# Patient Record
Sex: FEMALE | Race: WHITE | NOT HISPANIC OR LATINO | Employment: UNEMPLOYED | URBAN - METROPOLITAN AREA
[De-identification: names, ages, dates, MRNs, and addresses within clinical notes are randomized per-mention and may not be internally consistent; named-entity substitution may affect disease eponyms.]

---

## 2017-02-19 ENCOUNTER — HOSPITAL ENCOUNTER (EMERGENCY)
Facility: MEDICAL CENTER | Age: 47
End: 2017-02-19

## 2017-02-19 VITALS
BODY MASS INDEX: 26.36 KG/M2 | TEMPERATURE: 97.4 F | SYSTOLIC BLOOD PRESSURE: 137 MMHG | WEIGHT: 134.26 LBS | HEIGHT: 60 IN | DIASTOLIC BLOOD PRESSURE: 80 MMHG

## 2017-02-19 PROCEDURE — 302449 STATCHG TRIAGE ONLY (STATISTIC)

## 2017-03-04 ENCOUNTER — APPOINTMENT (OUTPATIENT)
Dept: RADIOLOGY | Facility: MEDICAL CENTER | Age: 47
End: 2017-03-04
Attending: EMERGENCY MEDICINE

## 2017-03-04 ENCOUNTER — HOSPITAL ENCOUNTER (EMERGENCY)
Facility: MEDICAL CENTER | Age: 47
End: 2017-03-04
Attending: EMERGENCY MEDICINE

## 2017-03-04 VITALS
HEIGHT: 60 IN | WEIGHT: 135.36 LBS | DIASTOLIC BLOOD PRESSURE: 89 MMHG | BODY MASS INDEX: 26.58 KG/M2 | RESPIRATION RATE: 16 BRPM | OXYGEN SATURATION: 99 % | SYSTOLIC BLOOD PRESSURE: 132 MMHG | HEART RATE: 104 BPM | TEMPERATURE: 96.6 F

## 2017-03-04 DIAGNOSIS — M43.6 TORTICOLLIS, ACUTE: ICD-10-CM

## 2017-03-04 DIAGNOSIS — F41.9 ANXIETY: ICD-10-CM

## 2017-03-04 PROCEDURE — 99283 EMERGENCY DEPT VISIT LOW MDM: CPT

## 2017-03-04 PROCEDURE — 700111 HCHG RX REV CODE 636 W/ 250 OVERRIDE (IP): Performed by: EMERGENCY MEDICINE

## 2017-03-04 PROCEDURE — 73030 X-RAY EXAM OF SHOULDER: CPT | Mod: LT

## 2017-03-04 PROCEDURE — 96372 THER/PROPH/DIAG INJ SC/IM: CPT

## 2017-03-04 RX ORDER — KETOROLAC TROMETHAMINE 30 MG/ML
30 INJECTION, SOLUTION INTRAMUSCULAR; INTRAVENOUS ONCE
Status: COMPLETED | OUTPATIENT
Start: 2017-03-04 | End: 2017-03-04

## 2017-03-04 RX ORDER — METHYLPREDNISOLONE 4 MG/1
TABLET ORAL
Qty: 1 KIT | Refills: 0 | Status: SHIPPED | OUTPATIENT
Start: 2017-03-04 | End: 2018-05-29

## 2017-03-04 RX ADMIN — KETOROLAC TROMETHAMINE 30 MG: 30 INJECTION, SOLUTION INTRAMUSCULAR; INTRAVENOUS at 15:35

## 2017-03-04 NOTE — ED AVS SNAPSHOT
365looks ([email or web address]) Access Code: [email or web address]  Expires: 3/12/2017 11:04 AM    365looks ([email or web address].me)  A secure, online tool to manage your health information     [employer]’s 365looks (Coqueta.me)® is a secure, online tool that connects you to your personalized health information from the privacy of your home -- day or night - making it very easy for you to manage your healthcare. Once the activation process is completed, you can even access your medical information using the 365looks (Coqueta.me) rodrigue, which is available for free in the Apple Rodrigue store or Google Play store.     365looks ([email or web address].me) provides the following levels of access (as shown below):   My Chart Features   [facility] Primary Care Doctor [facility]  Specialists [facility]  Urgent  Care Non-[facility]  Primary Care  Doctor   Email your healthcare team securely and privately 24/7 X X X X   Manage appointments: schedule your next appointment; view details of past/upcoming appointments X      Request prescription refills. X      View recent personal medical records, including lab and immunizations X X X X   View health record, including health history, allergies, medications X X X X   Read reports about your outpatient visits, procedures, consult and ER notes X X X X   See your discharge summary, which is a recap of your hospital and/or ER visit that includes your diagnosis, lab results, and care plan. X X       How to register for 365looks (Coqueta.me):  1. Go to  [email or web address].  2. Click on the Sign Up Now box, which takes you to the New Member Sign Up page. You will need to provide the following information:  a. Enter your 365looks (Coqueta.me) Access Code exactly as it appears at the top of this page. (You will not need to use this code after you’ve completed the sign-up process. If you do not sign up before the expiration date, you must request a new code.)   b. Enter your date of birth.   c. Enter your home email address.   d. Click Submit, and follow the next screen’s instructions.  3. Create a 365looks (Coqueta.me) ID. This will be your 365looks (Coqueta.me)  login ID and cannot be changed, so think of one that is secure and easy to remember.  4. Create a [employer] password. You can change your password at any time.  5. Enter your Password Reset Question and Answer. This can be used at a later time if you forget your password.   6. Enter your e-mail address. This allows you to receive e-mail notifications when new information is available in [employer].  7. Click Sign Up. You can now view your health information.    For assistance activating your [employer] account, call [phone]

## 2017-03-04 NOTE — ED NOTES
PT ambulated to yellow 64, per report pt has been having lt shoulder pain x 8 days, pt denies trauma

## 2017-03-04 NOTE — ED NOTES
"Olya Youssef  46 y.o.  Chief Complaint   Patient presents with   • Shoulder Pain     L shoulder x 8 days. Denies recent injury, states that she just woke up one day and it was hurting and it has been getting worse ever since \"the pain is so bad that I feel nauseated and can't barely breathe. My fingers feel all numb and tingly     Strong radial pulse palpated, pt was able to feel me touching her had. She does appear uncomfortable.   "

## 2017-03-04 NOTE — ED AVS SNAPSHOT
Home Care Instructions                                                                                                                Olya Youssef   MRN: [id number]    Department:  [facility], Emergency Dept   Date of Visit:  3/4/2017            [facility], Emergency Dept    [address] [address]    Phone:  [phone]      You were seen by     Miguelina Ritter M.D.      Your Diagnosis Was     Torticollis, acute     M43.6       These are the medications you received during your hospitalization from 03/04/2017 1340 to 03/04/2017 1612     Date/Time Order Dose Route Action    03/04/2017 1535 ketorolac (TORADOL) injection 30 mg 30 mg Intramuscular Given      Follow-up Information     1. Follow up with [facility]. Schedule an appointment as soon as possible for a visit in 2 days.    Contact information    [address] [address] [phone]        2. Follow up with Adrian Massey M.D.. Schedule an appointment as soon as possible for a visit in 2 days.    Specialty:  Orthopaedics    Contact information    [address]  [address]  [facility] [address] [phone]        Medication Information     Review all of your home medications and newly ordered medications with your primary doctor and/or pharmacist as soon as possible. Follow medication instructions as directed by your doctor and/or pharmacist.     Please keep your complete medication list with you and share with your physician. Update the information when medications are discontinued, doses are changed, or new medications (including over-the-counter products) are added; and carry medication information at all times in the event of emergency situations.               Medication List      ASK your doctor about these medications        Instructions    albuterol 108 (90 BASE) MCG/ACT Aers inhalation aerosol    Inhale 2 Puffs by mouth every 6 hours as needed for Shortness of  Breath.   Dose:  2 Puff       busPIRone 10 MG Tabs   Commonly known as:  BUSPAR    Take 20 mg by mouth 3 times a day.   Dose:  20 mg       CENTRUM ADULTS PO    Take 1 Tab by mouth every day.   Dose:  1 Tab       diclofenac EC 75 MG Tbec   Commonly known as:  VOLTAREN    Take 1 Tab by mouth 2 times a day as needed (pain).   Dose:  75 mg       fluoxetine 40 MG capsule   Commonly known as:  PROZAC    Take 40 mg by mouth every day.   Dose:  40 mg       gabapentin 300 MG Caps   Commonly known as:  NEURONTIN    Take 300 mg by mouth 3 times a day.   Dose:  300 mg       * MethylPREDNISolone 4 MG Tbpk   What changed:  Another medication with the same name was added. Make sure you understand how and when to take each.   Commonly known as:  MEDROL DOSEPAK   Ask about: Which instructions should I use?    Use as directed       * MethylPREDNISolone 4 MG Tbpk   What changed:  You were already taking a medication with the same name, and this prescription was added. Make sure you understand how and when to take each.   Commonly known as:  MEDROL DOSEPAK   Ask about: Which instructions should I use?    Use as directed       naltrexone 50 MG Tabs   Commonly known as:  DEPADE    Take 50 mg by mouth every day.   Dose:  50 mg       phenazopyridine 200 MG Tabs   Commonly known as:  PYRIDIUM    Take 1 Tab by mouth 3 times a day as needed for Moderate Pain.   Dose:  200 mg       VITAMIN B12 PO    Take 1 Tab by mouth every day.   Dose:  1 Tab       VITAMIN C PO    Take 1 Tab by mouth every day.   Dose:  1 Tab       * Notice:  This list has 2 medication(s) that are the same as other medications prescribed for you. Read the directions carefully, and ask your doctor or other care provider to review them with you.            Procedures and tests performed during your visit     DX-SHOULDER 2+ LEFT        Discharge Instructions       Acute Torticollis  Torticollis is a condition in which the muscles of the neck tighten (contract) abnormally,  causing the neck to twist and the head to move into an unnatural position. Torticollis that develops suddenly is called acute torticollis. If torticollis becomes chronic and is left untreated, the face and neck can become deformed.  CAUSES  This condition may be caused by:  · Sleeping in an awkward position (common).  · Extending or twisting the neck muscles beyond their normal position.  · Infection.  In some cases, the cause may not be known.  SYMPTOMS  Symptoms of this condition include:  · An unnatural position of the head.  · Neck pain.  · A limited ability to move the neck.  · Twisting of the neck to one side.  DIAGNOSIS  This condition is diagnosed with a physical exam. You may also have imaging tests, such as an X-ray, CT scan, or MRI.  TREATMENT  Treatment for this condition involves trying to relax the neck muscles. It may include:  · Medicines or shots.  · Physical therapy.  · Surgery. This may be done in severe cases.  HOME CARE INSTRUCTIONS  · Take medicines only as directed by your health care provider.  · Do stretching exercises and massage your neck as directed by your health care provider.  · Keep all follow-up visits as directed by your health care provider. This is important.  SEEK MEDICAL CARE IF:  · You develop a fever.  SEEK IMMEDIATE MEDICAL CARE IF:  · You develop difficulty breathing.  · You develop noisy breathing (stridor).  · You start drooling.  · You have trouble swallowing or have pain with swallowing.  · You develop numbness or weakness in your hands or feet.  · You have changes in your speech, understanding, or vision.  · Your pain gets worse.     This information is not intended to replace advice given to you by your health care provider. Make sure you discuss any questions you have with your health care provider.     Document Released: 12/15/2001 Document Revised: 05/03/2016 Document Reviewed: 12/14/2015  [employer] Interactive Patient Education ©2016 [employer]            Patient  Information     Patient Information    Following emergency treatment: all patient requiring follow-up care must return either to a private physician or a clinic if your condition worsens before you are able to obtain further medical attention, please return to the emergency room.     Billing Information    At [facility], we work to make the billing process streamlined for our patients.  Our Representatives are here to answer any questions you may have regarding your hospital bill.  If you have insurance coverage and have supplied your insurance information to us, we will submit a claim to your insurer on your behalf.  Should you have any questions regarding your bill, we can be reached online or by phone as follows:  Online: You are able pay your bills online or live chat with our representatives about any billing questions you may have. We are here to help Monday - Friday from 8:00am to 7:30pm and 9:00am - 12:00pm on Saturdays.  Please visit [email or web address]  for more information.   Phone:  [phone] or [phone]    Please note that your emergency physician, surgeon, pathologist, radiologist, anesthesiologist, and other specialists are not employed by [facility] and will therefore bill separately for their services.  Please contact them directly for any questions concerning their bills at the numbers below:     Emergency Physician Services:  [phone]  [facility]:  [phone]  [facility]:  [phone]  [facility]:  [phone]    1. Your final bill may vary from the amount quoted upon discharge if all procedures are not complete at that time, or if your doctor has additional procedures of which we are not aware. You will receive an additional bill if you return to the Emergency Department at [facility] for suture removal regardless of the facility of which the sutures were placed.     2. Please arrange for  settlement of this account at the emergency registration.    3. All self-pay accounts are due in full at the time of treatment.  If you are unable to meet this obligation then payment is expected within 4-5 days.     4. If you have had radiology studies (CT, X-ray, Ultrasound, MRI), you have received a preliminary result during your emergency department visit. Please contact the radiology department [phone] to receive a copy of your final result. Please discuss the Final result with your primary physician or with the follow up physician provided.     Crisis Hotline:  [place] Crisis Hotline:  1-778-PVELQDO or 1-297.421.8893  [place] Crisis Hotline:    1-911.127.2945 or 909-260-4130         ED Discharge Follow Up Questions    1. In order to provide you with very good care, we would like to follow up with a phone call in the next few days.  May we have your permission to contact you?     YES /  NO    2. What is the best phone number to call you? (       )_____-__________    3. What is the best time to call you?      Morning  /  Afternoon  /  Evening                   Patient Signature:  ____________________________________________________________    Date:  ____________________________________________________________

## 2017-03-04 NOTE — ED AVS SNAPSHOT
3/4/2017          Olya Youssef  [address]  [place] [address]    Dear Olya:    [facility] wants to ensure your discharge home is safe and you or your loved ones have had all your questions answered regarding your care after you leave the hospital.    You may receive a telephone call within two days of your discharge.  This call is to make certain you understand your discharge instructions as well as ensure we provided you with the best care possible during your stay with us.     The call will only last approximately 3-5 minutes and will be done by a nurse.    Once again, we want to ensure your discharge home is safe and that you have a clear understanding of any next steps in your care.  If you have any questions or concerns, please do not hesitate to contact us, we are here for you.  Thank you for choosing [facility] for your healthcare needs.    Sincerely,    Aamir Rod    [facility]

## 2017-03-04 NOTE — ED PROVIDER NOTES
"ED Provider Note    Scribed for Miguelina Ritter M.D. by Jhony Burdick. 3/4/2017, 3:28 PM.    Primary care provider: Pcp Pt States None  Means of arrival: Private vehicle   History obtained from: Patient   History limited by: None     CHIEF COMPLAINT  Chief Complaint   Patient presents with   • Shoulder Pain     L shoulder x 8 days. Denies recent injury, states that she just woke up one day and it was hurting and it has been getting worse ever since \"the pain is so bad that I feel nauseated and can't barely breathe. My fingers feel all numb and tingly     HPI  Olya Youssef is a 46 y.o. female who presents to the Emergency Department complaining of severe and worsening left shoulder pain  onset 8 days ago. She reports that her pain started after waking up and she suspected she just slept wrong. Her pain has worsened since onset since her pain has moved into her left arm. Patient notes that over the past 3 days she has developed left hand tingling. Associated symptoms include nausea. She denies associated vomiting, blunt force trauma, or falls. She has no alleviation of her pain with heat or ice.     REVIEW OF SYSTEMS  Pertinent positives include shoulder pain, upper back pain, nausea, and tingling.   Pertinent negatives include no vomiting, blunt force shoulder trauma, or falls.  E.      PAST MEDICAL HISTORY   has a past medical history of Pancreatitis chronic; Anxiety; Liver disease; Hepatitis, alcoholic; Unspecified hemorrhagic conditions (CMS-[facility]); Infectious disease (MRSA); Bipolar disorder, unspecified (CMS-[facility]); Psychiatric disorder; Alcoholism (CMS-[facility]); Seizure (CMS-[facility]); Hypertension; Bronchitis; Pneumonia; Indigestion; Unspecified urinary incontinence; Backpain; Renal disorder; Seizure disorder (CMS-[facility]); Alcohol abuse; and Drug abuse.    SURGICAL HISTORY   has past surgical history that includes gastroscopy (4/28/2015).    SOCIAL HISTORY  Social History   Substance Use Topics   • Smoking status: " Never Smoker    • Smokeless tobacco: Never Used   • Alcohol Use: Yes      Comment: Hx heavy drinking      History   Drug Use   • Yes     Comment: stopped meth 12/2015     FAMILY HISTORY  No pertinent family history     CURRENT MEDICATIONS  No current facility-administered medications on file prior to encounter.     Current Outpatient Prescriptions on File Prior to Encounter   Medication Sig Dispense Refill   • MethylPREDNISolone (MEDROL DOSEPAK) 4 MG Tablet Therapy Pack Use as directed 1 Kit 0   • albuterol 108 (90 BASE) MCG/ACT Aero Soln inhalation aerosol Inhale 2 Puffs by mouth every 6 hours as needed for Shortness of Breath. 1 Inhaler 0   • naltrexone (DEPADE) 50 MG Tab Take 50 mg by mouth every day.     • busPIRone (BUSPAR) 10 MG Tab Take 20 mg by mouth 3 times a day.     • fluoxetine (PROZAC) 40 MG capsule Take 40 mg by mouth every day.     • Multiple Vitamins-Minerals (CENTRUM ADULTS PO) Take 1 Tab by mouth every day.     • Ascorbic Acid (VITAMIN C PO) Take 1 Tab by mouth every day.     • Cyanocobalamin (VITAMIN B12 PO) Take 1 Tab by mouth every day.     • phenazopyridine (PYRIDIUM) 200 MG Tab Take 1 Tab by mouth 3 times a day as needed for Moderate Pain. 6 Tab 0   • diclofenac EC (VOLTAREN) 75 MG Tablet Delayed Response Take 1 Tab by mouth 2 times a day as needed (pain). 24 Tab 0   • gabapentin (NEURONTIN) 300 MG CAPS Take 300 mg by mouth 3 times a day.       ALLERGIES  Allergies   Allergen Reactions   • Bactrim Hives   • Cephalexin [Keflex] Hives   • Lamotrigine [Lamictal] Hives   • Quetiapine Fumarate Hives     Extrapyramidal Symptoms   • Quetiapine Fumarate      Extrapyramidal symptoms   • Sulfamethoxazole-Trimethoprim Hives     PHYSICAL EXAM  VITAL SIGNS: /90 mmHg  Pulse 123  Temp(Src) 36.1 °C (96.9 °F) (Temporal)  Resp 14  Ht 1.524 m (5')  Wt 61.4 kg (135 lb 5.8 oz)  BMI 26.44 kg/m2  SpO2 99%  Constitutional: Alert and in no apparent distress.  HENT: Normocephalic, Bilateral external ears  normal. Nose normal.   Eyes: Pupils are equal and reactive. Conjunctiva normal, non-icteric.   Cardiovascular:  Good Perfusion, good pulse in the LUE.   Thorax & Lungs: Easy unlabored respirations  Abdomen:  No pain with movement   Skin: Visualized skin is  Dry, No erythema, No rash.   Extremities:  Trapezius tenderness  Neurologic: Alert, Grossly non-focal. Neurovascularly intact distally. Good strength in the LUE.   Psychiatric: Anxious affect.      DIAGNOSTIC STUDIES / PROCEDURES  RADIOLOGY  DX-SHOULDER 2+ LEFT   Final Result      Unremarkable shoulder series.                  INTERPRETING LOCATION:  [address] [place] [address], [address]        The radiologist's interpretation of all radiological studies have been reviewed by me.    COURSE & MEDICAL DECISION MAKING  Nursing notes and vital signs were reviewed. (See chart for details)  The patient's  records were reviewed , history was obtained from the patient.     The patient presents with shoulder pain and the differential diagnosis includes but is not limited to dislocation vs fracture vs strain.       3:28 PM Initial orders in the Emergency Department included shoulder x ray and initial treatment in the Emergency Department included Toradol.     ED testing reveals no fracture or dislocation. The patient understands the plan.     The patient was discharged home with an information sheet on torticollis and told to return immediately for any signs or symptoms listed. The patient agreed to the discharge precautions and follow-up plan which is documented in EPIC.    DISPOSITION:  Patient will be discharged home in stable condition.    FOLLOW UP:  [address] [address] [phone]  Schedule an appointment as soon as possible for a visit in 2 days      Adrian Massey M.D.  [address]  [address]  [facility] [address] [phone]    Schedule an appointment as soon as possible for a visit in 2 days      OUTPATIENT  MEDICATIONS:  Discharge Medication List as of 3/4/2017  4:12 PM      START taking these medications    Details   !! MethylPREDNISolone (MEDROL DOSEPAK) 4 MG Tablet Therapy Pack Use as directed, Disp-1 Kit, R-0, Print Rx Paper       !! - Potential duplicate medications found. Please discuss with provider.        FINAL IMPRESSION  1. Torticollis, acute    2. Anxiety        I, Jhony Burdick (Scribe), am scribing for, and in the presence of, Miguelina Ritter M.D..    Electronically signed by: Jhony Burdick (Scribe), 3/4/2017    IMiguelina M.D. personally performed the services described in this documentation, as scribed by Jhony Burdick in my presence, and it is both accurate and complete.    The note accurately reflects work and decisions made by me.  Miguelina Ritter  3/4/2017  8:25 PM

## 2017-03-05 NOTE — DISCHARGE INSTRUCTIONS
Acute Torticollis  Torticollis is a condition in which the muscles of the neck tighten (contract) abnormally, causing the neck to twist and the head to move into an unnatural position. Torticollis that develops suddenly is called acute torticollis. If torticollis becomes chronic and is left untreated, the face and neck can become deformed.  CAUSES  This condition may be caused by:  · Sleeping in an awkward position (common).  · Extending or twisting the neck muscles beyond their normal position.  · Infection.  In some cases, the cause may not be known.  SYMPTOMS  Symptoms of this condition include:  · An unnatural position of the head.  · Neck pain.  · A limited ability to move the neck.  · Twisting of the neck to one side.  DIAGNOSIS  This condition is diagnosed with a physical exam. You may also have imaging tests, such as an X-ray, CT scan, or MRI.  TREATMENT  Treatment for this condition involves trying to relax the neck muscles. It may include:  · Medicines or shots.  · Physical therapy.  · Surgery. This may be done in severe cases.  HOME CARE INSTRUCTIONS  · Take medicines only as directed by your health care provider.  · Do stretching exercises and massage your neck as directed by your health care provider.  · Keep all follow-up visits as directed by your health care provider. This is important.  SEEK MEDICAL CARE IF:  · You develop a fever.  SEEK IMMEDIATE MEDICAL CARE IF:  · You develop difficulty breathing.  · You develop noisy breathing (stridor).  · You start drooling.  · You have trouble swallowing or have pain with swallowing.  · You develop numbness or weakness in your hands or feet.  · You have changes in your speech, understanding, or vision.  · Your pain gets worse.     This information is not intended to replace advice given to you by your health care provider. Make sure you discuss any questions you have with your health care provider.     Document Released: 12/15/2001 Document Revised:  05/03/2016 Document Reviewed: 12/14/2015  [employer] Interactive Patient Education ©2016 [employer]

## 2017-07-07 ENCOUNTER — HOSPITAL ENCOUNTER (EMERGENCY)
Facility: MEDICAL CENTER | Age: 47
End: 2017-07-07

## 2017-07-07 VITALS
HEIGHT: 60 IN | SYSTOLIC BLOOD PRESSURE: 121 MMHG | HEART RATE: 109 BPM | DIASTOLIC BLOOD PRESSURE: 80 MMHG | TEMPERATURE: 97.8 F | RESPIRATION RATE: 18 BRPM | OXYGEN SATURATION: 99 %

## 2017-07-07 PROCEDURE — 302449 STATCHG TRIAGE ONLY (STATISTIC)

## 2018-05-29 ENCOUNTER — HOSPITAL ENCOUNTER (INPATIENT)
Facility: MEDICAL CENTER | Age: 48
LOS: 4 days | DRG: 918 | End: 2018-06-02
Attending: EMERGENCY MEDICINE | Admitting: HOSPITALIST
Payer: MEDICAID

## 2018-05-29 PROBLEM — F15.10 METHAMPHETAMINE ABUSE (HCC): Status: ACTIVE | Noted: 2018-05-29

## 2018-05-29 PROBLEM — R94.31 ABNORMAL QT INTERVAL PRESENT ON ELECTROCARDIOGRAM: Status: ACTIVE | Noted: 2018-05-29

## 2018-05-29 PROBLEM — T50.912A SUICIDE ATTEMPT BY MULTIPLE DRUG OVERDOSE (HCC): Status: ACTIVE | Noted: 2018-05-29

## 2018-05-29 LAB
ALBUMIN SERPL BCP-MCNC: 4.3 G/DL (ref 3.2–4.9)
ALBUMIN/GLOB SERPL: 1.2 G/DL
ALP SERPL-CCNC: 89 U/L (ref 30–99)
ALT SERPL-CCNC: 28 U/L (ref 2–50)
AMMONIA PLAS-SCNC: 20 UMOL/L (ref 11–45)
AMPHET UR QL SCN: POSITIVE
ANION GAP SERPL CALC-SCNC: 13 MMOL/L (ref 0–11.9)
APAP SERPL-MCNC: 15 UG/ML (ref 10–30)
AST SERPL-CCNC: 26 U/L (ref 12–45)
BARBITURATES UR QL SCN: NEGATIVE
BASOPHILS # BLD AUTO: 0.6 % (ref 0–1.8)
BASOPHILS # BLD: 0.03 K/UL (ref 0–0.12)
BENZODIAZ UR QL SCN: NEGATIVE
BILIRUB SERPL-MCNC: 0.3 MG/DL (ref 0.1–1.5)
BUN SERPL-MCNC: 12 MG/DL (ref 8–22)
BZE UR QL SCN: NEGATIVE
CALCIUM SERPL-MCNC: 9.2 MG/DL (ref 8.5–10.5)
CANNABINOIDS UR QL SCN: NEGATIVE
CHLORIDE SERPL-SCNC: 104 MMOL/L (ref 96–112)
CO2 SERPL-SCNC: 21 MMOL/L (ref 20–33)
CREAT SERPL-MCNC: 0.96 MG/DL (ref 0.5–1.4)
EKG IMPRESSION: NORMAL
EOSINOPHIL # BLD AUTO: 0 K/UL (ref 0–0.51)
EOSINOPHIL NFR BLD: 0 % (ref 0–6.9)
ERYTHROCYTE [DISTWIDTH] IN BLOOD BY AUTOMATED COUNT: 45.3 FL (ref 35.9–50)
ETHANOL BLD-MCNC: 0.15 G/DL
GLOBULIN SER CALC-MCNC: 3.6 G/DL (ref 1.9–3.5)
GLUCOSE SERPL-MCNC: 126 MG/DL (ref 65–99)
HCG SERPL QL: NEGATIVE
HCT VFR BLD AUTO: 43.9 % (ref 37–47)
HGB BLD-MCNC: 14.8 G/DL (ref 12–16)
IMM GRANULOCYTES # BLD AUTO: 0.03 K/UL (ref 0–0.11)
IMM GRANULOCYTES NFR BLD AUTO: 0.6 % (ref 0–0.9)
LYMPHOCYTES # BLD AUTO: 0.87 K/UL (ref 1–4.8)
LYMPHOCYTES NFR BLD: 16.8 % (ref 22–41)
MCH RBC QN AUTO: 32.5 PG (ref 27–33)
MCHC RBC AUTO-ENTMCNC: 33.7 G/DL (ref 33.6–35)
MCV RBC AUTO: 96.3 FL (ref 81.4–97.8)
METHADONE UR QL SCN: NEGATIVE
MONOCYTES # BLD AUTO: 0.08 K/UL (ref 0–0.85)
MONOCYTES NFR BLD AUTO: 1.5 % (ref 0–13.4)
NEUTROPHILS # BLD AUTO: 4.16 K/UL (ref 2–7.15)
NEUTROPHILS NFR BLD: 80.5 % (ref 44–72)
NRBC # BLD AUTO: 0 K/UL
NRBC BLD-RTO: 0 /100 WBC
OPIATES UR QL SCN: NEGATIVE
OXYCODONE UR QL SCN: NEGATIVE
PCP UR QL SCN: NEGATIVE
PLATELET # BLD AUTO: 240 K/UL (ref 164–446)
PMV BLD AUTO: 8.5 FL (ref 9–12.9)
POTASSIUM SERPL-SCNC: 3.4 MMOL/L (ref 3.6–5.5)
PROPOXYPH UR QL SCN: NEGATIVE
PROT SERPL-MCNC: 7.9 G/DL (ref 6–8.2)
RBC # BLD AUTO: 4.56 M/UL (ref 4.2–5.4)
SALICYLATES SERPL-MCNC: 0 MG/DL (ref 15–25)
SODIUM SERPL-SCNC: 138 MMOL/L (ref 135–145)
TSH SERPL DL<=0.005 MIU/L-ACNC: 0.42 UIU/ML (ref 0.38–5.33)
WBC # BLD AUTO: 5.2 K/UL (ref 4.8–10.8)

## 2018-05-29 PROCEDURE — HZ2ZZZZ DETOXIFICATION SERVICES FOR SUBSTANCE ABUSE TREATMENT: ICD-10-PCS | Performed by: HOSPITALIST

## 2018-05-29 PROCEDURE — 770020 HCHG ROOM/CARE - TELE (206)

## 2018-05-29 PROCEDURE — 80307 DRUG TEST PRSMV CHEM ANLYZR: CPT

## 2018-05-29 PROCEDURE — 80053 COMPREHEN METABOLIC PANEL: CPT

## 2018-05-29 PROCEDURE — 700105 HCHG RX REV CODE 258: Performed by: EMERGENCY MEDICINE

## 2018-05-29 PROCEDURE — 93005 ELECTROCARDIOGRAM TRACING: CPT | Performed by: EMERGENCY MEDICINE

## 2018-05-29 PROCEDURE — 36415 COLL VENOUS BLD VENIPUNCTURE: CPT

## 2018-05-29 PROCEDURE — 99223 1ST HOSP IP/OBS HIGH 75: CPT | Performed by: HOSPITALIST

## 2018-05-29 PROCEDURE — 84443 ASSAY THYROID STIM HORMONE: CPT

## 2018-05-29 PROCEDURE — 80074 ACUTE HEPATITIS PANEL: CPT

## 2018-05-29 PROCEDURE — 82140 ASSAY OF AMMONIA: CPT

## 2018-05-29 PROCEDURE — 304561 HCHG STAT O2

## 2018-05-29 PROCEDURE — 85025 COMPLETE CBC W/AUTO DIFF WBC: CPT

## 2018-05-29 PROCEDURE — 96365 THER/PROPH/DIAG IV INF INIT: CPT

## 2018-05-29 PROCEDURE — 96361 HYDRATE IV INFUSION ADD-ON: CPT

## 2018-05-29 PROCEDURE — 84703 CHORIONIC GONADOTROPIN ASSAY: CPT

## 2018-05-29 PROCEDURE — 99285 EMERGENCY DEPT VISIT HI MDM: CPT

## 2018-05-29 RX ORDER — LORAZEPAM 2 MG/ML
1.5 INJECTION INTRAMUSCULAR
Status: DISCONTINUED | OUTPATIENT
Start: 2018-05-29 | End: 2018-05-30

## 2018-05-29 RX ORDER — LORAZEPAM 1 MG/1
1 TABLET ORAL EVERY 4 HOURS PRN
Status: DISCONTINUED | OUTPATIENT
Start: 2018-05-29 | End: 2018-05-30

## 2018-05-29 RX ORDER — SODIUM CHLORIDE 9 MG/ML
1000 INJECTION, SOLUTION INTRAVENOUS ONCE
Status: COMPLETED | OUTPATIENT
Start: 2018-05-29 | End: 2018-05-29

## 2018-05-29 RX ORDER — ALBUTEROL SULFATE 90 UG/1
2 AEROSOL, METERED RESPIRATORY (INHALATION) EVERY 4 HOURS PRN
Status: DISCONTINUED | OUTPATIENT
Start: 2018-05-29 | End: 2018-06-02 | Stop reason: HOSPADM

## 2018-05-29 RX ORDER — LORAZEPAM 2 MG/ML
2 INJECTION INTRAMUSCULAR
Status: DISCONTINUED | OUTPATIENT
Start: 2018-05-29 | End: 2018-05-30

## 2018-05-29 RX ORDER — BISACODYL 10 MG
10 SUPPOSITORY, RECTAL RECTAL
Status: DISCONTINUED | OUTPATIENT
Start: 2018-05-29 | End: 2018-06-02 | Stop reason: HOSPADM

## 2018-05-29 RX ORDER — ACETAMINOPHEN 325 MG/1
650 TABLET ORAL EVERY 6 HOURS PRN
Status: DISCONTINUED | OUTPATIENT
Start: 2018-05-29 | End: 2018-06-02 | Stop reason: HOSPADM

## 2018-05-29 RX ORDER — POLYETHYLENE GLYCOL 3350 17 G/17G
1 POWDER, FOR SOLUTION ORAL
Status: DISCONTINUED | OUTPATIENT
Start: 2018-05-29 | End: 2018-06-02 | Stop reason: HOSPADM

## 2018-05-29 RX ORDER — LORAZEPAM 1 MG/1
0.5 TABLET ORAL EVERY 4 HOURS PRN
Status: DISCONTINUED | OUTPATIENT
Start: 2018-05-29 | End: 2018-05-30

## 2018-05-29 RX ORDER — PROMETHAZINE HYDROCHLORIDE 25 MG/1
12.5-25 TABLET ORAL EVERY 4 HOURS PRN
Status: DISCONTINUED | OUTPATIENT
Start: 2018-05-29 | End: 2018-06-02 | Stop reason: HOSPADM

## 2018-05-29 RX ORDER — LORAZEPAM 2 MG/ML
0.5 INJECTION INTRAMUSCULAR EVERY 4 HOURS PRN
Status: DISCONTINUED | OUTPATIENT
Start: 2018-05-29 | End: 2018-05-30

## 2018-05-29 RX ORDER — LORAZEPAM 1 MG/1
2 TABLET ORAL
Status: DISCONTINUED | OUTPATIENT
Start: 2018-05-29 | End: 2018-05-30

## 2018-05-29 RX ORDER — ONDANSETRON 4 MG/1
4 TABLET, ORALLY DISINTEGRATING ORAL EVERY 4 HOURS PRN
Status: DISCONTINUED | OUTPATIENT
Start: 2018-05-29 | End: 2018-06-02 | Stop reason: HOSPADM

## 2018-05-29 RX ORDER — SODIUM CHLORIDE 9 MG/ML
INJECTION, SOLUTION INTRAVENOUS CONTINUOUS
Status: DISCONTINUED | OUTPATIENT
Start: 2018-05-29 | End: 2018-06-01

## 2018-05-29 RX ORDER — ALBUTEROL SULFATE 90 UG/1
2 AEROSOL, METERED RESPIRATORY (INHALATION) EVERY 4 HOURS PRN
COMMUNITY
End: 2018-07-11

## 2018-05-29 RX ORDER — LORAZEPAM 1 MG/1
3 TABLET ORAL
Status: DISCONTINUED | OUTPATIENT
Start: 2018-05-29 | End: 2018-05-30

## 2018-05-29 RX ORDER — LORAZEPAM 1 MG/1
4 TABLET ORAL
Status: DISCONTINUED | OUTPATIENT
Start: 2018-05-29 | End: 2018-05-30

## 2018-05-29 RX ORDER — AMOXICILLIN 250 MG
2 CAPSULE ORAL 2 TIMES DAILY
Status: DISCONTINUED | OUTPATIENT
Start: 2018-05-29 | End: 2018-06-02 | Stop reason: HOSPADM

## 2018-05-29 RX ORDER — NALOXONE HYDROCHLORIDE 0.4 MG/ML
0.4 INJECTION, SOLUTION INTRAMUSCULAR; INTRAVENOUS; SUBCUTANEOUS ONCE
Status: DISPENSED | OUTPATIENT
Start: 2018-05-29 | End: 2018-05-30

## 2018-05-29 RX ORDER — ONDANSETRON 2 MG/ML
4 INJECTION INTRAMUSCULAR; INTRAVENOUS EVERY 4 HOURS PRN
Status: DISCONTINUED | OUTPATIENT
Start: 2018-05-29 | End: 2018-06-02 | Stop reason: HOSPADM

## 2018-05-29 RX ORDER — LORAZEPAM 2 MG/ML
1 INJECTION INTRAMUSCULAR
Status: DISCONTINUED | OUTPATIENT
Start: 2018-05-29 | End: 2018-05-30

## 2018-05-29 RX ORDER — PROMETHAZINE HYDROCHLORIDE 25 MG/1
12.5-25 SUPPOSITORY RECTAL EVERY 4 HOURS PRN
Status: DISCONTINUED | OUTPATIENT
Start: 2018-05-29 | End: 2018-06-02 | Stop reason: HOSPADM

## 2018-05-29 RX ADMIN — SODIUM CHLORIDE 1000 ML: 9 INJECTION, SOLUTION INTRAVENOUS at 16:35

## 2018-05-29 RX ADMIN — SODIUM CHLORIDE 1000 ML: 9 INJECTION, SOLUTION INTRAVENOUS at 18:01

## 2018-05-29 ASSESSMENT — COPD QUESTIONNAIRES
DO YOU EVER COUGH UP ANY MUCUS OR PHLEGM?: NO/ONLY WITH OCCASIONAL COLDS OR INFECTIONS
COPD SCREENING SCORE: 0
DURING THE PAST 4 WEEKS HOW MUCH DID YOU FEEL SHORT OF BREATH: NONE/LITTLE OF THE TIME
HAVE YOU SMOKED AT LEAST 100 CIGARETTES IN YOUR ENTIRE LIFE: NO/DON'T KNOW

## 2018-05-29 ASSESSMENT — LIFESTYLE VARIABLES: EVER_SMOKED: NEVER

## 2018-05-29 ASSESSMENT — PAIN SCALES - GENERAL: PAINLEVEL_OUTOF10: 0

## 2018-05-29 NOTE — ED PROVIDER NOTES
ED Provider Note    Scribed for Dre Atkinson D.O. by Ricky Scott. 5/29/2018  3:44 PM    Primary care provider: Pcp Pt States None  Means of arrival: Ambulance  History obtained from: Nursing notes  History limited by: Patient's altered levels of consciousness    CHIEF COMPLAINT  Chief Complaint   Patient presents with   • Suicidal Ideation   • Drug Overdose     trazadone, lisinopril, prozac - unknown doses   • ALOC     obtunded - patient alert to painful stimuli.       HPI  Olya Youssef is a 47 y.o. female who presents to the Emergency Department for evaluation of altered levels oc consciousness status post suicide attempt today at 2:30 PM. Per nursing notes, bystanders at the patient's motel, which is her place of residence, reported the patient took Trazodone, Lisinopril, and Prozac at unknown dosages in an attempt to harm herself. Upon arrival, the patient is somnolent but is responsive to painful stimuli. She is requiring 2L of oxygen supplementation by nasal cannula on exam.    Further history is limited because the patient's altered levels of consciousness    REVIEW OF SYSTEMS  Pertinent positives include intentional drug overdose, altered levels of consciousness. See HPI for further details. C.    Further ROS is limited because the patient's altered levels of consciousness.     PAST MEDICAL HISTORY  Past Medical History:   Diagnosis Date   • Alcohol abuse    • Alcoholism (CMS-[facility])    • Anxiety    • Backpain    • Bipolar disorder, unspecified (CMS-[facility])    • Bronchitis    • Drug abuse     meth, crack cocaine, THC   • Hepatitis, alcoholic    • Hypertension     controlled   • Indigestion    • Infectious disease MRSA   • Liver disease     pancreatitis   • Pancreatitis chronic     Flare-up   • Pneumonia    • Psychiatric disorder     suicidal in past   • Renal disorder     kidney infection 1991   • Seizure (CMS-[facility])     7/8/2011   • Seizure disorder (CMS-[facility])    • Unspecified hemorrhagic conditions  (CMS-[facility])    • Unspecified urinary incontinence        SURGICAL HISTORY  Past Surgical History:   Procedure Laterality Date   • GASTROSCOPY  4/28/2015    Performed by Kevin Rendon M.D. at SURGERY [facility]        SOCIAL HISTORY  Social History   Substance Use Topics   • Smoking status: Never Smoker   • Smokeless tobacco: Never Used   • Alcohol use Yes      Comment: Hx heavy drinking      History   Drug Use     Comment: stopped meth 12/2015       FAMILY HISTORY  No history pertinent to complaint.     CURRENT MEDICATIONS  No current facility-administered medications on file prior to encounter.      No current outpatient prescriptions on file prior to encounter.      ALLERGIES  Allergies   Allergen Reactions   • Bactrim Hives   • Cephalexin [Keflex] Hives   • Lamotrigine [Lamictal] Hives   • Quetiapine Fumarate Hives     Extrapyramidal Symptoms   • Quetiapine Fumarate      Extrapyramidal symptoms   • Sulfamethoxazole-Trimethoprim Hives       PHYSICAL EXAM  VITAL SIGNS: /83   Pulse 69   Temp 36.4 °C (97.6 °F)   Resp 16   Ht 1.524 m (5')   Wt 80 kg (176 lb 5.9 oz)   SpO2 96%   BMI 34.44 kg/m²     Constitutional: Obtunded and moaning.   HENT: Normocephalic, Atraumatic, tympanic membranes normal, moist mucous membranes, No oral exudates, no rhinorrhea.  Eyes: PERRLA at 2 mm, EOMI, Conjunctiva normal, No discharge.   Neck: Normal range of motion, No cervical spine tenderness, Supple, No meningeus, No stridor.  Cardiovascular: Tachycardic, Normal rhythm, No murmurs, No rubs, No gallops.   Thorax & Lungs:  No respiratory distress, No rales, No rhonchi, No wheezing, No chest tenderness.   Abdomen: Bowel sounds normal, Soft, No tenderness, No guarding, No rebound, No masses, No pulsatile masses.   Skin: Warm, Dry, No erythema, No rash.   Back: No thoracic or lumbar spinetenderness, No CVA tenderness.   Extremities: No edema, No evidence of deformity, Full range of motion,  Neurologic: Obtunded. Moaning.  Responsive to painful stimulus. Moving upper and lower extremities. Telling her name and that she overdosed on medication earlier today      DIAGNOSTIC STUDIES/PROCEDURES    LABS  Results for orders placed or performed during the hospital encounter of 05/29/18   Blood Alcohol   Result Value Ref Range    Diagnostic Alcohol 0.15 (H) 0.00 g/dL   CBC WITH DIFFERENTIAL   Result Value Ref Range    WBC 5.2 4.8 - 10.8 K/uL    RBC 4.56 4.20 - 5.40 M/uL    Hemoglobin 14.8 12.0 - 16.0 g/dL    Hematocrit 43.9 37.0 - 47.0 %    MCV 96.3 81.4 - 97.8 fL    MCH 32.5 27.0 - 33.0 pg    MCHC 33.7 33.6 - 35.0 g/dL    RDW 45.3 35.9 - 50.0 fL    Platelet Count 240 164 - 446 K/uL    MPV 8.5 (L) 9.0 - 12.9 fL    Neutrophils-Polys 80.50 (H) 44.00 - 72.00 %    Lymphocytes 16.80 (L) 22.00 - 41.00 %    Monocytes 1.50 0.00 - 13.40 %    Eosinophils 0.00 0.00 - 6.90 %    Basophils 0.60 0.00 - 1.80 %    Immature Granulocytes 0.60 0.00 - 0.90 %    Nucleated RBC 0.00 /100 WBC    Neutrophils (Absolute) 4.16 2.00 - 7.15 K/uL    Lymphs (Absolute) 0.87 (L) 1.00 - 4.80 K/uL    Monos (Absolute) 0.08 0.00 - 0.85 K/uL    Eos (Absolute) 0.00 0.00 - 0.51 K/uL    Baso (Absolute) 0.03 0.00 - 0.12 K/uL    Immature Granulocytes (abs) 0.03 0.00 - 0.11 K/uL    NRBC (Absolute) 0.00 K/uL   COMP METABOLIC PANEL   Result Value Ref Range    Sodium 138 135 - 145 mmol/L    Potassium 3.4 (L) 3.6 - 5.5 mmol/L    Chloride 104 96 - 112 mmol/L    Co2 21 20 - 33 mmol/L    Anion Gap 13.0 (H) 0.0 - 11.9    Glucose 126 (H) 65 - 99 mg/dL    Bun 12 8 - 22 mg/dL    Creatinine 0.96 0.50 - 1.40 mg/dL    Calcium 9.2 8.5 - 10.5 mg/dL    AST(SGOT) 26 12 - 45 U/L    ALT(SGPT) 28 2 - 50 U/L    Alkaline Phosphatase 89 30 - 99 U/L    Total Bilirubin 0.3 0.1 - 1.5 mg/dL    Albumin 4.3 3.2 - 4.9 g/dL    Total Protein 7.9 6.0 - 8.2 g/dL    Globulin 3.6 (H) 1.9 - 3.5 g/dL    A-G Ratio 1.2 g/dL   HCG QUAL SERUM   Result Value Ref Range    Beta-Hcg Qualitative Serum Negative Negative   Acetaminophen  Level   Result Value Ref Range    Acetaminophen -Tylenol 15 10 - 30 ug/mL   SALICYLATE LEVEL   Result Value Ref Range    Salicylates, Quant. 0 (L) 15 - 25 mg/dL   AMMONIA   Result Value Ref Range    Ammonia 20 11 - 45 umol/L   ESTIMATED GFR   Result Value Ref Range    GFR If African American >60 >60 mL/min/1.73 m 2    GFR If Non African American >60 >60 mL/min/1.73 m 2   EKG (ER)   Result Value Ref Range    Report       [facility] Emergency Dept.    Test Date:  2018  Pt Name:    MANAS DELGADO              Department: ER  MRN:        [id number]                      Room:        12  Gender:     Female                       Technician: [id number]  :        1970                   Requested By:DANIELE SINHA  Order #:    [id number]                    Reading MD: DANIELE SINHA DO    Measurements  Intervals                                Axis  Rate:       93                           P:          61  LA:         124                          QRS:        7  QRSD:       82                           T:          -43  QT:         408  QTc:        508    Interpretive Statements  SINUS RHYTHM  NONSPECIFIC T ABNORMALITIES, DIFFUSE LEADS  BORDERLINE PROLONGED QT INTERVAL  Compared to ECG 2015 18:14:24  T-wave abnormality now present  Sinus tachycardia no longer present  Myocardial infarct finding no longer present    Electronically Signed On 2018 19:41:08 PDT by MCKAY SINHA DO        All labs reviewed by me.    COURSE & MEDICAL DECISION MAKING  Nursing notes, VS, PMSFHx reviewed in chart.    4:40 PM - Patient seen and examined at bedside. Patient will be treated with Narcan 0.4 mg and NS infusion 1000 mL. The patient is treated with IV fluids secondary to tachycardia and hypotension.  Ordered EKG, ammonia, HCG Qual Serum, Acetaminophen level, Salicylate level, urine drug screen, blood alcohol, CBC with differential, CMP to evaluate her symptoms.     5:41 PM - Informed  "patient remains hypotensive at 76/42 after treatment with 1L IV fluids. I will recheck the patient.     5:58 PM - Reevaluated the patient at bedside. The patient is easily awakened responsive to some questions with snoring respirations. Her blood pressure is improved to 96/55 and her heart rate is improved to the low 90's. I will consult Poison Control.    6:10 PM - I discussed the patient's case and the above findings with Poison Control (Case # [id number]) who recommends the patient is admitted for further observation.      6:16 PM - Paged Hospitalist    6:41 PM - I discussed the patient's case and the above findings with Dr. Schmitz (Hospitalist) who agrees to admit the patient.      CRITICAL CARE  The very real possibilty of a deterioration of this patient's condition required the highest level of my preparedness for sudden, emergent intervention.  I provided critical care services, which included medication orders, frequent reevaluations of the patient's condition and response to treatment, ordering and reviewing test results, and discussing the case with various consultants.  The critical care time associated with the care of the patient was 35 minutes. Review chart for interventions. This time is exclusive of any other billable procedures.      This is a charming 47 y.o. female that presents with overdose of possible lisinopril, trazodone and Seroquel. The patient initially her normal blood pressures at time responded to painful stimulation. On repeat evaluation, the patient became hypotensive requiring IV fluid resuscitation. She received 2 L normal saline IV fluid boluses with significant. Her blood pressure now she has normal blood pressure. The patient is metabolizing her overdose and now is able to converse with me and is asking me to \"F off\" as she is asking for water. I discussed the patient with poison control who agrees the patient should be observed. She has no evidence of acetaminophen or salicylate " overdose, she has no significant anion gap are non-anion gap acidosis, she has no evidence of prolonged QTC or widened QRS complex suspicious for TCA overdose or other significant cardiac abnormality. The patient has been admitted to Dr. Schmitz for further evaluation and management. The patient does have evidence of alcohol intoxication as well and we'll allow her to metabolize alcohol.    DISPOSITION:  Patient will be admitted to Dr. Schmitz in guarded condition.       FINAL IMPRESSION  Overdose   Alter mental status  Alcohol intoxication  1. Critical Care Time: 35 minutes. This time is exclusive of any other billable procedures.     Ricky LUNA (Scribe), am scribing for, and in the presence of, Dre Atkinson D.O.    Electronically signed by: Ricky Scott (Scribe), 5/29/2018    Dre LUNA D.O. personally performed the services described in this documentation, as scribed by Ricky Scott in my presence, and it is both accurate and complete.    The note accurately reflects work and decisions made by me.  Dre Atkinson  5/29/2018  7:44 PM

## 2018-05-29 NOTE — ED TRIAGE NOTES
Olya Youssef  47 y.o.  Chief Complaint   Patient presents with   • Suicidal Ideation   • Drug Overdose     trazadone, lisinopril, prozac - unknown doses   • ALOC     obtunded - patient alert to painful stimuli.     Patient bib EMS, PIV placed, picked up from place of residence at [facility], bystanders reported patient took medication - unknown dosage, at approx 1430 in attempt to harm self.      Patient obtunded on arrival, alert to painful stimuli, reports took medication in attempt to harm self.      Chart up for ERP.

## 2018-05-30 LAB
ALBUMIN SERPL BCP-MCNC: 3.7 G/DL (ref 3.2–4.9)
ALBUMIN/GLOB SERPL: 1.3 G/DL
ALP SERPL-CCNC: 70 U/L (ref 30–99)
ALT SERPL-CCNC: 19 U/L (ref 2–50)
ANION GAP SERPL CALC-SCNC: 8 MMOL/L (ref 0–11.9)
AST SERPL-CCNC: 19 U/L (ref 12–45)
BASOPHILS # BLD AUTO: 0.5 % (ref 0–1.8)
BASOPHILS # BLD: 0.03 K/UL (ref 0–0.12)
BILIRUB SERPL-MCNC: 0.3 MG/DL (ref 0.1–1.5)
BUN SERPL-MCNC: 9 MG/DL (ref 8–22)
CALCIUM SERPL-MCNC: 9 MG/DL (ref 8.5–10.5)
CHLORIDE SERPL-SCNC: 108 MMOL/L (ref 96–112)
CO2 SERPL-SCNC: 23 MMOL/L (ref 20–33)
CREAT SERPL-MCNC: 0.69 MG/DL (ref 0.5–1.4)
EKG IMPRESSION: NORMAL
EKG IMPRESSION: NORMAL
EOSINOPHIL # BLD AUTO: 0.01 K/UL (ref 0–0.51)
EOSINOPHIL NFR BLD: 0.2 % (ref 0–6.9)
ERYTHROCYTE [DISTWIDTH] IN BLOOD BY AUTOMATED COUNT: 46.5 FL (ref 35.9–50)
GLOBULIN SER CALC-MCNC: 2.8 G/DL (ref 1.9–3.5)
GLUCOSE SERPL-MCNC: 94 MG/DL (ref 65–99)
HAV IGM SERPL QL IA: NEGATIVE
HBV CORE IGM SER QL: NEGATIVE
HBV SURFACE AG SER QL: NEGATIVE
HCT VFR BLD AUTO: 39.4 % (ref 37–47)
HCV AB SER QL: NEGATIVE
HGB BLD-MCNC: 12.9 G/DL (ref 12–16)
IMM GRANULOCYTES # BLD AUTO: 0.02 K/UL (ref 0–0.11)
IMM GRANULOCYTES NFR BLD AUTO: 0.3 % (ref 0–0.9)
LYMPHOCYTES # BLD AUTO: 2.16 K/UL (ref 1–4.8)
LYMPHOCYTES NFR BLD: 33.5 % (ref 22–41)
MAGNESIUM SERPL-MCNC: 1.9 MG/DL (ref 1.5–2.5)
MCH RBC QN AUTO: 32 PG (ref 27–33)
MCHC RBC AUTO-ENTMCNC: 32.7 G/DL (ref 33.6–35)
MCV RBC AUTO: 97.8 FL (ref 81.4–97.8)
MONOCYTES # BLD AUTO: 0.61 K/UL (ref 0–0.85)
MONOCYTES NFR BLD AUTO: 9.5 % (ref 0–13.4)
NEUTROPHILS # BLD AUTO: 3.62 K/UL (ref 2–7.15)
NEUTROPHILS NFR BLD: 56 % (ref 44–72)
NRBC # BLD AUTO: 0 K/UL
NRBC BLD-RTO: 0 /100 WBC
PHOSPHATE SERPL-MCNC: 4.1 MG/DL (ref 2.5–4.5)
PLATELET # BLD AUTO: 235 K/UL (ref 164–446)
PMV BLD AUTO: 8.4 FL (ref 9–12.9)
POTASSIUM SERPL-SCNC: 3.8 MMOL/L (ref 3.6–5.5)
PROT SERPL-MCNC: 6.5 G/DL (ref 6–8.2)
RBC # BLD AUTO: 4.03 M/UL (ref 4.2–5.4)
SODIUM SERPL-SCNC: 139 MMOL/L (ref 135–145)
WBC # BLD AUTO: 6.5 K/UL (ref 4.8–10.8)

## 2018-05-30 PROCEDURE — 80053 COMPREHEN METABOLIC PANEL: CPT

## 2018-05-30 PROCEDURE — 84100 ASSAY OF PHOSPHORUS: CPT

## 2018-05-30 PROCEDURE — 700102 HCHG RX REV CODE 250 W/ 637 OVERRIDE(OP): Performed by: HOSPITALIST

## 2018-05-30 PROCEDURE — 85025 COMPLETE CBC W/AUTO DIFF WBC: CPT

## 2018-05-30 PROCEDURE — 93005 ELECTROCARDIOGRAM TRACING: CPT | Performed by: HOSPITALIST

## 2018-05-30 PROCEDURE — 83735 ASSAY OF MAGNESIUM: CPT

## 2018-05-30 PROCEDURE — 700111 HCHG RX REV CODE 636 W/ 250 OVERRIDE (IP): Performed by: HOSPITALIST

## 2018-05-30 PROCEDURE — 93010 ELECTROCARDIOGRAM REPORT: CPT | Performed by: INTERNAL MEDICINE

## 2018-05-30 PROCEDURE — 99232 SBSQ HOSP IP/OBS MODERATE 35: CPT | Performed by: HOSPITALIST

## 2018-05-30 PROCEDURE — A9270 NON-COVERED ITEM OR SERVICE: HCPCS | Performed by: HOSPITALIST

## 2018-05-30 PROCEDURE — 770020 HCHG ROOM/CARE - TELE (206)

## 2018-05-30 PROCEDURE — 700105 HCHG RX REV CODE 258: Performed by: HOSPITALIST

## 2018-05-30 RX ORDER — MAGNESIUM SULFATE HEPTAHYDRATE 40 MG/ML
2 INJECTION, SOLUTION INTRAVENOUS ONCE
Status: COMPLETED | OUTPATIENT
Start: 2018-05-30 | End: 2018-05-30

## 2018-05-30 RX ADMIN — SODIUM CHLORIDE: 9 INJECTION, SOLUTION INTRAVENOUS at 14:14

## 2018-05-30 RX ADMIN — MAGNESIUM SULFATE IN WATER 2 G: 40 INJECTION, SOLUTION INTRAVENOUS at 14:11

## 2018-05-30 RX ADMIN — ACETAMINOPHEN 650 MG: 325 TABLET, FILM COATED ORAL at 15:41

## 2018-05-30 RX ADMIN — ENOXAPARIN SODIUM 40 MG: 100 INJECTION SUBCUTANEOUS at 04:33

## 2018-05-30 RX ADMIN — SODIUM CHLORIDE: 9 INJECTION, SOLUTION INTRAVENOUS at 04:32

## 2018-05-30 ASSESSMENT — LIFESTYLE VARIABLES
NAUSEA AND VOMITING: NO NAUSEA AND NO VOMITING
TOTAL SCORE: 2
ANXIETY: NO ANXIETY (AT EASE)
TREMOR: NO TREMOR
VISUAL DISTURBANCES: NOT PRESENT
VISUAL DISTURBANCES: NOT PRESENT
NAUSEA AND VOMITING: NO NAUSEA AND NO VOMITING
TOTAL SCORE: 2
AUDITORY DISTURBANCES: NOT PRESENT
ANXIETY: NO ANXIETY (AT EASE)
AGITATION: NORMAL ACTIVITY
AGITATION: NORMAL ACTIVITY
ORIENTATION AND CLOUDING OF SENSORIUM: ORIENTED AND CAN DO SERIAL ADDITIONS
TREMOR: NO TREMOR
HEADACHE, FULLNESS IN HEAD: NOT PRESENT
ORIENTATION AND CLOUDING OF SENSORIUM: ORIENTED AND CAN DO SERIAL ADDITIONS
VISUAL DISTURBANCES: NOT PRESENT
VISUAL DISTURBANCES: NOT PRESENT
ANXIETY: NO ANXIETY (AT EASE)
HEADACHE, FULLNESS IN HEAD: NOT PRESENT
AGITATION: NORMAL ACTIVITY
PAROXYSMAL SWEATS: *
SUBSTANCE_ABUSE: 1
HEADACHE, FULLNESS IN HEAD: NOT PRESENT
HEADACHE, FULLNESS IN HEAD: NOT PRESENT
ANXIETY: NO ANXIETY (AT EASE)
AGITATION: NORMAL ACTIVITY
AUDITORY DISTURBANCES: NOT PRESENT
AGITATION: NORMAL ACTIVITY
PAROXYSMAL SWEATS: NO SWEAT VISIBLE
VISUAL DISTURBANCES: NOT PRESENT
ANXIETY: NO ANXIETY (AT EASE)
TREMOR: NO TREMOR
PAROXYSMAL SWEATS: *
ORIENTATION AND CLOUDING OF SENSORIUM: ORIENTED AND CAN DO SERIAL ADDITIONS
TOTAL SCORE: 2
AUDITORY DISTURBANCES: NOT PRESENT
TOTAL SCORE: 2
ORIENTATION AND CLOUDING OF SENSORIUM: ORIENTED AND CAN DO SERIAL ADDITIONS
PAROXYSMAL SWEATS: *
AUDITORY DISTURBANCES: NOT PRESENT
NAUSEA AND VOMITING: NO NAUSEA AND NO VOMITING
ORIENTATION AND CLOUDING OF SENSORIUM: ORIENTED AND CAN DO SERIAL ADDITIONS
PAROXYSMAL SWEATS: *
TOTAL SCORE: 0
AUDITORY DISTURBANCES: NOT PRESENT
NAUSEA AND VOMITING: NO NAUSEA AND NO VOMITING
HEADACHE, FULLNESS IN HEAD: NOT PRESENT
NAUSEA AND VOMITING: NO NAUSEA AND NO VOMITING

## 2018-05-30 ASSESSMENT — ENCOUNTER SYMPTOMS
RESPIRATORY NEGATIVE: 1
TINGLING: 0
HALLUCINATIONS: 0
CARDIOVASCULAR NEGATIVE: 1
DIZZINESS: 0
DEPRESSION: 1
EYES NEGATIVE: 1
VOMITING: 0
MUSCULOSKELETAL NEGATIVE: 1
HEADACHES: 0
CONSTITUTIONAL NEGATIVE: 1
DIARRHEA: 0
ABDOMINAL PAIN: 0
COUGH: 0
INSOMNIA: 0
TREMORS: 0
SEIZURES: 0
FOCAL WEAKNESS: 0
NERVOUS/ANXIOUS: 0
SPEECH CHANGE: 0
NAUSEA: 0
SENSORY CHANGE: 0
GASTROINTESTINAL NEGATIVE: 1
MEMORY LOSS: 0
LOSS OF CONSCIOUSNESS: 0

## 2018-05-30 ASSESSMENT — PAIN SCALES - GENERAL
PAINLEVEL_OUTOF10: 4
PAINLEVEL_OUTOF10: 0
PAINLEVEL_OUTOF10: 6

## 2018-05-30 NOTE — ED NOTES
Med rec updated and complete.  Met with pt at bedside .  Pt indicated that the medications that she took  Were not hers but her boyfriends.  Pt denies oral medications. Pt only uses an inhaler.

## 2018-05-30 NOTE — ED NOTES
Pt approved speaking with son. Pt son arrived to ed with request for update to pt condition.   Son notified of condition and poc.   Pt a&O x 3 soft spoken, erp to bedside.

## 2018-05-30 NOTE — DISCHARGE PLANNING
Anticipated Discharge Disposition: Likely Inpatient psych     Action: Pt was put on a legal hold. LSW faxed legal hold to Priyanka.     Barriers to Discharge: None    Plan: Awaiting medical clearance.

## 2018-05-30 NOTE — PROGRESS NOTES
[facility] Hospitalist Progress Note    Date of Service: 2018    Chief Complaint  47 y.o. female admitted 2018 with altered mentation and hypotension after ingesting multiple medications in a suicide attempt    Interval Problem Update  Patient is alert and oriented to person place and situation, tells me she did try to kill herself by taking pills.  States she is feeling much better now, denies current SI, no nausea, or vomiting, no dizziness, no cp, states she still feels a little drowsy      Consultants/Specialty  Psych    Disposition  Likely psych facility        Review of Systems   Constitutional: Negative.    HENT: Negative.    Eyes: Negative.    Respiratory: Negative.  Negative for cough.    Cardiovascular: Negative.  Negative for chest pain.   Gastrointestinal: Negative.  Negative for abdominal pain, diarrhea, nausea and vomiting.   Genitourinary: Negative.    Musculoskeletal: Negative.    Skin: Negative.    Neurological: Negative for dizziness, tingling, tremors, sensory change, speech change, focal weakness, seizures, loss of consciousness and headaches.   Endo/Heme/Allergies: Negative.    Psychiatric/Behavioral: Positive for depression and substance abuse. Negative for hallucinations, memory loss and suicidal ideas. The patient is not nervous/anxious and does not have insomnia.    All other systems reviewed and are negative.     Physical Exam  Laboratory/Imaging   Hemodynamics  Temp (24hrs), Av.9 °C (98.4 °F), Min:36.4 °C (97.6 °F), Max:37.2 °C (98.9 °F)   Temperature: 37 °C (98.6 °F)  Pulse  Av.1  Min: 69  Max: 114 Heart Rate (Monitored): 81  Blood Pressure: 101/61, NIBP: 103/71      Respiratory      Respiration: 18, Pulse Oximetry: 94 %, O2 Daily Delivery Respiratory : Silicone Nasal Cannula     Work Of Breathing / Effort: Mild  RUL Breath Sounds: Clear, RML Breath Sounds: Clear, RLL Breath Sounds: Diminished, MORALES Breath Sounds: Clear, LLL Breath Sounds: Diminished    Fluids  No intake or  output data in the 24 hours ending 05/30/18 1239    Nutrition  Orders Placed This Encounter   Procedures   • DIET ORDER     Standing Status:   Standing     Number of Occurrences:   1     Order Specific Question:   Diet:     Answer:   Regular [1]     Comments:   Plastic wear, no sharps     Physical Exam   Constitutional: She is oriented to person, place, and time. She appears well-developed and well-nourished. No distress.   HENT:   Head: Normocephalic and atraumatic.   Mouth/Throat: Oropharynx is clear and moist. No oropharyngeal exudate.   Eyes: Conjunctivae are normal. No scleral icterus.   Neck: Normal range of motion. Neck supple.   Cardiovascular: Normal rate, normal heart sounds and intact distal pulses.  Exam reveals no gallop and no friction rub.    No murmur heard.  Pulmonary/Chest: Effort normal and breath sounds normal. No respiratory distress. She has no wheezes. She has no rales. She exhibits no tenderness.   Abdominal: Soft. Bowel sounds are normal. She exhibits no distension and no mass. There is no tenderness. There is no rebound and no guarding.   Musculoskeletal: Normal range of motion. She exhibits no edema or tenderness.   Neurological: She is alert and oriented to person, place, and time. She has normal reflexes. No cranial nerve deficit. She exhibits normal muscle tone. Coordination normal.   Skin: Skin is warm and dry. No rash noted. She is not diaphoretic. No erythema. No pallor.   Psychiatric: She has a normal mood and affect. Her behavior is normal. Judgment and thought content normal.   Nursing note and vitals reviewed.      Recent Labs      05/29/18   1529  05/30/18   0300   WBC  5.2  6.5   RBC  4.56  4.03*   HEMOGLOBIN  14.8  12.9   HEMATOCRIT  43.9  39.4   MCV  96.3  97.8   MCH  32.5  32.0   MCHC  33.7  32.7*   RDW  45.3  46.5   PLATELETCT  240  235   MPV  8.5*  8.4*     Recent Labs      05/29/18   1529  05/30/18   0300   SODIUM  138  139   POTASSIUM  3.4*  3.8   CHLORIDE  104  108    CO2  21  23   GLUCOSE  126*  94   BUN  12  9   CREATININE  0.96  0.69   CALCIUM  9.2  9.0                      Assessment/Plan     * Multiple drug overdose- (present on admission)   Assessment & Plan    History of previous suicide attempt by overdose  Took unknown dose of lisinopril, trazadone and prozac.  Initially hypotensive, now bp in normal range  Continue to follow colsely          Alcohol intoxication (HCC)- (present on admission)   Assessment & Plan    History of alcohol abuse  No current signs of intoxication   Monitoring for withdraw            Abnormal QT interval present on electrocardiogram- (present on admission)   Assessment & Plan    Following  QT now normalized        Suicide attempt by multiple drug overdose (HCC)- (present on admission)   Assessment & Plan    Psych following  On legal hold        Methamphetamine abuse- (present on admission)   Assessment & Plan    Cessation discussed and recommended        Bipolar affective disorder (HCC)- (present on admission)   Assessment & Plan    Psych following          Quality-Core Measures

## 2018-05-30 NOTE — PROGRESS NOTES
[facility]. Pt walked from [facility] to hospital bed with 2x assist and unsteady gait. Tele monitor in place. Monitor room notified.    Assumed care report received. Assessment completed. AOx4. Pt resting in bed, denies any pain at this time. No other complaints at this time. Plan of care discussed, verbalized understanding. Fall precautions in place. White board updated. Bed alarm in use. Legal hold in place. Q15min obs. Pt states she is not having suicidal thoughts at this time.

## 2018-05-30 NOTE — PSYCHIATRY
"PSYCHIATRIC CONSULTATION:  *Reason for admission:  Multiple med overdose.  *Reason for consult:suicidal, depression and anxiety   *Requesting Physician: Dre Atkinson D.O.     Legal status:  on hold    *Chief Complaint: \"It was stupid\"    *HPI: 46 yo female who is not on psych meds who reports feeling depressed but without any vegetative signs, SI/HI, hopelessness or anhedonia until the past week when she began getting more anxious. She was also getting a cold, not feeling well, and she continued to work, come to the hotel room where her son and  who do nothing to help are, clean, do the shopping etc. She had an argument with her 25 yo son who is again without a job and then suddenly grabbed her 's meds and took them. they called 911. She says she knows this was \"stupid\". She is not SI/hi but does feel like she needs to be back on psych meds that she had been on in the past and were helpful at that time.    Anxiety is frequent however and she feels it is beyond situational stressors. Denies psychosis or nasim.    *Medical Review of Systems: as reported by pt. All systems reviewed. Only those found to be + are noted below. All others are negative.   Neurological:    TBIs:denies   SZs:doesn't think so   Strokes:denies  Other medical symptoms:pain in her feet. Feels her \"bones\" get\"out of place\"     *Past Medical Hx:as noted.    *Family Medical/Psychiatric Hx:    *Past Psychiatric Hx:since last seen by the consult service in 2013, she has not tried to harm herself or been hospitalized. Hx of prozac, trazodone, and buspar, seroquel, ambien and perhaps others. She has been admited to [facility] 13 times at least. 2 prior SA by overdose with alcohol. No hx of psychosis, nasim, PTSD, OCD orpanic attacks. Has been dx with borderline personality disorder and alcohol dependence and induced mood disorder    *Social Hx:as noted above. She can barely get by on the $880 she gets monthly. For other social hx, " see the records.    *Drug/Alcohol/Tobacco Hx:   Drugs:remote meth. None since 2013   Alcohol:rarely    *Psychiatric (Physical) Examination: observed phenomenon:  *Vitals:Blood pressure 114/72, pulse 90, temperature 37.1 °C (98.8 °F), resp. rate 16, height 1.524 m (5'), weight 67.5 kg (148 lb 13 oz), SpO2 91 %.  *Appearance/Attitude:a little unkempt otherwise clean, good eye contact, normal to overwt habitus. Cooperative.  *Muscle Strength/abnormal movements:none noted  *Tone/Gait/Station:gait not observed.  *Speech:soft  *Thought Process/Associations:linear  *Abnormal/Psychotic Thoughts (ex): none  *Insight/Judgement:improved.  *Orientation:x 4  *Memory: intact  *Attention/Concentration:intact  *Language:fluid  *Fund of Knowledge:not tested  *Mood: depressed          *Affect: sad  An occasional smile  *SI/HI:  denies    Medical Hx: labs, MARS, medications, etc were reviewed. Only those findings of potential interest to psychiatry are noted below:  Medical Conditions: s/p overdose as noted.    Allergies: Bactrim; Cephalexin [keflex]; Lamotrigine [lamictal]; Quetiapine fumarate; Quetiapine fumarate; and Sulfamethoxazole-trimethoprim    Medications (currently prescribed at [facility]none  Labs:Results for MANAS DELGADO (MRN [id number]) as of 5/30/2018 16:28   Ref. Range 5/29/2018 15:29   Diagnostic Alcohol Latest Ref Range: 0.00 g/dL 0.15 (H)   Results for MANAS DELGADO (MRN [id number]) as of 5/30/2018 16:28   Ref. Range 5/29/2018 19:40   Amphetamines Urine Latest Ref Range: Negative  Positive (A)     ECG: [id number]     *ASSESSMENT: ( acute, chronic, acute on chronic, complicated, stable, resolved)  Adjustment Disorder with depressed and anxious mood  Hx of depressive disorder, anxiety.     Alcohol Use Disorder: pt may not be forthcoming. Degree unclear  Methamphetamine Use Disorder:pt may not be forthcoming. Degree unclear    *Plan/Further Workup:   Legal status: dc. She is willing to stay to start meds: prozac and buspar  however will wait a few days before doing so to diminish the risk of a serotonergic syndrome.     Will follow  Thank you for the consult.

## 2018-05-30 NOTE — ASSESSMENT & PLAN NOTE
History of previous suicide attempt by overdose  Took unknown dose of lisinopril, trazadone and prozac.  Initially hypotensive, but bp has remained in normal range and qtc has normalized  Psych has released legal hold and are still following, per patient they plan to restart her psych meds today

## 2018-05-30 NOTE — H&P
Hospital Medicine History and Physical    Date of Service  5/29/2018    Chief Complaint  Chief Complaint   Patient presents with   • Suicidal Ideation   • Drug Overdose     trazadone, lisinopril, prozac - unknown doses   • ALOC     obtunded - patient alert to painful stimuli.       History of Presenting Illness  47 y.o. female who presented 5/29/2018 with history of depression who presented via ambulance after her  called 911 finding patient altered at approximately 2:30 PM. The  states that she took lisinopril trazodone and Prozac.  It is unknown how much she took. She was also found to have alcohol intoxication at  .15 as well as amphetamines. Urine drug screen. I am unable to obtain any history from the patient. She is currently somnolent on 2 L/m nasal cannula. She is maintaining her airway. A legal hold was placed by ERP in the ED psych consult placed.  Exam of her skin does not reveal any other injuries. The patient was found to be hypotensive on admission given 2 L of IV fluids with improvement of her blood pressure. Narcan 0.4 mg given without improvement. Salicylate and acetaminophen levels negative.      Primary Care Physician  Pcp Pt States None    Consultants  None    Code Status  Full code    Review of Systems  Review of Systems   Unable to perform ROS: Mental acuity        Past Medical History  Past Medical History:   Diagnosis Date   • Alcohol abuse    • Alcoholism (CMS-[facility])    • Anxiety    • Backpain    • Bipolar disorder, unspecified (CMS-HCC)    • Bronchitis    • Drug abuse     meth, crack cocaine, THC   • Hepatitis, alcoholic    • Hypertension     controlled   • Indigestion    • Infectious disease MRSA   • Liver disease     pancreatitis   • Pancreatitis chronic     Flare-up   • Pneumonia    • Psychiatric disorder     suicidal in past   • Renal disorder     kidney infection 1991   • Seizure (CMS-[facility])     7/8/2011   • Seizure disorder (CMS-[facility])    • Unspecified hemorrhagic conditions  (CMS-[facility])    • Unspecified urinary incontinence        Surgical History  Past Surgical History:   Procedure Laterality Date   • GASTROSCOPY  2015    Performed by Kevin Rendon M.D. at SURGERY [facility] ORS       Medications  No current facility-administered medications on file prior to encounter.      No current outpatient prescriptions on file prior to encounter.       Family History  No family history on file.    Social History  Social History   Substance Use Topics   • Smoking status: Never Smoker   • Smokeless tobacco: Never Used   • Alcohol use Yes      Comment: Hx heavy drinking   History of methamphetamine abuse.    Allergies  Allergies   Allergen Reactions   • Bactrim Hives   • Cephalexin [Keflex] Hives   • Lamotrigine [Lamictal] Hives   • Quetiapine Fumarate Hives     Extrapyramidal Symptoms   • Quetiapine Fumarate      Extrapyramidal symptoms   • Sulfamethoxazole-Trimethoprim Hives        Physical Exam  Laboratory   Hemodynamics  Temp (24hrs), Av.4 °C (97.6 °F), Min:36.4 °C (97.6 °F), Max:36.4 °C (97.6 °F)   Temperature: 36.4 °C (97.6 °F)  Pulse  Av.8  Min: 69  Max: 104 Heart Rate (Monitored): (!) 105  Blood Pressure: 125/83, NIBP: (!) 96/64      Respiratory      Respiration: 20, Pulse Oximetry: 92 %, O2 Daily Delivery Respiratory : Silicone Nasal Cannula     Work Of Breathing / Effort: Mild  RUL Breath Sounds: Clear, RML Breath Sounds: Clear, RLL Breath Sounds: Clear, MORALES Breath Sounds: Clear, LLL Breath Sounds: Clear    Physical Exam   Constitutional: She is oriented to person, place, and time. She appears well-developed and well-nourished. No distress.   NAD, sleeping on exam with 2 LPM NC O2.  Gag reflex intact.     HENT:   Head: Normocephalic and atraumatic.   Nose: Nose normal.   Mouth/Throat: Oropharynx is clear and moist. No oropharyngeal exudate.   Eyes: Conjunctivae and EOM are normal. Pupils are equal, round, and reactive to light. Right eye exhibits no discharge. Left eye  exhibits no discharge. No scleral icterus.   Neck: Normal range of motion. Neck supple. No JVD present. No tracheal deviation present. No thyromegaly present.   Cardiovascular: Normal rate, regular rhythm, normal heart sounds and intact distal pulses.  Exam reveals no gallop and no friction rub.    No murmur heard.  Pulmonary/Chest: Effort normal and breath sounds normal. No stridor. No respiratory distress. She has no wheezes. She has no rales. She exhibits no tenderness.   Abdominal: Soft. Bowel sounds are normal. She exhibits no distension and no mass. There is no tenderness. There is no rebound and no guarding.   Musculoskeletal: Normal range of motion. She exhibits no edema or tenderness.   Lymphadenopathy:     She has no cervical adenopathy.   Neurological: She is alert and oriented to person, place, and time. No cranial nerve deficit. She exhibits normal muscle tone. Coordination normal.   Skin: Skin is warm and dry. No rash noted. She is not diaphoretic. No erythema.   No evidence of trauma noted.   Psychiatric: She has a normal mood and affect. Her behavior is normal. Judgment and thought content normal.   Nursing note and vitals reviewed.      Recent Labs      05/29/18   1529   WBC  5.2   RBC  4.56   HEMOGLOBIN  14.8   HEMATOCRIT  43.9   MCV  96.3   MCH  32.5   MCHC  33.7   RDW  45.3   PLATELETCT  240   MPV  8.5*     Recent Labs      05/29/18   1529   SODIUM  138   POTASSIUM  3.4*   CHLORIDE  104   CO2  21   GLUCOSE  126*   BUN  12   CREATININE  0.96   CALCIUM  9.2     Recent Labs      05/29/18   1529   ALTSGPT  28   ASTSGOT  26   ALKPHOSPHAT  89   TBILIRUBIN  0.3   GLUCOSE  126*                 Lab Results   Component Value Date    TROPONINI <0.01 06/18/2015     Urinalysis:    Lab Results  Component Value Date/Time   SPECGRAVITY 1.008 07/04/2016 2205   GLUCOSEUR Negative 07/04/2016 2205   KETONES Negative 07/04/2016 2205   NITRITE Negative 07/04/2016 2205   WBCURINE 2-5 07/04/2016 2205   RBCURINE 10-20  (A) 07/04/2016 2205   BACTERIA Moderate (A) 07/04/2016 2205   EPITHELCELL Many (A) 07/04/2016 2205        Imaging   EKG SR rate93, QTc 508, narrow complex QRS.   Assessment/Plan     I anticipate this patient will require at least two midnights for appropriate medical management, necessitating inpatient admission.    * Multiple drug overdose- (present on admission)   Assessment & Plan    Lisinopril, trazadone and prozac.  Poison control contacted by ERP, telemetry monitor, observe.  IVFs for blood pressure support since hypotensive.        Alcohol intoxication (HCC)- (present on admission)   Assessment & Plan    Current alcohol intoxication.  Has history of alcohol abuse  Started CIWA protocol prn agitation once more alert.  NS at 125/hr.          Abnormal QT interval present on electrocardiogram   Assessment & Plan    Prolonged at 508, avoid antiemetics.  Monitor.        Suicide attempt by multiple drug overdose (HCC)- (present on admission)   Assessment & Plan    ip consult to psychiatry, left message at 4012 about legal hold initiated by ERP.        Methamphetamine abuse- (present on admission)   Assessment & Plan    UDS + meth.  History of meth abuse in past.        Bipolar affective disorder (HCC)- (present on admission)   Assessment & Plan    History of bipolar disorder and prior suicide attempts by overdose.            VTE prophylaxis: lovenox.
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
## 2018-05-30 NOTE — PROGRESS NOTES
2 RN skin check done with Buster DENSON.    Ears, elbows, sacrum and heels red and blanching.  Pt's hands are red and swollen. Pt states hands are always swollen.  Medial big toes and lateral pinky toes red and blanching.   Possible blood blister to bottom of 4th toe on L foot. Pt states she has had this a long time and not sure what it is from.    No other skin breakdown at this time.

## 2018-05-31 ENCOUNTER — APPOINTMENT (OUTPATIENT)
Dept: RADIOLOGY | Facility: MEDICAL CENTER | Age: 48
DRG: 918 | End: 2018-05-31
Attending: INTERNAL MEDICINE
Payer: MEDICAID

## 2018-05-31 PROBLEM — R05.9 COUGH: Status: ACTIVE | Noted: 2018-05-31

## 2018-05-31 LAB
ANION GAP SERPL CALC-SCNC: 10 MMOL/L (ref 0–11.9)
BUN SERPL-MCNC: 9 MG/DL (ref 8–22)
CALCIUM SERPL-MCNC: 8.6 MG/DL (ref 8.5–10.5)
CHLORIDE SERPL-SCNC: 106 MMOL/L (ref 96–112)
CO2 SERPL-SCNC: 24 MMOL/L (ref 20–33)
CREAT SERPL-MCNC: 0.69 MG/DL (ref 0.5–1.4)
EKG IMPRESSION: NORMAL
ETHANOL BLD-MCNC: 0 G/DL
GLUCOSE SERPL-MCNC: 102 MG/DL (ref 65–99)
MAGNESIUM SERPL-MCNC: 1.9 MG/DL (ref 1.5–2.5)
PHOSPHATE SERPL-MCNC: 2.9 MG/DL (ref 2.5–4.5)
POTASSIUM SERPL-SCNC: 3.9 MMOL/L (ref 3.6–5.5)
SODIUM SERPL-SCNC: 140 MMOL/L (ref 135–145)

## 2018-05-31 PROCEDURE — 99232 SBSQ HOSP IP/OBS MODERATE 35: CPT | Performed by: HOSPITALIST

## 2018-05-31 PROCEDURE — 84100 ASSAY OF PHOSPHORUS: CPT

## 2018-05-31 PROCEDURE — 770020 HCHG ROOM/CARE - TELE (206)

## 2018-05-31 PROCEDURE — 36415 COLL VENOUS BLD VENIPUNCTURE: CPT

## 2018-05-31 PROCEDURE — 83735 ASSAY OF MAGNESIUM: CPT

## 2018-05-31 PROCEDURE — 700102 HCHG RX REV CODE 250 W/ 637 OVERRIDE(OP): Performed by: HOSPITALIST

## 2018-05-31 PROCEDURE — 700111 HCHG RX REV CODE 636 W/ 250 OVERRIDE (IP): Performed by: HOSPITALIST

## 2018-05-31 PROCEDURE — 71045 X-RAY EXAM CHEST 1 VIEW: CPT

## 2018-05-31 PROCEDURE — 93005 ELECTROCARDIOGRAM TRACING: CPT | Performed by: HOSPITALIST

## 2018-05-31 PROCEDURE — A9270 NON-COVERED ITEM OR SERVICE: HCPCS | Performed by: HOSPITALIST

## 2018-05-31 PROCEDURE — 80307 DRUG TEST PRSMV CHEM ANLYZR: CPT

## 2018-05-31 PROCEDURE — 700105 HCHG RX REV CODE 258: Performed by: HOSPITALIST

## 2018-05-31 PROCEDURE — 80048 BASIC METABOLIC PNL TOTAL CA: CPT

## 2018-05-31 PROCEDURE — 93010 ELECTROCARDIOGRAM REPORT: CPT | Performed by: INTERNAL MEDICINE

## 2018-05-31 RX ORDER — GUAIFENESIN/DEXTROMETHORPHAN 100-10MG/5
5 SYRUP ORAL EVERY 6 HOURS PRN
Status: DISCONTINUED | OUTPATIENT
Start: 2018-05-31 | End: 2018-06-02 | Stop reason: HOSPADM

## 2018-05-31 RX ADMIN — GUAIFENESIN AND DEXTROMETHORPHAN 5 ML: 100; 10 SYRUP ORAL at 18:18

## 2018-05-31 RX ADMIN — ACETAMINOPHEN 650 MG: 325 TABLET, FILM COATED ORAL at 12:13

## 2018-05-31 RX ADMIN — ENOXAPARIN SODIUM 40 MG: 100 INJECTION SUBCUTANEOUS at 06:13

## 2018-05-31 RX ADMIN — ACETAMINOPHEN 650 MG: 325 TABLET, FILM COATED ORAL at 18:18

## 2018-05-31 RX ADMIN — SODIUM CHLORIDE: 9 INJECTION, SOLUTION INTRAVENOUS at 17:46

## 2018-05-31 RX ADMIN — SODIUM CHLORIDE: 9 INJECTION, SOLUTION INTRAVENOUS at 08:39

## 2018-05-31 RX ADMIN — GUAIFENESIN AND DEXTROMETHORPHAN 5 ML: 100; 10 SYRUP ORAL at 11:54

## 2018-05-31 RX ADMIN — ACETAMINOPHEN 650 MG: 325 TABLET, FILM COATED ORAL at 06:13

## 2018-05-31 RX ADMIN — SODIUM CHLORIDE: 9 INJECTION, SOLUTION INTRAVENOUS at 00:33

## 2018-05-31 ASSESSMENT — COGNITIVE AND FUNCTIONAL STATUS - GENERAL
STANDING UP FROM CHAIR USING ARMS: A LITTLE
CLIMB 3 TO 5 STEPS WITH RAILING: A LOT
SUGGESTED CMS G CODE MODIFIER MOBILITY: CK
MOVING TO AND FROM BED TO CHAIR: A LITTLE
WALKING IN HOSPITAL ROOM: A LITTLE
SUGGESTED CMS G CODE MODIFIER DAILY ACTIVITY: CH
MOVING FROM LYING ON BACK TO SITTING ON SIDE OF FLAT BED: A LITTLE
TURNING FROM BACK TO SIDE WHILE IN FLAT BAD: A LITTLE
DAILY ACTIVITIY SCORE: 24
MOBILITY SCORE: 17

## 2018-05-31 ASSESSMENT — LIFESTYLE VARIABLES
VISUAL DISTURBANCES: NOT PRESENT
SUBSTANCE_ABUSE: 1
VISUAL DISTURBANCES: NOT PRESENT
AGITATION: NORMAL ACTIVITY
NAUSEA AND VOMITING: NO NAUSEA AND NO VOMITING
HOW MANY TIMES IN THE PAST YEAR HAVE YOU HAD 5 OR MORE DRINKS IN A DAY: 2
PAROXYSMAL SWEATS: NO SWEAT VISIBLE
HEADACHE, FULLNESS IN HEAD: NOT PRESENT
HEADACHE, FULLNESS IN HEAD: NOT PRESENT
TOTAL SCORE: 0
HEADACHE, FULLNESS IN HEAD: NOT PRESENT
AUDITORY DISTURBANCES: NOT PRESENT
TOTAL SCORE: 0
HAVE PEOPLE ANNOYED YOU BY CRITICIZING YOUR DRINKING: NO
ALCOHOL_USE: YES
HAVE YOU EVER FELT YOU SHOULD CUT DOWN ON YOUR DRINKING: YES
PAROXYSMAL SWEATS: NO SWEAT VISIBLE
ANXIETY: NO ANXIETY (AT EASE)
VISUAL DISTURBANCES: NOT PRESENT
ON A TYPICAL DAY WHEN YOU DRINK ALCOHOL HOW MANY DRINKS DO YOU HAVE: 1
NAUSEA AND VOMITING: NO NAUSEA AND NO VOMITING
AUDITORY DISTURBANCES: NOT PRESENT
EVER FELT BAD OR GUILTY ABOUT YOUR DRINKING: YES
TOTAL SCORE: 3
PAROXYSMAL SWEATS: NO SWEAT VISIBLE
AVERAGE NUMBER OF DAYS PER WEEK YOU HAVE A DRINK CONTAINING ALCOHOL: 2
TREMOR: NO TREMOR
AGITATION: NORMAL ACTIVITY
AGITATION: NORMAL ACTIVITY
AUDITORY DISTURBANCES: NOT PRESENT
VISUAL DISTURBANCES: NOT PRESENT
ORIENTATION AND CLOUDING OF SENSORIUM: ORIENTED AND CAN DO SERIAL ADDITIONS
AUDITORY DISTURBANCES: NOT PRESENT
HEADACHE, FULLNESS IN HEAD: NOT PRESENT
TOTAL SCORE: 0
TOTAL SCORE: 0
TOTAL SCORE: 3
ORIENTATION AND CLOUDING OF SENSORIUM: ORIENTED AND CAN DO SERIAL ADDITIONS
PAROXYSMAL SWEATS: NO SWEAT VISIBLE
ANXIETY: NO ANXIETY (AT EASE)
ORIENTATION AND CLOUDING OF SENSORIUM: ORIENTED AND CAN DO SERIAL ADDITIONS
TREMOR: NO TREMOR
HEADACHE, FULLNESS IN HEAD: NOT PRESENT
AGITATION: NORMAL ACTIVITY
DOES PATIENT WANT TO STOP DRINKING: NO
TREMOR: NO TREMOR
EVER HAD A DRINK FIRST THING IN THE MORNING TO STEADY YOUR NERVES TO GET RID OF A HANGOVER: YES
NAUSEA AND VOMITING: NO NAUSEA AND NO VOMITING
TOTAL SCORE: 0
ORIENTATION AND CLOUDING OF SENSORIUM: ORIENTED AND CAN DO SERIAL ADDITIONS
EVER_SMOKED: NEVER
TREMOR: NO TREMOR
NAUSEA AND VOMITING: NO NAUSEA AND NO VOMITING
CONSUMPTION TOTAL: POSITIVE
TOTAL SCORE: 3
TREMOR: NO TREMOR
ANXIETY: NO ANXIETY (AT EASE)
PAROXYSMAL SWEATS: NO SWEAT VISIBLE
AGITATION: NORMAL ACTIVITY
VISUAL DISTURBANCES: NOT PRESENT
ANXIETY: NO ANXIETY (AT EASE)
ORIENTATION AND CLOUDING OF SENSORIUM: ORIENTED AND CAN DO SERIAL ADDITIONS
ANXIETY: NO ANXIETY (AT EASE)
AUDITORY DISTURBANCES: NOT PRESENT
NAUSEA AND VOMITING: NO NAUSEA AND NO VOMITING

## 2018-05-31 ASSESSMENT — ENCOUNTER SYMPTOMS
FOCAL WEAKNESS: 0
CARDIOVASCULAR NEGATIVE: 1
GASTROINTESTINAL NEGATIVE: 1
MUSCULOSKELETAL NEGATIVE: 1
RESPIRATORY NEGATIVE: 1
ABDOMINAL PAIN: 0
SEIZURES: 0
CONSTITUTIONAL NEGATIVE: 1
VOMITING: 0
SPEECH CHANGE: 0
EYES NEGATIVE: 1
NERVOUS/ANXIOUS: 0
DIARRHEA: 0
DEPRESSION: 1
COUGH: 0
TINGLING: 0
TREMORS: 0
HEADACHES: 0
MEMORY LOSS: 0
HALLUCINATIONS: 0
INSOMNIA: 0
NAUSEA: 0
DIZZINESS: 0
LOSS OF CONSCIOUSNESS: 0
SENSORY CHANGE: 0

## 2018-05-31 ASSESSMENT — PATIENT HEALTH QUESTIONNAIRE - PHQ9
6. FEELING BAD ABOUT YOURSELF - OR THAT YOU ARE A FAILURE OR HAVE LET YOURSELF OR YOUR FAMILY DOWN: SEVERAL DAYS
7. TROUBLE CONCENTRATING ON THINGS, SUCH AS READING THE NEWSPAPER OR WATCHING TELEVISION: NOT AT ALL
2. FEELING DOWN, DEPRESSED, IRRITABLE, OR HOPELESS: NEARLY EVERY DAY
SUM OF ALL RESPONSES TO PHQ QUESTIONS 1-9: 9
3. TROUBLE FALLING OR STAYING ASLEEP OR SLEEPING TOO MUCH: NOT AT ALL
5. POOR APPETITE OR OVEREATING: NOT AT ALL
SUM OF ALL RESPONSES TO PHQ9 QUESTIONS 1 AND 2: 4
4. FEELING TIRED OR HAVING LITTLE ENERGY: NEARLY EVERY DAY
8. MOVING OR SPEAKING SO SLOWLY THAT OTHER PEOPLE COULD HAVE NOTICED. OR THE OPPOSITE, BEING SO FIGETY OR RESTLESS THAT YOU HAVE BEEN MOVING AROUND A LOT MORE THAN USUAL: NOT AT ALL
9. THOUGHTS THAT YOU WOULD BE BETTER OFF DEAD, OR OF HURTING YOURSELF: SEVERAL DAYS
1. LITTLE INTEREST OR PLEASURE IN DOING THINGS: SEVERAL DAYS

## 2018-05-31 ASSESSMENT — PAIN SCALES - GENERAL
PAINLEVEL_OUTOF10: 5
PAINLEVEL_OUTOF10: 0
PAINLEVEL_OUTOF10: 5
PAINLEVEL_OUTOF10: 5
PAINLEVEL_OUTOF10: 7
PAINLEVEL_OUTOF10: 7
PAINLEVEL_OUTOF10: 4

## 2018-05-31 NOTE — CARE PLAN
Problem: Safety  Goal: Will remain free from injury  Outcome: PROGRESSING AS EXPECTED  Bed locked and in lowest position. Bed alarm on. Pt is x1 assist with walker. Treaded socks provided. Call light and belongings within reach.  Patient educated to call for assistance. Pt verbalized understanding. Hourly rounding in place.      Problem: Venous Thromboembolism (VTW)/Deep Vein Thrombosis (DVT) Prevention:  Goal: Patient will participate in Venous Thrombosis (VTE)/Deep Vein Thrombosis (DVT)Prevention Measures  Outcome: PROGRESSING AS EXPECTED  Pt on Lovenox injections for VTE.

## 2018-05-31 NOTE — PROGRESS NOTES
[facility] Hospitalist Progress Note    Date of Service: 2018    Chief Complaint  47 y.o. female admitted 2018 with altered mentation and hypotension after ingesting multiple medications in a suicide attempt    Interval Problem Update  Patient has a cough productive of clear to light yellow sputum, denies associated chest pain or sob, no fever or chills  She denies SI      Consultants/Specialty  Psych    Disposition          Review of Systems   Constitutional: Negative.    HENT: Negative.    Eyes: Negative.    Respiratory: Negative.  Negative for cough.    Cardiovascular: Negative.  Negative for chest pain.   Gastrointestinal: Negative.  Negative for abdominal pain, diarrhea, nausea and vomiting.   Genitourinary: Negative.    Musculoskeletal: Negative.    Skin: Negative.    Neurological: Negative for dizziness, tingling, tremors, sensory change, speech change, focal weakness, seizures, loss of consciousness and headaches.   Endo/Heme/Allergies: Negative.    Psychiatric/Behavioral: Positive for depression and substance abuse. Negative for hallucinations, memory loss and suicidal ideas. The patient is not nervous/anxious and does not have insomnia.    All other systems reviewed and are negative.     Physical Exam  Laboratory/Imaging   Hemodynamics  Temp (24hrs), Av.9 °C (98.4 °F), Min:36.6 °C (97.8 °F), Max:37.1 °C (98.8 °F)   Temperature: 36.7 °C (98.1 °F)  Pulse  Av.1  Min: 69  Max: 114    Blood Pressure: 121/84      Respiratory      Respiration: 18, Pulse Oximetry: 94 %     Work Of Breathing / Effort: Mild  RUL Breath Sounds: Clear, RML Breath Sounds: Clear, RLL Breath Sounds: Diminished, MORALES Breath Sounds: Clear, LLL Breath Sounds: Diminished    Fluids    Intake/Output Summary (Last 24 hours) at 18 1146  Last data filed at 18 1030   Gross per 24 hour   Intake              240 ml   Output                0 ml   Net              240 ml       Nutrition  Orders Placed This Encounter    Procedures   • DIET ORDER     Standing Status:   Standing     Number of Occurrences:   1     Order Specific Question:   Diet:     Answer:   Regular [1]     Comments:   Plastic wear, no sharps     Physical Exam   Constitutional: She is oriented to person, place, and time. She appears well-developed and well-nourished. No distress.   HENT:   Head: Normocephalic and atraumatic.   Mouth/Throat: Oropharynx is clear and moist. No oropharyngeal exudate.   Eyes: Conjunctivae are normal. No scleral icterus.   Neck: Normal range of motion. Neck supple.   Cardiovascular: Normal rate, normal heart sounds and intact distal pulses.  Exam reveals no gallop and no friction rub.    No murmur heard.  Pulmonary/Chest: Effort normal and breath sounds normal. No respiratory distress. She has no wheezes. She has no rales. She exhibits no tenderness.   Abdominal: Soft. Bowel sounds are normal. She exhibits no distension and no mass. There is no tenderness. There is no rebound and no guarding.   Musculoskeletal: Normal range of motion. She exhibits no edema or tenderness.   Neurological: She is alert and oriented to person, place, and time. She has normal reflexes. No cranial nerve deficit. She exhibits normal muscle tone. Coordination normal.   Skin: Skin is warm and dry. No rash noted. She is not diaphoretic. No erythema. No pallor.   Psychiatric: She has a normal mood and affect. Her behavior is normal. Judgment and thought content normal.   Nursing note and vitals reviewed.      Recent Labs      05/29/18   1529  05/30/18   0300   WBC  5.2  6.5   RBC  4.56  4.03*   HEMOGLOBIN  14.8  12.9   HEMATOCRIT  43.9  39.4   MCV  96.3  97.8   MCH  32.5  32.0   MCHC  33.7  32.7*   RDW  45.3  46.5   PLATELETCT  240  235   MPV  8.5*  8.4*     Recent Labs      05/29/18   1529  05/30/18   0300  05/31/18   0439   SODIUM  138  139  140   POTASSIUM  3.4*  3.8  3.9   CHLORIDE  104  108  106   CO2  21  23  24   GLUCOSE  126*  94  102*   BUN  12  9  9    CREATININE  0.96  0.69  0.69   CALCIUM  9.2  9.0  8.6                      Assessment/Plan     * Multiple drug overdose- (present on admission)   Assessment & Plan    History of previous suicide attempt by overdose  Took unknown dose of lisinopril, trazadone and prozac.  Initially hypotensive, now bp in normal range  Psych has released legal hold and are still following, planning to restart her psych meds        Alcohol intoxication (HCC)- (present on admission)   Assessment & Plan    History of alcohol abuse  Continue monitoring for withdraw            Cough   Assessment & Plan    Exam clear  cxr clear  No hypoxia or sob  Will follow        Abnormal QT interval present on electrocardiogram- (present on admission)   Assessment & Plan      QT now normalized        Suicide attempt by multiple drug overdose (HCC)- (present on admission)   Assessment & Plan    Psych following          Methamphetamine abuse- (present on admission)   Assessment & Plan    Cessation discussed and recommended        Bipolar affective disorder (HCC)- (present on admission)   Assessment & Plan    Psych following          Quality-Core Measures

## 2018-05-31 NOTE — PROGRESS NOTES
Assumed care of pt. She is a&ox4 and resting in bed. Discussed the plan of care with no questions or concerns at this time. Bed is locked in the lowest position and her call ligt is within reach. Fall precautions in place with a strip alarm. CIWA and Q4 neuro protocol in place. Pt appears to be calm and in no distress.

## 2018-05-31 NOTE — PROGRESS NOTES
Bedside report received. Patient A&O x4. JOE.   on the monitor. No complaints of pain at this time. POC discussed with patient. Patient verbalized understanding. Call light and belongings within reach. Bed locked and in lowest position, alarm and fall precautions in place.

## 2018-06-01 ENCOUNTER — PATIENT OUTREACH (OUTPATIENT)
Dept: HEALTH INFORMATION MANAGEMENT | Facility: OTHER | Age: 48
End: 2018-06-01

## 2018-06-01 PROBLEM — I47.20 V-TACH (HCC): Status: ACTIVE | Noted: 2018-06-01

## 2018-06-01 LAB
EKG IMPRESSION: NORMAL
MAGNESIUM SERPL-MCNC: 1.8 MG/DL (ref 1.5–2.5)
PHOSPHATE SERPL-MCNC: 3.5 MG/DL (ref 2.5–4.5)

## 2018-06-01 PROCEDURE — 700105 HCHG RX REV CODE 258: Performed by: HOSPITALIST

## 2018-06-01 PROCEDURE — 770020 HCHG ROOM/CARE - TELE (206)

## 2018-06-01 PROCEDURE — 700102 HCHG RX REV CODE 250 W/ 637 OVERRIDE(OP): Performed by: HOSPITALIST

## 2018-06-01 PROCEDURE — 700111 HCHG RX REV CODE 636 W/ 250 OVERRIDE (IP): Performed by: HOSPITALIST

## 2018-06-01 PROCEDURE — 93005 ELECTROCARDIOGRAM TRACING: CPT | Performed by: HOSPITALIST

## 2018-06-01 PROCEDURE — 99232 SBSQ HOSP IP/OBS MODERATE 35: CPT | Performed by: HOSPITALIST

## 2018-06-01 PROCEDURE — A9270 NON-COVERED ITEM OR SERVICE: HCPCS | Performed by: PSYCHIATRY & NEUROLOGY

## 2018-06-01 PROCEDURE — 93010 ELECTROCARDIOGRAM REPORT: CPT | Performed by: INTERNAL MEDICINE

## 2018-06-01 PROCEDURE — 83735 ASSAY OF MAGNESIUM: CPT

## 2018-06-01 PROCEDURE — 36415 COLL VENOUS BLD VENIPUNCTURE: CPT

## 2018-06-01 PROCEDURE — 700102 HCHG RX REV CODE 250 W/ 637 OVERRIDE(OP): Performed by: PSYCHIATRY & NEUROLOGY

## 2018-06-01 PROCEDURE — 84100 ASSAY OF PHOSPHORUS: CPT

## 2018-06-01 PROCEDURE — A9270 NON-COVERED ITEM OR SERVICE: HCPCS | Performed by: HOSPITALIST

## 2018-06-01 RX ORDER — FLUOXETINE 10 MG/1
10 CAPSULE ORAL DAILY
Status: DISCONTINUED | OUTPATIENT
Start: 2018-06-01 | End: 2018-06-02 | Stop reason: HOSPADM

## 2018-06-01 RX ORDER — MAGNESIUM SULFATE HEPTAHYDRATE 40 MG/ML
2 INJECTION, SOLUTION INTRAVENOUS ONCE
Status: COMPLETED | OUTPATIENT
Start: 2018-06-01 | End: 2018-06-01

## 2018-06-01 RX ADMIN — GUAIFENESIN AND DEXTROMETHORPHAN 5 ML: 100; 10 SYRUP ORAL at 21:57

## 2018-06-01 RX ADMIN — ENOXAPARIN SODIUM 40 MG: 100 INJECTION SUBCUTANEOUS at 05:36

## 2018-06-01 RX ADMIN — FLUOXETINE 10 MG: 10 CAPSULE ORAL at 17:00

## 2018-06-01 RX ADMIN — ACETAMINOPHEN 650 MG: 325 TABLET, FILM COATED ORAL at 00:50

## 2018-06-01 RX ADMIN — SODIUM CHLORIDE: 9 INJECTION, SOLUTION INTRAVENOUS at 10:43

## 2018-06-01 RX ADMIN — GUAIFENESIN AND DEXTROMETHORPHAN 5 ML: 100; 10 SYRUP ORAL at 14:02

## 2018-06-01 RX ADMIN — MAGNESIUM SULFATE IN WATER 2 G: 40 INJECTION, SOLUTION INTRAVENOUS at 12:10

## 2018-06-01 RX ADMIN — ACETAMINOPHEN 650 MG: 325 TABLET, FILM COATED ORAL at 18:20

## 2018-06-01 RX ADMIN — SODIUM CHLORIDE: 9 INJECTION, SOLUTION INTRAVENOUS at 01:39

## 2018-06-01 RX ADMIN — ACETAMINOPHEN 650 MG: 325 TABLET, FILM COATED ORAL at 10:46

## 2018-06-01 RX ADMIN — GUAIFENESIN AND DEXTROMETHORPHAN 5 ML: 100; 10 SYRUP ORAL at 05:36

## 2018-06-01 ASSESSMENT — LIFESTYLE VARIABLES
HEADACHE, FULLNESS IN HEAD: NOT PRESENT
HEADACHE, FULLNESS IN HEAD: NOT PRESENT
NAUSEA AND VOMITING: NO NAUSEA AND NO VOMITING
ANXIETY: NO ANXIETY (AT EASE)
AUDITORY DISTURBANCES: NOT PRESENT
TOTAL SCORE: 0
AGITATION: NORMAL ACTIVITY
ORIENTATION AND CLOUDING OF SENSORIUM: ORIENTED AND CAN DO SERIAL ADDITIONS
PAROXYSMAL SWEATS: NO SWEAT VISIBLE
AUDITORY DISTURBANCES: NOT PRESENT
TOTAL SCORE: 0
HEADACHE, FULLNESS IN HEAD: NOT PRESENT
ORIENTATION AND CLOUDING OF SENSORIUM: ORIENTED AND CAN DO SERIAL ADDITIONS
TREMOR: NO TREMOR
VISUAL DISTURBANCES: NOT PRESENT
AUDITORY DISTURBANCES: NOT PRESENT
TREMOR: NO TREMOR
TOTAL SCORE: 0
PAROXYSMAL SWEATS: NO SWEAT VISIBLE
PAROXYSMAL SWEATS: NO SWEAT VISIBLE
TREMOR: NO TREMOR
AGITATION: NORMAL ACTIVITY
AUDITORY DISTURBANCES: NOT PRESENT
PAROXYSMAL SWEATS: NO SWEAT VISIBLE
PAROXYSMAL SWEATS: NO SWEAT VISIBLE
NAUSEA AND VOMITING: NO NAUSEA AND NO VOMITING
HEADACHE, FULLNESS IN HEAD: NOT PRESENT
ANXIETY: NO ANXIETY (AT EASE)
VISUAL DISTURBANCES: NOT PRESENT
ORIENTATION AND CLOUDING OF SENSORIUM: ORIENTED AND CAN DO SERIAL ADDITIONS
TOTAL SCORE: 0
AGITATION: NORMAL ACTIVITY
SUBSTANCE_ABUSE: 1
NAUSEA AND VOMITING: NO NAUSEA AND NO VOMITING
TREMOR: NO TREMOR
NAUSEA AND VOMITING: NO NAUSEA AND NO VOMITING
NAUSEA AND VOMITING: NO NAUSEA AND NO VOMITING
TOTAL SCORE: 0
ANXIETY: NO ANXIETY (AT EASE)
AGITATION: NORMAL ACTIVITY
VISUAL DISTURBANCES: NOT PRESENT
ANXIETY: NO ANXIETY (AT EASE)
PAROXYSMAL SWEATS: NO SWEAT VISIBLE
ORIENTATION AND CLOUDING OF SENSORIUM: ORIENTED AND CAN DO SERIAL ADDITIONS
VISUAL DISTURBANCES: NOT PRESENT
HEADACHE, FULLNESS IN HEAD: NOT PRESENT
AGITATION: NORMAL ACTIVITY
TOTAL SCORE: 0
AUDITORY DISTURBANCES: NOT PRESENT
AUDITORY DISTURBANCES: NOT PRESENT
ANXIETY: NO ANXIETY (AT EASE)
HEADACHE, FULLNESS IN HEAD: NOT PRESENT
ANXIETY: NO ANXIETY (AT EASE)
VISUAL DISTURBANCES: NOT PRESENT
VISUAL DISTURBANCES: NOT PRESENT
NAUSEA AND VOMITING: NO NAUSEA AND NO VOMITING
AGITATION: NORMAL ACTIVITY
ORIENTATION AND CLOUDING OF SENSORIUM: ORIENTED AND CAN DO SERIAL ADDITIONS
ORIENTATION AND CLOUDING OF SENSORIUM: ORIENTED AND CAN DO SERIAL ADDITIONS

## 2018-06-01 ASSESSMENT — PAIN SCALES - GENERAL
PAINLEVEL_OUTOF10: 5
PAINLEVEL_OUTOF10: 0
PAINLEVEL_OUTOF10: 6
PAINLEVEL_OUTOF10: 8
PAINLEVEL_OUTOF10: 6
PAINLEVEL_OUTOF10: 0
PAINLEVEL_OUTOF10: 2

## 2018-06-01 ASSESSMENT — ENCOUNTER SYMPTOMS
SEIZURES: 0
NAUSEA: 0
HALLUCINATIONS: 0
FOCAL WEAKNESS: 0
MEMORY LOSS: 0
LOSS OF CONSCIOUSNESS: 0
COUGH: 0
MUSCULOSKELETAL NEGATIVE: 1
HEADACHES: 0
ABDOMINAL PAIN: 0
INSOMNIA: 0
TREMORS: 0
SENSORY CHANGE: 0
NERVOUS/ANXIOUS: 0
GASTROINTESTINAL NEGATIVE: 1
TINGLING: 0
CARDIOVASCULAR NEGATIVE: 1
SPEECH CHANGE: 0
DIZZINESS: 0
VOMITING: 0
DIARRHEA: 0
EYES NEGATIVE: 1
CONSTITUTIONAL NEGATIVE: 1
DEPRESSION: 1
RESPIRATORY NEGATIVE: 1

## 2018-06-01 NOTE — CARE PLAN
Problem: Knowledge Deficit  Goal: Knowledge of disease process/condition, treatment plan, diagnostic tests, and medications will improve  Outcome: PROGRESSING AS EXPECTED  Pt educated on importance of medication compliance. Pt verbalized understanding.     Problem: Pain Management  Goal: Pain level will decrease to patient's comfort goal  Outcome: PROGRESSING AS EXPECTED  Pt medicated per MAR for pain.  Pt also offered non-pharmacological means of pain management such as heat packs.

## 2018-06-01 NOTE — PROGRESS NOTES
Bedside report received. Patient A&O x4. RA. Pt has congested cough.  SR on the monitor.  No complaints of pain this morning. POC discussed with patient. Patient verbalized understanding. Call light and belongings within reach. Bed locked and in lowest position, alarm and fall precautions in place.

## 2018-06-01 NOTE — PROGRESS NOTES
Assumed care of pt. She is a&ox4. She is complaining of pain in her rib cage bilaterally. She also says she is more congested than yesterday. We discussed POC with no questions or concerns at this time. Bed is locked in the lowest position and call light is within reach. Education provided about importance of using the call light.

## 2018-06-01 NOTE — PSYCHIATRY
BRIEF PSYCHIATRIC CONSULT NOTE: patient seen, full note to follow.  -Legal Hold:voluntary  -Sitter:no   -Orders Placed: started prozac 10 mg which will be increased to 20 mg if she doesn't have any side effects, in the next 48 hours.   -Plan:will follow. Pt is cleared from psych when medically ready to do so. Please give her a script for prozac 20 mg #30 NR. She has a physician. Will need mental health referrals.

## 2018-06-01 NOTE — PROGRESS NOTES
[facility] Hospitalist Progress Note    Date of Service: 2018    Chief Complaint  47 y.o. female admitted 2018 with altered mentation and hypotension after ingesting multiple medications in a suicide attempt    Interval Problem Update  Cough improved, denies sob  Spoke with psych this am, states she is a little anxious but denies SI  Denies pain      Consultants/Specialty  Psych    Disposition          Review of Systems   Constitutional: Negative.    HENT: Negative.    Eyes: Negative.    Respiratory: Negative.  Negative for cough.    Cardiovascular: Negative.  Negative for chest pain.   Gastrointestinal: Negative.  Negative for abdominal pain, diarrhea, nausea and vomiting.   Genitourinary: Negative.    Musculoskeletal: Negative.    Skin: Negative.    Neurological: Negative for dizziness, tingling, tremors, sensory change, speech change, focal weakness, seizures, loss of consciousness and headaches.   Endo/Heme/Allergies: Negative.    Psychiatric/Behavioral: Positive for depression and substance abuse. Negative for hallucinations, memory loss and suicidal ideas. The patient is not nervous/anxious and does not have insomnia.    All other systems reviewed and are negative.     Physical Exam  Laboratory/Imaging   Hemodynamics  Temp (24hrs), Av.6 °C (97.8 °F), Min:36.3 °C (97.3 °F), Max:36.7 °C (98.1 °F)   Temperature: 36.6 °C (97.8 °F)  Pulse  Av.9  Min: 69  Max: 114    Blood Pressure: 132/86      Respiratory      Respiration: 14, Pulse Oximetry: 96 %     Work Of Breathing / Effort: Mild  RUL Breath Sounds: Clear, RML Breath Sounds: Clear, RLL Breath Sounds: Diminished, MORALES Breath Sounds: Clear, LLL Breath Sounds: Diminished    Fluids    Intake/Output Summary (Last 24 hours) at 18 1123  Last data filed at 18 0800   Gross per 24 hour   Intake              600 ml   Output                0 ml   Net              600 ml       Nutrition  Orders Placed This Encounter   Procedures   • DIET ORDER      Standing Status:   Standing     Number of Occurrences:   1     Order Specific Question:   Diet:     Answer:   Regular [1]     Comments:   Plastic wear, no sharps     Physical Exam   Constitutional: She is oriented to person, place, and time. She appears well-developed and well-nourished. No distress.   HENT:   Head: Normocephalic and atraumatic.   Mouth/Throat: Oropharynx is clear and moist. No oropharyngeal exudate.   Eyes: Conjunctivae are normal. No scleral icterus.   Neck: Normal range of motion. Neck supple.   Cardiovascular: Normal rate, normal heart sounds and intact distal pulses.  Exam reveals no gallop and no friction rub.    No murmur heard.  Pulmonary/Chest: Effort normal and breath sounds normal. No respiratory distress. She has no wheezes. She has no rales. She exhibits no tenderness.   Abdominal: Soft. Bowel sounds are normal. She exhibits no distension and no mass. There is no tenderness. There is no rebound and no guarding.   Musculoskeletal: Normal range of motion. She exhibits no edema or tenderness.   Neurological: She is alert and oriented to person, place, and time. She has normal reflexes. No cranial nerve deficit. She exhibits normal muscle tone. Coordination normal.   Skin: Skin is warm and dry. No rash noted. She is not diaphoretic. No erythema. No pallor.   Psychiatric: She has a normal mood and affect. Her behavior is normal. Judgment and thought content normal.   Nursing note and vitals reviewed.      Recent Labs      05/29/18   1529  05/30/18   0300   WBC  5.2  6.5   RBC  4.56  4.03*   HEMOGLOBIN  14.8  12.9   HEMATOCRIT  43.9  39.4   MCV  96.3  97.8   MCH  32.5  32.0   MCHC  33.7  32.7*   RDW  45.3  46.5   PLATELETCT  240  235   MPV  8.5*  8.4*     Recent Labs      05/29/18   1529  05/30/18   0300  05/31/18   0439   SODIUM  138  139  140   POTASSIUM  3.4*  3.8  3.9   CHLORIDE  104  108  106   CO2  21  23  24   GLUCOSE  126*  94  102*   BUN  12  9  9   CREATININE  0.96  0.69  0.69    CALCIUM  9.2  9.0  8.6                      Assessment/Plan     * Multiple drug overdose- (present on admission)   Assessment & Plan    History of previous suicide attempt by overdose  Took unknown dose of lisinopril, trazadone and prozac.  Initially hypotensive, but bp has remained in normal range and qtc has normalized  Psych has released legal hold and are still following, planning to restart her psych meds        Alcohol intoxication (HCC)- (present on admission)   Assessment & Plan    History of alcohol abuse  Continue monitoring for withdraw            Cough   Assessment & Plan    Exam clear  cxr clear  No hypoxia or sob  Will follow        Abnormal QT interval present on electrocardiogram- (present on admission)   Assessment & Plan      QT now normalized        Suicide attempt by multiple drug overdose (HCC)- (present on admission)   Assessment & Plan    Psych following          Methamphetamine abuse- (present on admission)   Assessment & Plan    Cessation discussed and recommended        Bipolar affective disorder (HCC)- (present on admission)   Assessment & Plan    Psych following          Quality-Core Measures

## 2018-06-02 ENCOUNTER — PATIENT OUTREACH (OUTPATIENT)
Dept: HEALTH INFORMATION MANAGEMENT | Facility: OTHER | Age: 48
End: 2018-06-02

## 2018-06-02 VITALS
RESPIRATION RATE: 20 BRPM | OXYGEN SATURATION: 95 % | BODY MASS INDEX: 27.57 KG/M2 | DIASTOLIC BLOOD PRESSURE: 97 MMHG | HEIGHT: 60 IN | WEIGHT: 140.43 LBS | HEART RATE: 74 BPM | TEMPERATURE: 99.1 F | SYSTOLIC BLOOD PRESSURE: 149 MMHG

## 2018-06-02 PROBLEM — R05.9 COUGH: Status: RESOLVED | Noted: 2018-05-31 | Resolved: 2018-06-02

## 2018-06-02 PROBLEM — I47.20 V-TACH (HCC): Status: RESOLVED | Noted: 2018-06-01 | Resolved: 2018-06-02

## 2018-06-02 PROBLEM — T50.912A SUICIDE ATTEMPT BY MULTIPLE DRUG OVERDOSE (HCC): Status: RESOLVED | Noted: 2018-05-29 | Resolved: 2018-06-02

## 2018-06-02 PROBLEM — F15.10 METHAMPHETAMINE ABUSE (HCC): Status: RESOLVED | Noted: 2018-05-29 | Resolved: 2018-06-02

## 2018-06-02 PROBLEM — R94.31 ABNORMAL QT INTERVAL PRESENT ON ELECTROCARDIOGRAM: Status: RESOLVED | Noted: 2018-05-29 | Resolved: 2018-06-02

## 2018-06-02 PROCEDURE — 700102 HCHG RX REV CODE 250 W/ 637 OVERRIDE(OP): Performed by: HOSPITALIST

## 2018-06-02 PROCEDURE — 700111 HCHG RX REV CODE 636 W/ 250 OVERRIDE (IP): Performed by: HOSPITALIST

## 2018-06-02 PROCEDURE — A9270 NON-COVERED ITEM OR SERVICE: HCPCS | Performed by: PSYCHIATRY & NEUROLOGY

## 2018-06-02 PROCEDURE — 700102 HCHG RX REV CODE 250 W/ 637 OVERRIDE(OP): Performed by: PSYCHIATRY & NEUROLOGY

## 2018-06-02 PROCEDURE — A9270 NON-COVERED ITEM OR SERVICE: HCPCS | Performed by: HOSPITALIST

## 2018-06-02 PROCEDURE — 99239 HOSP IP/OBS DSCHRG MGMT >30: CPT | Performed by: HOSPITALIST

## 2018-06-02 RX ORDER — FLUOXETINE 10 MG/1
20 CAPSULE ORAL DAILY
Qty: 30 CAP | Refills: 0 | Status: SHIPPED | OUTPATIENT
Start: 2018-06-03 | End: 2018-07-11

## 2018-06-02 RX ADMIN — ENOXAPARIN SODIUM 40 MG: 100 INJECTION SUBCUTANEOUS at 05:14

## 2018-06-02 RX ADMIN — GUAIFENESIN AND DEXTROMETHORPHAN 5 ML: 100; 10 SYRUP ORAL at 06:39

## 2018-06-02 RX ADMIN — ACETAMINOPHEN 650 MG: 325 TABLET, FILM COATED ORAL at 09:26

## 2018-06-02 RX ADMIN — FLUOXETINE 10 MG: 10 CAPSULE ORAL at 05:14

## 2018-06-02 ASSESSMENT — LIFESTYLE VARIABLES
ANXIETY: NO ANXIETY (AT EASE)
ORIENTATION AND CLOUDING OF SENSORIUM: ORIENTED AND CAN DO SERIAL ADDITIONS
VISUAL DISTURBANCES: NOT PRESENT
PAROXYSMAL SWEATS: NO SWEAT VISIBLE
AGITATION: NORMAL ACTIVITY
AGITATION: NORMAL ACTIVITY
TREMOR: NO TREMOR
HEADACHE, FULLNESS IN HEAD: NOT PRESENT
VISUAL DISTURBANCES: NOT PRESENT
TOTAL SCORE: 0
HEADACHE, FULLNESS IN HEAD: NOT PRESENT
HEADACHE, FULLNESS IN HEAD: NOT PRESENT
AUDITORY DISTURBANCES: NOT PRESENT
PAROXYSMAL SWEATS: NO SWEAT VISIBLE
AUDITORY DISTURBANCES: NOT PRESENT
ANXIETY: NO ANXIETY (AT EASE)
ORIENTATION AND CLOUDING OF SENSORIUM: ORIENTED AND CAN DO SERIAL ADDITIONS
TOTAL SCORE: 0
PAROXYSMAL SWEATS: NO SWEAT VISIBLE
NAUSEA AND VOMITING: NO NAUSEA AND NO VOMITING
NAUSEA AND VOMITING: NO NAUSEA AND NO VOMITING
ANXIETY: NO ANXIETY (AT EASE)
VISUAL DISTURBANCES: NOT PRESENT
TREMOR: NO TREMOR
AUDITORY DISTURBANCES: NOT PRESENT
AGITATION: NORMAL ACTIVITY
NAUSEA AND VOMITING: NO NAUSEA AND NO VOMITING
TOTAL SCORE: 0
TREMOR: NO TREMOR
ORIENTATION AND CLOUDING OF SENSORIUM: ORIENTED AND CAN DO SERIAL ADDITIONS

## 2018-06-02 ASSESSMENT — PAIN SCALES - GENERAL
PAINLEVEL_OUTOF10: 0
PAINLEVEL_OUTOF10: 6

## 2018-06-02 NOTE — DISCHARGE SUMMARY
Discharge Summary    CHIEF COMPLAINT ON ADMISSION  Chief Complaint   Patient presents with   • Suicidal Ideation   • Drug Overdose     trazadone, lisinopril, prozac - unknown doses   • ALOC     obtunded - patient alert to painful stimuli.       Reason for Admission  EMS     Admission Date  5/29/2018    CODE STATUS  Full Code    HPI & HOSPITAL COURSE  Please see history and physical dictated by Dr. Schmitz on 5/29/18 for further details  This is a 47 y.o. female here with altered mentation following a suicide attempt with an overdose of trazadone, lisinopril and prozac.  Amphetamines and alcohol were also found in her system.  She had mild initial qt prolongation and hypotension and both of these issues resolved.  She had a few non sustained runs of VTACH which resolved after her low magnesium levels were corrected.  She was followed closely by psychiatry and her legal hold was lifted. She was started back on her prozac and drug and alcohol counseling were discussed and recommended.  Patient has had no further suicidal ideation.  She has been medically cleared and has agreed to follow up with mental health early next week.       Therefore, she is discharged in fair and stable condition to home with close outpatient follow-up.    The patient met 2-midnight criteria for an inpatient stay at the time of discharge.    Discharge Date  6/2/18    FOLLOW UP ITEMS POST DISCHARGE  Psych   AA    DISCHARGE DIAGNOSES  Principal Problem (Resolved):    Multiple drug overdose POA: Yes  Active Problems:    Bipolar affective disorder (HCC) POA: Yes  Resolved Problems:    Alcohol intoxication (HCC) POA: Yes    Methamphetamine abuse POA: Yes    Suicide attempt by multiple drug overdose (HCC) POA: Yes    Abnormal QT interval present on electrocardiogram POA: Yes    Cough POA: Unknown    V-tach (HCC) POA: Unknown    Physical Exam   Constitutional: She is oriented to person, place, and time. She appears well-developed and well-nourished. No  distress.   HENT:   Head: Normocephalic and atraumatic.   Mouth/Throat: Oropharynx is clear and moist. No oropharyngeal exudate.   Eyes: Conjunctivae are normal. No scleral icterus.   Neck: Normal range of motion. Neck supple.   Cardiovascular: Normal rate, normal heart sounds and intact distal pulses.  Exam reveals no gallop and no friction rub.    No murmur heard.  Pulmonary/Chest: Effort normal and breath sounds normal. No respiratory distress. She has no wheezes. She has no rales. She exhibits no tenderness.   Abdominal: Soft. Bowel sounds are normal. She exhibits no distension and no mass. There is no tenderness. There is no rebound and no guarding.   Musculoskeletal: Normal range of motion. She exhibits no edema or tenderness.   Neurological: She is alert and oriented to person, place, and time. She has normal reflexes. No cranial nerve deficit. She exhibits normal muscle tone. Coordination normal.   Skin: Skin is warm and dry. No rash noted. She is not diaphoretic. No erythema. No pallor.   Psychiatric: She has a normal mood and affect. Her behavior is normal. Judgment and thought content normal.   Nursing note and vitals reviewed.  FOLLOW UP  No future appointments.  [address] [address] [phone]    If you would like to establish with a primary care provider please call this office to make an appointment. This office offers discounts for patients who have no insurance.        In 2 days        MEDICATIONS ON DISCHARGE     Medication List      START taking these medications      Instructions   FLUoxetine 10 MG Caps  Start taking on:  6/3/2018  Commonly known as:  PROZAC   Take 2 Caps by mouth every day.  Dose:  20 mg        CONTINUE taking these medications      Instructions   albuterol 108 (90 Base) MCG/ACT Aers inhalation aerosol   Inhale 2 Puffs by mouth every four hours as needed for Shortness of Breath.  Dose:  2 Puff             Allergies  Allergies   Allergen Reactions   • Bactrim Hives   • Cephalexin [Keflex] Hives   • Lamotrigine [Lamictal] Hives   • Quetiapine Fumarate Hives     Extrapyramidal Symptoms   • Quetiapine Fumarate      Extrapyramidal symptoms   • Sulfamethoxazole-Trimethoprim Hives       DIET  Orders Placed This Encounter   Procedures   • DIET ORDER     Standing Status:   Standing     Number of Occurrences:   1     Order Specific Question:   Diet:     Answer:   Regular [1]     Comments:   Plastic wear, no sharps       ACTIVITY  As tolerated.      CONSULTATIONS  Psychiatry    PROCEDURES  DX-CHEST-PORTABLE (1 VIEW)   Final Result         1.  Left basilar atelectasis, no focal infiltrate            LABORATORY  Lab Results   Component Value Date    SODIUM 140 05/31/2018    POTASSIUM 3.9 05/31/2018    CHLORIDE 106 05/31/2018    CO2 24 05/31/2018    GLUCOSE 102 (H) 05/31/2018    BUN 9 05/31/2018    CREATININE 0.69 05/31/2018    CREATININE 1.0 02/06/2009        Lab Results   Component Value Date    WBC 6.5 05/30/2018    HEMOGLOBIN 12.9 05/30/2018    HEMATOCRIT 39.4 05/30/2018    PLATELETCT 235 05/30/2018        Total time of the discharge process exceeds  45 minutes.

## 2018-06-02 NOTE — CARE PLAN
Problem: Venous Thromboembolism (VTW)/Deep Vein Thrombosis (DVT) Prevention:  Goal: Patient will participate in Venous Thrombosis (VTE)/Deep Vein Thrombosis (DVT)Prevention Measures  Outcome: PROGRESSING AS EXPECTED  Pt on Lovenox for VTE.  Pt is ambulatory.     Problem: Psychosocial Needs:  Goal: Level of anxiety will decrease  Outcome: PROGRESSING AS EXPECTED  POC explained to pt.  Pt educated on new administration of medications.  Pt verbalized understanding. Pt allowed to voice concerns.  All questions answered.

## 2018-06-02 NOTE — PSYCHIATRY
"PSYCHIATRIC FOLLOW-UP:  *Reason for admission: Multiple med overdose.   Legal Hold Status: NA    Slept well, wanting to get back to work asap. No SI. Feeling better. Has a friend in [place] who wants her to come up there.    *Psychiatric (Physical) Examination: observed phenomenon:  *Vitals:Blood pressure 135/91, pulse 77, temperature 36.7 °C (98.1 °F), resp. rate 20, height 1.524 m (5'), weight 63.7 kg (140 lb 6.9 oz), last menstrual period 02/28/2018, SpO2 97 %, not currently breastfeeding.    *Appearance/Attitude:clean, good eye contact, cooperative  *Muscle Strength:wnl  *Tone/Gait/Station:gait not observed  *Speech:wnl  *Thought Process/Associations: linear  *Abnormal/Psychotic Thoughts (ex):no psychosis  *Insight/Judgement: improved  *Orientation:x 4  *Memory:intact  *Attention/Concentration:intact  *Language:fluid  *Fund of Knowledge:not tested  *Mood: \"a lot better\"          *Affect: more euthymic       *SI/HI:  denies    *ASSESSMENT: ( acute, chronic, acute on chronic, complicated, stable, resolved)  Adjustment Disorder with depressed and anxious mood  Hx of depressive disorder, anxiety.      Alcohol Use Disorder: pt may not be forthcoming. Degree unclear  Methamphetamine Use Disorder:pt may not be forthcoming. Degree unclear    *Plan/Further Workup: will need a script for prozac 20 mg #30 NR on dc.   Legal status: NA     Will follow  "

## 2018-06-02 NOTE — PROGRESS NOTES
Report received. Care assumed. Patient A&Ox4. Pt reports feeling rib pain, no SOB at this time.  Pt currently has no further needs.Discussed POC for the day with Pt. Call light within reach, encouraged pt to call for assistance.

## 2018-06-02 NOTE — DISCHARGE INSTRUCTIONS
Discharge Instructions    Discharged to home by car with friend. Discharged via wheelchair, hospital escort: Yes.  Special equipment needed: Not Applicable    Be sure to schedule a follow-up appointment with your primary care doctor or any specialists as instructed.     Discharge Plan:   Diet Plan: Discussed  Activity Level: Discussed  Confirmed Follow up Appointment: Appointment Scheduled  Confirmed Symptoms Management: Discussed  Medication Reconciliation Updated: Yes  Pneumococcal Vaccine Administered/Refused: Not given - Patient refused pneumococcal vaccine  Influenza Vaccine Indication: Patient Refuses    I understand that a diet low in cholesterol, fat, and sodium is recommended for good health. Unless I have been given specific instructions below for another diet, I accept this instruction as my diet prescription.   Other diet: Regular    Special Instructions: None    · Is patient discharged on Warfarin / Coumadin?   No       Alcohol Withdrawal  Introduction  When a person who drinks a lot of alcohol stops drinking, he or she may go through alcohol withdrawal. Alcohol withdrawal causes problems. It can make you feel:  · Tired (fatigued).  · Sad (depressed).  · Fearful (anxious).  · Grouchy (irritable).  · Not hungry.  · Sick to your stomach (nauseous).  · Shaky.  It can also make you have:  · Nightmares.  · Trouble sleeping.  · Trouble thinking clearly.  · Mood swings.  · Clammy skin.  · Very bad sweating.  · A very fast heartbeat.  · Shaking that you cannot control (tremor).  · Having a fever.  · A fit of movements that you cannot control (seizure).  · Confusion.  · Throwing up (vomiting).  · Feeling or seeing things that are not there (hallucinations).  Follow these instructions at home:  · Take medicines and vitamins only as told by your doctor.  · Do not drink alcohol.  · Have someone around in case you need help.  · Drink enough fluid to keep your pee (urine) clear or pale yellow.  · Think about joining  a group to help you stop drinking.  Contact a doctor if:  · Your problems get worse.  · Your problems do not go away.  · You cannot eat or drink without throwing up.  · You are having a hard time not drinking alcohol.  · You cannot stop drinking alcohol.  Get help right away if:  · You feel your heart beating differently than usual.  · Your chest hurts.  · You have trouble breathing.  · You have very bad problems, like:  ¨ A fever.  ¨ A fit of movements that you cannot control.  ¨ Being very confused.  ¨ Feeling or seeing things that are not there.  This information is not intended to replace advice given to you by your health care provider. Make sure you discuss any questions you have with your health care provider.  Document Released: 06/05/2009 Document Revised: 05/25/2017 Document Reviewed: 10/06/2015  © 2017 Elsevier    Fluoxetine capsules or tablets (Depression/Mood Disorders)  What is this medicine?  FLUOXETINE (floo OX e teen) belongs to a class of drugs known as selective serotonin reuptake inhibitors (SSRIs). It helps to treat mood problems such as depression, obsessive compulsive disorder, and panic attacks. It can also treat certain eating disorders.  This medicine may be used for other purposes; ask your health care provider or pharmacist if you have questions.  COMMON BRAND NAME(S): Prozac  What should I tell my health care provider before I take this medicine?  They need to know if you have any of these conditions:  -bipolar disorder or a family history of bipolar disorder  -bleeding disorders  -glaucoma  -heart disease  -liver disease  -low levels of sodium in the blood  -seizures  -suicidal thoughts, plans, or attempt; a previous suicide attempt by you or a family member  -take MAOIs like Carbex, Eldepryl, Marplan, Nardil, and Parnate  -take medicines that treat or prevent blood clots  -thyroid disease  -an unusual or allergic reaction to fluoxetine, other medicines, foods, dyes, or  preservatives  -pregnant or trying to get pregnant  -breast-feeding  How should I use this medicine?  Take this medicine by mouth with a glass of water. Follow the directions on the prescription label. You can take this medicine with or without food. Take your medicine at regular intervals. Do not take it more often than directed. Do not stop taking this medicine suddenly except upon the advice of your doctor. Stopping this medicine too quickly may cause serious side effects or your condition may worsen.  A special MedGuide will be given to you by the pharmacist with each prescription and refill. Be sure to read this information carefully each time.  Talk to your pediatrician regarding the use of this medicine in children. While this drug may be prescribed for children as young as 7 years for selected conditions, precautions do apply.  Overdosage: If you think you have taken too much of this medicine contact a poison control center or emergency room at once.  NOTE: This medicine is only for you. Do not share this medicine with others.  What if I miss a dose?  If you miss a dose, skip the missed dose and go back to your regular dosing schedule. Do not take double or extra doses.  What may interact with this medicine?  Do not take this medicine with any of the following medications:  -other medicines containing fluoxetine, like Sarafem or Symbyax  -cisapride  -linezolid  -MAOIs like Carbex, Eldepryl, Marplan, Nardil, and Parnate  -methylene blue (injected into a vein)  -pimozide  -thioridazine  This medicine may also interact with the following medications:  -alcohol  -amphetamines  -aspirin and aspirin-like medicines  -carbamazepine  -certain medicines for depression, anxiety, or psychotic disturbances  -certain medicines for migraine headaches like almotriptan, eletriptan, frovatriptan, naratriptan, rizatriptan, sumatriptan,  zolmitriptan  -digoxin  -diuretics  -fentanyl  -flecainide  -furazolidone  -isoniazid  -lithium  -medicines for sleep  -medicines that treat or prevent blood clots like warfarin, enoxaparin, and dalteparin  -NSAIDs, medicines for pain and inflammation, like ibuprofen or naproxen  -phenytoin  -procarbazine  -propafenone  -rasagiline  -ritonavir  -supplements like Terri's wort, kava kava, valerian  -tramadol  -tryptophan  -vinblastine  This list may not describe all possible interactions. Give your health care provider a list of all the medicines, herbs, non-prescription drugs, or dietary supplements you use. Also tell them if you smoke, drink alcohol, or use illegal drugs. Some items may interact with your medicine.  What should I watch for while using this medicine?  Tell your doctor if your symptoms do not get better or if they get worse. Visit your doctor or health care professional for regular checks on your progress. Because it may take several weeks to see the full effects of this medicine, it is important to continue your treatment as prescribed by your doctor.  Patients and their families should watch out for new or worsening thoughts of suicide or depression. Also watch out for sudden changes in feelings such as feeling anxious, agitated, panicky, irritable, hostile, aggressive, impulsive, severely restless, overly excited and hyperactive, or not being able to sleep. If this happens, especially at the beginning of treatment or after a change in dose, call your health care professional.  You may get drowsy or dizzy. Do not drive, use machinery, or do anything that needs mental alertness until you know how this medicine affects you. Do not stand or sit up quickly, especially if you are an older patient. This reduces the risk of dizzy or fainting spells. Alcohol may interfere with the effect of this medicine. Avoid alcoholic drinks.  Your mouth may get dry. Chewing sugarless gum or sucking hard candy, and  drinking plenty of water may help. Contact your doctor if the problem does not go away or is severe.  This medicine may affect blood sugar levels. If you have diabetes, check with your doctor or health care professional before you change your diet or the dose of your diabetic medicine.  What side effects may I notice from receiving this medicine?  Side effects that you should report to your doctor or health care professional as soon as possible:  -allergic reactions like skin rash, itching or hives, swelling of the face, lips, or tongue  -anxious  -black, tarry stools  -breathing problems  -changes in vision  -confusion  -elevated mood, decreased need for sleep, racing thoughts, impulsive behavior  -eye pain  -fast, irregular heartbeat  -feeling faint or lightheaded, falls  -feeling agitated, angry, or irritable  -hallucination, loss of contact with reality  -loss of balance or coordination  -loss of memory  -painful or prolonged erections  -restlessness, pacing, inability to keep still  -seizures  -stiff muscles  -suicidal thoughts or other mood changes  -trouble sleeping  -unusual bleeding or bruising  -unusually weak or tired  -vomiting  Side effects that usually do not require medical attention (report to your doctor or health care professional if they continue or are bothersome):  -change in appetite or weight  -change in sex drive or performance  -diarrhea  -dry mouth  -headache  -increased sweating  -nausea  -tremors  This list may not describe all possible side effects. Call your doctor for medical advice about side effects. You may report side effects to FDA at 9-654-FDA-5430.  Where should I keep my medicine?  Keep out of the reach of children.  Store at room temperature between 15 and 30 degrees C (59 and 86 degrees F). Throw away any unused medicine after the expiration date.  NOTE: This sheet is a summary. It may not cover all possible information. If you have questions about this medicine, talk to your  "doctor, pharmacist, or health care provider.  © 2018 Elsevier/Gold Standard (2017-05-20 15:55:27)      Overdose, Adult  A person can overdose on alcohol, drugs or both by accident or on purpose. If it was on purpose, it is a serious matter. Professional help should be sought. If the overdose was an accident, certain steps should be taken to make sure that it never happens again.  ACCIDENTAL OVERDOSE  Overdosing on prescription medications can be a result of:  · Not understanding the instructions.  · Misreading the label.  · Forgetting that you took a dose and then taking another by mistake. This situation happens a lot.  To make sure this does not happen again:  · Clarify the correct dosage with your caregiver.  · Place the correct dosage in a \"pill-minder\" container (labeled for each day and time of day).  · Have someone dispense your medicine.  Please be sure to follow-up with your primary care doctor as directed.  INTENTIONAL OVERDOSE  If the overdose was on purpose, it is a serious situation. Taking more than the prescribed amount of medications (including taking someone else's prescription), abusing street drugs or drinking an amount of alcohol that requires medical treatment can show a variety of possible problems. These may indicate you:  · Are depressed or suicidal.  · Are abusing drugs, took too much or combined different drugs to experiment with the effects.  · Mixed alcohol with drugs and did not realize the danger of doing so (this is drug abuse).  · Are suffering addiction to drugs and/or alcohol (also known as chemical dependency).  · Binge drink.  If you have not been referred to a mental health professional for help, it is important that you get help right away. Only a professional can determine which problems may exist and what the best course of treatment may be. It is your responsibility to follow-up with further evaluation or treatment as directed.   Alcohol is responsible for a large number of " "overdoses and unintended deaths among college-age young adults. Binge drinking is consuming 4-5 drinks in a short period of time. The amount of alcohol in standard servings of wine (5 oz.), beer (12 oz.) and distilled spirits (1.5 oz., 80 proof) is the same. Beer or wine can be just as dangerous to the binge drinker as \"hard\" liquor can be.   CONSEQUENCES OF BINGE DRINKING  Alcohol poisoning is the most serious consequence of binge drinking. This is a severe and potentially fatal physical reaction to an alcohol overdose. When too much alcohol is consumed, the brain does not get enough oxygen. The lack of oxygen will eventually cause the brain to shut down the voluntary functions that regulate breathing and heart rate. Symptoms of alcohol poisoning include:  · Vomiting.  · Passing out (unconsciousness).  · Cold, clammy, pale or bluish skin.  · Slow or irregular breathing.  WHAT SHOULD I DO NEXT?  If you have a history of drug abuse or suffer chemical dependency (alcoholism, drug addiction or both), you might consider the following:  · Talk with a qualified substance abuse counselor and consider entering a treatment program.  · Go to a detox facility if necessary.  · If you were attending self-help group meetings, consider returning to them and go often.  · Explore other resources located near you (see sources listed below).  If you are unsure if you have a substance abuse problem, ask yourself the following questions:  · Have you been told by friends or family that drugs/alcohol has become a problem?  · Do you get into fights when drinking or using drugs?  · Do you have blackouts (not remembering what you do while using)?  · Do you lie about use or amounts of drugs or alcohol you consume?  · Do you need chemicals to get you going?  · Do you suffer in work or school performance because of drug or alcohol use?  · Do you get sick from drug or alcohol use but continue to use anyway?  · Do you need drugs or alcohol to " "relate to people or feel comfortable in social situations?  · Do you use drugs or alcohol to forget problems?  If you answered \"Yes\" to any of the above questions, it means you show signs of chemical dependency and a professional evaluation is suggested. The longer the use of drugs and alcohol continues, the problems will become greater.  SEEK IMMEDIATE MEDICAL CARE IF:   · You feel like you might repeat your problematic behavior.  · You need someone to talk to and feel that it should not wait.  · You feel you are a danger to yourself or someone else.  · You feel like you are having a new reaction to medications you are taking, or you are getting worse after leaving a care center.  · You have an overwhelming urge to drink or use drugs.  Addiction cannot be cured, but it can be treated successfully. Treatment centers are listed in the yellow pages under: Cocaine, Narcotics, and Alcoholics Anonymous. Most hospitals and clinics can refer you to a specialized care center. The  government maintains a toll-free number for obtaining treatment referrals: 1-156.841.8014 or 1-797.221.5263 (TDD) and maintains a website: http://findtreatment.samhsa.gov. Other websites for additional information are: www.mentalhealth.samhsa.gov. and www.[place].gov.  [place] treatment resources are listed in each Province with listings available under [facility] [employer] or similar titles.  Document Released: 12/20/2004 Document Revised: 03/11/2013 Document Reviewed: 11/11/2009  ExitCare® Patient Information ©2014 [employer], [employer].      Depression / Suicide Risk    As you are discharged from this [facility] facility, it is important to learn how to keep safe from harming yourself.    Recognize the warning signs:  · Abrupt changes in personality, positive or negative- including increase in energy   · Giving away possessions  · Change in eating patterns- significant weight changes-  positive or negative  · Change in sleeping " patterns- unable to sleep or sleeping all the time   · Unwillingness or inability to communicate  · Depression  · Unusual sadness, discouragement and loneliness  · Talk of wanting to die  · Neglect of personal appearance   · Rebelliousness- reckless behavior  · Withdrawal from people/activities they love  · Confusion- inability to concentrate     If you or a loved one observes any of these behaviors or has concerns about self-harm, here's what you can do:  · Talk about it- your feelings and reasons for harming yourself  · Remove any means that you might use to hurt yourself (examples: pills, rope, extension cords, firearm)  · Get professional help from the community (Mental Health, Substance Abuse, psychological counseling)  · Do not be alone:Call your Safe Contact- someone whom you trust who will be there for you.  · Call your local CRISIS HOTLINE 646-2091 or 819-252-6007  · Call your local Children's Mobile Crisis Response Team [place] (864) 957-0027 or www.M-Audio  · Call the toll free National Suicide Prevention Hotlines   · National Suicide Prevention Lifeline 835-144-UIGA (5114)  · National Hope Line Network 800-SUICIDE (874-6163)

## 2018-06-03 ENCOUNTER — PATIENT OUTREACH (OUTPATIENT)
Dept: HEALTH INFORMATION MANAGEMENT | Facility: OTHER | Age: 48
End: 2018-06-03

## 2018-06-17 ENCOUNTER — PATIENT OUTREACH (OUTPATIENT)
Dept: HEALTH INFORMATION MANAGEMENT | Facility: OTHER | Age: 48
End: 2018-06-17

## 2018-07-10 ENCOUNTER — HOSPITAL ENCOUNTER (EMERGENCY)
Facility: MEDICAL CENTER | Age: 48
End: 2018-07-12
Attending: EMERGENCY MEDICINE
Payer: MEDICAID

## 2018-07-10 DIAGNOSIS — F10.920 ALCOHOLIC INTOXICATION WITHOUT COMPLICATION (HCC): ICD-10-CM

## 2018-07-10 DIAGNOSIS — R45.851 SUICIDAL IDEATION: ICD-10-CM

## 2018-07-10 PROCEDURE — 304561 HCHG STAT O2

## 2018-07-10 PROCEDURE — 99285 EMERGENCY DEPT VISIT HI MDM: CPT

## 2018-07-10 ASSESSMENT — PAIN SCALES - GENERAL: PAINLEVEL_OUTOF10: 2

## 2018-07-11 LAB
ALBUMIN SERPL BCP-MCNC: 4.1 G/DL (ref 3.2–4.9)
ALBUMIN/GLOB SERPL: 1.3 G/DL
ALP SERPL-CCNC: 87 U/L (ref 30–99)
ALT SERPL-CCNC: 30 U/L (ref 2–50)
ANION GAP SERPL CALC-SCNC: 12 MMOL/L (ref 0–11.9)
APAP SERPL-MCNC: <10 UG/ML (ref 10–30)
AST SERPL-CCNC: 29 U/L (ref 12–45)
BASOPHILS # BLD AUTO: 0.7 % (ref 0–1.8)
BASOPHILS # BLD: 0.03 K/UL (ref 0–0.12)
BILIRUB SERPL-MCNC: 0.3 MG/DL (ref 0.1–1.5)
BUN SERPL-MCNC: 10 MG/DL (ref 8–22)
CALCIUM SERPL-MCNC: 8.6 MG/DL (ref 8.5–10.5)
CHLORIDE SERPL-SCNC: 103 MMOL/L (ref 96–112)
CO2 SERPL-SCNC: 23 MMOL/L (ref 20–33)
CREAT SERPL-MCNC: 0.55 MG/DL (ref 0.5–1.4)
EKG IMPRESSION: NORMAL
EOSINOPHIL # BLD AUTO: 0.03 K/UL (ref 0–0.51)
EOSINOPHIL NFR BLD: 0.7 % (ref 0–6.9)
ERYTHROCYTE [DISTWIDTH] IN BLOOD BY AUTOMATED COUNT: 47.2 FL (ref 35.9–50)
ETHANOL BLD-MCNC: 0.11 G/DL
GLOBULIN SER CALC-MCNC: 3.1 G/DL (ref 1.9–3.5)
GLUCOSE SERPL-MCNC: 91 MG/DL (ref 65–99)
HCT VFR BLD AUTO: 43 % (ref 37–47)
HGB BLD-MCNC: 14.9 G/DL (ref 12–16)
IMM GRANULOCYTES # BLD AUTO: 0.02 K/UL (ref 0–0.11)
IMM GRANULOCYTES NFR BLD AUTO: 0.4 % (ref 0–0.9)
LYMPHOCYTES # BLD AUTO: 1.04 K/UL (ref 1–4.8)
LYMPHOCYTES NFR BLD: 23.4 % (ref 22–41)
MCH RBC QN AUTO: 33 PG (ref 27–33)
MCHC RBC AUTO-ENTMCNC: 34.7 G/DL (ref 33.6–35)
MCV RBC AUTO: 95.3 FL (ref 81.4–97.8)
MONOCYTES # BLD AUTO: 0.43 K/UL (ref 0–0.85)
MONOCYTES NFR BLD AUTO: 9.7 % (ref 0–13.4)
NEUTROPHILS # BLD AUTO: 2.9 K/UL (ref 2–7.15)
NEUTROPHILS NFR BLD: 65.1 % (ref 44–72)
NRBC # BLD AUTO: 0 K/UL
NRBC BLD-RTO: 0 /100 WBC
PLATELET # BLD AUTO: 211 K/UL (ref 164–446)
PMV BLD AUTO: 8.1 FL (ref 9–12.9)
POTASSIUM SERPL-SCNC: 3.4 MMOL/L (ref 3.6–5.5)
PROT SERPL-MCNC: 7.2 G/DL (ref 6–8.2)
RBC # BLD AUTO: 4.51 M/UL (ref 4.2–5.4)
SALICYLATES SERPL-MCNC: 0 MG/DL (ref 15–25)
SODIUM SERPL-SCNC: 138 MMOL/L (ref 135–145)
WBC # BLD AUTO: 4.5 K/UL (ref 4.8–10.8)

## 2018-07-11 PROCEDURE — 80307 DRUG TEST PRSMV CHEM ANLYZR: CPT

## 2018-07-11 PROCEDURE — 700101 HCHG RX REV CODE 250: Performed by: EMERGENCY MEDICINE

## 2018-07-11 PROCEDURE — 93005 ELECTROCARDIOGRAM TRACING: CPT

## 2018-07-11 PROCEDURE — A9270 NON-COVERED ITEM OR SERVICE: HCPCS | Performed by: EMERGENCY MEDICINE

## 2018-07-11 PROCEDURE — 90791 PSYCH DIAGNOSTIC EVALUATION: CPT

## 2018-07-11 PROCEDURE — 85025 COMPLETE CBC W/AUTO DIFF WBC: CPT

## 2018-07-11 PROCEDURE — 700102 HCHG RX REV CODE 250 W/ 637 OVERRIDE(OP): Performed by: EMERGENCY MEDICINE

## 2018-07-11 PROCEDURE — 93005 ELECTROCARDIOGRAM TRACING: CPT | Performed by: EMERGENCY MEDICINE

## 2018-07-11 PROCEDURE — 80053 COMPREHEN METABOLIC PANEL: CPT

## 2018-07-11 PROCEDURE — 96365 THER/PROPH/DIAG IV INF INIT: CPT

## 2018-07-11 RX ORDER — LORAZEPAM 2 MG/ML
0.5 INJECTION INTRAMUSCULAR EVERY 4 HOURS PRN
Status: DISCONTINUED | OUTPATIENT
Start: 2018-07-11 | End: 2018-07-12 | Stop reason: HOSPADM

## 2018-07-11 RX ORDER — LORAZEPAM 1 MG/1
4 TABLET ORAL
Status: DISCONTINUED | OUTPATIENT
Start: 2018-07-11 | End: 2018-07-12 | Stop reason: HOSPADM

## 2018-07-11 RX ORDER — LORAZEPAM 1 MG/1
2 TABLET ORAL
Status: DISCONTINUED | OUTPATIENT
Start: 2018-07-11 | End: 2018-07-12 | Stop reason: HOSPADM

## 2018-07-11 RX ORDER — LORAZEPAM 2 MG/ML
1 INJECTION INTRAMUSCULAR
Status: DISCONTINUED | OUTPATIENT
Start: 2018-07-11 | End: 2018-07-12 | Stop reason: HOSPADM

## 2018-07-11 RX ORDER — GABAPENTIN 300 MG/1
300 CAPSULE ORAL 3 TIMES DAILY
Status: SHIPPED | COMMUNITY
End: 2021-10-05

## 2018-07-11 RX ORDER — LORAZEPAM 1 MG/1
0.5 TABLET ORAL EVERY 4 HOURS PRN
Status: DISCONTINUED | OUTPATIENT
Start: 2018-07-11 | End: 2018-07-12 | Stop reason: HOSPADM

## 2018-07-11 RX ORDER — HYDROXYZINE HYDROCHLORIDE 25 MG/1
50 TABLET, FILM COATED ORAL 3 TIMES DAILY
COMMUNITY
End: 2019-10-01

## 2018-07-11 RX ORDER — LORAZEPAM 2 MG/ML
2 INJECTION INTRAMUSCULAR
Status: DISCONTINUED | OUTPATIENT
Start: 2018-07-11 | End: 2018-07-12 | Stop reason: HOSPADM

## 2018-07-11 RX ORDER — LORAZEPAM 1 MG/1
1 TABLET ORAL EVERY 4 HOURS PRN
Status: DISCONTINUED | OUTPATIENT
Start: 2018-07-11 | End: 2018-07-12 | Stop reason: HOSPADM

## 2018-07-11 RX ORDER — LORAZEPAM 2 MG/ML
1.5 INJECTION INTRAMUSCULAR
Status: DISCONTINUED | OUTPATIENT
Start: 2018-07-11 | End: 2018-07-12 | Stop reason: HOSPADM

## 2018-07-11 RX ORDER — LORAZEPAM 1 MG/1
3 TABLET ORAL
Status: DISCONTINUED | OUTPATIENT
Start: 2018-07-11 | End: 2018-07-12 | Stop reason: HOSPADM

## 2018-07-11 RX ADMIN — THIAMINE HYDROCHLORIDE: 100 INJECTION, SOLUTION INTRAMUSCULAR; INTRAVENOUS at 17:30

## 2018-07-11 RX ADMIN — LORAZEPAM 2 MG: 1 TABLET ORAL at 15:23

## 2018-07-11 RX ADMIN — LORAZEPAM 1 MG: 1 TABLET ORAL at 17:15

## 2018-07-11 ASSESSMENT — LIFESTYLE VARIABLES
ORIENTATION AND CLOUDING OF SENSORIUM: ORIENTED AND CAN DO SERIAL ADDITIONS
VISUAL DISTURBANCES: NOT PRESENT
AUDITORY DISTURBANCES: NOT PRESENT
NAUSEA AND VOMITING: NO NAUSEA AND NO VOMITING
AUDITORY DISTURBANCES: NOT PRESENT
AGITATION: NORMAL ACTIVITY
TREMOR: NO TREMOR
ORIENTATION AND CLOUDING OF SENSORIUM: ORIENTED AND CAN DO SERIAL ADDITIONS
VISUAL DISTURBANCES: NOT PRESENT
ANXIETY: MILDLY ANXIOUS
ANXIETY: NO ANXIETY (AT EASE)
AGITATION: NORMAL ACTIVITY
AGITATION: NORMAL ACTIVITY
NAUSEA AND VOMITING: MILD NAUSEA WITH NO VOMITING
TREMOR: *
PAROXYSMAL SWEATS: *
HEADACHE, FULLNESS IN HEAD: NOT PRESENT
NAUSEA AND VOMITING: MILD NAUSEA WITH NO VOMITING
HEADACHE, FULLNESS IN HEAD: MILD
ANXIETY: NO ANXIETY (AT EASE)
TOTAL SCORE: 9
TREMOR: MODERATE TREMOR WITH ARMS EXTENDED
TOTAL SCORE: 12
AUDITORY DISTURBANCES: NOT PRESENT
ORIENTATION AND CLOUDING OF SENSORIUM: ORIENTED AND CAN DO SERIAL ADDITIONS
PAROXYSMAL SWEATS: NO SWEAT VISIBLE
PAROXYSMAL SWEATS: *
HEADACHE, FULLNESS IN HEAD: MODERATE

## 2018-07-11 ASSESSMENT — PAIN SCALES - WONG BAKER: WONGBAKER_NUMERICALRESPONSE: DOESN'T HURT AT ALL

## 2018-07-11 ASSESSMENT — PAIN SCALES - GENERAL: PAINLEVEL_OUTOF10: 0

## 2018-07-11 NOTE — ED PROVIDER NOTES
"ED PROVIDER NOTE    Scribed for Carl Borden M.D. by Candace Cifuentes. 7/11/2018, 12:10 PM.    This is an addendum to the note on Korin Hendrix. For further details and full chart entry, see the previously signed ED Provider Note written by Dr. Dickerson ([facility]).      12:10 PM Patient reevaluated at bedside. She is resting comfortably and has no complaints at this time. Her last set of vitals were /84   Pulse 85   Temp 36 °C (96.8 °F)   Resp 16   Ht 1.6 m (5' 3\")   Wt 68 kg (150 lb)   SpO2 96%   BMI 26.57 kg/m²     ICandace (Scribe), am scribing for, and in the presence of, Carl Borden M.D..  Electronically signed by: Candace Cifuentes (Scribe), 7/11/2018  ICarl M.D. personally performed the services described in this documentation, as scribed by Candace Cifuentes in my presence, and it is both accurate and complete.    The note accurately reflects work and decisions made by me.  Carl Borden  7/11/2018  12:12 PM      "

## 2018-07-11 NOTE — ED NOTES
"Med rec complete per pt at bedside  Allergies reviewed  No ABX in last month    Pt reports she has not been taking her medications in over 3 months  Took gabapentin and hydroxyzine yesterday to overdose, \"a handful of each\"  Added to med as they are prescribed to the pt  Pt states she is also supposed to be taking Prozac 20mg QD and Buspar 10mg TID  These medications not added to med rec, as pt has not taken them in over 3 months  "

## 2018-07-11 NOTE — CONSULTS
"[facility]   TRIAGE ASSESSMENT    Name: Olya Youssef  MRN: [id number]  : 1970  Age: 48 y.o.  Date of assessment: 2018  PCP: SALONI Marcial  Persons in attendance: Patient    CHIEF COMPLAINT/PRESENTING ISSUE (as stated by patient): Patient laying in bed.  Calm and cooperative.  Continues to endorse suicidal ideation.  \"My life is a mess. I think I lost my job now and I probably do not have anywhere to stay.\"  Patient stated she texted her ex- and told him \"goodbye.\" The police were notified and responded to the patients place of residence.  Patient was placed on a legal hold and taken to this ER. Patient's affect appears constricted to almost blunted with limited to poor eye contact.  History of bipolar disorder and previous suicide attempts.  Multiple inpatient psychiatric hospitalizations.  Patient reports being noncompliant with psychiatric medications. \"I don't take them like I should.\"  Patient to remain on legal hold for safety.  Chief Complaint   Patient presents with   • Suicidal Ideation        CURRENT LIVING SITUATION/SOCIAL SUPPORT: Patient is currently residing with her friend \"Roberto.\"  She was previously homeless before moving into her current residence.  Patient reports having a poor relationship with her sons and is .  Patient's living situation in now unclear after last nights events.     BEHAVIORAL HEALTH TREATMENT HISTORY  Does patient/parent report a history of prior behavioral health treatment for patient?   Yes:  Patient reports a long mental health treatment history.  Most of the patients treatment is established with [facility].  Patient reports diagnosis of bipolar disorder, PTSD, anxiety and Borderline personality Disorder.  Patient admits to long history of alcohol abuse as well.  Unable to give dates of inpatient hospitalizations.  Current medications include Prozac and vistaril.  Patient inconsistent with medication regimen.  Dates Level of Care " Facilty/Provider Diagnosis/Problem Medications   Approximate 2 years ago inpatient [facility] bipolar prozac                                                                        SAFETY ASSESSMENT - SELF  Does patient acknowledge current or past symptoms of dangerousness to self? yes  Does parent/significant other report patient has current or past symptoms of dangerousness to self? N\A  Does presenting problem suggest symptoms of dangerousness to self? Yes:     Past Current    Suicidal Thoughts: [x]  [x]    Suicidal Plans: [x]  [x]    Suicidal Intent: [x]  [x]    Suicide Attempts: [x]  [x]    Self-Injury []  []      For any boxes checked above, provide detail: Patient continues to endorse suicidal ideation with plan to overdose on medication.  Patient reports previous history of suicide attempts using the same method.      History of suicide by family member: no  History of suicide by friend/significant other: no  Recent change in frequency/specificity/intensity of suicidal thoughts or self-harm behavior? no  Current access to firearms, medications, or other identified means of suicide/self-harm? no  If yes, willing to restrict access to means of suicide/self-harm? no  Protective factors present:  Willing to address in treatment    SAFETY ASSESSMENT - OTHERS  Does patient acknowledge current or past symptoms of aggressive behavior or risk to others? no  Does parent/significant other report patient has current or past symptoms of aggressive behavior or risk to others?  no  Does presenting problem suggest symptoms of dangerousness to others? No    Crisis Safety Plan completed and copy given to patient? No  Patient unable to contract for safety at this time    ABUSE/NEGLECT SCREENING  Does patient report feeling “unsafe” in his/her home, or afraid of anyone?  no  Does patient report any history of physical, sexual, or emotional abuse?  no  Does parent or significant other report any of the above? N\A  Is there evidence  "of neglect by self?  no  Is there evidence of neglect by a caregiver? no  Does the patient/parent report any history of CPS/APS/police involvement related to suspected abuse/neglect or domestic violence? no  Based on the information provided during the current assessment, is a mandated report of suspected abuse/neglect being made?  No    SUBSTANCE USE SCREENING  Yes:  Isrrael all substances used in the past 30 days:      Last Use Amount   [x]   Alcohol 7/10/18    []   Marijuana     []   Heroin     []   Prescription Opioids  (used without prescription, for    recreation, or in excess of prescribed amount)     []   Other Prescription  (used without prescription, for    recreation, or in excess of prescribed amount)     []   Cocaine      []   Methamphetamine     []   \"\" drugs (ectasy, MDMA)     []   Other substances        UDS results: Pending  Breathalyzer results: Legally sober at time of evaluation    What consequences does the patient associate with any of the above substance use and or addictive behaviors? Other: \"Drinking has completely ruined my life.\"    Risk factors for detox (check all that apply):  []  Seizures   []  Diaphoretic (sweating)   []  Tremors   []  Hallucinations   []  Increased blood pressure   []  Decreased blood pressure   []  Other   []  None      [] Patient education on risk factors for detoxification and instructed to return to ER as needed.      MENTAL STATUS   Participation: Active verbal participation  Grooming: Disheveled  Orientation: Alert and Fully Oriented  Behavior: Calm  Eye contact: Poor  Mood: Depressed and Anxious  Affect: Constricted and Blunted  Thought process: Logical  Thought content: Within normal limits  Speech: Rate within normal limits and Volume within normal limits  Perception: Within normal limits  Memory:  Poor memory for chronology of events  Insight: Limited  Judgment:  Poor  Other:    Collateral information:   Source:  [] Significant other present in person: "   [] Significant other by telephone  [] [facility]   [] [facility] Nursing Staff  [x] [facility]  [] Other:     [] Unable to complete full assessment due to:  [] Acute intoxication  [] Patient declined to participate/engage  [] Patient verbally unresponsive  [] Significant cognitive deficits  [] Significant perceptual distortions or behavioral disorganization  [] Other:      CLINICAL IMPRESSIONS:  Primary:  Bipolar disorder  Secondary: Substance abuse       IDENTIFIED NEEDS/PLAN:  [Trigger DISPOSITION list for any items marked]    [x]  Imminent safety risk - self [] Imminent safety risk - others   []  Acute substance withdrawal []  Psychosis/Impaired reality testing   []  Mood/anxiety [x]  Substance use/Addictive behavior   []  Maladaptive behaviro [x]  Parent/child conflict   []  Family/Couples conflict []  Biomedical   [x]  Housing []  Financial   []   Legal  Occupational/Educational   []  Domestic violence []  Other:     Disposition: Refer to Legal Hold    Does patient express agreement with the above plan? yes    Referral appointment(s) scheduled? [facility] notified of patient    Alert team only:   I have discussed findings and recommendations with Dr. Dickerson who is in agreement with these recommendations.     Referral information sent to the following community providers :    If applicable : Referred  to : [facility] for legal hold follow up at (time): 0400      Param Cummings R.N.  7/11/2018

## 2018-07-11 NOTE — DISCHARGE PLANNING
Drowsy, difficult to arouse.  Continues to admit to S/I stating that her life is just a mess.  Continue to await placement.

## 2018-07-11 NOTE — DISCHARGE INSTRUCTIONS
Alcohol Problems  Most adults who drink alcohol drink in moderation (not a lot) are at low risk for developing problems related to their drinking. However, all drinkers, including low-risk drinkers, should know about the health risks connected with drinking alcohol.  RECOMMENDATIONS FOR LOW-RISK DRINKING   Drink in moderation. Moderate drinking is defined as follows:   · Men - no more than 2 drinks per day.  · Nonpregnant women - no more than 1 drink per day.  · Over age 65 - no more than 1 drink per day.  A standard drink is 12 grams of pure alcohol, which is equal to a 12 ounce bottle of beer or wine cooler, a 5 ounce glass of wine, or 1.5 ounces of distilled spirits (such as whiskey, maria teresa, vodka, or rum).   ABSTAIN FROM (DO NOT DRINK) ALCOHOL:  · When pregnant or considering pregnancy.  · When taking a medication that interacts with alcohol.  · If you are alcohol dependent.  · A medical condition that prohibits drinking alcohol (such as ulcer, liver disease, or heart disease).  DISCUSS WITH YOUR CAREGIVER:  · If you are at risk for coronary heart disease, discuss the potential benefits and risks of alcohol use: Light to moderate drinking is associated with lower rates of coronary heart disease in certain populations (for example, men over age 45 and postmenopausal women). Infrequent or nondrinkers are advised not to begin light to moderate drinking to reduce the risk of coronary heart disease so as to avoid creating an alcohol-related problem. Similar protective effects can likely be gained through proper diet and exercise.  · Women and the elderly have smaller amounts of body water than men. As a result women and the elderly achieve a higher blood alcohol concentration after drinking the same amount of alcohol.  · Exposing a fetus to alcohol can cause a broad range of birth defects referred to as Fetal Alcohol Syndrome (FAS) or Alcohol-Related Birth Defects (ARBD). Although FAS/ARBD is connected with excessive  alcohol consumption during pregnancy, studies also have reported neurobehavioral problems in infants born to mothers reporting drinking an average of 1 drink per day during pregnancy.  · Heavier drinking (the consumption of more than 4 drinks per occasion by men and more than 3 drinks per occasion by women) impairs learning (cognitive) and psychomotor functions and increases the risk of alcohol-related problems, including accidents and injuries.  CAGE QUESTIONS:   · Have you ever felt that you should Cut down on your drinking?  · Have people Annoyed you by criticizing your drinking?  · Have you ever felt bad or Guilty about your drinking?  · Have you ever had a drink first thing in the morning to steady your nerves or get rid of a hangover (Eye opener)?  If you answered positively to any of these questions: You may be at risk for alcohol-related problems if alcohol consumption is:   · Men: Greater than 14 drinks per week or more than 4 drinks per occasion.  · Women: Greater than 7 drinks per week or more than 3 drinks per occasion.  Do you or your family have a medical history of alcohol-related problems, such as:  · Blackouts.  · Sexual dysfunction.  · Depression.  · Trauma.  · Liver dysfunction.  · Sleep disorders.  · Hypertension.  · Chronic abdominal pain.  · Has your drinking ever caused you problems, such as problems with your family, problems with your work (or school) performance, or accidents/injuries?  · Do you have a compulsion to drink or a preoccupation with drinking?  · Do you have poor control or are you unable to stop drinking once you have started?  · Do you have to drink to avoid withdrawal symptoms?  · Do you have problems with withdrawal such as tremors, nausea, sweats, or mood disturbances?  · Does it take more alcohol than in the past to get you high?  · Do you feel a strong urge to drink?  · Do you change your plans so that you can have a drink?  · Do you ever drink in the morning to relieve  the shakes or a hangover?  If you have answered a number of the previous questions positively, it may be time for you to talk to your caregivers, family, and friends and see if they think you have a problem. Alcoholism is a chemical dependency that keeps getting worse and will eventually destroy your health and relationships. Many alcoholics end up dead, impoverished, or in detention. This is often the end result of all chemical dependency.  · Do not be discouraged if you are not ready to take action immediately.  · Decisions to change behavior often involve up and down desires to change and feeling like you cannot decide.  · Try to think more seriously about your drinking behavior.  · Think of the reasons to quit.  WHERE TO GO FOR ADDITIONAL INFORMATION   · The National [place] on Alcohol Abuse and Alcoholism (NIAAA)  www.niaaa.nih.gov  · National [place] on Alcoholism and Drug Dependence (NCADD)  www.ncadd.org  · American Society of Addiction Medicine (ASAM)  www.asam.org   Document Released: 12/18/2006 Document Revised: 03/11/2013 Document Reviewed: 08/05/2009  ExitCare® Patient Information ©2014 [employer].

## 2018-07-11 NOTE — ED PROVIDER NOTES
ED Provider Note    ED Provider Note    Primary care provider: SALONI Marcial  Means of arrival: EMS  History obtained from: Patient    CHIEF COMPLAINT  Chief Complaint   Patient presents with   • Suicidal Ideation     Seen at 11:53 PM.   HPI  Olya Youssef is a 48 y.o. female who presents to the Emergency Department with alcohol intoxication and possible suicidal ideation.  The history obtained is .    Per EMS, the family reported the patient drinking vodka and taking an extra dose of omeprazole and gabapentin prior to arrival.  No timeframe was given.  EMS reported bedbugs on scene.  I personally did not interview EMS as a departed prior to my evaluation of the patient.  It is unclear where the patient is living.  Presumably family was the one that activated 911.    Additional information unavailable at this time due to clinical condition.    REVIEW OF SYSTEMS  See HPI, full HPI unobtainable due to clinical condition..     PAST MEDICAL HISTORY   has a past medical history of Alcohol abuse; Alcoholism (HCC); Anxiety; Backpain; Bipolar disorder, unspecified (HCC); Bronchitis; Drug abuse; Hepatitis, alcoholic; Hypertension; Indigestion; Infectious disease (MRSA); Liver disease; Pancreatitis chronic; Pneumonia; Psychiatric disorder; Renal disorder; Seizure (HCC); Seizure disorder (HCC); Unspecified hemorrhagic conditions; and Unspecified urinary incontinence.    SURGICAL HISTORY   has a past surgical history that includes gastroscopy (4/28/2015).    SOCIAL HISTORY  Social History   Substance Use Topics   • Smoking status: Never Smoker   • Smokeless tobacco: Never Used   • Alcohol use Yes      Comment: Hx heavy drinking      History   Drug Use     Comment: stopped meth 12/2015       FAMILY HISTORY  No family history on file.    CURRENT MEDICATIONS  Reviewed.  See Encounter Summary.     ALLERGIES  Allergies   Allergen Reactions   • Bactrim Hives   • Cephalexin [Keflex] Hives   • Lamotrigine [Lamictal]  "Hives   • Quetiapine Fumarate Hives     Extrapyramidal Symptoms   • Quetiapine Fumarate      Extrapyramidal symptoms   • Sulfamethoxazole-Trimethoprim Hives       PHYSICAL EXAM  VITAL SIGNS: /84   Pulse 85   Temp 36 °C (96.8 °F)   Resp 16   Ht 1.6 m (5' 3\")   Wt 68 kg (150 lb)   SpO2 96%   BMI 26.57 kg/m²   Constitutional: Sedated, responds to voice.  HENT: Normocephalic, atraumatic, bilateral external ears normal. Nose normal.   Eyes: Conjunctiva normal, non-icteric, EOMI. PERRLA, 4 mm  Thorax & Lungs: Easy unlabored respirations, Clear to ascultation bilaterally.  Cardiovascular: Regular rate, Regular rhythm, No murmurs, rubs or gallops.  Abdomen:  Soft, no masses, nondistended, normal active bowel sounds.   : No signs of incontinence  Skin: Multiple scabs on the bilateral upper extremities as well as the face.  Musculoskeletal:   No cyanosis, clubbing or edema.  Neurologic: Sedated, appears intoxicated, moves all extremities, awakens to voice.  Psychiatric: Unable to assess  Lymphatic:  No cervical LAD    EKG   12 lead Interpreted by me  Rhythm:  Normal sinus rhythm   Rate: 86  Axis: normal  Ectopy: none  Conduction:   ST Segments: no acute change  T Waves: no acute change  Clinical Impression: Borderline prolonged QT, otherwise normal EKG.    RADIOLOGY  No orders to display         COURSE & MEDICAL DECISION MAKING  Pertinent Labs & Imaging studies reviewed. (See chart for details)    Differential diagnoses include but are not limited to: Intoxication, polysubstance abuse    11:53 PM - Medical record reviewed, patient seen and examined at bedside.    1:55 AM - pt still sleeping, responds to verbal stimuli. ETOH level only mildly elevated. I suspect methamphetamine washout.     3:15 AM -patient awakens easily to voice.  She is asking for water.  She denies suicidal ideation.    3:39 AM -patient was evaluated by life skills.    Decision Making:  This is a 48 y.o. year old female who presents " "with hypersomnolence and possible suicidal ideation.  She was observed closely in the emergency department.  Her alcohol level was only mildly elevated.  Despite this she was quite somnolent.  This was assumed to be either polysubstance abuse or methamphetamine washout.  During the ED course the patient gradually improved.  She initially denied any suicidality and I planned to discharge the patient.    Upon hearing of the discharge plan, then she reported suicidal ideation.  She gives a more cogent history at this time.  She did text her ex-boyfriend \"goodbye\" and stated that she intended to harm herself.  She did not have any specific plan but does admit to taking a handful of Neurontin prior to arrival.  Apparently, EMS was activated by her son who lives up in [place].  The son was not present but heard of the text messages and contacted EMS.    In light of the new information, the legal hold was completed.  She will need to be monitored closely for alcohol withdrawal.    The patient will be observed in the emergency department and plan to transfer to psychiatric facility for stabilization.    FINAL IMPRESSION  1. Alcoholic intoxication without complication (HCC)    2. Suicidal ideation         "

## 2018-07-11 NOTE — ED NOTES
"Pt stripped and belongings placed double bagged in blue bags due to bed bugs.  No live bugs seen at the time.  Pt responds to painful stimuli and states \"no\" when asked if she was trying to kill herself.  Pt very lethargic and cannot answer questions appropriately.   Pt placed in close observation.    "

## 2018-07-11 NOTE — ED TRIAGE NOTES
Pt comes by EMS for Suicidal ideation per family member and [employer].  [facility] brings pt in for legal hold. Per family pt took an extra gabapentin and an extra Omeprazole and drank vodka.  Pt is very lethargic and responds to painful stimuli.  According to EMS pt has had bed bugs, pt has copious markings consistent with bites on bilateral arms and midsection.

## 2018-07-11 NOTE — ED NOTES
Patient's home medications have been reviewed by the pharmacy team.     Past Medical History:   Diagnosis Date   • Alcohol abuse    • Alcoholism (HCC)    • Anxiety    • Backpain    • Bipolar disorder, unspecified (HCC)    • Bronchitis    • Drug abuse     meth, crack cocaine, THC   • Hepatitis, alcoholic    • Hypertension     controlled   • Indigestion    • Infectious disease MRSA   • Liver disease     pancreatitis   • Pancreatitis chronic     Flare-up   • Pneumonia    • Psychiatric disorder     suicidal in past   • Renal disorder     kidney infection 1991   • Seizure (HCC)     7/8/2011   • Seizure disorder (HCC)    • Unspecified hemorrhagic conditions    • Unspecified urinary incontinence        Patient's Medications   New Prescriptions    No medications on file   Previous Medications    GABAPENTIN (NEURONTIN) 300 MG CAP    Take 300 mg by mouth 3 times a day.    HYDROXYZINE HCL (ATARAX) 25 MG TAB    Take 50 mg by mouth 3 times a day.   Modified Medications    No medications on file   Discontinued Medications    ALBUTEROL 108 (90 BASE) MCG/ACT AERO SOLN INHALATION AEROSOL    Inhale 2 Puffs by mouth every four hours as needed for Shortness of Breath.    FLUOXETINE (PROZAC) 10 MG CAP    Take 2 Caps by mouth every day.          A:  Patient reports that she has not taken any medications in the past 3 months.  Patient states she attempted to overdose by taking handfuls of each medication - patient appears hemodynamically stable at this time.  Noncompliance to medications may be contributing to current complaints - patient has also been prescribed fluoxetine and buspirone but denies taking these either.      P:    No recommendations at this time.  Recommend referring to psychiatry, whom have been consulted for further management of psychotropic medications.    Zaira Fan, OsminD., BCPS

## 2018-07-11 NOTE — PSYCHIATRY
BRIEF PSYCHIATRIC CONSULT NOTE: patient seen, full note to follow.  -Legal Hold:extended     -Restrictions:   -phone:no   -visitors:no   -personal items: no   -finger foods required: no    -personal clothes: no   -Orders Placed: routine  -Plan:continue to follow     Hold extended. She has been on prozac for many years; per chart review it appears that most of her attempts were on prozac, will d/c as it hasn't helped her.   Considering mood stabilizer, but she is very somnolent right now.

## 2018-07-11 NOTE — ED NOTES
"Attempted to get pt up to get ready for discharge.  Pt states \"I can't walk I am still too shaky.\"  ERP notified.  This RN will let pt rest longer and continue to monitor.    "

## 2018-07-11 NOTE — DISCHARGE PLANNING
Face sheet and Legal hold faxed to [facility].  Message left with answering service.

## 2018-07-11 NOTE — ED NOTES
"Pt awake and given water per Dr. Dickerson request.  Pt asked if she has any thoughts of hurting herself or anyone else pt states \"no, I am all right.\"  Life skills notified.   "

## 2018-07-12 VITALS
SYSTOLIC BLOOD PRESSURE: 121 MMHG | WEIGHT: 150 LBS | DIASTOLIC BLOOD PRESSURE: 84 MMHG | HEIGHT: 63 IN | TEMPERATURE: 98.2 F | RESPIRATION RATE: 20 BRPM | OXYGEN SATURATION: 96 % | BODY MASS INDEX: 26.58 KG/M2 | HEART RATE: 100 BPM

## 2018-07-12 LAB
AMORPH CRY #/AREA URNS HPF: PRESENT /HPF
AMPHET UR QL SCN: POSITIVE
APPEARANCE UR: CLEAR
BACTERIA #/AREA URNS HPF: ABNORMAL /HPF
BARBITURATES UR QL SCN: NEGATIVE
BENZODIAZ UR QL SCN: NEGATIVE
BILIRUB UR QL STRIP.AUTO: NEGATIVE
BZE UR QL SCN: NEGATIVE
CANNABINOIDS UR QL SCN: POSITIVE
COLOR UR: YELLOW
EPI CELLS #/AREA URNS HPF: ABNORMAL /HPF
GLUCOSE UR STRIP.AUTO-MCNC: NEGATIVE MG/DL
KETONES UR STRIP.AUTO-MCNC: 15 MG/DL
LEUKOCYTE ESTERASE UR QL STRIP.AUTO: ABNORMAL
METHADONE UR QL SCN: NEGATIVE
MICRO URNS: ABNORMAL
MUCOUS THREADS #/AREA URNS HPF: ABNORMAL /HPF
NITRITE UR QL STRIP.AUTO: NEGATIVE
OPIATES UR QL SCN: NEGATIVE
OXYCODONE UR QL SCN: NEGATIVE
PCP UR QL SCN: NEGATIVE
PH UR STRIP.AUTO: 5 [PH]
PROPOXYPH UR QL SCN: NEGATIVE
PROT UR QL STRIP: NEGATIVE MG/DL
RBC # URNS HPF: ABNORMAL /HPF
RBC UR QL AUTO: NEGATIVE
SP GR UR STRIP.AUTO: 1.02
UROBILINOGEN UR STRIP.AUTO-MCNC: 0.2 MG/DL
WBC #/AREA URNS HPF: ABNORMAL /HPF

## 2018-07-12 PROCEDURE — 700102 HCHG RX REV CODE 250 W/ 637 OVERRIDE(OP): Performed by: EMERGENCY MEDICINE

## 2018-07-12 PROCEDURE — 81001 URINALYSIS AUTO W/SCOPE: CPT | Mod: XU

## 2018-07-12 PROCEDURE — A9270 NON-COVERED ITEM OR SERVICE: HCPCS | Performed by: EMERGENCY MEDICINE

## 2018-07-12 PROCEDURE — 80307 DRUG TEST PRSMV CHEM ANLYZR: CPT

## 2018-07-12 RX ADMIN — LORAZEPAM 1 MG: 1 TABLET ORAL at 10:45

## 2018-07-12 RX ADMIN — LORAZEPAM 1 MG: 1 TABLET ORAL at 01:11

## 2018-07-12 ASSESSMENT — PAIN SCALES - GENERAL
PAINLEVEL_OUTOF10: 0
PAINLEVEL_OUTOF10: 0

## 2018-07-12 ASSESSMENT — LIFESTYLE VARIABLES
ANXIETY: MILDLY ANXIOUS
ORIENTATION AND CLOUDING OF SENSORIUM: ORIENTED AND CAN DO SERIAL ADDITIONS
AUDITORY DISTURBANCES: NOT PRESENT
HEADACHE, FULLNESS IN HEAD: NOT PRESENT
AUDITORY DISTURBANCES: NOT PRESENT
NAUSEA AND VOMITING: NO NAUSEA AND NO VOMITING
TREMOR: MODERATE TREMOR WITH ARMS EXTENDED
NAUSEA AND VOMITING: *
ANXIETY: NO ANXIETY (AT EASE)
TOTAL SCORE: 10
ORIENTATION AND CLOUDING OF SENSORIUM: ORIENTED AND CAN DO SERIAL ADDITIONS
TREMOR: NO TREMOR
AGITATION: SOMEWHAT MORE THAN NORMAL ACTIVITY
VISUAL DISTURBANCES: NOT PRESENT
PAROXYSMAL SWEATS: NO SWEAT VISIBLE
PAROXYSMAL SWEATS: NO SWEAT VISIBLE
TOTAL SCORE: 0
AGITATION: NORMAL ACTIVITY
VISUAL DISTURBANCES: NOT PRESENT
HEADACHE, FULLNESS IN HEAD: MILD

## 2018-07-12 ASSESSMENT — PAIN SCALES - WONG BAKER
WONGBAKER_NUMERICALRESPONSE: DOESN'T HURT AT ALL
WONGBAKER_NUMERICALRESPONSE: DOESN'T HURT AT ALL

## 2018-07-12 NOTE — ED NOTES
PT IS RESTING IN BED, NDA. PT IS CALM. SITTER IS PRESENT. PT WAS OFFER FOOD AND SHE REFUSED. PT HAS SITTER PRESENT.

## 2018-07-12 NOTE — DISCHARGE PLANNING
"Alert team note:  Patient reports she is eating and sleeping.  Patient reports she is having withdrawal from alcohol, \"I have the shakes real bad.\"  Vital signs are stable and WNL.  Provided brochure for [facility], Kim, here today to assess this patient.  Legal hold extended by psychiatry.  Awaiting transfer to [facility].    "

## 2018-07-12 NOTE — ED NOTES
PT WAS ABLE TO AMB TO COMMAND WITH ONE PERSON ASSISTANCE. PTS URINE WAS SENT. PT IS AAOX4. NAD. PT WAS GIVEN A SNACK AND WATER.

## 2018-07-12 NOTE — DISCHARGE PLANNING
Medical Social Work    Referral: Legal hold Transfer to Mental Health Facility    Intervention: SW received call from Frandy at [facility] stating that they have accepted the patient for admission.     Pt's accepting physcian is Dr. Batool HARRIS arranged for transportation to be set up through [facility]    The pt will be picked up at 1500    SW notified the RN of the departure time as well as accepting facility.     STEVEN created transfer packet and placed on chart.     Plan: Pt will transfer back to [facility] at 1500

## 2018-07-12 NOTE — ED NOTES
"RN WENT TO MEDICATE PT. 1 MG  PO OF ATIVAN WAS REQUESTED FROM MACHINE. MACHINE GAVE 6 PILLS.  2 PILLS THEN 3 PILLS WERE RETURNED . ER PHARMACIST WAS TOLD .   PT WAS GIVEN 1 MG OF PO ATIVAN FOR \" SHAKES\"  "

## 2018-07-12 NOTE — ED NOTES
PT SLEEPING, RR = 16, AWOKEN BY TOUCH, REMAINS TREMULOUS, SEE MD TRACEY CONTACTED, DETOX BAG ORDERED AND HUNG, ATIVAN 1MG GIVEN.

## 2018-07-12 NOTE — ED NOTES
Pt's boyfriend isreal abdi called. Pt states he is her boyfriend and she is okay with sharing information with him. Isreal was informed by this RN that pt is safe and stable, as well as informed of some this facilities legal hold policies.

## 2018-07-12 NOTE — ED NOTES
RECEIVED REPORT FROM RN, PT IS AAOX4, PT IS IN NAD. PT IS ON A L2K HOLD. PT HAS A SITTER PRESENT. PT WAS REMAINED URINE WAS NEEDED.

## 2018-07-12 NOTE — ED NOTES
Mike DENSON from [facility] called, verbal report given. Mike with check with [facility] and call back to inform if patient is accepted or not.

## 2018-07-12 NOTE — ED NOTES
Pt transported to [facility] by [facility]. Pt ambulatory out floor remza carrying pt's posessions

## 2019-01-31 ENCOUNTER — HOSPITAL ENCOUNTER (INPATIENT)
Dept: HOSPITAL 8 - ED | Age: 49
LOS: 6 days | Discharge: LEFT BEFORE DISCHARGE | DRG: 432 | End: 2019-02-06
Attending: INTERNAL MEDICINE | Admitting: INTERNAL MEDICINE
Payer: MEDICAID

## 2019-01-31 VITALS — SYSTOLIC BLOOD PRESSURE: 122 MMHG | DIASTOLIC BLOOD PRESSURE: 85 MMHG

## 2019-01-31 VITALS — DIASTOLIC BLOOD PRESSURE: 90 MMHG | SYSTOLIC BLOOD PRESSURE: 123 MMHG

## 2019-01-31 VITALS — WEIGHT: 147.93 LBS | HEIGHT: 60 IN | BODY MASS INDEX: 29.04 KG/M2

## 2019-01-31 VITALS — DIASTOLIC BLOOD PRESSURE: 74 MMHG | SYSTOLIC BLOOD PRESSURE: 105 MMHG

## 2019-01-31 VITALS — DIASTOLIC BLOOD PRESSURE: 89 MMHG | SYSTOLIC BLOOD PRESSURE: 136 MMHG

## 2019-01-31 VITALS — DIASTOLIC BLOOD PRESSURE: 92 MMHG | SYSTOLIC BLOOD PRESSURE: 132 MMHG

## 2019-01-31 VITALS — DIASTOLIC BLOOD PRESSURE: 75 MMHG | SYSTOLIC BLOOD PRESSURE: 109 MMHG

## 2019-01-31 VITALS — DIASTOLIC BLOOD PRESSURE: 75 MMHG | SYSTOLIC BLOOD PRESSURE: 122 MMHG

## 2019-01-31 VITALS — SYSTOLIC BLOOD PRESSURE: 132 MMHG | DIASTOLIC BLOOD PRESSURE: 87 MMHG

## 2019-01-31 DIAGNOSIS — E86.0: ICD-10-CM

## 2019-01-31 DIAGNOSIS — R00.0: ICD-10-CM

## 2019-01-31 DIAGNOSIS — K85.20: ICD-10-CM

## 2019-01-31 DIAGNOSIS — F31.9: ICD-10-CM

## 2019-01-31 DIAGNOSIS — K70.10: Primary | ICD-10-CM

## 2019-01-31 DIAGNOSIS — E83.42: ICD-10-CM

## 2019-01-31 DIAGNOSIS — Z53.21: ICD-10-CM
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
## 2019-01-31 DIAGNOSIS — F43.10: ICD-10-CM

## 2019-01-31 DIAGNOSIS — F15.10: ICD-10-CM

## 2019-01-31 DIAGNOSIS — F60.9: ICD-10-CM

## 2019-01-31 DIAGNOSIS — Z82.49: ICD-10-CM

## 2019-01-31 DIAGNOSIS — F10.120: ICD-10-CM

## 2019-01-31 LAB
ALBUMIN SERPL-MCNC: 3.7 G/DL (ref 3.4–5)
ALP SERPL-CCNC: 131 U/L (ref 45–117)
ALT SERPL-CCNC: 366 U/L (ref 12–78)
ANION GAP SERPL CALC-SCNC: 11 MMOL/L (ref 5–15)
BASOPHILS # BLD AUTO: 0.04 X10^3/UL (ref 0–0.1)
BASOPHILS NFR BLD AUTO: 1 % (ref 0–1)
BILIRUB SERPL-MCNC: 0.4 MG/DL (ref 0.2–1)
CALCIUM SERPL-MCNC: 8.2 MG/DL (ref 8.5–10.1)
CHLORIDE SERPL-SCNC: 104 MMOL/L (ref 98–107)
CREAT SERPL-MCNC: 0.64 MG/DL (ref 0.55–1.02)
CULTURE INDICATED?: NO
EOSINOPHIL # BLD AUTO: 0.03 X10^3/UL (ref 0–0.4)
EOSINOPHIL NFR BLD AUTO: 1 % (ref 1–7)
ERYTHROCYTE [DISTWIDTH] IN BLOOD BY AUTOMATED COUNT: 13.6 % (ref 9.6–15.2)
LYMPHOCYTES # BLD AUTO: 1.78 X10^3/UL (ref 1–3.4)
LYMPHOCYTES NFR BLD AUTO: 49 % (ref 22–44)
MCH RBC QN AUTO: 32.1 PG (ref 27–34.8)
MCHC RBC AUTO-ENTMCNC: 33.2 G/DL (ref 32.4–35.8)
MCV RBC AUTO: 96.6 FL (ref 80–100)
MD: NO
MICROSCOPIC: (no result)
MONOCYTES # BLD AUTO: 0.45 X10^3/UL (ref 0.2–0.8)
MONOCYTES NFR BLD AUTO: 12 % (ref 2–9)
NEUTROPHILS # BLD AUTO: 1.37 X10^3/UL (ref 1.8–6.8)
NEUTROPHILS NFR BLD AUTO: 37 % (ref 42–75)
PLATELET # BLD AUTO: 129 X10^3/UL (ref 130–400)
PMV BLD AUTO: 6.8 FL (ref 7.4–10.4)
PROT SERPL-MCNC: 7.7 G/DL (ref 6.4–8.2)
RBC # BLD AUTO: 4.87 X10^6/UL (ref 3.82–5.3)

## 2019-01-31 PROCEDURE — C9113 INJ PANTOPRAZOLE SODIUM, VIA: HCPCS

## 2019-01-31 PROCEDURE — 80048 BASIC METABOLIC PNL TOTAL CA: CPT

## 2019-01-31 PROCEDURE — 81003 URINALYSIS AUTO W/O SCOPE: CPT

## 2019-01-31 PROCEDURE — 74177 CT ABD & PELVIS W/CONTRAST: CPT

## 2019-01-31 PROCEDURE — 93005 ELECTROCARDIOGRAM TRACING: CPT

## 2019-01-31 PROCEDURE — 83735 ASSAY OF MAGNESIUM: CPT

## 2019-01-31 PROCEDURE — 36415 COLL VENOUS BLD VENIPUNCTURE: CPT

## 2019-01-31 PROCEDURE — 99285 EMERGENCY DEPT VISIT HI MDM: CPT

## 2019-01-31 PROCEDURE — 80307 DRUG TEST PRSMV CHEM ANLYZR: CPT

## 2019-01-31 PROCEDURE — 83690 ASSAY OF LIPASE: CPT

## 2019-01-31 PROCEDURE — 80053 COMPREHEN METABOLIC PANEL: CPT

## 2019-01-31 PROCEDURE — 85025 COMPLETE CBC W/AUTO DIFF WBC: CPT

## 2019-01-31 RX ADMIN — SODIUM CHLORIDE, SODIUM LACTATE, POTASSIUM CHLORIDE, AND CALCIUM CHLORIDE SCH MLS/HR: .6; .31; .03; .02 INJECTION, SOLUTION INTRAVENOUS at 20:50

## 2019-01-31 RX ADMIN — LORAZEPAM PRN MG: 2 INJECTION INTRAMUSCULAR; INTRAVENOUS at 08:54

## 2019-01-31 RX ADMIN — SODIUM CHLORIDE, SODIUM LACTATE, POTASSIUM CHLORIDE, AND CALCIUM CHLORIDE SCH MLS/HR: .6; .31; .03; .02 INJECTION, SOLUTION INTRAVENOUS at 07:17

## 2019-01-31 RX ADMIN — LORAZEPAM PRN MG: 2 INJECTION INTRAMUSCULAR; INTRAVENOUS at 11:41

## 2019-01-31 RX ADMIN — LORAZEPAM PRN MG: 2 INJECTION INTRAMUSCULAR; INTRAVENOUS at 15:27

## 2019-01-31 RX ADMIN — POTASSIUM CHLORIDE SCH MLS/HR: 2 INJECTION, SOLUTION, CONCENTRATE INTRAVENOUS at 08:57

## 2019-01-31 RX ADMIN — POTASSIUM CHLORIDE SCH MLS/HR: 2 INJECTION, SOLUTION, CONCENTRATE INTRAVENOUS at 20:15

## 2019-01-31 RX ADMIN — ENOXAPARIN SODIUM SCH MG: 40 INJECTION SUBCUTANEOUS at 05:30

## 2019-01-31 RX ADMIN — PANTOPRAZOLE SODIUM SCH MG: 40 INJECTION, POWDER, FOR SOLUTION INTRAVENOUS at 08:57

## 2019-01-31 RX ADMIN — LORAZEPAM PRN MG: 2 INJECTION INTRAMUSCULAR; INTRAVENOUS at 19:40

## 2019-01-31 RX ADMIN — KETOROLAC TROMETHAMINE PRN MG: 30 INJECTION, SOLUTION INTRAMUSCULAR at 06:33

## 2019-01-31 NOTE — NUR
ASSUMED CARE OF PATIENT. PATIENT BIB [facility]. EMS REPORTS EMPTY ALCOHOL BOTTLES 
THROUGHOUT THE HOUSE. PT SMELLS OF ETOH. PT REPORTS SHE HAS EPIGASTRIC ABD PAIN 
AND DARK URINE. PT ALSO REPORTS SHE HAS BEEN DOING METH FOR THREE DAYS. VS 
STABLE AT THIS TIME. CALL LIGHT IN PLACE. IWLL CONTINUE TO MONITOR.

## 2019-02-01 VITALS — DIASTOLIC BLOOD PRESSURE: 92 MMHG | SYSTOLIC BLOOD PRESSURE: 133 MMHG

## 2019-02-01 VITALS — DIASTOLIC BLOOD PRESSURE: 98 MMHG | SYSTOLIC BLOOD PRESSURE: 187 MMHG

## 2019-02-01 VITALS — SYSTOLIC BLOOD PRESSURE: 123 MMHG | DIASTOLIC BLOOD PRESSURE: 85 MMHG

## 2019-02-01 VITALS — SYSTOLIC BLOOD PRESSURE: 145 MMHG | DIASTOLIC BLOOD PRESSURE: 89 MMHG

## 2019-02-01 VITALS — DIASTOLIC BLOOD PRESSURE: 95 MMHG | SYSTOLIC BLOOD PRESSURE: 135 MMHG

## 2019-02-01 VITALS — DIASTOLIC BLOOD PRESSURE: 86 MMHG | SYSTOLIC BLOOD PRESSURE: 137 MMHG

## 2019-02-01 LAB
ALBUMIN SERPL-MCNC: 3.5 G/DL (ref 3.4–5)
ALP SERPL-CCNC: 123 U/L (ref 45–117)
ALT SERPL-CCNC: 269 U/L (ref 12–78)
ANION GAP SERPL CALC-SCNC: 9 MMOL/L (ref 5–15)
BASOPHILS # BLD AUTO: 0.02 X10^3/UL (ref 0–0.1)
BASOPHILS NFR BLD AUTO: 1 % (ref 0–1)
BILIRUB SERPL-MCNC: 1 MG/DL (ref 0.2–1)
CALCIUM SERPL-MCNC: 8.3 MG/DL (ref 8.5–10.1)
CHLORIDE SERPL-SCNC: 99 MMOL/L (ref 98–107)
CREAT SERPL-MCNC: 0.38 MG/DL (ref 0.55–1.02)
EOSINOPHIL # BLD AUTO: 0.03 X10^3/UL (ref 0–0.4)
EOSINOPHIL NFR BLD AUTO: 1 % (ref 1–7)
ERYTHROCYTE [DISTWIDTH] IN BLOOD BY AUTOMATED COUNT: 13.1 % (ref 9.6–15.2)
LYMPHOCYTES # BLD AUTO: 0.78 X10^3/UL (ref 1–3.4)
LYMPHOCYTES NFR BLD AUTO: 26 % (ref 22–44)
MCH RBC QN AUTO: 33.3 PG (ref 27–34.8)
MCHC RBC AUTO-ENTMCNC: 34.8 G/DL (ref 32.4–35.8)
MCV RBC AUTO: 95.7 FL (ref 80–100)
MD: (no result)
MONOCYTES # BLD AUTO: 0.35 X10^3/UL (ref 0.2–0.8)
MONOCYTES NFR BLD AUTO: 12 % (ref 2–9)
NEUTROPHILS # BLD AUTO: 1.77 X10^3/UL (ref 1.8–6.8)
NEUTROPHILS NFR BLD AUTO: 60 % (ref 42–75)
PLATELET # BLD AUTO: 76 X10^3/UL (ref 130–400)
PMV BLD AUTO: 7.3 FL (ref 7.4–10.4)
PROT SERPL-MCNC: 7.3 G/DL (ref 6.4–8.2)
RBC # BLD AUTO: 4.44 X10^6/UL (ref 3.82–5.3)

## 2019-02-01 RX ADMIN — LORAZEPAM PRN MG: 2 INJECTION INTRAMUSCULAR; INTRAVENOUS at 13:52

## 2019-02-01 RX ADMIN — SODIUM CHLORIDE, SODIUM LACTATE, POTASSIUM CHLORIDE, AND CALCIUM CHLORIDE SCH MLS/HR: .6; .31; .03; .02 INJECTION, SOLUTION INTRAVENOUS at 03:08

## 2019-02-01 RX ADMIN — SODIUM CHLORIDE, SODIUM LACTATE, POTASSIUM CHLORIDE, AND CALCIUM CHLORIDE SCH MLS/HR: .6; .31; .03; .02 INJECTION, SOLUTION INTRAVENOUS at 13:51

## 2019-02-01 RX ADMIN — PANTOPRAZOLE SODIUM SCH MG: 40 INJECTION, POWDER, FOR SOLUTION INTRAVENOUS at 09:06

## 2019-02-01 RX ADMIN — POTASSIUM CHLORIDE SCH MLS/HR: 2 INJECTION, SOLUTION, CONCENTRATE INTRAVENOUS at 05:18

## 2019-02-01 RX ADMIN — LORAZEPAM PRN MG: 2 INJECTION INTRAMUSCULAR; INTRAVENOUS at 17:21

## 2019-02-01 RX ADMIN — LORAZEPAM PRN MG: 2 INJECTION INTRAMUSCULAR; INTRAVENOUS at 01:28

## 2019-02-01 RX ADMIN — ONDANSETRON PRN MG: 2 INJECTION, SOLUTION INTRAMUSCULAR; INTRAVENOUS at 17:56

## 2019-02-01 RX ADMIN — ENOXAPARIN SODIUM SCH MG: 40 INJECTION SUBCUTANEOUS at 05:09

## 2019-02-02 VITALS — DIASTOLIC BLOOD PRESSURE: 94 MMHG | SYSTOLIC BLOOD PRESSURE: 136 MMHG

## 2019-02-02 VITALS — DIASTOLIC BLOOD PRESSURE: 88 MMHG | SYSTOLIC BLOOD PRESSURE: 121 MMHG

## 2019-02-02 VITALS — DIASTOLIC BLOOD PRESSURE: 92 MMHG | SYSTOLIC BLOOD PRESSURE: 148 MMHG

## 2019-02-02 VITALS — SYSTOLIC BLOOD PRESSURE: 127 MMHG | DIASTOLIC BLOOD PRESSURE: 93 MMHG

## 2019-02-02 LAB
ANION GAP SERPL CALC-SCNC: 8 MMOL/L (ref 5–15)
CALCIUM SERPL-MCNC: 8.9 MG/DL (ref 8.5–10.1)
CHLORIDE SERPL-SCNC: 104 MMOL/L (ref 98–107)
CREAT SERPL-MCNC: 0.44 MG/DL (ref 0.55–1.02)

## 2019-02-02 RX ADMIN — LORAZEPAM PRN MG: 2 INJECTION INTRAMUSCULAR; INTRAVENOUS at 20:18

## 2019-02-02 RX ADMIN — LORAZEPAM PRN MG: 2 INJECTION INTRAMUSCULAR; INTRAVENOUS at 23:45

## 2019-02-02 RX ADMIN — LORAZEPAM PRN MG: 2 INJECTION INTRAMUSCULAR; INTRAVENOUS at 21:31

## 2019-02-02 RX ADMIN — POTASSIUM CHLORIDE SCH MLS/HR: 2 INJECTION, SOLUTION, CONCENTRATE INTRAVENOUS at 05:44

## 2019-02-02 RX ADMIN — KETOROLAC TROMETHAMINE PRN MG: 30 INJECTION, SOLUTION INTRAMUSCULAR at 20:19

## 2019-02-02 RX ADMIN — ENOXAPARIN SODIUM SCH MG: 40 INJECTION SUBCUTANEOUS at 05:30

## 2019-02-02 RX ADMIN — PANTOPRAZOLE SODIUM SCH MG: 40 INJECTION, POWDER, FOR SOLUTION INTRAVENOUS at 07:47

## 2019-02-02 RX ADMIN — LORAZEPAM PRN MG: 2 INJECTION INTRAMUSCULAR; INTRAVENOUS at 01:16

## 2019-02-02 RX ADMIN — ONDANSETRON PRN MG: 2 INJECTION, SOLUTION INTRAMUSCULAR; INTRAVENOUS at 00:15

## 2019-02-02 RX ADMIN — LORAZEPAM PRN MG: 2 INJECTION INTRAMUSCULAR; INTRAVENOUS at 04:00

## 2019-02-02 RX ADMIN — ONDANSETRON PRN MG: 2 INJECTION, SOLUTION INTRAMUSCULAR; INTRAVENOUS at 20:22

## 2019-02-02 RX ADMIN — LORAZEPAM PRN MG: 2 INJECTION INTRAMUSCULAR; INTRAVENOUS at 07:47

## 2019-02-03 VITALS — SYSTOLIC BLOOD PRESSURE: 108 MMHG | DIASTOLIC BLOOD PRESSURE: 76 MMHG

## 2019-02-03 VITALS — SYSTOLIC BLOOD PRESSURE: 132 MMHG | DIASTOLIC BLOOD PRESSURE: 93 MMHG

## 2019-02-03 VITALS — DIASTOLIC BLOOD PRESSURE: 57 MMHG | SYSTOLIC BLOOD PRESSURE: 120 MMHG

## 2019-02-03 VITALS — SYSTOLIC BLOOD PRESSURE: 122 MMHG | DIASTOLIC BLOOD PRESSURE: 87 MMHG

## 2019-02-03 LAB
ALBUMIN SERPL-MCNC: 3.1 G/DL (ref 3.4–5)
ALP SERPL-CCNC: 108 U/L (ref 45–117)
ALT SERPL-CCNC: 166 U/L (ref 12–78)
ANION GAP SERPL CALC-SCNC: 7 MMOL/L (ref 5–15)
BILIRUB SERPL-MCNC: 0.8 MG/DL (ref 0.2–1)
CALCIUM SERPL-MCNC: 8.7 MG/DL (ref 8.5–10.1)
CHLORIDE SERPL-SCNC: 107 MMOL/L (ref 98–107)
CREAT SERPL-MCNC: 0.5 MG/DL (ref 0.55–1.02)
PROT SERPL-MCNC: 6.6 G/DL (ref 6.4–8.2)

## 2019-02-03 RX ADMIN — KETOROLAC TROMETHAMINE PRN MG: 30 INJECTION, SOLUTION INTRAMUSCULAR at 21:58

## 2019-02-03 RX ADMIN — PROPRANOLOL HYDROCHLORIDE SCH MG: 20 TABLET ORAL at 11:48

## 2019-02-03 RX ADMIN — PROPRANOLOL HYDROCHLORIDE SCH MG: 20 TABLET ORAL at 21:58

## 2019-02-03 RX ADMIN — PROPRANOLOL HYDROCHLORIDE SCH MG: 20 TABLET ORAL at 14:00

## 2019-02-03 RX ADMIN — LORAZEPAM PRN MG: 2 INJECTION INTRAMUSCULAR; INTRAVENOUS at 22:16

## 2019-02-03 RX ADMIN — PANTOPRAZOLE SODIUM SCH MG: 40 INJECTION, POWDER, FOR SOLUTION INTRAVENOUS at 08:14

## 2019-02-03 RX ADMIN — ONDANSETRON PRN MG: 2 INJECTION, SOLUTION INTRAMUSCULAR; INTRAVENOUS at 02:44

## 2019-02-03 RX ADMIN — LORAZEPAM PRN MG: 2 INJECTION INTRAMUSCULAR; INTRAVENOUS at 02:06

## 2019-02-03 RX ADMIN — KETOROLAC TROMETHAMINE PRN MG: 30 INJECTION, SOLUTION INTRAMUSCULAR at 14:17

## 2019-02-03 RX ADMIN — ENOXAPARIN SODIUM SCH MG: 40 INJECTION SUBCUTANEOUS at 05:00

## 2019-02-03 RX ADMIN — POTASSIUM CHLORIDE SCH MLS/HR: 2 INJECTION, SOLUTION, CONCENTRATE INTRAVENOUS at 05:42

## 2019-02-03 RX ADMIN — LORAZEPAM PRN MG: 2 INJECTION INTRAMUSCULAR; INTRAVENOUS at 20:09

## 2019-02-03 RX ADMIN — ONDANSETRON PRN MG: 2 INJECTION, SOLUTION INTRAMUSCULAR; INTRAVENOUS at 18:32

## 2019-02-03 RX ADMIN — KETOROLAC TROMETHAMINE PRN MG: 30 INJECTION, SOLUTION INTRAMUSCULAR at 02:43

## 2019-02-04 VITALS — SYSTOLIC BLOOD PRESSURE: 129 MMHG | DIASTOLIC BLOOD PRESSURE: 90 MMHG

## 2019-02-04 VITALS — DIASTOLIC BLOOD PRESSURE: 76 MMHG | SYSTOLIC BLOOD PRESSURE: 115 MMHG

## 2019-02-04 VITALS — SYSTOLIC BLOOD PRESSURE: 134 MMHG | DIASTOLIC BLOOD PRESSURE: 78 MMHG

## 2019-02-04 VITALS — SYSTOLIC BLOOD PRESSURE: 137 MMHG | DIASTOLIC BLOOD PRESSURE: 84 MMHG

## 2019-02-04 RX ADMIN — PROPRANOLOL HYDROCHLORIDE SCH MG: 20 TABLET ORAL at 14:14

## 2019-02-04 RX ADMIN — PROPRANOLOL HYDROCHLORIDE SCH MG: 20 TABLET ORAL at 19:52

## 2019-02-04 RX ADMIN — ONDANSETRON PRN MG: 2 INJECTION, SOLUTION INTRAMUSCULAR; INTRAVENOUS at 01:54

## 2019-02-04 RX ADMIN — CHLORDIAZEPOXIDE HYDROCHLORIDE SCH MG: 25 CAPSULE ORAL at 21:17

## 2019-02-04 RX ADMIN — LORAZEPAM PRN MG: 2 INJECTION INTRAMUSCULAR; INTRAVENOUS at 05:08

## 2019-02-04 RX ADMIN — CHLORDIAZEPOXIDE HYDROCHLORIDE SCH MG: 25 CAPSULE ORAL at 15:44

## 2019-02-04 RX ADMIN — PROPRANOLOL HYDROCHLORIDE SCH MG: 20 TABLET ORAL at 06:00

## 2019-02-04 RX ADMIN — LORAZEPAM PRN MG: 2 INJECTION INTRAMUSCULAR; INTRAVENOUS at 19:15

## 2019-02-04 RX ADMIN — ENOXAPARIN SODIUM SCH MG: 40 INJECTION SUBCUTANEOUS at 04:40

## 2019-02-04 RX ADMIN — CHLORDIAZEPOXIDE HYDROCHLORIDE SCH MG: 25 CAPSULE ORAL at 19:16

## 2019-02-04 RX ADMIN — CHLORDIAZEPOXIDE HYDROCHLORIDE SCH MG: 25 CAPSULE ORAL at 10:46

## 2019-02-04 RX ADMIN — PANTOPRAZOLE SODIUM SCH MG: 40 INJECTION, POWDER, FOR SOLUTION INTRAVENOUS at 07:41

## 2019-02-04 RX ADMIN — LORAZEPAM PRN MG: 2 INJECTION INTRAMUSCULAR; INTRAVENOUS at 02:15

## 2019-02-04 RX ADMIN — CHLORDIAZEPOXIDE HYDROCHLORIDE SCH MG: 25 CAPSULE ORAL at 19:21

## 2019-02-04 RX ADMIN — LORAZEPAM PRN MG: 2 INJECTION INTRAMUSCULAR; INTRAVENOUS at 04:01

## 2019-02-05 VITALS — DIASTOLIC BLOOD PRESSURE: 82 MMHG | SYSTOLIC BLOOD PRESSURE: 121 MMHG

## 2019-02-05 VITALS — DIASTOLIC BLOOD PRESSURE: 88 MMHG | SYSTOLIC BLOOD PRESSURE: 133 MMHG

## 2019-02-05 VITALS — DIASTOLIC BLOOD PRESSURE: 83 MMHG | SYSTOLIC BLOOD PRESSURE: 119 MMHG

## 2019-02-05 VITALS — DIASTOLIC BLOOD PRESSURE: 68 MMHG | SYSTOLIC BLOOD PRESSURE: 104 MMHG

## 2019-02-05 LAB
ANION GAP SERPL CALC-SCNC: 7 MMOL/L (ref 5–15)
CALCIUM SERPL-MCNC: 9.1 MG/DL (ref 8.5–10.1)
CHLORIDE SERPL-SCNC: 105 MMOL/L (ref 98–107)
CREAT SERPL-MCNC: 0.64 MG/DL (ref 0.55–1.02)

## 2019-02-05 RX ADMIN — CHLORDIAZEPOXIDE HYDROCHLORIDE SCH MG: 25 CAPSULE ORAL at 04:11

## 2019-02-05 RX ADMIN — CHLORDIAZEPOXIDE HYDROCHLORIDE SCH MG: 25 CAPSULE ORAL at 11:32

## 2019-02-05 RX ADMIN — CHLORDIAZEPOXIDE HYDROCHLORIDE SCH MG: 25 CAPSULE ORAL at 16:25

## 2019-02-05 RX ADMIN — CHLORDIAZEPOXIDE HYDROCHLORIDE SCH MG: 25 CAPSULE ORAL at 11:00

## 2019-02-05 RX ADMIN — PROPRANOLOL HYDROCHLORIDE SCH MG: 20 TABLET ORAL at 13:19

## 2019-02-05 RX ADMIN — PROPRANOLOL HYDROCHLORIDE SCH MG: 20 TABLET ORAL at 04:11

## 2019-02-05 RX ADMIN — CHLORDIAZEPOXIDE HYDROCHLORIDE SCH MG: 25 CAPSULE ORAL at 16:00

## 2019-02-05 RX ADMIN — ENOXAPARIN SODIUM SCH MG: 40 INJECTION SUBCUTANEOUS at 04:12

## 2019-02-05 RX ADMIN — LORAZEPAM PRN MG: 2 INJECTION INTRAMUSCULAR; INTRAVENOUS at 04:12

## 2019-02-05 RX ADMIN — CHLORDIAZEPOXIDE HYDROCHLORIDE SCH MG: 25 CAPSULE ORAL at 03:19

## 2019-02-05 RX ADMIN — LORAZEPAM PRN MG: 2 INJECTION INTRAMUSCULAR; INTRAVENOUS at 00:21

## 2019-02-06 VITALS — SYSTOLIC BLOOD PRESSURE: 115 MMHG | DIASTOLIC BLOOD PRESSURE: 79 MMHG

## 2019-02-06 VITALS — SYSTOLIC BLOOD PRESSURE: 103 MMHG | DIASTOLIC BLOOD PRESSURE: 74 MMHG

## 2019-02-06 VITALS — SYSTOLIC BLOOD PRESSURE: 114 MMHG | DIASTOLIC BLOOD PRESSURE: 77 MMHG

## 2019-02-06 RX ADMIN — CHLORDIAZEPOXIDE HYDROCHLORIDE SCH MG: 25 CAPSULE ORAL at 15:56

## 2019-02-06 RX ADMIN — PROPRANOLOL HYDROCHLORIDE SCH MG: 20 TABLET ORAL at 00:10

## 2019-02-06 RX ADMIN — CHLORDIAZEPOXIDE HYDROCHLORIDE SCH MG: 25 CAPSULE ORAL at 11:12

## 2019-02-06 RX ADMIN — CHLORDIAZEPOXIDE HYDROCHLORIDE SCH MG: 25 CAPSULE ORAL at 00:09

## 2019-02-06 RX ADMIN — ENOXAPARIN SODIUM SCH MG: 40 INJECTION SUBCUTANEOUS at 05:02

## 2019-02-06 RX ADMIN — PROPRANOLOL HYDROCHLORIDE SCH MG: 20 TABLET ORAL at 14:00

## 2019-02-06 RX ADMIN — PROPRANOLOL HYDROCHLORIDE SCH MG: 20 TABLET ORAL at 06:46

## 2019-02-06 RX ADMIN — CHLORDIAZEPOXIDE HYDROCHLORIDE SCH MG: 25 CAPSULE ORAL at 06:46

## 2019-10-01 ENCOUNTER — HOSPITAL ENCOUNTER (EMERGENCY)
Facility: MEDICAL CENTER | Age: 49
End: 2019-10-01
Attending: EMERGENCY MEDICINE
Payer: MEDICAID

## 2019-10-01 ENCOUNTER — APPOINTMENT (OUTPATIENT)
Dept: RADIOLOGY | Facility: MEDICAL CENTER | Age: 49
End: 2019-10-01
Attending: EMERGENCY MEDICINE
Payer: MEDICAID

## 2019-10-01 VITALS
WEIGHT: 134.48 LBS | TEMPERATURE: 98.3 F | BODY MASS INDEX: 26.4 KG/M2 | HEIGHT: 60 IN | HEART RATE: 91 BPM | SYSTOLIC BLOOD PRESSURE: 138 MMHG | DIASTOLIC BLOOD PRESSURE: 76 MMHG | RESPIRATION RATE: 16 BRPM | OXYGEN SATURATION: 97 %

## 2019-10-01 DIAGNOSIS — S43.402A SPRAIN OF LEFT SHOULDER, UNSPECIFIED SHOULDER SPRAIN TYPE, INITIAL ENCOUNTER: ICD-10-CM

## 2019-10-01 DIAGNOSIS — S63.502A SPRAIN OF LEFT WRIST, INITIAL ENCOUNTER: ICD-10-CM

## 2019-10-01 PROCEDURE — 700102 HCHG RX REV CODE 250 W/ 637 OVERRIDE(OP): Performed by: EMERGENCY MEDICINE

## 2019-10-01 PROCEDURE — 29125 APPL SHORT ARM SPLINT STATIC: CPT

## 2019-10-01 PROCEDURE — 73060 X-RAY EXAM OF HUMERUS: CPT | Mod: LT

## 2019-10-01 PROCEDURE — 302874 HCHG BANDAGE ACE 2 OR 3""

## 2019-10-01 PROCEDURE — 73030 X-RAY EXAM OF SHOULDER: CPT | Mod: LT

## 2019-10-01 PROCEDURE — 73110 X-RAY EXAM OF WRIST: CPT | Mod: LT

## 2019-10-01 PROCEDURE — 99284 EMERGENCY DEPT VISIT MOD MDM: CPT

## 2019-10-01 PROCEDURE — A9270 NON-COVERED ITEM OR SERVICE: HCPCS | Performed by: EMERGENCY MEDICINE

## 2019-10-01 RX ORDER — ACETAMINOPHEN 325 MG/1
650 TABLET ORAL ONCE
Status: COMPLETED | OUTPATIENT
Start: 2019-10-01 | End: 2019-10-01

## 2019-10-01 RX ORDER — IBUPROFEN 600 MG/1
600 TABLET ORAL ONCE
Status: COMPLETED | OUTPATIENT
Start: 2019-10-01 | End: 2019-10-01

## 2019-10-01 RX ADMIN — IBUPROFEN 600 MG: 600 TABLET ORAL at 19:56

## 2019-10-01 RX ADMIN — ACETAMINOPHEN 650 MG: 325 TABLET, FILM COATED ORAL at 19:56

## 2019-10-02 NOTE — ED PROVIDER NOTES
ED Provider Note    Scribed for Kailey Culver M.D. by Leif Reyes. 10/1/2019  6:15 PM    Primary care provider: SALONI Marcial  Means of arrival: walk-in  History obtained from: patient  History limited by: none    CHIEF COMPLAINT  Chief Complaint   Patient presents with   • T-5000 FALL   • Arm Pain   • Wrist Pain       HPI  Olya Youssef is a 49 y.o. female who presents to the Emergency Department with complaints of mild pain to her left arm and wrist acute onset last night. She states she was climbing down a small for step ladder and missed the last step. She landed on her left hip and left shoulder.  She was able to get herself up right away.  No hip pain.  She is able to walk.  She states that she has difficulty grasping objects mostly secondary to the pain in her left wrist. There is some mild tingling to her left hand and wrist as well. She denies any left hip pain or pain with walking. There is exacerbation of her pain with bending her wrist. She further denies any left elbow pain.  She complains of pain in her left shoulder and left humerus region.  Pain gets worse when she tries to move her shoulder/arm and wrist.  She is adamant that she did not strike her head.  She denies any loss of consciousness, head trauma, abdominal pain, back pain or rib pain.    REVIEW OF SYSTEMS  Positives include left wrist and left shoulder/upper arm pain, tingling to fingers. Negatives include no head trauma, loss of consciousness, left hip pain, rib pain, neck pain, back pain, abdominal pain.      PAST MEDICAL HISTORY   has a past medical history of Alcohol abuse, Alcoholism (HCC), Anxiety, Backpain, Bipolar disorder, unspecified (HCC), Bronchitis, Drug abuse (HCC), Hepatitis, alcoholic, Hypertension, Indigestion, Infectious disease (MRSA), Liver disease, Pancreatitis chronic, Pneumonia, Psychiatric disorder, Renal disorder, Seizure (HCC), Seizure disorder (HCC), Unspecified hemorrhagic conditions, and Unspecified  urinary incontinence.    SURGICAL HISTORY   has a past surgical history that includes gastroscopy (4/28/2015).    SOCIAL HISTORY  Social History     Tobacco Use   • Smoking status: Never Smoker   • Smokeless tobacco: Never Used   Substance Use Topics   • Alcohol use: Yes     Comment: Hx heavy drinking   • Drug use: Yes     Comment: meth/THC      Social History     Substance and Sexual Activity   Drug Use Yes    Comment: meth/THC       FAMILY HISTORY  History reviewed. No pertinent family history.    CURRENT MEDICATIONS  Home Medications     Reviewed by Shanita Francis R.N. (Registered Nurse) on 10/01/19 at 1757  Med List Status: Partial   Medication Last Dose Status   gabapentin (NEURONTIN) 300 MG Cap  Active                ALLERGIES  Allergies   Allergen Reactions   • Bactrim Hives   • Cephalexin [Keflex] Hives   • Lamotrigine [Lamictal] Hives   • Quetiapine Fumarate Hives     Extrapyramidal Symptoms       PHYSICAL EXAM  VITAL SIGNS: /88   Pulse (!) 109   Temp 36.6 °C (97.9 °F) (Temporal)   Resp 16   Ht 1.524 m (5')   Wt 61 kg (134 lb 7.7 oz)   SpO2 96%   BMI 26.26 kg/m²   Constitutional: Disheveled, unkempt.  Alert in no apparent distress.  HENT: Normocephalic, Bilateral external ears normal. Nose normal.   Eyes: Conjunctiva normal, non-icteric.   Thorax & Lungs: Easy unlabored respirations  Skin: Visualized skin warm and dry, No erythema, No rash.   Extremities: Left upper extremity.  Patient is able to take her arm out of her jacket without much difficulty.  There is diffuse tenderness to the left shoulder.  There is tenderness to the left proximal humerus.  No deformity.  No ecchymosis.  There is some mild tenderness to the lateral epicondyle of the left elbow.  There is no swelling or ecchymosis.  No pain in the elbow with flexion, extension, pronation or supination.  There is diffuse tenderness to the left wrist.  There is tenderness in the snuffbox.  Radial, median, ulnar nerve function is  intact.  Sensation is intact light touch.  Good capillary refill.  There is some slight swelling to the left hand but there is also similar swelling to the right hand, which I suspect is chronic.  There is also a ready erythema to bilateral hands, which patient states is chronic as well.  Neurologic: Alert, clear speech  Psychiatric: Affect and Mood normal    RADIOLOGY  DX-WRIST-COMPLETE 3+ LEFT   Final Result      No acute osseous abnormality.      DX-HUMERUS 2+ LEFT   Final Result      No acute osseous abnormality.      DX-SHOULDER 2+ LEFT   Final Result      No acute osseous abnormality.        The radiologist's interpretation of all radiological studies have been reviewed by me.    COURSE & MEDICAL DECISION MAKING  Nursing notes and vital signs were reviewed. (See chart for details)    6:15 PM - Patient seen and examined at bedside.     7:53 PM - Per RN, the patient has requested medication for pain. Ordered Tylenol 650 mg, Motrin 600 mg.      8:20 PM - Reviewed radiology results which were negative for fractures.    8:32 PM - Patient was reevaluated at bedside. Discussed negative radiology results with the patient and informed of the plan for discharge.     Decision Making:  The patient presents to the Emergency Department with with complaint of left shoulder pain, left upper arm pain, and left wrist pain after she missed the last rung on a 4 step stepladder and fell onto her left side yesterday evening.  She did not strike her head.  No LOC.  No neck pain.  No back pain.  No rib pain.  No abdominal pain.  No hip pain.  She was able to get herself up and walk.  Patient is having pain in her left shoulder, left humerus, and her left wrist.  There is some mild tenderness of the lateral aspect of the left elbow as well.  Patient was able to take her arm out of her jacket without much difficulty.  She is diffusely tender over the shoulder as well as the proximal humerus.  There is no bruising or swelling.  X-rays  are negative.  No evidence for fracture dislocation.  The patient's wrist is diffusely tender.  She does have snuffbox tenderness.  The wrist x-ray is negative.  However, with her snuffbox tenderness I am concerned about an occult scaphoid fracture.  Patient is neurovascular intact.  She has 2+ radial pulse.  Full range of motion to digits.  Sensation intact light touch.  She be placed in a thumb spica splint and will be given a sling.  She understands she is to take her arm out of the sling several times a day and move her shoulder and elbow around so that she avoids getting frozen shoulder and frozen elbow.  She is only to stay in her sling for 2 to 3 days.  After that she should take your arm out of the sling so that she does not get frozen joint.  She should follow-up with orthopedics since there is concern for occult scaphoid fracture.  She is been referred to Dr. Leslie, orthopedic surgeon on-call.  She has been given strict return precautions and discharge instructions and she understands if she gets worse in any way she must return to the ER immediately.      The patient was discharged home with an information sheet on wrist sprain and shoulder sprain told to return immediately for any signs or symptoms listed, but specifically for any worsening pain, changing pain, numbness or tingling to your hand, discoloration to your hand, swelling of your shoulder, elbow wrist, or any worsening at all.  The patient verbally agreed to the discharge precautions and follow-up plan which is documented in EPIC.    DISPOSITION:  Patient will be discharged home in stable condition.    FOLLOW UP:  Trudy Dickey PJAMAAL.  [address]  [address]  [facility] [address]  [phone]    Schedule an appointment as soon as possible for a visit in 1 day  If symptoms worsen return to ER    Diego Leslie M.D.  [address]  [address]  [phone]    Schedule an appointment as soon as possible for a visit in 1 day  If  symptoms worsen return to ER      OUTPATIENT MEDICATIONS:  Discharge Medication List as of 10/1/2019  8:31 PM             FINAL IMPRESSION  1. Sprain of left wrist, initial encounter Acute   2. Sprain of left shoulder, unspecified shoulder sprain type, initial encounter Acute        This dictation has been created using voice recognition software. The accuracy of the dictation is limited by the abilities of the software. I expect there may be some errors of grammar and possibly content. I made every attempt to manually correct the errors within my dictation. However, errors related to voice recognition software may still exist and should be interpreted within the appropriate context.     Leif LUNA (Scribe), am scribing for, and in the presence of, Kailey Culver M.D..    Electronically signed by: Leif Reyes (Scribe), 10/1/2019    Kailey LUNA M.D. personally performed the services described in this documentation, as scribed by Leif Reyes in my presence, and it is both accurate and complete. E.    The note accurately reflects work and decisions made by me.  Kailey Culver  10/1/2019  6:29 PM

## 2019-10-02 NOTE — ED NOTES
Pt discharged home. Explained discharge instructions. Questions and comments addressed. Pt verbalized understanding of instructions. Pt advised to follow-up with PCP in 1 day or return to ED for any new or worsening of symptoms. Pt is ambulating well and steady on feet. VS stable.

## 2019-10-02 NOTE — ED TRIAGE NOTES
Pt comes in reporting she was up on the 2nd stair of a ladder and fell onto the floor. Pt stating she fell on her left side. Now with left arm pain, left wrist pain. Pt stating tingling to hand/fingers. Pt with good radial pulse

## 2019-10-02 NOTE — DISCHARGE INSTRUCTIONS
Use ice to help with the pain.    Take over-the-counter Tylenol and ibuprofen to help with the pain.    Wear your sling for the next 2 to 3 days.  After 2 to 3 days take your arm out of the sling and move it around as you normally would.  This will help prevent frozen shoulder and frozen elbow.  However, while you are in the sling, be sure you take your arm out of the sling several times a day and move your shoulder and elbow around to keep her joints from freezing up.    Your x-rays today did not reveal any obvious fracture, however, the ER physician is concerned that you may have a hidden fracture in your wrist.  Please follow-up with Dr. Leslie, orthopedic surgeon, within the next 1 to 2 days.  Please call for appointment.    Return to the ER for any worsening pain, changing pain, numbness or tingling to your hand, discoloration to your hand, swelling of your shoulder, elbow wrist, or for any concerns.

## 2020-02-02 NOTE — ED NOTES
[facility] AMBULATORY ENCOUNTER  URGENT CARE OFFICE VISIT    SUBJECTIVE:  Brea  is a 78year old female who presented requesting evaluation for a lump in her nose that has been draining for the past 1 week. Pt admits associated pain and erythema with drainage of blood and purulent material. Pt has a PMH of MRSA infection. Pt denies fever, chills, sweats, or spreading erythema or edema. REVIEW OF SYSTEMS:    A review of systems was performed and findings relevant to these symptoms are included in the HPI. PAST HISTORIES:    ALLERGIES:   Allergen Reactions   â¢ Dye [Contrast Media] ANAPHYLAXIS     Throat began to swell. Difficulty breathing   â¢ Tylenol With Codeine [Acetaminophen-Codeine] SHORTNESS OF BREATH     Severe back pain which led to difficulty breathing   â¢ Benadryl [Diphenhydramine] Other (See Comments)     Headache   â¢ Latex PRURITUS   â¢ Sulfa Antibiotics PRURITUS       MEDICATIONS:  Current Outpatient Medications   Medication Sig   â¢ Vitamin D, Ergocalciferol, 1.25 mg (50,000 units) capsule TK 1 C PO 1 TIME A WK   â¢ linaclotide (LINZESS) 290 MCG Take 1 capsule by mouth daily. â¢ docusate sodium-sennosides (SENOKOT S) 50-8.6 MG per tablet Take 1 tablet by mouth daily. â¢ HYDROcodone-acetaminophen (NORCO) 5-325 MG per tablet Take 1 tablet by mouth every 6 hours as needed for Pain. â¢ irbesartan (AVAPRO) 300 MG tablet Take 300 mg by mouth nightly. â¢ ALPRAZolam (XANAX) 0.25 MG tablet Take 0.25 mg by mouth nightly as needed for Sleep. â¢ levothyroxine (SYNTHROID, LEVOTHROID) 25 MCG tablet Take 1 tablet by mouth daily. â¢ atorvastatin (LIPITOR) 40 MG tablet Take 40 mg by mouth daily. â¢ pantoprazole (PROTONIX) 40 MG tablet Take 40 mg by mouth daily. â¢ aspirin 81 MG tablet Take 81 mg by mouth every 48 hours.    â¢ diclofenac (VOLTAREN) 1 % gel APPLY TO AFFECTED AREA TID TO QID X7 DAYS   â¢ fluticasone (FLONASE) 50 MCG/ACT nasal spray SHAKE LQ AND U 2 SPRAYS IEN D   â¢ escitalopram PT IS RESTING IN BED, NAD. PT WAS GIVEN A SNACK.   "(LEXAPRO) 5 MG tablet TK 1 T PO D   â¢ pregabalin (LYRICA) 50 MG capsule    â¢ doxycycline hyclate (VIBRAMYCIN) 100 MG capsule Take 1 capsule by mouth 2 times daily. â¢ predniSONE (DELTASONE) 20 MG tablet Take 2 tablets by mouth daily for 4 days, and then stop. Take with food to reduce gastric irritation   â¢ benzonatate (TESSALON PERLES) 200 MG capsule Take 1 capsule by mouth 3 times daily as needed for Cough. â¢ OXcarbazepine (TRILEPTAL) 150 MG tablet Take 150 mg by mouth 2 times daily. â¢ oxyCODONE/APAP (PERCOCET) 5-325 MG per tablet Take 1 tablet by mouth every 8 hours as needed for Pain. Caution: sedation     No current facility-administered medications for this visit. Past Medical History:   Diagnosis Date   â¢ Anxiety    â¢ Arthritis    â¢ Coronary artery disease    â¢ Depression    â¢ Disorder of bone and cartilage, unspecified 06/25/2002   â¢ Esophageal reflux    â¢ Essential (primary) hypertension    â¢ Malignant neoplasm (CMS/HCC)     skin Ca on back   â¢ Osteoporosis    â¢ Other and unspecified hyperlipidemia    â¢ Other chronic pain    â¢ Personal history of traumatic fracture     L femur   â¢ Pneumonia    â¢ Spinal stenosis     severe, L3-L4, with intractable back and radicular leg pain   â¢ Thyroid condition     3 cysts   â¢ Unspecified sinusitis (chronic)        OBJECTIVE:  PHYSICAL EXAM:    Visit Vitals  /86 (BP Location: LUE - Left upper extremity, Patient Position: Sitting, Cuff Size: Regular)   Pulse 72   Temp 98.1 Â°F (36.7 Â°C) (Oral)   Resp 18   Ht 5' 4"" (1.626 m)   Wt 70.8 kg   SpO2 99%   BMI 26.79 kg/mÂ²        CONSTITUTIONAL: Well-hydrated, well-nourished female who appears to be in no acute distress. Awake, alert and cooperative. HEENT: Head is atraumatic and normocephalic. There is a internal hordeolum of the left upper eyelid. External ears without deformities. Hearing is grossly normal. Nose without deformities. NECK: No lymphadenopathy   LUNGS: Clear to auscultation bilaterally.  No " wheezes, rales or rhonchi noted. CARDIOVASCULAR: Regular rate and rhythm with normal S1 and S2. There are no murmurs auscultated. ABDOMEN: Non-distended. MUSCULOSKELETAL: Tandem gait is accomplished smoothly. SKIN: There is an erythematous and edematous abscess at the left nare/septum with pain to palpation. NEUROLOGIC: Alert and oriented x3. PSYCHIATRIC:  Speech and behavior appropriate. Normal mood and affect. Incision and Drainage Procedure Note:  Indication: Abscess    Procedure: The patient was appropriately positioned. The incision site was prepped with alcohol. Local anesthesia was not indicated for the procedure. An incision was made over the apex of the lesion and approximately 2 cc of thick and mucoid material was expressed. Potential loculations were not present. The patient tolerated the procedure well, without difficulty. Complications: None      Ran Yousuf was seen today for nose problem. Diagnoses and all orders for this visit:    Abscess of nose (septum)  -     ANAEROBE/AEROBE, BACTERIAL CULTURE WITH GRAM STAIN  -     doxycycline hyclate (VIBRAMYCIN) 100 MG capsule; Take 1 capsule by mouth 2 times daily.  -     DRAIN SKIN ABSCESS SIMPLE    Hordeolum internum of left upper eyelid      Take medication as prescribed. Will call with culture results and if medication is appropriate. Apply warm compresses to left eye, 20 minutes at a time, 3 times daily until stye is gone. FOLLOW-UP:  Return if symptoms worsen or fail to improve, for PCP visit. PATIENT INSTRUCTIONS:  Hand-out of instructions provided and discussed with patient. The patient was advised to follow up with primary physician or to go to ED if symptoms get worse or if new symptoms appear. The patient indicated understanding of the diagnosis and agreed with the plan of care. Dr. Jairo Abernathy was present in the building as my supervising physician.

## 2020-05-07 ENCOUNTER — HOSPITAL ENCOUNTER (EMERGENCY)
Facility: MEDICAL CENTER | Age: 50
End: 2020-05-07
Attending: EMERGENCY MEDICINE
Payer: MEDICAID

## 2020-05-07 ENCOUNTER — APPOINTMENT (OUTPATIENT)
Dept: RADIOLOGY | Facility: MEDICAL CENTER | Age: 50
End: 2020-05-07
Attending: EMERGENCY MEDICINE
Payer: MEDICAID

## 2020-05-07 VITALS
SYSTOLIC BLOOD PRESSURE: 133 MMHG | BODY MASS INDEX: 29.45 KG/M2 | HEART RATE: 109 BPM | WEIGHT: 150 LBS | TEMPERATURE: 98.8 F | OXYGEN SATURATION: 93 % | RESPIRATION RATE: 16 BRPM | HEIGHT: 60 IN | DIASTOLIC BLOOD PRESSURE: 79 MMHG

## 2020-05-07 DIAGNOSIS — R00.0 TACHYCARDIA: ICD-10-CM

## 2020-05-07 DIAGNOSIS — R51.9 SEVERE HEADACHE: ICD-10-CM

## 2020-05-07 DIAGNOSIS — F10.930 ALCOHOL WITHDRAWAL SYNDROME WITHOUT COMPLICATION (HCC): ICD-10-CM

## 2020-05-07 DIAGNOSIS — F15.10 METHAMPHETAMINE ABUSE (HCC): ICD-10-CM

## 2020-05-07 LAB
ALBUMIN SERPL BCP-MCNC: 4.1 G/DL (ref 3.2–4.9)
ALBUMIN/GLOB SERPL: 1.1 G/DL
ALP SERPL-CCNC: 162 U/L (ref 30–99)
ALT SERPL-CCNC: 115 U/L (ref 2–50)
AMPHET UR QL SCN: POSITIVE
ANION GAP SERPL CALC-SCNC: 22 MMOL/L (ref 7–16)
APPEARANCE UR: ABNORMAL
AST SERPL-CCNC: 207 U/L (ref 12–45)
BACTERIA #/AREA URNS HPF: ABNORMAL /HPF
BARBITURATES UR QL SCN: NEGATIVE
BASOPHILS # BLD AUTO: 1.2 % (ref 0–1.8)
BASOPHILS # BLD: 0.05 K/UL (ref 0–0.12)
BENZODIAZ UR QL SCN: NEGATIVE
BILIRUB SERPL-MCNC: 0.5 MG/DL (ref 0.1–1.5)
BILIRUB UR QL STRIP.AUTO: NEGATIVE
BUN SERPL-MCNC: 11 MG/DL (ref 8–22)
BZE UR QL SCN: NEGATIVE
CALCIUM SERPL-MCNC: 8.9 MG/DL (ref 8.5–10.5)
CANNABINOIDS UR QL SCN: NEGATIVE
CHLORIDE SERPL-SCNC: 101 MMOL/L (ref 96–112)
CO2 SERPL-SCNC: 19 MMOL/L (ref 20–33)
COLOR UR: YELLOW
CREAT SERPL-MCNC: 0.48 MG/DL (ref 0.5–1.4)
EOSINOPHIL # BLD AUTO: 0.01 K/UL (ref 0–0.51)
EOSINOPHIL NFR BLD: 0.2 % (ref 0–6.9)
EPI CELLS #/AREA URNS HPF: ABNORMAL /HPF
ERYTHROCYTE [DISTWIDTH] IN BLOOD BY AUTOMATED COUNT: 50.8 FL (ref 35.9–50)
GLOBULIN SER CALC-MCNC: 3.7 G/DL (ref 1.9–3.5)
GLUCOSE SERPL-MCNC: 86 MG/DL (ref 65–99)
GLUCOSE UR STRIP.AUTO-MCNC: NEGATIVE MG/DL
HCT VFR BLD AUTO: 45.9 % (ref 37–47)
HGB BLD-MCNC: 16.2 G/DL (ref 12–16)
IMM GRANULOCYTES # BLD AUTO: 0.04 K/UL (ref 0–0.11)
IMM GRANULOCYTES NFR BLD AUTO: 0.9 % (ref 0–0.9)
KETONES UR STRIP.AUTO-MCNC: 15 MG/DL
LEUKOCYTE ESTERASE UR QL STRIP.AUTO: ABNORMAL
LYMPHOCYTES # BLD AUTO: 1.91 K/UL (ref 1–4.8)
LYMPHOCYTES NFR BLD: 45.2 % (ref 22–41)
MCH RBC QN AUTO: 35 PG (ref 27–33)
MCHC RBC AUTO-ENTMCNC: 35.3 G/DL (ref 33.6–35)
MCV RBC AUTO: 99.1 FL (ref 81.4–97.8)
METHADONE UR QL SCN: NEGATIVE
MICRO URNS: ABNORMAL
MONOCYTES # BLD AUTO: 0.43 K/UL (ref 0–0.85)
MONOCYTES NFR BLD AUTO: 10.2 % (ref 0–13.4)
NEUTROPHILS # BLD AUTO: 1.79 K/UL (ref 2–7.15)
NEUTROPHILS NFR BLD: 42.3 % (ref 44–72)
NITRITE UR QL STRIP.AUTO: NEGATIVE
NRBC # BLD AUTO: 0 K/UL
NRBC BLD-RTO: 0 /100 WBC
OPIATES UR QL SCN: NEGATIVE
OXYCODONE UR QL SCN: NEGATIVE
PCP UR QL SCN: NEGATIVE
PH UR STRIP.AUTO: 5 [PH] (ref 5–8)
PLATELET # BLD AUTO: 124 K/UL (ref 164–446)
PMV BLD AUTO: 8.5 FL (ref 9–12.9)
POTASSIUM SERPL-SCNC: 3.8 MMOL/L (ref 3.6–5.5)
PROPOXYPH UR QL SCN: NEGATIVE
PROT SERPL-MCNC: 7.8 G/DL (ref 6–8.2)
PROT UR QL STRIP: 30 MG/DL
RBC # BLD AUTO: 4.63 M/UL (ref 4.2–5.4)
RBC # URNS HPF: ABNORMAL /HPF
RBC UR QL AUTO: ABNORMAL
SODIUM SERPL-SCNC: 142 MMOL/L (ref 135–145)
SP GR UR STRIP.AUTO: 1.01
UROBILINOGEN UR STRIP.AUTO-MCNC: 0.2 MG/DL
WBC # BLD AUTO: 4.2 K/UL (ref 4.8–10.8)
WBC #/AREA URNS HPF: ABNORMAL /HPF

## 2020-05-07 PROCEDURE — 96375 TX/PRO/DX INJ NEW DRUG ADDON: CPT

## 2020-05-07 PROCEDURE — 96374 THER/PROPH/DIAG INJ IV PUSH: CPT

## 2020-05-07 PROCEDURE — 85025 COMPLETE CBC W/AUTO DIFF WBC: CPT

## 2020-05-07 PROCEDURE — 70450 CT HEAD/BRAIN W/O DYE: CPT

## 2020-05-07 PROCEDURE — 99285 EMERGENCY DEPT VISIT HI MDM: CPT

## 2020-05-07 PROCEDURE — 87086 URINE CULTURE/COLONY COUNT: CPT

## 2020-05-07 PROCEDURE — 80053 COMPREHEN METABOLIC PANEL: CPT

## 2020-05-07 PROCEDURE — 700105 HCHG RX REV CODE 258: Performed by: EMERGENCY MEDICINE

## 2020-05-07 PROCEDURE — 81001 URINALYSIS AUTO W/SCOPE: CPT

## 2020-05-07 PROCEDURE — 80307 DRUG TEST PRSMV CHEM ANLYZR: CPT

## 2020-05-07 PROCEDURE — 700111 HCHG RX REV CODE 636 W/ 250 OVERRIDE (IP): Performed by: EMERGENCY MEDICINE

## 2020-05-07 RX ORDER — DIPHENHYDRAMINE HYDROCHLORIDE 50 MG/ML
25 INJECTION INTRAMUSCULAR; INTRAVENOUS ONCE
Status: COMPLETED | OUTPATIENT
Start: 2020-05-07 | End: 2020-05-07

## 2020-05-07 RX ORDER — SODIUM CHLORIDE 9 MG/ML
1000 INJECTION, SOLUTION INTRAVENOUS ONCE
Status: COMPLETED | OUTPATIENT
Start: 2020-05-07 | End: 2020-05-07

## 2020-05-07 RX ORDER — KETOROLAC TROMETHAMINE 30 MG/ML
30 INJECTION, SOLUTION INTRAMUSCULAR; INTRAVENOUS ONCE
Status: COMPLETED | OUTPATIENT
Start: 2020-05-07 | End: 2020-05-07

## 2020-05-07 RX ORDER — CHLORDIAZEPOXIDE HYDROCHLORIDE 25 MG/1
25 CAPSULE, GELATIN COATED ORAL 3 TIMES DAILY PRN
Qty: 15 CAP | Refills: 0 | Status: SHIPPED | OUTPATIENT
Start: 2020-05-07 | End: 2020-05-12

## 2020-05-07 RX ORDER — METOCLOPRAMIDE HYDROCHLORIDE 5 MG/ML
10 INJECTION INTRAMUSCULAR; INTRAVENOUS ONCE
Status: COMPLETED | OUTPATIENT
Start: 2020-05-07 | End: 2020-05-07

## 2020-05-07 RX ADMIN — SODIUM CHLORIDE 1000 ML: 9 INJECTION, SOLUTION INTRAVENOUS at 10:01

## 2020-05-07 RX ADMIN — DIPHENHYDRAMINE HYDROCHLORIDE 25 MG: 50 INJECTION INTRAMUSCULAR; INTRAVENOUS at 08:27

## 2020-05-07 RX ADMIN — METOCLOPRAMIDE 10 MG: 5 INJECTION, SOLUTION INTRAMUSCULAR; INTRAVENOUS at 08:28

## 2020-05-07 RX ADMIN — KETOROLAC TROMETHAMINE 30 MG: 30 INJECTION, SOLUTION INTRAMUSCULAR at 08:28

## 2020-05-07 RX ADMIN — SODIUM CHLORIDE 1000 ML: 9 INJECTION, SOLUTION INTRAVENOUS at 11:16

## 2020-05-07 ASSESSMENT — LIFESTYLE VARIABLES
TREMOR: *
AGITATION: *
NAUSEA AND VOMITING: MILD NAUSEA WITH NO VOMITING
HEADACHE, FULLNESS IN HEAD: MILD
TOTAL SCORE: 11
ANXIETY: MILDLY ANXIOUS
VISUAL DISTURBANCES: NOT PRESENT
PAROXYSMAL SWEATS: NO SWEAT VISIBLE
AUDITORY DISTURBANCES: NOT PRESENT
DO YOU DRINK ALCOHOL: YES
ORIENTATION AND CLOUDING OF SENSORIUM: DATE DISORIENTATION BY NO MORE THAN TWO CALENDAR DAYS

## 2020-05-07 ASSESSMENT — FIBROSIS 4 INDEX: FIB4 SCORE: 1.28

## 2020-05-07 ASSESSMENT — PAIN SCALES - WONG BAKER: WONGBAKER_NUMERICALRESPONSE: DOESN'T HURT AT ALL

## 2020-05-07 NOTE — DISCHARGE INSTRUCTIONS
Please follow-up with [facility] as you have started the process there.  Call them today to let them know you are being placed on Librium.  If you drink any alcohol do not take Librium no need to bother getting the prescription filled.    Stop using methamphetamine also

## 2020-05-07 NOTE — ED TRIAGE NOTES
"Brought in by EMS, pt with 3 day history of headache \"to the top of my head\". Pt has tried \"everything\" including Tylenol PRN, \"more then I should\", meth, cannabis and ETOH. Interventions not effective.   "

## 2020-05-07 NOTE — ED PROVIDER NOTES
ED Provider  Scribed for Richard Gamble D.O. by Leif Reyes. 5/7/2020  8:07 AM    Means of arrival: EMS  History obtained from:patient  History limited by: none    CHIEF COMPLAINT  Chief Complaint   Patient presents with   • Headache       HPI  Olya Youssef is a 49 y.o. female who presents with complaints of a headache on the top of her head that acute onset 3 days ago. It has been constant since onset and gradually worsening. She has tried Tylenol without any improvement. She reports associated nausea, vomiting, and photophobia. She states that she has had a subjective fever, chills, sweats, numbness/tingling to bilateral hands. She denies any head trauma, chest pain, or weakness. She states that she will occasionally have headaches, but these are not similar. She does not have any history of HTN, HLD, or DM.     PPE Note: I personally donned full PPE for all patient encounters during this visit, including being clean-shaven with an N95 respirator mask, gloves, and goggles.     Scribe remained outside the patient's room and did not have any contact with the patient for the duration of patient encounter.      REVIEW OF SYSTEMS  See HPI for further details. All other systems are negative.     PAST MEDICAL HISTORY   has a past medical history of Alcohol abuse, Alcoholism (HCC), Anxiety, Backpain, Bipolar disorder, unspecified (HCC), Bronchitis, Drug abuse (HCC), Hepatitis, alcoholic, Hypertension, Indigestion, Infectious disease (MRSA), Liver disease, Pancreatitis chronic, Pneumonia, Psychiatric disorder, Renal disorder, Seizure (HCC), Seizure disorder (HCC), Unspecified hemorrhagic conditions, and Unspecified urinary incontinence.    SOCIAL HISTORY  Social History     Tobacco Use   • Smoking status: Never Smoker   • Smokeless tobacco: Never Used   Substance and Sexual Activity   • Alcohol use: Yes     Comment: Hx heavy drinking   • Drug use: Yes     Comment: meth/THC       SURGICAL HISTORY   has a past  surgical history that includes gastroscopy (4/28/2015).    CURRENT MEDICATIONS  Current Outpatient Medications:   •  gabapentin (NEURONTIN) 300 MG Cap, Take 300 mg by mouth 3 times a day., Disp: , Rfl:      ALLERGIES  Allergies   Allergen Reactions   • Bactrim Hives   • Cephalexin [Keflex] Hives   • Lamotrigine [Lamictal] Hives   • Quetiapine Fumarate Hives     Extrapyramidal Symptoms       PHYSICAL EXAM  VITAL SIGNS: /95   Pulse (!) 103   Temp 37.1 °C (98.8 °F) (Temporal)   Resp 20   Ht 1.524 m (5')   Wt 68 kg (150 lb)   SpO2 88%   BMI 29.29 kg/m²   Constitutional: Alert, moderate distress due to pain, tearful.  HENT: No signs of trauma, mucous membranes are moist  Eyes: Conjunctiva normal, Non-icteric.   Neck: Normal range of motion, No tenderness, Supple.  Lymphatic: No lymphadenopathy noted.   Cardiovascular: Tachycardic rate and regular rhythm, no murmurs.   Thorax & Lungs: Normal breath sounds, No respiratory distress, No wheezing, No chest tenderness.   Abdomen: Bowel sounds normal, Soft, No tenderness, No masses, No pulsatile masses. No peritoneal signs.  Skin: Warm, Dry, normal color.   Back: No bony tenderness, No CVA tenderness.   Extremities: No edema, No tenderness, No cyanosis  Musculoskeletal: Good range of motion in all major joints. No tenderness to palpation or major deformities noted.   Neurologic: Alert and oriented x4, Normal motor function, Normal sensory function, No focal deficits noted.   Psychiatric: Affect normal, Judgment normal, Mood normal.     DIAGNOSTIC STUDIES / PROCEDURES    LABS  Results for orders placed or performed during the hospital encounter of 05/07/20   CBC WITH DIFFERENTIAL   Result Value Ref Range    WBC 4.2 (L) 4.8 - 10.8 K/uL    RBC 4.63 4.20 - 5.40 M/uL    Hemoglobin 16.2 (H) 12.0 - 16.0 g/dL    Hematocrit 45.9 37.0 - 47.0 %    MCV 99.1 (H) 81.4 - 97.8 fL    MCH 35.0 (H) 27.0 - 33.0 pg    MCHC 35.3 (H) 33.6 - 35.0 g/dL    RDW 50.8 (H) 35.9 - 50.0 fL     Platelet Count 124 (L) 164 - 446 K/uL    MPV 8.5 (L) 9.0 - 12.9 fL    Neutrophils-Polys 42.30 (L) 44.00 - 72.00 %    Lymphocytes 45.20 (H) 22.00 - 41.00 %    Monocytes 10.20 0.00 - 13.40 %    Eosinophils 0.20 0.00 - 6.90 %    Basophils 1.20 0.00 - 1.80 %    Immature Granulocytes 0.90 0.00 - 0.90 %    Nucleated RBC 0.00 /100 WBC    Neutrophils (Absolute) 1.79 (L) 2.00 - 7.15 K/uL    Lymphs (Absolute) 1.91 1.00 - 4.80 K/uL    Monos (Absolute) 0.43 0.00 - 0.85 K/uL    Eos (Absolute) 0.01 0.00 - 0.51 K/uL    Baso (Absolute) 0.05 0.00 - 0.12 K/uL    Immature Granulocytes (abs) 0.04 0.00 - 0.11 K/uL    NRBC (Absolute) 0.00 K/uL   COMP METABOLIC PANEL   Result Value Ref Range    Sodium 142 135 - 145 mmol/L    Potassium 3.8 3.6 - 5.5 mmol/L    Chloride 101 96 - 112 mmol/L    Co2 19 (L) 20 - 33 mmol/L    Anion Gap 22.0 (H) 7.0 - 16.0    Glucose 86 65 - 99 mg/dL    Bun 11 8 - 22 mg/dL    Creatinine 0.48 (L) 0.50 - 1.40 mg/dL    Calcium 8.9 8.5 - 10.5 mg/dL    AST(SGOT) 207 (H) 12 - 45 U/L    ALT(SGPT) 115 (H) 2 - 50 U/L    Alkaline Phosphatase 162 (H) 30 - 99 U/L    Total Bilirubin 0.5 0.1 - 1.5 mg/dL    Albumin 4.1 3.2 - 4.9 g/dL    Total Protein 7.8 6.0 - 8.2 g/dL    Globulin 3.7 (H) 1.9 - 3.5 g/dL    A-G Ratio 1.1 g/dL   ESTIMATED GFR   Result Value Ref Range    GFR If African American >60 >60 mL/min/1.73 m 2    GFR If Non African American >60 >60 mL/min/1.73 m 2   URINALYSIS CULTURE, IF INDICATED   Result Value Ref Range    Color Yellow     Character Cloudy (A)     Specific Gravity 1.015 <1.035    Ph 5.0 5.0 - 8.0    Glucose Negative Negative mg/dL    Ketones 15 (A) Negative mg/dL    Protein 30 (A) Negative mg/dL    Bilirubin Negative Negative    Urobilinogen, Urine 0.2 Negative    Nitrite Negative Negative    Leukocyte Esterase Trace (A) Negative    Occult Blood Trace (A) Negative    Micro Urine Req Microscopic    URINE MICROSCOPIC (W/UA)   Result Value Ref Range    WBC 10-20 (A) /hpf    RBC 2-5 (A) /hpf    Bacteria  Moderate (A) None /hpf    Epithelial Cells Many (A) /hpf      All labs reviewed by me.    RADIOLOGY  CT-HEAD W/O   Final Result      No acute intracranial abnormality is identified.      Left sphenoid mucosal thickening.        The radiologist's interpretations of all radiological studies have been reviewed by me.    Films have been independently by me      COURSE  Pertinent Labs & Imaging studies reviewed. (See chart for details)    Review of patient's past medical records show prior visits for alcohol intoxication with UDS showing positive cannabinoid and amphetamines. She has had no prior visits for headaches in the past 2 years.     8:07 AM - Patient seen and examined at bedside. Discussed plan of care. The patient will be medicated with Toradol 30 mg, Reglan 10 mg, and Benadryl 25 mg. Ordered for CBC w/ diff, CMP, and CT-head w/o to evaluate her symptoms.    8:54 AM - Patient was reevaluated at bedside. She is resting comfortably and awakens to shaking. Her headache has improved.     9:52 AM - Patient will be treated with 1L NS IV infusion due to dehydration based on labs. Patient reevaluated at bedside. Patient states that her headache is still improved compared to arrival. Heart rate remains tachycardic.    10:57 AM - Patient reevaluated at bedside. She is resting comfortably, but she remains tachycardic. Ordered further labs including UA w/ culture if indicated and UDS.     12:37 PM - Patient reevaluated at bedside again. Patient's heart rate improved, but she remains mildly tachycardic. Patient states that she feels she is withdrawing from alcohol as she was drinking up until she came here. She also admits to methamphetamine use with the last usage being last night. She states that she has been working with [employer] for drug and alcohol cessation, and she emphasizes that she would like to quit drinking. I have provided her with a prescription for Librium, and we had a lengthy discussion of how to properly  take it. Patient understands not to take the Librium while drinking alcohol. Patient otherwise verbalizes understanding and agreement with the plan for discharge.    HYDRATION: Based on the patient's presentation of Dehydration and Tachycardia the patient was given IV fluids. IV Hydration was used because oral hydration was not adequate alone. Upon recheck following hydration, the patient was improved.      MEDICAL DECISION MAKING  This is a 49 y.o. female who presents with her complaints of a headache for several days.  The pain is at the top of the head.  She was medicated with migraine type medications with very good results.  She does have a history of methamphetamine use, and alcohol abuse there is concerns for possible subarachnoid hemorrhage from falls from her substance or even spontaneous bleed from methamphetamine so CT was done and it was negative.    The patient admits to methamphetamine use last night, she has been tachycardic since arrival not responsive to fluids I believe this may be related to the methamphetamine she also believes she may be going through alcohol withdrawal she does not drink until shortly prior to arrival here.  Her headache is improved she is neurologically intact there is no shakes and no hallucinations I do not suspect delirium tremens and she is stable for discharge home.  The patient has been working with [employer] and is planning to get help with them for her substance abuse, I did discuss with her Librium and that I will give her prescription if she agrees to stop drinking but if she does drink do not get the prescription filled.    I reviewed prescription monitoring program for patient's narcotic use before prescribing a scheduled drug.The patient will not drink alcohol nor drive with prescribed medications. The patient will return for new or worsening symptoms and is stable at the time of discharge.    The patient is referred to a primary physician for blood pressure  management, diabetic screening, and for all other preventative health concerns.    In prescribing controlled substances to this patient, I certify that I have obtained and reviewed the medical history of Olya Youssef. I have also made a good keanu effort to obtain applicable records from other providers who have treated the patient and no other records are available at this time.     I have conducted a physical exam and documented it. I have reviewed Ms. Youssef’s prescription history as maintained by the [place] Prescription Monitoring Program.     I have assessed the patient’s risk for abuse, dependency, and addiction using the validated Opioid Risk Tool available at https://www.mdcalc.com/untiya-bnnq-whei-ort-narcotic-abuse.     Given the above, I believe the benefits of controlled substance therapy outweigh the risks. The reasons for prescribing controlled substances include alcohol cessation and treating withdrawal symptoms. Accordingly, I have discussed the risk and benefits, treatment plan, and alternative therapies with the patient.        DISPOSITION:  Patient will be discharged home in stable condition.    FOLLOW UP:  [facility] - Psych  [facility] [address]  [phone]  In 2 days        OUTPATIENT MEDICATIONS:  New Prescriptions    CHLORDIAZEPOXIDE (LIBRIUM) 25 MG CAP    Take 1 Cap by mouth 3 times a day as needed for Anxiety for up to 5 days.        FINAL IMPRESSION  1. Severe headache    2. Tachycardia    3. Methamphetamine abuse (HCC)    4. Alcohol withdrawal syndrome without complication (HCC)         Leif LUNA (Tessaibe), am scribing for, and in the presence of, Richard Gamble D.O..    Electronically signed by: Leif Reyes (Neal), 5/7/2020    Richard LUNA D.O. personally performed the services described in this documentation, as scribed by Leif Reyes in my presence, and it is both accurate and complete. C    The note accurately reflects work  and decisions made by me.  Richard Gamble D.O.  5/7/2020  1:52 PM

## 2020-05-07 NOTE — ED NOTES
"Ambulated to BR with steady gait, urine specimen obtained. Pt states, \"I'm starting to detox from alcohol, I need a drink\". Pt anxious and agitated. CIWA 11.  "

## 2020-05-09 LAB
BACTERIA UR CULT: NORMAL
SIGNIFICANT IND 70042: NORMAL
SITE SITE: NORMAL
SOURCE SOURCE: NORMAL

## 2020-05-12 ENCOUNTER — HOSPITAL ENCOUNTER (INPATIENT)
Dept: HOSPITAL 8 - ED | Age: 50
LOS: 1 days | Discharge: LEFT BEFORE DISCHARGE | DRG: 177 | End: 2020-05-13
Attending: INTERNAL MEDICINE | Admitting: HOSPITALIST
Payer: MEDICAID

## 2020-05-12 VITALS — SYSTOLIC BLOOD PRESSURE: 131 MMHG | DIASTOLIC BLOOD PRESSURE: 95 MMHG

## 2020-05-12 VITALS — SYSTOLIC BLOOD PRESSURE: 141 MMHG | DIASTOLIC BLOOD PRESSURE: 95 MMHG

## 2020-05-12 VITALS — WEIGHT: 142.64 LBS | HEIGHT: 60 IN | BODY MASS INDEX: 28 KG/M2

## 2020-05-12 VITALS — SYSTOLIC BLOOD PRESSURE: 148 MMHG | DIASTOLIC BLOOD PRESSURE: 95 MMHG

## 2020-05-12 DIAGNOSIS — Z03.818: ICD-10-CM

## 2020-05-12 DIAGNOSIS — D75.89: ICD-10-CM

## 2020-05-12 DIAGNOSIS — F43.10: ICD-10-CM

## 2020-05-12 DIAGNOSIS — F15.10: ICD-10-CM

## 2020-05-12 DIAGNOSIS — G43.C0: ICD-10-CM

## 2020-05-12 DIAGNOSIS — J69.0: Primary | ICD-10-CM

## 2020-05-12 DIAGNOSIS — F31.9: ICD-10-CM

## 2020-05-12 DIAGNOSIS — Z87.11: ICD-10-CM

## 2020-05-12 DIAGNOSIS — F17.200: ICD-10-CM

## 2020-05-12 DIAGNOSIS — F10.129: ICD-10-CM

## 2020-05-12 DIAGNOSIS — F12.10: ICD-10-CM

## 2020-05-12 DIAGNOSIS — J32.9: ICD-10-CM

## 2020-05-12 DIAGNOSIS — Z82.5: ICD-10-CM

## 2020-05-12 DIAGNOSIS — E87.6: ICD-10-CM

## 2020-05-12 DIAGNOSIS — J96.01: ICD-10-CM

## 2020-05-12 LAB
ALBUMIN SERPL-MCNC: 3.2 G/DL (ref 3.4–5)
ALP SERPL-CCNC: 138 U/L (ref 45–117)
ALT SERPL-CCNC: 79 U/L (ref 12–78)
ANION GAP SERPL CALC-SCNC: 13 MMOL/L (ref 5–15)
BASOPHILS # BLD AUTO: 0.03 X10^3/UL (ref 0–0.1)
BASOPHILS NFR BLD AUTO: 1 % (ref 0–1)
BILIRUB SERPL-MCNC: 0.4 MG/DL (ref 0.2–1)
CALCIUM SERPL-MCNC: 8.4 MG/DL (ref 8.5–10.1)
CHLORIDE SERPL-SCNC: 101 MMOL/L (ref 98–107)
CREAT SERPL-MCNC: 0.42 MG/DL (ref 0.55–1.02)
CULTURE INDICATED?: NO
EOSINOPHIL # BLD AUTO: 0.05 X10^3/UL (ref 0–0.4)
EOSINOPHIL NFR BLD AUTO: 2 % (ref 1–7)
ERYTHROCYTE [DISTWIDTH] IN BLOOD BY AUTOMATED COUNT: 15 % (ref 9.6–15.2)
GFR SERPL CREATININE-BSD FRML MDRD: NEGATIVE ML/MIN/{1.73_M2}
LYMPHOCYTES # BLD AUTO: 1.42 X10^3/UL (ref 1–3.4)
LYMPHOCYTES NFR BLD AUTO: 42 % (ref 22–44)
MCH RBC QN AUTO: 34.7 PG (ref 27–34.8)
MCHC RBC AUTO-ENTMCNC: 34.3 G/DL (ref 32.4–35.8)
MCV RBC AUTO: 101.2 FL (ref 80–100)
MD: (no result)
MICROSCOPIC: (no result)
MONOCYTES # BLD AUTO: 0.36 X10^3/UL (ref 0.2–0.8)
MONOCYTES NFR BLD AUTO: 11 % (ref 2–9)
NEUTROPHILS # BLD AUTO: 1.51 X10^3/UL (ref 1.8–6.8)
NEUTROPHILS NFR BLD AUTO: 45 % (ref 42–75)
PLATELET # BLD AUTO: 83 X10^3/UL (ref 130–400)
PMV BLD AUTO: 6.7 FL (ref 7.4–10.4)
PROT SERPL-MCNC: 7.3 G/DL (ref 6.4–8.2)
RBC # BLD AUTO: 3.72 X10^6/UL (ref 3.82–5.3)
VANCOMYCIN TROUGH SERPL-MCNC: < 1.7 MG/DL (ref 2.8–20)

## 2020-05-12 PROCEDURE — 71045 X-RAY EXAM CHEST 1 VIEW: CPT

## 2020-05-12 PROCEDURE — 87040 BLOOD CULTURE FOR BACTERIA: CPT

## 2020-05-12 PROCEDURE — 71275 CT ANGIOGRAPHY CHEST: CPT

## 2020-05-12 PROCEDURE — 84145 PROCALCITONIN (PCT): CPT

## 2020-05-12 PROCEDURE — 36415 COLL VENOUS BLD VENIPUNCTURE: CPT

## 2020-05-12 PROCEDURE — 81003 URINALYSIS AUTO W/O SCOPE: CPT

## 2020-05-12 PROCEDURE — 83690 ASSAY OF LIPASE: CPT

## 2020-05-12 PROCEDURE — 83735 ASSAY OF MAGNESIUM: CPT

## 2020-05-12 PROCEDURE — 70450 CT HEAD/BRAIN W/O DYE: CPT

## 2020-05-12 PROCEDURE — 82272 OCCULT BLD FECES 1-3 TESTS: CPT

## 2020-05-12 PROCEDURE — 80307 DRUG TEST PRSMV CHEM ANLYZR: CPT

## 2020-05-12 PROCEDURE — 80053 COMPREHEN METABOLIC PANEL: CPT

## 2020-05-12 PROCEDURE — 96374 THER/PROPH/DIAG INJ IV PUSH: CPT

## 2020-05-12 PROCEDURE — 93005 ELECTROCARDIOGRAM TRACING: CPT

## 2020-05-12 PROCEDURE — 84100 ASSAY OF PHOSPHORUS: CPT

## 2020-05-12 PROCEDURE — 85025 COMPLETE CBC W/AUTO DIFF WBC: CPT

## 2020-05-12 PROCEDURE — 99285 EMERGENCY DEPT VISIT HI MDM: CPT

## 2020-05-12 PROCEDURE — U0001 2019-NCOV DIAGNOSTIC P: HCPCS

## 2020-05-12 RX ADMIN — AMPICILLIN SODIUM AND SULBACTAM SODIUM SCH MLS/HR: 2; 1 INJECTION, POWDER, FOR SOLUTION INTRAMUSCULAR; INTRAVENOUS at 20:50

## 2020-05-12 RX ADMIN — POTASSIUM CHLORIDE SCH MLS/HR: 2 INJECTION, SOLUTION, CONCENTRATE INTRAVENOUS at 12:26

## 2020-05-12 RX ADMIN — ACETAMINOPHEN PRN MG: 325 TABLET, FILM COATED ORAL at 12:48

## 2020-05-12 RX ADMIN — ACETAMINOPHEN PRN MG: 325 TABLET, FILM COATED ORAL at 08:42

## 2020-05-12 RX ADMIN — AMPICILLIN SODIUM AND SULBACTAM SODIUM SCH MLS/HR: 2; 1 INJECTION, POWDER, FOR SOLUTION INTRAMUSCULAR; INTRAVENOUS at 14:28

## 2020-05-12 RX ADMIN — AMPICILLIN SODIUM AND SULBACTAM SODIUM SCH MLS/HR: 2; 1 INJECTION, POWDER, FOR SOLUTION INTRAMUSCULAR; INTRAVENOUS at 08:30

## 2020-05-12 RX ADMIN — POTASSIUM CHLORIDE SCH MLS/HR: 2 INJECTION, SOLUTION, CONCENTRATE INTRAVENOUS at 20:00

## 2020-05-12 RX ADMIN — PANTOPRAZOLE SODIUM SCH MG: 40 TABLET, DELAYED RELEASE ORAL at 08:30

## 2020-05-13 VITALS — SYSTOLIC BLOOD PRESSURE: 164 MMHG | DIASTOLIC BLOOD PRESSURE: 97 MMHG

## 2020-05-13 VITALS — SYSTOLIC BLOOD PRESSURE: 130 MMHG | DIASTOLIC BLOOD PRESSURE: 89 MMHG

## 2020-05-13 VITALS — DIASTOLIC BLOOD PRESSURE: 85 MMHG | SYSTOLIC BLOOD PRESSURE: 122 MMHG

## 2020-05-13 LAB
ALBUMIN SERPL-MCNC: 3 G/DL (ref 3.4–5)
ALP SERPL-CCNC: 153 U/L (ref 45–117)
ALT SERPL-CCNC: 70 U/L (ref 12–78)
ANION GAP SERPL CALC-SCNC: 6 MMOL/L (ref 5–15)
BASOPHILS # BLD AUTO: 0.02 X10^3/UL (ref 0–0.1)
BASOPHILS NFR BLD AUTO: 1 % (ref 0–1)
BILIRUB SERPL-MCNC: 0.7 MG/DL (ref 0.2–1)
CALCIUM SERPL-MCNC: 8.9 MG/DL (ref 8.5–10.1)
CHLORIDE SERPL-SCNC: 103 MMOL/L (ref 98–107)
CREAT SERPL-MCNC: 0.44 MG/DL (ref 0.55–1.02)
EOSINOPHIL # BLD AUTO: 0.07 X10^3/UL (ref 0–0.4)
EOSINOPHIL NFR BLD AUTO: 3 % (ref 1–7)
ERYTHROCYTE [DISTWIDTH] IN BLOOD BY AUTOMATED COUNT: 14.4 % (ref 9.6–15.2)
LYMPHOCYTES # BLD AUTO: 0.66 X10^3/UL (ref 1–3.4)
LYMPHOCYTES NFR BLD AUTO: 23 % (ref 22–44)
MCH RBC QN AUTO: 34.5 PG (ref 27–34.8)
MCHC RBC AUTO-ENTMCNC: 33.7 G/DL (ref 32.4–35.8)
MCV RBC AUTO: 102.4 FL (ref 80–100)
MD: (no result)
MONOCYTES # BLD AUTO: 0.24 X10^3/UL (ref 0.2–0.8)
MONOCYTES NFR BLD AUTO: 8 % (ref 2–9)
NEUTROPHILS # BLD AUTO: 1.86 X10^3/UL (ref 1.8–6.8)
NEUTROPHILS NFR BLD AUTO: 65 % (ref 42–75)
PLATELET # BLD AUTO: 76 X10^3/UL (ref 130–400)
PMV BLD AUTO: 6.9 FL (ref 7.4–10.4)
PROT SERPL-MCNC: 6.9 G/DL (ref 6.4–8.2)
RBC # BLD AUTO: 3.74 X10^6/UL (ref 3.82–5.3)

## 2020-05-13 RX ADMIN — AMPICILLIN SODIUM AND SULBACTAM SODIUM SCH MLS/HR: 2; 1 INJECTION, POWDER, FOR SOLUTION INTRAMUSCULAR; INTRAVENOUS at 14:30

## 2020-05-13 RX ADMIN — AMPICILLIN SODIUM AND SULBACTAM SODIUM SCH MLS/HR: 2; 1 INJECTION, POWDER, FOR SOLUTION INTRAMUSCULAR; INTRAVENOUS at 08:36

## 2020-05-13 RX ADMIN — AMPICILLIN SODIUM AND SULBACTAM SODIUM SCH MLS/HR: 2; 1 INJECTION, POWDER, FOR SOLUTION INTRAMUSCULAR; INTRAVENOUS at 02:24

## 2020-05-13 RX ADMIN — PANTOPRAZOLE SODIUM SCH MG: 40 TABLET, DELAYED RELEASE ORAL at 10:18

## 2020-05-13 RX ADMIN — POTASSIUM CHLORIDE SCH MLS/HR: 2 INJECTION, SOLUTION, CONCENTRATE INTRAVENOUS at 05:31

## 2020-11-10 ENCOUNTER — HOSPITAL ENCOUNTER (INPATIENT)
Dept: HOSPITAL 8 - ED | Age: 50
LOS: 8 days | Discharge: HOME | DRG: 177 | End: 2020-11-18
Attending: HOSPITALIST | Admitting: HOSPITALIST
Payer: MEDICAID

## 2020-11-10 VITALS — WEIGHT: 136.25 LBS | HEIGHT: 60 IN | BODY MASS INDEX: 26.75 KG/M2

## 2020-11-10 DIAGNOSIS — D61.818: ICD-10-CM

## 2020-11-10 DIAGNOSIS — J96.01: ICD-10-CM

## 2020-11-10 DIAGNOSIS — F17.200: ICD-10-CM

## 2020-11-10 DIAGNOSIS — K85.90: ICD-10-CM

## 2020-11-10 DIAGNOSIS — E87.6: ICD-10-CM

## 2020-11-10 DIAGNOSIS — U07.1: Primary | ICD-10-CM

## 2020-11-10 DIAGNOSIS — F10.231: ICD-10-CM

## 2020-11-10 DIAGNOSIS — Z88.8: ICD-10-CM

## 2020-11-10 DIAGNOSIS — K70.9: ICD-10-CM

## 2020-11-10 DIAGNOSIS — J12.89: ICD-10-CM

## 2020-11-10 PROCEDURE — 87040 BLOOD CULTURE FOR BACTERIA: CPT

## 2020-11-10 PROCEDURE — 83615 LACTATE (LD) (LDH) ENZYME: CPT

## 2020-11-10 PROCEDURE — 93005 ELECTROCARDIOGRAM TRACING: CPT

## 2020-11-10 PROCEDURE — 96374 THER/PROPH/DIAG INJ IV PUSH: CPT

## 2020-11-10 PROCEDURE — 85610 PROTHROMBIN TIME: CPT

## 2020-11-10 PROCEDURE — 99285 EMERGENCY DEPT VISIT HI MDM: CPT

## 2020-11-10 PROCEDURE — 84100 ASSAY OF PHOSPHORUS: CPT

## 2020-11-10 PROCEDURE — 36415 COLL VENOUS BLD VENIPUNCTURE: CPT

## 2020-11-10 PROCEDURE — 82728 ASSAY OF FERRITIN: CPT

## 2020-11-10 PROCEDURE — 85025 COMPLETE CBC W/AUTO DIFF WBC: CPT

## 2020-11-10 PROCEDURE — 80053 COMPREHEN METABOLIC PANEL: CPT

## 2020-11-10 PROCEDURE — 87081 CULTURE SCREEN ONLY: CPT

## 2020-11-10 PROCEDURE — 96375 TX/PRO/DX INJ NEW DRUG ADDON: CPT

## 2020-11-10 PROCEDURE — 86140 C-REACTIVE PROTEIN: CPT

## 2020-11-10 PROCEDURE — 71045 X-RAY EXAM CHEST 1 VIEW: CPT

## 2020-11-10 PROCEDURE — 85049 AUTOMATED PLATELET COUNT: CPT

## 2020-11-10 PROCEDURE — 85384 FIBRINOGEN ACTIVITY: CPT

## 2020-11-10 PROCEDURE — 83735 ASSAY OF MAGNESIUM: CPT

## 2020-11-10 PROCEDURE — 84145 PROCALCITONIN (PCT): CPT

## 2020-11-10 PROCEDURE — 85730 THROMBOPLASTIN TIME PARTIAL: CPT

## 2020-11-10 PROCEDURE — 83605 ASSAY OF LACTIC ACID: CPT

## 2020-11-10 PROCEDURE — 87635 SARS-COV-2 COVID-19 AMP PRB: CPT

## 2020-11-10 PROCEDURE — 85379 FIBRIN DEGRADATION QUANT: CPT

## 2020-11-11 LAB
ACETONE EXG QL: 1.76 UG/MLFEU (ref 0–0.52)
ALBUMIN SERPL-MCNC: 2.9 G/DL (ref 3.4–5)
ALP SERPL-CCNC: 199 U/L (ref 45–117)
ALT SERPL-CCNC: 92 U/L (ref 12–78)
ANION GAP SERPL CALC-SCNC: 10 MMOL/L (ref 5–15)
APTT BLD: 26 SECONDS (ref 25–31)
BASOPHILS # BLD AUTO: 0 X10^3/UL (ref 0–0.1)
BASOPHILS NFR BLD AUTO: 1 % (ref 0–1)
BILIRUB SERPL-MCNC: 0.2 MG/DL (ref 0.2–1)
CALCIUM SERPL-MCNC: 8.1 MG/DL (ref 8.5–10.1)
CHLORIDE SERPL-SCNC: 99 MMOL/L (ref 98–107)
CREAT SERPL-MCNC: 0.46 MG/DL (ref 0.55–1.02)
CRP SERPL-MCNC: 4.57 MG/DL (ref 0.02–0.49)
EOSINOPHIL # BLD AUTO: 0 X10^3/UL (ref 0–0.4)
EOSINOPHIL NFR BLD AUTO: 0 % (ref 1–7)
ERYTHROCYTE [DISTWIDTH] IN BLOOD BY AUTOMATED COUNT: 12.6 % (ref 9.6–15.2)
FIBRINOGEN PPP-MCNC: 612 MG/DL (ref 200–340)
LYMPHOCYTES # BLD AUTO: 1.2 X10^3/UL (ref 1–3.4)
LYMPHOCYTES NFR BLD AUTO: 59 % (ref 22–44)
MCH RBC QN AUTO: 33.6 PG (ref 27–34.8)
MCHC RBC AUTO-ENTMCNC: 34.1 G/DL (ref 32.4–35.8)
MD: (no result)
MONOCYTES # BLD AUTO: 0.3 X10^3/UL (ref 0.2–0.8)
MONOCYTES NFR BLD AUTO: 14 % (ref 2–9)
NEUTROPHILS # BLD AUTO: 0.6 X10^3/UL (ref 1.8–6.8)
NEUTROPHILS NFR BLD AUTO: 27 % (ref 42–75)
PLATELET # BLD AUTO: 82 X10^3/UL (ref 130–400)
PLATELET (DIC): 82 X10^3/UL (ref 130–400)
PMV BLD AUTO: 6.5 FL (ref 7.4–10.4)
PROT SERPL-MCNC: 7.6 G/DL (ref 6.4–8.2)
PROTHROMBIN TIME: 9.1 SECONDS (ref 9.6–11.5)
RBC # BLD AUTO: 4.48 X10^6/UL (ref 3.82–5.3)

## 2020-11-11 RX ADMIN — AZTREONAM SCH MLS/HR: 1 INJECTION, POWDER, LYOPHILIZED, FOR SOLUTION INTRAMUSCULAR; INTRAVENOUS at 10:46

## 2020-11-11 RX ADMIN — DOXYCYCLINE SCH MLS/HR: 100 INJECTION, POWDER, LYOPHILIZED, FOR SOLUTION INTRAVENOUS at 02:31

## 2020-11-11 RX ADMIN — AZTREONAM SCH MLS/HR: 1 INJECTION, POWDER, LYOPHILIZED, FOR SOLUTION INTRAMUSCULAR; INTRAVENOUS at 19:00

## 2020-11-11 RX ADMIN — LORAZEPAM PRN MG: 2 INJECTION INTRAMUSCULAR; INTRAVENOUS at 21:25

## 2020-11-11 RX ADMIN — HEPARIN SODIUM SCH UNITS: 5000 INJECTION, SOLUTION INTRAVENOUS; SUBCUTANEOUS at 16:30

## 2020-11-11 RX ADMIN — LORAZEPAM PRN MG: 2 INJECTION INTRAMUSCULAR; INTRAVENOUS at 23:29

## 2020-11-11 RX ADMIN — DEXAMETHASONE SODIUM PHOSPHATE SCH MG: 4 INJECTION, SOLUTION INTRA-ARTICULAR; INTRALESIONAL; INTRAMUSCULAR; INTRAVENOUS; SOFT TISSUE at 08:37

## 2020-11-11 RX ADMIN — LORAZEPAM PRN MG: 2 INJECTION INTRAMUSCULAR; INTRAVENOUS at 15:44

## 2020-11-11 RX ADMIN — LORAZEPAM PRN MG: 2 INJECTION INTRAMUSCULAR; INTRAVENOUS at 10:46

## 2020-11-11 RX ADMIN — DOXYCYCLINE SCH MLS/HR: 100 INJECTION, POWDER, LYOPHILIZED, FOR SOLUTION INTRAVENOUS at 13:21

## 2020-11-11 NOTE — NUR
BREAK RN: PT. MEDICATED PER MAR.  VS UPDATED.  2 SETS OF BLOOD CULTURES WERE 
COMPLETED PRIOR TO IV ABX. 

PT. REQUESTING ATIVAN; WILL REQUEST FROM MD.

## 2020-11-11 NOTE — NUR
ASKED FOR HOSPITAL BED EARLIER, AND DID NOT RECIEVE ONE YET. HOUSE KEEPING 
CALLED AND THEY STATED THEY WILL BRING IT DOWN

## 2020-11-11 NOTE — NUR
CIWA 20, medicated with 3 mg Ativan IV per orders. Remains on continuous 
O2/tele monitoring. Bed low, side rails up, call bell within reach

## 2020-11-11 NOTE — NUR
PT BIB EMS, PT HAVING SOB AND GENRALIZED PAIN AROUND BODY. PT STATES HAVING 
FEVER OFF AND ON. PT TOOK 1000 MG TYLENOL APPROXIMATLY AT 2200, AFEBRILE AT 
THIS TIME. PT DENIES MEDICAL HISTORY OTHER THAN ETOH AND PANCREATITIS, BUT 
LIVES WITH PEOPLE WHO ARE COVID POSITIVE. PT PLACED IN GOWN, ON ALL MONITORS, 
IV LINE ESTABLISHED, LABS DRAWN, CXR COMPLETED

## 2020-11-11 NOTE — NUR
PT BECOMING ANXIOUS AND TREMULOUS, REQUESTING ATIVAN. PT STATES SHE DRINKS A 
PINT PER DAY AND IS WORRIED ABOUT DETOX. ADMITTING MD CALLED ORDERS RECIEVED 
FOR CIWA, WILL MEDICATE PER PROTOCOL

## 2020-11-11 NOTE — NUR
PT DESATING TO 83% WHILE SLEEPING ON 2L, PT WOKEN REMAINS SATING 84%. PT 
INCREASED TO 3.5LNC, NOW SATING 94%

## 2020-11-11 NOTE — NUR
CIWA 11. Medicated with 2 mg Ativan IV per provider order, see eMAR for 
additional details. Bed low, side rails up, call bell within reach

## 2020-11-11 NOTE — NUR
Pt up to commode with 2 staff assist. Remains weak, unable to take more than a 
couple of steps. Back in bed after using commode for BM. Bed low, side rails 
up, call bell within reach

## 2020-11-11 NOTE — NUR
PT VERBALIZING WANTING TO GO HOME, PT'S 02 SATURATION HAS BEEN 86-88 ON RA. 
SPOKE WITH PATIENT ON RISKS OF GOING HOME, PT UNDERSTANDING AND AGREEABLE TO 
STAY AT THIS TIME

## 2020-11-11 NOTE — NUR
CIWA 26. Tachy to 110s. Slight increase in confusion. Neurologically in tact 
otherwise.  strength equal bilaterally. Denies numbness/tingling. PERRLA. 
No facial droop noted. Medicated with 4 mg Ativan IV. Hospitalist aware of 
increased withdrawal sx. Remains on continuous O2/tele monitoring. Bed low, 
side rails up, call bell within reach

## 2020-11-11 NOTE — NUR
PT VERY ANXIOUS AND WORRIED ABOUT POTENTIALLY DETOXING, PT STATES SHE WOULD 
RATHER GET A DRINK. HOSP UPDATED, ONE TIME ORDER FOR ATIVAN RECIEVED AND 
ADMINED

## 2020-11-11 NOTE — NUR
PT PLACED ON ROOM AIR FOR 5 MINUTES AND PT'S SATURATIONS AT 86-88% WHILE AT 
REST. PT PLACED BACK ON 2 l

## 2020-11-12 VITALS — SYSTOLIC BLOOD PRESSURE: 159 MMHG | DIASTOLIC BLOOD PRESSURE: 110 MMHG

## 2020-11-12 LAB
<PLATELET ESTIMATE>: (no result)
<PLT MORPHOLOGY>: (no result)
ALBUMIN SERPL-MCNC: 2.9 G/DL (ref 3.4–5)
ALP SERPL-CCNC: 217 U/L (ref 45–117)
ALT SERPL-CCNC: 97 U/L (ref 12–78)
ANION GAP SERPL CALC-SCNC: 7 MMOL/L (ref 5–15)
BAND#(MANUAL): 0.01 X10^3/UL
BILIRUB DIRECT SERPL-MCNC: NORMAL MG/DL
BILIRUB SERPL-MCNC: 0.5 MG/DL (ref 0.2–1)
CALCIUM SERPL-MCNC: 8.5 MG/DL (ref 8.5–10.1)
CHLORIDE SERPL-SCNC: 97 MMOL/L (ref 98–107)
CREAT SERPL-MCNC: 0.46 MG/DL (ref 0.55–1.02)
ERYTHROCYTE [DISTWIDTH] IN BLOOD BY AUTOMATED COUNT: 12.5 % (ref 9.6–15.2)
INR PPP: 0.96 (ref 0.93–1.1)
LYMPH#(MANUAL): 0.54 X10^3/UL (ref 1–3.4)
LYMPHS% (MANUAL): 45 % (ref 22–44)
MCH RBC QN AUTO: 33.8 PG (ref 27–34.8)
MCHC RBC AUTO-ENTMCNC: 34.1 G/DL (ref 32.4–35.8)
MD: YES
METAMYELOCYTES# (MANUAL): 0.01 X10^3/UL (ref 0–0)
METAMYELOCYTES% (MANUAL): 1 % (ref 0–1)
MONOS#(MANUAL): 0.14 X10^3/UL (ref 0.3–2.7)
MONOS% (MANUAL): 12 % (ref 2–9)
NEUTS BAND NFR BLD: 1 % (ref 0–7)
PLATELET # BLD AUTO: 94 X10^3/UL (ref 130–400)
PMV BLD AUTO: 7 FL (ref 7.4–10.4)
PROT SERPL-MCNC: 7.5 G/DL (ref 6.4–8.2)
PROTHROMBIN TIME: 10.2 SECONDS (ref 9.6–11.5)
RBC # BLD AUTO: 4.33 X10^6/UL (ref 3.82–5.3)
SEG#(MANUAL): 0.49 X10^3/UL (ref 1.8–6.8)
SEGS% (MANUAL): 41 % (ref 42–75)

## 2020-11-12 RX ADMIN — AZTREONAM SCH MLS/HR: 1 INJECTION, POWDER, LYOPHILIZED, FOR SOLUTION INTRAMUSCULAR; INTRAVENOUS at 03:00

## 2020-11-12 RX ADMIN — ZINC SULFATE CAP 220 MG (50 MG ELEMENTAL ZN) SCH MG: 220 (50 ZN) CAP at 09:00

## 2020-11-12 RX ADMIN — PHENOBARBITAL SODIUM SCH MG: 65 INJECTION INTRAMUSCULAR; INTRAVENOUS at 19:40

## 2020-11-12 RX ADMIN — LABETALOL HYDROCHLORIDE PRN MG: 5 INJECTION INTRAVENOUS at 15:18

## 2020-11-12 RX ADMIN — DOXYCYCLINE SCH MLS/HR: 100 INJECTION, POWDER, LYOPHILIZED, FOR SOLUTION INTRAVENOUS at 01:30

## 2020-11-12 RX ADMIN — THIAMINE HYDROCHLORIDE SCH MLS/HR: 100 INJECTION, SOLUTION INTRAMUSCULAR; INTRAVENOUS at 18:25

## 2020-11-12 RX ADMIN — HEPARIN SODIUM SCH UNITS: 5000 INJECTION, SOLUTION INTRAVENOUS; SUBCUTANEOUS at 04:30

## 2020-11-12 RX ADMIN — DEXAMETHASONE SODIUM PHOSPHATE SCH MG: 4 INJECTION, SOLUTION INTRA-ARTICULAR; INTRALESIONAL; INTRAMUSCULAR; INTRAVENOUS; SOFT TISSUE at 10:52

## 2020-11-12 RX ADMIN — OXYCODONE HYDROCHLORIDE AND ACETAMINOPHEN SCH MG: 500 TABLET ORAL at 19:40

## 2020-11-12 RX ADMIN — OXYCODONE HYDROCHLORIDE AND ACETAMINOPHEN SCH MG: 500 TABLET ORAL at 08:00

## 2020-11-12 RX ADMIN — Medication SCH TAB: at 09:00

## 2020-11-12 RX ADMIN — CHOLECALCIFEROL TAB 125 MCG (5000 UNIT) SCH UNIT: 125 TAB at 09:00

## 2020-11-12 RX ADMIN — LORAZEPAM PRN MG: 2 INJECTION INTRAMUSCULAR; INTRAVENOUS at 00:40

## 2020-11-12 RX ADMIN — ENOXAPARIN SODIUM SCH MG: 40 INJECTION SUBCUTANEOUS at 10:51

## 2020-11-12 RX ADMIN — ACETAMINOPHEN PRN MG: 325 TABLET, FILM COATED ORAL at 19:40

## 2020-11-12 RX ADMIN — LEVOFLOXACIN SCH MLS/HR: 750 INJECTION, SOLUTION INTRAVENOUS at 11:31

## 2020-11-12 NOTE — NUR
BREAK RN: THIS RN IN TO ROOM TO ADMIN ATIVAN.  PT. MOSTLY SLEEPING IN HOSPITAL 
BED.  NO IV NOTED.  IV WITH TIP INTACT FOUND ON FLOOR NEAR PT. BED. BLEEING 
CONTROLLED.  ATTEMPTED NEW IV X 2 WITHOUT SUCCESS.

## 2020-11-12 NOTE — NUR
Pt incontinent of urine/stool. Pt will not leave purewick in place. Pt cleaned 
and linens changed. Call bell within reach

## 2020-11-12 NOTE — NUR
CIWA 25. Medicated with 4 mg Ativan IV. Hospitalist aware. Bed low, side rails 
up, call bell within reach

## 2020-11-12 NOTE — NUR
This RN spoke with Dr Roberto, instructions to re-order cancelled Aztreonam and 
administer as soon as pt gets PICC access. Pharmacy notified.

## 2020-11-12 NOTE — NUR
US guided IV in R arm infiltrated. Warm compress applied. Minor swelling noted. 
(+) CSM in affected extremity. Attempted 2nd US guided IV on L, infiltrated 
immediately with 10 mL NS. (+) CSM noted. Conversation with MD Cooney r/t having 
no IV access. Provider aware. Plan for PICC placement this AM and central line 
if pt becomes unstable

## 2020-11-12 NOTE — NUR
Pt removed IV and all monitoring. This RN placed new US guided 18 R brachial. 
IV secured. Placed pt on O2/tele monitoring. Repositioned for comfort. Bed low, 
side rails up, call bell within reach.

## 2020-11-12 NOTE — NUR
THIS RN AND LIZ RN HAD WASTED 1MG OF ATIVAN PRIOR TO THIS RN PROVIDING 
BREAK; THIS  RN WAS GOING TO ADMIN 3MG; UNABLE TO ADMIN AS PT. HAD NO IV.  3MG 
OF ATIVAN WASTED BY THIS RN AND LIZ FOY AT THIS TIME.

## 2020-11-12 NOTE — NUR
Pt arousable to touch, dissoriented and uncooperative. Pt stating repeatedly "I 
need to get dressed", "I need to go home". Pt is tremulous, weak, and unsteady. 
Instructed to stay in bed and rest. TV turned off, lights turned down, warm 
blankets provided for comfort. Pt removing SpO2 probe, pulling at BP cuff. VS 
updated, difficult BP assessment, most likely innacurate d/t pt condition. Pt 
now sleeping again.

## 2020-11-12 NOTE — NUR
Increased agitation. Pt quickly goes from asleep to agitated and back asleep 
again. Threw food tray across the room. Continus to pull off O2 sat/tele leads. 
Continues to state that nobody is checking on her. This RN is in the room every 
couple of minutes. Call bell within reach. Voicemail left with hospitalist for 
reeval

## 2020-11-13 LAB
<PLATELET ESTIMATE>: (no result)
<PLT MORPHOLOGY>: (no result)
ALBUMIN SERPL-MCNC: 2.8 G/DL (ref 3.4–5)
ALP SERPL-CCNC: 189 U/L (ref 45–117)
ALT SERPL-CCNC: 80 U/L (ref 12–78)
ANION GAP SERPL CALC-SCNC: 9 MMOL/L (ref 5–15)
BAND#(MANUAL): 0.02 X10^3/UL
BILIRUB DIRECT SERPL-MCNC: NORMAL MG/DL
BILIRUB SERPL-MCNC: 0.5 MG/DL (ref 0.2–1)
CALCIUM SERPL-MCNC: 8.5 MG/DL (ref 8.5–10.1)
CHLORIDE SERPL-SCNC: 101 MMOL/L (ref 98–107)
CREAT SERPL-MCNC: 0.55 MG/DL (ref 0.55–1.02)
ERYTHROCYTE [DISTWIDTH] IN BLOOD BY AUTOMATED COUNT: 12.7 % (ref 9.6–15.2)
LYMPH#(MANUAL): 0.78 X10^3/UL (ref 1–3.4)
LYMPHS% (MANUAL): 41 % (ref 22–44)
MCH RBC QN AUTO: 33.6 PG (ref 27–34.8)
MCHC RBC AUTO-ENTMCNC: 33.9 G/DL (ref 32.4–35.8)
MD: YES
MONOS#(MANUAL): 0.46 X10^3/UL (ref 0.3–2.7)
MONOS% (MANUAL): 24 % (ref 2–9)
NEUTS BAND NFR BLD: 1 % (ref 0–7)
PLATELET # BLD AUTO: 102 X10^3/UL (ref 130–400)
PMV BLD AUTO: 7.5 FL (ref 7.4–10.4)
PROT SERPL-MCNC: 7.2 G/DL (ref 6.4–8.2)
RBC # BLD AUTO: 4.48 X10^6/UL (ref 3.82–5.3)
REACTIVE LYMPHS # (MANUAL): 0.02 X10^3/UL (ref 0–0)
REACTIVE LYMPHS % (MANUAL): 1 % (ref 0–0)
SEG#(MANUAL): 0.63 X10^3/UL (ref 1.8–6.8)
SEGS% (MANUAL): 33 % (ref 42–75)

## 2020-11-13 RX ADMIN — THIAMINE HYDROCHLORIDE SCH MLS/HR: 100 INJECTION, SOLUTION INTRAMUSCULAR; INTRAVENOUS at 17:25

## 2020-11-13 RX ADMIN — ACETAMINOPHEN PRN MG: 325 TABLET, FILM COATED ORAL at 15:14

## 2020-11-13 RX ADMIN — LORAZEPAM PRN MG: 2 INJECTION INTRAMUSCULAR; INTRAVENOUS at 22:37

## 2020-11-13 RX ADMIN — DEXAMETHASONE SODIUM PHOSPHATE SCH MG: 4 INJECTION, SOLUTION INTRA-ARTICULAR; INTRALESIONAL; INTRAMUSCULAR; INTRAVENOUS; SOFT TISSUE at 08:38

## 2020-11-13 RX ADMIN — ENOXAPARIN SODIUM SCH MG: 40 INJECTION SUBCUTANEOUS at 11:09

## 2020-11-13 RX ADMIN — OXYCODONE HYDROCHLORIDE AND ACETAMINOPHEN SCH MG: 500 TABLET ORAL at 17:25

## 2020-11-13 RX ADMIN — LEVOFLOXACIN SCH MLS/HR: 750 INJECTION, SOLUTION INTRAVENOUS at 11:09

## 2020-11-13 RX ADMIN — OXYCODONE HYDROCHLORIDE AND ACETAMINOPHEN SCH MG: 500 TABLET ORAL at 08:48

## 2020-11-13 RX ADMIN — CHOLECALCIFEROL TAB 125 MCG (5000 UNIT) SCH UNIT: 125 TAB at 08:48

## 2020-11-13 RX ADMIN — ZINC SULFATE CAP 220 MG (50 MG ELEMENTAL ZN) SCH MG: 220 (50 ZN) CAP at 08:47

## 2020-11-13 RX ADMIN — PHENOBARBITAL SCH MG: 20 ELIXIR ORAL at 20:24

## 2020-11-13 RX ADMIN — PHENOBARBITAL SODIUM SCH MG: 65 INJECTION INTRAMUSCULAR; INTRAVENOUS at 08:38

## 2020-11-13 RX ADMIN — ACETAMINOPHEN PRN MG: 325 TABLET, FILM COATED ORAL at 20:24

## 2020-11-13 RX ADMIN — Medication SCH TAB: at 08:47

## 2020-11-14 VITALS — DIASTOLIC BLOOD PRESSURE: 94 MMHG | SYSTOLIC BLOOD PRESSURE: 136 MMHG

## 2020-11-14 LAB
<PLATELET ESTIMATE>: (no result)
<PLT MORPHOLOGY>: (no result)
ALBUMIN SERPL-MCNC: 2.8 G/DL (ref 3.4–5)
ALP SERPL-CCNC: 167 U/L (ref 45–117)
ALT SERPL-CCNC: 79 U/L (ref 12–78)
ANION GAP SERPL CALC-SCNC: 7 MMOL/L (ref 5–15)
BAND#(MANUAL): 0.03 X10^3/UL
BILIRUB DIRECT SERPL-MCNC: NORMAL MG/DL
BILIRUB SERPL-MCNC: 0.5 MG/DL (ref 0.2–1)
CALCIUM SERPL-MCNC: 8.4 MG/DL (ref 8.5–10.1)
CHLORIDE SERPL-SCNC: 102 MMOL/L (ref 98–107)
CREAT SERPL-MCNC: 0.42 MG/DL (ref 0.55–1.02)
ERYTHROCYTE [DISTWIDTH] IN BLOOD BY AUTOMATED COUNT: 12.5 % (ref 9.6–15.2)
LYMPH#(MANUAL): 0.56 X10^3/UL (ref 1–3.4)
LYMPHS% (MANUAL): 20 % (ref 22–44)
MCH RBC QN AUTO: 33.9 PG (ref 27–34.8)
MCHC RBC AUTO-ENTMCNC: 34.3 G/DL (ref 32.4–35.8)
MD: YES
METAMYELOCYTES# (MANUAL): 0.06 X10^3/UL (ref 0–0)
METAMYELOCYTES% (MANUAL): 2 % (ref 0–1)
MONOS#(MANUAL): 0.22 X10^3/UL (ref 0.3–2.7)
MONOS% (MANUAL): 8 % (ref 2–9)
NEUTS BAND NFR BLD: 1 % (ref 0–7)
PLATELET # BLD AUTO: 118 X10^3/UL (ref 130–400)
PMV BLD AUTO: 7.7 FL (ref 7.4–10.4)
PROT SERPL-MCNC: 6.7 G/DL (ref 6.4–8.2)
RBC # BLD AUTO: 4.39 X10^6/UL (ref 3.82–5.3)
REACTIVE LYMPHS # (MANUAL): 0.2 X10^3/UL (ref 0–0)
REACTIVE LYMPHS % (MANUAL): 7 % (ref 0–0)
SEG#(MANUAL): 1.74 X10^3/UL (ref 1.8–6.8)
SEGS% (MANUAL): 62 % (ref 42–75)

## 2020-11-14 RX ADMIN — ZIPRASIDONE MESYLATE PRN MG: 20 INJECTION, POWDER, LYOPHILIZED, FOR SOLUTION INTRAMUSCULAR at 15:57

## 2020-11-14 RX ADMIN — POTASSIUM CHLORIDE SCH MEQ: 20 TABLET, EXTENDED RELEASE ORAL at 08:36

## 2020-11-14 RX ADMIN — OXYCODONE HYDROCHLORIDE AND ACETAMINOPHEN SCH MG: 500 TABLET ORAL at 08:35

## 2020-11-14 RX ADMIN — OXYCODONE HYDROCHLORIDE AND ACETAMINOPHEN SCH MG: 500 TABLET ORAL at 17:09

## 2020-11-14 RX ADMIN — DEXAMETHASONE SODIUM PHOSPHATE SCH MG: 4 INJECTION, SOLUTION INTRA-ARTICULAR; INTRALESIONAL; INTRAMUSCULAR; INTRAVENOUS; SOFT TISSUE at 08:36

## 2020-11-14 RX ADMIN — ENOXAPARIN SODIUM SCH MG: 40 INJECTION SUBCUTANEOUS at 10:44

## 2020-11-14 RX ADMIN — POTASSIUM CHLORIDE SCH MEQ: 20 TABLET, EXTENDED RELEASE ORAL at 15:58

## 2020-11-14 RX ADMIN — Medication SCH TAB: at 08:36

## 2020-11-14 RX ADMIN — CHOLECALCIFEROL TAB 125 MCG (5000 UNIT) SCH UNIT: 125 TAB at 08:35

## 2020-11-14 RX ADMIN — LABETALOL HYDROCHLORIDE PRN MG: 5 INJECTION INTRAVENOUS at 08:44

## 2020-11-14 RX ADMIN — LEVOFLOXACIN SCH MLS/HR: 750 INJECTION, SOLUTION INTRAVENOUS at 10:44

## 2020-11-14 RX ADMIN — PHENOBARBITAL SCH MG: 20 ELIXIR ORAL at 08:34

## 2020-11-14 RX ADMIN — REMDESIVIR SCH MLS/HR: 5 INJECTION INTRAVENOUS at 07:02

## 2020-11-14 RX ADMIN — THIAMINE HYDROCHLORIDE SCH MLS/HR: 100 INJECTION, SOLUTION INTRAMUSCULAR; INTRAVENOUS at 17:08

## 2020-11-14 RX ADMIN — ONDANSETRON PRN MG: 2 INJECTION, SOLUTION INTRAMUSCULAR; INTRAVENOUS at 09:37

## 2020-11-14 RX ADMIN — PHENOBARBITAL SCH MG: 20 ELIXIR ORAL at 21:55

## 2020-11-14 RX ADMIN — LORAZEPAM PRN MG: 2 INJECTION INTRAMUSCULAR; INTRAVENOUS at 04:23

## 2020-11-14 RX ADMIN — ZINC SULFATE CAP 220 MG (50 MG ELEMENTAL ZN) SCH MG: 220 (50 ZN) CAP at 08:36

## 2020-11-14 RX ADMIN — PROMETHAZINE HYDROCHLORIDE PRN MG: 25 TABLET ORAL at 13:56

## 2020-11-15 VITALS — SYSTOLIC BLOOD PRESSURE: 142 MMHG | DIASTOLIC BLOOD PRESSURE: 89 MMHG

## 2020-11-15 VITALS — SYSTOLIC BLOOD PRESSURE: 113 MMHG | DIASTOLIC BLOOD PRESSURE: 61 MMHG

## 2020-11-15 LAB
ALBUMIN SERPL-MCNC: 2.7 G/DL (ref 3.4–5)
ALP SERPL-CCNC: 148 U/L (ref 45–117)
ALT SERPL-CCNC: 64 U/L (ref 12–78)
ANION GAP SERPL CALC-SCNC: 6 MMOL/L (ref 5–15)
BASOPHILS # BLD AUTO: 0 X10^3/UL (ref 0–0.1)
BASOPHILS NFR BLD AUTO: 0 % (ref 0–1)
BILIRUB SERPL-MCNC: 0.4 MG/DL (ref 0.2–1)
CALCIUM SERPL-MCNC: 8.3 MG/DL (ref 8.5–10.1)
CHLORIDE SERPL-SCNC: 103 MMOL/L (ref 98–107)
CREAT SERPL-MCNC: 0.66 MG/DL (ref 0.55–1.02)
EOSINOPHIL # BLD AUTO: 0 X10^3/UL (ref 0–0.4)
EOSINOPHIL NFR BLD AUTO: 0 % (ref 1–7)
ERYTHROCYTE [DISTWIDTH] IN BLOOD BY AUTOMATED COUNT: 12.8 % (ref 9.6–15.2)
LYMPHOCYTES # BLD AUTO: 1 X10^3/UL (ref 1–3.4)
LYMPHOCYTES NFR BLD AUTO: 24 % (ref 22–44)
MCH RBC QN AUTO: 33.7 PG (ref 27–34.8)
MCHC RBC AUTO-ENTMCNC: 33.2 G/DL (ref 32.4–35.8)
MD: NO
MONOCYTES # BLD AUTO: 0.7 X10^3/UL (ref 0.2–0.8)
MONOCYTES NFR BLD AUTO: 18 % (ref 2–9)
NEUTROPHILS # BLD AUTO: 2.5 X10^3/UL (ref 1.8–6.8)
NEUTROPHILS NFR BLD AUTO: 59 % (ref 42–75)
PLATELET # BLD AUTO: 116 X10^3/UL (ref 130–400)
PMV BLD AUTO: 7.7 FL (ref 7.4–10.4)
PROT SERPL-MCNC: 6.5 G/DL (ref 6.4–8.2)
RBC # BLD AUTO: 4.34 X10^6/UL (ref 3.82–5.3)

## 2020-11-15 RX ADMIN — OXYCODONE HYDROCHLORIDE AND ACETAMINOPHEN SCH MG: 500 TABLET ORAL at 16:10

## 2020-11-15 RX ADMIN — PROMETHAZINE HYDROCHLORIDE PRN MG: 25 TABLET ORAL at 17:41

## 2020-11-15 RX ADMIN — ZINC SULFATE CAP 220 MG (50 MG ELEMENTAL ZN) SCH MG: 220 (50 ZN) CAP at 08:18

## 2020-11-15 RX ADMIN — DEXAMETHASONE SODIUM PHOSPHATE SCH MG: 4 INJECTION, SOLUTION INTRA-ARTICULAR; INTRALESIONAL; INTRAMUSCULAR; INTRAVENOUS; SOFT TISSUE at 08:19

## 2020-11-15 RX ADMIN — ONDANSETRON PRN MG: 2 INJECTION, SOLUTION INTRAMUSCULAR; INTRAVENOUS at 22:32

## 2020-11-15 RX ADMIN — ZIPRASIDONE MESYLATE PRN MG: 20 INJECTION, POWDER, LYOPHILIZED, FOR SOLUTION INTRAMUSCULAR at 22:55

## 2020-11-15 RX ADMIN — PHENOBARBITAL SCH MG: 20 ELIXIR ORAL at 08:19

## 2020-11-15 RX ADMIN — REMDESIVIR SCH MLS/HR: 5 INJECTION INTRAVENOUS at 10:13

## 2020-11-15 RX ADMIN — LEVOFLOXACIN SCH MLS/HR: 750 INJECTION, SOLUTION INTRAVENOUS at 12:45

## 2020-11-15 RX ADMIN — OXYCODONE HYDROCHLORIDE AND ACETAMINOPHEN SCH MG: 500 TABLET ORAL at 09:17

## 2020-11-15 RX ADMIN — ENOXAPARIN SODIUM SCH MG: 40 INJECTION SUBCUTANEOUS at 10:42

## 2020-11-15 RX ADMIN — ACETAMINOPHEN PRN MG: 325 TABLET, FILM COATED ORAL at 00:44

## 2020-11-15 RX ADMIN — THIAMINE HYDROCHLORIDE SCH MLS/HR: 100 INJECTION, SOLUTION INTRAMUSCULAR; INTRAVENOUS at 17:41

## 2020-11-15 RX ADMIN — CHOLECALCIFEROL TAB 125 MCG (5000 UNIT) SCH UNIT: 125 TAB at 08:18

## 2020-11-15 RX ADMIN — PHENOBARBITAL SCH MG: 20 ELIXIR ORAL at 21:21

## 2020-11-15 RX ADMIN — Medication SCH TAB: at 08:18

## 2020-11-15 RX ADMIN — PROMETHAZINE HYDROCHLORIDE PRN MG: 25 TABLET ORAL at 00:45

## 2020-11-15 RX ADMIN — ZIPRASIDONE MESYLATE PRN MG: 20 INJECTION, POWDER, LYOPHILIZED, FOR SOLUTION INTRAMUSCULAR at 06:34

## 2020-11-16 VITALS — SYSTOLIC BLOOD PRESSURE: 134 MMHG | DIASTOLIC BLOOD PRESSURE: 94 MMHG

## 2020-11-16 VITALS — SYSTOLIC BLOOD PRESSURE: 143 MMHG | DIASTOLIC BLOOD PRESSURE: 92 MMHG

## 2020-11-16 VITALS — DIASTOLIC BLOOD PRESSURE: 83 MMHG | SYSTOLIC BLOOD PRESSURE: 129 MMHG

## 2020-11-16 LAB
<PLATELET ESTIMATE>: (no result)
<PLT MORPHOLOGY>: (no result)
ALBUMIN SERPL-MCNC: 2.6 G/DL (ref 3.4–5)
ALP SERPL-CCNC: 134 U/L (ref 45–117)
ALT SERPL-CCNC: 53 U/L (ref 12–78)
ANION GAP SERPL CALC-SCNC: 6 MMOL/L (ref 5–15)
BILIRUB SERPL-MCNC: 0.4 MG/DL (ref 0.2–1)
CALCIUM SERPL-MCNC: 8.4 MG/DL (ref 8.5–10.1)
CHLORIDE SERPL-SCNC: 106 MMOL/L (ref 98–107)
CREAT SERPL-MCNC: 0.57 MG/DL (ref 0.55–1.02)
ERYTHROCYTE [DISTWIDTH] IN BLOOD BY AUTOMATED COUNT: 12.6 % (ref 9.6–15.2)
LYMPH#(MANUAL): 1.24 X10^3/UL (ref 1–3.4)
LYMPHS% (MANUAL): 27 % (ref 22–44)
MACROCYTES BLD QL SMEAR: (no result)
MCH RBC QN AUTO: 33.8 PG (ref 27–34.8)
MCHC RBC AUTO-ENTMCNC: 33.5 G/DL (ref 32.4–35.8)
MD: YES
MONOS#(MANUAL): 0.87 X10^3/UL (ref 0.3–2.7)
MONOS% (MANUAL): 19 % (ref 2–9)
PLATELET # BLD AUTO: 126 X10^3/UL (ref 130–400)
PMV BLD AUTO: 7.9 FL (ref 7.4–10.4)
PROT SERPL-MCNC: 6.6 G/DL (ref 6.4–8.2)
RBC # BLD AUTO: 4.29 X10^6/UL (ref 3.82–5.3)
SEG#(MANUAL): 2.48 X10^3/UL (ref 1.8–6.8)
SEGS% (MANUAL): 54 % (ref 42–75)

## 2020-11-16 RX ADMIN — ZIPRASIDONE MESYLATE PRN MG: 20 INJECTION, POWDER, LYOPHILIZED, FOR SOLUTION INTRAMUSCULAR at 09:21

## 2020-11-16 RX ADMIN — Medication SCH TAB: at 09:22

## 2020-11-16 RX ADMIN — PROMETHAZINE HYDROCHLORIDE PRN MG: 25 TABLET ORAL at 18:05

## 2020-11-16 RX ADMIN — ACETAMINOPHEN PRN MG: 325 TABLET, FILM COATED ORAL at 22:50

## 2020-11-16 RX ADMIN — THIAMINE HYDROCHLORIDE SCH MLS/HR: 100 INJECTION, SOLUTION INTRAMUSCULAR; INTRAVENOUS at 17:55

## 2020-11-16 RX ADMIN — PHENOBARBITAL SCH MG: 20 ELIXIR ORAL at 09:43

## 2020-11-16 RX ADMIN — LEVOFLOXACIN SCH MLS/HR: 750 INJECTION, SOLUTION INTRAVENOUS at 13:00

## 2020-11-16 RX ADMIN — PHENOBARBITAL SCH MG: 20 ELIXIR ORAL at 21:11

## 2020-11-16 RX ADMIN — ACETAMINOPHEN PRN MG: 325 TABLET, FILM COATED ORAL at 09:21

## 2020-11-16 RX ADMIN — ENOXAPARIN SODIUM SCH MG: 40 INJECTION SUBCUTANEOUS at 11:00

## 2020-11-16 RX ADMIN — ZIPRASIDONE MESYLATE PRN MG: 20 INJECTION, POWDER, LYOPHILIZED, FOR SOLUTION INTRAMUSCULAR at 22:53

## 2020-11-16 RX ADMIN — DEXAMETHASONE SODIUM PHOSPHATE SCH MG: 4 INJECTION, SOLUTION INTRA-ARTICULAR; INTRALESIONAL; INTRAMUSCULAR; INTRAVENOUS; SOFT TISSUE at 09:20

## 2020-11-16 RX ADMIN — ZINC SULFATE CAP 220 MG (50 MG ELEMENTAL ZN) SCH MG: 220 (50 ZN) CAP at 09:22

## 2020-11-16 RX ADMIN — PROMETHAZINE HYDROCHLORIDE PRN MG: 25 TABLET ORAL at 02:46

## 2020-11-16 RX ADMIN — ACETAMINOPHEN PRN MG: 325 TABLET, FILM COATED ORAL at 02:46

## 2020-11-16 RX ADMIN — REMDESIVIR SCH MLS/HR: 5 INJECTION INTRAVENOUS at 09:20

## 2020-11-16 RX ADMIN — OXYCODONE HYDROCHLORIDE AND ACETAMINOPHEN SCH MG: 500 TABLET ORAL at 17:55

## 2020-11-16 RX ADMIN — OXYCODONE HYDROCHLORIDE AND ACETAMINOPHEN SCH MG: 500 TABLET ORAL at 09:22

## 2020-11-16 RX ADMIN — CHOLECALCIFEROL TAB 125 MCG (5000 UNIT) SCH UNIT: 125 TAB at 09:22

## 2020-11-17 VITALS — SYSTOLIC BLOOD PRESSURE: 140 MMHG | DIASTOLIC BLOOD PRESSURE: 95 MMHG

## 2020-11-17 VITALS — SYSTOLIC BLOOD PRESSURE: 100 MMHG | DIASTOLIC BLOOD PRESSURE: 71 MMHG

## 2020-11-17 VITALS — DIASTOLIC BLOOD PRESSURE: 81 MMHG | SYSTOLIC BLOOD PRESSURE: 117 MMHG

## 2020-11-17 VITALS — SYSTOLIC BLOOD PRESSURE: 156 MMHG | DIASTOLIC BLOOD PRESSURE: 103 MMHG

## 2020-11-17 LAB
ALBUMIN SERPL-MCNC: 2.9 G/DL (ref 3.4–5)
ALP SERPL-CCNC: 133 U/L (ref 45–117)
ALT SERPL-CCNC: 43 U/L (ref 12–78)
ANION GAP SERPL CALC-SCNC: 6 MMOL/L (ref 5–15)
BILIRUB SERPL-MCNC: 0.6 MG/DL (ref 0.2–1)
CALCIUM SERPL-MCNC: 8.9 MG/DL (ref 8.5–10.1)
CHLORIDE SERPL-SCNC: 105 MMOL/L (ref 98–107)
CREAT SERPL-MCNC: 0.5 MG/DL (ref 0.55–1.02)
PROT SERPL-MCNC: 6.9 G/DL (ref 6.4–8.2)

## 2020-11-17 RX ADMIN — ZIPRASIDONE MESYLATE PRN MG: 20 INJECTION, POWDER, LYOPHILIZED, FOR SOLUTION INTRAMUSCULAR at 05:17

## 2020-11-17 RX ADMIN — THIAMINE HYDROCHLORIDE SCH MLS/HR: 100 INJECTION, SOLUTION INTRAMUSCULAR; INTRAVENOUS at 16:16

## 2020-11-17 RX ADMIN — PHENOBARBITAL SCH MG: 20 ELIXIR ORAL at 11:37

## 2020-11-17 RX ADMIN — ENOXAPARIN SODIUM SCH MG: 40 INJECTION SUBCUTANEOUS at 11:00

## 2020-11-17 RX ADMIN — OXYCODONE HYDROCHLORIDE AND ACETAMINOPHEN SCH MG: 500 TABLET ORAL at 16:16

## 2020-11-17 RX ADMIN — LEVOFLOXACIN SCH MLS/HR: 750 INJECTION, SOLUTION INTRAVENOUS at 13:38

## 2020-11-17 RX ADMIN — ACETAMINOPHEN PRN MG: 325 TABLET, FILM COATED ORAL at 22:31

## 2020-11-17 RX ADMIN — CHOLECALCIFEROL TAB 125 MCG (5000 UNIT) SCH UNIT: 125 TAB at 08:32

## 2020-11-17 RX ADMIN — Medication SCH TAB: at 08:32

## 2020-11-17 RX ADMIN — ZIPRASIDONE MESYLATE PRN MG: 20 INJECTION, POWDER, LYOPHILIZED, FOR SOLUTION INTRAMUSCULAR at 20:35

## 2020-11-17 RX ADMIN — REMDESIVIR SCH MLS/HR: 5 INJECTION INTRAVENOUS at 08:32

## 2020-11-17 RX ADMIN — ZINC SULFATE CAP 220 MG (50 MG ELEMENTAL ZN) SCH MG: 220 (50 ZN) CAP at 08:32

## 2020-11-17 RX ADMIN — ACETAMINOPHEN PRN MG: 325 TABLET, FILM COATED ORAL at 13:38

## 2020-11-17 RX ADMIN — ZIPRASIDONE MESYLATE PRN MG: 20 INJECTION, POWDER, LYOPHILIZED, FOR SOLUTION INTRAMUSCULAR at 13:39

## 2020-11-17 RX ADMIN — DEXAMETHASONE SODIUM PHOSPHATE SCH MG: 4 INJECTION, SOLUTION INTRA-ARTICULAR; INTRALESIONAL; INTRAMUSCULAR; INTRAVENOUS; SOFT TISSUE at 08:32

## 2020-11-17 RX ADMIN — PROMETHAZINE HYDROCHLORIDE PRN MG: 25 TABLET ORAL at 04:02

## 2020-11-17 RX ADMIN — OXYCODONE HYDROCHLORIDE AND ACETAMINOPHEN SCH MG: 500 TABLET ORAL at 08:32

## 2020-11-18 VITALS — SYSTOLIC BLOOD PRESSURE: 127 MMHG | DIASTOLIC BLOOD PRESSURE: 84 MMHG

## 2020-11-18 VITALS — DIASTOLIC BLOOD PRESSURE: 92 MMHG | SYSTOLIC BLOOD PRESSURE: 127 MMHG

## 2020-11-18 VITALS — SYSTOLIC BLOOD PRESSURE: 126 MMHG | DIASTOLIC BLOOD PRESSURE: 92 MMHG

## 2020-11-18 RX ADMIN — ENOXAPARIN SODIUM SCH MG: 40 INJECTION SUBCUTANEOUS at 10:10

## 2020-11-18 RX ADMIN — Medication SCH TAB: at 10:11

## 2020-11-18 RX ADMIN — ZINC SULFATE CAP 220 MG (50 MG ELEMENTAL ZN) SCH MG: 220 (50 ZN) CAP at 10:11

## 2020-11-18 RX ADMIN — CHOLECALCIFEROL TAB 125 MCG (5000 UNIT) SCH UNIT: 125 TAB at 10:11

## 2020-11-18 RX ADMIN — ACETAMINOPHEN PRN MG: 325 TABLET, FILM COATED ORAL at 04:44

## 2020-11-18 RX ADMIN — OXYCODONE HYDROCHLORIDE AND ACETAMINOPHEN SCH MG: 500 TABLET ORAL at 10:11

## 2020-11-18 RX ADMIN — ACETAMINOPHEN PRN MG: 325 TABLET, FILM COATED ORAL at 15:39

## 2020-11-18 RX ADMIN — OXYCODONE HYDROCHLORIDE AND ACETAMINOPHEN SCH MG: 500 TABLET ORAL at 15:39

## 2020-11-18 RX ADMIN — ZIPRASIDONE MESYLATE PRN MG: 20 INJECTION, POWDER, LYOPHILIZED, FOR SOLUTION INTRAMUSCULAR at 02:50

## 2020-11-18 RX ADMIN — DEXAMETHASONE SODIUM PHOSPHATE SCH MG: 4 INJECTION, SOLUTION INTRA-ARTICULAR; INTRALESIONAL; INTRAMUSCULAR; INTRAVENOUS; SOFT TISSUE at 10:11

## 2020-11-18 RX ADMIN — LEVOFLOXACIN SCH MLS/HR: 750 INJECTION, SOLUTION INTRAVENOUS at 13:00

## 2021-01-13 ENCOUNTER — HOSPITAL ENCOUNTER (EMERGENCY)
Facility: MEDICAL CENTER | Age: 51
End: 2021-01-14
Attending: EMERGENCY MEDICINE
Payer: MEDICAID

## 2021-01-13 ENCOUNTER — APPOINTMENT (OUTPATIENT)
Dept: RADIOLOGY | Facility: MEDICAL CENTER | Age: 51
End: 2021-01-13
Attending: EMERGENCY MEDICINE
Payer: MEDICAID

## 2021-01-13 DIAGNOSIS — R10.84 GENERALIZED ABDOMINAL PAIN: ICD-10-CM

## 2021-01-13 DIAGNOSIS — R11.2 NAUSEA, VOMITING AND DIARRHEA: ICD-10-CM

## 2021-01-13 DIAGNOSIS — R19.7 NAUSEA, VOMITING AND DIARRHEA: ICD-10-CM

## 2021-01-13 DIAGNOSIS — R06.00 DYSPNEA, UNSPECIFIED TYPE: ICD-10-CM

## 2021-01-13 LAB
ALBUMIN SERPL BCP-MCNC: 4.9 G/DL (ref 3.2–4.9)
ALBUMIN/GLOB SERPL: 1.4 G/DL
ALP SERPL-CCNC: 139 U/L (ref 30–99)
ALT SERPL-CCNC: 100 U/L (ref 2–50)
ANION GAP SERPL CALC-SCNC: 20 MMOL/L (ref 7–16)
AST SERPL-CCNC: 170 U/L (ref 12–45)
BASOPHILS # BLD AUTO: 1 % (ref 0–1.8)
BASOPHILS # BLD: 0.04 K/UL (ref 0–0.12)
BILIRUB SERPL-MCNC: 0.3 MG/DL (ref 0.1–1.5)
BUN SERPL-MCNC: 14 MG/DL (ref 8–22)
CALCIUM SERPL-MCNC: 9.5 MG/DL (ref 8.5–10.5)
CHLORIDE SERPL-SCNC: 98 MMOL/L (ref 96–112)
CO2 SERPL-SCNC: 22 MMOL/L (ref 20–33)
CREAT SERPL-MCNC: 0.41 MG/DL (ref 0.5–1.4)
EOSINOPHIL # BLD AUTO: 0.01 K/UL (ref 0–0.51)
EOSINOPHIL NFR BLD: 0.3 % (ref 0–6.9)
ERYTHROCYTE [DISTWIDTH] IN BLOOD BY AUTOMATED COUNT: 50.5 FL (ref 35.9–50)
ETHANOL BLD-MCNC: 384.2 MG/DL (ref 0–10)
GLOBULIN SER CALC-MCNC: 3.5 G/DL (ref 1.9–3.5)
GLUCOSE SERPL-MCNC: 115 MG/DL (ref 65–99)
HCG SERPL QL: NEGATIVE
HCT VFR BLD AUTO: 47.6 % (ref 37–47)
HGB BLD-MCNC: 16 G/DL (ref 12–16)
IMM GRANULOCYTES # BLD AUTO: 0.02 K/UL (ref 0–0.11)
IMM GRANULOCYTES NFR BLD AUTO: 0.5 % (ref 0–0.9)
LACTATE BLD-SCNC: 3.7 MMOL/L (ref 0.5–2)
LIPASE SERPL-CCNC: 138 U/L (ref 11–82)
LYMPHOCYTES # BLD AUTO: 2.13 K/UL (ref 1–4.8)
LYMPHOCYTES NFR BLD: 55.8 % (ref 22–41)
MCH RBC QN AUTO: 34 PG (ref 27–33)
MCHC RBC AUTO-ENTMCNC: 33.6 G/DL (ref 33.6–35)
MCV RBC AUTO: 101.1 FL (ref 81.4–97.8)
MONOCYTES # BLD AUTO: 0.47 K/UL (ref 0–0.85)
MONOCYTES NFR BLD AUTO: 12.3 % (ref 0–13.4)
NEUTROPHILS # BLD AUTO: 1.15 K/UL (ref 2–7.15)
NEUTROPHILS NFR BLD: 30.1 % (ref 44–72)
NRBC # BLD AUTO: 0 K/UL
NRBC BLD-RTO: 0 /100 WBC
PLATELET # BLD AUTO: 118 K/UL (ref 164–446)
PMV BLD AUTO: 8.8 FL (ref 9–12.9)
POTASSIUM SERPL-SCNC: 3.8 MMOL/L (ref 3.6–5.5)
PROT SERPL-MCNC: 8.4 G/DL (ref 6–8.2)
RBC # BLD AUTO: 4.71 M/UL (ref 4.2–5.4)
SODIUM SERPL-SCNC: 140 MMOL/L (ref 135–145)
TROPONIN T SERPL-MCNC: 15 NG/L (ref 6–19)
WBC # BLD AUTO: 3.8 K/UL (ref 4.8–10.8)

## 2021-01-13 PROCEDURE — 99285 EMERGENCY DEPT VISIT HI MDM: CPT

## 2021-01-13 PROCEDURE — 84484 ASSAY OF TROPONIN QUANT: CPT

## 2021-01-13 PROCEDURE — 83735 ASSAY OF MAGNESIUM: CPT

## 2021-01-13 PROCEDURE — 84703 CHORIONIC GONADOTROPIN ASSAY: CPT

## 2021-01-13 PROCEDURE — 96365 THER/PROPH/DIAG IV INF INIT: CPT

## 2021-01-13 PROCEDURE — 82077 ASSAY SPEC XCP UR&BREATH IA: CPT

## 2021-01-13 PROCEDURE — 87040 BLOOD CULTURE FOR BACTERIA: CPT | Mod: 91

## 2021-01-13 PROCEDURE — 96366 THER/PROPH/DIAG IV INF ADDON: CPT

## 2021-01-13 PROCEDURE — 0241U HCHG SARS-COV-2 COVID-19 NFCT DS RESP RNA 4 TRGT MIC: CPT

## 2021-01-13 PROCEDURE — 36415 COLL VENOUS BLD VENIPUNCTURE: CPT

## 2021-01-13 PROCEDURE — 93005 ELECTROCARDIOGRAM TRACING: CPT | Performed by: EMERGENCY MEDICINE

## 2021-01-13 PROCEDURE — 96375 TX/PRO/DX INJ NEW DRUG ADDON: CPT

## 2021-01-13 PROCEDURE — 700101 HCHG RX REV CODE 250: Performed by: EMERGENCY MEDICINE

## 2021-01-13 PROCEDURE — 80053 COMPREHEN METABOLIC PANEL: CPT

## 2021-01-13 PROCEDURE — 83690 ASSAY OF LIPASE: CPT

## 2021-01-13 PROCEDURE — A9270 NON-COVERED ITEM OR SERVICE: HCPCS | Performed by: EMERGENCY MEDICINE

## 2021-01-13 PROCEDURE — 84145 PROCALCITONIN (PCT): CPT

## 2021-01-13 PROCEDURE — 700102 HCHG RX REV CODE 250 W/ 637 OVERRIDE(OP): Performed by: EMERGENCY MEDICINE

## 2021-01-13 PROCEDURE — 83605 ASSAY OF LACTIC ACID: CPT

## 2021-01-13 PROCEDURE — 71045 X-RAY EXAM CHEST 1 VIEW: CPT

## 2021-01-13 PROCEDURE — 85025 COMPLETE CBC W/AUTO DIFF WBC: CPT

## 2021-01-13 RX ORDER — MORPHINE SULFATE 4 MG/ML
4 INJECTION, SOLUTION INTRAMUSCULAR; INTRAVENOUS ONCE
Status: COMPLETED | OUTPATIENT
Start: 2021-01-13 | End: 2021-01-14

## 2021-01-13 RX ORDER — FLUOXETINE 10 MG/1
30 CAPSULE ORAL DAILY
Status: SHIPPED | COMMUNITY
End: 2021-10-05

## 2021-01-13 RX ORDER — PROMETHAZINE HYDROCHLORIDE 25 MG/1
25 TABLET ORAL ONCE
Status: COMPLETED | OUTPATIENT
Start: 2021-01-13 | End: 2021-01-13

## 2021-01-13 RX ORDER — ONDANSETRON 2 MG/ML
4 INJECTION INTRAMUSCULAR; INTRAVENOUS ONCE
Status: DISCONTINUED | OUTPATIENT
Start: 2021-01-13 | End: 2021-01-13

## 2021-01-13 RX ADMIN — THIAMINE HYDROCHLORIDE: 100 INJECTION, SOLUTION INTRAMUSCULAR; INTRAVENOUS at 23:21

## 2021-01-13 RX ADMIN — PROMETHAZINE HYDROCHLORIDE 25 MG: 25 TABLET ORAL at 23:19

## 2021-01-13 ASSESSMENT — FIBROSIS 4 INDEX: FIB4 SCORE: 8.09

## 2021-01-13 ASSESSMENT — PAIN DESCRIPTION - DESCRIPTORS: DESCRIPTORS: ACHING

## 2021-01-14 ENCOUNTER — APPOINTMENT (OUTPATIENT)
Dept: RADIOLOGY | Facility: MEDICAL CENTER | Age: 51
End: 2021-01-14
Attending: EMERGENCY MEDICINE
Payer: MEDICAID

## 2021-01-14 VITALS
DIASTOLIC BLOOD PRESSURE: 81 MMHG | TEMPERATURE: 97.2 F | HEART RATE: 103 BPM | WEIGHT: 140 LBS | RESPIRATION RATE: 16 BRPM | SYSTOLIC BLOOD PRESSURE: 119 MMHG | BODY MASS INDEX: 27.48 KG/M2 | OXYGEN SATURATION: 94 % | HEIGHT: 60 IN

## 2021-01-14 LAB
FLUAV RNA SPEC QL NAA+PROBE: NEGATIVE
FLUBV RNA SPEC QL NAA+PROBE: NEGATIVE
MAGNESIUM SERPL-MCNC: 1.9 MG/DL (ref 1.5–2.5)
PROCALCITONIN SERPL-MCNC: 0.06 NG/ML
RSV RNA SPEC QL NAA+PROBE: NEGATIVE
SARS-COV-2 RNA RESP QL NAA+PROBE: NOTDETECTED
SPECIMEN SOURCE: NORMAL

## 2021-01-14 PROCEDURE — 700111 HCHG RX REV CODE 636 W/ 250 OVERRIDE (IP): Performed by: EMERGENCY MEDICINE

## 2021-01-14 PROCEDURE — 700102 HCHG RX REV CODE 250 W/ 637 OVERRIDE(OP): Performed by: EMERGENCY MEDICINE

## 2021-01-14 PROCEDURE — A9270 NON-COVERED ITEM OR SERVICE: HCPCS | Performed by: EMERGENCY MEDICINE

## 2021-01-14 PROCEDURE — 74177 CT ABD & PELVIS W/CONTRAST: CPT

## 2021-01-14 PROCEDURE — 700117 HCHG RX CONTRAST REV CODE 255: Performed by: EMERGENCY MEDICINE

## 2021-01-14 RX ORDER — LORAZEPAM 1 MG/1
1 TABLET ORAL ONCE
Status: COMPLETED | OUTPATIENT
Start: 2021-01-14 | End: 2021-01-14

## 2021-01-14 RX ADMIN — LORAZEPAM 1 MG: 1 TABLET ORAL at 01:52

## 2021-01-14 RX ADMIN — MORPHINE SULFATE 2 MG: 4 INJECTION INTRAVENOUS at 00:36

## 2021-01-14 RX ADMIN — IOHEXOL 100 ML: 350 INJECTION, SOLUTION INTRAVENOUS at 00:28

## 2021-01-14 RX ADMIN — MORPHINE SULFATE 2 MG: 4 INJECTION INTRAVENOUS at 00:00

## 2021-01-14 ASSESSMENT — LIFESTYLE VARIABLES
AUDITORY DISTURBANCES: NOT PRESENT
TREMOR: *
NAUSEA AND VOMITING: NO NAUSEA AND NO VOMITING
ANXIETY: MILDLY ANXIOUS
HEADACHE, FULLNESS IN HEAD: MILD
AGITATION: NORMAL ACTIVITY
PAROXYSMAL SWEATS: NO SWEAT VISIBLE
ORIENTATION AND CLOUDING OF SENSORIUM: ORIENTED AND CAN DO SERIAL ADDITIONS
TOTAL SCORE: 10
VISUAL DISTURBANCES: MILD SENSITIVITY

## 2021-01-14 NOTE — ED TRIAGE NOTES
"Chief Complaint   Patient presents with   • Shortness of Breath     Patient was diagnosed with COVID PNA recently   • Abdominal Pain     Patient stating to have diarrhea, N/V and a headache.       BIB EMS to [facility], pt on monitor and in gown, labs drawn and sent. Pt consists of: Shortnes of breath, was recently on diagnosed with COVID PNA. Patient stating to have flu radha symptoms, and complain of body aches. Patient complaint of abdominal pain and states she has drank a lot of vodka today to stop the pain. Patient keeps repeating \"I can't breath, bt patient is stating to >90% on 2 L NC, which is patient's baseline. GCS 15    Medications given en route:N/A    /101   Pulse (!) 113   Resp 20   Ht 1.524 m (5')   Wt 63.5 kg (140 lb)   SpO2 95%   BMI 27.34 kg/m²   "

## 2021-01-14 NOTE — ED NOTES
ERP at bedside to examen patientmake sure patient does not have a prolapse rectum. RN at bedside as chaperon. Patient negative. Patient states has had problems with it in the past, but its able to put it back in.

## 2021-01-14 NOTE — ED NOTES
Pt discharged home. Pt alert and orientated. Ambulatory with a steady gait. Patient WC to , RN called patient a cab and provided patient MTM information with ID. IV discontinued and gauze placed, pt in possession of belongings. Pt provided discharge education and information pertaining to medications and follow up appointments. Discussed signs and symptoms to return to the ER, patient verbalized understanding. Pt received copy of discharge instructions and verbalized understanding.     /81   Pulse (!) 103   Temp 36.2 °C (97.2 °F) (Temporal)   Resp 16   Ht 1.524 m (5')   Wt 63.5 kg (140 lb)   SpO2 94%   BMI 27.34 kg/m²

## 2021-01-14 NOTE — ED PROVIDER NOTES
ED Provider Note    CHIEF COMPLAINT  Chief Complaint   Patient presents with   • Shortness of Breath     Patient was diagnosed with COVID PNA recently   • Abdominal Pain     Patient stating to have diarrhea, N/V and a headache.        HPI    Primary care provider: SALONI Marcial   History obtained from: Patient  History limited by: None     Olya Youssef is a 50 y.o. female who presents to the ED by EMS complaining of not feeling well for 3 days.  Patient states that she was hospitalized at [facility] for 9 days due to COVID-19 pneumonia and quarantined for 14 days after discharge.  She is unable to tell me what dates she was hospitalized at [facility].  She states that she was recovering until the symptoms started 3 days ago.  She has had body aches, headache, increasing difficulty breathing, diffuse abdominal pain, nausea, vomiting and diarrhea.  She reports a fever up to 102 at home along with chills.  She has been on 2 L of oxygen at home.  She continues to have slight cough.  She also has nasal congestion and sore throat and no taste.  Patient states that she has not eaten for the past 3 days because every time she tries to eat or drink she would vomit.  She has not noticed any gross blood.  She also states that she has diarrhea every time she tries to eat or drink without gross blood noted.  She denies dysuria.  She denies possibility of pregnancy because she has not been sexually active for 3 years.  She reports having some rash on both forearms.  She denies recent travels.  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      REVIEW OF SYSTEMS  Please see HPI for pertinent positives/negatives.  All other systems reviewed and are negative.     PAST MEDICAL HISTORY  Past Medical History:   Diagnosis Date   • Alcohol abuse    • Alcoholism (HCC)    • Anxiety    • Backpain    • Bipolar disorder,  unspecified (HCC)    • Bronchitis    • Drug abuse (HCC)     meth, crack cocaine, THC   • Hepatitis, alcoholic    • Hypertension     controlled   • Indigestion    • Infectious disease MRSA   • Liver disease     pancreatitis   • Pancreatitis chronic     Flare-up   • Pneumonia    • Psychiatric disorder     suicidal in past   • Renal disorder     kidney infection 1991   • Seizure (HCC)     7/8/2011   • Seizure disorder (HCC)    • Unspecified hemorrhagic conditions    • Unspecified urinary incontinence         SURGICAL HISTORY  Past Surgical History:   Procedure Laterality Date   • GASTROSCOPY  4/28/2015    Performed by Kevin Rendon M.D. at SURGERY [facility]        SOCIAL HISTORY  Social History     Tobacco Use   • Smoking status: Never Smoker   • Smokeless tobacco: Never Used   Substance and Sexual Activity   • Alcohol use: Yes     Comment: Hx heavy drinking apint in a 24 hr period   • Drug use: Yes     Comment: meth/THC   • Sexual activity: Not on file        FAMILY HISTORY  History reviewed. No pertinent family history.     CURRENT MEDICATIONS  Home Medications     Reviewed by Georgiana Harley R.N. (Registered Nurse) on 01/13/21 at 2227  Med List Status: Complete   Medication Last Dose Status   FLUoxetine (PROZAC) 10 MG Cap  Active   gabapentin (NEURONTIN) 300 MG Cap  Active                 ALLERGIES  Allergies   Allergen Reactions   • Bactrim Hives   • Cephalexin [Keflex] Hives   • Lamotrigine [Lamictal] Hives   • Quetiapine Fumarate Hives     Extrapyramidal Symptoms        PHYSICAL EXAM  VITAL SIGNS: /81   Pulse (!) 103   Temp 36.2 °C (97.2 °F) (Temporal)   Resp 16   Ht 1.524 m (5')   Wt 63.5 kg (140 lb)   SpO2 94%   BMI 27.34 kg/m²  @MACEY[[id number]::@     Pulse ox interpretation: 95% on her baseline oxygen I interpret this pulse ox as normal     Cardiac monitor interpretation: Sinus tachycardia with heart rate in the 100s as interpreted by me.  The patient presented with dyspnea and cardiac  monitor was ordered to monitor for dysrhythmia.    Constitutional: Well developed, well nourished, alert in no apparent distress, nontoxic appearance    HENT: No external signs of trauma, normocephalic, mask on due to COVID-19 pandemic  Eyes: PERRL, conjunctiva without erythema, no discharge, no icterus    Neck: Soft and supple, trachea midline, no stridor, no tenderness, no LAD, no JVD, good ROM    Cardiovascular: Tachycardia, no murmurs/rubs/gallops, strong distal pulses and good perfusion    Thorax & Lungs: No respiratory distress, CTAB   Abdomen: Soft, no apparent tenderness to palpation, nondistended, no guarding, no rebound, normal BS    Rectal: Chaperone present for exam. Normal external exam without hemorrhoids or external prolapse.  Back: No CVAT    Extremities: No cyanosis, no edema, no gross deformity, good ROM, no tenderness, intact distal pulses with brisk cap refill    Skin: Warm, dry, no pallor/cyanosis, no rash noted except scattered dry patches of skin on both forearms  Lymphatic: No lymphadenopathy noted    Neuro: A/O times 3, no focal deficits noted    Psychiatric: Cooperative, slightly anxious, no signs of active hallucinations or delusions      DIAGNOSTIC STUDIES / PROCEDURES    EKG  12 Lead EKG obtained at 2323 and interpreted by me:   Rate: 94   Rhythm: Sinus rhythm   Ectopy: None  Intervals: QTc 501  QRS: Normal   ST segments: Normal  T Waves: T wave inversion V3 and V4    Clinical Impression: Sinus rhythm with QT prolongation and nonspecific T wave changes       LABS  All labs reviewed by me.     Results for orders placed or performed during the hospital encounter of 01/13/21   CBC WITH DIFFERENTIAL   Result Value Ref Range    WBC 3.8 (L) 4.8 - 10.8 K/uL    RBC 4.71 4.20 - 5.40 M/uL    Hemoglobin 16.0 12.0 - 16.0 g/dL    Hematocrit 47.6 (H) 37.0 - 47.0 %    .1 (H) 81.4 - 97.8 fL    MCH 34.0 (H) 27.0 - 33.0 pg    MCHC 33.6 33.6 - 35.0 g/dL    RDW 50.5 (H) 35.9 - 50.0 fL    Platelet  Count 118 (L) 164 - 446 K/uL    MPV 8.8 (L) 9.0 - 12.9 fL    Neutrophils-Polys 30.10 (L) 44.00 - 72.00 %    Lymphocytes 55.80 (H) 22.00 - 41.00 %    Monocytes 12.30 0.00 - 13.40 %    Eosinophils 0.30 0.00 - 6.90 %    Basophils 1.00 0.00 - 1.80 %    Immature Granulocytes 0.50 0.00 - 0.90 %    Nucleated RBC 0.00 /100 WBC    Neutrophils (Absolute) 1.15 (L) 2.00 - 7.15 K/uL    Lymphs (Absolute) 2.13 1.00 - 4.80 K/uL    Monos (Absolute) 0.47 0.00 - 0.85 K/uL    Eos (Absolute) 0.01 0.00 - 0.51 K/uL    Baso (Absolute) 0.04 0.00 - 0.12 K/uL    Immature Granulocytes (abs) 0.02 0.00 - 0.11 K/uL    NRBC (Absolute) 0.00 K/uL   COMP METABOLIC PANEL   Result Value Ref Range    Sodium 140 135 - 145 mmol/L    Potassium 3.8 3.6 - 5.5 mmol/L    Chloride 98 96 - 112 mmol/L    Co2 22 20 - 33 mmol/L    Anion Gap 20.0 (H) 7.0 - 16.0    Glucose 115 (H) 65 - 99 mg/dL    Bun 14 8 - 22 mg/dL    Creatinine 0.41 (L) 0.50 - 1.40 mg/dL    Calcium 9.5 8.5 - 10.5 mg/dL    AST(SGOT) 170 (H) 12 - 45 U/L    ALT(SGPT) 100 (H) 2 - 50 U/L    Alkaline Phosphatase 139 (H) 30 - 99 U/L    Total Bilirubin 0.3 0.1 - 1.5 mg/dL    Albumin 4.9 3.2 - 4.9 g/dL    Total Protein 8.4 (H) 6.0 - 8.2 g/dL    Globulin 3.5 1.9 - 3.5 g/dL    A-G Ratio 1.4 g/dL   LIPASE   Result Value Ref Range    Lipase 138 (H) 11 - 82 U/L   TROPONIN   Result Value Ref Range    Troponin T 15 6 - 19 ng/L   DIAGNOSTIC ALCOHOL   Result Value Ref Range    Diagnostic Alcohol 384.2 (H) 0.0 - 10.0 mg/dL   LACTIC ACID   Result Value Ref Range    Lactic Acid 3.7 (H) 0.5 - 2.0 mmol/L   PROCALCITONIN   Result Value Ref Range    Procalcitonin 0.06 <0.25 ng/mL   BETA-HCG QUALITATIVE SERUM   Result Value Ref Range    Beta-Hcg Qualitative Serum Negative Negative   ESTIMATED GFR   Result Value Ref Range    GFR If African American >60 >60 mL/min/1.73 m 2    GFR If Non African American >60 >60 mL/min/1.73 m 2   COV-2, FLU A/B, AND RSV BY PCR (2-4 HOURS CEPHEID): Collect NP swab in VTM    Specimen:  Nasopharyngeal; Respirate   Result Value Ref Range    Influenza virus A RNA Negative Negative    Influenza virus B, PCR Negative Negative    RSV, PCR Negative Negative    SARS-CoV-2 by PCR NotDetected     SARS-CoV-2 Source NP Swab    MAGNESIUM   Result Value Ref Range    Magnesium 1.9 1.5 - 2.5 mg/dL   EKG (NOW)   Result Value Ref Range    Report       [facility] Emergency [facility]    Test Date:  2021  Pt Name:    MANAS DELGADO              Department: ER  MRN:        [id number]                      Room:        30  Gender:     Female                       Technician: [id number]  :        1970                   Requested By:JEROD BERNSTEIN  Order #:    [id number]                    Reading MD:    Measurements  Intervals                                Axis  Rate:       94                           P:          55  SC:         120                          QRS:        -11  QRSD:       94                           T:          -16  QT:         400  QTc:        501    Interpretive Statements  Sinus rhythm  Inferior infarct, old  Compared to ECG 2018 05:35:03  Myocardial infarct finding now present  T-wave abnormality no longer present          RADIOLOGY  The radiologist's interpretation of all radiological studies have been reviewed by me.     CT-ABDOMEN-PELVIS WITH   Final Result         1.  Extension of the rectum below the pelvic ring, appearance favors pelvic floor insufficiency and probable prolapse.   2.  Hepatic steatosis   3.  Diverticulosis   4.  Right middle lobe pulmonary nodule, see nodule follow-up recommendations below.      Fleischner Society pulmonary nodule recommendations:      Low Risk: No routine follow-up      High Risk: Optional CT at 12 months      Comments: Nodules less than 6 mm do not require routine follow-up, but certain patients at high risk with suspicious nodule morphology, upper lobe location, or both may warrant 12-month follow-up.      Low Risk - Minimal or  absent history of smoking and of other known risk factors.      High Risk - History of smoking or of other known risk factors.      Note: These recommendations do not apply to lung cancer screening, patients with immunosuppression, or patients with known primary cancer.      Fleischner Society 2017 Guidelines for Management of Incidentally Detected Pulmonary Nodules in Adults         DX-CHEST-PORTABLE (1 VIEW)   Final Result         1.  Left basilar atelectasis, no focal infiltrate             COURSE & MEDICAL DECISION MAKING  Nursing notes, VS, PMSFHx reviewed in chart.     Review of past medical records shows the patient was last seen in this ED May 7, 2020 for headache and CT head was performed showing left sphenoid mucosal thickening but otherwise no acute intracranial abnormality.  Record was obtained from [facility].  She was admitted on November 11, 2020 for COVID-19 pneumonia with acute hypoxic respiratory failure and discharged on November 18, 2020.  Patient also noted to have alcohol withdrawal syndromes and pancytopenia likely due to alcohol abuse.  She also had abnormal LFTs due to alcoholic hepatitis.      Differential diagnoses considered include but are not limited to: AMI, pericardial effusion/tamponade, pericarditis, CHF/pulm edema, PE, pneumothorax, pneumonia, pleural effusion, COPD, bronchospasm, bronchitis, respiratory infection, sepsis, metabolic acidosis, anxiety/hyperventilation, ppendicitis, AAA, bowel perforation/obstruction, ileus, cholecystitis/ascending cholangitis, gallstone/biliary colic, diverticulitis, mesenteric ischemia, pancreatitis, PUD, gastritis, GERD, KS/renal colic, gastroenteritis, colitis, muscle strain, pregnancy/ectopic       History and physical exam as above.  EKG showed sinus rhythm with QT prolongation and nonspecific T wave changes but with normal troponin.  Low clinical suspicion for ACS given 3 days of constant symptoms without elevation of troponin.  Patient with  persistent neutropenia and thrombocytopenia compared to prior results.  She also has persistent elevation of transaminases likely from her alcohol abuse as well as mild elevation of lipase.  Elevated lactic acid likely is from dehydration without apparent acute infectious etiology.  Procalcitonin is negative and low clinical suspicion for sepsis.  Her alcohol level today is 384.  Chest x-ray and CT abdomen/pelvis with findings as above.  An external rectal exam was performed with ED nurse chaperone without evidence of external rectal prolapse.  Patient reports that she does have tissue that pushes out with bowel movements that she is able to push back in.  She will be given outpatient referral to surgery.  Patient received oral Phenergan and tolerated oral fluids in the ED without further vomiting.  Due to her tachycardia and history of alcohol abuse and concern for withdrawal, a detox bag was given as well as Ativan.  She also received morphine for pain.  I discussed the findings with the patient.  She is noted to be resting comfortably in no acute distress and nontoxic in appearance.  No signs of acute abdomen on recheck to suggest a surgical emergency.  Her tachycardia and elevated blood pressure have improved.  Low clinical suspicion at this time for acute serious pathology given history/exam/findings.  She was advised on outpatient follow-up and given return to ED precautions.  Patient also advised to limit her alcohol use and to seek help if necessary.  She verbalized understanding and agreed with plan of care with no further questions or concerns.      HYDRATION: Based on the patient's presentation of Acute Diarrhea, Acute Vomiting and Tachycardia the patient was given IV fluids. IV Hydration was used because oral hydration was not as rapid as required. Upon recheck following hydration, the patient was improved.      The patient is referred to a primary physician for blood pressure management, diabetic  screening, and for all other preventative health concerns.       FINAL IMPRESSION  1. Dyspnea, unspecified type Acute   2. Generalized abdominal pain Acute   3. Nausea, vomiting and diarrhea Acute   4. Alcoholism /alcohol abuse (HCC) Chronic          DISPOSITION  Patient will be discharged home in stable condition.       FOLLOW UP  MARLO Marcial.  [address] [facility] [id number] [phone]    Call today      Jared Ritter M.D.  [address]  [address] [id number] [phone]    Call today      [facility], Emergency Dept  [address] [facility] [id number] [phone]    If symptoms worsen         OUTPATIENT MEDICATIONS  Discharge Medication List as of 1/14/2021  3:32 AM             Electronically signed by: Matthew Keith D.O., 1/13/2021 10:33 PM      Portions of this record were made with voice recognition software.  Despite my review, spelling/grammar/context errors may still remain.  Interpretation of this chart should be taken in this context.

## 2021-01-14 NOTE — ED NOTES
Notified ERP of patient's HR 130s, patient tolerating PO fluids, but patient having upper extremity tremors. New orders placed.

## 2021-01-14 NOTE — ED NOTES
Patient given second dose of morphine 2 mg, patient tearful at bedside for pain. Patient medicated per MAR.

## 2021-01-14 NOTE — ED NOTES
Patient provided more water, patient ambulated down martinez for a bit, SBA. Patient states only wears 2L of oxygen NC at night.

## 2021-01-19 LAB
BACTERIA BLD CULT: NORMAL
BACTERIA BLD CULT: NORMAL
SIGNIFICANT IND 70042: NORMAL
SIGNIFICANT IND 70042: NORMAL
SITE SITE: NORMAL
SITE SITE: NORMAL
SOURCE SOURCE: NORMAL
SOURCE SOURCE: NORMAL

## 2021-01-21 ENCOUNTER — HOSPITAL ENCOUNTER (INPATIENT)
Dept: HOSPITAL 8 - ED | Age: 51
LOS: 7 days | Discharge: LEFT BEFORE DISCHARGE | DRG: 438 | End: 2021-01-28
Attending: HOSPITALIST | Admitting: INTERNAL MEDICINE
Payer: MEDICAID

## 2021-01-21 VITALS — SYSTOLIC BLOOD PRESSURE: 130 MMHG | DIASTOLIC BLOOD PRESSURE: 76 MMHG

## 2021-01-21 VITALS — HEIGHT: 60 IN | BODY MASS INDEX: 29 KG/M2 | WEIGHT: 147.71 LBS

## 2021-01-21 DIAGNOSIS — K85.20: Primary | ICD-10-CM

## 2021-01-21 DIAGNOSIS — R74.01: ICD-10-CM

## 2021-01-21 DIAGNOSIS — J44.9: ICD-10-CM

## 2021-01-21 DIAGNOSIS — F10.220: ICD-10-CM

## 2021-01-21 DIAGNOSIS — F15.20: ICD-10-CM

## 2021-01-21 DIAGNOSIS — K70.9: ICD-10-CM

## 2021-01-21 DIAGNOSIS — Z86.16: ICD-10-CM

## 2021-01-21 DIAGNOSIS — F10.239: ICD-10-CM

## 2021-01-21 DIAGNOSIS — E43: ICD-10-CM

## 2021-01-21 DIAGNOSIS — D69.6: ICD-10-CM

## 2021-01-21 DIAGNOSIS — J96.11: ICD-10-CM

## 2021-01-21 DIAGNOSIS — R00.0: ICD-10-CM

## 2021-01-21 DIAGNOSIS — F99: ICD-10-CM

## 2021-01-21 LAB
<PLATELET ESTIMATE>: (no result)
<PLT MORPHOLOGY>: (no result)
ALBUMIN SERPL-MCNC: 4 G/DL (ref 3.4–5)
ALP SERPL-CCNC: 140 U/L (ref 45–117)
ALT SERPL-CCNC: 114 U/L (ref 12–78)
ANION GAP SERPL CALC-SCNC: 12 MMOL/L (ref 5–15)
BILIRUB DIRECT SERPL-MCNC: NORMAL MG/DL
BILIRUB SERPL-MCNC: 0.4 MG/DL (ref 0.2–1)
CALCIUM SERPL-MCNC: 8.9 MG/DL (ref 8.5–10.1)
CHLORIDE SERPL-SCNC: 104 MMOL/L (ref 98–107)
CREAT SERPL-MCNC: 0.49 MG/DL (ref 0.55–1.02)
ERYTHROCYTE [DISTWIDTH] IN BLOOD BY AUTOMATED COUNT: 14.2 % (ref 9.6–15.2)
LYMPH#(MANUAL): 2.59 X10^3/UL (ref 1–3.4)
LYMPHS% (MANUAL): 54 % (ref 22–44)
MCH RBC QN AUTO: 35.1 PG (ref 27–34.8)
MCHC RBC AUTO-ENTMCNC: 34.2 G/DL (ref 32.4–35.8)
MD: YES
MONOS#(MANUAL): 0.62 X10^3/UL (ref 0.3–2.7)
MONOS% (MANUAL): 13 % (ref 2–9)
PLATELET # BLD AUTO: 80 X10^3/UL (ref 130–400)
PMV BLD AUTO: 7.8 FL (ref 7.4–10.4)
PROT SERPL-MCNC: 8.2 G/DL (ref 6.4–8.2)
RBC # BLD AUTO: 4.21 X10^6/UL (ref 3.82–5.3)
REACTIVE LYMPHS # (MANUAL): 0.48 X10^3/UL (ref 0–0)
REACTIVE LYMPHS % (MANUAL): 10 % (ref 0–0)
SEG#(MANUAL): 1.1 X10^3/UL (ref 1.8–6.8)
SEGS% (MANUAL): 23 % (ref 42–75)

## 2021-01-21 PROCEDURE — 85025 COMPLETE CBC W/AUTO DIFF WBC: CPT

## 2021-01-21 PROCEDURE — 96375 TX/PRO/DX INJ NEW DRUG ADDON: CPT

## 2021-01-21 PROCEDURE — 80307 DRUG TEST PRSMV CHEM ANLYZR: CPT

## 2021-01-21 PROCEDURE — 96374 THER/PROPH/DIAG INJ IV PUSH: CPT

## 2021-01-21 PROCEDURE — 93005 ELECTROCARDIOGRAM TRACING: CPT

## 2021-01-21 PROCEDURE — C1751 CATH, INF, PER/CENT/MIDLINE: HCPCS

## 2021-01-21 PROCEDURE — 36415 COLL VENOUS BLD VENIPUNCTURE: CPT

## 2021-01-21 PROCEDURE — G0480 DRUG TEST DEF 1-7 CLASSES: HCPCS

## 2021-01-21 PROCEDURE — 71045 X-RAY EXAM CHEST 1 VIEW: CPT

## 2021-01-21 PROCEDURE — 36573 INSJ PICC RS&I 5 YR+: CPT

## 2021-01-21 PROCEDURE — 83690 ASSAY OF LIPASE: CPT

## 2021-01-21 PROCEDURE — 80053 COMPREHEN METABOLIC PANEL: CPT

## 2021-01-21 PROCEDURE — 99285 EMERGENCY DEPT VISIT HI MDM: CPT

## 2021-01-21 PROCEDURE — 84100 ASSAY OF PHOSPHORUS: CPT

## 2021-01-21 PROCEDURE — 96361 HYDRATE IV INFUSION ADD-ON: CPT

## 2021-01-21 PROCEDURE — 80320 DRUG SCREEN QUANTALCOHOLS: CPT

## 2021-01-21 PROCEDURE — 96376 TX/PRO/DX INJ SAME DRUG ADON: CPT

## 2021-01-21 PROCEDURE — 80069 RENAL FUNCTION PANEL: CPT

## 2021-01-21 PROCEDURE — 76700 US EXAM ABDOM COMPLETE: CPT

## 2021-01-21 PROCEDURE — 83735 ASSAY OF MAGNESIUM: CPT

## 2021-01-21 RX ADMIN — MORPHINE SULFATE PRN MG: 4 INJECTION INTRAVENOUS at 22:57

## 2021-01-21 RX ADMIN — Medication SCH TAB: at 17:45

## 2021-01-21 RX ADMIN — SODIUM CHLORIDE AND POTASSIUM CHLORIDE SCH MLS/HR: .9; .15 SOLUTION INTRAVENOUS at 13:32

## 2021-01-21 RX ADMIN — CHLORDIAZEPOXIDE HYDROCHLORIDE SCH MG: 10 CAPSULE ORAL at 22:57

## 2021-01-21 RX ADMIN — MORPHINE SULFATE PRN MG: 4 INJECTION INTRAVENOUS at 11:11

## 2021-01-21 RX ADMIN — ENOXAPARIN SODIUM SCH MG: 40 INJECTION SUBCUTANEOUS at 19:30

## 2021-01-21 RX ADMIN — MORPHINE SULFATE PRN MG: 4 INJECTION INTRAVENOUS at 17:53

## 2021-01-21 RX ADMIN — LORAZEPAM PRN MG: 2 INJECTION INTRAMUSCULAR; INTRAVENOUS at 13:40

## 2021-01-21 RX ADMIN — LORAZEPAM PRN MG: 2 INJECTION INTRAMUSCULAR; INTRAVENOUS at 20:17

## 2021-01-21 RX ADMIN — ONDANSETRON PRN MG: 2 INJECTION, SOLUTION INTRAMUSCULAR; INTRAVENOUS at 20:17

## 2021-01-21 RX ADMIN — CHLORDIAZEPOXIDE HYDROCHLORIDE SCH MG: 10 CAPSULE ORAL at 17:44

## 2021-01-21 RX ADMIN — MORPHINE SULFATE PRN MG: 4 INJECTION INTRAVENOUS at 09:41

## 2021-01-21 RX ADMIN — SODIUM CHLORIDE AND POTASSIUM CHLORIDE SCH MLS/HR: .9; .15 SOLUTION INTRAVENOUS at 19:30

## 2021-01-21 NOTE — NUR
CIWA SCORE 8. ATIVAN 1MG IV ADMIN AS PER MAR. PT PROVIDED WATER. PT RESTING ON 
[facility]. KWASI.

## 2021-01-22 VITALS — SYSTOLIC BLOOD PRESSURE: 147 MMHG | DIASTOLIC BLOOD PRESSURE: 78 MMHG

## 2021-01-22 VITALS — DIASTOLIC BLOOD PRESSURE: 74 MMHG | SYSTOLIC BLOOD PRESSURE: 130 MMHG

## 2021-01-22 VITALS — DIASTOLIC BLOOD PRESSURE: 93 MMHG | SYSTOLIC BLOOD PRESSURE: 150 MMHG

## 2021-01-22 VITALS — SYSTOLIC BLOOD PRESSURE: 142 MMHG | DIASTOLIC BLOOD PRESSURE: 89 MMHG

## 2021-01-22 VITALS — SYSTOLIC BLOOD PRESSURE: 115 MMHG | DIASTOLIC BLOOD PRESSURE: 80 MMHG

## 2021-01-22 LAB
<PLATELET ESTIMATE>: (no result)
<PLT MORPHOLOGY>: (no result)
ALBUMIN SERPL-MCNC: 3.7 G/DL (ref 3.4–5)
ALP SERPL-CCNC: 124 U/L (ref 45–117)
ALT SERPL-CCNC: 95 U/L (ref 12–78)
ANION GAP SERPL CALC-SCNC: 7 MMOL/L (ref 5–15)
BAND#(MANUAL): 0.1 X10^3/UL
BASOPHILS NFR BLD MANUAL: 1 % (ref 0–1)
BASOS#(MANUAL): 0.03 X10^3/UL (ref 0–0.1)
BILIRUB DIRECT SERPL-MCNC: NORMAL MG/DL
BILIRUB SERPL-MCNC: 0.8 MG/DL (ref 0.2–1)
CALCIUM SERPL-MCNC: 8.2 MG/DL (ref 8.5–10.1)
CHLORIDE SERPL-SCNC: 101 MMOL/L (ref 98–107)
CREAT SERPL-MCNC: 0.5 MG/DL (ref 0.55–1.02)
ERYTHROCYTE [DISTWIDTH] IN BLOOD BY AUTOMATED COUNT: 14.3 % (ref 9.6–15.2)
LYMPH#(MANUAL): 0.24 X10^3/UL (ref 1–3.4)
LYMPHS% (MANUAL): 7 % (ref 22–44)
MCH RBC QN AUTO: 35 PG (ref 27–34.8)
MCHC RBC AUTO-ENTMCNC: 34 G/DL (ref 32.4–35.8)
MD: YES
METAMYELOCYTES# (MANUAL): 0.03 X10^3/UL (ref 0–0)
METAMYELOCYTES% (MANUAL): 1 % (ref 0–1)
MONOS#(MANUAL): 0.37 X10^3/UL (ref 0.3–2.7)
MONOS% (MANUAL): 11 % (ref 2–9)
NEUTS BAND NFR BLD: 3 % (ref 0–7)
PLATELET # BLD AUTO: 62 X10^3/UL (ref 130–400)
PMV BLD AUTO: 6.9 FL (ref 7.4–10.4)
PROT SERPL-MCNC: 7.6 G/DL (ref 6.4–8.2)
RBC # BLD AUTO: 3.8 X10^6/UL (ref 3.82–5.3)
SEG#(MANUAL): 2.62 X10^3/UL (ref 1.8–6.8)
SEGS% (MANUAL): 77 % (ref 42–75)

## 2021-01-22 PROCEDURE — 02HV33Z INSERTION OF INFUSION DEVICE INTO SUPERIOR VENA CAVA, PERCUTANEOUS APPROACH: ICD-10-PCS | Performed by: RADIOLOGY

## 2021-01-22 PROCEDURE — B548ZZA ULTRASONOGRAPHY OF SUPERIOR VENA CAVA, GUIDANCE: ICD-10-PCS | Performed by: RADIOLOGY

## 2021-01-22 RX ADMIN — CHLORDIAZEPOXIDE HYDROCHLORIDE SCH MG: 10 CAPSULE ORAL at 23:06

## 2021-01-22 RX ADMIN — SODIUM CHLORIDE AND POTASSIUM CHLORIDE SCH MLS/HR: .9; .15 SOLUTION INTRAVENOUS at 01:13

## 2021-01-22 RX ADMIN — LORAZEPAM PRN MG: 2 INJECTION INTRAMUSCULAR; INTRAVENOUS at 18:39

## 2021-01-22 RX ADMIN — ENOXAPARIN SODIUM SCH MG: 40 INJECTION SUBCUTANEOUS at 19:30

## 2021-01-22 RX ADMIN — SODIUM CHLORIDE AND POTASSIUM CHLORIDE SCH MLS/HR: .9; .15 SOLUTION INTRAVENOUS at 18:05

## 2021-01-22 RX ADMIN — LORAZEPAM PRN MG: 2 INJECTION INTRAMUSCULAR; INTRAVENOUS at 15:24

## 2021-01-22 RX ADMIN — SODIUM CHLORIDE AND POTASSIUM CHLORIDE SCH MLS/HR: .9; .15 SOLUTION INTRAVENOUS at 07:42

## 2021-01-22 RX ADMIN — CHLORDIAZEPOXIDE HYDROCHLORIDE SCH MG: 10 CAPSULE ORAL at 11:47

## 2021-01-22 RX ADMIN — LORAZEPAM PRN MG: 2 INJECTION INTRAMUSCULAR; INTRAVENOUS at 00:27

## 2021-01-22 RX ADMIN — CHLORDIAZEPOXIDE HYDROCHLORIDE SCH MG: 10 CAPSULE ORAL at 23:11

## 2021-01-22 RX ADMIN — CHLORDIAZEPOXIDE HYDROCHLORIDE SCH MG: 10 CAPSULE ORAL at 04:58

## 2021-01-22 RX ADMIN — CHLORDIAZEPOXIDE HYDROCHLORIDE SCH MG: 10 CAPSULE ORAL at 18:04

## 2021-01-22 RX ADMIN — PROMETHAZINE HYDROCHLORIDE PRN MG: 25 INJECTION INTRAMUSCULAR; INTRAVENOUS at 21:54

## 2021-01-22 RX ADMIN — MORPHINE SULFATE PRN MG: 4 INJECTION INTRAVENOUS at 13:53

## 2021-01-22 RX ADMIN — LORAZEPAM PRN MG: 2 INJECTION INTRAMUSCULAR; INTRAVENOUS at 21:53

## 2021-01-22 RX ADMIN — Medication SCH TAB: at 07:42

## 2021-01-22 RX ADMIN — MORPHINE SULFATE PRN MG: 4 INJECTION INTRAVENOUS at 07:39

## 2021-01-22 RX ADMIN — SODIUM CHLORIDE AND POTASSIUM CHLORIDE SCH MLS/HR: .9; .15 SOLUTION INTRAVENOUS at 13:43

## 2021-01-22 RX ADMIN — PROMETHAZINE HYDROCHLORIDE PRN MG: 25 INJECTION INTRAMUSCULAR; INTRAVENOUS at 04:02

## 2021-01-23 VITALS — DIASTOLIC BLOOD PRESSURE: 92 MMHG | SYSTOLIC BLOOD PRESSURE: 146 MMHG

## 2021-01-23 VITALS — DIASTOLIC BLOOD PRESSURE: 74 MMHG | SYSTOLIC BLOOD PRESSURE: 130 MMHG

## 2021-01-23 VITALS — SYSTOLIC BLOOD PRESSURE: 126 MMHG | DIASTOLIC BLOOD PRESSURE: 81 MMHG

## 2021-01-23 VITALS — DIASTOLIC BLOOD PRESSURE: 90 MMHG | SYSTOLIC BLOOD PRESSURE: 128 MMHG

## 2021-01-23 VITALS — SYSTOLIC BLOOD PRESSURE: 156 MMHG | DIASTOLIC BLOOD PRESSURE: 99 MMHG

## 2021-01-23 RX ADMIN — SODIUM CHLORIDE AND POTASSIUM CHLORIDE SCH MLS/HR: .9; .15 SOLUTION INTRAVENOUS at 22:32

## 2021-01-23 RX ADMIN — ONDANSETRON PRN MG: 2 INJECTION, SOLUTION INTRAMUSCULAR; INTRAVENOUS at 05:54

## 2021-01-23 RX ADMIN — LORAZEPAM PRN MG: 2 INJECTION INTRAMUSCULAR; INTRAVENOUS at 04:44

## 2021-01-23 RX ADMIN — CHLORDIAZEPOXIDE HYDROCHLORIDE SCH MG: 25 CAPSULE ORAL at 20:13

## 2021-01-23 RX ADMIN — CHLORDIAZEPOXIDE HYDROCHLORIDE SCH MG: 10 CAPSULE ORAL at 07:34

## 2021-01-23 RX ADMIN — ENOXAPARIN SODIUM SCH MG: 40 INJECTION SUBCUTANEOUS at 19:30

## 2021-01-23 RX ADMIN — LORAZEPAM PRN MG: 2 INJECTION INTRAMUSCULAR; INTRAVENOUS at 15:49

## 2021-01-23 RX ADMIN — SODIUM CHLORIDE AND POTASSIUM CHLORIDE SCH MLS/HR: .9; .15 SOLUTION INTRAVENOUS at 17:32

## 2021-01-23 RX ADMIN — LORAZEPAM PRN MG: 2 INJECTION INTRAMUSCULAR; INTRAVENOUS at 05:54

## 2021-01-23 RX ADMIN — LORAZEPAM PRN MG: 2 INJECTION INTRAMUSCULAR; INTRAVENOUS at 12:32

## 2021-01-23 RX ADMIN — SODIUM CHLORIDE AND POTASSIUM CHLORIDE SCH MLS/HR: .9; .15 SOLUTION INTRAVENOUS at 04:44

## 2021-01-23 RX ADMIN — MORPHINE SULFATE PRN MG: 4 INJECTION INTRAVENOUS at 04:23

## 2021-01-23 RX ADMIN — CHLORDIAZEPOXIDE HYDROCHLORIDE SCH MG: 10 CAPSULE ORAL at 02:18

## 2021-01-23 RX ADMIN — MORPHINE SULFATE PRN MG: 4 INJECTION INTRAVENOUS at 09:09

## 2021-01-23 RX ADMIN — LORAZEPAM PRN MG: 2 INJECTION INTRAMUSCULAR; INTRAVENOUS at 11:19

## 2021-01-23 RX ADMIN — Medication SCH TAB: at 09:10

## 2021-01-23 RX ADMIN — LORAZEPAM PRN MG: 2 INJECTION INTRAMUSCULAR; INTRAVENOUS at 09:18

## 2021-01-24 VITALS — DIASTOLIC BLOOD PRESSURE: 105 MMHG | SYSTOLIC BLOOD PRESSURE: 160 MMHG

## 2021-01-24 VITALS — DIASTOLIC BLOOD PRESSURE: 87 MMHG | SYSTOLIC BLOOD PRESSURE: 121 MMHG

## 2021-01-24 VITALS — SYSTOLIC BLOOD PRESSURE: 125 MMHG | DIASTOLIC BLOOD PRESSURE: 78 MMHG

## 2021-01-24 VITALS — SYSTOLIC BLOOD PRESSURE: 122 MMHG | DIASTOLIC BLOOD PRESSURE: 86 MMHG

## 2021-01-24 RX ADMIN — LORAZEPAM PRN MG: 2 INJECTION INTRAMUSCULAR; INTRAVENOUS at 00:49

## 2021-01-24 RX ADMIN — MORPHINE SULFATE PRN MG: 4 INJECTION INTRAVENOUS at 01:59

## 2021-01-24 RX ADMIN — Medication SCH TAB: at 07:53

## 2021-01-24 RX ADMIN — LORAZEPAM PRN MG: 2 INJECTION INTRAMUSCULAR; INTRAVENOUS at 17:59

## 2021-01-24 RX ADMIN — CHLORDIAZEPOXIDE HYDROCHLORIDE SCH MG: 25 CAPSULE ORAL at 07:53

## 2021-01-24 RX ADMIN — LORAZEPAM PRN MG: 2 INJECTION INTRAMUSCULAR; INTRAVENOUS at 11:21

## 2021-01-24 RX ADMIN — LORAZEPAM PRN MG: 2 INJECTION INTRAMUSCULAR; INTRAVENOUS at 21:35

## 2021-01-24 RX ADMIN — CHLORDIAZEPOXIDE HYDROCHLORIDE SCH MG: 25 CAPSULE ORAL at 02:01

## 2021-01-24 RX ADMIN — LORAZEPAM PRN MG: 2 INJECTION INTRAMUSCULAR; INTRAVENOUS at 23:43

## 2021-01-24 RX ADMIN — SODIUM CHLORIDE AND POTASSIUM CHLORIDE SCH MLS/HR: .9; .15 SOLUTION INTRAVENOUS at 04:29

## 2021-01-24 RX ADMIN — LORAZEPAM PRN MG: 2 INJECTION INTRAMUSCULAR; INTRAVENOUS at 12:44

## 2021-01-24 RX ADMIN — LORAZEPAM PRN MG: 2 INJECTION INTRAMUSCULAR; INTRAVENOUS at 09:55

## 2021-01-24 RX ADMIN — SODIUM CHLORIDE AND POTASSIUM CHLORIDE SCH MLS/HR: .9; .15 SOLUTION INTRAVENOUS at 10:14

## 2021-01-24 RX ADMIN — ENOXAPARIN SODIUM SCH MG: 40 INJECTION SUBCUTANEOUS at 19:30

## 2021-01-24 RX ADMIN — SODIUM CHLORIDE AND POTASSIUM CHLORIDE SCH MLS/HR: .9; .15 SOLUTION INTRAVENOUS at 18:46

## 2021-01-24 RX ADMIN — LORAZEPAM PRN MG: 2 INJECTION INTRAMUSCULAR; INTRAVENOUS at 06:07

## 2021-01-24 RX ADMIN — PROMETHAZINE HYDROCHLORIDE PRN MG: 25 INJECTION INTRAMUSCULAR; INTRAVENOUS at 01:59

## 2021-01-24 RX ADMIN — MORPHINE SULFATE PRN MG: 4 INJECTION INTRAVENOUS at 08:00

## 2021-01-24 RX ADMIN — CHLORDIAZEPOXIDE HYDROCHLORIDE SCH MG: 25 CAPSULE ORAL at 14:00

## 2021-01-24 RX ADMIN — LORAZEPAM PRN MG: 2 INJECTION INTRAMUSCULAR; INTRAVENOUS at 04:15

## 2021-01-24 RX ADMIN — LORAZEPAM PRN MG: 2 INJECTION INTRAMUSCULAR; INTRAVENOUS at 11:26

## 2021-01-24 RX ADMIN — MORPHINE SULFATE PRN MG: 4 INJECTION INTRAVENOUS at 22:13

## 2021-01-24 RX ADMIN — CHLORDIAZEPOXIDE HYDROCHLORIDE SCH MG: 25 CAPSULE ORAL at 20:20

## 2021-01-25 VITALS — DIASTOLIC BLOOD PRESSURE: 90 MMHG | SYSTOLIC BLOOD PRESSURE: 143 MMHG

## 2021-01-25 VITALS — DIASTOLIC BLOOD PRESSURE: 80 MMHG | SYSTOLIC BLOOD PRESSURE: 127 MMHG

## 2021-01-25 VITALS — SYSTOLIC BLOOD PRESSURE: 119 MMHG | DIASTOLIC BLOOD PRESSURE: 88 MMHG

## 2021-01-25 VITALS — DIASTOLIC BLOOD PRESSURE: 91 MMHG | SYSTOLIC BLOOD PRESSURE: 124 MMHG

## 2021-01-25 LAB
ALBUMIN SERPL-MCNC: 3.2 G/DL (ref 3.4–5)
ANION GAP SERPL CALC-SCNC: 4 MMOL/L (ref 5–15)
BASOPHILS # BLD AUTO: 0 X10^3/UL (ref 0–0.1)
BASOPHILS NFR BLD AUTO: 1 % (ref 0–1)
CALCIUM SERPL-MCNC: 8.6 MG/DL (ref 8.5–10.1)
CHLORIDE SERPL-SCNC: 107 MMOL/L (ref 98–107)
CREAT SERPL-MCNC: 0.42 MG/DL (ref 0.55–1.02)
EOSINOPHIL # BLD AUTO: 0.1 X10^3/UL (ref 0–0.4)
EOSINOPHIL NFR BLD AUTO: 4 % (ref 1–7)
ERYTHROCYTE [DISTWIDTH] IN BLOOD BY AUTOMATED COUNT: 14.3 % (ref 9.6–15.2)
LYMPHOCYTES # BLD AUTO: 1.1 X10^3/UL (ref 1–3.4)
LYMPHOCYTES NFR BLD AUTO: 29 % (ref 22–44)
MCH RBC QN AUTO: 35.4 PG (ref 27–34.8)
MCHC RBC AUTO-ENTMCNC: 34.1 G/DL (ref 32.4–35.8)
MD: (no result)
MONOCYTES # BLD AUTO: 0.5 X10^3/UL (ref 0.2–0.8)
MONOCYTES NFR BLD AUTO: 15 % (ref 2–9)
NEUTROPHILS # BLD AUTO: 1.9 X10^3/UL (ref 1.8–6.8)
NEUTROPHILS NFR BLD AUTO: 52 % (ref 42–75)
PLATELET # BLD AUTO: 90 X10^3/UL (ref 130–400)
PMV BLD AUTO: 7.6 FL (ref 7.4–10.4)
RBC # BLD AUTO: 3.39 X10^6/UL (ref 3.82–5.3)

## 2021-01-25 RX ADMIN — LORAZEPAM PRN MG: 2 INJECTION INTRAMUSCULAR; INTRAVENOUS at 10:51

## 2021-01-25 RX ADMIN — CHLORDIAZEPOXIDE HYDROCHLORIDE SCH MG: 25 CAPSULE ORAL at 07:24

## 2021-01-25 RX ADMIN — ENOXAPARIN SODIUM SCH MG: 40 INJECTION SUBCUTANEOUS at 19:30

## 2021-01-25 RX ADMIN — CHLORDIAZEPOXIDE HYDROCHLORIDE SCH MG: 25 CAPSULE ORAL at 02:35

## 2021-01-25 RX ADMIN — LORAZEPAM PRN MG: 2 INJECTION INTRAMUSCULAR; INTRAVENOUS at 19:47

## 2021-01-25 RX ADMIN — LORAZEPAM PRN MG: 2 INJECTION INTRAMUSCULAR; INTRAVENOUS at 07:35

## 2021-01-25 RX ADMIN — MAGNESIUM SULFATE HEPTAHYDRATE SCH MLS/HR: 40 INJECTION, SOLUTION INTRAVENOUS at 19:48

## 2021-01-25 RX ADMIN — SODIUM CHLORIDE AND POTASSIUM CHLORIDE SCH MLS/HR: .9; .15 SOLUTION INTRAVENOUS at 05:53

## 2021-01-25 RX ADMIN — LORAZEPAM PRN MG: 2 INJECTION INTRAMUSCULAR; INTRAVENOUS at 15:33

## 2021-01-25 RX ADMIN — Medication SCH TAB: at 07:25

## 2021-01-25 RX ADMIN — LORAZEPAM PRN MG: 2 INJECTION INTRAMUSCULAR; INTRAVENOUS at 04:28

## 2021-01-25 RX ADMIN — SODIUM CHLORIDE AND POTASSIUM CHLORIDE SCH MLS/HR: .9; .15 SOLUTION INTRAVENOUS at 20:13

## 2021-01-25 RX ADMIN — SODIUM CHLORIDE AND POTASSIUM CHLORIDE SCH MLS/HR: .9; .15 SOLUTION INTRAVENOUS at 00:29

## 2021-01-25 RX ADMIN — MAGNESIUM SULFATE HEPTAHYDRATE SCH MLS/HR: 40 INJECTION, SOLUTION INTRAVENOUS at 09:12

## 2021-01-25 RX ADMIN — LORAZEPAM PRN MG: 2 INJECTION INTRAMUSCULAR; INTRAVENOUS at 17:28

## 2021-01-25 RX ADMIN — MORPHINE SULFATE PRN MG: 4 INJECTION INTRAVENOUS at 09:23

## 2021-01-25 RX ADMIN — SODIUM CHLORIDE AND POTASSIUM CHLORIDE SCH MLS/HR: .9; .15 SOLUTION INTRAVENOUS at 14:32

## 2021-01-25 RX ADMIN — MORPHINE SULFATE PRN MG: 4 INJECTION INTRAVENOUS at 03:20

## 2021-01-25 RX ADMIN — LORAZEPAM PRN MG: 2 INJECTION INTRAMUSCULAR; INTRAVENOUS at 13:22

## 2021-01-25 RX ADMIN — LORAZEPAM PRN MG: 2 INJECTION INTRAMUSCULAR; INTRAVENOUS at 21:42

## 2021-01-26 VITALS — DIASTOLIC BLOOD PRESSURE: 86 MMHG | SYSTOLIC BLOOD PRESSURE: 130 MMHG

## 2021-01-26 VITALS — SYSTOLIC BLOOD PRESSURE: 138 MMHG | DIASTOLIC BLOOD PRESSURE: 92 MMHG

## 2021-01-26 VITALS — DIASTOLIC BLOOD PRESSURE: 65 MMHG | SYSTOLIC BLOOD PRESSURE: 123 MMHG

## 2021-01-26 VITALS — DIASTOLIC BLOOD PRESSURE: 56 MMHG | SYSTOLIC BLOOD PRESSURE: 129 MMHG

## 2021-01-26 RX ADMIN — LORAZEPAM PRN MG: 2 INJECTION INTRAMUSCULAR; INTRAVENOUS at 13:02

## 2021-01-26 RX ADMIN — DOCUSATE SODIUM 50MG AND SENNOSIDES 8.6MG SCH TAB: 8.6; 5 TABLET, FILM COATED ORAL at 19:31

## 2021-01-26 RX ADMIN — MORPHINE SULFATE PRN MG: 4 INJECTION INTRAVENOUS at 04:42

## 2021-01-26 RX ADMIN — MAGNESIUM SULFATE HEPTAHYDRATE SCH MLS/HR: 40 INJECTION, SOLUTION INTRAVENOUS at 08:25

## 2021-01-26 RX ADMIN — MORPHINE SULFATE PRN MG: 4 INJECTION INTRAVENOUS at 08:31

## 2021-01-26 RX ADMIN — MORPHINE SULFATE PRN MG: 4 INJECTION INTRAVENOUS at 20:26

## 2021-01-26 RX ADMIN — LORAZEPAM PRN MG: 2 INJECTION INTRAMUSCULAR; INTRAVENOUS at 17:43

## 2021-01-26 RX ADMIN — LORAZEPAM PRN MG: 2 INJECTION INTRAMUSCULAR; INTRAVENOUS at 03:26

## 2021-01-26 RX ADMIN — LORAZEPAM PRN MG: 2 INJECTION INTRAMUSCULAR; INTRAVENOUS at 01:26

## 2021-01-26 RX ADMIN — MORPHINE SULFATE PRN MG: 4 INJECTION INTRAVENOUS at 14:13

## 2021-01-26 RX ADMIN — SODIUM CHLORIDE AND POTASSIUM CHLORIDE SCH MLS/HR: .9; .15 SOLUTION INTRAVENOUS at 17:44

## 2021-01-26 RX ADMIN — LORAZEPAM PRN MG: 2 INJECTION INTRAMUSCULAR; INTRAVENOUS at 09:46

## 2021-01-26 RX ADMIN — LORAZEPAM PRN MG: 2 INJECTION INTRAMUSCULAR; INTRAVENOUS at 21:48

## 2021-01-26 RX ADMIN — LORAZEPAM PRN MG: 2 INJECTION INTRAMUSCULAR; INTRAVENOUS at 11:55

## 2021-01-26 RX ADMIN — SODIUM CHLORIDE AND POTASSIUM CHLORIDE SCH MLS/HR: .9; .15 SOLUTION INTRAVENOUS at 23:13

## 2021-01-26 RX ADMIN — LORAZEPAM PRN MG: 2 INJECTION INTRAMUSCULAR; INTRAVENOUS at 19:31

## 2021-01-26 RX ADMIN — LORAZEPAM PRN MG: 2 INJECTION INTRAMUSCULAR; INTRAVENOUS at 23:13

## 2021-01-26 RX ADMIN — LORAZEPAM PRN MG: 2 INJECTION INTRAMUSCULAR; INTRAVENOUS at 05:55

## 2021-01-26 RX ADMIN — MAGNESIUM SULFATE HEPTAHYDRATE SCH MLS/HR: 40 INJECTION, SOLUTION INTRAVENOUS at 19:31

## 2021-01-26 RX ADMIN — ONDANSETRON PRN MG: 2 INJECTION, SOLUTION INTRAMUSCULAR; INTRAVENOUS at 11:55

## 2021-01-26 RX ADMIN — SODIUM CHLORIDE AND POTASSIUM CHLORIDE SCH MLS/HR: .9; .15 SOLUTION INTRAVENOUS at 11:47

## 2021-01-26 RX ADMIN — PROMETHAZINE HYDROCHLORIDE PRN MG: 25 INJECTION INTRAMUSCULAR; INTRAVENOUS at 13:36

## 2021-01-26 RX ADMIN — SODIUM CHLORIDE AND POTASSIUM CHLORIDE SCH MLS/HR: .9; .15 SOLUTION INTRAVENOUS at 08:27

## 2021-01-26 RX ADMIN — ENOXAPARIN SODIUM SCH MG: 40 INJECTION SUBCUTANEOUS at 19:30

## 2021-01-26 RX ADMIN — Medication SCH TAB: at 08:25

## 2021-01-26 RX ADMIN — DOCUSATE SODIUM 50MG AND SENNOSIDES 8.6MG SCH TAB: 8.6; 5 TABLET, FILM COATED ORAL at 11:47

## 2021-01-26 RX ADMIN — SODIUM CHLORIDE AND POTASSIUM CHLORIDE SCH MLS/HR: .9; .15 SOLUTION INTRAVENOUS at 01:30

## 2021-01-27 VITALS — DIASTOLIC BLOOD PRESSURE: 70 MMHG | SYSTOLIC BLOOD PRESSURE: 112 MMHG

## 2021-01-27 VITALS — DIASTOLIC BLOOD PRESSURE: 86 MMHG | SYSTOLIC BLOOD PRESSURE: 129 MMHG

## 2021-01-27 VITALS — SYSTOLIC BLOOD PRESSURE: 131 MMHG | DIASTOLIC BLOOD PRESSURE: 83 MMHG

## 2021-01-27 VITALS — SYSTOLIC BLOOD PRESSURE: 126 MMHG | DIASTOLIC BLOOD PRESSURE: 87 MMHG

## 2021-01-27 RX ADMIN — MORPHINE SULFATE PRN MG: 4 INJECTION INTRAVENOUS at 16:27

## 2021-01-27 RX ADMIN — LORAZEPAM PRN MG: 2 INJECTION INTRAMUSCULAR; INTRAVENOUS at 02:39

## 2021-01-27 RX ADMIN — SODIUM CHLORIDE AND POTASSIUM CHLORIDE SCH MLS/HR: .9; .15 SOLUTION INTRAVENOUS at 22:14

## 2021-01-27 RX ADMIN — LORAZEPAM PRN MG: 2 INJECTION INTRAMUSCULAR; INTRAVENOUS at 04:06

## 2021-01-27 RX ADMIN — CHLORDIAZEPOXIDE HYDROCHLORIDE PRN MG: 25 CAPSULE ORAL at 09:50

## 2021-01-27 RX ADMIN — LORAZEPAM PRN MG: 2 INJECTION INTRAMUSCULAR; INTRAVENOUS at 05:19

## 2021-01-27 RX ADMIN — MORPHINE SULFATE PRN MG: 4 INJECTION INTRAVENOUS at 06:46

## 2021-01-27 RX ADMIN — LORAZEPAM PRN MG: 2 INJECTION INTRAMUSCULAR; INTRAVENOUS at 00:18

## 2021-01-27 RX ADMIN — SODIUM CHLORIDE AND POTASSIUM CHLORIDE SCH MLS/HR: .9; .15 SOLUTION INTRAVENOUS at 16:01

## 2021-01-27 RX ADMIN — DOCUSATE SODIUM 50MG AND SENNOSIDES 8.6MG SCH TAB: 8.6; 5 TABLET, FILM COATED ORAL at 22:14

## 2021-01-27 RX ADMIN — LORAZEPAM PRN MG: 2 INJECTION INTRAMUSCULAR; INTRAVENOUS at 14:05

## 2021-01-27 RX ADMIN — SODIUM CHLORIDE AND POTASSIUM CHLORIDE SCH MLS/HR: .9; .15 SOLUTION INTRAVENOUS at 04:17

## 2021-01-27 RX ADMIN — PROMETHAZINE HYDROCHLORIDE PRN MG: 25 INJECTION INTRAMUSCULAR; INTRAVENOUS at 10:03

## 2021-01-27 RX ADMIN — SODIUM CHLORIDE AND POTASSIUM CHLORIDE SCH MLS/HR: .9; .15 SOLUTION INTRAVENOUS at 09:57

## 2021-01-27 RX ADMIN — DOCUSATE SODIUM 50MG AND SENNOSIDES 8.6MG SCH TAB: 8.6; 5 TABLET, FILM COATED ORAL at 09:50

## 2021-01-27 RX ADMIN — Medication SCH TAB: at 09:50

## 2021-01-27 RX ADMIN — ENOXAPARIN SODIUM SCH MG: 40 INJECTION SUBCUTANEOUS at 19:30

## 2021-01-27 RX ADMIN — CHLORDIAZEPOXIDE HYDROCHLORIDE PRN MG: 25 CAPSULE ORAL at 17:52

## 2021-01-28 VITALS — DIASTOLIC BLOOD PRESSURE: 85 MMHG | SYSTOLIC BLOOD PRESSURE: 127 MMHG

## 2021-01-28 VITALS — DIASTOLIC BLOOD PRESSURE: 83 MMHG | SYSTOLIC BLOOD PRESSURE: 123 MMHG

## 2021-01-28 RX ADMIN — LORAZEPAM PRN MG: 2 INJECTION INTRAMUSCULAR; INTRAVENOUS at 02:00

## 2021-01-28 RX ADMIN — DOCUSATE SODIUM 50MG AND SENNOSIDES 8.6MG SCH TAB: 8.6; 5 TABLET, FILM COATED ORAL at 08:45

## 2021-01-28 RX ADMIN — SODIUM CHLORIDE AND POTASSIUM CHLORIDE SCH MLS/HR: .9; .15 SOLUTION INTRAVENOUS at 04:19

## 2021-01-28 RX ADMIN — Medication SCH TAB: at 08:44

## 2021-01-30 ENCOUNTER — HOSPITAL ENCOUNTER (EMERGENCY)
Dept: HOSPITAL 8 - ED | Age: 51
Discharge: HOME | End: 2021-01-30
Payer: MEDICAID

## 2021-01-30 VITALS — HEIGHT: 60 IN | WEIGHT: 130.27 LBS | BODY MASS INDEX: 25.58 KG/M2

## 2021-01-30 VITALS — SYSTOLIC BLOOD PRESSURE: 115 MMHG | DIASTOLIC BLOOD PRESSURE: 75 MMHG

## 2021-01-30 DIAGNOSIS — R11.2: ICD-10-CM

## 2021-01-30 DIAGNOSIS — Z87.891: ICD-10-CM

## 2021-01-30 DIAGNOSIS — K29.20: Primary | ICD-10-CM
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
## 2021-01-30 DIAGNOSIS — R10.32: ICD-10-CM

## 2021-01-30 DIAGNOSIS — M54.5: ICD-10-CM

## 2021-01-30 DIAGNOSIS — J44.9: ICD-10-CM

## 2021-01-30 DIAGNOSIS — G89.29: ICD-10-CM

## 2021-01-30 DIAGNOSIS — F10.10: ICD-10-CM

## 2021-01-30 DIAGNOSIS — Z72.9: ICD-10-CM

## 2021-01-30 DIAGNOSIS — R50.9: ICD-10-CM

## 2021-01-30 LAB
<PLATELET ESTIMATE>: ADEQUATE
<PLT MORPHOLOGY>: (no result)
ALBUMIN SERPL-MCNC: 3.2 G/DL (ref 3.4–5)
ALP SERPL-CCNC: 100 U/L (ref 45–117)
ALT SERPL-CCNC: 42 U/L (ref 12–78)
ANION GAP SERPL CALC-SCNC: 10 MMOL/L (ref 5–15)
BASOPHILS NFR BLD MANUAL: 2 % (ref 0–1)
BASOS#(MANUAL): 0.07 X10^3/UL (ref 0–0.1)
BILIRUB DIRECT SERPL-MCNC: NORMAL MG/DL
BILIRUB SERPL-MCNC: 0.3 MG/DL (ref 0.2–1)
CALCIUM SERPL-MCNC: 8.5 MG/DL (ref 8.5–10.1)
CHLORIDE SERPL-SCNC: 107 MMOL/L (ref 98–107)
CREAT SERPL-MCNC: 0.56 MG/DL (ref 0.55–1.02)
EOS#(MANUAL): 0.04 X10^3/UL (ref 0–0.4)
EOS% (MANUAL): 1 % (ref 1–7)
ERYTHROCYTE [DISTWIDTH] IN BLOOD BY AUTOMATED COUNT: 14.3 % (ref 9.6–15.2)
LYMPH#(MANUAL): 2.09 X10^3/UL (ref 1–3.4)
LYMPHS% (MANUAL): 58 % (ref 22–44)
MCH RBC QN AUTO: 35.1 PG (ref 27–34.8)
MCHC RBC AUTO-ENTMCNC: 34.4 G/DL (ref 32.4–35.8)
MD: YES
MICROSCOPIC: (no result)
MONOS#(MANUAL): 0.47 X10^3/UL (ref 0.3–2.7)
MONOS% (MANUAL): 13 % (ref 2–9)
PLATELET # BLD AUTO: 285 X10^3/UL (ref 130–400)
PMV BLD AUTO: 6.8 FL (ref 7.4–10.4)
PROT SERPL-MCNC: 6.9 G/DL (ref 6.4–8.2)
RBC # BLD AUTO: 3.67 X10^6/UL (ref 3.82–5.3)
SEG#(MANUAL): 0.94 X10^3/UL (ref 1.8–6.8)
SEGS% (MANUAL): 26 % (ref 42–75)

## 2021-01-30 PROCEDURE — 96372 THER/PROPH/DIAG INJ SC/IM: CPT

## 2021-01-30 PROCEDURE — 36415 COLL VENOUS BLD VENIPUNCTURE: CPT

## 2021-01-30 PROCEDURE — 96361 HYDRATE IV INFUSION ADD-ON: CPT

## 2021-01-30 PROCEDURE — 74177 CT ABD & PELVIS W/CONTRAST: CPT

## 2021-01-30 PROCEDURE — 83690 ASSAY OF LIPASE: CPT

## 2021-01-30 PROCEDURE — 81003 URINALYSIS AUTO W/O SCOPE: CPT

## 2021-01-30 PROCEDURE — 96375 TX/PRO/DX INJ NEW DRUG ADDON: CPT

## 2021-01-30 PROCEDURE — 96374 THER/PROPH/DIAG INJ IV PUSH: CPT

## 2021-01-30 PROCEDURE — 80053 COMPREHEN METABOLIC PANEL: CPT

## 2021-01-30 PROCEDURE — 99285 EMERGENCY DEPT VISIT HI MDM: CPT

## 2021-01-30 PROCEDURE — 72110 X-RAY EXAM L-2 SPINE 4/>VWS: CPT

## 2021-01-30 PROCEDURE — 85025 COMPLETE CBC W/AUTO DIFF WBC: CPT

## 2021-01-30 NOTE — NUR
pt resting in [facility], on phone with son to come pick her up, spoke with md, pt 
has no reason to be admitted to hospital. pt yelling at son on phone at this 
time

## 2021-03-19 ENCOUNTER — HOSPITAL ENCOUNTER (EMERGENCY)
Dept: HOSPITAL 8 - ED | Age: 51
Discharge: HOME | End: 2021-03-19
Payer: MEDICAID

## 2021-03-19 VITALS — SYSTOLIC BLOOD PRESSURE: 129 MMHG | DIASTOLIC BLOOD PRESSURE: 79 MMHG

## 2021-03-19 VITALS — WEIGHT: 132.28 LBS | HEIGHT: 60 IN | BODY MASS INDEX: 25.97 KG/M2

## 2021-03-19 DIAGNOSIS — R06.00: ICD-10-CM

## 2021-03-19 DIAGNOSIS — J40: ICD-10-CM

## 2021-03-19 DIAGNOSIS — J44.1: Primary | ICD-10-CM

## 2021-03-19 DIAGNOSIS — R00.0: ICD-10-CM

## 2021-03-19 LAB
<PLATELET ESTIMATE>: ADEQUATE
<PLT MORPHOLOGY>: (no result)
ALBUMIN SERPL-MCNC: 3.5 G/DL (ref 3.4–5)
ALP SERPL-CCNC: 108 U/L (ref 45–117)
ALT SERPL-CCNC: 74 U/L (ref 12–78)
ANION GAP SERPL CALC-SCNC: 9 MMOL/L (ref 5–15)
BASOPHILS NFR BLD MANUAL: 2 % (ref 0–1)
BASOS#(MANUAL): 0.08 X10^3/UL (ref 0–0.1)
BILIRUB SERPL-MCNC: 0.1 MG/DL (ref 0.2–1)
CALCIUM SERPL-MCNC: 8.4 MG/DL (ref 8.5–10.1)
CHLORIDE SERPL-SCNC: 107 MMOL/L (ref 98–107)
CREAT SERPL-MCNC: 0.6 MG/DL (ref 0.55–1.02)
EOS#(MANUAL): 0.08 X10^3/UL (ref 0–0.4)
EOS% (MANUAL): 2 % (ref 1–7)
ERYTHROCYTE [DISTWIDTH] IN BLOOD BY AUTOMATED COUNT: 14 % (ref 9.6–15.2)
LYMPH#(MANUAL): 1.89 X10^3/UL (ref 1–3.4)
LYMPHS% (MANUAL): 46 % (ref 22–44)
MACROCYTES BLD QL SMEAR: (no result)
MCH RBC QN AUTO: 34.7 PG (ref 27–34.8)
MCHC RBC AUTO-ENTMCNC: 33.7 G/DL (ref 32.4–35.8)
MD: YES
MONOS#(MANUAL): 0.62 X10^3/UL (ref 0.3–2.7)
MONOS% (MANUAL): 15 % (ref 2–9)
PLATELET # BLD AUTO: 159 X10^3/UL (ref 130–400)
PMV BLD AUTO: 6.4 FL (ref 7.4–10.4)
PROT SERPL-MCNC: 7.7 G/DL (ref 6.4–8.2)
RBC # BLD AUTO: 4.03 X10^6/UL (ref 3.82–5.3)
SEG#(MANUAL): 1.44 X10^3/UL (ref 1.8–6.8)
SEGS% (MANUAL): 35 % (ref 42–75)
TROPONIN I SERPL-MCNC: < 0.015 NG/ML (ref 0–0.04)

## 2021-03-19 PROCEDURE — 71045 X-RAY EXAM CHEST 1 VIEW: CPT

## 2021-03-19 PROCEDURE — 83880 ASSAY OF NATRIURETIC PEPTIDE: CPT

## 2021-03-19 PROCEDURE — 83605 ASSAY OF LACTIC ACID: CPT

## 2021-03-19 PROCEDURE — 99285 EMERGENCY DEPT VISIT HI MDM: CPT

## 2021-03-19 PROCEDURE — 87040 BLOOD CULTURE FOR BACTERIA: CPT

## 2021-03-19 PROCEDURE — 96360 HYDRATION IV INFUSION INIT: CPT

## 2021-03-19 PROCEDURE — 80053 COMPREHEN METABOLIC PANEL: CPT

## 2021-03-19 PROCEDURE — 83690 ASSAY OF LIPASE: CPT

## 2021-03-19 PROCEDURE — 84145 PROCALCITONIN (PCT): CPT

## 2021-03-19 PROCEDURE — 93005 ELECTROCARDIOGRAM TRACING: CPT

## 2021-03-19 PROCEDURE — 84484 ASSAY OF TROPONIN QUANT: CPT

## 2021-03-19 PROCEDURE — 36415 COLL VENOUS BLD VENIPUNCTURE: CPT

## 2021-03-19 PROCEDURE — 87400 INFLUENZA A/B EACH AG IA: CPT

## 2021-03-19 PROCEDURE — 94640 AIRWAY INHALATION TREATMENT: CPT

## 2021-03-19 PROCEDURE — 85025 COMPLETE CBC W/AUTO DIFF WBC: CPT

## 2021-03-19 NOTE — NUR
PT STATES SHE HAS 10/10 ABD PAIN AND NAUSEA. MD NOTIFIED. WILL AWAIT ORDERS. 
CARDIAC, BP, SP02 MONITORS IN PLACE AND CALL LIGHT WITHIN REACH.

## 2021-03-19 NOTE — NUR
Pt to ER with c/o increased fatigue, difficulty breathing, and HA. Pt states 
she was dx with covid x 2 months ago, was admitted. Sent home on oxygen. Pt 
states the last few days she feel like her symptoms have come back, difficulty 
breathing, body aches, cough, ST, and new HA.

Pt states taking tylenol and motrin at home with no relief. 



Pt to jt, on monitor. 

EKG, CXR, labs, and BC x 2 sent



US IV placed to right FA. 



Pt medicated per order. 



Nixon monitor.

## 2021-03-19 NOTE — NUR
PATIENT REFUSED TYLENOL AND ZOFRAN STATING SHE HAS BEEN TAKING TYLENOL FOR DAYS 
AND IT DOESN'T HELP, AND ZOFRAN NEVER WORKS FOR HER. SHE JUST WANTS TO GO HOME. 
MD AWARE.

## 2021-03-20 NOTE — NUR
-------------------------------------------------------------------------------

          *** Note undone in [facility] - 05/12/20 at 0104 by ROBERT ***           

-------------------------------------------------------------------------------

TASK RN: PT TO CT VIA UBALDO AT THIS TIME. PIV ACCESS ESTABLISHED PRIOR TO PT 
LEAVING FOR CT.
ANTIBIOTICS STARTED. PATIENT VERBALIZED UNDERSTANDING. AWAITING ADMISSION 
ORDERS. PATIENT UPDATED ON PLAN OF CARE.
BLOOD CULTURES COLLECTED BY LAB. PATIENT TOLERATED WELL.
CALLED DANII OCONNOR FOR UPDATE IN PATIENT REPORT. PATIENT WILL GO TO CT AND THEN 
TO FLOOR. VERBALIZED UNDERSTANDING.
CT DELAYED DUE TO CURRENT IV ACCESS. STAFF ATTEMPTING ULTRASOUND IV PLACEMENT 
FOR PROPER IV TO COMPLETE CTA OF CHEST. PATIENT WITH HAVE CT COMPLETED PRIOR TO 
ADMISSION TO FLOOR.
PATIENT SWABBED FOR COVID, TOLERATED POORLY. UPDATED ON PLAN OF CARE.
PATIENT TO CT VIA CT TECH.
PATIENT TRANSFERED TO FLOOR. TOLERATED WELL.
PATIENT TRANSFERRED BACK FROM CT, ULTRASOUND IV IS ALARMING "HIGH PRESSURE" 
WHEN CONTACT IS HOOKED UP TO IV. CT TECH STATED THAT NEW IV SHOULD BE PLACED. 
AWAITING ADDITIONAL SUPPORT STAFF FOR AID WITH IV PLACEMENT. PATIENT IS 
REQUESTING EJ.
PATIENT YELLING IN ROOM, CALL LIGHT WITHIN REACH. PATIENT RE-EDUCATED REGARDING 
CALL LIGHT USE. PATIENT HAS REQUESTED FOOD, UPDATED PATIENT ON PLAN OF CARE.
PT SCREAMING IN ROOM. PATIENT REQUESTED BLANKET, BLANKET OBTAINED FOR PATIENT. 
PATIENT RE-EDUCATED ON CALL LIGHT AND PROPER USE. NO EVIDENCE OF LEARNING 
EXHIBITED. CALL LIGHT IS WITHIN REACH OF PATIENT. WILL CONTINUE TO MONITOR.
SUPPORT STAFF AT BEDSIDE, ULTRASOUND IV PLACED IN RIGHT BICEP. PATIENT 
TOLERATED WELL. CT NOTIFIED OF SUCCESSFUL IV PLACEMENT.
TASK RN: PT BACK [place]. [place] AT THIS TIME WITH 
NADN. EQUAL BILATERAL RISE AND FALL OF CHEST OBSERVED AT THIS TIME. PT VSS AND 
UPDATED IN EMR. ED TECH NICK AT  FOR EKG OF PT. CALL LIGHT IS WITHIN REACH.
TASK RN: PT LABS TUBED TO LAB AT THIS TIME.
TASK RN: PT PLACED ON CARDIAC MONITOR AT THIS TIME.
TASK RN: PT TO CT VIA UBALDO AT THIS TIME. PIV ACCESS ESTABLISHED PRIOR TO PT 
LEAVING FOR CT
Stable

## 2021-03-26 ENCOUNTER — HOSPITAL ENCOUNTER (EMERGENCY)
Dept: HOSPITAL 8 - ED | Age: 51
Discharge: HOME | End: 2021-03-26
Payer: MEDICAID

## 2021-03-26 VITALS — DIASTOLIC BLOOD PRESSURE: 83 MMHG | SYSTOLIC BLOOD PRESSURE: 116 MMHG

## 2021-03-26 VITALS — HEIGHT: 60 IN | WEIGHT: 136.25 LBS | BODY MASS INDEX: 26.75 KG/M2

## 2021-03-26 DIAGNOSIS — J44.9: ICD-10-CM

## 2021-03-26 DIAGNOSIS — R10.13: ICD-10-CM

## 2021-03-26 DIAGNOSIS — R94.5: ICD-10-CM

## 2021-03-26 DIAGNOSIS — K86.0: Primary | ICD-10-CM

## 2021-03-26 DIAGNOSIS — F10.220: ICD-10-CM

## 2021-03-26 DIAGNOSIS — Z88.2: ICD-10-CM
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
## 2021-03-26 LAB
<PLATELET ESTIMATE>: (no result)
<PLT MORPHOLOGY>: (no result)
ALBUMIN SERPL-MCNC: 3.8 G/DL (ref 3.4–5)
ALP SERPL-CCNC: 126 U/L (ref 45–117)
ALT SERPL-CCNC: 113 U/L (ref 12–78)
ANION GAP SERPL CALC-SCNC: 11 MMOL/L (ref 5–15)
BASOPHILS NFR BLD MANUAL: 1 % (ref 0–1)
BASOS#(MANUAL): 0.03 X10^3/UL (ref 0–0.1)
BILIRUB DIRECT SERPL-MCNC: NORMAL MG/DL
BILIRUB SERPL-MCNC: 0.3 MG/DL (ref 0.2–1)
CALCIUM SERPL-MCNC: 8.2 MG/DL (ref 8.5–10.1)
CHLORIDE SERPL-SCNC: 103 MMOL/L (ref 98–107)
CREAT SERPL-MCNC: 0.4 MG/DL (ref 0.55–1.02)
EOS#(MANUAL): 0.03 X10^3/UL (ref 0–0.4)
EOS% (MANUAL): 1 % (ref 1–7)
ERYTHROCYTE [DISTWIDTH] IN BLOOD BY AUTOMATED COUNT: 13.3 % (ref 9.6–15.2)
LYMPH#(MANUAL): 1.5 X10^3/UL (ref 1–3.4)
LYMPHS% (MANUAL): 47 % (ref 22–44)
MCH RBC QN AUTO: 34.5 PG (ref 27–34.8)
MCHC RBC AUTO-ENTMCNC: 34.4 G/DL (ref 32.4–35.8)
MD: YES
MICROSCOPIC: (no result)
MONOS#(MANUAL): 0.45 X10^3/UL (ref 0.3–2.7)
MONOS% (MANUAL): 14 % (ref 2–9)
PLATELET # BLD AUTO: 98 X10^3/UL (ref 130–400)
PMV BLD AUTO: 6.5 FL (ref 7.4–10.4)
PROT SERPL-MCNC: 8 G/DL (ref 6.4–8.2)
RBC # BLD AUTO: 4.11 X10^6/UL (ref 3.82–5.3)
SEG#(MANUAL): 1.18 X10^3/UL (ref 1.8–6.8)
SEGS% (MANUAL): 37 % (ref 42–75)

## 2021-03-26 PROCEDURE — 80053 COMPREHEN METABOLIC PANEL: CPT

## 2021-03-26 PROCEDURE — 85025 COMPLETE CBC W/AUTO DIFF WBC: CPT

## 2021-03-26 PROCEDURE — G0480 DRUG TEST DEF 1-7 CLASSES: HCPCS

## 2021-03-26 PROCEDURE — 99285 EMERGENCY DEPT VISIT HI MDM: CPT

## 2021-03-26 PROCEDURE — 76700 US EXAM ABDOM COMPLETE: CPT

## 2021-03-26 PROCEDURE — 71045 X-RAY EXAM CHEST 1 VIEW: CPT

## 2021-03-26 PROCEDURE — 83690 ASSAY OF LIPASE: CPT

## 2021-03-26 PROCEDURE — 81001 URINALYSIS AUTO W/SCOPE: CPT

## 2021-03-26 PROCEDURE — 80320 DRUG SCREEN QUANTALCOHOLS: CPT

## 2021-03-26 PROCEDURE — 93005 ELECTROCARDIOGRAM TRACING: CPT

## 2021-03-26 PROCEDURE — 36415 COLL VENOUS BLD VENIPUNCTURE: CPT

## 2021-03-26 NOTE — NUR
PT UA OBTAINED AND WALKED TO LAB. PT NAD, RESTING ON GURNEY, TOLERATED 
PROCEDURE WELL. US AT BEDSIDE AND LAB AT BEDSIDE, RADS OBTAINED, NO CHANGE IN 
CONDITION AT THIS TIME. WCTM

## 2021-03-26 NOTE — NUR
PT C/O N/V, ERP AWARE AND ORDERS PLACED, PT REFUSED ZOFRAN SAYING "THAT NEVER 
WORKS, I ONLY WANT PHENERGAN." ERP AWARE. NAD, WCTM, AWAITING TEST RESULTS.

## 2021-03-26 NOTE — NUR
Opening note: Pt lying in bed, A&O x4, speaking in full sentences and asking 
when she is able to go home. CMS intact. Pt given water per request. Will 
complete road test prior to d/c. VSS.

## 2021-03-26 NOTE — NUR
PT BIB EMS FOR ABDOMINAL PAIN. PT STATES SHE JUST GOT OUT OF THE HOSPITAL FOR 
COVID/PNA AND NOW CANT BREATHE BUT ALSO HAS ABDOMINAL PAIN THAT GOES ACROSS 
LOWER ABDOMEN AND ACROSS SPINE. PT CHANGED OUT OF URINE SOAKED CLOTHING AND 
INTO GOWN, PLACED ON SPO2/BP/ECG MONITORING AT THIS TIME. 



CARI MAYEN AT BS FOR EVAL AND POC. WCTM

## 2021-04-15 ENCOUNTER — HOSPITAL ENCOUNTER (EMERGENCY)
Dept: HOSPITAL 8 - ED | Age: 51
Discharge: HOME | End: 2021-04-15
Payer: MEDICAID

## 2021-04-15 VITALS — HEIGHT: 64 IN | BODY MASS INDEX: 23.07 KG/M2 | WEIGHT: 135.14 LBS

## 2021-04-15 VITALS — SYSTOLIC BLOOD PRESSURE: 148 MMHG | DIASTOLIC BLOOD PRESSURE: 92 MMHG

## 2021-04-15 DIAGNOSIS — J44.9: ICD-10-CM

## 2021-04-15 DIAGNOSIS — K85.20: Primary | ICD-10-CM

## 2021-04-15 DIAGNOSIS — F17.210: ICD-10-CM

## 2021-04-15 DIAGNOSIS — Z72.9: ICD-10-CM

## 2021-04-15 DIAGNOSIS — R00.0: ICD-10-CM

## 2021-04-15 DIAGNOSIS — R11.2: ICD-10-CM
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
## 2021-04-15 DIAGNOSIS — R10.9: ICD-10-CM

## 2021-04-15 DIAGNOSIS — R06.00: ICD-10-CM

## 2021-04-15 DIAGNOSIS — R91.1: ICD-10-CM

## 2021-04-15 DIAGNOSIS — R50.9: ICD-10-CM

## 2021-04-15 DIAGNOSIS — F10.229: ICD-10-CM

## 2021-04-15 DIAGNOSIS — R07.89: ICD-10-CM

## 2021-04-15 DIAGNOSIS — R06.02: ICD-10-CM

## 2021-04-15 DIAGNOSIS — R05: ICD-10-CM

## 2021-04-15 DIAGNOSIS — R10.13: ICD-10-CM

## 2021-04-15 LAB
<PLATELET ESTIMATE>: (no result)
<PLT MORPHOLOGY>: (no result)
ALBUMIN SERPL-MCNC: 3.7 G/DL (ref 3.4–5)
ALP SERPL-CCNC: 184 U/L (ref 45–117)
ALT SERPL-CCNC: 157 U/L (ref 12–78)
ANION GAP SERPL CALC-SCNC: 12 MMOL/L (ref 5–15)
BASOPHILS NFR BLD MANUAL: 2 % (ref 0–1)
BASOS#(MANUAL): 0.07 X10^3/UL (ref 0–0.1)
BILIRUB SERPL-MCNC: 0.8 MG/DL (ref 0.2–1)
CALCIUM SERPL-MCNC: 8.4 MG/DL (ref 8.5–10.1)
CHLORIDE SERPL-SCNC: 103 MMOL/L (ref 98–107)
CREAT SERPL-MCNC: 0.49 MG/DL (ref 0.55–1.02)
EOS#(MANUAL): 0.07 X10^3/UL (ref 0–0.4)
EOS% (MANUAL): 2 % (ref 1–7)
ERYTHROCYTE [DISTWIDTH] IN BLOOD BY AUTOMATED COUNT: 14.5 % (ref 9.6–15.2)
LYMPH#(MANUAL): 1.51 X10^3/UL (ref 1–3.4)
LYMPHS% (MANUAL): 42 % (ref 22–44)
MACROCYTES BLD QL SMEAR: (no result)
MCH RBC QN AUTO: 34 PG (ref 27–34.8)
MCHC RBC AUTO-ENTMCNC: 34.2 G/DL (ref 32.4–35.8)
MD: YES
MONOS#(MANUAL): 0.43 X10^3/UL (ref 0.3–2.7)
MONOS% (MANUAL): 12 % (ref 2–9)
MYELOCYTES# (MANUAL): 0.04 X10^3/UL (ref 0–0)
MYELOCYTES% (MANUAL): 1 % (ref 0–0)
PLATELET # BLD AUTO: 73 X10^3/UL (ref 130–400)
PMV BLD AUTO: 7.2 FL (ref 7.4–10.4)
PROT SERPL-MCNC: 7.8 G/DL (ref 6.4–8.2)
RBC # BLD AUTO: 4.33 X10^6/UL (ref 3.82–5.3)
REACTIVE LYMPHS # (MANUAL): 0.43 X10^3/UL (ref 0–0)
REACTIVE LYMPHS % (MANUAL): 12 % (ref 0–0)
SEG#(MANUAL): 1.04 X10^3/UL (ref 1.8–6.8)
SEGS% (MANUAL): 29 % (ref 42–75)
TROPONIN I SERPL-MCNC: < 0.015 NG/ML (ref 0–0.04)

## 2021-04-15 PROCEDURE — 80053 COMPREHEN METABOLIC PANEL: CPT

## 2021-04-15 PROCEDURE — 71275 CT ANGIOGRAPHY CHEST: CPT

## 2021-04-15 PROCEDURE — 84484 ASSAY OF TROPONIN QUANT: CPT

## 2021-04-15 PROCEDURE — 74176 CT ABD & PELVIS W/O CONTRAST: CPT

## 2021-04-15 PROCEDURE — 99285 EMERGENCY DEPT VISIT HI MDM: CPT

## 2021-04-15 PROCEDURE — 93005 ELECTROCARDIOGRAM TRACING: CPT

## 2021-04-15 PROCEDURE — 36415 COLL VENOUS BLD VENIPUNCTURE: CPT

## 2021-04-15 PROCEDURE — 83690 ASSAY OF LIPASE: CPT

## 2021-04-15 PROCEDURE — 71045 X-RAY EXAM CHEST 1 VIEW: CPT

## 2021-04-15 PROCEDURE — 85025 COMPLETE CBC W/AUTO DIFF WBC: CPT

## 2021-04-15 NOTE — NUR
PT STATES THAT SHE IS STARTING TO GET SHAKY, AND FEELS SHE IS DETOXING, AND 
NEEDS TO DETOX.  PT STATE SHE WANTS HELP, BUT IF SHE GOES HOME, SHE WILL 
PROBABLY JUST START DRINKING AGAIN.  MD MADE AWARE.

## 2021-04-15 NOTE — NUR
BIB EMS FROM HOME FOR SOB THE PAST 2 DAYS. DC FROM [facility] 12 DAYS AGO FOR 
COVID. RUQ ABD PAIN "I HAVE PANCREATITIS" PT ON 2.5L HOME O2. + ETOH. PT IN BED 
IN GOWN WITH CONT SPO2, 2.5L NC PER HOME O2, CARDIAC MONITOR, SPO2, BP Q 30 
MIN. WENT OVER PLAN OF CARE FROM ORDER LIST, AGREES TO POC. PT SLURRING HER 
WORDS, ABUNDANT ETOH ODER IN ROOM. MANY ATTEMPTS TO START IV. IAN FOY IN ROOM 
WITH US TO START IV.

## 2021-04-15 NOTE — NUR
PT SLEEPING, AND WHEN SHE WAKES UP SHE HITS THE CALL BELL.  AWAITING RESULTS OF 
ALL TESTS FOR RECHECK.  PT IN NO ACUTE DISTRESS, WARM BLANKETS PROVIDED AND PT 
SLEEPING.

## 2021-04-15 NOTE — NUR
pt appears intoxicated.  in bed with nc on 2 lpm. pt awake and alert, no 
distress.  requested warm blankets and provided with 2. pt states she feels 
better.  1 liter NS bag hung to run over 1hour.

## 2021-04-17 ENCOUNTER — HOSPITAL ENCOUNTER (INPATIENT)
Facility: MEDICAL CENTER | Age: 51
LOS: 5 days | DRG: 439 | End: 2021-04-22
Attending: EMERGENCY MEDICINE | Admitting: INTERNAL MEDICINE
Payer: MEDICAID

## 2021-04-17 DIAGNOSIS — F10.230 ALCOHOL DEPENDENCE WITH UNCOMPLICATED WITHDRAWAL (HCC): ICD-10-CM

## 2021-04-17 DIAGNOSIS — R53.81 DEBILITY: ICD-10-CM

## 2021-04-17 DIAGNOSIS — J44.9 CHRONIC OBSTRUCTIVE PULMONARY DISEASE, UNSPECIFIED COPD TYPE (HCC): ICD-10-CM

## 2021-04-17 DIAGNOSIS — F10.930 ALCOHOL WITHDRAWAL SYNDROME WITHOUT COMPLICATION (HCC): ICD-10-CM

## 2021-04-17 DIAGNOSIS — I99.9 IMPAIRED CIRCULATION: ICD-10-CM

## 2021-04-17 DIAGNOSIS — K85.00 IDIOPATHIC ACUTE PANCREATITIS WITHOUT INFECTION OR NECROSIS: ICD-10-CM

## 2021-04-17 DIAGNOSIS — G61.82 MULTIFOCAL MOTOR NEUROPATHY (HCC): ICD-10-CM

## 2021-04-17 DIAGNOSIS — J41.0 SIMPLE CHRONIC BRONCHITIS (HCC): ICD-10-CM

## 2021-04-17 PROBLEM — F12.10 CANNABIS ABUSE: Chronic | Status: ACTIVE | Noted: 2021-04-17

## 2021-04-17 LAB
ABO GROUP BLD: NORMAL
ALBUMIN SERPL BCP-MCNC: 4.5 G/DL (ref 3.2–4.9)
ALBUMIN/GLOB SERPL: 1.6 G/DL
ALP SERPL-CCNC: 186 U/L (ref 30–99)
ALT SERPL-CCNC: 126 U/L (ref 2–50)
ANION GAP SERPL CALC-SCNC: 17 MMOL/L (ref 7–16)
AST SERPL-CCNC: 267 U/L (ref 12–45)
BASOPHILS # BLD AUTO: 1 % (ref 0–1.8)
BASOPHILS # BLD: 0.03 K/UL (ref 0–0.12)
BILIRUB SERPL-MCNC: 0.7 MG/DL (ref 0.1–1.5)
BLD GP AB SCN SERPL QL: NORMAL
BUN SERPL-MCNC: 8 MG/DL (ref 8–22)
CALCIUM SERPL-MCNC: 8.2 MG/DL (ref 8.5–10.5)
CHLORIDE SERPL-SCNC: 100 MMOL/L (ref 96–112)
CO2 SERPL-SCNC: 26 MMOL/L (ref 20–33)
CREAT SERPL-MCNC: 0.3 MG/DL (ref 0.5–1.4)
EKG IMPRESSION: NORMAL
EOSINOPHIL # BLD AUTO: 0.03 K/UL (ref 0–0.51)
EOSINOPHIL NFR BLD: 1 % (ref 0–6.9)
ERYTHROCYTE [DISTWIDTH] IN BLOOD BY AUTOMATED COUNT: 49.2 FL (ref 35.9–50)
ETHANOL BLD-MCNC: 457.5 MG/DL (ref 0–10)
FLUAV RNA SPEC QL NAA+PROBE: NEGATIVE
FLUBV RNA SPEC QL NAA+PROBE: NEGATIVE
GLOBULIN SER CALC-MCNC: 2.9 G/DL (ref 1.9–3.5)
GLUCOSE SERPL-MCNC: 113 MG/DL (ref 65–99)
HCT VFR BLD AUTO: 41 % (ref 37–47)
HGB BLD-MCNC: 13.8 G/DL (ref 12–16)
IMM GRANULOCYTES # BLD AUTO: 0.02 K/UL (ref 0–0.11)
IMM GRANULOCYTES NFR BLD AUTO: 0.7 % (ref 0–0.9)
LACTATE BLD-SCNC: 2.2 MMOL/L (ref 0.5–2)
LACTATE BLD-SCNC: 2.3 MMOL/L (ref 0.5–2)
LACTATE BLD-SCNC: 2.7 MMOL/L (ref 0.5–2)
LIPASE SERPL-CCNC: 211 U/L (ref 11–82)
LYMPHOCYTES # BLD AUTO: 1.94 K/UL (ref 1–4.8)
LYMPHOCYTES NFR BLD: 63.8 % (ref 22–41)
MAGNESIUM SERPL-MCNC: 1.6 MG/DL (ref 1.5–2.5)
MCH RBC QN AUTO: 33.7 PG (ref 27–33)
MCHC RBC AUTO-ENTMCNC: 33.7 G/DL (ref 33.6–35)
MCV RBC AUTO: 100 FL (ref 81.4–97.8)
MONOCYTES # BLD AUTO: 0.35 K/UL (ref 0–0.85)
MONOCYTES NFR BLD AUTO: 11.5 % (ref 0–13.4)
NEUTROPHILS # BLD AUTO: 0.67 K/UL (ref 2–7.15)
NEUTROPHILS NFR BLD: 22 % (ref 44–72)
NRBC # BLD AUTO: 0 K/UL
NRBC BLD-RTO: 0 /100 WBC
PLATELET # BLD AUTO: 45 K/UL (ref 164–446)
PMV BLD AUTO: 9.4 FL (ref 9–12.9)
POTASSIUM SERPL-SCNC: 3.6 MMOL/L (ref 3.6–5.5)
PROT SERPL-MCNC: 7.4 G/DL (ref 6–8.2)
RBC # BLD AUTO: 4.1 M/UL (ref 4.2–5.4)
RH BLD: NORMAL
RSV RNA SPEC QL NAA+PROBE: NEGATIVE
SARS-COV-2 RNA RESP QL NAA+PROBE: NOTDETECTED
SODIUM SERPL-SCNC: 143 MMOL/L (ref 135–145)
SPECIMEN SOURCE: NORMAL
TROPONIN T SERPL-MCNC: 12 NG/L (ref 6–19)
TROPONIN T SERPL-MCNC: 15 NG/L (ref 6–19)
WBC # BLD AUTO: 3 K/UL (ref 4.8–10.8)

## 2021-04-17 PROCEDURE — 700102 HCHG RX REV CODE 250 W/ 637 OVERRIDE(OP): Performed by: STUDENT IN AN ORGANIZED HEALTH CARE EDUCATION/TRAINING PROGRAM

## 2021-04-17 PROCEDURE — 83605 ASSAY OF LACTIC ACID: CPT

## 2021-04-17 PROCEDURE — 85025 COMPLETE CBC W/AUTO DIFF WBC: CPT

## 2021-04-17 PROCEDURE — 700105 HCHG RX REV CODE 258: Performed by: EMERGENCY MEDICINE

## 2021-04-17 PROCEDURE — C9803 HOPD COVID-19 SPEC COLLECT: HCPCS | Performed by: EMERGENCY MEDICINE

## 2021-04-17 PROCEDURE — 700101 HCHG RX REV CODE 250: Performed by: STUDENT IN AN ORGANIZED HEALTH CARE EDUCATION/TRAINING PROGRAM

## 2021-04-17 PROCEDURE — 0241U HCHG SARS-COV-2 COVID-19 NFCT DS RESP RNA 4 TRGT MIC: CPT

## 2021-04-17 PROCEDURE — 82077 ASSAY SPEC XCP UR&BREATH IA: CPT

## 2021-04-17 PROCEDURE — 99223 1ST HOSP IP/OBS HIGH 75: CPT | Mod: GC | Performed by: INTERNAL MEDICINE

## 2021-04-17 PROCEDURE — 86900 BLOOD TYPING SEROLOGIC ABO: CPT

## 2021-04-17 PROCEDURE — 99285 EMERGENCY DEPT VISIT HI MDM: CPT

## 2021-04-17 PROCEDURE — 86850 RBC ANTIBODY SCREEN: CPT

## 2021-04-17 PROCEDURE — 700111 HCHG RX REV CODE 636 W/ 250 OVERRIDE (IP): Performed by: STUDENT IN AN ORGANIZED HEALTH CARE EDUCATION/TRAINING PROGRAM

## 2021-04-17 PROCEDURE — 700105 HCHG RX REV CODE 258: Performed by: STUDENT IN AN ORGANIZED HEALTH CARE EDUCATION/TRAINING PROGRAM

## 2021-04-17 PROCEDURE — 36415 COLL VENOUS BLD VENIPUNCTURE: CPT

## 2021-04-17 PROCEDURE — 770020 HCHG ROOM/CARE - TELE (206)

## 2021-04-17 PROCEDURE — 93005 ELECTROCARDIOGRAM TRACING: CPT | Performed by: STUDENT IN AN ORGANIZED HEALTH CARE EDUCATION/TRAINING PROGRAM

## 2021-04-17 PROCEDURE — HZ2ZZZZ DETOXIFICATION SERVICES FOR SUBSTANCE ABUSE TREATMENT: ICD-10-PCS | Performed by: INTERNAL MEDICINE

## 2021-04-17 PROCEDURE — 80053 COMPREHEN METABOLIC PANEL: CPT

## 2021-04-17 PROCEDURE — 83735 ASSAY OF MAGNESIUM: CPT

## 2021-04-17 PROCEDURE — 86901 BLOOD TYPING SEROLOGIC RH(D): CPT

## 2021-04-17 PROCEDURE — 700111 HCHG RX REV CODE 636 W/ 250 OVERRIDE (IP): Performed by: EMERGENCY MEDICINE

## 2021-04-17 PROCEDURE — A9270 NON-COVERED ITEM OR SERVICE: HCPCS | Performed by: STUDENT IN AN ORGANIZED HEALTH CARE EDUCATION/TRAINING PROGRAM

## 2021-04-17 PROCEDURE — 96375 TX/PRO/DX INJ NEW DRUG ADDON: CPT

## 2021-04-17 PROCEDURE — 93005 ELECTROCARDIOGRAM TRACING: CPT | Performed by: EMERGENCY MEDICINE

## 2021-04-17 PROCEDURE — 84484 ASSAY OF TROPONIN QUANT: CPT

## 2021-04-17 PROCEDURE — 83690 ASSAY OF LIPASE: CPT

## 2021-04-17 PROCEDURE — 96365 THER/PROPH/DIAG IV INF INIT: CPT

## 2021-04-17 RX ORDER — SODIUM CHLORIDE, SODIUM LACTATE, POTASSIUM CHLORIDE, CALCIUM CHLORIDE 600; 310; 30; 20 MG/100ML; MG/100ML; MG/100ML; MG/100ML
INJECTION, SOLUTION INTRAVENOUS CONTINUOUS
Status: DISCONTINUED | OUTPATIENT
Start: 2021-04-17 | End: 2021-04-20

## 2021-04-17 RX ORDER — LORAZEPAM 2 MG/ML
0.5 INJECTION INTRAMUSCULAR EVERY 4 HOURS PRN
Status: DISCONTINUED | OUTPATIENT
Start: 2021-04-17 | End: 2021-04-21

## 2021-04-17 RX ORDER — LORAZEPAM 2 MG/1
4 TABLET ORAL
Status: DISCONTINUED | OUTPATIENT
Start: 2021-04-17 | End: 2021-04-21

## 2021-04-17 RX ORDER — POLYETHYLENE GLYCOL 3350 17 G/17G
1 POWDER, FOR SOLUTION ORAL
Status: DISCONTINUED | OUTPATIENT
Start: 2021-04-17 | End: 2021-04-22 | Stop reason: HOSPADM

## 2021-04-17 RX ORDER — AMOXICILLIN 250 MG
2 CAPSULE ORAL 2 TIMES DAILY
Status: DISCONTINUED | OUTPATIENT
Start: 2021-04-17 | End: 2021-04-22 | Stop reason: HOSPADM

## 2021-04-17 RX ORDER — MORPHINE SULFATE 4 MG/ML
1 INJECTION, SOLUTION INTRAMUSCULAR; INTRAVENOUS
Status: DISCONTINUED | OUTPATIENT
Start: 2021-04-17 | End: 2021-04-22

## 2021-04-17 RX ORDER — FOLIC ACID 1 MG/1
1 TABLET ORAL DAILY
Status: DISPENSED | OUTPATIENT
Start: 2021-04-18 | End: 2021-04-22

## 2021-04-17 RX ORDER — LORAZEPAM 2 MG/ML
1.5 INJECTION INTRAMUSCULAR
Status: DISCONTINUED | OUTPATIENT
Start: 2021-04-17 | End: 2021-04-21

## 2021-04-17 RX ORDER — MORPHINE SULFATE 4 MG/ML
4 INJECTION, SOLUTION INTRAMUSCULAR; INTRAVENOUS ONCE
Status: COMPLETED | OUTPATIENT
Start: 2021-04-17 | End: 2021-04-17

## 2021-04-17 RX ORDER — POTASSIUM CHLORIDE 20 MEQ/1
40 TABLET, EXTENDED RELEASE ORAL ONCE
Status: ACTIVE | OUTPATIENT
Start: 2021-04-17 | End: 2021-04-18

## 2021-04-17 RX ORDER — GAUZE BANDAGE 2" X 2"
100 BANDAGE TOPICAL DAILY
Status: DISPENSED | OUTPATIENT
Start: 2021-04-18 | End: 2021-04-22

## 2021-04-17 RX ORDER — LORAZEPAM 1 MG/1
1 TABLET ORAL EVERY 4 HOURS PRN
Status: DISCONTINUED | OUTPATIENT
Start: 2021-04-17 | End: 2021-04-21

## 2021-04-17 RX ORDER — LORAZEPAM 2 MG/1
2 TABLET ORAL
Status: DISCONTINUED | OUTPATIENT
Start: 2021-04-17 | End: 2021-04-21

## 2021-04-17 RX ORDER — OMEPRAZOLE 20 MG/1
40 CAPSULE, DELAYED RELEASE ORAL DAILY
Status: DISCONTINUED | OUTPATIENT
Start: 2021-04-17 | End: 2021-04-22 | Stop reason: HOSPADM

## 2021-04-17 RX ORDER — LORAZEPAM 0.5 MG/1
0.5 TABLET ORAL EVERY 4 HOURS PRN
Status: DISCONTINUED | OUTPATIENT
Start: 2021-04-17 | End: 2021-04-21

## 2021-04-17 RX ORDER — GABAPENTIN 300 MG/1
300 CAPSULE ORAL DAILY
Status: DISCONTINUED | OUTPATIENT
Start: 2021-04-17 | End: 2021-04-19

## 2021-04-17 RX ORDER — BISACODYL 10 MG
10 SUPPOSITORY, RECTAL RECTAL
Status: DISCONTINUED | OUTPATIENT
Start: 2021-04-17 | End: 2021-04-22 | Stop reason: HOSPADM

## 2021-04-17 RX ORDER — LORAZEPAM 2 MG/ML
1 INJECTION INTRAMUSCULAR
Status: DISCONTINUED | OUTPATIENT
Start: 2021-04-17 | End: 2021-04-21

## 2021-04-17 RX ORDER — SODIUM CHLORIDE 9 MG/ML
1000 INJECTION, SOLUTION INTRAVENOUS ONCE
Status: COMPLETED | OUTPATIENT
Start: 2021-04-17 | End: 2021-04-17

## 2021-04-17 RX ORDER — ONDANSETRON 2 MG/ML
4 INJECTION INTRAMUSCULAR; INTRAVENOUS ONCE
Status: COMPLETED | OUTPATIENT
Start: 2021-04-17 | End: 2021-04-17

## 2021-04-17 RX ORDER — LORAZEPAM 2 MG/ML
2 INJECTION INTRAMUSCULAR
Status: DISCONTINUED | OUTPATIENT
Start: 2021-04-17 | End: 2021-04-21

## 2021-04-17 RX ORDER — PROCHLORPERAZINE EDISYLATE 5 MG/ML
10 INJECTION INTRAMUSCULAR; INTRAVENOUS EVERY 6 HOURS PRN
Status: DISCONTINUED | OUTPATIENT
Start: 2021-04-17 | End: 2021-04-22 | Stop reason: HOSPADM

## 2021-04-17 RX ADMIN — SODIUM CHLORIDE, POTASSIUM CHLORIDE, SODIUM LACTATE AND CALCIUM CHLORIDE: 600; 310; 30; 20 INJECTION, SOLUTION INTRAVENOUS at 13:53

## 2021-04-17 RX ADMIN — THIAMINE HYDROCHLORIDE: 100 INJECTION, SOLUTION INTRAMUSCULAR; INTRAVENOUS at 14:10

## 2021-04-17 RX ADMIN — LORAZEPAM 1.5 MG: 2 INJECTION INTRAMUSCULAR; INTRAVENOUS at 23:55

## 2021-04-17 RX ADMIN — SODIUM CHLORIDE 1000 ML: 9 INJECTION, SOLUTION INTRAVENOUS at 12:06

## 2021-04-17 RX ADMIN — ONDANSETRON 4 MG: 2 INJECTION INTRAMUSCULAR; INTRAVENOUS at 12:34

## 2021-04-17 RX ADMIN — MORPHINE SULFATE 4 MG: 4 INJECTION INTRAVENOUS at 12:34

## 2021-04-17 RX ADMIN — MORPHINE SULFATE 1 MG: 4 INJECTION INTRAVENOUS at 21:35

## 2021-04-17 RX ADMIN — MORPHINE SULFATE 1 MG: 4 INJECTION INTRAVENOUS at 23:54

## 2021-04-17 RX ADMIN — PROCHLORPERAZINE EDISYLATE 10 MG: 5 INJECTION INTRAMUSCULAR; INTRAVENOUS at 23:54

## 2021-04-17 RX ADMIN — LORAZEPAM 0.5 MG: 0.5 TABLET ORAL at 21:35

## 2021-04-17 ASSESSMENT — ENCOUNTER SYMPTOMS
COUGH: 0
NAUSEA: 1
VOMITING: 1
HEADACHES: 0
FOCAL WEAKNESS: 0
MEMORY LOSS: 0
SEIZURES: 0
FEVER: 1
HALLUCINATIONS: 0
CLAUDICATION: 0
DIZZINESS: 0
BLOOD IN STOOL: 0
SHORTNESS OF BREATH: 0
CHILLS: 1
SPEECH CHANGE: 1
DOUBLE VISION: 0
WEAKNESS: 1
WHEEZING: 0
BLURRED VISION: 0
TINGLING: 1
DIAPHORESIS: 1
NERVOUS/ANXIOUS: 1
HEMOPTYSIS: 0
SORE THROAT: 0
FALLS: 0
SENSORY CHANGE: 1
MYALGIAS: 1

## 2021-04-17 ASSESSMENT — LIFESTYLE VARIABLES
AUDITORY DISTURBANCES: NOT PRESENT
ORIENTATION AND CLOUDING OF SENSORIUM: ORIENTED AND CAN DO SERIAL ADDITIONS
VISUAL DISTURBANCES: NOT PRESENT
TOTAL SCORE: 4
AUDITORY DISTURBANCES: NOT PRESENT
EVER HAD A DRINK FIRST THING IN THE MORNING TO STEADY YOUR NERVES TO GET RID OF A HANGOVER: YES
AVERAGE NUMBER OF DAYS PER WEEK YOU HAVE A DRINK CONTAINING ALCOHOL: 7
DOES PATIENT WANT TO STOP DRINKING: NO
PAROXYSMAL SWEATS: NO SWEAT VISIBLE
EVER FELT BAD OR GUILTY ABOUT YOUR DRINKING: YES
TOTAL SCORE: 4
TOTAL SCORE: MODERATE ITCHING, PINS AND NEEDLES SENSATION, BURNING OR NUMBNESS
TREMOR: *
AGITATION: NORMAL ACTIVITY
HAVE YOU EVER FELT YOU SHOULD CUT DOWN ON YOUR DRINKING: YES
NAUSEA AND VOMITING: NO NAUSEA AND NO VOMITING
HAVE PEOPLE ANNOYED YOU BY CRITICIZING YOUR DRINKING: YES
TOTAL SCORE: 6
EVER FELT BAD OR GUILTY ABOUT YOUR DRINKING: YES
ANXIETY: *
TOTAL SCORE: 4
DOES PATIENT WANT TO STOP DRINKING: NO
NAUSEA AND VOMITING: MILD NAUSEA WITH NO VOMITING
ANXIETY: NO ANXIETY (AT EASE)
PAROXYSMAL SWEATS: *
ANXIETY: MILDLY ANXIOUS
HEADACHE, FULLNESS IN HEAD: EXTREMELY SEVERE
AVERAGE NUMBER OF DAYS PER WEEK YOU HAVE A DRINK CONTAINING ALCOHOL: 7
NAUSEA AND VOMITING: *
TOTAL SCORE: 4
VISUAL DISTURBANCES: NOT PRESENT
ALCOHOL_USE: YES
TOTAL SCORE: 4
VISUAL DISTURBANCES: NOT PRESENT
EVER HAD A DRINK FIRST THING IN THE MORNING TO STEADY YOUR NERVES TO GET RID OF A HANGOVER: YES
TREMOR: NO TREMOR
CONSUMPTION TOTAL: POSITIVE
TOTAL SCORE: 4
TOTAL SCORE: 4
AGITATION: SOMEWHAT MORE THAN NORMAL ACTIVITY
ANXIETY: NO ANXIETY (AT EASE)
NAUSEA AND VOMITING: *
HAVE PEOPLE ANNOYED YOU BY CRITICIZING YOUR DRINKING: YES
TREMOR: *
AUDITORY DISTURBANCES: NOT PRESENT
HOW MANY TIMES IN THE PAST YEAR HAVE YOU HAD 5 OR MORE DRINKS IN A DAY: 360
TREMOR: NO TREMOR
PAROXYSMAL SWEATS: NO SWEAT VISIBLE
VISUAL DISTURBANCES: NOT PRESENT
HEADACHE, FULLNESS IN HEAD: NOT PRESENT
TOTAL SCORE: 24
AUDITORY DISTURBANCES: NOT PRESENT
ON A TYPICAL DAY WHEN YOU DRINK ALCOHOL HOW MANY DRINKS DO YOU HAVE: 9
ORIENTATION AND CLOUDING OF SENSORIUM: ORIENTED AND CAN DO SERIAL ADDITIONS
HEADACHE, FULLNESS IN HEAD: NOT PRESENT
ORIENTATION AND CLOUDING OF SENSORIUM: DATE DISORIENTATION BY MORE THAN TWO CALENDAR DAYS
SUBSTANCE_ABUSE: 1
HAVE YOU EVER FELT YOU SHOULD CUT DOWN ON YOUR DRINKING: YES
ORIENTATION AND CLOUDING OF SENSORIUM: DATE DISORIENTATION BY MORE THAN TWO CALENDAR DAYS
AGITATION: *
CONSUMPTION TOTAL: INCOMPLETE
HEADACHE, FULLNESS IN HEAD: NOT PRESENT
PAROXYSMAL SWEATS: NO SWEAT VISIBLE
TOTAL SCORE: 4
DO YOU DRINK ALCOHOL: YES
AGITATION: SOMEWHAT MORE THAN NORMAL ACTIVITY

## 2021-04-17 ASSESSMENT — PAIN DESCRIPTION - PAIN TYPE
TYPE: ACUTE PAIN

## 2021-04-17 ASSESSMENT — COGNITIVE AND FUNCTIONAL STATUS - GENERAL
MOVING FROM LYING ON BACK TO SITTING ON SIDE OF FLAT BED: A LOT
MOBILITY SCORE: 12
MOVING TO AND FROM BED TO CHAIR: A LOT
PERSONAL GROOMING: A LITTLE
TOILETING: A LOT
SUGGESTED CMS G CODE MODIFIER DAILY ACTIVITY: CK
HELP NEEDED FOR BATHING: A LOT
STANDING UP FROM CHAIR USING ARMS: A LOT
WALKING IN HOSPITAL ROOM: A LOT
DAILY ACTIVITIY SCORE: 14
DRESSING REGULAR UPPER BODY CLOTHING: A LOT
DRESSING REGULAR LOWER BODY CLOTHING: A LOT
TURNING FROM BACK TO SIDE WHILE IN FLAT BAD: A LOT
CLIMB 3 TO 5 STEPS WITH RAILING: A LOT
SUGGESTED CMS G CODE MODIFIER MOBILITY: CL
EATING MEALS: A LITTLE

## 2021-04-17 ASSESSMENT — PATIENT HEALTH QUESTIONNAIRE - PHQ9
1. LITTLE INTEREST OR PLEASURE IN DOING THINGS: NOT AT ALL
2. FEELING DOWN, DEPRESSED, IRRITABLE, OR HOPELESS: NOT AT ALL
SUM OF ALL RESPONSES TO PHQ9 QUESTIONS 1 AND 2: 0

## 2021-04-17 ASSESSMENT — PAIN DESCRIPTION - DESCRIPTORS: DESCRIPTORS: SHARP

## 2021-04-17 ASSESSMENT — FIBROSIS 4 INDEX: FIB4 SCORE: 7.49

## 2021-04-17 NOTE — ASSESSMENT & PLAN NOTE
Transaminitis improving  With transaminitis, AST:ALT of 2:1.    -Continue to monitor, trend transaminases

## 2021-04-17 NOTE — ED NOTES
Handoff report given to Mark DENSON. Patient resting on gurney, respirations even and unlabored, NADN. All belongings transferred with patient.

## 2021-04-17 NOTE — ED TRIAGE NOTES
.  Chief Complaint   Patient presents with   • Abdominal Pain     Pt states she has 10/10 upper abdominal pain and was diagnosed with acute pancreatitis at [facility] two days ago and was discharged. Pt also stated she has been diagnosed with pancreatitis 5 times previously.    • Alcohol Intoxication     Per EMS they were called to pt's residense by pt's roommate who stated she is unable to care for herself due to her drinking. She has been unable to make it to the bathroom and has not been eating for the past few days. Pt states she drinks approximatley one pint of Vodka everyday. States she has has tried to quit drinking in the past but was unable to. Pt is A&Ox4, speech is slurred, but answers questions and follows commands appropriately.      BP (P) 141/90   Pulse (P) 90   Temp 36.2 °C (97.1 °F) (Temporal)   Resp (P) 16   Ht 1.524 m (5')   Wt 61.2 kg (135 lb)   SpO2 (P) 92%   BMI 26.37 kg/m²   ERP to see.

## 2021-04-17 NOTE — ED NOTES
Beto from Lab called with critical result of Platelets 45 at 1237. Critical lab result read back to Beto.   Dr. Miller notified of critical lab result at 1237.  Critical lab result read back by Dr. Miller.

## 2021-04-17 NOTE — ASSESSMENT & PLAN NOTE
Chronic, unknown if completely worked up before. No PFTs on file.    Patient will needs PFTs as outpatient.  Consider new home O2 eval prior to d/c if needed

## 2021-04-17 NOTE — ASSESSMENT & PLAN NOTE
Presented with severe abdominal pain, 10/10. Lipase of 211, around 3x upper limit of normal. Due to drinking 1 pint vodka per day. Also with transaminitis. This is especially concerning in light of the fact that the patient's condition is due to alcohol, but patient is self-medicating for the pain with drinking more alcohol. CT [facility] on 4/15 did not show any acute abnormalities. RUQ U/S showed hepatic steatosis.    -IVF resuscitation  -GI soft diet  -PPI omeprazole 40 mg qD  -Morphine IV PRN for pain

## 2021-04-17 NOTE — SENIOR ADMIT NOTE
Senior Admit Note                              Chief complaint:   Abdominal pain, nausea and vomiting.    Brief HPI:  Olya Youssef is a 50 y.o. female with past medical history of recurrent alcoholic pancreatitis, long-term current alcohol abuse, esophagitis and gastritis, alcoholic hepatitis, depression and suicide attempt (in 2018 with Prozac, lisinopril).  Patient presented to the ED on 04/17 with a chief complaint of nausea, vomiting and intermittent epigastric abdominal pain for the past 2 days.        Recent Labs     04/17/21  1157   WBC 3.0*   RBC 4.10*   HEMOGLOBIN 13.8   HEMATOCRIT 41.0   .0*   MCH 33.7*   RDW 49.2   PLATELETCT 45*   MPV 9.4   NEUTSPOLYS 22.00*   LYMPHOCYTES 63.80*   MONOCYTES 11.50   EOSINOPHILS 1.00   BASOPHILS 1.00       Recent Labs     04/17/21  1137   SODIUM 143   POTASSIUM 3.6   CHLORIDE 100   CO2 26   GLUCOSE 113*   BUN 8       Lipase-211    On exam:     Vitals:    04/17/21 1057 04/17/21 1100 04/17/21 1231 04/17/21 1302   BP: 141/90  102/71 140/60   Pulse: 90  (!) 107 (!) 111   Resp: 16 16 14 16   Temp:       TempSrc:       SpO2: 92%  91% 94%   Weight:       Height:         Body mass index is 26.37 kg/m².  /60   Pulse (!) 111   Temp 36.2 °C (97.1 °F) (Temporal)   Resp 16   Ht 1.524 m (5')   Wt 61.2 kg (135 lb)   SpO2 94%   BMI 26.37 kg/m²   O2 therapy: Pulse Oximetry: 94 %, O2 (LPM): 4, O2 Delivery Device: Nasal Cannula    Gen/Neuro: NAD, Moving all extremities, no focal deficits, A&Ox3  Heart/Lungs: RRR, no MGR, lungs clear to auscultation bilaterally.  Abdo/Pelvis: Soft, nondistended, tender to palpation in epigastric area without guarding or rigidity.  Bowel sounds present.  Skin/Ext: No rashes/erythema. No edema, no cyanosis, not tender.    Problem list:   #Acute recurrent pancreatitis secondary to alcohol (acute epigastric pain and elevated lipase 211)  #Alcoholic hepatitis  #Thrombocytopenia  #Alcohol withdrawal  #Depression (with previous history of  suicide attempt in 2018)  #Esophagitis, gastritis.    Plan:   -Admit inpatient medicine.    -Continue telemetry monitoring.  -Start clear liquid diet and advance if stable.  -Aspiration and fall precautions.  -Continue IV fluid hydration  mL/h, s/p 2L of bolus.  -Continue antiemetics as needed.  Continue as needed morphine for pain control.  -Follow lactic acid.  -Continue CIWA protocol.  -Started on rally pack.  Continue thiamine multivitamin as needed.    DVT prophylaxis: SCDs for now due to thrombocytopenia.  Code status: Full code    For complete details, please refer to H&P by Dr. Breezy Nicole M.D.

## 2021-04-17 NOTE — PROGRESS NOTES
Pt transferred to T804-2 on zoll with ACLS RN. Assumed care of pt, received bedside report from JARETT Guillory. Pt sleeping, arousable but lethargic and immediately falls back to sleep without constant stimulation, 4L O2 nasal cannula, respiration are even and unlabored. Fall and safety precautions in place, strip alarm in place. Discussed POC with pt, pt verbalizes understanding. No further needs at this time.

## 2021-04-17 NOTE — ASSESSMENT & PLAN NOTE
Drug cessation counseling >5 minutes, medical team has been discussing this with patient every day since day of admission

## 2021-04-17 NOTE — ED NOTES
Pt started screaming, I need some water im thirsty and I need something for my pain too. erp made aware. Received new order. Also per erp pt may have ice chips.

## 2021-04-17 NOTE — ED PROVIDER NOTES
"ED Provider Note    CHIEF COMPLAINT  Chief Complaint   Patient presents with    Abdominal Pain     Pt states she has 10/10 upper abdominal pain and was diagnosed with acute pancreatitis at [facility] two days ago and was discharged. Pt also stated she has been diagnosed with pancreatitis 5 times previously.     Alcohol Intoxication     Per EMS they were called to pt's residense by pt's roommate who stated she is unable to care for herself due to her drinking. She has been unable to make it to the bathroom and has not been eating for the past few days. Pt states she drinks approximatley one pint of Vodka everyday. States she has has tried to quit drinking in the past but was unable to. Pt is A&Ox4, speech is slurred, but answers questions and follows commands appropriately.        HPI  Olya Youssef is a 50 y.o. female here for evaluation of upper abdominal pain.  Patient says that she has history of pancreatitis \"5 times\" and she states that this is from drinking.  Patient's last drink was earlier this morning, she has upper abdominal pain, that is nonradiating to the back.  She has no chest pain or shortness of breath, she has no fever chills.  Patient has no headache.  Patient does admit to drinking daily, and has no other complaints at this time.      ROS  See HPI for further details, o/w negative.     PAST MEDICAL HISTORY   has a past medical history of Alcohol abuse, Alcoholism (HCC), Anxiety, Backpain, Bipolar disorder, unspecified (HCC), Bronchitis, Drug abuse (HCC), Hepatitis, alcoholic, Hypertension, Indigestion, Infectious disease (MRSA), Liver disease, Pancreatitis chronic, Pneumonia, Psychiatric disorder, Renal disorder, Seizure (HCC), Seizure disorder (HCC), Unspecified hemorrhagic conditions, and Unspecified urinary incontinence.    SOCIAL HISTORY  Social History     Tobacco Use    Smoking status: Never Smoker    Smokeless tobacco: Never Used   Substance and Sexual Activity    Alcohol use: Yes     " Comment: Hx heavy drinking apint in a 24 hr period    Drug use: Yes     Comment: meth/THC    Sexual activity: Not on file       Family History  No bleeding disorders    SURGICAL HISTORY   has a past surgical history that includes gastroscopy (4/28/2015).    CURRENT MEDICATIONS  Home Medications       Reviewed by Nancy Gil, Student (Nurse Apprentice) on 04/17/21 at 1100  Med List Status: <None>     Medication Last Dose Status   FLUoxetine (PROZAC) 10 MG Cap  Active   gabapentin (NEURONTIN) 300 MG Cap  Active                    ALLERGIES  Allergies   Allergen Reactions    Bactrim Hives    Cephalexin [Keflex] Hives    Lamotrigine [Lamictal] Hives    Quetiapine Fumarate Hives     Extrapyramidal Symptoms       REVIEW OF SYSTEMS  See HPI for further details. Review of systems as above, otherwise all other systems are negative.     PHYSICAL EXAM  Constitutional: Well developed, well nourished.  Mild acute distress.  HEENT: Normocephalic, atraumatic. Posterior pharynx clear and moist.  Eyes:  EOMI. Normal sclera.  Neck: Supple, Full range of motion, nontender.  Chest/Pulmonary: clear to ausculation. Symmetrical expansion.   Cardio: Regular rate and rhythm with no murmur.   Abdomen: Soft, mild epigastric tenderness, no peritoneal signs. No guarding. No palpable masses.  Musculoskeletal: No deformity, no edema, neurovascular intact.   Neuro: Clear speech, appropriate, cooperative, cranial nerves II-XII grossly intact.  Psych: Normal mood and affect    Results for orders placed or performed during the hospital encounter of 04/17/21   CBC WITH DIFFERENTIAL   Result Value Ref Range    WBC 3.0 (L) 4.8 - 10.8 K/uL    RBC 4.10 (L) 4.20 - 5.40 M/uL    Hemoglobin 13.8 12.0 - 16.0 g/dL    Hematocrit 41.0 37.0 - 47.0 %    .0 (H) 81.4 - 97.8 fL    MCH 33.7 (H) 27.0 - 33.0 pg    MCHC 33.7 33.6 - 35.0 g/dL    RDW 49.2 35.9 - 50.0 fL    Platelet Count 45 (LL) 164 - 446 K/uL    MPV 9.4 9.0 - 12.9 fL    Neutrophils-Polys 22.00  (L) 44.00 - 72.00 %    Lymphocytes 63.80 (H) 22.00 - 41.00 %    Monocytes 11.50 0.00 - 13.40 %    Eosinophils 1.00 0.00 - 6.90 %    Basophils 1.00 0.00 - 1.80 %    Immature Granulocytes 0.70 0.00 - 0.90 %    Nucleated RBC 0.00 /100 WBC    Neutrophils (Absolute) 0.67 (L) 2.00 - 7.15 K/uL    Lymphs (Absolute) 1.94 1.00 - 4.80 K/uL    Monos (Absolute) 0.35 0.00 - 0.85 K/uL    Eos (Absolute) 0.03 0.00 - 0.51 K/uL    Baso (Absolute) 0.03 0.00 - 0.12 K/uL    Immature Granulocytes (abs) 0.02 0.00 - 0.11 K/uL    NRBC (Absolute) 0.00 K/uL   COMP METABOLIC PANEL   Result Value Ref Range    Sodium 143 135 - 145 mmol/L    Potassium 3.6 3.6 - 5.5 mmol/L    Chloride 100 96 - 112 mmol/L    Co2 26 20 - 33 mmol/L    Anion Gap 17.0 (H) 7.0 - 16.0    Glucose 113 (H) 65 - 99 mg/dL    Bun 8 8 - 22 mg/dL    Creatinine 0.30 (L) 0.50 - 1.40 mg/dL    Calcium 8.2 (L) 8.5 - 10.5 mg/dL    AST(SGOT) 267 (H) 12 - 45 U/L    ALT(SGPT) 126 (H) 2 - 50 U/L    Alkaline Phosphatase 186 (H) 30 - 99 U/L    Total Bilirubin 0.7 0.1 - 1.5 mg/dL    Albumin 4.5 3.2 - 4.9 g/dL    Total Protein 7.4 6.0 - 8.2 g/dL    Globulin 2.9 1.9 - 3.5 g/dL    A-G Ratio 1.6 g/dL   LIPASE   Result Value Ref Range    Lipase 211 (H) 11 - 82 U/L   TROPONIN   Result Value Ref Range    Troponin T 15 6 - 19 ng/L   ESTIMATED GFR   Result Value Ref Range    GFR If African American >60 >60 mL/min/1.73 m 2    GFR If Non African American >60 >60 mL/min/1.73 m 2   COV-2, FLU A/B, AND RSV BY PCR (2-4 HOURS CEPHEID): Collect NP swab in VTM    Specimen: Respirate   Result Value Ref Range    SARS-CoV-2 Source NP Swab    EKG   Result Value Ref Range    Report       [facility] Emergency Dept.    Test Date:  2021  Pt Name:    MANAS DELGADO              Department: ER  MRN:        [id number]                      Room:        12  Gender:     Female                       Technician: TREY  :        [id number]                   Requested By:DINORAH TONY  Order #:     [id number]                    Reading MD:    Measurements  Intervals                                Axis  Rate:       96                           P:          38  DE:         144                          QRS:        -14  QRSD:       96                           T:          252  QT:         420  QTc:        531    Interpretive Statements  SINUS RHYTHM  INFERIOR INFARCT, AGE INDETERMINATE  NONSPECIFIC T ABNORMALITIES, ANTERIOR LEADS  LATERAL LEADS ARE ALSO INVOLVED  PROLONGED QT INTERVAL  Compared to ECG 01/13/2021 23:23:02  T-wave abnormality now present  Prolonged QT interval now present  Myocardial infarct finding still present       No orders to display     Ekg; nsr 96.  No st elevation, no st depression, qtc 531.    Compared to 1/13/21    PROCEDURES     MEDICAL RECORD  I have reviewed patient's medical record and pertinent results are listed.    COURSE & MEDICAL DECISION MAKING  I have reviewed any medical record information, laboratory studies and radiographic results as noted above.    HYDRATION: Based on the patient's presentation of Dehydration the patient was given IV fluids. IV Hydration was used because oral hydration was not adequate alone. Upon recheck following hydration, the patient was improved.    The pt will be admitted to the hospital.         FINAL IMPRESSION  Pancreatitis       Electronically signed by: Rakesh Miller D.O., 4/17/2021 12:08 PM

## 2021-04-17 NOTE — H&P
"History & Physical Note    Date of Admission: 4/17/2021  Admission Status: Inpatient  [facility] Team: MATTHEW  Attending: Dr. Rinaldi   Senior Resident: Dr. Nicole  Intern: Dr. Friend  Contact Number: [phone]    Chief Complaint:   Chief Complaint   Patient presents with   • Abdominal Pain     Pt states she has 10/10 upper abdominal pain and was diagnosed with acute pancreatitis at [facility] two days ago and was discharged. Pt also stated she has been diagnosed with pancreatitis 5 times previously.    • Alcohol Intoxication     Per EMS they were called to pt's residense by pt's roommate who stated she is unable to care for herself due to her drinking. She has been unable to make it to the bathroom and has not been eating for the past few days. Pt states she drinks approximatley one pint of Vodka everyday. States she has has tried to quit drinking in the past but was unable to. Pt is A&Ox4, speech is slurred, but answers questions and follows commands appropriately.         History of Present Illness (HPI):     Note: Patient is a poor historian.    Olya Youssef is a 50 y.o. female with a PMH of recurrent alcoholic pancreatitis, chronic alcohol abuse, and history of alcohol withdrawals (including seizures), bipolar disorder, history of meth abuse, history of suicide attempts, COVID-19 infection (recovered, out of quarantine), COPD (no PFTs on file, on 2L O2 at home) who presented 4/17/2021 with severe upper abdominal pain and alcohol intoxication. Patient has been drinking 1 pint vodka/day for an indefinite amount of time, and was discharged from [facility] 2 days ago for a hospitalization for alcohol pancreatitis. She had been having intractable vomiting for 3 days (bloody consistency, exact # of emesis unknown). After leaving [facility], she went back to drinking vodka because \"they didn't give me anything to detox, I want to do a detox\". Then had persistent 10/10 upper abdominal pain radiating to her back, which she " tried to self-medicate by drinking more alcohol.  Also she stated that she had a fever  of 101.2 °F, chills, sweating.  States that she did not fall or hit her head, although may have lost consciousness during her drinking. Last drink was yesterday. She presented to the hospital due to worsening abdominal pain.    In the hospital, patient was found to be tachycardic with heart rate in the 100s.  Accompanying symptoms included slurring of speech.  Patient was found to have a platelet count 45, AST of 267, ALT of 126, .  Lipase was 211.  CT abdominal pelvis showed unremarkable pancreas, diverticulosis, hepatic steatosis, 2 mm sized pulmonary nodule in the right middle lobe.  She was then admitted to the hospital for further work-up and treatment.    Review of Systems:   Review of Systems   Constitutional: Positive for chills, diaphoresis, fever and malaise/fatigue.   HENT: Negative for congestion and sore throat.    Eyes: Negative for blurred vision and double vision.   Respiratory: Negative for cough, hemoptysis, shortness of breath and wheezing.    Cardiovascular: Positive for leg swelling. Negative for chest pain and claudication.   Gastrointestinal: Positive for nausea and vomiting. Negative for blood in stool and melena.        Bloody emesis   Genitourinary: Negative for dysuria, frequency and urgency.   Musculoskeletal: Positive for myalgias. Negative for falls.   Neurological: Positive for tingling, sensory change, speech change and weakness. Negative for dizziness, focal weakness, seizures and headaches.   Psychiatric/Behavioral: Positive for substance abuse. Negative for hallucinations, memory loss and suicidal ideas. The patient is nervous/anxious.          Past Medical History:   Past Medical History was reviewed with patient.   has a past medical history of Alcohol abuse, Alcoholism (HCC), Anxiety, Backpain, Bipolar disorder, unspecified (HCC), Bronchitis, Drug abuse (HCC), Hepatitis, alcoholic,  Hypertension, Indigestion, Infectious disease (MRSA), Liver disease, Pancreatitis chronic, Pneumonia, Psychiatric disorder, Renal disorder, Seizure (HCC), Seizure disorder (HCC), Unspecified hemorrhagic conditions, and Unspecified urinary incontinence. She also has no past medical history of Angina, Arrhythmia, Arthritis, CATARACT, Dialysis, Fall, Glaucoma, Heart murmur, Heart valve disease, Jaundice, Myocardial infarct (HCC), Other specified symptom associated with female genital organs, Pacemaker, Personal history of venous thrombosis and embolism, Rheumatic fever, or Unspecified disorder of thyroid.    Past Surgical History: Past Surgical History was reviewed with patient.   has a past surgical history that includes gastroscopy (4/28/2015).    Medications: Medications have been reviewed with patient.  Prior to Admission Medications   Prescriptions Last Dose Informant Patient Reported? Taking?   FLUoxetine (PROZAC) 10 MG Cap >3 days ago at unknown Patient Yes No   Sig: Take 30 mg by mouth every day.   gabapentin (NEURONTIN) 300 MG Cap >3 days ago at unknown Patient Yes No   Sig: Take 300 mg by mouth every day.      Facility-Administered Medications: None        Allergies: Allergies have been reviewed with patient.  Allergies   Allergen Reactions   • Bactrim Hives   • Cephalexin [Keflex] Hives   • Lamotrigine [Lamictal] Hives   • Quetiapine Fumarate Hives     Extrapyramidal Symptoms       Family History:   family history includes Cancer in her mother; Lung Disease in her mother.     Social History:   Tobacco: None   Alcohol: 1 pint vodka/day   Recreational drugs (illegal and prescription):  Marijuana pipe nightly for sleeping   Employment: Unemployed  Activity Level: Performs all ADLs by self, IADLs with help of roommate   Living situation:  Lives w/ roommate  Recent travel:  None  Primary Care Provider: reviewed SALONI Marcial  Other (stressors, spirituality, exposures):  None    Physical Exam:    Vitals:  Temp:  [36.2 °C (97.1 °F)-36.8 °C (98.3 °F)] 36.8 °C (98.3 °F)  Pulse:  [] 95  Resp:  [13-20] 16  BP: (102-141)/(60-90) 108/69  SpO2:  [91 %-95 %] 95 %    Physical Exam  Vitals and nursing note reviewed. Exam conducted with a chaperone present (Dr. Breezy Nicole and APRN student Janiya Callaway at bedside).   Constitutional:       General: She is in acute distress.      Appearance: She is ill-appearing and diaphoretic. She is not toxic-appearing.      Comments: Lethargic, occasionally needing multiple repetitions of questions to answer questions.  Feces underneath fingernails and all over bed   HENT:      Head: Normocephalic and atraumatic.      Right Ear: External ear normal.      Left Ear: External ear normal.      Nose: Nose normal. No congestion or rhinorrhea.      Mouth/Throat:      Mouth: Mucous membranes are dry.      Pharynx: Posterior oropharyngeal erythema present. No oropharyngeal exudate.      Comments: Lips stained with dried blood  Mallampati class 3  Eyes:      General: No scleral icterus.        Right eye: No discharge.         Left eye: No discharge.      Extraocular Movements: Extraocular movements intact.      Conjunctiva/sclera: Conjunctivae normal.      Pupils: Pupils are equal, round, and reactive to light.   Cardiovascular:      Rate and Rhythm: Regular rhythm. Tachycardia present.      Heart sounds: No murmur.   Pulmonary:      Effort: Pulmonary effort is normal. No respiratory distress.      Breath sounds: Normal breath sounds. No wheezing or rales.   Abdominal:      General: Abdomen is flat. There is no distension.      Palpations: Abdomen is soft.      Tenderness: There is abdominal tenderness. There is guarding. There is no right CVA tenderness or left CVA tenderness.      Comments: Abdominal striae present, diffuse abdominal tenderness to palpation in all quadrants   Musculoskeletal:         General: No swelling, tenderness or deformity.      Cervical back: Normal range of  motion and neck supple. No rigidity or tenderness.      Right lower leg: No edema.      Left lower leg: No edema.   Skin:     General: Skin is warm.      Capillary Refill: Capillary refill takes less than 2 seconds.      Coloration: Skin is not jaundiced.      Comments: Callus on left big toe and on ball of right foot   Neurological:      Mental Status: She is oriented to person, place, and time.      Cranial Nerves: No cranial nerve deficit.      Sensory: No sensory deficit.      Motor: Weakness present.      Comments: Patient declined rest of neuro exam   Psychiatric:         Attention and Perception: Perception normal. She is attentive. She does not perceive auditory or visual hallucinations.         Mood and Affect: Affect is blunt.         Speech: Speech is delayed.         Behavior: Behavior is cooperative.         Thought Content: Thought content normal.         Cognition and Memory: Memory is not impaired. She exhibits impaired recent memory.         Labs:   Recent Labs     04/17/21  1157   WBC 3.0*   RBC 4.10*   HEMOGLOBIN 13.8   HEMATOCRIT 41.0   .0*   MCH 33.7*   RDW 49.2   PLATELETCT 45*   MPV 9.4   NEUTSPOLYS 22.00*   LYMPHOCYTES 63.80*   MONOCYTES 11.50   EOSINOPHILS 1.00   BASOPHILS 1.00     Recent Labs     04/17/21  1137   SODIUM 143   POTASSIUM 3.6   CHLORIDE 100   CO2 26   GLUCOSE 113*   BUN 8     Results for orders placed or performed during the hospital encounter of 04/17/21   CBC WITH DIFFERENTIAL   Result Value Ref Range    WBC 3.0 (L) 4.8 - 10.8 K/uL    RBC 4.10 (L) 4.20 - 5.40 M/uL    Hemoglobin 13.8 12.0 - 16.0 g/dL    Hematocrit 41.0 37.0 - 47.0 %    .0 (H) 81.4 - 97.8 fL    MCH 33.7 (H) 27.0 - 33.0 pg    MCHC 33.7 33.6 - 35.0 g/dL    RDW 49.2 35.9 - 50.0 fL    Platelet Count 45 (LL) 164 - 446 K/uL    MPV 9.4 9.0 - 12.9 fL    Neutrophils-Polys 22.00 (L) 44.00 - 72.00 %    Lymphocytes 63.80 (H) 22.00 - 41.00 %    Monocytes 11.50 0.00 - 13.40 %    Eosinophils 1.00 0.00 - 6.90 %     Basophils 1.00 0.00 - 1.80 %    Immature Granulocytes 0.70 0.00 - 0.90 %    Nucleated RBC 0.00 /100 WBC    Neutrophils (Absolute) 0.67 (L) 2.00 - 7.15 K/uL    Lymphs (Absolute) 1.94 1.00 - 4.80 K/uL    Monos (Absolute) 0.35 0.00 - 0.85 K/uL    Eos (Absolute) 0.03 0.00 - 0.51 K/uL    Baso (Absolute) 0.03 0.00 - 0.12 K/uL    Immature Granulocytes (abs) 0.02 0.00 - 0.11 K/uL    NRBC (Absolute) 0.00 K/uL   COMP METABOLIC PANEL   Result Value Ref Range    Sodium 143 135 - 145 mmol/L    Potassium 3.6 3.6 - 5.5 mmol/L    Chloride 100 96 - 112 mmol/L    Co2 26 20 - 33 mmol/L    Anion Gap 17.0 (H) 7.0 - 16.0    Glucose 113 (H) 65 - 99 mg/dL    Bun 8 8 - 22 mg/dL    Creatinine 0.30 (L) 0.50 - 1.40 mg/dL    Calcium 8.2 (L) 8.5 - 10.5 mg/dL    AST(SGOT) 267 (H) 12 - 45 U/L    ALT(SGPT) 126 (H) 2 - 50 U/L    Alkaline Phosphatase 186 (H) 30 - 99 U/L    Total Bilirubin 0.7 0.1 - 1.5 mg/dL    Albumin 4.5 3.2 - 4.9 g/dL    Total Protein 7.4 6.0 - 8.2 g/dL    Globulin 2.9 1.9 - 3.5 g/dL    A-G Ratio 1.6 g/dL   LIPASE   Result Value Ref Range    Lipase 211 (H) 11 - 82 U/L   TROPONIN   Result Value Ref Range    Troponin T 15 6 - 19 ng/L   ESTIMATED GFR   Result Value Ref Range    GFR If African American >60 >60 mL/min/1.73 m 2    GFR If Non African American >60 >60 mL/min/1.73 m 2   COV-2, FLU A/B, AND RSV BY PCR (2-4 HOURS CEPHEID): Collect NP swab in VTM    Specimen: Respirate   Result Value Ref Range    Influenza virus A RNA Negative Negative    Influenza virus B, PCR Negative Negative    RSV, PCR Negative Negative    SARS-CoV-2 by PCR NotDetected     SARS-CoV-2 Source NP Swab    Magnesium   Result Value Ref Range    Magnesium 1.6 1.5 - 2.5 mg/dL   LACTIC ACID   Result Value Ref Range    Lactic Acid 2.2 (H) 0.5 - 2.0 mmol/L   DIAGNOSTIC ALCOHOL   Result Value Ref Range    Diagnostic Alcohol 457.5 (H) 0.0 - 10.0 mg/dL   COD - Adult (Type and Screen)   Result Value Ref Range    ABO Grouping Only A     Rh Grouping Only POS      Antibody Screen-Cod NEG    EKG   Result Value Ref Range    Report       [facility] Emergency Dept.    Test Date:  2021  Pt Name:    MANAS DELGADO              Department: ER  MRN:        [id number]                      Room:       RD 12  Gender:     Female                       Technician: TREY  :        [id number]                   Requested By:DINORAH TONY  Order #:    [id number]                    Reading MD:    Measurements  Intervals                                Axis  Rate:       96                           P:          38  IN:         144                          QRS:        -14  QRSD:       96                           T:          252  QT:         420  QTc:        531    Interpretive Statements  SINUS RHYTHM  INFERIOR INFARCT, AGE INDETERMINATE  NONSPECIFIC T ABNORMALITIES, ANTERIOR LEADS  LATERAL LEADS ARE ALSO INVOLVED  PROLONGED QT INTERVAL  Compared to ECG 2021 23:23:02  T-wave abnormality now present  Prolonged QT interval now present  Myocardial infarct finding still present         EKG: Per my read, regular rate and rhythm,  Normal axis, normal pwave, t wave depression in leads V2, V3, V4, poor R wave progression     Imaging:   No orders to display       Previous Data Review: Reviewed    Problem Representation:  50 y.o. female with a PMH of recurrent alcoholic pancreatitis, chronic alcohol abuse, and history of alcohol withdrawals (including seizures), bipolar disorder, history of meth abuse, history of suicide attempts, COVID-19 infection (recovered, out of quarantine), COPD (no PFTs on file, on 2L O2 at home) who presented 2021 with severe upper abdominal pain and alcohol intoxication.    * Pancreatitis, alcoholic, acute- (present on admission)  Assessment & Plan  Presented with severe abdominal pain, 10/10. Lipase of 211, around 3x upper limit of normal. Due to drinking 1 pint vodka per day. Also with transaminitis. This is especially concerning in  light of the fact that the patient's condition is due to alcohol, but patient is self-medicating for the pain with drinking more alcohol.    -IVF, LR @125  -CLD  -PPI omeprazole 40 mg qD  -Morphine IV PRN for pain  -Will transition to PO pain meds when possible    COPD (chronic obstructive pulmonary disease) (HCC)- (present on admission)  Assessment & Plan  Chronic, unknown if completely worked up before. No PFTs on file.    Patient will needs PFTs as outpatient.  Consider new home O2 eval prior to d/c if needed    Cannabis abuse- (present on admission)  Assessment & Plan  Drug cessation counseling    Alcoholic hepatitis- (present on admission)  Assessment & Plan  With transaminitis, AST:ALT of 2:1.    -Continue to monitor, trend transaminases    Hypokalemia- (present on admission)  Assessment & Plan  Replete with goal of K=4    Alcohol intoxication with blood level over 0.3 (HCC)- (present on admission)  Assessment & Plan  Especially concerning in light of the fact that the patient is chronically drinking alcohol. Patient has h/o alcohol withdrawal in past including with seizures. Last drink was yesterday per history. Given ryan bag in ED.    MV, thiamine, folate supp  CIWA protocol  Neuro checks q4  Seizure, aspiration, fall precautions  Significant alcohol cessation counseling    Bipolar affective disorder (HCC)- (present on admission)  Assessment & Plan  On prozac at home. This is not ideal as this can cause the patient to go into manic episode with an SSRI, however will need to continue this to prevent withdrawal.       Quality Measures:   Code: Full  VTE: SCDs (no chemical VTE ppx in light of thrombocytopenia)  Diet: CLD  Disposition: Remain inpatient    Trina Friend MD, MPH    Please note that this dictation was created using voice recognition software. I have worked with technical experts from [employer] [facility] to optimize the interface.  I have made every reasonable attempt to correct obvious errors, but  there may be errors of grammar and possibly content that I did not discover before finalizing the note.

## 2021-04-17 NOTE — ASSESSMENT & PLAN NOTE
On prozac at home. This is not ideal as this can cause the patient to go into manic episode with an SSRI, however will need to continue this to prevent withdrawal.    Now on aripiprazole, hopefully will help sx of alcohol withdrawal as well

## 2021-04-17 NOTE — CARE PLAN
Problem: Communication  Goal: The ability to communicate needs accurately and effectively will improve  Outcome: PROGRESSING AS EXPECTED     Problem: Safety  Goal: Will remain free from injury  Outcome: PROGRESSING AS EXPECTED     Problem: Infection  Goal: Will remain free from infection  Outcome: PROGRESSING AS EXPECTED     Problem: Venous Thromboembolism (VTW)/Deep Vein Thrombosis (DVT) Prevention:  Goal: Patient will participate in Venous Thrombosis (VTE)/Deep Vein Thrombosis (DVT)Prevention Measures  Outcome: PROGRESSING AS EXPECTED     Problem: Bowel/Gastric:  Goal: Normal bowel function is maintained or improved  Outcome: PROGRESSING AS EXPECTED     Problem: Knowledge Deficit  Goal: Knowledge of disease process/condition, treatment plan, diagnostic tests, and medications will improve  Outcome: PROGRESSING AS EXPECTED     Problem: Discharge Barriers/Planning  Goal: Patient's continuum of care needs will be met  Outcome: PROGRESSING AS EXPECTED

## 2021-04-17 NOTE — ASSESSMENT & PLAN NOTE
Especially concerning in light of the fact that the patient is chronically drinking alcohol. Patient has h/o alcohol withdrawal in past including with seizures. Last drink was yesterday per history. Given ryan bag in ED.    MV, thiamine, folate supp  CIWA protocol - discontinued 4/21, as last 3 CIWA scores were less than 8  Neuro checks q4  Seizure, aspiration, fall precautions  Significant alcohol cessation counseling, medical team has been regularly counseling the patient every day since day of admission

## 2021-04-18 PROBLEM — D61.818 PANCYTOPENIA (HCC): Status: ACTIVE | Noted: 2021-04-18

## 2021-04-18 PROBLEM — E83.51 HYPOCALCEMIA: Status: ACTIVE | Noted: 2021-04-18

## 2021-04-18 PROBLEM — D53.9 MACROCYTIC ANEMIA: Status: ACTIVE | Noted: 2021-04-18

## 2021-04-18 PROBLEM — E83.42 HYPOMAGNESEMIA: Status: ACTIVE | Noted: 2021-04-18

## 2021-04-18 PROBLEM — E87.20 LACTIC ACIDOSIS: Status: ACTIVE | Noted: 2021-04-18

## 2021-04-18 LAB
AMPHET UR QL SCN: POSITIVE
ANION GAP SERPL CALC-SCNC: 12 MMOL/L (ref 7–16)
BARBITURATES UR QL SCN: NEGATIVE
BASOPHILS # BLD AUTO: 1 % (ref 0–1.8)
BASOPHILS # BLD: 0.02 K/UL (ref 0–0.12)
BENZODIAZ UR QL SCN: NEGATIVE
BUN SERPL-MCNC: 4 MG/DL (ref 8–22)
BZE UR QL SCN: NEGATIVE
CALCIUM SERPL-MCNC: 7.5 MG/DL (ref 8.5–10.5)
CANNABINOIDS UR QL SCN: NEGATIVE
CHLORIDE SERPL-SCNC: 96 MMOL/L (ref 96–112)
CO2 SERPL-SCNC: 29 MMOL/L (ref 20–33)
CREAT SERPL-MCNC: 0.21 MG/DL (ref 0.5–1.4)
EKG IMPRESSION: NORMAL
EKG IMPRESSION: NORMAL
EOSINOPHIL # BLD AUTO: 0.01 K/UL (ref 0–0.51)
EOSINOPHIL NFR BLD: 0.5 % (ref 0–6.9)
ERYTHROCYTE [DISTWIDTH] IN BLOOD BY AUTOMATED COUNT: 50.6 FL (ref 35.9–50)
GLUCOSE SERPL-MCNC: 85 MG/DL (ref 65–99)
HCT VFR BLD AUTO: 40.4 % (ref 37–47)
HGB BLD-MCNC: 13.4 G/DL (ref 12–16)
IMM GRANULOCYTES # BLD AUTO: 0.02 K/UL (ref 0–0.11)
IMM GRANULOCYTES NFR BLD AUTO: 1 % (ref 0–0.9)
LACTATE BLD-SCNC: 3.1 MMOL/L (ref 0.5–2)
LYMPHOCYTES # BLD AUTO: 0.6 K/UL (ref 1–4.8)
LYMPHOCYTES NFR BLD: 30 % (ref 22–41)
MAGNESIUM SERPL-MCNC: 1.4 MG/DL (ref 1.5–2.5)
MCH RBC QN AUTO: 34 PG (ref 27–33)
MCHC RBC AUTO-ENTMCNC: 33.2 G/DL (ref 33.6–35)
MCV RBC AUTO: 102.5 FL (ref 81.4–97.8)
METHADONE UR QL SCN: NEGATIVE
MONOCYTES # BLD AUTO: 0.37 K/UL (ref 0–0.85)
MONOCYTES NFR BLD AUTO: 18.5 % (ref 0–13.4)
NEUTROPHILS # BLD AUTO: 0.98 K/UL (ref 2–7.15)
NEUTROPHILS NFR BLD: 49 % (ref 44–72)
NRBC # BLD AUTO: 0 K/UL
NRBC BLD-RTO: 0 /100 WBC
OPIATES UR QL SCN: POSITIVE
OXYCODONE UR QL SCN: NEGATIVE
PCP UR QL SCN: NEGATIVE
PHOSPHATE SERPL-MCNC: 3.7 MG/DL (ref 2.5–4.5)
PLATELET # BLD AUTO: 26 K/UL (ref 164–446)
PMV BLD AUTO: 11.1 FL (ref 9–12.9)
POTASSIUM SERPL-SCNC: 3.3 MMOL/L (ref 3.6–5.5)
PROPOXYPH UR QL SCN: NEGATIVE
RBC # BLD AUTO: 3.94 M/UL (ref 4.2–5.4)
SODIUM SERPL-SCNC: 137 MMOL/L (ref 135–145)
TROPONIN T SERPL-MCNC: 9 NG/L (ref 6–19)
WBC # BLD AUTO: 2 K/UL (ref 4.8–10.8)

## 2021-04-18 PROCEDURE — A9270 NON-COVERED ITEM OR SERVICE: HCPCS | Performed by: STUDENT IN AN ORGANIZED HEALTH CARE EDUCATION/TRAINING PROGRAM

## 2021-04-18 PROCEDURE — 99233 SBSQ HOSP IP/OBS HIGH 50: CPT | Mod: GC | Performed by: INTERNAL MEDICINE

## 2021-04-18 PROCEDURE — 700102 HCHG RX REV CODE 250 W/ 637 OVERRIDE(OP): Performed by: STUDENT IN AN ORGANIZED HEALTH CARE EDUCATION/TRAINING PROGRAM

## 2021-04-18 PROCEDURE — 84100 ASSAY OF PHOSPHORUS: CPT

## 2021-04-18 PROCEDURE — 770020 HCHG ROOM/CARE - TELE (206)

## 2021-04-18 PROCEDURE — 83605 ASSAY OF LACTIC ACID: CPT

## 2021-04-18 PROCEDURE — 84484 ASSAY OF TROPONIN QUANT: CPT

## 2021-04-18 PROCEDURE — 80048 BASIC METABOLIC PNL TOTAL CA: CPT

## 2021-04-18 PROCEDURE — 93010 ELECTROCARDIOGRAM REPORT: CPT | Performed by: INTERNAL MEDICINE

## 2021-04-18 PROCEDURE — 700105 HCHG RX REV CODE 258: Performed by: INTERNAL MEDICINE

## 2021-04-18 PROCEDURE — 36415 COLL VENOUS BLD VENIPUNCTURE: CPT

## 2021-04-18 PROCEDURE — 93010 ELECTROCARDIOGRAM REPORT: CPT | Mod: 76 | Performed by: INTERNAL MEDICINE

## 2021-04-18 PROCEDURE — 93005 ELECTROCARDIOGRAM TRACING: CPT | Performed by: STUDENT IN AN ORGANIZED HEALTH CARE EDUCATION/TRAINING PROGRAM

## 2021-04-18 PROCEDURE — 80307 DRUG TEST PRSMV CHEM ANLYZR: CPT

## 2021-04-18 PROCEDURE — 85025 COMPLETE CBC W/AUTO DIFF WBC: CPT

## 2021-04-18 PROCEDURE — 700111 HCHG RX REV CODE 636 W/ 250 OVERRIDE (IP): Performed by: STUDENT IN AN ORGANIZED HEALTH CARE EDUCATION/TRAINING PROGRAM

## 2021-04-18 PROCEDURE — 83735 ASSAY OF MAGNESIUM: CPT

## 2021-04-18 RX ORDER — POTASSIUM CHLORIDE 20 MEQ/1
40 TABLET, EXTENDED RELEASE ORAL ONCE
Status: DISCONTINUED | OUTPATIENT
Start: 2021-04-18 | End: 2021-04-18

## 2021-04-18 RX ORDER — MAGNESIUM SULFATE HEPTAHYDRATE 40 MG/ML
2 INJECTION, SOLUTION INTRAVENOUS ONCE
Status: COMPLETED | OUTPATIENT
Start: 2021-04-18 | End: 2021-04-18

## 2021-04-18 RX ORDER — MAGNESIUM SULFATE HEPTAHYDRATE 40 MG/ML
4 INJECTION, SOLUTION INTRAVENOUS ONCE
Status: COMPLETED | OUTPATIENT
Start: 2021-04-18 | End: 2021-04-18

## 2021-04-18 RX ORDER — ARIPIPRAZOLE 2 MG/1
2 TABLET ORAL DAILY
Status: DISCONTINUED | OUTPATIENT
Start: 2021-04-18 | End: 2021-04-22 | Stop reason: HOSPADM

## 2021-04-18 RX ORDER — POTASSIUM CHLORIDE 20 MEQ/1
40 TABLET, EXTENDED RELEASE ORAL ONCE
Status: COMPLETED | OUTPATIENT
Start: 2021-04-18 | End: 2021-04-18

## 2021-04-18 RX ORDER — IBUPROFEN 200 MG
950 CAPSULE ORAL DAILY
Status: DISCONTINUED | OUTPATIENT
Start: 2021-04-18 | End: 2021-04-22 | Stop reason: HOSPADM

## 2021-04-18 RX ORDER — PROPRANOLOL HYDROCHLORIDE 10 MG/1
10 TABLET ORAL EVERY 8 HOURS
Status: DISCONTINUED | OUTPATIENT
Start: 2021-04-18 | End: 2021-04-19

## 2021-04-18 RX ORDER — POTASSIUM CHLORIDE 7.45 MG/ML
10 INJECTION INTRAVENOUS
Status: COMPLETED | OUTPATIENT
Start: 2021-04-18 | End: 2021-04-18

## 2021-04-18 RX ORDER — ERGOCALCIFEROL 1.25 MG/1
50000 CAPSULE ORAL
Status: DISCONTINUED | OUTPATIENT
Start: 2021-04-18 | End: 2021-04-22 | Stop reason: HOSPADM

## 2021-04-18 RX ADMIN — POTASSIUM CHLORIDE 10 MEQ: 10 INJECTION, SOLUTION INTRAVENOUS at 14:49

## 2021-04-18 RX ADMIN — FLUOXETINE 30 MG: 20 CAPSULE ORAL at 04:12

## 2021-04-18 RX ADMIN — LORAZEPAM 1.5 MG: 2 INJECTION INTRAMUSCULAR; INTRAVENOUS at 12:29

## 2021-04-18 RX ADMIN — LORAZEPAM 1 MG: 2 INJECTION INTRAMUSCULAR; INTRAVENOUS at 23:55

## 2021-04-18 RX ADMIN — LORAZEPAM 1 MG: 2 INJECTION INTRAMUSCULAR; INTRAVENOUS at 09:23

## 2021-04-18 RX ADMIN — POTASSIUM CHLORIDE 10 MEQ: 7.46 INJECTION, SOLUTION INTRAVENOUS at 11:16

## 2021-04-18 RX ADMIN — OMEPRAZOLE 40 MG: 20 CAPSULE, DELAYED RELEASE ORAL at 04:12

## 2021-04-18 RX ADMIN — SODIUM CHLORIDE, POTASSIUM CHLORIDE, SODIUM LACTATE AND CALCIUM CHLORIDE: 600; 310; 30; 20 INJECTION, SOLUTION INTRAVENOUS at 10:15

## 2021-04-18 RX ADMIN — MAGNESIUM SULFATE 2 G: 2 INJECTION INTRAVENOUS at 09:04

## 2021-04-18 RX ADMIN — MORPHINE SULFATE 1 MG: 4 INJECTION INTRAVENOUS at 22:21

## 2021-04-18 RX ADMIN — LORAZEPAM 2 MG: 2 INJECTION INTRAMUSCULAR; INTRAVENOUS at 09:03

## 2021-04-18 RX ADMIN — LORAZEPAM 1 MG: 2 INJECTION INTRAMUSCULAR; INTRAVENOUS at 22:09

## 2021-04-18 RX ADMIN — LORAZEPAM 1.5 MG: 2 INJECTION INTRAMUSCULAR; INTRAVENOUS at 13:24

## 2021-04-18 RX ADMIN — LORAZEPAM 0.5 MG: 2 INJECTION INTRAMUSCULAR; INTRAVENOUS at 05:58

## 2021-04-18 RX ADMIN — MAGNESIUM SULFATE IN WATER 4 G: 40 INJECTION, SOLUTION INTRAVENOUS at 03:15

## 2021-04-18 RX ADMIN — POTASSIUM CHLORIDE 10 MEQ: 10 INJECTION, SOLUTION INTRAVENOUS at 17:00

## 2021-04-18 RX ADMIN — GABAPENTIN 300 MG: 300 CAPSULE ORAL at 04:12

## 2021-04-18 RX ADMIN — POTASSIUM CHLORIDE 10 MEQ: 10 INJECTION, SOLUTION INTRAVENOUS at 15:00

## 2021-04-18 RX ADMIN — POTASSIUM CHLORIDE 10 MEQ: 7.46 INJECTION, SOLUTION INTRAVENOUS at 10:21

## 2021-04-18 RX ADMIN — SODIUM CHLORIDE, POTASSIUM CHLORIDE, SODIUM LACTATE AND CALCIUM CHLORIDE: 600; 310; 30; 20 INJECTION, SOLUTION INTRAVENOUS at 06:21

## 2021-04-18 RX ADMIN — LORAZEPAM 1.5 MG: 2 INJECTION INTRAMUSCULAR; INTRAVENOUS at 11:26

## 2021-04-18 RX ADMIN — POTASSIUM CHLORIDE 40 MEQ: 1500 TABLET, EXTENDED RELEASE ORAL at 03:13

## 2021-04-18 RX ADMIN — SODIUM CHLORIDE, POTASSIUM CHLORIDE, SODIUM LACTATE AND CALCIUM CHLORIDE: 600; 310; 30; 20 INJECTION, SOLUTION INTRAVENOUS at 03:17

## 2021-04-18 RX ADMIN — POTASSIUM CHLORIDE 10 MEQ: 10 INJECTION, SOLUTION INTRAVENOUS at 16:00

## 2021-04-18 ASSESSMENT — LIFESTYLE VARIABLES
TREMOR: NO TREMOR
AGITATION: NORMAL ACTIVITY
VISUAL DISTURBANCES: NOT PRESENT
VISUAL DISTURBANCES: MILD SENSITIVITY
ORIENTATION AND CLOUDING OF SENSORIUM: CANNOT DO SERIAL ADDITIONS OR IS UNCERTAIN ABOUT DATE
NAUSEA AND VOMITING: MILD NAUSEA WITH NO VOMITING
ORIENTATION AND CLOUDING OF SENSORIUM: CANNOT DO SERIAL ADDITIONS OR IS UNCERTAIN ABOUT DATE
NAUSEA AND VOMITING: *
TREMOR: *
TREMOR: TREMOR NOT VISIBLE BUT CAN BE FELT, FINGERTIP TO FINGERTIP
ORIENTATION AND CLOUDING OF SENSORIUM: ORIENTED AND CAN DO SERIAL ADDITIONS
PAROXYSMAL SWEATS: NO SWEAT VISIBLE
HEADACHE, FULLNESS IN HEAD: MILD
AGITATION: SOMEWHAT MORE THAN NORMAL ACTIVITY
NAUSEA AND VOMITING: MILD NAUSEA WITH NO VOMITING
AGITATION: SOMEWHAT MORE THAN NORMAL ACTIVITY
TOTAL SCORE: 27
PAROXYSMAL SWEATS: *
TOTAL SCORE: 9
VISUAL DISTURBANCES: NOT PRESENT
HEADACHE, FULLNESS IN HEAD: MILD
TREMOR: *
TREMOR: NO TREMOR
PAROXYSMAL SWEATS: *
HEADACHE, FULLNESS IN HEAD: MODERATE
ORIENTATION AND CLOUDING OF SENSORIUM: CANNOT DO SERIAL ADDITIONS OR IS UNCERTAIN ABOUT DATE
AUDITORY DISTURBANCES: NOT PRESENT
AUDITORY DISTURBANCES: NOT PRESENT
AGITATION: MODERATELY FIDGETY AND RESTLESS
ORIENTATION AND CLOUDING OF SENSORIUM: CANNOT DO SERIAL ADDITIONS OR IS UNCERTAIN ABOUT DATE
TOTAL SCORE: 3
NAUSEA AND VOMITING: *
AUDITORY DISTURBANCES: NOT PRESENT
HEADACHE, FULLNESS IN HEAD: VERY MILD
AGITATION: MODERATELY FIDGETY AND RESTLESS
ORIENTATION AND CLOUDING OF SENSORIUM: ORIENTED AND CAN DO SERIAL ADDITIONS
ANXIETY: *
NAUSEA AND VOMITING: MILD NAUSEA WITH NO VOMITING
TOTAL SCORE: 12
AGITATION: *
TREMOR: NO TREMOR
VISUAL DISTURBANCES: NOT PRESENT
AGITATION: SOMEWHAT MORE THAN NORMAL ACTIVITY
PAROXYSMAL SWEATS: BARELY PERCEPTIBLE SWEATING. PALMS MOIST
HEADACHE, FULLNESS IN HEAD: MILD
ANXIETY: *
HEADACHE, FULLNESS IN HEAD: NOT PRESENT
TOTAL SCORE: VERY MILD ITCHING, PINS AND NEEDLES SENSATION, BURNING OR NUMBNESS
AUDITORY DISTURBANCES: NOT PRESENT
HEADACHE, FULLNESS IN HEAD: NOT PRESENT
TREMOR: *
AUDITORY DISTURBANCES: NOT PRESENT
VISUAL DISTURBANCES: NOT PRESENT
PAROXYSMAL SWEATS: BARELY PERCEPTIBLE SWEATING. PALMS MOIST
TOTAL SCORE: 19
TOTAL SCORE: 11
ANXIETY: *
NAUSEA AND VOMITING: *
VISUAL DISTURBANCES: NOT PRESENT
VISUAL DISTURBANCES: MODERATE SENSITIVITY
ANXIETY: *
PAROXYSMAL SWEATS: BARELY PERCEPTIBLE SWEATING. PALMS MOIST
TOTAL SCORE: 5
ANXIETY: NO ANXIETY (AT EASE)
AGITATION: *
PAROXYSMAL SWEATS: BARELY PERCEPTIBLE SWEATING. PALMS MOIST
NAUSEA AND VOMITING: *
NAUSEA AND VOMITING: MILD NAUSEA WITH NO VOMITING
TREMOR: MODERATE TREMOR WITH ARMS EXTENDED
SUBSTANCE_ABUSE: 0
HEADACHE, FULLNESS IN HEAD: VERY MILD
ANXIETY: NO ANXIETY (AT EASE)
VISUAL DISTURBANCES: NOT PRESENT
AUDITORY DISTURBANCES: NOT PRESENT
ORIENTATION AND CLOUDING OF SENSORIUM: ORIENTED AND CAN DO SERIAL ADDITIONS
ORIENTATION AND CLOUDING OF SENSORIUM: CANNOT DO SERIAL ADDITIONS OR IS UNCERTAIN ABOUT DATE
AUDITORY DISTURBANCES: NOT PRESENT
PAROXYSMAL SWEATS: BARELY PERCEPTIBLE SWEATING. PALMS MOIST
NAUSEA AND VOMITING: MILD NAUSEA WITH NO VOMITING
VISUAL DISTURBANCES: MILD SENSITIVITY
ANXIETY: *
TOTAL SCORE: 17
HEADACHE, FULLNESS IN HEAD: MODERATE
PAROXYSMAL SWEATS: BARELY PERCEPTIBLE SWEATING. PALMS MOIST
ORIENTATION AND CLOUDING OF SENSORIUM: ORIENTED AND CAN DO SERIAL ADDITIONS
AUDITORY DISTURBANCES: NOT PRESENT
ANXIETY: MILDLY ANXIOUS
AGITATION: SOMEWHAT MORE THAN NORMAL ACTIVITY
VISUAL DISTURBANCES: MILD SENSITIVITY
AUDITORY DISTURBANCES: NOT PRESENT
TOTAL SCORE: 18
HEADACHE, FULLNESS IN HEAD: MILD
AUDITORY DISTURBANCES: NOT PRESENT
TREMOR: MODERATE TREMOR WITH ARMS EXTENDED
AUDITORY DISTURBANCES: NOT PRESENT
AGITATION: MODERATELY FIDGETY AND RESTLESS
ANXIETY: *
TREMOR: *
PAROXYSMAL SWEATS: NO SWEAT VISIBLE
HEADACHE, FULLNESS IN HEAD: NOT PRESENT
ORIENTATION AND CLOUDING OF SENSORIUM: CANNOT DO SERIAL ADDITIONS OR IS UNCERTAIN ABOUT DATE
NAUSEA AND VOMITING: MILD NAUSEA WITH NO VOMITING
NAUSEA AND VOMITING: *
TOTAL SCORE: 5
VISUAL DISTURBANCES: MILD SENSITIVITY
ANXIETY: NO ANXIETY (AT EASE)
AGITATION: *
TREMOR: SEVERE TREMOR, EVEN WITH ARMS NOT EXTENDED
PAROXYSMAL SWEATS: NO SWEAT VISIBLE
TOTAL SCORE: 13
ORIENTATION AND CLOUDING OF SENSORIUM: ORIENTED AND CAN DO SERIAL ADDITIONS
ANXIETY: *

## 2021-04-18 ASSESSMENT — PAIN DESCRIPTION - PAIN TYPE
TYPE: ACUTE PAIN

## 2021-04-18 ASSESSMENT — ENCOUNTER SYMPTOMS
SHORTNESS OF BREATH: 0
COUGH: 0
WHEEZING: 0
DIZZINESS: 0
VOMITING: 0
TREMORS: 1
CONSTIPATION: 0
FALLS: 0
DOUBLE VISION: 0
NAUSEA: 0
SORE THROAT: 0
CHILLS: 0
BLURRED VISION: 0
DIAPHORESIS: 0
NERVOUS/ANXIOUS: 0
SENSORY CHANGE: 1
HEADACHES: 0
PALPITATIONS: 1
ABDOMINAL PAIN: 0
DIARRHEA: 0
MYALGIAS: 0
FEVER: 0
SEIZURES: 0

## 2021-04-18 ASSESSMENT — FIBROSIS 4 INDEX: FIB4 SCORE: 47.57

## 2021-04-18 NOTE — PROGRESS NOTES
Assumed care of pt. Bedside report received from night RN Tyrel. Pt appears to be resting with unlabored respirations. Call light, phone and personal belongings within reach. Bed locked in lowest position, 3 side rails up, bed alarm on and working appropriately.

## 2021-04-18 NOTE — ASSESSMENT & PLAN NOTE
IMPROVING  This is concerning because this will result in promoting hypocalcemia as well as wasting potassium in the kidney.    Replete with goal of magnesium of 2

## 2021-04-18 NOTE — PROGRESS NOTES
Daily Progress Note:     Date of Service: 4/18/2021  Primary Team: [facility] Team   Attending: Evin Rinaldi M.D.   Senior Resident: Dr. Corey MD  Intern: Dr. Trina Friend M.D.  Contact:  [phone]    Chief Complaint:   Chief Complaint   Patient presents with   • Abdominal Pain     Pt states she has 10/10 upper abdominal pain and was diagnosed with acute pancreatitis at [facility] two days ago and was discharged. Pt also stated she has been diagnosed with pancreatitis 5 times previously.    • Alcohol Intoxication     Per EMS they were called to pt's residense by pt's roommate who stated she is unable to care for herself due to her drinking. She has been unable to make it to the bathroom and has not been eating for the past few days. Pt states she drinks approximatley one pint of Vodka everyday. States she has has tried to quit drinking in the past but was unable to. Pt is A&Ox4, speech is slurred, but answers questions and follows commands appropriately.      ID:  50 y.o. female with a PMH of recurrent alcoholic pancreatitis, chronic alcohol abuse, and history of alcohol withdrawals (including seizures), bipolar disorder, history of meth abuse, history of suicide attempts, COVID-19 infection (recovered, out of quarantine), COPD (no PFTs on file, on 2L O2 at home) who presented 4/17/2021 with alcoholic pancreatitis and alcohol intoxication, now in alcohol withdrawal.    Subjective:   Overnight, patient CIWA scores ranged from 3-24.  Received Ativan 2.5 mg overnight.  Patient also received morphine for abdominal pain.  This a.m., highest CIWA score was 27.  The patient stated today that she was tremulous, and was having difficulty moving properly.  Did not have any suicidal ideation or homicidal ideation.  She did not have any hematemesis.  Did not have a bowel movement, however, was urinating regularly.  No acute complaints, including chest pain and shortness of  breath.    Consultants/Specialty:  None    Review of Systems:   Review of Systems   Constitutional: Positive for malaise/fatigue. Negative for chills, diaphoresis and fever.   HENT: Negative for congestion and sore throat.    Eyes: Negative for blurred vision and double vision.   Respiratory: Negative for cough, shortness of breath and wheezing.    Cardiovascular: Positive for palpitations. Negative for chest pain and leg swelling.   Gastrointestinal: Negative for abdominal pain, constipation, diarrhea, nausea and vomiting.   Genitourinary: Negative for dysuria, frequency and urgency.   Musculoskeletal: Negative for falls and myalgias.   Neurological: Positive for tremors and sensory change. Negative for dizziness, seizures and headaches.   Psychiatric/Behavioral: Negative for substance abuse and suicidal ideas. The patient is not nervous/anxious.        Objective Data:   Physical Exam:   Vitals:   Temp:  [35.9 °C (96.6 °F)-36.9 °C (98.5 °F)] 35.9 °C (96.6 °F)  Pulse:  [] 112  Resp:  [13-18] 18  BP: (103-142)/(67-85) 142/85  SpO2:  [91 %-97 %] 97 %     Physical Exam  Vitals and nursing note reviewed.   Constitutional:       General: She is in acute distress.      Appearance: She is ill-appearing. She is not toxic-appearing or diaphoretic.      Comments: Lethargic   HENT:      Head: Normocephalic and atraumatic.      Right Ear: External ear normal.      Left Ear: External ear normal.      Nose: Nose normal. No congestion or rhinorrhea.      Mouth/Throat:      Mouth: Mucous membranes are dry.      Pharynx: Posterior oropharyngeal erythema present. No oropharyngeal exudate.      Comments: Lips stained with dried blood  Mallampati class 3  Eyes:      General: No scleral icterus.        Right eye: No discharge.         Left eye: No discharge.      Extraocular Movements: Extraocular movements intact.      Conjunctiva/sclera: Conjunctivae normal.      Pupils: Pupils are equal, round, and reactive to light.    Cardiovascular:      Rate and Rhythm: Regular rhythm. Tachycardia present.      Heart sounds: No murmur.   Pulmonary:      Effort: Pulmonary effort is normal. No respiratory distress.      Breath sounds: Normal breath sounds. No wheezing or rales.   Abdominal:      General: Abdomen is flat. There is no distension.      Palpations: Abdomen is soft.      Tenderness: There is abdominal tenderness. There is guarding. There is no right CVA tenderness or left CVA tenderness.      Comments: Abdominal striae present, diffuse abdominal tenderness to palpation in all quadrants   Musculoskeletal:         General: No swelling, tenderness or deformity.      Cervical back: Normal range of motion and neck supple. No rigidity or tenderness.      Right lower leg: No edema.      Left lower leg: No edema.   Skin:     General: Skin is warm.      Capillary Refill: Capillary refill takes less than 2 seconds.      Coloration: Skin is not jaundiced.      Comments: Callus on left big toe and on ball of right foot   Neurological:      Mental Status: She is oriented to person, place, and time.      Cranial Nerves: No cranial nerve deficit.      Sensory: No sensory deficit.      Motor: Weakness present.      Coordination: Coordination abnormal.      Comments: Tumbling in bilateral upper extremities.  Patient has been having difficulty with finger-to-nose test.  Patient declined rest of neuro exam.   Psychiatric:         Attention and Perception: Perception normal. She is attentive. She does not perceive auditory or visual hallucinations.         Mood and Affect: Affect is blunt.         Speech: Speech is delayed.         Behavior: Behavior is cooperative.         Thought Content: Thought content normal.         Cognition and Memory: Memory is not impaired. She exhibits impaired recent memory.           Labs:   Recent Labs     04/17/21  1157 04/18/21  0142   WBC 3.0* 2.0*   RBC 4.10* 3.94*   HEMOGLOBIN 13.8 13.4   HEMATOCRIT 41.0 40.4   MCV  100.0* 102.5*   MCH 33.7* 34.0*   RDW 49.2 50.6*   PLATELETCT 45* 26*   MPV 9.4 11.1   NEUTSPOLYS 22.00* 49.00   LYMPHOCYTES 63.80* 30.00   MONOCYTES 11.50 18.50*   EOSINOPHILS 1.00 0.50   BASOPHILS 1.00 1.00     Recent Labs     04/17/21  1137 04/18/21  0142   SODIUM 143 137   POTASSIUM 3.6 3.3*   CHLORIDE 100 96   CO2 26 29   GLUCOSE 113* 85   BUN 8 4*        Imaging:   No orders to display       Problem Representation:   50 y.o. female with a PMH of recurrent alcoholic pancreatitis, chronic alcohol abuse, and history of alcohol withdrawals (including seizures), bipolar disorder, history of meth abuse, history of suicide attempts, COVID-19 infection (recovered, out of quarantine), COPD (no PFTs on file, on 2L O2 at home) who presented 4/17/2021 with alcoholic pancreatitis and alcohol intoxication, now in alcohol withdrawal.    * Pancreatitis, alcoholic, acute- (present on admission)  Assessment & Plan  Presented with severe abdominal pain, 10/10. Lipase of 211, around 3x upper limit of normal. Due to drinking 1 pint vodka per day. Also with transaminitis. This is especially concerning in light of the fact that the patient's condition is due to alcohol, but patient is self-medicating for the pain with drinking more alcohol.    -IVF, LR @125  -CLD  -PPI omeprazole 40 mg qD  -Morphine IV PRN for pain  -Will transition to PO pain meds when possible  -Obtain records from [facility], will follow up on CT abdomen/pelvis without contrast from recent hospitalization    Pancytopenia (HCC)  Assessment & Plan  On 4/18/2021, patient had white blood count of 2.0 and platelet of 26.  This is concerning, likely due to the stress intoxication from effects of alcohol.  Less likely some other malignant process such as leukemia.  May also be concerning for heparin-induced thrombocytopenia versus ITP.  Patient may have lymphopenia due to previous Covid infection.    Patient has been placed on neutropenic precautions  Continue  to monitor CBC labs  Obtain records from [facility] where patient was recently hospitalized for further trending of values and to see if heparin was administered during hospitalization there.    Alcohol withdrawal (HCC)- (present on admission)  Assessment & Plan  Also see problem of alcohol intoxication    Patient has history of alcohol withdrawal in the past including with seizures.  Last drink was 4/16/2021 per patient history.  Patient has gotten multiple doses of Ativan, this is concerning in the context that there may be some other underlying pathology such as her bipolar disorder.      Multivitamin, thiamine, folate supplement  [facility] protocol  Neurochecks every 4 hours  Seizure, aspiration, fall precautions  Start aripiprazole low-dose, will uptitrate as necessary.  This will hopefully help unmask some of the symptoms of symptoms of alcohol withdrawal versus due to underlying bipolar disorder pathology  Starting propranolol low-dose for tachycardia  Monitor electrolytes closely      Alcohol intoxication with blood level over 0.3 (HCC)- (present on admission)  Assessment & Plan  Especially concerning in light of the fact that the patient is chronically drinking alcohol. Patient has h/o alcohol withdrawal in past including with seizures. Last drink was yesterday per history. Given ryan bag in ED.    MV, thiamine, folate supp  CIWA protocol  Neuro checks q4  Seizure, aspiration, fall precautions  Significant alcohol cessation counseling    Macrocytic anemia- (present on admission)  Assessment & Plan  This is likely a chronic issue, patient likely has malnutrition due to chronic alcoholism.  Patient presented with macrocytosis    Follow-up on complete anemia work-up  Obtain collateral records from [facility] for further information    Hypomagnesemia- (present on admission)  Assessment & Plan  This is concerning because this will result in promoting hypocalcemia as well as wasting potassium in the  kidney.    Replete with goal of magnesium of 2    Lactic acidosis- (present on admission)  Assessment & Plan  Lactic acid levels have been uptrending, from 2.3-2.7-3.9 on 4/18/2021.  This is likely not due to fluid depletion, rather due to acute stress state and liver.  Patient is currently not septic.    Continue to monitor clinically  Continue IV fluid resuscitation  No need to continue trending  Serial abdominal exams  If patient becomes septic, will repeat trend    Hypocalcemia- (present on admission)  Assessment & Plan  This is very concerning.  Patient likely has a component of low magnesium causing low absorption of calcium.  Also may have component of low vitamin D.  May be part of malnutrition due to alcoholism    Follow-up on vitamin D level  Start calcium and vitamin D supplementation    COPD (chronic obstructive pulmonary disease) (HCC)- (present on admission)  Assessment & Plan  Chronic, unknown if completely worked up before. No PFTs on file.    Patient will needs PFTs as outpatient.  Consider new home O2 eval prior to d/c if needed    Cannabis abuse- (present on admission)  Assessment & Plan  Drug cessation counseling    Abnormal QT interval present on electrocardiogram- (present on admission)  Assessment & Plan  Present on multiple EKGs.    Avoid medications that prolong QTc interval  Continue to monitor on telemetry      Alcoholic hepatitis- (present on admission)  Assessment & Plan  With transaminitis, AST:ALT of 2:1.    -Continue to monitor, trend transaminases    Hypokalemia- (present on admission)  Assessment & Plan  Replete with goal of K=4    Bipolar affective disorder (HCC)- (present on admission)  Assessment & Plan  On prozac at home. This is not ideal as this can cause the patient to go into manic episode with an SSRI, however will need to continue this to prevent withdrawal.      Quality Measures:  Code: Full  VTE: SCDs  Diet: CLD  Disposition: Remain inpatient    Please note that this  dictation was created using voice recognition software. I have worked with technical experts from [facility] to optimize the interface.  I have made every reasonable attempt to correct obvious errors, but there may be errors of grammar and possibly content that I did not discover before finalizing the note.

## 2021-04-18 NOTE — ASSESSMENT & PLAN NOTE
Lactic acid levels have been uptrending, from 2.3-2.7-3.9 on 4/18/2021.  This is likely not due to fluid depletion, rather due to acute stress state and liver.  Patient is currently not septic.    Continue to monitor clinically  Continue IV fluid resuscitation  No need to continue trending  Serial abdominal exams  If patient becomes septic, will repeat trend

## 2021-04-18 NOTE — ASSESSMENT & PLAN NOTE
This is likely a chronic issue, patient likely has malnutrition due to chronic alcoholism.  Patient presented with macrocytosis. Records from [facility] showed MCV 99.5 (borderline macrocytosis). With all labs together, anemia likely due to alcoholism with malnutrition.    As the Hg is okay at this time, will not start on iron supp  Extensive alcohol cessation counseling

## 2021-04-18 NOTE — ASSESSMENT & PLAN NOTE
Present on multiple EKGs.    Avoid medications that prolong QTc interval  Continue to monitor on telemetry

## 2021-04-18 NOTE — PROGRESS NOTES
Assumed care at 1915. Bedside report received from jono RN. Patient's chart and MAR reviewed. 12 hour chart check complete. Assessment complete, pt 0/10 pain at this time. Pt is sleeping in bed. Pt is A & O x 3. Patient was updated on plan of care for the day. Questions answered and concerns addressed.  Pt denies any additional needs at this time. White board updated. Call light, phone and personal belongings within reach. Bed alarm on and working appropriately. Vital signs stable.

## 2021-04-18 NOTE — ASSESSMENT & PLAN NOTE
On 4/18/2021, patient had white blood count of 2.0 and platelet of 26.  This is concerning, likely due to the stress intoxication from effects of alcohol.  Less likely some other malignant process such as leukemia.  Records showed at [facility]'s patient had WBC of 3.6, Plt 73, Hg 14.7. May also be concerning for heparin-induced thrombocytopenia versus ITP.  Patient may have lymphopenia due to previous Covid infection. Re    Patient has been placed on neutropenic precautions  Continue to monitor CBC labs

## 2021-04-18 NOTE — PROGRESS NOTES
Critical lab values called for WBC of 2.0 and Platelet of 26. On call MD Garcai updated.Updated orders for 40 meq PO potassium, 4 G Magnesium IV.

## 2021-04-18 NOTE — PROGRESS NOTES
2 RN skin check complete with Mark RN     Devices in place:nasal cannula.    Skin assessed under devices bilateral ears intact.    Confirmed pressure ulcers found on N/A.    Elbows intact, non-red, blanching    Heels soft, intact,blanching.    Bruising noted on left hip.      New potential pressure ulcers noted on N/A.     Wound consult placed N/A.    The following interventions in place: pillows in place for positioning, moisturizer.

## 2021-04-18 NOTE — ASSESSMENT & PLAN NOTE
This is very concerning.  Patient likely has a component of low magnesium causing low absorption of calcium.  Also may have component of low vitamin D.  May be part of malnutrition due to alcoholism    Follow-up on vitamin D level  calcium and vitamin D supplementation

## 2021-04-18 NOTE — ASSESSMENT & PLAN NOTE
Also see problem of alcohol intoxication    Patient has history of alcohol withdrawal in the past including with seizures.  Last drink was 4/16/2021 per patient history.  Patient has gotten multiple doses of Ativan, this is concerning in the context that there may be some other underlying pathology such as her bipolar disorder.      Multivitamin, thiamine, folate supplement  CIWA protocol - discontinued 4/21, as last 3 CIWA scores were less than 8  --> Monitor how patient does off CIWA, can restart if needed  Neurochecks every 4 hours  Seizure, aspiration, fall precautions  Start aripiprazole low-dose, will uptitrate as necessary.  This will hopefully help unmask some of the symptoms of symptoms of alcohol withdrawal versus due to underlying bipolar disorder pathology  Monitor electrolytes closely  Gabapentin 300 mg TID

## 2021-04-19 ENCOUNTER — APPOINTMENT (OUTPATIENT)
Dept: RADIOLOGY | Facility: MEDICAL CENTER | Age: 51
DRG: 439 | End: 2021-04-19
Attending: STUDENT IN AN ORGANIZED HEALTH CARE EDUCATION/TRAINING PROGRAM
Payer: MEDICAID

## 2021-04-19 PROBLEM — I99.9: Status: ACTIVE | Noted: 2021-04-19

## 2021-04-19 PROBLEM — G62.9 PERIPHERAL NEUROPATHY: Chronic | Status: ACTIVE | Noted: 2021-04-19

## 2021-04-19 LAB
ALBUMIN SERPL BCP-MCNC: 3.9 G/DL (ref 3.2–4.9)
ALBUMIN/GLOB SERPL: 1.3 G/DL
ALP SERPL-CCNC: 178 U/L (ref 30–99)
ALT SERPL-CCNC: 105 U/L (ref 2–50)
ANION GAP SERPL CALC-SCNC: 9 MMOL/L (ref 7–16)
AST SERPL-CCNC: 286 U/L (ref 12–45)
BASOPHILS # BLD AUTO: 0.5 % (ref 0–1.8)
BASOPHILS # BLD: 0.01 K/UL (ref 0–0.12)
BILIRUB SERPL-MCNC: 1.4 MG/DL (ref 0.1–1.5)
BUN SERPL-MCNC: 4 MG/DL (ref 8–22)
CALCIUM SERPL-MCNC: 8.6 MG/DL (ref 8.5–10.5)
CHLORIDE SERPL-SCNC: 94 MMOL/L (ref 96–112)
CO2 SERPL-SCNC: 31 MMOL/L (ref 20–33)
CREAT SERPL-MCNC: 0.28 MG/DL (ref 0.5–1.4)
EOSINOPHIL # BLD AUTO: 0.02 K/UL (ref 0–0.51)
EOSINOPHIL NFR BLD: 1 % (ref 0–6.9)
ERYTHROCYTE [DISTWIDTH] IN BLOOD BY AUTOMATED COUNT: 48.3 FL (ref 35.9–50)
FERRITIN SERPL-MCNC: 776 NG/ML (ref 10–291)
FOLATE SERPL-MCNC: 15.1 NG/ML
GLOBULIN SER CALC-MCNC: 3.1 G/DL (ref 1.9–3.5)
GLUCOSE SERPL-MCNC: 94 MG/DL (ref 65–99)
HCT VFR BLD AUTO: 39.7 % (ref 37–47)
HGB BLD-MCNC: 13.2 G/DL (ref 12–16)
HGB RETIC QN AUTO: 39 PG/CELL (ref 29–35)
IMM GRANULOCYTES # BLD AUTO: 0.01 K/UL (ref 0–0.11)
IMM GRANULOCYTES NFR BLD AUTO: 0.5 % (ref 0–0.9)
IMM RETICS NFR: 13 % (ref 9.3–17.4)
IRON SATN MFR SERPL: 37 % (ref 15–55)
IRON SERPL-MCNC: 81 UG/DL (ref 40–170)
LYMPHOCYTES # BLD AUTO: 0.43 K/UL (ref 1–4.8)
LYMPHOCYTES NFR BLD: 22.2 % (ref 22–41)
MAGNESIUM SERPL-MCNC: 2 MG/DL (ref 1.5–2.5)
MCH RBC QN AUTO: 33.8 PG (ref 27–33)
MCHC RBC AUTO-ENTMCNC: 33.2 G/DL (ref 33.6–35)
MCV RBC AUTO: 101.5 FL (ref 81.4–97.8)
MONOCYTES # BLD AUTO: 0.3 K/UL (ref 0–0.85)
MONOCYTES NFR BLD AUTO: 15.5 % (ref 0–13.4)
NEUTROPHILS # BLD AUTO: 1.17 K/UL (ref 2–7.15)
NEUTROPHILS NFR BLD: 60.3 % (ref 44–72)
NRBC # BLD AUTO: 0 K/UL
NRBC BLD-RTO: 0 /100 WBC
PHOSPHATE SERPL-MCNC: 2.9 MG/DL (ref 2.5–4.5)
PLATELET # BLD AUTO: 31 K/UL (ref 164–446)
PMV BLD AUTO: 10.8 FL (ref 9–12.9)
POTASSIUM SERPL-SCNC: 3.8 MMOL/L (ref 3.6–5.5)
PROT SERPL-MCNC: 7 G/DL (ref 6–8.2)
RBC # BLD AUTO: 3.91 M/UL (ref 4.2–5.4)
RETICS # AUTO: 0.09 M/UL (ref 0.04–0.06)
RETICS/RBC NFR: 2.2 % (ref 0.8–2.1)
SODIUM SERPL-SCNC: 134 MMOL/L (ref 135–145)
TIBC SERPL-MCNC: 221 UG/DL (ref 250–450)
TRANSFERRIN SERPL-MCNC: 186 MG/DL (ref 200–370)
UIBC SERPL-MCNC: 140 UG/DL (ref 110–370)
VIT B12 SERPL-MCNC: 952 PG/ML (ref 211–911)
WBC # BLD AUTO: 1.9 K/UL (ref 4.8–10.8)

## 2021-04-19 PROCEDURE — 84466 ASSAY OF TRANSFERRIN: CPT

## 2021-04-19 PROCEDURE — 84100 ASSAY OF PHOSPHORUS: CPT

## 2021-04-19 PROCEDURE — 700102 HCHG RX REV CODE 250 W/ 637 OVERRIDE(OP): Performed by: STUDENT IN AN ORGANIZED HEALTH CARE EDUCATION/TRAINING PROGRAM

## 2021-04-19 PROCEDURE — A9270 NON-COVERED ITEM OR SERVICE: HCPCS | Performed by: STUDENT IN AN ORGANIZED HEALTH CARE EDUCATION/TRAINING PROGRAM

## 2021-04-19 PROCEDURE — 85046 RETICYTE/HGB CONCENTRATE: CPT

## 2021-04-19 PROCEDURE — 80053 COMPREHEN METABOLIC PANEL: CPT

## 2021-04-19 PROCEDURE — 85025 COMPLETE CBC W/AUTO DIFF WBC: CPT

## 2021-04-19 PROCEDURE — 82607 VITAMIN B-12: CPT

## 2021-04-19 PROCEDURE — 700111 HCHG RX REV CODE 636 W/ 250 OVERRIDE (IP): Performed by: STUDENT IN AN ORGANIZED HEALTH CARE EDUCATION/TRAINING PROGRAM

## 2021-04-19 PROCEDURE — 83550 IRON BINDING TEST: CPT

## 2021-04-19 PROCEDURE — 93922 UPR/L XTREMITY ART 2 LEVELS: CPT | Mod: 26 | Performed by: INTERNAL MEDICINE

## 2021-04-19 PROCEDURE — 93922 UPR/L XTREMITY ART 2 LEVELS: CPT

## 2021-04-19 PROCEDURE — 99232 SBSQ HOSP IP/OBS MODERATE 35: CPT | Mod: GC | Performed by: INTERNAL MEDICINE

## 2021-04-19 PROCEDURE — 36415 COLL VENOUS BLD VENIPUNCTURE: CPT

## 2021-04-19 PROCEDURE — 82746 ASSAY OF FOLIC ACID SERUM: CPT

## 2021-04-19 PROCEDURE — 83735 ASSAY OF MAGNESIUM: CPT

## 2021-04-19 PROCEDURE — 700105 HCHG RX REV CODE 258: Performed by: STUDENT IN AN ORGANIZED HEALTH CARE EDUCATION/TRAINING PROGRAM

## 2021-04-19 PROCEDURE — 83540 ASSAY OF IRON: CPT

## 2021-04-19 PROCEDURE — 770020 HCHG ROOM/CARE - TELE (206)

## 2021-04-19 PROCEDURE — 82728 ASSAY OF FERRITIN: CPT

## 2021-04-19 PROCEDURE — 82306 VITAMIN D 25 HYDROXY: CPT

## 2021-04-19 RX ORDER — POTASSIUM CHLORIDE 20 MEQ/1
20 TABLET, EXTENDED RELEASE ORAL ONCE
Status: COMPLETED | OUTPATIENT
Start: 2021-04-19 | End: 2021-04-19

## 2021-04-19 RX ORDER — GABAPENTIN 300 MG/1
300 CAPSULE ORAL 3 TIMES DAILY
Status: DISCONTINUED | OUTPATIENT
Start: 2021-04-19 | End: 2021-04-22 | Stop reason: HOSPADM

## 2021-04-19 RX ADMIN — LORAZEPAM 0.5 MG: 2 INJECTION INTRAMUSCULAR; INTRAVENOUS at 06:25

## 2021-04-19 RX ADMIN — FLUOXETINE 30 MG: 20 CAPSULE ORAL at 04:52

## 2021-04-19 RX ADMIN — LORAZEPAM 0.5 MG: 2 INJECTION INTRAMUSCULAR; INTRAVENOUS at 02:20

## 2021-04-19 RX ADMIN — GABAPENTIN 300 MG: 300 CAPSULE ORAL at 04:51

## 2021-04-19 RX ADMIN — PROCHLORPERAZINE EDISYLATE 10 MG: 5 INJECTION INTRAMUSCULAR; INTRAVENOUS at 04:27

## 2021-04-19 RX ADMIN — ARIPIPRAZOLE 2 MG: 2 TABLET ORAL at 04:51

## 2021-04-19 RX ADMIN — LORAZEPAM 2 MG: 2 TABLET ORAL at 13:50

## 2021-04-19 RX ADMIN — Medication 100 MG: at 04:51

## 2021-04-19 RX ADMIN — SODIUM CHLORIDE, POTASSIUM CHLORIDE, SODIUM LACTATE AND CALCIUM CHLORIDE: 600; 310; 30; 20 INJECTION, SOLUTION INTRAVENOUS at 05:33

## 2021-04-19 RX ADMIN — GABAPENTIN 300 MG: 300 CAPSULE ORAL at 17:22

## 2021-04-19 RX ADMIN — GABAPENTIN 300 MG: 300 CAPSULE ORAL at 12:32

## 2021-04-19 RX ADMIN — SODIUM CHLORIDE, POTASSIUM CHLORIDE, SODIUM LACTATE AND CALCIUM CHLORIDE: 600; 310; 30; 20 INJECTION, SOLUTION INTRAVENOUS at 13:50

## 2021-04-19 RX ADMIN — MORPHINE SULFATE 1 MG: 4 INJECTION INTRAVENOUS at 17:21

## 2021-04-19 RX ADMIN — PROPRANOLOL HYDROCHLORIDE 10 MG: 10 TABLET ORAL at 04:51

## 2021-04-19 RX ADMIN — FOLIC ACID 1 MG: 1 TABLET ORAL at 04:51

## 2021-04-19 RX ADMIN — LORAZEPAM 1 MG: 1 TABLET ORAL at 22:56

## 2021-04-19 RX ADMIN — LORAZEPAM 2 MG: 2 TABLET ORAL at 20:29

## 2021-04-19 RX ADMIN — DOCUSATE SODIUM 50 MG AND SENNOSIDES 8.6 MG 2 TABLET: 8.6; 5 TABLET, FILM COATED ORAL at 04:51

## 2021-04-19 RX ADMIN — THERA TABS 1 TABLET: TAB at 04:52

## 2021-04-19 RX ADMIN — MORPHINE SULFATE 1 MG: 4 INJECTION INTRAVENOUS at 02:20

## 2021-04-19 RX ADMIN — POTASSIUM CHLORIDE 20 MEQ: 1500 TABLET, EXTENDED RELEASE ORAL at 08:15

## 2021-04-19 RX ADMIN — CALCIUM CITRATE 200 MG (950 MG) TABLET 950 MG: at 06:00

## 2021-04-19 RX ADMIN — MORPHINE SULFATE 1 MG: 4 INJECTION INTRAVENOUS at 23:22

## 2021-04-19 RX ADMIN — OMEPRAZOLE 40 MG: 20 CAPSULE, DELAYED RELEASE ORAL at 04:51

## 2021-04-19 RX ADMIN — DOCUSATE SODIUM 50 MG AND SENNOSIDES 8.6 MG 2 TABLET: 8.6; 5 TABLET, FILM COATED ORAL at 17:22

## 2021-04-19 RX ADMIN — LORAZEPAM 1 MG: 1 TABLET ORAL at 16:05

## 2021-04-19 RX ADMIN — LORAZEPAM 1 MG: 2 INJECTION INTRAMUSCULAR; INTRAVENOUS at 04:50

## 2021-04-19 RX ADMIN — MORPHINE SULFATE 1 MG: 4 INJECTION INTRAVENOUS at 12:32

## 2021-04-19 ASSESSMENT — ENCOUNTER SYMPTOMS
BLURRED VISION: 0
COUGH: 0
ABDOMINAL PAIN: 0
DIAPHORESIS: 0
CONSTIPATION: 0
NERVOUS/ANXIOUS: 0
SENSORY CHANGE: 1
PALPITATIONS: 0
DIZZINESS: 0
NAUSEA: 1
FEVER: 0
SORE THROAT: 0
SHORTNESS OF BREATH: 0
DIARRHEA: 0
HEADACHES: 0
TREMORS: 1
MYALGIAS: 0
SEIZURES: 0
DOUBLE VISION: 0
WHEEZING: 0
VOMITING: 1
CHILLS: 0
FALLS: 0

## 2021-04-19 ASSESSMENT — LIFESTYLE VARIABLES
ANXIETY: *
ANXIETY: MODERATELY ANXIOUS OR GUARDED, SO ANXIETY IS INFERRED
VISUAL DISTURBANCES: NOT PRESENT
VISUAL DISTURBANCES: NOT PRESENT
TREMOR: *
NAUSEA AND VOMITING: *
PAROXYSMAL SWEATS: *
AUDITORY DISTURBANCES: NOT PRESENT
VISUAL DISTURBANCES: NOT PRESENT
NAUSEA AND VOMITING: *
PAROXYSMAL SWEATS: BARELY PERCEPTIBLE SWEATING. PALMS MOIST
PAROXYSMAL SWEATS: BARELY PERCEPTIBLE SWEATING. PALMS MOIST
AUDITORY DISTURBANCES: NOT PRESENT
HEADACHE, FULLNESS IN HEAD: NOT PRESENT
AGITATION: SOMEWHAT MORE THAN NORMAL ACTIVITY
ANXIETY: *
ORIENTATION AND CLOUDING OF SENSORIUM: ORIENTED AND CAN DO SERIAL ADDITIONS
SUBSTANCE_ABUSE: 0
TOTAL SCORE: 14
AUDITORY DISTURBANCES: NOT PRESENT
PAROXYSMAL SWEATS: NO SWEAT VISIBLE
NAUSEA AND VOMITING: MILD NAUSEA WITH NO VOMITING
AGITATION: NORMAL ACTIVITY
AUDITORY DISTURBANCES: NOT PRESENT
TREMOR: MODERATE TREMOR WITH ARMS EXTENDED
ORIENTATION AND CLOUDING OF SENSORIUM: ORIENTED AND CAN DO SERIAL ADDITIONS
VISUAL DISTURBANCES: NOT PRESENT
TREMOR: *
AUDITORY DISTURBANCES: NOT PRESENT
HEADACHE, FULLNESS IN HEAD: MODERATE
AGITATION: NORMAL ACTIVITY
VISUAL DISTURBANCES: NOT PRESENT
ORIENTATION AND CLOUDING OF SENSORIUM: ORIENTED AND CAN DO SERIAL ADDITIONS
PAROXYSMAL SWEATS: BARELY PERCEPTIBLE SWEATING. PALMS MOIST
TOTAL SCORE: 8
TOTAL SCORE: 8
ANXIETY: *
VISUAL DISTURBANCES: NOT PRESENT
PAROXYSMAL SWEATS: BARELY PERCEPTIBLE SWEATING. PALMS MOIST
NAUSEA AND VOMITING: NO NAUSEA AND NO VOMITING
ANXIETY: *
HEADACHE, FULLNESS IN HEAD: MILD
HEADACHE, FULLNESS IN HEAD: NOT PRESENT
ORIENTATION AND CLOUDING OF SENSORIUM: ORIENTED AND CAN DO SERIAL ADDITIONS
TOTAL SCORE: 8
TREMOR: *
ORIENTATION AND CLOUDING OF SENSORIUM: ORIENTED AND CAN DO SERIAL ADDITIONS
VISUAL DISTURBANCES: NOT PRESENT
TREMOR: MODERATE TREMOR WITH ARMS EXTENDED
AGITATION: NORMAL ACTIVITY
AGITATION: NORMAL ACTIVITY
TREMOR: *
ANXIETY: MILDLY ANXIOUS
TOTAL SCORE: 12
NAUSEA AND VOMITING: NO NAUSEA AND NO VOMITING
HEADACHE, FULLNESS IN HEAD: NOT PRESENT
HEADACHE, FULLNESS IN HEAD: NOT PRESENT
ORIENTATION AND CLOUDING OF SENSORIUM: ORIENTED AND CAN DO SERIAL ADDITIONS
AGITATION: NORMAL ACTIVITY
NAUSEA AND VOMITING: *
ANXIETY: MILDLY ANXIOUS
VISUAL DISTURBANCES: NOT PRESENT
HEADACHE, FULLNESS IN HEAD: MILD
PAROXYSMAL SWEATS: BARELY PERCEPTIBLE SWEATING. PALMS MOIST
TOTAL SCORE: 12
PAROXYSMAL SWEATS: NO SWEAT VISIBLE
NAUSEA AND VOMITING: *
ORIENTATION AND CLOUDING OF SENSORIUM: ORIENTED AND CAN DO SERIAL ADDITIONS
AUDITORY DISTURBANCES: NOT PRESENT
AGITATION: NORMAL ACTIVITY
NAUSEA AND VOMITING: MILD NAUSEA WITH NO VOMITING
TREMOR: *
HEADACHE, FULLNESS IN HEAD: NOT PRESENT
TOTAL SCORE: 4
AUDITORY DISTURBANCES: NOT PRESENT
AUDITORY DISTURBANCES: NOT PRESENT
ANXIETY: *
AGITATION: NORMAL ACTIVITY
TOTAL SCORE: 8
TREMOR: MODERATE TREMOR WITH ARMS EXTENDED
ORIENTATION AND CLOUDING OF SENSORIUM: ORIENTED AND CAN DO SERIAL ADDITIONS

## 2021-04-19 ASSESSMENT — PAIN DESCRIPTION - PAIN TYPE
TYPE: ACUTE PAIN

## 2021-04-19 ASSESSMENT — FIBROSIS 4 INDEX: FIB4 SCORE: 46.82

## 2021-04-19 NOTE — CARE PLAN
Problem: Safety  Goal: Will remain free from injury  Outcome: PROGRESSING AS EXPECTED  Notes: Pt calls for assistance appropriately. Pt's bed alarm is on/working appropriately.   Goal: Will remain free from falls  Outcome: PROGRESSING AS EXPECTED  Note: Pt has non-skid socks on, pt's bed alarm is on, pt states understanding to call before getting out of bed.

## 2021-04-19 NOTE — CARE PLAN
Problem: Communication  Goal: The ability to communicate needs accurately and effectively will improve  Outcome: PROGRESSING AS EXPECTED     Problem: Safety  Goal: Will remain free from injury  Outcome: PROGRESSING AS EXPECTED  Goal: Will remain free from falls  Outcome: PROGRESSING AS EXPECTED     Problem: Discharge Barriers/Planning  Goal: Patient's continuum of care needs will be met  Outcome: PROGRESSING AS EXPECTED     Problem: Pain Management  Goal: Pain level will decrease to patient's comfort goal  Outcome: PROGRESSING AS EXPECTED     Problem: Psychosocial Needs:  Goal: Level of anxiety will decrease  Outcome: PROGRESSING AS EXPECTED     Problem: Mobility  Goal: Risk for activity intolerance will decrease  Outcome: PROGRESSING AS EXPECTED

## 2021-04-19 NOTE — PROGRESS NOTES
Assumed care of pt. Bedside report received from Laurie DENSON. Pt was updated on plan of care. Call light, phone and personal belongings in reach. Bed alarm on and working properly, bed in lowest position, and locked.

## 2021-04-19 NOTE — RESPIRATORY CARE
" COPD EDUCATION by COPD CLINICAL EDUCATOR  4/19/2021 at 3:15 PM by Ena Arias, RRT     Patient reviewed by COPD education team. Patient does not qualify for the COPD program.  COPD Assessment  COPD Clinical Specialists ONLY  COPD Education Initiated: Yes--Short Intervention(patient denies history/diagnosis of COPD. non-smoker. went over oxygen safety for home.)  Referrals Initiated: Yes  Pulmonary Rehab: N/A  Smoking Cessation: N/A  Hospice: N/A  Home Health Care: Declined  [facility] Outreach: Yes  [facility] Group: N/A  [facility]: Declined  Private In-Home Care Agency: N/A  Is this a COPD exacerbation patient?: No  $ Demo/Eval of SVN's, MDI's and Aerosols: (no medications at home)    Meds to Beds        MY COPD ACTION PLAN     It is recommended that patients and physicians /healthcare providers complete this action plan together. This plan should be discussed at each physician visit and updated as needed.    The green, yellow and red zones show groups of symptoms of COPD. This list of symptoms is not comprehensive, and you may experience other symptoms. In the \"Actions\" column, your healthcare provider has recommended actions for you to take based on your symptoms.    Patient Name: Olya Youssef   YOB: 1970   Last Updated on: 4/19/2021  3:14 PM   Green Zone:  I am doing well today Actions   •  Usual activitiy and exercise level •  Take daily medications   •  Usual amounts of cough and phlegm/mucus •  Use oxygen as prescribed   •  Sleep well at night •  Continue regular exercise/diet plan   •  Appetite is good •  At all times avoid cigarette smoke, inhaled irritants     Daily Medications (these medications are taken every day):                Yellow Zone:  I am having a bad day or a COPD flare Actions   •  More breathless than usual •  Continue daily medications   •  I have less energy for my daily activities •  Use quick relief inhaler as ordered   •  Increased or " "thicker phlegm/mucus •  Use oxygen as prescribed   •  Using quick relief inhaler/nebulizer more often •  Get plenty of rest   •  Swelling of ankles more than usual •  Use pursed lip breathing   •  More coughing than usual •  At all times avoid cigarette smoke, inhaled irritants   •  I feel like I have a \"chest cold\"   •  Poor sleep and my symptoms woke me up   •  My appetite is not good   •  My medicine is not helping    •  Call provider immediately if symptoms don’t improve     Continue daily medications, add rescue medications:               Medications to be used during a flare up, (as Discussed with Provider):              Red Zone:  I need urgent medical care Actions   •  Severe shortness of breath even at rest •  Call 911 or seek medical care immediately   •  Not able to do any activity because of breathing    •  Fever or shaking chills    •  Feeling confused or very drowsy     •  Chest pains    •  Coughing up blood              "

## 2021-04-19 NOTE — PROGRESS NOTES
Daily Progress Note:     Date of Service: 4/19/2021  Primary Team: [facility] Team   Attending: Dr. Ayala  Senior Resident: Dr. Corey MD  Intern: Dr. Trina Friend M.D.  Contact:  [phone]    Chief Complaint:   Chief Complaint   Patient presents with   • Abdominal Pain     Pt states she has 10/10 upper abdominal pain and was diagnosed with acute pancreatitis at [facility] two days ago and was discharged. Pt also stated she has been diagnosed with pancreatitis 5 times previously.    • Alcohol Intoxication     Per EMS they were called to pt's residense by pt's roommate who stated she is unable to care for herself due to her drinking. She has been unable to make it to the bathroom and has not been eating for the past few days. Pt states she drinks approximatley one pint of Vodka everyday. States she has has tried to quit drinking in the past but was unable to. Pt is A&Ox4, speech is slurred, but answers questions and follows commands appropriately.      ID:  50 y.o. female with a PMH of recurrent alcoholic pancreatitis, chronic alcohol abuse, and history of alcohol withdrawals (including seizures), bipolar disorder, history of meth abuse, history of suicide attempts, COVID-19 infection (recovered, out of quarantine), COPD (no PFTs on file, on 2L O2 at home) who presented 4/17/2021 with alcoholic pancreatitis and alcohol intoxication, now in alcohol withdrawal.    Subjective:   Overnight, patient's CIWA scores were between 8 to 11, latest one this AM 14. She tried to drink water but vomited it up shortly thereafter. She was coherent and was able to take her PO medications without issue. This morning, patient stated she felt better though she still had some tremors. Did not have any suicidal ideation or homicidal ideation.  She did not have any hematemesis.  No acute complaints, including chest pain and shortness of breath.    Consultants/Specialty:  None    Review of Systems:   Review of Systems    Constitutional: Positive for malaise/fatigue. Negative for chills, diaphoresis and fever.   HENT: Negative for congestion and sore throat.    Eyes: Negative for blurred vision and double vision.   Respiratory: Negative for cough, shortness of breath and wheezing.    Cardiovascular: Negative for chest pain, palpitations and leg swelling.   Gastrointestinal: Positive for nausea and vomiting. Negative for abdominal pain, constipation and diarrhea.   Genitourinary: Negative for dysuria, frequency and urgency.   Musculoskeletal: Negative for falls and myalgias.   Neurological: Positive for tremors and sensory change. Negative for dizziness, seizures and headaches.   Psychiatric/Behavioral: Negative for substance abuse and suicidal ideas. The patient is not nervous/anxious.        Objective Data:   Physical Exam:   Vitals:   Temp:  [36.2 °C (97.2 °F)-36.9 °C (98.5 °F)] 36.3 °C (97.4 °F)  Pulse:  [] 69  Resp:  [16-19] 18  BP: (101-149)/(72-90) 101/72  SpO2:  [98 %-100 %] 98 %     Physical Exam  Vitals and nursing note reviewed.   Constitutional:       General: She is in acute distress.      Appearance: She is ill-appearing. She is not toxic-appearing or diaphoretic.      Comments: Lethargic   HENT:      Head: Normocephalic and atraumatic.      Right Ear: External ear normal.      Left Ear: External ear normal.      Nose: Nose normal. No congestion or rhinorrhea.      Mouth/Throat:      Mouth: Mucous membranes are dry.      Pharynx: Posterior oropharyngeal erythema present. No oropharyngeal exudate.      Comments: Lips stained with dried blood  Mallampati class 3  Eyes:      General: No scleral icterus.        Right eye: No discharge.         Left eye: No discharge.      Extraocular Movements: Extraocular movements intact.      Conjunctiva/sclera: Conjunctivae normal.      Pupils: Pupils are equal, round, and reactive to light.   Cardiovascular:      Rate and Rhythm: Normal rate and regular rhythm.      Heart  sounds: No murmur.   Pulmonary:      Effort: Pulmonary effort is normal. No respiratory distress.      Breath sounds: Normal breath sounds. No wheezing or rales.   Abdominal:      General: Abdomen is flat. There is no distension.      Palpations: Abdomen is soft.      Tenderness: There is abdominal tenderness. There is guarding. There is no right CVA tenderness or left CVA tenderness.      Comments: Abdominal striae present, diffuse abdominal tenderness to palpation in all quadrants, improved from 4/18/2021   Musculoskeletal:         General: No swelling, tenderness or deformity.      Cervical back: Normal range of motion and neck supple. No rigidity or tenderness.      Comments: Left lower extremity cool to touch, blanching skin. RLE warm to touch. 2+ dorsalis pedis pulses in b/l LE   Skin:     General: Skin is warm.      Capillary Refill: Capillary refill takes less than 2 seconds.      Coloration: Skin is not jaundiced.      Comments: Callus on left big toe and on ball of right foot   Neurological:      Mental Status: She is oriented to person, place, and time. She is lethargic.      Cranial Nerves: No cranial nerve deficit.      Sensory: No sensory deficit.      Motor: Weakness present.      Coordination: Coordination abnormal.      Comments: Patient declined rest of neuro exam.   Psychiatric:         Attention and Perception: Perception normal. She is attentive. She does not perceive auditory or visual hallucinations.         Mood and Affect: Affect is blunt.         Speech: Speech is delayed.         Behavior: Behavior is cooperative.         Thought Content: Thought content normal.         Cognition and Memory: Memory is not impaired. She exhibits impaired recent memory.           Labs:   Recent Labs     04/17/21  1157 04/18/21  0142 04/19/21  0126   WBC 3.0* 2.0* 1.9*   RBC 4.10* 3.94* 3.91*   HEMOGLOBIN 13.8 13.4 13.2   HEMATOCRIT 41.0 40.4 39.7   .0* 102.5* 101.5*   MCH 33.7* 34.0* 33.8*   RDW 49.2  50.6* 48.3   PLATELETCT 45* 26* 31*   MPV 9.4 11.1 10.8   NEUTSPOLYS 22.00* 49.00 60.30   LYMPHOCYTES 63.80* 30.00 22.20   MONOCYTES 11.50 18.50* 15.50*   EOSINOPHILS 1.00 0.50 1.00   BASOPHILS 1.00 1.00 0.50     Recent Labs     04/17/21  1137 04/18/21  0142 04/19/21  0126   SODIUM 143 137 134*   POTASSIUM 3.6 3.3* 3.8   CHLORIDE 100 96 94*   CO2 26 29 31   GLUCOSE 113* 85 94   BUN 8 4* 4*        Imaging:   US-ANTONIO SINGLE LEVEL BILAT             Problem Representation:   50 y.o. female with a PMH of recurrent alcoholic pancreatitis, chronic alcohol abuse, and history of alcohol withdrawals (including seizures), bipolar disorder, history of meth abuse, history of suicide attempts, COVID-19 infection (recovered, out of quarantine), COPD (no PFTs on file, on 2L O2 at home) who presented 4/17/2021 with alcoholic pancreatitis and alcohol intoxication, now in alcohol withdrawal.    * Pancreatitis, alcoholic, acute- (present on admission)  Assessment & Plan  Presented with severe abdominal pain, 10/10. Lipase of 211, around 3x upper limit of normal. Due to drinking 1 pint vodka per day. Also with transaminitis. This is especially concerning in light of the fact that the patient's condition is due to alcohol, but patient is self-medicating for the pain with drinking more alcohol.    -IVF resuscitation  -CLD  -PPI omeprazole 40 mg qD  -Morphine IV PRN for pain  -Obtain records from [facility], [place], will follow up on CT abdomen/pelvis without contrast from recent hospitalization    Impaired circulation of left lower extremity  Assessment & Plan  Concerning as this is new upon physical exam on 4/19    -Follow up left lower extremity arterial ultrasound    Pancytopenia (HCC)  Assessment & Plan  On 4/18/2021, patient had white blood count of 2.0 and platelet of 26.  This is concerning, likely due to the stress intoxication from effects of alcohol.  Less likely some other malignant process such as leukemia.  May also be  concerning for heparin-induced thrombocytopenia versus ITP.  Patient may have lymphopenia due to previous Covid infection.    Patient has been placed on neutropenic precautions  Continue to monitor CBC labs  Obtain records from [facility] where patient was recently hospitalized for further trending of values and to see if heparin was administered during hospitalization there.    Alcohol withdrawal (HCC)- (present on admission)  Assessment & Plan  Also see problem of alcohol intoxication    Patient has history of alcohol withdrawal in the past including with seizures.  Last drink was 4/16/2021 per patient history.  Patient has gotten multiple doses of Ativan, this is concerning in the context that there may be some other underlying pathology such as her bipolar disorder.      Multivitamin, thiamine, folate supplement  CIWA protocol  Neurochecks every 4 hours  Seizure, aspiration, fall precautions  Start aripiprazole low-dose, will uptitrate as necessary.  This will hopefully help unmask some of the symptoms of symptoms of alcohol withdrawal versus due to underlying bipolar disorder pathology  Monitor electrolytes closely  4/18: Dc'ed propranolol as tachycardia has resolved    Alcohol intoxication with blood level over 0.3 (HCC)- (present on admission)  Assessment & Plan  Especially concerning in light of the fact that the patient is chronically drinking alcohol. Patient has h/o alcohol withdrawal in past including with seizures. Last drink was yesterday per history. Given ryan bag in ED.    MV, thiamine, folate supp  CIWA protocol  Neuro checks q4  Seizure, aspiration, fall precautions  Significant alcohol cessation counseling    Peripheral neuropathy- (present on admission)  Assessment & Plan  Gabapentin 300 mg TID (increased from home dose)    Macrocytic anemia- (present on admission)  Assessment & Plan  This is likely a chronic issue, patient likely has malnutrition due to chronic alcoholism.  Patient  presented with macrocytosis    Follow-up on complete anemia work-up  Obtain collateral records from [place] for further information    Hypomagnesemia- (present on admission)  Assessment & Plan  This is concerning because this will result in promoting hypocalcemia as well as wasting potassium in the kidney.    Replete with goal of magnesium of 2    Lactic acidosis- (present on admission)  Assessment & Plan  Lactic acid levels have been uptrending, from 2.3-2.7-3.9 on 4/18/2021.  This is likely not due to fluid depletion, rather due to acute stress state and liver.  Patient is currently not septic.    Continue to monitor clinically  Continue IV fluid resuscitation  No need to continue trending  Serial abdominal exams  If patient becomes septic, will repeat trend    Hypocalcemia- (present on admission)  Assessment & Plan  This is very concerning.  Patient likely has a component of low magnesium causing low absorption of calcium.  Also may have component of low vitamin D.  May be part of malnutrition due to alcoholism    Follow-up on vitamin D level  Start calcium and vitamin D supplementation    COPD (chronic obstructive pulmonary disease) (HCC)- (present on admission)  Assessment & Plan  Chronic, unknown if completely worked up before. No PFTs on file.    Patient will needs PFTs as outpatient.  Consider new home O2 eval prior to d/c if needed    Cannabis abuse- (present on admission)  Assessment & Plan  Drug cessation counseling    Abnormal QT interval present on electrocardiogram- (present on admission)  Assessment & Plan  Present on multiple EKGs.    Avoid medications that prolong QTc interval  Continue to monitor on telemetry      Alcoholic hepatitis- (present on admission)  Assessment & Plan  With transaminitis, AST:ALT of 2:1.    -Continue to monitor, trend transaminases    Vomiting- (present on admission)  Assessment & Plan  Sx started 4/19. Part of alcohol withdrawal.    Continue IVF  PRN  antiemetics  Incentive spirometry, encourage to use 10x/hr while awake as possible    Bipolar affective disorder (HCC)- (present on admission)  Assessment & Plan  On prozac at home. This is not ideal as this can cause the patient to go into manic episode with an SSRI, however will need to continue this to prevent withdrawal.    Now on aripiprazole, hopefully will help sx of alcohol withdrawal as well    Hypokalemia- (present on admission)  Assessment & Plan  Replete with goal of K=4      Quality Measures:  Code: Full  VTE: SCDs  Diet: CLD  Disposition: Remain inpatient    Please note that this dictation was created using voice recognition software. I have worked with technical experts from [facility] to optimize the interface.  I have made every reasonable attempt to correct obvious errors, but there may be errors of grammar and possibly content that I did not discover before finalizing the note.

## 2021-04-19 NOTE — ASSESSMENT & PLAN NOTE
IMPROVING  Sx started 4/19. Part of alcohol withdrawal.    Continue IVF  PRN antiemetics  Incentive spirometry, encourage to use 10x/hr while awake as possible

## 2021-04-19 NOTE — PROGRESS NOTES
notified RN about Critical lab platelet 30 and wbc 1.9. Dr Yan notified. No new orders at this time.

## 2021-04-19 NOTE — CARE PLAN
Problem: Nutritional:  Goal: Achieve adequate nutritional intake  Description: Pt able to tolerate diet advancement past clear liquids in 48 hours.   Outcome: NOT MET

## 2021-04-19 NOTE — ASSESSMENT & PLAN NOTE
Concerning as this is new upon physical exam on 4/19 (RLE), then migrated to E on 4/20. Bilateral arterial U/S did not show any acute abnormalities. CT angio performed at [facility] on 4/15/2021 did not show evidence of PE.    CTM, conservative tx for now

## 2021-04-19 NOTE — DIETARY
Nutrition services: Day 2 of admit.  Olya Youssef is a 50 y.o. female with admitting DX of Acute recurrent pancreatitis.    Hospital Problem List:   1. Impaired circulation of left lower extremity  2. Pancytopenia   3. Pancreatitis, alcoholic, acute   4. Alcohol intoxication with blood level over 0.3   5. Alcohol withdrawal   6. Peripheral neuropathy (Chronic)  7. Hypocalcemia   8. Lactic acidosis   9. Hypomagnesemia   10. Macrocytic anemia   11. Cannabis abuse (Chronic)   12. COPD (chronic obstructive pulmonary disease)   13. Abnormal QT interval present on electrocardiogram  14. Vomiting   15. Alcoholic hepatitis   16. Bipolar affective disorder   17. Hypokalemia       Pt seen for clear liquids since admit (~ 3 days) with recurrent pancreatitis. Pt very sleepy so interview limited. Pt reports poor intake and eating <50% of her normal intake over past couple of weeks with suspected wt loss, UBW ~ 194 lbs. Pt fell asleep so interview was stopped. Intervention limited at this time.          Assessment:  Height: 152.4 cm (5')  Weight: 65 kg (143 lb 4.8 oz)- per bedscale on 4/18  Body mass index is 27.99 kg/m²., BMI classification: Overweight  Diet/Intake: Clear liquids    Evaluation:   1. Pertinent Labs: Na+ 134, BUN 4, Creat 0.28, , .   2. Pertinent Meds: LR @ 100 ml/hr, Prilosec, Compazine PRN, Bowel protocol, daily thiamine, folic acid, daily multivitamin, vit D.   3. Wt per chart review:   · 68 kg/ 150 lbs per ED visit on 5/7/2020  · 63.5 kg/ 140 lbs per ED visit on 1/13/2021  · Wt trends per chart review not consistent with pt report but unclear how wts were obtained, will continue to monitor.     Malnutrition Risk: limited information; high risk for acute disease related malnutrition related to pancreatitis as evidenced by eating <50% of her normal intake for past couple of weeks per pt report.    Recommendations/Plan:  1. Advance diet as medically feasible, consider Low fat diet restriction to  help optimize tolerances.   2. Encourage PO and document all intake as % taken in ADL's to provide interdisciplinary communication across all shifts.   3. Monitor weight.  4. Nutrition rep will continue to see patient for ongoing meal and snack preferences.     RD following.

## 2021-04-20 ENCOUNTER — APPOINTMENT (OUTPATIENT)
Dept: RADIOLOGY | Facility: MEDICAL CENTER | Age: 51
DRG: 439 | End: 2021-04-20
Attending: STUDENT IN AN ORGANIZED HEALTH CARE EDUCATION/TRAINING PROGRAM
Payer: MEDICAID

## 2021-04-20 PROBLEM — I10 HYPERTENSION: Status: ACTIVE | Noted: 2021-04-20

## 2021-04-20 LAB
25(OH)D3 SERPL-MCNC: 11 NG/ML (ref 30–80)
ALBUMIN SERPL BCP-MCNC: 3.5 G/DL (ref 3.2–4.9)
ALBUMIN/GLOB SERPL: 1.1 G/DL
ALP SERPL-CCNC: 156 U/L (ref 30–99)
ALT SERPL-CCNC: 78 U/L (ref 2–50)
ANION GAP SERPL CALC-SCNC: 11 MMOL/L (ref 7–16)
ANION GAP SERPL CALC-SCNC: 9 MMOL/L (ref 7–16)
AST SERPL-CCNC: 131 U/L (ref 12–45)
BASOPHILS # BLD AUTO: 0.9 % (ref 0–1.8)
BASOPHILS # BLD: 0.03 K/UL (ref 0–0.12)
BILIRUB SERPL-MCNC: 1 MG/DL (ref 0.1–1.5)
BUN SERPL-MCNC: 4 MG/DL (ref 8–22)
BUN SERPL-MCNC: 5 MG/DL (ref 8–22)
CALCIUM SERPL-MCNC: 9.2 MG/DL (ref 8.5–10.5)
CALCIUM SERPL-MCNC: 9.4 MG/DL (ref 8.5–10.5)
CHLORIDE SERPL-SCNC: 88 MMOL/L (ref 96–112)
CHLORIDE SERPL-SCNC: 88 MMOL/L (ref 96–112)
CO2 SERPL-SCNC: 27 MMOL/L (ref 20–33)
CO2 SERPL-SCNC: 30 MMOL/L (ref 20–33)
CREAT SERPL-MCNC: 0.25 MG/DL (ref 0.5–1.4)
CREAT SERPL-MCNC: 0.27 MG/DL (ref 0.5–1.4)
EOSINOPHIL # BLD AUTO: 0.11 K/UL (ref 0–0.51)
EOSINOPHIL NFR BLD: 3.3 % (ref 0–6.9)
ERYTHROCYTE [DISTWIDTH] IN BLOOD BY AUTOMATED COUNT: 47.4 FL (ref 35.9–50)
GLOBULIN SER CALC-MCNC: 3.1 G/DL (ref 1.9–3.5)
GLUCOSE SERPL-MCNC: 119 MG/DL (ref 65–99)
GLUCOSE SERPL-MCNC: 139 MG/DL (ref 65–99)
HCT VFR BLD AUTO: 39.6 % (ref 37–47)
HGB BLD-MCNC: 13 G/DL (ref 12–16)
IMM GRANULOCYTES # BLD AUTO: 0.05 K/UL (ref 0–0.11)
IMM GRANULOCYTES NFR BLD AUTO: 1.5 % (ref 0–0.9)
LYMPHOCYTES # BLD AUTO: 1.06 K/UL (ref 1–4.8)
LYMPHOCYTES NFR BLD: 31.5 % (ref 22–41)
MAGNESIUM SERPL-MCNC: 1.4 MG/DL (ref 1.5–2.5)
MCH RBC QN AUTO: 33.8 PG (ref 27–33)
MCHC RBC AUTO-ENTMCNC: 32.8 G/DL (ref 33.6–35)
MCV RBC AUTO: 102.9 FL (ref 81.4–97.8)
MONOCYTES # BLD AUTO: 0.53 K/UL (ref 0–0.85)
MONOCYTES NFR BLD AUTO: 15.7 % (ref 0–13.4)
NEUTROPHILS # BLD AUTO: 1.59 K/UL (ref 2–7.15)
NEUTROPHILS NFR BLD: 47.1 % (ref 44–72)
NRBC # BLD AUTO: 0 K/UL
NRBC BLD-RTO: 0 /100 WBC
OSMOLALITY SERPL: 268 MOSM/KG H2O (ref 278–298)
PLATELET # BLD AUTO: 60 K/UL (ref 164–446)
PMV BLD AUTO: 11.1 FL (ref 9–12.9)
POTASSIUM SERPL-SCNC: 3.4 MMOL/L (ref 3.6–5.5)
POTASSIUM SERPL-SCNC: 3.6 MMOL/L (ref 3.6–5.5)
PROT SERPL-MCNC: 6.6 G/DL (ref 6–8.2)
RBC # BLD AUTO: 3.85 M/UL (ref 4.2–5.4)
SODIUM SERPL-SCNC: 124 MMOL/L (ref 135–145)
SODIUM SERPL-SCNC: 129 MMOL/L (ref 135–145)
WBC # BLD AUTO: 3.4 K/UL (ref 4.8–10.8)

## 2021-04-20 PROCEDURE — 76705 ECHO EXAM OF ABDOMEN: CPT

## 2021-04-20 PROCEDURE — 700111 HCHG RX REV CODE 636 W/ 250 OVERRIDE (IP): Performed by: STUDENT IN AN ORGANIZED HEALTH CARE EDUCATION/TRAINING PROGRAM

## 2021-04-20 PROCEDURE — A9270 NON-COVERED ITEM OR SERVICE: HCPCS | Performed by: STUDENT IN AN ORGANIZED HEALTH CARE EDUCATION/TRAINING PROGRAM

## 2021-04-20 PROCEDURE — 83930 ASSAY OF BLOOD OSMOLALITY: CPT

## 2021-04-20 PROCEDURE — 85025 COMPLETE CBC W/AUTO DIFF WBC: CPT

## 2021-04-20 PROCEDURE — 83735 ASSAY OF MAGNESIUM: CPT

## 2021-04-20 PROCEDURE — 99232 SBSQ HOSP IP/OBS MODERATE 35: CPT | Mod: GC | Performed by: INTERNAL MEDICINE

## 2021-04-20 PROCEDURE — 700105 HCHG RX REV CODE 258: Performed by: STUDENT IN AN ORGANIZED HEALTH CARE EDUCATION/TRAINING PROGRAM

## 2021-04-20 PROCEDURE — 80048 BASIC METABOLIC PNL TOTAL CA: CPT

## 2021-04-20 PROCEDURE — 770020 HCHG ROOM/CARE - TELE (206)

## 2021-04-20 PROCEDURE — 700102 HCHG RX REV CODE 250 W/ 637 OVERRIDE(OP): Performed by: STUDENT IN AN ORGANIZED HEALTH CARE EDUCATION/TRAINING PROGRAM

## 2021-04-20 PROCEDURE — 82306 VITAMIN D 25 HYDROXY: CPT

## 2021-04-20 PROCEDURE — 80053 COMPREHEN METABOLIC PANEL: CPT

## 2021-04-20 PROCEDURE — 36415 COLL VENOUS BLD VENIPUNCTURE: CPT

## 2021-04-20 RX ORDER — CLONIDINE 0.2 MG/24H
1 PATCH, EXTENDED RELEASE TRANSDERMAL
Status: DISCONTINUED | OUTPATIENT
Start: 2021-04-20 | End: 2021-04-22 | Stop reason: HOSPADM

## 2021-04-20 RX ORDER — CLONIDINE 0.1 MG/24H
1 PATCH, EXTENDED RELEASE TRANSDERMAL
Status: DISCONTINUED | OUTPATIENT
Start: 2021-04-20 | End: 2021-04-20

## 2021-04-20 RX ORDER — SODIUM CHLORIDE 9 MG/ML
INJECTION, SOLUTION INTRAVENOUS CONTINUOUS
Status: DISCONTINUED | OUTPATIENT
Start: 2021-04-20 | End: 2021-04-22 | Stop reason: HOSPADM

## 2021-04-20 RX ORDER — POTASSIUM CHLORIDE 20 MEQ/1
40 TABLET, EXTENDED RELEASE ORAL 2 TIMES DAILY
Status: COMPLETED | OUTPATIENT
Start: 2021-04-20 | End: 2021-04-20

## 2021-04-20 RX ORDER — MAGNESIUM SULFATE HEPTAHYDRATE 40 MG/ML
4 INJECTION, SOLUTION INTRAVENOUS 2 TIMES DAILY
Status: COMPLETED | OUTPATIENT
Start: 2021-04-20 | End: 2021-04-20

## 2021-04-20 RX ADMIN — LORAZEPAM 2 MG: 2 TABLET ORAL at 12:54

## 2021-04-20 RX ADMIN — SODIUM CHLORIDE: 9 INJECTION, SOLUTION INTRAVENOUS at 23:26

## 2021-04-20 RX ADMIN — LORAZEPAM 0.5 MG: 0.5 TABLET ORAL at 20:53

## 2021-04-20 RX ADMIN — MORPHINE SULFATE 1 MG: 4 INJECTION INTRAVENOUS at 03:53

## 2021-04-20 RX ADMIN — OMEPRAZOLE 40 MG: 20 CAPSULE, DELAYED RELEASE ORAL at 05:09

## 2021-04-20 RX ADMIN — LORAZEPAM 2 MG: 2 TABLET ORAL at 10:56

## 2021-04-20 RX ADMIN — MAGNESIUM SULFATE IN WATER 4 G: 40 INJECTION, SOLUTION INTRAVENOUS at 18:00

## 2021-04-20 RX ADMIN — LORAZEPAM 1.5 MG: 2 INJECTION INTRAMUSCULAR; INTRAVENOUS at 06:43

## 2021-04-20 RX ADMIN — Medication 100 MG: at 05:09

## 2021-04-20 RX ADMIN — POTASSIUM CHLORIDE 40 MEQ: 1500 TABLET, EXTENDED RELEASE ORAL at 09:49

## 2021-04-20 RX ADMIN — CLONIDINE 1 PATCH: 0.1 PATCH TRANSDERMAL at 10:22

## 2021-04-20 RX ADMIN — MORPHINE SULFATE 1 MG: 4 INJECTION INTRAVENOUS at 23:26

## 2021-04-20 RX ADMIN — MORPHINE SULFATE 1 MG: 4 INJECTION INTRAVENOUS at 10:34

## 2021-04-20 RX ADMIN — FOLIC ACID 1 MG: 1 TABLET ORAL at 05:09

## 2021-04-20 RX ADMIN — ARIPIPRAZOLE 2 MG: 2 TABLET ORAL at 05:09

## 2021-04-20 RX ADMIN — LORAZEPAM 1 MG: 2 INJECTION INTRAMUSCULAR; INTRAVENOUS at 05:09

## 2021-04-20 RX ADMIN — LORAZEPAM 1.5 MG: 2 INJECTION INTRAMUSCULAR; INTRAVENOUS at 08:34

## 2021-04-20 RX ADMIN — GABAPENTIN 300 MG: 300 CAPSULE ORAL at 05:09

## 2021-04-20 RX ADMIN — MAGNESIUM SULFATE IN WATER 4 G: 40 INJECTION, SOLUTION INTRAVENOUS at 09:46

## 2021-04-20 RX ADMIN — CLONIDINE 1 PATCH: 0.2 PATCH TRANSDERMAL at 12:54

## 2021-04-20 RX ADMIN — SODIUM CHLORIDE: 9 INJECTION, SOLUTION INTRAVENOUS at 09:46

## 2021-04-20 RX ADMIN — POTASSIUM CHLORIDE 40 MEQ: 1500 TABLET, EXTENDED RELEASE ORAL at 18:00

## 2021-04-20 RX ADMIN — CALCIUM CITRATE 200 MG (950 MG) TABLET 950 MG: at 05:09

## 2021-04-20 RX ADMIN — PROCHLORPERAZINE EDISYLATE 10 MG: 5 INJECTION INTRAMUSCULAR; INTRAVENOUS at 23:26

## 2021-04-20 RX ADMIN — SODIUM CHLORIDE, POTASSIUM CHLORIDE, SODIUM LACTATE AND CALCIUM CHLORIDE: 600; 310; 30; 20 INJECTION, SOLUTION INTRAVENOUS at 01:49

## 2021-04-20 RX ADMIN — THERA TABS 1 TABLET: TAB at 05:09

## 2021-04-20 RX ADMIN — LORAZEPAM 2 MG: 2 TABLET ORAL at 02:27

## 2021-04-20 RX ADMIN — GABAPENTIN 300 MG: 300 CAPSULE ORAL at 18:00

## 2021-04-20 RX ADMIN — DOCUSATE SODIUM 50 MG AND SENNOSIDES 8.6 MG 2 TABLET: 8.6; 5 TABLET, FILM COATED ORAL at 05:09

## 2021-04-20 RX ADMIN — FLUOXETINE 30 MG: 20 CAPSULE ORAL at 05:09

## 2021-04-20 RX ADMIN — GABAPENTIN 300 MG: 300 CAPSULE ORAL at 12:53

## 2021-04-20 ASSESSMENT — FIBROSIS 4 INDEX: FIB4 SCORE: 12.86

## 2021-04-20 ASSESSMENT — LIFESTYLE VARIABLES
AGITATION: NORMAL ACTIVITY
ANXIETY: *
ANXIETY: *
TOTAL SCORE: MILD ITCHING, PINS AND NEEDLES SENSATION, BURNING OR NUMBNESS
TOTAL SCORE: 14
NAUSEA AND VOMITING: NO NAUSEA AND NO VOMITING
TOTAL SCORE: 4
ANXIETY: MODERATELY ANXIOUS OR GUARDED, SO ANXIETY IS INFERRED
AUDITORY DISTURBANCES: NOT PRESENT
ANXIETY: NO ANXIETY (AT EASE)
AGITATION: *
ANXIETY: *
AUDITORY DISTURBANCES: NOT PRESENT
PAROXYSMAL SWEATS: BARELY PERCEPTIBLE SWEATING. PALMS MOIST
NAUSEA AND VOMITING: *
PAROXYSMAL SWEATS: BARELY PERCEPTIBLE SWEATING. PALMS MOIST
VISUAL DISTURBANCES: NOT PRESENT
NAUSEA AND VOMITING: NO NAUSEA AND NO VOMITING
VISUAL DISTURBANCES: NOT PRESENT
TREMOR: *
TOTAL SCORE: MILD ITCHING, PINS AND NEEDLES SENSATION, BURNING OR NUMBNESS
TOTAL SCORE: 11
AGITATION: NORMAL ACTIVITY
VISUAL DISTURBANCES: NOT PRESENT
ANXIETY: *
NAUSEA AND VOMITING: NO NAUSEA AND NO VOMITING
PAROXYSMAL SWEATS: BARELY PERCEPTIBLE SWEATING. PALMS MOIST
HEADACHE, FULLNESS IN HEAD: VERY MILD
VISUAL DISTURBANCES: NOT PRESENT
TREMOR: MODERATE TREMOR WITH ARMS EXTENDED
ORIENTATION AND CLOUDING OF SENSORIUM: CANNOT DO SERIAL ADDITIONS OR IS UNCERTAIN ABOUT DATE
TOTAL SCORE: 12
AGITATION: NORMAL ACTIVITY
HEADACHE, FULLNESS IN HEAD: VERY MILD
TOTAL SCORE: 14
VISUAL DISTURBANCES: NOT PRESENT
AGITATION: SOMEWHAT MORE THAN NORMAL ACTIVITY
TOTAL SCORE: MILD ITCHING, PINS AND NEEDLES SENSATION, BURNING OR NUMBNESS
AUDITORY DISTURBANCES: NOT PRESENT
HEADACHE, FULLNESS IN HEAD: MILD
TREMOR: *
AGITATION: NORMAL ACTIVITY
NAUSEA AND VOMITING: NO NAUSEA AND NO VOMITING
HEADACHE, FULLNESS IN HEAD: MODERATE
AGITATION: SOMEWHAT MORE THAN NORMAL ACTIVITY
TREMOR: *
TOTAL SCORE: 16
AUDITORY DISTURBANCES: NOT PRESENT
NAUSEA AND VOMITING: NO NAUSEA AND NO VOMITING
AGITATION: *
HEADACHE, FULLNESS IN HEAD: MILD
HEADACHE, FULLNESS IN HEAD: MILD
NAUSEA AND VOMITING: NO NAUSEA AND NO VOMITING
TOTAL SCORE: 7
PAROXYSMAL SWEATS: BARELY PERCEPTIBLE SWEATING. PALMS MOIST
PAROXYSMAL SWEATS: BARELY PERCEPTIBLE SWEATING. PALMS MOIST
AUDITORY DISTURBANCES: NOT PRESENT
NAUSEA AND VOMITING: NO NAUSEA AND NO VOMITING
TOTAL SCORE: MILD ITCHING, PINS AND NEEDLES SENSATION, BURNING OR NUMBNESS
AUDITORY DISTURBANCES: NOT PRESENT
VISUAL DISTURBANCES: NOT PRESENT
ANXIETY: *
ORIENTATION AND CLOUDING OF SENSORIUM: CANNOT DO SERIAL ADDITIONS OR IS UNCERTAIN ABOUT DATE
HEADACHE, FULLNESS IN HEAD: MILD
ANXIETY: *
VISUAL DISTURBANCES: NOT PRESENT
ORIENTATION AND CLOUDING OF SENSORIUM: ORIENTED AND CAN DO SERIAL ADDITIONS
TOTAL SCORE: 17
ORIENTATION AND CLOUDING OF SENSORIUM: CANNOT DO SERIAL ADDITIONS OR IS UNCERTAIN ABOUT DATE
PAROXYSMAL SWEATS: BARELY PERCEPTIBLE SWEATING. PALMS MOIST
ORIENTATION AND CLOUDING OF SENSORIUM: ORIENTED AND CAN DO SERIAL ADDITIONS
SUBSTANCE_ABUSE: 0
AUDITORY DISTURBANCES: NOT PRESENT
PAROXYSMAL SWEATS: BARELY PERCEPTIBLE SWEATING. PALMS MOIST
AUDITORY DISTURBANCES: NOT PRESENT
TOTAL SCORE: MILD ITCHING, PINS AND NEEDLES SENSATION, BURNING OR NUMBNESS
VISUAL DISTURBANCES: NOT PRESENT
ORIENTATION AND CLOUDING OF SENSORIUM: CANNOT DO SERIAL ADDITIONS OR IS UNCERTAIN ABOUT DATE
HEADACHE, FULLNESS IN HEAD: MILD
TREMOR: *
ORIENTATION AND CLOUDING OF SENSORIUM: ORIENTED AND CAN DO SERIAL ADDITIONS
PAROXYSMAL SWEATS: BARELY PERCEPTIBLE SWEATING. PALMS MOIST
TREMOR: *
TREMOR: TREMOR NOT VISIBLE BUT CAN BE FELT, FINGERTIP TO FINGERTIP
ORIENTATION AND CLOUDING OF SENSORIUM: CANNOT DO SERIAL ADDITIONS OR IS UNCERTAIN ABOUT DATE
TREMOR: *

## 2021-04-20 ASSESSMENT — ENCOUNTER SYMPTOMS
COUGH: 0
VOMITING: 0
MYALGIAS: 1
SEIZURES: 0
WHEEZING: 0
ABDOMINAL PAIN: 1
HEADACHES: 0
NAUSEA: 0
BLURRED VISION: 0
FALLS: 0
DOUBLE VISION: 0
PALPITATIONS: 0
FEVER: 0
DIARRHEA: 0
SHORTNESS OF BREATH: 0
DIZZINESS: 0
NERVOUS/ANXIOUS: 0
TREMORS: 1
SORE THROAT: 0
CONSTIPATION: 0
DIAPHORESIS: 0
SENSORY CHANGE: 1
CHILLS: 0

## 2021-04-20 ASSESSMENT — PAIN DESCRIPTION - PAIN TYPE
TYPE: ACUTE PAIN

## 2021-04-20 NOTE — NON-PROVIDER
Internal Medicine Medical Student Note  Note Author: Chanda Velásquez, Student    Name Olya Youssef 1970   Age/Sex 50 y.o. female   MRN [id number]   Code Status Full     Chief Complaint:  Chief Complaint   Patient presents with   • Abdominal Pain     Pt states she has 10/10 upper abdominal pain and was diagnosed with acute pancreatitis at [facility] two days ago and was discharged. Pt also stated she has been diagnosed with pancreatitis 5 times previously.    • Alcohol Intoxication     Per EMS they were called to pt's residense by pt's roommate who stated she is unable to care for herself due to her drinking. She has been unable to make it to the bathroom and has not been eating for the past few days. Pt states she drinks approximatley one pint of Vodka everyday. States she has has tried to quit drinking in the past but was unable to. Pt is A&Ox4, speech is slurred, but answers questions and follows commands appropriately.        Subjective:  No acute overnight events. Lat 3 CIWA scores were 8-11-16, and patient was given 7mg Lorazepam overnight and extra 4 mg this AM. She states that she is feeling better. Her vomiting has resolved, though she still reports nausea. Tremor has improved. She does complain of diffuse abdominal pain that is worse in RUQ. She had bowel movement this AM, which was hard and painful to pass.      ROS:  Constitutional: Chills last night, nausea. Denies fever, night sweats.  HEENT: Denies masses in throat. Denies changes in hearing. Denies blurred or double vision  Cardio: Denies chest pain, palpitations, or edema  Pulm: Pain with deep inspiration. Denies cough of sore throat.  GI: Diffuse abdominal pain, jack in RUQ, nausea. Denies vomiting or diarrhea.  : Denies dysuria, hematuria or changes in frequency/urgency  Skin: Denies rash  MSK: Generalized body aches.  Neuro: Tremor in upper extremities. Denies dizziness or syncope  Psych: Reports hallucinations. Denies suicidal or  homicidal ideations      Vitals:    04/19/21 2344 04/20/21 0444 04/20/21 0705 04/20/21 1106   BP: 141/92 (!) 165/102 144/104 136/101   Pulse: 88 (!) 105 (!) 108 (!) 111   Resp: 18 17 18 17   Temp: 36.7 °C (98 °F) 37.1 °C (98.7 °F) 36.5 °C (97.7 °F) 36.7 °C (98.1 °F)   TempSrc: Temporal Temporal Temporal Temporal   SpO2: 92% 96% 97% 96%   Weight:       Height:         Body mass index is 28.29 kg/m². Weight: 65.7 kg (144 lb 13.5 oz)  Oxygen Therapy:  Pulse Oximetry: 96 %, O2 (LPM): 2, O2 Delivery Device: Silicone Nasal Cannula    Physical Exam   Constitutional: Patient lying in bed in obvious discomfort. Lethargic.  HEENT: Normocephalic, atraumatic. Refused to open eyes for examination.  CV: Regular rhythm, tachycardia, no murmurs  Pulm: Clear to auscultation bilaterally  Abdominal: Diffusely tender to palpation in all quadrants, worse in RLQ and much worse in RUQ. Voluntary guarding. Bowel sounds normoactive.  MSK: Left lower extremity is cool to touch. Right lower extremity is warm, but has slight swelling. 2+ dorsalis pedis pulses bilaterally.  Skin: no rashes or lesions observed.  Neuro: Lethargic, weakness in all extremities. Tremor improved.  Psych: Cooperative. Normal mood. Reports possible hallucination.    In/Out:  I/O last 3 completed shifts:  In: 1200 [P.O.:1200]  Out: 3700 [Urine:3700]      Labs/Imaging:  Recent/pertinent lab results & imaging reviewed.  Lab Results   Component Value Date/Time    SODIUM 124 (L) 04/20/2021 03:50 AM    POTASSIUM 3.4 (L) 04/20/2021 03:50 AM    CHLORIDE 88 (L) 04/20/2021 03:50 AM    CO2 27 04/20/2021 03:50 AM    GLUCOSE 119 (H) 04/20/2021 03:50 AM    BUN 5 (L) 04/20/2021 03:50 AM    CREATININE 0.25 (L) 04/20/2021 03:50 AM    CREATININE 1.0 02/06/2009 03:40 AM      Lab Results   Component Value Date/Time    WBC 3.4 (L) 04/20/2021 03:50 AM    RBC 3.85 (L) 04/20/2021 03:50 AM    HEMOGLOBIN 13.0 04/20/2021 03:50 AM    HEMATOCRIT 39.6 04/20/2021 03:50 AM    .9 (H) 04/20/2021  03:50 AM    MCH 33.8 (H) 04/20/2021 03:50 AM    MCHC 32.8 (L) 04/20/2021 03:50 AM    MPV 11.1 04/20/2021 03:50 AM    NEUTSPOLYS 47.10 04/20/2021 03:50 AM    LYMPHOCYTES 31.50 04/20/2021 03:50 AM    MONOCYTES 15.70 (H) 04/20/2021 03:50 AM    EOSINOPHILS 3.30 04/20/2021 03:50 AM    BASOPHILS 0.90 04/20/2021 03:50 AM    HYPOCHROMIA 1+ 07/03/2014 04:53 PM    ANISOCYTOSIS 1+ 07/22/2015 05:28 AM      Lab Results   Component Value Date/Time    PROTHROMBTM 13.4 08/11/2015 02:15 PM    INR 1.02 08/11/2015 02:15 PM        US-ANTONIO SINGLE LEVEL BILAT   Final Result      US-RUQ    (Results Pending)       Medications:  Current Facility-Administered Medications   Medication Dose   • NS infusion     • magnesium sulfate IVPB premix 4 g  4 g   • potassium chloride SA (Kdur) tablet 40 mEq  40 mEq   • cloNIDine (CATAPRES) 0.2 MG/24HR patch 1 Patch  1 Patch   • gabapentin (NEURONTIN) capsule 300 mg  300 mg   • ARIPiprazole (Abilify) tablet 2 mg  2 mg   • vitamin D (Ergocalciferol) (DRISDOL) capsule 50,000 Units  50,000 Units   • calcium citrate (CALCITRATE) tablet 950 mg  950 mg   • senna-docusate (PERICOLACE or SENOKOT S) 8.6-50 MG per tablet 2 tablet  2 tablet    And   • polyethylene glycol/lytes (MIRALAX) PACKET 1 Packet  1 Packet    And   • magnesium hydroxide (MILK OF MAGNESIA) suspension 30 mL  30 mL    And   • bisacodyl (DULCOLAX) suppository 10 mg  10 mg   • prochlorperazine (COMPAZINE) injection 10 mg  10 mg   • thiamine (Vitamin B-1) tablet 100 mg  100 mg    And   • folic acid (FOLVITE) tablet 1 mg  1 mg    And   • multivitamin (THERAGRAN) tablet 1 tablet  1 tablet   • LORazepam (ATIVAN) tablet 0.5 mg  0.5 mg   • LORazepam (ATIVAN) tablet 1 mg  1 mg    Or   • LORazepam (ATIVAN) injection 0.5 mg  0.5 mg   • LORazepam (ATIVAN) tablet 2 mg  2 mg    Or   • LORazepam (ATIVAN) injection 1 mg  1 mg   • LORazepam (ATIVAN) tablet 3 mg  3 mg    Or   • LORazepam (ATIVAN) injection 1.5 mg  1.5 mg   • LORazepam (ATIVAN) tablet 4 mg  4 mg     Or   • LORazepam (ATIVAN) injection 2 mg  2 mg   • FLUoxetine (PROZAC) capsule 30 mg  30 mg   • omeprazole (PRILOSEC) capsule 40 mg  40 mg   • morphine (pf) 4 mg/mL injection 1 mg  1 mg       Assessment/Plan   Pt is a 50 y.o. female with w/ PMH of recurrent alcoholic pancreatitis, chronic alcohol abuse, hx of alcohol withdrawals, stimulant use disorder, bipolar, hx of suicide attempts with current alcoholic pancreatitis.    #Pancreatitis, alcoholic  We received her prior CT abdomen from [facility] done on 4/15/2021, which was unremarkable.  -Continue IVF  -Continue PPI omeprazole 40 mg daily  -PRN Morphine for pain  -RUQ ultrasound    #Hypertension  -Clonidine patch    #Impaired circulation of bilateral lower extremity  US-ANTONIO normal  -Continue monitoring    #Pancytopenia  Improving - WBC 3.4 up from 1.9 yesterday, Platelets 60 up from 31 yesterday.  -Continue monitoring    #Alcohol withdrawal  -Continue multivitamin, thiamine, folate supplementation  -Neuro checks q4hrs  -Seizure precautions  -Gabapentin 300mg TID  -Monitor electrolytes    #Peripheral neuropathy  Present on admission  -Continue Gabapentin 300mg TID    #Macrocytic anemia  Likely secondary to malnutrition due to alcohol consumption    #Hypomagnesemia  -Continue monitoring  -Continue Mag sulfate    #Lactic acidosis  -Continue IVF  -Continue monitoring for clinical symptoms    #Hypocalcemia  -Continue supplementing Vit D and Calcium  -Continue monitoring    #COPD  RT did education with patient yesterday  -Continue monitoring    #Abnormal QT interval  -Avoid medications that prolong QTc    #Alcoholic hepatitis  Transaminitis improving  -Continue monitoring    #Vomiting  -Resolved    #Bipolar affective disorder  -Continue home Prozac, but advise outpatient psych follow up for med management as SSRIs are contraindicated in Bipolar  -Continue Aripiprazole    #Hypokalemia  Last K was 3.4, down from 3.8 yesterday  -Continue repleting with KCl PO.     Dispo:  Inpatient

## 2021-04-20 NOTE — ASSESSMENT & PLAN NOTE
This is continuing to happen, part of alcohol withdrawal symptoms, patient has high catecholamine release    Clonidine patch

## 2021-04-20 NOTE — PROGRESS NOTES
Assumed care of pt at 0700. Report received and bedside rounding completed with night RN. Patient in no acute distress noted.  Call light and pt belongings within reach - hourly rounding in place. See flowsheets for further assessment.     Pt is altered and actively hallucinating. Pt noted to have tremors. On CIWA.

## 2021-04-20 NOTE — PROGRESS NOTES
Daily Progress Note:     Date of Service: 4/20/2021  Primary Team: [facility] Team   Attending: Dr. Ayala  Senior Resident: Dr. Corey MD  Intern: Dr. Trina Friend M.D.  Contact:  [phone]    Chief Complaint:   Chief Complaint   Patient presents with   • Abdominal Pain     Pt states she has 10/10 upper abdominal pain and was diagnosed with acute pancreatitis at [facility] two days ago and was discharged. Pt also stated she has been diagnosed with pancreatitis 5 times previously.    • Alcohol Intoxication     Per EMS they were called to pt's residense by pt's roommate who stated she is unable to care for herself due to her drinking. She has been unable to make it to the bathroom and has not been eating for the past few days. Pt states she drinks approximatley one pint of Vodka everyday. States she has has tried to quit drinking in the past but was unable to. Pt is A&Ox4, speech is slurred, but answers questions and follows commands appropriately.      ID:  50 y.o. female with a PMH of recurrent alcoholic pancreatitis, chronic alcohol abuse, and history of alcohol withdrawals (including seizures), bipolar disorder, history of meth abuse, history of suicide attempts, COVID-19 infection (recovered, out of quarantine), COPD (no PFTs on file, on 2L O2 at home) who presented 4/17/2021 with alcoholic pancreatitis and alcohol intoxication, now in alcohol withdrawal.    Subjective:     Overnight, the patient's CIWA scores ranged from 4 to 12. Her latest score this AM was 16. She required 9.5 mg Ativan in the last 24 hours. Her vomiting resolved and was able to take her PO medications without issue. This morning, patient stated she felt better, although still had 5/10 abdominal pain diffusely. Had some pain with deep inspiration. Did not have any suicidal ideation or homicidal ideation.  She did not have any hematemesis. Had 1 bowel movement which she stated was hard in consistency and has been urinating regularly;  states her tremors are better.  No other acute complaints, including chest pain and shortness of breath.    Consultants/Specialty:  None    Review of Systems:   Review of Systems   Constitutional: Positive for malaise/fatigue. Negative for chills, diaphoresis and fever.   HENT: Negative for congestion and sore throat.    Eyes: Negative for blurred vision and double vision.   Respiratory: Negative for cough, shortness of breath and wheezing.    Cardiovascular: Negative for chest pain, palpitations and leg swelling.   Gastrointestinal: Positive for abdominal pain. Negative for constipation, diarrhea, nausea and vomiting.   Genitourinary: Negative for dysuria, frequency and urgency.   Musculoskeletal: Positive for myalgias. Negative for falls.        +generalized body aches   Neurological: Positive for tremors and sensory change. Negative for dizziness, seizures and headaches.        Tremors improved from 4/19   Psychiatric/Behavioral: Negative for substance abuse and suicidal ideas. The patient is not nervous/anxious.        Objective Data:   Physical Exam:   Vitals:   Temp:  [35.9 °C (96.7 °F)-37.1 °C (98.7 °F)] 36.5 °C (97.7 °F)  Pulse:  [] 108  Resp:  [17-18] 18  BP: (114-167)/() 144/104  SpO2:  [92 %-97 %] 97 %     Physical Exam  Vitals and nursing note reviewed.   Constitutional:       General: She is in acute distress.      Appearance: She is ill-appearing. She is not toxic-appearing or diaphoretic.      Comments: Lethargic   HENT:      Head: Normocephalic and atraumatic.      Right Ear: External ear normal.      Left Ear: External ear normal.      Nose: Nose normal. No congestion or rhinorrhea.      Mouth/Throat:      Mouth: Mucous membranes are dry.      Pharynx: Posterior oropharyngeal erythema present. No oropharyngeal exudate.      Comments: Lips stained with dried blood  Mallampati class 3  Eyes:      General: No scleral icterus.        Right eye: No discharge.         Left eye: No discharge.       Extraocular Movements: Extraocular movements intact.      Conjunctiva/sclera: Conjunctivae normal.      Pupils: Pupils are equal, round, and reactive to light.   Cardiovascular:      Rate and Rhythm: Regular rhythm. Tachycardia present.      Heart sounds: No murmur.   Pulmonary:      Effort: Pulmonary effort is normal. No respiratory distress.      Breath sounds: Normal breath sounds. No wheezing or rales.   Abdominal:      General: Abdomen is flat. There is no distension.      Palpations: Abdomen is soft.      Tenderness: There is abdominal tenderness. There is guarding. There is no right CVA tenderness or left CVA tenderness.      Comments: Abdominal striae present, diffuse abdominal tenderness to palpation in all quadrants, improved from 4/19/2021   Musculoskeletal:         General: No swelling, tenderness or deformity.      Cervical back: Normal range of motion and neck supple. No rigidity or tenderness.      Comments: Left lower extremity cool to touch, blanching skin. RLE warm to touch. 2+ dorsalis pedis pulses in b/l LE   Skin:     General: Skin is warm.      Capillary Refill: Capillary refill takes less than 2 seconds.      Coloration: Skin is not jaundiced.      Comments: Callus on left big toe and on ball of right foot   Neurological:      Mental Status: She is oriented to person, place, and time. She is lethargic.      Cranial Nerves: No cranial nerve deficit.      Sensory: No sensory deficit.      Motor: Weakness present.      Coordination: Coordination abnormal.      Comments: Patient declined rest of neuro exam.  Tremors decreased from 4/20   Psychiatric:         Attention and Perception: Perception normal. She is attentive. She does not perceive auditory or visual hallucinations.         Mood and Affect: Mood normal. Affect is blunt.         Speech: Speech is delayed.         Behavior: Behavior is cooperative.         Thought Content: Thought content normal.         Cognition and Memory: Memory  is not impaired. She exhibits impaired recent memory.         Judgment: Judgment normal.           Labs:   Recent Labs     04/18/21  0142 04/19/21  0126 04/20/21  0350   WBC 2.0* 1.9* 3.4*   RBC 3.94* 3.91* 3.85*   HEMOGLOBIN 13.4 13.2 13.0   HEMATOCRIT 40.4 39.7 39.6   .5* 101.5* 102.9*   MCH 34.0* 33.8* 33.8*   RDW 50.6* 48.3 47.4   PLATELETCT 26* 31* 60*   MPV 11.1 10.8 11.1   NEUTSPOLYS 49.00 60.30 47.10   LYMPHOCYTES 30.00 22.20 31.50   MONOCYTES 18.50* 15.50* 15.70*   EOSINOPHILS 0.50 1.00 3.30   BASOPHILS 1.00 0.50 0.90     Recent Labs     04/18/21  0142 04/19/21  0126 04/20/21  0350   SODIUM 137 134* 124*   POTASSIUM 3.3* 3.8 3.4*   CHLORIDE 96 94* 88*   CO2 29 31 27   GLUCOSE 85 94 119*   BUN 4* 4* 5*        Imaging:   US-ANTONIO SINGLE LEVEL BILAT   Final Result      US-RUQ    (Results Pending)       Problem Representation:   50 y.o. female with a PMH of recurrent alcoholic pancreatitis, chronic alcohol abuse, and history of alcohol withdrawals (including seizures), bipolar disorder, history of meth abuse, history of suicide attempts, COVID-19 infection (recovered, out of quarantine), COPD (no PFTs on file, on 2L O2 at home) who presented 4/17/2021 with alcoholic pancreatitis and alcohol intoxication, now in alcohol withdrawal.    * Pancreatitis, alcoholic, acute- (present on admission)  Assessment & Plan  Presented with severe abdominal pain, 10/10. Lipase of 211, around 3x upper limit of normal. Due to drinking 1 pint vodka per day. Also with transaminitis. This is especially concerning in light of the fact that the patient's condition is due to alcohol, but patient is self-medicating for the pain with drinking more alcohol. CT A/P from [facility] on 4/15 did not show any acute abnormalities.    -IVF resuscitation  -FLD  -PPI omeprazole 40 mg qD  -Morphine IV PRN for pain  -Follow up on RUQ ultrasound    Hypertension- (present on admission)  Assessment & Plan  This is continuing to happen,  part of alcohol withdrawal symptoms, patient has high catecholamine release    Clonidine patch    Impaired circulation of bilateral lower extremity  Assessment & Plan  Concerning as this is new upon physical exam on 4/19 (RLE), then migrated to E on 4/20. Bilateral arterial U/S did not show any acute abnormalities. CT angio performed at [facility] on 4/15/2021 did not show evidence of PE.    CTM, conservative tx for now    Pancytopenia (HCC)  Assessment & Plan  On 4/18/2021, patient had white blood count of 2.0 and platelet of 26.  This is concerning, likely due to the stress intoxication from effects of alcohol.  Less likely some other malignant process such as leukemia.  Records showed at [facility] patient had WBC of 3.6, Plt 73, Hg 14.7. May also be concerning for heparin-induced thrombocytopenia versus ITP.  Patient may have lymphopenia due to previous Covid infection. Re    Patient has been placed on neutropenic precautions  Continue to monitor CBC labs    Alcohol withdrawal (HCC)- (present on admission)  Assessment & Plan  Also see problem of alcohol intoxication    Patient has history of alcohol withdrawal in the past including with seizures.  Last drink was 4/16/2021 per patient history.  Patient has gotten multiple doses of Ativan, this is concerning in the context that there may be some other underlying pathology such as her bipolar disorder.      Multivitamin, thiamine, folate supplement  [facility] protocol  Neurochecks every 4 hours  Seizure, aspiration, fall precautions  Start aripiprazole low-dose, will uptitrate as necessary.  This will hopefully help unmask some of the symptoms of symptoms of alcohol withdrawal versus due to underlying bipolar disorder pathology  Monitor electrolytes closely  Gabapentin 300 mg TID    Alcohol intoxication with blood level over 0.3 (HCC)- (present on admission)  Assessment & Plan  Especially concerning in light of the fact that the patient is chronically drinking alcohol.  Patient has h/o alcohol withdrawal in past including with seizures. Last drink was yesterday per history. Given ryan bag in ED.    MV, thiamine, folate supp  CIWA protocol  Neuro checks q4  Seizure, aspiration, fall precautions  Significant alcohol cessation counseling, medical team has been regularly counseling the patient every day since day of admission    Peripheral neuropathy- (present on admission)  Assessment & Plan  Gabapentin 300 mg TID (increased from home dose)    Macrocytic anemia- (present on admission)  Assessment & Plan  This is likely a chronic issue, patient likely has malnutrition due to chronic alcoholism.  Patient presented with macrocytosis. Records from [facility] showed MCV 99.5 (borderline macrocytosis). With all labs together, anemia likely due to alcoholism with malnutrition.    As the Hg is okay at this time, will not start on iron supp  Extensive alcohol cessation counseling    Hypomagnesemia- (present on admission)  Assessment & Plan  This is concerning because this will result in promoting hypocalcemia as well as wasting potassium in the kidney.    Replete with goal of magnesium of 2    Lactic acidosis- (present on admission)  Assessment & Plan  Lactic acid levels have been uptrending, from 2.3-2.7-3.9 on 4/18/2021.  This is likely not due to fluid depletion, rather due to acute stress state and liver.  Patient is currently not septic.    Continue to monitor clinically  Continue IV fluid resuscitation  No need to continue trending  Serial abdominal exams  If patient becomes septic, will repeat trend    Hypocalcemia- (present on admission)  Assessment & Plan  This is very concerning.  Patient likely has a component of low magnesium causing low absorption of calcium.  Also may have component of low vitamin D.  May be part of malnutrition due to alcoholism    Follow-up on vitamin D level  calcium and vitamin D supplementation    COPD (chronic obstructive pulmonary disease) (HCC)- (present  on admission)  Assessment & Plan  Chronic, unknown if completely worked up before. No PFTs on file.    Patient will needs PFTs as outpatient.  Consider new home O2 eval prior to d/c if needed    Cannabis abuse- (present on admission)  Assessment & Plan  Drug cessation counseling >5 minutes, medical team has been discussing this with patient every day since day of admission    Abnormal QT interval present on electrocardiogram- (present on admission)  Assessment & Plan  Present on multiple EKGs.    Avoid medications that prolong QTc interval  Continue to monitor on telemetry      Alcoholic hepatitis- (present on admission)  Assessment & Plan  Transaminitis improving  With transaminitis, AST:ALT of 2:1.    -Continue to monitor, trend transaminases    Vomiting- (present on admission)  Assessment & Plan  IMPROVING  Sx started 4/19. Part of alcohol withdrawal.    Continue IVF  PRN antiemetics  Incentive spirometry, encourage to use 10x/hr while awake as possible    Bipolar affective disorder (HCC)- (present on admission)  Assessment & Plan  On prozac at home. This is not ideal as this can cause the patient to go into manic episode with an SSRI, however will need to continue this to prevent withdrawal.    Now on aripiprazole, hopefully will help sx of alcohol withdrawal as well    Hypokalemia- (present on admission)  Assessment & Plan  Replete with goal of K=4      Quality Measures:  Code: Full  VTE: SCDs  Diet: FLD  Disposition: Remain inpatient    Please note that this dictation was created using voice recognition software. I have worked with technical experts from [facility] to optimize the interface.  I have made every reasonable attempt to correct obvious errors, but there may be errors of grammar and possibly content that I did not discover before finalizing the note.

## 2021-04-20 NOTE — PROGRESS NOTES
Bedside report received from day shift RN. Assumed care at 1900. Pt is A&Ox3. Pt is in bed. Pt was updated on plan of care. Pt has call light, personal belongings, and bedside table within reach. Bed is in the lowest position and bed alarm is on. Will continue to monitor

## 2021-04-21 PROBLEM — R53.81 DEBILITY: Status: ACTIVE | Noted: 2021-04-21

## 2021-04-21 LAB
25(OH)D3 SERPL-MCNC: 11 NG/ML (ref 30–80)
ANION GAP SERPL CALC-SCNC: 8 MMOL/L (ref 7–16)
BUN SERPL-MCNC: 3 MG/DL (ref 8–22)
CALCIUM SERPL-MCNC: 9.5 MG/DL (ref 8.5–10.5)
CHLORIDE SERPL-SCNC: 94 MMOL/L (ref 96–112)
CO2 SERPL-SCNC: 29 MMOL/L (ref 20–33)
CREAT SERPL-MCNC: 0.29 MG/DL (ref 0.5–1.4)
ERYTHROCYTE [DISTWIDTH] IN BLOOD BY AUTOMATED COUNT: 47.7 FL (ref 35.9–50)
GLUCOSE SERPL-MCNC: 109 MG/DL (ref 65–99)
HCT VFR BLD AUTO: 40.7 % (ref 37–47)
HGB BLD-MCNC: 13.6 G/DL (ref 12–16)
MAGNESIUM SERPL-MCNC: 2.1 MG/DL (ref 1.5–2.5)
MCH RBC QN AUTO: 34 PG (ref 27–33)
MCHC RBC AUTO-ENTMCNC: 33.4 G/DL (ref 33.6–35)
MCV RBC AUTO: 101.8 FL (ref 81.4–97.8)
PLATELET # BLD AUTO: 57 K/UL (ref 164–446)
PMV BLD AUTO: 10.1 FL (ref 9–12.9)
POTASSIUM SERPL-SCNC: 4 MMOL/L (ref 3.6–5.5)
RBC # BLD AUTO: 4 M/UL (ref 4.2–5.4)
SODIUM SERPL-SCNC: 131 MMOL/L (ref 135–145)
WBC # BLD AUTO: 2.8 K/UL (ref 4.8–10.8)

## 2021-04-21 PROCEDURE — 700105 HCHG RX REV CODE 258: Performed by: STUDENT IN AN ORGANIZED HEALTH CARE EDUCATION/TRAINING PROGRAM

## 2021-04-21 PROCEDURE — 85027 COMPLETE CBC AUTOMATED: CPT

## 2021-04-21 PROCEDURE — 770020 HCHG ROOM/CARE - TELE (206)

## 2021-04-21 PROCEDURE — 97161 PT EVAL LOW COMPLEX 20 MIN: CPT

## 2021-04-21 PROCEDURE — 700111 HCHG RX REV CODE 636 W/ 250 OVERRIDE (IP): Performed by: STUDENT IN AN ORGANIZED HEALTH CARE EDUCATION/TRAINING PROGRAM

## 2021-04-21 PROCEDURE — 36415 COLL VENOUS BLD VENIPUNCTURE: CPT

## 2021-04-21 PROCEDURE — 99232 SBSQ HOSP IP/OBS MODERATE 35: CPT | Mod: GC | Performed by: INTERNAL MEDICINE

## 2021-04-21 PROCEDURE — 700102 HCHG RX REV CODE 250 W/ 637 OVERRIDE(OP): Performed by: STUDENT IN AN ORGANIZED HEALTH CARE EDUCATION/TRAINING PROGRAM

## 2021-04-21 PROCEDURE — A9270 NON-COVERED ITEM OR SERVICE: HCPCS | Performed by: STUDENT IN AN ORGANIZED HEALTH CARE EDUCATION/TRAINING PROGRAM

## 2021-04-21 PROCEDURE — 94760 N-INVAS EAR/PLS OXIMETRY 1: CPT

## 2021-04-21 PROCEDURE — 83735 ASSAY OF MAGNESIUM: CPT

## 2021-04-21 PROCEDURE — 80048 BASIC METABOLIC PNL TOTAL CA: CPT

## 2021-04-21 RX ADMIN — SODIUM CHLORIDE: 9 INJECTION, SOLUTION INTRAVENOUS at 08:52

## 2021-04-21 RX ADMIN — PROCHLORPERAZINE EDISYLATE 10 MG: 5 INJECTION INTRAMUSCULAR; INTRAVENOUS at 15:33

## 2021-04-21 RX ADMIN — THERA TABS 1 TABLET: TAB at 04:48

## 2021-04-21 RX ADMIN — MORPHINE SULFATE 1 MG: 4 INJECTION INTRAVENOUS at 15:34

## 2021-04-21 RX ADMIN — FOLIC ACID 1 MG: 1 TABLET ORAL at 04:48

## 2021-04-21 RX ADMIN — DOCUSATE SODIUM 50 MG AND SENNOSIDES 8.6 MG 2 TABLET: 8.6; 5 TABLET, FILM COATED ORAL at 17:33

## 2021-04-21 RX ADMIN — MORPHINE SULFATE 1 MG: 4 INJECTION INTRAVENOUS at 12:35

## 2021-04-21 RX ADMIN — ARIPIPRAZOLE 2 MG: 2 TABLET ORAL at 04:48

## 2021-04-21 RX ADMIN — OMEPRAZOLE 40 MG: 20 CAPSULE, DELAYED RELEASE ORAL at 04:48

## 2021-04-21 RX ADMIN — FLUOXETINE 30 MG: 20 CAPSULE ORAL at 04:48

## 2021-04-21 RX ADMIN — PROCHLORPERAZINE EDISYLATE 10 MG: 5 INJECTION INTRAMUSCULAR; INTRAVENOUS at 22:11

## 2021-04-21 RX ADMIN — MORPHINE SULFATE 1 MG: 4 INJECTION INTRAVENOUS at 22:11

## 2021-04-21 RX ADMIN — GABAPENTIN 300 MG: 300 CAPSULE ORAL at 12:28

## 2021-04-21 RX ADMIN — SODIUM CHLORIDE: 9 INJECTION, SOLUTION INTRAVENOUS at 23:25

## 2021-04-21 RX ADMIN — MORPHINE SULFATE 1 MG: 4 INJECTION INTRAVENOUS at 04:58

## 2021-04-21 RX ADMIN — CALCIUM CITRATE 200 MG (950 MG) TABLET 950 MG: at 04:48

## 2021-04-21 RX ADMIN — GABAPENTIN 300 MG: 300 CAPSULE ORAL at 17:33

## 2021-04-21 RX ADMIN — GABAPENTIN 300 MG: 300 CAPSULE ORAL at 04:48

## 2021-04-21 RX ADMIN — Medication 100 MG: at 04:48

## 2021-04-21 ASSESSMENT — COGNITIVE AND FUNCTIONAL STATUS - GENERAL
CLIMB 3 TO 5 STEPS WITH RAILING: A LOT
SUGGESTED CMS G CODE MODIFIER MOBILITY: CK
MOVING FROM LYING ON BACK TO SITTING ON SIDE OF FLAT BED: A LITTLE
WALKING IN HOSPITAL ROOM: A LITTLE
STANDING UP FROM CHAIR USING ARMS: A LITTLE
MOVING TO AND FROM BED TO CHAIR: A LITTLE
TURNING FROM BACK TO SIDE WHILE IN FLAT BAD: A LITTLE
MOBILITY SCORE: 17

## 2021-04-21 ASSESSMENT — LIFESTYLE VARIABLES
TREMOR: *
PAROXYSMAL SWEATS: BARELY PERCEPTIBLE SWEATING. PALMS MOIST
TOTAL SCORE: 4
NAUSEA AND VOMITING: MILD NAUSEA WITH NO VOMITING
AGITATION: NORMAL ACTIVITY
TREMOR: *
ORIENTATION AND CLOUDING OF SENSORIUM: CANNOT DO SERIAL ADDITIONS OR IS UNCERTAIN ABOUT DATE
VISUAL DISTURBANCES: NOT PRESENT
PAROXYSMAL SWEATS: BARELY PERCEPTIBLE SWEATING. PALMS MOIST
VISUAL DISTURBANCES: NOT PRESENT
TOTAL SCORE: 6
AUDITORY DISTURBANCES: NOT PRESENT
ANXIETY: NO ANXIETY (AT EASE)
ORIENTATION AND CLOUDING OF SENSORIUM: CANNOT DO SERIAL ADDITIONS OR IS UNCERTAIN ABOUT DATE
HEADACHE, FULLNESS IN HEAD: VERY MILD
HEADACHE, FULLNESS IN HEAD: NOT PRESENT
NAUSEA AND VOMITING: NO NAUSEA AND NO VOMITING
TREMOR: TREMOR NOT VISIBLE BUT CAN BE FELT, FINGERTIP TO FINGERTIP
AGITATION: NORMAL ACTIVITY
SUBSTANCE_ABUSE: 0
ORIENTATION AND CLOUDING OF SENSORIUM: CANNOT DO SERIAL ADDITIONS OR IS UNCERTAIN ABOUT DATE
VISUAL DISTURBANCES: NOT PRESENT
AGITATION: NORMAL ACTIVITY
AUDITORY DISTURBANCES: NOT PRESENT
TOTAL SCORE: 6
TREMOR: *
NAUSEA AND VOMITING: MILD NAUSEA WITH NO VOMITING
NAUSEA AND VOMITING: NO NAUSEA AND NO VOMITING
PAROXYSMAL SWEATS: NO SWEAT VISIBLE
AUDITORY DISTURBANCES: NOT PRESENT
HEADACHE, FULLNESS IN HEAD: NOT PRESENT
TOTAL SCORE: 4
ANXIETY: NO ANXIETY (AT EASE)
PAROXYSMAL SWEATS: NO SWEAT VISIBLE
HEADACHE, FULLNESS IN HEAD: NOT PRESENT
ORIENTATION AND CLOUDING OF SENSORIUM: CANNOT DO SERIAL ADDITIONS OR IS UNCERTAIN ABOUT DATE
AGITATION: NORMAL ACTIVITY
ANXIETY: MILDLY ANXIOUS
AUDITORY DISTURBANCES: NOT PRESENT
ANXIETY: MILDLY ANXIOUS
VISUAL DISTURBANCES: NOT PRESENT

## 2021-04-21 ASSESSMENT — ENCOUNTER SYMPTOMS
TREMORS: 1
FEVER: 0
SORE THROAT: 0
COUGH: 0
ABDOMINAL PAIN: 1
PALPITATIONS: 0
DIZZINESS: 0
BLURRED VISION: 0
DOUBLE VISION: 0
MYALGIAS: 1
SHORTNESS OF BREATH: 0
SENSORY CHANGE: 1
VOMITING: 0
CONSTIPATION: 0
NAUSEA: 0
DIAPHORESIS: 1
SEIZURES: 0
FALLS: 0
CHILLS: 0
NERVOUS/ANXIOUS: 0
DIARRHEA: 0
HEADACHES: 0
WHEEZING: 0

## 2021-04-21 ASSESSMENT — GAIT ASSESSMENTS
DISTANCE (FEET): 40
DEVIATION: BRADYKINETIC;DECREASED HEEL STRIKE;DECREASED TOE OFF
GAIT LEVEL OF ASSIST: MINIMAL ASSIST
ASSISTIVE DEVICE: FRONT WHEEL WALKER

## 2021-04-21 ASSESSMENT — PAIN SCALES - WONG BAKER: WONGBAKER_NUMERICALRESPONSE: HURTS EVEN MORE

## 2021-04-21 ASSESSMENT — PAIN DESCRIPTION - PAIN TYPE
TYPE: ACUTE PAIN

## 2021-04-21 ASSESSMENT — FIBROSIS 4 INDEX: FIB4 SCORE: 13.53

## 2021-04-21 NOTE — CARE PLAN
Problem: Nutritional:  Goal: Achieve adequate nutritional intake  Description: Pt able to tolerate diet advancement past clear liquids in 48 hours.   Outcome: PROGRESSING SLOWER THAN EXPECTED   Little PO intake recorded. 4/19 pt at 25-50% lunch. No PO intake recorded 4/20, but pt was NPO for part of the day. She ate <25% this morning per CNA. Pt stated meal was good and she did review the menu for today which she said sounded good. She declined offer of supplements at this time. Encouraged PO intake. RD following.

## 2021-04-21 NOTE — PROGRESS NOTES
Assumed care at 0700, bedside report received from JARETT San. Pt is -ST on the telemetry monitor. Patient is AO x 3-4 and is in bed. Initial assessment completed and orders reviewed. POC discussed with patient. Call light within reach and hourly rounding in place. No further questions at this time. Fall precautions in place.

## 2021-04-21 NOTE — THERAPY
Physical Therapy   Initial Evaluation     Patient Name: Olya Youssef  Age:  50 y.o., Sex:  female  Medical Record #: [id number]  Today's Date: 4/21/2021     Precautions: Fall Risk    Assessment  Patient is 50 y.o. female with a diagnosis of acute pancreatitis secondary to alcohol abuse and alcohol withdrawal, and COPD.  Other PMH includes esophagitis, gastritis, alcoholic hepatitis, depression with prior suicide attempt, anxiety, bipolar, Hx drug abuse, Hx Covid-19. She reports she lives in a ground level apartment with a friend. She reports at baseline, she is independent with gait sans AD and independent with self care. She reports her roommate has been helping with IADLs since she has been sick. Today, pt required min A with transfers and gait, and demonstrated shaky, bradykinetic gait. She required cues for use of FWW. She reported fatigue with amb 40'. Pt with signficant deficits in mobility, and will benefit from continued PT to address deficits. Based on mobility today, pt will benefit from further PT in the post-acute setting prior to D/C home, however, her D/C recommendation may change as she continues to improve medically.    Plan    Recommend Physical Therapy 3 times per week until therapy goals are met for the following treatments:  Bed Mobility, Gait Training, Neuro Re-Education / Balance, Stair Training, Therapeutic Activities and Therapeutic Exercises    DC Equipment Recommendations: (P) Unable to determine at this time  Discharge Recommendations: (P) Recommend post-acute placement for additional physical therapy services prior to discharge home       Subjective    Pt reluctant to work with PT today, but agreeable with encouragement.     Objective       04/21/21 1504   Prior Living Situation   Prior Services None   Housing / Facility 1 Story Apartment / Condo   Steps Into Home 0   Steps In Home 0   Bathroom Set up Walk In Shower;Shower Chair   Lives with - Patient's Self Care Capacity Friends    Comments per pt she was independent with self care and mobility, but while she has been sick, her roommate has been helping with laundry and the dog   Prior Level of Functional Mobility   Bed Mobility Independent   Transfer Status Independent   Ambulation Independent   Distance Ambulation (Feet)   (household)   Assistive Devices Used None   Cognition    Cognition / Consciousness X   Attention Impaired   Strength Lower Body   Lower Body Strength  X   Gross Strength Generalized Weakness, Equal Bilaterally   Balance Assessment   Sitting Balance (Static) Fair   Sitting Balance (Dynamic) Fair -   Standing Balance (Static) Fair -   Standing Balance (Dynamic) Fair -   Weight Shift Sitting Fair   Weight Shift Standing Fair   Comments with FWW   Gait Analysis   Gait Level Of Assist Minimal Assist   Assistive Device Front Wheel Walker   Distance (Feet) 40   # of Times Distance was Traveled 1   Deviation Bradykinetic;Decreased Heel Strike;Decreased Toe Off  (short step length, shaky)   # of Stairs Climbed 0   Weight Bearing Status no restriction   Comments shaky; LE fatigue with 40'   Bed Mobility    Supine to Sit Supervised   Sit to Supine Supervised   Scooting Supervised   Functional Mobility   Sit to Stand Minimal Assist   Transfer Method Stand Step   Mobility supine->sit EOB->stand->amb->sit EOB->supine   How much difficulty does the patient currently have...   Turning over in bed (including adjusting bedclothes, sheets and blankets)? 3   Sitting down on and standing up from a chair with arms (e.g., wheelchair, bedside commode, etc.) 3   Moving from lying on back to sitting on the side of the bed? 3   How much help from another person does the patient currently need...   Moving to and from a bed to a chair (including a wheelchair)? 3   Need to walk in a hospital room? 3   Climbing 3-5 steps with a railing? 2   6 clicks Mobility Score 17   Activity Tolerance   Sitting in Chair declined   Sitting Edge of Bed 4 minutes  approx   Standing 6 minutes approx   Comments fatigued with bilat LEs   Patient / Family Goals    Patient / Family Goal #1 none stated   Short Term Goals    Short Term Goal # 1 Pt to perform transfer bed to chair with supv with LRAD on no AD within 6 visits in order to return home   Short Term Goal # 2 Pt to ambulate 200' with LRAD or no AD with supv within 6 visits in order to return home   Education Group   Education Provided Role of Physical Therapist   Role of Physical Therapist Patient Response Patient;Acceptance;Explanation;Verbal Demonstration   Problem List    Problems Pain;Impaired Transfers;Impaired Ambulation;Functional Strength Deficit;Impaired Balance;Decreased Activity Tolerance;Safety Awareness Deficits / Cognition   Anticipated Discharge Equipment and Recommendations   DC Equipment Recommendations Unable to determine at this time   Discharge Recommendations Recommend post-acute placement for additional physical therapy services prior to discharge home

## 2021-04-21 NOTE — PROGRESS NOTES
Daily Progress Note:     Date of Service: 4/21/2021  Primary Team: [facility] Team   Attending: Dr. Ayala  Senior Resident: Dr. Corey MD  Intern: Dr. Trina Friend M.D.  Contact:  [phone]    Chief Complaint:   Chief Complaint   Patient presents with   • Abdominal Pain     Pt states she has 10/10 upper abdominal pain and was diagnosed with acute pancreatitis at [facility] two days ago and was discharged. Pt also stated she has been diagnosed with pancreatitis 5 times previously.    • Alcohol Intoxication     Per EMS they were called to pt's residense by pt's roommate who stated she is unable to care for herself due to her drinking. She has been unable to make it to the bathroom and has not been eating for the past few days. Pt states she drinks approximatley one pint of Vodka everyday. States she has has tried to quit drinking in the past but was unable to. Pt is A&Ox4, speech is slurred, but answers questions and follows commands appropriately.      ID:  50 y.o. female with a PMH of recurrent alcoholic pancreatitis, chronic alcohol abuse, and history of alcohol withdrawals (including seizures), bipolar disorder, history of meth abuse, history of suicide attempts, COVID-19 infection (recovered, out of quarantine), COPD (no PFTs on file, on 2L O2 at home) who presented 4/17/2021 with alcoholic pancreatitis and alcohol intoxication, now in alcohol withdrawal.    Subjective:     NAEON. The patient's CIWA scores in the last 24 hours ranged from 4 to 16; latest one this morning was 4. She received 8.5 mg Ativan in the last 24 hours. This morning, patient stated she felt better, though did not wish to be bothered. Did not have any suicidal ideation or homicidal ideation.  She did not have any hematemesis.  No other acute complaints, including chest pain and shortness of breath.    Consultants/Specialty:  None    Review of Systems:   Review of Systems   Constitutional: Positive for diaphoresis and  malaise/fatigue. Negative for chills and fever.   HENT: Negative for congestion and sore throat.    Eyes: Negative for blurred vision and double vision.   Respiratory: Negative for cough, shortness of breath and wheezing.    Cardiovascular: Negative for chest pain, palpitations and leg swelling.   Gastrointestinal: Positive for abdominal pain. Negative for constipation, diarrhea, nausea and vomiting.   Genitourinary: Negative for dysuria, frequency and urgency.   Musculoskeletal: Positive for myalgias. Negative for falls.   Neurological: Positive for tremors and sensory change. Negative for dizziness, seizures and headaches.   Psychiatric/Behavioral: Negative for substance abuse and suicidal ideas. The patient is not nervous/anxious.        Objective Data:   Physical Exam:   Vitals:   Temp:  [35.9 °C (96.7 °F)-36.9 °C (98.4 °F)] 35.9 °C (96.7 °F)  Pulse:  [] 75  Resp:  [15-18] 17  BP: ()/(73-95) 154/92  SpO2:  [95 %-98 %] 95 %     Physical Exam  Vitals and nursing note reviewed.   Constitutional:       General: She is in acute distress.      Appearance: She is ill-appearing. She is not toxic-appearing or diaphoretic.      Comments: Lethargic   HENT:      Head: Normocephalic and atraumatic.      Right Ear: External ear normal.      Left Ear: External ear normal.      Nose: Nose normal. No congestion or rhinorrhea.      Mouth/Throat:      Mouth: Mucous membranes are dry.      Pharynx: Posterior oropharyngeal erythema present. No oropharyngeal exudate.      Comments: Dry lips  Mallampati class 3  Eyes:      General: No scleral icterus.        Right eye: No discharge.         Left eye: No discharge.      Extraocular Movements: Extraocular movements intact.      Conjunctiva/sclera: Conjunctivae normal.      Pupils: Pupils are equal, round, and reactive to light.   Cardiovascular:      Comments: Patient declined cardiac exam  Pulmonary:      Effort: Pulmonary effort is normal.      Comments: Unlabored  breathing  Patient declined rest of exam  Abdominal:      Comments: Patient declined abdominal exam   Musculoskeletal:         General: No swelling, tenderness or deformity.      Cervical back: Normal range of motion and neck supple. No rigidity or tenderness.      Comments: Left lower extremity cool to touch, blanching skin. RLE warm to touch. 2+ dorsalis pedis pulses in b/l LE   Skin:     General: Skin is warm.      Capillary Refill: Capillary refill takes less than 2 seconds.      Coloration: Skin is not jaundiced.      Comments: Callus on left big toe and on ball of right foot   Neurological:      Mental Status: She is oriented to person, place, and time. She is lethargic.      Motor: Weakness present.      Comments: Patient declined rest of neuro exam.   Psychiatric:         Attention and Perception: Perception normal. She is attentive. She does not perceive auditory or visual hallucinations.         Mood and Affect: Mood normal. Affect is blunt.         Speech: Speech is delayed.         Behavior: Behavior is cooperative.         Thought Content: Thought content normal.         Cognition and Memory: Memory is not impaired. She exhibits impaired recent memory.         Judgment: Judgment normal.           Labs:   Recent Labs     04/19/21  0126 04/20/21  0350 04/21/21  0315   WBC 1.9* 3.4* 2.8*   RBC 3.91* 3.85* 4.00*   HEMOGLOBIN 13.2 13.0 13.6   HEMATOCRIT 39.7 39.6 40.7   .5* 102.9* 101.8*   MCH 33.8* 33.8* 34.0*   RDW 48.3 47.4 47.7   PLATELETCT 31* 60* 57*   MPV 10.8 11.1 10.1   NEUTSPOLYS 60.30 47.10  --    LYMPHOCYTES 22.20 31.50  --    MONOCYTES 15.50* 15.70*  --    EOSINOPHILS 1.00 3.30  --    BASOPHILS 0.50 0.90  --      Recent Labs     04/20/21  0350 04/20/21  1317 04/21/21  0315   SODIUM 124* 129* 131*   POTASSIUM 3.4* 3.6 4.0   CHLORIDE 88* 88* 94*   CO2 27 30 29   GLUCOSE 119* 139* 109*   BUN 5* 4* 3*        Imaging:   US-RUQ   Final Result      1. Echogenic liver, most commonly hepatic  steatosis.      2. No gallstone. No sonographic evidence of acute cholecystitis            US-ANTONIO SINGLE LEVEL BILAT   Final Result          Problem Representation:   50 y.o. female with a PMH of recurrent alcoholic pancreatitis, chronic alcohol abuse, and history of alcohol withdrawals (including seizures), bipolar disorder, history of meth abuse, history of suicide attempts, COVID-19 infection (recovered, out of quarantine), COPD (no PFTs on file, on 2L O2 at home) who presented 4/17/2021 with alcoholic pancreatitis and alcohol intoxication, now in alcohol withdrawal.    * Pancreatitis, alcoholic, acute- (present on admission)  Assessment & Plan  Presented with severe abdominal pain, 10/10. Lipase of 211, around 3x upper limit of normal. Due to drinking 1 pint vodka per day. Also with transaminitis. This is especially concerning in light of the fact that the patient's condition is due to alcohol, but patient is self-medicating for the pain with drinking more alcohol. CT A/P from [facility] on 4/15 did not show any acute abnormalities. RUQ U/S showed hepatic steatosis.    -IVF resuscitation  -GI soft diet  -PPI omeprazole 40 mg qD  -Morphine IV PRN for pain    Hypertension- (present on admission)  Assessment & Plan  This is continuing to happen, part of alcohol withdrawal symptoms, patient has high catecholamine release    Clonidine patch    Impaired circulation of bilateral lower extremity  Assessment & Plan  Concerning as this is new upon physical exam on 4/19 (RLE), then migrated to LLE on 4/20. Bilateral arterial U/S did not show any acute abnormalities. CT angio performed at [place] on 4/15/2021 did not show evidence of PE.    CTM, conservative tx for now    Pancytopenia (HCC)  Assessment & Plan  On 4/18/2021, patient had white blood count of 2.0 and platelet of 26.  This is concerning, likely due to the stress intoxication from effects of alcohol.  Less likely some other malignant process such as  leukemia.  Records showed at [facility]'s patient had WBC of 3.6, Plt 73, Hg 14.7. May also be concerning for heparin-induced thrombocytopenia versus ITP.  Patient may have lymphopenia due to previous Covid infection. Re    Patient has been placed on neutropenic precautions  Continue to monitor CBC labs    Alcohol withdrawal (HCC)- (present on admission)  Assessment & Plan  Also see problem of alcohol intoxication    Patient has history of alcohol withdrawal in the past including with seizures.  Last drink was 4/16/2021 per patient history.  Patient has gotten multiple doses of Ativan, this is concerning in the context that there may be some other underlying pathology such as her bipolar disorder.      Multivitamin, thiamine, folate supplement  CIWA protocol - discontinued 4/21, as last 3 CIWA scores were less than 8  --> Monitor how patient does off CIWA, can restart if needed  Neurochecks every 4 hours  Seizure, aspiration, fall precautions  Start aripiprazole low-dose, will uptitrate as necessary.  This will hopefully help unmask some of the symptoms of symptoms of alcohol withdrawal versus due to underlying bipolar disorder pathology  Monitor electrolytes closely  Gabapentin 300 mg TID    Alcohol intoxication with blood level over 0.3 (HCC)- (present on admission)  Assessment & Plan  Especially concerning in light of the fact that the patient is chronically drinking alcohol. Patient has h/o alcohol withdrawal in past including with seizures. Last drink was yesterday per history. Given ryan bag in ED.    MV, thiamine, folate supp  CIWA protocol - discontinued 4/21, as last 3 CIWA scores were less than 8  Neuro checks q4  Seizure, aspiration, fall precautions  Significant alcohol cessation counseling, medical team has been regularly counseling the patient every day since day of admission    Debility- (present on admission)  Assessment & Plan  Possible issues with ambulation and mobility given chronic use of alcohol,  alcohol withdrawals.    -PT OT consult, appreciate recs    Peripheral neuropathy- (present on admission)  Assessment & Plan  Gabapentin 300 mg TID (increased from home dose)    Macrocytic anemia- (present on admission)  Assessment & Plan  This is likely a chronic issue, patient likely has malnutrition due to chronic alcoholism.  Patient presented with macrocytosis. Records from [facility] showed MCV 99.5 (borderline macrocytosis). With all labs together, anemia likely due to alcoholism with malnutrition.    As the Hg is okay at this time, will not start on iron supp  Extensive alcohol cessation counseling    Hypomagnesemia- (present on admission)  Assessment & Plan  IMPROVING  This is concerning because this will result in promoting hypocalcemia as well as wasting potassium in the kidney.    Replete with goal of magnesium of 2    Lactic acidosis- (present on admission)  Assessment & Plan  Lactic acid levels have been uptrending, from 2.3-2.7-3.9 on 4/18/2021.  This is likely not due to fluid depletion, rather due to acute stress state and liver.  Patient is currently not septic.    Continue to monitor clinically  Continue IV fluid resuscitation  No need to continue trending  Serial abdominal exams  If patient becomes septic, will repeat trend    Hypocalcemia- (present on admission)  Assessment & Plan  This is very concerning.  Patient likely has a component of low magnesium causing low absorption of calcium.  Also may have component of low vitamin D.  May be part of malnutrition due to alcoholism    Follow-up on vitamin D level  calcium and vitamin D supplementation    COPD (chronic obstructive pulmonary disease) (HCC)- (present on admission)  Assessment & Plan  Chronic, unknown if completely worked up before. No PFTs on file.    Patient will needs PFTs as outpatient.  Consider new home O2 eval prior to d/c if needed    Cannabis abuse- (present on admission)  Assessment & Plan  Drug cessation counseling >5 minutes,  medical team has been discussing this with patient every day since day of admission    Abnormal QT interval present on electrocardiogram- (present on admission)  Assessment & Plan  Present on multiple EKGs.    Avoid medications that prolong QTc interval  Continue to monitor on telemetry      Alcoholic hepatitis- (present on admission)  Assessment & Plan  Transaminitis improving  With transaminitis, AST:ALT of 2:1.    -Continue to monitor, trend transaminases    Vomiting- (present on admission)  Assessment & Plan  IMPROVING  Sx started 4/19. Part of alcohol withdrawal.    Continue IVF  PRN antiemetics  Incentive spirometry, encourage to use 10x/hr while awake as possible    Bipolar affective disorder (HCC)- (present on admission)  Assessment & Plan  On prozac at home. This is not ideal as this can cause the patient to go into manic episode with an SSRI, however will need to continue this to prevent withdrawal.    Now on aripiprazole, hopefully will help sx of alcohol withdrawal as well    Hypokalemia- (present on admission)  Assessment & Plan  IMPROVING  Replete with goal of K=4      Quality Measures:  Code: Full  VTE: SCDs  Diet: GI soft  Disposition: Remain inpatient    Please note that this dictation was created using voice recognition software. I have worked with technical experts from [facility] to optimize the interface.  I have made every reasonable attempt to correct obvious errors, but there may be errors of grammar and possibly content that I did not discover before finalizing the note.

## 2021-04-21 NOTE — ASSESSMENT & PLAN NOTE
Possible issues with ambulation and mobility given chronic use of alcohol, alcohol withdrawals.    -PT OT consult, appreciate recs

## 2021-04-22 ENCOUNTER — PHARMACY VISIT (OUTPATIENT)
Dept: PHARMACY | Facility: MEDICAL CENTER | Age: 51
End: 2021-04-22
Payer: COMMERCIAL

## 2021-04-22 ENCOUNTER — PATIENT OUTREACH (OUTPATIENT)
Dept: HEALTH INFORMATION MANAGEMENT | Facility: OTHER | Age: 51
End: 2021-04-22

## 2021-04-22 VITALS
HEIGHT: 60 IN | SYSTOLIC BLOOD PRESSURE: 131 MMHG | HEART RATE: 108 BPM | DIASTOLIC BLOOD PRESSURE: 96 MMHG | BODY MASS INDEX: 29.48 KG/M2 | TEMPERATURE: 97.4 F | WEIGHT: 150.13 LBS | RESPIRATION RATE: 18 BRPM | OXYGEN SATURATION: 98 %

## 2021-04-22 PROBLEM — R94.31 ABNORMAL QT INTERVAL PRESENT ON ELECTROCARDIOGRAM: Status: RESOLVED | Noted: 2018-05-29 | Resolved: 2021-04-22

## 2021-04-22 PROBLEM — E83.51 HYPOCALCEMIA: Status: RESOLVED | Noted: 2021-04-18 | Resolved: 2021-04-22

## 2021-04-22 PROBLEM — I10 HYPERTENSION: Status: RESOLVED | Noted: 2021-04-20 | Resolved: 2021-04-22

## 2021-04-22 PROBLEM — F12.10 CANNABIS ABUSE: Chronic | Status: RESOLVED | Noted: 2021-04-17 | Resolved: 2021-04-22

## 2021-04-22 PROBLEM — R53.81 DEBILITY: Status: RESOLVED | Noted: 2021-04-21 | Resolved: 2021-04-22

## 2021-04-22 PROBLEM — J44.9 COPD (CHRONIC OBSTRUCTIVE PULMONARY DISEASE) (HCC): Chronic | Status: RESOLVED | Noted: 2021-04-17 | Resolved: 2021-04-22

## 2021-04-22 PROBLEM — I99.9: Status: RESOLVED | Noted: 2021-04-19 | Resolved: 2021-04-22

## 2021-04-22 PROBLEM — E83.42 HYPOMAGNESEMIA: Status: RESOLVED | Noted: 2021-04-18 | Resolved: 2021-04-22

## 2021-04-22 PROBLEM — G62.9 PERIPHERAL NEUROPATHY: Chronic | Status: RESOLVED | Noted: 2021-04-19 | Resolved: 2021-04-22

## 2021-04-22 PROBLEM — E87.20 LACTIC ACIDOSIS: Status: RESOLVED | Noted: 2021-04-18 | Resolved: 2021-04-22

## 2021-04-22 PROBLEM — D61.818 PANCYTOPENIA (HCC): Status: RESOLVED | Noted: 2021-04-18 | Resolved: 2021-04-22

## 2021-04-22 PROBLEM — D53.9 MACROCYTIC ANEMIA: Status: RESOLVED | Noted: 2021-04-18 | Resolved: 2021-04-22

## 2021-04-22 LAB
ANION GAP SERPL CALC-SCNC: 8 MMOL/L (ref 7–16)
BUN SERPL-MCNC: 4 MG/DL (ref 8–22)
CALCIUM SERPL-MCNC: 9.1 MG/DL (ref 8.5–10.5)
CHLORIDE SERPL-SCNC: 99 MMOL/L (ref 96–112)
CO2 SERPL-SCNC: 26 MMOL/L (ref 20–33)
CREAT SERPL-MCNC: 0.3 MG/DL (ref 0.5–1.4)
ERYTHROCYTE [DISTWIDTH] IN BLOOD BY AUTOMATED COUNT: 49 FL (ref 35.9–50)
GLUCOSE SERPL-MCNC: 97 MG/DL (ref 65–99)
HCT VFR BLD AUTO: 37.6 % (ref 37–47)
HGB BLD-MCNC: 12.8 G/DL (ref 12–16)
MAGNESIUM SERPL-MCNC: 1.6 MG/DL (ref 1.5–2.5)
MCH RBC QN AUTO: 34.5 PG (ref 27–33)
MCHC RBC AUTO-ENTMCNC: 34 G/DL (ref 33.6–35)
MCV RBC AUTO: 101.3 FL (ref 81.4–97.8)
PLATELET # BLD AUTO: 101 K/UL (ref 164–446)
PMV BLD AUTO: 10 FL (ref 9–12.9)
POTASSIUM SERPL-SCNC: 3.8 MMOL/L (ref 3.6–5.5)
RBC # BLD AUTO: 3.71 M/UL (ref 4.2–5.4)
SODIUM SERPL-SCNC: 133 MMOL/L (ref 135–145)
WBC # BLD AUTO: 2.8 K/UL (ref 4.8–10.8)

## 2021-04-22 PROCEDURE — 80048 BASIC METABOLIC PNL TOTAL CA: CPT

## 2021-04-22 PROCEDURE — A9270 NON-COVERED ITEM OR SERVICE: HCPCS | Performed by: STUDENT IN AN ORGANIZED HEALTH CARE EDUCATION/TRAINING PROGRAM

## 2021-04-22 PROCEDURE — 83735 ASSAY OF MAGNESIUM: CPT

## 2021-04-22 PROCEDURE — 700111 HCHG RX REV CODE 636 W/ 250 OVERRIDE (IP): Performed by: STUDENT IN AN ORGANIZED HEALTH CARE EDUCATION/TRAINING PROGRAM

## 2021-04-22 PROCEDURE — 97165 OT EVAL LOW COMPLEX 30 MIN: CPT

## 2021-04-22 PROCEDURE — 700102 HCHG RX REV CODE 250 W/ 637 OVERRIDE(OP): Performed by: STUDENT IN AN ORGANIZED HEALTH CARE EDUCATION/TRAINING PROGRAM

## 2021-04-22 PROCEDURE — 36415 COLL VENOUS BLD VENIPUNCTURE: CPT

## 2021-04-22 PROCEDURE — 97530 THERAPEUTIC ACTIVITIES: CPT

## 2021-04-22 PROCEDURE — RXMED WILLOW AMBULATORY MEDICATION CHARGE: Performed by: STUDENT IN AN ORGANIZED HEALTH CARE EDUCATION/TRAINING PROGRAM

## 2021-04-22 PROCEDURE — 97116 GAIT TRAINING THERAPY: CPT

## 2021-04-22 PROCEDURE — 99239 HOSP IP/OBS DSCHRG MGMT >30: CPT | Mod: GC | Performed by: INTERNAL MEDICINE

## 2021-04-22 PROCEDURE — 85027 COMPLETE CBC AUTOMATED: CPT

## 2021-04-22 RX ORDER — ACETAMINOPHEN 500 MG
500 TABLET ORAL EVERY 6 HOURS PRN
Qty: 15 TABLET | Refills: 0 | Status: SHIPPED | OUTPATIENT
Start: 2021-04-22 | End: 2021-04-27

## 2021-04-22 RX ORDER — ACETAMINOPHEN 500 MG
500 TABLET ORAL EVERY 6 HOURS
Status: DISCONTINUED | OUTPATIENT
Start: 2021-04-22 | End: 2021-04-22 | Stop reason: HOSPADM

## 2021-04-22 RX ORDER — ALBUTEROL SULFATE 90 UG/1
2 AEROSOL, METERED RESPIRATORY (INHALATION) EVERY 6 HOURS PRN
Qty: 8.5 G | Refills: 2 | Status: SHIPPED | OUTPATIENT
Start: 2021-04-22 | End: 2021-05-22

## 2021-04-22 RX ORDER — MORPHINE SULFATE 4 MG/ML
2 INJECTION, SOLUTION INTRAMUSCULAR; INTRAVENOUS
Status: DISCONTINUED | OUTPATIENT
Start: 2021-04-22 | End: 2021-04-22

## 2021-04-22 RX ORDER — OMEPRAZOLE 40 MG/1
40 CAPSULE, DELAYED RELEASE ORAL DAILY
Qty: 30 CAPSULE | Refills: 0 | Status: SHIPPED | OUTPATIENT
Start: 2021-04-23 | End: 2021-05-23

## 2021-04-22 RX ADMIN — ACETAMINOPHEN 500 MG: 500 TABLET ORAL at 12:40

## 2021-04-22 RX ADMIN — OMEPRAZOLE 40 MG: 20 CAPSULE, DELAYED RELEASE ORAL at 04:31

## 2021-04-22 RX ADMIN — PROCHLORPERAZINE EDISYLATE 10 MG: 5 INJECTION INTRAMUSCULAR; INTRAVENOUS at 04:31

## 2021-04-22 RX ADMIN — CALCIUM CITRATE 200 MG (950 MG) TABLET 950 MG: at 04:32

## 2021-04-22 RX ADMIN — GABAPENTIN 300 MG: 300 CAPSULE ORAL at 04:31

## 2021-04-22 RX ADMIN — MORPHINE SULFATE 2 MG: 4 INJECTION INTRAVENOUS at 02:29

## 2021-04-22 RX ADMIN — MORPHINE SULFATE 2 MG: 4 INJECTION INTRAVENOUS at 04:31

## 2021-04-22 RX ADMIN — FLUOXETINE 30 MG: 20 CAPSULE ORAL at 04:32

## 2021-04-22 RX ADMIN — MORPHINE SULFATE 1 MG: 4 INJECTION INTRAVENOUS at 00:28

## 2021-04-22 RX ADMIN — ARIPIPRAZOLE 2 MG: 2 TABLET ORAL at 04:33

## 2021-04-22 RX ADMIN — GABAPENTIN 300 MG: 300 CAPSULE ORAL at 12:40

## 2021-04-22 RX ADMIN — ACETAMINOPHEN 500 MG: 500 TABLET ORAL at 04:42

## 2021-04-22 ASSESSMENT — ENCOUNTER SYMPTOMS
DIAPHORESIS: 0
FALLS: 0
VOMITING: 0
COUGH: 0
CHILLS: 0
PALPITATIONS: 0
SORE THROAT: 0
WHEEZING: 0
CONSTIPATION: 0
HEADACHES: 0
SHORTNESS OF BREATH: 0
FEVER: 0
BLURRED VISION: 0
SEIZURES: 0
SENSORY CHANGE: 0
ABDOMINAL PAIN: 1
DIARRHEA: 0
DOUBLE VISION: 0
NERVOUS/ANXIOUS: 0
DIZZINESS: 0
MYALGIAS: 0
NAUSEA: 1

## 2021-04-22 ASSESSMENT — COGNITIVE AND FUNCTIONAL STATUS - GENERAL
CLIMB 3 TO 5 STEPS WITH RAILING: A LITTLE
SUGGESTED CMS G CODE MODIFIER DAILY ACTIVITY: CH
SUGGESTED CMS G CODE MODIFIER MOBILITY: CI
DAILY ACTIVITIY SCORE: 24
MOBILITY SCORE: 23

## 2021-04-22 ASSESSMENT — GAIT ASSESSMENTS
GAIT LEVEL OF ASSIST: SUPERVISED
ASSISTIVE DEVICE: FRONT WHEEL WALKER
DISTANCE (FEET): 125

## 2021-04-22 ASSESSMENT — PAIN DESCRIPTION - PAIN TYPE
TYPE: ACUTE PAIN

## 2021-04-22 ASSESSMENT — ACTIVITIES OF DAILY LIVING (ADL): TOILETING: INDEPENDENT

## 2021-04-22 ASSESSMENT — LIFESTYLE VARIABLES: SUBSTANCE_ABUSE: 0

## 2021-04-22 NOTE — FACE TO FACE
Face to Face Supporting Documentation - Home Health    The encounter with this patient was in whole or in part the primary reason for home health admission.    Date of encounter:   Patient:                    MRN:                       YOB: 2023  Olya Youssef  [id number]  1970     Home health to see patient for:  Physical Therapy evaluation and treatment and Occupational therapy evaluation and treatment    Skilled need for:  Exacerbation of Chronic Disease State Weakness    Skilled nursing interventions to include:  Comment: None    Homebound status evidenced by:  Need the aid of supportive devices such as crutches, canes, wheelchairs or walkers. Leaving home requires a considerable and taxing effort. There is a normal inability to leave the home.    Community Physician to provide follow up care: SALONI Marcial     Optional Interventions? No      I certify the face to face encounter for this home health care referral meets the CMS requirements and the encounter/clinical assessment with the patient was, in whole, or in part, for the medical condition(s) listed above, which is the primary reason for home health care. Based on my clinical findings: the service(s) are medically necessary, support the need for home health care, and the homebound criteria are met.  I certify that this patient has had a face to face encounter by myself.  Breezy Nicole M.D. - NPI: [id number]

## 2021-04-22 NOTE — THERAPY
Physical Therapy   Daily Treatment     Patient Name: Olya Youssef  Age:  50 y.o., Sex:  female  Medical Record #: [id number]  Today's Date: 4/22/2021     Precautions: Fall Risk    Assessment    Pt is more alert, showing better balance and endurance when up to walk today. Pt is supervised for OOB, supervised for transfers, on/off toilet without assist, no help needed to clean up after toileting, supervised for ambulation x 125 feet using FWW. Pt lives with roommate who has been helping. Pt will need a FWW for use at home and will benefit from home health.     Plan    Continue current treatment plan.    DC Equipment Recommendations: Front-Wheel Walker  Discharge Recommendations: Recommend home health for continued physical therapy services         Objective       04/22/21 1022   Pain 0 - 10 Group   Therapist Pain Assessment During Activity;Nurse Notified  (no c/o pain, but tired, wants to sleep.)   Cognition    Cognition / Consciousness WDL   Comments alert, cooperative.    Gait Analysis   Gait Level Of Assist Supervised   Assistive Device Front Wheel Walker   Distance (Feet) 125   # of Times Distance was Traveled 1   Deviation   (pt avoided obstacles encountered during ambulation. )   # of Stairs Climbed 0  (pt reiterated that she does not encounter stairs at home. )   Bed Mobility    Supine to Sit Supervised   Sit to Supine Supervised   Scooting Supervised   Rolling Supervised   Functional Mobility   Sit to Stand Supervised   Bed, Chair, Wheelchair Transfer Supervised   Toilet Transfers Supervised  (no assist needed for wipe after toilet)   Transfer Method Stand Pivot   Short Term Goals    Short Term Goal # 1 Pt to perform transfer bed to chair with supv with LRAD on no AD within 6 visits in order to return home   Goal Outcome # 1 Goal met   Short Term Goal # 2 Pt to ambulate 200' with LRAD or no AD with supv within 6 visits in order to return home   Goal Outcome # 2 Goal not met   Anticipated Discharge Equipment  and Recommendations   DC Equipment Recommendations Front-Wheel Walker   Discharge Recommendations Recommend home health for continued physical therapy services

## 2021-04-22 NOTE — DISCHARGE INSTRUCTIONS
-Follow-up with primary care physician.    Discharge Instructions    Discharged to home by taxi with escort. Discharged via wheelchair, hospital escort: Yes.  Special equipment needed: Oxygen and Walker    Be sure to schedule a follow-up appointment with your primary care doctor or any specialists as instructed.     Discharge Plan:   Diet Plan: Discussed  Activity Level: Discussed  Confirmed Follow up Appointment: Patient to Call and Schedule Appointment  Confirmed Symptoms Management: Discussed  Medication Reconciliation Updated: Yes    I understand that a diet low in cholesterol, fat, and sodium is recommended for good health. Unless I have been given specific instructions below for another diet, I accept this instruction as my diet prescription.   Other diet: GI soft diet    Special Instructions: None    · Is patient discharged on Warfarin / Coumadin?   No     Depression / Suicide Risk    As you are discharged from this [facility] facility, it is important to learn how to keep safe from harming yourself.    Recognize the warning signs:  · Abrupt changes in personality, positive or negative- including increase in energy   · Giving away possessions  · Change in eating patterns- significant weight changes-  positive or negative  · Change in sleeping patterns- unable to sleep or sleeping all the time   · Unwillingness or inability to communicate  · Depression  · Unusual sadness, discouragement and loneliness  · Talk of wanting to die  · Neglect of personal appearance   · Rebelliousness- reckless behavior  · Withdrawal from people/activities they love  · Confusion- inability to concentrate     If you or a loved one observes any of these behaviors or has concerns about self-harm, here's what you can do:  · Talk about it- your feelings and reasons for harming yourself  · Remove any means that you might use to hurt yourself (examples: pills, rope, extension cords, firearm)  · Get professional help from the community  (Mental Health, Substance Abuse, psychological counseling)  · Do not be alone:Call your Safe Contact- someone whom you trust who will be there for you.  · Call your local CRISIS HOTLINE 912-0740 or 648-584-4488  · Call your local Children's Mobile Crisis Response Team [place] (476) 960-9604 or www.[employer]  · Call the toll free National Suicide Prevention Hotlines   · National Suicide Prevention Lifeline 502-768-VMTX (4336)  · National Hope Line Network 800-SUICIDE (008-3468)      Omeprazole tablets (OTC)  What is this medicine?  OMEPRAZOLE (oh ME pray zol) prevents the production of acid in the stomach. It is used to treat the symptoms of heartburn. You can buy this medicine without a prescription. This product is not for long-term use, unless otherwise directed by your doctor or health care professional.  This medicine may be used for other purposes; ask your health care provider or pharmacist if you have questions.  COMMON BRAND NAME(S): Prilosec OTC  What should I tell my health care provider before I take this medicine?  They need to know if you have any of these conditions:  · black or bloody stools  · chest pain  · difficulty swallowing  · have had heartburn for over 3 months  · have heartburn with dizziness, lightheadedness or sweating  · liver disease  · lupus  · stomach pain  · unexplained weight loss  · vomiting with blood  · wheezing  · an unusual or allergic reaction to omeprazole, other medicines, foods, dyes, or preservatives  · pregnant or trying to get pregnant  · breast-feeding  How should I use this medicine?  Take this medicine by mouth with a glass of water. Follow the directions on the product label. Do not cut, crush or chew this medicine. Swallow the tablets whole. Take this medicine on an empty stomach, at least 30 minutes before breakfast. Take your medicine at regular intervals. Do not take it more often than directed.  Talk to your pediatrician regarding the use of this  medicine in children. Special care may be needed.  Overdosage: If you think you have taken too much of this medicine contact a poison control center or emergency room at once.  NOTE: This medicine is only for you. Do not share this medicine with others.  What if I miss a dose?  If you miss a dose, take it as soon as you can. If it is almost time for your next dose, take only that dose. Do not take double or extra doses.  What may interact with this medicine?  Do not take this medicine with any of the following medications:  · atazanavir  · clopidogrel  · nelfinavir  · rilpivirine  This medicine may also interact with the following medications:  · antifungals like itraconazole, ketoconazole, and voriconazole  · certain antivirals for HIV or hepatitis  · certain medicines that treat or prevent blood clots like warfarin  · cilostazol  · citalopram  · cyclosporine  · dasatinib  · digoxin  · disulfiram  · diuretics  · erlotinib  · iron supplements  · medicines for anxiety, panic, and sleep like diazepam  · medicines for seizures like carbamazepine, phenobarbital, phenytoin  · methotrexate  · mycophenolate mofetil  · nilotinib  · rifampin  · [place]'s wort  · tacrolimus  · vitamin B12  This list may not describe all possible interactions. Give your health care provider a list of all the medicines, herbs, non-prescription drugs, or dietary supplements you use. Also tell them if you smoke, drink alcohol, or use illegal drugs. Some items may interact with your medicine.  What should I watch for while using this medicine?  It can take several days before your heartburn gets better. Tell your healthcare professional if your symptoms do not start to get better or if they get worse. If you need to take this medicine for more than 14 days, talk to your healthcare professional. Heartburn may sometimes be caused by a more serious condition.  This medicine may cause a decrease in vitamin B12. You should make sure that you get  enough vitamin B12 while you are taking this medicine. Discuss the foods you eat and the vitamins you take with your health care professional.  What side effects may I notice from receiving this medicine?  Side effects that you should report to your doctor or health care professional as soon as possible:  · allergic reactions like skin rash, itching or hives, swelling of the face, lips, or tongue  · bone pain  · breathing problems  · fever or sore throat  · joint pain  · rash on cheeks or arms that gets worse in the sun  · redness, blistering, peeling, or loosening of the skin, including inside the mouth  · severe diarrhea  · signs and symptoms of kidney injury like trouble passing urine or change in the amount of urine  · signs and symptoms of low magnesium like muscle cramps; muscle pain; muscle weakness; tremors; seizures; or fast, irregular heartbeat  · stomach polyps  · unusual bleeding or bruising  Side effects that usually do not require medical attention (report to your doctor or health care professional if they continue or are bothersome):  · diarrhea  · dry mouth  · gas  · headache  · nausea  · stomach pain  This list may not describe all possible side effects. Call your doctor for medical advice about side effects. You may report side effects to FDA at 4-346-RRC-1053.  Where should I keep my medicine?  Keep out of the reach of children.  Store at room temperature between 20 and 25 degrees C (68 and 77 degrees F). Protect from light and moisture. Throw away any unused medicine after the expiration date.  NOTE: This sheet is a summary. It may not cover all possible information. If you have questions about this medicine, talk to your doctor, pharmacist, or health care provider.  © 2020 [employer] (2019-10-09 13:06:30)    Alcohol Intoxication  Alcohol intoxication occurs when a person no longer thinks clearly or functions well (becomes impaired) after drinking alcohol. Intoxication can occur with  just one drink. The legal definition of alcohol intoxication depends on the amount of alcohol in the blood (blood alcohol concentration, BISI). BISI of  mg/dL or higher is commonly considered legally intoxicated. The level of impairment depends on:  The amount of alcohol the person had.  The person's age, gender, and weight.  How often the person drinks.  Whether the person has other medical conditions, such as diabetes, seizures, or a heart condition.  Alcohol intoxication can range from mild to severe. The condition can be dangerous, especially if the person:  Also took certain drugs or prescription medicines.  Drinks a large amount of alcohol in a short period of time (binge drinks).  For women, binge drinking is having four or more drinks at one time.  For men, binge drinking is having five or more drinks at one time.  If you or anyone around you appears intoxicated, speak up and act.  What are the causes?  This condition is caused by drinking alcohol.  What increases the risk?  The following factors may make you more likely to develop this condition:  Peer pressure in young adults.  Difficulty managing stress.  History of drug or alcohol abuse.  Combining alcohol with drugs.  Family history of drug or alcohol abuse.  Low body weight.  Binge drinking.  What are the signs or symptoms?  Symptoms of alcohol intoxication can vary from person to person. Symptoms can be mild, moderate, or severe.  Symptoms of mild alcohol intoxication may include:  Feeling relaxed or sleepy.  Having mild difficulty with coordination, speech, memory, or attention.  Symptoms of moderate alcohol intoxication may include:  Extreme emotions, like anger or sadness.  Moderate difficulty with coordination, speech, memory, or attention.  Symptoms of severe alcohol intoxication may include:  Severe difficulty with coordination, speech, memory, or attention.  Passing out.  Vomiting.  Confusion.  Slow breathing.  Coma.  Intoxication can  change quickly from mild to severe. It can cause coma or death, especially in people who are not exposed to alcohol often.  How is this diagnosed?  Your health care provider will ask you how much alcohol you drank and what kind you had. Intoxication may also be diagnosed based on:  Your symptoms and medical history.  A physical exam.  A blood test that measures BISI.  A smell of alcohol on your breath.  How is this treated?  Treatment for alcohol intoxication may include:  Being monitored in an emergency department, hospital, or treatment center until your BISI comes down and it is safe for you to go home.  IV fluids to prevent or treat loss of fluid in the body (dehydration).  Medicine to treat nausea or vomiting or to get rid of alcohol in the body.  Counseling (brief intervention) about the dangers of using alcohol.  Treatment for substance use disorder.  Oxygen therapy or a breathing machine (ventilator).  Long-term (chronic) exposure to alcohol can have long-term effects on your brain, heart, and gastrointestinal system. These effects can be serious and may also require treatment.  Follow these instructions at home:    Eating and drinking    Do not drink alcohol if:  Your health care provider tells you not to drink.  You are pregnant, may be pregnant, or are planning to become pregnant.  You are under the legal drinking age (21 years old in the [place]  You are taking medicines that should not be taken with alcohol.  You have a medical condition, and alcohol makes it worse.  You need to drive or perform activities that require you to be alert.  You have substance use disorder.  Ask your health care provider if alcohol is safe for you. If your health care provider allows you to drink alcohol, limit how much you have. You may drink:  0-1 drink a day for women.  0-2 drinks a day for men.  Be aware of how much alcohol is in your drink. In the [place] one drink equals one 12 oz bottle of beer (355 mL), one 5 oz glass of  wine (148 mL), or one 1½ oz shot of hard liquor (44 mL).  Avoid drinking alcohol on an empty stomach.  Stay hydrated. Drink enough fluid to keep your urine pale yellow. Avoid caffeine because it can dehydrate you.  Avoid drinking more than one drink per hour.  When having multiple drinks, drink water or a non-alcoholic beverage between alcoholic drinks.  General instructions  Take over-the-counter and prescription medicines only as told by your health care provider.  Do not drive after drinking any amount of alcohol. Plan for a designated  or another way to go home.  Have someone responsible stay with you while you are intoxicated. You should not be left alone.  Keep all follow-up visits as told by your health care provider. This is important.  Contact a health care provider if:  You do not feel better after a few days.  You have problems at work, at school, or at home due to drinking.  Get help right away if:  You have any of the following:  Moderate to severe trouble with coordination, speech, memory, or attention.  Trouble staying awake.  Severe confusion.  A seizure.  Light-headedness.  Fainting.  Vomiting bright red blood or material that looks like coffee grounds.  Bloody stool (feces). The blood may make your stool bright red, black, or tarry. It may also smell bad.  Shakiness when trying to stop drinking.  Thoughts about hurting yourself or others.  If you ever feel like you may hurt yourself or others, or have thoughts about taking your own life, get help right away. You can go to your nearest emergency department or call:  Your local emergency services (911 in the [place]  A suicide crisis helpline, such as the National Suicide Prevention Lifeline at 1-473.966.6149. This is open 24 hours a day.  Summary  Alcohol intoxication occurs when a person no longer thinks clearly or functions well after drinking alcohol.  If your health care provider says that alcohol is safe for you, limit alcohol intake to no  more than 1 drink a day for women (no drinks if you are pregnant) and 2 drinks a day for men. One drink equals 12 oz of beer, 5 oz of wine, or 1½ oz of hard liquor.  Contact your health care provider if drinking has caused you problems at work, school, or home.  Get help right away if you have thoughts about hurting yourself or others.  This information is not intended to replace advice given to you by your health care provider. Make sure you discuss any questions you have with your health care provider.  Document Released: 09/27/2006 Document Revised: 04/09/2019 Document Reviewed: 04/09/2019  [employer] Patient Education © 2020 [employer]    Acute Pancreatitis    The pancreas is a gland that is located behind the stomach on the left side of the abdomen. It produces enzymes that help to digest food. The pancreas also releases the hormones glucagon and insulin, which help to regulate blood sugar. Acute pancreatitis happens when inflammation of the pancreas suddenly occurs and the pancreas becomes irritated and swollen.  Most acute attacks last a few days and cause serious problems. Some people become dehydrated and develop low blood pressure. In severe cases, bleeding in the abdomen can lead to shock and can be life-threatening. The lungs, heart, and kidneys may fail.  What are the causes?  This condition may be caused by:  · Alcohol abuse.  · Drug abuse.  · Gallstones or other conditions that can block the tube that drains the pancreas (pancreatic duct).  · A tumor in the pancreas.  Other causes include:  · Certain medicines.  · Exposure to certain chemicals.  · Diabetes.  · An infection in the pancreas.  · Damage caused by an accident (trauma).  · The poison (venom) from a scorpion bite.  · Abdominal surgery.  · Autoimmune pancreatitis. This is when the body's disease-fighting (immune) system attacks the pancreas.  · Genes that are passed from parent to child (inherited).  In some cases, the cause of this  condition is not known.  What are the signs or symptoms?  Symptoms of this condition include:  · Pain in the upper abdomen that may radiate to the back. Pain may be severe.  · Tenderness and swelling of the abdomen.  · Nausea and vomiting.  · Fever.  How is this diagnosed?  This condition may be diagnosed based on:  · A physical exam.  · Blood tests.  · Imaging tests, such as X-rays, CT or MRI scans, or an ultrasound of the abdomen.  How is this treated?  Treatment for this condition usually requires a stay in the hospital. Treatment for this condition may include:  · Pain medicine.  · Fluid replacement through an IV.  · Placing a tube in the stomach to remove stomach contents and to control vomiting (NG tube, or nasogastric tube).  · Not eating for 3-4 days. This gives the pancreas a rest, because enzymes are not being produced that can cause further damage.  · Antibiotic medicines, if your condition is caused by an infection.  · Treating any underlying conditions that may be the cause.  · Steroid medicines, if your condition is caused by your immune system attacking your body's own tissues (autoimmune disease).  · Surgery on the pancreas or gallbladder.  Follow these instructions at home:  Eating and drinking    · Follow instructions from your health care provider about diet. This may involve avoiding alcohol and decreasing the amount of fat in your diet.  · Eat smaller, more frequent meals. This reduces the amount of digestive fluids that the pancreas produces.  · Drink enough fluid to keep your urine pale yellow.  · Do not drink alcohol if it caused your condition.  General instructions  · Take over-the-counter and prescription medicines only as told by your health care provider.  · Do not drive or use heavy machinery while taking prescription pain medicine.  · Ask your health care provider if the medicine prescribed to you can cause constipation. You may need to take steps to prevent or treat constipation, such  as:  ? Take an over-the-counter or prescription medicine for constipation.  ? Eat foods that are high in fiber such as whole grains and beans.  ? Limit foods that are high in fat and processed sugars, such as fried or sweet foods.  · Do not use any products that contain nicotine or tobacco, such as cigarettes, e-cigarettes, and chewing tobacco. If you need help quitting, ask your health care provider.  · Get plenty of rest.  · If directed, check your blood sugar at home as told by your health care provider.  · Keep all follow-up visits as told by your health care provider. This is important.  Contact a health care provider if you:  · Do not recover as quickly as expected.  · Develop new or worsening symptoms.  · Have persistent pain, weakness, or nausea.  · Recover and then have another episode of pain.  · Have a fever.  Get help right away if:  · You cannot eat or keep fluids down.  · Your pain becomes severe.  · Your skin or the white part of your eyes turns yellow (jaundice).  · You have sudden swelling in your abdomen.  · You vomit.  · You feel dizzy or you faint.  · Your blood sugar is high (over 300 mg/dL).  Summary  · Acute pancreatitis happens when inflammation of the pancreas suddenly occurs and the pancreas becomes irritated and swollen.  · This condition is typically caused by alcohol abuse, drug abuse, or gallstones.  · Treatment for this condition usually requires a stay in the hospital.  This information is not intended to replace advice given to you by your health care provider. Make sure you discuss any questions you have with your health care provider.  Document Released: 12/18/2006 Document Revised: 10/07/2019 Document Reviewed: 06/24/2019  [employer] Patient Education © 2020 [employer]    Pancreatitis Eating Plan  Pancreatitis is when your pancreas becomes irritated and swollen (inflamed). The pancreas is a small organ located behind your stomach. It helps your body digest food and regulate your  blood sugar. Pancreatitis can affect how your body digests food, especially foods with fat. You may also have other symptoms such as abdominal pain or nausea.  When you have pancreatitis, following a low-fat eating plan may help you manage symptoms and recover more quickly. Work with your health care provider or a diet and nutrition specialist (dietitian) to create an eating plan that is right for you.  What are tips for following this plan?  Reading food labels  Use the information on food labels to help keep track of how much fat you eat:  · Check the serving size.  · Look for the amount of total fat in grams (g) in one serving.  ? Low-fat foods have 3 g of fat or less per serving.  ? Fat-free foods have 0.5 g of fat or less per serving.  · Keep track of how much fat you eat based on how many servings you eat.  ? For example, if you eat two servings, the amount of fat you eat will be two times what is listed on the label.  Shopping    · Buy low-fat or nonfat foods, such as:  ? Fresh, frozen, or canned fruits and vegetables.  ? Grains, including pasta, bread, and rice.  ? Lean meat, poultry, fish, and other protein foods.  ? Low-fat or nonfat dairy.  · Avoid buying bakery products and other sweets made with whole milk, butter, and eggs.  · Avoid buying snack foods with added fat, such as anything with butter or cheese flavoring.  Cooking  · Remove skin from poultry, and remove extra fat from meat.  · Limit the amount of fat and oil you use to 6 teaspoons or less per day.  · Cook using low-fat methods, such as boiling, broiling, grilling, steaming, or baking.  · Use spray oil to cook. Add fat-free chicken broth to add flavor and moisture.  · Avoid adding cream to thicken soups or sauces. Use other thickeners such as corn starch or tomato paste.  Meal planning    · Eat a low-fat diet as told by your dietitian. For most people, this means having no more than 55-65 grams of fat each day.  · Eat small, frequent meals  throughout the day. For example, you may have 5-6 small meals instead of 3 large meals.  · Drink enough fluid to keep your urine pale yellow.  · Do not drink alcohol. Talk to your health care provider if you need help stopping.  · Limit how much caffeine you have, including black coffee, black and green tea, caffeinated soft drinks, and energy drinks.  General information  · Let your health care provider or dietitian know if you have unplanned weight loss on this eating plan.  · You may be instructed to follow a clear liquid diet during a flare of symptoms. Talk with your health care provider about how to manage your diet during symptoms of a flare.  · Take any vitamins or supplements as told by your health care provider.  · Work with a dietitian, especially if you have other conditions such as obesity or diabetes mellitus.  What foods should I avoid?  Fruits  Fried fruits. Fruits served with butter or cream.  Vegetables  Fried vegetables. Vegetables cooked with butter, cheese, or cream.  Grains  Biscuits, waffles, donuts, pastries, and croissants. Pies and cookies. Butter-flavored popcorn. Regular crackers.  Meats and other protein foods  Fatty cuts of meat. Poultry with skin. Organ meats. [place], sausage, and cold cuts. Whole eggs. Nuts and nut butters.  Dairy  Whole and 2% milk. Whole milk yogurt. Whole milk ice cream. Cream and half-and-half. Cream cheese. Sour cream. Cheese.  Beverages  Wine, beer, and liquor.  The items listed above may not be a complete list of foods and beverages to avoid. Contact a dietitian for more information.  Summary  · Pancreatitis can affect how your body digests food, especially foods with fat.  · When you have pancreatitis, it is recommended that you follow a low-fat eating plan to help you recover more quickly and manage symptoms. For most people, this means limiting fat to no more than 55-65 grams per day.  · Do not drink alcohol. Limit the amount of caffeine you have, and drink  enough fluid to keep your urine pale yellow.  This information is not intended to replace advice given to you by your health care provider. Make sure you discuss any questions you have with your health care provider.  Document Released: 03/26/2019 Document Revised: 04/09/2020 Document Reviewed: 03/26/2019  [employer]vier Patient Education © 2020 [employer]

## 2021-04-22 NOTE — DISCHARGE PLANNING
LSW met with patient at bedside. Patient sleepy throughout interview. Patient confirmed demographics. Patient reports that she will take a taxi home. Patient declines home health but does want a walker. Walker ordered through traction.    Patient needing a tank to discharge on. LSW spoke to [employer]. [facility] will bring a tank for patient to discharge on.    Dr. Nicole notified of patient declining home health. Bedside RN to order walker through traction.     Care Transition Team Assessment    Information Source  Orientation : Oriented x 4  Information Given By: Patient  Who is responsible for making decisions for patient? : Patient    Readmission Evaluation  Is this a readmission?: No    Elopement Risk  Legal Hold: No  Ambulatory or Self Mobile in Wheelchair: Yes  Disoriented: Time-At Risk for Elopement  Psychiatric Symptoms: None  History of Wandering: No  Elopement this Admit: No  Vocalizing Wanting to Leave: No  Displays Behaviors, Body Language Wanting to Leave: No-Not at Risk for Elopement  Elopement Risk: Not at Risk for Elopement    Interdisciplinary Discharge Planning  Lives with - Patient's Self Care Capacity: Friends  Patient or legal guardian wants to designate a caregiver: No  Housing / Facility: 1 Story Apartment / Condo  Prior Services: None    Discharge Preparedness  What is your plan after discharge?: Home with help  What are your discharge supports?: Child  Prior Functional Level: Independent with Activities of Daily Living, Independent with Medication Management  Difficulity with ADLs: None  Difficulity with IADLs: None    Functional Assesment  Prior Functional Level: Independent with Activities of Daily Living, Independent with Medication Management    Finances  Financial Barriers to Discharge: No  Prescription Coverage: Yes              Advance Directive  Advance Directive?: None    Domestic Abuse  Have you ever been the victim of abuse or violence?: No  Physical Abuse or Sexual Abuse:  No  Verbal Abuse or Emotional Abuse: No  Possible Abuse/Neglect Reported to:: Not Applicable    Psychological Assessment  History of Substance Abuse: Alcohol  Date Last Used - Alcohol: 4/16/21  History of Psychiatric Problems: Yes  Non-compliant with Treatment: No  Newly Diagnosed Illness: No    Discharge Risks or Barriers  Discharge risks or barriers?: No PCP, Uninsured / underinsured, Substance abuse  Patient risk factors: No PCP, Substance abuse    Anticipated Discharge Information  Discharge Disposition: Discharged to home/self care (01)  Discharge Address: [address] [place] [address]

## 2021-04-22 NOTE — DISCHARGE PLANNING
LSW contacted by bedside RN for transportation. Patient has MTM benefits.     LSW scheduled MTM ride home to home address on face sheet for patient. ETA 15-20 with [employer] cab. Nurses station number provided.

## 2021-04-22 NOTE — DISCHARGE PLANNING
Meds-to-Beds: Discharge prescription orders listed below delivered to patient's bedside. JARETT Phipps notified. Patient was sleeping at time of delivery. Written information regarding the dispensed prescriptions was provided to the patient including the phone number of the pharmacy to call for any questions.     Olya Youssef   Home Medication Instructions DEEP:[id number]    Printed on:04/22/21 4225   Medication Information                      albuterol 108 (90 Base) MCG/ACT Aero Soln inhalation aerosol  Inhale 2 Puffs every 6 hours as needed for Shortness of Breath for up to 30 days.                 Nora Smith, PharmD

## 2021-04-22 NOTE — PROGRESS NOTES
Daily Progress Note:     Date of Service: 4/22/2021  Primary Team: [facility] Team   Attending: Dr. Ayala  Senior Resident: Dr. Corey MD  Intern: Dr. Trina Friend M.D.  Contact:  [phone]    Chief Complaint:   Chief Complaint   Patient presents with   • Abdominal Pain     Pt states she has 10/10 upper abdominal pain and was diagnosed with acute pancreatitis at [facility] two days ago and was discharged. Pt also stated she has been diagnosed with pancreatitis 5 times previously.    • Alcohol Intoxication     Per EMS they were called to pt's residense by pt's roommate who stated she is unable to care for herself due to her drinking. She has been unable to make it to the bathroom and has not been eating for the past few days. Pt states she drinks approximatley one pint of Vodka everyday. States she has has tried to quit drinking in the past but was unable to. Pt is A&Ox4, speech is slurred, but answers questions and follows commands appropriately.      ID:  50 y.o. female with a PMH of recurrent alcoholic pancreatitis, chronic alcohol abuse, and history of alcohol withdrawals (including seizures), bipolar disorder, history of meth abuse, history of suicide attempts, COVID-19 infection (recovered, out of quarantine), COPD (no PFTs on file, on 2L O2 at home) who presented 4/17/2021 with alcoholic pancreatitis and alcohol intoxication, now in alcohol withdrawal.    Subjective:   -No acute events overnight.  Patient still complains of mild abdominal discomfort in epigastric area and nausea without vomiting.  -Afebrile, mildly tachycardic in one tens, sinus rhythm.  SBP in 130s-140s.  On 2 L of oxygen, baseline at home.  -She is off CIWA protocol/Ativan since last 24 hours.  -On Abilify, gabapentin and clonidine patch.  -Patient was evaluated by physical and Occupational Therapy..  Needs home health for further therapy.      Consultants/Specialty:  None    Review of Systems:   Review of Systems    Constitutional: Negative for chills, diaphoresis, fever and malaise/fatigue.   HENT: Negative for congestion and sore throat.    Eyes: Negative for blurred vision and double vision.   Respiratory: Negative for cough, shortness of breath and wheezing.    Cardiovascular: Negative for chest pain, palpitations and leg swelling.   Gastrointestinal: Positive for abdominal pain and nausea. Negative for constipation, diarrhea and vomiting.   Genitourinary: Negative for dysuria, frequency and urgency.   Musculoskeletal: Negative for falls and myalgias.   Neurological: Negative for dizziness, sensory change, seizures and headaches.   Psychiatric/Behavioral: Negative for substance abuse and suicidal ideas. The patient is not nervous/anxious.        Objective Data:   Physical Exam:   Vitals:   Temp:  [36.1 °C (96.9 °F)-37.2 °C (99 °F)] 36.6 °C (97.9 °F)  Pulse:  [] 103  Resp:  [16-18] 18  BP: (120-142)/() 142/101  SpO2:  [94 %-97 %] 96 %     Physical Exam  Vitals and nursing note reviewed.   Constitutional:       General: She is not in acute distress.     Appearance: Normal appearance. She is not ill-appearing, toxic-appearing or diaphoretic.   HENT:      Head: Normocephalic and atraumatic.      Right Ear: External ear normal.      Left Ear: External ear normal.      Nose: Nose normal. No congestion or rhinorrhea.      Mouth/Throat:      Mouth: Mucous membranes are moist.      Pharynx: Oropharynx is clear. No oropharyngeal exudate or posterior oropharyngeal erythema.   Eyes:      Extraocular Movements: Extraocular movements intact.      Conjunctiva/sclera: Conjunctivae normal.      Pupils: Pupils are equal, round, and reactive to light.   Cardiovascular:      Rate and Rhythm: Tachycardia present.      Pulses: Normal pulses.      Heart sounds: Normal heart sounds. No murmur.   Pulmonary:      Effort: Pulmonary effort is normal. No respiratory distress.      Breath sounds: No wheezing or rales.      Comments: Mildly  diminished lung sounds all over.  Abdominal:      General: Abdomen is flat. Bowel sounds are normal. There is no distension.      Palpations: Abdomen is soft.      Tenderness: There is no abdominal tenderness. There is no guarding.      Comments: Mild tenderness in epigastric area.   Musculoskeletal:         General: No swelling, tenderness or deformity. Normal range of motion.      Cervical back: Normal range of motion and neck supple. No rigidity or tenderness.   Skin:     General: Skin is warm.      Capillary Refill: Capillary refill takes less than 2 seconds.      Coloration: Skin is not jaundiced.   Neurological:      General: No focal deficit present.      Mental Status: She is alert and oriented to person, place, and time.      Motor: Weakness present.   Psychiatric:         Attention and Perception: Perception normal. She is attentive. She does not perceive auditory or visual hallucinations.         Mood and Affect: Mood normal. Affect is not blunt.         Speech: Speech is not delayed.         Behavior: Behavior is cooperative.         Cognition and Memory: Memory is not impaired. She does not exhibit impaired recent memory.           Labs:   Recent Labs     04/20/21  0350 04/21/21  0315 04/22/21  0423   WBC 3.4* 2.8* 2.8*   RBC 3.85* 4.00* 3.71*   HEMOGLOBIN 13.0 13.6 12.8   HEMATOCRIT 39.6 40.7 37.6   .9* 101.8* 101.3*   MCH 33.8* 34.0* 34.5*   RDW 47.4 47.7 49.0   PLATELETCT 60* 57* 101*   MPV 11.1 10.1 10.0   NEUTSPOLYS 47.10  --   --    LYMPHOCYTES 31.50  --   --    MONOCYTES 15.70*  --   --    EOSINOPHILS 3.30  --   --    BASOPHILS 0.90  --   --      Recent Labs     04/20/21  1317 04/21/21  0315 04/22/21  0423   SODIUM 129* 131* 133*   POTASSIUM 3.6 4.0 3.8   CHLORIDE 88* 94* 99   CO2 30 29 26   GLUCOSE 139* 109* 97   BUN 4* 3* 4*        Imaging:   US-RUQ   Final Result      1. Echogenic liver, most commonly hepatic steatosis.      2. No gallstone. No sonographic evidence of acute  cholecystitis            US-ANTONIO SINGLE LEVEL BILAT   Final Result          Problem Representation:   50 y.o. female with a PMH of recurrent alcoholic pancreatitis, chronic alcohol abuse, and history of alcohol withdrawals (including seizures), bipolar disorder, history of meth abuse, history of suicide attempts, COVID-19 infection (recovered, out of quarantine), COPD (no PFTs on file, on 2L O2 at home) who presented 4/17/2021 with alcoholic pancreatitis and alcohol intoxication, now in alcohol withdrawal.    * Alcohol withdrawal (HCC)- (present on admission)  Assessment & Plan  Also see problem of alcohol intoxication    Patient has history of alcohol withdrawal in the past including with seizures.  Last drink was 4/16/2021 per patient history.  Patient has gotten multiple doses of Ativan, this is concerning in the context that there may be some other underlying pathology such as her bipolar disorder.      Multivitamin, thiamine, folate supplement  CIWA protocol - discontinued 4/21, as last 3 CIWA scores were less than 8  --> Monitor how patient does off CIWA, can restart if needed  Neurochecks every 4 hours  Seizure, aspiration, fall precautions  Start aripiprazole low-dose, will uptitrate as necessary.  This will hopefully help unmask some of the symptoms of symptoms of alcohol withdrawal versus due to underlying bipolar disorder pathology  Monitor electrolytes closely  Gabapentin 300 mg TID    Hypertension- (present on admission)  Assessment & Plan  This is continuing to happen, part of alcohol withdrawal symptoms, patient has high catecholamine release    Clonidine patch    Impaired circulation of bilateral lower extremity  Assessment & Plan  Concerning as this is new upon physical exam on 4/19 (RLE), then migrated to LLE on 4/20. Bilateral arterial U/S did not show any acute abnormalities. CT angio performed at [facility] on 4/15/2021 did not show evidence of PE.    CTM, conservative tx for now    Pancytopenia  (HCC)  Assessment & Plan  On 4/18/2021, patient had white blood count of 2.0 and platelet of 26.  This is concerning, likely due to the stress intoxication from effects of alcohol.  Less likely some other malignant process such as leukemia.  Records showed at [facility] patient had WBC of 3.6, Plt 73, Hg 14.7. May also be concerning for heparin-induced thrombocytopenia versus ITP.  Patient may have lymphopenia due to previous Covid infection. Re    Patient has been placed on neutropenic precautions  Continue to monitor CBC labs    Pancreatitis, alcoholic, acute- (present on admission)  Assessment & Plan  Presented with severe abdominal pain, 10/10. Lipase of 211, around 3x upper limit of normal. Due to drinking 1 pint vodka per day. Also with transaminitis. This is especially concerning in light of the fact that the patient's condition is due to alcohol, but patient is self-medicating for the pain with drinking more alcohol. CT A[facility] from [facility] on 4/15 did not show any acute abnormalities. RUQ U/S showed hepatic steatosis.    -IVF resuscitation  -GI soft diet  -PPI omeprazole 40 mg qD  -Morphine IV PRN for pain    Alcohol intoxication with blood level over 0.3 (HCC)- (present on admission)  Assessment & Plan  Especially concerning in light of the fact that the patient is chronically drinking alcohol. Patient has h/o alcohol withdrawal in past including with seizures. Last drink was yesterday per history. Given ryan bag in ED.    MV, thiamine, folate supp  CIWA protocol - discontinued 4/21, as last 3 CIWA scores were less than 8  Neuro checks q4  Seizure, aspiration, fall precautions  Significant alcohol cessation counseling, medical team has been regularly counseling the patient every day since day of admission    Debility- (present on admission)  Assessment & Plan  Possible issues with ambulation and mobility given chronic use of alcohol, alcohol withdrawals.    -PT OT consult, appreciate  recs    Peripheral neuropathy- (present on admission)  Assessment & Plan  Gabapentin 300 mg TID (increased from home dose)    Macrocytic anemia- (present on admission)  Assessment & Plan  This is likely a chronic issue, patient likely has malnutrition due to chronic alcoholism.  Patient presented with macrocytosis. Records from [facility] showed MCV 99.5 (borderline macrocytosis). With all labs together, anemia likely due to alcoholism with malnutrition.    As the Hg is okay at this time, will not start on iron supp  Extensive alcohol cessation counseling    Hypomagnesemia- (present on admission)  Assessment & Plan  IMPROVING  This is concerning because this will result in promoting hypocalcemia as well as wasting potassium in the kidney.    Replete with goal of magnesium of 2    Lactic acidosis- (present on admission)  Assessment & Plan  Lactic acid levels have been uptrending, from 2.3-2.7-3.9 on 4/18/2021.  This is likely not due to fluid depletion, rather due to acute stress state and liver.  Patient is currently not septic.    Continue to monitor clinically  Continue IV fluid resuscitation  No need to continue trending  Serial abdominal exams  If patient becomes septic, will repeat trend    Hypocalcemia- (present on admission)  Assessment & Plan  This is very concerning.  Patient likely has a component of low magnesium causing low absorption of calcium.  Also may have component of low vitamin D.  May be part of malnutrition due to alcoholism    Follow-up on vitamin D level  calcium and vitamin D supplementation    COPD (chronic obstructive pulmonary disease) (HCC)- (present on admission)  Assessment & Plan  Chronic, unknown if completely worked up before. No PFTs on file.    Patient will needs PFTs as outpatient.  Consider new home O2 eval prior to d/c if needed    Cannabis abuse- (present on admission)  Assessment & Plan  Drug cessation counseling >5 minutes, medical team has been discussing this with patient  every day since day of admission    Abnormal QT interval present on electrocardiogram- (present on admission)  Assessment & Plan  Present on multiple EKGs.    Avoid medications that prolong QTc interval  Continue to monitor on telemetry      Alcoholic hepatitis- (present on admission)  Assessment & Plan  Transaminitis improving  With transaminitis, AST:ALT of 2:1.    -Continue to monitor, trend transaminases    Vomiting- (present on admission)  Assessment & Plan  IMPROVING  Sx started 4/19. Part of alcohol withdrawal.    Continue IVF  PRN antiemetics  Incentive spirometry, encourage to use 10x/hr while awake as possible    Bipolar affective disorder (HCC)- (present on admission)  Assessment & Plan  On prozac at home. This is not ideal as this can cause the patient to go into manic episode with an SSRI, however will need to continue this to prevent withdrawal.    Now on aripiprazole, hopefully will help sx of alcohol withdrawal as well    Hypokalemia- (present on admission)  Assessment & Plan  IMPROVING  Replete with goal of K=4      Quality Measures:  Code: Full  VTE: SCDs  Diet: GI soft  Disposition: Remain inpatient    Please note that this dictation was created using voice recognition software. I have worked with technical experts from [facility] to optimize the interface.  I have made every reasonable attempt to correct obvious errors, but there may be errors of grammar and possibly content that I did not discover before finalizing the note.

## 2021-04-22 NOTE — CARE PLAN
Problem: Communication  Goal: The ability to communicate needs accurately and effectively will improve  Outcome: PROGRESSING AS EXPECTED     Problem: Safety  Goal: Will remain free from injury  Outcome: PROGRESSING AS EXPECTED  Goal: Will remain free from falls  Outcome: PROGRESSING AS EXPECTED     Problem: Infection  Goal: Will remain free from infection  Outcome: PROGRESSING AS EXPECTED     Problem: Venous Thromboembolism (VTW)/Deep Vein Thrombosis (DVT) Prevention:  Goal: Patient will participate in Venous Thrombosis (VTE)/Deep Vein Thrombosis (DVT)Prevention Measures  Outcome: PROGRESSING AS EXPECTED     Problem: Bowel/Gastric:  Goal: Normal bowel function is maintained or improved  Outcome: PROGRESSING AS EXPECTED  Goal: Will not experience complications related to bowel motility  Outcome: PROGRESSING AS EXPECTED     Problem: Knowledge Deficit  Goal: Knowledge of disease process/condition, treatment plan, diagnostic tests, and medications will improve  Outcome: PROGRESSING AS EXPECTED  Goal: Knowledge of the prescribed therapeutic regimen will improve  Outcome: PROGRESSING AS EXPECTED     Problem: Discharge Barriers/Planning  Goal: Patient's continuum of care needs will be met  Outcome: PROGRESSING AS EXPECTED     Problem: Respiratory:  Goal: Respiratory status will improve  Outcome: PROGRESSING AS EXPECTED     Problem: Skin Integrity  Goal: Risk for impaired skin integrity will decrease  Outcome: PROGRESSING AS EXPECTED     Problem: Urinary Elimination:  Goal: Ability to reestablish a normal urinary elimination pattern will improve  Outcome: PROGRESSING AS EXPECTED     Problem: Psychosocial Needs:  Goal: Level of anxiety will decrease  Outcome: PROGRESSING AS EXPECTED     Problem: Pain Management  Goal: Pain level will decrease to patient's comfort goal  Outcome: PROGRESSING SLOWER THAN EXPECTED     Problem: Mobility  Goal: Risk for activity intolerance will decrease  Outcome: PROGRESSING SLOWER THAN  EXPECTED

## 2021-04-22 NOTE — THERAPY
Occupational Therapy   Initial Evaluation     Patient Name: Olya Youssef  Age:  50 y.o., Sex:  female  Medical Record #: [id number]  Today's Date: 4/22/2021     Precautions  Precautions: Fall Risk  Comments: unsteady    Assessment  Patient is 50 y.o. female with a diagnosis of ETOH withdraw.  Pt is at or near his/her functional baseline. Pt with no further skilled OT needs in the acute care setting at this time.     Plan     Occupational Therapy for Evaluation only  Discharge Recommendations: (P) Anticipate that the patient will have no further occupational therapy needs after discharge from the hospital        04/22/21 0719   Prior Living Situation   Prior Services None   Housing / Facility 1 Story Apartment / Condo   Steps Into Home 0   Steps In Home 0   Bathroom Set up Walk In Shower   Lives with - Patient's Self Care Capacity Friends   Prior Level of ADL Function   Self Feeding Independent   Grooming / Hygiene Independent   Bathing Independent   Dressing Independent   Toileting Independent   ADL Assessment   Grooming Supervision   Upper Body Dressing Supervision   Lower Body Dressing Supervision   Toileting Supervision   Functional Mobility   Sit to Stand Supervised   Bed, Chair, Wheelchair Transfer Supervised   Session Information   Priority 0

## 2021-04-22 NOTE — FACE TO FACE
Face to Face Note  -  Durable Medical Equipment    Breezy Nicole M.D. - NPI: [id number]  I certify that this patient is under my care and that they had a durable medical equipment(DME)face to face encounter by myself that meets the physician DME face-to-face encounter requirements with this patient on:    Date of encounter:   Patient:                    MRN:                       YOB: 2023  Olya Youssef  [id number]  1970     The encounter with the patient was in whole, or in part, for the following medical condition, which is the primary reason for durable medical equipment:  COPD and Other - weakness    I certify that, based on my findings, the following durable medical equipment is medically necessary:  Walkers and Other DME Equipment - Front wheel walker.    HOME O2 Saturation Measurements:(Values must be present for Home Oxygen orders)  Room air sat at rest: 97  Room air sat with amb: 93  With liters of O2: 2.5, O2 sat at rest with O2: 98  With Liters of O2: 3, O2 sat with amb with O2 : 100  Is the patient mobile?: Yes    My Clinical findings support the need for the above equipment due to:  Abnormal Gait, Other - weakness    Supporting Symptoms: weakness    If patient feels more short of breath, they can go up to 6 liters per minute and contact healthcare provider.

## 2021-04-22 NOTE — DISCHARGE SUMMARY
Discharge Summary    Date of Admission: 4/17/2021  Date of Discharge: 4/22/2021  Discharging Attending: Zohaib Ayala M.D.   Discharging Senior Resident: Dr. Nicole  Discharging Intern: Dr. Friend    CHIEF COMPLAINT ON ADMISSION  Chief Complaint   Patient presents with   • Abdominal Pain     Pt states she has 10/10 upper abdominal pain and was diagnosed with acute pancreatitis at [facility] two days ago and was discharged. Pt also stated she has been diagnosed with pancreatitis 5 times previously.    • Alcohol Intoxication     Per EMS they were called to pt's residense by pt's roommate who stated she is unable to care for herself due to her drinking. She has been unable to make it to the bathroom and has not been eating for the past few days. Pt states she drinks approximatley one pint of Vodka everyday. States she has has tried to quit drinking in the past but was unable to. Pt is A&Ox4, speech is slurred, but answers questions and follows commands appropriately.        Reason for Admission  Alcohol withdrawal.    Admission Date  4/17/2021    CODE STATUS  Full Code    HPI & HOSPITAL COURSE  This is a 50 y.o. female with past medical history of recurrent alcoholic pancreatitis, alcohol abuse, history of alcohol withdrawals including seizures, bipolar disorder, methamphetamine abuse, suicide attempt and chronic obstructive pulmonary disease  (no PFTs on file, on 2 L of oxygen at home). He presented to the ED on 04/17 with a chief complaint of intractable nausea and vomiting and abdominal discomfort. She was admitted to [facility] some days ago before the presentation for acute pancreatitis. After discharge, she started drinking alcohol and started having intractable nausea and vomiting and abdominal pain with chills. She drinks 1 pint of vodka every day.  In the ED, she was tachycardic. Labs showed thrombocytopenia, elevated transaminases and elevated lipase of 211. Therefore, she was admitted for further  management.    She was started on IV fluid hydration after bolus. She was started on CIWA protocol/ IV Ativan, gabapentin as well as multivitamin and thiamine replacement after rally bag. She had epigastric abdominal pain on admission with some tenderness. There were no signs of acute abdomen. Nausea and pain were controlled by appropriate medications. She remained afebrile and except tachycardia she was hemodynamically stable.  Electrolytes were monitored and repleted. CT scan of the abdomen report from [place] did not show any complication of pancreatitis. Right upper quadrant ultrasound showed hepatic steatosis but negative for any gallbladder pathology. Tachycardia and hypertension were controlled with clonidine patch. She was started on clear liquid diet on admission which later on advanced to regular diet which he tolerated. She did not require any Ativan in last 24 hours in the hospital before the discharge. Nausea, vomiting and abdominal pain resolved.  She was counseled extensively to quit drinking alcohol.  She agreed. Her weakness also improved but she remained weak during ambulation, continue physical therapy at home was recommended due to weakness but patient refused home health. She was provided a walker before the discharge.    Therefore, she is discharged in good and stable condition to home with close outpatient follow-up.    The patient met 2-midnight criteria for an inpatient stay at the time of discharge.       PHYSICAL EXAM ON DISCHARGE  Temp:  [36.1 °C (96.9 °F)-37.2 °C (99 °F)] 36.3 °C (97.4 °F)  Pulse:  [] 108  Resp:  [16-18] 18  BP: (120-142)/() 131/96  SpO2:  [94 %-98 %] 98 %       Physical Exam  Constitutional:       General: She is not in acute distress.     Appearance: Normal appearance. She is not ill-appearing.   HENT:      Head: Normocephalic and atraumatic.      Nose: Nose normal. No congestion or rhinorrhea.      Mouth/Throat:      Mouth: Mucous membranes are moist.       Pharynx: Oropharynx is clear. No oropharyngeal exudate or posterior oropharyngeal erythema.   Eyes:      Conjunctiva/sclera: Conjunctivae normal.      Pupils: Pupils are equal, round, and reactive to light.   Cardiovascular:      Rate and Rhythm: Normal rate and regular rhythm.      Pulses: Normal pulses.      Heart sounds: Normal heart sounds. No murmur.   Pulmonary:      Effort: Pulmonary effort is normal. No respiratory distress.      Breath sounds: Normal breath sounds. No wheezing or rales.   Abdominal:      General: Abdomen is flat. Bowel sounds are normal. There is no distension.      Palpations: Abdomen is soft.      Tenderness: There is no abdominal tenderness. There is no guarding.   Musculoskeletal:         General: No swelling. Normal range of motion.      Cervical back: No tenderness.      Right lower leg: No edema.      Left lower leg: No edema.   Skin:     General: Skin is warm.      Coloration: Skin is not jaundiced.      Findings: No bruising.   Neurological:      General: No focal deficit present.      Mental Status: She is alert and oriented to person, place, and time.   Psychiatric:         Mood and Affect: Mood normal.         Behavior: Behavior normal.       Discharge Date  4/22/2021    FOLLOW UP ITEMS POST DISCHARGE  -Follow-up with primary care physician for chronic medical problems.      DISCHARGE DIAGNOSES  Principal Problem (Resolved):    Alcohol withdrawal (HCC) POA: Yes  Active Problems:    * No active hospital problems. *  Resolved Problems:    Alcohol intoxication with blood level over 0.3 (HCC) POA: Yes    Pancreatitis, alcoholic, acute POA: Yes    Pancytopenia (HCC) POA: Clinically Undetermined    Impaired circulation of bilateral lower extremity POA: No    Hypertension POA: Yes    Vomiting POA: Yes    Alcoholic hepatitis POA: Yes    Abnormal QT interval present on electrocardiogram POA: Yes    Cannabis abuse (Chronic) POA: Yes    COPD (chronic obstructive pulmonary disease)  (HCC) (Chronic) POA: Yes    Hypocalcemia POA: Yes    Lactic acidosis POA: Yes    Hypomagnesemia POA: Yes    Macrocytic anemia POA: Yes    Peripheral neuropathy (Chronic) POA: Yes    Debility POA: Yes    Hypokalemia POA: Yes    Bipolar affective disorder (HCC) POA: Yes      FOLLOW UP  No future appointments.  MARLO Marcial.  [address] [facility] [address]  [phone]    In 1 week        MEDICATIONS ON DISCHARGE     Medication List      START taking these medications      Instructions   acetaminophen 500 MG Tabs  Commonly known as: TYLENOL   Take 1 tablet by mouth every 6 hours as needed for Moderate Pain for up to 5 days.  Dose: 500 mg     albuterol 108 (90 Base) MCG/ACT Aers inhalation aerosol   Inhale 2 Puffs every 6 hours as needed for Shortness of Breath for up to 30 days.  Dose: 2 Puff     omeprazole 40 MG delayed-release capsule  Start taking on: April 23, 2021  Commonly known as: PRILOSEC   Take 1 capsule by mouth every day  Dose: 40 mg        CONTINUE taking these medications      Instructions   FLUoxetine 10 MG Caps  Commonly known as: PROZAC   Take 30 mg by mouth every day.  Dose: 30 mg     gabapentin 300 MG Caps  Commonly known as: NEURONTIN   Take 300 mg by mouth every day.  Dose: 300 mg            Allergies  Allergies   Allergen Reactions   • Bactrim Hives   • Lamotrigine [Lamictal] Hives   • Quetiapine Fumarate Hives     Extrapyramidal Symptoms   • Keflex Hives       DIET  Orders Placed This Encounter   Procedures   • Diet Order Diet: Low Fiber(GI Soft)     Standing Status:   Standing     Number of Occurrences:   1     Order Specific Question:   Diet:     Answer:   Low Fiber(GI Soft) [2]       ACTIVITY  As tolerated.  Weight bearing as tolerated    CONSULTATIONS  None    PROCEDURES  None

## 2021-04-22 NOTE — PROGRESS NOTES
Follow up made with Agustín [place] with [facility] Romeo will take another 300 mins.  will call RN when downstairs.

## 2021-04-22 NOTE — PROGRESS NOTES
Dr. Yan notified that patient is requesting additional pain and nausea medication.  See new orders.

## 2021-04-22 NOTE — CARE PLAN
Problem: Communication  Goal: The ability to communicate needs accurately and effectively will improve  Outcome: PROGRESSING AS EXPECTED     Problem: Safety  Goal: Will remain free from injury  Outcome: PROGRESSING AS EXPECTED  Goal: Will remain free from falls  Outcome: PROGRESSING AS EXPECTED     Problem: Pain Management  Goal: Pain level will decrease to patient's comfort goal  Outcome: PROGRESSING AS EXPECTED     Problem: Respiratory:  Goal: Respiratory status will improve  Outcome: PROGRESSING AS EXPECTED     Problem: Psychosocial Needs:  Goal: Level of anxiety will decrease  Outcome: PROGRESSING AS EXPECTED     Problem: Mobility  Goal: Risk for activity intolerance will decrease  Outcome: PROGRESSING AS EXPECTED

## 2021-04-22 NOTE — DISCHARGE PLANNING
Received Choice form at 1140  Agency/Facility Name: [facility]  Referral sent per Choice form @ 1142    RN CM informed

## 2021-04-23 NOTE — PROGRESS NOTES
Patient discharged home in Cab. [employer] cab was arranged via [facility] insurance. Discharge instructions given to the patient, pt verbalized understanding. All belongings sent with the patient including the purse and cell phone. Walker and O2 tank sent with the patient. Address confirmed with patinet and , patient confirmed her home address with the . Patient was instructed to get the omeprazole and tylenol OTC, MD notified. Albuterol was delivered to the bedside and sent with the patient. Patient was escorted downstairs via wheelchair.

## 2021-04-27 ENCOUNTER — PATIENT OUTREACH (OUTPATIENT)
Dept: HEALTH INFORMATION MANAGEMENT | Facility: OTHER | Age: 51
End: 2021-04-27

## 2021-04-27 NOTE — PROGRESS NOTES
MCKAYW Silvestre attempted to call the patient and introduce Community Care Management. Patient has a voicemail box that has not been set up.     CHW will attempt again.

## 2021-05-03 LAB — EKG IMPRESSION: NORMAL

## 2021-05-03 NOTE — PROGRESS NOTES
CHW Silvestre attempted to call the patient and introduce Community Care Management. Patient has a voicemail box that has not been set up.     CHW will remove the patient from CCM caseload due to being unable to contact.

## 2021-05-19 ENCOUNTER — HOSPITAL ENCOUNTER (EMERGENCY)
Facility: MEDICAL CENTER | Age: 51
End: 2021-05-19
Attending: EMERGENCY MEDICINE
Payer: MEDICAID

## 2021-05-19 ENCOUNTER — APPOINTMENT (OUTPATIENT)
Dept: RADIOLOGY | Facility: MEDICAL CENTER | Age: 51
End: 2021-05-19
Attending: EMERGENCY MEDICINE
Payer: MEDICAID

## 2021-05-19 VITALS
SYSTOLIC BLOOD PRESSURE: 115 MMHG | DIASTOLIC BLOOD PRESSURE: 72 MMHG | HEIGHT: 60 IN | HEART RATE: 103 BPM | TEMPERATURE: 97.3 F | WEIGHT: 135 LBS | BODY MASS INDEX: 26.5 KG/M2 | OXYGEN SATURATION: 94 % | RESPIRATION RATE: 17 BRPM

## 2021-05-19 DIAGNOSIS — F10.929 ALCOHOLIC INTOXICATION WITH COMPLICATION (HCC): ICD-10-CM

## 2021-05-19 DIAGNOSIS — K70.9 ALCOHOLIC LIVER DISEASE (HCC): ICD-10-CM

## 2021-05-19 DIAGNOSIS — R74.8 ELEVATED LIPASE: ICD-10-CM

## 2021-05-19 DIAGNOSIS — E87.6 HYPOKALEMIA: ICD-10-CM

## 2021-05-19 LAB
ALBUMIN SERPL BCP-MCNC: 4.2 G/DL (ref 3.2–4.9)
ALBUMIN/GLOB SERPL: 1.2 G/DL
ALP SERPL-CCNC: 202 U/L (ref 30–99)
ALT SERPL-CCNC: 181 U/L (ref 2–50)
ANION GAP SERPL CALC-SCNC: 19 MMOL/L (ref 7–16)
AST SERPL-CCNC: 460 U/L (ref 12–45)
BASOPHILS # BLD AUTO: 1.6 % (ref 0–1.8)
BASOPHILS # BLD: 0.05 K/UL (ref 0–0.12)
BILIRUB SERPL-MCNC: 0.9 MG/DL (ref 0.1–1.5)
BLOOD CULTURE HOLD CXBCH: NORMAL
BUN SERPL-MCNC: 6 MG/DL (ref 8–22)
CALCIUM SERPL-MCNC: 8.8 MG/DL (ref 8.5–10.5)
CHLORIDE SERPL-SCNC: 99 MMOL/L (ref 96–112)
CO2 SERPL-SCNC: 20 MMOL/L (ref 20–33)
CREAT SERPL-MCNC: 0.33 MG/DL (ref 0.5–1.4)
EKG IMPRESSION: NORMAL
EOSINOPHIL # BLD AUTO: 0.01 K/UL (ref 0–0.51)
EOSINOPHIL NFR BLD: 0.3 % (ref 0–6.9)
ERYTHROCYTE [DISTWIDTH] IN BLOOD BY AUTOMATED COUNT: 47.9 FL (ref 35.9–50)
ETHANOL BLD-MCNC: 420.3 MG/DL (ref 0–10)
GLOBULIN SER CALC-MCNC: 3.4 G/DL (ref 1.9–3.5)
GLUCOSE SERPL-MCNC: 126 MG/DL (ref 65–99)
HCT VFR BLD AUTO: 42 % (ref 37–47)
HGB BLD-MCNC: 13.9 G/DL (ref 12–16)
IMM GRANULOCYTES # BLD AUTO: 0.03 K/UL (ref 0–0.11)
IMM GRANULOCYTES NFR BLD AUTO: 1 % (ref 0–0.9)
LIPASE SERPL-CCNC: 170 U/L (ref 11–82)
LYMPHOCYTES # BLD AUTO: 1.51 K/UL (ref 1–4.8)
LYMPHOCYTES NFR BLD: 49.3 % (ref 22–41)
MCH RBC QN AUTO: 33.4 PG (ref 27–33)
MCHC RBC AUTO-ENTMCNC: 33.1 G/DL (ref 33.6–35)
MCV RBC AUTO: 101 FL (ref 81.4–97.8)
MONOCYTES # BLD AUTO: 0.45 K/UL (ref 0–0.85)
MONOCYTES NFR BLD AUTO: 14.7 % (ref 0–13.4)
NEUTROPHILS # BLD AUTO: 1.01 K/UL (ref 2–7.15)
NEUTROPHILS NFR BLD: 33.1 % (ref 44–72)
NRBC # BLD AUTO: 0 K/UL
NRBC BLD-RTO: 0 /100 WBC
PLATELET # BLD AUTO: 89 K/UL (ref 164–446)
PMV BLD AUTO: 9.2 FL (ref 9–12.9)
POTASSIUM SERPL-SCNC: 3.3 MMOL/L (ref 3.6–5.5)
PROT SERPL-MCNC: 7.6 G/DL (ref 6–8.2)
RBC # BLD AUTO: 4.16 M/UL (ref 4.2–5.4)
SODIUM SERPL-SCNC: 138 MMOL/L (ref 135–145)
WBC # BLD AUTO: 3.1 K/UL (ref 4.8–10.8)

## 2021-05-19 PROCEDURE — 93005 ELECTROCARDIOGRAM TRACING: CPT | Performed by: EMERGENCY MEDICINE

## 2021-05-19 PROCEDURE — 82077 ASSAY SPEC XCP UR&BREATH IA: CPT

## 2021-05-19 PROCEDURE — 700111 HCHG RX REV CODE 636 W/ 250 OVERRIDE (IP): Performed by: EMERGENCY MEDICINE

## 2021-05-19 PROCEDURE — 96365 THER/PROPH/DIAG IV INF INIT: CPT

## 2021-05-19 PROCEDURE — 96375 TX/PRO/DX INJ NEW DRUG ADDON: CPT

## 2021-05-19 PROCEDURE — 93005 ELECTROCARDIOGRAM TRACING: CPT

## 2021-05-19 PROCEDURE — 83690 ASSAY OF LIPASE: CPT

## 2021-05-19 PROCEDURE — A9270 NON-COVERED ITEM OR SERVICE: HCPCS | Performed by: EMERGENCY MEDICINE

## 2021-05-19 PROCEDURE — 71045 X-RAY EXAM CHEST 1 VIEW: CPT

## 2021-05-19 PROCEDURE — 99285 EMERGENCY DEPT VISIT HI MDM: CPT

## 2021-05-19 PROCEDURE — 76705 ECHO EXAM OF ABDOMEN: CPT

## 2021-05-19 PROCEDURE — 80053 COMPREHEN METABOLIC PANEL: CPT

## 2021-05-19 PROCEDURE — 36415 COLL VENOUS BLD VENIPUNCTURE: CPT

## 2021-05-19 PROCEDURE — 700101 HCHG RX REV CODE 250: Performed by: EMERGENCY MEDICINE

## 2021-05-19 PROCEDURE — 700102 HCHG RX REV CODE 250 W/ 637 OVERRIDE(OP): Performed by: EMERGENCY MEDICINE

## 2021-05-19 PROCEDURE — 85025 COMPLETE CBC W/AUTO DIFF WBC: CPT

## 2021-05-19 PROCEDURE — 94640 AIRWAY INHALATION TREATMENT: CPT

## 2021-05-19 PROCEDURE — 700105 HCHG RX REV CODE 258: Performed by: EMERGENCY MEDICINE

## 2021-05-19 RX ORDER — DIAZEPAM 5 MG/1
5 TABLET ORAL EVERY 6 HOURS PRN
Status: DISCONTINUED | OUTPATIENT
Start: 2021-05-19 | End: 2021-05-20 | Stop reason: HOSPADM

## 2021-05-19 RX ORDER — MAGNESIUM SULFATE 1 G/100ML
1 INJECTION INTRAVENOUS ONCE
Status: COMPLETED | OUTPATIENT
Start: 2021-05-19 | End: 2021-05-19

## 2021-05-19 RX ORDER — FAMOTIDINE 20 MG/1
20 TABLET, FILM COATED ORAL ONCE
Status: COMPLETED | OUTPATIENT
Start: 2021-05-19 | End: 2021-05-19

## 2021-05-19 RX ORDER — ONDANSETRON 4 MG/1
4 TABLET, ORALLY DISINTEGRATING ORAL EVERY 8 HOURS PRN
Qty: 10 TABLET | Refills: 0 | Status: SHIPPED | OUTPATIENT
Start: 2021-05-19 | End: 2021-10-05

## 2021-05-19 RX ORDER — SODIUM CHLORIDE, SODIUM LACTATE, POTASSIUM CHLORIDE, CALCIUM CHLORIDE 600; 310; 30; 20 MG/100ML; MG/100ML; MG/100ML; MG/100ML
1000 INJECTION, SOLUTION INTRAVENOUS ONCE
Status: COMPLETED | OUTPATIENT
Start: 2021-05-19 | End: 2021-05-19

## 2021-05-19 RX ORDER — ONDANSETRON 2 MG/ML
4 INJECTION INTRAMUSCULAR; INTRAVENOUS ONCE
Status: COMPLETED | OUTPATIENT
Start: 2021-05-19 | End: 2021-05-19

## 2021-05-19 RX ORDER — POTASSIUM CHLORIDE 20 MEQ/1
40 TABLET, EXTENDED RELEASE ORAL ONCE
Status: COMPLETED | OUTPATIENT
Start: 2021-05-19 | End: 2021-05-19

## 2021-05-19 RX ORDER — IPRATROPIUM BROMIDE AND ALBUTEROL SULFATE 2.5; .5 MG/3ML; MG/3ML
3 SOLUTION RESPIRATORY (INHALATION)
Status: COMPLETED | OUTPATIENT
Start: 2021-05-19 | End: 2021-05-19

## 2021-05-19 RX ORDER — FAMOTIDINE 20 MG/1
20 TABLET, FILM COATED ORAL DAILY
Qty: 30 TABLET | Refills: 0 | Status: SHIPPED | OUTPATIENT
Start: 2021-05-19 | End: 2021-10-05

## 2021-05-19 RX ORDER — KETOROLAC TROMETHAMINE 30 MG/ML
15 INJECTION, SOLUTION INTRAMUSCULAR; INTRAVENOUS ONCE
Status: COMPLETED | OUTPATIENT
Start: 2021-05-19 | End: 2021-05-19

## 2021-05-19 RX ADMIN — IPRATROPIUM BROMIDE AND ALBUTEROL SULFATE 3 ML: .5; 2.5 SOLUTION RESPIRATORY (INHALATION) at 19:01

## 2021-05-19 RX ADMIN — THERA TABS 1 TABLET: TAB at 18:49

## 2021-05-19 RX ADMIN — SODIUM CHLORIDE, POTASSIUM CHLORIDE, SODIUM LACTATE AND CALCIUM CHLORIDE 1000 ML: 600; 310; 30; 20 INJECTION, SOLUTION INTRAVENOUS at 17:56

## 2021-05-19 RX ADMIN — LIDOCAINE HYDROCHLORIDE 30 ML: 20 SOLUTION OROPHARYNGEAL at 17:59

## 2021-05-19 RX ADMIN — FAMOTIDINE 20 MG: 20 TABLET ORAL at 17:58

## 2021-05-19 RX ADMIN — DIAZEPAM 5 MG: 5 TABLET ORAL at 22:05

## 2021-05-19 RX ADMIN — POTASSIUM CHLORIDE 40 MEQ: 1500 TABLET, EXTENDED RELEASE ORAL at 18:50

## 2021-05-19 RX ADMIN — ONDANSETRON 4 MG: 2 INJECTION INTRAMUSCULAR; INTRAVENOUS at 17:52

## 2021-05-19 RX ADMIN — MAGNESIUM SULFATE 1 G: 1 INJECTION INTRAVENOUS at 19:00

## 2021-05-19 RX ADMIN — KETOROLAC TROMETHAMINE 15 MG: 30 INJECTION, SOLUTION INTRAMUSCULAR; INTRAVENOUS at 17:55

## 2021-05-19 ASSESSMENT — COPD QUESTIONNAIRES
HAVE YOU SMOKED AT LEAST 100 CIGARETTES IN YOUR ENTIRE LIFE: NO/DON'T KNOW
DURING THE PAST 4 WEEKS HOW MUCH DID YOU FEEL SHORT OF BREATH: SOME OF THE TIME
COPD SCREENING SCORE: 3
DO YOU EVER COUGH UP ANY MUCUS OR PHLEGM?: NO/ONLY WITH OCCASIONAL COLDS OR INFECTIONS

## 2021-05-19 ASSESSMENT — FIBROSIS 4 INDEX: FIB4 SCORE: 7.64

## 2021-05-19 ASSESSMENT — LIFESTYLE VARIABLES: EVER_SMOKED: NEVER

## 2021-05-20 NOTE — ED TRIAGE NOTES
Pt presents to ED via EMS with complaint of shortness of air, and abdominal pain. States recent dx of pneumonia as well as recent dx of pancreatitis. States N/V/D, Fever as high as 102 at home, chills, muscle aches, fatigue. States she has required O2 3Lpm/NC since being diagnosed with Covid several months ago. Speaking full and complete sentences without signs of distress.

## 2021-05-20 NOTE — ED PROVIDER NOTES
"ED Provider Note    Scribed for Clif Rose M.D. by Sandra Martinez. 5/19/2021,  5:31 PM.    Means of Arrival: EMS  History obtained from: Patient  History limited by: None    CHIEF COMPLAINT  Chief Complaint   Patient presents with    Shortness of Breath     hx covid, hx recent pneumonia,      HPI  Olya Youssef is a 50 y.o. female who presents to the Emergency Department via EMS for evaluation of shortness of breath. The patient reports right flank pain, nausea, vomiting, diarrhea, headache, fatigue, and cough with green sputum. The patient reports a fever with a Tmax of 102 °F. The patient was admitted last month for pancreatitis. She reports drinking 1 pint of vodka daily since discharge. She reports the vodka \"makes the pain go away.\" Patient states she has required 3L oxygen nasal canula since being diagnosed with COVID several months ago. The patient denies associated dysuria. She denies being seen at any other hospitals since discharge.     REVIEW OF SYSTEMS  CONSTITUTIONAL: Fever and fatigue.   RESPIRATORY: Shortness of breath and cough.   GASTROINTESTINAL: Right flank pain, nausea, vomiting, and diarrhea.   GENITOURINARY: No dysuria.  NEUROLOGIC:  Headache.  See HPI for further details.   All other systems are negative.     PAST MEDICAL HISTORY  Past Medical History:   Diagnosis Date    Alcohol abuse     Alcoholism (HCC)     Anxiety     Backpain     Bipolar disorder, unspecified (HCC)     Bronchitis     Drug abuse (HCC)     meth, crack cocaine, THC    Hepatitis, alcoholic     Hypertension     controlled    Indigestion     Infectious disease MRSA    Liver disease     pancreatitis    Pancreatitis chronic     Flare-up    Pneumonia     Psychiatric disorder     suicidal in past    Renal disorder     kidney infection 1991    Seizure (HCC)     7/8/2011    Seizure disorder (HCC)     Unspecified hemorrhagic conditions     Unspecified urinary incontinence      FAMILY HISTORY  Family History   Problem " Relation Age of Onset    Lung Disease Mother     Cancer Mother      SOCIAL HISTORY   reports that she has never smoked. She has never used smokeless tobacco. She reports current alcohol use. She reports current drug use.    SURGICAL HISTORY  Past Surgical History:   Procedure Laterality Date    GASTROSCOPY  2015    Performed by Kevin Rendon M.D. at SURGERY [facility]S     CURRENT MEDICATIONS  Home Medications    **Home medications have not yet been reviewed for this encounter**       ALLERGIES  Allergies   Allergen Reactions    Bactrim Hives    Lamotrigine [Lamictal] Hives    Quetiapine Fumarate Hives     Extrapyramidal Symptoms    Keflex Hives     PHYSICAL EXAM  VITAL SIGNS: /75   Pulse (!) 102   Temp 35.9 °C (96.7 °F) (Oral)   Resp (!) 22   Ht 1.524 m (5')   Wt 61.2 kg (135 lb)   SpO2 93%   BMI 26.37 kg/m²    Gen: Alert, no acute distress.  HEENT: ATNC, dry mucous membranes.   Eyes: PERRL, EOMI, normal conjunctiva.   Neck: trachea midline.  Resp: no respiratory distress, lungs clear, no wheezes or crackles, poor air exchange.   CV: No JVD, tachycardic, regular rhythm.  Abd: non-distended, soft, tenderness to palpation of right upper quadrant, otherwise no tenderness.  Ext: No deformities, no pedal edema.  Psych: normal mood.  Neuro: speech fluent.    DIAGNOSTIC STUDIES / PROCEDURES     EKG  Results for orders placed or performed during the hospital encounter of 21   EKG (NOW)   Result Value Ref Range    Report       [facility] Emergency Dept.    Test Date:  2021  Pt Name:    MANAS DELGADO              Department: ER  MRN:        [id number]                      Room:       [facility]  Gender:     Female                       Technician: LENO  :        1970                   Requested By:ER TRIAGE PROTOCOL  Order #:    [id number]                    Reading MD: Clif Rose    Measurements  Intervals                                Axis  Rate:       104                           P:          49  RI:         128                          QRS:        9  QRSD:       90                           T:          241  QT:         364  QTc:        479    Interpretive Statements  SINUS TACHYCARDIA  Compared to ECG 04/18/2021 05:54:13  Myocardial infarct finding no longer present  Prolonged QT interval no longer present  Electronically Signed On 5- 19:47:01 PDT by Clif bookjam        LABS  Labs Reviewed   CBC WITH DIFFERENTIAL - Abnormal; Notable for the following components:       Result Value    WBC 3.1 (*)     RBC 4.16 (*)     .0 (*)     MCH 33.4 (*)     MCHC 33.1 (*)     Platelet Count 89 (*)     Neutrophils-Polys 33.10 (*)     Lymphocytes 49.30 (*)     Monocytes 14.70 (*)     Immature Granulocytes 1.00 (*)     Neutrophils (Absolute) 1.01 (*)     All other components within normal limits   COMP METABOLIC PANEL - Abnormal; Notable for the following components:    Potassium 3.3 (*)     Anion Gap 19.0 (*)     Glucose 126 (*)     Bun 6 (*)     Creatinine 0.33 (*)     AST(SGOT) 460 (*)     ALT(SGPT) 181 (*)     Alkaline Phosphatase 202 (*)     All other components within normal limits   LIPASE - Abnormal; Notable for the following components:    Lipase 170 (*)     All other components within normal limits   DIAGNOSTIC ALCOHOL - Abnormal; Notable for the following components:    Diagnostic Alcohol 420.3 (*)     All other components within normal limits   BLOOD CULTURE,HOLD   ESTIMATED GFR   URINALYSIS     All labs reviewed by me.    RADIOLOGY  US-RUQ   Final Result      1. Echogenic liver, most commonly hepatic steatosis.      2. No gallstone.         DX-CHEST-PORTABLE (1 VIEW)   Final Result         1. Minimal left basilar atelectasis, similar to prior No new pulmonary infiltrates or consolidations are noted.        The radiologist’s interpretation of all radiology studies have been reviewed by me.    COURSE & MEDICAL DECISION MAKING  Pertinent Labs & Imaging studies reviewed.  (See chart for details)    5:31 PM Patient seen and examined at bedside. Patient will be treated with Pepcid 20 mg tablet, GI Cocktail 30 mL oral suspension, Zofran 4 mg injection, LR infusion, and Toradol 15 mg injection for her symptoms.     6:06 PM - Patient will be treated with Duoneb solution.    6:24 PM - Patient will be treated with Theragran multivitamin, potassium chloride SA 40 mEq, and magnesium sulfate 1 g in D5W IVPB premix.     7:43 PM - Updated patient on her lab results.     10:01 PM - Patient will be treated with Valium 5 mg tablet.    10:03 PM - Counseled the patient on alcohol use cessation and recommended alcohol detox treatment at this time. The patient was given an opportunity to ask question. I discussed plan of discharge and strict return precautions for any new or worsening symptoms. The patient verbalizes agreement and understands.    HYDRATION: Based on the patient's presentation of Dehydration and Tachycardia the patient was given IV fluids. IV Hydration was used because oral hydration was not adequate alone. Upon recheck following hydration, the patient was improved.    Medical Decision Making:  Patient presents with symptoms concerning for recurrent pancreatitis.  The patient continues to drink alcohol.  She was counseled on alcohol cessation.  Labs and ultrasound demonstrate improving lipase from prior hospitalization, patient was tolerating oral intake in the emergency department.  She does have evidence of fatty liver due to alcohol and alcoholic liver injury.  She was extensively counseled on the risks of this proceeding to cirrhosis, as well as the complications of that including hepatocellular carcinoma, ascites, serial paracenteses and ultimately an early demise.  She was given resources for alcohol withdrawal detox facilities and given a dose of diazepam orally for her alcohol withdrawal symptoms at the time of discharge.  She was given potassium and magnesium supplementation  for hypokalemia.  Patient is clinically sober at the time of discharge.    I wore a mask and eye protection throughout the encounter.    The patient will return for new or worsening symptoms and is stable at the time of discharge.    The patient is referred to a primary physician for blood pressure management, diabetic screening, and for all other preventative health concerns.    DISPOSITION:  Patient will be discharged home in stable condition.    FOLLOW UP:  SALONI Marcial  [address] [address]  [phone]    Schedule an appointment as soon as possible for a visit       [facility], Emergency Dept  [address] [facility] [id number] [phone]    If symptoms worsen    OUTPATIENT MEDICATIONS:  Discharge Medication List as of 5/19/2021  9:57 PM        START taking these medications    Details   famotidine (PEPCID) 20 MG Tab Take 1 tablet by mouth every day., Disp-30 tablet, R-0, Normal      ondansetron (ZOFRAN ODT) 4 MG TABLET DISPERSIBLE Take 1 tablet by mouth every 8 hours as needed for Nausea., Disp-10 tablet, R-0, Normal           FINAL IMPRESSION  1. Alcoholic intoxication with complication (HCC)    2. Elevated lipase    3. Alcoholic liver disease (HCC)    4. Hypokalemia      ISandra (Neal), am scribing for, and in the presence of, Clif Rose M.D..    Electronically signed by: Sandra Hale), 5/19/2021    IClif M.D. personally performed the services described in this documentation, as scribed by Sandra Martinez in my presence, and it is both accurate and complete.    The note accurately reflects work and decisions made by me.  Clif Rose M.D.  5/19/2021  11:20 PM    C.    This dictation was created using voice recognition software. The accuracy of the dictation is limited to the abilities of the software. I expect there may be some errors of grammar and possibly content. The nursing notes were  reviewed and certain aspects of this information were incorporated into this note.

## 2021-05-20 NOTE — DISCHARGE INSTRUCTIONS
You were seen in the ED today for alcohol intoxication. While here you were examined and allowed to sober up while we monitored you. Your physical exam was reassuring. If you continue to drink you will be placing your health at risk. At this time you are sober and can return home.    Your labs show improving lipase from your prior hospitalization.  Your ultrasound shows that your liver is being damaged by your alcohol use.  We strongly recommend inpatient alcohol detox.    You were found to have a low potassium level.  At this time, it does not appear dangerous, however you should eat fruits and vegetables that are high in potassium.  This includes bananas, oranges, avocados, cooked beans, baked potatoes among others.  You can do an Internet search to find foods that are high in potassium.        Please be alert for signs of alcohol withdrawal, including shaking hands, agitation, feeling pins and needles, nausea, vomiting, headache. Please seek medical care if you develop these. Alcohol withdrawal can be dangerous and even lead to seizures or death if untreated.

## 2021-05-24 ENCOUNTER — APPOINTMENT (OUTPATIENT)
Dept: RADIOLOGY | Facility: MEDICAL CENTER | Age: 51
DRG: 896 | End: 2021-05-24
Attending: EMERGENCY MEDICINE
Payer: MEDICAID

## 2021-05-24 ENCOUNTER — HOSPITAL ENCOUNTER (INPATIENT)
Facility: MEDICAL CENTER | Age: 51
LOS: 24 days | DRG: 896 | End: 2021-06-17
Attending: EMERGENCY MEDICINE | Admitting: STUDENT IN AN ORGANIZED HEALTH CARE EDUCATION/TRAINING PROGRAM
Payer: MEDICAID

## 2021-05-24 DIAGNOSIS — R53.81 DEBILITY: ICD-10-CM

## 2021-05-24 DIAGNOSIS — J43.9 PULMONARY EMPHYSEMA, UNSPECIFIED EMPHYSEMA TYPE (HCC): ICD-10-CM

## 2021-05-24 DIAGNOSIS — R45.851 SUICIDAL IDEATION: ICD-10-CM

## 2021-05-24 DIAGNOSIS — K86.0 ALCOHOL-INDUCED CHRONIC PANCREATITIS (HCC): ICD-10-CM

## 2021-05-24 DIAGNOSIS — G89.29 CHRONIC LOW BACK PAIN WITHOUT SCIATICA, UNSPECIFIED BACK PAIN LATERALITY: ICD-10-CM

## 2021-05-24 DIAGNOSIS — R10.9 ABDOMINAL PAIN, UNSPECIFIED ABDOMINAL LOCATION: ICD-10-CM

## 2021-05-24 DIAGNOSIS — F10.920 ALCOHOLIC INTOXICATION WITHOUT COMPLICATION (HCC): ICD-10-CM

## 2021-05-24 DIAGNOSIS — M54.50 CHRONIC LOW BACK PAIN WITHOUT SCIATICA, UNSPECIFIED BACK PAIN LATERALITY: ICD-10-CM

## 2021-05-24 PROBLEM — R79.89 ABNORMAL LFTS: Status: ACTIVE | Noted: 2021-05-24

## 2021-05-24 LAB
ALBUMIN SERPL BCP-MCNC: 4.4 G/DL (ref 3.2–4.9)
ALBUMIN/GLOB SERPL: 1.3 G/DL
ALP SERPL-CCNC: 258 U/L (ref 30–99)
ALT SERPL-CCNC: 184 U/L (ref 2–50)
ANION GAP SERPL CALC-SCNC: 20 MMOL/L (ref 7–16)
AST SERPL-CCNC: 497 U/L (ref 12–45)
BASOPHILS # BLD AUTO: 1.3 % (ref 0–1.8)
BASOPHILS # BLD: 0.04 K/UL (ref 0–0.12)
BILIRUB SERPL-MCNC: 2.1 MG/DL (ref 0.1–1.5)
BUN SERPL-MCNC: 12 MG/DL (ref 8–22)
CALCIUM SERPL-MCNC: 8.9 MG/DL (ref 8.5–10.5)
CHLORIDE SERPL-SCNC: 89 MMOL/L (ref 96–112)
CO2 SERPL-SCNC: 27 MMOL/L (ref 20–33)
CREAT SERPL-MCNC: 0.43 MG/DL (ref 0.5–1.4)
EOSINOPHIL # BLD AUTO: 0 K/UL (ref 0–0.51)
EOSINOPHIL NFR BLD: 0 % (ref 0–6.9)
ERYTHROCYTE [DISTWIDTH] IN BLOOD BY AUTOMATED COUNT: 50.3 FL (ref 35.9–50)
GLOBULIN SER CALC-MCNC: 3.5 G/DL (ref 1.9–3.5)
GLUCOSE SERPL-MCNC: 97 MG/DL (ref 65–99)
HCT VFR BLD AUTO: 43.4 % (ref 37–47)
HGB BLD-MCNC: 14.1 G/DL (ref 12–16)
IMM GRANULOCYTES # BLD AUTO: 0.06 K/UL (ref 0–0.11)
IMM GRANULOCYTES NFR BLD AUTO: 2 % (ref 0–0.9)
LIPASE SERPL-CCNC: 167 U/L (ref 11–82)
LYMPHOCYTES # BLD AUTO: 0.73 K/UL (ref 1–4.8)
LYMPHOCYTES NFR BLD: 23.8 % (ref 22–41)
MCH RBC QN AUTO: 33.2 PG (ref 27–33)
MCHC RBC AUTO-ENTMCNC: 32.5 G/DL (ref 33.6–35)
MCV RBC AUTO: 102.1 FL (ref 81.4–97.8)
MONOCYTES # BLD AUTO: 0.31 K/UL (ref 0–0.85)
MONOCYTES NFR BLD AUTO: 10.1 % (ref 0–13.4)
NEUTROPHILS # BLD AUTO: 1.93 K/UL (ref 2–7.15)
NEUTROPHILS NFR BLD: 62.8 % (ref 44–72)
NRBC # BLD AUTO: 0 K/UL
NRBC BLD-RTO: 0 /100 WBC
PLATELET # BLD AUTO: 91 K/UL (ref 164–446)
PMV BLD AUTO: 9.1 FL (ref 9–12.9)
POC BREATHALIZER: 0.29 PERCENT (ref 0–0.01)
POTASSIUM SERPL-SCNC: 3.9 MMOL/L (ref 3.6–5.5)
PROT SERPL-MCNC: 7.9 G/DL (ref 6–8.2)
RBC # BLD AUTO: 4.25 M/UL (ref 4.2–5.4)
SODIUM SERPL-SCNC: 136 MMOL/L (ref 135–145)
WBC # BLD AUTO: 3.1 K/UL (ref 4.8–10.8)

## 2021-05-24 PROCEDURE — 83735 ASSAY OF MAGNESIUM: CPT

## 2021-05-24 PROCEDURE — A9270 NON-COVERED ITEM OR SERVICE: HCPCS | Performed by: STUDENT IN AN ORGANIZED HEALTH CARE EDUCATION/TRAINING PROGRAM

## 2021-05-24 PROCEDURE — 700102 HCHG RX REV CODE 250 W/ 637 OVERRIDE(OP): Performed by: EMERGENCY MEDICINE

## 2021-05-24 PROCEDURE — 302970 POC BREATHALIZER: Performed by: EMERGENCY MEDICINE

## 2021-05-24 PROCEDURE — 80053 COMPREHEN METABOLIC PANEL: CPT

## 2021-05-24 PROCEDURE — 302970 POC BREATHALIZER

## 2021-05-24 PROCEDURE — 76705 ECHO EXAM OF ABDOMEN: CPT

## 2021-05-24 PROCEDURE — 96375 TX/PRO/DX INJ NEW DRUG ADDON: CPT

## 2021-05-24 PROCEDURE — 84100 ASSAY OF PHOSPHORUS: CPT

## 2021-05-24 PROCEDURE — 770020 HCHG ROOM/CARE - TELE (206)

## 2021-05-24 PROCEDURE — 700105 HCHG RX REV CODE 258: Performed by: EMERGENCY MEDICINE

## 2021-05-24 PROCEDURE — 700102 HCHG RX REV CODE 250 W/ 637 OVERRIDE(OP): Performed by: STUDENT IN AN ORGANIZED HEALTH CARE EDUCATION/TRAINING PROGRAM

## 2021-05-24 PROCEDURE — 85025 COMPLETE CBC W/AUTO DIFF WBC: CPT

## 2021-05-24 PROCEDURE — 99223 1ST HOSP IP/OBS HIGH 75: CPT | Performed by: STUDENT IN AN ORGANIZED HEALTH CARE EDUCATION/TRAINING PROGRAM

## 2021-05-24 PROCEDURE — 700111 HCHG RX REV CODE 636 W/ 250 OVERRIDE (IP): Performed by: EMERGENCY MEDICINE

## 2021-05-24 PROCEDURE — U0003 INFECTIOUS AGENT DETECTION BY NUCLEIC ACID (DNA OR RNA); SEVERE ACUTE RESPIRATORY SYNDROME CORONAVIRUS 2 (SARS-COV-2) (CORONAVIRUS DISEASE [COVID-19]), AMPLIFIED PROBE TECHNIQUE, MAKING USE OF HIGH THROUGHPUT TECHNOLOGIES AS DESCRIBED BY CMS-2020-01-R: HCPCS

## 2021-05-24 PROCEDURE — HZ2ZZZZ DETOXIFICATION SERVICES FOR SUBSTANCE ABUSE TREATMENT: ICD-10-PCS | Performed by: STUDENT IN AN ORGANIZED HEALTH CARE EDUCATION/TRAINING PROGRAM

## 2021-05-24 PROCEDURE — 96365 THER/PROPH/DIAG IV INF INIT: CPT

## 2021-05-24 PROCEDURE — A9270 NON-COVERED ITEM OR SERVICE: HCPCS | Performed by: EMERGENCY MEDICINE

## 2021-05-24 PROCEDURE — 36415 COLL VENOUS BLD VENIPUNCTURE: CPT

## 2021-05-24 PROCEDURE — 99285 EMERGENCY DEPT VISIT HI MDM: CPT

## 2021-05-24 PROCEDURE — U0005 INFEC AGEN DETEC AMPLI PROBE: HCPCS

## 2021-05-24 PROCEDURE — 83690 ASSAY OF LIPASE: CPT

## 2021-05-24 PROCEDURE — 700101 HCHG RX REV CODE 250: Performed by: STUDENT IN AN ORGANIZED HEALTH CARE EDUCATION/TRAINING PROGRAM

## 2021-05-24 RX ORDER — ONDANSETRON 2 MG/ML
4 INJECTION INTRAMUSCULAR; INTRAVENOUS EVERY 4 HOURS PRN
Status: DISCONTINUED | OUTPATIENT
Start: 2021-05-24 | End: 2021-06-17 | Stop reason: HOSPADM

## 2021-05-24 RX ORDER — PROMETHAZINE HYDROCHLORIDE 25 MG/1
12.5-25 TABLET ORAL EVERY 4 HOURS PRN
Status: DISCONTINUED | OUTPATIENT
Start: 2021-05-24 | End: 2021-06-17 | Stop reason: HOSPADM

## 2021-05-24 RX ORDER — FOLIC ACID 1 MG/1
1 TABLET ORAL DAILY
Status: COMPLETED | OUTPATIENT
Start: 2021-05-25 | End: 2021-05-28

## 2021-05-24 RX ORDER — SODIUM CHLORIDE, SODIUM LACTATE, POTASSIUM CHLORIDE, CALCIUM CHLORIDE 600; 310; 30; 20 MG/100ML; MG/100ML; MG/100ML; MG/100ML
INJECTION, SOLUTION INTRAVENOUS CONTINUOUS
Status: DISCONTINUED | OUTPATIENT
Start: 2021-05-24 | End: 2021-06-01

## 2021-05-24 RX ORDER — LORAZEPAM 1 MG/1
1 TABLET ORAL EVERY 4 HOURS PRN
Status: DISCONTINUED | OUTPATIENT
Start: 2021-05-24 | End: 2021-06-01

## 2021-05-24 RX ORDER — ACETAMINOPHEN 325 MG/1
650 TABLET ORAL EVERY 6 HOURS PRN
Status: DISCONTINUED | OUTPATIENT
Start: 2021-05-24 | End: 2021-06-17 | Stop reason: HOSPADM

## 2021-05-24 RX ORDER — PROMETHAZINE HYDROCHLORIDE 25 MG/1
12.5-25 SUPPOSITORY RECTAL EVERY 4 HOURS PRN
Status: DISCONTINUED | OUTPATIENT
Start: 2021-05-24 | End: 2021-06-17 | Stop reason: HOSPADM

## 2021-05-24 RX ORDER — CHLORDIAZEPOXIDE HYDROCHLORIDE 25 MG/1
50 CAPSULE, GELATIN COATED ORAL EVERY 6 HOURS
Status: COMPLETED | OUTPATIENT
Start: 2021-05-25 | End: 2021-05-25

## 2021-05-24 RX ORDER — GABAPENTIN 300 MG/1
300 CAPSULE ORAL 3 TIMES DAILY
Status: DISCONTINUED | OUTPATIENT
Start: 2021-05-24 | End: 2021-06-17 | Stop reason: HOSPADM

## 2021-05-24 RX ORDER — LORAZEPAM 2 MG/ML
2 INJECTION INTRAMUSCULAR
Status: DISCONTINUED | OUTPATIENT
Start: 2021-05-24 | End: 2021-06-01

## 2021-05-24 RX ORDER — SODIUM CHLORIDE 9 MG/ML
1000 INJECTION, SOLUTION INTRAVENOUS ONCE
Status: COMPLETED | OUTPATIENT
Start: 2021-05-24 | End: 2021-05-24

## 2021-05-24 RX ORDER — LORAZEPAM 2 MG/1
4 TABLET ORAL
Status: DISCONTINUED | OUTPATIENT
Start: 2021-05-24 | End: 2021-06-01

## 2021-05-24 RX ORDER — LABETALOL HYDROCHLORIDE 5 MG/ML
10 INJECTION, SOLUTION INTRAVENOUS EVERY 4 HOURS PRN
Status: DISCONTINUED | OUTPATIENT
Start: 2021-05-24 | End: 2021-06-17 | Stop reason: HOSPADM

## 2021-05-24 RX ORDER — LORAZEPAM 1 MG/1
0.5 TABLET ORAL EVERY 4 HOURS PRN
Status: DISCONTINUED | OUTPATIENT
Start: 2021-05-24 | End: 2021-06-01

## 2021-05-24 RX ORDER — POLYETHYLENE GLYCOL 3350 17 G/17G
1 POWDER, FOR SOLUTION ORAL
Status: DISCONTINUED | OUTPATIENT
Start: 2021-05-24 | End: 2021-05-30

## 2021-05-24 RX ORDER — ONDANSETRON 2 MG/ML
4 INJECTION INTRAMUSCULAR; INTRAVENOUS ONCE
Status: COMPLETED | OUTPATIENT
Start: 2021-05-24 | End: 2021-05-24

## 2021-05-24 RX ORDER — BISACODYL 10 MG
10 SUPPOSITORY, RECTAL RECTAL
Status: DISCONTINUED | OUTPATIENT
Start: 2021-05-24 | End: 2021-05-30

## 2021-05-24 RX ORDER — LORAZEPAM 2 MG/ML
0.5 INJECTION INTRAMUSCULAR EVERY 4 HOURS PRN
Status: DISCONTINUED | OUTPATIENT
Start: 2021-05-24 | End: 2021-06-01

## 2021-05-24 RX ORDER — FAMOTIDINE 20 MG/1
20 TABLET, FILM COATED ORAL DAILY
Status: DISCONTINUED | OUTPATIENT
Start: 2021-05-25 | End: 2021-06-17 | Stop reason: HOSPADM

## 2021-05-24 RX ORDER — LORAZEPAM 2 MG/ML
1 INJECTION INTRAMUSCULAR
Status: DISCONTINUED | OUTPATIENT
Start: 2021-05-24 | End: 2021-06-01

## 2021-05-24 RX ORDER — LORAZEPAM 2 MG/ML
2 INJECTION INTRAMUSCULAR ONCE
Status: DISPENSED | OUTPATIENT
Start: 2021-05-24 | End: 2021-05-25

## 2021-05-24 RX ORDER — LORAZEPAM 2 MG/ML
1.5 INJECTION INTRAMUSCULAR
Status: DISCONTINUED | OUTPATIENT
Start: 2021-05-24 | End: 2021-06-01

## 2021-05-24 RX ORDER — LORAZEPAM 2 MG/1
2 TABLET ORAL
Status: DISCONTINUED | OUTPATIENT
Start: 2021-05-24 | End: 2021-06-01

## 2021-05-24 RX ORDER — ONDANSETRON 4 MG/1
4 TABLET, ORALLY DISINTEGRATING ORAL EVERY 4 HOURS PRN
Status: DISCONTINUED | OUTPATIENT
Start: 2021-05-24 | End: 2021-06-17 | Stop reason: HOSPADM

## 2021-05-24 RX ORDER — GAUZE BANDAGE 2" X 2"
100 BANDAGE TOPICAL DAILY
Status: COMPLETED | OUTPATIENT
Start: 2021-05-25 | End: 2021-05-28

## 2021-05-24 RX ORDER — PROCHLORPERAZINE EDISYLATE 5 MG/ML
5-10 INJECTION INTRAMUSCULAR; INTRAVENOUS EVERY 4 HOURS PRN
Status: DISCONTINUED | OUTPATIENT
Start: 2021-05-24 | End: 2021-06-17 | Stop reason: HOSPADM

## 2021-05-24 RX ORDER — LORAZEPAM 2 MG/ML
2 INJECTION INTRAMUSCULAR ONCE
Status: COMPLETED | OUTPATIENT
Start: 2021-05-24 | End: 2021-05-24

## 2021-05-24 RX ORDER — AMOXICILLIN 250 MG
2 CAPSULE ORAL 2 TIMES DAILY
Status: DISCONTINUED | OUTPATIENT
Start: 2021-05-24 | End: 2021-05-30

## 2021-05-24 RX ORDER — CHLORDIAZEPOXIDE HYDROCHLORIDE 25 MG/1
25 CAPSULE, GELATIN COATED ORAL EVERY 6 HOURS
Status: DISCONTINUED | OUTPATIENT
Start: 2021-05-26 | End: 2021-05-26

## 2021-05-24 RX ORDER — IPRATROPIUM BROMIDE AND ALBUTEROL SULFATE 2.5; .5 MG/3ML; MG/3ML
3 SOLUTION RESPIRATORY (INHALATION)
Status: DISCONTINUED | OUTPATIENT
Start: 2021-05-24 | End: 2021-06-17 | Stop reason: HOSPADM

## 2021-05-24 RX ADMIN — THIAMINE HYDROCHLORIDE 1000 ML: 100 INJECTION, SOLUTION INTRAMUSCULAR; INTRAVENOUS at 22:53

## 2021-05-24 RX ADMIN — CHLORDIAZEPOXIDE HYDROCHLORIDE 50 MG: 25 CAPSULE ORAL at 23:59

## 2021-05-24 RX ADMIN — SODIUM CHLORIDE 1000 ML: 9 INJECTION, SOLUTION INTRAVENOUS at 20:53

## 2021-05-24 RX ADMIN — LORAZEPAM 2 MG: 2 INJECTION INTRAMUSCULAR; INTRAVENOUS at 20:13

## 2021-05-24 RX ADMIN — SODIUM CHLORIDE 1000 ML: 9 INJECTION, SOLUTION INTRAVENOUS at 18:51

## 2021-05-24 RX ADMIN — ONDANSETRON 4 MG: 2 INJECTION INTRAMUSCULAR; INTRAVENOUS at 18:52

## 2021-05-24 RX ADMIN — LIDOCAINE HYDROCHLORIDE 30 ML: 20 SOLUTION OROPHARYNGEAL at 18:50

## 2021-05-24 ASSESSMENT — PATIENT HEALTH QUESTIONNAIRE - PHQ9
SUM OF ALL RESPONSES TO PHQ9 QUESTIONS 1 AND 2: 4
8. MOVING OR SPEAKING SO SLOWLY THAT OTHER PEOPLE COULD HAVE NOTICED. OR THE OPPOSITE, BEING SO FIGETY OR RESTLESS THAT YOU HAVE BEEN MOVING AROUND A LOT MORE THAN USUAL: NOT AT ALL
4. FEELING TIRED OR HAVING LITTLE ENERGY: NEARLY EVERY DAY
SUM OF ALL RESPONSES TO PHQ QUESTIONS 1-9: 20
9. THOUGHTS THAT YOU WOULD BE BETTER OFF DEAD, OR OF HURTING YOURSELF: MORE THAN HALF THE DAYS
7. TROUBLE CONCENTRATING ON THINGS, SUCH AS READING THE NEWSPAPER OR WATCHING TELEVISION: MORE THAN HALF THE DAYS
2. FEELING DOWN, DEPRESSED, IRRITABLE, OR HOPELESS: SEVERAL DAYS
1. LITTLE INTEREST OR PLEASURE IN DOING THINGS: NEARLY EVERY DAY
5. POOR APPETITE OR OVEREATING: NEARLY EVERY DAY
6. FEELING BAD ABOUT YOURSELF - OR THAT YOU ARE A FAILURE OR HAVE LET YOURSELF OR YOUR FAMILY DOWN: NEARLY EVERY DAY
3. TROUBLE FALLING OR STAYING ASLEEP OR SLEEPING TOO MUCH: NEARLY EVERY DAY

## 2021-05-24 ASSESSMENT — LIFESTYLE VARIABLES
EVER HAD A DRINK FIRST THING IN THE MORNING TO STEADY YOUR NERVES TO GET RID OF A HANGOVER: YES
HAVE PEOPLE ANNOYED YOU BY CRITICIZING YOUR DRINKING: YES
ALCOHOL_USE: YES
HOW MANY TIMES IN THE PAST YEAR HAVE YOU HAD 5 OR MORE DRINKS IN A DAY: 300
EVER FELT BAD OR GUILTY ABOUT YOUR DRINKING: YES
TOTAL SCORE: 4
TOTAL SCORE: 4
HAVE YOU EVER FELT YOU SHOULD CUT DOWN ON YOUR DRINKING: YES
CONSUMPTION TOTAL: POSITIVE
TOTAL SCORE: 4
AVERAGE NUMBER OF DAYS PER WEEK YOU HAVE A DRINK CONTAINING ALCOHOL: 5
DOES PATIENT WANT TO TALK TO SOMEONE ABOUT QUITTING: YES
ON A TYPICAL DAY WHEN YOU DRINK ALCOHOL HOW MANY DRINKS DO YOU HAVE: 8
DOES PATIENT WANT TO STOP DRINKING: YES

## 2021-05-24 ASSESSMENT — ENCOUNTER SYMPTOMS
HEADACHES: 1
ABDOMINAL PAIN: 1
BACK PAIN: 1

## 2021-05-24 ASSESSMENT — FIBROSIS 4 INDEX: FIB4 SCORE: 19.98

## 2021-05-25 PROBLEM — E55.9 VITAMIN D DEFICIENCY: Chronic | Status: ACTIVE | Noted: 2021-05-25

## 2021-05-25 LAB
ALBUMIN SERPL BCP-MCNC: 3.2 G/DL (ref 3.2–4.9)
ALBUMIN/GLOB SERPL: 1.2 G/DL
ALP SERPL-CCNC: 182 U/L (ref 30–99)
ALT SERPL-CCNC: 130 U/L (ref 2–50)
AMPHET UR QL SCN: NEGATIVE
ANION GAP SERPL CALC-SCNC: 14 MMOL/L (ref 7–16)
AST SERPL-CCNC: 312 U/L (ref 12–45)
BARBITURATES UR QL SCN: NEGATIVE
BASOPHILS # BLD AUTO: 1.1 % (ref 0–1.8)
BASOPHILS # BLD: 0.03 K/UL (ref 0–0.12)
BENZODIAZ UR QL SCN: NEGATIVE
BILIRUB SERPL-MCNC: 1.1 MG/DL (ref 0.1–1.5)
BUN SERPL-MCNC: 6 MG/DL (ref 8–22)
BZE UR QL SCN: NEGATIVE
CALCIUM SERPL-MCNC: 7.3 MG/DL (ref 8.5–10.5)
CANNABINOIDS UR QL SCN: NEGATIVE
CHLORIDE SERPL-SCNC: 94 MMOL/L (ref 96–112)
CO2 SERPL-SCNC: 24 MMOL/L (ref 20–33)
CREAT SERPL-MCNC: 0.28 MG/DL (ref 0.5–1.4)
EOSINOPHIL # BLD AUTO: 0 K/UL (ref 0–0.51)
EOSINOPHIL NFR BLD: 0 % (ref 0–6.9)
ERYTHROCYTE [DISTWIDTH] IN BLOOD BY AUTOMATED COUNT: 51.3 FL (ref 35.9–50)
GLOBULIN SER CALC-MCNC: 2.7 G/DL (ref 1.9–3.5)
GLUCOSE SERPL-MCNC: 158 MG/DL (ref 65–99)
HCT VFR BLD AUTO: 30 % (ref 37–47)
HGB BLD-MCNC: 10.1 G/DL (ref 12–16)
IMM GRANULOCYTES # BLD AUTO: 0.02 K/UL (ref 0–0.11)
IMM GRANULOCYTES NFR BLD AUTO: 0.7 % (ref 0–0.9)
INR PPP: 1 (ref 0.87–1.13)
LYMPHOCYTES # BLD AUTO: 0.83 K/UL (ref 1–4.8)
LYMPHOCYTES NFR BLD: 29.4 % (ref 22–41)
MAGNESIUM SERPL-MCNC: 1.9 MG/DL (ref 1.5–2.5)
MAGNESIUM SERPL-MCNC: 2.2 MG/DL (ref 1.5–2.5)
MCH RBC QN AUTO: 34.2 PG (ref 27–33)
MCHC RBC AUTO-ENTMCNC: 33.7 G/DL (ref 33.6–35)
MCV RBC AUTO: 101.7 FL (ref 81.4–97.8)
METHADONE UR QL SCN: NEGATIVE
MONOCYTES # BLD AUTO: 0.43 K/UL (ref 0–0.85)
MONOCYTES NFR BLD AUTO: 15.2 % (ref 0–13.4)
NEUTROPHILS # BLD AUTO: 1.51 K/UL (ref 2–7.15)
NEUTROPHILS NFR BLD: 53.6 % (ref 44–72)
NRBC # BLD AUTO: 0 K/UL
NRBC BLD-RTO: 0 /100 WBC
OPIATES UR QL SCN: NEGATIVE
OXYCODONE UR QL SCN: NEGATIVE
PCP UR QL SCN: NEGATIVE
PHOSPHATE SERPL-MCNC: 4.5 MG/DL (ref 2.5–4.5)
PLATELET # BLD AUTO: 55 K/UL (ref 164–446)
PMV BLD AUTO: 9.2 FL (ref 9–12.9)
POTASSIUM SERPL-SCNC: 3.3 MMOL/L (ref 3.6–5.5)
PROPOXYPH UR QL SCN: NEGATIVE
PROT SERPL-MCNC: 5.9 G/DL (ref 6–8.2)
PROTHROMBIN TIME: 13.5 SEC (ref 12–14.6)
RBC # BLD AUTO: 2.95 M/UL (ref 4.2–5.4)
SARS-COV-2 RNA RESP QL NAA+PROBE: NOTDETECTED
SODIUM SERPL-SCNC: 132 MMOL/L (ref 135–145)
SPECIMEN SOURCE: NORMAL
WBC # BLD AUTO: 2.8 K/UL (ref 4.8–10.8)

## 2021-05-25 PROCEDURE — 700111 HCHG RX REV CODE 636 W/ 250 OVERRIDE (IP): Performed by: INTERNAL MEDICINE

## 2021-05-25 PROCEDURE — 700111 HCHG RX REV CODE 636 W/ 250 OVERRIDE (IP): Performed by: STUDENT IN AN ORGANIZED HEALTH CARE EDUCATION/TRAINING PROGRAM

## 2021-05-25 PROCEDURE — 83735 ASSAY OF MAGNESIUM: CPT

## 2021-05-25 PROCEDURE — A9270 NON-COVERED ITEM OR SERVICE: HCPCS | Performed by: STUDENT IN AN ORGANIZED HEALTH CARE EDUCATION/TRAINING PROGRAM

## 2021-05-25 PROCEDURE — 90791 PSYCH DIAGNOSTIC EVALUATION: CPT | Performed by: SOCIAL WORKER

## 2021-05-25 PROCEDURE — 80053 COMPREHEN METABOLIC PANEL: CPT

## 2021-05-25 PROCEDURE — 700102 HCHG RX REV CODE 250 W/ 637 OVERRIDE(OP): Performed by: INTERNAL MEDICINE

## 2021-05-25 PROCEDURE — A9270 NON-COVERED ITEM OR SERVICE: HCPCS | Performed by: INTERNAL MEDICINE

## 2021-05-25 PROCEDURE — 80307 DRUG TEST PRSMV CHEM ANLYZR: CPT

## 2021-05-25 PROCEDURE — 85025 COMPLETE CBC W/AUTO DIFF WBC: CPT

## 2021-05-25 PROCEDURE — 700105 HCHG RX REV CODE 258: Performed by: STUDENT IN AN ORGANIZED HEALTH CARE EDUCATION/TRAINING PROGRAM

## 2021-05-25 PROCEDURE — 770020 HCHG ROOM/CARE - TELE (206)

## 2021-05-25 PROCEDURE — 700102 HCHG RX REV CODE 250 W/ 637 OVERRIDE(OP): Performed by: STUDENT IN AN ORGANIZED HEALTH CARE EDUCATION/TRAINING PROGRAM

## 2021-05-25 PROCEDURE — 99233 SBSQ HOSP IP/OBS HIGH 50: CPT | Performed by: INTERNAL MEDICINE

## 2021-05-25 PROCEDURE — 85610 PROTHROMBIN TIME: CPT

## 2021-05-25 PROCEDURE — 36415 COLL VENOUS BLD VENIPUNCTURE: CPT

## 2021-05-25 RX ORDER — OXYCODONE HYDROCHLORIDE 5 MG/1
5 TABLET ORAL
Status: COMPLETED | OUTPATIENT
Start: 2021-05-25 | End: 2021-05-26

## 2021-05-25 RX ORDER — CHOLECALCIFEROL (VITAMIN D3) 125 MCG
5 CAPSULE ORAL NIGHTLY
Status: DISCONTINUED | OUTPATIENT
Start: 2021-05-25 | End: 2021-06-17 | Stop reason: HOSPADM

## 2021-05-25 RX ORDER — ERGOCALCIFEROL 1.25 MG/1
50000 CAPSULE ORAL
Status: DISCONTINUED | OUTPATIENT
Start: 2021-05-26 | End: 2021-06-17 | Stop reason: HOSPADM

## 2021-05-25 RX ORDER — POTASSIUM CHLORIDE 7.45 MG/ML
10 INJECTION INTRAVENOUS
Status: COMPLETED | OUTPATIENT
Start: 2021-05-25 | End: 2021-05-25

## 2021-05-25 RX ADMIN — GABAPENTIN 300 MG: 300 CAPSULE ORAL at 17:15

## 2021-05-25 RX ADMIN — LORAZEPAM 2 MG: 2 TABLET ORAL at 22:52

## 2021-05-25 RX ADMIN — POTASSIUM CHLORIDE 10 MEQ: 7.46 INJECTION, SOLUTION INTRAVENOUS at 11:21

## 2021-05-25 RX ADMIN — ACETAMINOPHEN 650 MG: 325 TABLET, FILM COATED ORAL at 17:15

## 2021-05-25 RX ADMIN — CHLORDIAZEPOXIDE HYDROCHLORIDE 50 MG: 25 CAPSULE ORAL at 17:15

## 2021-05-25 RX ADMIN — GABAPENTIN 300 MG: 300 CAPSULE ORAL at 11:20

## 2021-05-25 RX ADMIN — CHLORDIAZEPOXIDE HYDROCHLORIDE 25 MG: 25 CAPSULE ORAL at 23:04

## 2021-05-25 RX ADMIN — POTASSIUM CHLORIDE 10 MEQ: 7.46 INJECTION, SOLUTION INTRAVENOUS at 09:54

## 2021-05-25 RX ADMIN — CHLORDIAZEPOXIDE HYDROCHLORIDE 50 MG: 25 CAPSULE ORAL at 11:20

## 2021-05-25 RX ADMIN — THERA TABS 1 TABLET: TAB at 05:07

## 2021-05-25 RX ADMIN — LORAZEPAM 2 MG: 2 TABLET ORAL at 05:06

## 2021-05-25 RX ADMIN — GABAPENTIN 300 MG: 300 CAPSULE ORAL at 00:00

## 2021-05-25 RX ADMIN — LORAZEPAM 2 MG: 2 TABLET ORAL at 15:29

## 2021-05-25 RX ADMIN — FAMOTIDINE 20 MG: 20 TABLET ORAL at 05:06

## 2021-05-25 RX ADMIN — ACETAMINOPHEN 650 MG: 325 TABLET, FILM COATED ORAL at 11:20

## 2021-05-25 RX ADMIN — CHLORDIAZEPOXIDE HYDROCHLORIDE 50 MG: 25 CAPSULE ORAL at 05:06

## 2021-05-25 RX ADMIN — ERGOCALCIFEROL 50000 UNITS: 1.25 CAPSULE ORAL at 23:04

## 2021-05-25 RX ADMIN — LORAZEPAM 1 MG: 2 INJECTION INTRAMUSCULAR; INTRAVENOUS at 02:47

## 2021-05-25 RX ADMIN — GABAPENTIN 300 MG: 300 CAPSULE ORAL at 05:07

## 2021-05-25 RX ADMIN — PROMETHAZINE HYDROCHLORIDE 25 MG: 25 TABLET ORAL at 23:04

## 2021-05-25 RX ADMIN — ENOXAPARIN SODIUM 40 MG: 40 INJECTION SUBCUTANEOUS at 05:07

## 2021-05-25 RX ADMIN — ONDANSETRON 4 MG: 2 INJECTION INTRAMUSCULAR; INTRAVENOUS at 20:35

## 2021-05-25 RX ADMIN — DOCUSATE SODIUM 50 MG AND SENNOSIDES 8.6 MG 2 TABLET: 8.6; 5 TABLET, FILM COATED ORAL at 17:15

## 2021-05-25 RX ADMIN — Medication 5 MG: at 20:39

## 2021-05-25 RX ADMIN — THIAMINE HCL TAB 100 MG 100 MG: 100 TAB at 05:07

## 2021-05-25 RX ADMIN — SODIUM CHLORIDE, POTASSIUM CHLORIDE, SODIUM LACTATE AND CALCIUM CHLORIDE: 600; 310; 30; 20 INJECTION, SOLUTION INTRAVENOUS at 02:50

## 2021-05-25 RX ADMIN — LORAZEPAM 1 MG: 2 INJECTION INTRAMUSCULAR; INTRAVENOUS at 09:52

## 2021-05-25 RX ADMIN — FOLIC ACID 1 MG: 1 TABLET ORAL at 05:06

## 2021-05-25 RX ADMIN — LORAZEPAM 0.5 MG: 1 TABLET ORAL at 01:09

## 2021-05-25 RX ADMIN — LORAZEPAM 2 MG: 2 TABLET ORAL at 20:01

## 2021-05-25 RX ADMIN — ONDANSETRON 4 MG: 2 INJECTION INTRAMUSCULAR; INTRAVENOUS at 02:48

## 2021-05-25 RX ADMIN — SODIUM CHLORIDE, POTASSIUM CHLORIDE, SODIUM LACTATE AND CALCIUM CHLORIDE: 600; 310; 30; 20 INJECTION, SOLUTION INTRAVENOUS at 14:00

## 2021-05-25 ASSESSMENT — LIFESTYLE VARIABLES
AUDITORY DISTURBANCES: NOT PRESENT
ANXIETY: MILDLY ANXIOUS
VISUAL DISTURBANCES: VERY MILD SENSITIVITY
ORIENTATION AND CLOUDING OF SENSORIUM: CANNOT DO SERIAL ADDITIONS OR IS UNCERTAIN ABOUT DATE
ORIENTATION AND CLOUDING OF SENSORIUM: CANNOT DO SERIAL ADDITIONS OR IS UNCERTAIN ABOUT DATE
VISUAL DISTURBANCES: NOT PRESENT
AGITATION: NORMAL ACTIVITY
TOTAL SCORE: 13
TREMOR: *
TREMOR: *
NAUSEA AND VOMITING: *
ORIENTATION AND CLOUDING OF SENSORIUM: ORIENTED AND CAN DO SERIAL ADDITIONS
NAUSEA AND VOMITING: *
VISUAL DISTURBANCES: NOT PRESENT
VISUAL DISTURBANCES: NOT PRESENT
AGITATION: NORMAL ACTIVITY
NAUSEA AND VOMITING: MILD NAUSEA WITH NO VOMITING
PAROXYSMAL SWEATS: *
TOTAL SCORE: 2
NAUSEA AND VOMITING: MILD NAUSEA WITH NO VOMITING
NAUSEA AND VOMITING: NO NAUSEA AND NO VOMITING
TOTAL SCORE: 5
ANXIETY: MODERATELY ANXIOUS OR GUARDED, SO ANXIETY IS INFERRED
HEADACHE, FULLNESS IN HEAD: MODERATE
ORIENTATION AND CLOUDING OF SENSORIUM: ORIENTED AND CAN DO SERIAL ADDITIONS
NAUSEA AND VOMITING: MILD NAUSEA WITH NO VOMITING
AUDITORY DISTURBANCES: NOT PRESENT
TREMOR: *
ANXIETY: MILDLY ANXIOUS
ANXIETY: *
AUDITORY DISTURBANCES: NOT PRESENT
PAROXYSMAL SWEATS: NO SWEAT VISIBLE
TREMOR: MODERATE TREMOR WITH ARMS EXTENDED
TOTAL SCORE: 4
TOTAL SCORE: 2
VISUAL DISTURBANCES: NOT PRESENT
ORIENTATION AND CLOUDING OF SENSORIUM: ORIENTED AND CAN DO SERIAL ADDITIONS
ORIENTATION AND CLOUDING OF SENSORIUM: CANNOT DO SERIAL ADDITIONS OR IS UNCERTAIN ABOUT DATE
AUDITORY DISTURBANCES: NOT PRESENT
ORIENTATION AND CLOUDING OF SENSORIUM: ORIENTED AND CAN DO SERIAL ADDITIONS
HEADACHE, FULLNESS IN HEAD: MODERATELY SEVERE
TOTAL SCORE: VERY MILD ITCHING, PINS AND NEEDLES SENSATION, BURNING OR NUMBNESS
ANXIETY: MILDLY ANXIOUS
HEADACHE, FULLNESS IN HEAD: NOT PRESENT
PAROXYSMAL SWEATS: BARELY PERCEPTIBLE SWEATING. PALMS MOIST
AGITATION: NORMAL ACTIVITY
AGITATION: NORMAL ACTIVITY
NAUSEA AND VOMITING: NO NAUSEA AND NO VOMITING
TOTAL SCORE: 12
HEADACHE, FULLNESS IN HEAD: NOT PRESENT
HEADACHE, FULLNESS IN HEAD: NOT PRESENT
AUDITORY DISTURBANCES: NOT PRESENT
VISUAL DISTURBANCES: VERY MILD SENSITIVITY
PAROXYSMAL SWEATS: NO SWEAT VISIBLE
ANXIETY: *
HEADACHE, FULLNESS IN HEAD: MODERATE
PAROXYSMAL SWEATS: NO SWEAT VISIBLE
TOTAL SCORE: 13
VISUAL DISTURBANCES: NOT PRESENT
AGITATION: NORMAL ACTIVITY
AGITATION: NORMAL ACTIVITY
TREMOR: *
TOTAL SCORE: 7
VISUAL DISTURBANCES: NOT PRESENT
VISUAL DISTURBANCES: NOT PRESENT
AUDITORY DISTURBANCES: NOT PRESENT
TOTAL SCORE: 13
TREMOR: NO TREMOR
ORIENTATION AND CLOUDING OF SENSORIUM: ORIENTED AND CAN DO SERIAL ADDITIONS
TOTAL SCORE: 6
NAUSEA AND VOMITING: NO NAUSEA AND NO VOMITING
TREMOR: TREMOR NOT VISIBLE BUT CAN BE FELT, FINGERTIP TO FINGERTIP
PAROXYSMAL SWEATS: BARELY PERCEPTIBLE SWEATING. PALMS MOIST
ANXIETY: MILDLY ANXIOUS
PAROXYSMAL SWEATS: *
ORIENTATION AND CLOUDING OF SENSORIUM: ORIENTED AND CAN DO SERIAL ADDITIONS
HEADACHE, FULLNESS IN HEAD: MILD
VISUAL DISTURBANCES: NOT PRESENT
AUDITORY DISTURBANCES: NOT PRESENT
PAROXYSMAL SWEATS: NO SWEAT VISIBLE
PAROXYSMAL SWEATS: *
NAUSEA AND VOMITING: NO NAUSEA AND NO VOMITING
VISUAL DISTURBANCES: NOT PRESENT
ANXIETY: MODERATELY ANXIOUS OR GUARDED, SO ANXIETY IS INFERRED
NAUSEA AND VOMITING: *
HEADACHE, FULLNESS IN HEAD: MODERATELY SEVERE
AUDITORY DISTURBANCES: NOT PRESENT
ANXIETY: NO ANXIETY (AT EASE)
PAROXYSMAL SWEATS: NO SWEAT VISIBLE
HEADACHE, FULLNESS IN HEAD: MODERATE
HEADACHE, FULLNESS IN HEAD: NOT PRESENT
TOTAL SCORE: 11
AUDITORY DISTURBANCES: NOT PRESENT
TREMOR: TREMOR NOT VISIBLE BUT CAN BE FELT, FINGERTIP TO FINGERTIP
ORIENTATION AND CLOUDING OF SENSORIUM: CANNOT DO SERIAL ADDITIONS OR IS UNCERTAIN ABOUT DATE
HEADACHE, FULLNESS IN HEAD: NOT PRESENT
TREMOR: *
HEADACHE, FULLNESS IN HEAD: MODERATE
TOTAL SCORE: 11
AGITATION: NORMAL ACTIVITY
AGITATION: NORMAL ACTIVITY
ORIENTATION AND CLOUDING OF SENSORIUM: ORIENTED AND CAN DO SERIAL ADDITIONS
PAROXYSMAL SWEATS: NO SWEAT VISIBLE
ANXIETY: MILDLY ANXIOUS
VISUAL DISTURBANCES: NOT PRESENT
ANXIETY: MILDLY ANXIOUS
AGITATION: NORMAL ACTIVITY
AGITATION: NORMAL ACTIVITY
ORIENTATION AND CLOUDING OF SENSORIUM: ORIENTED AND CAN DO SERIAL ADDITIONS
TREMOR: *
ANXIETY: MODERATELY ANXIOUS OR GUARDED, SO ANXIETY IS INFERRED
NAUSEA AND VOMITING: NO NAUSEA AND NO VOMITING
PAROXYSMAL SWEATS: BARELY PERCEPTIBLE SWEATING. PALMS MOIST
AGITATION: NORMAL ACTIVITY
AUDITORY DISTURBANCES: NOT PRESENT
AGITATION: NORMAL ACTIVITY
TREMOR: NO TREMOR
NAUSEA AND VOMITING: *
TREMOR: TREMOR NOT VISIBLE BUT CAN BE FELT, FINGERTIP TO FINGERTIP

## 2021-05-25 ASSESSMENT — ENCOUNTER SYMPTOMS
FEVER: 0
CONSTIPATION: 0
PALPITATIONS: 0
SHORTNESS OF BREATH: 0
DIZZINESS: 0
TREMORS: 1
DIARRHEA: 0
HEADACHES: 1
COUGH: 0
NAUSEA: 1
CHILLS: 0
ABDOMINAL PAIN: 0
VOMITING: 0
BACK PAIN: 0

## 2021-05-25 ASSESSMENT — COGNITIVE AND FUNCTIONAL STATUS - GENERAL
MOBILITY SCORE: 20
STANDING UP FROM CHAIR USING ARMS: A LITTLE
SUGGESTED CMS G CODE MODIFIER MOBILITY: CJ
CLIMB 3 TO 5 STEPS WITH RAILING: A LITTLE
DRESSING REGULAR LOWER BODY CLOTHING: A LITTLE
DAILY ACTIVITIY SCORE: 18
DRESSING REGULAR UPPER BODY CLOTHING: A LITTLE
HELP NEEDED FOR BATHING: A LITTLE
PERSONAL GROOMING: A LITTLE
TOILETING: A LITTLE
SUGGESTED CMS G CODE MODIFIER DAILY ACTIVITY: CK
EATING MEALS: A LITTLE
WALKING IN HOSPITAL ROOM: A LOT

## 2021-05-25 ASSESSMENT — PAIN DESCRIPTION - PAIN TYPE
TYPE: ACUTE PAIN

## 2021-05-25 NOTE — DISCHARGE PLANNING
Alert Team  Consult order noted.  Pt not currently ready for consult.  She will need:  -legal sobriety noted in results tab, (currently 0.292 etoh.)    WCTM for legal sobriety.

## 2021-05-25 NOTE — H&P
"Hospital Medicine History & Physical Note    Date of Service  5/24/2021    Primary Care Physician  No primary care provider on file.    Consultants  None    Code Status  Full Code    Chief Complaint  Chief Complaint   Patient presents with   • Suicidal Ideation     Patient has been having thoughts \"for a while\" of not wanting to live anymore. Patient states \"she has a plan to take all her pills but she didn't have enough\". Pt has a hx of SI. Pt on legal hold upon arrival.    • Alcohol Intoxication     Per EMS, pts roomate says \"she has been lying on the floor drinking for the last 3 days\". Pt drinks 1-2 pints of vodka a day. Pt has drank \"a couple shots today\".       History of Presenting Illness  50 y.o. female with PMH recurrent alcoholic pancreatitis, alcohol abuse, history of alcohol withdrawals including seizures, bipolar disorder, methamphetamine abuse, suicide attempt and chronic obstructive pulmonary disease (no PFTs on file, on 2 L of oxygen at home). who presented 5/24/2021 intoxicated with complaints of suicidal ideation.    Of note most information obtained through chart review and discussion with other healthcare providers as patient is intoxicated and rather somnolent in the ED.  It appears Ms. Youssef has been having worsening depression and having suicidal ideation for the past few days.  She is quite intoxicated and also endorses abdominal pain, back pain, headache.  Denies chest pain or dyspnea.  Abdominal pain is rated as moderate and crampy in nature, back pain moderate and achy in nature, headache rated as moderate.  No aggravating or alleviating factors.  Work-up in the ED significant for thrombocytopenia, leukopenia, elevated LFTs, elevated lipase, POC breathalyzer 0.292.  Patient wakes up with sternal rub and answers relatively appropriately but rapidly falls back asleep, only really answering occasional questions with 1 or 2 word answers.    Review of Systems  Review of Systems   Unable to " perform ROS: Mental status change       Past Medical History   has a past medical history of Alcohol abuse, Alcoholism (HCC), Anxiety, Backpain, Bipolar disorder, unspecified (HCC), Bronchitis, Drug abuse (HCC), Hepatitis, alcoholic, Hypertension, Indigestion, Infectious disease (MRSA), Liver disease, Pancreatitis chronic, Pneumonia, Psychiatric disorder, Renal disorder, Seizure (HCC), Seizure disorder (HCC), Unspecified hemorrhagic conditions, and Unspecified urinary incontinence.    Surgical History   has a past surgical history that includes gastroscopy (4/28/2015).     Family History  family history includes Cancer in her mother; Lung Disease in her mother.     Social History   reports that she has never smoked. She has never used smokeless tobacco. She reports current alcohol use. She reports current drug use.    Allergies  Allergies   Allergen Reactions   • Bactrim Hives   • Lamotrigine [Lamictal] Hives   • Quetiapine Fumarate Hives     Extrapyramidal Symptoms   • Keflex Hives       Medications  Prior to Admission Medications   Prescriptions Last Dose Informant Patient Reported? Taking?   FLUoxetine (PROZAC) 10 MG Cap  Patient Yes No   Sig: Take 30 mg by mouth every day.   famotidine (PEPCID) 20 MG Tab   No No   Sig: Take 1 tablet by mouth every day.   gabapentin (NEURONTIN) 300 MG Cap  Patient Yes No   Sig: Take 300 mg by mouth every day.   ondansetron (ZOFRAN ODT) 4 MG TABLET DISPERSIBLE   No No   Sig: Take 1 tablet by mouth every 8 hours as needed for Nausea.      Facility-Administered Medications: None       Physical Exam  Temp:  [36.2 °C (97.2 °F)-36.4 °C (97.6 °F)] 36.4 °C (97.6 °F)  Pulse:  [] 103  Resp:  [12-18] 12  BP: (108-136)/(57-95) 130/89  SpO2:  [94 %-99 %] 95 %    Physical Exam  Vitals and nursing note reviewed. Exam conducted with a chaperone present.   Constitutional:       General: She is not in acute distress.     Appearance: She is obese. She is not toxic-appearing.      Comments:  50-year-old female appears stated age, somnolent on exam arouses with sternal rub will answer occasional questions with 1-2 word answers rapidly falls back asleep   HENT:      Head: Normocephalic and atraumatic.      Nose: Nose normal. No rhinorrhea.      Mouth/Throat:      Mouth: Mucous membranes are dry.      Pharynx: Oropharynx is clear.   Eyes:      Extraocular Movements: Extraocular movements intact.      Conjunctiva/sclera: Conjunctivae normal.      Pupils: Pupils are equal, round, and reactive to light.   Cardiovascular:      Rate and Rhythm: Regular rhythm. Tachycardia present.      Pulses: Normal pulses.      Heart sounds: No murmur heard.     Pulmonary:      Effort: No respiratory distress.      Breath sounds: No wheezing or rhonchi.      Comments: Poor inspiratory effort  Abdominal:      General: Bowel sounds are normal. There is no distension.      Palpations: Abdomen is soft.      Tenderness: There is abdominal tenderness (mild right sided tenderness). There is no guarding or rebound.   Musculoskeletal:         General: No deformity. Normal range of motion.      Cervical back: Normal range of motion and neck supple.   Skin:     General: Skin is warm and dry.      Capillary Refill: Capillary refill takes less than 2 seconds.   Neurological:      General: No focal deficit present.      Mental Status: She is disoriented.      Cranial Nerves: No cranial nerve deficit.      Comments: Patient quite intoxicated, somnolent, not fully participating with exam.  Observed to move following spontaneously, face symmetrical, tongue midline         Laboratory:  Recent Labs     05/24/21 1845   WBC 3.1*   RBC 4.25   HEMOGLOBIN 14.1   HEMATOCRIT 43.4   .1*   MCH 33.2*   MCHC 32.5*   RDW 50.3*   PLATELETCT 91*   MPV 9.1     Recent Labs     05/24/21 1845   SODIUM 136   POTASSIUM 3.9   CHLORIDE 89*   CO2 27   GLUCOSE 97   BUN 12   CREATININE 0.43*   CALCIUM 8.9     Recent Labs     05/24/21 1845   ALTSGPT 184*  "  ASTSGOT 497*   ALKPHOSPHAT 258*   TBILIRUBIN 2.1*   LIPASE 167*   GLUCOSE 97         No results for input(s): NTPROBNP in the last 72 hours.      No results for input(s): TROPONINT in the last 72 hours.    Imaging:  US-RUQ   Final Result      Stable echogenic liver which is most commonly secondary to steatosis            Assessment/Plan:  I anticipate this patient will require at least two midnights for appropriate medical management, necessitating inpatient admission.    * Alcohol dependence with withdrawal (HCC)- (present on admission)  Assessment & Plan  -H/o DT's and seizure 2/2 ETOH withdrawal.   -Seizure precaution  -[facility] protocol for symptom triggered therapy   -Librium for long acting benzodiazepine coverage  -Rally bag with daily PO vitamins to follow   -Check Magnesium, phosphorous  - consult      Alcohol-induced acute pancreatitis- (present on admission)  Assessment & Plan  Can start liquids when she wants to eat, advance as tolerated based on N/V abdominal pain.  IVF  Pain Control      COPD (chronic obstructive pulmonary disease) (HCC)- (present on admission)  Assessment & Plan  Per chart review: no PFTs on file, on 2 L of oxygen at home  Oxygen per guidelines      Abnormal LFTs- (present on admission)  Assessment & Plan  Suspecting 2/2 to continued daily ETOH abuse.   Check PT/INR.  Continue to monitor.  RUQ US shows: \"Stable echogenic liver which is most commonly secondary to steatosis\"          "

## 2021-05-25 NOTE — ED NOTES
Report received from JARETT Tran. POC discussed.     Legal hold in chart.     1:1 sitter at bedside. All legal hold protocols and precautions in place.

## 2021-05-25 NOTE — PROGRESS NOTES
TRACEY at 0924 scored 11, 0.5 ml of Ativan IV given.   Seizure precaution in place. 1:1 sitter at bedside.

## 2021-05-25 NOTE — ED NOTES
Patient to [facility] 150 with RN x 2. All belongings with RNs. Patient on 2L NC. Chart with RNs. Fluids continued to floor.

## 2021-05-25 NOTE — PROGRESS NOTES
[facility] Medicine Daily Progress Note    Date of Service  5/25/2021    Chief Complaint  50 y.o. female admitted 5/24/2021 with suicidal ideation    Hospital Course  50-year-old female with a past medical history of recurrent alcoholic pancreatitis, alcohol abuse and alcohol withdrawals including seizures, history of bipolar disorder, polysubstance use with methamphetamines and alcohol, prior suicide attempt, COPD on 2 L home oxygen, presented 5/24/2021 with complaints of suicidal ideation due to her chronic alcohol use.  Patient was admitted found to be in acute alcohol intoxication, elevated POC breathalyzer 0.292.  Patient on admission was more somnolent but arousable with sternal rub.  Patient came in with a legal hold due to complaints of suicidal ideation, suicidal plan by taking overdose of pills she has at home.    Interval Problem Update  5/25/2021-patient seen and evaluated today, labs and notes reviewed.  Patient stated she had continued suicidal thoughts, with potential plan of using her pills at home.  Patient was showing signs of acute alcohol withdrawal today, tremors and tachycardia.  Patient complained of nausea.  Psychiatry evaluated patient today given she her legal hold status.  CIWA scores ranging between 11-13 since admission.  Patient requiring Ativan and Librium.  She stated her last drink was the day she came to the hospital.    Consultants/Specialty  Psychiatry    Code Status  Full Code    Disposition  To be determined, patient in acute alcohol withdrawal requiring IV Ativan    Review of Systems  Review of Systems   Constitutional: Negative for chills, fever and malaise/fatigue.   Respiratory: Negative for cough and shortness of breath.    Cardiovascular: Negative for chest pain and palpitations.   Gastrointestinal: Positive for nausea. Negative for abdominal pain, constipation, diarrhea and vomiting.   Musculoskeletal: Negative for back pain and joint pain.   Neurological: Positive for  tremors and headaches. Negative for dizziness.   Psychiatric/Behavioral: Positive for suicidal ideas.      Physical Exam  Temp:  [36.2 °C (97.2 °F)-37.1 °C (98.8 °F)] 37.1 °C (98.7 °F)  Pulse:  [] 119  Resp:  [12-22] 22  BP: (104-136)/(57-95) 120/72  SpO2:  [91 %-99 %] 94 %    Physical Exam  Vitals and nursing note reviewed.   Constitutional:       General: She is in acute distress (Due to withdrawal tremors).      Appearance: She is obese. She is not ill-appearing.   Cardiovascular:      Rate and Rhythm: Regular rhythm. Tachycardia present.      Pulses: Normal pulses.      Heart sounds: Normal heart sounds. No murmur heard.     Pulmonary:      Effort: Pulmonary effort is normal. No respiratory distress.      Breath sounds: Normal breath sounds. No wheezing.   Abdominal:      General: Abdomen is flat. Bowel sounds are normal. There is no distension.      Palpations: Abdomen is soft.      Tenderness: There is no abdominal tenderness.   Musculoskeletal:         General: No swelling or tenderness. Normal range of motion.   Skin:     General: Skin is warm.      Capillary Refill: Capillary refill takes less than 2 seconds.      Coloration: Skin is not jaundiced.      Findings: No erythema.   Neurological:      General: No focal deficit present.      Mental Status: She is alert and oriented to person, place, and time. Mental status is at baseline.      GCS: GCS eye subscore is 4. GCS verbal subscore is 5. GCS motor subscore is 6.      Motor: Tremor present.   Psychiatric:         Behavior: Behavior normal.         Thought Content: Thought content normal.         Judgment: Judgment normal.      Comments: Stated she has positive SI by taking pills she was at home.  Depressed mood.         Fluids    Intake/Output Summary (Last 24 hours) at 5/25/2021 1152  Last data filed at 5/25/2021 0722  Gross per 24 hour   Intake 2966.67 ml   Output --   Net 2966.67 ml       Laboratory  Recent Labs     05/24/21  1845 05/25/21  0202    WBC 3.1* 2.8*   RBC 4.25 2.95*   HEMOGLOBIN 14.1 10.1*   HEMATOCRIT 43.4 30.0*   .1* 101.7*   MCH 33.2* 34.2*   MCHC 32.5* 33.7   RDW 50.3* 51.3*   PLATELETCT 91* 55*   MPV 9.1 9.2     Recent Labs     05/24/21  1845 05/25/21  0209   SODIUM 136 132*   POTASSIUM 3.9 3.3*   CHLORIDE 89* 94*   CO2 27 24   GLUCOSE 97 158*   BUN 12 6*   CREATININE 0.43* 0.28*   CALCIUM 8.9 7.3*     Recent Labs     05/25/21  0209   INR 1.00               Imaging  US-RUQ   Final Result      Stable echogenic liver which is most commonly secondary to steatosis           Assessment/Plan  * Alcohol dependence with withdrawal (HCC)- (present on admission)  Assessment & Plan  Hx DT's and seizure 2/2 ETOH withdrawal.  Patient is a persistent alcohol user.    Prefers vodka, drinks half pint usually daily, obtains her drinks from her roommate.  Patient's last drink was on the day of admission, as stated by the patient.  Alcohol cessation education provided to patient.  Patient stated she wants to stop and this is what makes her feel suicidal as she is unable to stop.  -Seizure precaution  -[facility] protocol for symptom triggered therapy   -Librium for long acting benzodiazepine coverage  - PO vitamins, patient was given detox IV bag with thiamine.  -Check Magnesium, phosphorous daily  - consult    Alcohol-induced acute pancreatitis- (present on admission)  Assessment & Plan  Patient continues to complain about nausea, but denied any vomiting.  Trialing patient on solid foods  IVF  Pain Control    Depression with suicidal ideation- (present on admission)  Assessment & Plan  Patient presented with suicidal ideation, plan to potentially overdose on her home medications.  Patient was brought in with a legal hold  -Psychiatry consultation pending recommendation    Abnormal LFTs- (present on admission)  Assessment & Plan  Suspecting 2/2 to continued daily ETOH abuse and acute alcohol intoxication on admission.  Continue to  "monitor.  RUQ US shows: \"Stable echogenic liver which is most commonly secondary to steatosis\"  Patient likely has fatty liver disease from alcohol use.    COPD (chronic obstructive pulmonary disease) (HCC)- (present on admission)  Assessment & Plan  Per chart review: no PFTs on file, on 2 L of oxygen at home.  No signs of acute exacerbation at this time on admission.  Oxygen per guidelines  Monitor respiratory status    Vitamin D deficiency- (present on admission)  Assessment & Plan  Patient's vitamin D 25 level 11 on 4/21  Vitamin B12 and iron at appropriate levels.  Continue patient on supplementation, 50,000 IU weekly p.o.    Hypokalemia and hypomagnesemia  Assessment & Plan  Patient's potassium low today 3.3.  Likely due to poor nutrition due to severe alcohol use.  Magnesium low on admission 1.9.  Replacing via IV given patient's nausea  Recheck potassium and magnesium daily     VTE prophylaxis: Lovenox  "

## 2021-05-25 NOTE — RESPIRATORY CARE
COPD EDUCATION by COPD CLINICAL EDUCATOR  5/25/2021 at 3:01 PM by Prince Reed RRT     Patient reviewed by COPD education team. Patient is unable engage in any conversation at this time. We will check back again later.

## 2021-05-25 NOTE — ED NOTES
"Patient complaining of anxiety and \"shakes.\" Patient has visible tremors at this time.     Discussed with ERP. Orders placed.     1:1 sitter continues to be at bedside  "

## 2021-05-25 NOTE — ASSESSMENT & PLAN NOTE
Patient's vitamin D 25 level 11 on 4/21  Vitamin B12 and iron at appropriate levels.  Continue patient on supplementation, 50,000 IU weekly p.o.

## 2021-05-25 NOTE — ED NOTES
Patient continues to be on all monitors. VSS. Respirations even and unlabored.     1:1 sitter continues to be at bedside.

## 2021-05-25 NOTE — ASSESSMENT & PLAN NOTE
Patient presented with suicidal ideation, plan to potentially overdose on her home medications.  Patient was brought in with a legal hold  Legal hold discontinued.

## 2021-05-25 NOTE — ED PROVIDER NOTES
"ED Provider Note    Scribed for Anjali Gambino M.D. by Aleyda Lou. 5/24/2021, 5:54 PM.    Primary care provider: None noted  Means of arrival: EMS  History obtained from: Patient  History limited by: None    CHIEF COMPLAINT  Chief Complaint   Patient presents with   • Suicidal Ideation     Patient has been having thoughts \"for a while\" of not wanting to live anymore. Patient states \"she has a plan to take all her pills but she didn't have enough\". Pt has a hx of SI. Pt on legal hold upon arrival.    • Alcohol Intoxication     Per EMS, pts roomate says \"she has been lying on the floor drinking for the last 3 days\". Pt drinks 1-2 pints of vodka a day. Pt has drank \"a couple shots today\".       HPI  Olya Youssef is a 50 y.o. female who presents to the Emergency Department for evaluation of suicidal ideation that has been gradually worsening over the past few days. The patient is currently intoxicated, and has a history of alcohol abuse as well. She endorses associated epigastric pain that radiates to the back.  Patient reports that the pain has been constant for 3 days and this is why she has been drinking more heavily. She has no surgical history on the abdomen. She takes a daily regimen of Gabapentin and Prozac for history of mental health noted below. She is not experiencing any COVID-19 symptoms at this time.     Per chart review patient has a history of recurrent pancreatitis and alcohol abuse.    REVIEW OF SYSTEMS  Review of Systems   Gastrointestinal: Positive for abdominal pain.   Musculoskeletal: Positive for back pain.   Neurological: Positive for headaches.   Psychiatric/Behavioral: Positive for suicidal ideas.   All other systems reviewed and are negative.    PAST MEDICAL HISTORY   has a past medical history of Alcohol abuse, Alcoholism (HCC), Anxiety, Backpain, Bipolar disorder, unspecified (HCC), Bronchitis, Drug abuse (HCC), Hepatitis, alcoholic, Hypertension, Indigestion, Infectious disease " (MRSA), Liver disease, Pancreatitis chronic, Pneumonia, Psychiatric disorder, Renal disorder, Seizure (HCC), Seizure disorder (HCC), Unspecified hemorrhagic conditions, and Unspecified urinary incontinence.    SURGICAL HISTORY   has a past surgical history that includes gastroscopy (4/28/2015).    SOCIAL HISTORY  Social History     Tobacco Use   • Smoking status: Never Smoker   • Smokeless tobacco: Never Used   Vaping Use   • Vaping Use: Never used   Substance Use Topics   • Alcohol use: Yes     Comment: Hx heavy drinking apint in a 24 hr period   • Drug use: Yes     Comment: meth/THC      Social History     Substance and Sexual Activity   Drug Use Yes    Comment: meth/THC       FAMILY HISTORY  Family History   Problem Relation Age of Onset   • Lung Disease Mother    • Cancer Mother        CURRENT MEDICATIONS  Scheduled Medications   Medication Dose Frequency   • LORazepam  2 mg Once   • famotidine  20 mg DAILY   • FLUoxetine  30 mg DAILY   • gabapentin  300 mg TID   • senna-docusate  2 tablet BID   • enoxaparin  40 mg DAILY   • chlordiazePOXIDE  50 mg Q6HRS    Followed by   • [START ON 5/26/2021] chlordiazePOXIDE  25 mg Q6HRS   • thiamine  100 mg DAILY    And   • multivitamin  1 tablet DAILY    And   • folic acid  1 mg DAILY     ALLERGIES  Allergies   Allergen Reactions   • Bactrim Hives   • Lamotrigine [Lamictal] Hives   • Quetiapine Fumarate Hives     Extrapyramidal Symptoms   • Keflex Hives       PHYSICAL EXAM  VITAL SIGNS: /89   Pulse (!) 103   Temp 36.4 °C (97.6 °F) (Temporal)   Resp 12   Ht 1.524 m (5')   Wt 61.2 kg (135 lb)   LMP 02/28/2018   SpO2 95%   BMI 26.37 kg/m²   Vitals reviewed by myself.  Nursing note and vitals reviewed.  Constitutional: Well-developed and well-nourished. No distress.   HENT: Head is normocephalic and atraumatic. Oropharynx is clear and moist without exudate or erythema.   Eyes: Pupils are equal, round, and reactive to light. No horizontal or vertical nystagmus.  Conjunctiva are normal.   Cardiovascular: Normal rate and regular rhythm. No murmur heard. Normal radial pulses.  Pulmonary/Chest: Breath sounds normal. No wheezes or rales.   Abdominal: Mild epigastric tenderness. Soft. No distention.    Musculoskeletal: Extremities exhibit normal range of motion without edema or tenderness. Patient ambulates with a normal narrow-based steady gait.   Neurological: Awake, alert and oriented to person, place, and time. No focal deficits noted. Cranial nerves II - XII intact. No pronator drift.  No dysmetria on cerebellar testing. Normal speech and language. Normal strength and sensation in bilateral upper and lower extremities.   Skin: Skin is warm and dry. No rash.   Psychiatric: Normal mood and affect. Appropriate for clinical situation.    DIAGNOSTIC STUDIES /  LABS  Labs Reviewed   CBC WITH DIFFERENTIAL - Abnormal; Notable for the following components:       Result Value    WBC 3.1 (*)     .1 (*)     MCH 33.2 (*)     MCHC 32.5 (*)     RDW 50.3 (*)     Platelet Count 91 (*)     Immature Granulocytes 2.00 (*)     Neutrophils (Absolute) 1.93 (*)     Lymphs (Absolute) 0.73 (*)     All other components within normal limits   COMP METABOLIC PANEL - Abnormal; Notable for the following components:    Chloride 89 (*)     Anion Gap 20.0 (*)     Creatinine 0.43 (*)     AST(SGOT) 497 (*)     ALT(SGPT) 184 (*)     Alkaline Phosphatase 258 (*)     Total Bilirubin 2.1 (*)     All other components within normal limits   LIPASE - Abnormal; Notable for the following components:    Lipase 167 (*)     All other components within normal limits   POC BREATHALIZER - Abnormal; Notable for the following components:    POC Breathalizer 0.292 (*)     All other components within normal limits   ESTIMATED GFR   SARS-COV-2, PCR (IN-HOUSE)    Narrative:     Have you been in close contact with a person who is suspected  or known to be positive for COVID-19 within the last 30 days  (e.g. last seen that  person < 30 days ago)->No   MAGNESIUM   PHOSPHORUS   URINE DRUG SCREEN   PROTHROMBIN TIME   CBC WITH DIFFERENTIAL   COMP METABOLIC PANEL       All labs reviewed by me.    REASSESSMENT  I verified that the patient was wearing a mask and I was wearing appropriate PPE every time I entered the room. The patient's mask was on the patient at all times during my encounter except for a brief view of the oropharynx. Ordered labs to evaluate the patient's symptoms.     8:35 PM- Patient was reevaluated at bedside. Discussed lab and radiology results with the patient.    8:48 PM- Repeat Ativan will not be administered. Patient is relatively sleepy.     8:49 PM I discussed the patient's case and the above findings with Dr. Avendano (hospitalist) who agrees to hospitalize the patient and take over the plan of care.    COURSE & MEDICAL DECISION MAKING  Nursing notes, VS, PMSFHx reviewed in chart.    Patient is a 50-year-old female who comes in for evaluation of abdominal pain, suicidal ideation and alcohol intoxication.  Differential diagnosis includes gastritis, pancreatitis, alcohol intoxication, alcohol withdrawal, depression, situational anxiety.  Diagnostic work-up includes labs.    Patient's initial vitals are within normal limits.  She is treated with IV fluids, Zofran, GI cocktail and Ativan after which she feels improved.  Breathalyzer is 0.29 on arrival.  Patient was initiated on a legal hold given her suicidal ideation, although she does not have a clear plan.  Labs returned are notable for elevated mildly elevated lipase and LFTs which appears to be chronic for patient.  Her bilirubin is more elevated this time than her baseline therefore I elected to obtain a right upper quadrant ultrasound to assess for obstructive biliary pathology.  Ultrasound returns demonstrates no acute obstructive pathology.  Given her abdominal pain and pancreatitis secondary to alcohol intoxication plan will be to hospitalize patient for  ongoing management.  She will also need further work-up of her suicidal ideation when she is clinically sober.  Discussed the case with Dr. Avendano has accepted patient for hospitalization.  Patient is in guarded condition.    HYDRATION: Based on the patient's presentation of Other Nausea and emesis the patient was given IV fluids. IV Hydration was used because oral hydration was not adequate alone. Upon recheck following hydration, the patient was improved.    DISPOSITION:  Patient will be hospitalized by Dr. Avendano in guarded condition.    FINAL IMPRESSION  1. Alcohol-induced chronic pancreatitis (HCC)    2. Alcoholic intoxication without complication (HCC)    3. Abdominal pain, unspecified abdominal location    4. Suicidal ideation            Aleyda LUNA (Scribe), am scribing for, and in the presence of, Anjali Gambino M.D..    Electronically signed by: Aleyda Lou (Neal), 5/24/2021    Anjali LUNA M.D. personally performed the services described in this documentation, as scribed by Aleyda Lou in my presence, and it is both accurate and complete.    The note accurately reflects work and decisions made by me.  Anjali Gambino M.D.  5/25/2021  1:43 AM

## 2021-05-25 NOTE — ASSESSMENT & PLAN NOTE
Patient continues to complain about nausea, but denied any vomiting.  On solid foods  Pain Control

## 2021-05-25 NOTE — PROGRESS NOTES
2 RN Skin Check    2 RN skin check complete with Charge RNElizabeth.   Devices in place: Nasal Cannula.  Skin assessed under devices: yes.  Confirmed pressure ulcers found on: n/a .  New potential pressure ulcers noted on n/a   . Wound consult placed N/A.  The following interventions in place Pillows.   Patient's skin integrity is dry and intact, overall unremarkable. Patient able to turn self freely. Pillows given for comfort and support.

## 2021-05-25 NOTE — ED NOTES
[facility] tech at bedside speaking with patient. Patient lethargic at this time.     1:1 sitter continues to be at bedside.

## 2021-05-25 NOTE — ASSESSMENT & PLAN NOTE
Per chart review: no PFTs on file, on 2 L of oxygen at home.  No signs of acute exacerbation at this time on admission.  Oxygen per guidelines  Monitor respiratory status

## 2021-05-25 NOTE — ASSESSMENT & PLAN NOTE
Patient's potassium low today 3.3.  Likely due to poor nutrition due to severe alcohol use.  Magnesium low on admission 1.9.  Replacing via IV given patient's nausea  Recheck potassium and magnesium daily

## 2021-05-25 NOTE — ASSESSMENT & PLAN NOTE
"Suspecting 2/2 to continued daily ETOH abuse and acute alcohol intoxication on admission.  RUQ US shows: \"Stable echogenic liver which is most commonly secondary to steatosis\"  Patient likely has fatty liver disease from alcohol use.  5/25 - No Discriminant Function = 8, no indication for steroids  GGT 2572  HIDA scan - 13% ejection fraction  5/26 - Consulted [facility] for evaluation of rising LFTs, elevated GGT.     --- No update by GI until 5/30 - recommended general surgery evaluation.  5/31 - Consulted General Surgery Dr. Chavez, patient is not a candidate for any cholecystectomy due to her alcoholic hepatitis on this admission, likely biliary dyskinesia, recommended low fat diet and outpt evaluation for any possible cholecystectomy.   --- Patient's LFTs improving.  , ALT 80,     Continue to monitor, recheck CMP daily  Trial back librium 25mg po daily (5/31)  Started her on hydroxyzine for anxiety.  "

## 2021-05-25 NOTE — CONSULTS
Alert Team:    Patient to be admitted to hospital; unable to initiate consult in ED due to department acuity and current patient intoxication. IP Psychiatry consult ordered for f/u.

## 2021-05-25 NOTE — ED NOTES
Med rec updated and complete. Allergies reviewed. Met with pt at bedside. Pt confirmed name and date of birth.    Home pharmacy [address] [phone]

## 2021-05-25 NOTE — PROGRESS NOTES
"Pt arrived to unit drowsy but arousable to voice. Aox2, confused about how she got here and the date. Patient admitted to suicidal thoughts and plan to take pills but reiterated that she did not do that. Patient admitted to drinking heaviily the last few days, about 1 pint per day but stated she does drink regularly but wants to quit. Patient stated she was suicidal due to multiple hospital admissions recently and feeling sick for the last 2 months and \"I just want to get better.\" pt on 2L oxygen and telemonitored with 1:1 sitter and legal hold in place. Pt swallowed pills and is cooperative. Patient's bed low and locked, belongings removed, and all questions addressed. Will continue to monitor.   "

## 2021-05-25 NOTE — ED TRIAGE NOTES
"Chief Complaint   Patient presents with   • Suicidal Ideation     Patient has been having thoughts \"for a while\" of not wanting to live anymore. Patient states \"she has a plan to take all her pills but she didn't have enough\". Pt has a hx of SI. Pt on legal hold upon arrival.    • Alcohol Intoxication     Per EMS, pts roomate says \"she has been lying on the floor drinking for the last 3 days\". Pt drinks 1-2 pints of vodka a day. Pt has drank \"a couple shots today\".     Patient was BIB EMS for the above complaint. Patient's belongings removed from room. Legal hold placed in chart. Chart up for ERP.   "

## 2021-05-25 NOTE — ASSESSMENT & PLAN NOTE
Hx DT's and seizure 2/2 ETOH withdrawal.  Patient is a persistent alcohol user.    Prefers vodka, drinks half pint usually daily, obtains her drinks from her roommate.  Patient's last drink was on the day of admission, as stated by the patient.  Alcohol cessation education provided to patient.  Patient stated she wants to stop and this is what makes her feel suicidal as she is unable to stop.  -Seizure precaution  -Librium for long acting benzodiazepine coverage  - PO vitamins, patient was given detox IV bag with thiamine.  -Check Magnesium daily  - consult    Discontinued [facility] protocol on June 1, 2021.

## 2021-05-25 NOTE — CARE PLAN
The patient is Stable - Low risk of patient condition declining or worsening         Progress made toward(s) clinical / shift goals:  Psychologist, 1:1 sitter in place, monitor CIWA.     Patient is not progressing towards the following goals:

## 2021-05-25 NOTE — ED NOTES
Report called to JARETT Robb. POC discussed. RNs to be down shortly for patient.     1:1 sitter continues to be at bedside.

## 2021-05-25 NOTE — ED NOTES
ERP called to bedside. Patient only responsive to sternal rub. Bolus initiated. Patient on monitors. VSS.
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
## 2021-05-26 PROBLEM — D72.819 LEUKOCYTOPENIA: Status: ACTIVE | Noted: 2021-04-18

## 2021-05-26 LAB
ALBUMIN SERPL BCP-MCNC: 3.4 G/DL (ref 3.2–4.9)
ALBUMIN/GLOB SERPL: 1.3 G/DL
ALP SERPL-CCNC: 200 U/L (ref 30–99)
ALT SERPL-CCNC: 163 U/L (ref 2–50)
ANION GAP SERPL CALC-SCNC: 9 MMOL/L (ref 7–16)
AST SERPL-CCNC: 442 U/L (ref 12–45)
BILIRUB SERPL-MCNC: 2 MG/DL (ref 0.1–1.5)
BUN SERPL-MCNC: 5 MG/DL (ref 8–22)
CALCIUM SERPL-MCNC: 9.1 MG/DL (ref 8.5–10.5)
CHLORIDE SERPL-SCNC: 100 MMOL/L (ref 96–112)
CO2 SERPL-SCNC: 31 MMOL/L (ref 20–33)
CREAT SERPL-MCNC: 0.31 MG/DL (ref 0.5–1.4)
ERYTHROCYTE [DISTWIDTH] IN BLOOD BY AUTOMATED COUNT: 53 FL (ref 35.9–50)
GGT SERPL-CCNC: 2572 U/L (ref 7–34)
GLOBULIN SER CALC-MCNC: 2.6 G/DL (ref 1.9–3.5)
GLUCOSE SERPL-MCNC: 112 MG/DL (ref 65–99)
HAV IGM SERPL QL IA: NORMAL
HBV CORE IGM SER QL: NORMAL
HBV SURFACE AG SER QL: NORMAL
HCT VFR BLD AUTO: 31.3 % (ref 37–47)
HCV AB SER QL: NORMAL
HGB BLD-MCNC: 10.2 G/DL (ref 12–16)
LIPASE SERPL-CCNC: 90 U/L (ref 11–82)
MAGNESIUM SERPL-MCNC: 1.7 MG/DL (ref 1.5–2.5)
MCH RBC QN AUTO: 34.6 PG (ref 27–33)
MCHC RBC AUTO-ENTMCNC: 32.6 G/DL (ref 33.6–35)
MCV RBC AUTO: 106.1 FL (ref 81.4–97.8)
PHOSPHATE SERPL-MCNC: 3.1 MG/DL (ref 2.5–4.5)
PLATELET # BLD AUTO: 52 K/UL (ref 164–446)
PMV BLD AUTO: 9.7 FL (ref 9–12.9)
POTASSIUM SERPL-SCNC: 3.6 MMOL/L (ref 3.6–5.5)
PROT SERPL-MCNC: 6 G/DL (ref 6–8.2)
RBC # BLD AUTO: 2.95 M/UL (ref 4.2–5.4)
SODIUM SERPL-SCNC: 140 MMOL/L (ref 135–145)
TSH SERPL DL<=0.005 MIU/L-ACNC: 1.94 UIU/ML (ref 0.38–5.33)
WBC # BLD AUTO: 1.4 K/UL (ref 4.8–10.8)

## 2021-05-26 PROCEDURE — 80053 COMPREHEN METABOLIC PANEL: CPT

## 2021-05-26 PROCEDURE — A9270 NON-COVERED ITEM OR SERVICE: HCPCS | Performed by: STUDENT IN AN ORGANIZED HEALTH CARE EDUCATION/TRAINING PROGRAM

## 2021-05-26 PROCEDURE — 99233 SBSQ HOSP IP/OBS HIGH 50: CPT | Performed by: INTERNAL MEDICINE

## 2021-05-26 PROCEDURE — 700111 HCHG RX REV CODE 636 W/ 250 OVERRIDE (IP): Performed by: INTERNAL MEDICINE

## 2021-05-26 PROCEDURE — 82977 ASSAY OF GGT: CPT

## 2021-05-26 PROCEDURE — A9270 NON-COVERED ITEM OR SERVICE: HCPCS | Performed by: INTERNAL MEDICINE

## 2021-05-26 PROCEDURE — 700111 HCHG RX REV CODE 636 W/ 250 OVERRIDE (IP): Performed by: STUDENT IN AN ORGANIZED HEALTH CARE EDUCATION/TRAINING PROGRAM

## 2021-05-26 PROCEDURE — 84100 ASSAY OF PHOSPHORUS: CPT

## 2021-05-26 PROCEDURE — 770020 HCHG ROOM/CARE - TELE (206)

## 2021-05-26 PROCEDURE — 84443 ASSAY THYROID STIM HORMONE: CPT

## 2021-05-26 PROCEDURE — 85027 COMPLETE CBC AUTOMATED: CPT

## 2021-05-26 PROCEDURE — 700102 HCHG RX REV CODE 250 W/ 637 OVERRIDE(OP): Performed by: INTERNAL MEDICINE

## 2021-05-26 PROCEDURE — 700105 HCHG RX REV CODE 258: Performed by: STUDENT IN AN ORGANIZED HEALTH CARE EDUCATION/TRAINING PROGRAM

## 2021-05-26 PROCEDURE — 83690 ASSAY OF LIPASE: CPT

## 2021-05-26 PROCEDURE — 36415 COLL VENOUS BLD VENIPUNCTURE: CPT

## 2021-05-26 PROCEDURE — 83735 ASSAY OF MAGNESIUM: CPT

## 2021-05-26 PROCEDURE — 80074 ACUTE HEPATITIS PANEL: CPT

## 2021-05-26 PROCEDURE — 700102 HCHG RX REV CODE 250 W/ 637 OVERRIDE(OP): Performed by: STUDENT IN AN ORGANIZED HEALTH CARE EDUCATION/TRAINING PROGRAM

## 2021-05-26 RX ORDER — OXYCODONE HYDROCHLORIDE 5 MG/1
5 TABLET ORAL EVERY 6 HOURS PRN
Status: DISPENSED | OUTPATIENT
Start: 2021-05-26 | End: 2021-05-28

## 2021-05-26 RX ORDER — MAGNESIUM SULFATE 1 G/100ML
1 INJECTION INTRAVENOUS ONCE
Status: COMPLETED | OUTPATIENT
Start: 2021-05-26 | End: 2021-05-26

## 2021-05-26 RX ADMIN — OXYCODONE 5 MG: 5 TABLET ORAL at 05:28

## 2021-05-26 RX ADMIN — LORAZEPAM 0.5 MG: 1 TABLET ORAL at 17:53

## 2021-05-26 RX ADMIN — FOLIC ACID 1 MG: 1 TABLET ORAL at 04:54

## 2021-05-26 RX ADMIN — THERA TABS 1 TABLET: TAB at 04:54

## 2021-05-26 RX ADMIN — LORAZEPAM 2 MG: 2 TABLET ORAL at 20:00

## 2021-05-26 RX ADMIN — Medication 5 MG: at 20:00

## 2021-05-26 RX ADMIN — PROMETHAZINE HYDROCHLORIDE 25 MG: 25 TABLET ORAL at 05:27

## 2021-05-26 RX ADMIN — OXYCODONE 5 MG: 5 TABLET ORAL at 23:43

## 2021-05-26 RX ADMIN — LORAZEPAM 0.5 MG: 1 TABLET ORAL at 13:45

## 2021-05-26 RX ADMIN — SODIUM CHLORIDE, POTASSIUM CHLORIDE, SODIUM LACTATE AND CALCIUM CHLORIDE: 600; 310; 30; 20 INJECTION, SOLUTION INTRAVENOUS at 12:48

## 2021-05-26 RX ADMIN — LORAZEPAM 1 MG: 1 TABLET ORAL at 05:27

## 2021-05-26 RX ADMIN — GABAPENTIN 300 MG: 300 CAPSULE ORAL at 17:53

## 2021-05-26 RX ADMIN — MAGNESIUM SULFATE 1 G: 1 INJECTION INTRAVENOUS at 09:34

## 2021-05-26 RX ADMIN — ACETAMINOPHEN 650 MG: 325 TABLET, FILM COATED ORAL at 20:00

## 2021-05-26 RX ADMIN — CHLORDIAZEPOXIDE HYDROCHLORIDE 25 MG: 25 CAPSULE ORAL at 04:54

## 2021-05-26 RX ADMIN — GABAPENTIN 300 MG: 300 CAPSULE ORAL at 12:18

## 2021-05-26 RX ADMIN — LORAZEPAM 0.5 MG: 1 TABLET ORAL at 23:36

## 2021-05-26 RX ADMIN — PROMETHAZINE HYDROCHLORIDE 25 MG: 25 TABLET ORAL at 20:58

## 2021-05-26 RX ADMIN — GABAPENTIN 300 MG: 300 CAPSULE ORAL at 04:54

## 2021-05-26 RX ADMIN — FAMOTIDINE 20 MG: 20 TABLET ORAL at 04:54

## 2021-05-26 RX ADMIN — LORAZEPAM 0.5 MG: 1 TABLET ORAL at 09:33

## 2021-05-26 RX ADMIN — ENOXAPARIN SODIUM 40 MG: 40 INJECTION SUBCUTANEOUS at 04:54

## 2021-05-26 RX ADMIN — FLUOXETINE 30 MG: 20 CAPSULE ORAL at 05:27

## 2021-05-26 RX ADMIN — DOCUSATE SODIUM 50 MG AND SENNOSIDES 8.6 MG 2 TABLET: 8.6; 5 TABLET, FILM COATED ORAL at 17:53

## 2021-05-26 RX ADMIN — THIAMINE HCL TAB 100 MG 100 MG: 100 TAB at 04:54

## 2021-05-26 ASSESSMENT — LIFESTYLE VARIABLES
AGITATION: NORMAL ACTIVITY
PAROXYSMAL SWEATS: NO SWEAT VISIBLE
PAROXYSMAL SWEATS: BARELY PERCEPTIBLE SWEATING. PALMS MOIST
ANXIETY: NO ANXIETY (AT EASE)
TOTAL SCORE: 7
PAROXYSMAL SWEATS: NO SWEAT VISIBLE
VISUAL DISTURBANCES: VERY MILD SENSITIVITY
TREMOR: *
AUDITORY DISTURBANCES: NOT PRESENT
HEADACHE, FULLNESS IN HEAD: MILD
AGITATION: NORMAL ACTIVITY
TOTAL SCORE: 12
HEADACHE, FULLNESS IN HEAD: MODERATELY SEVERE
VISUAL DISTURBANCES: NOT PRESENT
TOTAL SCORE: 9
NAUSEA AND VOMITING: MILD NAUSEA WITH NO VOMITING
ANXIETY: NO ANXIETY (AT EASE)
TOTAL SCORE: 6
NAUSEA AND VOMITING: NO NAUSEA AND NO VOMITING
HEADACHE, FULLNESS IN HEAD: NOT PRESENT
NAUSEA AND VOMITING: NO NAUSEA AND NO VOMITING
AGITATION: NORMAL ACTIVITY
HEADACHE, FULLNESS IN HEAD: VERY MILD
HEADACHE, FULLNESS IN HEAD: NOT PRESENT
PAROXYSMAL SWEATS: NO SWEAT VISIBLE
TREMOR: MODERATE TREMOR WITH ARMS EXTENDED
VISUAL DISTURBANCES: NOT PRESENT
NAUSEA AND VOMITING: MILD NAUSEA WITH NO VOMITING
ORIENTATION AND CLOUDING OF SENSORIUM: ORIENTED AND CAN DO SERIAL ADDITIONS
AGITATION: NORMAL ACTIVITY
NAUSEA AND VOMITING: *
HEADACHE, FULLNESS IN HEAD: VERY MILD
AGITATION: NORMAL ACTIVITY
ORIENTATION AND CLOUDING OF SENSORIUM: ORIENTED AND CAN DO SERIAL ADDITIONS
TREMOR: MODERATE TREMOR WITH ARMS EXTENDED
NAUSEA AND VOMITING: NO NAUSEA AND NO VOMITING
VISUAL DISTURBANCES: NOT PRESENT
PAROXYSMAL SWEATS: BARELY PERCEPTIBLE SWEATING. PALMS MOIST
TOTAL SCORE: 5
AUDITORY DISTURBANCES: NOT PRESENT
VISUAL DISTURBANCES: NOT PRESENT
NAUSEA AND VOMITING: NO NAUSEA AND NO VOMITING
TREMOR: TREMOR NOT VISIBLE BUT CAN BE FELT, FINGERTIP TO FINGERTIP
ORIENTATION AND CLOUDING OF SENSORIUM: DATE DISORIENTATION BY NO MORE THAN TWO CALENDAR DAYS
TOTAL SCORE: 7
TREMOR: MODERATE TREMOR WITH ARMS EXTENDED
PAROXYSMAL SWEATS: NO SWEAT VISIBLE
ORIENTATION AND CLOUDING OF SENSORIUM: ORIENTED AND CAN DO SERIAL ADDITIONS
ANXIETY: MILDLY ANXIOUS
AUDITORY DISTURBANCES: NOT PRESENT
AUDITORY DISTURBANCES: NOT PRESENT
ANXIETY: MILDLY ANXIOUS
HEADACHE, FULLNESS IN HEAD: VERY MILD
VISUAL DISTURBANCES: VERY MILD SENSITIVITY
AUDITORY DISTURBANCES: NOT PRESENT
AGITATION: NORMAL ACTIVITY
TOTAL SCORE: 4
ORIENTATION AND CLOUDING OF SENSORIUM: DATE DISORIENTATION BY NO MORE THAN TWO CALENDAR DAYS
TREMOR: MODERATE TREMOR WITH ARMS EXTENDED
ORIENTATION AND CLOUDING OF SENSORIUM: ORIENTED AND CAN DO SERIAL ADDITIONS
AGITATION: NORMAL ACTIVITY
ANXIETY: NO ANXIETY (AT EASE)
AUDITORY DISTURBANCES: NOT PRESENT
VISUAL DISTURBANCES: NOT PRESENT
ANXIETY: NO ANXIETY (AT EASE)
ANXIETY: MILDLY ANXIOUS
TREMOR: MODERATE TREMOR WITH ARMS EXTENDED
AUDITORY DISTURBANCES: NOT PRESENT
PAROXYSMAL SWEATS: NO SWEAT VISIBLE
ORIENTATION AND CLOUDING OF SENSORIUM: DATE DISORIENTATION BY NO MORE THAN TWO CALENDAR DAYS

## 2021-05-26 ASSESSMENT — ENCOUNTER SYMPTOMS
CONSTIPATION: 0
DIZZINESS: 0
CHILLS: 0
COUGH: 0
DIARRHEA: 0
TREMORS: 1
VOMITING: 0
DEPRESSION: 1
SHORTNESS OF BREATH: 0
NAUSEA: 1
PALPITATIONS: 0
ABDOMINAL PAIN: 1
HEADACHES: 1
FEVER: 0
BACK PAIN: 0

## 2021-05-26 ASSESSMENT — PAIN DESCRIPTION - PAIN TYPE
TYPE: ACUTE PAIN
TYPE: ACUTE PAIN

## 2021-05-26 NOTE — DIETARY
"Nutrition services: Day 2 of admit.  Olya Youssef is a 50 y.o. female with admitting DX of alcohol withdrawal syndrome.   Consult received for malnutrition screening tool score 3 (unsure wt loss, poor PO pta).     RD spoke with pt at bedside regarding wt/PO hx. Pt difficult to understand at times, seemed disengaged with questions, falling asleep during interview. Pt reported a usual body wt of 134 lb. Reported pta she was eating \"mostly fast food\", did describe PO as being less than normal - \"taking a few bites\" and then feeling full quickly. Currently pt notes a good appetite. States she would like to receive more finger foods d/t being shaky and unable to use utensils at this time. Agreed to adding high protein milkshake QD w/ dinner.     Assessment:  Height: 152.4 cm (5')  Weight: 61.2 kg (135 lb) - other healthcare provider.   Body mass index is 26.37 kg/m²., BMI classification: overweight  Diet/Intake: regular; PO Variable per flowsheets 0-75% x4 meals since admit.     Evaluation:   1. Pt presented with suicidal ideation and alcohol intoxication. Per H&P \"pts roommate says 'she has been lying on the floor drinking for the last 3 days' Pt drinks 1-2 pints of vodka a day.\"   2. Problem list/PMHx includes: bipolar disorder, methamphetamine abuse, COPD, alcohol abuse, HTN, liver disease, chronic pancreatitis, depression  3. +CIWA protocol in place.   4. Per chart review pt wt was 144 lb via bed scale on 4/20, 140 lb on 1/13. Unable to accurately assess wt trend without current measured wt in chart.   5. Per pt report eating less than normal pta, though unable to obtain quantification of PO intake at this time.   6. Pt appears well-nourished. No obvious visible signs of muscle/fat wasting noted.     Malnutrition Risk: unable to fully assess.     Recommendations/Plan:  1. RD will add high protein milkshake QD w/ dinner.    2. RD communicated with dietary staff to offer pt finger-foods at this time.   3. Encourage " intake of meals, milkshakes  4. Document intake of all meals, milkshakes as % taken in ADL's to provide interdisciplinary communication across all shifts.   5. Monitor weight. RD requested measured wt from RN/CNA as able.   6. Nutrition rep will continue to see patient for ongoing meal and snack preferences.     RD following.

## 2021-05-26 NOTE — PROGRESS NOTES
Hospital Medicine Daily Progress Note    Date of Service  5/26/2021    Chief Complaint  50 y.o. female admitted 5/24/2021 with suicidal ideation    Hospital Course  50-year-old female with a past medical history of recurrent alcoholic pancreatitis, alcohol abuse and alcohol withdrawals including seizures, history of bipolar disorder, polysubstance use with methamphetamines and alcohol, prior suicide attempt, COPD on 2 L home oxygen, presented 5/24/2021 with complaints of suicidal ideation due to her chronic alcohol use.  Patient was admitted found to be in acute alcohol intoxication, elevated POC breathalyzer 0.292.  Patient on admission was more somnolent but arousable with sternal rub.  Patient came in with a legal hold due to complaints of suicidal ideation, suicidal plan by taking overdose of pills she has at home.    Interval Problem Update  5/25/2021-patient seen and evaluated today, labs and notes reviewed.  Patient stated she had continued suicidal thoughts, with potential plan of using her pills at home.  Patient was showing signs of acute alcohol withdrawal today, tremors and tachycardia.  Patient complained of nausea.  Psychiatry evaluated patient today given she her legal hold status.  CIWA scores ranging between 11-13 since admission.  Patient requiring Ativan and Librium.  She stated her last drink was the day she came to the hospital.    5/26 - Patient's LFTs increasing today, was decreased yesterday compared to high levels on admission. RUQ obtained on admission did not show obstruction.  Patient in active withdrawal continued. Patient stated she has RUQ pain today, severe, painful to deep touch, localized 9/10.  Consulted [facility].    Consultants/Specialty  Psychiatry  [facility]    Code Status  Full Code    Disposition  To be determined, patient in acute alcohol withdrawal requiring IV Ativan    Review of Systems  Review of Systems    Constitutional: Negative for chills, fever and malaise/fatigue.   Respiratory: Negative for cough and shortness of breath.    Cardiovascular: Negative for chest pain and palpitations.   Gastrointestinal: Positive for abdominal pain and nausea. Negative for constipation, diarrhea and vomiting.   Musculoskeletal: Negative for back pain and joint pain.   Neurological: Positive for tremors and headaches. Negative for dizziness.   Psychiatric/Behavioral: Positive for depression. Negative for suicidal ideas.      Physical Exam  Temp:  [35.9 °C (96.6 °F)-37.3 °C (99.1 °F)] 36.2 °C (97.1 °F)  Pulse:  [] 97  Resp:  [12-22] 12  BP: (108-128)/(62-80) 119/78  SpO2:  [94 %-98 %] 97 %    Physical Exam  Vitals and nursing note reviewed.   Constitutional:       General: She is in acute distress (Due to withdrawal tremors).      Appearance: She is obese. She is ill-appearing.   Cardiovascular:      Rate and Rhythm: Regular rhythm. Tachycardia present.      Pulses: Normal pulses.      Heart sounds: Normal heart sounds. No murmur heard.     Pulmonary:      Effort: Pulmonary effort is normal. No respiratory distress.      Breath sounds: Normal breath sounds. No wheezing.   Abdominal:      General: There is no distension.      Tenderness: There is abdominal tenderness (RUQ).      Comments: Decreased bowel sounds   Musculoskeletal:         General: No swelling or tenderness. Normal range of motion.   Skin:     General: Skin is warm.      Capillary Refill: Capillary refill takes less than 2 seconds.      Coloration: Skin is not jaundiced.      Findings: No erythema.   Neurological:      General: No focal deficit present.      Mental Status: She is alert and oriented to person, place, and time. Mental status is at baseline.      GCS: GCS eye subscore is 4. GCS verbal subscore is 5. GCS motor subscore is 6.      Motor: No tremor.   Psychiatric:         Behavior: Behavior normal.         Thought Content: Thought content normal.          Judgment: Judgment normal.      Comments: Stated she has positive SI by taking pills she was at home.  Depressed mood.       Fluids    Intake/Output Summary (Last 24 hours) at 5/26/2021 1309  Last data filed at 5/25/2021 1700  Gross per 24 hour   Intake 1769.17 ml   Output 651 ml   Net 1118.17 ml       Laboratory  Recent Labs     05/24/21 1845 05/25/21  0209 05/26/21  0414   WBC 3.1* 2.8* 1.4*   RBC 4.25 2.95* 2.95*   HEMOGLOBIN 14.1 10.1* 10.2*   HEMATOCRIT 43.4 30.0* 31.3*   .1* 101.7* 106.1*   MCH 33.2* 34.2* 34.6*   MCHC 32.5* 33.7 32.6*   RDW 50.3* 51.3* 53.0*   PLATELETCT 91* 55* 52*   MPV 9.1 9.2 9.7     Recent Labs     05/24/21 1845 05/25/21  0209 05/26/21  0414   SODIUM 136 132* 140   POTASSIUM 3.9 3.3* 3.6   CHLORIDE 89* 94* 100   CO2 27 24 31   GLUCOSE 97 158* 112*   BUN 12 6* 5*   CREATININE 0.43* 0.28* 0.31*   CALCIUM 8.9 7.3* 9.1     Recent Labs     05/25/21  0209   INR 1.00               Imaging  US-RUQ   Final Result      Stable echogenic liver which is most commonly secondary to steatosis           Assessment/Plan  * Alcohol dependence with withdrawal (HCC)- (present on admission)  Assessment & Plan  Hx DT's and seizure 2/2 ETOH withdrawal.  Patient is a persistent alcohol user.    Prefers vodka, drinks half pint usually daily, obtains her drinks from her roommate.  Patient's last drink was on the day of admission, as stated by the patient.  Alcohol cessation education provided to patient.  Patient stated she wants to stop and this is what makes her feel suicidal as she is unable to stop.  -Seizure precaution  -[facility] protocol for symptom triggered therapy   -Librium for long acting benzodiazepine coverage  - PO vitamins, patient was given detox IV bag with thiamine.  -Check Magnesium, phosphorous daily  - consult    Abnormal LFTs- (present on admission)  Assessment & Plan  Suspecting 2/2 to continued daily ETOH abuse and acute alcohol intoxication on admission.  Patient's  "LFTs increasing today, was decreased yesterday compared to high levels on admission.    RUQ US shows: \"Stable echogenic liver which is most commonly secondary to steatosis\"  Patient likely has fatty liver disease from alcohol use.  5/25 - No Discriminant Function = 8, no indication for steroids    Continue to monitor  Holding librium  Consulted [facility], appreciate any further recommendations    Macrocytic anemia- (present on admission)  Assessment & Plan  Vit B12 and folate levels normal on prior check  Likely due to alcohol liver disease  Monitor for any bleeding    Leukocytopenia- (present on admission)  Assessment & Plan  Low WBC due to liver disorder  - monitor CBC with diff, no need for reverse isolation yet.    Alcohol-induced acute pancreatitis- (present on admission)  Assessment & Plan  Patient continues to complain about nausea, but denied any vomiting.  Trialing patient on solid foods  IVF  Pain Control    Depression with suicidal ideation- (present on admission)  Assessment & Plan  Patient presented with suicidal ideation, plan to potentially overdose on her home medications.  Patient was brought in with a legal hold  -Psychiatry consultation pending recommendation    Hypokalemia and hypomagnesemia  Assessment & Plan  Patient's potassium low today 3.3.  Likely due to poor nutrition due to severe alcohol use.  Magnesium low on admission 1.9.  Replacing via IV given patient's nausea  Recheck potassium and magnesium daily    Vitamin D deficiency- (present on admission)  Assessment & Plan  Patient's vitamin D 25 level 11 on 4/21  Vitamin B12 and iron at appropriate levels.  Continue patient on supplementation, 50,000 IU weekly p.o.    COPD (chronic obstructive pulmonary disease) (HCC)- (present on admission)  Assessment & Plan  Per chart review: no PFTs on file, on 2 L of oxygen at home.  No signs of acute exacerbation at this time on admission.  Oxygen per guidelines  Monitor " respiratory status     VTE prophylaxis: Lovenox

## 2021-05-26 NOTE — ASSESSMENT & PLAN NOTE
Vit B12 and folate levels normal on prior check  Likely due to alcohol liver disease  Monitor for any bleeding

## 2021-05-26 NOTE — CONSULTS
Gastroenterology Consult Note:    Mireille Dan M.D.  Date & Time note created:    5/26/2021   10:32 AM     Referring MD:  Dr. Dmoinguez    Patient ID:   Name:             Olya Youssef     YOB: 1970  Age:                 50 y.o.  female   MRN:               [id number]                                                             Reason for Consult:      Uptrending LFTs, etoh abuse    History of Present Illness:    Patient is a 49 y/o female with a PMH of etoh pancreatitis, etoh w/d , hepatic steatosis, diverticulosis, EGD 2015 noting hiatal hernia, esophagitis/gastritis who presented to the hospital with etoh intox. In my encounter with her she is non-participatory. She was in no distress during my encounter.    Chart review noted from admission states was experiencing abdominal pain that was moderate and cramping, with moderate back pain.     GI work-up on admission significant for lipase 167 and transaminitis. RUQ U/S indicated stable echogenic liver 2/2 steatosis, fatty liver disease 2/2 etoh abuse.  Last LFTS noted  (319 prior), [id number] (130 prior), Alk phos 200 (182 prior), total bilirubin 2.0 (1.1)    Review of Systems:      Unable to obtain, non-participatory with hx questions      Past Medical History:   Past Medical History:   Diagnosis Date   • Alcohol abuse    • Alcoholism (HCC)    • Anxiety    • Backpain    • Bipolar disorder, unspecified (HCC)    • Bronchitis    • Drug abuse (HCC)     meth, crack cocaine, THC   • Hepatitis, alcoholic    • Hypertension     controlled   • Indigestion    • Infectious disease MRSA   • Liver disease     pancreatitis   • Pancreatitis chronic     Flare-up   • Pneumonia    • Psychiatric disorder     suicidal in past   • Renal disorder     kidney infection 1991   • Seizure (HCC)     7/8/2011   • Seizure disorder (HCC)    • Unspecified hemorrhagic conditions    • Unspecified urinary incontinence          Past Surgical History:  Past Surgical History:    Procedure Laterality Date   • GASTROSCOPY  4/28/2015    Performed by Kevin Rendon M.D. at SUR[facility] Medications:    Current Facility-Administered Medications:   •  magnesium sulfate in D5W IVPB premix 1 g, 1 g, Intravenous, Once, Puneet Dominguez M.D., Last Rate: 100 mL/hr at 05/26/21 0934, 1 g at 05/26/21 0934  •  vitamin D (Ergocalciferol) (DRISDOL) capsule 50,000 Units, 50,000 Units, Oral, Q7 DAYS, Puneet Dominguez M.D., 50,000 Units at 05/25/21 2304  •  melatonin tablet 5 mg, 5 mg, Oral, Nightly, Puneet Dominguez M.D., 5 mg at 05/25/21 2039  •  famotidine (PEPCID) tablet 20 mg, 20 mg, Oral, DAILY, Armando Avendano M.D., 20 mg at 05/26/21 0454  •  FLUoxetine (PROZAC) capsule 30 mg, 30 mg, Oral, DAILY, Armando Avendano M.D., 30 mg at 05/26/21 0527  •  gabapentin (NEURONTIN) capsule 300 mg, 300 mg, Oral, TID, Armando Avendano M.D., 300 mg at 05/26/21 0454  •  senna-docusate (PERICOLACE or SENOKOT S) 8.6-50 MG per tablet 2 tablet, 2 tablet, Oral, BID, 2 tablet at 05/25/21 1715 **AND** polyethylene glycol/lytes (MIRALAX) PACKET 1 Packet, 1 Packet, Oral, QDAY PRN **AND** magnesium hydroxide (MILK OF MAGNESIA) suspension 30 mL, 30 mL, Oral, QDAY PRN **AND** bisacodyl (DULCOLAX) suppository 10 mg, 10 mg, Rectal, QDAY PRN, Armando Avendano M.D.  •  lactated ringers infusion, , Intravenous, Continuous, Armando Avendano M.D., Last Rate: 125 mL/hr at 05/25/21 1400, New Bag at 05/25/21 1400  •  enoxaparin (LOVENOX) inj 40 mg, 40 mg, Subcutaneous, DAILY, Armando Avendano M.D., 40 mg at 05/26/21 0454  •  acetaminophen (Tylenol) tablet 650 mg, 650 mg, Oral, Q6HRS PRN, Armando Avendano M.D., 650 mg at 05/25/21 1715  •  labetalol (NORMODYNE/TRANDATE) injection 10 mg, 10 mg, Intravenous, Q4HRS PRN, Armando Avendano M.D.  •  ondansetron (ZOFRAN) syringe/vial injection 4 mg, 4 mg, Intravenous, Q4HRS PRN, Armando Avendano M.D., 4 mg at 05/25/21 2035  •  ondansetron (ZOFRAN ODT)  dispertab 4 mg, 4 mg, Oral, Q4HRS PRN, Armando Avendano M.D.  •  promethazine (PHENERGAN) tablet 12.5-25 mg, 12.5-25 mg, Oral, Q4HRS PRN, Armando Avendano M.D., 25 mg at 05/26/21 0527  •  promethazine (PHENERGAN) suppository 12.5-25 mg, 12.5-25 mg, Rectal, Q4HRS PRN, Armando Avendano M.D.  •  prochlorperazine (COMPAZINE) injection 5-10 mg, 5-10 mg, Intravenous, Q4HRS PRN, Armando Avendano M.D.  •  LORazepam (ATIVAN) tablet 0.5 mg, 0.5 mg, Oral, Q4HRS PRN, Armando Avendano M.D., 0.5 mg at 05/26/21 0933  •  LORazepam (ATIVAN) tablet 1 mg, 1 mg, Oral, Q4HRS PRN, 1 mg at 05/26/21 0527 **OR** LORazepam (ATIVAN) injection 0.5 mg, 0.5 mg, Intravenous, Q4HRS PRN, Armando Avendano M.D.  •  LORazepam (ATIVAN) tablet 2 mg, 2 mg, Oral, Q2HRS PRN, 2 mg at 05/25/21 2252 **OR** LORazepam (ATIVAN) injection 1 mg, 1 mg, Intravenous, Q2HRS PRN, Armando Avendano M.D., 1 mg at 05/25/21 0952  •  LORazepam (ATIVAN) tablet 3 mg, 3 mg, Oral, Q HOUR PRN **OR** LORazepam (ATIVAN) injection 1.5 mg, 1.5 mg, Intravenous, Q HOUR PRN, Armando Avendano M.D.  •  LORazepam (ATIVAN) tablet 4 mg, 4 mg, Oral, Q15 MIN PRN **OR** LORazepam (ATIVAN) injection 2 mg, 2 mg, Intravenous, Q15 MIN PRN, Armando Avendano M.D.  •  [COMPLETED] detox IV 1000 mL (D5LR + magnesium 1 g + thiamine 100 mg + folic acid 1 mg) infusion, , Intravenous, Once, Last Rate: 250 mL/hr at 05/24/21 2253, 1,000 mL at 05/24/21 2253 **AND** thiamine (Vitamin B-1) tablet 100 mg, 100 mg, Oral, DAILY, 100 mg at 05/26/21 0454 **AND** multivitamin (THERAGRAN) tablet 1 tablet, 1 tablet, Oral, DAILY, 1 tablet at 05/26/21 0454 **AND** folic acid (FOLVITE) tablet 1 mg, 1 mg, Oral, DAILY, Armando Avendano M.D., 1 mg at 05/26/21 0454  •  ipratropium-albuterol (DUONEB) nebulizer solution, 3 mL, Nebulization, Q4H PRN (RT), Armando Avendano M.D.    Current Outpatient Medications:  Current Facility-Administered Medications   Medication Dose Route Frequency Provider Last Rate Last Admin   •  magnesium sulfate in D5W IVPB premix 1 g  1 g Intravenous Once Puneet Dominguez M.D. 100 mL/hr at 05/26/21 0934 1 g at 05/26/21 0934   • vitamin D (Ergocalciferol) (DRISDOL) capsule 50,000 Units  50,000 Units Oral Q7 DAYS Pnueet Dominguez M.D.   50,000 Units at 05/25/21 2304   • melatonin tablet 5 mg  5 mg Oral Nightly Puneet Dominguez M.D.   5 mg at 05/25/21 2039   • famotidine (PEPCID) tablet 20 mg  20 mg Oral DAILY Armando Avendano M.D.   20 mg at 05/26/21 0454   • FLUoxetine (PROZAC) capsule 30 mg  30 mg Oral DAILY Armando Avendano M.D.   30 mg at 05/26/21 0527   • gabapentin (NEURONTIN) capsule 300 mg  300 mg Oral TID Armando Avendano M.D.   300 mg at 05/26/21 0454   • senna-docusate (PERICOLACE or SENOKOT S) 8.6-50 MG per tablet 2 tablet  2 tablet Oral BID Armando Avendano M.D.   2 tablet at 05/25/21 1715    And   • polyethylene glycol/lytes (MIRALAX) PACKET 1 Packet  1 Packet Oral QDAY PRN Armando Avendano M.D.        And   • magnesium hydroxide (MILK OF MAGNESIA) suspension 30 mL  30 mL Oral QDAY PRN Armando Avendano M.D.        And   • bisacodyl (DULCOLAX) suppository 10 mg  10 mg Rectal QDAY PRN Armando Avendano M.D.       • lactated ringers infusion   Intravenous Continuous Armando Avendano M.D. 125 mL/hr at 05/25/21 1400 New Bag at 05/25/21 1400   • enoxaparin (LOVENOX) inj 40 mg  40 mg Subcutaneous DAILY Armando Avendano M.D.   40 mg at 05/26/21 0454   • acetaminophen (Tylenol) tablet 650 mg  650 mg Oral Q6HRS PRN Armando Avendano M.D.   650 mg at 05/25/21 1715   • labetalol (NORMODYNE/TRANDATE) injection 10 mg  10 mg Intravenous Q4HRS PRN Armando Avendano M.D.       • ondansetron (ZOFRAN) syringe/vial injection 4 mg  4 mg Intravenous Q4HRS PRN Armando Avendano M.D.   4 mg at 05/25/21 2035   • ondansetron (ZOFRAN ODT) dispertab 4 mg  4 mg Oral Q4HRS PRN Armando Avendano M.D.       • promethazine (PHENERGAN) tablet 12.5-25 mg  12.5-25 mg Oral Q4HRS PRN Armando Avendano M.D.   25 mg  at 05/26/21 0527   • promethazine (PHENERGAN) suppository 12.5-25 mg  12.5-25 mg Rectal Q4HRS PRN Armando Avendano M.D.       • prochlorperazine (COMPAZINE) injection 5-10 mg  5-10 mg Intravenous Q4HRS PRN Armando Avendano M.D.       • LORazepam (ATIVAN) tablet 0.5 mg  0.5 mg Oral Q4HRS PRN Armando Avendano M.D.   0.5 mg at 05/26/21 0933   • LORazepam (ATIVAN) tablet 1 mg  1 mg Oral Q4HRS PRN Armando Avendano M.D.   1 mg at 05/26/21 0527    Or   • LORazepam (ATIVAN) injection 0.5 mg  0.5 mg Intravenous Q4HRS PRN Armando Avendano M.D.       • LORazepam (ATIVAN) tablet 2 mg  2 mg Oral Q2HRS PRN Armando Avendano M.D.   2 mg at 05/25/21 2252    Or   • LORazepam (ATIVAN) injection 1 mg  1 mg Intravenous Q2HRS PRN Armando Avendano M.D.   1 mg at 05/25/21 0952   • LORazepam (ATIVAN) tablet 3 mg  3 mg Oral Q HOUR PRN Armando Avendano M.D.        Or   • LORazepam (ATIVAN) injection 1.5 mg  1.5 mg Intravenous Q HOUR PRN Armando Avendano M.D.       • LORazepam (ATIVAN) tablet 4 mg  4 mg Oral Q15 MIN PRN Armando Avendano M.D.        Or   • LORazepam (ATIVAN) injection 2 mg  2 mg Intravenous Q15 MIN PRN Armando Avendano M.D.       • thiamine (Vitamin B-1) tablet 100 mg  100 mg Oral DAILY Armando Avendano M.D.   100 mg at 05/26/21 0454    And   • multivitamin (THERAGRAN) tablet 1 tablet  1 tablet Oral DAILY Armando Avendano M.D.   1 tablet at 05/26/21 0454    And   • folic acid (FOLVITE) tablet 1 mg  1 mg Oral DAILY Armando Avendaon M.D.   1 mg at 05/26/21 0454   • ipratropium-albuterol (DUONEB) nebulizer solution  3 mL Nebulization Q4H PRN (RT) Armando Avendano M.D.           Medication Allergy:  Allergies   Allergen Reactions   • Bactrim Hives   • Lamotrigine [Lamictal] Hives   • Quetiapine Fumarate Hives     Extrapyramidal Symptoms   • Keflex Hives       Family History:  Family History   Problem Relation Age of Onset   • Lung Disease Mother    • Cancer Mother        Social History:  Social History      Socioeconomic History   • Marital status: Single     Spouse name: Not on file   • Number of children: Not on file   • Years of education: Not on file   • Highest education level: Not on file   Occupational History   • Not on file   Tobacco Use   • Smoking status: Never Smoker   • Smokeless tobacco: Never Used   Vaping Use   • Vaping Use: Never used   Substance and Sexual Activity   • Alcohol use: Yes     Comment: Hx heavy drinking apint in a 24 hr period   • Drug use: Yes     Comment: meth/THC   • Sexual activity: Not on file   Other Topics Concern   • Not on file   Social History Narrative   • Not on file     Social Determinants of Health     Financial Resource Strain:    • Difficulty of Paying Living Expenses:    Food Insecurity:    • Worried About Running Out of Food in the Last Year:    • Ran Out of Food in the Last Year:    Transportation Needs:    • Lack of Transportation (Medical):    • Lack of Transportation (Non-Medical):    Physical Activity:    • Days of Exercise per Week:    • Minutes of Exercise per Session:    Stress:    • Feeling of Stress :    Social Connections:    • Frequency of Communication with Friends and Family:    • Frequency of Social Gatherings with Friends and Family:    • Attends Voodoo Services:    • Active Member of Clubs or Organizations:    • Attends Club or Organization Meetings:    • Marital Status:    Intimate Partner Violence:    • Fear of Current or Ex-Partner:    • Emotionally Abused:    • Physically Abused:    • Sexually Abused:          Physical Exam:  Vitals/ General Appearance:   Weight/BMI: Body mass index is 26.37 kg/m².  /72   Pulse 98   Temp 35.9 °C (96.6 °F) (Temporal)   Resp 13   Ht 1.524 m (5')   Wt 61.2 kg (135 lb)   SpO2 97%   Vitals:    05/25/21 1540 05/25/21 1940 05/26/21 0350 05/26/21 0802   BP: 126/80 125/70 108/73 128/72   Pulse: (!) 107 (!) 118 99 98   Resp: 16 20 16 13   Temp: 37.3 °C (99.1 °F) 36.5 °C (97.7 °F) 35.9 °C (96.6 °F) 35.9 °C  (96.6 °F)   TempSrc: Temporal Temporal Temporal Temporal   SpO2: 98% 94% 98% 97%   Weight:       Height:         Oxygen Therapy:  Pulse Oximetry: 97 %, O2 (LPM): 5, O2 Delivery Device: Nasal Cannula    Constitutional:  Malnourished, somnolent  HENMT:  Normocephalic, Atraumatic, Oropharynx moist mucous membranes, No oral exudates, Nose normal.  No thyromegaly.  Eyes:  EOMI, Conjunctiva normal, No discharge.  Neck:  Normal range of motion, No cervical tenderness,  no JVD.  Cardiovascular:  Normal heart rate, Normal rhythm, No murmurs, No rubs, No gallops.   Extremitites with intact distal pulses, no cyanosis, or edema.  Lungs:  Normal breath sounds, breath sounds clear to auscultation bilaterally,  no crackles, no wheezing.   Abdomen: RUQ tenderness, Bowel sounds normal, Soft, No tenderness, No guarding, No rebound, No masses, No hepatosplenomegaly.  Skin: Warm, Dry, No erythema, No rash, no induration.  Neurologic: Alert & oriented x 3, No focal deficits noted, cranial nerves II through X are grossly intact.  Psychiatric: Affect normal, Judgment normal, Mood normal.      MDM (Data Review):     Records reviewed and summarized in current documentation    Lab Data Review:  Recent Results (from the past 24 hour(s))   Comp Metabolic Panel    Collection Time: 05/26/21  4:14 AM   Result Value Ref Range    Sodium 140 135 - 145 mmol/L    Potassium 3.6 3.6 - 5.5 mmol/L    Chloride 100 96 - 112 mmol/L    Co2 31 20 - 33 mmol/L    Anion Gap 9.0 7.0 - 16.0    Glucose 112 (H) 65 - 99 mg/dL    Bun 5 (L) 8 - 22 mg/dL    Creatinine 0.31 (L) 0.50 - 1.40 mg/dL    Calcium 9.1 8.5 - 10.5 mg/dL    AST(SGOT) 442 (H) 12 - 45 U/L    ALT(SGPT) 163 (H) 2 - 50 U/L    Alkaline Phosphatase 200 (H) 30 - 99 U/L    Total Bilirubin 2.0 (H) 0.1 - 1.5 mg/dL    Albumin 3.4 3.2 - 4.9 g/dL    Total Protein 6.0 6.0 - 8.2 g/dL    Globulin 2.6 1.9 - 3.5 g/dL    A-G Ratio 1.3 g/dL   MAGNESIUM    Collection Time: 05/26/21  4:14 AM   Result Value Ref Range     Magnesium 1.7 1.5 - 2.5 mg/dL   PHOSPHORUS    Collection Time: 05/26/21  4:14 AM   Result Value Ref Range    Phosphorus 3.1 2.5 - 4.5 mg/dL   CBC WITHOUT DIFFERENTIAL    Collection Time: 05/26/21  4:14 AM   Result Value Ref Range    WBC 1.4 (LL) 4.8 - 10.8 K/uL    RBC 2.95 (L) 4.20 - 5.40 M/uL    Hemoglobin 10.2 (L) 12.0 - 16.0 g/dL    Hematocrit 31.3 (L) 37.0 - 47.0 %    .1 (H) 81.4 - 97.8 fL    MCH 34.6 (H) 27.0 - 33.0 pg    MCHC 32.6 (L) 33.6 - 35.0 g/dL    RDW 53.0 (H) 35.9 - 50.0 fL    Platelet Count 52 (L) 164 - 446 K/uL    MPV 9.7 9.0 - 12.9 fL   ESTIMATED GFR    Collection Time: 05/26/21  4:14 AM   Result Value Ref Range    GFR If African American >60 >60 mL/min/1.73 m 2    GFR If Non African American >60 >60 mL/min/1.73 m 2             MDM (Assessment and Plan):     Patient Active Problem List    Diagnosis Date Noted   • Vitamin D deficiency 05/25/2021   • Abnormal LFTs 05/24/2021   • Leukocytopenia 04/18/2021   • Macrocytic anemia 04/18/2021   • COPD (chronic obstructive pulmonary disease) ([facility]) 04/17/2021   • GI bleed 04/26/2015   • Withdrawal symptoms, alcohol ([facility]) 04/26/2015   • Alcohol dependence with withdrawal ([facility]) 06/12/2014   • Alcohol withdrawal delirium ([facility]) 08/08/2013   • Alcohol intoxication, with unspecified complication ([facility]) 08/07/2013   • Alcohol-induced acute pancreatitis 08/07/2013   • Alcohol abuse 07/22/2013   • Depression with suicidal ideation 07/22/2013   • Overdose of antidepressant 03/29/2013   • Overdose 03/25/2013   • Obtundation 03/25/2013   • Alcohol withdrawal seizure ([facility]) 05/16/2012   • Pancreatitis 11/06/2011   • Hematemesis 11/06/2011   • Hepatitis 11/06/2011   • Thrombocytopenia ([facility]) 08/17/2011   • Hypokalemia and hypomagnesemia 08/16/2011   • Hyponatremia 08/16/2011   • Metabolic acidosis 08/16/2011   • Seizure (HCC) 07/15/2011   • Lower urinary tract infectious disease 07/15/2011     #Transaminitis  #Acute Pancreatitis  #Etoh w/d  #Liver steatosis  #Etoh  hepatitis    Patient is a 49 y/o female with a PMH of etoh pancreatitis, etoh w/d , hepatic steatosis, diverticulosis, EGD 2015 noting hiatal hernia, esophagitis/gastritis who presented to the hospital with etoh intox. GI work-up on admission significant for lipase 167 and transaminitis.  Findings thus far consistent with acute pancreatitis and etoh hepatitis. LFTs mild uptrend from prior admission. AST>ALT consistent with etoh abuse hx, etoh pan/hep. RUQ noted normal GB findings, no pericholecystic fluid, stable echogenic liver 2/2 steatosis, fatty liver disease 2/2 etoh abuse.  Last LFTS noted  (319 prior), [id number] (130 prior), Alk phos 200 (182 prior), total bilirubin 2.0 (1.1). Maddrey's score 8.9 (good prognosis w/ regards to etoh hep).     MELD score 9, with 6.0% 3 month mortality.    -continue supportive measure for pancreatitis, seems that is tolerating diet by primary team, and on MIVF  -pain management  -supplemental MV  -folate  -CRP assessment of etoh hepatitis0  -CTM LFT trend  -follow up hep panel  -follow up GGT  -electrolyte monitoring and repletion    Please refer to attending attestation for changes/additions to this plan.      Thank your for the opportunity to assist in the care of your patient.  Please call for any questions or concerns.

## 2021-05-26 NOTE — CARE PLAN
Problem: Optimal Care for Alcohol Withdrawal  Goal: Optimal Care for the alcohol withdrawal patient  Outcome: Progressing     Problem: Seizure Precautions  Goal: Implementation of seizure precautions  Outcome: Progressing     Problem: Lifestyle Changes  Goal: Patient's ability to identify lifestyle changes and available resources to help reduce recurrence of condition will improve  Outcome: Not Met   The patient is Watcher - Medium risk of patient condition declining or worsening         Progress made toward(s) clinical / shift goals:  CIWA [id number]. Q 4 hours neuro checks.     Patient is not progressing towards the following goals:      Problem: Lifestyle Changes  Goal: Patient's ability to identify lifestyle changes and available resources to help reduce recurrence of condition will improve  Outcome: Not Met

## 2021-05-26 NOTE — DISCHARGE PLANNING
Anticipated Discharge Disposition:   TBD    Action:   Discussed discharge planning needs during rounds. Per chart review, Legal Hold was discontinued on 5/25/2021, pt on CIWA. Per MD, GI consult in place. Pt is not medically cleared per MD.    Barriers to Discharge:   Medical clearance    Plan:   Hospital Care Management will continue to follow and assist with discharge planning needs.

## 2021-05-26 NOTE — RESPIRATORY CARE
COPD EDUCATION by COPD CLINICAL EDUCATOR  5/26/2021 at 7:00 AM by Kristen Randall, RRT     Patient interviewed by COPD education team. Patient refused COPD program. She states she doesn't smoke or take any respiratory medications.

## 2021-05-27 ENCOUNTER — APPOINTMENT (OUTPATIENT)
Dept: RADIOLOGY | Facility: MEDICAL CENTER | Age: 51
DRG: 896 | End: 2021-05-27
Attending: INTERNAL MEDICINE
Payer: MEDICAID

## 2021-05-27 LAB
ALBUMIN SERPL BCP-MCNC: 3.6 G/DL (ref 3.2–4.9)
BASOPHILS # BLD AUTO: 1.3 % (ref 0–1.8)
BASOPHILS # BLD: 0.03 K/UL (ref 0–0.12)
BUN SERPL-MCNC: 3 MG/DL (ref 8–22)
CALCIUM SERPL-MCNC: 9.5 MG/DL (ref 8.5–10.5)
CHLORIDE SERPL-SCNC: 99 MMOL/L (ref 96–112)
CO2 SERPL-SCNC: 24 MMOL/L (ref 20–33)
CREAT SERPL-MCNC: 0.29 MG/DL (ref 0.5–1.4)
EOSINOPHIL # BLD AUTO: 0.07 K/UL (ref 0–0.51)
EOSINOPHIL NFR BLD: 3.1 % (ref 0–6.9)
ERYTHROCYTE [DISTWIDTH] IN BLOOD BY AUTOMATED COUNT: 53.4 FL (ref 35.9–50)
GLUCOSE SERPL-MCNC: 104 MG/DL (ref 65–99)
HCT VFR BLD AUTO: 32.2 % (ref 37–47)
HGB BLD-MCNC: 10.3 G/DL (ref 12–16)
IMM GRANULOCYTES # BLD AUTO: 0.02 K/UL (ref 0–0.11)
IMM GRANULOCYTES NFR BLD AUTO: 0.9 % (ref 0–0.9)
LYMPHOCYTES # BLD AUTO: 0.87 K/UL (ref 1–4.8)
LYMPHOCYTES NFR BLD: 38.3 % (ref 22–41)
MAGNESIUM SERPL-MCNC: 1.6 MG/DL (ref 1.5–2.5)
MCH RBC QN AUTO: 34.4 PG (ref 27–33)
MCHC RBC AUTO-ENTMCNC: 32 G/DL (ref 33.6–35)
MCV RBC AUTO: 107.7 FL (ref 81.4–97.8)
MONOCYTES # BLD AUTO: 0.33 K/UL (ref 0–0.85)
MONOCYTES NFR BLD AUTO: 14.5 % (ref 0–13.4)
NEUTROPHILS # BLD AUTO: 0.95 K/UL (ref 2–7.15)
NEUTROPHILS NFR BLD: 41.9 % (ref 44–72)
NRBC # BLD AUTO: 0 K/UL
NRBC BLD-RTO: 0 /100 WBC
PHOSPHATE SERPL-MCNC: 2.9 MG/DL (ref 2.5–4.5)
PLATELET # BLD AUTO: 61 K/UL (ref 164–446)
PMV BLD AUTO: 10.7 FL (ref 9–12.9)
POTASSIUM SERPL-SCNC: 4.1 MMOL/L (ref 3.6–5.5)
RBC # BLD AUTO: 2.99 M/UL (ref 4.2–5.4)
SODIUM SERPL-SCNC: 139 MMOL/L (ref 135–145)
WBC # BLD AUTO: 2.3 K/UL (ref 4.8–10.8)

## 2021-05-27 PROCEDURE — 700105 HCHG RX REV CODE 258: Performed by: STUDENT IN AN ORGANIZED HEALTH CARE EDUCATION/TRAINING PROGRAM

## 2021-05-27 PROCEDURE — A9270 NON-COVERED ITEM OR SERVICE: HCPCS | Performed by: INTERNAL MEDICINE

## 2021-05-27 PROCEDURE — 770020 HCHG ROOM/CARE - TELE (206)

## 2021-05-27 PROCEDURE — A9270 NON-COVERED ITEM OR SERVICE: HCPCS | Performed by: STUDENT IN AN ORGANIZED HEALTH CARE EDUCATION/TRAINING PROGRAM

## 2021-05-27 PROCEDURE — 85025 COMPLETE CBC W/AUTO DIFF WBC: CPT

## 2021-05-27 PROCEDURE — 99232 SBSQ HOSP IP/OBS MODERATE 35: CPT | Performed by: INTERNAL MEDICINE

## 2021-05-27 PROCEDURE — 700102 HCHG RX REV CODE 250 W/ 637 OVERRIDE(OP): Performed by: INTERNAL MEDICINE

## 2021-05-27 PROCEDURE — 36415 COLL VENOUS BLD VENIPUNCTURE: CPT

## 2021-05-27 PROCEDURE — 83735 ASSAY OF MAGNESIUM: CPT

## 2021-05-27 PROCEDURE — 700111 HCHG RX REV CODE 636 W/ 250 OVERRIDE (IP): Performed by: STUDENT IN AN ORGANIZED HEALTH CARE EDUCATION/TRAINING PROGRAM

## 2021-05-27 PROCEDURE — 700111 HCHG RX REV CODE 636 W/ 250 OVERRIDE (IP): Performed by: INTERNAL MEDICINE

## 2021-05-27 PROCEDURE — 700102 HCHG RX REV CODE 250 W/ 637 OVERRIDE(OP): Performed by: STUDENT IN AN ORGANIZED HEALTH CARE EDUCATION/TRAINING PROGRAM

## 2021-05-27 PROCEDURE — 80069 RENAL FUNCTION PANEL: CPT

## 2021-05-27 RX ORDER — MAGNESIUM SULFATE 1 G/100ML
1 INJECTION INTRAVENOUS ONCE
Status: COMPLETED | OUTPATIENT
Start: 2021-05-27 | End: 2021-05-27

## 2021-05-27 RX ADMIN — GABAPENTIN 300 MG: 300 CAPSULE ORAL at 05:12

## 2021-05-27 RX ADMIN — ACETAMINOPHEN 650 MG: 325 TABLET, FILM COATED ORAL at 20:04

## 2021-05-27 RX ADMIN — FOLIC ACID 1 MG: 1 TABLET ORAL at 05:09

## 2021-05-27 RX ADMIN — Medication 5 MG: at 20:04

## 2021-05-27 RX ADMIN — LORAZEPAM 0.5 MG: 1 TABLET ORAL at 05:08

## 2021-05-27 RX ADMIN — OXYCODONE 5 MG: 5 TABLET ORAL at 23:50

## 2021-05-27 RX ADMIN — LORAZEPAM 1 MG: 1 TABLET ORAL at 23:51

## 2021-05-27 RX ADMIN — ENOXAPARIN SODIUM 40 MG: 40 INJECTION SUBCUTANEOUS at 05:09

## 2021-05-27 RX ADMIN — FLUOXETINE 30 MG: 20 CAPSULE ORAL at 05:12

## 2021-05-27 RX ADMIN — LORAZEPAM 0.5 MG: 1 TABLET ORAL at 18:09

## 2021-05-27 RX ADMIN — SODIUM CHLORIDE, POTASSIUM CHLORIDE, SODIUM LACTATE AND CALCIUM CHLORIDE: 600; 310; 30; 20 INJECTION, SOLUTION INTRAVENOUS at 17:09

## 2021-05-27 RX ADMIN — OXYCODONE 5 MG: 5 TABLET ORAL at 06:40

## 2021-05-27 RX ADMIN — THIAMINE HCL TAB 100 MG 100 MG: 100 TAB at 05:10

## 2021-05-27 RX ADMIN — LORAZEPAM 0.5 MG: 1 TABLET ORAL at 09:32

## 2021-05-27 RX ADMIN — ACETAMINOPHEN 650 MG: 325 TABLET, FILM COATED ORAL at 05:09

## 2021-05-27 RX ADMIN — OXYCODONE 5 MG: 5 TABLET ORAL at 18:43

## 2021-05-27 RX ADMIN — THERA TABS 1 TABLET: TAB at 05:09

## 2021-05-27 RX ADMIN — GABAPENTIN 300 MG: 300 CAPSULE ORAL at 14:00

## 2021-05-27 RX ADMIN — FAMOTIDINE 20 MG: 20 TABLET ORAL at 05:10

## 2021-05-27 RX ADMIN — LORAZEPAM 1 MG: 1 TABLET ORAL at 20:04

## 2021-05-27 RX ADMIN — MAGNESIUM SULFATE 1 G: 1 INJECTION INTRAVENOUS at 13:16

## 2021-05-27 RX ADMIN — GABAPENTIN 300 MG: 300 CAPSULE ORAL at 18:10

## 2021-05-27 RX ADMIN — DOCUSATE SODIUM 50 MG AND SENNOSIDES 8.6 MG 2 TABLET: 8.6; 5 TABLET, FILM COATED ORAL at 05:09

## 2021-05-27 ASSESSMENT — LIFESTYLE VARIABLES
HEADACHE, FULLNESS IN HEAD: NOT PRESENT
TOTAL SCORE: 4
PAROXYSMAL SWEATS: NO SWEAT VISIBLE
HEADACHE, FULLNESS IN HEAD: NOT PRESENT
VISUAL DISTURBANCES: NOT PRESENT
TOTAL SCORE: 6
PAROXYSMAL SWEATS: NO SWEAT VISIBLE
NAUSEA AND VOMITING: NO NAUSEA AND NO VOMITING
NAUSEA AND VOMITING: NO NAUSEA AND NO VOMITING
HEADACHE, FULLNESS IN HEAD: MODERATE
PAROXYSMAL SWEATS: NO SWEAT VISIBLE
TREMOR: *
VISUAL DISTURBANCES: NOT PRESENT
ANXIETY: *
TOTAL SCORE: 9
AUDITORY DISTURBANCES: NOT PRESENT
AUDITORY DISTURBANCES: NOT PRESENT
AGITATION: NORMAL ACTIVITY
AGITATION: NORMAL ACTIVITY
TOTAL SCORE: 8
VISUAL DISTURBANCES: NOT PRESENT
HEADACHE, FULLNESS IN HEAD: MODERATE
VISUAL DISTURBANCES: NOT PRESENT
ANXIETY: *
TOTAL SCORE: 5
HEADACHE, FULLNESS IN HEAD: VERY MILD
ANXIETY: NO ANXIETY (AT EASE)
VISUAL DISTURBANCES: NOT PRESENT
ANXIETY: MILDLY ANXIOUS
TREMOR: *
TOTAL SCORE: 5
PAROXYSMAL SWEATS: NO SWEAT VISIBLE
ORIENTATION AND CLOUDING OF SENSORIUM: ORIENTED AND CAN DO SERIAL ADDITIONS
TREMOR: *
AGITATION: NORMAL ACTIVITY
NAUSEA AND VOMITING: NO NAUSEA AND NO VOMITING
ORIENTATION AND CLOUDING OF SENSORIUM: ORIENTED AND CAN DO SERIAL ADDITIONS
NAUSEA AND VOMITING: MILD NAUSEA WITH NO VOMITING
ORIENTATION AND CLOUDING OF SENSORIUM: CANNOT DO SERIAL ADDITIONS OR IS UNCERTAIN ABOUT DATE
PAROXYSMAL SWEATS: NO SWEAT VISIBLE
ORIENTATION AND CLOUDING OF SENSORIUM: ORIENTED AND CAN DO SERIAL ADDITIONS
ORIENTATION AND CLOUDING OF SENSORIUM: CANNOT DO SERIAL ADDITIONS OR IS UNCERTAIN ABOUT DATE
HEADACHE, FULLNESS IN HEAD: NOT PRESENT
ORIENTATION AND CLOUDING OF SENSORIUM: ORIENTED AND CAN DO SERIAL ADDITIONS
NAUSEA AND VOMITING: NO NAUSEA AND NO VOMITING
AUDITORY DISTURBANCES: NOT PRESENT
NAUSEA AND VOMITING: MILD NAUSEA WITH NO VOMITING
AUDITORY DISTURBANCES: NOT PRESENT
TREMOR: *
VISUAL DISTURBANCES: NOT PRESENT
ANXIETY: MILDLY ANXIOUS
AGITATION: NORMAL ACTIVITY
TREMOR: *
AGITATION: NORMAL ACTIVITY
AUDITORY DISTURBANCES: NOT PRESENT
PAROXYSMAL SWEATS: NO SWEAT VISIBLE
TREMOR: MODERATE TREMOR WITH ARMS EXTENDED
AUDITORY DISTURBANCES: NOT PRESENT
AGITATION: NORMAL ACTIVITY
ANXIETY: MILDLY ANXIOUS

## 2021-05-27 ASSESSMENT — ENCOUNTER SYMPTOMS
DIARRHEA: 0
PALPITATIONS: 0
VOMITING: 0
HEADACHES: 0
FEVER: 0
SHORTNESS OF BREATH: 0
BACK PAIN: 0
ABDOMINAL PAIN: 0
TREMORS: 1
CHILLS: 0
DIZZINESS: 0
CONSTIPATION: 0
NAUSEA: 1
COUGH: 0
DEPRESSION: 1

## 2021-05-27 ASSESSMENT — PAIN DESCRIPTION - PAIN TYPE
TYPE: ACUTE PAIN

## 2021-05-27 NOTE — PROGRESS NOTES
[facility] Medicine Daily Progress Note    Date of Service  5/28/2021    Chief Complaint  50 y.o. female admitted 5/24/2021 with suicidal ideation    Hospital Course  50-year-old female with a past medical history of recurrent alcoholic pancreatitis, alcohol abuse and alcohol withdrawals including seizures, history of bipolar disorder, polysubstance use with methamphetamines and alcohol, prior suicide attempt, COPD on 2 L home oxygen, presented 5/24/2021 with complaints of suicidal ideation due to her chronic alcohol use.  Patient was admitted found to be in acute alcohol intoxication, elevated POC breathalyzer 0.292.  Patient on admission was more somnolent but arousable with sternal rub.  Patient came in with a legal hold due to complaints of suicidal ideation, suicidal plan by taking overdose of pills she has at home.    Interval Problem Update  5/25/2021-patient seen and evaluated today, labs and notes reviewed.  Patient stated she had continued suicidal thoughts, with potential plan of using her pills at home.  Patient was showing signs of acute alcohol withdrawal today, tremors and tachycardia.  Patient complained of nausea.  Psychiatry evaluated patient today given she her legal hold status.  CIWA scores ranging between 11-13 since admission.  Patient requiring Ativan and Librium.  She stated her last drink was the day she came to the hospital.    5/26 - Patient's LFTs increasing today, was decreased yesterday compared to high levels on admission. RUQ obtained on admission did not show obstruction.  Patient in active withdrawal continued. Patient stated she has RUQ pain today, severe, painful to deep touch, localized 9/10.  Consulted [facility].    5/27 - patient this morning was distressed, she was noted to be standing without help of nursing despite having directions explained to her by nursing to wait for assistance to use the commode or to get out of bed. Patient did not fall, but was  found to have soiled herself with urine. Patient denied any RUQ pain today compared to yesterday. She stated she was eating better. CIWA up to 12.  Oxygen supplementation up to 5LNC.    Consultants/Specialty  Psychiatry  [facility] gastroenterology    Code Status  Full Code    Disposition  To be determined, patient in acute alcohol withdrawal requiring increased oxygen support compared to her baseline 2L at home.    Review of Systems  Review of Systems   Constitutional: Negative for chills, fever and malaise/fatigue.   Respiratory: Negative for cough and shortness of breath.    Cardiovascular: Negative for chest pain and palpitations.   Gastrointestinal: Positive for nausea. Negative for abdominal pain, constipation, diarrhea and vomiting.   Musculoskeletal: Negative for back pain and joint pain.   Neurological: Positive for tremors. Negative for dizziness and headaches.   Psychiatric/Behavioral: Positive for depression. Negative for suicidal ideas.      Physical Exam  Temp:  [35.8 °C (96.5 °F)-36.8 °C (98.3 °F)] 36.3 °C (97.4 °F)  Pulse:  [] 100  Resp:  [18-20] 20  BP: (123-153)/(86-95) 139/90  SpO2:  [92 %-100 %] 92 %    Physical Exam  Vitals and nursing note reviewed.   Constitutional:       General: She is in acute distress (Due to withdrawal tremors).      Appearance: She is obese. She is ill-appearing.   Cardiovascular:      Rate and Rhythm: Regular rhythm. Tachycardia present.      Pulses: Normal pulses.      Heart sounds: Normal heart sounds. No murmur heard.     Pulmonary:      Effort: Pulmonary effort is normal. No respiratory distress.      Breath sounds: Normal breath sounds. No wheezing.   Abdominal:      General: There is no distension.      Tenderness: There is no abdominal tenderness.      Comments: Decreased bowel sounds   Musculoskeletal:         General: No swelling or tenderness. Normal range of motion.   Skin:     General: Skin is warm.      Capillary Refill: Capillary  refill takes less than 2 seconds.      Coloration: Skin is not jaundiced.      Findings: No erythema.   Neurological:      General: No focal deficit present.      Mental Status: She is alert and oriented to person, place, and time. Mental status is at baseline.      GCS: GCS eye subscore is 4. GCS verbal subscore is 5. GCS motor subscore is 6.      Motor: No tremor.   Psychiatric:         Behavior: Behavior normal.         Thought Content: Thought content normal.         Judgment: Judgment normal.      Comments: Stated she has positive SI by taking pills she was at home.  Depressed mood.       Fluids    Intake/Output Summary (Last 24 hours) at 5/28/2021 0731  Last data filed at 5/27/2021 1730  Gross per 24 hour   Intake 360 ml   Output --   Net 360 ml       Laboratory  Recent Labs     05/26/21  0414 05/27/21  0614   WBC 1.4* 2.3*   RBC 2.95* 2.99*   HEMOGLOBIN 10.2* 10.3*   HEMATOCRIT 31.3* 32.2*   .1* 107.7*   MCH 34.6* 34.4*   MCHC 32.6* 32.0*   RDW 53.0* 53.4*   PLATELETCT 52* 61*   MPV 9.7 10.7     Recent Labs     05/26/21  0414 05/27/21  0415   SODIUM 140 139   POTASSIUM 3.6 4.1   CHLORIDE 100 99   CO2 31 24   GLUCOSE 112* 104*   BUN 5* 3*   CREATININE 0.31* 0.29*   CALCIUM 9.1 9.5                   Imaging  IR-US GUIDED PIV   Final Result    Ultrasound-guided PERIPHERAL IV INSERTION performed by    qualified nursing staff as above.      US-RUQ   Final Result      Stable echogenic liver which is most commonly secondary to steatosis           Assessment/Plan  * Alcohol dependence with withdrawal (HCC)- (present on admission)  Assessment & Plan  Hx DT's and seizure 2/2 ETOH withdrawal.  Patient is a persistent alcohol user.    Prefers vodka, drinks half pint usually daily, obtains her drinks from her roommate.  Patient's last drink was on the day of admission, as stated by the patient.  Alcohol cessation education provided to patient.  Patient stated she wants to stop and this is what makes her feel  "suicidal as she is unable to stop.  -Seizure precaution  -[facility] protocol for symptom triggered therapy   -Librium for long acting benzodiazepine coverage  - PO vitamins, patient was given detox IV bag with thiamine.  -Check Magnesium daily  - consult    Acute on chronic respiratory failure with hypoxia on home O2 therapy- (present on admission)  Assessment & Plan  Patient currently requiring increased oxygen, arrived to ED requiring 3L.  O2 demand up to 5L NC compared to her chronic home oxygen needs of 2L. Due to patient's acute alcohol withdrawal worsening distress, tachycardia.  - continue oxygen support  - will need restart of home oxygen order on discharge    Abnormal LFTs- (present on admission)  Assessment & Plan  Suspecting 2/2 to continued daily ETOH abuse and acute alcohol intoxication on admission.  Patient's LFTs increasing today, was decreased yesterday compared to high levels on admission.    RUQ US shows: \"Stable echogenic liver which is most commonly secondary to steatosis\"  Patient likely has fatty liver disease from alcohol use.  5/25 - No Discriminant Function = 8, no indication for steroids  GGT 2572    Continue to monitor, recheck CMP daily  Holding librium  Consulted [facility], appreciate any further recommendations    Macrocytic anemia- (present on admission)  Assessment & Plan  Vit B12 and folate levels normal on prior check  Likely due to alcohol liver disease  Monitor for any bleeding    Hypomagnesemia- (present on admission)  Assessment & Plan  Patient arrived with magnesium 1.9, down to 1.6 despite IV replacements.  Continue IV replacement likely due to nutritional deficiency    Leukocytopenia- (present on admission)  Assessment & Plan  Low WBC due to liver disorder  Slightly improving  - monitor CBC with diff, no need for reverse isolation yet.    Alcohol-induced acute pancreatitis- (present on admission)  Assessment & Plan  Patient continues to " complain about nausea, but denied any vomiting.  Trialing patient on solid foods  IVF  Pain Control    Depression with suicidal ideation- (present on admission)  Assessment & Plan  Patient presented with suicidal ideation, plan to potentially overdose on her home medications.  Patient was brought in with a legal hold  -Psychiatry consultation pending recommendation    Hypokalemia  Assessment & Plan  Patient's potassium low today 3.3.  Likely due to poor nutrition due to severe alcohol use.  Magnesium low on admission 1.9.  Replacing via IV given patient's nausea  Recheck potassium and magnesium daily    Vitamin D deficiency- (present on admission)  Assessment & Plan  Patient's vitamin D 25 level 11 on 4/21  Vitamin B12 and iron at appropriate levels.  Continue patient on supplementation, 50,000 IU weekly p.o.    COPD (chronic obstructive pulmonary disease) (HCC)- (present on admission)  Assessment & Plan  Per chart review: no PFTs on file, on 2 L of oxygen at home.  No signs of acute exacerbation at this time on admission.  Oxygen per guidelines  Monitor respiratory status     VTE prophylaxis: Lovenox

## 2021-05-27 NOTE — ASSESSMENT & PLAN NOTE
Patient arrived with magnesium 1.9, down to 1.6 despite IV replacements.  Replace as needed.  Continue to monitor.

## 2021-05-27 NOTE — CARE PLAN
Problem: Fall Risk  Goal: Patient will remain free from falls  Note: Call light and belongings within reach, bed down and locked, hourly rounding in progress. Treaded socks in use, no DME at bedside. pt calls appropriately.       Problem: Pain - Standard  Goal: Alleviation of pain or a reduction in pain to the patient’s comfort goal  Note: PRN pain medications in use for pain control.

## 2021-05-28 ENCOUNTER — APPOINTMENT (OUTPATIENT)
Dept: RADIOLOGY | Facility: MEDICAL CENTER | Age: 51
DRG: 896 | End: 2021-05-28
Attending: INTERNAL MEDICINE
Payer: MEDICAID

## 2021-05-28 PROBLEM — Z99.81 CHRONIC RESPIRATORY FAILURE WITH HYPOXIA, ON HOME O2 THERAPY (HCC): Chronic | Status: ACTIVE | Noted: 2021-05-28

## 2021-05-28 PROBLEM — J96.21 ACUTE ON CHRONIC RESPIRATORY FAILURE WITH HYPOXIA (HCC): Status: ACTIVE | Noted: 2021-05-28

## 2021-05-28 PROBLEM — J96.11 CHRONIC RESPIRATORY FAILURE WITH HYPOXIA, ON HOME O2 THERAPY (HCC): Chronic | Status: ACTIVE | Noted: 2021-05-28

## 2021-05-28 LAB
ALBUMIN SERPL BCP-MCNC: 3.4 G/DL (ref 3.2–4.9)
ALBUMIN/GLOB SERPL: 1.2 G/DL
ALP SERPL-CCNC: 179 U/L (ref 30–99)
ALT SERPL-CCNC: 93 U/L (ref 2–50)
ANION GAP SERPL CALC-SCNC: 11 MMOL/L (ref 7–16)
AST SERPL-CCNC: 133 U/L (ref 12–45)
BASOPHILS # BLD AUTO: 1.1 % (ref 0–1.8)
BASOPHILS # BLD: 0.03 K/UL (ref 0–0.12)
BILIRUB SERPL-MCNC: 1.3 MG/DL (ref 0.1–1.5)
BUN SERPL-MCNC: 4 MG/DL (ref 8–22)
CALCIUM SERPL-MCNC: 9.3 MG/DL (ref 8.5–10.5)
CHLORIDE SERPL-SCNC: 98 MMOL/L (ref 96–112)
CO2 SERPL-SCNC: 28 MMOL/L (ref 20–33)
CREAT SERPL-MCNC: 0.22 MG/DL (ref 0.5–1.4)
EOSINOPHIL # BLD AUTO: 0.07 K/UL (ref 0–0.51)
EOSINOPHIL NFR BLD: 2.5 % (ref 0–6.9)
ERYTHROCYTE [DISTWIDTH] IN BLOOD BY AUTOMATED COUNT: 54 FL (ref 35.9–50)
GLOBULIN SER CALC-MCNC: 2.9 G/DL (ref 1.9–3.5)
GLUCOSE SERPL-MCNC: 118 MG/DL (ref 65–99)
HCT VFR BLD AUTO: 33.5 % (ref 37–47)
HGB BLD-MCNC: 10.7 G/DL (ref 12–16)
IMM GRANULOCYTES # BLD AUTO: 0.01 K/UL (ref 0–0.11)
IMM GRANULOCYTES NFR BLD AUTO: 0.4 % (ref 0–0.9)
LYMPHOCYTES # BLD AUTO: 0.98 K/UL (ref 1–4.8)
LYMPHOCYTES NFR BLD: 34.6 % (ref 22–41)
MAGNESIUM SERPL-MCNC: 1.6 MG/DL (ref 1.5–2.5)
MCH RBC QN AUTO: 34.5 PG (ref 27–33)
MCHC RBC AUTO-ENTMCNC: 31.9 G/DL (ref 33.6–35)
MCV RBC AUTO: 108.1 FL (ref 81.4–97.8)
MONOCYTES # BLD AUTO: 0.56 K/UL (ref 0–0.85)
MONOCYTES NFR BLD AUTO: 19.8 % (ref 0–13.4)
NEUTROPHILS # BLD AUTO: 1.18 K/UL (ref 2–7.15)
NEUTROPHILS NFR BLD: 41.6 % (ref 44–72)
NRBC # BLD AUTO: 0 K/UL
NRBC BLD-RTO: 0 /100 WBC
PLATELET # BLD AUTO: 102 K/UL (ref 164–446)
PMV BLD AUTO: 10.2 FL (ref 9–12.9)
POTASSIUM SERPL-SCNC: 3.9 MMOL/L (ref 3.6–5.5)
PROT SERPL-MCNC: 6.3 G/DL (ref 6–8.2)
RBC # BLD AUTO: 3.1 M/UL (ref 4.2–5.4)
SODIUM SERPL-SCNC: 137 MMOL/L (ref 135–145)
WBC # BLD AUTO: 2.8 K/UL (ref 4.8–10.8)

## 2021-05-28 PROCEDURE — A9270 NON-COVERED ITEM OR SERVICE: HCPCS | Performed by: STUDENT IN AN ORGANIZED HEALTH CARE EDUCATION/TRAINING PROGRAM

## 2021-05-28 PROCEDURE — 700102 HCHG RX REV CODE 250 W/ 637 OVERRIDE(OP): Performed by: STUDENT IN AN ORGANIZED HEALTH CARE EDUCATION/TRAINING PROGRAM

## 2021-05-28 PROCEDURE — A9270 NON-COVERED ITEM OR SERVICE: HCPCS | Performed by: INTERNAL MEDICINE

## 2021-05-28 PROCEDURE — 700105 HCHG RX REV CODE 258: Performed by: STUDENT IN AN ORGANIZED HEALTH CARE EDUCATION/TRAINING PROGRAM

## 2021-05-28 PROCEDURE — 700111 HCHG RX REV CODE 636 W/ 250 OVERRIDE (IP): Performed by: STUDENT IN AN ORGANIZED HEALTH CARE EDUCATION/TRAINING PROGRAM

## 2021-05-28 PROCEDURE — 83735 ASSAY OF MAGNESIUM: CPT

## 2021-05-28 PROCEDURE — 36415 COLL VENOUS BLD VENIPUNCTURE: CPT

## 2021-05-28 PROCEDURE — 700102 HCHG RX REV CODE 250 W/ 637 OVERRIDE(OP): Performed by: INTERNAL MEDICINE

## 2021-05-28 PROCEDURE — 80053 COMPREHEN METABOLIC PANEL: CPT

## 2021-05-28 PROCEDURE — 99232 SBSQ HOSP IP/OBS MODERATE 35: CPT | Performed by: INTERNAL MEDICINE

## 2021-05-28 PROCEDURE — 700111 HCHG RX REV CODE 636 W/ 250 OVERRIDE (IP): Performed by: INTERNAL MEDICINE

## 2021-05-28 PROCEDURE — 770020 HCHG ROOM/CARE - TELE (206)

## 2021-05-28 PROCEDURE — 85025 COMPLETE CBC W/AUTO DIFF WBC: CPT

## 2021-05-28 RX ORDER — MAGNESIUM SULFATE HEPTAHYDRATE 40 MG/ML
2 INJECTION, SOLUTION INTRAVENOUS ONCE
Status: COMPLETED | OUTPATIENT
Start: 2021-05-28 | End: 2021-05-28

## 2021-05-28 RX ADMIN — DOCUSATE SODIUM 50 MG AND SENNOSIDES 8.6 MG 2 TABLET: 8.6; 5 TABLET, FILM COATED ORAL at 04:10

## 2021-05-28 RX ADMIN — FLUOXETINE 30 MG: 20 CAPSULE ORAL at 04:11

## 2021-05-28 RX ADMIN — OXYCODONE 5 MG: 5 TABLET ORAL at 13:16

## 2021-05-28 RX ADMIN — SODIUM CHLORIDE, POTASSIUM CHLORIDE, SODIUM LACTATE AND CALCIUM CHLORIDE: 600; 310; 30; 20 INJECTION, SOLUTION INTRAVENOUS at 13:12

## 2021-05-28 RX ADMIN — LORAZEPAM 1 MG: 1 TABLET ORAL at 04:10

## 2021-05-28 RX ADMIN — THIAMINE HCL TAB 100 MG 100 MG: 100 TAB at 04:11

## 2021-05-28 RX ADMIN — GABAPENTIN 300 MG: 300 CAPSULE ORAL at 04:10

## 2021-05-28 RX ADMIN — ENOXAPARIN SODIUM 40 MG: 40 INJECTION SUBCUTANEOUS at 04:11

## 2021-05-28 RX ADMIN — LORAZEPAM 2 MG: 2 TABLET ORAL at 13:12

## 2021-05-28 RX ADMIN — SODIUM CHLORIDE, POTASSIUM CHLORIDE, SODIUM LACTATE AND CALCIUM CHLORIDE: 600; 310; 30; 20 INJECTION, SOLUTION INTRAVENOUS at 22:46

## 2021-05-28 RX ADMIN — SODIUM CHLORIDE, POTASSIUM CHLORIDE, SODIUM LACTATE AND CALCIUM CHLORIDE: 600; 310; 30; 20 INJECTION, SOLUTION INTRAVENOUS at 01:04

## 2021-05-28 RX ADMIN — LORAZEPAM 1 MG: 1 TABLET ORAL at 21:58

## 2021-05-28 RX ADMIN — GABAPENTIN 300 MG: 300 CAPSULE ORAL at 17:35

## 2021-05-28 RX ADMIN — FOLIC ACID 1 MG: 1 TABLET ORAL at 04:11

## 2021-05-28 RX ADMIN — MAGNESIUM SULFATE 2 G: 2 INJECTION INTRAVENOUS at 15:25

## 2021-05-28 RX ADMIN — FAMOTIDINE 20 MG: 20 TABLET ORAL at 04:11

## 2021-05-28 RX ADMIN — ONDANSETRON 4 MG: 2 INJECTION INTRAMUSCULAR; INTRAVENOUS at 13:16

## 2021-05-28 RX ADMIN — Medication 5 MG: at 21:18

## 2021-05-28 RX ADMIN — GABAPENTIN 300 MG: 300 CAPSULE ORAL at 13:12

## 2021-05-28 RX ADMIN — OXYCODONE 5 MG: 5 TABLET ORAL at 21:58

## 2021-05-28 RX ADMIN — DOCUSATE SODIUM 50 MG AND SENNOSIDES 8.6 MG 2 TABLET: 8.6; 5 TABLET, FILM COATED ORAL at 17:35

## 2021-05-28 RX ADMIN — THERA TABS 1 TABLET: TAB at 04:14

## 2021-05-28 ASSESSMENT — PAIN DESCRIPTION - PAIN TYPE
TYPE: ACUTE PAIN

## 2021-05-28 ASSESSMENT — LIFESTYLE VARIABLES
ORIENTATION AND CLOUDING OF SENSORIUM: ORIENTED AND CAN DO SERIAL ADDITIONS
ANXIETY: NO ANXIETY (AT EASE)
NAUSEA AND VOMITING: NO NAUSEA AND NO VOMITING
AUDITORY DISTURBANCES: NOT PRESENT
ORIENTATION AND CLOUDING OF SENSORIUM: ORIENTED AND CAN DO SERIAL ADDITIONS
AUDITORY DISTURBANCES: NOT PRESENT
VISUAL DISTURBANCES: NOT PRESENT
AUDITORY DISTURBANCES: NOT PRESENT
AGITATION: NORMAL ACTIVITY
HEADACHE, FULLNESS IN HEAD: MODERATE
TREMOR: *
AUDITORY DISTURBANCES: NOT PRESENT
PAROXYSMAL SWEATS: NO SWEAT VISIBLE
AGITATION: NORMAL ACTIVITY
ANXIETY: NO ANXIETY (AT EASE)
VISUAL DISTURBANCES: NOT PRESENT
ORIENTATION AND CLOUDING OF SENSORIUM: ORIENTED AND CAN DO SERIAL ADDITIONS
PAROXYSMAL SWEATS: BARELY PERCEPTIBLE SWEATING. PALMS MOIST
AUDITORY DISTURBANCES: NOT PRESENT
TOTAL SCORE: 4
NAUSEA AND VOMITING: NO NAUSEA AND NO VOMITING
ANXIETY: MILDLY ANXIOUS
TREMOR: *
AGITATION: NORMAL ACTIVITY
ORIENTATION AND CLOUDING OF SENSORIUM: ORIENTED AND CAN DO SERIAL ADDITIONS
PAROXYSMAL SWEATS: NO SWEAT VISIBLE
NAUSEA AND VOMITING: MILD NAUSEA WITH NO VOMITING
HEADACHE, FULLNESS IN HEAD: VERY MILD
TREMOR: NO TREMOR
HEADACHE, FULLNESS IN HEAD: MODERATELY SEVERE
ANXIETY: MILDLY ANXIOUS
VISUAL DISTURBANCES: NOT PRESENT
ANXIETY: MILDLY ANXIOUS
HEADACHE, FULLNESS IN HEAD: MODERATE
PAROXYSMAL SWEATS: NO SWEAT VISIBLE
HEADACHE, FULLNESS IN HEAD: NOT PRESENT
VISUAL DISTURBANCES: NOT PRESENT
TOTAL SCORE: 13
AGITATION: NORMAL ACTIVITY
TOTAL SCORE: 8
TOTAL SCORE: 9
TOTAL SCORE: 0
NAUSEA AND VOMITING: NO NAUSEA AND NO VOMITING
ORIENTATION AND CLOUDING OF SENSORIUM: CANNOT DO SERIAL ADDITIONS OR IS UNCERTAIN ABOUT DATE
VISUAL DISTURBANCES: NOT PRESENT
NAUSEA AND VOMITING: INTERMITTENT NAUSEA WITH DRY HEAVES
PAROXYSMAL SWEATS: BARELY PERCEPTIBLE SWEATING. PALMS MOIST
TREMOR: *
AGITATION: NORMAL ACTIVITY
TREMOR: *

## 2021-05-28 ASSESSMENT — ENCOUNTER SYMPTOMS
DIZZINESS: 0
NAUSEA: 0
COUGH: 0
DIARRHEA: 0
FEVER: 0
CONSTIPATION: 0
PALPITATIONS: 0
DEPRESSION: 1
SHORTNESS OF BREATH: 0
CHILLS: 0
ABDOMINAL PAIN: 0
HEADACHES: 0
BACK PAIN: 0
VOMITING: 0
TREMORS: 0

## 2021-05-28 NOTE — ASSESSMENT & PLAN NOTE
Patient currently requiring increased oxygen, arrived to ED requiring 3L.  O2 demand up to 5L NC compared to her chronic home oxygen needs of 2L. Due to patient's acute alcohol withdrawal worsening distress, tachycardia.  5/28 - Improving to 3LNC  - continue oxygen support  - will need restart of home oxygen order on discharge

## 2021-05-28 NOTE — DISCHARGE PLANNING
Anticipated Discharge Disposition:   TBD    Action:   Discussed discharge planning needs during rounds. Per MD, pt continues to be on CIWA protocol, receiving Ativan. Per chart review, pt is AAOx4, on 3 LPM oxygen.    Barriers to Discharge:   Medical clearance    Plan:   Hospital Care Management will continue to follow and assist with discharge planning needs.

## 2021-05-28 NOTE — CARE PLAN
Problem: Knowledge Deficit - Standard  Goal: Patient and family/care givers will demonstrate understanding of plan of care, disease process/condition, diagnostic tests and medications  Outcome: Progressing     Problem: Optimal Care for Alcohol Withdrawal  Goal: Optimal Care for the alcohol withdrawal patient  Outcome: Progressing

## 2021-05-28 NOTE — CARE PLAN
The patient is Stable - Low risk of patient condition declining or worsening         Progress made toward(s) clinical / shift goals:    Problem: Knowledge Deficit - Standard  Goal: Patient and family/care givers will demonstrate understanding of plan of care, disease process/condition, diagnostic tests and medications  Outcome: Progressing     Problem: Optimal Care for Alcohol Withdrawal  Goal: Optimal Care for the alcohol withdrawal patient  Outcome: Progressing  Note: CIWA protocol in place.  Patient receiving Ativan per MAR.  Continue to monitor.       Problem: Provide Safe Environment  Goal: Suicide environmental safety, protocols, policies, and practices will be implemented  Outcome: Progressing     Problem: Psychosocial  Goal: Patient's ability to identify and develop effective coping behaviors will improve  Outcome: Progressing     Problem: Fall Risk  Goal: Patient will remain free from falls  Outcome: Progressing  Note: Free of falls.  Fall precautions in place.  Patient using call light appropriately.       Problem: Pain - Standard  Goal: Alleviation of pain or a reduction in pain to the patient’s comfort goal  Outcome: Progressing  Note: Pain controlled with PO oxycodone.         Patient is not progressing towards the following goals:

## 2021-05-28 NOTE — PROGRESS NOTES
[facility] Medicine Daily Progress Note    Date of Service  5/28/2021    Chief Complaint  50 y.o. female admitted 5/24/2021 with suicidal ideation    Hospital Course  50-year-old female with a past medical history of recurrent alcoholic pancreatitis, alcohol abuse and alcohol withdrawals including seizures, history of bipolar disorder, polysubstance use with methamphetamines and alcohol, prior suicide attempt, COPD on 2 L home oxygen, presented 5/24/2021 with complaints of suicidal ideation due to her chronic alcohol use.  Patient was admitted found to be in acute alcohol intoxication, elevated POC breathalyzer 0.292.  Patient on admission was more somnolent but arousable with sternal rub.  Patient came in with a legal hold due to complaints of suicidal ideation, suicidal plan by taking overdose of pills she has at home.    Interval Problem Update  5/25/2021-patient seen and evaluated today, labs and notes reviewed.  Patient stated she had continued suicidal thoughts, with potential plan of using her pills at home.  Patient was showing signs of acute alcohol withdrawal today, tremors and tachycardia.  Patient complained of nausea.  Psychiatry evaluated patient today given she her legal hold status.  CIWA scores ranging between 11-13 since admission.  Patient requiring Ativan and Librium.  She stated her last drink was the day she came to the hospital.    5/26 - Patient's LFTs increasing today, was decreased yesterday compared to high levels on admission. RUQ obtained on admission did not show obstruction.  Patient in active withdrawal continued. Patient stated she has RUQ pain today, severe, painful to deep touch, localized 9/10.  Consulted [facility] gastro[facility].    5/27 - patient this morning was distressed, she was noted to be standing without help of nursing despite having directions explained to her by nursing to wait for assistance to use the commode or to get out of bed. Patient did not fall, but was  found to have soiled herself with urine. Patient denied any RUQ pain today compared to yesterday. She stated she was eating better. CIWA up to 12.  Oxygen supplementation up to 5LNC.    5/28 -patient was more controlled today, and denied any symptoms for abdominal pain at this time.  Morning patient was showing signs of improvement.  Most recent CIWA score went up to 13 at 1305 p.m.  Was only as high as 9 before that.  Oxygen now down to 3 L nasal cannula.    Consultants/Specialty  Psychiatry  [facility] gastroenterology    Code Status  Full Code    Disposition  To be determined, patient in acute alcohol withdrawal requiring increased oxygen support compared to her baseline 2L at home.    Review of Systems  Review of Systems   Constitutional: Negative for chills, fever and malaise/fatigue.   Respiratory: Negative for cough and shortness of breath.    Cardiovascular: Negative for chest pain and palpitations.   Gastrointestinal: Negative for abdominal pain, constipation, diarrhea, nausea and vomiting.   Musculoskeletal: Negative for back pain and joint pain.   Neurological: Negative for dizziness, tremors and headaches.   Psychiatric/Behavioral: Positive for depression. Negative for suicidal ideas.      Physical Exam  Temp:  [35.8 °C (96.5 °F)-36.8 °C (98.3 °F)] 36.3 °C (97.4 °F)  Pulse:  [] 100  Resp:  [18-20] 20  BP: (123-153)/(86-95) 139/90  SpO2:  [92 %-100 %] 92 %    Physical Exam  Vitals and nursing note reviewed.   Constitutional:       General: She is not in acute distress.     Appearance: She is obese. She is not ill-appearing.   Cardiovascular:      Rate and Rhythm: Regular rhythm. Tachycardia present.      Pulses: Normal pulses.      Heart sounds: Normal heart sounds. No murmur heard.     Pulmonary:      Effort: Pulmonary effort is normal. No respiratory distress.      Breath sounds: Normal breath sounds. No wheezing.   Abdominal:      General: Bowel sounds are normal. There is no  distension.      Tenderness: There is no abdominal tenderness.   Musculoskeletal:         General: No swelling or tenderness. Normal range of motion.   Skin:     General: Skin is warm.      Capillary Refill: Capillary refill takes less than 2 seconds.      Coloration: Skin is not jaundiced.      Findings: No erythema.   Neurological:      General: No focal deficit present.      Mental Status: She is alert and oriented to person, place, and time. Mental status is at baseline.      GCS: GCS eye subscore is 4. GCS verbal subscore is 5. GCS motor subscore is 6.      Motor: No tremor.   Psychiatric:         Behavior: Behavior normal.         Thought Content: Thought content normal.         Judgment: Judgment normal.       Fluids    Intake/Output Summary (Last 24 hours) at 5/28/2021 0732  Last data filed at 5/27/2021 1730  Gross per 24 hour   Intake 360 ml   Output --   Net 360 ml       Laboratory  Recent Labs     05/26/21 0414 05/27/21  0614   WBC 1.4* 2.3*   RBC 2.95* 2.99*   HEMOGLOBIN 10.2* 10.3*   HEMATOCRIT 31.3* 32.2*   .1* 107.7*   MCH 34.6* 34.4*   MCHC 32.6* 32.0*   RDW 53.0* 53.4*   PLATELETCT 52* 61*   MPV 9.7 10.7     Recent Labs     05/26/21 0414 05/27/21  0415   SODIUM 140 139   POTASSIUM 3.6 4.1   CHLORIDE 100 99   CO2 31 24   GLUCOSE 112* 104*   BUN 5* 3*   CREATININE 0.31* 0.29*   CALCIUM 9.1 9.5                   Imaging  IR-US GUIDED PIV   Final Result    Ultrasound-guided PERIPHERAL IV INSERTION performed by    qualified nursing staff as above.      US-RUQ   Final Result      Stable echogenic liver which is most commonly secondary to steatosis           Assessment/Plan  * Alcohol dependence with withdrawal (HCC)- (present on admission)  Assessment & Plan  Hx DT's and seizure 2/2 ETOH withdrawal.  Patient is a persistent alcohol user.    Prefers vodka, drinks half pint usually daily, obtains her drinks from her roommate.  Patient's last drink was on the day of admission, as stated by the  "patient.  Alcohol cessation education provided to patient.  Patient stated she wants to stop and this is what makes her feel suicidal as she is unable to stop.  -Seizure precaution  -[facility] protocol for symptom triggered therapy   -Librium for long acting benzodiazepine coverage  - PO vitamins, patient was given detox IV bag with thiamine.  -Check Magnesium daily  - consult    Acute on chronic respiratory failure with hypoxia on home O2 therapy- (present on admission)  Assessment & Plan  Patient currently requiring increased oxygen, arrived to ED requiring 3L.  O2 demand up to 5L NC compared to her chronic home oxygen needs of 2L. Due to patient's acute alcohol withdrawal worsening distress, tachycardia.  5/28 - Improving to 3LNC  - continue oxygen support  - will need restart of home oxygen order on discharge    Abnormal LFTs- (present on admission)  Assessment & Plan  Suspecting 2/2 to continued daily ETOH abuse and acute alcohol intoxication on admission.  Patient's LFTs increasing today, was decreased yesterday compared to high levels on admission.    RUQ US shows: \"Stable echogenic liver which is most commonly secondary to steatosis\"  Patient likely has fatty liver disease from alcohol use.  5/25 - No Discriminant Function = 8, no indication for steroids  GGT 2572    Continue to monitor, recheck CMP daily  Holding librium  Consulted [facility], appreciate any further recommendations    Macrocytic anemia- (present on admission)  Assessment & Plan  Vit B12 and folate levels normal on prior check  Likely due to alcohol liver disease  Monitor for any bleeding    Hypomagnesemia- (present on admission)  Assessment & Plan  Patient arrived with magnesium 1.9, down to 1.6 despite IV replacements.  Continue IV replacement likely due to nutritional deficiency    Leukocytopenia- (present on admission)  Assessment & Plan  Low WBC due to liver disorder  Slightly improving  - monitor CBC " with diff, no need for reverse isolation yet.    Alcohol-induced acute pancreatitis- (present on admission)  Assessment & Plan  Patient continues to complain about nausea, but denied any vomiting.  Trialing patient on solid foods  IVF  Pain Control    Depression with suicidal ideation- (present on admission)  Assessment & Plan  Patient presented with suicidal ideation, plan to potentially overdose on her home medications.  Patient was brought in with a legal hold  -Psychiatry consultation pending recommendation    Hypokalemia  Assessment & Plan  Patient's potassium low today 3.3.  Likely due to poor nutrition due to severe alcohol use.  Magnesium low on admission 1.9.  Replacing via IV given patient's nausea  Recheck potassium and magnesium daily    Vitamin D deficiency- (present on admission)  Assessment & Plan  Patient's vitamin D 25 level 11 on 4/21  Vitamin B12 and iron at appropriate levels.  Continue patient on supplementation, 50,000 IU weekly p.o.    COPD (chronic obstructive pulmonary disease) (HCC)- (present on admission)  Assessment & Plan  Per chart review: no PFTs on file, on 2 L of oxygen at home.  No signs of acute exacerbation at this time on admission.  Oxygen per guidelines  Monitor respiratory status     VTE prophylaxis: Lovenox

## 2021-05-29 ENCOUNTER — APPOINTMENT (OUTPATIENT)
Dept: RADIOLOGY | Facility: MEDICAL CENTER | Age: 51
DRG: 896 | End: 2021-05-29
Attending: INTERNAL MEDICINE
Payer: MEDICAID

## 2021-05-29 LAB
ALBUMIN SERPL BCP-MCNC: 3.3 G/DL (ref 3.2–4.9)
ALBUMIN/GLOB SERPL: 1.1 G/DL
ALP SERPL-CCNC: 204 U/L (ref 30–99)
ALT SERPL-CCNC: 89 U/L (ref 2–50)
ANION GAP SERPL CALC-SCNC: 12 MMOL/L (ref 7–16)
AST SERPL-CCNC: 135 U/L (ref 12–45)
BILIRUB SERPL-MCNC: 1.3 MG/DL (ref 0.1–1.5)
BUN SERPL-MCNC: 5 MG/DL (ref 8–22)
CALCIUM SERPL-MCNC: 9.7 MG/DL (ref 8.5–10.5)
CHLORIDE SERPL-SCNC: 99 MMOL/L (ref 96–112)
CO2 SERPL-SCNC: 27 MMOL/L (ref 20–33)
CREAT SERPL-MCNC: 0.32 MG/DL (ref 0.5–1.4)
GLOBULIN SER CALC-MCNC: 3.1 G/DL (ref 1.9–3.5)
GLUCOSE SERPL-MCNC: 145 MG/DL (ref 65–99)
MAGNESIUM SERPL-MCNC: 1.7 MG/DL (ref 1.5–2.5)
POTASSIUM SERPL-SCNC: 3.8 MMOL/L (ref 3.6–5.5)
PROT SERPL-MCNC: 6.4 G/DL (ref 6–8.2)
SODIUM SERPL-SCNC: 138 MMOL/L (ref 135–145)

## 2021-05-29 PROCEDURE — 700111 HCHG RX REV CODE 636 W/ 250 OVERRIDE (IP): Performed by: STUDENT IN AN ORGANIZED HEALTH CARE EDUCATION/TRAINING PROGRAM

## 2021-05-29 PROCEDURE — 83735 ASSAY OF MAGNESIUM: CPT

## 2021-05-29 PROCEDURE — A9270 NON-COVERED ITEM OR SERVICE: HCPCS | Performed by: STUDENT IN AN ORGANIZED HEALTH CARE EDUCATION/TRAINING PROGRAM

## 2021-05-29 PROCEDURE — A9270 NON-COVERED ITEM OR SERVICE: HCPCS | Performed by: INTERNAL MEDICINE

## 2021-05-29 PROCEDURE — 99232 SBSQ HOSP IP/OBS MODERATE 35: CPT | Performed by: INTERNAL MEDICINE

## 2021-05-29 PROCEDURE — 78226 HEPATOBILIARY SYSTEM IMAGING: CPT

## 2021-05-29 PROCEDURE — 80053 COMPREHEN METABOLIC PANEL: CPT

## 2021-05-29 PROCEDURE — 700102 HCHG RX REV CODE 250 W/ 637 OVERRIDE(OP): Performed by: INTERNAL MEDICINE

## 2021-05-29 PROCEDURE — 770020 HCHG ROOM/CARE - TELE (206)

## 2021-05-29 PROCEDURE — 700102 HCHG RX REV CODE 250 W/ 637 OVERRIDE(OP): Performed by: STUDENT IN AN ORGANIZED HEALTH CARE EDUCATION/TRAINING PROGRAM

## 2021-05-29 PROCEDURE — 36415 COLL VENOUS BLD VENIPUNCTURE: CPT

## 2021-05-29 PROCEDURE — 700105 HCHG RX REV CODE 258: Performed by: STUDENT IN AN ORGANIZED HEALTH CARE EDUCATION/TRAINING PROGRAM

## 2021-05-29 RX ORDER — UREA 10 %
500 LOTION (ML) TOPICAL
Status: DISPENSED | OUTPATIENT
Start: 2021-05-29 | End: 2021-06-01

## 2021-05-29 RX ORDER — OXYCODONE HYDROCHLORIDE 5 MG/1
2.5 TABLET ORAL
Status: DISCONTINUED | OUTPATIENT
Start: 2021-05-29 | End: 2021-05-30

## 2021-05-29 RX ORDER — OXYCODONE HYDROCHLORIDE 5 MG/1
5 TABLET ORAL
Status: DISCONTINUED | OUTPATIENT
Start: 2021-05-29 | End: 2021-05-30

## 2021-05-29 RX ORDER — HYDROMORPHONE HYDROCHLORIDE 1 MG/ML
0.25 INJECTION, SOLUTION INTRAMUSCULAR; INTRAVENOUS; SUBCUTANEOUS
Status: DISCONTINUED | OUTPATIENT
Start: 2021-05-29 | End: 2021-05-30

## 2021-05-29 RX ADMIN — LORAZEPAM 2 MG: 2 TABLET ORAL at 13:53

## 2021-05-29 RX ADMIN — GABAPENTIN 300 MG: 300 CAPSULE ORAL at 06:12

## 2021-05-29 RX ADMIN — OXYCODONE 5 MG: 5 TABLET ORAL at 23:13

## 2021-05-29 RX ADMIN — SODIUM CHLORIDE, POTASSIUM CHLORIDE, SODIUM LACTATE AND CALCIUM CHLORIDE: 600; 310; 30; 20 INJECTION, SOLUTION INTRAVENOUS at 23:09

## 2021-05-29 RX ADMIN — LORAZEPAM 1 MG: 1 TABLET ORAL at 01:27

## 2021-05-29 RX ADMIN — LORAZEPAM 1 MG: 1 TABLET ORAL at 12:05

## 2021-05-29 RX ADMIN — LORAZEPAM 1 MG: 1 TABLET ORAL at 07:37

## 2021-05-29 RX ADMIN — ENOXAPARIN SODIUM 40 MG: 40 INJECTION SUBCUTANEOUS at 06:13

## 2021-05-29 RX ADMIN — OXYCODONE 5 MG: 5 TABLET ORAL at 17:32

## 2021-05-29 RX ADMIN — FAMOTIDINE 20 MG: 20 TABLET ORAL at 06:13

## 2021-05-29 RX ADMIN — PROMETHAZINE HYDROCHLORIDE 25 MG: 25 TABLET ORAL at 01:27

## 2021-05-29 RX ADMIN — SODIUM CHLORIDE, POTASSIUM CHLORIDE, SODIUM LACTATE AND CALCIUM CHLORIDE: 600; 310; 30; 20 INJECTION, SOLUTION INTRAVENOUS at 11:10

## 2021-05-29 RX ADMIN — FLUOXETINE 30 MG: 20 CAPSULE ORAL at 06:12

## 2021-05-29 RX ADMIN — Medication 5 MG: at 21:09

## 2021-05-29 RX ADMIN — GABAPENTIN 300 MG: 300 CAPSULE ORAL at 17:31

## 2021-05-29 RX ADMIN — GABAPENTIN 300 MG: 300 CAPSULE ORAL at 12:15

## 2021-05-29 RX ADMIN — Medication 500 MG: at 17:45

## 2021-05-29 ASSESSMENT — LIFESTYLE VARIABLES
ANXIETY: MILDLY ANXIOUS
ORIENTATION AND CLOUDING OF SENSORIUM: ORIENTED AND CAN DO SERIAL ADDITIONS
HEADACHE, FULLNESS IN HEAD: VERY MILD
VISUAL DISTURBANCES: NOT PRESENT
AUDITORY DISTURBANCES: NOT PRESENT
AGITATION: NORMAL ACTIVITY
PAROXYSMAL SWEATS: NO SWEAT VISIBLE
TOTAL SCORE: 8
ANXIETY: MILDLY ANXIOUS
ANXIETY: MILDLY ANXIOUS
AGITATION: NORMAL ACTIVITY
ANXIETY: NO ANXIETY (AT EASE)
HEADACHE, FULLNESS IN HEAD: VERY MILD
AUDITORY DISTURBANCES: NOT PRESENT
ORIENTATION AND CLOUDING OF SENSORIUM: ORIENTED AND CAN DO SERIAL ADDITIONS
NAUSEA AND VOMITING: *
AUDITORY DISTURBANCES: NOT PRESENT
TOTAL SCORE: 11
TOTAL SCORE: 8
VISUAL DISTURBANCES: NOT PRESENT
ORIENTATION AND CLOUDING OF SENSORIUM: ORIENTED AND CAN DO SERIAL ADDITIONS
PAROXYSMAL SWEATS: NO SWEAT VISIBLE
AGITATION: NORMAL ACTIVITY
VISUAL DISTURBANCES: NOT PRESENT
TOTAL SCORE: 2
NAUSEA AND VOMITING: *
NAUSEA AND VOMITING: *
ANXIETY: *
AGITATION: NORMAL ACTIVITY
HEADACHE, FULLNESS IN HEAD: NOT PRESENT
AUDITORY DISTURBANCES: NOT PRESENT
HEADACHE, FULLNESS IN HEAD: MILD
HEADACHE, FULLNESS IN HEAD: VERY MILD
TREMOR: *
PAROXYSMAL SWEATS: NO SWEAT VISIBLE
VISUAL DISTURBANCES: NOT PRESENT
NAUSEA AND VOMITING: NO NAUSEA AND NO VOMITING
ORIENTATION AND CLOUDING OF SENSORIUM: ORIENTED AND CAN DO SERIAL ADDITIONS
PAROXYSMAL SWEATS: NO SWEAT VISIBLE
ORIENTATION AND CLOUDING OF SENSORIUM: ORIENTED AND CAN DO SERIAL ADDITIONS
TREMOR: *
TOTAL SCORE: 8
TREMOR: *
PAROXYSMAL SWEATS: NO SWEAT VISIBLE
NAUSEA AND VOMITING: *
TREMOR: *
VISUAL DISTURBANCES: MILD SENSITIVITY
AUDITORY DISTURBANCES: NOT PRESENT
TREMOR: NO TREMOR
AGITATION: NORMAL ACTIVITY

## 2021-05-29 ASSESSMENT — ENCOUNTER SYMPTOMS
SHORTNESS OF BREATH: 0
DEPRESSION: 0
DIARRHEA: 0
HEADACHES: 0
FEVER: 0
PALPITATIONS: 0
DIZZINESS: 0
NAUSEA: 1
CHILLS: 0
VOMITING: 0
BACK PAIN: 0
CONSTIPATION: 0
COUGH: 0
ABDOMINAL PAIN: 1
TREMORS: 1

## 2021-05-29 ASSESSMENT — PAIN DESCRIPTION - PAIN TYPE
TYPE: ACUTE PAIN

## 2021-05-29 NOTE — CARE PLAN
The patient is Stable - Low risk of patient condition declining or worsening         Progress made toward(s) clinical / shift goals:  pt resting comfortably.  Problem: Knowledge Deficit - Standard  Goal: Patient and family/care givers will demonstrate understanding of plan of care, disease process/condition, diagnostic tests and medications  Note: POC discussed with pt.      Problem: Seizure Precautions  Goal: Implementation of seizure precautions  Note: Seizure precautions in place.       Patient is not progressing towards the following goals:

## 2021-05-29 NOTE — PROGRESS NOTES
[facility] Medicine Daily Progress Note    Date of Service  5/29/2021    Chief Complaint  50 y.o. female admitted 5/24/2021 with suicidal ideation    Hospital Course  50-year-old female with a past medical history of recurrent alcoholic pancreatitis, alcohol abuse and alcohol withdrawals including seizures, history of bipolar disorder, polysubstance use with methamphetamines and alcohol, prior suicide attempt, COPD on 2 L home oxygen, presented 5/24/2021 with complaints of suicidal ideation due to her chronic alcohol use.  Patient was admitted found to be in acute alcohol intoxication, elevated POC breathalyzer 0.292.  Patient on admission was more somnolent but arousable with sternal rub.  Patient came in with a legal hold due to complaints of suicidal ideation, suicidal plan by taking overdose of pills she has at home.    Interval Problem Update  5/25/2021-patient seen and evaluated today, labs and notes reviewed.  Patient stated she had continued suicidal thoughts, with potential plan of using her pills at home.  Patient was showing signs of acute alcohol withdrawal today, tremors and tachycardia.  Patient complained of nausea.  Psychiatry evaluated patient today given she her legal hold status.  CIWA scores ranging between 11-13 since admission.  Patient requiring Ativan and Librium.  She stated her last drink was the day she came to the hospital.    5/26 - Patient's LFTs increasing today, was decreased yesterday compared to high levels on admission. RUQ obtained on admission did not show obstruction.  Patient in active withdrawal continued. Patient stated she has RUQ pain today, severe, painful to deep touch, localized 9/10.  Consulted [facility].    5/27 - patient this morning was distressed, she was noted to be standing without help of nursing despite having directions explained to her by nursing to wait for assistance to use the commode or to get out of bed. Patient did not fall, but was  found to have soiled herself with urine. Patient denied any RUQ pain today compared to yesterday. She stated she was eating better. CIWA up to 12.  Oxygen supplementation up to 5LNC.    5/28 -patient was more controlled today, and denied any symptoms for abdominal pain at this time.  Morning patient was showing signs of improvement.  Most recent CIWA score went up to 13 at 1305 p.m.  Was only as high as 9 before that.  Oxygen now down to 3 L nasal cannula.    5/29-patient continued to have RUQ pain today, 8/10, sharp, radiating to her back and to her left lower quadrant.  + nausea.  Denied chest pain, vomiting.  Ordered HIDA scan.    Consultants/Specialty  Psychiatry  [facility] gastroenterology    Code Status  Full Code    Disposition  To be determined, patient in acute alcohol withdrawal requiring increased oxygen support compared to her baseline 2L at home.    Review of Systems  Review of Systems   Constitutional: Negative for chills, fever and malaise/fatigue.   Respiratory: Negative for cough and shortness of breath.    Cardiovascular: Negative for chest pain and palpitations.   Gastrointestinal: Positive for abdominal pain (RUQ) and nausea. Negative for constipation, diarrhea and vomiting.   Musculoskeletal: Negative for back pain and joint pain.   Neurological: Positive for tremors. Negative for dizziness and headaches.   Psychiatric/Behavioral: Negative for depression and suicidal ideas.      Physical Exam  Temp:  [36.1 °C (96.9 °F)-36.4 °C (97.5 °F)] 36.2 °C (97.1 °F)  Pulse:  [] 95  Resp:  [18-19] 18  BP: (110-145)/(76-89) 110/85  SpO2:  [94 %-99 %] 97 %    Physical Exam  Vitals and nursing note reviewed.   Constitutional:       General: She is not in acute distress.     Appearance: She is obese. She is not ill-appearing.   Cardiovascular:      Rate and Rhythm: Regular rhythm. Tachycardia present.      Pulses: Normal pulses.      Heart sounds: Normal heart sounds. No murmur heard.      Pulmonary:      Effort: Pulmonary effort is normal. No respiratory distress.      Breath sounds: Normal breath sounds. No wheezing.   Abdominal:      General: Bowel sounds are normal. There is no distension.      Tenderness: There is abdominal tenderness (RUQ).   Musculoskeletal:         General: No swelling or tenderness. Normal range of motion.   Skin:     General: Skin is warm.      Capillary Refill: Capillary refill takes less than 2 seconds.      Coloration: Skin is not jaundiced.      Findings: No erythema.   Neurological:      General: No focal deficit present.      Mental Status: She is alert and oriented to person, place, and time. Mental status is at baseline.      GCS: GCS eye subscore is 4. GCS verbal subscore is 5. GCS motor subscore is 6.      Motor: No tremor.   Psychiatric:         Mood and Affect: Mood normal.         Behavior: Behavior normal.         Thought Content: Thought content normal.         Judgment: Judgment normal.       Fluids    Intake/Output Summary (Last 24 hours) at 5/29/2021 1421  Last data filed at 5/29/2021 0630  Gross per 24 hour   Intake 1240 ml   Output 1200 ml   Net 40 ml       Laboratory  Recent Labs     05/27/21  0614 05/28/21  1223   WBC 2.3* 2.8*   RBC 2.99* 3.10*   HEMOGLOBIN 10.3* 10.7*   HEMATOCRIT 32.2* 33.5*   .7* 108.1*   MCH 34.4* 34.5*   MCHC 32.0* 31.9*   RDW 53.4* 54.0*   PLATELETCT 61* 102*   MPV 10.7 10.2     Recent Labs     05/27/21  0415 05/28/21  0904 05/29/21  0412   SODIUM 139 137 138   POTASSIUM 4.1 3.9 3.8   CHLORIDE 99 98 99   CO2 24 28 27   GLUCOSE 104* 118* 145*   BUN 3* 4* 5*   CREATININE 0.29* 0.22* 0.32*   CALCIUM 9.5 9.3 9.7                   Imaging  IR-US GUIDED PIV   Final Result    Ultrasound-guided PERIPHERAL IV INSERTION performed by    qualified nursing staff as above.      IR-US GUIDED PIV   Final Result    Ultrasound-guided PERIPHERAL IV INSERTION performed by    qualified nursing staff as above.      US-RUQ   Final Result     "  Stable echogenic liver which is most commonly secondary to steatosis      NM-BILIARY (HIDA) SCAN WITH CCK    (Results Pending)        Assessment/Plan  * Alcohol dependence with withdrawal (HCC)- (present on admission)  Assessment & Plan  Hx DT's and seizure 2/2 ETOH withdrawal.  Patient is a persistent alcohol user.    Prefers vodka, drinks half pint usually daily, obtains her drinks from her roommate.  Patient's last drink was on the day of admission, as stated by the patient.  Alcohol cessation education provided to patient.  Patient stated she wants to stop and this is what makes her feel suicidal as she is unable to stop.  -Seizure precaution  -[facility] protocol for symptom triggered therapy   -Librium for long acting benzodiazepine coverage  - PO vitamins, patient was given detox IV bag with thiamine.  -Check Magnesium daily  - consult    Acute on chronic respiratory failure with hypoxia on home O2 therapy- (present on admission)  Assessment & Plan  Patient currently requiring increased oxygen, arrived to ED requiring 3L.  O2 demand up to 5L NC compared to her chronic home oxygen needs of 2L. Due to patient's acute alcohol withdrawal worsening distress, tachycardia.  5/28 - Improving to 3LNC  - continue oxygen support  - will need restart of home oxygen order on discharge    Abnormal LFTs- (present on admission)  Assessment & Plan  Suspecting 2/2 to continued daily ETOH abuse and acute alcohol intoxication on admission.  Patient's LFTs increasing today, was decreased yesterday compared to high levels on admission.    RUQ US shows: \"Stable echogenic liver which is most commonly secondary to steatosis\"  Patient likely has fatty liver disease from alcohol use.  5/25 - No Discriminant Function = 8, no indication for steroids  GGT 2572  Pending HIDA scan    Continue to monitor, recheck CMP daily  Holding librium  Consulted [facility], appreciate any further " recommendations    Macrocytic anemia- (present on admission)  Assessment & Plan  Vit B12 and folate levels normal on prior check  Likely due to alcohol liver disease  Monitor for any bleeding    Hypomagnesemia- (present on admission)  Assessment & Plan  Patient arrived with magnesium 1.9, down to 1.6 despite IV replacements.  Continue IV replacement likely due to nutritional deficiency    Leukocytopenia- (present on admission)  Assessment & Plan  Low WBC due to liver disorder  Slightly improving  - monitor CBC with diff, no need for reverse isolation yet.    Alcohol-induced acute pancreatitis- (present on admission)  Assessment & Plan  Patient continues to complain about nausea, but denied any vomiting.  Trialing patient on solid foods  IVF  Pain Control    Depression with suicidal ideation- (present on admission)  Assessment & Plan  Patient presented with suicidal ideation, plan to potentially overdose on her home medications.  Patient was brought in with a legal hold  -Psychiatry consultation pending recommendation    Hypokalemia  Assessment & Plan  Patient's potassium low today 3.3.  Likely due to poor nutrition due to severe alcohol use.  Magnesium low on admission 1.9.  Replacing via IV given patient's nausea  Recheck potassium and magnesium daily    Vitamin D deficiency- (present on admission)  Assessment & Plan  Patient's vitamin D 25 level 11 on 4/21  Vitamin B12 and iron at appropriate levels.  Continue patient on supplementation, 50,000 IU weekly p.o.    COPD (chronic obstructive pulmonary disease) (HCC)- (present on admission)  Assessment & Plan  Per chart review: no PFTs on file, on 2 L of oxygen at home.  No signs of acute exacerbation at this time on admission.  Oxygen per guidelines  Monitor respiratory status     VTE prophylaxis: Lovenox

## 2021-05-30 LAB
ALBUMIN SERPL BCP-MCNC: 3.2 G/DL (ref 3.2–4.9)
ALBUMIN/GLOB SERPL: 1.1 G/DL
ALP SERPL-CCNC: 194 U/L (ref 30–99)
ALT SERPL-CCNC: 75 U/L (ref 2–50)
ANION GAP SERPL CALC-SCNC: 10 MMOL/L (ref 7–16)
APTT PPP: 29.5 SEC (ref 24.7–36)
AST SERPL-CCNC: 125 U/L (ref 12–45)
BASOPHILS # BLD AUTO: 0.7 % (ref 0–1.8)
BASOPHILS # BLD: 0.02 K/UL (ref 0–0.12)
BILIRUB SERPL-MCNC: 0.9 MG/DL (ref 0.1–1.5)
BUN SERPL-MCNC: 5 MG/DL (ref 8–22)
CALCIUM SERPL-MCNC: 9.3 MG/DL (ref 8.5–10.5)
CHLORIDE SERPL-SCNC: 99 MMOL/L (ref 96–112)
CO2 SERPL-SCNC: 29 MMOL/L (ref 20–33)
CREAT SERPL-MCNC: 0.32 MG/DL (ref 0.5–1.4)
EOSINOPHIL # BLD AUTO: 0.07 K/UL (ref 0–0.51)
EOSINOPHIL NFR BLD: 2.4 % (ref 0–6.9)
ERYTHROCYTE [DISTWIDTH] IN BLOOD BY AUTOMATED COUNT: 57 FL (ref 35.9–50)
GLOBULIN SER CALC-MCNC: 2.9 G/DL (ref 1.9–3.5)
GLUCOSE SERPL-MCNC: 126 MG/DL (ref 65–99)
HCT VFR BLD AUTO: 31.2 % (ref 37–47)
HGB BLD-MCNC: 10 G/DL (ref 12–16)
IMM GRANULOCYTES # BLD AUTO: 0.01 K/UL (ref 0–0.11)
IMM GRANULOCYTES NFR BLD AUTO: 0.3 % (ref 0–0.9)
LYMPHOCYTES # BLD AUTO: 1.11 K/UL (ref 1–4.8)
LYMPHOCYTES NFR BLD: 38 % (ref 22–41)
MAGNESIUM SERPL-MCNC: 1.6 MG/DL (ref 1.5–2.5)
MCH RBC QN AUTO: 34.8 PG (ref 27–33)
MCHC RBC AUTO-ENTMCNC: 32.1 G/DL (ref 33.6–35)
MCV RBC AUTO: 108.7 FL (ref 81.4–97.8)
MONOCYTES # BLD AUTO: 0.7 K/UL (ref 0–0.85)
MONOCYTES NFR BLD AUTO: 24 % (ref 0–13.4)
NEUTROPHILS # BLD AUTO: 1.01 K/UL (ref 2–7.15)
NEUTROPHILS NFR BLD: 34.6 % (ref 44–72)
NRBC # BLD AUTO: 0 K/UL
NRBC BLD-RTO: 0 /100 WBC
PLATELET # BLD AUTO: 108 K/UL (ref 164–446)
PMV BLD AUTO: 9.7 FL (ref 9–12.9)
POTASSIUM SERPL-SCNC: 3.7 MMOL/L (ref 3.6–5.5)
PROT SERPL-MCNC: 6.1 G/DL (ref 6–8.2)
RBC # BLD AUTO: 2.87 M/UL (ref 4.2–5.4)
SODIUM SERPL-SCNC: 138 MMOL/L (ref 135–145)
WBC # BLD AUTO: 2.9 K/UL (ref 4.8–10.8)

## 2021-05-30 PROCEDURE — 700102 HCHG RX REV CODE 250 W/ 637 OVERRIDE(OP): Performed by: INTERNAL MEDICINE

## 2021-05-30 PROCEDURE — 85730 THROMBOPLASTIN TIME PARTIAL: CPT

## 2021-05-30 PROCEDURE — 700102 HCHG RX REV CODE 250 W/ 637 OVERRIDE(OP): Performed by: STUDENT IN AN ORGANIZED HEALTH CARE EDUCATION/TRAINING PROGRAM

## 2021-05-30 PROCEDURE — A9270 NON-COVERED ITEM OR SERVICE: HCPCS | Performed by: INTERNAL MEDICINE

## 2021-05-30 PROCEDURE — 85025 COMPLETE CBC W/AUTO DIFF WBC: CPT

## 2021-05-30 PROCEDURE — 770020 HCHG ROOM/CARE - TELE (206)

## 2021-05-30 PROCEDURE — 80053 COMPREHEN METABOLIC PANEL: CPT

## 2021-05-30 PROCEDURE — 83735 ASSAY OF MAGNESIUM: CPT

## 2021-05-30 PROCEDURE — A9270 NON-COVERED ITEM OR SERVICE: HCPCS | Performed by: STUDENT IN AN ORGANIZED HEALTH CARE EDUCATION/TRAINING PROGRAM

## 2021-05-30 PROCEDURE — 36415 COLL VENOUS BLD VENIPUNCTURE: CPT

## 2021-05-30 PROCEDURE — 99232 SBSQ HOSP IP/OBS MODERATE 35: CPT | Performed by: INTERNAL MEDICINE

## 2021-05-30 PROCEDURE — 700111 HCHG RX REV CODE 636 W/ 250 OVERRIDE (IP): Performed by: STUDENT IN AN ORGANIZED HEALTH CARE EDUCATION/TRAINING PROGRAM

## 2021-05-30 PROCEDURE — 700105 HCHG RX REV CODE 258: Performed by: STUDENT IN AN ORGANIZED HEALTH CARE EDUCATION/TRAINING PROGRAM

## 2021-05-30 RX ORDER — BISACODYL 10 MG
10 SUPPOSITORY, RECTAL RECTAL
Status: DISCONTINUED | OUTPATIENT
Start: 2021-05-30 | End: 2021-06-17 | Stop reason: HOSPADM

## 2021-05-30 RX ORDER — AMOXICILLIN 250 MG
2 CAPSULE ORAL DAILY
Status: DISCONTINUED | OUTPATIENT
Start: 2021-05-31 | End: 2021-06-17 | Stop reason: HOSPADM

## 2021-05-30 RX ORDER — OXYCODONE HYDROCHLORIDE 5 MG/1
5 TABLET ORAL
Status: DISCONTINUED | OUTPATIENT
Start: 2021-05-30 | End: 2021-06-15

## 2021-05-30 RX ORDER — POLYETHYLENE GLYCOL 3350 17 G/17G
1 POWDER, FOR SOLUTION ORAL
Status: DISCONTINUED | OUTPATIENT
Start: 2021-05-30 | End: 2021-06-17 | Stop reason: HOSPADM

## 2021-05-30 RX ORDER — HYDROMORPHONE HYDROCHLORIDE 1 MG/ML
0.5 INJECTION, SOLUTION INTRAMUSCULAR; INTRAVENOUS; SUBCUTANEOUS
Status: DISCONTINUED | OUTPATIENT
Start: 2021-05-30 | End: 2021-06-10

## 2021-05-30 RX ORDER — OXYCODONE HYDROCHLORIDE 5 MG/1
2.5 TABLET ORAL
Status: DISCONTINUED | OUTPATIENT
Start: 2021-05-30 | End: 2021-06-15

## 2021-05-30 RX ADMIN — OXYCODONE 5 MG: 5 TABLET ORAL at 04:23

## 2021-05-30 RX ADMIN — OXYCODONE 5 MG: 5 TABLET ORAL at 21:45

## 2021-05-30 RX ADMIN — ENOXAPARIN SODIUM 40 MG: 40 INJECTION SUBCUTANEOUS at 05:05

## 2021-05-30 RX ADMIN — OXYCODONE 5 MG: 5 TABLET ORAL at 12:36

## 2021-05-30 RX ADMIN — Medication 5 MG: at 21:44

## 2021-05-30 RX ADMIN — LORAZEPAM 0.5 MG: 1 TABLET ORAL at 04:17

## 2021-05-30 RX ADMIN — PROMETHAZINE HYDROCHLORIDE 12.5 MG: 25 TABLET ORAL at 12:36

## 2021-05-30 RX ADMIN — ONDANSETRON 4 MG: 4 TABLET, ORALLY DISINTEGRATING ORAL at 17:10

## 2021-05-30 RX ADMIN — FAMOTIDINE 20 MG: 20 TABLET ORAL at 05:05

## 2021-05-30 RX ADMIN — LORAZEPAM 0.5 MG: 1 TABLET ORAL at 10:32

## 2021-05-30 RX ADMIN — GABAPENTIN 300 MG: 300 CAPSULE ORAL at 12:36

## 2021-05-30 RX ADMIN — LORAZEPAM 0.5 MG: 1 TABLET ORAL at 21:44

## 2021-05-30 RX ADMIN — Medication 500 MG: at 17:11

## 2021-05-30 RX ADMIN — OXYCODONE 5 MG: 5 TABLET ORAL at 17:10

## 2021-05-30 RX ADMIN — FLUOXETINE 30 MG: 20 CAPSULE ORAL at 05:04

## 2021-05-30 RX ADMIN — Medication 500 MG: at 08:12

## 2021-05-30 RX ADMIN — GABAPENTIN 300 MG: 300 CAPSULE ORAL at 17:10

## 2021-05-30 RX ADMIN — SODIUM CHLORIDE, POTASSIUM CHLORIDE, SODIUM LACTATE AND CALCIUM CHLORIDE: 600; 310; 30; 20 INJECTION, SOLUTION INTRAVENOUS at 08:12

## 2021-05-30 RX ADMIN — PROMETHAZINE HYDROCHLORIDE 12.5 MG: 25 TABLET ORAL at 13:54

## 2021-05-30 RX ADMIN — GABAPENTIN 300 MG: 300 CAPSULE ORAL at 05:05

## 2021-05-30 ASSESSMENT — LIFESTYLE VARIABLES
TOTAL SCORE: 4
VISUAL DISTURBANCES: NOT PRESENT
VISUAL DISTURBANCES: NOT PRESENT
AUDITORY DISTURBANCES: NOT PRESENT
HEADACHE, FULLNESS IN HEAD: MILD
AUDITORY DISTURBANCES: NOT PRESENT
TOTAL SCORE: 3
ORIENTATION AND CLOUDING OF SENSORIUM: ORIENTED AND CAN DO SERIAL ADDITIONS
AGITATION: NORMAL ACTIVITY
ORIENTATION AND CLOUDING OF SENSORIUM: ORIENTED AND CAN DO SERIAL ADDITIONS
ORIENTATION AND CLOUDING OF SENSORIUM: ORIENTED AND CAN DO SERIAL ADDITIONS
TREMOR: *
VISUAL DISTURBANCES: NOT PRESENT
ORIENTATION AND CLOUDING OF SENSORIUM: ORIENTED AND CAN DO SERIAL ADDITIONS
HEADACHE, FULLNESS IN HEAD: MODERATE
HEADACHE, FULLNESS IN HEAD: MILD
TOTAL SCORE: 4
ANXIETY: NO ANXIETY (AT EASE)
ANXIETY: NO ANXIETY (AT EASE)
AGITATION: NORMAL ACTIVITY
PAROXYSMAL SWEATS: NO SWEAT VISIBLE
ANXIETY: NO ANXIETY (AT EASE)
TOTAL SCORE: 6
AUDITORY DISTURBANCES: NOT PRESENT
PAROXYSMAL SWEATS: NO SWEAT VISIBLE
AGITATION: SOMEWHAT MORE THAN NORMAL ACTIVITY
AGITATION: NORMAL ACTIVITY
ANXIETY: MILDLY ANXIOUS
ANXIETY: NO ANXIETY (AT EASE)
HEADACHE, FULLNESS IN HEAD: MODERATELY SEVERE
AGITATION: NORMAL ACTIVITY
TREMOR: *
AUDITORY DISTURBANCES: NOT PRESENT
VISUAL DISTURBANCES: NOT PRESENT
NAUSEA AND VOMITING: NO NAUSEA AND NO VOMITING
HEADACHE, FULLNESS IN HEAD: MILD
TREMOR: TREMOR NOT VISIBLE BUT CAN BE FELT, FINGERTIP TO FINGERTIP
NAUSEA AND VOMITING: NO NAUSEA AND NO VOMITING
PAROXYSMAL SWEATS: NO SWEAT VISIBLE
NAUSEA AND VOMITING: NO NAUSEA AND NO VOMITING
PAROXYSMAL SWEATS: NO SWEAT VISIBLE
TOTAL SCORE: 6
AUDITORY DISTURBANCES: NOT PRESENT
TREMOR: NO TREMOR
PAROXYSMAL SWEATS: NO SWEAT VISIBLE
VISUAL DISTURBANCES: NOT PRESENT
TREMOR: *
NAUSEA AND VOMITING: NO NAUSEA AND NO VOMITING
NAUSEA AND VOMITING: NO NAUSEA AND NO VOMITING
ORIENTATION AND CLOUDING OF SENSORIUM: ORIENTED AND CAN DO SERIAL ADDITIONS

## 2021-05-30 ASSESSMENT — ENCOUNTER SYMPTOMS
PALPITATIONS: 0
CONSTIPATION: 0
FEVER: 0
HEADACHES: 0
DIZZINESS: 0
VOMITING: 0
COUGH: 0
CHILLS: 0
ABDOMINAL PAIN: 1
TREMORS: 1
BACK PAIN: 0
DIARRHEA: 0
SHORTNESS OF BREATH: 0
DEPRESSION: 0
NAUSEA: 1

## 2021-05-30 ASSESSMENT — PAIN DESCRIPTION - PAIN TYPE: TYPE: ACUTE PAIN

## 2021-05-30 ASSESSMENT — PAIN SCALES - WONG BAKER: WONGBAKER_NUMERICALRESPONSE: HURTS JUST A LITTLE BIT

## 2021-05-30 NOTE — CARE PLAN
The patient is progressing towards goal,awaiting for GI.  Shift Goals  Clinical Goals: manage Etoh withdrawal    Progress made toward(s) clinical / shift goals:  pain control and less anxiety.    Patient is not progressing towards the following goals:medicated with ativan once  only CIWA SCORE-6      Problem: Knowledge Deficit - Standard  Goal: Patient and family/care givers will demonstrate understanding of plan of care, disease process/condition, diagnostic tests and medications  Outcome: Progressing     Problem: Optimal Care for Alcohol Withdrawal  Goal: Optimal Care for the alcohol withdrawal patient  Outcome: Progressing     Problem: Seizure Precautions  Goal: Implementation of seizure precautions  Outcome: Progressing     Problem: Lifestyle Changes  Goal: Patient's ability to identify lifestyle changes and available resources to help reduce recurrence of condition will improve  Outcome: Progressing     Problem: Psychosocial  Goal: Patient's level of anxiety will decrease  Outcome: Progressing     Problem: Provide Safe Environment  Goal: Suicide environmental safety, protocols, policies, and practices will be implemented  Outcome: Progressing

## 2021-05-30 NOTE — PROGRESS NOTES
[facility] Medicine Daily Progress Note    Date of Service  5/30/2021    Chief Complaint  50 y.o. female admitted 5/24/2021 with suicidal ideation    Hospital Course  50-year-old female with a past medical history of recurrent alcoholic pancreatitis, alcohol abuse and alcohol withdrawals including seizures, history of bipolar disorder, polysubstance use with methamphetamines and alcohol, prior suicide attempt, COPD on 2 L home oxygen, presented 5/24/2021 with complaints of suicidal ideation due to her chronic alcohol use.  Patient was admitted found to be in acute alcohol intoxication, elevated POC breathalyzer 0.292.  Patient on admission was more somnolent but arousable with sternal rub.  Patient came in with a legal hold due to complaints of suicidal ideation, suicidal plan by taking overdose of pills she has at home.    Interval Problem Update  5/25/2021-patient seen and evaluated today, labs and notes reviewed.  Patient stated she had continued suicidal thoughts, with potential plan of using her pills at home.  Patient was showing signs of acute alcohol withdrawal today, tremors and tachycardia.  Patient complained of nausea.  Psychiatry evaluated patient today given she her legal hold status.  CIWA scores ranging between 11-13 since admission.  Patient requiring Ativan and Librium.  She stated her last drink was the day she came to the hospital.    5/26 - Patient's LFTs increasing today, was decreased yesterday compared to high levels on admission. RUQ obtained on admission did not show obstruction.  Patient in active withdrawal continued. Patient stated she has RUQ pain today, severe, painful to deep touch, localized 9/10.  Consulted [facility] gastro[facility].    5/27 - patient this morning was distressed, she was noted to be standing without help of nursing despite having directions explained to her by nursing to wait for assistance to use the commode or to get out of bed. Patient did not fall, but was  found to have soiled herself with urine. Patient denied any RUQ pain today compared to yesterday. She stated she was eating better. CIWA up to 12.  Oxygen supplementation up to 5LNC.    5/28 -patient was more controlled today, and denied any symptoms for abdominal pain at this time.  Morning patient was showing signs of improvement.  Most recent CIWA score went up to 13 at 1305 p.m.  Was only as high as 9 before that.  Oxygen now down to 3 L nasal cannula.    5/29-patient continued to have RUQ pain today, 8/10, sharp, radiating to her back and to her left lower quadrant.  + nausea.  Denied chest pain, vomiting.  Ordered HIDA scan.    5/30 - patient stated she had continued RUQ pain. HIDA scan showed ejection fraction of 13%, with GGT 2572.  Called [facility] for update, physician on call to evaluate patient and make recommendations.  If no procedures required by GI, will need to likely consult with general surgery.  Patient unable to sit for MRI due to her tremors, hence HIDA scan was ordered.    Consultants/Specialty  Psychiatry  [facility] gastroenterology    Code Status  Full Code    Disposition  To be determined, patient in acute alcohol withdrawal requiring increased oxygen support compared to her baseline 2L at home.  RUQ pain, HIDA scan positive, patient requires specialist evaluation.    Review of Systems  Review of Systems   Constitutional: Negative for chills, fever and malaise/fatigue.   Respiratory: Negative for cough and shortness of breath.    Cardiovascular: Negative for chest pain and palpitations.   Gastrointestinal: Positive for abdominal pain (RUQ) and nausea. Negative for constipation, diarrhea and vomiting.   Musculoskeletal: Negative for back pain and joint pain.   Neurological: Positive for tremors. Negative for dizziness and headaches.   Psychiatric/Behavioral: Negative for depression and suicidal ideas.      Physical Exam  Temp:  [36.4 °C (97.5 °F)-37 °C (98.6 °F)] 36.6 °C (97.8  °F)  Pulse:  [] 104  Resp:  [16-20] 17  BP: (113-148)/(75-95) 148/86  SpO2:  [93 %-98 %] 94 %    Physical Exam  Vitals and nursing note reviewed.   Constitutional:       General: She is not in acute distress.     Appearance: She is obese. She is not ill-appearing.   Cardiovascular:      Rate and Rhythm: Regular rhythm. Tachycardia present.      Pulses: Normal pulses.      Heart sounds: Normal heart sounds. No murmur heard.     Pulmonary:      Effort: Pulmonary effort is normal. No respiratory distress.      Breath sounds: Normal breath sounds. No wheezing.   Abdominal:      General: Bowel sounds are normal. There is no distension.      Tenderness: There is abdominal tenderness (RUQ).   Musculoskeletal:         General: No swelling or tenderness. Normal range of motion.   Skin:     General: Skin is warm.      Capillary Refill: Capillary refill takes less than 2 seconds.      Coloration: Skin is not jaundiced.      Findings: No erythema.   Neurological:      General: No focal deficit present.      Mental Status: She is alert and oriented to person, place, and time. Mental status is at baseline.      GCS: GCS eye subscore is 4. GCS verbal subscore is 5. GCS motor subscore is 6.      Motor: No tremor.   Psychiatric:         Mood and Affect: Mood normal.         Behavior: Behavior normal.         Thought Content: Thought content normal.         Judgment: Judgment normal.       Fluids    Intake/Output Summary (Last 24 hours) at 5/30/2021 1337  Last data filed at 5/30/2021 0200  Gross per 24 hour   Intake --   Output 1100 ml   Net -1100 ml       Laboratory  Recent Labs     05/28/21  1223 05/30/21  0559   WBC 2.8* 2.9*   RBC 3.10* 2.87*   HEMOGLOBIN 10.7* 10.0*   HEMATOCRIT 33.5* 31.2*   .1* 108.7*   MCH 34.5* 34.8*   MCHC 31.9* 32.1*   RDW 54.0* 57.0*   PLATELETCT 102* 108*   MPV 10.2 9.7     Recent Labs     05/28/21  0904 05/29/21  0412 05/30/21  0559   SODIUM 137 138 138   POTASSIUM 3.9 3.8 3.7   CHLORIDE 98  99 99   CO2 28 27 29   GLUCOSE 118* 145* 126*   BUN 4* 5* 5*   CREATININE 0.22* 0.32* 0.32*   CALCIUM 9.3 9.7 9.3                   Imaging  NM-BILIARY HIDA SCAN FATTY MEAL   Final Result      Low gallbladder ejection fraction.      This study was obtained utilizing fatty meal challenge to induce gallbladder contraction due to national shortage of cholecystokinin.  There are no national standards for gallbladder ejection fraction calculated utilizing fatty meal challenge.  An    ejection fraction greater than 35% is most likely normal.  Gallbladder ejection fraction less than 35% may be abnormal but could be falsely positive.  Therefore, this test could be falsely positive.  Consider repeat imaging once cholecystokinin becomes    available.      IR-US GUIDED PIV   Final Result    Ultrasound-guided PERIPHERAL IV INSERTION performed by    qualified nursing staff as above.      IR-US GUIDED PIV   Final Result    Ultrasound-guided PERIPHERAL IV INSERTION performed by    qualified nursing staff as above.      US-RUQ   Final Result      Stable echogenic liver which is most commonly secondary to steatosis           Assessment/Plan  * Alcohol dependence with withdrawal (HCC)- (present on admission)  Assessment & Plan  Hx DT's and seizure 2/2 ETOH withdrawal.  Patient is a persistent alcohol user.    Prefers vodka, drinks half pint usually daily, obtains her drinks from her roommate.  Patient's last drink was on the day of admission, as stated by the patient.  Alcohol cessation education provided to patient.  Patient stated she wants to stop and this is what makes her feel suicidal as she is unable to stop.  -Seizure precaution  -[facility] protocol for symptom triggered therapy   -Librium for long acting benzodiazepine coverage  - PO vitamins, patient was given detox IV bag with thiamine.  -Check Magnesium daily  - consult    Acute on chronic respiratory failure with hypoxia on home O2 therapy- (present on  "admission)  Assessment & Plan  Patient currently requiring increased oxygen, arrived to ED requiring 3L.  O2 demand up to 5L NC compared to her chronic home oxygen needs of 2L. Due to patient's acute alcohol withdrawal worsening distress, tachycardia.  5/28 - Improving to 3LNC  - continue oxygen support  - will need restart of home oxygen order on discharge    Abnormal LFTs- (present on admission)  Assessment & Plan  Suspecting 2/2 to continued daily ETOH abuse and acute alcohol intoxication on admission.  Patient's LFTs increasing today, was decreased yesterday compared to high levels on admission.    RUQ US shows: \"Stable echogenic liver which is most commonly secondary to steatosis\"  Patient likely has fatty liver disease from alcohol use.  5/25 - No Discriminant Function = 8, no indication for steroids  GGT 2572  HIDA scan - 13% ejection fraction    Continue to monitor, recheck CMP daily  Holding librium  Consulted [facility], appreciate any further recommendations. To update evaluation patient today.    Macrocytic anemia- (present on admission)  Assessment & Plan  Vit B12 and folate levels normal on prior check  Likely due to alcohol liver disease  Monitor for any bleeding    Hypomagnesemia- (present on admission)  Assessment & Plan  Patient arrived with magnesium 1.9, down to 1.6 despite IV replacements.  Continue IV replacement likely due to nutritional deficiency    Leukocytopenia- (present on admission)  Assessment & Plan  Low WBC due to liver disorder  Slightly improving  - monitor CBC with diff, no need for reverse isolation yet.    Alcohol-induced acute pancreatitis- (present on admission)  Assessment & Plan  Patient continues to complain about nausea, but denied any vomiting.  On solid foods  Pain Control    Depression with suicidal ideation- (present on admission)  Assessment & Plan  Patient presented with suicidal ideation, plan to potentially overdose on her home " medications.  Patient was brought in with a legal hold  -Psychiatry consultation pending recommendation    Hypokalemia  Assessment & Plan  Patient's potassium low today 3.3.  Likely due to poor nutrition due to severe alcohol use.  Magnesium low on admission 1.9.  Replacing via IV given patient's nausea  Recheck potassium and magnesium daily    Vitamin D deficiency- (present on admission)  Assessment & Plan  Patient's vitamin D 25 level 11 on 4/21  Vitamin B12 and iron at appropriate levels.  Continue patient on supplementation, 50,000 IU weekly p.o.    COPD (chronic obstructive pulmonary disease) (HCC)- (present on admission)  Assessment & Plan  Per chart review: no PFTs on file, on 2 L of oxygen at home.  No signs of acute exacerbation at this time on admission.  Oxygen per guidelines  Monitor respiratory status     VTE prophylaxis: Lovenox

## 2021-05-30 NOTE — CARE PLAN
The patient is Stable - Low risk of patient condition declining or worsening    Shift Goals  Clinical Goals: manage Etoh withdrawal      Problem: Optimal Care for Alcohol Withdrawal  Goal: Optimal Care for the alcohol withdrawal patient  Outcome: Progressing     CIWA <5 all night. No ativan administered.    Problem: Psychosocial  Goal: Patient's level of anxiety will decrease  Outcome: Progressing     Woke up at 0200 and stated she feels anxious, requesting ativan. Immediately fell asleep and ativan therefore held.       Addendum:    Ativan administered at 0417 due to CIWA of 6.

## 2021-05-31 PROBLEM — K70.10 ACUTE ALCOHOLIC HEPATITIS: Status: ACTIVE | Noted: 2021-05-24

## 2021-05-31 LAB
ALBUMIN SERPL BCP-MCNC: 3.6 G/DL (ref 3.2–4.9)
ALBUMIN/GLOB SERPL: 1.3 G/DL
ALP SERPL-CCNC: 202 U/L (ref 30–99)
ALT SERPL-CCNC: 80 U/L (ref 2–50)
ANION GAP SERPL CALC-SCNC: 9 MMOL/L (ref 7–16)
AST SERPL-CCNC: 133 U/L (ref 12–45)
BILIRUB SERPL-MCNC: 1 MG/DL (ref 0.1–1.5)
BUN SERPL-MCNC: 5 MG/DL (ref 8–22)
CALCIUM SERPL-MCNC: 9.2 MG/DL (ref 8.5–10.5)
CHLORIDE SERPL-SCNC: 97 MMOL/L (ref 96–112)
CO2 SERPL-SCNC: 29 MMOL/L (ref 20–33)
CREAT SERPL-MCNC: 0.28 MG/DL (ref 0.5–1.4)
ERYTHROCYTE [DISTWIDTH] IN BLOOD BY AUTOMATED COUNT: 54.5 FL (ref 35.9–50)
GLOBULIN SER CALC-MCNC: 2.8 G/DL (ref 1.9–3.5)
GLUCOSE BLD-MCNC: 104 MG/DL (ref 65–99)
GLUCOSE SERPL-MCNC: 111 MG/DL (ref 65–99)
HCT VFR BLD AUTO: 33.1 % (ref 37–47)
HGB BLD-MCNC: 10.5 G/DL (ref 12–16)
INR PPP: 0.89 (ref 0.87–1.13)
MAGNESIUM SERPL-MCNC: 1.5 MG/DL (ref 1.5–2.5)
MCH RBC QN AUTO: 34.2 PG (ref 27–33)
MCHC RBC AUTO-ENTMCNC: 31.7 G/DL (ref 33.6–35)
MCV RBC AUTO: 107.8 FL (ref 81.4–97.8)
PHOSPHATE SERPL-MCNC: 4.7 MG/DL (ref 2.5–4.5)
PLATELET # BLD AUTO: 151 K/UL (ref 164–446)
PMV BLD AUTO: 10 FL (ref 9–12.9)
POTASSIUM SERPL-SCNC: 3.7 MMOL/L (ref 3.6–5.5)
PROT SERPL-MCNC: 6.4 G/DL (ref 6–8.2)
PROTHROMBIN TIME: 12.3 SEC (ref 12–14.6)
RBC # BLD AUTO: 3.07 M/UL (ref 4.2–5.4)
SODIUM SERPL-SCNC: 135 MMOL/L (ref 135–145)
WBC # BLD AUTO: 4.4 K/UL (ref 4.8–10.8)

## 2021-05-31 PROCEDURE — 86038 ANTINUCLEAR ANTIBODIES: CPT

## 2021-05-31 PROCEDURE — 700102 HCHG RX REV CODE 250 W/ 637 OVERRIDE(OP): Performed by: INTERNAL MEDICINE

## 2021-05-31 PROCEDURE — 86256 FLUORESCENT ANTIBODY TITER: CPT

## 2021-05-31 PROCEDURE — 99232 SBSQ HOSP IP/OBS MODERATE 35: CPT | Performed by: INTERNAL MEDICINE

## 2021-05-31 PROCEDURE — A9270 NON-COVERED ITEM OR SERVICE: HCPCS | Performed by: STUDENT IN AN ORGANIZED HEALTH CARE EDUCATION/TRAINING PROGRAM

## 2021-05-31 PROCEDURE — 83516 IMMUNOASSAY NONANTIBODY: CPT

## 2021-05-31 PROCEDURE — 770020 HCHG ROOM/CARE - TELE (206)

## 2021-05-31 PROCEDURE — 97166 OT EVAL MOD COMPLEX 45 MIN: CPT

## 2021-05-31 PROCEDURE — 85610 PROTHROMBIN TIME: CPT

## 2021-05-31 PROCEDURE — 700102 HCHG RX REV CODE 250 W/ 637 OVERRIDE(OP): Performed by: STUDENT IN AN ORGANIZED HEALTH CARE EDUCATION/TRAINING PROGRAM

## 2021-05-31 PROCEDURE — 82962 GLUCOSE BLOOD TEST: CPT

## 2021-05-31 PROCEDURE — 97161 PT EVAL LOW COMPLEX 20 MIN: CPT

## 2021-05-31 PROCEDURE — 84100 ASSAY OF PHOSPHORUS: CPT

## 2021-05-31 PROCEDURE — 700111 HCHG RX REV CODE 636 W/ 250 OVERRIDE (IP): Performed by: STUDENT IN AN ORGANIZED HEALTH CARE EDUCATION/TRAINING PROGRAM

## 2021-05-31 PROCEDURE — A9270 NON-COVERED ITEM OR SERVICE: HCPCS | Performed by: INTERNAL MEDICINE

## 2021-05-31 PROCEDURE — 700111 HCHG RX REV CODE 636 W/ 250 OVERRIDE (IP): Performed by: INTERNAL MEDICINE

## 2021-05-31 PROCEDURE — 36415 COLL VENOUS BLD VENIPUNCTURE: CPT

## 2021-05-31 PROCEDURE — 83735 ASSAY OF MAGNESIUM: CPT

## 2021-05-31 PROCEDURE — 85027 COMPLETE CBC AUTOMATED: CPT

## 2021-05-31 PROCEDURE — 80053 COMPREHEN METABOLIC PANEL: CPT

## 2021-05-31 PROCEDURE — 82787 IGG 1 2 3 OR 4 EACH: CPT | Mod: 91

## 2021-05-31 RX ORDER — MAGNESIUM SULFATE HEPTAHYDRATE 40 MG/ML
2 INJECTION, SOLUTION INTRAVENOUS ONCE
Status: COMPLETED | OUTPATIENT
Start: 2021-05-31 | End: 2021-05-31

## 2021-05-31 RX ORDER — GAUZE BANDAGE 2" X 2"
100 BANDAGE TOPICAL DAILY
Status: DISCONTINUED | OUTPATIENT
Start: 2021-05-31 | End: 2021-06-17 | Stop reason: HOSPADM

## 2021-05-31 RX ORDER — CHLORDIAZEPOXIDE HYDROCHLORIDE 25 MG/1
25 CAPSULE, GELATIN COATED ORAL DAILY
Status: DISCONTINUED | OUTPATIENT
Start: 2021-05-31 | End: 2021-06-02

## 2021-05-31 RX ORDER — VITAMIN C
1 TAB ORAL DAILY
Status: COMPLETED | OUTPATIENT
Start: 2021-05-31 | End: 2021-06-13

## 2021-05-31 RX ADMIN — OXYCODONE 5 MG: 5 TABLET ORAL at 02:11

## 2021-05-31 RX ADMIN — FAMOTIDINE 20 MG: 20 TABLET ORAL at 05:57

## 2021-05-31 RX ADMIN — LORAZEPAM 1 MG: 1 TABLET ORAL at 17:03

## 2021-05-31 RX ADMIN — MAGNESIUM SULFATE 2 G: 2 INJECTION INTRAVENOUS at 11:18

## 2021-05-31 RX ADMIN — Medication 500 MG: at 05:57

## 2021-05-31 RX ADMIN — Medication 5 MG: at 20:28

## 2021-05-31 RX ADMIN — ACETAMINOPHEN 650 MG: 325 TABLET, FILM COATED ORAL at 02:11

## 2021-05-31 RX ADMIN — GABAPENTIN 300 MG: 300 CAPSULE ORAL at 11:18

## 2021-05-31 RX ADMIN — GABAPENTIN 300 MG: 300 CAPSULE ORAL at 16:52

## 2021-05-31 RX ADMIN — Medication 500 MG: at 16:51

## 2021-05-31 RX ADMIN — ENOXAPARIN SODIUM 40 MG: 40 INJECTION SUBCUTANEOUS at 05:58

## 2021-05-31 RX ADMIN — ONDANSETRON 4 MG: 4 TABLET, ORALLY DISINTEGRATING ORAL at 21:11

## 2021-05-31 RX ADMIN — GABAPENTIN 300 MG: 300 CAPSULE ORAL at 05:57

## 2021-05-31 RX ADMIN — OXYCODONE 5 MG: 5 TABLET ORAL at 20:28

## 2021-05-31 RX ADMIN — FLUOXETINE 30 MG: 20 CAPSULE ORAL at 05:57

## 2021-05-31 RX ADMIN — Medication 100 MG: at 11:18

## 2021-05-31 RX ADMIN — LORAZEPAM 0.5 MG: 1 TABLET ORAL at 05:56

## 2021-05-31 RX ADMIN — Medication 1 TABLET: at 11:18

## 2021-05-31 RX ADMIN — OXYCODONE 5 MG: 5 TABLET ORAL at 16:52

## 2021-05-31 RX ADMIN — OXYCODONE 5 MG: 5 TABLET ORAL at 05:56

## 2021-05-31 RX ADMIN — CHLORDIAZEPOXIDE HYDROCHLORIDE 25 MG: 25 CAPSULE ORAL at 11:18

## 2021-05-31 ASSESSMENT — LIFESTYLE VARIABLES
AUDITORY DISTURBANCES: NOT PRESENT
NAUSEA AND VOMITING: NO NAUSEA AND NO VOMITING
NAUSEA AND VOMITING: NO NAUSEA AND NO VOMITING
ANXIETY: NO ANXIETY (AT EASE)
TREMOR: *
HEADACHE, FULLNESS IN HEAD: MILD
NAUSEA AND VOMITING: *
ANXIETY: NO ANXIETY (AT EASE)
AGITATION: NORMAL ACTIVITY
VISUAL DISTURBANCES: NOT PRESENT
ANXIETY: NO ANXIETY (AT EASE)
HEADACHE, FULLNESS IN HEAD: NOT PRESENT
HEADACHE, FULLNESS IN HEAD: NOT PRESENT
TOTAL SCORE: 2
TREMOR: TREMOR NOT VISIBLE BUT CAN BE FELT, FINGERTIP TO FINGERTIP
ORIENTATION AND CLOUDING OF SENSORIUM: ORIENTED AND CAN DO SERIAL ADDITIONS
ORIENTATION AND CLOUDING OF SENSORIUM: ORIENTED AND CAN DO SERIAL ADDITIONS
TOTAL SCORE: 9
PAROXYSMAL SWEATS: NO SWEAT VISIBLE
ORIENTATION AND CLOUDING OF SENSORIUM: ORIENTED AND CAN DO SERIAL ADDITIONS
ORIENTATION AND CLOUDING OF SENSORIUM: ORIENTED AND CAN DO SERIAL ADDITIONS
AUDITORY DISTURBANCES: NOT PRESENT
NAUSEA AND VOMITING: *
HEADACHE, FULLNESS IN HEAD: NOT PRESENT
TREMOR: *
ANXIETY: NO ANXIETY (AT EASE)
AUDITORY DISTURBANCES: NOT PRESENT
TOTAL SCORE: 4
AUDITORY DISTURBANCES: NOT PRESENT
TOTAL SCORE: 1
AGITATION: NORMAL ACTIVITY
AGITATION: NORMAL ACTIVITY
VISUAL DISTURBANCES: NOT PRESENT
PAROXYSMAL SWEATS: NO SWEAT VISIBLE
TREMOR: *
VISUAL DISTURBANCES: NOT PRESENT
PAROXYSMAL SWEATS: NO SWEAT VISIBLE
VISUAL DISTURBANCES: MODERATE SENSITIVITY
PAROXYSMAL SWEATS: NO SWEAT VISIBLE
AGITATION: NORMAL ACTIVITY

## 2021-05-31 ASSESSMENT — ENCOUNTER SYMPTOMS
SHORTNESS OF BREATH: 0
NAUSEA: 0
FEVER: 0
DEPRESSION: 0
DIZZINESS: 0
PALPITATIONS: 0
CHILLS: 0
TREMORS: 0
BACK PAIN: 0
CONSTIPATION: 0
VOMITING: 0
DIARRHEA: 0
ABDOMINAL PAIN: 1
COUGH: 0
HEADACHES: 0

## 2021-05-31 ASSESSMENT — COGNITIVE AND FUNCTIONAL STATUS - GENERAL
MOVING FROM LYING ON BACK TO SITTING ON SIDE OF FLAT BED: A LITTLE
CLIMB 3 TO 5 STEPS WITH RAILING: TOTAL
TOILETING: A LOT
DAILY ACTIVITIY SCORE: 16
EATING MEALS: A LITTLE
HELP NEEDED FOR BATHING: A LOT
WALKING IN HOSPITAL ROOM: A LOT
TURNING FROM BACK TO SIDE WHILE IN FLAT BAD: A LITTLE
DRESSING REGULAR UPPER BODY CLOTHING: A LITTLE
MOBILITY SCORE: 15
MOVING TO AND FROM BED TO CHAIR: A LITTLE
PERSONAL GROOMING: A LITTLE
STANDING UP FROM CHAIR USING ARMS: A LITTLE
SUGGESTED CMS G CODE MODIFIER DAILY ACTIVITY: CK
DRESSING REGULAR LOWER BODY CLOTHING: A LITTLE
SUGGESTED CMS G CODE MODIFIER MOBILITY: CK

## 2021-05-31 ASSESSMENT — GAIT ASSESSMENTS
ASSISTIVE DEVICE: FRONT WHEEL WALKER
GAIT LEVEL OF ASSIST: MINIMAL ASSIST
DISTANCE (FEET): 12

## 2021-05-31 ASSESSMENT — PAIN DESCRIPTION - PAIN TYPE: TYPE: ACUTE PAIN

## 2021-05-31 ASSESSMENT — ACTIVITIES OF DAILY LIVING (ADL): TOILETING: REQUIRES ASSIST

## 2021-05-31 NOTE — PROGRESS NOTES
Gastroenterology Progress Note         Author: Emmanuelle Cartagena D.O.                                 Date & Time Created: 5/30/2021 10:22 PM         Chief Complaint:  Alcoholic liver disease    Interval History:  GI was reconsulted for elevated liver chemistry.  Patient is a 50-year-old female with chronic alcohol consumption consisting of a pint of vodka for over 10 years.  Her last check was 6 days ago.  She also admits to smoking methamphetamine about 6 days ago.  Patient complains of right upper quad abdominal pain.    Ultrasound shows echogenic liver consistent with steatosis.  HIDA scan shows an ejection fraction of 13%.    Review of Systems:  General: No fevers, chills, unintentional, weight loss.  HEENT: No scleral icterus, gum bleed, dysphagia, odynophagia.  Cardiology: No chest pain, palpitations, orthopnea.  Respiratory: No dyspnea, cough, wheezing.  Gastrointestinal: No abdominal pain, nausea, vomiting, changes in bowel habits, rectal bleed.  Genitourinary: No hematuria, dysuria, urgency.  Neurologic: No changes in memory, confusion, gait instability.  Skin: No ecchymosis, jaundice, telangiectasia.    Physical Exam:  Vitals:    05/30/21 0802 05/30/21 1218 05/30/21 1635 05/30/21 2000   BP: 145/85 148/86 128/78 125/89   Pulse: (!) 101 (!) 104 95    Resp: 19 17 17 19   Temp: 37 °C (98.6 °F) 36.6 °C (97.8 °F) 36.1 °C (97 °F) 36.4 °C (97.6 °F)   TempSrc: Temporal Temporal Temporal Temporal   SpO2: 97% 94% 99% 99%   Weight:       Height:         General: No acute cardiopulmonary distress.  Head: Normocephalic.  EENT: Scleral anicterus. Mucosa moist.   Respiratory: Breath sounds present. Symmetrical rise of anterior thorax.  Cardiovascular: Normal S1, S2.  Gastrointestinal: Soft, nontender, nondistended. Normoactive bowel sounds. No guarding.  Neurologic: Alert and oriented.  Skin: Warm and dry.    Labs:              Recent Labs     05/28/21  0904 05/29/21  0412 05/30/21  0559   SODIUM 137 138 138   POTASSIUM 3.9  3.8 3.7   CHLORIDE 98 99 99   CO2 28 27 29   BUN 4* 5* 5*   CREATININE 0.22* 0.32* 0.32*   MAGNESIUM 1.6 1.7 1.6   CALCIUM 9.3 9.7 9.3       Recent Labs     21  0921  04121  0559   ALTSGPT 93* 89* 75*   ASTSGOT 133* 135* 125*   ALKPHOSPHAT 179* 204* 194*   TBILIRUBIN 1.3 1.3 0.9   GLUCOSE 118* 145* 126*       Recent Labs     21  1223 21  0559 21  1355   RBC 3.10* 2.87*  --    HEMOGLOBIN 10.7* 10.0*  --    HEMATOCRIT 33.5* 31.2*  --    PLATELETCT 102* 108*  --    APTT  --   --  29.5       Recent Labs     21  0559   WBC  --  2.8*  --  2.9*   NEUTSPOLYS  --  41.60*  --  34.60*   LYMPHOCYTES  --  34.60  --  38.00   MONOCYTES  --  19.80*  --  24.00*   EOSINOPHILS  --  2.50  --  2.40   BASOPHILS  --  1.10  --  0.70   ASTSGOT 133*  --  135* 125*   ALTSGPT 93*  --  89* 75*   ALKPHOSPHAT 179*  --  204* 194*   TBILIRUBIN 1.3  --  1.3 0.9       Hemodynamics:    Temp (24hrs), Av.6 °C (97.8 °F), Min:36.1 °C (97 °F), Max:37 °C (98.6 °F)  Temperature: 36.4 °C (97.6 °F)    Pulse  Av.2  Min: 79  Max: 121     Blood Pressure: 125/89       Respiratory:      Respiration: 19, Pulse Oximetry: 99 %         Work Of Breathing / Effort: Within Normal Limits    RUL Breath Sounds: Clear, RML Breath Sounds: Clear, RLL Breath Sounds: Clear, MORALES Breath Sounds: Clear, LLL Breath Sounds: Clear    Fluids:      Intake/Output Summary (Last 24 hours) at 20212  Last data filed at 2021  Gross per 24 hour   Intake --   Output 1100 ml   Net -1100 ml            GI/Nutrition:    Orders Placed This Encounter   Procedures   • Diet Order Diet: Regular     Standing Status:   Standing     Number of Occurrences:   1     Order Specific Question:   Diet:     Answer:   Regular [1]   • Diet NPO     Standing Status:   Standing     Number of Occurrences:   8     Order Specific Question:   Restrict to:     Answer:   Sips with Medications [3]        Medical Decision Making, by Problem:    Active Hospital Problems    Diagnosis    • *Alcohol dependence with withdrawal (HCC) [F10.239]    • Acute on chronic respiratory failure with hypoxia on home O2 therapy [J96.21]    • Vitamin D deficiency [E55.9]    • Abnormal LFTs [R94.5]    • Leukocytopenia [D72.819]    • Macrocytic anemia [D53.9]    • Hypomagnesemia [E83.42]    • COPD (chronic obstructive pulmonary disease) (HCC) [J44.9]    • Alcohol-induced acute pancreatitis [K85.20]    • Depression with suicidal ideation [F32.9, R45.851]    • Hypokalemia [E87.6]             Assessment:  1.  Alcoholic liver disease  2.  Methamphetamine use  3.  RUQ abdominal pain could be due to biliary dyskinesia with EF of 13% versus alcoholic hepatitis  4.  Acute mixed liver injury      Plan:  -Check cholestatic liver disease and autoimmune hepatitis These labs will take several days to return and patient can be follow-up as an outpatient for the results as liver chemistry is downtrending.  -AST:ALT >2 indicating alcoholic liver disease  -Alcohol cessation and lifestyle modification.  -Given RUQ pain with low EF, consider general surgery consultation if pain does not improve.  -Follow-up for liver management as an outpatient.  -CIWA for alcohol withdrawal.      Please call GI with any questions or concerns.         Quality-Core Measures    Emmanuelle Cartagena D.O.  Gastroenterology  [facility]  [phone]

## 2021-05-31 NOTE — THERAPY
"Occupational Therapy   Initial Evaluation     Patient Name: Olya Youssef  Age:  50 y.o., Sex:  female  Medical Record #: [id number]  Today's Date: 5/31/2021     Precautions  Precautions: Fall Risk  Comments: SI, ETOH abuse, bipolar DO, supplemental O2     Assessment  Patient is 50 y.o. female admitted for SI and abdominal pain found to have alcoholic hepatitis and likely biliary dyskinesia. PMHx of daily ETOH/methamphetamine abuse, recurrent alcoholic pancreatitis, withdrawals with seizures, bipolar disorder, COPD, and prior suicide attempts. Reports sleeps on couch and roommate assists her PRN. 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 Since getting COVID/PNA she d/c'd home and \"pretty much stayed on the couch\", has taken 3 showers in the past 8 wks and usually can't \"make it to the toilet\". Reports had to throw out the couch because she had so many \"accidents\" on it. Currently limited by decreased functional mobility, activity tolerance, cognition, strength, AROM, coordination, balance, and pain which are currently affecting pt's ability to complete ADLs/IADLs at baseline. Will continue to follow.     Plan    Recommend Occupational Therapy 3 times per week until therapy goals are met for the following treatments:  Adaptive Equipment, Neuro Re-Education / Balance, Self Care/Activities of Daily Living, Therapeutic Activities and Therapeutic Exercises.    DC Equipment Recommendations: Unable to determine at this time  Discharge Recommendations: Recommend post-acute placement for additional occupational therapy services prior to discharge home      Objective     05/31/21 1001   Prior Living Situation   Prior Services Intermittent Physical Support for ADL Per Family   Housing / Facility 1 Story Apartment / Condo   Steps Into Home 0   Bathroom Set up Walk In Shower   Equipment Owned Front-Wheel Walker   Lives with - Patient's Self Care Capacity Unrelated Adult   Comments Reports sleeps on couch and roommate " "assists her. Reports since getting COVID/PNA she went home and \"pretty much stayed on the couch\". Reports had to throw out the couch because she had so many \"accidents\" on it.    Prior Level of ADL Function   Self Feeding Independent   Grooming / Hygiene Independent   Bathing Requires Assist   Dressing Independent   Toileting Requires Assist   Prior Level of IADL Function   Medication Management Requires Assist   Laundry Dependent   Kitchen Mobility Dependent   Finances Requires Assist   Home Management Dependent   Shopping Dependent   Prior Level Of Mobility Supervision With Device in Home   Precautions   Precautions Fall Risk   Comments SI, ETOH abuse, bipolar DO, supplemental O2    Pain 0 - 10 Group   Therapist Pain Assessment Post Activity Pain Same as Prior to Activity;During Activity;Nurse Notified  (no c/o pain, just fatigue)   Cognition    Cognition / Consciousness X   Speech/ Communication Delayed Responses   Level of Consciousness Alert   Ability To Follow Commands 2 Step   Safety Awareness Impaired   Sequencing Impaired   Initiation Impaired   Comments cooperative but req encouragement to participate, seems lethargic, w/ flat affect   Passive ROM Upper Body   Passive ROM Upper Body WDL   Active ROM Upper Body   Active ROM Upper Body  WDL   Strength Upper Body   Upper Body Strength  X   Gross Strength Generalized Weakness, Equal Bilaterally.    Coordination Upper Body   Coordination X   Fine Motor Coordination reports decreased sensation in fingertips and poor FM control x~6 wks   Comments limited d/t fatigue   Balance Assessment   Sitting Balance (Static) Fair +   Sitting Balance (Dynamic) Fair   Standing Balance (Static) Fair -   Standing Balance (Dynamic) Poor +   Weight Shift Sitting Fair   Weight Shift Standing Poor   Comments w/FWW   Bed Mobility    Supine to Sit Minimal Assist   Sit to Supine   (left in chair)   Scooting Minimal Assist   Comments HOB elevated and use of rails and therapist hand for " leverage   ADL Assessment   Eating Supervision  (drinking water from cup; MD d/c'd NPO)   Grooming Supervision;Seated  (wiping face with cloth)   Lower Body Dressing Minimal Assist  (SPV for socks, likely min for pants)   Toileting   (declined need)   Comments limited by fatigue   How much help from another person does the patient currently need...   Putting on and taking off regular lower body clothing? 3   Bathing (including washing, rinsing, and drying)? 2   Toileting, which includes using a toilet, bedpan, or urinal? 2   Putting on and taking off regular upper body clothing? 3   Taking care of personal grooming such as brushing teeth? 3   Eating meals? 3   6 Clicks Daily Activity Score 16   Functional Mobility   Sit to Stand Minimal Assist   Bed, Chair, Wheelchair Transfer Minimal Assist   Toilet Transfers   [place]declined need; [place] [facility] [place]   Transfer Method Stand Step   Mobility w/FWW EOB>door>chair; small shuffling steps   Edema / Skin Assessment   Edema / Skin  Not Assessed   Activity Tolerance   Sitting in Chair 5+ min left in chair   Sitting Edge of Bed 12 min   Standing 4 min total   Comments limited by fatigue and SOB; reports cannot take deep breaths because painful d/t prior COVID/PNA   Patient / Family Goals   Patient / Family Goal #1 to get better   Short Term Goals   Short Term Goal # 1 LB dressing with SPV (pants)   Short Term Goal # 2 toilet txf with SPV   Short Term Goal # 3 standing g/h with SPV for >1 min

## 2021-05-31 NOTE — PROGRESS NOTES
[facility] Medicine Daily Progress Note    Date of Service  5/31/2021    Chief Complaint  50 y.o. female admitted 5/24/2021 with suicidal ideation    Hospital Course  50-year-old female with a past medical history of recurrent alcoholic pancreatitis, alcohol abuse and alcohol withdrawals including seizures, history of bipolar disorder, polysubstance use with methamphetamines and alcohol, prior suicide attempt, COPD on 2 L home oxygen, presented 5/24/2021 with complaints of suicidal ideation due to her chronic alcohol use.  Patient was admitted found to be in acute alcohol intoxication, elevated POC breathalyzer 0.292.  Patient on admission was more somnolent but arousable with sternal rub.  Patient came in with a legal hold due to complaints of suicidal ideation, suicidal plan by taking overdose of pills she has at home.    Interval Problem Update  5/25/2021-patient seen and evaluated today, labs and notes reviewed.  Patient stated she had continued suicidal thoughts, with potential plan of using her pills at home.  Patient was showing signs of acute alcohol withdrawal today, tremors and tachycardia.  Patient complained of nausea.  Psychiatry evaluated patient today given she her legal hold status.  CIWA scores ranging between 11-13 since admission.  Patient requiring Ativan and Librium.  She stated her last drink was the day she came to the hospital.    5/26 - Patient's LFTs increasing today, was decreased yesterday compared to high levels on admission. RUQ obtained on admission did not show obstruction.  Patient in active withdrawal continued. Patient stated she has RUQ pain today, severe, painful to deep touch, localized 9/10.  Consulted [facility].    5/27 - patient this morning was distressed, she was noted to be standing without help of nursing despite having directions explained to her by nursing to wait for assistance to use the commode or to get out of bed. Patient did not fall, but was  found to have soiled herself with urine. Patient denied any RUQ pain today compared to yesterday. She stated she was eating better. CIWA up to 12.  Oxygen supplementation up to 5LNC.    5/28 -patient was more controlled today, and denied any symptoms for abdominal pain at this time.  Morning patient was showing signs of improvement.  Most recent CIWA score went up to 13 at 1305 p.m.  Was only as high as 9 before that.  Oxygen now down to 3 L nasal cannula.    5/29-patient continued to have RUQ pain today, 8/10, sharp, radiating to her back and to her left lower quadrant.  + nausea.  Denied chest pain, vomiting.  Ordered HIDA scan.    5/30 - patient stated she had continued RUQ pain. HIDA scan showed ejection fraction of 13%, with GGT 2572.  Called [facility] for update, physician on call to evaluate patient and make recommendations.  If no procedures required by GI, will need to likely consult with general surgery.  Patient unable to sit for MRI due to her tremors, hence HIDA scan was ordered.    5/31 - patient had continued RUQ pain. Tolerating her diet.     Spoke with Dr. Chavez with general surgery, patient is not a candidate for any cholecystectomy due to her alcoholic hepatitis on this admission, likely biliary dyskinesia, recommended low fat diet and outpt evaluation for any possible cholecystectomy.    Consultants/Specialty  Psychiatry  [facility] gastroenterology  General surgery    Code Status  Full Code    Disposition  To be determined, patient in acute alcohol withdrawal requiring increased oxygen support compared to her baseline 2L at home.    Review of Systems  Review of Systems   Constitutional: Negative for chills, fever and malaise/fatigue.   Respiratory: Negative for cough and shortness of breath.    Cardiovascular: Negative for chest pain and palpitations.   Gastrointestinal: Positive for abdominal pain (RUQ). Negative for constipation, diarrhea, nausea and vomiting.    Musculoskeletal: Negative for back pain and joint pain.   Neurological: Negative for dizziness, tremors and headaches.   Psychiatric/Behavioral: Negative for depression and suicidal ideas.      Physical Exam  Temp:  [35.9 °C (96.7 °F)-37.2 °C (98.9 °F)] 35.9 °C (96.7 °F)  Pulse:  [] 97  Resp:  [16-20] 16  BP: (110-148)/(78-89) 116/78  SpO2:  [94 %-99 %] 97 %    Physical Exam  Vitals and nursing note reviewed.   Constitutional:       General: She is not in acute distress.     Appearance: She is obese. She is not ill-appearing.   Cardiovascular:      Rate and Rhythm: Regular rhythm. Tachycardia present.      Pulses: Normal pulses.      Heart sounds: Normal heart sounds. No murmur heard.     Pulmonary:      Effort: Pulmonary effort is normal. No respiratory distress.      Breath sounds: Normal breath sounds. No wheezing.   Abdominal:      General: Bowel sounds are normal. There is no distension.      Tenderness: There is abdominal tenderness (RUQ).   Musculoskeletal:         General: No swelling or tenderness. Normal range of motion.   Skin:     General: Skin is warm.      Capillary Refill: Capillary refill takes less than 2 seconds.      Coloration: Skin is not jaundiced.      Findings: No erythema.   Neurological:      General: No focal deficit present.      Mental Status: She is alert and oriented to person, place, and time. Mental status is at baseline.      GCS: GCS eye subscore is 4. GCS verbal subscore is 5. GCS motor subscore is 6.      Motor: No tremor.   Psychiatric:         Mood and Affect: Mood normal.         Behavior: Behavior normal.         Thought Content: Thought content normal.         Judgment: Judgment normal.       Fluids    Intake/Output Summary (Last 24 hours) at 5/31/2021 0950  Last data filed at 5/31/2021 0130  Gross per 24 hour   Intake --   Output 1300 ml   Net -1300 ml       Laboratory  Recent Labs     05/28/21  1223 05/30/21  0559 05/31/21  0330   WBC 2.8* 2.9* 4.4*   RBC 3.10*  2.87* 3.07*   HEMOGLOBIN 10.7* 10.0* 10.5*   HEMATOCRIT 33.5* 31.2* 33.1*   .1* 108.7* 107.8*   MCH 34.5* 34.8* 34.2*   MCHC 31.9* 32.1* 31.7*   RDW 54.0* 57.0* 54.5*   PLATELETCT 102* 108* 151*   MPV 10.2 9.7 10.0     Recent Labs     05/29/21  0412 05/30/21  0559 05/31/21  0330   SODIUM 138 138 135   POTASSIUM 3.8 3.7 3.7   CHLORIDE 99 99 97   CO2 27 29 29   GLUCOSE 145* 126* 111*   BUN 5* 5* 5*   CREATININE 0.32* 0.32* 0.28*   CALCIUM 9.7 9.3 9.2     Recent Labs     05/30/21  1355 05/31/21  0330   APTT 29.5  --    INR  --  0.89               Imaging  NM-BILIARY HIDA SCAN FATTY MEAL   Final Result      Low gallbladder ejection fraction.      This study was obtained utilizing fatty meal challenge to induce gallbladder contraction due to national shortage of cholecystokinin.  There are no national standards for gallbladder ejection fraction calculated utilizing fatty meal challenge.  An    ejection fraction greater than 35% is most likely normal.  Gallbladder ejection fraction less than 35% may be abnormal but could be falsely positive.  Therefore, this test could be falsely positive.  Consider repeat imaging once cholecystokinin becomes    available.      IR-US GUIDED PIV   Final Result    Ultrasound-guided PERIPHERAL IV INSERTION performed by    qualified nursing staff as above.      IR-US GUIDED PIV   Final Result    Ultrasound-guided PERIPHERAL IV INSERTION performed by    qualified nursing staff as above.      US-RUQ   Final Result      Stable echogenic liver which is most commonly secondary to steatosis           Assessment/Plan  * Alcohol dependence with withdrawal (HCC)- (present on admission)  Assessment & Plan  Hx DT's and seizure 2/2 ETOH withdrawal.  Patient is a persistent alcohol user.    Prefers vodka, drinks half pint usually daily, obtains her drinks from her roommate.  Patient's last drink was on the day of admission, as stated by the patient.  Alcohol cessation education provided to patient.  " Patient stated she wants to stop and this is what makes her feel suicidal as she is unable to stop.  -Seizure precaution  -[facility] protocol for symptom triggered therapy   -Librium for long acting benzodiazepine coverage  - PO vitamins, patient was given detox IV bag with thiamine.  -Check Magnesium daily  - consult    Acute on chronic respiratory failure with hypoxia on home O2 therapy- (present on admission)  Assessment & Plan  Patient currently requiring increased oxygen, arrived to ED requiring 3L.  O2 demand up to 5L NC compared to her chronic home oxygen needs of 2L. Due to patient's acute alcohol withdrawal worsening distress, tachycardia.  5/28 - Improving to 3LNC  - continue oxygen support  - will need restart of home oxygen order on discharge    Acute alcoholic hepatitis- (present on admission)  Assessment & Plan  Suspecting 2/2 to continued daily ETOH abuse and acute alcohol intoxication on admission.  RUQ US shows: \"Stable echogenic liver which is most commonly secondary to steatosis\"  Patient likely has fatty liver disease from alcohol use.  5/25 - Lida Discriminant Function = 8, no indication for steroids  GGT 2572  HIDA scan - 13% ejection fraction  5/26 - Consulted [facility] for evaluation of rising LFTs, elevated GGT.     --- No update by GI until 5/30 - recommended general surgery evaluation.  5/31 - Consulted General Surgery Dr. Chavez, patient is not a candidate for any cholecystectomy due to her alcoholic hepatitis on this admission, likely biliary dyskinesia, recommended low fat diet and outpt evaluation for any possible cholecystectomy.   --- Patient's LFTs improving.  , ALT 80,     Continue to monitor, recheck CMP daily  Trial back librium 25mg po daily (5/31)    Macrocytic anemia- (present on admission)  Assessment & Plan  Vit B12 and folate levels normal on prior check  Likely due to alcohol liver disease  Monitor for any " bleeding    Hypomagnesemia- (present on admission)  Assessment & Plan  Patient arrived with magnesium 1.9, down to 1.6 despite IV replacements.  Continue IV replacement likely due to nutritional deficiency    Leukocytopenia- (present on admission)  Assessment & Plan  Low WBC due to liver disorder  Slightly improving  - monitor CBC with diff, no need for reverse isolation yet.    Alcohol-induced acute pancreatitis- (present on admission)  Assessment & Plan  Patient continues to complain about nausea, but denied any vomiting.  On solid foods  Pain Control    Depression with suicidal ideation- (present on admission)  Assessment & Plan  Patient presented with suicidal ideation, plan to potentially overdose on her home medications.  Patient was brought in with a legal hold  -Psychiatry consultation pending recommendation    Hypokalemia  Assessment & Plan  Patient's potassium low today 3.3.  Likely due to poor nutrition due to severe alcohol use.  Magnesium low on admission 1.9.  Replacing via IV given patient's nausea  Recheck potassium and magnesium daily    Vitamin D deficiency- (present on admission)  Assessment & Plan  Patient's vitamin D 25 level 11 on 4/21  Vitamin B12 and iron at appropriate levels.  Continue patient on supplementation, 50,000 IU weekly p.o.    COPD (chronic obstructive pulmonary disease) (HCC)- (present on admission)  Assessment & Plan  Per chart review: no PFTs on file, on 2 L of oxygen at home.  No signs of acute exacerbation at this time on admission.  Oxygen per guidelines  Monitor respiratory status     VTE prophylaxis: Lovenox

## 2021-05-31 NOTE — THERAPY
Physical Therapy   Initial Evaluation     Patient Name: Olya Youssef  Age:  50 y.o., Sex:  female  Medical Record #: [id number]  Today's Date: 5/31/2021     Precautions: Fall Risk    Assessment  Patient is 50 y.o. female who presented 5/24/2021 intoxicated with complaints of suicidal ideation.  PMH recurrent alcoholic pancreatitis, alcohol abuse, history of alcohol withdrawals including seizures, bipolar disorder, methamphetamine abuse, suicide attempt and COPD on supplemental O2 at home. Pt resides in an apartment with a roommate.   Pt currently requires assistance for all mobility, demonstrates generalized weakness and unsteady gait. ( see below for status, goals and POC) Pt will benefit from continued inpatient as well as post acute placement for rehabilitation.     Plan    Recommend Physical Therapy 3 times per week until therapy goals are met for the following treatments:  Bed Mobility, Equipment, Gait Training, Neuro Re-Education / Balance, Therapeutic Activities and Therapeutic Exercises    DC Equipment Recommendations: Unable to determine at this time  Discharge Recommendations: Recommend post-acute placement for additional physical therapy services prior to discharge home          Objective       05/31/21 1024   Prior Living Situation   Prior Services None   Housing / Facility 1 Story Apartment / Condo   Steps Into Home 0   Steps In Home 0   Equipment Owned Front-Wheel Walker   Lives with - Patient's Self Care Capacity Unrelated Adult   Prior Level of Functional Mobility   Bed Mobility Required Assist   Transfer Status Required Assist   Ambulation Required Assist   Distance Ambulation (Feet)   (short household distance. )   Assistive Devices Used Front-Wheel Walker   Comments states she lays around alot on the couch.    History of Falls   Date of Last Fall   (no  falls reported. )   Cognition    Speech/ Communication Delayed Responses   Level of Consciousness Alert   Ability To Follow Commands 2 Step    Safety Awareness Impaired   Sequencing Impaired   Initiation Impaired   Comments flat affect.    Active ROM Lower Body    Active ROM Lower Body  WDL   Strength Lower Body   Lower Body Strength  X   Gross Strength Generalized Weakness, Equal Bilaterally   Sensation Lower Body   Lower Extremity Sensation   Not Tested   Lower Body Muscle Tone   Lower Body Muscle Tone  Not Tested   Vision   Vision Comments no c/o visual changes.    Balance Assessment   Sitting Balance (Static) Fair +   Sitting Balance (Dynamic) Fair   Standing Balance (Static) Fair -   Standing Balance (Dynamic) Poor +   Weight Shift Sitting Fair   Weight Shift Standing Poor   Comments wide based.    Gait Analysis   Gait Level Of Assist Minimal Assist   Assistive Device Front Wheel Walker   Distance (Feet) 12   # of Times Distance was Traveled 1   Deviation Increased Base Of Support;Step To;Decreased Heel Strike;Decreased Toe Off   # of Stairs Climbed 0   Weight Bearing Status no restrictions.    Comments assist with supplmental O2   Bed Mobility    Supine to Sit Minimal Assist   Sit to Supine   (up to chair post session . )   Scooting Minimal Assist   Rolling Supervised   Functional Mobility   Sit to Stand Minimal Assist   Bed, Chair, Wheelchair Transfer Minimal Assist   Transfer Method Stand Step   Mobility bed <> room > chair.    How much difficulty does the patient currently have...   Turning over in bed (including adjusting bedclothes, sheets and blankets)? 3   Sitting down on and standing up from a chair with arms (e.g., wheelchair, bedside commode, etc.) 3   Moving from lying on back to sitting on the side of the bed? 3   How much help from another person does the patient currently need...   Moving to and from a bed to a chair (including a wheelchair)? 3   Need to walk in a hospital room? 2   Climbing 3-5 steps with a railing? 1   6 clicks Mobility Score 15   Activity Tolerance   Sitting Edge of Bed 12   Standing 4   Edema / Skin Assessment    Edema / Skin  Not Assessed   Patient / Family Goals    Patient / Family Goal #1 did not express   Short Term Goals    Short Term Goal # 1 Pt will perform bed mobility with SPV by the 6th tx   Short Term Goal # 2 Pt will transfer from bed to chair with SPV by the 6th tx   Short Term Goal # 3 Pt will ambulate with FWW for 50 ft with SPV by the 6th tx.   Education Group   Education Provided Role of Physical Therapist   Role of Physical Therapist Patient Response Patient;Eager;Explanation;Verbal Demonstration   Problem List    Problems Impaired Bed Mobility;Impaired Transfers;Impaired Ambulation;Functional Strength Deficit;Impaired Balance;Safety Awareness Deficits / Cognition

## 2021-05-31 NOTE — CONSULTS
Surgical History and Physical    Date of Service: 5/31/2021    Requesting Physician: Puneet Dominguez MD - Medicine    Reason for Consultation: Biliary dyskinesia    HPI: This is a 50 y.o. female who is presenting with abdominal pain, suicidal ideation, and heavy daily alcohol use.  She was admitted onto the medical service and found to have alcoholic hepatitis.  GI is following for this.  A RUQ ultrasound was obtained for ongoing abdominal pain and the gallbladder was normal, no gallstones.  A HIDA was done 2 days ago with a fatty meal challenge and showed an EF of 13%.      The patient is also being treated for acute alcohol withdrawal.    The patient was seen and evaluated at bedside.  She reports ongoing abdominal pain for the past few weeks.  She states that she drinks large amounts of vodka daily to help with the pain.      PAST MEDICAL HISTORY:   Past Medical History:   Diagnosis Date   • Alcohol abuse    • Alcoholism (HCC)    • Anxiety    • Backpain    • Bipolar disorder, unspecified (HCC)    • Bronchitis    • Drug abuse (HCC)     meth, crack cocaine, THC   • Hepatitis, alcoholic    • Hypertension     controlled   • Indigestion    • Infectious disease MRSA   • Liver disease     pancreatitis   • Pancreatitis chronic     Flare-up   • Pneumonia    • Psychiatric disorder     suicidal in past   • Renal disorder     kidney infection 1991   • Seizure (HCC)     7/8/2011   • Seizure disorder (HCC)    • Unspecified hemorrhagic conditions    • Unspecified urinary incontinence          PAST SURGICAL HISTORY:   Past Surgical History:   Procedure Laterality Date   • GASTROSCOPY  4/28/2015    Performed by Kevin Rendon M.D. at SURGERY [facility]     No significant or pertinent past surgical history    ALLERGIES: Bactrim, Lamotrigine [lamictal], Quetiapine fumarate, and Keflex       CURRENT MEDICATIONS:   Please refer to the medication reconciliation in [facility]    FAMILY HISTORY: family history includes Cancer in her  mother; Lung Disease in her mother.      SOCIAL HISTORY:  reports that she has never smoked. She has never used smokeless tobacco. She reports current alcohol use. She reports current drug use.      Review of Systems:  Constitutional: Negative for fever, chills, weight loss, malaise/fatigue and diaphoresis.   HENT: Negative for hearing loss, ear pain, nosebleeds, congestion, sore throat, neck pain, tinnitus and ear discharge.    Eyes: Negative for blurred vision, double vision, photophobia, pain, discharge and redness.   Respiratory: Negative for cough, hemoptysis, sputum production, shortness of breath, wheezing and stridor.    Cardiovascular: Negative for chest pain, palpitations, orthopnea, claudication, leg swelling and PND.   Gastrointestinal: Negative for heartburn, nausea, vomiting, abdominal pain, diarrhea, constipation, blood in stool and melena.   Genitourinary: Negative for dysuria, urgency, frequency, hematuria and flank pain.   Musculoskeletal: Negative for myalgias, back pain, joint pain and falls.   Skin: Negative for itching and rash.  Neurological: Negative for dizziness, tingling, tremors, sensory change, speech change, focal weakness, seizures, loss of consciousness, weakness and headaches.   Endo/Heme/Allergies: Negative for environmental allergies and polydipsia. Does not bruise/bleed easily.   Psychiatric/Behavioral: Negative for hallucinations, memory loss. The patient is not nervous/anxious and does not have insomnia.    Physical Exam:  /78   Pulse 97   Temp 35.9 °C (96.7 °F) (Temporal)   Resp 16   Ht 1.524 m (5')   Wt 61.2 kg (135 lb)   SpO2 97%   Vitals:    05/31/21 0727   BP: 116/78   Pulse: 97   Resp: 16   Temp: 35.9 °C (96.7 °F)   SpO2: 97%     GENERAL:  Obese and shaky appearing and in no acute distress  HEENT:  Atraumatic, normocephalic.  Normal pinna bilaterally.  External auditory canals are without discharge.  Conjunctivae and sclerae are clear. Extraocular movements are  full. Pupils are equal, round, and reactive to light.  Oral mucosa is moist.  CHEST:  Lungs are clear to auscultation bilaterally.  No masses, lesions, or signs of trauma were noted.     CARDIOVASCULAR:  Regular rate and rhythm.  No murmurs appreciated.  No JVD.  Palpable pulses present in all four extremities.   ABDOMEN:  Soft, mildly tender over the right upper quadrant, non-distended.  Non-tympanitic.  No hepatomegaly or splenomegaly.   MUSCULOSKELETAL: Normal range of motion x4 extremities.    SKIN:  Warm and well perfused. No rashes.  NEUROLOGIC:  Alert and oriented. Cranial nerves II through XII are grossly intact. Motor and sensory exams are normal in all four extremities. Motor and sensory reflexes are 2+ and symmetric with bilateral plantar responses.  PSYCHIATRIC: Affect and mood is appropriate for age and condition.    Labs:  Recent Labs     05/28/21  1223 05/30/21  0559 05/31/21  0330   WBC 2.8* 2.9* 4.4*   RBC 3.10* 2.87* 3.07*   HEMOGLOBIN 10.7* 10.0* 10.5*   HEMATOCRIT 33.5* 31.2* 33.1*   .1* 108.7* 107.8*   MCH 34.5* 34.8* 34.2*   MCHC 31.9* 32.1* 31.7*   RDW 54.0* 57.0* 54.5*   PLATELETCT 102* 108* 151*   MPV 10.2 9.7 10.0     Recent Labs     05/29/21  0412 05/30/21  0559 05/31/21  0330   SODIUM 138 138 135   POTASSIUM 3.8 3.7 3.7   CHLORIDE 99 99 97   CO2 27 29 29   GLUCOSE 145* 126* 111*   BUN 5* 5* 5*   CREATININE 0.32* 0.32* 0.28*   CALCIUM 9.7 9.3 9.2     Recent Labs     05/30/21  1355 05/31/21  0330   APTT 29.5  --    INR  --  0.89     Recent Labs     05/29/21  0412 05/30/21  0559 05/31/21  0330   ASTSGOT 135* 125* 133*   ALTSGPT 89* 75* 80*   TBILIRUBIN 1.3 0.9 1.0   ALKPHOSPHAT 204* 194* 202*   GLOBULIN 3.1 2.9 2.8   INR  --   --  0.89       Radiology:  NM-BILIARY HIDA SCAN FATTY MEAL   Final Result      Low gallbladder ejection fraction.      This study was obtained utilizing fatty meal challenge to induce gallbladder contraction due to national shortage of cholecystokinin.  There  are no national standards for gallbladder ejection fraction calculated utilizing fatty meal challenge.  An    ejection fraction greater than 35% is most likely normal.  Gallbladder ejection fraction less than 35% may be abnormal but could be falsely positive.  Therefore, this test could be falsely positive.  Consider repeat imaging once cholecystokinin becomes    available.      IR-US GUIDED PIV   Final Result    Ultrasound-guided PERIPHERAL IV INSERTION performed by    qualified nursing staff as above.      IR-US GUIDED PIV   Final Result    Ultrasound-guided PERIPHERAL IV INSERTION performed by    qualified nursing staff as above.      US-RUQ   Final Result      Stable echogenic liver which is most commonly secondary to steatosis          Assessment/Plan:   1) Biliary Dyskinesia:    This is typically an outpatient issue.  There is no evidence of cholecystitis at this time.  Cholecystectomy would be precarious and potentially dangerous given the acute inflammation of her liver and ongoing alcohol withdrawals.  Her elevation in her LFTs is due to alcoholic hepatitis as LFTs are not effected in the setting of biliary dyskinesia.      -Abstain from all alcohol use  -Lowfat or liquid diet as tolerated  -Follow up outpatient for consideration for cholecystectomy  ____________________________________   Alfredito Chavez MD, FACS     JRU / NTS     DD: 5/31/2021   DT: 10:37 AM

## 2021-05-31 NOTE — DIETARY
Nutrition Services: Update   Day 7 of admit.  Olya Youssef is a 50 y.o. female with admitting DX of Alcohol withdrawal syndrome, with unspecified complication.    Pt is currently on Regular, Low fat diet. Recorded PO intake has been variable. Pt refused dinner last night, but ate % of breakfast and lunch yesterday. She was NPO this morning. Pt ate lunch late, but enjoyed her turkey sandwich. Observed she did not eat her fruit or sun chips. She states her appetite is still not good. Encouraged PO intake of >50% of meals. Offering finger foods.    Malnutrition Risk: Criteria not met.    Recommendations/Plan:  1. Encourage intake of meals >50%  2. Document intake of all meals as % taken in ADL's to provide interdisciplinary communication across all shifts.   3. Monitor weight.  4. Nutrition rep will continue to see patient for ongoing meal and snack preferences.    RD following

## 2021-06-01 LAB
ALBUMIN SERPL BCP-MCNC: 3.4 G/DL (ref 3.2–4.9)
ALBUMIN/GLOB SERPL: 0.9 G/DL
ALP SERPL-CCNC: 206 U/L (ref 30–99)
ALT SERPL-CCNC: 70 U/L (ref 2–50)
ANION GAP SERPL CALC-SCNC: 10 MMOL/L (ref 7–16)
AST SERPL-CCNC: 103 U/L (ref 12–45)
BILIRUB SERPL-MCNC: 1 MG/DL (ref 0.1–1.5)
BUN SERPL-MCNC: 7 MG/DL (ref 8–22)
CALCIUM SERPL-MCNC: 9.7 MG/DL (ref 8.5–10.5)
CHLORIDE SERPL-SCNC: 98 MMOL/L (ref 96–112)
CO2 SERPL-SCNC: 28 MMOL/L (ref 20–33)
CREAT SERPL-MCNC: 0.44 MG/DL (ref 0.5–1.4)
ERYTHROCYTE [DISTWIDTH] IN BLOOD BY AUTOMATED COUNT: 56 FL (ref 35.9–50)
GLOBULIN SER CALC-MCNC: 3.6 G/DL (ref 1.9–3.5)
GLUCOSE SERPL-MCNC: 105 MG/DL (ref 65–99)
HCT VFR BLD AUTO: 36.6 % (ref 37–47)
HGB BLD-MCNC: 11.4 G/DL (ref 12–16)
IGG1 SER-MCNC: 525 MG/DL (ref 240–1118)
IGG2 SER-MCNC: 200 MG/DL (ref 124–549)
IGG3 SER-MCNC: 41 MG/DL (ref 21–134)
IGG4 SER-MCNC: 42 MG/DL (ref 1–123)
MAGNESIUM SERPL-MCNC: 1.9 MG/DL (ref 1.5–2.5)
MCH RBC QN AUTO: 34.5 PG (ref 27–33)
MCHC RBC AUTO-ENTMCNC: 31.1 G/DL (ref 33.6–35)
MCV RBC AUTO: 110.9 FL (ref 81.4–97.8)
MITOCHONDRIA M2 IGG SER-ACNC: 1.2 UNITS (ref 0–24.9)
NUCLEAR IGG SER QL IA: NORMAL
PHOSPHATE SERPL-MCNC: 4.7 MG/DL (ref 2.5–4.5)
PLATELET # BLD AUTO: 176 K/UL (ref 164–446)
PMV BLD AUTO: 10.2 FL (ref 9–12.9)
POTASSIUM SERPL-SCNC: 4.2 MMOL/L (ref 3.6–5.5)
PROT SERPL-MCNC: 7 G/DL (ref 6–8.2)
RBC # BLD AUTO: 3.3 M/UL (ref 4.2–5.4)
SODIUM SERPL-SCNC: 136 MMOL/L (ref 135–145)
WBC # BLD AUTO: 4.8 K/UL (ref 4.8–10.8)

## 2021-06-01 PROCEDURE — 700102 HCHG RX REV CODE 250 W/ 637 OVERRIDE(OP): Performed by: INTERNAL MEDICINE

## 2021-06-01 PROCEDURE — 83735 ASSAY OF MAGNESIUM: CPT

## 2021-06-01 PROCEDURE — 84100 ASSAY OF PHOSPHORUS: CPT

## 2021-06-01 PROCEDURE — 85027 COMPLETE CBC AUTOMATED: CPT

## 2021-06-01 PROCEDURE — 770020 HCHG ROOM/CARE - TELE (206)

## 2021-06-01 PROCEDURE — 36415 COLL VENOUS BLD VENIPUNCTURE: CPT

## 2021-06-01 PROCEDURE — A9270 NON-COVERED ITEM OR SERVICE: HCPCS | Performed by: INTERNAL MEDICINE

## 2021-06-01 PROCEDURE — 700102 HCHG RX REV CODE 250 W/ 637 OVERRIDE(OP): Performed by: STUDENT IN AN ORGANIZED HEALTH CARE EDUCATION/TRAINING PROGRAM

## 2021-06-01 PROCEDURE — 700111 HCHG RX REV CODE 636 W/ 250 OVERRIDE (IP): Performed by: STUDENT IN AN ORGANIZED HEALTH CARE EDUCATION/TRAINING PROGRAM

## 2021-06-01 PROCEDURE — 700105 HCHG RX REV CODE 258: Performed by: STUDENT IN AN ORGANIZED HEALTH CARE EDUCATION/TRAINING PROGRAM

## 2021-06-01 PROCEDURE — 80053 COMPREHEN METABOLIC PANEL: CPT

## 2021-06-01 PROCEDURE — A9270 NON-COVERED ITEM OR SERVICE: HCPCS | Performed by: STUDENT IN AN ORGANIZED HEALTH CARE EDUCATION/TRAINING PROGRAM

## 2021-06-01 PROCEDURE — 99232 SBSQ HOSP IP/OBS MODERATE 35: CPT | Performed by: INTERNAL MEDICINE

## 2021-06-01 RX ORDER — HYDROXYZINE HYDROCHLORIDE 25 MG/1
25 TABLET, FILM COATED ORAL 3 TIMES DAILY PRN
Status: DISCONTINUED | OUTPATIENT
Start: 2021-06-01 | End: 2021-06-17 | Stop reason: HOSPADM

## 2021-06-01 RX ADMIN — OXYCODONE 5 MG: 5 TABLET ORAL at 22:58

## 2021-06-01 RX ADMIN — Medication 5 MG: at 21:57

## 2021-06-01 RX ADMIN — HYDROXYZINE HYDROCHLORIDE 25 MG: 25 TABLET, FILM COATED ORAL at 21:57

## 2021-06-01 RX ADMIN — ERGOCALCIFEROL 50000 UNITS: 1.25 CAPSULE ORAL at 23:00

## 2021-06-01 RX ADMIN — FLUOXETINE 30 MG: 20 CAPSULE ORAL at 04:37

## 2021-06-01 RX ADMIN — GABAPENTIN 300 MG: 300 CAPSULE ORAL at 04:36

## 2021-06-01 RX ADMIN — OXYCODONE 5 MG: 5 TABLET ORAL at 16:47

## 2021-06-01 RX ADMIN — Medication 100 MG: at 04:35

## 2021-06-01 RX ADMIN — Medication 1 TABLET: at 04:35

## 2021-06-01 RX ADMIN — OXYCODONE 5 MG: 5 TABLET ORAL at 03:41

## 2021-06-01 RX ADMIN — GABAPENTIN 300 MG: 300 CAPSULE ORAL at 12:17

## 2021-06-01 RX ADMIN — SODIUM CHLORIDE, POTASSIUM CHLORIDE, SODIUM LACTATE AND CALCIUM CHLORIDE: 600; 310; 30; 20 INJECTION, SOLUTION INTRAVENOUS at 04:37

## 2021-06-01 RX ADMIN — CHLORDIAZEPOXIDE HYDROCHLORIDE 25 MG: 25 CAPSULE ORAL at 04:36

## 2021-06-01 RX ADMIN — PROCHLORPERAZINE EDISYLATE 10 MG: 5 INJECTION INTRAMUSCULAR; INTRAVENOUS at 00:17

## 2021-06-01 RX ADMIN — ONDANSETRON 4 MG: 4 TABLET, ORALLY DISINTEGRATING ORAL at 22:58

## 2021-06-01 RX ADMIN — FAMOTIDINE 20 MG: 20 TABLET ORAL at 04:36

## 2021-06-01 RX ADMIN — GABAPENTIN 300 MG: 300 CAPSULE ORAL at 16:47

## 2021-06-01 RX ADMIN — PROMETHAZINE HYDROCHLORIDE 25 MG: 25 TABLET ORAL at 03:42

## 2021-06-01 RX ADMIN — OXYCODONE 5 MG: 5 TABLET ORAL at 00:17

## 2021-06-01 ASSESSMENT — ENCOUNTER SYMPTOMS
ORTHOPNEA: 0
SHORTNESS OF BREATH: 0
MYALGIAS: 0
HALLUCINATIONS: 0
NERVOUS/ANXIOUS: 1
DIARRHEA: 0
TREMORS: 0
PHOTOPHOBIA: 0
PALPITATIONS: 0
CHILLS: 0
TINGLING: 0
NECK PAIN: 0
BACK PAIN: 0
HEADACHES: 0
VOMITING: 0
NAUSEA: 0
CONSTIPATION: 0
SENSORY CHANGE: 0
ABDOMINAL PAIN: 1
EYE PAIN: 0
SPEECH CHANGE: 0
WEIGHT LOSS: 0
FOCAL WEAKNESS: 0
COUGH: 0
BLURRED VISION: 0
FEVER: 0
DIZZINESS: 0
SPUTUM PRODUCTION: 0
DOUBLE VISION: 0

## 2021-06-01 ASSESSMENT — LIFESTYLE VARIABLES
ANXIETY: NO ANXIETY (AT EASE)
HEADACHE, FULLNESS IN HEAD: NOT PRESENT
SUBSTANCE_ABUSE: 0
VISUAL DISTURBANCES: NOT PRESENT
AGITATION: NORMAL ACTIVITY
AUDITORY DISTURBANCES: NOT PRESENT
ANXIETY: NO ANXIETY (AT EASE)
HEADACHE, FULLNESS IN HEAD: NOT PRESENT
ORIENTATION AND CLOUDING OF SENSORIUM: ORIENTED AND CAN DO SERIAL ADDITIONS
NAUSEA AND VOMITING: *
NAUSEA AND VOMITING: *
TREMOR: *
TOTAL SCORE: 4
ORIENTATION AND CLOUDING OF SENSORIUM: ORIENTED AND CAN DO SERIAL ADDITIONS
VISUAL DISTURBANCES: NOT PRESENT
PAROXYSMAL SWEATS: NO SWEAT VISIBLE
TOTAL SCORE: 4
TREMOR: *
AUDITORY DISTURBANCES: NOT PRESENT
PAROXYSMAL SWEATS: NO SWEAT VISIBLE
AGITATION: NORMAL ACTIVITY

## 2021-06-01 ASSESSMENT — PAIN DESCRIPTION - PAIN TYPE
TYPE: ACUTE PAIN

## 2021-06-01 NOTE — DISCHARGE PLANNING
@9312  Agency/Facility Name: [facility]  Spoke To: Debi  Outcome: Declined due to suicidal ideations with plan.    Received Choice form at 7843  Agency/Facility Name: [facility]  Referral sent per Choice form @ 1245

## 2021-06-01 NOTE — DISCHARGE PLANNING
Anticipated Discharge Disposition:   SNF    Action:   Discussed discharge planning needs during rounds. SNF referral in place. RN CM spoke pt at bedside. Per pt, she just woke up and requested for RN CM to come back later to talk about discharge planning.     Barriers to Discharge:   Medical clearance  SNF choice    Plan:   Hospital Care Management will continue to follow and assist with discharge planning needs.

## 2021-06-01 NOTE — DISCHARGE PLANNING
Anticipated Discharge Disposition:   SNF    Action:   RN LIANET spoke to pt at bedside. Discussed discharge planning. Received SNF choice. Choice form faxed to [facility]. Per pt, she eventually wants to go to [place] for rehab.    Barriers to Discharge:   Medical clearance.  Placement acceptance and bed availability.    Plan:   Hospital Care Management will continue to follow and assist with discharge planning needs.

## 2021-06-01 NOTE — CARE PLAN
The patient is: Stable - Low risk of patient condition declining or worsening    Progress made toward(s) clinical / shift goals:  Patient is hemodynamically stable; patient remains free from falls; aspiration/seizure/CIWA precautions in place.    Patient is not progressing towards the following goals:  N/A          Problem: Knowledge Deficit - Standard  Goal: Patient and family/care givers will demonstrate understanding of plan of care, disease process/condition, diagnostic tests and medications  Outcome: Progressing     Problem: Optimal Care for Alcohol Withdrawal  Goal: Optimal Care for the alcohol withdrawal patient  Outcome: Progressing     Problem: Seizure Precautions  Goal: Implementation of seizure precautions  Outcome: Progressing     Problem: Lifestyle Changes  Goal: Patient's ability to identify lifestyle changes and available resources to help reduce recurrence of condition will improve  Outcome: Progressing     Problem: Psychosocial  Goal: Patient's level of anxiety will decrease  Outcome: Progressing  Goal: Spiritual and cultural needs incorporated into hospitalization  Outcome: Progressing     Problem: Risk for Aspiration  Goal: Patient's risk for aspiration will be absent or decrease  Outcome: Progressing     Problem: Provide Safe Environment  Goal: Suicide environmental safety, protocols, policies, and practices will be implemented  Outcome: Progressing     Problem: Psychosocial  Goal: Patient's ability to identify and develop effective coping behaviors will improve  Outcome: Progressing  Goal: Patient's ability to identify and utilize available support systems will improve  Outcome: Progressing     Problem: Depression  Goal: Patient and family/caregiver will verbalize accurate information about at least two of the possible causes of depression, three-four of the signs and symptoms of depression  Outcome: Progressing     Problem: Fall Risk  Goal: Patient will remain free from falls  Outcome:  Progressing     Problem: Pain - Standard  Goal: Alleviation of pain or a reduction in pain to the patient’s comfort goal  Outcome: Progressing

## 2021-06-01 NOTE — PROGRESS NOTES
Bedside shift report received from JARETT Bragg.  Safety check complete.  All patient needs met at this time.  Will continue to monitor.
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
## 2021-06-01 NOTE — PROGRESS NOTES
"Hospital Medicine Daily Progress Note    Date of Service  6/1/2021    Chief Complaint  50 y.o. female admitted 5/24/2021 with suicidal ideation    Hospital Course    Per Dr. Dominguez note    \"50-year-old female with a past medical history of recurrent alcoholic pancreatitis, alcohol abuse and alcohol withdrawals including seizures, history of bipolar disorder, polysubstance use with methamphetamines and alcohol, prior suicide attempt, COPD on 2 L home oxygen, presented 5/24/2021 with complaints of suicidal ideation due to her chronic alcohol use.  Patient was admitted found to be in acute alcohol intoxication, elevated POC breathalyzer 0.292.  Patient on admission was more somnolent but arousable with sternal rub.  Patient came in with a legal hold due to complaints of suicidal ideation, suicidal plan by taking overdose of pills she has at home.    5/25/2021-patient seen and evaluated today, labs and notes reviewed.  Patient stated she had continued suicidal thoughts, with potential plan of using her pills at home.  Patient was showing signs of acute alcohol withdrawal today, tremors and tachycardia.  Patient complained of nausea.  Psychiatry evaluated patient today given she her legal hold status.  CIWA scores ranging between 11-13 since admission.  Patient requiring Ativan and Librium.  She stated her last drink was the day she came to the hospital.    5/26 - Patient's LFTs increasing today, was decreased yesterday compared to high levels on admission. RUQ obtained on admission did not show obstruction.  Patient in active withdrawal continued. Patient stated she has RUQ pain today, severe, painful to deep touch, localized 9/10.  Consulted [facility] gastro[facility]ology.    5/27 - patient this morning was distressed, she was noted to be standing without help of nursing despite having directions explained to her by nursing to wait for assistance to use the commode or to get out of bed. Patient did not fall, but was " "found to have soiled herself with urine. Patient denied any RUQ pain today compared to yesterday. She stated she was eating better. CIWA up to 12.  Oxygen supplementation up to 5LNC.    5/28 -patient was more controlled today, and denied any symptoms for abdominal pain at this time.  Morning patient was showing signs of improvement.  Most recent CIWA score went up to 13 at 1305 p.m.  Was only as high as 9 before that.  Oxygen now down to 3 L nasal cannula.    5/29-patient continued to have RUQ pain today, 8/10, sharp, radiating to her back and to her left lower quadrant.  + nausea.  Denied chest pain, vomiting.  Ordered HIDA scan.    5/30 - patient stated she had continued RUQ pain. HIDA scan showed ejection fraction of 13%, with GGT 2572.  Called [facility] for update, physician on call to evaluate patient and make recommendations.  If no procedures required by GI, will need to likely consult with general surgery.  Patient unable to sit for MRI due to her tremors, hence HIDA scan was ordered.    5/31 - patient had continued RUQ pain. Tolerating her diet.     Spoke with Dr. Chavez with general surgery, patient is not a candidate for any cholecystectomy due to her alcoholic hepatitis on this admission, likely biliary dyskinesia, recommended low fat diet and outpt evaluation for any possible cholecystectomy\".      Interval Problem Update    I evaluated and examined her at the bedside.  She denies any acute complaints  She does not show signs of acute alcohol withdrawal I discontinued her CIWA protocol with Ativan.  She expressed concern that she would like to use Ativan for anxiety.  I discussed with her at length that Ativan is not a good medication to use it for long period of time.  I started her on hydroxyzine as needed for anxiety.  Discussed plan of care during multidisciplinary regarding her discharge plan.  I placed SNF referral.    Consultants/Specialty  Psychiatry  [facility] " gastroenterology  General surgery    Code Status  Full Code    Disposition  Skilled nursing facility    Review of Systems  Review of Systems   Constitutional: Negative for chills, fever and weight loss.   HENT: Negative for hearing loss and tinnitus.    Eyes: Negative for blurred vision, double vision, photophobia and pain.   Respiratory: Negative for cough, sputum production and shortness of breath.    Cardiovascular: Negative for chest pain, palpitations, orthopnea and leg swelling.   Gastrointestinal: Positive for abdominal pain. Negative for constipation, diarrhea, nausea and vomiting.   Genitourinary: Negative for dysuria, frequency and urgency.   Musculoskeletal: Negative for back pain, joint pain, myalgias and neck pain.   Skin: Negative for rash.   Neurological: Negative for dizziness, tingling, tremors, sensory change, speech change, focal weakness and headaches.   Psychiatric/Behavioral: Negative for hallucinations and substance abuse. The patient is nervous/anxious.    All other systems reviewed and are negative.     Physical Exam  Temp:  [35.8 °C (96.5 °F)-37.2 °C (99 °F)] 36.1 °C (97 °F)  Pulse:  [] 88  Resp:  [17-18] 18  BP: (100-138)/(61-94) 109/75  SpO2:  [94 %-100 %] 94 %    Physical Exam  Vitals reviewed.   Constitutional:       General: She is not in acute distress.     Appearance: Normal appearance. She is not ill-appearing.   HENT:      Head: Normocephalic and atraumatic.      Nose: No congestion.   Eyes:      General:         Right eye: No discharge.         Left eye: No discharge.      Pupils: Pupils are equal, round, and reactive to light.   Cardiovascular:      Rate and Rhythm: Normal rate and regular rhythm.      Pulses: Normal pulses.      Heart sounds: Normal heart sounds. No murmur heard.     Pulmonary:      Effort: Pulmonary effort is normal. No respiratory distress.      Breath sounds: Normal breath sounds. No stridor.   Abdominal:      General: Bowel sounds are normal. There is  no distension.      Palpations: Abdomen is soft.      Tenderness: There is abdominal tenderness (Mild).      Comments: No signs of acute abdomen on physical exam.   Musculoskeletal:         General: No swelling or tenderness. Normal range of motion.      Cervical back: Normal range of motion. No rigidity.   Skin:     General: Skin is warm.      Capillary Refill: Capillary refill takes less than 2 seconds.      Coloration: Skin is not jaundiced or pale.      Findings: No bruising.   Neurological:      General: No focal deficit present.      Mental Status: She is alert and oriented to person, place, and time.      Cranial Nerves: No cranial nerve deficit.   Psychiatric:         Mood and Affect: Mood normal.         Behavior: Behavior normal.       Fluids    Intake/Output Summary (Last 24 hours) at 6/1/2021 1624  Last data filed at 6/1/2021 0900  Gross per 24 hour   Intake 720 ml   Output 1350 ml   Net -630 ml       Laboratory  Recent Labs     05/30/21  0559 05/31/21  0330 06/01/21  0342   WBC 2.9* 4.4* 4.8   RBC 2.87* 3.07* 3.30*   HEMOGLOBIN 10.0* 10.5* 11.4*   HEMATOCRIT 31.2* 33.1* 36.6*   .7* 107.8* 110.9*   MCH 34.8* 34.2* 34.5*   MCHC 32.1* 31.7* 31.1*   RDW 57.0* 54.5* 56.0*   PLATELETCT 108* 151* 176   MPV 9.7 10.0 10.2     Recent Labs     05/30/21  0559 05/31/21  0330 06/01/21  0342   SODIUM 138 135 136   POTASSIUM 3.7 3.7 4.2   CHLORIDE 99 97 98   CO2 29 29 28   GLUCOSE 126* 111* 105*   BUN 5* 5* 7*   CREATININE 0.32* 0.28* 0.44*   CALCIUM 9.3 9.2 9.7     Recent Labs     05/30/21  1355 05/31/21  0330   APTT 29.5  --    INR  --  0.89               Imaging  NM-BILIARY HIDA SCAN FATTY MEAL   Final Result      Low gallbladder ejection fraction.      This study was obtained utilizing fatty meal challenge to induce gallbladder contraction due to national shortage of cholecystokinin.  There are no national standards for gallbladder ejection fraction calculated utilizing fatty meal challenge.  An    ejection  fraction greater than 35% is most likely normal.  Gallbladder ejection fraction less than 35% may be abnormal but could be falsely positive.  Therefore, this test could be falsely positive.  Consider repeat imaging once cholecystokinin becomes    available.      IR-US GUIDED PIV   Final Result    Ultrasound-guided PERIPHERAL IV INSERTION performed by    qualified nursing staff as above.      IR-US GUIDED PIV   Final Result    Ultrasound-guided PERIPHERAL IV INSERTION performed by    qualified nursing staff as above.      US-RUQ   Final Result      Stable echogenic liver which is most commonly secondary to steatosis           Assessment/Plan  * Alcohol dependence with withdrawal (HCC)- (present on admission)  Assessment & Plan  Hx DT's and seizure 2/2 ETOH withdrawal.  Patient is a persistent alcohol user.    Prefers vodka, drinks half pint usually daily, obtains her drinks from her roommate.  Patient's last drink was on the day of admission, as stated by the patient.  Alcohol cessation education provided to patient.  Patient stated she wants to stop and this is what makes her feel suicidal as she is unable to stop.  -Seizure precaution  -Librium for long acting benzodiazepine coverage  - PO vitamins, patient was given detox IV bag with thiamine.  -Check Magnesium daily  - consult    Discontinued CIWA protocol on June 1, 2021.    Acute on chronic respiratory failure with hypoxia on home O2 therapy- (present on admission)  Assessment & Plan  Patient currently requiring increased oxygen, arrived to ED requiring 3L.  O2 demand up to 5L NC compared to her chronic home oxygen needs of 2L. Due to patient's acute alcohol withdrawal worsening distress, tachycardia.  5/28 - Improving to 3LNC  - continue oxygen support  - will need restart of home oxygen order on discharge    Vitamin D deficiency- (present on admission)  Assessment & Plan  Patient's vitamin D 25 level 11 on 4/21  Vitamin B12 and iron at  "appropriate levels.  Continue patient on supplementation, 50,000 IU weekly p.o.    Acute alcoholic hepatitis- (present on admission)  Assessment & Plan  Suspecting 2/2 to continued daily ETOH abuse and acute alcohol intoxication on admission.  RUQ US shows: \"Stable echogenic liver which is most commonly secondary to steatosis\"  Patient likely has fatty liver disease from alcohol use.  [facility], no indication for [facility]  GGT 2572  HIDA scan - 13% ejection fraction  5/26 - Consulted [facility] for evaluation of rising LFTs, elevated GGT.     --- No update by GI until 5/30 - recommended general surgery evaluation.  5/31 - Consulted General Surgery Dr. Chavez, patient is not a candidate for any cholecystectomy due to her alcoholic hepatitis on this admission, likely biliary dyskinesia, recommended low fat diet and outpt evaluation for any possible cholecystectomy.   --- Patient's LFTs improving.  , ALT 80,     Continue to monitor, recheck CMP daily  Trial back librium 25mg po daily (5/31)  Started her on hydroxyzine for anxiety.    Macrocytic anemia- (present on admission)  Assessment & Plan  Vit B12 and folate levels normal on prior check  Likely due to alcohol liver disease  Monitor for any bleeding    Hypomagnesemia- (present on admission)  Assessment & Plan  Patient arrived with magnesium 1.9, down to 1.6 despite IV replacements.  Replace as needed.  Continue to monitor.    Leukocytopenia- (present on admission)  Assessment & Plan  Low WBC due to liver disorder  Slightly improving  Continue to monitor.    COPD (chronic obstructive pulmonary disease) (HCC)- (present on admission)  Assessment & Plan  Per chart review: no PFTs on file, on 2 L of oxygen at home.  No signs of acute exacerbation at this time on admission.  Oxygen per guidelines  Monitor respiratory status    Alcohol-induced acute pancreatitis- (present on admission)  Assessment & " Plan  Patient continues to complain about nausea, but denied any vomiting.  On solid foods  Pain Control    Depression with suicidal ideation- (present on admission)  Assessment & Plan  Patient presented with suicidal ideation, plan to potentially overdose on her home medications.  Patient was brought in with a legal hold  Legal hold discontinued.    Hypokalemia  Assessment & Plan  Patient's potassium low today 3.3.  Likely due to poor nutrition due to severe alcohol use.  Magnesium low on admission 1.9.  Replacing via IV given patient's nausea  Recheck potassium and magnesium daily     I discussed plan of care during multidisciplinary regarding her current medical condition and discharge plan.  I placed SNF referral.    VTE prophylaxis: Lovenox

## 2021-06-01 NOTE — PROGRESS NOTES
"Patient states, \"Give me all my medications.\"  When asked what specific medication she is requesting, patient states, \"I don't know, whatever I'm due for.\"  I educated the patient that many of her medications are PRN, meaning they are only due as needed for specific reasons.  Patient then verbalized understanding and asked for \"pain medication\", Librium, Ativan, and Phenergan.  I explained to the patient that she may receive Oxycodone and Compazine only per orders at this time.  Per nursing judgement and   CIWA score, patient is not actively withdrawing and I believe that patient is intentionally requesting multiple sedating medications at a time.    Oxycodone and Compazine given per orders.  Will monitor for side effects.  Will also use CIWA protocol as ordered and medicate if appropriate.  "

## 2021-06-02 LAB
ALBUMIN SERPL BCP-MCNC: 3.7 G/DL (ref 3.2–4.9)
ALBUMIN/GLOB SERPL: 1.1 G/DL
ALP SERPL-CCNC: 201 U/L (ref 30–99)
ALT SERPL-CCNC: 62 U/L (ref 2–50)
ANION GAP SERPL CALC-SCNC: 11 MMOL/L (ref 7–16)
AST SERPL-CCNC: 96 U/L (ref 12–45)
BILIRUB SERPL-MCNC: 0.9 MG/DL (ref 0.1–1.5)
BUN SERPL-MCNC: 7 MG/DL (ref 8–22)
CALCIUM SERPL-MCNC: 9.4 MG/DL (ref 8.5–10.5)
CHLORIDE SERPL-SCNC: 96 MMOL/L (ref 96–112)
CO2 SERPL-SCNC: 29 MMOL/L (ref 20–33)
CREAT SERPL-MCNC: 0.43 MG/DL (ref 0.5–1.4)
ERYTHROCYTE [DISTWIDTH] IN BLOOD BY AUTOMATED COUNT: 56.2 FL (ref 35.9–50)
GLOBULIN SER CALC-MCNC: 3.3 G/DL (ref 1.9–3.5)
GLUCOSE SERPL-MCNC: 102 MG/DL (ref 65–99)
HCT VFR BLD AUTO: 37.3 % (ref 37–47)
HGB BLD-MCNC: 11.5 G/DL (ref 12–16)
MAGNESIUM SERPL-MCNC: 1.9 MG/DL (ref 1.5–2.5)
MCH RBC QN AUTO: 34.3 PG (ref 27–33)
MCHC RBC AUTO-ENTMCNC: 30.8 G/DL (ref 33.6–35)
MCV RBC AUTO: 111.3 FL (ref 81.4–97.8)
PLATELET # BLD AUTO: 253 K/UL (ref 164–446)
PMV BLD AUTO: 10.1 FL (ref 9–12.9)
POTASSIUM SERPL-SCNC: 3.9 MMOL/L (ref 3.6–5.5)
PROT SERPL-MCNC: 7 G/DL (ref 6–8.2)
RBC # BLD AUTO: 3.35 M/UL (ref 4.2–5.4)
SMA IGG SER-ACNC: 44 UNITS (ref 0–19)
SMOOTH MUSCLE IGG TITR SER: ABNORMAL {TITER}
SODIUM SERPL-SCNC: 136 MMOL/L (ref 135–145)
WBC # BLD AUTO: 4.9 K/UL (ref 4.8–10.8)

## 2021-06-02 PROCEDURE — 700102 HCHG RX REV CODE 250 W/ 637 OVERRIDE(OP): Performed by: INTERNAL MEDICINE

## 2021-06-02 PROCEDURE — 99231 SBSQ HOSP IP/OBS SF/LOW 25: CPT | Performed by: INTERNAL MEDICINE

## 2021-06-02 PROCEDURE — 80053 COMPREHEN METABOLIC PANEL: CPT

## 2021-06-02 PROCEDURE — 85027 COMPLETE CBC AUTOMATED: CPT

## 2021-06-02 PROCEDURE — 700111 HCHG RX REV CODE 636 W/ 250 OVERRIDE (IP): Performed by: STUDENT IN AN ORGANIZED HEALTH CARE EDUCATION/TRAINING PROGRAM

## 2021-06-02 PROCEDURE — 83735 ASSAY OF MAGNESIUM: CPT

## 2021-06-02 PROCEDURE — 770020 HCHG ROOM/CARE - TELE (206)

## 2021-06-02 PROCEDURE — 700102 HCHG RX REV CODE 250 W/ 637 OVERRIDE(OP): Performed by: STUDENT IN AN ORGANIZED HEALTH CARE EDUCATION/TRAINING PROGRAM

## 2021-06-02 PROCEDURE — A9270 NON-COVERED ITEM OR SERVICE: HCPCS | Performed by: INTERNAL MEDICINE

## 2021-06-02 PROCEDURE — 36415 COLL VENOUS BLD VENIPUNCTURE: CPT

## 2021-06-02 PROCEDURE — A9270 NON-COVERED ITEM OR SERVICE: HCPCS | Performed by: STUDENT IN AN ORGANIZED HEALTH CARE EDUCATION/TRAINING PROGRAM

## 2021-06-02 RX ADMIN — GABAPENTIN 300 MG: 300 CAPSULE ORAL at 11:24

## 2021-06-02 RX ADMIN — OXYCODONE 5 MG: 5 TABLET ORAL at 23:59

## 2021-06-02 RX ADMIN — Medication 5 MG: at 20:45

## 2021-06-02 RX ADMIN — Medication 100 MG: at 05:27

## 2021-06-02 RX ADMIN — PROMETHAZINE HYDROCHLORIDE 12.5 MG: 25 TABLET ORAL at 16:44

## 2021-06-02 RX ADMIN — Medication 1 TABLET: at 05:27

## 2021-06-02 RX ADMIN — GABAPENTIN 300 MG: 300 CAPSULE ORAL at 05:27

## 2021-06-02 RX ADMIN — ENOXAPARIN SODIUM 40 MG: 40 INJECTION SUBCUTANEOUS at 05:27

## 2021-06-02 RX ADMIN — FLUOXETINE 30 MG: 20 CAPSULE ORAL at 05:27

## 2021-06-02 RX ADMIN — CHLORDIAZEPOXIDE HYDROCHLORIDE 25 MG: 25 CAPSULE ORAL at 05:27

## 2021-06-02 RX ADMIN — GABAPENTIN 300 MG: 300 CAPSULE ORAL at 16:44

## 2021-06-02 RX ADMIN — FAMOTIDINE 20 MG: 20 TABLET ORAL at 05:27

## 2021-06-02 RX ADMIN — OXYCODONE 5 MG: 5 TABLET ORAL at 05:26

## 2021-06-02 ASSESSMENT — ENCOUNTER SYMPTOMS
DOUBLE VISION: 0
HEADACHES: 0
NECK PAIN: 0
VOMITING: 0
BLURRED VISION: 0
FEVER: 0
PHOTOPHOBIA: 0
NAUSEA: 0
SPUTUM PRODUCTION: 0
TREMORS: 0
FOCAL WEAKNESS: 0
SHORTNESS OF BREATH: 0
CONSTIPATION: 0
SENSORY CHANGE: 0
WEIGHT LOSS: 0
PALPITATIONS: 0
COUGH: 0
ABDOMINAL PAIN: 1
BACK PAIN: 0
CHILLS: 0
HALLUCINATIONS: 0
DIZZINESS: 0
EYE PAIN: 0
ORTHOPNEA: 0
DIARRHEA: 0
SPEECH CHANGE: 0
TINGLING: 0
MYALGIAS: 0
NERVOUS/ANXIOUS: 1

## 2021-06-02 ASSESSMENT — PAIN DESCRIPTION - PAIN TYPE: TYPE: ACUTE PAIN

## 2021-06-02 ASSESSMENT — LIFESTYLE VARIABLES: SUBSTANCE_ABUSE: 0

## 2021-06-02 NOTE — CARE PLAN
The patient is Watcher - Medium risk of patient condition declining or worsening    Shift Goals  Clinical Goals: decrease pain level    Patient is not progressing towards the following goals:      Problem: Knowledge Deficit - Standard  Goal: Patient and family/care givers will demonstrate understanding of plan of care, disease process/condition, diagnostic tests and medications  Outcome: Not Progressing  Patient educated regarding pain medication use, did not verbalize understanding. Patient did not verbalize understanding regarding oral medication action of onset when educated. Educated food intake can aggravate pancreatitis     Problem: Psychosocial  Goal: Patient's level of anxiety will decrease  Outcome: Not Progressing  PRN Atarax given with no relief. Distraction methods in place.

## 2021-06-02 NOTE — DISCHARGE PLANNING
@[id number]  Agency[facility]Facility [facility]  Outcome: Left message, awaiting call back.    @[id number]  Agency/Facility Name: [facility]  Outcome: Left message, awaiting call back.    Agency/Facility Name: [facility]  Spoke To: Debi  Outcome: Reviewing due to SI and legal hold.    Agency/Facility Name: [facility]  Spoke To: Mary  Outcome: Declined due to medicaid.

## 2021-06-02 NOTE — PROGRESS NOTES
"Hospital Medicine Daily Progress Note    Date of Service  6/2/2021    Chief Complaint  50 y.o. female admitted 5/24/2021 with suicidal ideation    Hospital Course    Per Dr. Dominguez note    \"50-year-old female with a past medical history of recurrent alcoholic pancreatitis, alcohol abuse and alcohol withdrawals including seizures, history of bipolar disorder, polysubstance use with methamphetamines and alcohol, prior suicide attempt, COPD on 2 L home oxygen, presented 5/24/2021 with complaints of suicidal ideation due to her chronic alcohol use.  Patient was admitted found to be in acute alcohol intoxication, elevated POC breathalyzer 0.292.  Patient on admission was more somnolent but arousable with sternal rub.  Patient came in with a legal hold due to complaints of suicidal ideation, suicidal plan by taking overdose of pills she has at home.    5/25/2021-patient seen and evaluated today, labs and notes reviewed.  Patient stated she had continued suicidal thoughts, with potential plan of using her pills at home.  Patient was showing signs of acute alcohol withdrawal today, tremors and tachycardia.  Patient complained of nausea.  Psychiatry evaluated patient today given she her legal hold status.  CIWA scores ranging between 11-13 since admission.  Patient requiring Ativan and Librium.  She stated her last drink was the day she came to the hospital.    5/26 - Patient's LFTs increasing today, was decreased yesterday compared to high levels on admission. RUQ obtained on admission did not show obstruction.  Patient in active withdrawal continued. Patient stated she has RUQ pain today, severe, painful to deep touch, localized 9/10.  Consulted [facility]ology.    5/27 - patient this morning was distressed, she was noted to be standing without help of nursing despite having directions explained to her by nursing to wait for assistance to use the commode or to get out of bed. Patient did not fall, but was " "found to have soiled herself with urine. Patient denied any RUQ pain today compared to yesterday. She stated she was eating better. CIWA up to 12.  Oxygen supplementation up to 5LNC.    5/28 -patient was more controlled today, and denied any symptoms for abdominal pain at this time.  Morning patient was showing signs of improvement.  Most recent CIWA score went up to 13 at 1305 p.m.  Was only as high as 9 before that.  Oxygen now down to 3 L nasal cannula.    5/29-patient continued to have RUQ pain today, 8/10, sharp, radiating to her back and to her left lower quadrant.  + nausea.  Denied chest pain, vomiting.  Ordered HIDA scan.    5/30 - patient stated she had continued RUQ pain. HIDA scan showed ejection fraction of 13%, with GGT 2572.  Called [facility] for update, physician on call to evaluate patient and make recommendations.  If no procedures required by GI, will need to likely consult with general surgery.  Patient unable to sit for MRI due to her tremors, hence HIDA scan was ordered.    5/31 - patient had continued RUQ pain. Tolerating her diet.   Spoke with Dr. Chavez with general surgery, patient is not a candidate for any cholecystectomy due to her alcoholic hepatitis on this admission, likely biliary dyskinesia, recommended low fat diet and outpt evaluation for any possible cholecystectomy\".      Interval Problem Update  Patient seen and examined resting in bed, no acute complains at this time.   She does not show signs of acute alcohol withdrawal her CIWA was d/c yesterday  Awaiting placement     Consultants/Specialty  Psychiatry  [facility] gastroenterology  General surgery    Code Status  Full Code    Disposition  Skilled nursing facility    Review of Systems  Review of Systems   Constitutional: Negative for chills, fever and weight loss.   HENT: Negative for hearing loss and tinnitus.    Eyes: Negative for blurred vision, double vision, photophobia and pain.   Respiratory: Negative " for cough, sputum production and shortness of breath.    Cardiovascular: Negative for chest pain, palpitations, orthopnea and leg swelling.   Gastrointestinal: Positive for abdominal pain. Negative for constipation, diarrhea, nausea and vomiting.   Genitourinary: Negative for dysuria, frequency and urgency.   Musculoskeletal: Negative for back pain, joint pain, myalgias and neck pain.   Skin: Negative for rash.   Neurological: Negative for dizziness, tingling, tremors, sensory change, speech change, focal weakness and headaches.   Psychiatric/Behavioral: Negative for hallucinations and substance abuse. The patient is nervous/anxious.    All other systems reviewed and are negative.     Physical Exam  Temp:  [36 °C (96.8 °F)-37.6 °C (99.7 °F)] 37 °C (98.6 °F)  Pulse:  [59-99] 88  Resp:  [16-19] 19  BP: (100-117)/(61-82) 108/65  SpO2:  [94 %-99 %] 96 %    Physical Exam  Vitals reviewed.   Constitutional:       General: She is not in acute distress.     Appearance: Normal appearance. She is not ill-appearing.   HENT:      Head: Normocephalic and atraumatic.      Nose: No congestion.   Eyes:      General:         Right eye: No discharge.         Left eye: No discharge.      Pupils: Pupils are equal, round, and reactive to light.   Cardiovascular:      Rate and Rhythm: Normal rate and regular rhythm.      Pulses: Normal pulses.      Heart sounds: Normal heart sounds. No murmur heard.     Pulmonary:      Effort: Pulmonary effort is normal. No respiratory distress.      Breath sounds: Normal breath sounds. No stridor.   Abdominal:      General: Bowel sounds are normal. There is no distension.      Palpations: Abdomen is soft.      Tenderness: There is abdominal tenderness (Mild).      Comments: No signs of acute abdomen on physical exam.   Musculoskeletal:         General: No swelling or tenderness. Normal range of motion.      Cervical back: Normal range of motion. No rigidity.   Skin:     General: Skin is warm.       Capillary Refill: Capillary refill takes less than 2 seconds.      Coloration: Skin is not jaundiced or pale.      Findings: No bruising.   Neurological:      General: No focal deficit present.      Mental Status: She is alert and oriented to person, place, and time.      Cranial Nerves: No cranial nerve deficit.   Psychiatric:         Mood and Affect: Mood normal.         Behavior: Behavior normal.       Fluids    Intake/Output Summary (Last 24 hours) at 6/2/2021 0953  Last data filed at 6/2/2021 0000  Gross per 24 hour   Intake 840 ml   Output --   Net 840 ml       Laboratory  Recent Labs     05/31/21  0330 06/01/21  0342 06/02/21  0107   WBC 4.4* 4.8 4.9   RBC 3.07* 3.30* 3.35*   HEMOGLOBIN 10.5* 11.4* 11.5*   HEMATOCRIT 33.1* 36.6* 37.3   .8* 110.9* 111.3*   MCH 34.2* 34.5* 34.3*   MCHC 31.7* 31.1* 30.8*   RDW 54.5* 56.0* 56.2*   PLATELETCT 151* 176 253   MPV 10.0 10.2 10.1     Recent Labs     05/31/21  0330 06/01/21  0342 06/02/21  0107   SODIUM 135 136 136   POTASSIUM 3.7 4.2 3.9   CHLORIDE 97 98 96   CO2 29 28 29   GLUCOSE 111* 105* 102*   BUN 5* 7* 7*   CREATININE 0.28* 0.44* 0.43*   CALCIUM 9.2 9.7 9.4     Recent Labs     05/30/21  1355 05/31/21  0330   APTT 29.5  --    INR  --  0.89               Imaging  NM-BILIARY HIDA SCAN FATTY MEAL   Final Result      Low gallbladder ejection fraction.      This study was obtained utilizing fatty meal challenge to induce gallbladder contraction due to national shortage of cholecystokinin.  There are no national standards for gallbladder ejection fraction calculated utilizing fatty meal challenge.  An    ejection fraction greater than 35% is most likely normal.  Gallbladder ejection fraction less than 35% may be abnormal but could be falsely positive.  Therefore, this test could be falsely positive.  Consider repeat imaging once cholecystokinin becomes    available.      IR-US GUIDED PIV   Final Result    Ultrasound-guided PERIPHERAL IV INSERTION performed by   "  qualified nursing staff as above.      IR-US GUIDED PIV   Final Result    Ultrasound-guided PERIPHERAL IV INSERTION performed by    qualified nursing staff as above.      US-RUQ   Final Result      Stable echogenic liver which is most commonly secondary to steatosis           Assessment/Plan  * Alcohol dependence with withdrawal (HCC)- (present on admission)  Assessment & Plan  Hx DT's and seizure 2/2 ETOH withdrawal.  Patient is a persistent alcohol user.    Prefers vodka, drinks half pint usually daily, obtains her drinks from her roommate.  Patient's last drink was on the day of admission, as stated by the patient.  Alcohol cessation education provided to patient.  Patient stated she wants to stop and this is what makes her feel suicidal as she is unable to stop.  -Seizure precaution  -Librium for long acting benzodiazepine coverage  - PO vitamins, patient was given detox IV bag with thiamine.  -Check Magnesium daily  - consult    Discontinued CIWA protocol on June 1, 2021.    Acute on chronic respiratory failure with hypoxia on home O2 therapy- (present on admission)  Assessment & Plan  Patient currently requiring increased oxygen, arrived to ED requiring 3L.  O2 demand up to 5L NC compared to her chronic home oxygen needs of 2L. Due to patient's acute alcohol withdrawal worsening distress, tachycardia.  5/28 - Improving to 3LNC  - continue oxygen support  - will need restart of home oxygen order on discharge    Vitamin D deficiency- (present on admission)  Assessment & Plan  Patient's vitamin D 25 level 11 on 4/21  Vitamin B12 and iron at appropriate levels.  Continue patient on supplementation, 50,000 IU weekly p.o.    Acute alcoholic hepatitis- (present on admission)  Assessment & Plan  Suspecting 2/2 to continued daily ETOH abuse and acute alcohol intoxication on admission.  RUQ US shows: \"Stable echogenic liver which is most commonly secondary to steatosis\"  Patient likely has fatty liver " disease from alcohol use.  5/25 - No Discriminant Function = 8, no indication for steroids  GGT 2572  HIDA scan - 13% ejection fraction  5/26 - Consulted [facility] for evaluation of rising LFTs, elevated GGT.     --- No update by GI until 5/30 - recommended general surgery evaluation.  5/31 - Consulted General Surgery Dr. Chavez, patient is not a candidate for any cholecystectomy due to her alcoholic hepatitis on this admission, likely biliary dyskinesia, recommended low fat diet and outpt evaluation for any possible cholecystectomy.   --- Patient's LFTs improving.  , ALT 80,     Continue to monitor, recheck CMP daily  Trial back librium 25mg po daily (5/31)  Started her on hydroxyzine for anxiety.    Macrocytic anemia- (present on admission)  Assessment & Plan  Vit B12 and folate levels normal on prior check  Likely due to alcohol liver disease  Monitor for any bleeding    Hypomagnesemia- (present on admission)  Assessment & Plan  Patient arrived with magnesium 1.9, down to 1.6 despite IV replacements.  Replace as needed.  Continue to monitor.    Leukocytopenia- (present on admission)  Assessment & Plan  Low WBC due to liver disorder  Slightly improving  Continue to monitor.    COPD (chronic obstructive pulmonary disease) (HCC)- (present on admission)  Assessment & Plan  Per chart review: no PFTs on file, on 2 L of oxygen at home.  No signs of acute exacerbation at this time on admission.  Oxygen per guidelines  Monitor respiratory status    Alcohol-induced acute pancreatitis- (present on admission)  Assessment & Plan  Patient continues to complain about nausea, but denied any vomiting.  On solid foods  Pain Control    Depression with suicidal ideation- (present on admission)  Assessment & Plan  Patient presented with suicidal ideation, plan to potentially overdose on her home medications.  Patient was brought in with a legal hold  Legal hold  discontinued.    Hypokalemia  Assessment & Plan  Patient's potassium low today 3.3.  Likely due to poor nutrition due to severe alcohol use.  Magnesium low on admission 1.9.  Replacing via IV given patient's nausea  Recheck potassium and magnesium daily     I discussed plan of care during multidisciplinary regarding her current medical condition and discharge plan.  I placed SNF referral.    VTE prophylaxis: Lovenox

## 2021-06-02 NOTE — PROGRESS NOTES
Handoff report received from day shift RN. Assumed pt care. Pt ot in distress. Pt AOx 4, on 2-3L NC. Tele box on, rhythm verified. Safety precautions in place. Call light and personal belongings within reach. Educated to call for assistance if needed.

## 2021-06-03 LAB
ANION GAP SERPL CALC-SCNC: 11 MMOL/L (ref 7–16)
BUN SERPL-MCNC: 6 MG/DL (ref 8–22)
CALCIUM SERPL-MCNC: 10 MG/DL (ref 8.5–10.5)
CHLORIDE SERPL-SCNC: 100 MMOL/L (ref 96–112)
CO2 SERPL-SCNC: 27 MMOL/L (ref 20–33)
CREAT SERPL-MCNC: 0.43 MG/DL (ref 0.5–1.4)
ERYTHROCYTE [DISTWIDTH] IN BLOOD BY AUTOMATED COUNT: 53.8 FL (ref 35.9–50)
GLUCOSE SERPL-MCNC: 105 MG/DL (ref 65–99)
HCT VFR BLD AUTO: 39.8 % (ref 37–47)
HGB BLD-MCNC: 12.4 G/DL (ref 12–16)
MCH RBC QN AUTO: 34.3 PG (ref 27–33)
MCHC RBC AUTO-ENTMCNC: 31.2 G/DL (ref 33.6–35)
MCV RBC AUTO: 110.2 FL (ref 81.4–97.8)
PLATELET # BLD AUTO: 306 K/UL (ref 164–446)
PMV BLD AUTO: 9.9 FL (ref 9–12.9)
POTASSIUM SERPL-SCNC: 4.3 MMOL/L (ref 3.6–5.5)
RBC # BLD AUTO: 3.61 M/UL (ref 4.2–5.4)
SODIUM SERPL-SCNC: 138 MMOL/L (ref 135–145)
WBC # BLD AUTO: 4.1 K/UL (ref 4.8–10.8)

## 2021-06-03 PROCEDURE — 97530 THERAPEUTIC ACTIVITIES: CPT | Mod: CQ

## 2021-06-03 PROCEDURE — 700102 HCHG RX REV CODE 250 W/ 637 OVERRIDE(OP): Performed by: STUDENT IN AN ORGANIZED HEALTH CARE EDUCATION/TRAINING PROGRAM

## 2021-06-03 PROCEDURE — 80048 BASIC METABOLIC PNL TOTAL CA: CPT

## 2021-06-03 PROCEDURE — 36415 COLL VENOUS BLD VENIPUNCTURE: CPT

## 2021-06-03 PROCEDURE — A9270 NON-COVERED ITEM OR SERVICE: HCPCS | Performed by: INTERNAL MEDICINE

## 2021-06-03 PROCEDURE — 770020 HCHG ROOM/CARE - TELE (206)

## 2021-06-03 PROCEDURE — 700102 HCHG RX REV CODE 250 W/ 637 OVERRIDE(OP): Performed by: INTERNAL MEDICINE

## 2021-06-03 PROCEDURE — 85027 COMPLETE CBC AUTOMATED: CPT

## 2021-06-03 PROCEDURE — A9270 NON-COVERED ITEM OR SERVICE: HCPCS | Performed by: STUDENT IN AN ORGANIZED HEALTH CARE EDUCATION/TRAINING PROGRAM

## 2021-06-03 PROCEDURE — 97116 GAIT TRAINING THERAPY: CPT | Mod: CQ

## 2021-06-03 PROCEDURE — 99231 SBSQ HOSP IP/OBS SF/LOW 25: CPT | Performed by: INTERNAL MEDICINE

## 2021-06-03 PROCEDURE — 700111 HCHG RX REV CODE 636 W/ 250 OVERRIDE (IP): Performed by: STUDENT IN AN ORGANIZED HEALTH CARE EDUCATION/TRAINING PROGRAM

## 2021-06-03 RX ADMIN — ENOXAPARIN SODIUM 40 MG: 40 INJECTION SUBCUTANEOUS at 05:05

## 2021-06-03 RX ADMIN — Medication 1 TABLET: at 05:06

## 2021-06-03 RX ADMIN — GABAPENTIN 300 MG: 300 CAPSULE ORAL at 18:13

## 2021-06-03 RX ADMIN — HYDROXYZINE HYDROCHLORIDE 25 MG: 25 TABLET, FILM COATED ORAL at 16:39

## 2021-06-03 RX ADMIN — Medication 5 MG: at 20:13

## 2021-06-03 RX ADMIN — GABAPENTIN 300 MG: 300 CAPSULE ORAL at 12:31

## 2021-06-03 RX ADMIN — FAMOTIDINE 20 MG: 20 TABLET ORAL at 05:06

## 2021-06-03 RX ADMIN — FLUOXETINE 30 MG: 20 CAPSULE ORAL at 05:07

## 2021-06-03 RX ADMIN — OXYCODONE 5 MG: 5 TABLET ORAL at 16:39

## 2021-06-03 RX ADMIN — PROMETHAZINE HYDROCHLORIDE 25 MG: 25 TABLET ORAL at 20:13

## 2021-06-03 RX ADMIN — PROMETHAZINE HYDROCHLORIDE 25 MG: 25 TABLET ORAL at 01:39

## 2021-06-03 RX ADMIN — PROMETHAZINE HYDROCHLORIDE 25 MG: 25 TABLET ORAL at 08:29

## 2021-06-03 RX ADMIN — Medication 100 MG: at 05:05

## 2021-06-03 RX ADMIN — GABAPENTIN 300 MG: 300 CAPSULE ORAL at 05:06

## 2021-06-03 ASSESSMENT — ENCOUNTER SYMPTOMS
HEADACHES: 0
COUGH: 0
FEVER: 0
TINGLING: 0
NECK PAIN: 0
ORTHOPNEA: 0
WEIGHT LOSS: 0
CHILLS: 0
FOCAL WEAKNESS: 0
EYE PAIN: 0
PHOTOPHOBIA: 0
SENSORY CHANGE: 0
SPEECH CHANGE: 0
BLURRED VISION: 0
DOUBLE VISION: 0
CONSTIPATION: 0
NAUSEA: 0
MYALGIAS: 0
VOMITING: 0
PALPITATIONS: 0
SPUTUM PRODUCTION: 0
DIARRHEA: 0
BACK PAIN: 0
HALLUCINATIONS: 0
ABDOMINAL PAIN: 1
SHORTNESS OF BREATH: 0
TREMORS: 0
DIZZINESS: 0
NERVOUS/ANXIOUS: 1

## 2021-06-03 ASSESSMENT — COGNITIVE AND FUNCTIONAL STATUS - GENERAL
WALKING IN HOSPITAL ROOM: A LITTLE
SUGGESTED CMS G CODE MODIFIER MOBILITY: CK
TURNING FROM BACK TO SIDE WHILE IN FLAT BAD: A LITTLE
STANDING UP FROM CHAIR USING ARMS: A LITTLE
MOVING FROM LYING ON BACK TO SITTING ON SIDE OF FLAT BED: A LITTLE
MOBILITY SCORE: 17
CLIMB 3 TO 5 STEPS WITH RAILING: A LOT
MOVING TO AND FROM BED TO CHAIR: A LITTLE

## 2021-06-03 ASSESSMENT — PAIN DESCRIPTION - PAIN TYPE
TYPE: ACUTE PAIN

## 2021-06-03 ASSESSMENT — GAIT ASSESSMENTS
ASSISTIVE DEVICE: FRONT WHEEL WALKER
GAIT LEVEL OF ASSIST: MINIMAL ASSIST
DISTANCE (FEET): 50
DEVIATION: SHUFFLED GAIT

## 2021-06-03 ASSESSMENT — LIFESTYLE VARIABLES: SUBSTANCE_ABUSE: 0

## 2021-06-03 NOTE — DISCHARGE PLANNING
@1975  Agency/Facility Name: [facility]  Spoke To: Georgiana  Outcome: Declined due to mental health needs.    Received Choice form at 0200  Agency[facility]Facility Name[facility]  Referral sent per Choice form @ 1147

## 2021-06-03 NOTE — CARE PLAN
Problem: Nutritional:  Goal: Achieve adequate nutritional intake  Description: Patient will consume >50% of meals  Outcome: Not Met   PO was recorded as >50% for meals 5/31 and 6/1, however yesterday meals were at <25%. Attempted to interview pt, however did not wake to name.     Trial Boost Plus QD with Breakfast. Liberalized diet to Regular from Low Fat to promote increased PO intake.     RD to follow.

## 2021-06-03 NOTE — PROGRESS NOTES
Monitor summary  Rhythm: SR    Ectopy: R PVC  HR:  87-93  .14/.08/.35  Per strip from monitor room  Physical strip sent d/t computer issues.

## 2021-06-03 NOTE — PROGRESS NOTES
"Hospital Medicine Daily Progress Note    Date of Service  6/3/2021    Chief Complaint  50 y.o. female admitted 5/24/2021 with suicidal ideation    Hospital Course    Per Dr. Dominguez note    \"50-year-old female with a past medical history of recurrent alcoholic pancreatitis, alcohol abuse and alcohol withdrawals including seizures, history of bipolar disorder, polysubstance use with methamphetamines and alcohol, prior suicide attempt, COPD on 2 L home oxygen, presented 5/24/2021 with complaints of suicidal ideation due to her chronic alcohol use.  Patient was admitted found to be in acute alcohol intoxication, elevated POC breathalyzer 0.292.  Patient on admission was more somnolent but arousable with sternal rub.  Patient came in with a legal hold due to complaints of suicidal ideation, suicidal plan by taking overdose of pills she has at home.    5/25/2021-patient seen and evaluated today, labs and notes reviewed.  Patient stated she had continued suicidal thoughts, with potential plan of using her pills at home.  Patient was showing signs of acute alcohol withdrawal today, tremors and tachycardia.  Patient complained of nausea.  Psychiatry evaluated patient today given she her legal hold status.  CIWA scores ranging between 11-13 since admission.  Patient requiring Ativan and Librium.  She stated her last drink was the day she came to the hospital.    5/26 - Patient's LFTs increasing today, was decreased yesterday compared to high levels on admission. RUQ obtained on admission did not show obstruction.  Patient in active withdrawal continued. Patient stated she has RUQ pain today, severe, painful to deep touch, localized 9/10.  Consulted [facility] gastro[facility]ology.    5/27 - patient this morning was distressed, she was noted to be standing without help of nursing despite having directions explained to her by nursing to wait for assistance to use the commode or to get out of bed. Patient did not fall, but was " "found to have soiled herself with urine. Patient denied any RUQ pain today compared to yesterday. She stated she was eating better. CIWA up to 12.  Oxygen supplementation up to 5LNC.    5/28 -patient was more controlled today, and denied any symptoms for abdominal pain at this time.  Morning patient was showing signs of improvement.  Most recent CIWA score went up to 13 at 1305 p.m.  Was only as high as 9 before that.  Oxygen now down to 3 L nasal cannula.    5/29-patient continued to have RUQ pain today, 8/10, sharp, radiating to her back and to her left lower quadrant.  + nausea.  Denied chest pain, vomiting.  Ordered HIDA scan.    5/30 - patient stated she had continued RUQ pain. HIDA scan showed ejection fraction of 13%, with GGT 2572.  Called [facility] for update, physician on call to evaluate patient and make recommendations.  If no procedures required by GI, will need to likely consult with general surgery.  Patient unable to sit for MRI due to her tremors, hence HIDA scan was ordered.    5/31 - patient had continued RUQ pain. Tolerating her diet.   Spoke with Dr. Chavez with general surgery, patient is not a candidate for any cholecystectomy due to her alcoholic hepatitis on this admission, likely biliary dyskinesia, recommended low fat diet and outpt evaluation for any possible cholecystectomy\".      Interval Problem Update  No acute events overnight resting in bed, no acute complains at this time.   She does not show signs of acute alcohol withdrawal her CIWA was d/c on 6/1  Awaiting placement     Consultants/Specialty  Psychiatry  [facility] gastroenterology  General surgery    Code Status  Full Code    Disposition  Skilled nursing facility    Review of Systems  Review of Systems   Constitutional: Negative for chills, fever and weight loss.   HENT: Negative for hearing loss and tinnitus.    Eyes: Negative for blurred vision, double vision, photophobia and pain.   Respiratory: Negative for " cough, sputum production and shortness of breath.    Cardiovascular: Negative for chest pain, palpitations, orthopnea and leg swelling.   Gastrointestinal: Positive for abdominal pain. Negative for constipation, diarrhea, nausea and vomiting.   Genitourinary: Negative for dysuria, frequency and urgency.   Musculoskeletal: Negative for back pain, joint pain, myalgias and neck pain.   Skin: Negative for rash.   Neurological: Negative for dizziness, tingling, tremors, sensory change, speech change, focal weakness and headaches.   Psychiatric/Behavioral: Negative for hallucinations and substance abuse. The patient is nervous/anxious.    All other systems reviewed and are negative.     Physical Exam  Temp:  [35.9 °C (96.6 °F)-36.4 °C (97.5 °F)] 36.1 °C (97 °F)  Pulse:  [78-98] 98  Resp:  [16-18] 16  BP: ()/(55-91) 96/59  SpO2:  [91 %-98 %] 91 %    Physical Exam  Vitals reviewed.   Constitutional:       General: She is not in acute distress.     Appearance: Normal appearance. She is not ill-appearing.   HENT:      Head: Normocephalic and atraumatic.      Nose: No congestion.   Eyes:      General:         Right eye: No discharge.         Left eye: No discharge.      Pupils: Pupils are equal, round, and reactive to light.   Cardiovascular:      Rate and Rhythm: Normal rate and regular rhythm.      Pulses: Normal pulses.      Heart sounds: Normal heart sounds. No murmur heard.     Pulmonary:      Effort: Pulmonary effort is normal. No respiratory distress.      Breath sounds: Normal breath sounds. No stridor.   Abdominal:      General: Bowel sounds are normal. There is no distension.      Palpations: Abdomen is soft.      Tenderness: There is abdominal tenderness (Mild).      Comments: No signs of acute abdomen on physical exam.   Musculoskeletal:         General: No swelling or tenderness. Normal range of motion.      Cervical back: Normal range of motion. No rigidity.   Skin:     General: Skin is warm.      Capillary  Refill: Capillary refill takes less than 2 seconds.      Coloration: Skin is not jaundiced or pale.      Findings: No bruising.   Neurological:      General: No focal deficit present.      Mental Status: She is alert and oriented to person, place, and time.      Cranial Nerves: No cranial nerve deficit.   Psychiatric:         Mood and Affect: Mood normal.         Behavior: Behavior normal.       Fluids    Intake/Output Summary (Last 24 hours) at 6/3/2021 0927  Last data filed at 6/2/2021 2250  Gross per 24 hour   Intake 320 ml   Output 2500 ml   Net -2180 ml       Laboratory  Recent Labs     06/01/21  0342 06/02/21  0107 06/03/21  0123   WBC 4.8 4.9 4.1*   RBC 3.30* 3.35* 3.61*   HEMOGLOBIN 11.4* 11.5* 12.4   HEMATOCRIT 36.6* 37.3 39.8   .9* 111.3* 110.2*   MCH 34.5* 34.3* 34.3*   MCHC 31.1* 30.8* 31.2*   RDW 56.0* 56.2* 53.8*   PLATELETCT 176 253 306   MPV 10.2 10.1 9.9     Recent Labs     06/01/21  0342 06/02/21  0107 06/03/21  0123   SODIUM 136 136 138   POTASSIUM 4.2 3.9 4.3   CHLORIDE 98 96 100   CO2 28 29 27   GLUCOSE 105* 102* 105*   BUN 7* 7* 6*   CREATININE 0.44* 0.43* 0.43*   CALCIUM 9.7 9.4 10.0                   Imaging  NM-BILIARY HIDA SCAN FATTY MEAL   Final Result      Low gallbladder ejection fraction.      This study was obtained utilizing fatty meal challenge to induce gallbladder contraction due to national shortage of cholecystokinin.  There are no national standards for gallbladder ejection fraction calculated utilizing fatty meal challenge.  An    ejection fraction greater than 35% is most likely normal.  Gallbladder ejection fraction less than 35% may be abnormal but could be falsely positive.  Therefore, this test could be falsely positive.  Consider repeat imaging once cholecystokinin becomes    available.      IR-US GUIDED PIV   Final Result    Ultrasound-guided PERIPHERAL IV INSERTION performed by    qualified nursing staff as above.      IR-US GUIDED PIV   Final Result     "Ultrasound-guided PERIPHERAL IV INSERTION performed by    qualified nursing staff as above.      US-RUQ   Final Result      Stable echogenic liver which is most commonly secondary to steatosis           Assessment/Plan  * Alcohol dependence with withdrawal (HCC)- (present on admission)  Assessment & Plan  Hx DT's and seizure 2/2 ETOH withdrawal.  Patient is a persistent alcohol user.    Prefers vodka, drinks half pint usually daily, obtains her drinks from her roommate.  Patient's last drink was on the day of admission, as stated by the patient.  Alcohol cessation education provided to patient.  Patient stated she wants to stop and this is what makes her feel suicidal as she is unable to stop.  -Seizure precaution  -Librium for long acting benzodiazepine coverage  - PO vitamins, patient was given detox IV bag with thiamine.  -Check Magnesium daily  - consult    Discontinued CIWA protocol on June 1, 2021.    Acute on chronic respiratory failure with hypoxia on home O2 therapy- (present on admission)  Assessment & Plan  Patient currently requiring increased oxygen, arrived to ED requiring 3L.  O2 demand up to 5L NC compared to her chronic home oxygen needs of 2L. Due to patient's acute alcohol withdrawal worsening distress, tachycardia.  5/28 - Improving to 3LNC  - continue oxygen support  - will need restart of home oxygen order on discharge    Vitamin D deficiency- (present on admission)  Assessment & Plan  Patient's vitamin D 25 level 11 on 4/21  Vitamin B12 and iron at appropriate levels.  Continue patient on supplementation, 50,000 IU weekly p.o.    Acute alcoholic hepatitis- (present on admission)  Assessment & Plan  Suspecting 2/2 to continued daily ETOH abuse and acute alcohol intoxication on admission.  RUQ US shows: \"Stable echogenic liver which is most commonly secondary to steatosis\"  Patient likely has fatty liver disease from alcohol use.  5/25 - No Discriminant Function = 8, no " indication for steroids  GGT 2572  HIDA scan - 13% ejection fraction  5/26 - Consulted [facility] for evaluation of rising LFTs, elevated GGT.     --- No update by GI until 5/30 - recommended general surgery evaluation.  5/31 - Consulted General Surgery Dr. Chavez, patient is not a candidate for any cholecystectomy due to her alcoholic hepatitis on this admission, likely biliary dyskinesia, recommended low fat diet and outpt evaluation for any possible cholecystectomy.   --- Patient's LFTs improving.  , ALT 80,     Continue to monitor, recheck CMP daily  Trial back librium 25mg po daily (5/31)  Started her on hydroxyzine for anxiety.    Macrocytic anemia- (present on admission)  Assessment & Plan  Vit B12 and folate levels normal on prior check  Likely due to alcohol liver disease  Monitor for any bleeding    Hypomagnesemia- (present on admission)  Assessment & Plan  Patient arrived with magnesium 1.9, down to 1.6 despite IV replacements.  Replace as needed.  Continue to monitor.    Leukocytopenia- (present on admission)  Assessment & Plan  Low WBC due to liver disorder  Slightly improving  Continue to monitor.    COPD (chronic obstructive pulmonary disease) (HCC)- (present on admission)  Assessment & Plan  Per chart review: no PFTs on file, on 2 L of oxygen at home.  No signs of acute exacerbation at this time on admission.  Oxygen per guidelines  Monitor respiratory status    Alcohol-induced acute pancreatitis- (present on admission)  Assessment & Plan  Patient continues to complain about nausea, but denied any vomiting.  On solid foods  Pain Control    Depression with suicidal ideation- (present on admission)  Assessment & Plan  Patient presented with suicidal ideation, plan to potentially overdose on her home medications.  Patient was brought in with a legal hold  Legal hold discontinued.    Hypokalemia  Assessment & Plan  Patient's potassium low today 3.3.  Likely due to poor  nutrition due to severe alcohol use.  Magnesium low on admission 1.9.  Replacing via IV given patient's nausea  Recheck potassium and magnesium daily     I discussed plan of care during multidisciplinary regarding her current medical condition and discharge plan.  I placed SNF referral.    VTE prophylaxis: Lovenox

## 2021-06-03 NOTE — CARE PLAN
The patient is Stable - Low risk of patient condition declining or worsening    Shift Goals  Clinical Goals: maintain pain level of 4/10 or less  Patient Goals: rest tonight    Progress made toward(s) clinical / shift goals:  Pt reports a sufficient pain decrease with the use of PRN oxycodone.     Patient is not progressing towards the following goals: Pt report feeling fatigued, but unable to sleep soundly.    Problem: Provide Safe Environment  Goal: Suicide environmental safety, protocols, policies, and practices will be implemented  Outcome: Progressing   Fall precautions in place.     Problem: Pain - Standard  Goal: Alleviation of pain or a reduction in pain to the patient’s comfort goal  Outcome: Progressing

## 2021-06-03 NOTE — THERAPY
Physical Therapy   Daily Treatment     Patient Name: Olya Youssef  Age:  50 y.o., Sex:  female  Medical Record #: [id number]  Today's Date: 6/3/2021     Precautions: (P) Fall Risk    Assessment    Pt found seated in the chair eating lunch but willing to participate w/PT. Did not assess bed mobility, min needed for transfers and her amb efforts w/FWW. Ambulation tolerance limited by fatigue. Pt uses O2 24/7, currently on 3L. PT conts to recommend post acute therapy.     Plan    Continue current treatment plan.    DC Equipment Recommendations: Unable to determine at this time  Discharge Recommendations: Recommend post-acute placement for additional physical therapy services prior to discharge home     Objective       06/03/21 1550   Balance   Sitting Balance (Static) Fair +   Sitting Balance (Dynamic) Fair   Standing Balance (Static) Fair   Standing Balance (Dynamic) Fair -   Weight Shift Sitting Fair   Weight Shift Standing Fair   Comments standing w/FWW   Gait Analysis   Gait Level Of Assist Minimal Assist   Assistive Device Front Wheel Walker   Distance (Feet) 50   # of Times Distance was Traveled 1   Deviation Shuffled Gait   Comments Improvement w/amb distance from previous PT session, still limited by c/o fatigue. Pt amb on 3L O2.    Bed Mobility    Comments Did not assess, pt found up in chair eating her lunch and returned to the chair.    Functional Mobility   Sit to Stand   (CGA->FWW)   Bed, Chair, Wheelchair Transfer Minimal Assist  (w/FWW)   Skilled Intervention Verbal Cuing   Comments Pt pushed walker out of the way to sit back to the chair, she needing cueing when it was safe to sit.    How much difficulty does the patient currently have...   Turning over in bed (including adjusting bedclothes, sheets and blankets)? 3   Sitting down on and standing up from a chair with arms (e.g., wheelchair, bedside commode, etc.) 3   Moving from lying on back to sitting on the side of the bed? 3   How much help from  another person does the patient currently need...   Moving to and from a bed to a chair (including a wheelchair)? 3   Need to walk in a hospital room? 3   Climbing 3-5 steps with a railing? 2   6 clicks Mobility Score 17   Short Term Goals    Short Term Goal # 1 Pt will perform bed mobility with SPV by the 6th tx   Goal Outcome # 1 goal not met   Short Term Goal # 2 Pt will transfer from bed to chair with SPV by the 6th tx   Goal Outcome # 2 Goal not met   Short Term Goal # 3 Pt will ambulate with FWW for 50 ft with SPV by the 6th tx.   Goal Outcome # 3 Goal not met

## 2021-06-03 NOTE — DISCHARGE PLANNING
Anticipated Discharge Disposition:   SNF    Action:   Per chart review, pt has been declined by SNFs in [facility] due to insurance, hx SI, alcohol use. RN CM spoke to pt at bedside. Discussed discharge planning. Received additional SNF choices. Choice form faxed to [facility].    Barriers to Discharge:   Placement acceptance and bed availability.    Plan:   Hospital Care Management will continue to follow and assist with discharge planning needs.

## 2021-06-04 LAB
ANION GAP SERPL CALC-SCNC: 11 MMOL/L (ref 7–16)
BUN SERPL-MCNC: 7 MG/DL (ref 8–22)
CALCIUM SERPL-MCNC: 9.8 MG/DL (ref 8.5–10.5)
CHLORIDE SERPL-SCNC: 98 MMOL/L (ref 96–112)
CO2 SERPL-SCNC: 26 MMOL/L (ref 20–33)
CREAT SERPL-MCNC: 0.41 MG/DL (ref 0.5–1.4)
ERYTHROCYTE [DISTWIDTH] IN BLOOD BY AUTOMATED COUNT: 51.8 FL (ref 35.9–50)
GLUCOSE SERPL-MCNC: 115 MG/DL (ref 65–99)
HCT VFR BLD AUTO: 36.5 % (ref 37–47)
HGB BLD-MCNC: 11.4 G/DL (ref 12–16)
MCH RBC QN AUTO: 33.8 PG (ref 27–33)
MCHC RBC AUTO-ENTMCNC: 31.2 G/DL (ref 33.6–35)
MCV RBC AUTO: 108.3 FL (ref 81.4–97.8)
PLATELET # BLD AUTO: 338 K/UL (ref 164–446)
PMV BLD AUTO: 10.2 FL (ref 9–12.9)
POTASSIUM SERPL-SCNC: 4 MMOL/L (ref 3.6–5.5)
RBC # BLD AUTO: 3.37 M/UL (ref 4.2–5.4)
SODIUM SERPL-SCNC: 135 MMOL/L (ref 135–145)
WBC # BLD AUTO: 4.6 K/UL (ref 4.8–10.8)

## 2021-06-04 PROCEDURE — 700102 HCHG RX REV CODE 250 W/ 637 OVERRIDE(OP): Performed by: STUDENT IN AN ORGANIZED HEALTH CARE EDUCATION/TRAINING PROGRAM

## 2021-06-04 PROCEDURE — A9270 NON-COVERED ITEM OR SERVICE: HCPCS | Performed by: STUDENT IN AN ORGANIZED HEALTH CARE EDUCATION/TRAINING PROGRAM

## 2021-06-04 PROCEDURE — A9270 NON-COVERED ITEM OR SERVICE: HCPCS | Performed by: INTERNAL MEDICINE

## 2021-06-04 PROCEDURE — 700111 HCHG RX REV CODE 636 W/ 250 OVERRIDE (IP): Performed by: STUDENT IN AN ORGANIZED HEALTH CARE EDUCATION/TRAINING PROGRAM

## 2021-06-04 PROCEDURE — 700102 HCHG RX REV CODE 250 W/ 637 OVERRIDE(OP): Performed by: INTERNAL MEDICINE

## 2021-06-04 PROCEDURE — 36415 COLL VENOUS BLD VENIPUNCTURE: CPT

## 2021-06-04 PROCEDURE — 770020 HCHG ROOM/CARE - TELE (206)

## 2021-06-04 PROCEDURE — 99231 SBSQ HOSP IP/OBS SF/LOW 25: CPT | Performed by: INTERNAL MEDICINE

## 2021-06-04 PROCEDURE — 80048 BASIC METABOLIC PNL TOTAL CA: CPT

## 2021-06-04 PROCEDURE — 85027 COMPLETE CBC AUTOMATED: CPT

## 2021-06-04 RX ORDER — CALCIUM CARBONATE 500 MG/1
500 TABLET, CHEWABLE ORAL DAILY
Status: DISCONTINUED | OUTPATIENT
Start: 2021-06-04 | End: 2021-06-17 | Stop reason: HOSPADM

## 2021-06-04 RX ADMIN — MAGNESIUM HYDROXIDE 30 ML: 400 SUSPENSION ORAL at 05:54

## 2021-06-04 RX ADMIN — ANTACID TABLETS 500 MG: 500 TABLET, CHEWABLE ORAL at 09:54

## 2021-06-04 RX ADMIN — Medication 100 MG: at 04:15

## 2021-06-04 RX ADMIN — Medication 1 TABLET: at 04:15

## 2021-06-04 RX ADMIN — PROMETHAZINE HYDROCHLORIDE 25 MG: 25 TABLET ORAL at 23:15

## 2021-06-04 RX ADMIN — FLUOXETINE 30 MG: 20 CAPSULE ORAL at 04:16

## 2021-06-04 RX ADMIN — Medication 5 MG: at 21:26

## 2021-06-04 RX ADMIN — GABAPENTIN 300 MG: 300 CAPSULE ORAL at 04:15

## 2021-06-04 RX ADMIN — HYDROXYZINE HYDROCHLORIDE 25 MG: 25 TABLET, FILM COATED ORAL at 23:15

## 2021-06-04 RX ADMIN — DOCUSATE SODIUM 50 MG AND SENNOSIDES 8.6 MG 2 TABLET: 8.6; 5 TABLET, FILM COATED ORAL at 04:15

## 2021-06-04 RX ADMIN — OXYCODONE 5 MG: 5 TABLET ORAL at 23:50

## 2021-06-04 RX ADMIN — GABAPENTIN 300 MG: 300 CAPSULE ORAL at 12:31

## 2021-06-04 RX ADMIN — ONDANSETRON 4 MG: 2 INJECTION INTRAMUSCULAR; INTRAVENOUS at 23:03

## 2021-06-04 RX ADMIN — ENOXAPARIN SODIUM 40 MG: 40 INJECTION SUBCUTANEOUS at 04:16

## 2021-06-04 RX ADMIN — GABAPENTIN 300 MG: 300 CAPSULE ORAL at 17:15

## 2021-06-04 RX ADMIN — ONDANSETRON 4 MG: 4 TABLET, ORALLY DISINTEGRATING ORAL at 08:42

## 2021-06-04 RX ADMIN — FAMOTIDINE 20 MG: 20 TABLET ORAL at 04:15

## 2021-06-04 RX ADMIN — OXYCODONE 5 MG: 5 TABLET ORAL at 02:08

## 2021-06-04 ASSESSMENT — PAIN DESCRIPTION - PAIN TYPE
TYPE: ACUTE PAIN

## 2021-06-04 ASSESSMENT — LIFESTYLE VARIABLES
SUBSTANCE_ABUSE: 0
TREMOR: NO TREMOR
ANXIETY: MILDLY ANXIOUS
VISUAL DISTURBANCES: NOT PRESENT
ORIENTATION AND CLOUDING OF SENSORIUM: ORIENTED AND CAN DO SERIAL ADDITIONS
PAROXYSMAL SWEATS: NO SWEAT VISIBLE
TOTAL SCORE: 4
AGITATION: SOMEWHAT MORE THAN NORMAL ACTIVITY
HEADACHE, FULLNESS IN HEAD: NOT PRESENT
AUDITORY DISTURBANCES: NOT PRESENT
NAUSEA AND VOMITING: *

## 2021-06-04 ASSESSMENT — ENCOUNTER SYMPTOMS
ORTHOPNEA: 0
NERVOUS/ANXIOUS: 1
TINGLING: 0
FOCAL WEAKNESS: 0
SENSORY CHANGE: 0
ABDOMINAL PAIN: 1
CHILLS: 0
NECK PAIN: 0
DIZZINESS: 0
COUGH: 0
PALPITATIONS: 0
CONSTIPATION: 0
HALLUCINATIONS: 0
FEVER: 0
MYALGIAS: 0
HEADACHES: 0
BACK PAIN: 0
SPUTUM PRODUCTION: 0
SPEECH CHANGE: 0
DOUBLE VISION: 0
NAUSEA: 0
TREMORS: 0
BLURRED VISION: 0
WEIGHT LOSS: 0
PHOTOPHOBIA: 0
DIARRHEA: 0
EYE PAIN: 0
SHORTNESS OF BREATH: 0
VOMITING: 0

## 2021-06-04 NOTE — CARE PLAN
The patient is Stable - Low risk of patient condition declining or worsening    Shift Goals  Clinical Goals:  (increase comfort and mobility)  Patient Goals: control GI upset    Progress made toward(s) clinical / shift goals:  Patient received moderate relief of Gi upset with PRNs.     Patient is not progressing towards the following goals:      Problem: Knowledge Deficit - Standard  Goal: Patient and family/care givers will demonstrate understanding of plan of care, disease process/condition, diagnostic tests and medications  Outcome: Progressing  Note: Patient to be able to verbalize understanding of diagnosis and treatments.      Problem: Fall Risk  Goal: Patient will remain free from falls  Outcome: Progressing  Note: Patient to remain free of falls and injury during admission.     Problem: Pain - Standard  Goal: Alleviation of pain or a reduction in pain to the patient’s comfort goal  Outcome: Progressing  Note: Patient to remain free from pain on current regiment.      Problem: Skin Integrity  Goal: Skin integrity is maintained or improved  Outcome: Progressing  Note: Patient to remain free of skin breakdown.

## 2021-06-04 NOTE — PROGRESS NOTES
Monitor Summary:  Rhythm NSR, [place] 0.133, QRS 0.064, QT 0.443; reviewed strip per monitor room summary. Strip not available in Epic at this time.

## 2021-06-04 NOTE — CARE PLAN
Problem: Knowledge Deficit - Standard  Goal: Patient and family/care givers will demonstrate understanding of plan of care, disease process/condition, diagnostic tests and medications  Outcome: Progressing     Problem: Lifestyle Changes  Goal: Patient's ability to identify lifestyle changes and available resources to help reduce recurrence of condition will improve  Outcome: Progressing     Problem: Psychosocial  Goal: Patient's level of anxiety will decrease  Outcome: Progressing  Goal: Spiritual and cultural needs incorporated into hospitalization  Outcome: Progressing   The patient is Stable - Low risk of patient condition declining or worsening    Shift Goals  Clinical Goals: decrease nausea  Patient Goals: get rest

## 2021-06-04 NOTE — PROGRESS NOTES
Assumed care of patient. Chart review done. Patient alert and oriented.  Pain control regiment reviewed. Bed low and locked.

## 2021-06-04 NOTE — PROGRESS NOTES
"Hospital Medicine Daily Progress Note    Date of Service  6/4/2021    Chief Complaint  50 y.o. female admitted 5/24/2021 with suicidal ideation    Hospital Course    Per Dr. Dominguez note    \"50-year-old female with a past medical history of recurrent alcoholic pancreatitis, alcohol abuse and alcohol withdrawals including seizures, history of bipolar disorder, polysubstance use with methamphetamines and alcohol, prior suicide attempt, COPD on 2 L home oxygen, presented 5/24/2021 with complaints of suicidal ideation due to her chronic alcohol use.  Patient was admitted found to be in acute alcohol intoxication, elevated POC breathalyzer 0.292.  Patient on admission was more somnolent but arousable with sternal rub.  Patient came in with a legal hold due to complaints of suicidal ideation, suicidal plan by taking overdose of pills she has at home.    5/25/2021-patient seen and evaluated today, labs and notes reviewed.  Patient stated she had continued suicidal thoughts, with potential plan of using her pills at home.  Patient was showing signs of acute alcohol withdrawal today, tremors and tachycardia.  Patient complained of nausea.  Psychiatry evaluated patient today given she her legal hold status.  CIWA scores ranging between 11-13 since admission.  Patient requiring Ativan and Librium.  She stated her last drink was the day she came to the hospital.    5/26 - Patient's LFTs increasing today, was decreased yesterday compared to high levels on admission. RUQ obtained on admission did not show obstruction.  Patient in active withdrawal continued. Patient stated she has RUQ pain today, severe, painful to deep touch, localized 9/10.  Consulted [facility]ology.    5/27 - patient this morning was distressed, she was noted to be standing without help of nursing despite having directions explained to her by nursing to wait for assistance to use the commode or to get out of bed. Patient did not fall, but was " "found to have soiled herself with urine. Patient denied any RUQ pain today compared to yesterday. She stated she was eating better. CIWA up to 12.  Oxygen supplementation up to 5LNC.    5/28 -patient was more controlled today, and denied any symptoms for abdominal pain at this time.  Morning patient was showing signs of improvement.  Most recent CIWA score went up to 13 at 1305 p.m.  Was only as high as 9 before that.  Oxygen now down to 3 L nasal cannula.    5/29-patient continued to have RUQ pain today, 8/10, sharp, radiating to her back and to her left lower quadrant.  + nausea.  Denied chest pain, vomiting.  Ordered HIDA scan.    5/30 - patient stated she had continued RUQ pain. HIDA scan showed ejection fraction of 13%, with GGT 2572.  Called [facility] for update, physician on call to evaluate patient and make recommendations.  If no procedures required by GI, will need to likely consult with general surgery.  Patient unable to sit for MRI due to her tremors, hence HIDA scan was ordered.    5/31 - patient had continued RUQ pain. Tolerating her diet.   Spoke with Dr. Chavez with general surgery, patient is not a candidate for any cholecystectomy due to her alcoholic hepatitis on this admission, likely biliary dyskinesia, recommended low fat diet and outpt evaluation for any possible cholecystectomy\".      Interval Problem Update  Patient seen and examined, afebrile, resting in bed, no acute complains at this time.   Awaiting placement     Consultants/Specialty  Psychiatry  [facility] gastroenterology  General surgery    Code Status  Full Code    Disposition  Skilled nursing facility    Review of Systems  Review of Systems   Constitutional: Negative for chills, fever and weight loss.   HENT: Negative for hearing loss and tinnitus.    Eyes: Negative for blurred vision, double vision, photophobia and pain.   Respiratory: Negative for cough, sputum production and shortness of breath.  "   Cardiovascular: Negative for chest pain, palpitations, orthopnea and leg swelling.   Gastrointestinal: Positive for abdominal pain. Negative for constipation, diarrhea, nausea and vomiting.   Genitourinary: Negative for dysuria, frequency and urgency.   Musculoskeletal: Negative for back pain, joint pain, myalgias and neck pain.   Skin: Negative for rash.   Neurological: Negative for dizziness, tingling, tremors, sensory change, speech change, focal weakness and headaches.   Psychiatric/Behavioral: Negative for hallucinations and substance abuse. The patient is nervous/anxious.    All other systems reviewed and are negative.     Physical Exam  Temp:  [36.3 °C (97.3 °F)-37.1 °C (98.7 °F)] 36.6 °C (97.9 °F)  Pulse:  [] 88  Resp:  [16-20] 19  BP: (101-115)/(62-78) 102/62  SpO2:  [96 %-98 %] 97 %    Physical Exam  Vitals reviewed.   Constitutional:       General: She is not in acute distress.     Appearance: Normal appearance. She is not ill-appearing.   HENT:      Head: Normocephalic and atraumatic.      Nose: No congestion.   Eyes:      General:         Right eye: No discharge.         Left eye: No discharge.      Pupils: Pupils are equal, round, and reactive to light.   Cardiovascular:      Rate and Rhythm: Normal rate and regular rhythm.      Pulses: Normal pulses.      Heart sounds: Normal heart sounds. No murmur heard.     Pulmonary:      Effort: Pulmonary effort is normal. No respiratory distress.      Breath sounds: Normal breath sounds. No stridor.   Abdominal:      General: Bowel sounds are normal. There is no distension.      Palpations: Abdomen is soft.      Tenderness: There is abdominal tenderness (Mild).      Comments: No signs of acute abdomen on physical exam.   Musculoskeletal:         General: No swelling or tenderness. Normal range of motion.      Cervical back: Normal range of motion. No rigidity.   Skin:     General: Skin is warm.      Capillary Refill: Capillary refill takes less than 2  seconds.      Coloration: Skin is not jaundiced or pale.      Findings: No bruising.   Neurological:      General: No focal deficit present.      Mental Status: She is alert and oriented to person, place, and time.      Cranial Nerves: No cranial nerve deficit.   Psychiatric:         Mood and Affect: Mood normal.         Behavior: Behavior normal.       Fluids    Intake/Output Summary (Last 24 hours) at 6/4/2021 0944  Last data filed at 6/3/2021 1700  Gross per 24 hour   Intake 240 ml   Output 500 ml   Net -260 ml       Laboratory  Recent Labs     06/02/21  0107 06/03/21  0123 06/04/21  0128   WBC 4.9 4.1* 4.6*   RBC 3.35* 3.61* 3.37*   HEMOGLOBIN 11.5* 12.4 11.4*   HEMATOCRIT 37.3 39.8 36.5*   .3* 110.2* 108.3*   MCH 34.3* 34.3* 33.8*   MCHC 30.8* 31.2* 31.2*   RDW 56.2* 53.8* 51.8*   PLATELETCT 253 306 338   MPV 10.1 9.9 10.2     Recent Labs     06/02/21  0107 06/03/21  0123 06/04/21  0128   SODIUM 136 138 135   POTASSIUM 3.9 4.3 4.0   CHLORIDE 96 100 98   CO2 29 27 26   GLUCOSE 102* 105* 115*   BUN 7* 6* 7*   CREATININE 0.43* 0.43* 0.41*   CALCIUM 9.4 10.0 9.8                   Imaging  NM-BILIARY HIDA SCAN FATTY MEAL   Final Result      Low gallbladder ejection fraction.      This study was obtained utilizing fatty meal challenge to induce gallbladder contraction due to national shortage of cholecystokinin.  There are no national standards for gallbladder ejection fraction calculated utilizing fatty meal challenge.  An    ejection fraction greater than 35% is most likely normal.  Gallbladder ejection fraction less than 35% may be abnormal but could be falsely positive.  Therefore, this test could be falsely positive.  Consider repeat imaging once cholecystokinin becomes    available.      IR-US GUIDED PIV   Final Result    Ultrasound-guided PERIPHERAL IV INSERTION performed by    qualified nursing staff as above.      IR-US GUIDED PIV   Final Result    Ultrasound-guided PERIPHERAL IV INSERTION performed  "by    qualified nursing staff as above.      US-RUQ   Final Result      Stable echogenic liver which is most commonly secondary to steatosis           Assessment/Plan  * Alcohol dependence with withdrawal (HCC)- (present on admission)  Assessment & Plan  Hx DT's and seizure 2/2 ETOH withdrawal.  Patient is a persistent alcohol user.    Prefers vodka, drinks half pint usually daily, obtains her drinks from her roommate.  Patient's last drink was on the day of admission, as stated by the patient.  Alcohol cessation education provided to patient.  Patient stated she wants to stop and this is what makes her feel suicidal as she is unable to stop.  -Seizure precaution  -Librium for long acting benzodiazepine coverage  - PO vitamins, patient was given detox IV bag with thiamine.  -Check Magnesium daily  - consult    Discontinued CIWA protocol on June 1, 2021.    Acute on chronic respiratory failure with hypoxia on home O2 therapy- (present on admission)  Assessment & Plan  Patient currently requiring increased oxygen, arrived to ED requiring 3L.  O2 demand up to 5L NC compared to her chronic home oxygen needs of 2L. Due to patient's acute alcohol withdrawal worsening distress, tachycardia.  5/28 - Improving to 3LNC  - continue oxygen support  - will need restart of home oxygen order on discharge    Vitamin D deficiency- (present on admission)  Assessment & Plan  Patient's vitamin D 25 level 11 on 4/21  Vitamin B12 and iron at appropriate levels.  Continue patient on supplementation, 50,000 IU weekly p.o.    Acute alcoholic hepatitis- (present on admission)  Assessment & Plan  Suspecting 2/2 to continued daily ETOH abuse and acute alcohol intoxication on admission.  RUQ US shows: \"Stable echogenic liver which is most commonly secondary to steatosis\"  Patient likely has fatty liver disease from alcohol use.  5/25 - No Discriminant Function = 8, no indication for steroids  GGT 2572  HIDA scan - 13% " ejection fraction  5/26 - Consulted [facility] for evaluation of rising LFTs, elevated GGT.     --- No update by GI until 5/30 - recommended general surgery evaluation.  5/31 - Consulted General Surgery Dr. Chavez, patient is not a candidate for any cholecystectomy due to her alcoholic hepatitis on this admission, likely biliary dyskinesia, recommended low fat diet and outpt evaluation for any possible cholecystectomy.   --- Patient's LFTs improving.  , ALT 80,     Continue to monitor, recheck CMP daily  Trial back librium 25mg po daily (5/31)  Started her on hydroxyzine for anxiety.    Macrocytic anemia- (present on admission)  Assessment & Plan  Vit B12 and folate levels normal on prior check  Likely due to alcohol liver disease  Monitor for any bleeding    Hypomagnesemia- (present on admission)  Assessment & Plan  Patient arrived with magnesium 1.9, down to 1.6 despite IV replacements.  Replace as needed.  Continue to monitor.    Leukocytopenia- (present on admission)  Assessment & Plan  Low WBC due to liver disorder  Slightly improving  Continue to monitor.    COPD (chronic obstructive pulmonary disease) (HCC)- (present on admission)  Assessment & Plan  Per chart review: no PFTs on file, on 2 L of oxygen at home.  No signs of acute exacerbation at this time on admission.  Oxygen per guidelines  Monitor respiratory status    Alcohol-induced acute pancreatitis- (present on admission)  Assessment & Plan  Patient continues to complain about nausea, but denied any vomiting.  On solid foods  Pain Control    Depression with suicidal ideation- (present on admission)  Assessment & Plan  Patient presented with suicidal ideation, plan to potentially overdose on her home medications.  Patient was brought in with a legal hold  Legal hold discontinued.    Hypokalemia  Assessment & Plan  Patient's potassium low today 3.3.  Likely due to poor nutrition due to severe alcohol use.  Magnesium low on  admission 1.9.  Replacing via IV given patient's nausea  Recheck potassium and magnesium daily     I discussed plan of care during multidisciplinary regarding her current medical condition and discharge plan.  I placed SNF referral.    VTE prophylaxis: Lovenox

## 2021-06-04 NOTE — DISCHARGE PLANNING
@1525  Agency/Facility Name: [facility]  Spoke To: Admission  Outcome: Declined due to no medicaid beds.    @1410  Agency/Facility Name: [facility]  Outcome: Left message, awaiting call back.     Agency/Facility Name: [facility]  Outcome: Left message, awaiting call back.     Agency/Facility Name: [facility]  Spoke To: Jolly  Outcome: Did not receive referral, resent.    Agency/Facility Name: [facility]  Outcome: Left message, awaiting call back.     Agency/Facility Name: [facility]  Outcome: Left message, awaiting call back.     Agency/Facility Name: [facility]  Outcome: Left message, awaiting call back.     Agency/Facility Name: [facility]  Outcome: Left message, awaiting call back.     Agency/Facility Name: [facility]  Outcome: Left message, awaiting call back.     Agency/Facility Name: [facility]   Outcome: Left message, awaiting call back.

## 2021-06-04 NOTE — CARE PLAN
Problem: Psychosocial  Goal: Patient's level of anxiety will decrease  Outcome: Not Progressing     Problem: Pain - Standard  Goal: Alleviation of pain or a reduction in pain to the patient’s comfort goal  Outcome: Not Progressing   The patient is Stable - Low risk of patient condition declining or worsening    Shift Goals  Clinical Goals: Decrease pain and nausea  Patient Goals: Rest    Progress made toward(s) clinical / shift goals: Pt medicated once today for pain and once for nausea. Appeared to rest well throughout the day.     Patient is not progressing towards the following goals: Pt still requiring medication for abdominal pain, medicated once for anxiety.      Problem: Psychosocial  Goal: Patient's level of anxiety will decrease  Outcome: Not Progressing     Problem: Pain - Standard  Goal: Alleviation of pain or a reduction in pain to the patient’s comfort goal  Outcome: Not Progressing

## 2021-06-05 PROCEDURE — A9270 NON-COVERED ITEM OR SERVICE: HCPCS | Performed by: INTERNAL MEDICINE

## 2021-06-05 PROCEDURE — 700111 HCHG RX REV CODE 636 W/ 250 OVERRIDE (IP): Performed by: STUDENT IN AN ORGANIZED HEALTH CARE EDUCATION/TRAINING PROGRAM

## 2021-06-05 PROCEDURE — 700102 HCHG RX REV CODE 250 W/ 637 OVERRIDE(OP): Performed by: STUDENT IN AN ORGANIZED HEALTH CARE EDUCATION/TRAINING PROGRAM

## 2021-06-05 PROCEDURE — 99231 SBSQ HOSP IP/OBS SF/LOW 25: CPT | Performed by: INTERNAL MEDICINE

## 2021-06-05 PROCEDURE — 700102 HCHG RX REV CODE 250 W/ 637 OVERRIDE(OP): Performed by: INTERNAL MEDICINE

## 2021-06-05 PROCEDURE — A9270 NON-COVERED ITEM OR SERVICE: HCPCS | Performed by: STUDENT IN AN ORGANIZED HEALTH CARE EDUCATION/TRAINING PROGRAM

## 2021-06-05 PROCEDURE — 770020 HCHG ROOM/CARE - TELE (206)

## 2021-06-05 RX ADMIN — PROCHLORPERAZINE EDISYLATE 10 MG: 5 INJECTION INTRAMUSCULAR; INTRAVENOUS at 19:50

## 2021-06-05 RX ADMIN — HYDROXYZINE HYDROCHLORIDE 25 MG: 25 TABLET, FILM COATED ORAL at 15:18

## 2021-06-05 RX ADMIN — ANTACID TABLETS 500 MG: 500 TABLET, CHEWABLE ORAL at 06:10

## 2021-06-05 RX ADMIN — GABAPENTIN 300 MG: 300 CAPSULE ORAL at 17:15

## 2021-06-05 RX ADMIN — Medication 5 MG: at 20:53

## 2021-06-05 RX ADMIN — GABAPENTIN 300 MG: 300 CAPSULE ORAL at 11:46

## 2021-06-05 RX ADMIN — Medication 100 MG: at 06:10

## 2021-06-05 RX ADMIN — ONDANSETRON 4 MG: 4 TABLET, ORALLY DISINTEGRATING ORAL at 18:31

## 2021-06-05 RX ADMIN — FLUOXETINE 30 MG: 20 CAPSULE ORAL at 06:10

## 2021-06-05 RX ADMIN — OXYCODONE 5 MG: 5 TABLET ORAL at 18:24

## 2021-06-05 RX ADMIN — GABAPENTIN 300 MG: 300 CAPSULE ORAL at 06:10

## 2021-06-05 RX ADMIN — OXYCODONE 5 MG: 5 TABLET ORAL at 07:52

## 2021-06-05 RX ADMIN — FAMOTIDINE 20 MG: 20 TABLET ORAL at 06:10

## 2021-06-05 RX ADMIN — ENOXAPARIN SODIUM 40 MG: 40 INJECTION SUBCUTANEOUS at 06:09

## 2021-06-05 RX ADMIN — DOCUSATE SODIUM 50 MG AND SENNOSIDES 8.6 MG 2 TABLET: 8.6; 5 TABLET, FILM COATED ORAL at 06:09

## 2021-06-05 RX ADMIN — ONDANSETRON 4 MG: 4 TABLET, ORALLY DISINTEGRATING ORAL at 18:27

## 2021-06-05 RX ADMIN — Medication 1 TABLET: at 06:09

## 2021-06-05 ASSESSMENT — LIFESTYLE VARIABLES
AUDITORY DISTURBANCES: NOT PRESENT
AGITATION: SOMEWHAT MORE THAN NORMAL ACTIVITY
ANXIETY: *
ORIENTATION AND CLOUDING OF SENSORIUM: ORIENTED AND CAN DO SERIAL ADDITIONS
VISUAL DISTURBANCES: NOT PRESENT
PAROXYSMAL SWEATS: NO SWEAT VISIBLE
TOTAL SCORE: 0
ORIENTATION AND CLOUDING OF SENSORIUM: ORIENTED AND CAN DO SERIAL ADDITIONS
AUDITORY DISTURBANCES: NOT PRESENT
TREMOR: NO TREMOR
HEADACHE, FULLNESS IN HEAD: NOT PRESENT
ANXIETY: NO ANXIETY (AT EASE)
NAUSEA AND VOMITING: NO NAUSEA AND NO VOMITING
SUBSTANCE_ABUSE: 0
TOTAL SCORE: 6
TREMOR: *
NAUSEA AND VOMITING: NO NAUSEA AND NO VOMITING
PAROXYSMAL SWEATS: NO SWEAT VISIBLE
AGITATION: NORMAL ACTIVITY
VISUAL DISTURBANCES: NOT PRESENT
HEADACHE, FULLNESS IN HEAD: NOT PRESENT

## 2021-06-05 ASSESSMENT — ENCOUNTER SYMPTOMS
DOUBLE VISION: 0
SHORTNESS OF BREATH: 0
NERVOUS/ANXIOUS: 1
MYALGIAS: 0
CHILLS: 0
SPUTUM PRODUCTION: 0
NAUSEA: 0
PHOTOPHOBIA: 0
SENSORY CHANGE: 0
NECK PAIN: 0
FEVER: 0
ABDOMINAL PAIN: 1
SPEECH CHANGE: 0
VOMITING: 0
DIARRHEA: 0
HEADACHES: 0
DIZZINESS: 0
WEIGHT LOSS: 0
ORTHOPNEA: 0
FOCAL WEAKNESS: 0
COUGH: 0
TREMORS: 0
CONSTIPATION: 0
BACK PAIN: 0
TINGLING: 0
BLURRED VISION: 0
EYE PAIN: 0
PALPITATIONS: 0
HALLUCINATIONS: 0

## 2021-06-05 ASSESSMENT — PAIN DESCRIPTION - PAIN TYPE
TYPE: ACUTE PAIN

## 2021-06-05 NOTE — PROGRESS NOTES
Assumed care of patient. Chart review done. Patient AAOx4. Plan of care reviewd with patient. Bed low and locked.

## 2021-06-05 NOTE — PROGRESS NOTES
"Hospital Medicine Daily Progress Note    Date of Service  6/5/2021    Chief Complaint  50 y.o. female admitted 5/24/2021 with suicidal ideation    Hospital Course    Per Dr. Dominguez note    \"50-year-old female with a past medical history of recurrent alcoholic pancreatitis, alcohol abuse and alcohol withdrawals including seizures, history of bipolar disorder, polysubstance use with methamphetamines and alcohol, prior suicide attempt, COPD on 2 L home oxygen, presented 5/24/2021 with complaints of suicidal ideation due to her chronic alcohol use.  Patient was admitted found to be in acute alcohol intoxication, elevated POC breathalyzer 0.292.  Patient on admission was more somnolent but arousable with sternal rub.  Patient came in with a legal hold due to complaints of suicidal ideation, suicidal plan by taking overdose of pills she has at home.    5/25/2021-patient seen and evaluated today, labs and notes reviewed.  Patient stated she had continued suicidal thoughts, with potential plan of using her pills at home.  Patient was showing signs of acute alcohol withdrawal today, tremors and tachycardia.  Patient complained of nausea.  Psychiatry evaluated patient today given she her legal hold status.  CIWA scores ranging between 11-13 since admission.  Patient requiring Ativan and Librium.  She stated her last drink was the day she came to the hospital.    5/26 - Patient's LFTs increasing today, was decreased yesterday compared to high levels on admission. RUQ obtained on admission did not show obstruction.  Patient in active withdrawal continued. Patient stated she has RUQ pain today, severe, painful to deep touch, localized 9/10.  Consulted [facility]ology.    5/27 - patient this morning was distressed, she was noted to be standing without help of nursing despite having directions explained to her by nursing to wait for assistance to use the commode or to get out of bed. Patient did not fall, but was " "found to have soiled herself with urine. Patient denied any RUQ pain today compared to yesterday. She stated she was eating better. CIWA up to 12.  Oxygen supplementation up to 5LNC.    5/28 -patient was more controlled today, and denied any symptoms for abdominal pain at this time.  Morning patient was showing signs of improvement.  Most recent CIWA score went up to 13 at 1305 p.m.  Was only as high as 9 before that.  Oxygen now down to 3 L nasal cannula.    5/29-patient continued to have RUQ pain today, 8/10, sharp, radiating to her back and to her left lower quadrant.  + nausea.  Denied chest pain, vomiting.  Ordered HIDA scan.    5/30 - patient stated she had continued RUQ pain. HIDA scan showed ejection fraction of 13%, with GGT 2572.  Called [facility] for update, physician on call to evaluate patient and make recommendations.  If no procedures required by GI, will need to likely consult with general surgery.  Patient unable to sit for MRI due to her tremors, hence HIDA scan was ordered.    5/31 - patient had continued RUQ pain. Tolerating her diet.   Spoke with Dr. Chavez with general surgery, patient is not a candidate for any cholecystectomy due to her alcoholic hepatitis on this admission, likely biliary dyskinesia, recommended low fat diet and outpt evaluation for any possible cholecystectomy\".      Interval Problem Update  Afebrile, resting in bed, no acute complains at this time.   Awaiting placement     Consultants/Specialty  Psychiatry  [facility] gastroenterology  General surgery    Code Status  Full Code    Disposition  Skilled nursing facility    Review of Systems  Review of Systems   Constitutional: Negative for chills, fever and weight loss.   HENT: Negative for hearing loss and tinnitus.    Eyes: Negative for blurred vision, double vision, photophobia and pain.   Respiratory: Negative for cough, sputum production and shortness of breath.    Cardiovascular: Negative for chest pain, " palpitations, orthopnea and leg swelling.   Gastrointestinal: Positive for abdominal pain. Negative for constipation, diarrhea, nausea and vomiting.   Genitourinary: Negative for dysuria, frequency and urgency.   Musculoskeletal: Negative for back pain, joint pain, myalgias and neck pain.   Skin: Negative for rash.   Neurological: Negative for dizziness, tingling, tremors, sensory change, speech change, focal weakness and headaches.   Psychiatric/Behavioral: Negative for hallucinations and substance abuse. The patient is nervous/anxious.    All other systems reviewed and are negative.     Physical Exam  Temp:  [36.1 °C (97 °F)-36.7 °C (98.1 °F)] 36.1 °C (97 °F)  Pulse:  [87-98] 87  Resp:  [17-19] 18  BP: (107-117)/(67-84) 110/71  SpO2:  [91 %-97 %] 91 %    Physical Exam  Vitals reviewed.   Constitutional:       General: She is not in acute distress.     Appearance: Normal appearance. She is not ill-appearing.   HENT:      Head: Normocephalic and atraumatic.      Nose: No congestion.   Eyes:      General:         Right eye: No discharge.         Left eye: No discharge.      Pupils: Pupils are equal, round, and reactive to light.   Cardiovascular:      Rate and Rhythm: Normal rate and regular rhythm.      Pulses: Normal pulses.      Heart sounds: Normal heart sounds. No murmur heard.     Pulmonary:      Effort: Pulmonary effort is normal. No respiratory distress.      Breath sounds: Normal breath sounds. No stridor.   Abdominal:      General: Bowel sounds are normal. There is no distension.      Palpations: Abdomen is soft.      Tenderness: There is abdominal tenderness (Mild).      Comments: No signs of acute abdomen on physical exam.   Musculoskeletal:         General: No swelling or tenderness. Normal range of motion.      Cervical back: Normal range of motion. No rigidity.   Skin:     General: Skin is warm.      Capillary Refill: Capillary refill takes less than 2 seconds.      Coloration: Skin is not jaundiced or  pale.      Findings: No bruising.   Neurological:      General: No focal deficit present.      Mental Status: She is alert and oriented to person, place, and time.      Cranial Nerves: No cranial nerve deficit.   Psychiatric:         Mood and Affect: Mood normal.         Behavior: Behavior normal.       Fluids    Intake/Output Summary (Last 24 hours) at 6/5/2021 0916  Last data filed at 6/5/2021 0800  Gross per 24 hour   Intake 720 ml   Output 0 ml   Net 720 ml       Laboratory  Recent Labs     06/03/21  0123 06/04/21  0128   WBC 4.1* 4.6*   RBC 3.61* 3.37*   HEMOGLOBIN 12.4 11.4*   HEMATOCRIT 39.8 36.5*   .2* 108.3*   MCH 34.3* 33.8*   MCHC 31.2* 31.2*   RDW 53.8* 51.8*   PLATELETCT 306 338   MPV 9.9 10.2     Recent Labs     06/03/21  0123 06/04/21 0128   SODIUM 138 135   POTASSIUM 4.3 4.0   CHLORIDE 100 98   CO2 27 26   GLUCOSE 105* 115*   BUN 6* 7*   CREATININE 0.43* 0.41*   CALCIUM 10.0 9.8                   Imaging  NM-BILIARY HIDA SCAN FATTY MEAL   Final Result      Low gallbladder ejection fraction.      This study was obtained utilizing fatty meal challenge to induce gallbladder contraction due to national shortage of cholecystokinin.  There are no national standards for gallbladder ejection fraction calculated utilizing fatty meal challenge.  An    ejection fraction greater than 35% is most likely normal.  Gallbladder ejection fraction less than 35% may be abnormal but could be falsely positive.  Therefore, this test could be falsely positive.  Consider repeat imaging once cholecystokinin becomes    available.      IR-US GUIDED PIV   Final Result    Ultrasound-guided PERIPHERAL IV INSERTION performed by    qualified nursing staff as above.      IR-US GUIDED PIV   Final Result    Ultrasound-guided PERIPHERAL IV INSERTION performed by    qualified nursing staff as above.      US-RUQ   Final Result      Stable echogenic liver which is most commonly secondary to steatosis           Assessment/Plan  *  "Alcohol dependence with withdrawal (HCC)- (present on admission)  Assessment & Plan  Hx DT's and seizure 2/2 ETOH withdrawal.  Patient is a persistent alcohol user.    Prefers vodka, drinks half pint usually daily, obtains her drinks from her roommate.  Patient's last drink was on the day of admission, as stated by the patient.  Alcohol cessation education provided to patient.  Patient stated she wants to stop and this is what makes her feel suicidal as she is unable to stop.  -Seizure precaution  -Librium for long acting benzodiazepine coverage  - PO vitamins, patient was given detox IV bag with thiamine.  -Check Magnesium daily  - consult    Discontinued CIWA protocol on June 1, 2021.    Acute on chronic respiratory failure with hypoxia on home O2 therapy- (present on admission)  Assessment & Plan  Patient currently requiring increased oxygen, arrived to ED requiring 3L.  O2 demand up to 5L NC compared to her chronic home oxygen needs of 2L. Due to patient's acute alcohol withdrawal worsening distress, tachycardia.  5/28 - Improving to 3LNC  - continue oxygen support  - will need restart of home oxygen order on discharge    Vitamin D deficiency- (present on admission)  Assessment & Plan  Patient's vitamin D 25 level 11 on 4/21  Vitamin B12 and iron at appropriate levels.  Continue patient on supplementation, 50,000 IU weekly p.o.    Acute alcoholic hepatitis- (present on admission)  Assessment & Plan  Suspecting 2/2 to continued daily ETOH abuse and acute alcohol intoxication on admission.  RUQ US shows: \"Stable echogenic liver which is most commonly secondary to steatosis\"  Patient likely has fatty liver disease from alcohol use.  5/25 - Lidaey Discriminant Function = 8, no indication for steroids  GGT 2572  HIDA scan - 13% ejection fraction  5/26 - Consulted [facility] for evaluation of rising LFTs, elevated GGT.     --- No update by GI until 5/30 - recommended general surgery " evaluation.  5/31 - Consulted General Surgery Dr. Chavez, patient is not a candidate for any cholecystectomy due to her alcoholic hepatitis on this admission, likely biliary dyskinesia, recommended low fat diet and outpt evaluation for any possible cholecystectomy.   --- Patient's LFTs improving.  , ALT 80,     Continue to monitor, recheck CMP daily  Trial back librium 25mg po daily (5/31)  Started her on hydroxyzine for anxiety.    Macrocytic anemia- (present on admission)  Assessment & Plan  Vit B12 and folate levels normal on prior check  Likely due to alcohol liver disease  Monitor for any bleeding    Hypomagnesemia- (present on admission)  Assessment & Plan  Patient arrived with magnesium 1.9, down to 1.6 despite IV replacements.  Replace as needed.  Continue to monitor.    Leukocytopenia- (present on admission)  Assessment & Plan  Low WBC due to liver disorder  Slightly improving  Continue to monitor.    COPD (chronic obstructive pulmonary disease) (HCC)- (present on admission)  Assessment & Plan  Per chart review: no PFTs on file, on 2 L of oxygen at home.  No signs of acute exacerbation at this time on admission.  Oxygen per guidelines  Monitor respiratory status    Alcohol-induced acute pancreatitis- (present on admission)  Assessment & Plan  Patient continues to complain about nausea, but denied any vomiting.  On solid foods  Pain Control    Depression with suicidal ideation- (present on admission)  Assessment & Plan  Patient presented with suicidal ideation, plan to potentially overdose on her home medications.  Patient was brought in with a legal hold  Legal hold discontinued.    Hypokalemia  Assessment & Plan  Patient's potassium low today 3.3.  Likely due to poor nutrition due to severe alcohol use.  Magnesium low on admission 1.9.  Replacing via IV given patient's nausea  Recheck potassium and magnesium daily     I discussed plan of care during multidisciplinary regarding her current  medical condition and discharge plan.  I placed SNF referral.    VTE prophylaxis: Lovenox

## 2021-06-05 NOTE — CARE PLAN
Received report on patient by 2000 from Kristin DENSON.    Patient stable over night. A&O X4. VSS. Did complain of a headache, relieved with pain medications. See MAR. Some nausea around 2300, See MAR for interventions. Pt slept well. Bed alarm and chair alarm in use for patient safety. Patient seems stable suicide ideation wise at this time.     Patient safety maintained during this shift.

## 2021-06-05 NOTE — CARE PLAN
The patient is Stable - Low risk of patient condition declining or worsening    Shift Goals  Clinical Goals:  (maintain safety and comfrot)  Patient Goals: get a bath and untangle hair    Progress made toward(s) clinical / shift goals:  N/a    Patient is not progressing towards the following goals: patient declined assistance with personal hygiene. Patient was anxious this shift.     Problem: Knowledge Deficit - Standard  Goal: Patient and family/care givers will demonstrate understanding of plan of care, disease process/condition, diagnostic tests and medications  Outcome: Progressing  Note: Patient to verbalize understanding of diagnosis and treatments      Problem: Fall Risk  Goal: Patient will remain free from falls  Outcome: Progressing  Note: Patient to remain free from falls     Problem: Pain - Standard  Goal: Alleviation of pain or a reduction in pain to the patient’s comfort goal  Outcome: Progressing  Note: Patient to remain free from pain

## 2021-06-05 NOTE — CARE PLAN
Problem: Psychosocial  Goal: Patient's level of anxiety will decrease  Outcome: Progressing  Flowsheets (Taken 6/5/2021 0000)  Decrease Anxiety Level:   Collaborated with patient to identify and develop coping strategies   Pharmacologic management per MD order  Note: Pt level of anxiety will be monitored and treated per MD orders and unit protocols.      Problem: Provide Safe Environment  Goal: Suicide environmental safety, protocols, policies, and practices will be implemented  Outcome: Progressing  Flowsheets (Taken 6/4/2021 2100)  Safety Interventions:   Provided Safety Education   Patient Wearing Hospital Clothing   Discussed no self harm or elopement and safe behavior with patient   Patient Room Close to Nursing Station   Potentially Dangerous Items Removed from room   Medically necessary equipment present, hourly room safety check, and post-meal tray check.  Note: Suicide environmental safety and practices implemented per unit protocol.      Problem: Pain - Standard  Goal: Alleviation of pain or a reduction in pain to the patient’s comfort goal  Outcome: Progressing  Note: Pain managed per unit protocol and MD orders.      Problem: Skin Integrity  Goal: Skin integrity is maintained or improved  Outcome: Progressing  Note: Patient skin integrity is maintained per unit protocol.

## 2021-06-06 LAB
ANION GAP SERPL CALC-SCNC: 10 MMOL/L (ref 7–16)
BUN SERPL-MCNC: 8 MG/DL (ref 8–22)
CALCIUM SERPL-MCNC: 9.5 MG/DL (ref 8.5–10.5)
CHLORIDE SERPL-SCNC: 98 MMOL/L (ref 96–112)
CO2 SERPL-SCNC: 26 MMOL/L (ref 20–33)
CREAT SERPL-MCNC: 0.51 MG/DL (ref 0.5–1.4)
ERYTHROCYTE [DISTWIDTH] IN BLOOD BY AUTOMATED COUNT: 49.5 FL (ref 35.9–50)
GLUCOSE SERPL-MCNC: 98 MG/DL (ref 65–99)
HCT VFR BLD AUTO: 34.8 % (ref 37–47)
HGB BLD-MCNC: 11.4 G/DL (ref 12–16)
MCH RBC QN AUTO: 34.8 PG (ref 27–33)
MCHC RBC AUTO-ENTMCNC: 32.8 G/DL (ref 33.6–35)
MCV RBC AUTO: 106.1 FL (ref 81.4–97.8)
PLATELET # BLD AUTO: 290 K/UL (ref 164–446)
PMV BLD AUTO: 9.5 FL (ref 9–12.9)
POTASSIUM SERPL-SCNC: 4.1 MMOL/L (ref 3.6–5.5)
RBC # BLD AUTO: 3.28 M/UL (ref 4.2–5.4)
SODIUM SERPL-SCNC: 134 MMOL/L (ref 135–145)
WBC # BLD AUTO: 5.2 K/UL (ref 4.8–10.8)

## 2021-06-06 PROCEDURE — 700111 HCHG RX REV CODE 636 W/ 250 OVERRIDE (IP): Performed by: STUDENT IN AN ORGANIZED HEALTH CARE EDUCATION/TRAINING PROGRAM

## 2021-06-06 PROCEDURE — 99231 SBSQ HOSP IP/OBS SF/LOW 25: CPT | Performed by: INTERNAL MEDICINE

## 2021-06-06 PROCEDURE — 700102 HCHG RX REV CODE 250 W/ 637 OVERRIDE(OP): Performed by: INTERNAL MEDICINE

## 2021-06-06 PROCEDURE — A9270 NON-COVERED ITEM OR SERVICE: HCPCS | Performed by: INTERNAL MEDICINE

## 2021-06-06 PROCEDURE — 85027 COMPLETE CBC AUTOMATED: CPT

## 2021-06-06 PROCEDURE — 36415 COLL VENOUS BLD VENIPUNCTURE: CPT

## 2021-06-06 PROCEDURE — 80048 BASIC METABOLIC PNL TOTAL CA: CPT

## 2021-06-06 PROCEDURE — 700102 HCHG RX REV CODE 250 W/ 637 OVERRIDE(OP): Performed by: STUDENT IN AN ORGANIZED HEALTH CARE EDUCATION/TRAINING PROGRAM

## 2021-06-06 PROCEDURE — A9270 NON-COVERED ITEM OR SERVICE: HCPCS | Performed by: STUDENT IN AN ORGANIZED HEALTH CARE EDUCATION/TRAINING PROGRAM

## 2021-06-06 PROCEDURE — 770020 HCHG ROOM/CARE - TELE (206)

## 2021-06-06 RX ADMIN — ONDANSETRON 4 MG: 2 INJECTION INTRAMUSCULAR; INTRAVENOUS at 14:01

## 2021-06-06 RX ADMIN — ACETAMINOPHEN 650 MG: 325 TABLET, FILM COATED ORAL at 14:01

## 2021-06-06 RX ADMIN — ANTACID TABLETS 500 MG: 500 TABLET, CHEWABLE ORAL at 04:52

## 2021-06-06 RX ADMIN — Medication 100 MG: at 04:56

## 2021-06-06 RX ADMIN — Medication 1 TABLET: at 04:52

## 2021-06-06 RX ADMIN — PROMETHAZINE HYDROCHLORIDE 25 MG: 25 TABLET ORAL at 05:03

## 2021-06-06 RX ADMIN — OXYCODONE 5 MG: 5 TABLET ORAL at 12:13

## 2021-06-06 RX ADMIN — OXYCODONE 5 MG: 5 TABLET ORAL at 19:51

## 2021-06-06 RX ADMIN — FLUOXETINE 30 MG: 20 CAPSULE ORAL at 04:58

## 2021-06-06 RX ADMIN — GABAPENTIN 300 MG: 300 CAPSULE ORAL at 16:58

## 2021-06-06 RX ADMIN — FAMOTIDINE 20 MG: 20 TABLET ORAL at 04:53

## 2021-06-06 RX ADMIN — GABAPENTIN 300 MG: 300 CAPSULE ORAL at 11:26

## 2021-06-06 RX ADMIN — PROMETHAZINE HYDROCHLORIDE 25 MG: 25 TABLET ORAL at 20:42

## 2021-06-06 RX ADMIN — GABAPENTIN 300 MG: 300 CAPSULE ORAL at 04:52

## 2021-06-06 RX ADMIN — Medication 5 MG: at 20:42

## 2021-06-06 ASSESSMENT — ENCOUNTER SYMPTOMS
SPEECH CHANGE: 0
TINGLING: 0
NAUSEA: 0
SPUTUM PRODUCTION: 0
PHOTOPHOBIA: 0
TREMORS: 0
VOMITING: 0
NECK PAIN: 0
CONSTIPATION: 0
MYALGIAS: 0
WEIGHT LOSS: 0
DIZZINESS: 0
ABDOMINAL PAIN: 1
EYE PAIN: 0
FEVER: 0
SENSORY CHANGE: 0
COUGH: 0
FOCAL WEAKNESS: 0
PALPITATIONS: 0
BACK PAIN: 0
NERVOUS/ANXIOUS: 1
HALLUCINATIONS: 0
BLURRED VISION: 0
CHILLS: 0
SHORTNESS OF BREATH: 0
HEADACHES: 0
DIARRHEA: 0
ORTHOPNEA: 0
DOUBLE VISION: 0

## 2021-06-06 ASSESSMENT — PAIN DESCRIPTION - PAIN TYPE
TYPE: ACUTE PAIN

## 2021-06-06 ASSESSMENT — LIFESTYLE VARIABLES: SUBSTANCE_ABUSE: 0

## 2021-06-06 NOTE — PROGRESS NOTES
"Hospital Medicine Daily Progress Note    Date of Service  6/6/2021    Chief Complaint  50 y.o. female admitted 5/24/2021 with suicidal ideation    Hospital Course    Per Dr. Dominguez note    \"50-year-old female with a past medical history of recurrent alcoholic pancreatitis, alcohol abuse and alcohol withdrawals including seizures, history of bipolar disorder, polysubstance use with methamphetamines and alcohol, prior suicide attempt, COPD on 2 L home oxygen, presented 5/24/2021 with complaints of suicidal ideation due to her chronic alcohol use.  Patient was admitted found to be in acute alcohol intoxication, elevated POC breathalyzer 0.292.  Patient on admission was more somnolent but arousable with sternal rub.  Patient came in with a legal hold due to complaints of suicidal ideation, suicidal plan by taking overdose of pills she has at home.    5/25/2021-patient seen and evaluated today, labs and notes reviewed.  Patient stated she had continued suicidal thoughts, with potential plan of using her pills at home.  Patient was showing signs of acute alcohol withdrawal today, tremors and tachycardia.  Patient complained of nausea.  Psychiatry evaluated patient today given she her legal hold status.  CIWA scores ranging between 11-13 since admission.  Patient requiring Ativan and Librium.  She stated her last drink was the day she came to the hospital.    5/26 - Patient's LFTs increasing today, was decreased yesterday compared to high levels on admission. RUQ obtained on admission did not show obstruction.  Patient in active withdrawal continued. Patient stated she has RUQ pain today, severe, painful to deep touch, localized 9/10.  Consulted [facility] gastro[facility]ology.    5/27 - patient this morning was distressed, she was noted to be standing without help of nursing despite having directions explained to her by nursing to wait for assistance to use the commode or to get out of bed. Patient did not fall, but was " "found to have soiled herself with urine. Patient denied any RUQ pain today compared to yesterday. She stated she was eating better. CIWA up to 12.  Oxygen supplementation up to 5LNC.    5/28 -patient was more controlled today, and denied any symptoms for abdominal pain at this time.  Morning patient was showing signs of improvement.  Most recent CIWA score went up to 13 at 1305 p.m.  Was only as high as 9 before that.  Oxygen now down to 3 L nasal cannula.    5/29-patient continued to have RUQ pain today, 8/10, sharp, radiating to her back and to her left lower quadrant.  + nausea.  Denied chest pain, vomiting.  Ordered HIDA scan.    5/30 - patient stated she had continued RUQ pain. HIDA scan showed ejection fraction of 13%, with GGT 2572.  Called [facility] for update, physician on call to evaluate patient and make recommendations.  If no procedures required by , will need to likely consult with general surgery.  Patient unable to sit for MRI due to her tremors, hence HIDA scan was ordered.    5/31 - patient had continued RUQ pain. Tolerating her diet.   Spoke with Dr. Chavez with general surgery, patient is not a candidate for any cholecystectomy due to her alcoholic hepatitis on this admission, likely biliary dyskinesia, recommended low fat diet and outpt evaluation for any possible cholecystectomy\".      Interval Problem Update  Patient seen and examined , resting in bed, no acute complains at this time.   Awaiting placement     Consultants/Specialty  Psychiatry  [facility] gastroenterology  General surgery    Code Status  Full Code    Disposition  Skilled nursing facility    Review of Systems  Review of Systems   Constitutional: Negative for chills, fever and weight loss.   HENT: Negative for hearing loss and tinnitus.    Eyes: Negative for blurred vision, double vision, photophobia and pain.   Respiratory: Negative for cough, sputum production and shortness of breath.    Cardiovascular: " Negative for chest pain, palpitations, orthopnea and leg swelling.   Gastrointestinal: Positive for abdominal pain. Negative for constipation, diarrhea, nausea and vomiting.   Genitourinary: Negative for dysuria, frequency and urgency.   Musculoskeletal: Negative for back pain, joint pain, myalgias and neck pain.   Skin: Negative for rash.   Neurological: Negative for dizziness, tingling, tremors, sensory change, speech change, focal weakness and headaches.   Psychiatric/Behavioral: Negative for hallucinations and substance abuse. The patient is nervous/anxious.    All other systems reviewed and are negative.     Physical Exam  Temp:  [36.3 °C (97.3 °F)-37 °C (98.6 °F)] 36.6 °C (97.9 °F)  Pulse:  [82-94] 82  Resp:  [18-20] 20  BP: (102-127)/(73-86) 102/73  SpO2:  [93 %-96 %] 95 %    Physical Exam  Vitals reviewed.   Constitutional:       General: She is not in acute distress.     Appearance: Normal appearance. She is not ill-appearing.   HENT:      Head: Normocephalic and atraumatic.      Nose: No congestion.   Eyes:      General:         Right eye: No discharge.         Left eye: No discharge.      Pupils: Pupils are equal, round, and reactive to light.   Cardiovascular:      Rate and Rhythm: Normal rate and regular rhythm.      Pulses: Normal pulses.      Heart sounds: Normal heart sounds. No murmur heard.     Pulmonary:      Effort: Pulmonary effort is normal. No respiratory distress.      Breath sounds: Normal breath sounds. No stridor.   Abdominal:      General: Bowel sounds are normal. There is no distension.      Palpations: Abdomen is soft.      Tenderness: There is abdominal tenderness (Mild).      Comments: No signs of acute abdomen on physical exam.   Musculoskeletal:         General: No swelling or tenderness. Normal range of motion.      Cervical back: Normal range of motion. No rigidity.   Skin:     General: Skin is warm.      Capillary Refill: Capillary refill takes less than 2 seconds.       Coloration: Skin is not jaundiced or pale.      Findings: No bruising.   Neurological:      General: No focal deficit present.      Mental Status: She is alert and oriented to person, place, and time.      Cranial Nerves: No cranial nerve deficit.   Psychiatric:         Mood and Affect: Mood normal.         Behavior: Behavior normal.       Fluids  No intake or output data in the 24 hours ending 06/06/21 0952    Laboratory  Recent Labs     06/04/21  0128 06/06/21  0048   WBC 4.6* 5.2   RBC 3.37* 3.28*   HEMOGLOBIN 11.4* 11.4*   HEMATOCRIT 36.5* 34.8*   .3* 106.1*   MCH 33.8* 34.8*   MCHC 31.2* 32.8*   RDW 51.8* 49.5   PLATELETCT 338 290   MPV 10.2 9.5     Recent Labs     06/04/21  0128 06/06/21 0048   SODIUM 135 134*   POTASSIUM 4.0 4.1   CHLORIDE 98 98   CO2 26 26   GLUCOSE 115* 98   BUN 7* 8   CREATININE 0.41* 0.51   CALCIUM 9.8 9.5                   Imaging  NM-BILIARY HIDA SCAN FATTY MEAL   Final Result      Low gallbladder ejection fraction.      This study was obtained utilizing fatty meal challenge to induce gallbladder contraction due to national shortage of cholecystokinin.  There are no national standards for gallbladder ejection fraction calculated utilizing fatty meal challenge.  An    ejection fraction greater than 35% is most likely normal.  Gallbladder ejection fraction less than 35% may be abnormal but could be falsely positive.  Therefore, this test could be falsely positive.  Consider repeat imaging once cholecystokinin becomes    available.      IR-US GUIDED PIV   Final Result    Ultrasound-guided PERIPHERAL IV INSERTION performed by    qualified nursing staff as above.      IR-US GUIDED PIV   Final Result    Ultrasound-guided PERIPHERAL IV INSERTION performed by    qualified nursing staff as above.      US-RUQ   Final Result      Stable echogenic liver which is most commonly secondary to steatosis           Assessment/Plan  * Alcohol dependence with withdrawal (HCC)- (present on  "admission)  Assessment & Plan  Hx DT's and seizure 2/2 ETOH withdrawal.  Patient is a persistent alcohol user.    Prefers vodka, drinks half pint usually daily, obtains her drinks from her roommate.  Patient's last drink was on the day of admission, as stated by the patient.  Alcohol cessation education provided to patient.  Patient stated she wants to stop and this is what makes her feel suicidal as she is unable to stop.  -Seizure precaution  -Librium for long acting benzodiazepine coverage  - PO vitamins, patient was given detox IV bag with thiamine.  -Check Magnesium daily  - consult    Discontinued CIWA protocol on June 1, 2021.    Acute on chronic respiratory failure with hypoxia on home O2 therapy- (present on admission)  Assessment & Plan  Patient currently requiring increased oxygen, arrived to ED requiring 3L.  O2 demand up to 5L NC compared to her chronic home oxygen needs of 2L. Due to patient's acute alcohol withdrawal worsening distress, tachycardia.  5/28 - Improving to 3LNC  - continue oxygen support  - will need restart of home oxygen order on discharge    Vitamin D deficiency- (present on admission)  Assessment & Plan  Patient's vitamin D 25 level 11 on 4/21  Vitamin B12 and iron at appropriate levels.  Continue patient on supplementation, 50,000 IU weekly p.o.    Acute alcoholic hepatitis- (present on admission)  Assessment & Plan  Suspecting 2/2 to continued daily ETOH abuse and acute alcohol intoxication on admission.  RUQ US shows: \"Stable echogenic liver which is most commonly secondary to steatosis\"  Patient likely has fatty liver disease from alcohol use.  5/25 - No Discriminant Function = 8, no indication for steroids  GGT 2572  HIDA scan - 13% ejection fraction  5/26 - Consulted [facility] for evaluation of rising LFTs, elevated GGT.     --- No update by GI until 5/30 - recommended general surgery evaluation.  5/31 - Consulted General Surgery  " [place], patient is not a candidate for any cholecystectomy due to her alcoholic hepatitis on this admission, likely biliary dyskinesia, recommended low fat diet and outpt evaluation for any possible cholecystectomy.   --- Patient's LFTs improving.  , ALT 80,     Continue to monitor, recheck CMP daily  Trial back librium 25mg po daily (5/31)  Started her on hydroxyzine for anxiety.    Macrocytic anemia- (present on admission)  Assessment & Plan  Vit B12 and folate levels normal on prior check  Likely due to alcohol liver disease  Monitor for any bleeding    Hypomagnesemia- (present on admission)  Assessment & Plan  Patient arrived with magnesium 1.9, down to 1.6 despite IV replacements.  Replace as needed.  Continue to monitor.    Leukocytopenia- (present on admission)  Assessment & Plan  Low WBC due to liver disorder  Slightly improving  Continue to monitor.    COPD (chronic obstructive pulmonary disease) (HCC)- (present on admission)  Assessment & Plan  Per chart review: no PFTs on file, on 2 L of oxygen at home.  No signs of acute exacerbation at this time on admission.  Oxygen per guidelines  Monitor respiratory status    Alcohol-induced acute pancreatitis- (present on admission)  Assessment & Plan  Patient continues to complain about nausea, but denied any vomiting.  On solid foods  Pain Control    Depression with suicidal ideation- (present on admission)  Assessment & Plan  Patient presented with suicidal ideation, plan to potentially overdose on her home medications.  Patient was brought in with a legal hold  Legal hold discontinued.    Hypokalemia  Assessment & Plan  Patient's potassium low today 3.3.  Likely due to poor nutrition due to severe alcohol use.  Magnesium low on admission 1.9.  Replacing via IV given patient's nausea  Recheck potassium and magnesium daily     I discussed plan of care during multidisciplinary regarding her current medical condition and discharge plan.  I placed SNF  referral.    VTE prophylaxis: Lovenox

## 2021-06-06 NOTE — PROGRESS NOTES
Bedside shift report received from JARETT Pang.  Safety check complete.  All patient needs met at this time.  Will continue to monitor.

## 2021-06-06 NOTE — CARE PLAN
The patient is: Stable - Low risk of patient condition declining or worsening    Progress made toward(s) clinical / shift goals: Patient is hemodynamically stable and afebrile; ETOH withdrawal resolved.    Patient is not progressing towards the following goals:  Prolonged discharge planning; patient still having pain and nausea.        Problem: Knowledge Deficit - Standard  Goal: Patient and family/care givers will demonstrate understanding of plan of care, disease process/condition, diagnostic tests and medications  Outcome: Progressing     Problem: Optimal Care for Alcohol Withdrawal  Goal: Optimal Care for the alcohol withdrawal patient  Outcome: Progressing     Problem: Seizure Precautions  Goal: Implementation of seizure precautions  Outcome: Progressing     Problem: Lifestyle Changes  Goal: Patient's ability to identify lifestyle changes and available resources to help reduce recurrence of condition will improve  Outcome: Progressing     Problem: Psychosocial  Goal: Patient's level of anxiety will decrease  Outcome: Progressing  Goal: Spiritual and cultural needs incorporated into hospitalization  Outcome: Progressing     Problem: Risk for Aspiration  Goal: Patient's risk for aspiration will be absent or decrease  Outcome: Progressing     Problem: Provide Safe Environment  Goal: Suicide environmental safety, protocols, policies, and practices will be implemented  Outcome: Progressing     Problem: Psychosocial  Goal: Patient's ability to identify and develop effective coping behaviors will improve  Outcome: Progressing  Goal: Patient's ability to identify and utilize available support systems will improve  Outcome: Progressing     Problem: Depression  Goal: Patient and family/caregiver will verbalize accurate information about at least two of the possible causes of depression, three-four of the signs and symptoms of depression  Outcome: Progressing     Problem: Fall Risk  Goal: Patient will remain free from  falls  Outcome: Progressing     Problem: Skin Integrity  Goal: Skin integrity is maintained or improved  Outcome: Progressing

## 2021-06-06 NOTE — CARE PLAN
The patient is Stable - Low risk of patient condition declining or worsening    Shift Goals  Clinical Goals:  (maintain safety and comfrot)  Patient Goals: get a bath and untangle hair    Progress made toward(s) clinical / shift goals:        Problem: Psychosocial  Goal: Patient's level of anxiety will decrease  Outcome: Progressing    Pt shows or states no signs of anxiety this shift.      Problem: Provide Safe Environment  Goal: Suicide environmental safety, protocols, policies, and practices will be implemented  Outcome: Progressing  Pt uses call light appropriately to get out of bed. Bed alarm in place.

## 2021-06-07 PROCEDURE — 700102 HCHG RX REV CODE 250 W/ 637 OVERRIDE(OP): Performed by: INTERNAL MEDICINE

## 2021-06-07 PROCEDURE — 770020 HCHG ROOM/CARE - TELE (206)

## 2021-06-07 PROCEDURE — 97530 THERAPEUTIC ACTIVITIES: CPT

## 2021-06-07 PROCEDURE — 700102 HCHG RX REV CODE 250 W/ 637 OVERRIDE(OP): Performed by: STUDENT IN AN ORGANIZED HEALTH CARE EDUCATION/TRAINING PROGRAM

## 2021-06-07 PROCEDURE — 700111 HCHG RX REV CODE 636 W/ 250 OVERRIDE (IP): Performed by: STUDENT IN AN ORGANIZED HEALTH CARE EDUCATION/TRAINING PROGRAM

## 2021-06-07 PROCEDURE — A9270 NON-COVERED ITEM OR SERVICE: HCPCS | Performed by: INTERNAL MEDICINE

## 2021-06-07 PROCEDURE — 99231 SBSQ HOSP IP/OBS SF/LOW 25: CPT | Performed by: INTERNAL MEDICINE

## 2021-06-07 PROCEDURE — A9270 NON-COVERED ITEM OR SERVICE: HCPCS | Performed by: STUDENT IN AN ORGANIZED HEALTH CARE EDUCATION/TRAINING PROGRAM

## 2021-06-07 RX ADMIN — PROMETHAZINE HYDROCHLORIDE 25 MG: 25 TABLET ORAL at 06:09

## 2021-06-07 RX ADMIN — OXYCODONE 5 MG: 5 TABLET ORAL at 02:40

## 2021-06-07 RX ADMIN — FAMOTIDINE 20 MG: 20 TABLET ORAL at 06:10

## 2021-06-07 RX ADMIN — Medication 5 MG: at 20:25

## 2021-06-07 RX ADMIN — HYDROXYZINE HYDROCHLORIDE 25 MG: 25 TABLET, FILM COATED ORAL at 19:25

## 2021-06-07 RX ADMIN — GABAPENTIN 300 MG: 300 CAPSULE ORAL at 11:48

## 2021-06-07 RX ADMIN — GABAPENTIN 300 MG: 300 CAPSULE ORAL at 06:10

## 2021-06-07 RX ADMIN — ENOXAPARIN SODIUM 40 MG: 40 INJECTION SUBCUTANEOUS at 06:09

## 2021-06-07 RX ADMIN — OXYCODONE 5 MG: 5 TABLET ORAL at 19:25

## 2021-06-07 RX ADMIN — GABAPENTIN 300 MG: 300 CAPSULE ORAL at 17:22

## 2021-06-07 RX ADMIN — ANTACID TABLETS 500 MG: 500 TABLET, CHEWABLE ORAL at 06:10

## 2021-06-07 RX ADMIN — OXYCODONE 5 MG: 5 TABLET ORAL at 07:59

## 2021-06-07 RX ADMIN — Medication 100 MG: at 06:10

## 2021-06-07 RX ADMIN — Medication 1 TABLET: at 06:10

## 2021-06-07 RX ADMIN — PROMETHAZINE HYDROCHLORIDE 25 MG: 25 TABLET ORAL at 20:25

## 2021-06-07 RX ADMIN — FLUOXETINE 30 MG: 20 CAPSULE ORAL at 06:11

## 2021-06-07 RX ADMIN — ACETAMINOPHEN 650 MG: 325 TABLET, FILM COATED ORAL at 09:50

## 2021-06-07 ASSESSMENT — COGNITIVE AND FUNCTIONAL STATUS - GENERAL
WALKING IN HOSPITAL ROOM: A LITTLE
SUGGESTED CMS G CODE MODIFIER MOBILITY: CJ
MOBILITY SCORE: 22
CLIMB 3 TO 5 STEPS WITH RAILING: A LITTLE

## 2021-06-07 ASSESSMENT — ENCOUNTER SYMPTOMS
TINGLING: 0
VOMITING: 0
EYE PAIN: 0
SPEECH CHANGE: 0
SHORTNESS OF BREATH: 0
ORTHOPNEA: 0
PHOTOPHOBIA: 0
FEVER: 0
BLURRED VISION: 0
HALLUCINATIONS: 0
ABDOMINAL PAIN: 1
NERVOUS/ANXIOUS: 1
NAUSEA: 0
DIARRHEA: 0
HEADACHES: 0
SENSORY CHANGE: 0
COUGH: 0
MYALGIAS: 0
TREMORS: 0
DIZZINESS: 0
FOCAL WEAKNESS: 0
BACK PAIN: 0
WEIGHT LOSS: 0
DOUBLE VISION: 0
PALPITATIONS: 0
CONSTIPATION: 0
SPUTUM PRODUCTION: 0
NECK PAIN: 0
CHILLS: 0

## 2021-06-07 ASSESSMENT — PAIN DESCRIPTION - PAIN TYPE
TYPE: ACUTE PAIN

## 2021-06-07 ASSESSMENT — LIFESTYLE VARIABLES: SUBSTANCE_ABUSE: 0

## 2021-06-07 ASSESSMENT — GAIT ASSESSMENTS
DISTANCE (FEET): 300
ASSISTIVE DEVICE: FRONT WHEEL WALKER
GAIT LEVEL OF ASSIST: MODIFIED INDEPENDENT

## 2021-06-07 NOTE — THERAPY
Physical Therapy   Daily Treatment     Patient Name: Olya Youssef  Age:  50 y.o., Sex:  female  Medical Record #: [id number]  Today's Date: 6/7/2021     Precautions: Fall Risk (2/2 O2 line and canister management)    Assessment    Pt with significant improvement since her last PT session. Now able to mobilize w/o verbal or tactile cues. She was able to use her walker safely, no LOB or path deviation. Able to negotiate small spaces in her room and bathroom as well as get up and down off a low surface. At this point she has achieved her highest practical level of function in an acute care setting and will not actively be followed by acute physical therapy services. She should continue to mobilize with the nursing staff in order to preserve function.    Plan    Discharge secondary to goals met.    DC Equipment Recommendations:  Front-Wheel Walker  Discharge Recommendations: Recommend home health for continued physical therapy services      Subjective    Eager to participate     Objective       06/07/21 1422   Cognition    Comments interactive, talkative and pleasant; motivated to participate   Balance   Sitting Balance (Static) Normal   Sitting Balance (Dynamic) Good   Standing Balance (Static) Good   Standing Balance (Dynamic) Good   Comments with FWW and w/o. Cleaned up bedside table w/o an AD and did not have LOB   Gait Analysis   Gait Level Of Assist Modified Independent   Assistive Device Front Wheel Walker   Distance (Feet) 300   # of Times Distance was Traveled 1   Comments significant improvement from last PT session. No fatigue; typical gait speed, no LOB, able to manage small spaces   Bed Mobility    Supine to Sit Independent  (no bed accessories)   Sit to Supine Independent   Scooting Independent   Rolling Independent   Comments no interventions needed; pt able to safely achieve all bed mobility   Functional Mobility   Sit to Stand Modified Independent   Toilet Transfers Modified Independent  (sit <> stand  from low toilet w/o difficulty)   Mobility in room to bathroom and in hallway   Comments safe use of walker during session; no cueing required   How much help from another person does the patient currently need...   6 clicks Mobility Score 22   Short Term Goals    Short Term Goal # 1 Pt will perform bed mobility with SPV by the 6th tx   Goal Outcome # 1 Goal met   Short Term Goal # 2 Pt will transfer from bed to chair with SPV by the 6th tx   Goal Outcome # 2 Goal met   Short Term Goal # 3 Pt will ambulate with FWW for 50 ft with SPV by the 6th tx.   Goal Outcome # 3 Goal met     Amy Vyas, PT

## 2021-06-07 NOTE — CARE PLAN
The patient is Stable - Low risk of patient condition declining or worsening    Shift Goals  Clinical Goals:  (maintain safety and comfrot)  Patient Goals: get a bath and untangle hair    Progress made toward(s) clinical / shift goals:    Problem: Knowledge Deficit - Standard  Goal: Patient and family/care givers will demonstrate understanding of plan of care, disease process/condition, diagnostic tests and medications  Outcome: Progressing     Problem: Lifestyle Changes  Goal: Patient's ability to identify lifestyle changes and available resources to help reduce recurrence of condition will improve  Outcome: Progressing     Problem: Psychosocial  Goal: Patient's level of anxiety will decrease  Outcome: Progressing  Goal: Spiritual and cultural needs incorporated into hospitalization  Outcome: Progressing     Problem: Provide Safe Environment  Goal: Suicide environmental safety, protocols, policies, and practices will be implemented  Outcome: Progressing     Problem: Psychosocial  Goal: Patient's ability to identify and develop effective coping behaviors will improve  Outcome: Progressing  Goal: Patient's ability to identify and utilize available support systems will improve  Outcome: Progressing     Problem: Depression  Goal: Patient and family/caregiver will verbalize accurate information about at least two of the possible causes of depression, three-four of the signs and symptoms of depression  Outcome: Progressing     Problem: Fall Risk  Goal: Patient will remain free from falls  Outcome: Progressing     Problem: Pain - Standard  Goal: Alleviation of pain or a reduction in pain to the patient’s comfort goal  Outcome: Progressing     Problem: Skin Integrity  Goal: Skin integrity is maintained or improved  Outcome: Progressing       Patient is not progressing towards the following goals:

## 2021-06-07 NOTE — CARE PLAN
Problem: Nutritional:  Goal: Achieve adequate nutritional intake  Description: Patient will consume >50% of meals  Outcome: Met. Per ADL, pt eating % of last five out of six meals. RD to follow weekly per department policy.

## 2021-06-07 NOTE — DISCHARGE PLANNING
PASRR submitted on Friday in manual review.     Requested notes submitted to [place] [employer] PASRR portal - awaiting determination.

## 2021-06-07 NOTE — DISCHARGE PLANNING
@1605  Agency/Facility Name: [facility]  Spoke To: Admission  Outcome: Declined due to no beds.    @8090  Agency/Facility Name: [facility]  Outcome: Left message, awaiting call back.    Agency/Facility Name: [facility]  Outcome: Left message, awaiting call back.    [facility] Name[facility]  Outcome: Left message, awaiting call back.    Agency/Facility Name: [facility]  Spoke To: Admissions  Outcome: Do not have referral, resent.    Agency/Facility Name: [facility]  Spoke To: [place]  Outcome: Admissions is in meeting, will have them call back.    Agency/Facility Name: [facility]  Outcome: Left message, awaiting call back.

## 2021-06-07 NOTE — CARE PLAN
Problem: Knowledge Deficit - Standard  Goal: Patient and family/care givers will demonstrate understanding of plan of care, disease process/condition, diagnostic tests and medications  Outcome: Progressing     Problem: Optimal Care for Alcohol Withdrawal  Goal: Optimal Care for the alcohol withdrawal patient  Outcome: Progressing     Problem: Provide Safe Environment  Goal: Suicide environmental safety, protocols, policies, and practices will be implemented  Outcome: Progressing   The patient is Stable - Low risk of patient condition declining or worsening    Shift Goals  Clinical Goals:  (maintain safety and comfrot)  Patient Goals: get a bath and untangle hair    Progress made toward(s) clinical / shift goals:  Patient remains safe and un-harmed    Patient is not progressing towards the following goals:

## 2021-06-07 NOTE — PROGRESS NOTES
"Hospital Medicine Daily Progress Note    Date of Service  6/7/2021    Chief Complaint  50 y.o. female admitted 5/24/2021 with suicidal ideation    Hospital Course    Per Dr. Dominguez note    \"50-year-old female with a past medical history of recurrent alcoholic pancreatitis, alcohol abuse and alcohol withdrawals including seizures, history of bipolar disorder, polysubstance use with methamphetamines and alcohol, prior suicide attempt, COPD on 2 L home oxygen, presented 5/24/2021 with complaints of suicidal ideation due to her chronic alcohol use.  Patient was admitted found to be in acute alcohol intoxication, elevated POC breathalyzer 0.292.  Patient on admission was more somnolent but arousable with sternal rub.  Patient came in with a legal hold due to complaints of suicidal ideation, suicidal plan by taking overdose of pills she has at home.    5/25/2021-patient seen and evaluated today, labs and notes reviewed.  Patient stated she had continued suicidal thoughts, with potential plan of using her pills at home.  Patient was showing signs of acute alcohol withdrawal today, tremors and tachycardia.  Patient complained of nausea.  Psychiatry evaluated patient today given she her legal hold status.  CIWA scores ranging between 11-13 since admission.  Patient requiring Ativan and Librium.  She stated her last drink was the day she came to the hospital.    5/26 - Patient's LFTs increasing today, was decreased yesterday compared to high levels on admission. RUQ obtained on admission did not show obstruction.  Patient in active withdrawal continued. Patient stated she has RUQ pain today, severe, painful to deep touch, localized 9/10.  Consulted [facility]ology.    5/27 - patient this morning was distressed, she was noted to be standing without help of nursing despite having directions explained to her by nursing to wait for assistance to use the commode or to get out of bed. Patient did not fall, but was " "found to have soiled herself with urine. Patient denied any RUQ pain today compared to yesterday. She stated she was eating better. CIWA up to 12.  Oxygen supplementation up to 5LNC.    5/28 -patient was more controlled today, and denied any symptoms for abdominal pain at this time.  Morning patient was showing signs of improvement.  Most recent CIWA score went up to 13 at 1305 p.m.  Was only as high as 9 before that.  Oxygen now down to 3 L nasal cannula.    5/29-patient continued to have RUQ pain today, 8/10, sharp, radiating to her back and to her left lower quadrant.  + nausea.  Denied chest pain, vomiting.  Ordered HIDA scan.    5/30 - patient stated she had continued RUQ pain. HIDA scan showed ejection fraction of 13%, with GGT 2572.  Called [facility] for update, physician on call to evaluate patient and make recommendations.  If no procedures required by GI, will need to likely consult with general surgery.  Patient unable to sit for MRI due to her tremors, hence HIDA scan was ordered.    5/31 - patient had continued RUQ pain. Tolerating her diet.   Spoke with Dr. Chavez with general surgery, patient is not a candidate for any cholecystectomy due to her alcoholic hepatitis on this admission, likely biliary dyskinesia, recommended low fat diet and outpt evaluation for any possible cholecystectomy\".      Interval Problem Update  Afebrile, resting in bed, no acute complains at this time.   Awaiting placement     Consultants/Specialty  Psychiatry  [facility] gastroenterology  General surgery    Code Status  Full Code    Disposition  Skilled nursing facility    Review of Systems  Review of Systems   Constitutional: Negative for chills, fever and weight loss.   HENT: Negative for hearing loss and tinnitus.    Eyes: Negative for blurred vision, double vision, photophobia and pain.   Respiratory: Negative for cough, sputum production and shortness of breath.    Cardiovascular: Negative for chest pain, " palpitations, orthopnea and leg swelling.   Gastrointestinal: Positive for abdominal pain. Negative for constipation, diarrhea, nausea and vomiting.   Genitourinary: Negative for dysuria, frequency and urgency.   Musculoskeletal: Negative for back pain, joint pain, myalgias and neck pain.   Skin: Negative for rash.   Neurological: Negative for dizziness, tingling, tremors, sensory change, speech change, focal weakness and headaches.   Psychiatric/Behavioral: Negative for hallucinations and substance abuse. The patient is nervous/anxious.    All other systems reviewed and are negative.     Physical Exam  Temp:  [36.1 °C (97 °F)-36.7 °C (98 °F)] 36.6 °C (97.9 °F)  Pulse:  [80-97] 97  Resp:  [17-20] 19  BP: (107-125)/(62-89) 121/80  SpO2:  [94 %-97 %] 94 %    Physical Exam  Vitals reviewed.   Constitutional:       General: She is not in acute distress.     Appearance: Normal appearance. She is not ill-appearing.   HENT:      Head: Normocephalic and atraumatic.      Nose: No congestion.   Eyes:      General:         Right eye: No discharge.         Left eye: No discharge.      Pupils: Pupils are equal, round, and reactive to light.   Cardiovascular:      Rate and Rhythm: Normal rate and regular rhythm.      Pulses: Normal pulses.      Heart sounds: Normal heart sounds. No murmur heard.     Pulmonary:      Effort: Pulmonary effort is normal. No respiratory distress.      Breath sounds: Normal breath sounds. No stridor.   Abdominal:      General: Bowel sounds are normal. There is no distension.      Palpations: Abdomen is soft.      Tenderness: There is abdominal tenderness (Mild).      Comments: No signs of acute abdomen on physical exam.   Musculoskeletal:         General: No swelling or tenderness. Normal range of motion.      Cervical back: Normal range of motion. No rigidity.   Skin:     General: Skin is warm.      Capillary Refill: Capillary refill takes less than 2 seconds.      Coloration: Skin is not jaundiced or  pale.      Findings: No bruising.   Neurological:      General: No focal deficit present.      Mental Status: She is alert and oriented to person, place, and time.      Cranial Nerves: No cranial nerve deficit.   Psychiatric:         Mood and Affect: Mood normal.         Behavior: Behavior normal.       Fluids    Intake/Output Summary (Last 24 hours) at 6/7/2021 1016  Last data filed at 6/7/2021 0807  Gross per 24 hour   Intake 720 ml   Output --   Net 720 ml       Laboratory  Recent Labs     06/06/21  0048   WBC 5.2   RBC 3.28*   HEMOGLOBIN 11.4*   HEMATOCRIT 34.8*   .1*   MCH 34.8*   MCHC 32.8*   RDW 49.5   PLATELETCT 290   MPV 9.5     Recent Labs     06/06/21  0048   SODIUM 134*   POTASSIUM 4.1   CHLORIDE 98   CO2 26   GLUCOSE 98   BUN 8   CREATININE 0.51   CALCIUM 9.5                   Imaging  NM-BILIARY HIDA SCAN FATTY MEAL   Final Result      Low gallbladder ejection fraction.      This study was obtained utilizing fatty meal challenge to induce gallbladder contraction due to national shortage of cholecystokinin.  There are no national standards for gallbladder ejection fraction calculated utilizing fatty meal challenge.  An    ejection fraction greater than 35% is most likely normal.  Gallbladder ejection fraction less than 35% may be abnormal but could be falsely positive.  Therefore, this test could be falsely positive.  Consider repeat imaging once cholecystokinin becomes    available.      IR-US GUIDED PIV   Final Result    Ultrasound-guided PERIPHERAL IV INSERTION performed by    qualified nursing staff as above.      IR-US GUIDED PIV   Final Result    Ultrasound-guided PERIPHERAL IV INSERTION performed by    qualified nursing staff as above.      US-RUQ   Final Result      Stable echogenic liver which is most commonly secondary to steatosis           Assessment/Plan  * Alcohol dependence with withdrawal (HCC)- (present on admission)  Assessment & Plan  Hx DT's and seizure 2/2 ETOH withdrawal.   "Patient is a persistent alcohol user.    Prefers vodka, drinks half pint usually daily, obtains her drinks from her roommate.  Patient's last drink was on the day of admission, as stated by the patient.  Alcohol cessation education provided to patient.  Patient stated she wants to stop and this is what makes her feel suicidal as she is unable to stop.  -Seizure precaution  -Librium for long acting benzodiazepine coverage  - PO vitamins, patient was given detox IV bag with thiamine.  -Check Magnesium daily  - consult    Discontinued CIWA protocol on June 1, 2021.    Acute on chronic respiratory failure with hypoxia on home O2 therapy- (present on admission)  Assessment & Plan  Patient currently requiring increased oxygen, arrived to ED requiring 3L.  O2 demand up to 5L NC compared to her chronic home oxygen needs of 2L. Due to patient's acute alcohol withdrawal worsening distress, tachycardia.  5/28 - Improving to 3LNC  - continue oxygen support  - will need restart of home oxygen order on discharge    Vitamin D deficiency- (present on admission)  Assessment & Plan  Patient's vitamin D 25 level 11 on 4/21  Vitamin B12 and iron at appropriate levels.  Continue patient on supplementation, 50,000 IU weekly p.o.    Acute alcoholic hepatitis- (present on admission)  Assessment & Plan  Suspecting 2/2 to continued daily ETOH abuse and acute alcohol intoxication on admission.  RUQ US shows: \"Stable echogenic liver which is most commonly secondary to steatosis\"  Patient likely has fatty liver disease from alcohol use.  5/25 - No Discriminant Function = 8, no indication for steroids  GGT 2572  HIDA scan - 13% ejection fraction  5/26 - Consulted [facility] for evaluation of rising LFTs, elevated GGT.     --- No update by GI until 5/30 - recommended general surgery evaluation.  5/31 - Consulted General Surgery Dr. Chavez, patient is not a candidate for any cholecystectomy due to her " alcoholic hepatitis on this admission, likely biliary dyskinesia, recommended low fat diet and outpt evaluation for any possible cholecystectomy.   --- Patient's LFTs improving.  , ALT 80,     Continue to monitor, recheck CMP daily  Trial back librium 25mg po daily (5/31)  Started her on hydroxyzine for anxiety.    Macrocytic anemia- (present on admission)  Assessment & Plan  Vit B12 and folate levels normal on prior check  Likely due to alcohol liver disease  Monitor for any bleeding    Hypomagnesemia- (present on admission)  Assessment & Plan  Patient arrived with magnesium 1.9, down to 1.6 despite IV replacements.  Replace as needed.  Continue to monitor.    Leukocytopenia- (present on admission)  Assessment & Plan  Low WBC due to liver disorder  Slightly improving  Continue to monitor.    COPD (chronic obstructive pulmonary disease) (HCC)- (present on admission)  Assessment & Plan  Per chart review: no PFTs on file, on 2 L of oxygen at home.  No signs of acute exacerbation at this time on admission.  Oxygen per guidelines  Monitor respiratory status    Alcohol-induced acute pancreatitis- (present on admission)  Assessment & Plan  Patient continues to complain about nausea, but denied any vomiting.  On solid foods  Pain Control    Depression with suicidal ideation- (present on admission)  Assessment & Plan  Patient presented with suicidal ideation, plan to potentially overdose on her home medications.  Patient was brought in with a legal hold  Legal hold discontinued.    Hypokalemia  Assessment & Plan  Patient's potassium low today 3.3.  Likely due to poor nutrition due to severe alcohol use.  Magnesium low on admission 1.9.  Replacing via IV given patient's nausea  Recheck potassium and magnesium daily     I discussed plan of care during multidisciplinary regarding her current medical condition and discharge plan.  I placed SNF referral.    VTE prophylaxis: Lovenox

## 2021-06-08 ENCOUNTER — PATIENT OUTREACH (OUTPATIENT)
Dept: HEALTH INFORMATION MANAGEMENT | Facility: OTHER | Age: 51
End: 2021-06-08

## 2021-06-08 PROCEDURE — 770020 HCHG ROOM/CARE - TELE (206)

## 2021-06-08 PROCEDURE — A9270 NON-COVERED ITEM OR SERVICE: HCPCS | Performed by: INTERNAL MEDICINE

## 2021-06-08 PROCEDURE — 700102 HCHG RX REV CODE 250 W/ 637 OVERRIDE(OP): Performed by: INTERNAL MEDICINE

## 2021-06-08 PROCEDURE — 700102 HCHG RX REV CODE 250 W/ 637 OVERRIDE(OP): Performed by: STUDENT IN AN ORGANIZED HEALTH CARE EDUCATION/TRAINING PROGRAM

## 2021-06-08 PROCEDURE — A9270 NON-COVERED ITEM OR SERVICE: HCPCS | Performed by: STUDENT IN AN ORGANIZED HEALTH CARE EDUCATION/TRAINING PROGRAM

## 2021-06-08 PROCEDURE — 700111 HCHG RX REV CODE 636 W/ 250 OVERRIDE (IP): Performed by: STUDENT IN AN ORGANIZED HEALTH CARE EDUCATION/TRAINING PROGRAM

## 2021-06-08 PROCEDURE — 700111 HCHG RX REV CODE 636 W/ 250 OVERRIDE (IP): Performed by: INTERNAL MEDICINE

## 2021-06-08 PROCEDURE — 97535 SELF CARE MNGMENT TRAINING: CPT

## 2021-06-08 PROCEDURE — 99231 SBSQ HOSP IP/OBS SF/LOW 25: CPT | Performed by: INTERNAL MEDICINE

## 2021-06-08 RX ADMIN — HYDROXYZINE HYDROCHLORIDE 25 MG: 25 TABLET, FILM COATED ORAL at 19:54

## 2021-06-08 RX ADMIN — FAMOTIDINE 20 MG: 20 TABLET ORAL at 05:45

## 2021-06-08 RX ADMIN — GABAPENTIN 300 MG: 300 CAPSULE ORAL at 05:45

## 2021-06-08 RX ADMIN — GABAPENTIN 300 MG: 300 CAPSULE ORAL at 11:59

## 2021-06-08 RX ADMIN — Medication 1 TABLET: at 05:45

## 2021-06-08 RX ADMIN — OXYCODONE 5 MG: 5 TABLET ORAL at 14:36

## 2021-06-08 RX ADMIN — OXYCODONE 5 MG: 5 TABLET ORAL at 09:38

## 2021-06-08 RX ADMIN — ACETAMINOPHEN 650 MG: 325 TABLET, FILM COATED ORAL at 21:08

## 2021-06-08 RX ADMIN — Medication 5 MG: at 19:52

## 2021-06-08 RX ADMIN — PROMETHAZINE HYDROCHLORIDE 25 MG: 25 TABLET ORAL at 05:44

## 2021-06-08 RX ADMIN — GABAPENTIN 300 MG: 300 CAPSULE ORAL at 17:10

## 2021-06-08 RX ADMIN — HYDROMORPHONE HYDROCHLORIDE 0.5 MG: 1 INJECTION, SOLUTION INTRAMUSCULAR; INTRAVENOUS; SUBCUTANEOUS at 23:13

## 2021-06-08 RX ADMIN — Medication 100 MG: at 05:45

## 2021-06-08 RX ADMIN — FLUOXETINE 30 MG: 20 CAPSULE ORAL at 05:46

## 2021-06-08 RX ADMIN — PROMETHAZINE HYDROCHLORIDE 25 MG: 25 TABLET ORAL at 18:09

## 2021-06-08 RX ADMIN — ANTACID TABLETS 500 MG: 500 TABLET, CHEWABLE ORAL at 05:45

## 2021-06-08 RX ADMIN — OXYCODONE 5 MG: 5 TABLET ORAL at 19:52

## 2021-06-08 RX ADMIN — OXYCODONE 5 MG: 5 TABLET ORAL at 03:07

## 2021-06-08 RX ADMIN — ENOXAPARIN SODIUM 40 MG: 40 INJECTION SUBCUTANEOUS at 05:44

## 2021-06-08 ASSESSMENT — ENCOUNTER SYMPTOMS
SPUTUM PRODUCTION: 0
EYE PAIN: 0
FEVER: 0
DOUBLE VISION: 0
SHORTNESS OF BREATH: 0
VOMITING: 0
NAUSEA: 0
COUGH: 0
CONSTIPATION: 0
HEADACHES: 0
PALPITATIONS: 0
NERVOUS/ANXIOUS: 1
HALLUCINATIONS: 0
BLURRED VISION: 0
PHOTOPHOBIA: 0
WEIGHT LOSS: 0
ORTHOPNEA: 0
ABDOMINAL PAIN: 1
DIZZINESS: 0
CHILLS: 0
DIARRHEA: 0
MYALGIAS: 0
FOCAL WEAKNESS: 0

## 2021-06-08 ASSESSMENT — COGNITIVE AND FUNCTIONAL STATUS - GENERAL
TOILETING: A LITTLE
DAILY ACTIVITIY SCORE: 18
PERSONAL GROOMING: A LOT
HELP NEEDED FOR BATHING: A LOT
SUGGESTED CMS G CODE MODIFIER DAILY ACTIVITY: CK
DRESSING REGULAR LOWER BODY CLOTHING: A LITTLE

## 2021-06-08 ASSESSMENT — PAIN DESCRIPTION - PAIN TYPE
TYPE: CHRONIC PAIN
TYPE: ACUTE PAIN

## 2021-06-08 ASSESSMENT — LIFESTYLE VARIABLES: SUBSTANCE_ABUSE: 0

## 2021-06-08 NOTE — CARE PLAN
The patient is Stable - Low risk of patient condition declining or worsening    Shift Goals  Clinical Goals:  (maintain safety and comfrot)  Patient Goals: get a bath and untangle hair    Progress made toward(s) clinical / shift goals:    Problem: Knowledge Deficit - Standard  Goal: Patient and family/care givers will demonstrate understanding of plan of care, disease process/condition, diagnostic tests and medications  Outcome: Progressing     Problem: Psychosocial  Goal: Patient's level of anxiety will decrease  Outcome: Progressing  Goal: Spiritual and cultural needs incorporated into hospitalization  Outcome: Progressing     Problem: Provide Safe Environment  Goal: Suicide environmental safety, protocols, policies, and practices will be implemented  Outcome: Progressing     Problem: Psychosocial  Goal: Patient's ability to identify and develop effective coping behaviors will improve  Outcome: Progressing  Goal: Patient's ability to identify and utilize available support systems will improve  Outcome: Progressing     Problem: Depression  Goal: Patient and family/caregiver will verbalize accurate information about at least two of the possible causes of depression, three-four of the signs and symptoms of depression  Outcome: Progressing     Problem: Fall Risk  Goal: Patient will remain free from falls  Outcome: Progressing     Problem: Pain - Standard  Goal: Alleviation of pain or a reduction in pain to the patient’s comfort goal  Outcome: Progressing     Problem: Skin Integrity  Goal: Skin integrity is maintained or improved  Outcome: Progressing       Patient is not progressing towards the following goals:

## 2021-06-08 NOTE — PROGRESS NOTES
"Hospital Medicine Daily Progress Note    Date of Service  6/8/2021    Chief Complaint  50 y.o. female admitted 5/24/2021 with suicidal ideation    Hospital Course    Per Dr. Dominguez note    \"50-year-old female with a past medical history of recurrent alcoholic pancreatitis, alcohol abuse and alcohol withdrawals including seizures, history of bipolar disorder, polysubstance use with methamphetamines and alcohol, prior suicide attempt, COPD on 2 L home oxygen, presented 5/24/2021 with complaints of suicidal ideation due to her chronic alcohol use.  Patient was admitted found to be in acute alcohol intoxication, elevated POC breathalyzer 0.292.  Patient on admission was more somnolent but arousable with sternal rub.  Patient came in with a legal hold due to complaints of suicidal ideation, suicidal plan by taking overdose of pills she has at home.    5/25/2021-patient seen and evaluated today, labs and notes reviewed.  Patient stated she had continued suicidal thoughts, with potential plan of using her pills at home.  Patient was showing signs of acute alcohol withdrawal today, tremors and tachycardia.  Patient complained of nausea.  Psychiatry evaluated patient today given she her legal hold status.  CIWA scores ranging between 11-13 since admission.  Patient requiring Ativan and Librium.  She stated her last drink was the day she came to the hospital.    5/26 - Patient's LFTs increasing today, was decreased yesterday compared to high levels on admission. RUQ obtained on admission did not show obstruction.  Patient in active withdrawal continued. Patient stated she has RUQ pain today, severe, painful to deep touch, localized 9/10.  Consulted [facility] gastro[facility]ology.    5/27 - patient this morning was distressed, she was noted to be standing without help of nursing despite having directions explained to her by nursing to wait for assistance to use the commode or to get out of bed. Patient did not fall, but was " "found to have soiled herself with urine. Patient denied any RUQ pain today compared to yesterday. She stated she was eating better. CIWA up to 12.  Oxygen supplementation up to 5LNC.    5/28 -patient was more controlled today, and denied any symptoms for abdominal pain at this time.  Morning patient was showing signs of improvement.  Most recent CIWA score went up to 13 at 1305 p.m.  Was only as high as 9 before that.  Oxygen now down to 3 L nasal cannula.    5/29-patient continued to have RUQ pain today, 8/10, sharp, radiating to her back and to her left lower quadrant.  + nausea.  Denied chest pain, vomiting.  Ordered HIDA scan.    5/30 - patient stated she had continued RUQ pain. HIDA scan showed ejection fraction of 13%, with GGT 2572.  Called [facility] for update, physician on call to evaluate patient and make recommendations.  If no procedures required by , will need to likely consult with general surgery.  Patient unable to sit for MRI due to her tremors, hence HIDA scan was ordered.    5/31 - patient had continued RUQ pain. Tolerating her diet.   Spoke with Dr. Chavez with general surgery, patient is not a candidate for any cholecystectomy due to her alcoholic hepatitis on this admission, likely biliary dyskinesia, recommended low fat diet and outpt evaluation for any possible cholecystectomy\".      Interval Problem Update  Patient seen and examined, afebrile resting in bed, no acute complains at this time.   Awaiting placement     Consultants/Specialty  Psychiatry  [facility] gastroenterology  General surgery    Code Status  Full Code    Disposition  Skilled nursing facility    Review of Systems  Review of Systems   Constitutional: Negative for chills, fever and weight loss.   HENT: Negative for hearing loss and tinnitus.    Eyes: Negative for blurred vision, double vision, photophobia and pain.   Respiratory: Negative for cough, sputum production and shortness of breath.    Cardiovascular: " Negative for chest pain, palpitations, orthopnea and leg swelling.   Gastrointestinal: Positive for abdominal pain. Negative for constipation, diarrhea, nausea and vomiting.   Genitourinary: Negative for dysuria, frequency and urgency.   Musculoskeletal: Negative for joint pain and myalgias.   Skin: Negative for rash.   Neurological: Negative for dizziness, focal weakness and headaches.   Psychiatric/Behavioral: Negative for hallucinations and substance abuse. The patient is nervous/anxious.    All other systems reviewed and are negative.     Physical Exam  Temp:  [35.9 °C (96.6 °F)-36.8 °C (98.2 °F)] 36.1 °C (97 °F)  Pulse:  [75-88] 80  Resp:  [18] 18  BP: ()/(56-74) 103/74  SpO2:  [92 %-98 %] 93 %    Physical Exam  Vitals reviewed.   Constitutional:       General: She is not in acute distress.     Appearance: Normal appearance. She is not ill-appearing.   HENT:      Head: Normocephalic and atraumatic.      Nose: No congestion.   Eyes:      General:         Right eye: No discharge.         Left eye: No discharge.      Pupils: Pupils are equal, round, and reactive to light.   Cardiovascular:      Rate and Rhythm: Normal rate and regular rhythm.      Pulses: Normal pulses.      Heart sounds: Normal heart sounds. No murmur heard.     Pulmonary:      Effort: Pulmonary effort is normal. No respiratory distress.      Breath sounds: Normal breath sounds. No stridor.   Abdominal:      General: Bowel sounds are normal. There is no distension.      Palpations: Abdomen is soft.      Tenderness: There is no abdominal tenderness. There is no guarding or rebound.      Comments: No signs of acute abdomen on physical exam.   Musculoskeletal:         General: No swelling or tenderness. Normal range of motion.      Cervical back: Normal range of motion. No rigidity.   Skin:     General: Skin is warm.      Capillary Refill: Capillary refill takes less than 2 seconds.      Coloration: Skin is not jaundiced or pale.       Findings: No bruising.   Neurological:      General: No focal deficit present.      Mental Status: She is alert and oriented to person, place, and time.      Cranial Nerves: No cranial nerve deficit.   Psychiatric:         Mood and Affect: Mood normal.         Behavior: Behavior normal.       Fluids    Intake/Output Summary (Last 24 hours) at 6/8/2021 0929  Last data filed at 6/7/2021 1300  Gross per 24 hour   Intake 240 ml   Output --   Net 240 ml       Laboratory  Recent Labs     06/06/21  0048   WBC 5.2   RBC 3.28*   HEMOGLOBIN 11.4*   HEMATOCRIT 34.8*   .1*   MCH 34.8*   MCHC 32.8*   RDW 49.5   PLATELETCT 290   MPV 9.5     Recent Labs     06/06/21  0048   SODIUM 134*   POTASSIUM 4.1   CHLORIDE 98   CO2 26   GLUCOSE 98   BUN 8   CREATININE 0.51   CALCIUM 9.5                   Imaging  NM-BILIARY HIDA SCAN FATTY MEAL   Final Result      Low gallbladder ejection fraction.      This study was obtained utilizing fatty meal challenge to induce gallbladder contraction due to national shortage of cholecystokinin.  There are no national standards for gallbladder ejection fraction calculated utilizing fatty meal challenge.  An    ejection fraction greater than 35% is most likely normal.  Gallbladder ejection fraction less than 35% may be abnormal but could be falsely positive.  Therefore, this test could be falsely positive.  Consider repeat imaging once cholecystokinin becomes    available.      IR-US GUIDED PIV   Final Result    Ultrasound-guided PERIPHERAL IV INSERTION performed by    qualified nursing staff as above.      IR-US GUIDED PIV   Final Result    Ultrasound-guided PERIPHERAL IV INSERTION performed by    qualified nursing staff as above.      US-RUQ   Final Result      Stable echogenic liver which is most commonly secondary to steatosis           Assessment/Plan  * Alcohol dependence with withdrawal (HCC)- (present on admission)  Assessment & Plan  Hx DT's and seizure 2/2 ETOH withdrawal.  Patient is a  "persistent alcohol user.    Prefers vodka, drinks half pint usually daily, obtains her drinks from her roommate.  Patient's last drink was on the day of admission, as stated by the patient.  Alcohol cessation education provided to patient.  Patient stated she wants to stop and this is what makes her feel suicidal as she is unable to stop.  -Seizure precaution  -Librium for long acting benzodiazepine coverage  - PO vitamins, patient was given detox IV bag with thiamine.  -Check Magnesium daily  - consult    Discontinued CIWA protocol on June 1, 2021.    Acute on chronic respiratory failure with hypoxia on home O2 therapy- (present on admission)  Assessment & Plan  Patient currently requiring increased oxygen, arrived to ED requiring 3L.  O2 demand up to 5L NC compared to her chronic home oxygen needs of 2L. Due to patient's acute alcohol withdrawal worsening distress, tachycardia.  5/28 - Improving to 3LNC  - continue oxygen support  - will need restart of home oxygen order on discharge    Vitamin D deficiency- (present on admission)  Assessment & Plan  Patient's vitamin D 25 level 11 on 4/21  Vitamin B12 and iron at appropriate levels.  Continue patient on supplementation, 50,000 IU weekly p.o.    Acute alcoholic hepatitis- (present on admission)  Assessment & Plan  Suspecting 2/2 to continued daily ETOH abuse and acute alcohol intoxication on admission.  RUQ US shows: \"Stable echogenic liver which is most commonly secondary to steatosis\"  Patient likely has fatty liver disease from alcohol use.  5/25 - No Discriminant Function = 8, no indication for steroids  GGT 2572  HIDA scan - 13% ejection fraction  5/26 - Consulted [facility] for evaluation of rising LFTs, elevated GGT.     --- No update by GI until 5/30 - recommended general surgery evaluation.  5/31 - Consulted General Surgery Dr. Chavez, patient is not a candidate for any cholecystectomy due to her alcoholic hepatitis " on this admission, likely biliary dyskinesia, recommended low fat diet and outpt evaluation for any possible cholecystectomy.   --- Patient's LFTs improving.  , ALT 80,     Continue to monitor, recheck CMP daily  Trial back librium 25mg po daily (5/31)  Started her on hydroxyzine for anxiety.    Macrocytic anemia- (present on admission)  Assessment & Plan  Vit B12 and folate levels normal on prior check  Likely due to alcohol liver disease  Monitor for any bleeding    Hypomagnesemia- (present on admission)  Assessment & Plan  Patient arrived with magnesium 1.9, down to 1.6 despite IV replacements.  Replace as needed.  Continue to monitor.    Leukocytopenia- (present on admission)  Assessment & Plan  Low WBC due to liver disorder  Slightly improving  Continue to monitor.    COPD (chronic obstructive pulmonary disease) (HCC)- (present on admission)  Assessment & Plan  Per chart review: no PFTs on file, on 2 L of oxygen at home.  No signs of acute exacerbation at this time on admission.  Oxygen per guidelines  Monitor respiratory status    Alcohol-induced acute pancreatitis- (present on admission)  Assessment & Plan  Patient continues to complain about nausea, but denied any vomiting.  On solid foods  Pain Control    Depression with suicidal ideation- (present on admission)  Assessment & Plan  Patient presented with suicidal ideation, plan to potentially overdose on her home medications.  Patient was brought in with a legal hold  Legal hold discontinued.    Hypokalemia  Assessment & Plan  Patient's potassium low today 3.3.  Likely due to poor nutrition due to severe alcohol use.  Magnesium low on admission 1.9.  Replacing via IV given patient's nausea  Recheck potassium and magnesium daily     I discussed plan of care during multidisciplinary regarding her current medical condition and discharge plan.  I placed SNF referral.    VTE prophylaxis: Lovenox

## 2021-06-08 NOTE — CARE PLAN
Assumed day shift care at start of shift  Patient a+o x 4  7/10 RUQ pain - no request for pain meds at this time, will reassess  Monitored on telemetry, vss, afebrile  Bm yesterday, tolerating diet with no n/v/d  No needs at this time, wctm      Fall precautions/hourly rounding maintained, call light within reach and functioning, all items within reach.  Patient encouraged to call for assistance, poc reviewed with patient, ?'s/concerns answered.  Bed alarm active.   The patient is Stable - Low risk of patient condition declining or worsening    Shift Goals  Clinical Goals:  (maintain safety and comfort)  Patient Goals: pain control, tolerate diet

## 2021-06-08 NOTE — PROGRESS NOTES
Per request from hospital care management, the patient has been scheduled with primary care, PAMELA Herr, on 6/14/2021 @ 2:30 & behavioral health, Sukh Fuller, on 6/15/2021 @ 2pm.   [facility] will provide transportation to these appointments if the patient calls. All information has been provided in AVS.

## 2021-06-08 NOTE — DISCHARGE PLANNING
@1330  Agency/Facility Name: [facility]  Outcome: Left message, awaiting call back.    Agency/Facility Name: [facility]  Outcome: Left message, awaiting call back.    @1005  Agency/Facility Name: [facility]  Spoke To: Redd  Outcome: Declined due to unable to meet care needs.    @0940  Agency/Facility Name: [facility]  Spoke To: Redd  Outcome: Did not have referral, resent.    Agency/Facility Name: [facility]  Outcome: Left message, awaiting call back.    Agency/Facility Name: [facility]  Spoke To: [place]  Outcome: Admissions is in a meeting, will let admissions know I called and to call back.    Agency/Facility Name: [facility]   Spoke To: Admission  Outcome: Declined due to non-contracted insurance.

## 2021-06-08 NOTE — THERAPY
Occupational Therapy  Daily Treatment     Patient Name: Olya Youssef  Age:  50 y.o., Sex:  female  Medical Record #: [id number]  Today's Date: 6/8/2021     Precautions  Precautions: Fall Risk  Comments: SI, bipolar, supplemental O2    Assessment    Pt seen for OT session. Progressing with functional mobility, activity tolerance, and alertness/participation. Educated on shorter, more frequent walks (as pt insisted on returning to bed after ambulation) to improve endurance, as well as sitting in chair rather than in bed. RN aware. Reports she is supposed to be caring for roommate who has dementia, so needs to be able to complete IADLs before returning home. Continues to be limited by decreased functional mobility, activity tolerance, cognition, strength, coordination, balance, and pain which are currently affecting pt's ability to complete ADLs/IADLs at baseline. Will continue to follow.     Plan    Continue current treatment plan.    DC Equipment Recommendations: Unable to determine at this time  Discharge Recommendations: Recommend post-acute placement for additional occupational therapy services prior to discharge home     Objective       06/08/21 0837   Vitals   O2 (LPM) 3   O2 Delivery Device Nasal Cannula   Vitals Comments Removed O2 to go to BR despite v/cs; educated on use of O2 during all OOB activities.    Pain 0 - 10 Group   Therapist Pain Assessment Post Activity Pain Same as Prior to Activity;During Activity;Nurse Notified  (no c/o pain)   Cognition    Cognition / Consciousness X   Speech/ Communication   (improving)   Level of Consciousness Alert   Ability To Follow Commands 2 Step   Safety Awareness Impaired   Comments pleasant and cooperative; more alert and participatory this session. Motivated for functional ambulation   Passive ROM Upper Body   Passive ROM Upper Body WDL   Active ROM Upper Body   Active ROM Upper Body  WDL; decreased coordination   Strength Upper Body   Upper Body Strength  X    Gross Strength Generalized Weakness, Equal Bilaterally.    Comments improving   Other Treatments   Other Treatments Provided Educated on shorter, more frequent walks (as pt insisted on returning to bed after ambulation) to improve endurance, as well as sitting in chair rather than in bed. RN aware. Reports she is supposed to be caring for roommate who has dementia, so needs to be able to complete IADLs before returning home.   Balance   Sitting Balance (Static) Good   Sitting Balance (Dynamic) Good   Standing Balance (Static) Good   Standing Balance (Dynamic) Fair +   Weight Shift Sitting Good   Weight Shift Standing Fair   Skilled Intervention Verbal Cuing;Tactile Cuing;Compensatory Strategies   Comments w/ FWW, no LOB   Bed Mobility    Supine to Sit Supervised  (HOB elevated)   Sit to Supine Supervised   Scooting Supervised   Skilled Intervention Verbal Cuing;Compensatory Strategies   Activities of Daily Living   Grooming Standing;Minimal Assist  (washing hands; for balance and safety with FWW)   Lower Body Dressing Supervision  (socks)   Toileting Supervision   Skilled Intervention Verbal Cuing;Compensatory Strategies   Comments continued edu on safety with FWW during txfs/adls   How much help from another person does the patient currently need...   Putting on and taking off regular lower body clothing? 3   Bathing (including washing, rinsing, and drying)? 2   Toileting, which includes using a toilet, bedpan, or urinal? 3   Putting on and taking off regular upper body clothing? 4   Taking care of personal grooming such as brushing teeth? 2   Eating meals? 4   6 Clicks Daily Activity Score 18   Functional Mobility   Sit to Stand Supervised   Bed, Chair, Wheelchair Transfer Supervised   Toilet Transfers Supervised   Transfer Method Stand Step   Mobility w/ FWW in room and hallway   Skilled Intervention Verbal Cuing;Compensatory Strategies   Comments req v/cs for safety with FWW and to keep O2 on during OOB  activities   Activity Tolerance   Sitting in Chair 4 min on toilet   Sitting Edge of Bed 3 min   Standing 15 min total   Comments fatigues quickly, but improving   Patient / Family Goals   Patient / Family Goal #1 to get better   Short Term Goals   Short Term Goal # 1 LB dressing with SPV (pants)   Goal Outcome # 1 Progressing as expected   Short Term Goal # 2 toilet txf with SPV   Goal Outcome # 2 Goal met   Short Term Goal # 3 standing g/h with SPV for >2 min   Goal Outcome # 3 Progressing as expected   Short Term Goal # 4 functional ambulation/ADL txfs using proper safety with FWW and no v/cs

## 2021-06-09 PROCEDURE — 700102 HCHG RX REV CODE 250 W/ 637 OVERRIDE(OP): Performed by: INTERNAL MEDICINE

## 2021-06-09 PROCEDURE — 700111 HCHG RX REV CODE 636 W/ 250 OVERRIDE (IP): Performed by: INTERNAL MEDICINE

## 2021-06-09 PROCEDURE — 99231 SBSQ HOSP IP/OBS SF/LOW 25: CPT | Performed by: INTERNAL MEDICINE

## 2021-06-09 PROCEDURE — A9270 NON-COVERED ITEM OR SERVICE: HCPCS | Performed by: INTERNAL MEDICINE

## 2021-06-09 PROCEDURE — 700102 HCHG RX REV CODE 250 W/ 637 OVERRIDE(OP): Performed by: STUDENT IN AN ORGANIZED HEALTH CARE EDUCATION/TRAINING PROGRAM

## 2021-06-09 PROCEDURE — 770020 HCHG ROOM/CARE - TELE (206)

## 2021-06-09 PROCEDURE — A9270 NON-COVERED ITEM OR SERVICE: HCPCS | Performed by: STUDENT IN AN ORGANIZED HEALTH CARE EDUCATION/TRAINING PROGRAM

## 2021-06-09 PROCEDURE — 700111 HCHG RX REV CODE 636 W/ 250 OVERRIDE (IP): Performed by: STUDENT IN AN ORGANIZED HEALTH CARE EDUCATION/TRAINING PROGRAM

## 2021-06-09 RX ADMIN — OXYCODONE 5 MG: 5 TABLET ORAL at 18:08

## 2021-06-09 RX ADMIN — HYDROMORPHONE HYDROCHLORIDE 0.5 MG: 1 INJECTION, SOLUTION INTRAMUSCULAR; INTRAVENOUS; SUBCUTANEOUS at 19:49

## 2021-06-09 RX ADMIN — GABAPENTIN 300 MG: 300 CAPSULE ORAL at 04:06

## 2021-06-09 RX ADMIN — Medication 100 MG: at 04:06

## 2021-06-09 RX ADMIN — GABAPENTIN 300 MG: 300 CAPSULE ORAL at 11:52

## 2021-06-09 RX ADMIN — ENOXAPARIN SODIUM 40 MG: 40 INJECTION SUBCUTANEOUS at 04:06

## 2021-06-09 RX ADMIN — OXYCODONE 5 MG: 5 TABLET ORAL at 08:42

## 2021-06-09 RX ADMIN — PROMETHAZINE HYDROCHLORIDE 25 MG: 25 TABLET ORAL at 18:13

## 2021-06-09 RX ADMIN — ANTACID TABLETS 500 MG: 500 TABLET, CHEWABLE ORAL at 04:06

## 2021-06-09 RX ADMIN — FLUOXETINE 30 MG: 20 CAPSULE ORAL at 04:06

## 2021-06-09 RX ADMIN — OXYCODONE 5 MG: 5 TABLET ORAL at 04:06

## 2021-06-09 RX ADMIN — HYDROMORPHONE HYDROCHLORIDE 0.5 MG: 1 INJECTION, SOLUTION INTRAMUSCULAR; INTRAVENOUS; SUBCUTANEOUS at 11:51

## 2021-06-09 RX ADMIN — PROMETHAZINE HYDROCHLORIDE 25 MG: 25 TABLET ORAL at 11:52

## 2021-06-09 RX ADMIN — Medication 1 TABLET: at 04:06

## 2021-06-09 RX ADMIN — FAMOTIDINE 20 MG: 20 TABLET ORAL at 04:06

## 2021-06-09 RX ADMIN — ONDANSETRON 4 MG: 2 INJECTION INTRAMUSCULAR; INTRAVENOUS at 08:49

## 2021-06-09 RX ADMIN — Medication 5 MG: at 19:49

## 2021-06-09 RX ADMIN — GABAPENTIN 300 MG: 300 CAPSULE ORAL at 18:01

## 2021-06-09 ASSESSMENT — ENCOUNTER SYMPTOMS
COUGH: 0
DOUBLE VISION: 0
EYE PAIN: 0
DIARRHEA: 0
SHORTNESS OF BREATH: 0
MYALGIAS: 0
FEVER: 0
HEADACHES: 0
VOMITING: 0
PALPITATIONS: 0
ABDOMINAL PAIN: 1
DIZZINESS: 0
SPUTUM PRODUCTION: 0
PHOTOPHOBIA: 0
BLURRED VISION: 0
ORTHOPNEA: 0
NAUSEA: 0
WEIGHT LOSS: 0
CONSTIPATION: 0
CHILLS: 0
FOCAL WEAKNESS: 0
NERVOUS/ANXIOUS: 1
HALLUCINATIONS: 0

## 2021-06-09 ASSESSMENT — PAIN DESCRIPTION - PAIN TYPE
TYPE: ACUTE PAIN

## 2021-06-09 ASSESSMENT — LIFESTYLE VARIABLES: SUBSTANCE_ABUSE: 0

## 2021-06-09 NOTE — PROGRESS NOTES
"Hospital Medicine Daily Progress Note    Date of Service  6/9/2021    Chief Complaint  50 y.o. female admitted 5/24/2021 with suicidal ideation    Hospital Course    Per Dr. oDminguez note    \"50-year-old female with a past medical history of recurrent alcoholic pancreatitis, alcohol abuse and alcohol withdrawals including seizures, history of bipolar disorder, polysubstance use with methamphetamines and alcohol, prior suicide attempt, COPD on 2 L home oxygen, presented 5/24/2021 with complaints of suicidal ideation due to her chronic alcohol use.  Patient was admitted found to be in acute alcohol intoxication, elevated POC breathalyzer 0.292.  Patient on admission was more somnolent but arousable with sternal rub.  Patient came in with a legal hold due to complaints of suicidal ideation, suicidal plan by taking overdose of pills she has at home.    5/25/2021-patient seen and evaluated today, labs and notes reviewed.  Patient stated she had continued suicidal thoughts, with potential plan of using her pills at home.  Patient was showing signs of acute alcohol withdrawal today, tremors and tachycardia.  Patient complained of nausea.  Psychiatry evaluated patient today given she her legal hold status.  CIWA scores ranging between 11-13 since admission.  Patient requiring Ativan and Librium.  She stated her last drink was the day she came to the hospital.    5/26 - Patient's LFTs increasing today, was decreased yesterday compared to high levels on admission. RUQ obtained on admission did not show obstruction.  Patient in active withdrawal continued. Patient stated she has RUQ pain today, severe, painful to deep touch, localized 9/10.  Consulted [facility]ology.    5/27 - patient this morning was distressed, she was noted to be standing without help of nursing despite having directions explained to her by nursing to wait for assistance to use the commode or to get out of bed. Patient did not fall, but was " "found to have soiled herself with urine. Patient denied any RUQ pain today compared to yesterday. She stated she was eating better. CIWA up to 12.  Oxygen supplementation up to 5LNC.    5/28 -patient was more controlled today, and denied any symptoms for abdominal pain at this time.  Morning patient was showing signs of improvement.  Most recent CIWA score went up to 13 at 1305 p.m.  Was only as high as 9 before that.  Oxygen now down to 3 L nasal cannula.    5/29-patient continued to have RUQ pain today, 8/10, sharp, radiating to her back and to her left lower quadrant.  + nausea.  Denied chest pain, vomiting.  Ordered HIDA scan.    5/30 - patient stated she had continued RUQ pain. HIDA scan showed ejection fraction of 13%, with GGT 2572.  Called [facility] for update, physician on call to evaluate patient and make recommendations.  If no procedures required by , will need to likely consult with general surgery.  Patient unable to sit for MRI due to her tremors, hence HIDA scan was ordered.    5/31 - patient had continued RUQ pain. Tolerating her diet.   Spoke with Dr. Chavez with general surgery, patient is not a candidate for any cholecystectomy due to her alcoholic hepatitis on this admission, likely biliary dyskinesia, recommended low fat diet and outpt evaluation for any possible cholecystectomy\".      Interval Problem Update  Patient seen and examined, afebrile resting in bed, no acute complains at this time.   Awaiting placement.   Patient will need SNF for less then 45 days     Consultants/Specialty  Psychiatry  [facility] gastroenterology  General surgery    Code Status  Full Code    Disposition  Skilled nursing facility    Review of Systems  Review of Systems   Constitutional: Negative for chills, fever and weight loss.   HENT: Negative for hearing loss and tinnitus.    Eyes: Negative for blurred vision, double vision, photophobia and pain.   Respiratory: Negative for cough, sputum " production and shortness of breath.    Cardiovascular: Negative for chest pain, palpitations, orthopnea and leg swelling.   Gastrointestinal: Positive for abdominal pain. Negative for constipation, diarrhea, nausea and vomiting.   Genitourinary: Negative for dysuria, frequency and urgency.   Musculoskeletal: Negative for joint pain and myalgias.   Skin: Negative for rash.   Neurological: Negative for dizziness, focal weakness and headaches.   Psychiatric/Behavioral: Negative for hallucinations and substance abuse. The patient is nervous/anxious.    All other systems reviewed and are negative.     Physical Exam  Temp:  [35.8 °C (96.5 °F)-36.6 °C (97.9 °F)] 36.5 °C (97.7 °F)  Pulse:  [76-95] 90  Resp:  [18-20] 18  BP: (103-139)/(68-95) 103/68  SpO2:  [92 %-97 %] 95 %    Physical Exam  Vitals reviewed.   Constitutional:       General: She is not in acute distress.     Appearance: Normal appearance. She is not ill-appearing.   HENT:      Head: Normocephalic and atraumatic.      Nose: No congestion.   Eyes:      General:         Right eye: No discharge.         Left eye: No discharge.      Pupils: Pupils are equal, round, and reactive to light.   Cardiovascular:      Rate and Rhythm: Normal rate and regular rhythm.      Pulses: Normal pulses.      Heart sounds: Normal heart sounds. No murmur heard.     Pulmonary:      Effort: Pulmonary effort is normal. No respiratory distress.      Breath sounds: Normal breath sounds. No stridor.   Abdominal:      General: Bowel sounds are normal. There is no distension.      Palpations: Abdomen is soft.      Tenderness: There is no abdominal tenderness. There is no guarding or rebound.      Comments: No signs of acute abdomen on physical exam.   Musculoskeletal:         General: No swelling or tenderness. Normal range of motion.      Cervical back: Normal range of motion. No rigidity.   Skin:     General: Skin is warm.      Capillary Refill: Capillary refill takes less than 2 seconds.       Coloration: Skin is not jaundiced or pale.      Findings: No bruising.   Neurological:      General: No focal deficit present.      Mental Status: She is alert and oriented to person, place, and time.      Cranial Nerves: No cranial nerve deficit.   Psychiatric:         Mood and Affect: Mood normal.         Behavior: Behavior normal.       Fluids    Intake/Output Summary (Last 24 hours) at 6/9/2021 1118  Last data filed at 6/8/2021 2000  Gross per 24 hour   Intake 360 ml   Output --   Net 360 ml       Laboratory                        Imaging  NM-BILIARY HIDA SCAN FATTY MEAL   Final Result      Low gallbladder ejection fraction.      This study was obtained utilizing fatty meal challenge to induce gallbladder contraction due to national shortage of cholecystokinin.  There are no national standards for gallbladder ejection fraction calculated utilizing fatty meal challenge.  An    ejection fraction greater than 35% is most likely normal.  Gallbladder ejection fraction less than 35% may be abnormal but could be falsely positive.  Therefore, this test could be falsely positive.  Consider repeat imaging once cholecystokinin becomes    available.      IR-US GUIDED PIV   Final Result    Ultrasound-guided PERIPHERAL IV INSERTION performed by    qualified nursing staff as above.      IR-US GUIDED PIV   Final Result    Ultrasound-guided PERIPHERAL IV INSERTION performed by    qualified nursing staff as above.      US-RUQ   Final Result      Stable echogenic liver which is most commonly secondary to steatosis           Assessment/Plan  * Alcohol dependence with withdrawal (HCC)- (present on admission)  Assessment & Plan  Hx DT's and seizure 2/2 ETOH withdrawal.  Patient is a persistent alcohol user.    Prefers vodka, drinks half pint usually daily, obtains her drinks from her roommate.  Patient's last drink was on the day of admission, as stated by the patient.  Alcohol cessation education provided to patient.  Patient  "stated she wants to stop and this is what makes her feel suicidal as she is unable to stop.  -Seizure precaution  -Librium for long acting benzodiazepine coverage  - PO vitamins, patient was given detox IV bag with thiamine.  -Check Magnesium daily  - consult    Discontinued CIWA protocol on June 1, 2021.    Acute on chronic respiratory failure with hypoxia on home O2 therapy- (present on admission)  Assessment & Plan  Patient currently requiring increased oxygen, arrived to ED requiring 3L.  O2 demand up to 5L NC compared to her chronic home oxygen needs of 2L. Due to patient's acute alcohol withdrawal worsening distress, tachycardia.  5/28 - Improving to 3LNC  - continue oxygen support  - will need restart of home oxygen order on discharge    Vitamin D deficiency- (present on admission)  Assessment & Plan  Patient's vitamin D 25 level 11 on 4/21  Vitamin B12 and iron at appropriate levels.  Continue patient on supplementation, 50,000 IU weekly p.o.    Acute alcoholic hepatitis- (present on admission)  Assessment & Plan  Suspecting 2/2 to continued daily ETOH abuse and acute alcohol intoxication on admission.  RUQ US shows: \"Stable echogenic liver which is most commonly secondary to steatosis\"  Patient likely has fatty liver disease from alcohol use.  5/25 - No Discriminant Function = 8, no indication for steroids  GGT 2572  HIDA scan - 13% ejection fraction  5/26 - Consulted [facility] for evaluation of rising LFTs, elevated GGT.     --- No update by GI until 5/30 - recommended general surgery evaluation.  5/31 - Consulted General Surgery Dr. Chavez, patient is not a candidate for any cholecystectomy due to her alcoholic hepatitis on this admission, likely biliary dyskinesia, recommended low fat diet and outpt evaluation for any possible cholecystectomy.   --- Patient's LFTs improving.  , ALT 80,     Continue to monitor, recheck CMP daily  Trial back librium 25mg " po daily (5/31)  Started her on hydroxyzine for anxiety.    Macrocytic anemia- (present on admission)  Assessment & Plan  Vit B12 and folate levels normal on prior check  Likely due to alcohol liver disease  Monitor for any bleeding    Hypomagnesemia- (present on admission)  Assessment & Plan  Patient arrived with magnesium 1.9, down to 1.6 despite IV replacements.  Replace as needed.  Continue to monitor.    Leukocytopenia- (present on admission)  Assessment & Plan  Low WBC due to liver disorder  Slightly improving  Continue to monitor.    COPD (chronic obstructive pulmonary disease) (HCC)- (present on admission)  Assessment & Plan  Per chart review: no PFTs on file, on 2 L of oxygen at home.  No signs of acute exacerbation at this time on admission.  Oxygen per guidelines  Monitor respiratory status    Alcohol-induced acute pancreatitis- (present on admission)  Assessment & Plan  Patient continues to complain about nausea, but denied any vomiting.  On solid foods  Pain Control    Depression with suicidal ideation- (present on admission)  Assessment & Plan  Patient presented with suicidal ideation, plan to potentially overdose on her home medications.  Patient was brought in with a legal hold  Legal hold discontinued.    Hypokalemia  Assessment & Plan  Patient's potassium low today 3.3.  Likely due to poor nutrition due to severe alcohol use.  Magnesium low on admission 1.9.  Replacing via IV given patient's nausea  Recheck potassium and magnesium daily     I discussed plan of care during multidisciplinary regarding her current medical condition and discharge plan.  I placed SNF referral. Patient will need SNF for less than 45 days     VTE prophylaxis: Lovenox

## 2021-06-09 NOTE — CARE PLAN
Problem: Knowledge Deficit - Standard  Goal: Patient and family/care givers will demonstrate understanding of plan of care, disease process/condition, diagnostic tests and medications  Outcome: Met     Problem: Fall Risk  Goal: Patient will remain free from falls  Outcome: Met   The patient is Stable - Low risk of patient condition declining or worsening    Shift Goals  Clinical Goals: maintain safety  Patient Goals: control back pain, sleep    Progress made toward(s) clinical / shift goals:  No falls during the shift  Pain level improved after medications    Patient is not progressing towards the following goals:

## 2021-06-09 NOTE — DISCHARGE PLANNING
Anticipated Discharge Disposition:   SNF    Action:   Discussed discharge planning needs during rounds. Discussed with [facility]. MD aware about requested MD noted for [facility]. Pending SNF acceptance. DPA to follow up.    Barriers to Discharge:   Placement acceptance and bed availability.    Plan:   Hospital Care Management will continue to follow and assist with discharge planning needs.

## 2021-06-09 NOTE — CARE PLAN
Problem: Knowledge Deficit - Standard  Goal: Patient and family/care givers will demonstrate understanding of plan of care, disease process/condition, diagnostic tests and medications  Outcome: Progressing  Note: Pt educated regarding safety precautions, medications, pain management and plan of care     Problem: Optimal Care for Alcohol Withdrawal  Goal: Optimal Care for the alcohol withdrawal patient  Outcome: Progressing     Problem: Seizure Precautions  Goal: Implementation of seizure precautions  Outcome: Progressing  Note: Seizure precautions in place     Problem: Lifestyle Changes  Goal: Patient's ability to identify lifestyle changes and available resources to help reduce recurrence of condition will improve  Outcome: Progressing     Problem: Psychosocial  Goal: Patient's level of anxiety will decrease  Outcome: Progressing  Flowsheets  Taken 6/8/2021 2000 by Desiree Cabezas R.N.  Patient Behaviors: Anxious  Taken 6/5/2021 0000 by Alejandro Pulliam R.N.  Decrease Anxiety Level:   Collaborated with patient to identify and develop coping strategies   Pharmacologic management per MD order  Goal: Spiritual and cultural needs incorporated into hospitalization  Outcome: Progressing  Flowsheets (Taken 6/9/2021 0215)  Incorporate Spiritual/Cultural Needs: Encouraged verbilization of feelings, concerns, expectations and needs     Problem: Provide Safe Environment  Goal: Suicide environmental safety, protocols, policies, and practices will be implemented  Outcome: Progressing  Flowsheets (Taken 6/8/2021 2000)  Safety Interventions:   Patient Wearing Hospital Clothing   Provided Safety Education     Problem: Psychosocial  Goal: Patient's ability to identify and develop effective coping behaviors will improve  Outcome: Progressing  Goal: Patient's ability to identify and utilize available support systems will improve  Outcome: Progressing     Problem: Depression  Goal: Patient and family/caregiver will verbalize accurate  information about at least two of the possible causes of depression, three-four of the signs and symptoms of depression  Outcome: Progressing     Problem: Fall Risk  Goal: Patient will remain free from falls  Outcome: Progressing  Note: Fall precautions in place     Problem: Pain - Standard  Goal: Alleviation of pain or a reduction in pain to the patient’s comfort goal  Outcome: Progressing     Problem: Skin Integrity  Goal: Skin integrity is maintained or improved  Outcome: Progressing   The patient is Stable - Low risk of patient condition declining or worsening    Shift Goals  Clinical Goals: maintain safety  Patient Goals: control back pain, sleep    Progress made toward(s) clinical / shift goals:  Pt remains free from falls, uses call light appropriately.    Patient is not progressing towards the following goals:

## 2021-06-09 NOTE — DISCHARGE PLANNING
@1340  Agency/Facility Name: [facility]  Outcome: Left message, awaiting call back.     Agency/Facility Name: [facility]  Outcome: Left message, awaiting call back.    @0902  Agency/Facility Name: [facility]  Outcome: Left message, awaiting call back.     Agency/Facility Name: [facility]  Spoke To: [place]  Outcome: Admissions is in a meeting, will have admissions call back later.

## 2021-06-10 LAB
ANION GAP SERPL CALC-SCNC: 11 MMOL/L (ref 7–16)
BUN SERPL-MCNC: 8 MG/DL (ref 8–22)
CALCIUM SERPL-MCNC: 9.1 MG/DL (ref 8.5–10.5)
CHLORIDE SERPL-SCNC: 98 MMOL/L (ref 96–112)
CO2 SERPL-SCNC: 29 MMOL/L (ref 20–33)
CREAT SERPL-MCNC: 0.46 MG/DL (ref 0.5–1.4)
ERYTHROCYTE [DISTWIDTH] IN BLOOD BY AUTOMATED COUNT: 47.9 FL (ref 35.9–50)
GLUCOSE SERPL-MCNC: 106 MG/DL (ref 65–99)
HCT VFR BLD AUTO: 36.3 % (ref 37–47)
HGB BLD-MCNC: 11.4 G/DL (ref 12–16)
MCH RBC QN AUTO: 33.5 PG (ref 27–33)
MCHC RBC AUTO-ENTMCNC: 31.4 G/DL (ref 33.6–35)
MCV RBC AUTO: 106.8 FL (ref 81.4–97.8)
PLATELET # BLD AUTO: 358 K/UL (ref 164–446)
PMV BLD AUTO: 9.8 FL (ref 9–12.9)
POTASSIUM SERPL-SCNC: 4.1 MMOL/L (ref 3.6–5.5)
RBC # BLD AUTO: 3.4 M/UL (ref 4.2–5.4)
SODIUM SERPL-SCNC: 138 MMOL/L (ref 135–145)
WBC # BLD AUTO: 6.4 K/UL (ref 4.8–10.8)

## 2021-06-10 PROCEDURE — 700102 HCHG RX REV CODE 250 W/ 637 OVERRIDE(OP): Performed by: STUDENT IN AN ORGANIZED HEALTH CARE EDUCATION/TRAINING PROGRAM

## 2021-06-10 PROCEDURE — 700102 HCHG RX REV CODE 250 W/ 637 OVERRIDE(OP): Performed by: INTERNAL MEDICINE

## 2021-06-10 PROCEDURE — 80048 BASIC METABOLIC PNL TOTAL CA: CPT

## 2021-06-10 PROCEDURE — 700111 HCHG RX REV CODE 636 W/ 250 OVERRIDE (IP): Performed by: STUDENT IN AN ORGANIZED HEALTH CARE EDUCATION/TRAINING PROGRAM

## 2021-06-10 PROCEDURE — A9270 NON-COVERED ITEM OR SERVICE: HCPCS | Performed by: STUDENT IN AN ORGANIZED HEALTH CARE EDUCATION/TRAINING PROGRAM

## 2021-06-10 PROCEDURE — A9270 NON-COVERED ITEM OR SERVICE: HCPCS | Performed by: INTERNAL MEDICINE

## 2021-06-10 PROCEDURE — 700101 HCHG RX REV CODE 250: Performed by: INTERNAL MEDICINE

## 2021-06-10 PROCEDURE — 700111 HCHG RX REV CODE 636 W/ 250 OVERRIDE (IP): Performed by: INTERNAL MEDICINE

## 2021-06-10 PROCEDURE — 36415 COLL VENOUS BLD VENIPUNCTURE: CPT

## 2021-06-10 PROCEDURE — 770020 HCHG ROOM/CARE - TELE (206)

## 2021-06-10 PROCEDURE — 85027 COMPLETE CBC AUTOMATED: CPT

## 2021-06-10 PROCEDURE — 99231 SBSQ HOSP IP/OBS SF/LOW 25: CPT | Performed by: INTERNAL MEDICINE

## 2021-06-10 RX ORDER — LIDOCAINE 50 MG/G
1 PATCH TOPICAL EVERY 24 HOURS
Status: DISCONTINUED | OUTPATIENT
Start: 2021-06-10 | End: 2021-06-17 | Stop reason: HOSPADM

## 2021-06-10 RX ADMIN — GABAPENTIN 300 MG: 300 CAPSULE ORAL at 17:56

## 2021-06-10 RX ADMIN — HYDROMORPHONE HYDROCHLORIDE 0.5 MG: 1 INJECTION, SOLUTION INTRAMUSCULAR; INTRAVENOUS; SUBCUTANEOUS at 08:17

## 2021-06-10 RX ADMIN — Medication 1 TABLET: at 04:46

## 2021-06-10 RX ADMIN — FAMOTIDINE 20 MG: 20 TABLET ORAL at 04:46

## 2021-06-10 RX ADMIN — HYDROXYZINE HYDROCHLORIDE 25 MG: 25 TABLET, FILM COATED ORAL at 13:04

## 2021-06-10 RX ADMIN — OXYCODONE 5 MG: 5 TABLET ORAL at 17:56

## 2021-06-10 RX ADMIN — ACETAMINOPHEN 650 MG: 325 TABLET, FILM COATED ORAL at 13:05

## 2021-06-10 RX ADMIN — FLUOXETINE 30 MG: 20 CAPSULE ORAL at 04:46

## 2021-06-10 RX ADMIN — PROMETHAZINE HYDROCHLORIDE 25 MG: 25 TABLET ORAL at 08:17

## 2021-06-10 RX ADMIN — Medication 5 MG: at 21:08

## 2021-06-10 RX ADMIN — GABAPENTIN 300 MG: 300 CAPSULE ORAL at 11:32

## 2021-06-10 RX ADMIN — OXYCODONE 5 MG: 5 TABLET ORAL at 21:08

## 2021-06-10 RX ADMIN — ANTACID TABLETS 500 MG: 500 TABLET, CHEWABLE ORAL at 04:46

## 2021-06-10 RX ADMIN — ENOXAPARIN SODIUM 40 MG: 40 INJECTION SUBCUTANEOUS at 04:47

## 2021-06-10 RX ADMIN — LIDOCAINE 1 PATCH: 50 PATCH TOPICAL at 12:45

## 2021-06-10 RX ADMIN — OXYCODONE 5 MG: 5 TABLET ORAL at 11:32

## 2021-06-10 RX ADMIN — OXYCODONE 5 MG: 5 TABLET ORAL at 06:33

## 2021-06-10 RX ADMIN — Medication 100 MG: at 04:46

## 2021-06-10 RX ADMIN — HYDROMORPHONE HYDROCHLORIDE 0.5 MG: 1 INJECTION, SOLUTION INTRAMUSCULAR; INTRAVENOUS; SUBCUTANEOUS at 02:13

## 2021-06-10 RX ADMIN — PROCHLORPERAZINE EDISYLATE 10 MG: 5 INJECTION INTRAMUSCULAR; INTRAVENOUS at 17:56

## 2021-06-10 RX ADMIN — GABAPENTIN 300 MG: 300 CAPSULE ORAL at 04:46

## 2021-06-10 RX ADMIN — OXYCODONE 5 MG: 5 TABLET ORAL at 00:51

## 2021-06-10 ASSESSMENT — LIFESTYLE VARIABLES: SUBSTANCE_ABUSE: 0

## 2021-06-10 ASSESSMENT — PAIN DESCRIPTION - PAIN TYPE
TYPE: ACUTE PAIN

## 2021-06-10 ASSESSMENT — ENCOUNTER SYMPTOMS
ORTHOPNEA: 0
WEIGHT LOSS: 0
HEADACHES: 0
DOUBLE VISION: 0
COUGH: 0
CONSTIPATION: 0
DIZZINESS: 0
CHILLS: 0
VOMITING: 0
DIARRHEA: 0
FEVER: 0
NERVOUS/ANXIOUS: 1
ABDOMINAL PAIN: 1
PHOTOPHOBIA: 0
SHORTNESS OF BREATH: 0
HALLUCINATIONS: 0
PALPITATIONS: 0
NAUSEA: 0
EYE PAIN: 0
SPUTUM PRODUCTION: 0
MYALGIAS: 0
FOCAL WEAKNESS: 0
BLURRED VISION: 0

## 2021-06-10 NOTE — CARE PLAN
Problem: Pain - Standard  Goal: Alleviation of pain or a reduction in pain to the patient’s comfort goal  Outcome: Progressing  Pain medication is being utilized as needed for patient comfort.     Problem: Skin Integrity  Goal: Skin integrity is maintained or improved  Outcome: Progressing  Patient is encouraged to change position frequently.      The patient is stable at this time.    Shift Goals  Clinical Goals: maintain safety  Patient Goals: control back pain, sleep

## 2021-06-10 NOTE — DISCHARGE PLANNING
Agency/Facility Name: [facility]  Outcome: Left message, awaiting call back.    Agency/Facility Name: [facility]  Spoke To: [place]  Outcome: Declined due to behaviors.    [facility]  Spoke To: [place]  Outcome: Admissions is out for today.    Agency/Facility Name: [facility]  Outcome: Left message, awaiting call back.    Agency/Facility Name: [facility]  Outcome: Left message, awaiting call back.

## 2021-06-10 NOTE — PROGRESS NOTES
"Hospital Medicine Daily Progress Note    Date of Service  6/10/2021    Chief Complaint  50 y.o. female admitted 5/24/2021 with suicidal ideation    Hospital Course    Per Dr. Dominguez note    \"50-year-old female with a past medical history of recurrent alcoholic pancreatitis, alcohol abuse and alcohol withdrawals including seizures, history of bipolar disorder, polysubstance use with methamphetamines and alcohol, prior suicide attempt, COPD on 2 L home oxygen, presented 5/24/2021 with complaints of suicidal ideation due to her chronic alcohol use.  Patient was admitted found to be in acute alcohol intoxication, elevated POC breathalyzer 0.292.  Patient on admission was more somnolent but arousable with sternal rub.  Patient came in with a legal hold due to complaints of suicidal ideation, suicidal plan by taking overdose of pills she has at home.    5/25/2021-patient seen and evaluated today, labs and notes reviewed.  Patient stated she had continued suicidal thoughts, with potential plan of using her pills at home.  Patient was showing signs of acute alcohol withdrawal today, tremors and tachycardia.  Patient complained of nausea.  Psychiatry evaluated patient today given she her legal hold status.  CIWA scores ranging between 11-13 since admission.  Patient requiring Ativan and Librium.  She stated her last drink was the day she came to the hospital.    5/26 - Patient's LFTs increasing today, was decreased yesterday compared to high levels on admission. RUQ obtained on admission did not show obstruction.  Patient in active withdrawal continued. Patient stated she has RUQ pain today, severe, painful to deep touch, localized 9/10.  Consulted [facility]ology.    5/27 - patient this morning was distressed, she was noted to be standing without help of nursing despite having directions explained to her by nursing to wait for assistance to use the commode or to get out of bed. Patient did not fall, but was " "found to have soiled herself with urine. Patient denied any RUQ pain today compared to yesterday. She stated she was eating better. CIWA up to 12.  Oxygen supplementation up to 5LNC.    5/28 -patient was more controlled today, and denied any symptoms for abdominal pain at this time.  Morning patient was showing signs of improvement.  Most recent CIWA score went up to 13 at 1305 p.m.  Was only as high as 9 before that.  Oxygen now down to 3 L nasal cannula.    5/29-patient continued to have RUQ pain today, 8/10, sharp, radiating to her back and to her left lower quadrant.  + nausea.  Denied chest pain, vomiting.  Ordered HIDA scan.    5/30 - patient stated she had continued RUQ pain. HIDA scan showed ejection fraction of 13%, with GGT 2572.  Called [facility] for update, physician on call to evaluate patient and make recommendations.  If no procedures required by GI, will need to likely consult with general surgery.  Patient unable to sit for MRI due to her tremors, hence HIDA scan was ordered.    5/31 - patient had continued RUQ pain. Tolerating her diet.   Spoke with Dr. Chavez with general surgery, patient is not a candidate for any cholecystectomy due to her alcoholic hepatitis on this admission, likely biliary dyskinesia, recommended low fat diet and outpt evaluation for any possible cholecystectomy\".      Interval Problem Update  Afebrile resting in bed, no acute complains at this time.   Awaiting placement.       Consultants/Specialty  Psychiatry  [facility] gastroenterology  General surgery    Code Status  Full Code    Disposition  Skilled nursing facility    Review of Systems  Review of Systems   Constitutional: Negative for chills, fever and weight loss.   HENT: Negative for hearing loss and tinnitus.    Eyes: Negative for blurred vision, double vision, photophobia and pain.   Respiratory: Negative for cough, sputum production and shortness of breath.    Cardiovascular: Negative for chest " pain, palpitations, orthopnea and leg swelling.   Gastrointestinal: Positive for abdominal pain. Negative for constipation, diarrhea, nausea and vomiting.   Genitourinary: Negative for dysuria, frequency and urgency.   Musculoskeletal: Negative for joint pain and myalgias.   Skin: Negative for rash.   Neurological: Negative for dizziness, focal weakness and headaches.   Psychiatric/Behavioral: Negative for hallucinations and substance abuse. The patient is nervous/anxious.    All other systems reviewed and are negative.     Physical Exam  Temp:  [36.1 °C (96.9 °F)-36.6 °C (97.8 °F)] 36.2 °C (97.1 °F)  Pulse:  [69-92] 69  Resp:  [18-20] 19  BP: ()/(63-79) 95/70  SpO2:  [91 %-96 %] 94 %    Physical Exam  Vitals reviewed.   Constitutional:       General: She is not in acute distress.     Appearance: Normal appearance. She is not ill-appearing.   HENT:      Head: Normocephalic and atraumatic.      Nose: No congestion.   Eyes:      General:         Right eye: No discharge.         Left eye: No discharge.      Pupils: Pupils are equal, round, and reactive to light.   Cardiovascular:      Rate and Rhythm: Normal rate and regular rhythm.      Pulses: Normal pulses.      Heart sounds: Normal heart sounds. No murmur heard.     Pulmonary:      Effort: Pulmonary effort is normal. No respiratory distress.      Breath sounds: Normal breath sounds. No stridor.   Abdominal:      General: Bowel sounds are normal. There is no distension.      Palpations: Abdomen is soft.      Tenderness: There is no abdominal tenderness. There is no guarding or rebound.      Comments: No signs of acute abdomen on physical exam.   Musculoskeletal:         General: No swelling or tenderness. Normal range of motion.      Cervical back: Normal range of motion. No rigidity.   Skin:     General: Skin is warm.      Capillary Refill: Capillary refill takes less than 2 seconds.      Coloration: Skin is not jaundiced or pale.      Findings: No bruising.    Neurological:      General: No focal deficit present.      Mental Status: She is alert and oriented to person, place, and time.      Cranial Nerves: No cranial nerve deficit.   Psychiatric:         Mood and Affect: Mood normal.         Behavior: Behavior normal.       Fluids  No intake or output data in the 24 hours ending 06/10/21 1025    Laboratory  Recent Labs     06/10/21  0353   WBC 6.4   RBC 3.40*   HEMOGLOBIN 11.4*   HEMATOCRIT 36.3*   .8*   MCH 33.5*   MCHC 31.4*   RDW 47.9   PLATELETCT 358   MPV 9.8     Recent Labs     06/10/21  0353   SODIUM 138   POTASSIUM 4.1   CHLORIDE 98   CO2 29   GLUCOSE 106*   BUN 8   CREATININE 0.46*   CALCIUM 9.1                   Imaging  NM-BILIARY HIDA SCAN FATTY MEAL   Final Result      Low gallbladder ejection fraction.      This study was obtained utilizing fatty meal challenge to induce gallbladder contraction due to national shortage of cholecystokinin.  There are no national standards for gallbladder ejection fraction calculated utilizing fatty meal challenge.  An    ejection fraction greater than 35% is most likely normal.  Gallbladder ejection fraction less than 35% may be abnormal but could be falsely positive.  Therefore, this test could be falsely positive.  Consider repeat imaging once cholecystokinin becomes    available.      IR-US GUIDED PIV   Final Result    Ultrasound-guided PERIPHERAL IV INSERTION performed by    qualified nursing staff as above.      IR-US GUIDED PIV   Final Result    Ultrasound-guided PERIPHERAL IV INSERTION performed by    qualified nursing staff as above.      US-RUQ   Final Result      Stable echogenic liver which is most commonly secondary to steatosis           Assessment/Plan  * Alcohol dependence with withdrawal (HCC)- (present on admission)  Assessment & Plan  Hx DT's and seizure 2/2 ETOH withdrawal.  Patient is a persistent alcohol user.    Prefers vodka, drinks half pint usually daily, obtains her drinks from her  "roommate.  Patient's last drink was on the day of admission, as stated by the patient.  Alcohol cessation education provided to patient.  Patient stated she wants to stop and this is what makes her feel suicidal as she is unable to stop.  -Seizure precaution  -Librium for long acting benzodiazepine coverage  - PO vitamins, patient was given detox IV bag with thiamine.  -Check Magnesium daily  - consult    Discontinued CIWA protocol on June 1, 2021.    Acute on chronic respiratory failure with hypoxia on home O2 therapy- (present on admission)  Assessment & Plan  Patient currently requiring increased oxygen, arrived to ED requiring 3L.  O2 demand up to 5L NC compared to her chronic home oxygen needs of 2L. Due to patient's acute alcohol withdrawal worsening distress, tachycardia.  5/28 - Improving to 3LNC  - continue oxygen support  - will need restart of home oxygen order on discharge    Vitamin D deficiency- (present on admission)  Assessment & Plan  Patient's vitamin D 25 level 11 on 4/21  Vitamin B12 and iron at appropriate levels.  Continue patient on supplementation, 50,000 IU weekly p.o.    Acute alcoholic hepatitis- (present on admission)  Assessment & Plan  Suspecting 2/2 to continued daily ETOH abuse and acute alcohol intoxication on admission.  RUQ US shows: \"Stable echogenic liver which is most commonly secondary to steatosis\"  Patient likely has fatty liver disease from alcohol use.  5/25 - No Discriminant Function = 8, no indication for steroids  GGT 2572  HIDA scan - 13% ejection fraction  5/26 - Consulted [facility] for evaluation of rising LFTs, elevated GGT.     --- No update by GI until 5/30 - recommended general surgery evaluation.  5/31 - Consulted General Surgery Dr. Chavez, patient is not a candidate for any cholecystectomy due to her alcoholic hepatitis on this admission, likely biliary dyskinesia, recommended low fat diet and outpt evaluation for any " possible cholecystectomy.   --- Patient's LFTs improving.  , ALT 80,     Continue to monitor, recheck CMP daily  Trial back librium 25mg po daily (5/31)  Started her on hydroxyzine for anxiety.    Macrocytic anemia- (present on admission)  Assessment & Plan  Vit B12 and folate levels normal on prior check  Likely due to alcohol liver disease  Monitor for any bleeding    Hypomagnesemia- (present on admission)  Assessment & Plan  Patient arrived with magnesium 1.9, down to 1.6 despite IV replacements.  Replace as needed.  Continue to monitor.    Leukocytopenia- (present on admission)  Assessment & Plan  Low WBC due to liver disorder  Slightly improving  Continue to monitor.    COPD (chronic obstructive pulmonary disease) (HCC)- (present on admission)  Assessment & Plan  Per chart review: no PFTs on file, on 2 L of oxygen at home.  No signs of acute exacerbation at this time on admission.  Oxygen per guidelines  Monitor respiratory status    Alcohol-induced acute pancreatitis- (present on admission)  Assessment & Plan  Patient continues to complain about nausea, but denied any vomiting.  On solid foods  Pain Control    Depression with suicidal ideation- (present on admission)  Assessment & Plan  Patient presented with suicidal ideation, plan to potentially overdose on her home medications.  Patient was brought in with a legal hold  Legal hold discontinued.    Hypokalemia  Assessment & Plan  Patient's potassium low today 3.3.  Likely due to poor nutrition due to severe alcohol use.  Magnesium low on admission 1.9.  Replacing via IV given patient's nausea  Recheck potassium and magnesium daily     I discussed plan of care during multidisciplinary regarding her current medical condition and discharge plan.  I placed SNF referral. Patient will need SNF for less than 45 days     VTE prophylaxis: Lovenox

## 2021-06-10 NOTE — CARE PLAN
The patient is Stable - Low risk of patient condition declining or worsening      Problem: Provide Safe Environment  Goal: Suicide environmental safety, protocols, policies, and practices will be implemented  Outcome: Met     Problem: Skin Integrity  Goal: Skin integrity is maintained or improved  Outcome: Met

## 2021-06-11 PROCEDURE — 770020 HCHG ROOM/CARE - TELE (206)

## 2021-06-11 PROCEDURE — A9270 NON-COVERED ITEM OR SERVICE: HCPCS | Performed by: INTERNAL MEDICINE

## 2021-06-11 PROCEDURE — 700102 HCHG RX REV CODE 250 W/ 637 OVERRIDE(OP): Performed by: STUDENT IN AN ORGANIZED HEALTH CARE EDUCATION/TRAINING PROGRAM

## 2021-06-11 PROCEDURE — A9270 NON-COVERED ITEM OR SERVICE: HCPCS | Performed by: STUDENT IN AN ORGANIZED HEALTH CARE EDUCATION/TRAINING PROGRAM

## 2021-06-11 PROCEDURE — 700102 HCHG RX REV CODE 250 W/ 637 OVERRIDE(OP): Performed by: INTERNAL MEDICINE

## 2021-06-11 PROCEDURE — 700101 HCHG RX REV CODE 250: Performed by: INTERNAL MEDICINE

## 2021-06-11 PROCEDURE — 99231 SBSQ HOSP IP/OBS SF/LOW 25: CPT | Performed by: INTERNAL MEDICINE

## 2021-06-11 RX ADMIN — HYDROXYZINE HYDROCHLORIDE 25 MG: 25 TABLET, FILM COATED ORAL at 11:53

## 2021-06-11 RX ADMIN — OXYCODONE 5 MG: 5 TABLET ORAL at 08:58

## 2021-06-11 RX ADMIN — OXYCODONE 5 MG: 5 TABLET ORAL at 11:54

## 2021-06-11 RX ADMIN — GABAPENTIN 300 MG: 300 CAPSULE ORAL at 04:12

## 2021-06-11 RX ADMIN — OXYCODONE 5 MG: 5 TABLET ORAL at 16:41

## 2021-06-11 RX ADMIN — LIDOCAINE 1 PATCH: 50 PATCH TOPICAL at 11:54

## 2021-06-11 RX ADMIN — Medication 100 MG: at 04:11

## 2021-06-11 RX ADMIN — GABAPENTIN 300 MG: 300 CAPSULE ORAL at 11:53

## 2021-06-11 RX ADMIN — OXYCODONE 5 MG: 5 TABLET ORAL at 00:11

## 2021-06-11 RX ADMIN — ANTACID TABLETS 500 MG: 500 TABLET, CHEWABLE ORAL at 04:12

## 2021-06-11 RX ADMIN — ACETAMINOPHEN 650 MG: 325 TABLET, FILM COATED ORAL at 08:58

## 2021-06-11 RX ADMIN — ACETAMINOPHEN 650 MG: 325 TABLET, FILM COATED ORAL at 14:57

## 2021-06-11 RX ADMIN — Medication 5 MG: at 20:07

## 2021-06-11 RX ADMIN — GABAPENTIN 300 MG: 300 CAPSULE ORAL at 16:40

## 2021-06-11 RX ADMIN — FLUOXETINE 30 MG: 20 CAPSULE ORAL at 04:11

## 2021-06-11 RX ADMIN — OXYCODONE 5 MG: 5 TABLET ORAL at 20:07

## 2021-06-11 RX ADMIN — OXYCODONE 5 MG: 5 TABLET ORAL at 04:12

## 2021-06-11 RX ADMIN — ACETAMINOPHEN 650 MG: 325 TABLET, FILM COATED ORAL at 00:11

## 2021-06-11 RX ADMIN — HYDROXYZINE HYDROCHLORIDE 25 MG: 25 TABLET, FILM COATED ORAL at 21:54

## 2021-06-11 RX ADMIN — ACETAMINOPHEN 650 MG: 325 TABLET, FILM COATED ORAL at 23:14

## 2021-06-11 RX ADMIN — OXYCODONE 5 MG: 5 TABLET ORAL at 23:14

## 2021-06-11 RX ADMIN — FAMOTIDINE 20 MG: 20 TABLET ORAL at 04:12

## 2021-06-11 RX ADMIN — Medication 1 TABLET: at 04:12

## 2021-06-11 ASSESSMENT — ENCOUNTER SYMPTOMS
DIARRHEA: 0
SHORTNESS OF BREATH: 0
EYE PAIN: 0
CHILLS: 0
VOMITING: 0
COUGH: 0
FEVER: 0
FOCAL WEAKNESS: 0
DOUBLE VISION: 0
DIZZINESS: 0
HEADACHES: 0
WEIGHT LOSS: 0
PALPITATIONS: 0
NERVOUS/ANXIOUS: 1
ORTHOPNEA: 0
MYALGIAS: 0
ABDOMINAL PAIN: 1
HALLUCINATIONS: 0
NAUSEA: 0
BLURRED VISION: 0
SPUTUM PRODUCTION: 0
PHOTOPHOBIA: 0
CONSTIPATION: 0

## 2021-06-11 ASSESSMENT — PAIN DESCRIPTION - PAIN TYPE
TYPE: ACUTE PAIN

## 2021-06-11 ASSESSMENT — LIFESTYLE VARIABLES: SUBSTANCE_ABUSE: 0

## 2021-06-11 NOTE — CARE PLAN
Problem: Depression  Goal: Patient and family/caregiver will verbalize accurate information about at least two of the possible causes of depression, three-four of the signs and symptoms of depression  Outcome: Progressing     Problem: Pain - Standard  Goal: Alleviation of pain or a reduction in pain to the patient’s comfort goal  Outcome: Progressing  Note: Pain medication is being administered as needed per parameters set via MAR.   The patient is stable at this time.     Shift Goals  Clinical Goals: maintain safety  Patient Goals: control back pain, sleep    Progress made toward(s) clinical / shift goals: safety    Patient is not progressing towards the following goals:controlling back pain

## 2021-06-11 NOTE — CARE PLAN
The patient is Stable - Low risk of patient condition declining or worsening    Shift Goals  Clinical Goals: maintain safety  Patient Goals: control back pain, sleep    Progress made toward(s) clinical / shift goals:  Pt encouraged to be up in chair for meals.    Patient is not progressing towards the following goals:

## 2021-06-11 NOTE — DISCHARGE PLANNING
Anticipated Discharge Disposition:   SNF    Action:   Discussed discharge planning needs during rounds. Per MD, pt is medically cleared for discharge to SNF. No accepting SNF at this time. Per MD, plan for PT/OT reeval if pt has progressed and possibly discharge to home with home health.    Barriers to Discharge:   Placement acceptance and bed availability.  PT/OT reeval and recommendations.    Plan:   Hospital Care Management will continue to follow and assist with discharge planning needs.

## 2021-06-11 NOTE — DISCHARGE PLANNING
Agency/Facility Name: [facility]  Outcome: Left message, awaiting call back.    Agency/Facility Name: [facility]  Outcome: Left message, awaiting call back.     Agency/Facility Name: [facility]  Outcome: Left message, awaiting call back.

## 2021-06-11 NOTE — PROGRESS NOTES
"Hospital Medicine Daily Progress Note    Date of Service  6/11/2021    Chief Complaint  50 y.o. female admitted 5/24/2021 with suicidal ideation    Hospital Course    Per Dr. Dominguez note    \"50-year-old female with a past medical history of recurrent alcoholic pancreatitis, alcohol abuse and alcohol withdrawals including seizures, history of bipolar disorder, polysubstance use with methamphetamines and alcohol, prior suicide attempt, COPD on 2 L home oxygen, presented 5/24/2021 with complaints of suicidal ideation due to her chronic alcohol use.  Patient was admitted found to be in acute alcohol intoxication, elevated POC breathalyzer 0.292.  Patient on admission was more somnolent but arousable with sternal rub.  Patient came in with a legal hold due to complaints of suicidal ideation, suicidal plan by taking overdose of pills she has at home.    5/25/2021-patient seen and evaluated today, labs and notes reviewed.  Patient stated she had continued suicidal thoughts, with potential plan of using her pills at home.  Patient was showing signs of acute alcohol withdrawal today, tremors and tachycardia.  Patient complained of nausea.  Psychiatry evaluated patient today given she her legal hold status.  CIWA scores ranging between 11-13 since admission.  Patient requiring Ativan and Librium.  She stated her last drink was the day she came to the hospital.    5/26 - Patient's LFTs increasing today, was decreased yesterday compared to high levels on admission. RUQ obtained on admission did not show obstruction.  Patient in active withdrawal continued. Patient stated she has RUQ pain today, severe, painful to deep touch, localized 9/10.  Consulted [facility] gastroenterology.    5/27 - patient this morning was distressed, she was noted to be standing without help of nursing despite having directions explained to her by nursing to wait for assistance to use the commode or to get out of bed. Patient did not fall, but was " "found to have soiled herself with urine. Patient denied any RUQ pain today compared to yesterday. She stated she was eating better. CIWA up to 12.  Oxygen supplementation up to 5LNC.    5/28 -patient was more controlled today, and denied any symptoms for abdominal pain at this time.  Morning patient was showing signs of improvement.  Most recent CIWA score went up to 13 at 1305 p.m.  Was only as high as 9 before that.  Oxygen now down to 3 L nasal cannula.    5/29-patient continued to have RUQ pain today, 8/10, sharp, radiating to her back and to her left lower quadrant.  + nausea.  Denied chest pain, vomiting.  Ordered HIDA scan.    5/30 - patient stated she had continued RUQ pain. HIDA scan showed ejection fraction of 13%, with GGT 2572.  Called [facility] for update, physician on call to evaluate patient and make recommendations.  If no procedures required by , will need to likely consult with general surgery.  Patient unable to sit for MRI due to her tremors, hence HIDA scan was ordered.    5/31 - patient had continued RUQ pain. Tolerating her diet.   Spoke with Dr. Chavez with general surgery, patient is not a candidate for any cholecystectomy due to her alcoholic hepatitis on this admission, likely biliary dyskinesia, recommended low fat diet and outpt evaluation for any possible cholecystectomy\".      Interval Problem Update  Patient seen and examined, resting in bed, no acute complains at this time.   Awaiting placement.       Consultants/Specialty  Psychiatry  [facility] gastroenterology  General surgery    Code Status  Full Code    Disposition  Skilled nursing facility    Review of Systems  Review of Systems   Constitutional: Negative for chills, fever and weight loss.   HENT: Negative for hearing loss and tinnitus.    Eyes: Negative for blurred vision, double vision, photophobia and pain.   Respiratory: Negative for cough, sputum production and shortness of breath.    Cardiovascular: " Negative for chest pain, palpitations, orthopnea and leg swelling.   Gastrointestinal: Positive for abdominal pain. Negative for constipation, diarrhea, nausea and vomiting.   Genitourinary: Negative for dysuria, frequency and urgency.   Musculoskeletal: Negative for joint pain and myalgias.   Skin: Negative for rash.   Neurological: Negative for dizziness, focal weakness and headaches.   Psychiatric/Behavioral: Negative for hallucinations and substance abuse. The patient is nervous/anxious.    All other systems reviewed and are negative.     Physical Exam  Temp:  [35.8 °C (96.5 °F)-36.9 °C (98.5 °F)] 36.9 °C (98.5 °F)  Pulse:  [81-92] 81  Resp:  [16-19] 16  BP: (112-130)/(65-89) 125/65  SpO2:  [92 %-97 %] 97 %    Physical Exam  Vitals reviewed.   Constitutional:       General: She is not in acute distress.     Appearance: Normal appearance. She is not ill-appearing.   HENT:      Head: Normocephalic and atraumatic.      Nose: No congestion.   Eyes:      General:         Right eye: No discharge.         Left eye: No discharge.      Pupils: Pupils are equal, round, and reactive to light.   Cardiovascular:      Rate and Rhythm: Normal rate and regular rhythm.      Pulses: Normal pulses.      Heart sounds: Normal heart sounds. No murmur heard.     Pulmonary:      Effort: Pulmonary effort is normal. No respiratory distress.      Breath sounds: Normal breath sounds. No stridor.   Abdominal:      General: Bowel sounds are normal. There is no distension.      Palpations: Abdomen is soft.      Tenderness: There is no abdominal tenderness. There is no guarding or rebound.      Comments: No signs of acute abdomen on physical exam.   Musculoskeletal:         General: No swelling or tenderness. Normal range of motion.      Cervical back: Normal range of motion. No rigidity.   Skin:     General: Skin is warm.      Capillary Refill: Capillary refill takes less than 2 seconds.      Coloration: Skin is not jaundiced or pale.       Findings: No bruising.   Neurological:      General: No focal deficit present.      Mental Status: She is alert and oriented to person, place, and time.      Cranial Nerves: No cranial nerve deficit.   Psychiatric:         Mood and Affect: Mood normal.         Behavior: Behavior normal.       Fluids  No intake or output data in the 24 hours ending 06/11/21 0950    Laboratory  Recent Labs     06/10/21  0353   WBC 6.4   RBC 3.40*   HEMOGLOBIN 11.4*   HEMATOCRIT 36.3*   .8*   MCH 33.5*   MCHC 31.4*   RDW 47.9   PLATELETCT 358   MPV 9.8     Recent Labs     06/10/21  0353   SODIUM 138   POTASSIUM 4.1   CHLORIDE 98   CO2 29   GLUCOSE 106*   BUN 8   CREATININE 0.46*   CALCIUM 9.1                   Imaging  NM-BILIARY HIDA SCAN FATTY MEAL   Final Result      Low gallbladder ejection fraction.      This study was obtained utilizing fatty meal challenge to induce gallbladder contraction due to national shortage of cholecystokinin.  There are no national standards for gallbladder ejection fraction calculated utilizing fatty meal challenge.  An    ejection fraction greater than 35% is most likely normal.  Gallbladder ejection fraction less than 35% may be abnormal but could be falsely positive.  Therefore, this test could be falsely positive.  Consider repeat imaging once cholecystokinin becomes    available.      IR-US GUIDED PIV   Final Result    Ultrasound-guided PERIPHERAL IV INSERTION performed by    qualified nursing staff as above.      IR-US GUIDED PIV   Final Result    Ultrasound-guided PERIPHERAL IV INSERTION performed by    qualified nursing staff as above.      US-RUQ   Final Result      Stable echogenic liver which is most commonly secondary to steatosis           Assessment/Plan  * Alcohol dependence with withdrawal (HCC)- (present on admission)  Assessment & Plan  Hx DT's and seizure 2/2 ETOH withdrawal.  Patient is a persistent alcohol user.    Prefers vodka, drinks half pint usually daily, obtains her  "drinks from her roommate.  Patient's last drink was on the day of admission, as stated by the patient.  Alcohol cessation education provided to patient.  Patient stated she wants to stop and this is what makes her feel suicidal as she is unable to stop.  -Seizure precaution  -Librium for long acting benzodiazepine coverage  - PO vitamins, patient was given detox IV bag with thiamine.  -Check Magnesium daily  - consult    Discontinued CIWA protocol on June 1, 2021.    Acute on chronic respiratory failure with hypoxia on home O2 therapy- (present on admission)  Assessment & Plan  Patient currently requiring increased oxygen, arrived to ED requiring 3L.  O2 demand up to 5L NC compared to her chronic home oxygen needs of 2L. Due to patient's acute alcohol withdrawal worsening distress, tachycardia.  5/28 - Improving to 3LNC  - continue oxygen support  - will need restart of home oxygen order on discharge    Vitamin D deficiency- (present on admission)  Assessment & Plan  Patient's vitamin D 25 level 11 on 4/21  Vitamin B12 and iron at appropriate levels.  Continue patient on supplementation, 50,000 IU weekly p.o.    Acute alcoholic hepatitis- (present on admission)  Assessment & Plan  Suspecting 2/2 to continued daily ETOH abuse and acute alcohol intoxication on admission.  RUQ US shows: \"Stable echogenic liver which is most commonly secondary to steatosis\"  Patient likely has fatty liver disease from alcohol use.  5/25 - No Discriminant Function = 8, no indication for steroids  GGT 2572  HIDA scan - 13% ejection fraction  5/26 - Consulted [facility] for evaluation of rising LFTs, elevated GGT.     --- No update by GI until 5/30 - recommended general surgery evaluation.  5/31 - Consulted General Surgery Dr. Chavez, patient is not a candidate for any cholecystectomy due to her alcoholic hepatitis on this admission, likely biliary dyskinesia, recommended low fat diet and outpt " evaluation for any possible cholecystectomy.   --- Patient's LFTs improving.  , ALT 80,     Continue to monitor, recheck CMP daily  Trial back librium 25mg po daily (5/31)  Started her on hydroxyzine for anxiety.    Macrocytic anemia- (present on admission)  Assessment & Plan  Vit B12 and folate levels normal on prior check  Likely due to alcohol liver disease  Monitor for any bleeding    Hypomagnesemia- (present on admission)  Assessment & Plan  Patient arrived with magnesium 1.9, down to 1.6 despite IV replacements.  Replace as needed.  Continue to monitor.    Leukocytopenia- (present on admission)  Assessment & Plan  Low WBC due to liver disorder  Slightly improving  Continue to monitor.    COPD (chronic obstructive pulmonary disease) (HCC)- (present on admission)  Assessment & Plan  Per chart review: no PFTs on file, on 2 L of oxygen at home.  No signs of acute exacerbation at this time on admission.  Oxygen per guidelines  Monitor respiratory status    Alcohol-induced acute pancreatitis- (present on admission)  Assessment & Plan  Patient continues to complain about nausea, but denied any vomiting.  On solid foods  Pain Control    Depression with suicidal ideation- (present on admission)  Assessment & Plan  Patient presented with suicidal ideation, plan to potentially overdose on her home medications.  Patient was brought in with a legal hold  Legal hold discontinued.    Hypokalemia  Assessment & Plan  Patient's potassium low today 3.3.  Likely due to poor nutrition due to severe alcohol use.  Magnesium low on admission 1.9.  Replacing via IV given patient's nausea  Recheck potassium and magnesium daily     I discussed plan of care during multidisciplinary regarding her current medical condition and discharge plan.  I placed SNF referral. Patient will need SNF for less than 45 days     VTE prophylaxis: Lovenox

## 2021-06-12 PROCEDURE — 99231 SBSQ HOSP IP/OBS SF/LOW 25: CPT | Performed by: INTERNAL MEDICINE

## 2021-06-12 PROCEDURE — 700102 HCHG RX REV CODE 250 W/ 637 OVERRIDE(OP): Performed by: INTERNAL MEDICINE

## 2021-06-12 PROCEDURE — 770020 HCHG ROOM/CARE - TELE (206)

## 2021-06-12 PROCEDURE — 700102 HCHG RX REV CODE 250 W/ 637 OVERRIDE(OP): Performed by: STUDENT IN AN ORGANIZED HEALTH CARE EDUCATION/TRAINING PROGRAM

## 2021-06-12 PROCEDURE — A9270 NON-COVERED ITEM OR SERVICE: HCPCS | Performed by: INTERNAL MEDICINE

## 2021-06-12 PROCEDURE — 700111 HCHG RX REV CODE 636 W/ 250 OVERRIDE (IP): Performed by: STUDENT IN AN ORGANIZED HEALTH CARE EDUCATION/TRAINING PROGRAM

## 2021-06-12 PROCEDURE — A9270 NON-COVERED ITEM OR SERVICE: HCPCS | Performed by: STUDENT IN AN ORGANIZED HEALTH CARE EDUCATION/TRAINING PROGRAM

## 2021-06-12 PROCEDURE — 700101 HCHG RX REV CODE 250: Performed by: INTERNAL MEDICINE

## 2021-06-12 RX ADMIN — FAMOTIDINE 20 MG: 20 TABLET ORAL at 04:17

## 2021-06-12 RX ADMIN — ACETAMINOPHEN 650 MG: 325 TABLET, FILM COATED ORAL at 18:24

## 2021-06-12 RX ADMIN — PROMETHAZINE HYDROCHLORIDE 12.5 MG: 25 TABLET ORAL at 04:16

## 2021-06-12 RX ADMIN — OXYCODONE 5 MG: 5 TABLET ORAL at 12:01

## 2021-06-12 RX ADMIN — OXYCODONE 5 MG: 5 TABLET ORAL at 21:15

## 2021-06-12 RX ADMIN — Medication 5 MG: at 21:15

## 2021-06-12 RX ADMIN — LIDOCAINE 1 PATCH: 50 PATCH TOPICAL at 12:01

## 2021-06-12 RX ADMIN — OXYCODONE 5 MG: 5 TABLET ORAL at 15:16

## 2021-06-12 RX ADMIN — ACETAMINOPHEN 650 MG: 325 TABLET, FILM COATED ORAL at 12:00

## 2021-06-12 RX ADMIN — GABAPENTIN 300 MG: 300 CAPSULE ORAL at 12:01

## 2021-06-12 RX ADMIN — ENOXAPARIN SODIUM 40 MG: 40 INJECTION SUBCUTANEOUS at 04:18

## 2021-06-12 RX ADMIN — Medication 100 MG: at 04:17

## 2021-06-12 RX ADMIN — FLUOXETINE 30 MG: 20 CAPSULE ORAL at 04:16

## 2021-06-12 RX ADMIN — ANTACID TABLETS 500 MG: 500 TABLET, CHEWABLE ORAL at 04:17

## 2021-06-12 RX ADMIN — GABAPENTIN 300 MG: 300 CAPSULE ORAL at 04:18

## 2021-06-12 RX ADMIN — Medication 1 TABLET: at 04:17

## 2021-06-12 RX ADMIN — OXYCODONE 5 MG: 5 TABLET ORAL at 04:16

## 2021-06-12 RX ADMIN — GABAPENTIN 300 MG: 300 CAPSULE ORAL at 18:20

## 2021-06-12 ASSESSMENT — ENCOUNTER SYMPTOMS
NERVOUS/ANXIOUS: 1
FEVER: 0
MYALGIAS: 0
CHILLS: 0
BLURRED VISION: 0
ABDOMINAL PAIN: 1
PALPITATIONS: 0
SPUTUM PRODUCTION: 0
HALLUCINATIONS: 0
PHOTOPHOBIA: 0
CONSTIPATION: 0
DOUBLE VISION: 0
SHORTNESS OF BREATH: 0
DIARRHEA: 0
FOCAL WEAKNESS: 0
HEADACHES: 0
COUGH: 0
ORTHOPNEA: 0
EYE PAIN: 0
DIZZINESS: 0
VOMITING: 0
WEIGHT LOSS: 0
NAUSEA: 0

## 2021-06-12 ASSESSMENT — PAIN DESCRIPTION - PAIN TYPE
TYPE: CHRONIC PAIN
TYPE: ACUTE PAIN
TYPE: ACUTE PAIN

## 2021-06-12 ASSESSMENT — LIFESTYLE VARIABLES: SUBSTANCE_ABUSE: 0

## 2021-06-12 NOTE — PROGRESS NOTES
"Hospital Medicine Daily Progress Note    Date of Service  6/12/2021    Chief Complaint  50 y.o. female admitted 5/24/2021 with suicidal ideation    Hospital Course    Per Dr. Dominguez note    \"50-year-old female with a past medical history of recurrent alcoholic pancreatitis, alcohol abuse and alcohol withdrawals including seizures, history of bipolar disorder, polysubstance use with methamphetamines and alcohol, prior suicide attempt, COPD on 2 L home oxygen, presented 5/24/2021 with complaints of suicidal ideation due to her chronic alcohol use.  Patient was admitted found to be in acute alcohol intoxication, elevated POC breathalyzer 0.292.  Patient on admission was more somnolent but arousable with sternal rub.  Patient came in with a legal hold due to complaints of suicidal ideation, suicidal plan by taking overdose of pills she has at home.    5/25/2021-patient seen and evaluated today, labs and notes reviewed.  Patient stated she had continued suicidal thoughts, with potential plan of using her pills at home.  Patient was showing signs of acute alcohol withdrawal today, tremors and tachycardia.  Patient complained of nausea.  Psychiatry evaluated patient today given she her legal hold status.  CIWA scores ranging between 11-13 since admission.  Patient requiring Ativan and Librium.  She stated her last drink was the day she came to the hospital.    5/26 - Patient's LFTs increasing today, was decreased yesterday compared to high levels on admission. RUQ obtained on admission did not show obstruction.  Patient in active withdrawal continued. Patient stated she has RUQ pain today, severe, painful to deep touch, localized 9/10.  Consulted [facility] gastroenterology.    5/27 - patient this morning was distressed, she was noted to be standing without help of nursing despite having directions explained to her by nursing to wait for assistance to use the commode or to get out of bed. Patient did not fall, but was " "found to have soiled herself with urine. Patient denied any RUQ pain today compared to yesterday. She stated she was eating better. CIWA up to 12.  Oxygen supplementation up to 5LNC.    5/28 -patient was more controlled today, and denied any symptoms for abdominal pain at this time.  Morning patient was showing signs of improvement.  Most recent CIWA score went up to 13 at 1305 p.m.  Was only as high as 9 before that.  Oxygen now down to 3 L nasal cannula.    5/29-patient continued to have RUQ pain today, 8/10, sharp, radiating to her back and to her left lower quadrant.  + nausea.  Denied chest pain, vomiting.  Ordered HIDA scan.    5/30 - patient stated she had continued RUQ pain. HIDA scan showed ejection fraction of 13%, with GGT 2572.  Called [facility] for update, physician on call to evaluate patient and make recommendations.  If no procedures required by GI, will need to likely consult with general surgery.  Patient unable to sit for MRI due to her tremors, hence HIDA scan was ordered.    5/31 - patient had continued RUQ pain. Tolerating her diet.   Spoke with Dr. Chavez with general surgery, patient is not a candidate for any cholecystectomy due to her alcoholic hepatitis on this admission, likely biliary dyskinesia, recommended low fat diet and outpt evaluation for any possible cholecystectomy\".      Interval Problem Update  Afebrile, no acute events overnight  resting in bed, no acute complains at this time.   Awaiting placement.       Consultants/Specialty  Psychiatry  [facility] gastroenterology  General surgery    Code Status  Full Code    Disposition  Skilled nursing facility    Review of Systems  Review of Systems   Constitutional: Negative for chills, fever and weight loss.   HENT: Negative for hearing loss and tinnitus.    Eyes: Negative for blurred vision, double vision, photophobia and pain.   Respiratory: Negative for cough, sputum production and shortness of breath.  "   Cardiovascular: Negative for chest pain, palpitations, orthopnea and leg swelling.   Gastrointestinal: Positive for abdominal pain. Negative for constipation, diarrhea, nausea and vomiting.   Genitourinary: Negative for dysuria, frequency and urgency.   Musculoskeletal: Negative for joint pain and myalgias.   Skin: Negative for rash.   Neurological: Negative for dizziness, focal weakness and headaches.   Psychiatric/Behavioral: Negative for hallucinations and substance abuse. The patient is nervous/anxious.    All other systems reviewed and are negative.     Physical Exam  Temp:  [36.1 °C (97 °F)-36.4 °C (97.5 °F)] 36.4 °C (97.5 °F)  Pulse:  [71-89] 74  Resp:  [17-20] 18  BP: (107-139)/(68-79) 117/79  SpO2:  [91 %-97 %] 91 %    Physical Exam  Vitals reviewed.   Constitutional:       General: She is not in acute distress.     Appearance: Normal appearance. She is not ill-appearing.   HENT:      Head: Normocephalic and atraumatic.      Nose: No congestion.   Eyes:      General:         Right eye: No discharge.         Left eye: No discharge.      Pupils: Pupils are equal, round, and reactive to light.   Cardiovascular:      Rate and Rhythm: Normal rate and regular rhythm.      Pulses: Normal pulses.      Heart sounds: Normal heart sounds. No murmur heard.     Pulmonary:      Effort: Pulmonary effort is normal. No respiratory distress.      Breath sounds: Normal breath sounds. No stridor.   Abdominal:      General: Bowel sounds are normal. There is no distension.      Palpations: Abdomen is soft.      Tenderness: There is no abdominal tenderness. There is no guarding or rebound.      Comments: No signs of acute abdomen on physical exam.   Musculoskeletal:         General: No swelling or tenderness. Normal range of motion.      Cervical back: Normal range of motion. No rigidity.   Skin:     General: Skin is warm.      Capillary Refill: Capillary refill takes less than 2 seconds.      Coloration: Skin is not jaundiced  or pale.      Findings: No bruising.   Neurological:      General: No focal deficit present.      Mental Status: She is alert and oriented to person, place, and time.      Cranial Nerves: No cranial nerve deficit.   Psychiatric:         Mood and Affect: Mood normal.         Behavior: Behavior normal.       Fluids    Intake/Output Summary (Last 24 hours) at 6/12/2021 1024  Last data filed at 6/11/2021 2100  Gross per 24 hour   Intake 360 ml   Output --   Net 360 ml       Laboratory  Recent Labs     06/10/21  0353   WBC 6.4   RBC 3.40*   HEMOGLOBIN 11.4*   HEMATOCRIT 36.3*   .8*   MCH 33.5*   MCHC 31.4*   RDW 47.9   PLATELETCT 358   MPV 9.8     Recent Labs     06/10/21  0353   SODIUM 138   POTASSIUM 4.1   CHLORIDE 98   CO2 29   GLUCOSE 106*   BUN 8   CREATININE 0.46*   CALCIUM 9.1                   Imaging  NM-BILIARY HIDA SCAN FATTY MEAL   Final Result      Low gallbladder ejection fraction.      This study was obtained utilizing fatty meal challenge to induce gallbladder contraction due to national shortage of cholecystokinin.  There are no national standards for gallbladder ejection fraction calculated utilizing fatty meal challenge.  An    ejection fraction greater than 35% is most likely normal.  Gallbladder ejection fraction less than 35% may be abnormal but could be falsely positive.  Therefore, this test could be falsely positive.  Consider repeat imaging once cholecystokinin becomes    available.      IR-US GUIDED PIV   Final Result    Ultrasound-guided PERIPHERAL IV INSERTION performed by    qualified nursing staff as above.      IR-US GUIDED PIV   Final Result    Ultrasound-guided PERIPHERAL IV INSERTION performed by    qualified nursing staff as above.      US-RUQ   Final Result      Stable echogenic liver which is most commonly secondary to steatosis           Assessment/Plan  * Alcohol dependence with withdrawal (HCC)- (present on admission)  Assessment & Plan  Hx DT's and seizure 2/2 ETOH  "withdrawal.  Patient is a persistent alcohol user.    Prefers vodka, drinks half pint usually daily, obtains her drinks from her roommate.  Patient's last drink was on the day of admission, as stated by the patient.  Alcohol cessation education provided to patient.  Patient stated she wants to stop and this is what makes her feel suicidal as she is unable to stop.  -Seizure precaution  -Librium for long acting benzodiazepine coverage  - PO vitamins, patient was given detox IV bag with thiamine.  -Check Magnesium daily  - consult    Discontinued CIWA protocol on June 1, 2021.    Acute on chronic respiratory failure with hypoxia on home O2 therapy- (present on admission)  Assessment & Plan  Patient currently requiring increased oxygen, arrived to ED requiring 3L.  O2 demand up to 5L NC compared to her chronic home oxygen needs of 2L. Due to patient's acute alcohol withdrawal worsening distress, tachycardia.  5/28 - Improving to 3LNC  - continue oxygen support  - will need restart of home oxygen order on discharge    Vitamin D deficiency- (present on admission)  Assessment & Plan  Patient's vitamin D 25 level 11 on 4/21  Vitamin B12 and iron at appropriate levels.  Continue patient on supplementation, 50,000 IU weekly p.o.    Acute alcoholic hepatitis- (present on admission)  Assessment & Plan  Suspecting 2/2 to continued daily ETOH abuse and acute alcohol intoxication on admission.  RUQ US shows: \"Stable echogenic liver which is most commonly secondary to steatosis\"  Patient likely has fatty liver disease from alcohol use.  5/25 - No Discriminant Function = 8, no indication for steroids  GGT 2572  HIDA scan - 13% ejection fraction  5/26 - Consulted [facility] for evaluation of rising LFTs, elevated GGT.     --- No update by GI until 5/30 - recommended general surgery evaluation.  5/31 - Consulted General Surgery Dr. Chavez, patient is not a candidate for any cholecystectomy due to " her alcoholic hepatitis on this admission, likely biliary dyskinesia, recommended low fat diet and outpt evaluation for any possible cholecystectomy.   --- Patient's LFTs improving.  , ALT 80,     Continue to monitor, recheck CMP daily  Trial back librium 25mg po daily (5/31)  Started her on hydroxyzine for anxiety.    Macrocytic anemia- (present on admission)  Assessment & Plan  Vit B12 and folate levels normal on prior check  Likely due to alcohol liver disease  Monitor for any bleeding    Hypomagnesemia- (present on admission)  Assessment & Plan  Patient arrived with magnesium 1.9, down to 1.6 despite IV replacements.  Replace as needed.  Continue to monitor.    Leukocytopenia- (present on admission)  Assessment & Plan  Low WBC due to liver disorder  Slightly improving  Continue to monitor.    COPD (chronic obstructive pulmonary disease) (HCC)- (present on admission)  Assessment & Plan  Per chart review: no PFTs on file, on 2 L of oxygen at home.  No signs of acute exacerbation at this time on admission.  Oxygen per guidelines  Monitor respiratory status    Alcohol-induced acute pancreatitis- (present on admission)  Assessment & Plan  Patient continues to complain about nausea, but denied any vomiting.  On solid foods  Pain Control    Depression with suicidal ideation- (present on admission)  Assessment & Plan  Patient presented with suicidal ideation, plan to potentially overdose on her home medications.  Patient was brought in with a legal hold  Legal hold discontinued.    Hypokalemia  Assessment & Plan  Patient's potassium low today 3.3.  Likely due to poor nutrition due to severe alcohol use.  Magnesium low on admission 1.9.  Replacing via IV given patient's nausea  Recheck potassium and magnesium daily     I discussed plan of care during multidisciplinary regarding her current medical condition and discharge plan.  I placed SNF referral. Patient will need SNF for less than 45 days     VTE  prophylaxis: Lovenox

## 2021-06-12 NOTE — CARE PLAN
Problem: Seizure Precautions  Goal: Implementation of seizure precautions  Outcome: Progressing  Note: Seizure precautions remain in place.     Problem: Pain - Standard  Goal: Alleviation of pain or a reduction in pain to the patient’s comfort goal  Outcome: Progressing  Note: Pain medication is being utilized for patient comfort.     The patient is stable at this time.    Shift Goals  Clinical Goals: maintain safety  Patient Goals: control back pain, sleep    Progress made toward(s) clinical / shift goals: maintain safety, sleep, pain control    Patient is not progressing towards the following goals:

## 2021-06-12 NOTE — CARE PLAN
The patient is Stable - Low risk of patient condition declining or worsening    Shift Goals  Clinical Goals: maintain safety  Patient Goals: control back pain, sleep    Progress made toward(s) clinical / shift goals:  Pt on baseline oxygen.     Patient is not progressing towards the following goals:

## 2021-06-13 ENCOUNTER — APPOINTMENT (OUTPATIENT)
Dept: RADIOLOGY | Facility: MEDICAL CENTER | Age: 51
DRG: 896 | End: 2021-06-13
Attending: INTERNAL MEDICINE
Payer: MEDICAID

## 2021-06-13 LAB
APPEARANCE UR: CLEAR
BACTERIA #/AREA URNS HPF: NEGATIVE /HPF
BILIRUB UR QL STRIP.AUTO: NEGATIVE
COLOR UR: YELLOW
EPI CELLS #/AREA URNS HPF: NORMAL /HPF
GLUCOSE UR STRIP.AUTO-MCNC: NEGATIVE MG/DL
HYALINE CASTS #/AREA URNS LPF: NORMAL /LPF
KETONES UR STRIP.AUTO-MCNC: NEGATIVE MG/DL
LEUKOCYTE ESTERASE UR QL STRIP.AUTO: ABNORMAL
MICRO URNS: ABNORMAL
NITRITE UR QL STRIP.AUTO: NEGATIVE
PH UR STRIP.AUTO: 6 [PH] (ref 5–8)
PROT UR QL STRIP: NEGATIVE MG/DL
RBC # URNS HPF: NORMAL /HPF
RBC UR QL AUTO: NEGATIVE
SP GR UR STRIP.AUTO: 1.01
UROBILINOGEN UR STRIP.AUTO-MCNC: 0.2 MG/DL
WBC #/AREA URNS HPF: NORMAL /HPF

## 2021-06-13 PROCEDURE — 700102 HCHG RX REV CODE 250 W/ 637 OVERRIDE(OP): Performed by: INTERNAL MEDICINE

## 2021-06-13 PROCEDURE — 97164 PT RE-EVAL EST PLAN CARE: CPT

## 2021-06-13 PROCEDURE — 770020 HCHG ROOM/CARE - TELE (206)

## 2021-06-13 PROCEDURE — A9270 NON-COVERED ITEM OR SERVICE: HCPCS | Performed by: STUDENT IN AN ORGANIZED HEALTH CARE EDUCATION/TRAINING PROGRAM

## 2021-06-13 PROCEDURE — A9270 NON-COVERED ITEM OR SERVICE: HCPCS | Performed by: INTERNAL MEDICINE

## 2021-06-13 PROCEDURE — 70450 CT HEAD/BRAIN W/O DYE: CPT

## 2021-06-13 PROCEDURE — 99232 SBSQ HOSP IP/OBS MODERATE 35: CPT | Performed by: INTERNAL MEDICINE

## 2021-06-13 PROCEDURE — 700101 HCHG RX REV CODE 250: Performed by: INTERNAL MEDICINE

## 2021-06-13 PROCEDURE — 700102 HCHG RX REV CODE 250 W/ 637 OVERRIDE(OP): Performed by: STUDENT IN AN ORGANIZED HEALTH CARE EDUCATION/TRAINING PROGRAM

## 2021-06-13 PROCEDURE — 700111 HCHG RX REV CODE 636 W/ 250 OVERRIDE (IP): Performed by: STUDENT IN AN ORGANIZED HEALTH CARE EDUCATION/TRAINING PROGRAM

## 2021-06-13 PROCEDURE — 81001 URINALYSIS AUTO W/SCOPE: CPT

## 2021-06-13 RX ORDER — GUAIFENESIN/DEXTROMETHORPHAN 100-10MG/5
5 SYRUP ORAL EVERY 6 HOURS PRN
Status: DISCONTINUED | OUTPATIENT
Start: 2021-06-13 | End: 2021-06-17 | Stop reason: HOSPADM

## 2021-06-13 RX ORDER — NITROFURANTOIN 25; 75 MG/1; MG/1
100 CAPSULE ORAL 2 TIMES DAILY WITH MEALS
Status: COMPLETED | OUTPATIENT
Start: 2021-06-13 | End: 2021-06-16

## 2021-06-13 RX ORDER — CIPROFLOXACIN 500 MG/1
500 TABLET, FILM COATED ORAL EVERY 12 HOURS
Status: DISCONTINUED | OUTPATIENT
Start: 2021-06-13 | End: 2021-06-13

## 2021-06-13 RX ADMIN — FLUOXETINE 30 MG: 20 CAPSULE ORAL at 05:34

## 2021-06-13 RX ADMIN — GABAPENTIN 300 MG: 300 CAPSULE ORAL at 18:01

## 2021-06-13 RX ADMIN — GUAIFENESIN AND DEXTROMETHORPHAN 5 ML: 100; 10 SYRUP ORAL at 15:43

## 2021-06-13 RX ADMIN — ANTACID TABLETS 500 MG: 500 TABLET, CHEWABLE ORAL at 05:34

## 2021-06-13 RX ADMIN — OXYCODONE 5 MG: 5 TABLET ORAL at 05:34

## 2021-06-13 RX ADMIN — LIDOCAINE 1 PATCH: 50 PATCH TOPICAL at 13:29

## 2021-06-13 RX ADMIN — Medication 100 MG: at 05:34

## 2021-06-13 RX ADMIN — GABAPENTIN 300 MG: 300 CAPSULE ORAL at 13:29

## 2021-06-13 RX ADMIN — OXYCODONE 5 MG: 5 TABLET ORAL at 02:43

## 2021-06-13 RX ADMIN — ENOXAPARIN SODIUM 40 MG: 40 INJECTION SUBCUTANEOUS at 05:35

## 2021-06-13 RX ADMIN — ACETAMINOPHEN 650 MG: 325 TABLET, FILM COATED ORAL at 13:30

## 2021-06-13 RX ADMIN — FAMOTIDINE 20 MG: 20 TABLET ORAL at 05:34

## 2021-06-13 RX ADMIN — ACETAMINOPHEN 650 MG: 325 TABLET, FILM COATED ORAL at 00:45

## 2021-06-13 RX ADMIN — NITROFURANTOIN MONOHYDRATE/MACROCRYSTALLINE 100 MG: 25; 75 CAPSULE ORAL at 16:53

## 2021-06-13 RX ADMIN — GABAPENTIN 300 MG: 300 CAPSULE ORAL at 05:34

## 2021-06-13 RX ADMIN — OXYCODONE 5 MG: 5 TABLET ORAL at 16:53

## 2021-06-13 RX ADMIN — PROMETHAZINE HYDROCHLORIDE 12.5 MG: 25 TABLET ORAL at 00:44

## 2021-06-13 RX ADMIN — Medication 1 TABLET: at 05:34

## 2021-06-13 ASSESSMENT — ENCOUNTER SYMPTOMS
SPUTUM PRODUCTION: 0
WEIGHT LOSS: 0
ABDOMINAL PAIN: 1
VOMITING: 0
SHORTNESS OF BREATH: 0
NERVOUS/ANXIOUS: 1
BLURRED VISION: 0
FEVER: 0
DIZZINESS: 0
DIARRHEA: 0
ORTHOPNEA: 0
HEADACHES: 0
EYE PAIN: 0
PHOTOPHOBIA: 0
NAUSEA: 0
COUGH: 0
DOUBLE VISION: 0
HALLUCINATIONS: 0
PALPITATIONS: 0
CONSTIPATION: 0
CHILLS: 0
MYALGIAS: 0
FOCAL WEAKNESS: 0

## 2021-06-13 ASSESSMENT — COGNITIVE AND FUNCTIONAL STATUS - GENERAL
SUGGESTED CMS G CODE MODIFIER MOBILITY: CJ
TURNING FROM BACK TO SIDE WHILE IN FLAT BAD: A LITTLE
MOVING TO AND FROM BED TO CHAIR: A LITTLE
CLIMB 3 TO 5 STEPS WITH RAILING: A LITTLE
MOBILITY SCORE: 21

## 2021-06-13 ASSESSMENT — PAIN DESCRIPTION - PAIN TYPE
TYPE: ACUTE PAIN

## 2021-06-13 ASSESSMENT — GAIT ASSESSMENTS
ASSISTIVE DEVICE: FRONT WHEEL WALKER
GAIT LEVEL OF ASSIST: SUPERVISED
DISTANCE (FEET): 150

## 2021-06-13 ASSESSMENT — LIFESTYLE VARIABLES: SUBSTANCE_ABUSE: 0

## 2021-06-13 NOTE — PROGRESS NOTES
Monitor summary  Rhythm:  SR  Ectopy: PVC (f)(o), (coup)  HR:  88-98  .15/.06/.38  Per strip from monitor room

## 2021-06-13 NOTE — PROGRESS NOTES
Pt intermittently confused. Dr. Saini aware via face to face conversation. Per Dr. Saini reassess in a hour.

## 2021-06-13 NOTE — CARE PLAN
The patient is Watcher - Medium risk of patient condition declining or worsening    Shift Goals  Clinical Goals: maintain safety  Patient Goals: control back pain, sleep    Progress made toward(s) clinical / shift goals: Pt SB assist for ADLs. Pt able to perform cares with set up.   Patient is not progressing towards the following goals:      Problem: Self Care  Goal: Patient will have the ability to perform ADLs independently or with assistance (bathe, groom, dress, toilet and feed)  Outcome: Not Progressing

## 2021-06-13 NOTE — PROGRESS NOTES
"Hospital Medicine Daily Progress Note    Date of Service  6/13/2021    Chief Complaint  50 y.o. female admitted 5/24/2021 with suicidal ideation    Hospital Course    Per Dr. Dominguez note    \"50-year-old female with a past medical history of recurrent alcoholic pancreatitis, alcohol abuse and alcohol withdrawals including seizures, history of bipolar disorder, polysubstance use with methamphetamines and alcohol, prior suicide attempt, COPD on 2 L home oxygen, presented 5/24/2021 with complaints of suicidal ideation due to her chronic alcohol use.  Patient was admitted found to be in acute alcohol intoxication, elevated POC breathalyzer 0.292.  Patient on admission was more somnolent but arousable with sternal rub.  Patient came in with a legal hold due to complaints of suicidal ideation, suicidal plan by taking overdose of pills she has at home.    5/25/2021-patient seen and evaluated today, labs and notes reviewed.  Patient stated she had continued suicidal thoughts, with potential plan of using her pills at home.  Patient was showing signs of acute alcohol withdrawal today, tremors and tachycardia.  Patient complained of nausea.  Psychiatry evaluated patient today given she her legal hold status.  CIWA scores ranging between 11-13 since admission.  Patient requiring Ativan and Librium.  She stated her last drink was the day she came to the hospital.    5/26 - Patient's LFTs increasing today, was decreased yesterday compared to high levels on admission. RUQ obtained on admission did not show obstruction.  Patient in active withdrawal continued. Patient stated she has RUQ pain today, severe, painful to deep touch, localized 9/10.  Consulted [facility] gastro[facility]ology.    5/27 - patient this morning was distressed, she was noted to be standing without help of nursing despite having directions explained to her by nursing to wait for assistance to use the commode or to get out of bed. Patient did not fall, but was " "found to have soiled herself with urine. Patient denied any RUQ pain today compared to yesterday. She stated she was eating better. CIWA up to 12.  Oxygen supplementation up to 5LNC.    5/28 -patient was more controlled today, and denied any symptoms for abdominal pain at this time.  Morning patient was showing signs of improvement.  Most recent CIWA score went up to 13 at 1305 p.m.  Was only as high as 9 before that.  Oxygen now down to 3 L nasal cannula.    5/29-patient continued to have RUQ pain today, 8/10, sharp, radiating to her back and to her left lower quadrant.  + nausea.  Denied chest pain, vomiting.  Ordered HIDA scan.    5/30 - patient stated she had continued RUQ pain. HIDA scan showed ejection fraction of 13%, with GGT 2572.  Called [facility] for update, physician on call to evaluate patient and make recommendations.  If no procedures required by , will need to likely consult with general surgery.  Patient unable to sit for MRI due to her tremors, hence HIDA scan was ordered.    5/31 - patient had continued RUQ pain. Tolerating her diet.   Spoke with Dr. Chavez with general surgery, patient is not a candidate for any cholecystectomy due to her alcoholic hepatitis on this admission, likely biliary dyskinesia, recommended low fat diet and outpt evaluation for any possible cholecystectomy\".      Interval Problem Update  Patient getting confused this morning will check a CT head and also a UA denies any CP or SOB    Consultants/Specialty  Psychiatry  [facility] gastroenterology  General surgery    Code Status  Full Code    Disposition  Skilled nursing facility    Review of Systems  Review of Systems   Constitutional: Negative for chills, fever and weight loss.   HENT: Negative for hearing loss and tinnitus.    Eyes: Negative for blurred vision, double vision, photophobia and pain.   Respiratory: Negative for cough, sputum production and shortness of breath.    Cardiovascular: Negative for " chest pain, palpitations, orthopnea and leg swelling.   Gastrointestinal: Positive for abdominal pain. Negative for constipation, diarrhea, nausea and vomiting.   Genitourinary: Negative for dysuria, frequency and urgency.   Musculoskeletal: Negative for joint pain and myalgias.   Skin: Negative for rash.   Neurological: Negative for dizziness, focal weakness and headaches.   Psychiatric/Behavioral: Negative for hallucinations and substance abuse. The patient is nervous/anxious.    All other systems reviewed and are negative.     Physical Exam  Temp:  [35.8 °C (96.5 °F)-36.6 °C (97.8 °F)] 36.6 °C (97.8 °F)  Pulse:  [] 82  Resp:  [9-20] 20  BP: (104-149)/(54-90) 107/72  SpO2:  [93 %-97 %] 94 %    Physical Exam  Vitals reviewed.   Constitutional:       General: She is not in acute distress.     Appearance: Normal appearance. She is not ill-appearing.   HENT:      Head: Normocephalic and atraumatic.      Nose: No congestion.   Eyes:      General:         Right eye: No discharge.         Left eye: No discharge.      Pupils: Pupils are equal, round, and reactive to light.   Cardiovascular:      Rate and Rhythm: Normal rate and regular rhythm.      Pulses: Normal pulses.      Heart sounds: Normal heart sounds. No murmur heard.     Pulmonary:      Effort: Pulmonary effort is normal. No respiratory distress.      Breath sounds: Normal breath sounds. No stridor.   Abdominal:      General: Bowel sounds are normal. There is no distension.      Palpations: Abdomen is soft.      Tenderness: There is no abdominal tenderness. There is no guarding or rebound.      Comments: No signs of acute abdomen on physical exam.   Musculoskeletal:         General: No swelling or tenderness. Normal range of motion.      Cervical back: Normal range of motion. No rigidity.   Skin:     General: Skin is warm.      Capillary Refill: Capillary refill takes less than 2 seconds.      Coloration: Skin is not jaundiced or pale.      Findings: No  bruising.   Neurological:      General: No focal deficit present.      Mental Status: She is alert and oriented to person, place, and time.      Cranial Nerves: No cranial nerve deficit.   Psychiatric:         Mood and Affect: Mood normal.         Behavior: Behavior normal.       Fluids  No intake or output data in the 24 hours ending 06/13/21 1144    Laboratory                        Imaging  NM-BILIARY HIDA SCAN FATTY MEAL   Final Result      Low gallbladder ejection fraction.      This study was obtained utilizing fatty meal challenge to induce gallbladder contraction due to national shortage of cholecystokinin.  There are no national standards for gallbladder ejection fraction calculated utilizing fatty meal challenge.  An    ejection fraction greater than 35% is most likely normal.  Gallbladder ejection fraction less than 35% may be abnormal but could be falsely positive.  Therefore, this test could be falsely positive.  Consider repeat imaging once cholecystokinin becomes    available.      IR-US GUIDED PIV   Final Result    Ultrasound-guided PERIPHERAL IV INSERTION performed by    qualified nursing staff as above.      IR-US GUIDED PIV   Final Result    Ultrasound-guided PERIPHERAL IV INSERTION performed by    qualified nursing staff as above.      US-RUQ   Final Result      Stable echogenic liver which is most commonly secondary to steatosis      CT-HEAD W/O    (Results Pending)        Assessment/Plan  * Alcohol dependence with withdrawal (HCC)- (present on admission)  Assessment & Plan  Hx DT's and seizure 2/2 ETOH withdrawal.  Patient is a persistent alcohol user.    Prefers vodka, drinks half pint usually daily, obtains her drinks from her roommate.  Patient's last drink was on the day of admission, as stated by the patient.  Alcohol cessation education provided to patient.  Patient stated she wants to stop and this is what makes her feel suicidal as she is unable to stop.  -Seizure precaution  -Librium  "for long acting benzodiazepine coverage  - PO vitamins, patient was given detox IV bag with thiamine.  -Check Magnesium daily  - consult    Discontinued CIWA protocol on June 1, 2021.    Acute on chronic respiratory failure with hypoxia on home O2 therapy- (present on admission)  Assessment & Plan  Patient currently requiring increased oxygen, arrived to ED requiring 3L.  O2 demand up to 5L NC compared to her chronic home oxygen needs of 2L. Due to patient's acute alcohol withdrawal worsening distress, tachycardia.  5/28 - Improving to 3LNC  - continue oxygen support  - will need restart of home oxygen order on discharge    Vitamin D deficiency- (present on admission)  Assessment & Plan  Patient's vitamin D 25 level 11 on 4/21  Vitamin B12 and iron at appropriate levels.  Continue patient on supplementation, 50,000 IU weekly p.o.    Acute alcoholic hepatitis- (present on admission)  Assessment & Plan  Suspecting 2/2 to continued daily ETOH abuse and acute alcohol intoxication on admission.  RUQ US shows: \"Stable echogenic liver which is most commonly secondary to steatosis\"  Patient likely has fatty liver disease from alcohol use.  5/25 - No Discriminant Function = 8, no indication for steroids  GGT 2572  HIDA scan - 13% ejection fraction  5/26 - Consulted [facility] for evaluation of rising LFTs, elevated GGT.     --- No update by GI until 5/30 - recommended general surgery evaluation.  5/31 - Consulted General Surgery Dr. Chavez, patient is not a candidate for any cholecystectomy due to her alcoholic hepatitis on this admission, likely biliary dyskinesia, recommended low fat diet and outpt evaluation for any possible cholecystectomy.   --- Patient's LFTs improving.  , ALT 80,     Continue to monitor, recheck CMP daily  Trial back librium 25mg po daily (5/31)  Started her on hydroxyzine for anxiety.    Macrocytic anemia- (present on admission)  Assessment & " Plan  Vit B12 and folate levels normal on prior check  Likely due to alcohol liver disease  Monitor for any bleeding    Hypomagnesemia- (present on admission)  Assessment & Plan  Patient arrived with magnesium 1.9, down to 1.6 despite IV replacements.  Replace as needed.  Continue to monitor.    Leukocytopenia- (present on admission)  Assessment & Plan  Low WBC due to liver disorder  Slightly improving  Continue to monitor.    COPD (chronic obstructive pulmonary disease) (HCC)- (present on admission)  Assessment & Plan  Per chart review: no PFTs on file, on 2 L of oxygen at home.  No signs of acute exacerbation at this time on admission.  Oxygen per guidelines  Monitor respiratory status    Alcohol-induced acute pancreatitis- (present on admission)  Assessment & Plan  Patient continues to complain about nausea, but denied any vomiting.  On solid foods  Pain Control    Depression with suicidal ideation- (present on admission)  Assessment & Plan  Patient presented with suicidal ideation, plan to potentially overdose on her home medications.  Patient was brought in with a legal hold  Legal hold discontinued.    Hypokalemia  Assessment & Plan  Patient's potassium low today 3.3.  Likely due to poor nutrition due to severe alcohol use.  Magnesium low on admission 1.9.  Replacing via IV given patient's nausea  Recheck potassium and magnesium daily     I discussed plan of care during multidisciplinary regarding her current medical condition and discharge plan.  I placed SNF referral. Patient will need SNF for less than 45 days     VTE prophylaxis: Lovenox
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
## 2021-06-14 LAB
ANION GAP SERPL CALC-SCNC: 12 MMOL/L (ref 7–16)
BUN SERPL-MCNC: 6 MG/DL (ref 8–22)
CALCIUM SERPL-MCNC: 9.7 MG/DL (ref 8.5–10.5)
CHLORIDE SERPL-SCNC: 99 MMOL/L (ref 96–112)
CO2 SERPL-SCNC: 25 MMOL/L (ref 20–33)
CREAT SERPL-MCNC: 0.52 MG/DL (ref 0.5–1.4)
ERYTHROCYTE [DISTWIDTH] IN BLOOD BY AUTOMATED COUNT: 45.3 FL (ref 35.9–50)
GLUCOSE SERPL-MCNC: 104 MG/DL (ref 65–99)
HCT VFR BLD AUTO: 43 % (ref 37–47)
HGB BLD-MCNC: 14.7 G/DL (ref 12–16)
MCH RBC QN AUTO: 34.4 PG (ref 27–33)
MCHC RBC AUTO-ENTMCNC: 34.2 G/DL (ref 33.6–35)
MCV RBC AUTO: 100.7 FL (ref 81.4–97.8)
PLATELET # BLD AUTO: 252 K/UL (ref 164–446)
PMV BLD AUTO: 9.7 FL (ref 9–12.9)
POTASSIUM SERPL-SCNC: 4.3 MMOL/L (ref 3.6–5.5)
RBC # BLD AUTO: 4.27 M/UL (ref 4.2–5.4)
SODIUM SERPL-SCNC: 136 MMOL/L (ref 135–145)
WBC # BLD AUTO: 5.4 K/UL (ref 4.8–10.8)

## 2021-06-14 PROCEDURE — A9270 NON-COVERED ITEM OR SERVICE: HCPCS | Performed by: INTERNAL MEDICINE

## 2021-06-14 PROCEDURE — 700102 HCHG RX REV CODE 250 W/ 637 OVERRIDE(OP): Performed by: INTERNAL MEDICINE

## 2021-06-14 PROCEDURE — 97112 NEUROMUSCULAR REEDUCATION: CPT

## 2021-06-14 PROCEDURE — 97535 SELF CARE MNGMENT TRAINING: CPT

## 2021-06-14 PROCEDURE — 770020 HCHG ROOM/CARE - TELE (206)

## 2021-06-14 PROCEDURE — A9270 NON-COVERED ITEM OR SERVICE: HCPCS | Performed by: STUDENT IN AN ORGANIZED HEALTH CARE EDUCATION/TRAINING PROGRAM

## 2021-06-14 PROCEDURE — 99232 SBSQ HOSP IP/OBS MODERATE 35: CPT | Performed by: INTERNAL MEDICINE

## 2021-06-14 PROCEDURE — 700101 HCHG RX REV CODE 250: Performed by: INTERNAL MEDICINE

## 2021-06-14 PROCEDURE — 80048 BASIC METABOLIC PNL TOTAL CA: CPT

## 2021-06-14 PROCEDURE — 700102 HCHG RX REV CODE 250 W/ 637 OVERRIDE(OP): Performed by: STUDENT IN AN ORGANIZED HEALTH CARE EDUCATION/TRAINING PROGRAM

## 2021-06-14 PROCEDURE — 85027 COMPLETE CBC AUTOMATED: CPT

## 2021-06-14 PROCEDURE — 36415 COLL VENOUS BLD VENIPUNCTURE: CPT

## 2021-06-14 RX ADMIN — OXYCODONE 5 MG: 5 TABLET ORAL at 21:58

## 2021-06-14 RX ADMIN — GABAPENTIN 300 MG: 300 CAPSULE ORAL at 17:46

## 2021-06-14 RX ADMIN — ACETAMINOPHEN 650 MG: 325 TABLET, FILM COATED ORAL at 05:07

## 2021-06-14 RX ADMIN — FAMOTIDINE 20 MG: 20 TABLET ORAL at 05:07

## 2021-06-14 RX ADMIN — OXYCODONE 5 MG: 5 TABLET ORAL at 03:16

## 2021-06-14 RX ADMIN — NITROFURANTOIN MONOHYDRATE/MACROCRYSTALLINE 100 MG: 25; 75 CAPSULE ORAL at 07:49

## 2021-06-14 RX ADMIN — Medication 100 MG: at 05:08

## 2021-06-14 RX ADMIN — ANTACID TABLETS 500 MG: 500 TABLET, CHEWABLE ORAL at 05:07

## 2021-06-14 RX ADMIN — OXYCODONE 5 MG: 5 TABLET ORAL at 15:53

## 2021-06-14 RX ADMIN — Medication 5 MG: at 21:59

## 2021-06-14 RX ADMIN — FLUOXETINE 30 MG: 20 CAPSULE ORAL at 05:07

## 2021-06-14 RX ADMIN — GABAPENTIN 300 MG: 300 CAPSULE ORAL at 05:07

## 2021-06-14 RX ADMIN — NITROFURANTOIN MONOHYDRATE/MACROCRYSTALLINE 100 MG: 25; 75 CAPSULE ORAL at 17:46

## 2021-06-14 RX ADMIN — GABAPENTIN 300 MG: 300 CAPSULE ORAL at 14:29

## 2021-06-14 RX ADMIN — LIDOCAINE 1 PATCH: 50 PATCH TOPICAL at 14:27

## 2021-06-14 ASSESSMENT — COGNITIVE AND FUNCTIONAL STATUS - GENERAL
SUGGESTED CMS G CODE MODIFIER DAILY ACTIVITY: CJ
DAILY ACTIVITIY SCORE: 21
HELP NEEDED FOR BATHING: A LITTLE
DRESSING REGULAR LOWER BODY CLOTHING: A LITTLE
TOILETING: A LITTLE

## 2021-06-14 ASSESSMENT — ENCOUNTER SYMPTOMS
VOMITING: 0
ABDOMINAL PAIN: 1
DOUBLE VISION: 0
PHOTOPHOBIA: 0
SPUTUM PRODUCTION: 0
NERVOUS/ANXIOUS: 1
CHILLS: 0
SHORTNESS OF BREATH: 0
MYALGIAS: 0
BLURRED VISION: 0
DIZZINESS: 0
FOCAL WEAKNESS: 0
FEVER: 0
ORTHOPNEA: 0
EYE PAIN: 0
CONSTIPATION: 0
WEIGHT LOSS: 0
NAUSEA: 0
PALPITATIONS: 0
HALLUCINATIONS: 0
COUGH: 0
HEADACHES: 0
DIARRHEA: 0

## 2021-06-14 ASSESSMENT — LIFESTYLE VARIABLES: SUBSTANCE_ABUSE: 0

## 2021-06-14 ASSESSMENT — PAIN DESCRIPTION - PAIN TYPE
TYPE: ACUTE PAIN
TYPE: ACUTE PAIN

## 2021-06-14 NOTE — PROGRESS NOTES
Pt has remained intermittently confused during this shift. Dr. Saini aware. CT ordered and UA done.
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
## 2021-06-14 NOTE — DISCHARGE PLANNING
Agency/Facility Name: [facility]  Outcome: Left message, awaiting call back.     Agency/Facility Name: [facility]  Outcome: Left message, awaiting call back.    Agency/Facility Name: [facility]  Outcome: No answer and mailbox is full.    Agency/Facility Name: [facility]  Spoke To:   Outcome: Admission just stepped into a meeting, will leave message and my number when they finish.

## 2021-06-14 NOTE — CARE PLAN
The patient is Stable - Low risk of patient condition declining or worsening    Shift Goals  Clinical Goals: maintain safety  Patient Goals: control back pain, sleep      Problem: Seizure Precautions  Goal: Implementation of seizure precautions  Outcome: Progressing  Note: Padded bed rails in place along with oxygen and suction set up      Problem: Psychosocial  Goal: Patient's ability to identify and develop effective coping behaviors will improve  Outcome: Progressing     Problem: Communication  Goal: The ability to communicate needs accurately and effectively will improve  Outcome: Progressing  Flowsheets (Taken 6/14/2021 0825)  Communication:   Assessed patient's ability to understand and communicate   Oriented patient to call light   Reoriented patient to environment     Problem: Mobility  Goal: Patient's capacity to carry out activities will improve  Outcome: Progressing  Flowsheets (Taken 6/14/2021 0825)  Mobility: Encouraged mobilization per interdisciplinary team recommendations     Problem: Self Care  Goal: Patient will have the ability to perform ADLs independently or with assistance (bathe, groom, dress, toilet and feed)  Outcome: Progressing  Note: Encouraged patient to participate in ADLs such as brushing teeth and hair care

## 2021-06-14 NOTE — CARE PLAN
The patient is stable at this time.      Problem: Pain - Standard  Goal: Alleviation of pain or a reduction in pain to the patient’s comfort goal  Outcome: Progressing  Note: Patient reports increased pain control this evening without use of medication.     Problem: Communication  Goal: The ability to communicate needs accurately and effectively will improve  Outcome: Progressing  Flowsheets (Taken 6/14/2021 0111)  Communication:   Assessed patient's ability to understand and communicate   Oriented patient to call light  Note: Patient expresses her needs appropriately. White board updated at beginning of shift. Call light remains within reach of patient.       Shift Goals  Clinical Goals: maintain safety  Patient Goals: control back pain, sleep    Progress made toward(s) clinical / shift goals:  maintaining safety and pain control    Patient is not progressing towards the following goals:

## 2021-06-14 NOTE — PROGRESS NOTES
"Hospital Medicine Daily Progress Note    Date of Service  6/14/2021    Chief Complaint  50 y.o. female admitted 5/24/2021 with suicidal ideation    Hospital Course    Per Dr. Dominguez note    \"50-year-old female with a past medical history of recurrent alcoholic pancreatitis, alcohol abuse and alcohol withdrawals including seizures, history of bipolar disorder, polysubstance use with methamphetamines and alcohol, prior suicide attempt, COPD on 2 L home oxygen, presented 5/24/2021 with complaints of suicidal ideation due to her chronic alcohol use.  Patient was admitted found to be in acute alcohol intoxication, elevated POC breathalyzer 0.292.  Patient on admission was more somnolent but arousable with sternal rub.  Patient came in with a legal hold due to complaints of suicidal ideation, suicidal plan by taking overdose of pills she has at home.    5/25/2021-patient seen and evaluated today, labs and notes reviewed.  Patient stated she had continued suicidal thoughts, with potential plan of using her pills at home.  Patient was showing signs of acute alcohol withdrawal today, tremors and tachycardia.  Patient complained of nausea.  Psychiatry evaluated patient today given she her legal hold status.  CIWA scores ranging between 11-13 since admission.  Patient requiring Ativan and Librium.  She stated her last drink was the day she came to the hospital.    5/26 - Patient's LFTs increasing today, was decreased yesterday compared to high levels on admission. RUQ obtained on admission did not show obstruction.  Patient in active withdrawal continued. Patient stated she has RUQ pain today, severe, painful to deep touch, localized 9/10.  Consulted [facility].    5/27 - patient this morning was distressed, she was noted to be standing without help of nursing despite having directions explained to her by nursing to wait for assistance to use the commode or to get out of bed. Patient did not fall, but was " "found to have soiled herself with urine. Patient denied any RUQ pain today compared to yesterday. She stated she was eating better. CIWA up to 12.  Oxygen supplementation up to 5LNC.    5/28 -patient was more controlled today, and denied any symptoms for abdominal pain at this time.  Morning patient was showing signs of improvement.  Most recent CIWA score went up to 13 at 1305 p.m.  Was only as high as 9 before that.  Oxygen now down to 3 L nasal cannula.    5/29-patient continued to have RUQ pain today, 8/10, sharp, radiating to her back and to her left lower quadrant.  + nausea.  Denied chest pain, vomiting.  Ordered HIDA scan.    5/30 - patient stated she had continued RUQ pain. HIDA scan showed ejection fraction of 13%, with GGT 2572.  Called [facility] for update, physician on call to evaluate patient and make recommendations.  If no procedures required by GI, will need to likely consult with general surgery.  Patient unable to sit for MRI due to her tremors, hence HIDA scan was ordered.    5/31 - patient had continued RUQ pain. Tolerating her diet.   Spoke with Dr. Chavez with general surgery, patient is not a candidate for any cholecystectomy due to her alcoholic hepatitis on this admission, likely biliary dyskinesia, recommended low fat diet and outpt evaluation for any possible cholecystectomy\".      Interval Problem Update  Patient seen and examined mentation doing better today, CT head neg,  UA positive for possible UTI on Macrobid for 3 days  No acute events overnight     Consultants/Specialty  Psychiatry  [facility] gastroenterology  General surgery    Code Status  Full Code    Disposition  Skilled nursing facility    Review of Systems  Review of Systems   Constitutional: Negative for chills, fever and weight loss.   HENT: Negative for hearing loss and tinnitus.    Eyes: Negative for blurred vision, double vision, photophobia and pain.   Respiratory: Negative for cough, sputum production " and shortness of breath.    Cardiovascular: Negative for chest pain, palpitations, orthopnea and leg swelling.   Gastrointestinal: Positive for abdominal pain. Negative for constipation, diarrhea, nausea and vomiting.   Genitourinary: Negative for dysuria, frequency and urgency.   Musculoskeletal: Negative for joint pain and myalgias.   Skin: Negative for rash.   Neurological: Negative for dizziness, focal weakness and headaches.   Psychiatric/Behavioral: Negative for hallucinations and substance abuse. The patient is nervous/anxious.    All other systems reviewed and are negative.     Physical Exam  Temp:  [35.9 °C (96.6 °F)-36.6 °C (97.8 °F)] 35.9 °C (96.7 °F)  Pulse:  [70-97] 97  Resp:  [18-20] 18  BP: ()/() 145/94  SpO2:  [95 %-99 %] 96 %    Physical Exam  Vitals reviewed.   Constitutional:       General: She is not in acute distress.     Appearance: Normal appearance. She is not ill-appearing.   HENT:      Head: Normocephalic and atraumatic.      Nose: No congestion.   Eyes:      General:         Right eye: No discharge.         Left eye: No discharge.      Pupils: Pupils are equal, round, and reactive to light.   Cardiovascular:      Rate and Rhythm: Normal rate and regular rhythm.      Pulses: Normal pulses.      Heart sounds: Normal heart sounds. No murmur heard.     Pulmonary:      Effort: Pulmonary effort is normal. No respiratory distress.      Breath sounds: Normal breath sounds. No stridor.   Abdominal:      General: Bowel sounds are normal. There is no distension.      Palpations: Abdomen is soft.      Tenderness: There is no abdominal tenderness. There is no guarding or rebound.      Comments: No signs of acute abdomen on physical exam.   Musculoskeletal:         General: No swelling or tenderness. Normal range of motion.      Cervical back: Normal range of motion. No rigidity.   Skin:     General: Skin is warm.      Capillary Refill: Capillary refill takes less than 2 seconds.       Coloration: Skin is not jaundiced or pale.      Findings: No bruising.   Neurological:      General: No focal deficit present.      Mental Status: She is alert and oriented to person, place, and time.      Cranial Nerves: No cranial nerve deficit.   Psychiatric:         Mood and Affect: Mood normal.         Behavior: Behavior normal.       Fluids  No intake or output data in the 24 hours ending 06/14/21 0955    Laboratory  Recent Labs     06/14/21  0327   WBC 5.4   RBC 4.27   HEMOGLOBIN 14.7   HEMATOCRIT 43.0   .7*   MCH 34.4*   MCHC 34.2   RDW 45.3   PLATELETCT 252   MPV 9.7     Recent Labs     06/14/21  0327   SODIUM 136   POTASSIUM 4.3   CHLORIDE 99   CO2 25   GLUCOSE 104*   BUN 6*   CREATININE 0.52   CALCIUM 9.7                   Imaging  IR-US GUIDED PIV   Final Result    Ultrasound-guided PERIPHERAL IV INSERTION performed by    qualified nursing staff as above.      CT-HEAD W/O   Final Result         1. No acute intracranial abnormality. No evidence of acute intracranial hemorrhage or mass lesion.               NM-BILIARY HIDA SCAN FATTY MEAL   Final Result      Low gallbladder ejection fraction.      This study was obtained utilizing fatty meal challenge to induce gallbladder contraction due to national shortage of cholecystokinin.  There are no national standards for gallbladder ejection fraction calculated utilizing fatty meal challenge.  An    ejection fraction greater than 35% is most likely normal.  Gallbladder ejection fraction less than 35% may be abnormal but could be falsely positive.  Therefore, this test could be falsely positive.  Consider repeat imaging once cholecystokinin becomes    available.      IR-US GUIDED PIV   Final Result    Ultrasound-guided PERIPHERAL IV INSERTION performed by    qualified nursing staff as above.      IR-US GUIDED PIV   Final Result    Ultrasound-guided PERIPHERAL IV INSERTION performed by    qualified nursing staff as above.      US-RUQ   Final Result     "  Stable echogenic liver which is most commonly secondary to steatosis           Assessment/Plan  * Alcohol dependence with withdrawal (HCC)- (present on admission)  Assessment & Plan  Hx DT's and seizure 2/2 ETOH withdrawal.  Patient is a persistent alcohol user.    Prefers vodka, drinks half pint usually daily, obtains her drinks from her roommate.  Patient's last drink was on the day of admission, as stated by the patient.  Alcohol cessation education provided to patient.  Patient stated she wants to stop and this is what makes her feel suicidal as she is unable to stop.  -Seizure precaution  -Librium for long acting benzodiazepine coverage  - PO vitamins, patient was given detox IV bag with thiamine.  -Check Magnesium daily  - consult    Discontinued CIWA protocol on June 1, 2021.    Acute on chronic respiratory failure with hypoxia on home O2 therapy- (present on admission)  Assessment & Plan  Patient currently requiring increased oxygen, arrived to ED requiring 3L.  O2 demand up to 5L NC compared to her chronic home oxygen needs of 2L. Due to patient's acute alcohol withdrawal worsening distress, tachycardia.  5/28 - Improving to 3LNC  - continue oxygen support  - will need restart of home oxygen order on discharge    Vitamin D deficiency- (present on admission)  Assessment & Plan  Patient's vitamin D 25 level 11 on 4/21  Vitamin B12 and iron at appropriate levels.  Continue patient on supplementation, 50,000 IU weekly p.o.    Acute alcoholic hepatitis- (present on admission)  Assessment & Plan  Suspecting 2/2 to continued daily ETOH abuse and acute alcohol intoxication on admission.  RUQ US shows: \"Stable echogenic liver which is most commonly secondary to steatosis\"  Patient likely has fatty liver disease from alcohol use.  5/25 - No Discriminant Function = 8, no indication for steroids  GGT 2572  HIDA scan - 13% ejection fraction  5/26 - Consulted [facility] for " evaluation of rising LFTs, elevated GGT.     --- No update by GI until 5/30 - recommended general surgery evaluation.  5/31 - Consulted General Surgery Dr. Chavez, patient is not a candidate for any cholecystectomy due to her alcoholic hepatitis on this admission, likely biliary dyskinesia, recommended low fat diet and outpt evaluation for any possible cholecystectomy.   --- Patient's LFTs improving.  , ALT 80,     Continue to monitor, recheck CMP daily  Trial back librium 25mg po daily (5/31)  Started her on hydroxyzine for anxiety.    Macrocytic anemia- (present on admission)  Assessment & Plan  Vit B12 and folate levels normal on prior check  Likely due to alcohol liver disease  Monitor for any bleeding    Hypomagnesemia- (present on admission)  Assessment & Plan  Patient arrived with magnesium 1.9, down to 1.6 despite IV replacements.  Replace as needed.  Continue to monitor.    Leukocytopenia- (present on admission)  Assessment & Plan  Low WBC due to liver disorder  Slightly improving  Continue to monitor.    COPD (chronic obstructive pulmonary disease) (HCC)- (present on admission)  Assessment & Plan  Per chart review: no PFTs on file, on 2 L of oxygen at home.  No signs of acute exacerbation at this time on admission.  Oxygen per guidelines  Monitor respiratory status    Alcohol-induced acute pancreatitis- (present on admission)  Assessment & Plan  Patient continues to complain about nausea, but denied any vomiting.  On solid foods  Pain Control    Depression with suicidal ideation- (present on admission)  Assessment & Plan  Patient presented with suicidal ideation, plan to potentially overdose on her home medications.  Patient was brought in with a legal hold  Legal hold discontinued.    Hypokalemia  Assessment & Plan  Patient's potassium low today 3.3.  Likely due to poor nutrition due to severe alcohol use.  Magnesium low on admission 1.9.  Replacing via IV given patient's nausea  Recheck  potassium and magnesium daily     I discussed plan of care during multidisciplinary regarding her current medical condition and discharge plan.  I placed SNF referral. Patient will need SNF for less than 45 days     VTE prophylaxis: Lovenox

## 2021-06-14 NOTE — PROGRESS NOTES
Monitor summary  Rhythm: NSR-ST  Ectopy: Rare PVCs  HR:   .15/.09/.32  Per strip from monitor room

## 2021-06-15 LAB
ANION GAP SERPL CALC-SCNC: 11 MMOL/L (ref 7–16)
BUN SERPL-MCNC: 6 MG/DL (ref 8–22)
CALCIUM SERPL-MCNC: 9.4 MG/DL (ref 8.5–10.5)
CHLORIDE SERPL-SCNC: 102 MMOL/L (ref 96–112)
CO2 SERPL-SCNC: 26 MMOL/L (ref 20–33)
CREAT SERPL-MCNC: 0.4 MG/DL (ref 0.5–1.4)
ERYTHROCYTE [DISTWIDTH] IN BLOOD BY AUTOMATED COUNT: 46.1 FL (ref 35.9–50)
GLUCOSE SERPL-MCNC: 115 MG/DL (ref 65–99)
HCT VFR BLD AUTO: 36.5 % (ref 37–47)
HGB BLD-MCNC: 12 G/DL (ref 12–16)
MCH RBC QN AUTO: 33.9 PG (ref 27–33)
MCHC RBC AUTO-ENTMCNC: 32.9 G/DL (ref 33.6–35)
MCV RBC AUTO: 103.1 FL (ref 81.4–97.8)
PLATELET # BLD AUTO: 260 K/UL (ref 164–446)
PMV BLD AUTO: 9.2 FL (ref 9–12.9)
POTASSIUM SERPL-SCNC: 3.7 MMOL/L (ref 3.6–5.5)
RBC # BLD AUTO: 3.54 M/UL (ref 4.2–5.4)
SODIUM SERPL-SCNC: 139 MMOL/L (ref 135–145)
WBC # BLD AUTO: 6.1 K/UL (ref 4.8–10.8)

## 2021-06-15 PROCEDURE — 700102 HCHG RX REV CODE 250 W/ 637 OVERRIDE(OP): Performed by: INTERNAL MEDICINE

## 2021-06-15 PROCEDURE — A9270 NON-COVERED ITEM OR SERVICE: HCPCS | Performed by: INTERNAL MEDICINE

## 2021-06-15 PROCEDURE — 700101 HCHG RX REV CODE 250: Performed by: INTERNAL MEDICINE

## 2021-06-15 PROCEDURE — 770020 HCHG ROOM/CARE - TELE (206)

## 2021-06-15 PROCEDURE — 700102 HCHG RX REV CODE 250 W/ 637 OVERRIDE(OP): Performed by: STUDENT IN AN ORGANIZED HEALTH CARE EDUCATION/TRAINING PROGRAM

## 2021-06-15 PROCEDURE — 80048 BASIC METABOLIC PNL TOTAL CA: CPT

## 2021-06-15 PROCEDURE — 36415 COLL VENOUS BLD VENIPUNCTURE: CPT

## 2021-06-15 PROCEDURE — RXMED WILLOW AMBULATORY MEDICATION CHARGE: Performed by: INTERNAL MEDICINE

## 2021-06-15 PROCEDURE — A9270 NON-COVERED ITEM OR SERVICE: HCPCS | Performed by: STUDENT IN AN ORGANIZED HEALTH CARE EDUCATION/TRAINING PROGRAM

## 2021-06-15 PROCEDURE — 99231 SBSQ HOSP IP/OBS SF/LOW 25: CPT | Performed by: INTERNAL MEDICINE

## 2021-06-15 PROCEDURE — 700111 HCHG RX REV CODE 636 W/ 250 OVERRIDE (IP): Performed by: STUDENT IN AN ORGANIZED HEALTH CARE EDUCATION/TRAINING PROGRAM

## 2021-06-15 PROCEDURE — 85027 COMPLETE CBC AUTOMATED: CPT

## 2021-06-15 RX ORDER — OXYCODONE HYDROCHLORIDE 5 MG/1
5 TABLET ORAL EVERY 4 HOURS PRN
Status: DISCONTINUED | OUTPATIENT
Start: 2021-06-15 | End: 2021-06-17 | Stop reason: HOSPADM

## 2021-06-15 RX ORDER — OXYCODONE HYDROCHLORIDE 5 MG/1
2.5 TABLET ORAL EVERY 6 HOURS PRN
Qty: 6 TABLET | Refills: 0 | Status: SHIPPED | OUTPATIENT
Start: 2021-06-15 | End: 2021-06-20

## 2021-06-15 RX ORDER — OXYCODONE HYDROCHLORIDE 5 MG/1
2.5 TABLET ORAL EVERY 4 HOURS PRN
Status: DISCONTINUED | OUTPATIENT
Start: 2021-06-15 | End: 2021-06-17 | Stop reason: HOSPADM

## 2021-06-15 RX ORDER — CYCLOBENZAPRINE HCL 10 MG
10 TABLET ORAL 3 TIMES DAILY PRN
Status: DISCONTINUED | OUTPATIENT
Start: 2021-06-15 | End: 2021-06-17 | Stop reason: HOSPADM

## 2021-06-15 RX ADMIN — GABAPENTIN 300 MG: 300 CAPSULE ORAL at 12:34

## 2021-06-15 RX ADMIN — OXYCODONE 5 MG: 5 TABLET ORAL at 16:47

## 2021-06-15 RX ADMIN — CYCLOBENZAPRINE 10 MG: 10 TABLET, FILM COATED ORAL at 15:55

## 2021-06-15 RX ADMIN — LIDOCAINE 1 PATCH: 50 PATCH TOPICAL at 12:34

## 2021-06-15 RX ADMIN — ANTACID TABLETS 500 MG: 500 TABLET, CHEWABLE ORAL at 05:51

## 2021-06-15 RX ADMIN — GABAPENTIN 300 MG: 300 CAPSULE ORAL at 05:50

## 2021-06-15 RX ADMIN — HYDROXYZINE HYDROCHLORIDE 25 MG: 25 TABLET, FILM COATED ORAL at 16:47

## 2021-06-15 RX ADMIN — FAMOTIDINE 20 MG: 20 TABLET ORAL at 05:50

## 2021-06-15 RX ADMIN — Medication 100 MG: at 05:50

## 2021-06-15 RX ADMIN — OXYCODONE 5 MG: 5 TABLET ORAL at 21:15

## 2021-06-15 RX ADMIN — NITROFURANTOIN MONOHYDRATE/MACROCRYSTALLINE 100 MG: 25; 75 CAPSULE ORAL at 16:48

## 2021-06-15 RX ADMIN — OXYCODONE 5 MG: 5 TABLET ORAL at 05:51

## 2021-06-15 RX ADMIN — ERGOCALCIFEROL 50000 UNITS: 1.25 CAPSULE ORAL at 22:54

## 2021-06-15 RX ADMIN — Medication 5 MG: at 21:15

## 2021-06-15 RX ADMIN — ENOXAPARIN SODIUM 40 MG: 40 INJECTION SUBCUTANEOUS at 05:52

## 2021-06-15 RX ADMIN — GABAPENTIN 300 MG: 300 CAPSULE ORAL at 16:47

## 2021-06-15 RX ADMIN — FLUOXETINE 30 MG: 20 CAPSULE ORAL at 05:50

## 2021-06-15 RX ADMIN — OXYCODONE 5 MG: 5 TABLET ORAL at 02:17

## 2021-06-15 RX ADMIN — NITROFURANTOIN MONOHYDRATE/MACROCRYSTALLINE 100 MG: 25; 75 CAPSULE ORAL at 07:25

## 2021-06-15 RX ADMIN — ACETAMINOPHEN 650 MG: 325 TABLET, FILM COATED ORAL at 12:34

## 2021-06-15 RX ADMIN — ACETAMINOPHEN 650 MG: 325 TABLET, FILM COATED ORAL at 22:54

## 2021-06-15 ASSESSMENT — PAIN DESCRIPTION - PAIN TYPE
TYPE: ACUTE PAIN
TYPE: ACUTE PAIN;CHRONIC PAIN
TYPE: ACUTE PAIN

## 2021-06-15 NOTE — CARE PLAN
The patient is Stable - Low risk of patient condition declining or worsening    Shift Goals  Clinical Goals: maintain safety  Patient Goals: control back pain  Family Goals: Na    Progress made toward(s) clinical / shift goals:  pt c/o of back pain 7/10. Pt medicated per MAR and hotpack given.     Patient is not progressing towards the following goals:      Problem: Pain - Standard  Goal: Alleviation of pain or a reduction in pain to the patient’s comfort goal  Outcome: Not Progressing

## 2021-06-15 NOTE — CARE PLAN
The patient is Stable - Low risk of patient condition declining or worsening    Shift Goals  Clinical Goals: maintain safety  Patient Goals: control back pain  Family Goals: Na    Progress made toward(s) clinical / shift goals:  patient participating in care    Patient is not progressing towards the following goals:

## 2021-06-15 NOTE — DISCHARGE PLANNING
Anticipated Discharge Disposition:   TBD, [facility] vs other inhouse detox/transitional housing    Action:   Received call from Regi MARTINI from [facility]. Requested to talk to bedside RN. Call transferred. Received call back from LIANET Deluna. Per Regi, they cannot accept the pt for now due to her inability to go up the stairs,     RN CM called [facility] [phone]. No answer, left voicemail for call back.    RN CM called [facility] ([phone]). There is a wait list. Pt needs to call them for phone screening, they they can do an assessment. They do not know when there will be an opening.     RN CM called [facility] ([phone]). Spoke to Tanisha  Needs substance abuse evaluation and needs to be approved by their clinical director. Insurance will cover fee for assessment. Pt will need to go in person. Ruth (email: [email or web address]) head of admin.    JARETT MARTINI called [facility] ([phone]). They are 4-6 weeks out for housing.     RN CM called [facility] ([phone]). No available housing at this time. She waiting for another house to be finished around mid July.    RN CM called [facility] ([phone]). No answer, left voicemail for call back.    RN CM [facility] ([phone]). Pt needs to submit an application. They do have a bed available but pt will need to be evaluated if pt will qualify. Email: [email or web address]. Soraya will have to check with their  if home health, FWW and oxygen are allowed. Gave pt the program application. Bedside RN aware and was also in room.    RN CM called [facility] ([phone]). They are not accepting admissions ans screening because they are transitioning to another program.    Barriers to Discharge:   Placement acceptance  DME oxygen and FWW delivery  Home health if allowed at housing  Need to get pt's home oxygen concentrator from previous residence.    Plan:    Hospital Care Management will continue to follow and assist with discharge planning needs.

## 2021-06-15 NOTE — PROGRESS NOTES
1400- Call received from Akua MARTINI at [facility]. Per Regi pt needs to be able to climb 30-40 stairs (3-4 flights) for them to accept pt. No stairs noted in PT assessment.Dr Saini notified and PT reordered for pt dc needs.

## 2021-06-15 NOTE — FACE TO FACE
Face to Face Supporting Documentation - Home Health    The encounter with this patient was in whole or in part the primary reason for home health admission.    Date of encounter:   Patient:                    MRN:                       YOB: 2023  Olya Youssef  [id number]  1970     Home health to see patient for:  Physical Therapy evaluation and treatment and Occupational therapy evaluation and treatment    Homebound status evidenced by:  Need the aid of supportive devices such as crutches, canes, wheelchairs or walkers. Leaving home requires a considerable and taxing effort. There is a normal inability to leave the home.    Community Physician to provide follow up care: Roby Daniel, A.P.R.N.     Optional Interventions? No      I certify the face to face encounter for this home health care referral meets the CMS requirements and the encounter/clinical assessment with the patient was, in whole, or in part, for the medical condition(s) listed above, which is the primary reason for home health care. Based on my clinical findings: the service(s) are medically necessary, support the need for home health care, and the homebound criteria are met.  I certify that this patient has had a face to face encounter by myself.  Alan Saini M.D. - NPI: [id number]

## 2021-06-15 NOTE — PROGRESS NOTES
Assumed care. Received bedside report. Patient stable A&Ox4 and comfortable. Pain 0 on a 0-10 scale.

## 2021-06-15 NOTE — DISCHARGE PLANNING
@1945  Agency/Facility Name: [facility]  Spoke To: Prashanth  Outcome: Accepted and will deliver portable tank and walker within 2 hours. Need patient to allow for  of used portable tanks.    Received Choice form at 1250  Agency/Facility Name: [facility]  Referral sent per Choice form @ 2692

## 2021-06-15 NOTE — PROGRESS NOTES
Monitor summary for 6-14-21 night shift  Rhythm: SR-ST  Ectopy: PVC (R) PAC  HR:   .12/.09/.30  Per strip from monitor room

## 2021-06-15 NOTE — THERAPY
Physical Therapy Contact Note    Patient Name: Olya Youssef  Age:  50 y.o., Sex:  female  Medical Record #: [id number]  Today's Date: 6/15/2021    Acknowledge new order to assess pt's ability to climb 30-40 stairs. Spoke with patient and CM. It is not a reasonable expectation for patient to achieve this task given her h/o COPD, PNA, and bronchitis in addition to being O2 dependent. On presentation she was at the sink with the CNA taking a sponge bath and became tremulous and SOB in conversation. This is a basic ADL task which was causing exertion; stair climbing will cause an increased demand on her pulmonary system that she will not be able to achieve. Above discussed with patient and she concurs.     Amy Vyas, PT

## 2021-06-15 NOTE — FACE TO FACE
"Face to Face Note  -  Durable Medical Equipment    Alan Saini M.D. - NPI: [id number]  I certify that this patient is under my care and that they had a durable medical equipment(DME)face to face encounter by myself that meets the physician DME face-to-face encounter requirements with this patient on:    Date of encounter:   Patient:                    MRN:                       YOB: 2023  Olya Youssef  [id number]  1970     The encounter with the patient was in whole, or in part, for the following medical condition, which is the primary reason for durable medical equipment:  COPD and Other - debility     I certify that, based on my findings, the following durable medical equipment is medically necessary:  Oxygen and Walkers.    HOME O2 Saturation Measurements:(Values must be present for Home Oxygen orders)  Room air sat at rest: 87  Room air sat with amb: 84  With liters of O2: 3, O2 sat at rest with O2: 96  With Liters of O2: 3, O2 sat with amb with O2 : 91  Is the patient mobile?: Yes    My Clinical findings support the need for the above equipment due to:  Abnormal Gait, Hypoxia    Supporting Symptoms: The patient requires supplemental oxygen, as the following interventions have been tried with limited or no improvement: \"Bronchodilators and/or steroid inhalers, \"Oral and/or IV steroids, \"Ambulation with oximetry and \"Incentive spirometry    If patient feels more short of breath, they can go up to 6 liters per minute and contact healthcare provider.  "

## 2021-06-15 NOTE — DISCHARGE PLANNING
Anticipated Discharge Disposition:   [facility], with home oxygen, FWW    Action:   RN CM called Kathryn ([phone]) from [facility]. Per Kathryn, [facility] does not accept home health. Informed Kathryn that pt has oxygen and FWW. Per Kathryn, these can be an issue for placement. Per Kathryn, she will forward pt's information to a LIANET Deluna, the CM will call this RN CM.   Received choice for DME to resume service with preferred. Choice form faxed to [facility].    Barriers to Discharge:   [facility] acceptance.  DME acceptance to resume care.    Plan:   Hospital Care Management will continue to follow and assist with discharge planning needs.

## 2021-06-15 NOTE — DISCHARGE PLANNING
Anticipated Discharge Disposition:   [facility]    Action:   JARETT MARTINI called Kathryn ([phone]) from [facility]. No answer, left voicemail for call back.     Barriers to Discharge:   Placement acceptance and bed availability.    Plan:   Hospital Care Management will continue to follow and assist with discharge planning needs.

## 2021-06-16 PROCEDURE — A9270 NON-COVERED ITEM OR SERVICE: HCPCS | Performed by: INTERNAL MEDICINE

## 2021-06-16 PROCEDURE — 700101 HCHG RX REV CODE 250: Performed by: INTERNAL MEDICINE

## 2021-06-16 PROCEDURE — 700102 HCHG RX REV CODE 250 W/ 637 OVERRIDE(OP): Performed by: INTERNAL MEDICINE

## 2021-06-16 PROCEDURE — 770020 HCHG ROOM/CARE - TELE (206)

## 2021-06-16 PROCEDURE — A9270 NON-COVERED ITEM OR SERVICE: HCPCS | Performed by: STUDENT IN AN ORGANIZED HEALTH CARE EDUCATION/TRAINING PROGRAM

## 2021-06-16 PROCEDURE — 700102 HCHG RX REV CODE 250 W/ 637 OVERRIDE(OP): Performed by: STUDENT IN AN ORGANIZED HEALTH CARE EDUCATION/TRAINING PROGRAM

## 2021-06-16 PROCEDURE — 99231 SBSQ HOSP IP/OBS SF/LOW 25: CPT | Performed by: INTERNAL MEDICINE

## 2021-06-16 RX ADMIN — ANTACID TABLETS 500 MG: 500 TABLET, CHEWABLE ORAL at 04:45

## 2021-06-16 RX ADMIN — OXYCODONE 5 MG: 5 TABLET ORAL at 21:00

## 2021-06-16 RX ADMIN — CYCLOBENZAPRINE 10 MG: 10 TABLET, FILM COATED ORAL at 08:34

## 2021-06-16 RX ADMIN — ACETAMINOPHEN 650 MG: 325 TABLET, FILM COATED ORAL at 04:55

## 2021-06-16 RX ADMIN — CYCLOBENZAPRINE 10 MG: 10 TABLET, FILM COATED ORAL at 01:41

## 2021-06-16 RX ADMIN — OXYCODONE 5 MG: 5 TABLET ORAL at 17:08

## 2021-06-16 RX ADMIN — FLUOXETINE 30 MG: 20 CAPSULE ORAL at 04:45

## 2021-06-16 RX ADMIN — FAMOTIDINE 20 MG: 20 TABLET ORAL at 04:45

## 2021-06-16 RX ADMIN — Medication 5 MG: at 21:00

## 2021-06-16 RX ADMIN — Medication 100 MG: at 04:45

## 2021-06-16 RX ADMIN — GABAPENTIN 300 MG: 300 CAPSULE ORAL at 17:08

## 2021-06-16 RX ADMIN — OXYCODONE 5 MG: 5 TABLET ORAL at 01:41

## 2021-06-16 RX ADMIN — GABAPENTIN 300 MG: 300 CAPSULE ORAL at 12:15

## 2021-06-16 RX ADMIN — CYCLOBENZAPRINE 10 MG: 10 TABLET, FILM COATED ORAL at 17:08

## 2021-06-16 RX ADMIN — GABAPENTIN 300 MG: 300 CAPSULE ORAL at 04:45

## 2021-06-16 RX ADMIN — LIDOCAINE 1 PATCH: 50 PATCH TOPICAL at 12:15

## 2021-06-16 RX ADMIN — NITROFURANTOIN MONOHYDRATE/MACROCRYSTALLINE 100 MG: 25; 75 CAPSULE ORAL at 08:34

## 2021-06-16 RX ADMIN — OXYCODONE 5 MG: 5 TABLET ORAL at 08:34

## 2021-06-16 ASSESSMENT — ENCOUNTER SYMPTOMS
PHOTOPHOBIA: 0
MYALGIAS: 0
FEVER: 0
NAUSEA: 0
PALPITATIONS: 0
COUGH: 0
EYE PAIN: 0
CONSTIPATION: 0
DIARRHEA: 0
HEADACHES: 0
ABDOMINAL PAIN: 1
SHORTNESS OF BREATH: 0
SPUTUM PRODUCTION: 0
DOUBLE VISION: 0
CHILLS: 0
WEIGHT LOSS: 0
BLURRED VISION: 0
FOCAL WEAKNESS: 0
NERVOUS/ANXIOUS: 1
DIZZINESS: 0
HALLUCINATIONS: 0
VOMITING: 0
ORTHOPNEA: 0

## 2021-06-16 ASSESSMENT — PAIN DESCRIPTION - PAIN TYPE
TYPE: CHRONIC PAIN
TYPE: ACUTE PAIN
TYPE: CHRONIC PAIN
TYPE: ACUTE PAIN
TYPE: CHRONIC PAIN
TYPE: CHRONIC PAIN

## 2021-06-16 ASSESSMENT — LIFESTYLE VARIABLES: SUBSTANCE_ABUSE: 0

## 2021-06-16 NOTE — CARE PLAN
The patient is stable at this time.      Problem: Pain - Standard  Goal: Alleviation of pain or a reduction in pain to the patient’s comfort goal  Outcome: Progressing  Note: Pain medication and muscle relaxer medication are being utilized for patient comfort as needed.     Problem: Communication  Goal: The ability to communicate needs accurately and effectively will improve  6/15/2021 2224 by Serena Velazquez R.N.  Outcome: Progressing  Flowsheets (Taken 6/15/2021 2224)  Communication:  • Assessed patient's ability to understand and communicate  • Oriented patient to call light  Note: Pt uses call light appropriately and expresses her needs well.    Shift Goals  Clinical Goals: maintain safety  Patient Goals: control back pain  Family Goals: Na    Progress made toward(s) clinical / shift goals:  maintaining safety, controlling back pain    Patient is not progressing towards the following goals:

## 2021-06-16 NOTE — PROGRESS NOTES
Head WDL  Ears WDL  Nose WDL  Mouth WDL  Neck WDL  Breast/Chest WDL  Shoulder Blades WDL  Spine WDL  (R) Arm/Elbow/Hand WDL  (L) Arm/Elbow/Hand WDL  Abdomen WDL  Groin WDL  Scrotum/Coccyx/Buttocks WDL  (R) Leg WDL  (L) Leg WDL  (R) Heel/Foot/Toe WDL  (L) Heel/Foot/Toe WDL          Devices In Places Nasal Cannula      Interventions In Place N/A    Possible Skin Injury No    Pictures Uploaded Into Epic N/A  Wound Consult Placed N/A  RN Wound Prevention Protocol Ordered No

## 2021-06-16 NOTE — PROGRESS NOTES
"Hospital Medicine Daily Progress Note    Date of Service  6/16/2021    Chief Complaint  50 y.o. female admitted 5/24/2021 with suicidal ideation    Hospital Course    Per Dr. Dominguez note    \"50-year-old female with a past medical history of recurrent alcoholic pancreatitis, alcohol abuse and alcohol withdrawals including seizures, history of bipolar disorder, polysubstance use with methamphetamines and alcohol, prior suicide attempt, COPD on 2 L home oxygen, presented 5/24/2021 with complaints of suicidal ideation due to her chronic alcohol use.  Patient was admitted found to be in acute alcohol intoxication, elevated POC breathalyzer 0.292.  Patient on admission was more somnolent but arousable with sternal rub.  Patient came in with a legal hold due to complaints of suicidal ideation, suicidal plan by taking overdose of pills she has at home.    5/25/2021-patient seen and evaluated today, labs and notes reviewed.  Patient stated she had continued suicidal thoughts, with potential plan of using her pills at home.  Patient was showing signs of acute alcohol withdrawal today, tremors and tachycardia.  Patient complained of nausea.  Psychiatry evaluated patient today given she her legal hold status.  CIWA scores ranging between 11-13 since admission.  Patient requiring Ativan and Librium.  She stated her last drink was the day she came to the hospital.    5/26 - Patient's LFTs increasing today, was decreased yesterday compared to high levels on admission. RUQ obtained on admission did not show obstruction.  Patient in active withdrawal continued. Patient stated she has RUQ pain today, severe, painful to deep touch, localized 9/10.  Consulted [facility].    5/27 - patient this morning was distressed, she was noted to be standing without help of nursing despite having directions explained to her by nursing to wait for assistance to use the commode or to get out of bed. Patient did not fall, but was " "found to have soiled herself with urine. Patient denied any RUQ pain today compared to yesterday. She stated she was eating better. CIWA up to 12.  Oxygen supplementation up to 5LNC.    5/28 -patient was more controlled today, and denied any symptoms for abdominal pain at this time.  Morning patient was showing signs of improvement.  Most recent CIWA score went up to 13 at 1305 p.m.  Was only as high as 9 before that.  Oxygen now down to 3 L nasal cannula.    5/29-patient continued to have RUQ pain today, 8/10, sharp, radiating to her back and to her left lower quadrant.  + nausea.  Denied chest pain, vomiting.  Ordered HIDA scan.    5/30 - patient stated she had continued RUQ pain. HIDA scan showed ejection fraction of 13%, with GGT 2572.  Called [facility] for update, physician on call to evaluate patient and make recommendations.  If no procedures required by GI, will need to likely consult with general surgery.  Patient unable to sit for MRI due to her tremors, hence HIDA scan was ordered.    5/31 - patient had continued RUQ pain. Tolerating her diet.   Spoke with Dr. Chavez with general surgery, patient is not a candidate for any cholecystectomy due to her alcoholic hepatitis on this admission, likely biliary dyskinesia, recommended low fat diet and outpt evaluation for any possible cholecystectomy\".      Interval Problem Update  Patient seen and examined, no acute events overnight, afebrile, no nausea or vomiting.  Awaiting placement      Consultants/Specialty  Psychiatry  [facility] gastroenterology  General surgery    Code Status  Full Code    Disposition  Skilled nursing facility    Review of Systems  Review of Systems   Constitutional: Negative for chills, fever and weight loss.   HENT: Negative for hearing loss and tinnitus.    Eyes: Negative for blurred vision, double vision, photophobia and pain.   Respiratory: Negative for cough, sputum production and shortness of breath.  "   Cardiovascular: Negative for chest pain, palpitations, orthopnea and leg swelling.   Gastrointestinal: Positive for abdominal pain. Negative for constipation, diarrhea, nausea and vomiting.   Genitourinary: Negative for dysuria, frequency and urgency.   Musculoskeletal: Negative for joint pain and myalgias.   Skin: Negative for rash.   Neurological: Negative for dizziness, focal weakness and headaches.   Psychiatric/Behavioral: Negative for hallucinations and substance abuse. The patient is nervous/anxious.    All other systems reviewed and are negative.     Physical Exam  Temp:  [35.9 °C (96.7 °F)-36.5 °C (97.7 °F)] 36.5 °C (97.7 °F)  Pulse:  [86-93] 86  Resp:  [16-20] 18  BP: (112-120)/(64-78) 113/64  SpO2:  [93 %-96 %] 95 %    Physical Exam  Vitals reviewed.   Constitutional:       General: She is not in acute distress.     Appearance: Normal appearance. She is not ill-appearing.   HENT:      Head: Normocephalic and atraumatic.      Nose: No congestion.   Eyes:      General:         Right eye: No discharge.         Left eye: No discharge.      Pupils: Pupils are equal, round, and reactive to light.   Cardiovascular:      Rate and Rhythm: Normal rate and regular rhythm.      Pulses: Normal pulses.      Heart sounds: Normal heart sounds. No murmur heard.     Pulmonary:      Effort: Pulmonary effort is normal. No respiratory distress.      Breath sounds: Normal breath sounds. No stridor.   Abdominal:      General: Bowel sounds are normal. There is no distension.      Palpations: Abdomen is soft.      Tenderness: There is no abdominal tenderness. There is no guarding or rebound.      Comments: No signs of acute abdomen on physical exam.   Musculoskeletal:         General: No swelling or tenderness. Normal range of motion.      Cervical back: Normal range of motion. No rigidity.   Skin:     General: Skin is warm.      Capillary Refill: Capillary refill takes less than 2 seconds.      Coloration: Skin is not  jaundiced or pale.      Findings: No bruising.   Neurological:      General: No focal deficit present.      Mental Status: She is alert and oriented to person, place, and time.      Cranial Nerves: No cranial nerve deficit.   Psychiatric:         Mood and Affect: Mood normal.         Behavior: Behavior normal.       Fluids  No intake or output data in the 24 hours ending 06/16/21 0906    Laboratory  Recent Labs     06/14/21  0327 06/15/21  0332   WBC 5.4 6.1   RBC 4.27 3.54*   HEMOGLOBIN 14.7 12.0   HEMATOCRIT 43.0 36.5*   .7* 103.1*   MCH 34.4* 33.9*   MCHC 34.2 32.9*   RDW 45.3 46.1   PLATELETCT 252 260   MPV 9.7 9.2     Recent Labs     06/14/21  0327 06/15/21  0332   SODIUM 136 139   POTASSIUM 4.3 3.7   CHLORIDE 99 102   CO2 25 26   GLUCOSE 104* 115*   BUN 6* 6*   CREATININE 0.52 0.40*   CALCIUM 9.7 9.4                   Imaging  IR-US GUIDED PIV   Final Result    Ultrasound-guided PERIPHERAL IV INSERTION performed by    qualified nursing staff as above.      CT-HEAD W/O   Final Result         1. No acute intracranial abnormality. No evidence of acute intracranial hemorrhage or mass lesion.               NM-BILIARY HIDA SCAN FATTY MEAL   Final Result      Low gallbladder ejection fraction.      This study was obtained utilizing fatty meal challenge to induce gallbladder contraction due to national shortage of cholecystokinin.  There are no national standards for gallbladder ejection fraction calculated utilizing fatty meal challenge.  An    ejection fraction greater than 35% is most likely normal.  Gallbladder ejection fraction less than 35% may be abnormal but could be falsely positive.  Therefore, this test could be falsely positive.  Consider repeat imaging once cholecystokinin becomes    available.      IR-US GUIDED PIV   Final Result    Ultrasound-guided PERIPHERAL IV INSERTION performed by    qualified nursing staff as above.      IR-US GUIDED PIV   Final Result    Ultrasound-guided PERIPHERAL IV  "INSERTION performed by    qualified nursing staff as above.      US-RUQ   Final Result      Stable echogenic liver which is most commonly secondary to steatosis           Assessment/Plan  * Alcohol dependence with withdrawal (HCC)- (present on admission)  Assessment & Plan  Hx DT's and seizure 2/2 ETOH withdrawal.  Patient is a persistent alcohol user.    Prefers vodka, drinks half pint usually daily, obtains her drinks from her roommate.  Patient's last drink was on the day of admission, as stated by the patient.  Alcohol cessation education provided to patient.  Patient stated she wants to stop and this is what makes her feel suicidal as she is unable to stop.  -Seizure precaution  -Librium for long acting benzodiazepine coverage  - PO vitamins, patient was given detox IV bag with thiamine.  -Check Magnesium daily  - consult    Discontinued CIWA protocol on June 1, 2021.    Acute on chronic respiratory failure with hypoxia on home O2 therapy- (present on admission)  Assessment & Plan  Patient currently requiring increased oxygen, arrived to ED requiring 3L.  O2 demand up to 5L NC compared to her chronic home oxygen needs of 2L. Due to patient's acute alcohol withdrawal worsening distress, tachycardia.  5/28 - Improving to 3LNC  - continue oxygen support  - will need restart of home oxygen order on discharge    Vitamin D deficiency- (present on admission)  Assessment & Plan  Patient's vitamin D 25 level 11 on 4/21  Vitamin B12 and iron at appropriate levels.  Continue patient on supplementation, 50,000 IU weekly p.o.    Acute alcoholic hepatitis- (present on admission)  Assessment & Plan  Suspecting 2/2 to continued daily ETOH abuse and acute alcohol intoxication on admission.  RUQ US shows: \"Stable echogenic liver which is most commonly secondary to steatosis\"  Patient likely has fatty liver disease from alcohol use.  5/25 - No Discriminant Function = 8, no indication for steroids  GGT " 2572  HIDA scan - 13% ejection fraction  5/26 - Consulted [facility] for evaluation of rising LFTs, elevated GGT.     --- No update by GI until 5/30 - recommended general surgery evaluation.  5/31 - Consulted General Surgery Dr. Chavez, patient is not a candidate for any cholecystectomy due to her alcoholic hepatitis on this admission, likely biliary dyskinesia, recommended low fat diet and outpt evaluation for any possible cholecystectomy.   --- Patient's LFTs improving.  , ALT 80,     Continue to monitor, recheck CMP daily  Trial back librium 25mg po daily (5/31)  Started her on hydroxyzine for anxiety.    Macrocytic anemia- (present on admission)  Assessment & Plan  Vit B12 and folate levels normal on prior check  Likely due to alcohol liver disease  Monitor for any bleeding    Hypomagnesemia- (present on admission)  Assessment & Plan  Patient arrived with magnesium 1.9, down to 1.6 despite IV replacements.  Replace as needed.  Continue to monitor.    Leukocytopenia- (present on admission)  Assessment & Plan  Low WBC due to liver disorder  Slightly improving  Continue to monitor.    COPD (chronic obstructive pulmonary disease) (HCC)- (present on admission)  Assessment & Plan  Per chart review: no PFTs on file, on 2 L of oxygen at home.  No signs of acute exacerbation at this time on admission.  Oxygen per guidelines  Monitor respiratory status    Alcohol-induced acute pancreatitis- (present on admission)  Assessment & Plan  Patient continues to complain about nausea, but denied any vomiting.  On solid foods  Pain Control    Depression with suicidal ideation- (present on admission)  Assessment & Plan  Patient presented with suicidal ideation, plan to potentially overdose on her home medications.  Patient was brought in with a legal hold  Legal hold discontinued.    Hypokalemia  Assessment & Plan  Patient's potassium low today 3.3.  Likely due to poor nutrition due to severe alcohol  use.  Magnesium low on admission 1.9.  Replacing via IV given patient's nausea  Recheck potassium and magnesium daily     I discussed plan of care during multidisciplinary regarding her current medical condition and discharge plan.  I placed SNF referral. Patient will need SNF for less than 45 days     VTE prophylaxis: Lovenox

## 2021-06-16 NOTE — PROGRESS NOTES
"Hospital Medicine Daily Progress Note    Date of Service  6/16/2021    Chief Complaint  50 y.o. female admitted 5/24/2021 with suicidal ideation    Hospital Course    Per Dr. Dominguez note    \"50-year-old female with a past medical history of recurrent alcoholic pancreatitis, alcohol abuse and alcohol withdrawals including seizures, history of bipolar disorder, polysubstance use with methamphetamines and alcohol, prior suicide attempt, COPD on 2 L home oxygen, presented 5/24/2021 with complaints of suicidal ideation due to her chronic alcohol use.  Patient was admitted found to be in acute alcohol intoxication, elevated POC breathalyzer 0.292.  Patient on admission was more somnolent but arousable with sternal rub.  Patient came in with a legal hold due to complaints of suicidal ideation, suicidal plan by taking overdose of pills she has at home.    5/25/2021-patient seen and evaluated today, labs and notes reviewed.  Patient stated she had continued suicidal thoughts, with potential plan of using her pills at home.  Patient was showing signs of acute alcohol withdrawal today, tremors and tachycardia.  Patient complained of nausea.  Psychiatry evaluated patient today given she her legal hold status.  CIWA scores ranging between 11-13 since admission.  Patient requiring Ativan and Librium.  She stated her last drink was the day she came to the hospital.    5/26 - Patient's LFTs increasing today, was decreased yesterday compared to high levels on admission. RUQ obtained on admission did not show obstruction.  Patient in active withdrawal continued. Patient stated she has RUQ pain today, severe, painful to deep touch, localized 9/10.  Consulted [facility].    5/27 - patient this morning was distressed, she was noted to be standing without help of nursing despite having directions explained to her by nursing to wait for assistance to use the commode or to get out of bed. Patient did not fall, but was " "found to have soiled herself with urine. Patient denied any RUQ pain today compared to yesterday. She stated she was eating better. CIWA up to 12.  Oxygen supplementation up to 5LNC.    5/28 -patient was more controlled today, and denied any symptoms for abdominal pain at this time.  Morning patient was showing signs of improvement.  Most recent CIWA score went up to 13 at 1305 p.m.  Was only as high as 9 before that.  Oxygen now down to 3 L nasal cannula.    5/29-patient continued to have RUQ pain today, 8/10, sharp, radiating to her back and to her left lower quadrant.  + nausea.  Denied chest pain, vomiting.  Ordered HIDA scan.    5/30 - patient stated she had continued RUQ pain. HIDA scan showed ejection fraction of 13%, with GGT 2572.  Called [facility] for update, physician on call to evaluate patient and make recommendations.  If no procedures required by GI, will need to likely consult with general surgery.  Patient unable to sit for MRI due to her tremors, hence HIDA scan was ordered.    5/31 - patient had continued RUQ pain. Tolerating her diet.   Spoke with Dr. Chavez with general surgery, patient is not a candidate for any cholecystectomy due to her alcoholic hepatitis on this admission, likely biliary dyskinesia, recommended low fat diet and outpt evaluation for any possible cholecystectomy\".      Interval Problem Update  Afebrile, no nausea or vomiting, no acute events overnight, denies HA, Mentation improved  Awaiting placement     Consultants/Specialty  Psychiatry  [facility] gastroenterology  General surgery    Code Status  Full Code    Disposition  Skilled nursing facility    Review of Systems  Review of Systems   Constitutional: Negative for chills, fever and weight loss.   HENT: Negative for hearing loss and tinnitus.    Eyes: Negative for blurred vision, double vision, photophobia and pain.   Respiratory: Negative for cough, sputum production and shortness of breath.  "   Cardiovascular: Negative for chest pain, palpitations, orthopnea and leg swelling.   Gastrointestinal: Positive for abdominal pain. Negative for constipation, diarrhea, nausea and vomiting.   Genitourinary: Negative for dysuria, frequency and urgency.   Musculoskeletal: Negative for joint pain and myalgias.   Skin: Negative for rash.   Neurological: Negative for dizziness, focal weakness and headaches.   Psychiatric/Behavioral: Negative for hallucinations and substance abuse. The patient is nervous/anxious.    All other systems reviewed and are negative.     Physical Exam  Temp:  [35.9 °C (96.7 °F)-36.5 °C (97.7 °F)] 36.5 °C (97.7 °F)  Pulse:  [86-93] 86  Resp:  [16-20] 18  BP: (112-120)/(64-78) 113/64  SpO2:  [93 %-96 %] 95 %    Physical Exam  Vitals reviewed.   Constitutional:       General: She is not in acute distress.     Appearance: Normal appearance. She is not ill-appearing.   HENT:      Head: Normocephalic and atraumatic.      Nose: No congestion.   Eyes:      General:         Right eye: No discharge.         Left eye: No discharge.      Pupils: Pupils are equal, round, and reactive to light.   Cardiovascular:      Rate and Rhythm: Normal rate and regular rhythm.      Pulses: Normal pulses.      Heart sounds: Normal heart sounds. No murmur heard.     Pulmonary:      Effort: Pulmonary effort is normal. No respiratory distress.      Breath sounds: Normal breath sounds. No stridor.   Abdominal:      General: Bowel sounds are normal. There is no distension.      Palpations: Abdomen is soft.      Tenderness: There is no abdominal tenderness. There is no guarding or rebound.      Comments: No signs of acute abdomen on physical exam.   Musculoskeletal:         General: No swelling or tenderness. Normal range of motion.      Cervical back: Normal range of motion. No rigidity.   Skin:     General: Skin is warm.      Capillary Refill: Capillary refill takes less than 2 seconds.      Coloration: Skin is not  jaundiced or pale.      Findings: No bruising.   Neurological:      General: No focal deficit present.      Mental Status: She is alert and oriented to person, place, and time.      Cranial Nerves: No cranial nerve deficit.   Psychiatric:         Mood and Affect: Mood normal.         Behavior: Behavior normal.       Fluids  No intake or output data in the 24 hours ending 06/16/21 0907    Laboratory  Recent Labs     06/14/21  0327 06/15/21  0332   WBC 5.4 6.1   RBC 4.27 3.54*   HEMOGLOBIN 14.7 12.0   HEMATOCRIT 43.0 36.5*   .7* 103.1*   MCH 34.4* 33.9*   MCHC 34.2 32.9*   RDW 45.3 46.1   PLATELETCT 252 260   MPV 9.7 9.2     Recent Labs     06/14/21  0327 06/15/21  0332   SODIUM 136 139   POTASSIUM 4.3 3.7   CHLORIDE 99 102   CO2 25 26   GLUCOSE 104* 115*   BUN 6* 6*   CREATININE 0.52 0.40*   CALCIUM 9.7 9.4                   Imaging  IR-US GUIDED PIV   Final Result    Ultrasound-guided PERIPHERAL IV INSERTION performed by    qualified nursing staff as above.      CT-HEAD W/O   Final Result         1. No acute intracranial abnormality. No evidence of acute intracranial hemorrhage or mass lesion.               NM-BILIARY HIDA SCAN FATTY MEAL   Final Result      Low gallbladder ejection fraction.      This study was obtained utilizing fatty meal challenge to induce gallbladder contraction due to national shortage of cholecystokinin.  There are no national standards for gallbladder ejection fraction calculated utilizing fatty meal challenge.  An    ejection fraction greater than 35% is most likely normal.  Gallbladder ejection fraction less than 35% may be abnormal but could be falsely positive.  Therefore, this test could be falsely positive.  Consider repeat imaging once cholecystokinin becomes    available.      IR-US GUIDED PIV   Final Result    Ultrasound-guided PERIPHERAL IV INSERTION performed by    qualified nursing staff as above.      IR-US GUIDED PIV   Final Result    Ultrasound-guided PERIPHERAL IV  "INSERTION performed by    qualified nursing staff as above.      US-RUQ   Final Result      Stable echogenic liver which is most commonly secondary to steatosis           Assessment/Plan  * Alcohol dependence with withdrawal (HCC)- (present on admission)  Assessment & Plan  Hx DT's and seizure 2/2 ETOH withdrawal.  Patient is a persistent alcohol user.    Prefers vodka, drinks half pint usually daily, obtains her drinks from her roommate.  Patient's last drink was on the day of admission, as stated by the patient.  Alcohol cessation education provided to patient.  Patient stated she wants to stop and this is what makes her feel suicidal as she is unable to stop.  -Seizure precaution  -Librium for long acting benzodiazepine coverage  - PO vitamins, patient was given detox IV bag with thiamine.  -Check Magnesium daily  - consult    Discontinued CIWA protocol on June 1, 2021.    Acute on chronic respiratory failure with hypoxia on home O2 therapy- (present on admission)  Assessment & Plan  Patient currently requiring increased oxygen, arrived to ED requiring 3L.  O2 demand up to 5L NC compared to her chronic home oxygen needs of 2L. Due to patient's acute alcohol withdrawal worsening distress, tachycardia.  5/28 - Improving to 3LNC  - continue oxygen support  - will need restart of home oxygen order on discharge    Vitamin D deficiency- (present on admission)  Assessment & Plan  Patient's vitamin D 25 level 11 on 4/21  Vitamin B12 and iron at appropriate levels.  Continue patient on supplementation, 50,000 IU weekly p.o.    Acute alcoholic hepatitis- (present on admission)  Assessment & Plan  Suspecting 2/2 to continued daily ETOH abuse and acute alcohol intoxication on admission.  RUQ US shows: \"Stable echogenic liver which is most commonly secondary to steatosis\"  Patient likely has fatty liver disease from alcohol use.  5/25 - No Discriminant Function = 8, no indication for steroids  GGT " 2572  HIDA scan - 13% ejection fraction  5/26 - Consulted [facility] for evaluation of rising LFTs, elevated GGT.     --- No update by GI until 5/30 - recommended general surgery evaluation.  5/31 - Consulted General Surgery Dr. Chavez, patient is not a candidate for any cholecystectomy due to her alcoholic hepatitis on this admission, likely biliary dyskinesia, recommended low fat diet and outpt evaluation for any possible cholecystectomy.   --- Patient's LFTs improving.  , ALT 80,     Continue to monitor, recheck CMP daily  Trial back librium 25mg po daily (5/31)  Started her on hydroxyzine for anxiety.    Macrocytic anemia- (present on admission)  Assessment & Plan  Vit B12 and folate levels normal on prior check  Likely due to alcohol liver disease  Monitor for any bleeding    Hypomagnesemia- (present on admission)  Assessment & Plan  Patient arrived with magnesium 1.9, down to 1.6 despite IV replacements.  Replace as needed.  Continue to monitor.    Leukocytopenia- (present on admission)  Assessment & Plan  Low WBC due to liver disorder  Slightly improving  Continue to monitor.    COPD (chronic obstructive pulmonary disease) (HCC)- (present on admission)  Assessment & Plan  Per chart review: no PFTs on file, on 2 L of oxygen at home.  No signs of acute exacerbation at this time on admission.  Oxygen per guidelines  Monitor respiratory status    Alcohol-induced acute pancreatitis- (present on admission)  Assessment & Plan  Patient continues to complain about nausea, but denied any vomiting.  On solid foods  Pain Control    Depression with suicidal ideation- (present on admission)  Assessment & Plan  Patient presented with suicidal ideation, plan to potentially overdose on her home medications.  Patient was brought in with a legal hold  Legal hold discontinued.    Hypokalemia  Assessment & Plan  Patient's potassium low today 3.3.  Likely due to poor nutrition due to severe alcohol  use.  Magnesium low on admission 1.9.  Replacing via IV given patient's nausea  Recheck potassium and magnesium daily     I discussed plan of care during multidisciplinary regarding her current medical condition and discharge plan.  I placed SNF referral. Patient will need SNF for less than 45 days     VTE prophylaxis: Lovenox

## 2021-06-16 NOTE — CARE PLAN
The patient is Stable - Low risk of patient condition declining or worsening    Shift Goals  Clinical Goals: remain free from falls, continue working with therapy,   Patient Goals: rest comfortably   Family Goals: Na    Progress made toward(s) clinical / shift goals:  no falls, uses call light appropriately     Patient is not progressing towards the following goals:

## 2021-06-17 ENCOUNTER — PATIENT OUTREACH (OUTPATIENT)
Dept: HEALTH INFORMATION MANAGEMENT | Facility: OTHER | Age: 51
End: 2021-06-17

## 2021-06-17 ENCOUNTER — PHARMACY VISIT (OUTPATIENT)
Dept: PHARMACY | Facility: MEDICAL CENTER | Age: 51
End: 2021-06-17
Payer: COMMERCIAL

## 2021-06-17 VITALS
HEART RATE: 93 BPM | OXYGEN SATURATION: 93 % | DIASTOLIC BLOOD PRESSURE: 76 MMHG | BODY MASS INDEX: 26.5 KG/M2 | TEMPERATURE: 97.6 F | SYSTOLIC BLOOD PRESSURE: 113 MMHG | WEIGHT: 135 LBS | RESPIRATION RATE: 18 BRPM | HEIGHT: 60 IN

## 2021-06-17 LAB
ANION GAP SERPL CALC-SCNC: 10 MMOL/L (ref 7–16)
BUN SERPL-MCNC: 12 MG/DL (ref 8–22)
CALCIUM SERPL-MCNC: 9.1 MG/DL (ref 8.5–10.5)
CHLORIDE SERPL-SCNC: 100 MMOL/L (ref 96–112)
CO2 SERPL-SCNC: 26 MMOL/L (ref 20–33)
CREAT SERPL-MCNC: 0.45 MG/DL (ref 0.5–1.4)
ERYTHROCYTE [DISTWIDTH] IN BLOOD BY AUTOMATED COUNT: 45.9 FL (ref 35.9–50)
GLUCOSE SERPL-MCNC: 113 MG/DL (ref 65–99)
HCT VFR BLD AUTO: 36.5 % (ref 37–47)
HGB BLD-MCNC: 11.9 G/DL (ref 12–16)
MCH RBC QN AUTO: 33.6 PG (ref 27–33)
MCHC RBC AUTO-ENTMCNC: 32.6 G/DL (ref 33.6–35)
MCV RBC AUTO: 103.1 FL (ref 81.4–97.8)
PLATELET # BLD AUTO: 225 K/UL (ref 164–446)
PMV BLD AUTO: 9.4 FL (ref 9–12.9)
POTASSIUM SERPL-SCNC: 3.8 MMOL/L (ref 3.6–5.5)
RBC # BLD AUTO: 3.54 M/UL (ref 4.2–5.4)
SODIUM SERPL-SCNC: 136 MMOL/L (ref 135–145)
WBC # BLD AUTO: 7.1 K/UL (ref 4.8–10.8)

## 2021-06-17 PROCEDURE — 85027 COMPLETE CBC AUTOMATED: CPT

## 2021-06-17 PROCEDURE — 99231 SBSQ HOSP IP/OBS SF/LOW 25: CPT | Performed by: INTERNAL MEDICINE

## 2021-06-17 PROCEDURE — 700111 HCHG RX REV CODE 636 W/ 250 OVERRIDE (IP): Performed by: STUDENT IN AN ORGANIZED HEALTH CARE EDUCATION/TRAINING PROGRAM

## 2021-06-17 PROCEDURE — 700102 HCHG RX REV CODE 250 W/ 637 OVERRIDE(OP): Performed by: INTERNAL MEDICINE

## 2021-06-17 PROCEDURE — 80048 BASIC METABOLIC PNL TOTAL CA: CPT

## 2021-06-17 PROCEDURE — A9270 NON-COVERED ITEM OR SERVICE: HCPCS | Performed by: STUDENT IN AN ORGANIZED HEALTH CARE EDUCATION/TRAINING PROGRAM

## 2021-06-17 PROCEDURE — 700102 HCHG RX REV CODE 250 W/ 637 OVERRIDE(OP): Performed by: STUDENT IN AN ORGANIZED HEALTH CARE EDUCATION/TRAINING PROGRAM

## 2021-06-17 PROCEDURE — 700101 HCHG RX REV CODE 250: Performed by: INTERNAL MEDICINE

## 2021-06-17 PROCEDURE — A9270 NON-COVERED ITEM OR SERVICE: HCPCS | Performed by: INTERNAL MEDICINE

## 2021-06-17 PROCEDURE — 36415 COLL VENOUS BLD VENIPUNCTURE: CPT

## 2021-06-17 PROCEDURE — 99239 HOSP IP/OBS DSCHRG MGMT >30: CPT | Performed by: INTERNAL MEDICINE

## 2021-06-17 RX ORDER — PROMETHAZINE HYDROCHLORIDE 12.5 MG/1
12.5 TABLET ORAL EVERY 6 HOURS PRN
Qty: 12 TABLET | Refills: 0 | Status: SHIPPED | OUTPATIENT
Start: 2021-06-17 | End: 2021-06-20

## 2021-06-17 RX ADMIN — OXYCODONE 5 MG: 5 TABLET ORAL at 05:29

## 2021-06-17 RX ADMIN — GABAPENTIN 300 MG: 300 CAPSULE ORAL at 13:43

## 2021-06-17 RX ADMIN — ANTACID TABLETS 500 MG: 500 TABLET, CHEWABLE ORAL at 05:30

## 2021-06-17 RX ADMIN — Medication 100 MG: at 05:30

## 2021-06-17 RX ADMIN — ENOXAPARIN SODIUM 40 MG: 40 INJECTION SUBCUTANEOUS at 05:28

## 2021-06-17 RX ADMIN — OXYCODONE 5 MG: 5 TABLET ORAL at 13:47

## 2021-06-17 RX ADMIN — CYCLOBENZAPRINE 10 MG: 10 TABLET, FILM COATED ORAL at 13:47

## 2021-06-17 RX ADMIN — OXYCODONE 5 MG: 5 TABLET ORAL at 01:13

## 2021-06-17 RX ADMIN — FAMOTIDINE 20 MG: 20 TABLET ORAL at 05:31

## 2021-06-17 RX ADMIN — FLUOXETINE 30 MG: 20 CAPSULE ORAL at 05:29

## 2021-06-17 RX ADMIN — LIDOCAINE 1 PATCH: 50 PATCH TOPICAL at 13:43

## 2021-06-17 RX ADMIN — GABAPENTIN 300 MG: 300 CAPSULE ORAL at 05:30

## 2021-06-17 ASSESSMENT — ENCOUNTER SYMPTOMS
PALPITATIONS: 0
SPUTUM PRODUCTION: 0
HEADACHES: 0
NAUSEA: 0
ABDOMINAL PAIN: 1
FEVER: 0
MYALGIAS: 0
COUGH: 0
SHORTNESS OF BREATH: 0
DIARRHEA: 0
VOMITING: 0
BLURRED VISION: 0
HALLUCINATIONS: 0
ORTHOPNEA: 0
EYE PAIN: 0
CONSTIPATION: 0
CHILLS: 0
WEIGHT LOSS: 0
NERVOUS/ANXIOUS: 1
DOUBLE VISION: 0
PHOTOPHOBIA: 0
DIZZINESS: 0
FOCAL WEAKNESS: 0

## 2021-06-17 ASSESSMENT — PAIN DESCRIPTION - PAIN TYPE
TYPE: CHRONIC PAIN

## 2021-06-17 ASSESSMENT — LIFESTYLE VARIABLES: SUBSTANCE_ABUSE: 0

## 2021-06-17 NOTE — PROGRESS NOTES
Pt AAOx4, calls appropriately for assistance. Medicated x 2 for chronic back pain this shift. Case management team has asked pt to fill out an application to try to get her housing. Have reminded pt to do this multiple times through out the day. CM stated they will pick it up tomorrow. Pt is medically clear for dc, only needs placement.

## 2021-06-17 NOTE — DISCHARGE PLANNING
Meds-to-Beds: Discharge prescription orders listed below delivered to patient's bedside. RN Yu notified. Patient counseled.       Olya Youssef   Home Medication Instructions DEEP:[id number]    Printed on:06/17/21 6444   Medication Information                      oxyCODONE immediate-release (ROXICODONE) 5 MG Tab  Take 0.5 Tablet by mouth every 6 hours as needed for up to 3 days.                 Nora Smith, PharmD

## 2021-06-17 NOTE — DISCHARGE INSTRUCTIONS
Discharge Instructions    Discharged to home by car with relative. Discharged via wheelchair, hospital escort: Yes.  Special equipment needed: Walker, oxygen     Be sure to schedule a follow-up appointment with your primary care doctor or any specialists as instructed.     Discharge Plan:   Diet Plan: Discussed  Activity Level: Discussed  Confirmed Follow up Appointment: Patient to Call and Schedule Appointment  Medication Reconciliation Updated: Yes    I understand that a diet low in cholesterol, fat, and sodium is recommended for good health. Unless I have been given specific instructions below for another diet, I accept this instruction as my diet prescription.   Other diet: cardiac    Special Instructions: None    · Is patient discharged on Warfarin / Coumadin?   No     Depression / Suicide Risk    As you are discharged from this [facility] facility, it is important to learn how to keep safe from harming yourself.    Recognize the warning signs:  · Abrupt changes in personality, positive or negative- including increase in energy   · Giving away possessions  · Change in eating patterns- significant weight changes-  positive or negative  · Change in sleeping patterns- unable to sleep or sleeping all the time   · Unwillingness or inability to communicate  · Depression  · Unusual sadness, discouragement and loneliness  · Talk of wanting to die  · Neglect of personal appearance   · Rebelliousness- reckless behavior  · Withdrawal from people/activities they love  · Confusion- inability to concentrate     If you or a loved one observes any of these behaviors or has concerns about self-harm, here's what you can do:  · Talk about it- your feelings and reasons for harming yourself  · Remove any means that you might use to hurt yourself (examples: pills, rope, extension cords, firearm)  · Get professional help from the community (Mental Health, Substance Abuse, psychological counseling)  · Do not be alone:Call your Safe  Contact- someone whom you trust who will be there for you.  · Call your local CRISIS HOTLINE 046-0859 or 502-055-5119  · Call your local Children's Mobile Crisis Response Team [place] (306) 595-3429 or www.[employer]  · Call the toll free National Suicide Prevention Hotlines   · National Suicide Prevention Lifeline 288-590-CXWN (3704)  · National [employer] Line Network 800-SUICIDE (733-4335)

## 2021-06-17 NOTE — PROGRESS NOTES
"Hospital Medicine Daily Progress Note    Date of Service  6/17/2021    Chief Complaint  50 y.o. female admitted 5/24/2021 with suicidal ideation    Hospital Course    Per Dr. Dominguez note    \"50-year-old female with a past medical history of recurrent alcoholic pancreatitis, alcohol abuse and alcohol withdrawals including seizures, history of bipolar disorder, polysubstance use with methamphetamines and alcohol, prior suicide attempt, COPD on 2 L home oxygen, presented 5/24/2021 with complaints of suicidal ideation due to her chronic alcohol use.  Patient was admitted found to be in acute alcohol intoxication, elevated POC breathalyzer 0.292.  Patient on admission was more somnolent but arousable with sternal rub.  Patient came in with a legal hold due to complaints of suicidal ideation, suicidal plan by taking overdose of pills she has at home.    5/25/2021-patient seen and evaluated today, labs and notes reviewed.  Patient stated she had continued suicidal thoughts, with potential plan of using her pills at home.  Patient was showing signs of acute alcohol withdrawal today, tremors and tachycardia.  Patient complained of nausea.  Psychiatry evaluated patient today given she her legal hold status.  CIWA scores ranging between 11-13 since admission.  Patient requiring Ativan and Librium.  She stated her last drink was the day she came to the hospital.    5/26 - Patient's LFTs increasing today, was decreased yesterday compared to high levels on admission. RUQ obtained on admission did not show obstruction.  Patient in active withdrawal continued. Patient stated she has RUQ pain today, severe, painful to deep touch, localized 9/10.  Consulted [facility].    5/27 - patient this morning was distressed, she was noted to be standing without help of nursing despite having directions explained to her by nursing to wait for assistance to use the commode or to get out of bed. Patient did not fall, but was " "found to have soiled herself with urine. Patient denied any RUQ pain today compared to yesterday. She stated she was eating better. CIWA up to 12.  Oxygen supplementation up to 5LNC.    5/28 -patient was more controlled today, and denied any symptoms for abdominal pain at this time.  Morning patient was showing signs of improvement.  Most recent CIWA score went up to 13 at 1305 p.m.  Was only as high as 9 before that.  Oxygen now down to 3 L nasal cannula.    5/29-patient continued to have RUQ pain today, 8/10, sharp, radiating to her back and to her left lower quadrant.  + nausea.  Denied chest pain, vomiting.  Ordered HIDA scan.    5/30 - patient stated she had continued RUQ pain. HIDA scan showed ejection fraction of 13%, with GGT 2572.  Called [facility] for update, physician on call to evaluate patient and make recommendations.  If no procedures required by GI, will need to likely consult with general surgery.  Patient unable to sit for MRI due to her tremors, hence HIDA scan was ordered.    5/31 - patient had continued RUQ pain. Tolerating her diet.   Spoke with Dr. Chavez with general surgery, patient is not a candidate for any cholecystectomy due to her alcoholic hepatitis on this admission, likely biliary dyskinesia, recommended low fat diet and outpt evaluation for any possible cholecystectomy\".      Interval Problem Update  No acute events overnight, afebrile, no nausea or vomiting.  Awaiting placement      Consultants/Specialty  Psychiatry  [facility] gastroenterology  General surgery    Code Status  Full Code    Disposition  Skilled nursing facility    Review of Systems  Review of Systems   Constitutional: Negative for chills, fever and weight loss.   HENT: Negative for hearing loss and tinnitus.    Eyes: Negative for blurred vision, double vision, photophobia and pain.   Respiratory: Negative for cough, sputum production and shortness of breath.    Cardiovascular: Negative for chest pain, " palpitations, orthopnea and leg swelling.   Gastrointestinal: Positive for abdominal pain. Negative for constipation, diarrhea, nausea and vomiting.   Genitourinary: Negative for dysuria, frequency and urgency.   Musculoskeletal: Negative for joint pain and myalgias.   Skin: Negative for rash.   Neurological: Negative for dizziness, focal weakness and headaches.   Psychiatric/Behavioral: Negative for hallucinations and substance abuse. The patient is nervous/anxious.    All other systems reviewed and are negative.     Physical Exam  Temp:  [36 °C (96.8 °F)-36.4 °C (97.6 °F)] 36.4 °C (97.6 °F)  Pulse:  [] 93  Resp:  [16-18] 18  BP: (108-131)/(52-76) 113/76  SpO2:  [93 %-100 %] 93 %    Physical Exam  Vitals reviewed.   Constitutional:       General: She is not in acute distress.     Appearance: Normal appearance. She is not ill-appearing.   HENT:      Head: Normocephalic and atraumatic.      Nose: No congestion.   Eyes:      General:         Right eye: No discharge.         Left eye: No discharge.      Pupils: Pupils are equal, round, and reactive to light.   Cardiovascular:      Rate and Rhythm: Normal rate and regular rhythm.      Pulses: Normal pulses.      Heart sounds: Normal heart sounds. No murmur heard.     Pulmonary:      Effort: Pulmonary effort is normal. No respiratory distress.      Breath sounds: Normal breath sounds. No stridor.   Abdominal:      General: Bowel sounds are normal. There is no distension.      Palpations: Abdomen is soft.      Tenderness: There is no abdominal tenderness. There is no guarding or rebound.      Comments: No signs of acute abdomen on physical exam.   Musculoskeletal:         General: No swelling or tenderness. Normal range of motion.      Cervical back: Normal range of motion. No rigidity.   Skin:     General: Skin is warm.      Capillary Refill: Capillary refill takes less than 2 seconds.      Coloration: Skin is not jaundiced or pale.      Findings: No bruising.    Neurological:      General: No focal deficit present.      Mental Status: She is alert and oriented to person, place, and time.      Cranial Nerves: No cranial nerve deficit.   Psychiatric:         Mood and Affect: Mood normal.         Behavior: Behavior normal.       Fluids    Intake/Output Summary (Last 24 hours) at 6/17/2021 0926  Last data filed at 6/16/2021 2100  Gross per 24 hour   Intake 240 ml   Output --   Net 240 ml       Laboratory  Recent Labs     06/15/21  0332 06/17/21  0252   WBC 6.1 7.1   RBC 3.54* 3.54*   HEMOGLOBIN 12.0 11.9*   HEMATOCRIT 36.5* 36.5*   .1* 103.1*   MCH 33.9* 33.6*   MCHC 32.9* 32.6*   RDW 46.1 45.9   PLATELETCT 260 225   MPV 9.2 9.4     Recent Labs     06/15/21  0332 06/17/21  0252   SODIUM 139 136   POTASSIUM 3.7 3.8   CHLORIDE 102 100   CO2 26 26   GLUCOSE 115* 113*   BUN 6* 12   CREATININE 0.40* 0.45*   CALCIUM 9.4 9.1                   Imaging  IR-US GUIDED PIV   Final Result    Ultrasound-guided PERIPHERAL IV INSERTION performed by    qualified nursing staff as above.      CT-HEAD W/O   Final Result         1. No acute intracranial abnormality. No evidence of acute intracranial hemorrhage or mass lesion.               NM-BILIARY HIDA SCAN FATTY MEAL   Final Result      Low gallbladder ejection fraction.      This study was obtained utilizing fatty meal challenge to induce gallbladder contraction due to national shortage of cholecystokinin.  There are no national standards for gallbladder ejection fraction calculated utilizing fatty meal challenge.  An    ejection fraction greater than 35% is most likely normal.  Gallbladder ejection fraction less than 35% may be abnormal but could be falsely positive.  Therefore, this test could be falsely positive.  Consider repeat imaging once cholecystokinin becomes    available.      IR-US GUIDED PIV   Final Result    Ultrasound-guided PERIPHERAL IV INSERTION performed by    qualified nursing staff as above.      IR-US GUIDED PIV  "  Final Result    Ultrasound-guided PERIPHERAL IV INSERTION performed by    qualified nursing staff as above.      US-RUQ   Final Result      Stable echogenic liver which is most commonly secondary to steatosis           Assessment/Plan  * Alcohol dependence with withdrawal (HCC)- (present on admission)  Assessment & Plan  Hx DT's and seizure 2/2 ETOH withdrawal.  Patient is a persistent alcohol user.    Prefers vodka, drinks half pint usually daily, obtains her drinks from her roommate.  Patient's last drink was on the day of admission, as stated by the patient.  Alcohol cessation education provided to patient.  Patient stated she wants to stop and this is what makes her feel suicidal as she is unable to stop.  -Seizure precaution  -Librium for long acting benzodiazepine coverage  - PO vitamins, patient was given detox IV bag with thiamine.  -Check Magnesium daily  - consult    Discontinued CIWA protocol on June 1, 2021.    Acute on chronic respiratory failure with hypoxia on home O2 therapy- (present on admission)  Assessment & Plan  Patient currently requiring increased oxygen, arrived to ED requiring 3L.  O2 demand up to 5L NC compared to her chronic home oxygen needs of 2L. Due to patient's acute alcohol withdrawal worsening distress, tachycardia.  5/28 - Improving to 3LNC  - continue oxygen support  - will need restart of home oxygen order on discharge    Vitamin D deficiency- (present on admission)  Assessment & Plan  Patient's vitamin D 25 level 11 on 4/21  Vitamin B12 and iron at appropriate levels.  Continue patient on supplementation, 50,000 IU weekly p.o.    Acute alcoholic hepatitis- (present on admission)  Assessment & Plan  Suspecting 2/2 to continued daily ETOH abuse and acute alcohol intoxication on admission.  RUQ US shows: \"Stable echogenic liver which is most commonly secondary to steatosis\"  Patient likely has fatty liver disease from alcohol use.  5/25 - No Discriminant " Function = 8, no indication for steroids  GGT 2572  HIDA scan - 13% ejection fraction  5/26 - Consulted [facility] for evaluation of rising LFTs, elevated GGT.     --- No update by GI until 5/30 - recommended general surgery evaluation.  5/31 - Consulted General Surgery Dr. Chavez, patient is not a candidate for any cholecystectomy due to her alcoholic hepatitis on this admission, likely biliary dyskinesia, recommended low fat diet and outpt evaluation for any possible cholecystectomy.   --- Patient's LFTs improving.  , ALT 80,     Continue to monitor, recheck CMP daily  Trial back librium 25mg po daily (5/31)  Started her on hydroxyzine for anxiety.    Macrocytic anemia- (present on admission)  Assessment & Plan  Vit B12 and folate levels normal on prior check  Likely due to alcohol liver disease  Monitor for any bleeding    Hypomagnesemia- (present on admission)  Assessment & Plan  Patient arrived with magnesium 1.9, down to 1.6 despite IV replacements.  Replace as needed.  Continue to monitor.    Leukocytopenia- (present on admission)  Assessment & Plan  Low WBC due to liver disorder  Slightly improving  Continue to monitor.    COPD (chronic obstructive pulmonary disease) (HCC)- (present on admission)  Assessment & Plan  Per chart review: no PFTs on file, on 2 L of oxygen at home.  No signs of acute exacerbation at this time on admission.  Oxygen per guidelines  Monitor respiratory status    Alcohol-induced acute pancreatitis- (present on admission)  Assessment & Plan  Patient continues to complain about nausea, but denied any vomiting.  On solid foods  Pain Control    Depression with suicidal ideation- (present on admission)  Assessment & Plan  Patient presented with suicidal ideation, plan to potentially overdose on her home medications.  Patient was brought in with a legal hold  Legal hold discontinued.    Hypokalemia  Assessment & Plan  Patient's potassium low today 3.3.   Likely due to poor nutrition due to severe alcohol use.  Magnesium low on admission 1.9.  Replacing via IV given patient's nausea  Recheck potassium and magnesium daily     I discussed plan of care during multidisciplinary regarding her current medical condition and discharge plan.  I placed SNF referral. Patient will need SNF for less than 45 days     VTE prophylaxis: Lovenox

## 2021-06-17 NOTE — DISCHARGE PLANNING
@1245  Agency/Facility Name: [facility]  Spoke To: Crystal  Outcome: Accepted.    Received Choice form at 1020  Agency/Facility Name: [facility]  Referral sent per Choice form @ 1020    Agency[facility]Facility Name: [facility]  Spoke To: Admission  Outcome: Declined due to non-contracted insurance.    Received Choice form at 1005  Agency/Facility Name: [facility]  Referral sent per Choice form @ 1005

## 2021-06-17 NOTE — DISCHARGE PLANNING
Anticipated Discharge Disposition:   Home with home health, home oxygen, FWW    Action:   0954: RN CM spoke to pt at bedside. Pt refused to go to transitional housing with alcohol/substance abuse program for now. Pt decided she will go home, get home health for therapy, and shw will call [facility] and [facility] if they can take her eventually. Pt has alcohol/substance abuse resources provided at bedside. Received home health choice. Choice form faxed to [facility]. Pt does not have a PCP. Pt agreeable with going to [facility]. Pt was previously scheduled for an appointment with [facility] but she is still in the hospital.     RN CM called [facility] [employer] [facility]. Per Kathryn, pt can follow up with PCP and behavioral health and can got to [facility] housing for assessment and arrangement for possible services that pt needs.     RN CM called [facility] scheduling, no answer, left voicemail for call back. RN CM called [facility]. Per [facility], he will follow up with [facility] to set up appointments for PCP and behavioral Health.     Pt under service with [facility]. FWW and home oxygen tanks at bedside. Pt has an oxygen concentrator at home. Pt's aunt at the bedside to provide transportation for pt at discharge.    Barriers to Discharge:   PCP appointment.  Home Health acceptance.   Med to Beds delivery    Plan:   Follow up with [facility].  Hospital Care Management will continue to follow and assist with discharge planning needs.      Addendum:  1250: PCP and behavioral health appointments are in AVS.   Per FARHAN, [facility] home health accepted pt. MD and bedside RN updated via [employer].

## 2021-06-17 NOTE — PROGRESS NOTES
Dc instructions, medication list, prescriptions provide to pt with understanding verbalized. Pt escorted by wheel chair to private vehicle with portable o2 tank and walker. Pt to dc by private vehicle

## 2021-06-17 NOTE — PROGRESS NOTES
CHW received call from [facility] asking for assistance getting pt a follow up PCP and Behavior health appointment. PCP made for 6/21 @12pm and behavioral health 6/21 @ 2:40pm. All information added to follow up tab

## 2021-06-17 NOTE — CARE PLAN
Problem: Optimal Care for Alcohol Withdrawal  Goal: Optimal Care for the alcohol withdrawal patient  Outcome: Progressing     Problem: Seizure Precautions  Goal: Implementation of seizure precautions  Outcome: Progressing     Problem: Lifestyle Changes  Goal: Patient's ability to identify lifestyle changes and available resources to help reduce recurrence of condition will improve  Outcome: Progressing     Problem: Psychosocial  Goal: Patient's level of anxiety will decrease  Outcome: Progressing  Goal: Spiritual and cultural needs incorporated into hospitalization  Outcome: Progressing     Problem: Psychosocial  Goal: Patient's ability to identify and develop effective coping behaviors will improve  Outcome: Progressing  Goal: Patient's ability to identify and utilize available support systems will improve  Outcome: Progressing     Problem: Depression  Goal: Patient and family/caregiver will verbalize accurate information about at least two of the possible causes of depression, three-four of the signs and symptoms of depression  Outcome: Progressing     Problem: Pain - Standard  Goal: Alleviation of pain or a reduction in pain to the patient’s comfort goal  Outcome: Progressing     Problem: Communication  Goal: The ability to communicate needs accurately and effectively will improve  Outcome: Progressing     Problem: Hemodynamics  Goal: Patient's hemodynamics, fluid balance and neurologic status will be stable or improve  Outcome: Progressing     Problem: Respiratory  Goal: Patient will achieve/maintain optimum respiratory ventilation and gas exchange  Outcome: Progressing     Problem: Nutrition  Goal: Patient's nutritional and fluid intake will be adequate or improve  Outcome: Progressing  Goal: Enteral nutrition will be maintained or improve  Outcome: Progressing  Goal: Enteral nutrition will be maintained or improve  Outcome: Progressing     Problem: Urinary Elimination  Goal: Establish and maintain regular  urinary output  Outcome: Progressing     Problem: Bowel Elimination  Goal: Establish and maintain regular bowel function  Outcome: Progressing     Problem: Gastrointestinal Irritability  Goal: Nausea and vomiting will be absent or improve  Outcome: Progressing  Goal: Diarrhea will be absent or improved  Outcome: Progressing     Problem: Mobility  Goal: Patient's capacity to carry out activities will improve  Outcome: Progressing     Problem: Self Care  Goal: Patient will have the ability to perform ADLs independently or with assistance (bathe, groom, dress, toilet and feed)  Outcome: Progressing     Problem: Infection - Standard  Goal: Patient will remain free from infection  Outcome: Progressing   The patient is Stable - Low risk of patient condition declining or worsening    Shift Goals  Clinical Goals: remains free of falls (remains free of falls)  Patient Goals: Rest comfortably  Family Goals: na    Progress made toward(s) clinical / shift goals: patients needs are met    Patient is not progressing towards the following goals:

## 2021-10-04 ENCOUNTER — APPOINTMENT (OUTPATIENT)
Dept: RADIOLOGY | Facility: MEDICAL CENTER | Age: 51
DRG: 896 | End: 2021-10-04
Attending: EMERGENCY MEDICINE
Payer: MEDICAID

## 2021-10-04 ENCOUNTER — HOSPITAL ENCOUNTER (INPATIENT)
Facility: MEDICAL CENTER | Age: 51
LOS: 2 days | DRG: 896 | End: 2021-10-07
Attending: EMERGENCY MEDICINE | Admitting: HOSPITALIST
Payer: MEDICAID

## 2021-10-04 DIAGNOSIS — J69.0 ASPIRATION PNEUMONIA OF LEFT LOWER LOBE DUE TO GASTRIC SECRETIONS (HCC): ICD-10-CM

## 2021-10-04 DIAGNOSIS — F10.920 ALCOHOLIC INTOXICATION WITHOUT COMPLICATION (HCC): ICD-10-CM

## 2021-10-04 DIAGNOSIS — E87.29 ALCOHOLIC KETOACIDOSIS: ICD-10-CM

## 2021-10-04 DIAGNOSIS — J18.9 PNEUMONIA OF LEFT LOWER LOBE DUE TO INFECTIOUS ORGANISM: ICD-10-CM

## 2021-10-04 DIAGNOSIS — F10.10 ALCOHOL ABUSE: ICD-10-CM

## 2021-10-04 DIAGNOSIS — R11.0 NAUSEA: ICD-10-CM

## 2021-10-04 DIAGNOSIS — F10.930 ALCOHOL WITHDRAWAL SYNDROME WITHOUT COMPLICATION (HCC): ICD-10-CM

## 2021-10-04 LAB
ALBUMIN SERPL BCP-MCNC: 4.6 G/DL (ref 3.2–4.9)
ALBUMIN/GLOB SERPL: 1.4 G/DL
ALP SERPL-CCNC: 222 U/L (ref 30–99)
ALT SERPL-CCNC: 77 U/L (ref 2–50)
ANION GAP SERPL CALC-SCNC: 22 MMOL/L (ref 7–16)
APAP SERPL-MCNC: 12 UG/ML (ref 10–30)
AST SERPL-CCNC: 238 U/L (ref 12–45)
BILIRUB SERPL-MCNC: 0.6 MG/DL (ref 0.1–1.5)
BUN SERPL-MCNC: 8 MG/DL (ref 8–22)
CALCIUM SERPL-MCNC: 8.9 MG/DL (ref 8.5–10.5)
CHLORIDE SERPL-SCNC: 99 MMOL/L (ref 96–112)
CO2 SERPL-SCNC: 20 MMOL/L (ref 20–33)
CREAT SERPL-MCNC: 0.37 MG/DL (ref 0.5–1.4)
ETHANOL BLD-MCNC: 476.2 MG/DL (ref 0–10)
GLOBULIN SER CALC-MCNC: 3.4 G/DL (ref 1.9–3.5)
GLUCOSE SERPL-MCNC: 111 MG/DL (ref 65–99)
LIPASE SERPL-CCNC: 128 U/L (ref 11–82)
POTASSIUM SERPL-SCNC: 3.9 MMOL/L (ref 3.6–5.5)
PROT SERPL-MCNC: 8 G/DL (ref 6–8.2)
SODIUM SERPL-SCNC: 141 MMOL/L (ref 135–145)

## 2021-10-04 PROCEDURE — HZ2ZZZZ DETOXIFICATION SERVICES FOR SUBSTANCE ABUSE TREATMENT: ICD-10-PCS | Performed by: EMERGENCY MEDICINE

## 2021-10-04 PROCEDURE — 82010 KETONE BODYS QUAN: CPT

## 2021-10-04 PROCEDURE — C9803 HOPD COVID-19 SPEC COLLECT: HCPCS | Performed by: EMERGENCY MEDICINE

## 2021-10-04 PROCEDURE — 36415 COLL VENOUS BLD VENIPUNCTURE: CPT

## 2021-10-04 PROCEDURE — 80143 DRUG ASSAY ACETAMINOPHEN: CPT

## 2021-10-04 PROCEDURE — 83690 ASSAY OF LIPASE: CPT

## 2021-10-04 PROCEDURE — 71045 X-RAY EXAM CHEST 1 VIEW: CPT

## 2021-10-04 PROCEDURE — 82077 ASSAY SPEC XCP UR&BREATH IA: CPT

## 2021-10-04 PROCEDURE — 80053 COMPREHEN METABOLIC PANEL: CPT

## 2021-10-04 PROCEDURE — 0241U HCHG SARS-COV-2 COVID-19 NFCT DS RESP RNA 4 TRGT MIC: CPT

## 2021-10-04 PROCEDURE — 99285 EMERGENCY DEPT VISIT HI MDM: CPT

## 2021-10-04 ASSESSMENT — FIBROSIS 4 INDEX: FIB4 SCORE: 2.87

## 2021-10-05 PROBLEM — J69.0 ASPIRATION PNEUMONIA (HCC): Status: ACTIVE | Noted: 2021-10-05

## 2021-10-05 LAB
ANION GAP SERPL CALC-SCNC: 18 MMOL/L (ref 7–16)
APAP SERPL-MCNC: 6 UG/ML (ref 10–30)
B-OH-BUTYR SERPL-MCNC: 0.4 MMOL/L (ref 0.02–0.27)
BASE EXCESS BLDV CALC-SCNC: -2 MMOL/L
BASOPHILS # BLD AUTO: 1.4 % (ref 0–1.8)
BASOPHILS # BLD: 0.06 K/UL (ref 0–0.12)
BODY TEMPERATURE: ABNORMAL CENTIGRADE
BUN SERPL-MCNC: 5 MG/DL (ref 8–22)
CALCIUM SERPL-MCNC: 8.5 MG/DL (ref 8.5–10.5)
CHLORIDE SERPL-SCNC: 98 MMOL/L (ref 96–112)
CO2 SERPL-SCNC: 23 MMOL/L (ref 20–33)
CREAT SERPL-MCNC: 0.31 MG/DL (ref 0.5–1.4)
EOSINOPHIL # BLD AUTO: 0.03 K/UL (ref 0–0.51)
EOSINOPHIL NFR BLD: 0.7 % (ref 0–6.9)
ERYTHROCYTE [DISTWIDTH] IN BLOOD BY AUTOMATED COUNT: 47.6 FL (ref 35.9–50)
FLUAV RNA SPEC QL NAA+PROBE: NEGATIVE
FLUBV RNA SPEC QL NAA+PROBE: NEGATIVE
GLUCOSE SERPL-MCNC: 90 MG/DL (ref 65–99)
HCO3 BLDV-SCNC: 25 MMOL/L (ref 24–28)
HCT VFR BLD AUTO: 41.9 % (ref 37–47)
HGB BLD-MCNC: 14.4 G/DL (ref 12–16)
IMM GRANULOCYTES # BLD AUTO: 0.02 K/UL (ref 0–0.11)
IMM GRANULOCYTES NFR BLD AUTO: 0.5 % (ref 0–0.9)
LYMPHOCYTES # BLD AUTO: 2.03 K/UL (ref 1–4.8)
LYMPHOCYTES NFR BLD: 48.4 % (ref 22–41)
MAGNESIUM SERPL-MCNC: 1.6 MG/DL (ref 1.5–2.5)
MCH RBC QN AUTO: 33 PG (ref 27–33)
MCHC RBC AUTO-ENTMCNC: 34.4 G/DL (ref 33.6–35)
MCV RBC AUTO: 96.1 FL (ref 81.4–97.8)
MONOCYTES # BLD AUTO: 0.39 K/UL (ref 0–0.85)
MONOCYTES NFR BLD AUTO: 9.3 % (ref 0–13.4)
NEUTROPHILS # BLD AUTO: 1.66 K/UL (ref 2–7.15)
NEUTROPHILS NFR BLD: 39.7 % (ref 44–72)
NRBC # BLD AUTO: 0 K/UL
NRBC BLD-RTO: 0 /100 WBC
PCO2 BLDV: 50.2 MMHG (ref 41–51)
PH BLDV: 7.32 [PH] (ref 7.31–7.45)
PLATELET # BLD AUTO: 67 K/UL (ref 164–446)
PMV BLD AUTO: 9.7 FL (ref 9–12.9)
PO2 BLDV: 60.1 MMHG (ref 25–40)
POTASSIUM SERPL-SCNC: 3.7 MMOL/L (ref 3.6–5.5)
RBC # BLD AUTO: 4.36 M/UL (ref 4.2–5.4)
RSV RNA SPEC QL NAA+PROBE: NEGATIVE
SAO2 % BLDV: 81.6 %
SARS-COV-2 RNA RESP QL NAA+PROBE: NOTDETECTED
SODIUM SERPL-SCNC: 139 MMOL/L (ref 135–145)
SPECIMEN SOURCE: NORMAL
WBC # BLD AUTO: 4.2 K/UL (ref 4.8–10.8)

## 2021-10-05 PROCEDURE — 80143 DRUG ASSAY ACETAMINOPHEN: CPT

## 2021-10-05 PROCEDURE — 96375 TX/PRO/DX INJ NEW DRUG ADDON: CPT

## 2021-10-05 PROCEDURE — 700111 HCHG RX REV CODE 636 W/ 250 OVERRIDE (IP): Performed by: EMERGENCY MEDICINE

## 2021-10-05 PROCEDURE — 96368 THER/DIAG CONCURRENT INF: CPT

## 2021-10-05 PROCEDURE — 96376 TX/PRO/DX INJ SAME DRUG ADON: CPT

## 2021-10-05 PROCEDURE — 700105 HCHG RX REV CODE 258

## 2021-10-05 PROCEDURE — 700105 HCHG RX REV CODE 258: Performed by: HOSPITALIST

## 2021-10-05 PROCEDURE — 83735 ASSAY OF MAGNESIUM: CPT

## 2021-10-05 PROCEDURE — A9270 NON-COVERED ITEM OR SERVICE: HCPCS | Performed by: EMERGENCY MEDICINE

## 2021-10-05 PROCEDURE — 700102 HCHG RX REV CODE 250 W/ 637 OVERRIDE(OP): Performed by: EMERGENCY MEDICINE

## 2021-10-05 PROCEDURE — 36415 COLL VENOUS BLD VENIPUNCTURE: CPT

## 2021-10-05 PROCEDURE — 96367 TX/PROPH/DG ADDL SEQ IV INF: CPT

## 2021-10-05 PROCEDURE — 96365 THER/PROPH/DIAG IV INF INIT: CPT

## 2021-10-05 PROCEDURE — 770001 HCHG ROOM/CARE - MED/SURG/GYN PRIV*

## 2021-10-05 PROCEDURE — 80048 BASIC METABOLIC PNL TOTAL CA: CPT

## 2021-10-05 PROCEDURE — 96372 THER/PROPH/DIAG INJ SC/IM: CPT

## 2021-10-05 PROCEDURE — 700102 HCHG RX REV CODE 250 W/ 637 OVERRIDE(OP): Performed by: HOSPITALIST

## 2021-10-05 PROCEDURE — A9270 NON-COVERED ITEM OR SERVICE: HCPCS | Performed by: HOSPITALIST

## 2021-10-05 PROCEDURE — 700105 HCHG RX REV CODE 258: Performed by: EMERGENCY MEDICINE

## 2021-10-05 PROCEDURE — 96366 THER/PROPH/DIAG IV INF ADDON: CPT

## 2021-10-05 PROCEDURE — 700111 HCHG RX REV CODE 636 W/ 250 OVERRIDE (IP): Performed by: HOSPITALIST

## 2021-10-05 PROCEDURE — 85025 COMPLETE CBC W/AUTO DIFF WBC: CPT

## 2021-10-05 PROCEDURE — 82803 BLOOD GASES ANY COMBINATION: CPT

## 2021-10-05 PROCEDURE — 99223 1ST HOSP IP/OBS HIGH 75: CPT | Performed by: HOSPITALIST

## 2021-10-05 PROCEDURE — 700101 HCHG RX REV CODE 250: Performed by: EMERGENCY MEDICINE

## 2021-10-05 RX ORDER — LORAZEPAM 2 MG/ML
0.5 INJECTION INTRAMUSCULAR EVERY 4 HOURS PRN
Status: DISCONTINUED | OUTPATIENT
Start: 2021-10-05 | End: 2021-10-07 | Stop reason: HOSPADM

## 2021-10-05 RX ORDER — LORAZEPAM 1 MG/1
2 TABLET ORAL
Status: DISCONTINUED | OUTPATIENT
Start: 2021-10-05 | End: 2021-10-07 | Stop reason: HOSPADM

## 2021-10-05 RX ORDER — FOLIC ACID 1 MG/1
1 TABLET ORAL DAILY
Status: DISCONTINUED | OUTPATIENT
Start: 2021-10-06 | End: 2021-10-07 | Stop reason: HOSPADM

## 2021-10-05 RX ORDER — LORAZEPAM 2 MG/ML
1.5 INJECTION INTRAMUSCULAR
Status: DISCONTINUED | OUTPATIENT
Start: 2021-10-05 | End: 2021-10-07 | Stop reason: HOSPADM

## 2021-10-05 RX ORDER — CLONIDINE HYDROCHLORIDE 0.1 MG/1
0.1 TABLET ORAL
Status: DISCONTINUED | OUTPATIENT
Start: 2021-10-05 | End: 2021-10-07 | Stop reason: HOSPADM

## 2021-10-05 RX ORDER — LORAZEPAM 2 MG/ML
2 INJECTION INTRAMUSCULAR ONCE
Status: COMPLETED | OUTPATIENT
Start: 2021-10-05 | End: 2021-10-05

## 2021-10-05 RX ORDER — AZITHROMYCIN 250 MG/1
TABLET, FILM COATED ORAL
Qty: 5 TABLET | Refills: 0 | Status: SHIPPED | OUTPATIENT
Start: 2021-10-05 | End: 2021-10-06

## 2021-10-05 RX ORDER — ACETAMINOPHEN 325 MG/1
650 TABLET ORAL EVERY 6 HOURS PRN
Status: DISCONTINUED | OUTPATIENT
Start: 2021-10-05 | End: 2021-10-07 | Stop reason: HOSPADM

## 2021-10-05 RX ORDER — ONDANSETRON 2 MG/ML
4 INJECTION INTRAMUSCULAR; INTRAVENOUS ONCE
Status: COMPLETED | OUTPATIENT
Start: 2021-10-05 | End: 2021-10-05

## 2021-10-05 RX ORDER — LORAZEPAM 1 MG/1
4 TABLET ORAL
Status: DISCONTINUED | OUTPATIENT
Start: 2021-10-05 | End: 2021-10-07 | Stop reason: HOSPADM

## 2021-10-05 RX ORDER — PROMETHAZINE HYDROCHLORIDE 25 MG/1
25 TABLET ORAL ONCE
Status: COMPLETED | OUTPATIENT
Start: 2021-10-05 | End: 2021-10-05

## 2021-10-05 RX ORDER — SODIUM CHLORIDE, SODIUM LACTATE, POTASSIUM CHLORIDE, CALCIUM CHLORIDE 600; 310; 30; 20 MG/100ML; MG/100ML; MG/100ML; MG/100ML
INJECTION, SOLUTION INTRAVENOUS CONTINUOUS
Status: DISCONTINUED | OUTPATIENT
Start: 2021-10-05 | End: 2021-10-06

## 2021-10-05 RX ORDER — AMOXICILLIN AND CLAVULANATE POTASSIUM 875; 125 MG/1; MG/1
1 TABLET, FILM COATED ORAL 2 TIMES DAILY
Qty: 10 TABLET | Refills: 0 | Status: SHIPPED | OUTPATIENT
Start: 2021-10-05 | End: 2021-10-06

## 2021-10-05 RX ORDER — BISACODYL 10 MG
10 SUPPOSITORY, RECTAL RECTAL
Status: DISCONTINUED | OUTPATIENT
Start: 2021-10-05 | End: 2021-10-06

## 2021-10-05 RX ORDER — LORAZEPAM 0.5 MG/1
0.5 TABLET ORAL EVERY 4 HOURS PRN
Status: DISCONTINUED | OUTPATIENT
Start: 2021-10-05 | End: 2021-10-07 | Stop reason: HOSPADM

## 2021-10-05 RX ORDER — POLYETHYLENE GLYCOL 3350 17 G/17G
1 POWDER, FOR SOLUTION ORAL
Status: DISCONTINUED | OUTPATIENT
Start: 2021-10-05 | End: 2021-10-06

## 2021-10-05 RX ORDER — LORAZEPAM 2 MG/ML
2 INJECTION INTRAMUSCULAR
Status: DISCONTINUED | OUTPATIENT
Start: 2021-10-05 | End: 2021-10-07 | Stop reason: HOSPADM

## 2021-10-05 RX ORDER — AMOXICILLIN 250 MG
2 CAPSULE ORAL 2 TIMES DAILY
Status: DISCONTINUED | OUTPATIENT
Start: 2021-10-05 | End: 2021-10-06

## 2021-10-05 RX ORDER — DIAZEPAM 5 MG/1
10 TABLET ORAL ONCE
Status: COMPLETED | OUTPATIENT
Start: 2021-10-05 | End: 2021-10-05

## 2021-10-05 RX ORDER — LORAZEPAM 2 MG/ML
1 INJECTION INTRAMUSCULAR
Status: DISCONTINUED | OUTPATIENT
Start: 2021-10-05 | End: 2021-10-07 | Stop reason: HOSPADM

## 2021-10-05 RX ORDER — LORAZEPAM 1 MG/1
3 TABLET ORAL
Status: DISCONTINUED | OUTPATIENT
Start: 2021-10-05 | End: 2021-10-07 | Stop reason: HOSPADM

## 2021-10-05 RX ORDER — DIAZEPAM 5 MG/1
5 TABLET ORAL ONCE
Status: COMPLETED | OUTPATIENT
Start: 2021-10-05 | End: 2021-10-05

## 2021-10-05 RX ORDER — SODIUM CHLORIDE, SODIUM LACTATE, POTASSIUM CHLORIDE, CALCIUM CHLORIDE 600; 310; 30; 20 MG/100ML; MG/100ML; MG/100ML; MG/100ML
1000 INJECTION, SOLUTION INTRAVENOUS ONCE
Status: COMPLETED | OUTPATIENT
Start: 2021-10-05 | End: 2021-10-05

## 2021-10-05 RX ORDER — AZITHROMYCIN 500 MG/1
500 INJECTION, POWDER, LYOPHILIZED, FOR SOLUTION INTRAVENOUS ONCE
Status: COMPLETED | OUTPATIENT
Start: 2021-10-05 | End: 2021-10-05

## 2021-10-05 RX ORDER — SODIUM CHLORIDE, SODIUM LACTATE, POTASSIUM CHLORIDE, CALCIUM CHLORIDE 600; 310; 30; 20 MG/100ML; MG/100ML; MG/100ML; MG/100ML
1000 INJECTION, SOLUTION INTRAVENOUS ONCE
Status: DISCONTINUED | OUTPATIENT
Start: 2021-10-05 | End: 2021-10-05

## 2021-10-05 RX ORDER — LORAZEPAM 1 MG/1
1 TABLET ORAL EVERY 4 HOURS PRN
Status: DISCONTINUED | OUTPATIENT
Start: 2021-10-05 | End: 2021-10-07 | Stop reason: HOSPADM

## 2021-10-05 RX ADMIN — LORAZEPAM 2 MG: 2 INJECTION INTRAMUSCULAR; INTRAVENOUS at 19:56

## 2021-10-05 RX ADMIN — THIAMINE HYDROCHLORIDE 250 MG: 100 INJECTION, SOLUTION INTRAMUSCULAR; INTRAVENOUS at 22:11

## 2021-10-05 RX ADMIN — ENOXAPARIN SODIUM 40 MG: 40 INJECTION SUBCUTANEOUS at 18:00

## 2021-10-05 RX ADMIN — DIAZEPAM 5 MG: 5 TABLET ORAL at 07:48

## 2021-10-05 RX ADMIN — SODIUM CHLORIDE, POTASSIUM CHLORIDE, SODIUM LACTATE AND CALCIUM CHLORIDE: 600; 310; 30; 20 INJECTION, SOLUTION INTRAVENOUS at 19:57

## 2021-10-05 RX ADMIN — THIAMINE HYDROCHLORIDE: 100 INJECTION, SOLUTION INTRAMUSCULAR; INTRAVENOUS at 03:17

## 2021-10-05 RX ADMIN — LORAZEPAM 2 MG: 2 INJECTION INTRAMUSCULAR; INTRAVENOUS at 15:05

## 2021-10-05 RX ADMIN — THIAMINE HYDROCHLORIDE 250 MG: 100 INJECTION, SOLUTION INTRAMUSCULAR; INTRAVENOUS at 17:00

## 2021-10-05 RX ADMIN — SODIUM CHLORIDE, POTASSIUM CHLORIDE, SODIUM LACTATE AND CALCIUM CHLORIDE: 600; 310; 30; 20 INJECTION, SOLUTION INTRAVENOUS at 17:15

## 2021-10-05 RX ADMIN — LORAZEPAM 1 MG: 1 TABLET ORAL at 22:11

## 2021-10-05 RX ADMIN — SODIUM CHLORIDE 3 G: 900 INJECTION INTRAVENOUS at 03:05

## 2021-10-05 RX ADMIN — SODIUM CHLORIDE, POTASSIUM CHLORIDE, SODIUM LACTATE AND CALCIUM CHLORIDE: 600; 310; 30; 20 INJECTION, SOLUTION INTRAVENOUS at 17:00

## 2021-10-05 RX ADMIN — ONDANSETRON 4 MG: 2 INJECTION INTRAMUSCULAR; INTRAVENOUS at 13:55

## 2021-10-05 RX ADMIN — SODIUM CHLORIDE, POTASSIUM CHLORIDE, SODIUM LACTATE AND CALCIUM CHLORIDE 1000 ML: 600; 310; 30; 20 INJECTION, SOLUTION INTRAVENOUS at 07:32

## 2021-10-05 RX ADMIN — LORAZEPAM 2 MG: 2 INJECTION INTRAMUSCULAR; INTRAVENOUS at 04:11

## 2021-10-05 RX ADMIN — DIAZEPAM 10 MG: 5 TABLET ORAL at 13:55

## 2021-10-05 RX ADMIN — PROMETHAZINE HYDROCHLORIDE 25 MG: 25 TABLET ORAL at 03:30

## 2021-10-05 RX ADMIN — AZITHROMYCIN MONOHYDRATE 500 MG: 500 INJECTION, POWDER, LYOPHILIZED, FOR SOLUTION INTRAVENOUS at 01:15

## 2021-10-05 ASSESSMENT — LIFESTYLE VARIABLES
HEADACHE, FULLNESS IN HEAD: MODERATE
DOES PATIENT WANT TO TALK TO SOMEONE ABOUT QUITTING: NO
ORIENTATION AND CLOUDING OF SENSORIUM: ORIENTED AND CAN DO SERIAL ADDITIONS
TOTAL SCORE: 4
PAROXYSMAL SWEATS: NO SWEAT VISIBLE
AGITATION: NORMAL ACTIVITY
VISUAL DISTURBANCES: MILD SENSITIVITY
TOTAL SCORE: 4
DO YOU DRINK ALCOHOL: YES
TOTAL SCORE: 10
NAUSEA AND VOMITING: *
VISUAL DISTURBANCES: VERY MILD SENSITIVITY
ANXIETY: MILDLY ANXIOUS
AUDITORY DISTURBANCES: NOT PRESENT
ANXIETY: MILDLY ANXIOUS
TOTAL SCORE: 4
ORIENTATION AND CLOUDING OF SENSORIUM: ORIENTED AND CAN DO SERIAL ADDITIONS
DOES PATIENT WANT TO STOP DRINKING: YES
AUDITORY DISTURBANCES: NOT PRESENT
DOES PATIENT WANT TO STOP DRINKING: YES
HOW MANY TIMES IN THE PAST YEAR HAVE YOU HAD 5 OR MORE DRINKS IN A DAY: 365
AUDITORY DISTURBANCES: MODERATE HARSHNESS OR ABILITY TO FRIGHTEN
ORIENTATION AND CLOUDING OF SENSORIUM: ORIENTED AND CAN DO SERIAL ADDITIONS
PAROXYSMAL SWEATS: *
AVERAGE NUMBER OF DAYS PER WEEK YOU HAVE A DRINK CONTAINING ALCOHOL: 7
HEADACHE, FULLNESS IN HEAD: VERY SEVERE
HEADACHE, FULLNESS IN HEAD: VERY MILD
ON A TYPICAL DAY WHEN YOU DRINK ALCOHOL HOW MANY DRINKS DO YOU HAVE: 5
AUDITORY DISTURBANCES: NOT PRESENT
ANXIETY: *
PAROXYSMAL SWEATS: NO SWEAT VISIBLE
VISUAL DISTURBANCES: NOT PRESENT
EVER FELT BAD OR GUILTY ABOUT YOUR DRINKING: YES
TREMOR: TREMOR NOT VISIBLE BUT CAN BE FELT, FINGERTIP TO FINGERTIP
NAUSEA AND VOMITING: *
DOES PATIENT WANT TO TALK TO SOMEONE ABOUT QUITTING: NO
PAROXYSMAL SWEATS: NO SWEAT VISIBLE
VISUAL DISTURBANCES: NOT PRESENT
TREMOR: *
HEADACHE, FULLNESS IN HEAD: VERY MILD
AGITATION: NORMAL ACTIVITY
HAVE YOU EVER FELT YOU SHOULD CUT DOWN ON YOUR DRINKING: YES
TOTAL SCORE: 7
TOTAL SCORE: 5
CONSUMPTION TOTAL: POSITIVE
ORIENTATION AND CLOUDING OF SENSORIUM: ORIENTED AND CAN DO SERIAL ADDITIONS
AGITATION: NORMAL ACTIVITY
NAUSEA AND VOMITING: MILD NAUSEA WITH NO VOMITING
ANXIETY: MILDLY ANXIOUS
AGITATION: *
NAUSEA AND VOMITING: INTERMITTENT NAUSEA WITH DRY HEAVES
HAVE PEOPLE ANNOYED YOU BY CRITICIZING YOUR DRINKING: YES
TOTAL SCORE: 35
EVER HAD A DRINK FIRST THING IN THE MORNING TO STEADY YOUR NERVES TO GET RID OF A HANGOVER: YES
TOTAL SCORE: MODERATELY SEVERE HALLUCINATIONS

## 2021-10-05 ASSESSMENT — COGNITIVE AND FUNCTIONAL STATUS - GENERAL
TOILETING: A LITTLE
MOVING TO AND FROM BED TO CHAIR: A LITTLE
HELP NEEDED FOR BATHING: A LITTLE
DRESSING REGULAR UPPER BODY CLOTHING: A LITTLE
PERSONAL GROOMING: A LITTLE
STANDING UP FROM CHAIR USING ARMS: A LITTLE
MOVING FROM LYING ON BACK TO SITTING ON SIDE OF FLAT BED: A LITTLE
MOBILITY SCORE: 18
CLIMB 3 TO 5 STEPS WITH RAILING: A LITTLE
TURNING FROM BACK TO SIDE WHILE IN FLAT BAD: A LITTLE
SUGGESTED CMS G CODE MODIFIER MOBILITY: CK
DAILY ACTIVITIY SCORE: 18
SUGGESTED CMS G CODE MODIFIER DAILY ACTIVITY: CK
DRESSING REGULAR LOWER BODY CLOTHING: A LITTLE
EATING MEALS: A LITTLE
WALKING IN HOSPITAL ROOM: A LITTLE

## 2021-10-05 ASSESSMENT — PATIENT HEALTH QUESTIONNAIRE - PHQ9
SUM OF ALL RESPONSES TO PHQ9 QUESTIONS 1 AND 2: 0
2. FEELING DOWN, DEPRESSED, IRRITABLE, OR HOPELESS: NOT AT ALL
1. LITTLE INTEREST OR PLEASURE IN DOING THINGS: NOT AT ALL

## 2021-10-05 ASSESSMENT — PAIN DESCRIPTION - PAIN TYPE: TYPE: ACUTE PAIN

## 2021-10-05 ASSESSMENT — FIBROSIS 4 INDEX: FIB4 SCORE: 21.46

## 2021-10-05 NOTE — ED NOTES
Pt medicated per MAR for pain and nausea. Pt repositioned and cleaned, soiled brief changed. Pt states all needs are met at this time, call light in reach.

## 2021-10-05 NOTE — ED NOTES
Pt sleeping in bed, arousable to voice. Pt is tachycardiac, Dr. Guerrero aware, received new orders.
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
## 2021-10-05 NOTE — ASSESSMENT & PLAN NOTE
Patient would appear to be having some issues with withdrawal.  She does drink heavily, but is unable or unwilling to tell me exactly how much.  We will admit to the hospital  Placed on benzodiazepine withdrawal program  IV loaded with thiamine  P.o. folate and multivitamin  Clonidine for as needed hypertension

## 2021-10-05 NOTE — ED NOTES
Blood drawn and sent to lab, pt tolerated draw well. Pt resting in bed, respirations even and unlabored, VSS.

## 2021-10-05 NOTE — ASSESSMENT & PLAN NOTE
Chest x-ray would indicate lower lobe infiltrate  Patient is at risk for aspiration given her heavy alcohol abuse  Placed on ampicillin sulbactam

## 2021-10-05 NOTE — PROGRESS NOTES
ED Observation Progress Note    Date of Service: 10/05/21    Interval History/Events    5:10 AM patient care signed out from nighttime ERP. Patient here with alcohol intoxication. She has been medically cleared. Await sobriety and a trial of ambulation determine safe disposition.    10:09 AM repeat chemistry reviewed.  Improvement in her anion gap, now 18.  Patient is resting and calm at this time. No vomiting. Patient's blood pressure is improved, 130s over 86. Heart rate in the 1 teens. She is resting and appears comfortable. Care will be signed out to oncoming ERP, pending safe ambulation, sobriety and anticipation of discharge to home.    Physical Exam  /89   Pulse (!) 114   Temp 36.4 °C (97.6 °F) (Temporal)   Resp 15   Ht 1.524 m (5')   Wt 61.2 kg (135 lb)   LMP 02/28/2018   SpO2 94%   BMI 26.37 kg/m² .    Constitutional: Awake and alert. Nontoxic  HENT:  Grossly normal  Eyes: Grossly normal  Neck: Normal range of motion  Cardiovascular: Normal heart rate   Thorax & Lungs: No respiratory distress  Abdomen: Nontender  Skin:  No pathologic rash.   Extremities: Well perfused  Psychiatric: Affect normal    Labs  Results for orders placed or performed during the hospital encounter of 10/04/21   CMP   Result Value Ref Range    Sodium 141 135 - 145 mmol/L    Potassium 3.9 3.6 - 5.5 mmol/L    Chloride 99 96 - 112 mmol/L    Co2 20 20 - 33 mmol/L    Anion Gap 22.0 (H) 7.0 - 16.0    Glucose 111 (H) 65 - 99 mg/dL    Bun 8 8 - 22 mg/dL    Creatinine 0.37 (L) 0.50 - 1.40 mg/dL    Calcium 8.9 8.5 - 10.5 mg/dL    AST(SGOT) 238 (H) 12 - 45 U/L    ALT(SGPT) 77 (H) 2 - 50 U/L    Alkaline Phosphatase 222 (H) 30 - 99 U/L    Total Bilirubin 0.6 0.1 - 1.5 mg/dL    Albumin 4.6 3.2 - 4.9 g/dL    Total Protein 8.0 6.0 - 8.2 g/dL    Globulin 3.4 1.9 - 3.5 g/dL    A-G Ratio 1.4 g/dL   LIPASE   Result Value Ref Range    Lipase 128 (H) 11 - 82 U/L   DIAGNOSTIC ALCOHOL   Result Value Ref Range    Diagnostic Alcohol 476.2 (H)  0.0 - 10.0 mg/dL   ACETAMINOPHEN   Result Value Ref Range    Acetaminophen -Tylenol 12.0 10.0 - 30.0 ug/mL   COV-2, FLU A/B, AND RSV BY PCR (2-4 HOURS [place]): Collect NP swab in VTM    Specimen: Respirate   Result Value Ref Range    Influenza virus A RNA Negative Negative    Influenza virus B, PCR Negative Negative    RSV, PCR Negative Negative    SARS-CoV-2 by PCR NotDetected     SARS-CoV-2 Source NP Swab    ESTIMATED GFR   Result Value Ref Range    GFR If African American >60 >60 mL/min/1.73 m 2    GFR If Non African American >60 >60 mL/min/1.73 m 2   CBC WITH DIFFERENTIAL   Result Value Ref Range    WBC 4.2 (L) 4.8 - 10.8 K/uL    RBC 4.36 4.20 - 5.40 M/uL    Hemoglobin 14.4 12.0 - 16.0 g/dL    Hematocrit 41.9 37.0 - 47.0 %    MCV 96.1 81.4 - 97.8 fL    MCH 33.0 27.0 - 33.0 pg    MCHC 34.4 33.6 - 35.0 g/dL    RDW 47.6 35.9 - 50.0 fL    Platelet Count 67 (L) 164 - 446 K/uL    MPV 9.7 9.0 - 12.9 fL    Neutrophils-Polys 39.70 (L) 44.00 - 72.00 %    Lymphocytes 48.40 (H) 22.00 - 41.00 %    Monocytes 9.30 0.00 - 13.40 %    Eosinophils 0.70 0.00 - 6.90 %    Basophils 1.40 0.00 - 1.80 %    Immature Granulocytes 0.50 0.00 - 0.90 %    Nucleated RBC 0.00 /100 WBC    Neutrophils (Absolute) 1.66 (L) 2.00 - 7.15 K/uL    Lymphs (Absolute) 2.03 1.00 - 4.80 K/uL    Monos (Absolute) 0.39 0.00 - 0.85 K/uL    Eos (Absolute) 0.03 0.00 - 0.51 K/uL    Baso (Absolute) 0.06 0.00 - 0.12 K/uL    Immature Granulocytes (abs) 0.02 0.00 - 0.11 K/uL    NRBC (Absolute) 0.00 K/uL   BETA-HYDROXYBUTYRIC ACID   Result Value Ref Range    beta-Hydroxybutyric Acid 0.40 (H) 0.02 - 0.27 mmol/L   VENOUS BLOOD GAS   Result Value Ref Range    Venous Bg Ph 7.32 7.31 - 7.45    Venous Bg Pco2 50.2 41.0 - 51.0 mmHg    Venous Bg Po2 60.1 (H) 25.0 - 40.0 mmHg    Venous Bg O2 Saturation 81.6 %    Venous Bg Hco3 25 24 - 28 mmol/L    Venous Bg Base Excess -2 mmol/L    Body Temp see below Centigrade   ACETAMINOPHEN   Result Value Ref Range    Acetaminophen -Tylenol  6.0 (L) 10.0 - 30.0 ug/mL       Radiology  DX-CHEST-PORTABLE (1 VIEW)   Final Result         1.  Left basilar atelectasis or early infiltrate.          Problem List  1. Alcohol intoxication without complication  2. Alcoholic ketoacidosis  3. Pneumonia      Electronically signed by: Desiree Guerrero D.O., 10/5/2021 4:38 AM

## 2021-10-05 NOTE — ED PROVIDER NOTES
ED Observation Progress Note    Date of Service: 10/05/21    Interval History  Olya is a 51 y.o. female who was signed out to me by my partner Dr. Yeung due to alcohol intoxication.  Patient initially showed up with shortness of breath, headache and abdominal pain and was found to be intoxicated with an alcohol level of almost 500.  She did have evidence of an elevated anion gap that improved with fluids.  Also signs of possible early infiltrate on chest x-ray.  She did have signs of developing alcohol withdrawal and for this morning and was treated with Ativan and oral Valium.  Disposition is pending sober reevaluation.    On my initial assessment, patient is currently complaining of cold-sweats. Patient states she remembers last night having a headache, and presenting to the hospital. She states she drinks 1.5 pints of alcohol daily. Patient adds that she has a history of seizures with alcohol withdrawal. She notes her headache has improved since arrival. She endorses associated nausea and vomiting. She reports a history of smoking marijuana. Patient is not interested in going to inpatient detox. Per nursing staff, the patient has had difficulty ambulating without assistance.     1:26 PM - Patient is awake. She was having difficulty ambulating due to worsening tremors. She appears to be in mild alcohol withdrawal. Will treat with valium and get food. She will be treated with valium 10 mg and Zofran 4 mg.     4:20 PM Following oral valium still tremalous and unable to ambulate.  We will treat with IV Ativan and admit for alcohol withdrawal.    4:29 PM - I discussed the patient's case and the above findings with Dr. Montoya (hospitalist) who will accept the patient for hospitalization.     Physical Exam  /92   Pulse (!) 104   Temp 36.4 °C (97.6 °F) (Temporal)   Resp 20   Ht 1.524 m (5')   Wt 61.2 kg (135 lb)   LMP 02/28/2018   SpO2 94%   BMI 26.37 kg/m² .    Constitutional: Awake and alert. Nontoxic,  Mild hand tremor  HENT:  Grossly normal  Eyes: Grossly normal  Neck: Normal range of motion  Cardiovascular: tachycardic  Thorax & Lungs: No respiratory distress  Abdomen: Nontender  Skin:  No pathologic rash.   Extremities: Well perfused  Psychiatric: Affect normal    Labs  Results for orders placed or performed during the hospital encounter of 10/04/21   CMP   Result Value Ref Range    Sodium 141 135 - 145 mmol/L    Potassium 3.9 3.6 - 5.5 mmol/L    Chloride 99 96 - 112 mmol/L    Co2 20 20 - 33 mmol/L    Anion Gap 22.0 (H) 7.0 - 16.0    Glucose 111 (H) 65 - 99 mg/dL    Bun 8 8 - 22 mg/dL    Creatinine 0.37 (L) 0.50 - 1.40 mg/dL    Calcium 8.9 8.5 - 10.5 mg/dL    AST(SGOT) 238 (H) 12 - 45 U/L    ALT(SGPT) 77 (H) 2 - 50 U/L    Alkaline Phosphatase 222 (H) 30 - 99 U/L    Total Bilirubin 0.6 0.1 - 1.5 mg/dL    Albumin 4.6 3.2 - 4.9 g/dL    Total Protein 8.0 6.0 - 8.2 g/dL    Globulin 3.4 1.9 - 3.5 g/dL    A-G Ratio 1.4 g/dL   LIPASE   Result Value Ref Range    Lipase 128 (H) 11 - 82 U/L   DIAGNOSTIC ALCOHOL   Result Value Ref Range    Diagnostic Alcohol 476.2 (H) 0.0 - 10.0 mg/dL   ACETAMINOPHEN   Result Value Ref Range    Acetaminophen -Tylenol 12.0 10.0 - 30.0 ug/mL   COV-2, FLU A/B, AND RSV BY PCR (2-4 HOURS CEPHEID): Collect NP swab in VTM    Specimen: Respirate   Result Value Ref Range    Influenza virus A RNA Negative Negative    Influenza virus B, PCR Negative Negative    RSV, PCR Negative Negative    SARS-CoV-2 by PCR NotDetected     SARS-CoV-2 Source NP Swab    ESTIMATED GFR   Result Value Ref Range    GFR If African American >60 >60 mL/min/1.73 m 2    GFR If Non African American >60 >60 mL/min/1.73 m 2   CBC WITH DIFFERENTIAL   Result Value Ref Range    WBC 4.2 (L) 4.8 - 10.8 K/uL    RBC 4.36 4.20 - 5.40 M/uL    Hemoglobin 14.4 12.0 - 16.0 g/dL    Hematocrit 41.9 37.0 - 47.0 %    MCV 96.1 81.4 - 97.8 fL    MCH 33.0 27.0 - 33.0 pg    MCHC 34.4 33.6 - 35.0 g/dL    RDW 47.6 35.9 - 50.0 fL    Platelet Count 67  (L) 164 - 446 K/uL    MPV 9.7 9.0 - 12.9 fL    Neutrophils-Polys 39.70 (L) 44.00 - 72.00 %    Lymphocytes 48.40 (H) 22.00 - 41.00 %    Monocytes 9.30 0.00 - 13.40 %    Eosinophils 0.70 0.00 - 6.90 %    Basophils 1.40 0.00 - 1.80 %    Immature Granulocytes 0.50 0.00 - 0.90 %    Nucleated RBC 0.00 /100 WBC    Neutrophils (Absolute) 1.66 (L) 2.00 - 7.15 K/uL    Lymphs (Absolute) 2.03 1.00 - 4.80 K/uL    Monos (Absolute) 0.39 0.00 - 0.85 K/uL    Eos (Absolute) 0.03 0.00 - 0.51 K/uL    Baso (Absolute) 0.06 0.00 - 0.12 K/uL    Immature Granulocytes (abs) 0.02 0.00 - 0.11 K/uL    NRBC (Absolute) 0.00 K/uL   BETA-HYDROXYBUTYRIC ACID   Result Value Ref Range    beta-Hydroxybutyric Acid 0.40 (H) 0.02 - 0.27 mmol/L   VENOUS BLOOD GAS   Result Value Ref Range    Venous Bg Ph 7.32 7.31 - 7.45    Venous Bg Pco2 50.2 41.0 - 51.0 mmHg    Venous Bg Po2 60.1 (H) 25.0 - 40.0 mmHg    Venous Bg O2 Saturation 81.6 %    Venous Bg Hco3 25 24 - 28 mmol/L    Venous Bg Base Excess -2 mmol/L    Body Temp see below Centigrade   ACETAMINOPHEN   Result Value Ref Range    Acetaminophen -Tylenol 6.0 (L) 10.0 - 30.0 ug/mL   BMP   Result Value Ref Range    Sodium 139 135 - 145 mmol/L    Potassium 3.7 3.6 - 5.5 mmol/L    Chloride 98 96 - 112 mmol/L    Co2 23 20 - 33 mmol/L    Glucose 90 65 - 99 mg/dL    Bun 5 (L) 8 - 22 mg/dL    Creatinine 0.31 (L) 0.50 - 1.40 mg/dL    Calcium 8.5 8.5 - 10.5 mg/dL    Anion Gap 18.0 (H) 7.0 - 16.0   ESTIMATED GFR   Result Value Ref Range    GFR If African American >60 >60 mL/min/1.73 m 2    GFR If Non African American >60 >60 mL/min/1.73 m 2       Radiology  DX-CHEST-PORTABLE (1 VIEW)   Final Result         1.  Left basilar atelectasis or early infiltrate.          Problem List  (F10.920) Alcoholic intoxication without complication (HCC)  (E87.2) Alcoholic ketoacidosis  (J18.9) Pneumonia of left lower lobe due to infectious organism  (F10.230) Alcohol withdrawal syndrome without complication (HCC)        Electronically  signed by: Lauryn Hernandez, 10/5/2021 5:00 PM
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
## 2021-10-05 NOTE — ED PROVIDER NOTES
ED Provider Note    Chief Complaint:   Shortness of breath    HPI:  Olya Youssef is a very pleasant 51-year-old woman who presents to the emergency department for evaluation of shortness of breath.  She states that she called an ambulance, but is uncertain as to exactly why.  She does have a history of COPD, chronically on 2 L of supplemental oxygen at all times.  Her speech is somewhat rambling, she states that she has had some nausea and vomiting, as well as shortness of breath, and also states that she had some abdominal pain.  When questioned further she states that she does not know time of onset, duration, location, nor migration.  She states that she has taken a lot of Tylenol recently, but is unable to tell me the amount, nor how long she has been taking Tylenol.  She does have a history of alcohol dependence, and seems somewhat drowsy.  May be due to clinical intoxication.  Further HPI is limited by patient's inability to recall details of her condition.    Review of Systems:  See HPI for pertinent positives and negatives.  Further ROS is limited by patient's inability to recall details of her condition.    Family history: Patient states she does not know of any significant family history, this may be limited by inability to recall details of her condition    Past Medical History:   has a past medical history of Alcohol abuse, Alcoholism (HCC), Anxiety, Backpain, Bipolar disorder, unspecified (HCC), Bronchitis, Drug abuse (HCC), Hepatitis, alcoholic, Hypertension, Indigestion, Infectious disease (MRSA), Liver disease, Pancreatitis chronic, Pneumonia, Psychiatric disorder, Renal disorder, Seizure (HCC), Seizure disorder (HCC), Unspecified hemorrhagic conditions, and Unspecified urinary incontinence.    Social History:  Social History     Tobacco Use   • Smoking status: Never Smoker   • Smokeless tobacco: Never Used   Vaping Use   • Vaping Use: Never used   Substance and Sexual Activity   • Alcohol use:  Yes     Comment: Hx heavy drinking apint in a 24 hr period   • Drug use: Yes     Comment: meth/THC   • Sexual activity: Not on file       Surgical History:   has a past surgical history that includes gastroscopy (4/28/2015).    Current Medications:  Home Medications    **Home medications have not yet been reviewed for this encounter**         Allergies:  Allergies   Allergen Reactions   • Bactrim Hives   • Lamotrigine [Lamictal] Hives   • Quetiapine Fumarate Hives     Extrapyramidal Symptoms   • Keflex Hives       Physical Exam:  Vital Signs: /89   Pulse (!) 114   Temp 36.4 °C (97.6 °F) (Temporal)   Resp 15   Ht 1.524 m (5')   Wt 61.2 kg (135 lb)   LMP 02/28/2018   SpO2 94%   BMI 26.37 kg/m²   Constitutional: Drowsy, no acute distress, thin  HENT: Normocephalic, mask in place, atraumatic  Eyes: Pupils equal and reactive, normal conjunctiva  Neck: Supple, normal range of motion, no stridor  Cardiovascular: Extremities are warm and well perfused, no murmur appreciated, normal cardiac auscultation  Pulmonary: No respiratory distress, normal work of breathing, no accessory muscule usage, breath sounds clear and equal bilaterally, no wheezing, no coarse breath sounds, breath sounds are quite consistent with history of COPD, oxygen saturation on home 2 L supplemental oxygen is in the mid 90s.  Abdomen: Soft, non-distended, non-tender to palpation, no peritoneal signs  Skin: Warm, dry, no rashes or lesions  Musculoskeletal: Normal range of motion in all extremities, no swelling or deformity noted  Neurologic: Alert, awake, rambling speech but no aphasia, speech is somewhat slurred  Psychiatric: Calm and cooperative    Medical records reviewed for continuity of care.  Discharge summary reviewed from 6/17/2021.  She was evaluated for alcohol intoxication, as well as suicidal ideation.  She is note of a history of alcohol abuse, as well as withdrawal including withdrawal seizures.  She also has history of  bipolar disorder and polysubstance abuse with methamphetamines and alcohol.  She does have a history of COPD on 2 L of home oxygen.  She was placed on a legal hold.  [facility] protocol started for alcohol withdrawal.  Psychiatry was consulted.  She had abdominal pain as well, lipase is 167 and there was concern for pancreatitis.  HIDA scan was done which showed low ejection fraction of gallbladder, this was discussed with Dr. Chavez who states that she has not a candidate for cholecystectomy due to alcoholic hepatitis.     Labs:  Labs Reviewed   COMP METABOLIC PANEL - Abnormal; Notable for the following components:       Result Value    Anion Gap 22.0 (*)     Glucose 111 (*)     Creatinine 0.37 (*)     AST(SGOT) 238 (*)     ALT(SGPT) 77 (*)     Alkaline Phosphatase 222 (*)     All other components within normal limits   LIPASE - Abnormal; Notable for the following components:    Lipase 128 (*)     All other components within normal limits   DIAGNOSTIC ALCOHOL - Abnormal; Notable for the following components:    Diagnostic Alcohol 476.2 (*)     All other components within normal limits   CBC WITH DIFFERENTIAL - Abnormal; Notable for the following components:    WBC 4.2 (*)     Platelet Count 67 (*)     Neutrophils-Polys 39.70 (*)     Lymphocytes 48.40 (*)     Neutrophils (Absolute) 1.66 (*)     All other components within normal limits    Narrative:     Specimen is a recollect.   BETA-HYDROXYBUTYRIC ACID - Abnormal; Notable for the following components:    beta-Hydroxybutyric Acid 0.40 (*)     All other components within normal limits   VENOUS BLOOD GAS - Abnormal; Notable for the following components:    Venous Bg Po2 60.1 (*)     All other components within normal limits   ACETAMINOPHEN - Abnormal; Notable for the following components:    Acetaminophen -Tylenol 6.0 (*)     All other components within normal limits   ACETAMINOPHEN   COV-2, FLU A/B, AND RSV BY PCR    Narrative:     Have you been in close contact with a  person who is suspected  or known to be positive for COVID-19 within the last 30 days  (e.g. last seen that person < 30 days ago)->Unknown   ESTIMATED GFR   BASIC METABOLIC PANEL       Radiology:  DX-CHEST-PORTABLE (1 VIEW)   Final Result         1.  Left basilar atelectasis or early infiltrate.           ED Medications Administered:  Medications   diazePAM (VALIUM) tablet 5 mg (has no administration in time range)   ampicillin/sulbactam (UNASYN) 3 g in  mL IVPB (0 g Intravenous Stopped 10/5/21 0323)   azithromycin (ZITHROMAX) injection 500 mg (500 mg Intravenous IVPB 10/5/21 0115)   detox IV 1000 mL (D5LR + magnesium 1 g + thiamine 100 mg + folic acid 1 mg) infusion ( Intravenous New Bag 10/5/21 0317)   promethazine (PHENERGAN) tablet 25 mg (25 mg Oral Given 10/5/21 0330)   LORazepam (ATIVAN) injection 2 mg (2 mg Intravenous Given 10/5/21 0411)       Differential diagnosis:  Electrolyte abnormality, intoxication, dehydration, alcoholic ketoacidosis, pneumonia, COPD exacerbation, Covid    MDM:  Ms. Youssef presents the emergency department today with concerns for shortness of breath, she also reports some abdominal pain, and several other aches and pains.  She does have a history of alcohol abuse, and heavy drinking.  She is not able to provide a full history due to drowsiness, and clinical signs of intoxication on arrival.  She does not appear to be in any respiratory distress, she does not have any significant wheezing on pulmonary auscultation, symptoms are less concerning for COPD exacerbation or pneumonia.    On laboratory evaluation her lipase is elevated to 128.  Anion gap is 22 with glucose of 111.  Liver enzymes are elevated with , ALT 77, and alkaline phosphatase of 222.  Tylenol level is not significantly elevated, no evidence of acute Tylenol toxicity.  White blood count is just below normal reference range at 4.2, platelet count of 67 without evidence of active bleeding.  Thrombocytopenia  is new from 4 months ago, most recent platelet count was 225.  She has a normal hemoglobin that is actually improved from prior.  Lipase today is 128, which is actually consistent with her baseline levels.  COVID-19 testing resulted negative.  Diagnostic alcohol is 476.2, consistent with her clinical presentation.    ED observation status initiated at 11:45 PM 10/4/2021.    12:54 AM I followed up on chest x-ray result, this demonstrates early infiltrate or possibly left basilar atelectasis.  Given her report of shortness of breath, Unasyn and azithromycin were initiated in the emergency department.  IV medications were administered as she is too drowsy to tolerate oral medications at this time.  Augmentin and azithromycin prescriptions sent to pharmacy for outpatient treatment.    1:23 AM beta hydroxybutyric acid is elevated with a low normal pH of 7.32.  This is consistent with an early or mild diabetic ketoacidosis.  IV fluids administered, as well as IV magnesium, thiamine, and folic acid.    2:28 AM repeat Tylenol level 4 hours after arrival has dropped to 6.0.  Tylenol level is downtrending, no evidence of recent acute Tylenol toxicity, patient does not report a history of recent overdose on further discussion now that she is more lucid.    4:28 AM IV fluid bolus was delayed due to difficulty obtaining access.  On my reassessment, she does have a mild tremor and slight tachycardia.  She was treated with 2 mg of Ativan IV, followed by 5 mg of Valium p.o.  After receiving these medications, her tremors resolved, she is again sleeping comfortably, while IV antibiotics and IV fluids are infusing.    At time of shift change, she is still receiving her IV infusions.  As long as she is feeling well after receiving her medications, and is safely able to ambulate, I do believe she can be discharged home.  As her pH is normal, she may be discharged without follow-up BMP.  At this time, etiology of her transaminitis is  unclear, this may be due to heavy alcohol use, this may also be due to alcoholic ketoacidosis.  Chronic Tylenol toxicity is possible, though on further discussion she states she is not taking more than 3 or 4 tablets daily, which would make toxicity less likely.    Personal protective equipment including N95 surgical respirator, goggles, and gloves were used during this encounter.       Disposition:  Pending sobriety    Final Impression:  1. Alcoholic intoxication without complication (HCC)    2. Alcoholic ketoacidosis    3. Pneumonia of left lower lobe due to infectious organism        Electronically signed by: Yoli Shell MD, 10/5/2021 4:33 AM

## 2021-10-05 NOTE — ED NOTES
Pt stating she wants to leave, does not want IV antibiotics or fluids. ERP aware, pt provided meal and water and encouraged to eat ands drink. Pt sitting in bed eating.

## 2021-10-05 NOTE — ASSESSMENT & PLAN NOTE
Currently on no medications  Given acute withdrawal we will treat as needed with benzodiazepines and clonidine  Would hesitate to start patient on scheduled medication during acute phase withdrawal.

## 2021-10-05 NOTE — DISCHARGE PLANNING
Alert Team  Provided background info and facesheet to Cassie from [facility], in case pt should be interested in alcohol tx upon DC.  Notified RN of availability of services.

## 2021-10-05 NOTE — ED NOTES
Med Rec complete per pt and pt's pharmacy  Allergies Reviewed  No ABX in the last 14 days    Pt unable to participate in interview    Pt has not filled any medications since 06/2021

## 2021-10-05 NOTE — ED NOTES
Spoke with Dr. Shell., pt is to ambulate to freedom and receive prescribed Valium before DC. Pt sleeping in bed, ABDON ARITA.

## 2021-10-05 NOTE — ED NOTES
Pt asking to use restroom. Attempted to stand patient but tremors were so severe, pt is unable to ambulate or even stand independently. Pt diaphoretic, tachycardic >150bpm, and tachypnic. Pt placed on bed pan.

## 2021-10-06 LAB
ANION GAP SERPL CALC-SCNC: 14 MMOL/L (ref 7–16)
BUN SERPL-MCNC: 6 MG/DL (ref 8–22)
CALCIUM SERPL-MCNC: 9.7 MG/DL (ref 8.5–10.5)
CHLORIDE SERPL-SCNC: 97 MMOL/L (ref 96–112)
CO2 SERPL-SCNC: 26 MMOL/L (ref 20–33)
CREAT SERPL-MCNC: 0.32 MG/DL (ref 0.5–1.4)
ERYTHROCYTE [DISTWIDTH] IN BLOOD BY AUTOMATED COUNT: 47.2 FL (ref 35.9–50)
GLUCOSE SERPL-MCNC: 116 MG/DL (ref 65–99)
HCT VFR BLD AUTO: 39.6 % (ref 37–47)
HGB BLD-MCNC: 13.4 G/DL (ref 12–16)
MAGNESIUM SERPL-MCNC: 1.7 MG/DL (ref 1.5–2.5)
MCH RBC QN AUTO: 33 PG (ref 27–33)
MCHC RBC AUTO-ENTMCNC: 33.8 G/DL (ref 33.6–35)
MCV RBC AUTO: 97.5 FL (ref 81.4–97.8)
MORPHOLOGY BLD-IMP: NORMAL
PHOSPHATE SERPL-MCNC: 3.9 MG/DL (ref 2.5–4.5)
PLATELET # BLD AUTO: 53 K/UL (ref 164–446)
PLATELETS.RETICULATED NFR BLD AUTO: 5.9 K/UL (ref 0.6–13.1)
PMV BLD AUTO: 9.9 FL (ref 9–12.9)
POTASSIUM SERPL-SCNC: 3.5 MMOL/L (ref 3.6–5.5)
RBC # BLD AUTO: 4.06 M/UL (ref 4.2–5.4)
SODIUM SERPL-SCNC: 137 MMOL/L (ref 135–145)
WBC # BLD AUTO: 2.8 K/UL (ref 4.8–10.8)

## 2021-10-06 PROCEDURE — 99233 SBSQ HOSP IP/OBS HIGH 50: CPT | Performed by: GENERAL PRACTICE

## 2021-10-06 PROCEDURE — RXMED WILLOW AMBULATORY MEDICATION CHARGE: Performed by: GENERAL PRACTICE

## 2021-10-06 PROCEDURE — A9270 NON-COVERED ITEM OR SERVICE: HCPCS | Performed by: GENERAL PRACTICE

## 2021-10-06 PROCEDURE — 700111 HCHG RX REV CODE 636 W/ 250 OVERRIDE (IP): Performed by: GENERAL PRACTICE

## 2021-10-06 PROCEDURE — 85055 RETICULATED PLATELET ASSAY: CPT

## 2021-10-06 PROCEDURE — 770001 HCHG ROOM/CARE - MED/SURG/GYN PRIV*

## 2021-10-06 PROCEDURE — 83735 ASSAY OF MAGNESIUM: CPT

## 2021-10-06 PROCEDURE — 700105 HCHG RX REV CODE 258: Performed by: HOSPITALIST

## 2021-10-06 PROCEDURE — 700102 HCHG RX REV CODE 250 W/ 637 OVERRIDE(OP): Performed by: GENERAL PRACTICE

## 2021-10-06 PROCEDURE — 700102 HCHG RX REV CODE 250 W/ 637 OVERRIDE(OP): Performed by: HOSPITALIST

## 2021-10-06 PROCEDURE — 700111 HCHG RX REV CODE 636 W/ 250 OVERRIDE (IP): Performed by: HOSPITALIST

## 2021-10-06 PROCEDURE — 84100 ASSAY OF PHOSPHORUS: CPT

## 2021-10-06 PROCEDURE — A9270 NON-COVERED ITEM OR SERVICE: HCPCS | Performed by: HOSPITALIST

## 2021-10-06 PROCEDURE — 80048 BASIC METABOLIC PNL TOTAL CA: CPT

## 2021-10-06 PROCEDURE — 85027 COMPLETE CBC AUTOMATED: CPT

## 2021-10-06 RX ORDER — AZITHROMYCIN 250 MG/1
500 TABLET, FILM COATED ORAL DAILY
Status: COMPLETED | OUTPATIENT
Start: 2021-10-06 | End: 2021-10-07

## 2021-10-06 RX ORDER — DIAZEPAM 5 MG/1
10 TABLET ORAL EVERY 6 HOURS
Status: CANCELLED | OUTPATIENT
Start: 2021-10-06 | End: 2021-10-07

## 2021-10-06 RX ORDER — POTASSIUM CHLORIDE 20 MEQ/1
40 TABLET, EXTENDED RELEASE ORAL ONCE
Status: COMPLETED | OUTPATIENT
Start: 2021-10-06 | End: 2021-10-06

## 2021-10-06 RX ORDER — LANOLIN ALCOHOL/MO/W.PET/CERES
100 CREAM (GRAM) TOPICAL DAILY
Qty: 30 TABLET | Refills: 0 | Status: SHIPPED | OUTPATIENT
Start: 2021-10-07 | End: 2021-11-06

## 2021-10-06 RX ORDER — MAGNESIUM SULFATE HEPTAHYDRATE 40 MG/ML
2 INJECTION, SOLUTION INTRAVENOUS ONCE
Status: COMPLETED | OUTPATIENT
Start: 2021-10-06 | End: 2021-10-06

## 2021-10-06 RX ORDER — POTASSIUM CHLORIDE 20 MEQ/1
40 TABLET, EXTENDED RELEASE ORAL DAILY
Status: DISCONTINUED | OUTPATIENT
Start: 2021-10-07 | End: 2021-10-07 | Stop reason: HOSPADM

## 2021-10-06 RX ORDER — BISACODYL 10 MG
10 SUPPOSITORY, RECTAL RECTAL
Status: DISCONTINUED | OUTPATIENT
Start: 2021-10-06 | End: 2021-10-07 | Stop reason: HOSPADM

## 2021-10-06 RX ORDER — POLYETHYLENE GLYCOL 3350 17 G/17G
1 POWDER, FOR SOLUTION ORAL
Status: DISCONTINUED | OUTPATIENT
Start: 2021-10-06 | End: 2021-10-07 | Stop reason: HOSPADM

## 2021-10-06 RX ORDER — GAUZE BANDAGE 2" X 2"
100 BANDAGE TOPICAL DAILY
Status: DISCONTINUED | OUTPATIENT
Start: 2021-10-07 | End: 2021-10-07 | Stop reason: HOSPADM

## 2021-10-06 RX ORDER — DIAZEPAM 5 MG/1
5 TABLET ORAL EVERY 6 HOURS
Status: CANCELLED | OUTPATIENT
Start: 2021-10-07 | End: 2021-10-09

## 2021-10-06 RX ORDER — FOLIC ACID 1 MG/1
1 TABLET ORAL DAILY
Qty: 30 TABLET | Refills: 0 | Status: SHIPPED | OUTPATIENT
Start: 2021-10-07 | End: 2021-11-06

## 2021-10-06 RX ORDER — AMOXICILLIN AND CLAVULANATE POTASSIUM 875; 125 MG/1; MG/1
1 TABLET, FILM COATED ORAL EVERY 12 HOURS
Status: DISCONTINUED | OUTPATIENT
Start: 2021-10-06 | End: 2021-10-07 | Stop reason: HOSPADM

## 2021-10-06 RX ORDER — PROCHLORPERAZINE EDISYLATE 5 MG/ML
10 INJECTION INTRAMUSCULAR; INTRAVENOUS EVERY 6 HOURS PRN
Status: DISCONTINUED | OUTPATIENT
Start: 2021-10-06 | End: 2021-10-07 | Stop reason: HOSPADM

## 2021-10-06 RX ORDER — AMOXICILLIN 250 MG
2 CAPSULE ORAL 2 TIMES DAILY PRN
Status: DISCONTINUED | OUTPATIENT
Start: 2021-10-06 | End: 2021-10-07 | Stop reason: HOSPADM

## 2021-10-06 RX ADMIN — LORAZEPAM 1 MG: 1 TABLET ORAL at 18:01

## 2021-10-06 RX ADMIN — ACETAMINOPHEN 650 MG: 325 TABLET, FILM COATED ORAL at 14:17

## 2021-10-06 RX ADMIN — AZITHROMYCIN MONOHYDRATE 500 MG: 250 TABLET ORAL at 15:36

## 2021-10-06 RX ADMIN — LORAZEPAM 0.5 MG: 0.5 TABLET ORAL at 14:13

## 2021-10-06 RX ADMIN — MAGNESIUM SULFATE HEPTAHYDRATE 2 G: 40 INJECTION, SOLUTION INTRAVENOUS at 08:13

## 2021-10-06 RX ADMIN — LORAZEPAM 1 MG: 2 INJECTION INTRAMUSCULAR; INTRAVENOUS at 01:29

## 2021-10-06 RX ADMIN — LORAZEPAM 3 MG: 1 TABLET ORAL at 23:21

## 2021-10-06 RX ADMIN — PROCHLORPERAZINE EDISYLATE 10 MG: 5 INJECTION INTRAMUSCULAR; INTRAVENOUS at 18:02

## 2021-10-06 RX ADMIN — LORAZEPAM 0.5 MG: 0.5 TABLET ORAL at 10:12

## 2021-10-06 RX ADMIN — THIAMINE HYDROCHLORIDE 250 MG: 100 INJECTION, SOLUTION INTRAMUSCULAR; INTRAVENOUS at 10:47

## 2021-10-06 RX ADMIN — AMOXICILLIN AND CLAVULANATE POTASSIUM 1 TABLET: 875; 125 TABLET, FILM COATED ORAL at 17:54

## 2021-10-06 RX ADMIN — ACETAMINOPHEN 650 MG: 325 TABLET, FILM COATED ORAL at 08:12

## 2021-10-06 RX ADMIN — LORAZEPAM 2 MG: 1 TABLET ORAL at 08:12

## 2021-10-06 RX ADMIN — LORAZEPAM 1.5 MG: 2 INJECTION INTRAMUSCULAR; INTRAVENOUS at 05:36

## 2021-10-06 RX ADMIN — FOLIC ACID 1 MG: 1 TABLET ORAL at 05:38

## 2021-10-06 RX ADMIN — POTASSIUM CHLORIDE 40 MEQ: 1500 TABLET, EXTENDED RELEASE ORAL at 08:12

## 2021-10-06 RX ADMIN — DOCUSATE SODIUM 50 MG AND SENNOSIDES 8.6 MG 2 TABLET: 8.6; 5 TABLET, FILM COATED ORAL at 05:38

## 2021-10-06 ASSESSMENT — LIFESTYLE VARIABLES
HEADACHE, FULLNESS IN HEAD: MODERATE
TOTAL SCORE: 16
HEADACHE, FULLNESS IN HEAD: MILD
AGITATION: NORMAL ACTIVITY
TOTAL SCORE: 14
AGITATION: NORMAL ACTIVITY
AGITATION: NORMAL ACTIVITY
PAROXYSMAL SWEATS: NO SWEAT VISIBLE
TOTAL SCORE: VERY MILD ITCHING, PINS AND NEEDLES SENSATION, BURNING OR NUMBNESS
ORIENTATION AND CLOUDING OF SENSORIUM: ORIENTED AND CAN DO SERIAL ADDITIONS
AUDITORY DISTURBANCES: NOT PRESENT
VISUAL DISTURBANCES: NOT PRESENT
HEADACHE, FULLNESS IN HEAD: VERY MILD
NAUSEA AND VOMITING: *
TREMOR: *
AGITATION: SOMEWHAT MORE THAN NORMAL ACTIVITY
ANXIETY: MILDLY ANXIOUS
AGITATION: NORMAL ACTIVITY
HEADACHE, FULLNESS IN HEAD: NOT PRESENT
ORIENTATION AND CLOUDING OF SENSORIUM: ORIENTED AND CAN DO SERIAL ADDITIONS
ORIENTATION AND CLOUDING OF SENSORIUM: ORIENTED AND CAN DO SERIAL ADDITIONS
TREMOR: *
VISUAL DISTURBANCES: NOT PRESENT
ANXIETY: MILDLY ANXIOUS
AUDITORY DISTURBANCES: NOT PRESENT
TOTAL SCORE: 9
TREMOR: *
TOTAL SCORE: VERY MILD ITCHING, PINS AND NEEDLES SENSATION, BURNING OR NUMBNESS
NAUSEA AND VOMITING: MILD NAUSEA WITH NO VOMITING
ANXIETY: *
PAROXYSMAL SWEATS: NO SWEAT VISIBLE
AGITATION: NORMAL ACTIVITY
AUDITORY DISTURBANCES: MILD HARSHNESS OR ABILITY TO FRIGHTEN
PAROXYSMAL SWEATS: *
ORIENTATION AND CLOUDING OF SENSORIUM: DATE DISORIENTATION BY NO MORE THAN TWO CALENDAR DAYS
NAUSEA AND VOMITING: INTERMITTENT NAUSEA WITH DRY HEAVES
TOTAL SCORE: VERY MILD ITCHING, PINS AND NEEDLES SENSATION, BURNING OR NUMBNESS
HEADACHE, FULLNESS IN HEAD: VERY MILD
AGITATION: NORMAL ACTIVITY
PAROXYSMAL SWEATS: NO SWEAT VISIBLE
ANXIETY: MODERATELY ANXIOUS OR GUARDED, SO ANXIETY IS INFERRED
TOTAL SCORE: 7
TOTAL SCORE: VERY MILD ITCHING, PINS AND NEEDLES SENSATION, BURNING OR NUMBNESS
VISUAL DISTURBANCES: NOT PRESENT
TOTAL SCORE: 7
VISUAL DISTURBANCES: NOT PRESENT
VISUAL DISTURBANCES: NOT PRESENT
NAUSEA AND VOMITING: *
ANXIETY: *
TREMOR: *
TOTAL SCORE: 12
VISUAL DISTURBANCES: VERY MILD SENSITIVITY
TREMOR: NO TREMOR
TREMOR: *
PAROXYSMAL SWEATS: NO SWEAT VISIBLE
NAUSEA AND VOMITING: *
NAUSEA AND VOMITING: *
AUDITORY DISTURBANCES: NOT PRESENT
VISUAL DISTURBANCES: NOT PRESENT
ANXIETY: *
HEADACHE, FULLNESS IN HEAD: MILD
TREMOR: TREMOR NOT VISIBLE BUT CAN BE FELT, FINGERTIP TO FINGERTIP
AUDITORY DISTURBANCES: NOT PRESENT
AUDITORY DISTURBANCES: NOT PRESENT
ANXIETY: MILDLY ANXIOUS
PAROXYSMAL SWEATS: *
AUDITORY DISTURBANCES: VERY MILD HARSHNESS OR ABILITY TO FRIGHTEN
NAUSEA AND VOMITING: *
ORIENTATION AND CLOUDING OF SENSORIUM: ORIENTED AND CAN DO SERIAL ADDITIONS
ORIENTATION AND CLOUDING OF SENSORIUM: ORIENTED AND CAN DO SERIAL ADDITIONS
TOTAL SCORE: VERY MILD ITCHING, PINS AND NEEDLES SENSATION, BURNING OR NUMBNESS
TOTAL SCORE: 15
ORIENTATION AND CLOUDING OF SENSORIUM: DATE DISORIENTATION BY MORE THAN TWO CALENDAR DAYS
HEADACHE, FULLNESS IN HEAD: MILD
PAROXYSMAL SWEATS: NO SWEAT VISIBLE

## 2021-10-06 ASSESSMENT — COPD QUESTIONNAIRES
COPD SCREENING SCORE: 1
DURING THE PAST 4 WEEKS HOW MUCH DID YOU FEEL SHORT OF BREATH: NONE/LITTLE OF THE TIME
DO YOU EVER COUGH UP ANY MUCUS OR PHLEGM?: NO/ONLY WITH OCCASIONAL COLDS OR INFECTIONS
HAVE YOU SMOKED AT LEAST 100 CIGARETTES IN YOUR ENTIRE LIFE: NO/DON'T KNOW

## 2021-10-06 ASSESSMENT — PAIN DESCRIPTION - PAIN TYPE
TYPE: ACUTE PAIN

## 2021-10-06 ASSESSMENT — PATIENT HEALTH QUESTIONNAIRE - PHQ9
SUM OF ALL RESPONSES TO PHQ9 QUESTIONS 1 AND 2: 0
1. LITTLE INTEREST OR PLEASURE IN DOING THINGS: NOT AT ALL
2. FEELING DOWN, DEPRESSED, IRRITABLE, OR HOPELESS: NOT AT ALL

## 2021-10-06 NOTE — PROGRESS NOTES
Covid surge in effect.     Received report from NOC RN and assumed care of pt. Pt is A&Ox3, disoriented to time. Resting in bed on 2 L O2 via nasal cannula. Pt reports mild headache, medicated per MAR. CIWA score 12, patient medicated per MAR. POC discussed with pt; all questions answered. No other needs at this time. Bed locked in lowest position, call light within reach, bed alarm on.

## 2021-10-06 NOTE — PROGRESS NOTES
Hospital Medicine Daily Progress Note    Date of Service  10/6/2021    Chief Complaint  Olya Youssef is a 51 y.o. female admitted 10/4/2021 with alcohol intoxication    Hospital Course  This is a 51 year old female with PMHx of alcohol use disorder, history of alcohol induced pancreatitis, history of bipolar disorder, history of polysubstance abuse with methamphetamines, prior SI, COPD with chronic respiratory failure on 2 L who was admitted on 10/4/2021 for alcohol intoxication/withdrawals and aspiration pneumonia.     Of note patient had recent admission for alcohol withdrawal in May 2021 with suicidal ideation.  Patient was evaluated by psychiatry at that time, was placed temporarily on a legal hold which was discontinued as patient improved. During that admission patient had a pancreatitis, and biliary dyskinesia, surgery recommend outpatient follow-up for possible cholecystectomy.    Interval Problem Update  Last CIWA was 7, initially on admission patient had CIWA from 15-35.  We will continue patient on CIWA protocol.  Patient provided with multivitamin, thiamine, and folic acid supplementation.     Unfortunately is not the patient's first admission for alcohol intoxication.  I will discuss with her further options in terms of AA or possible detox facilities once she is more awake and alert.    I have personally seen and examined the patient at bedside. I discussed the plan of care with patient and bedside RN.    Consultants/Specialty  None    Code Status  Full Code    Disposition  Patient is not medically cleared.   Anticipate discharge to to home with close outpatient follow-up.  I have placed the appropriate orders for post-discharge needs.    Review of Systems  Review of Systems   Unable to perform ROS: Mental acuity        Physical Exam  Temp:  [36.6 °C (97.9 °F)-36.9 °C (98.4 °F)] 36.6 °C (97.9 °F)  Pulse:  [] 112  Resp:  [18-20] 18  BP: (121-133)/(81-92) 130/86  SpO2:  [91 %-97 %] 95  %    Physical Exam  Vitals and nursing note reviewed.   Constitutional:       General: She is not in acute distress.     Comments: Unkempt   HENT:      Head: Normocephalic and atraumatic.      Mouth/Throat:      Mouth: Mucous membranes are moist.      Pharynx: No oropharyngeal exudate.   Eyes:      Extraocular Movements: Extraocular movements intact.      Pupils: Pupils are equal, round, and reactive to light.   Cardiovascular:      Rate and Rhythm: Normal rate and regular rhythm.      Pulses: Normal pulses.      Heart sounds: No murmur heard.   No friction rub. No gallop.    Pulmonary:      Effort: Pulmonary effort is normal. No respiratory distress.      Breath sounds: No wheezing, rhonchi or rales.   Abdominal:      General: Bowel sounds are normal. There is no distension.      Palpations: Abdomen is soft. There is no mass.      Tenderness: There is no abdominal tenderness.   Musculoskeletal:         General: No swelling or tenderness. Normal range of motion.      Cervical back: Normal range of motion. No rigidity. No muscular tenderness.      Right lower leg: No edema.      Left lower leg: No edema.   Skin:     General: Skin is warm and dry.      Capillary Refill: Capillary refill takes less than 2 seconds.      Findings: No erythema or rash.   Neurological:      General: No focal deficit present.      Mental Status: She is alert. She is disoriented.      Motor: No weakness.      Gait: Gait normal.         Fluids    Intake/Output Summary (Last 24 hours) at 10/6/2021 1431  Last data filed at 10/6/2021 0600  Gross per 24 hour   Intake --   Output 1600 ml   Net -1600 ml       Laboratory  Recent Labs     10/05/21  0016 10/06/21  0403   WBC 4.2* 2.8*   RBC 4.36 4.06*   HEMOGLOBIN 14.4 13.4   HEMATOCRIT 41.9 39.6   MCV 96.1 97.5   MCH 33.0 33.0   MCHC 34.4 33.8   RDW 47.6 47.2   PLATELETCT 67* 53*   MPV 9.7 9.9     Recent Labs     10/04/21  2254 10/05/21  0934 10/06/21  0403   SODIUM 141 139 137   POTASSIUM 3.9 3.7  3.5*   CHLORIDE 99 98 97   CO2 20 23 26   GLUCOSE 111* 90 116*   BUN 8 5* 6*   CREATININE 0.37* 0.31* 0.32*   CALCIUM 8.9 8.5 9.7                   Imaging  DX-CHEST-PORTABLE (1 VIEW)   Final Result         1.  Left basilar atelectasis or early infiltrate.           Assessment/Plan  * Withdrawal symptoms, alcohol (HCC)- (present on admission)  Assessment & Plan  Patient would appear to be having some issues with withdrawal.  She does drink heavily, but is unable or unwilling to tell me exactly how much.  We will admit to the hospital  Placed on benzodiazepine withdrawal program  IV loaded with thiamine  P.o. folate and multivitamin  Clonidine for as needed hypertension    Aspiration pneumonia (HCC)  Assessment & Plan  Chest x-ray would indicate lower lobe infiltrate  Patient is at risk for aspiration given her heavy alcohol abuse  Placed on ampicillin sulbactam    Hypertension- (present on admission)  Assessment & Plan  Currently on no medications  Given acute withdrawal we will treat as needed with benzodiazepines and clonidine  Would hesitate to start patient on scheduled medication during acute phase withdrawal.    COPD (chronic obstructive pulmonary disease) (HCC)- (present on admission)  Assessment & Plan  Placed on O2 and RT protocols  Exam benign    Methamphetamine abuse (HCC)- (present on admission)  Assessment & Plan  When asked the patient if she is using currently, she states, I am thinking I am not sure.    Thrombocytopenia (HCC)- (present on admission)  Assessment & Plan  Likely secondary to alcohol use disorder  Worse than patient's baseline  No active bleeding  We will continue to monitor  We will hold off on any VTE at this time       VTE prophylaxis: SCDs/TEDs    I have performed a physical exam and reviewed and updated ROS and Plan today (10/6/2021). In review of yesterday's note (10/5/2021), there are no changes except as documented above.

## 2021-10-06 NOTE — CARE PLAN
Problem: Optimal Care for Alcohol Withdrawal  Goal: Optimal Care for the alcohol withdrawal patient  Outcome: Progressing     Problem: Seizure Precautions  Goal: Implementation of seizure precautions  Outcome: Progressing     The patient is Watcher - Medium risk of patient condition declining or worsening         Progress made toward(s) clinical / shift goals:  Pt medicated per MAR    Patient is not progressing towards the following goals:

## 2021-10-06 NOTE — ASSESSMENT & PLAN NOTE
Likely secondary to alcohol use disorder  Worse than patient's baseline  No active bleeding  We will continue to monitor  We will hold off on any VTE at this time

## 2021-10-06 NOTE — DIETARY
Nutrition services: Day 1 of admit.  Olya Youssef is a 51 y.o. female with admitting DX of alcohol withdrawal syndrome.   Consult received per nutrition admit screen; recent weight loss and poor oral intake (MST score = 3).     Visited pt at bedside, pt appears adequately nourished. Started dietary interview, pt reported poor PO intake x 1 week, however then went on to say she never eats well. Pt then began c/o headache and requested to stop interview.     Assessment:  Height: 152.4 cm (5')  Weight: 59.7 kg (131 lb 9.8 oz)  Body mass index is 25.7 kg/m²., BMI classification: overweight   Diet/Intake: Regular; 25-50% of breakfast consumed     Evaluation:   1. Hx of COPD, etoh abuse, and methamphetamine use   2. MAR: folic acid, KCl, thiamine  3. Labs: K 3.5, glucose 116, BUN 6, creat 0.32    Malnutrition Risk: Suspect chronically poor nutrition status r/t lifestyle/environment, however unable to meet specific criteria of malnutrition.     Recommendations/Plan:   1. Encourage intake of meals  2. Document intake of all meals as % taken in ADL's to provide interdisciplinary communication across all shifts.   3. Monitor weight.  4. Nutrition rep will continue to see patient for ongoing meal and snack preferences.   5. RD to monitor PO intake, wt trends, and nutrition labs/meds    RD to follow

## 2021-10-06 NOTE — HOSPITAL COURSE
This is a 51 year old female with PMHx of alcohol use disorder, history of alcohol induced pancreatitis, history of bipolar disorder, history of polysubstance abuse with methamphetamines, prior SI, COPD with chronic respiratory failure on 2 L who was admitted on 10/4/2021 for alcohol intoxication/withdrawals and aspiration pneumonia.     Of note patient had recent admission for alcohol withdrawal in May 2021 with suicidal ideation.  Patient was evaluated by psychiatry at that time, was placed temporarily on a legal hold which was discontinued as patient improved. During that admission patient had a pancreatitis, and biliary dyskinesia, surgery recommend outpatient follow-up for possible cholecystectomy.

## 2021-10-07 ENCOUNTER — PATIENT OUTREACH (OUTPATIENT)
Dept: HEALTH INFORMATION MANAGEMENT | Facility: OTHER | Age: 51
End: 2021-10-07

## 2021-10-07 ENCOUNTER — PHARMACY VISIT (OUTPATIENT)
Dept: PHARMACY | Facility: MEDICAL CENTER | Age: 51
End: 2021-10-07
Payer: COMMERCIAL

## 2021-10-07 VITALS
DIASTOLIC BLOOD PRESSURE: 96 MMHG | BODY MASS INDEX: 25.84 KG/M2 | HEIGHT: 60 IN | HEART RATE: 118 BPM | SYSTOLIC BLOOD PRESSURE: 136 MMHG | WEIGHT: 131.61 LBS | TEMPERATURE: 96.7 F | OXYGEN SATURATION: 95 % | RESPIRATION RATE: 19 BRPM

## 2021-10-07 LAB
ALBUMIN SERPL BCP-MCNC: 3.9 G/DL (ref 3.2–4.9)
ALBUMIN/GLOB SERPL: 1.1 G/DL
ALP SERPL-CCNC: 187 U/L (ref 30–99)
ALT SERPL-CCNC: 49 U/L (ref 2–50)
ANION GAP SERPL CALC-SCNC: 9 MMOL/L (ref 7–16)
AST SERPL-CCNC: 106 U/L (ref 12–45)
BILIRUB SERPL-MCNC: 0.8 MG/DL (ref 0.1–1.5)
BUN SERPL-MCNC: 9 MG/DL (ref 8–22)
CALCIUM SERPL-MCNC: 9.6 MG/DL (ref 8.5–10.5)
CHLORIDE SERPL-SCNC: 96 MMOL/L (ref 96–112)
CO2 SERPL-SCNC: 27 MMOL/L (ref 20–33)
CREAT SERPL-MCNC: 0.36 MG/DL (ref 0.5–1.4)
ERYTHROCYTE [DISTWIDTH] IN BLOOD BY AUTOMATED COUNT: 47.4 FL (ref 35.9–50)
GLOBULIN SER CALC-MCNC: 3.6 G/DL (ref 1.9–3.5)
GLUCOSE SERPL-MCNC: 126 MG/DL (ref 65–99)
HCT VFR BLD AUTO: 41.1 % (ref 37–47)
HGB BLD-MCNC: 13.1 G/DL (ref 12–16)
MAGNESIUM SERPL-MCNC: 1.8 MG/DL (ref 1.5–2.5)
MCH RBC QN AUTO: 31.5 PG (ref 27–33)
MCHC RBC AUTO-ENTMCNC: 31.9 G/DL (ref 33.6–35)
MCV RBC AUTO: 98.8 FL (ref 81.4–97.8)
PLATELET # BLD AUTO: 67 K/UL (ref 164–446)
PMV BLD AUTO: 10.6 FL (ref 9–12.9)
POTASSIUM SERPL-SCNC: 4 MMOL/L (ref 3.6–5.5)
PROT SERPL-MCNC: 7.5 G/DL (ref 6–8.2)
RBC # BLD AUTO: 4.16 M/UL (ref 4.2–5.4)
SODIUM SERPL-SCNC: 132 MMOL/L (ref 135–145)
WBC # BLD AUTO: 3.6 K/UL (ref 4.8–10.8)

## 2021-10-07 PROCEDURE — RXMED WILLOW AMBULATORY MEDICATION CHARGE: Performed by: GENERAL PRACTICE

## 2021-10-07 PROCEDURE — 85027 COMPLETE CBC AUTOMATED: CPT

## 2021-10-07 PROCEDURE — 700102 HCHG RX REV CODE 250 W/ 637 OVERRIDE(OP): Performed by: GENERAL PRACTICE

## 2021-10-07 PROCEDURE — 97165 OT EVAL LOW COMPLEX 30 MIN: CPT

## 2021-10-07 PROCEDURE — 99239 HOSP IP/OBS DSCHRG MGMT >30: CPT | Performed by: GENERAL PRACTICE

## 2021-10-07 PROCEDURE — 700102 HCHG RX REV CODE 250 W/ 637 OVERRIDE(OP): Performed by: HOSPITALIST

## 2021-10-07 PROCEDURE — A9270 NON-COVERED ITEM OR SERVICE: HCPCS | Performed by: GENERAL PRACTICE

## 2021-10-07 PROCEDURE — 80053 COMPREHEN METABOLIC PANEL: CPT

## 2021-10-07 PROCEDURE — 83735 ASSAY OF MAGNESIUM: CPT

## 2021-10-07 PROCEDURE — 97161 PT EVAL LOW COMPLEX 20 MIN: CPT

## 2021-10-07 PROCEDURE — 700111 HCHG RX REV CODE 636 W/ 250 OVERRIDE (IP): Performed by: GENERAL PRACTICE

## 2021-10-07 PROCEDURE — A9270 NON-COVERED ITEM OR SERVICE: HCPCS | Performed by: HOSPITALIST

## 2021-10-07 RX ORDER — PROCHLORPERAZINE MALEATE 5 MG/1
5 TABLET ORAL EVERY 8 HOURS PRN
Qty: 21 TABLET | Refills: 0 | Status: SHIPPED | OUTPATIENT
Start: 2021-10-07 | End: 2021-10-14

## 2021-10-07 RX ORDER — AMOXICILLIN AND CLAVULANATE POTASSIUM 875; 125 MG/1; MG/1
1 TABLET, FILM COATED ORAL EVERY 12 HOURS
Qty: 10 TABLET | Refills: 0 | Status: SHIPPED | OUTPATIENT
Start: 2021-10-07 | End: 2021-10-12

## 2021-10-07 RX ADMIN — PROCHLORPERAZINE EDISYLATE 10 MG: 5 INJECTION INTRAMUSCULAR; INTRAVENOUS at 08:36

## 2021-10-07 RX ADMIN — LORAZEPAM 2 MG: 1 TABLET ORAL at 00:17

## 2021-10-07 RX ADMIN — THIAMINE HCL TAB 100 MG 100 MG: 100 TAB at 06:04

## 2021-10-07 RX ADMIN — FOLIC ACID 1 MG: 1 TABLET ORAL at 06:05

## 2021-10-07 RX ADMIN — POTASSIUM CHLORIDE 40 MEQ: 1500 TABLET, EXTENDED RELEASE ORAL at 06:04

## 2021-10-07 RX ADMIN — THERA TABS 1 TABLET: TAB at 06:05

## 2021-10-07 RX ADMIN — AZITHROMYCIN MONOHYDRATE 500 MG: 250 TABLET ORAL at 06:04

## 2021-10-07 RX ADMIN — AMOXICILLIN AND CLAVULANATE POTASSIUM 1 TABLET: 875; 125 TABLET, FILM COATED ORAL at 06:04

## 2021-10-07 ASSESSMENT — LIFESTYLE VARIABLES
NAUSEA AND VOMITING: *
HEADACHE, FULLNESS IN HEAD: NOT PRESENT
AGITATION: SOMEWHAT MORE THAN NORMAL ACTIVITY
AUDITORY DISTURBANCES: NOT PRESENT
HEADACHE, FULLNESS IN HEAD: NOT PRESENT
AUDITORY DISTURBANCES: NOT PRESENT
ORIENTATION AND CLOUDING OF SENSORIUM: ORIENTED AND CAN DO SERIAL ADDITIONS
HEADACHE, FULLNESS IN HEAD: NOT PRESENT
TOTAL SCORE: 3
TREMOR: TREMOR NOT VISIBLE BUT CAN BE FELT, FINGERTIP TO FINGERTIP
PAROXYSMAL SWEATS: NO SWEAT VISIBLE
ORIENTATION AND CLOUDING OF SENSORIUM: ORIENTED AND CAN DO SERIAL ADDITIONS
TREMOR: TREMOR NOT VISIBLE BUT CAN BE FELT, FINGERTIP TO FINGERTIP
ORIENTATION AND CLOUDING OF SENSORIUM: ORIENTED AND CAN DO SERIAL ADDITIONS
ANXIETY: MILDLY ANXIOUS
NAUSEA AND VOMITING: NO NAUSEA AND NO VOMITING
TREMOR: *
TOTAL SCORE: 3
ORIENTATION AND CLOUDING OF SENSORIUM: ORIENTED AND CAN DO SERIAL ADDITIONS
VISUAL DISTURBANCES: NOT PRESENT
AGITATION: SOMEWHAT MORE THAN NORMAL ACTIVITY
VISUAL DISTURBANCES: NOT PRESENT
NAUSEA AND VOMITING: MILD NAUSEA WITH NO VOMITING
AGITATION: NORMAL ACTIVITY
PAROXYSMAL SWEATS: NO SWEAT VISIBLE
ANXIETY: NO ANXIETY (AT EASE)
PAROXYSMAL SWEATS: NO SWEAT VISIBLE
TOTAL SCORE: 4
NAUSEA AND VOMITING: MILD NAUSEA WITH NO VOMITING
VISUAL DISTURBANCES: NOT PRESENT
PAROXYSMAL SWEATS: BARELY PERCEPTIBLE SWEATING. PALMS MOIST
AGITATION: NORMAL ACTIVITY
AUDITORY DISTURBANCES: NOT PRESENT
ANXIETY: MILDLY ANXIOUS
ANXIETY: MILDLY ANXIOUS
HEADACHE, FULLNESS IN HEAD: MODERATE
TOTAL SCORE: 13
AUDITORY DISTURBANCES: NOT PRESENT
VISUAL DISTURBANCES: NOT PRESENT
TREMOR: *

## 2021-10-07 ASSESSMENT — COGNITIVE AND FUNCTIONAL STATUS - GENERAL
DAILY ACTIVITIY SCORE: 23
SUGGESTED CMS G CODE MODIFIER DAILY ACTIVITY: CI
HELP NEEDED FOR BATHING: A LITTLE
SUGGESTED CMS G CODE MODIFIER MOBILITY: CI
CLIMB 3 TO 5 STEPS WITH RAILING: A LITTLE
MOBILITY SCORE: 23

## 2021-10-07 ASSESSMENT — GAIT ASSESSMENTS
DISTANCE (FEET): 150
DISTANCE (FEET): 100
ASSISTIVE DEVICE: FRONT WHEEL WALKER
GAIT LEVEL OF ASSIST: SUPERVISED

## 2021-10-07 ASSESSMENT — ACTIVITIES OF DAILY LIVING (ADL): TOILETING: INDEPENDENT

## 2021-10-07 NOTE — THERAPY
Physical Therapy   Initial Evaluation     Patient Name: Olya Youssef  Age:  51 y.o., Sex:  female  Medical Record #: [id number]  Today's Date: 10/7/2021     Precautions  Precautions: Fall Risk  Comments: d/t ongoing etoh abuse    Assessment  Patient is 51 y.o. female with a diagnosis of ETOH w/d.  Additional factors influencing patient status / progress: today, pt was unhappy with being asked to get out of bed, but cooperative with encouragement. Pt was supervised for OOB, transfers and ambulation with FWW. Pt was encouraged to use her FWW at home until she feels more confident. Pt reports that she has a full time job as  and helps the elderly man she lives with by doing grocery shopping, laundry and cooking. Social work to confirm details.  PT to see if still in house to progress ambulation.     Plan    Recommend Physical Therapy 3 times per week until therapy goals are met for the following treatments:  Gait Training and Neuro Re-Education / Balance    DC Equipment Recommendations: None (pt owns a FWW already)  Discharge Recommendations: Anticipate that the patient will have no further physical therapy needs after discharge from the hospital        Objective       10/07/21 1134   Precautions   Precautions Fall Risk   Prior Living Situation   Prior Services None   Housing / Facility 1 Story House   Steps Into Home 0   Steps In Home 0   Equipment Owned Front-Wheel Walker   Lives with - Patient's Self Care Capacity Unrelated Adult  (pt reports caring for elderly man-groceries, cooking.)   Prior Level of Functional Mobility   Bed Mobility Independent   Transfer Status Independent   Ambulation Independent   Distance Ambulation (Feet)   (community)   Assistive Devices Used None   Comments Pt reports that she works full time as  and cares for the elderly man she lives with-doing grocery shopping, cooking, cleaning. 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    History of Falls   History of Falls  No  (pt denies falls.)   Gait Analysis   Gait Level Of Assist Supervised   Assistive Device Front Wheel Walker   Distance (Feet) 100   Deviation   (decreased stride length.)   Bed Mobility    Supine to Sit Supervised   Sit to Supine Supervised   Scooting Supervised   Rolling Supervised   Functional Mobility   Sit to Stand Supervised   Bed, Chair, Wheelchair Transfer Supervised   Transfer Method Stand Pivot   Short Term Goals    Short Term Goal # 1 Pt will ambulate x 200 feet using no AD with supervision in 6 visits to improve functional indep.    Education Group   Education Provided Role of Physical Therapist   Role of Physical Therapist Patient Response Patient;Acceptance;Explanation;Verbal Demonstration   Problem List    Problems Decreased Activity Tolerance;Impaired Balance;Impaired Ambulation   Anticipated Discharge Equipment and Recommendations   DC Equipment Recommendations None  (pt owns a FWW already)   Discharge Recommendations Anticipate that the patient will have no further physical therapy needs after discharge from the hospital

## 2021-10-07 NOTE — PROGRESS NOTES
DC to home via MTM transport in stable condition. Pt states a friend/roomate is @ her home and will be assisting her when she arrives home.
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
## 2021-10-07 NOTE — PROGRESS NOTES
Arrived to dc lounge via wheelchair. A/O. DC instructions reviewed w/ pt. Verbalized understanding. Pt waiting for ride home. MTM arranged by LIANET.

## 2021-10-07 NOTE — PROGRESS NOTES
COVID-19 surge in effect:     Received report from JARETT Hannah and assumed care of pt. Pt A&OX3, disoriented to time . Pt on 2L O2 via NC.  Currently on a regular diet.  L forearm PIV in place. C/o n/v, tremors, and headache medicated per CIWA score. POC discussed. Denies further needs at this time. Bed locked and in lowest position. Bed alarm on, call light within reach & hourly rounding in place

## 2021-10-07 NOTE — DISCHARGE PLANNING
Received Transport Form @ 1637  Spoke to Samantha @ [facility]    Transport is scheduled for 10/7/2021 @1600 going to home.       [facility] trip #:  [id number]    Informed care team of transport via Vo[employer].

## 2021-10-07 NOTE — DISCHARGE PLANNING
Meds-to-Beds: Discharge prescription orders listed below delivered to patient's bedside. RN Desiree notified. Patient counseled. There was no copay.    Current Outpatient Medications   Medication Sig Dispense Refill   • amoxicillin-clavulanate (AUGMENTIN) 875-125 MG Tab Take 1 Tablet by mouth every 12 hours for 5 days. 10 Tablet 0   • thiamine (THIAMINE) 100 MG tablet Take 1 Tablet by mouth every day for 30 days. 30 Tablet 0   • multivitamin (THERAGRAN) Tab Take 1 Tablet by mouth every day for 30 days. 30 Tablet 0   • folic acid (FOLVITE) 1 MG Tab Take 1 Tablet by mouth every day for 30 days. 30 Tablet 0      Altaf Lao, PharmD

## 2021-10-07 NOTE — DISCHARGE INSTRUCTIONS
Return to the emergency department if you develop any new or worsening symptoms including worsening shortness of breath, cough, fevers, nausea or vomiting, or any further concerns.  You may follow-up at [facility], or another local detox facility, if you would like assistance with alcohol cessation.    Please continue with Augmentin for the next 5 days, continue with your medications as you were.    Please take care and be well!    Discharge Instructions    Discharged to home by medical transportation with escort. Discharged via wheelchair, hospital escort: Yes.  Special equipment needed: Oxygen    Be sure to schedule a follow-up appointment with your primary care doctor or any specialists as instructed.     Discharge Plan:   Diet Plan: Discussed  Activity Level: Discussed  Confirmed Follow up Appointment: Patient to Call and Schedule Appointment  Confirmed Symptoms Management: Discussed  Medication Reconciliation Updated: Yes  Influenza Vaccine Indication: Patient Refuses    I understand that a diet low in cholesterol, fat, and sodium is recommended for good health. Unless I have been given specific instructions below for another diet, I accept this instruction as my diet prescription.   Other diet: Regular diet     Special Instructions: None    · Is patient discharged on Warfarin / Coumadin?   No     Depression / Suicide Risk    As you are discharged from this [facility], it is important to learn how to keep safe from harming yourself.    Recognize the warning signs:  · Abrupt changes in personality, positive or negative- including increase in energy   · Giving away possessions  · Change in eating patterns- significant weight changes-  positive or negative  · Change in sleeping patterns- unable to sleep or sleeping all the time   · Unwillingness or inability to communicate  · Depression  · Unusual sadness, discouragement and loneliness  · Talk of wanting to die  · Neglect of personal  appearance   · Rebelliousness- reckless behavior  · Withdrawal from people/activities they love  · Confusion- inability to concentrate     If you or a loved one observes any of these behaviors or has concerns about self-harm, here's what you can do:  · Talk about it- your feelings and reasons for harming yourself  · Remove any means that you might use to hurt yourself (examples: pills, rope, extension cords, firearm)  · Get professional help from the community (Mental Health, Substance Abuse, psychological counseling)  · Do not be alone:Call your Safe Contact- someone whom you trust who will be there for you.  · Call your local CRISIS HOTLINE 702-1072 or 039-574-6583  · Call your local Children's Mobile Crisis Response Team [place] (853) 162-0104 or www.ViS  · Call the toll free National Suicide Prevention Hotlines   · National Suicide Prevention Lifeline 347-697-JHSK (2201)  · National Hope Line Network 800-SUICIDE (113-6607)

## 2021-10-07 NOTE — DISCHARGE PLANNING
Anticipated Discharge Disposition: Home     Action: Met with pt at bedside to discuss discharge. Pt states she lives with a roommate who she also provides assistance with for laundry, cleaning and meals. Pt states roommate is fine when left alone and neighbor checks in on roommate as needed. Pt has no concerns about roommate.     LSW provided outpatient ETOH resources to pt and clothing for d/c home.     Pt on O2 and states she's on service with [facility]. Pt needs O2 tank for d/c. LSW called [facility] and spoke with Samantha. Samantha states tank will be delivered within two hours.     1500- confirmed O2 was delivered    Transport form completed and faxed to [facility]    Barriers to Discharge: None    Plan: Care coordination will continue to follow and assist with discharge planning

## 2021-10-11 ENCOUNTER — PATIENT OUTREACH (OUTPATIENT)
Dept: HEALTH INFORMATION MANAGEMENT | Facility: OTHER | Age: 51
End: 2021-10-11

## 2021-10-11 NOTE — PROGRESS NOTES
CHW Vinson called the patient in an attempt to follow up after DC. Patient has a voicemail box that is not set up yet. CHW will attempt again.

## 2021-10-13 NOTE — PROGRESS NOTES
CHW Vinson called the patient in an attempt to follow up after DC. MCKAYW left a voicemail.     MCKAYW will attempt again.

## 2021-10-18 NOTE — PROGRESS NOTES
CHW Vinson called the patient in an attempt to follow up after DC. Patient has voicemail box not set up.     CHW will remove the patient from [facility] caseload.

## 2022-04-22 ENCOUNTER — HOSPITAL ENCOUNTER (INPATIENT)
Facility: MEDICAL CENTER | Age: 52
LOS: 7 days | DRG: 896 | End: 2022-04-30
Attending: EMERGENCY MEDICINE | Admitting: INTERNAL MEDICINE
Payer: COMMERCIAL

## 2022-04-22 ENCOUNTER — APPOINTMENT (OUTPATIENT)
Dept: RADIOLOGY | Facility: MEDICAL CENTER | Age: 52
DRG: 896 | End: 2022-04-22
Attending: EMERGENCY MEDICINE
Payer: COMMERCIAL

## 2022-04-22 ENCOUNTER — APPOINTMENT (OUTPATIENT)
Dept: RADIOLOGY | Facility: MEDICAL CENTER | Age: 52
DRG: 896 | End: 2022-04-22
Payer: COMMERCIAL

## 2022-04-22 DIAGNOSIS — R53.1 WEAKNESS: ICD-10-CM

## 2022-04-22 DIAGNOSIS — K85.20 ALCOHOL-INDUCED ACUTE PANCREATITIS, UNSPECIFIED COMPLICATION STATUS: ICD-10-CM

## 2022-04-22 DIAGNOSIS — E87.29 ALCOHOLIC KETOACIDOSIS: ICD-10-CM

## 2022-04-22 DIAGNOSIS — F10.920 ALCOHOLIC INTOXICATION WITHOUT COMPLICATION (HCC): ICD-10-CM

## 2022-04-22 DIAGNOSIS — R09.02 HYPOXIA: ICD-10-CM

## 2022-04-22 LAB
ALBUMIN SERPL BCP-MCNC: 4.3 G/DL (ref 3.2–4.9)
ALBUMIN/GLOB SERPL: 1.3 G/DL
ALP SERPL-CCNC: 209 U/L (ref 30–99)
ALT SERPL-CCNC: 50 U/L (ref 2–50)
ANION GAP SERPL CALC-SCNC: 26 MMOL/L (ref 7–16)
AST SERPL-CCNC: 201 U/L (ref 12–45)
BILIRUB SERPL-MCNC: 1.8 MG/DL (ref 0.1–1.5)
BUN SERPL-MCNC: 12 MG/DL (ref 8–22)
CALCIUM SERPL-MCNC: 8.2 MG/DL (ref 8.5–10.5)
CHLORIDE SERPL-SCNC: 93 MMOL/L (ref 96–112)
CO2 SERPL-SCNC: 20 MMOL/L (ref 20–33)
CREAT SERPL-MCNC: 0.29 MG/DL (ref 0.5–1.4)
ETHANOL BLD-MCNC: >498 MG/DL (ref 0–10)
GFR SERPLBLD CREATININE-BSD FMLA CKD-EPI: 127 ML/MIN/1.73 M 2
GLOBULIN SER CALC-MCNC: 3.2 G/DL (ref 1.9–3.5)
GLUCOSE SERPL-MCNC: 81 MG/DL (ref 65–99)
LIPASE SERPL-CCNC: 356 U/L (ref 11–82)
POTASSIUM SERPL-SCNC: 3.1 MMOL/L (ref 3.6–5.5)
PROT SERPL-MCNC: 7.5 G/DL (ref 6–8.2)
SODIUM SERPL-SCNC: 139 MMOL/L (ref 135–145)

## 2022-04-22 PROCEDURE — C9803 HOPD COVID-19 SPEC COLLECT: HCPCS | Performed by: EMERGENCY MEDICINE

## 2022-04-22 PROCEDURE — 99285 EMERGENCY DEPT VISIT HI MDM: CPT

## 2022-04-22 PROCEDURE — 80053 COMPREHEN METABOLIC PANEL: CPT

## 2022-04-22 PROCEDURE — 84100 ASSAY OF PHOSPHORUS: CPT

## 2022-04-22 PROCEDURE — 82077 ASSAY SPEC XCP UR&BREATH IA: CPT

## 2022-04-22 PROCEDURE — 83735 ASSAY OF MAGNESIUM: CPT

## 2022-04-22 PROCEDURE — 85007 BL SMEAR W/DIFF WBC COUNT: CPT

## 2022-04-22 PROCEDURE — 94760 N-INVAS EAR/PLS OXIMETRY 1: CPT

## 2022-04-22 PROCEDURE — 87040 BLOOD CULTURE FOR BACTERIA: CPT

## 2022-04-22 PROCEDURE — 71045 X-RAY EXAM CHEST 1 VIEW: CPT

## 2022-04-22 PROCEDURE — 83690 ASSAY OF LIPASE: CPT

## 2022-04-22 PROCEDURE — 85055 RETICULATED PLATELET ASSAY: CPT

## 2022-04-22 PROCEDURE — 85025 COMPLETE CBC W/AUTO DIFF WBC: CPT

## 2022-04-22 PROCEDURE — 0241U HCHG SARS-COV-2 COVID-19 NFCT DS RESP RNA 4 TRGT MIC: CPT

## 2022-04-22 PROCEDURE — 96375 TX/PRO/DX INJ NEW DRUG ADDON: CPT

## 2022-04-22 PROCEDURE — 36415 COLL VENOUS BLD VENIPUNCTURE: CPT

## 2022-04-22 PROCEDURE — 700111 HCHG RX REV CODE 636 W/ 250 OVERRIDE (IP): Performed by: EMERGENCY MEDICINE

## 2022-04-22 RX ORDER — METOCLOPRAMIDE HYDROCHLORIDE 5 MG/ML
10 INJECTION INTRAMUSCULAR; INTRAVENOUS ONCE
Status: COMPLETED | OUTPATIENT
Start: 2022-04-22 | End: 2022-04-22

## 2022-04-22 RX ORDER — DIPHENHYDRAMINE HYDROCHLORIDE 50 MG/ML
25 INJECTION INTRAMUSCULAR; INTRAVENOUS ONCE
Status: COMPLETED | OUTPATIENT
Start: 2022-04-22 | End: 2022-04-22

## 2022-04-22 RX ADMIN — DIPHENHYDRAMINE HYDROCHLORIDE 25 MG: 50 INJECTION INTRAMUSCULAR; INTRAVENOUS at 22:52

## 2022-04-22 RX ADMIN — METOCLOPRAMIDE 10 MG: 5 INJECTION, SOLUTION INTRAMUSCULAR; INTRAVENOUS at 22:52

## 2022-04-22 ASSESSMENT — FIBROSIS 4 INDEX: FIB4 SCORE: 11.98

## 2022-04-23 PROBLEM — G93.40 ACUTE ENCEPHALOPATHY: Status: ACTIVE | Noted: 2022-04-23

## 2022-04-23 LAB
AMMONIA PLAS-SCNC: 75 UMOL/L (ref 11–45)
ANION GAP SERPL CALC-SCNC: 18 MMOL/L (ref 7–16)
BASOPHILS # BLD AUTO: 0 % (ref 0–1.8)
BASOPHILS # BLD: 0 K/UL (ref 0–0.12)
BUN SERPL-MCNC: 11 MG/DL (ref 8–22)
CALCIUM SERPL-MCNC: 8.3 MG/DL (ref 8.5–10.5)
CHLORIDE SERPL-SCNC: 95 MMOL/L (ref 96–112)
CO2 SERPL-SCNC: 23 MMOL/L (ref 20–33)
CREAT SERPL-MCNC: 0.21 MG/DL (ref 0.5–1.4)
EOSINOPHIL # BLD AUTO: 0 K/UL (ref 0–0.51)
EOSINOPHIL NFR BLD: 0 % (ref 0–6.9)
ERYTHROCYTE [DISTWIDTH] IN BLOOD BY AUTOMATED COUNT: 54.2 FL (ref 35.9–50)
FLUAV RNA SPEC QL NAA+PROBE: NEGATIVE
FLUBV RNA SPEC QL NAA+PROBE: NEGATIVE
GFR SERPLBLD CREATININE-BSD FMLA CKD-EPI: 137 ML/MIN/1.73 M 2
GLUCOSE SERPL-MCNC: 110 MG/DL (ref 65–99)
HCT VFR BLD AUTO: 43 % (ref 37–47)
HGB BLD-MCNC: 14.2 G/DL (ref 12–16)
LACTATE BLD-SCNC: 1.9 MMOL/L (ref 0.5–2)
LACTATE BLD-SCNC: 1.9 MMOL/L (ref 0.5–2)
LACTATE BLD-SCNC: 2.2 MMOL/L (ref 0.5–2)
LYMPHOCYTES # BLD AUTO: 1.37 K/UL (ref 1–4.8)
LYMPHOCYTES NFR BLD: 24.4 % (ref 22–41)
MAGNESIUM SERPL-MCNC: 1.8 MG/DL (ref 1.5–2.5)
MANUAL DIFF BLD: NORMAL
MCH RBC QN AUTO: 34.1 PG (ref 27–33)
MCHC RBC AUTO-ENTMCNC: 33 G/DL (ref 33.6–35)
MCV RBC AUTO: 103.1 FL (ref 81.4–97.8)
MONOCYTES # BLD AUTO: 0.29 K/UL (ref 0–0.85)
MONOCYTES NFR BLD AUTO: 5.2 % (ref 0–13.4)
MORPHOLOGY BLD-IMP: NORMAL
NEUTROPHILS # BLD AUTO: 3.8 K/UL (ref 2–7.15)
NEUTROPHILS NFR BLD: 67.8 % (ref 44–72)
NRBC # BLD AUTO: 0.02 K/UL
NRBC BLD-RTO: 0.4 /100 WBC
PHOSPHATE SERPL-MCNC: 4 MG/DL (ref 2.5–4.5)
PLATELET # BLD AUTO: 34 K/UL (ref 164–446)
PLATELET BLD QL SMEAR: NORMAL
PLATELETS.RETICULATED NFR BLD AUTO: 5.2 K/UL (ref 0.6–13.1)
PMV BLD AUTO: 10.3 FL (ref 9–12.9)
POTASSIUM SERPL-SCNC: 4.5 MMOL/L (ref 3.6–5.5)
PROMYELOCYTES NFR BLD MANUAL: 2.6 %
RBC # BLD AUTO: 4.17 M/UL (ref 4.2–5.4)
RBC BLD AUTO: NORMAL
RSV RNA SPEC QL NAA+PROBE: NEGATIVE
SARS-COV-2 RNA RESP QL NAA+PROBE: NOTDETECTED
SODIUM SERPL-SCNC: 136 MMOL/L (ref 135–145)
SPECIMEN SOURCE: NORMAL
WBC # BLD AUTO: 5.6 K/UL (ref 4.8–10.8)

## 2022-04-23 PROCEDURE — 700111 HCHG RX REV CODE 636 W/ 250 OVERRIDE (IP): Performed by: INTERNAL MEDICINE

## 2022-04-23 PROCEDURE — 99223 1ST HOSP IP/OBS HIGH 75: CPT | Performed by: INTERNAL MEDICINE

## 2022-04-23 PROCEDURE — 96376 TX/PRO/DX INJ SAME DRUG ADON: CPT

## 2022-04-23 PROCEDURE — 700102 HCHG RX REV CODE 250 W/ 637 OVERRIDE(OP): Performed by: INTERNAL MEDICINE

## 2022-04-23 PROCEDURE — 83605 ASSAY OF LACTIC ACID: CPT | Mod: 91

## 2022-04-23 PROCEDURE — HZ2ZZZZ DETOXIFICATION SERVICES FOR SUBSTANCE ABUSE TREATMENT: ICD-10-PCS | Performed by: INTERNAL MEDICINE

## 2022-04-23 PROCEDURE — 96375 TX/PRO/DX INJ NEW DRUG ADDON: CPT

## 2022-04-23 PROCEDURE — 36415 COLL VENOUS BLD VENIPUNCTURE: CPT

## 2022-04-23 PROCEDURE — 770020 HCHG ROOM/CARE - TELE (206)

## 2022-04-23 PROCEDURE — 96365 THER/PROPH/DIAG IV INF INIT: CPT

## 2022-04-23 PROCEDURE — 80048 BASIC METABOLIC PNL TOTAL CA: CPT

## 2022-04-23 PROCEDURE — A9270 NON-COVERED ITEM OR SERVICE: HCPCS | Performed by: INTERNAL MEDICINE

## 2022-04-23 PROCEDURE — 700101 HCHG RX REV CODE 250: Performed by: INTERNAL MEDICINE

## 2022-04-23 PROCEDURE — 82140 ASSAY OF AMMONIA: CPT

## 2022-04-23 PROCEDURE — 96366 THER/PROPH/DIAG IV INF ADDON: CPT

## 2022-04-23 RX ORDER — LABETALOL HYDROCHLORIDE 5 MG/ML
10 INJECTION, SOLUTION INTRAVENOUS EVERY 4 HOURS PRN
Status: DISCONTINUED | OUTPATIENT
Start: 2022-04-23 | End: 2022-04-23

## 2022-04-23 RX ORDER — HYDROMORPHONE HYDROCHLORIDE 1 MG/ML
0.25 INJECTION, SOLUTION INTRAMUSCULAR; INTRAVENOUS; SUBCUTANEOUS
Status: DISCONTINUED | OUTPATIENT
Start: 2022-04-23 | End: 2022-04-28

## 2022-04-23 RX ORDER — LABETALOL 100 MG/1
200 TABLET, FILM COATED ORAL EVERY 6 HOURS PRN
Status: DISCONTINUED | OUTPATIENT
Start: 2022-04-23 | End: 2022-04-30 | Stop reason: HOSPADM

## 2022-04-23 RX ORDER — LORAZEPAM 2 MG/ML
1.5 INJECTION INTRAMUSCULAR
Status: DISCONTINUED | OUTPATIENT
Start: 2022-04-23 | End: 2022-04-30 | Stop reason: HOSPADM

## 2022-04-23 RX ORDER — IPRATROPIUM BROMIDE AND ALBUTEROL SULFATE 2.5; .5 MG/3ML; MG/3ML
3 SOLUTION RESPIRATORY (INHALATION)
Status: DISCONTINUED | OUTPATIENT
Start: 2022-04-24 | End: 2022-04-25

## 2022-04-23 RX ORDER — LACTULOSE 20 G/30ML
30 SOLUTION ORAL 3 TIMES DAILY
Status: DISCONTINUED | OUTPATIENT
Start: 2022-04-23 | End: 2022-04-30 | Stop reason: HOSPADM

## 2022-04-23 RX ORDER — LORAZEPAM 2 MG/1
2 TABLET ORAL
Status: DISCONTINUED | OUTPATIENT
Start: 2022-04-23 | End: 2022-04-30 | Stop reason: HOSPADM

## 2022-04-23 RX ORDER — LORAZEPAM 0.5 MG/1
0.5 TABLET ORAL EVERY 4 HOURS PRN
Status: DISCONTINUED | OUTPATIENT
Start: 2022-04-23 | End: 2022-04-30 | Stop reason: HOSPADM

## 2022-04-23 RX ORDER — POTASSIUM CHLORIDE 20 MEQ/1
40 TABLET, EXTENDED RELEASE ORAL 3 TIMES DAILY
Status: DISPENSED | OUTPATIENT
Start: 2022-04-23 | End: 2022-04-24

## 2022-04-23 RX ORDER — POLYETHYLENE GLYCOL 3350 17 G/17G
1 POWDER, FOR SOLUTION ORAL
Status: DISCONTINUED | OUTPATIENT
Start: 2022-04-23 | End: 2022-04-24

## 2022-04-23 RX ORDER — LORAZEPAM 1 MG/1
1 TABLET ORAL EVERY 4 HOURS PRN
Status: DISCONTINUED | OUTPATIENT
Start: 2022-04-23 | End: 2022-04-30 | Stop reason: HOSPADM

## 2022-04-23 RX ORDER — LORAZEPAM 2 MG/ML
2 INJECTION INTRAMUSCULAR
Status: DISCONTINUED | OUTPATIENT
Start: 2022-04-23 | End: 2022-04-30 | Stop reason: HOSPADM

## 2022-04-23 RX ORDER — FOLIC ACID 1 MG/1
1 TABLET ORAL DAILY
Status: COMPLETED | OUTPATIENT
Start: 2022-04-24 | End: 2022-04-27

## 2022-04-23 RX ORDER — LORAZEPAM 2 MG/ML
0.5 INJECTION INTRAMUSCULAR EVERY 4 HOURS PRN
Status: DISCONTINUED | OUTPATIENT
Start: 2022-04-23 | End: 2022-04-30 | Stop reason: HOSPADM

## 2022-04-23 RX ORDER — LORAZEPAM 2 MG/1
4 TABLET ORAL
Status: DISCONTINUED | OUTPATIENT
Start: 2022-04-23 | End: 2022-04-30 | Stop reason: HOSPADM

## 2022-04-23 RX ORDER — OXYCODONE HYDROCHLORIDE 5 MG/1
5 TABLET ORAL
Status: DISCONTINUED | OUTPATIENT
Start: 2022-04-23 | End: 2022-04-28

## 2022-04-23 RX ORDER — LORAZEPAM 2 MG/ML
1 INJECTION INTRAMUSCULAR
Status: DISCONTINUED | OUTPATIENT
Start: 2022-04-23 | End: 2022-04-30 | Stop reason: HOSPADM

## 2022-04-23 RX ORDER — BISACODYL 10 MG
10 SUPPOSITORY, RECTAL RECTAL
Status: DISCONTINUED | OUTPATIENT
Start: 2022-04-23 | End: 2022-04-24

## 2022-04-23 RX ORDER — LABETALOL HYDROCHLORIDE 5 MG/ML
10 INJECTION, SOLUTION INTRAVENOUS
Status: DISCONTINUED | OUTPATIENT
Start: 2022-04-23 | End: 2022-04-30 | Stop reason: HOSPADM

## 2022-04-23 RX ORDER — GAUZE BANDAGE 2" X 2"
100 BANDAGE TOPICAL DAILY
Status: COMPLETED | OUTPATIENT
Start: 2022-04-24 | End: 2022-04-27

## 2022-04-23 RX ORDER — OXYCODONE HYDROCHLORIDE 5 MG/1
2.5 TABLET ORAL
Status: DISCONTINUED | OUTPATIENT
Start: 2022-04-23 | End: 2022-04-28

## 2022-04-23 RX ORDER — AMOXICILLIN 250 MG
2 CAPSULE ORAL 2 TIMES DAILY
Status: DISCONTINUED | OUTPATIENT
Start: 2022-04-23 | End: 2022-04-24

## 2022-04-23 RX ADMIN — LORAZEPAM 1.5 MG: 2 INJECTION INTRAMUSCULAR; INTRAVENOUS at 19:51

## 2022-04-23 RX ADMIN — LORAZEPAM 1.5 MG: 2 INJECTION INTRAMUSCULAR; INTRAVENOUS at 15:14

## 2022-04-23 RX ADMIN — LORAZEPAM 1.5 MG: 2 INJECTION INTRAMUSCULAR; INTRAVENOUS at 04:30

## 2022-04-23 RX ADMIN — SENNOSIDES AND DOCUSATE SODIUM 2 TABLET: 50; 8.6 TABLET ORAL at 18:25

## 2022-04-23 RX ADMIN — THIAMINE HYDROCHLORIDE: 100 INJECTION, SOLUTION INTRAMUSCULAR; INTRAVENOUS at 02:02

## 2022-04-23 RX ADMIN — LACTULOSE 30 ML: 20 SOLUTION ORAL at 18:25

## 2022-04-23 RX ADMIN — POTASSIUM CHLORIDE 40 MEQ: 20 TABLET, EXTENDED RELEASE ORAL at 18:25

## 2022-04-23 RX ADMIN — POTASSIUM CHLORIDE 40 MEQ: 20 TABLET, EXTENDED RELEASE ORAL at 02:02

## 2022-04-23 RX ADMIN — LORAZEPAM 1.5 MG: 2 INJECTION INTRAMUSCULAR; INTRAVENOUS at 10:07

## 2022-04-23 ASSESSMENT — LIFESTYLE VARIABLES
ON A TYPICAL DAY WHEN YOU DRINK ALCOHOL HOW MANY DRINKS DO YOU HAVE: 5
DOES PATIENT WANT TO STOP DRINKING: YES
HAVE YOU EVER FELT YOU SHOULD CUT DOWN ON YOUR DRINKING: YES
TREMOR: *
AUDITORY DISTURBANCES: NOT PRESENT
TOTAL SCORE: 4
ANXIETY: NO ANXIETY (AT EASE)
DO YOU DRINK ALCOHOL: YES
ANXIETY: MILDLY ANXIOUS
ANXIETY: NO ANXIETY (AT EASE)
ANXIETY: *
PAROXYSMAL SWEATS: NO SWEAT VISIBLE
HEADACHE, FULLNESS IN HEAD: MODERATELY SEVERE
ORIENTATION AND CLOUDING OF SENSORIUM: DATE DISORIENTATION BY NO MORE THAN TWO CALENDAR DAYS
HEADACHE, FULLNESS IN HEAD: VERY MILD
AGITATION: *
VISUAL DISTURBANCES: NOT PRESENT
HAVE PEOPLE ANNOYED YOU BY CRITICIZING YOUR DRINKING: YES
TREMOR: TREMOR NOT VISIBLE BUT CAN BE FELT, FINGERTIP TO FINGERTIP
AUDITORY DISTURBANCES: NOT PRESENT
AGITATION: NORMAL ACTIVITY
TREMOR: *
TOTAL SCORE: 24
VISUAL DISTURBANCES: NOT PRESENT
TOTAL SCORE: 2
NAUSEA AND VOMITING: NO NAUSEA AND NO VOMITING
TOTAL SCORE: 1
VISUAL DISTURBANCES: NOT PRESENT
PAROXYSMAL SWEATS: NO SWEAT VISIBLE
PAROXYSMAL SWEATS: NO SWEAT VISIBLE
VISUAL DISTURBANCES: MILD SENSITIVITY
AGITATION: NORMAL ACTIVITY
HEADACHE, FULLNESS IN HEAD: NOT PRESENT
NAUSEA AND VOMITING: NO NAUSEA AND NO VOMITING
TOTAL SCORE: 16
TOTAL SCORE: 17
TREMOR: *
AGITATION: *
ORIENTATION AND CLOUDING OF SENSORIUM: CANNOT DO SERIAL ADDITIONS OR IS UNCERTAIN ABOUT DATE
ANXIETY: *
NAUSEA AND VOMITING: MILD NAUSEA WITH NO VOMITING
ORIENTATION AND CLOUDING OF SENSORIUM: CANNOT DO SERIAL ADDITIONS OR IS UNCERTAIN ABOUT DATE
HAVE YOU EVER FELT YOU SHOULD CUT DOWN ON YOUR DRINKING: YES
TOTAL SCORE: VERY MILD ITCHING, PINS AND NEEDLES SENSATION, BURNING OR NUMBNESS
VISUAL DISTURBANCES: NOT PRESENT
EVER FELT BAD OR GUILTY ABOUT YOUR DRINKING: YES
AUDITORY DISTURBANCES: NOT PRESENT
ORIENTATION AND CLOUDING OF SENSORIUM: ORIENTED AND CAN DO SERIAL ADDITIONS
CONSUMPTION TOTAL: POSITIVE
TOTAL SCORE: 4
HEADACHE, FULLNESS IN HEAD: MODERATELY SEVERE
TOTAL SCORE: 4
HAVE PEOPLE ANNOYED YOU BY CRITICIZING YOUR DRINKING: YES
NAUSEA AND VOMITING: MILD NAUSEA WITH NO VOMITING
HEADACHE, FULLNESS IN HEAD: NOT PRESENT
TOTAL SCORE: 2
TREMOR: NO TREMOR
ORIENTATION AND CLOUDING OF SENSORIUM: DATE DISORIENTATION BY MORE THAN TWO CALENDAR DAYS
AUDITORY DISTURBANCES: NOT PRESENT
TOTAL SCORE: MODERATE ITCHING, PINS AND NEEDLES SENSATION, BURNING OR NUMBNESS
TOTAL SCORE: 15
HEADACHE, FULLNESS IN HEAD: NOT PRESENT
AUDITORY DISTURBANCES: NOT PRESENT
VISUAL DISTURBANCES: NOT PRESENT
PAROXYSMAL SWEATS: NO SWEAT VISIBLE
CONSUMPTION TOTAL: INCOMPLETE
EVER HAD A DRINK FIRST THING IN THE MORNING TO STEADY YOUR NERVES TO GET RID OF A HANGOVER: YES
AVERAGE NUMBER OF DAYS PER WEEK YOU HAVE A DRINK CONTAINING ALCOHOL: 7
HOW MANY TIMES IN THE PAST YEAR HAVE YOU HAD 5 OR MORE DRINKS IN A DAY: 365
TREMOR: TREMOR NOT VISIBLE BUT CAN BE FELT, FINGERTIP TO FINGERTIP
AUDITORY DISTURBANCES: NOT PRESENT
PAROXYSMAL SWEATS: *
DOES PATIENT WANT TO TALK TO SOMEONE ABOUT QUITTING: YES
ANXIETY: NO ANXIETY (AT EASE)
AGITATION: NORMAL ACTIVITY
AGITATION: *
NAUSEA AND VOMITING: NO NAUSEA AND NO VOMITING
TOTAL SCORE: 4
NAUSEA AND VOMITING: NO NAUSEA AND NO VOMITING
PAROXYSMAL SWEATS: NO SWEAT VISIBLE
PAROXYSMAL SWEATS: NO SWEAT VISIBLE
HEADACHE, FULLNESS IN HEAD: MODERATE
TOTAL SCORE: 4
PAROXYSMAL SWEATS: NO SWEAT VISIBLE
VISUAL DISTURBANCES: MILD SENSITIVITY
AUDITORY DISTURBANCES: NOT PRESENT
EVER HAD A DRINK FIRST THING IN THE MORNING TO STEADY YOUR NERVES TO GET RID OF A HANGOVER: YES
ORIENTATION AND CLOUDING OF SENSORIUM: CANNOT DO SERIAL ADDITIONS OR IS UNCERTAIN ABOUT DATE
ANXIETY: NO ANXIETY (AT EASE)
TOTAL SCORE: 4
AGITATION: NORMAL ACTIVITY
ALCOHOL_USE: YES
HEADACHE, FULLNESS IN HEAD: NOT PRESENT
TOTAL SCORE: 6
NAUSEA AND VOMITING: NO NAUSEA AND NO VOMITING
AUDITORY DISTURBANCES: NOT PRESENT
EVER FELT BAD OR GUILTY ABOUT YOUR DRINKING: YES
ANXIETY: MILDLY ANXIOUS
ORIENTATION AND CLOUDING OF SENSORIUM: CANNOT DO SERIAL ADDITIONS OR IS UNCERTAIN ABOUT DATE
VISUAL DISTURBANCES: NOT PRESENT
TREMOR: MODERATE TREMOR WITH ARMS EXTENDED
AGITATION: NORMAL ACTIVITY
NAUSEA AND VOMITING: MILD NAUSEA WITH NO VOMITING
ORIENTATION AND CLOUDING OF SENSORIUM: CANNOT DO SERIAL ADDITIONS OR IS UNCERTAIN ABOUT DATE
TREMOR: *

## 2022-04-23 ASSESSMENT — COGNITIVE AND FUNCTIONAL STATUS - GENERAL
CLIMB 3 TO 5 STEPS WITH RAILING: A LITTLE
SUGGESTED CMS G CODE MODIFIER MOBILITY: CK
PERSONAL GROOMING: A LITTLE
MOVING TO AND FROM BED TO CHAIR: A LITTLE
DRESSING REGULAR UPPER BODY CLOTHING: A LITTLE
SUGGESTED CMS G CODE MODIFIER MOBILITY: CK
STANDING UP FROM CHAIR USING ARMS: A LITTLE
CLIMB 3 TO 5 STEPS WITH RAILING: A LITTLE
DAILY ACTIVITIY SCORE: 18
DRESSING REGULAR LOWER BODY CLOTHING: A LITTLE
DRESSING REGULAR UPPER BODY CLOTHING: A LITTLE
MOVING FROM LYING ON BACK TO SITTING ON SIDE OF FLAT BED: A LITTLE
TURNING FROM BACK TO SIDE WHILE IN FLAT BAD: A LITTLE
DAILY ACTIVITIY SCORE: 18
MOBILITY SCORE: 18
DRESSING REGULAR LOWER BODY CLOTHING: A LITTLE
WALKING IN HOSPITAL ROOM: A LITTLE
SUGGESTED CMS G CODE MODIFIER DAILY ACTIVITY: CK
PERSONAL GROOMING: A LITTLE
HELP NEEDED FOR BATHING: A LITTLE
HELP NEEDED FOR BATHING: A LITTLE
MOVING FROM LYING ON BACK TO SITTING ON SIDE OF FLAT BED: A LITTLE
EATING MEALS: A LITTLE
MOVING TO AND FROM BED TO CHAIR: A LITTLE
WALKING IN HOSPITAL ROOM: A LITTLE
EATING MEALS: A LITTLE
STANDING UP FROM CHAIR USING ARMS: A LITTLE
SUGGESTED CMS G CODE MODIFIER DAILY ACTIVITY: CK
TOILETING: A LITTLE
TURNING FROM BACK TO SIDE WHILE IN FLAT BAD: A LITTLE
MOBILITY SCORE: 18
TOILETING: A LITTLE

## 2022-04-23 ASSESSMENT — FIBROSIS 4 INDEX
FIB4 SCORE: 44.31

## 2022-04-23 ASSESSMENT — PATIENT HEALTH QUESTIONNAIRE - PHQ9
3. TROUBLE FALLING OR STAYING ASLEEP OR SLEEPING TOO MUCH: SEVERAL DAYS
5. POOR APPETITE OR OVEREATING: NOT AT ALL
8. MOVING OR SPEAKING SO SLOWLY THAT OTHER PEOPLE COULD HAVE NOTICED. OR THE OPPOSITE, BEING SO FIGETY OR RESTLESS THAT YOU HAVE BEEN MOVING AROUND A LOT MORE THAN USUAL: SEVERAL DAYS
4. FEELING TIRED OR HAVING LITTLE ENERGY: SEVERAL DAYS
7. TROUBLE CONCENTRATING ON THINGS, SUCH AS READING THE NEWSPAPER OR WATCHING TELEVISION: SEVERAL DAYS
SUM OF ALL RESPONSES TO PHQ9 QUESTIONS 1 AND 2: 2
SUM OF ALL RESPONSES TO PHQ QUESTIONS 1-9: 8
1. LITTLE INTEREST OR PLEASURE IN DOING THINGS: SEVERAL DAYS
6. FEELING BAD ABOUT YOURSELF - OR THAT YOU ARE A FAILURE OR HAVE LET YOURSELF OR YOUR FAMILY DOWN: SEVERAL DAYS
2. FEELING DOWN, DEPRESSED, IRRITABLE, OR HOPELESS: SEVERAL DAYS
9. THOUGHTS THAT YOU WOULD BE BETTER OFF DEAD, OR OF HURTING YOURSELF: SEVERAL DAYS

## 2022-04-23 ASSESSMENT — PAIN DESCRIPTION - PAIN TYPE: TYPE: ACUTE PAIN;CHRONIC PAIN

## 2022-04-23 NOTE — ASSESSMENT & PLAN NOTE
Multifactorial secondary to hepatic encephalopathy, alcohol and methamphetamine intoxication.  Seems to be improving  Fall, aspiration, seizure precautions  Ammonia 75 ->54  Continue lactulose  CIWA protocol, benzodiazepine as needed  Continue to monitor chemistries  Improved

## 2022-04-23 NOTE — ED NOTES
Assumed care, report received from Estevan RN, POC discussed. Pt asleep on gurney, aroused with verbal stimulation. Pt on all monitors. On 3L O2 connected to the wall. Detox bag infusing. Call light within reach.

## 2022-04-23 NOTE — ED PROVIDER NOTES
ED Provider Note    ER Provider Note         CHIEF COMPLAINT  Chief Complaint   Patient presents with   • Altered Mental Status     Roommate called 911 due to patient's altered mental status   • Headache     Patient reports she has been smoking meth and drinking a lot of alcohol because she has a bad headache.        HPI  Olya Youssef is a 51 y.o. female who presents to the Emergency Department with headache as well as some neck pain and using methamphetamines for the past few days.  The patient also mention some abdominal pain.  She says she been drinking alcohol heavily.  At this point she says that she thinks that a lot of her use is causing her symptoms.  She says her headache radiates up from her neck.  She says she has no stiffness in her neck.  She denies any fevers or chills.  She denies any shortness of breath or cough.  There is no chest pain.  She describes the pain as squeezing.  There is no report of nausea or vomiting.  She says the light hurts her head.  She denies any dysuria.    REVIEW OF SYSTEMS  See HPI for further details. All other systems are negative.     PAST MEDICAL HISTORY   has a past medical history of Alcohol abuse, Alcoholism (HCC), Anxiety, Backpain, Bipolar disorder, unspecified (HCC), Bronchitis, Drug abuse (HCC), Hepatitis, alcoholic, Hypertension, Indigestion, Infectious disease (MRSA), Liver disease, Pancreatitis chronic, Pneumonia, Psychiatric disorder, Renal disorder, Seizure (HCC), Seizure disorder (HCC), Unspecified hemorrhagic conditions, and Unspecified urinary incontinence.    SURGICAL HISTORY   has a past surgical history that includes gastroscopy (4/28/2015).    SOCIAL HISTORY  Social History     Tobacco Use   • Smoking status: Never Smoker   • Smokeless tobacco: Never Used   Vaping Use   • Vaping Use: Never used   Substance Use Topics   • Alcohol use: Yes     Comment: Hx heavy drinking apint in a 24 hr period   • Drug use: Yes     Comment: meth/THC      Social History      Substance and Sexual Activity   Drug Use Yes    Comment: meth/THC       FAMILY HISTORY  Family History   Problem Relation Age of Onset   • Lung Disease Mother    • Cancer Mother        CURRENT MEDICATIONS  Home Medications     Reviewed by Dolly Varela (Pharmacy Tech) on 04/23/22 at 0033  Med List Status: Complete   Medication Last Dose Status        Patient Tino Taking any Medications                       ALLERGIES  Allergies   Allergen Reactions   • Bactrim Hives   • Lamotrigine [Lamictal] Hives   • Quetiapine Fumarate Hives     Extrapyramidal Symptoms   • Keflex Hives       PHYSICAL EXAM  VITAL SIGNS: /74   Pulse (!) 111   Temp 36.8 °C (98.2 °F) (Temporal)   Resp 18   Ht 1.524 m (5')   Wt 61.2 kg (135 lb)   LMP 02/28/2018   SpO2 95%   BMI 26.37 kg/m²      Constitutional: Alert in mild distress.  The patient does appear unkempt.  HENT: No signs of trauma, Bilateral external ears normal, Nose normal.   Eyes: Pupils are equal, Conjunctiva normal, Non-icteric.   Neck: Normal range of motion, No tenderness, Supple, No stridor.   Lymphatic: No lymphadenopathy noted.   Cardiovascular: Tachycardic, nondisplaced PMI  Thorax & Lungs: No respiratory distress,  No chest tenderness.   Abdomen: Bowel sounds normal, Soft, No tenderness, No masses, No pulsatile masses. No peritoneal signs.  Skin: Warm, Dry, No erythema, No rash.   Back: No bony tenderness, No CVA tenderness.   Extremities: Intact distal pulses, No edema, No tenderness, No cyanosis.  Musculoskeletal: Good range of motion in all major joints. No tenderness to palpation or major deformities noted.   Neurologic: Alert , Normal motor function, Normal sensory function, No focal deficits noted.   Psychiatric: Affect normal, Judgment normal, Mood normal.     DIAGNOSTIC STUDIES / PROCEDURES         LABS  Labs Reviewed   CBC WITH DIFFERENTIAL - Abnormal; Notable for the following components:       Result Value    RBC 4.17 (*)     .1 (*)      MCH 34.1 (*)     MCHC 33.0 (*)     RDW 54.2 (*)     Platelet Count 34 (*)     All other components within normal limits   COMP METABOLIC PANEL - Abnormal; Notable for the following components:    Potassium 3.1 (*)     Chloride 93 (*)     Anion Gap 26.0 (*)     Creatinine 0.29 (*)     Calcium 8.2 (*)     AST(SGOT) 201 (*)     Alkaline Phosphatase 209 (*)     Total Bilirubin 1.8 (*)     All other components within normal limits   DIAGNOSTIC ALCOHOL - Abnormal; Notable for the following components:    Diagnostic Alcohol >498.0 (*)     All other components within normal limits   LIPASE - Abnormal; Notable for the following components:    Lipase 356 (*)     All other components within normal limits   COV-2, FLU A/B, AND RSV BY PCR (CEPHEID)   ESTIMATED GFR   LACTIC ACID   LACTIC ACID   LACTIC ACID   URINALYSIS   URINE CULTURE(NEW)   BLOOD CULTURE   BLOOD CULTURE   AMMONIA   MAGNESIUM   PHOSPHORUS       All labs reviewed by me.    RADIOLOGY  DX-CHEST-PORTABLE (1 VIEW)   Final Result         1.  Left basilar atelectasis, no focal infiltrate           The radiologist's interpretation of all radiological studies have been reviewed by me.    COURSE & MEDICAL DECISION MAKING  Pertinent Labs & Imaging studies reviewed. (See chart for details)    This is a 51 y.o. female that presents with now resolved alteration in mental status as well as some mild tachycardia and chronic methamphetamine abuse as well as potential alcohol intoxication.  We will evaluate her for COVID given she has been hypoxic as well as get a lipase evaluate her for sepsis with lactic acid as well as urinalysis and chest x-ray.  Will reassess after this..     10:36 PM - Patient seen and examined at bedside.    Patient has a elevated lipase and pancreatitis.  The patient is appearing to have a 26 cm alcohol ketoacidosis.  The patient is significantly intoxicated with a greater than 500 alcohol level.  The patient is COVID-negative.  At this time we will  admit the patient to the hospitalist in guarded condition.          FINAL IMPRESSION  1. Alcohol-induced acute pancreatitis, unspecified complication status    2. Alcoholic ketoacidosis    3. Alcoholic intoxication without complication (HCC)    4. Hypoxia              Electronically signed by: Jared Gonzales M.D., 4/22/2022

## 2022-04-23 NOTE — ED NOTES
Linens changed, pt noted to have soiled bed. Pants removed, pt cleansed and fresh linens provided. Pt repositioned in bed for comfort. Remains on all monitors, call light within reach.

## 2022-04-23 NOTE — RESPIRATORY CARE
COPD EDUCATION by COPD CLINICAL EDUCATOR  4/23/2022 at 2:31 PM by Kristen Randall, RRT     Patient interviewed by COPD education team. Patient refused COPD program at this time. No desire to quit

## 2022-04-23 NOTE — PROGRESS NOTES
51-year-old female with known history of alcohol abuse and methamphetamine abuse presents with complaints of generalized body aches and encephalopathy with intoxication.  She is admitted for pancreatitis with elevated lipase pace as well as ammonia level.    She is mildly tachycardic appears comfortable on exam.  On CLD.  We will continue lactulose.    Continue current management.    Metabolic acidosis, will repeat BMP.

## 2022-04-23 NOTE — H&P
Hospital Medicine History & Physical Note    Date of Service  4/23/2022    Primary Care Physician  Roby Daniel, A.P.R.N.        Code Status  Full Code    Chief Complaint  Chief Complaint   Patient presents with   • Altered Mental Status     Roommate called 911 due to patient's altered mental status   • Headache     Patient reports she has been smoking meth and drinking a lot of alcohol because she has a bad headache.        History of Presenting Illness  Olya Youssef is a 51 y.o. female well-known to ER staff for history of alcoholism and methamphetamine abuse who presented 4/22/2022 with acute intoxication  Secondary to alcohol and methamphetamine use over the last few days.   The patient also mention some abdominal pain.  She says she been drinking alcohol heavily.  At this point she says that she thinks that a lot of her use is causing her symptoms.  She says her headache radiates up from her neck.  She says she has no stiffness in her neck.  She denies any fevers or chills.  She denies any shortness of breath or cough.  There is no chest pain.  She describes the pain as squeezing.  There is no report of nausea or vomiting.  She says the light hurts her head.  She denies any dysuria.  Her work-up reveals platelets of 34, potassium 3.1, and alcohol level greater than detected level of 498.  At bedside she is acutely intoxicated but denies complaints requesting something to eat.    I discussed the plan of care with patient and bedside RN.    Review of Systems  Review of Systems   Unable to perform ROS: Mental acuity       Past Medical History   has a past medical history of Alcohol abuse, Alcoholism (HCC), Anxiety, Backpain, Bipolar disorder, unspecified (HCC), Bronchitis, Drug abuse (HCC), Hepatitis, alcoholic, Hypertension, Indigestion, Infectious disease (MRSA), Liver disease, Pancreatitis chronic, Pneumonia, Psychiatric disorder, Renal disorder, Seizure (HCC), Seizure disorder (HCC), Unspecified hemorrhagic  conditions, and Unspecified urinary incontinence.    Surgical History   has a past surgical history that includes gastroscopy (4/28/2015).     Family History  family history includes Cancer in her mother; Lung Disease in her mother.   Family history reviewed with patient. There is no family history that is pertinent to the chief complaint.     Social History   reports that she has never smoked. She has never used smokeless tobacco. She reports current alcohol use. She reports current drug use.    Allergies  Allergies   Allergen Reactions   • Bactrim Hives   • Lamotrigine [Lamictal] Hives   • Quetiapine Fumarate Hives     Extrapyramidal Symptoms   • Keflex Hives       Medications  None       Physical Exam  Temp:  [36.8 °C (98.2 °F)] 36.8 °C (98.2 °F)  Pulse:  [101-119] 111  Resp:  [13-22] 18  BP: (124-145)/(74-84) 127/74  SpO2:  [85 %-98 %] 95 %  Blood Pressure: 127/74   Temperature: 36.8 °C (98.2 °F)   Pulse: (!) 111   Respiration: 18   Pulse Oximetry: 95 %       Physical Exam  Vitals and nursing note reviewed.   Constitutional:       General: She is not in acute distress.     Appearance: Normal appearance. She is normal weight. She is not toxic-appearing.      Comments: Appears intoxicated, disheveled, and chronically ill-appearing   HENT:      Head: Normocephalic and atraumatic.      Nose: Nose normal. No congestion or rhinorrhea.      Mouth/Throat:      Mouth: Mucous membranes are moist.      Pharynx: Oropharynx is clear.   Eyes:      Extraocular Movements: Extraocular movements intact.      Conjunctiva/sclera: Conjunctivae normal.      Pupils: Pupils are equal, round, and reactive to light.   Neck:      Vascular: No carotid bruit.   Cardiovascular:      Rate and Rhythm: Regular rhythm. Tachycardia present.      Pulses: Normal pulses.      Heart sounds: Normal heart sounds. No murmur heard.    No gallop.   Pulmonary:      Effort: No respiratory distress.      Breath sounds: Normal breath sounds. No stridor. No  wheezing, rhonchi or rales.   Chest:      Chest wall: No tenderness.   Abdominal:      General: Abdomen is flat. Bowel sounds are normal. There is no distension.      Palpations: Abdomen is soft. There is no mass.      Tenderness: There is no abdominal tenderness.      Hernia: No hernia is present.   Musculoskeletal:         General: No swelling, tenderness, deformity or signs of injury.      Cervical back: Normal range of motion and neck supple. No muscular tenderness.   Lymphadenopathy:      Cervical: No cervical adenopathy.   Skin:     Capillary Refill: Capillary refill takes less than 2 seconds.      Coloration: Skin is pale. Skin is not jaundiced.      Findings: No bruising.   Neurological:      General: No focal deficit present.      Mental Status: She is alert and oriented to person, place, and time. Mental status is at baseline.      Cranial Nerves: No cranial nerve deficit.      Motor: No weakness.      Coordination: Coordination normal.         Laboratory:  Recent Labs     04/22/22 2215   WBC 5.6   RBC 4.17*   HEMOGLOBIN 14.2   HEMATOCRIT 43.0   .1*   MCH 34.1*   MCHC 33.0*   RDW 54.2*   PLATELETCT 34*   MPV 10.3     Recent Labs     04/22/22  2215   SODIUM 139   POTASSIUM 3.1*   CHLORIDE 93*   CO2 20   GLUCOSE 81   BUN 12   CREATININE 0.29*   CALCIUM 8.2*     Recent Labs     04/22/22  2215   ALTSGPT 50   ASTSGOT 201*   ALKPHOSPHAT 209*   TBILIRUBIN 1.8*   LIPASE 356*   GLUCOSE 81         No results for input(s): NTPROBNP in the last 72 hours.      No results for input(s): TROPONINT in the last 72 hours.    Imaging:  DX-CHEST-PORTABLE (1 VIEW)   Final Result         1.  Left basilar atelectasis, no focal infiltrate          X-Ray:  I have personally reviewed the images and compared with prior images.    Assessment/Plan:  I anticipate this patient will require at least two midnights for appropriate medical management, necessitating inpatient admission.    * Acute encephalopathy- (present on  admission)  Assessment & Plan  Multifactorial secondary to alcohol and methamphetamine intoxication.  Fall, aspiration, seizure precautions, CIWA protocol, benzodiazepine as needed    Macrocytic anemia- (present on admission)  Assessment & Plan  Thiamine, folic acid, follow-up anemia labs    COPD (chronic obstructive pulmonary disease) (HCC)- (present on admission)  Assessment & Plan  Compensated, DuoNeb as needed, incentive spirometry    Methamphetamine abuse (HCC)- (present on admission)  Assessment & Plan  Education, supportive care, benzodiazepine as needed    Alcohol intoxication, with unspecified complication (HCC)- (present on admission)  Assessment & Plan  Supportive care, follow-up chemistry    Pancreatitis- (present on admission)  Assessment & Plan  Secondary to alcohol, bowel rest, symptomatic management, follow-up chemistry    Thrombocytopenia (HCC)- (present on admission)  Assessment & Plan  Secondary to alcoholism, without evidence of acute bleed   hold prophylactic heparin.  Follow-up CBC      VTE prophylaxis: SCDs/TEDs

## 2022-04-23 NOTE — ED TRIAGE NOTES
.  Chief Complaint   Patient presents with   • Altered Mental Status     Roommate called 911 due to patient's altered mental status   • Headache     Patient reports she has been smoking meth and drinking a lot of alcohol because she has a bad headache.        ./77   Pulse (!) 104   Temp 36.8 °C (98.2 °F) (Temporal)   Resp 13   Ht 1.524 m (5')   Wt 61.2 kg (135 lb)   SpO2 96%

## 2022-04-23 NOTE — ED NOTES
Pt calling out room for water, call light at bedside. Pt given water, medicated per MAR, all needs met at this time.

## 2022-04-23 NOTE — ASSESSMENT & PLAN NOTE
Secondary to alcoholism, without evidence of acute bleed  Hold prophylactic heparin.   Continue to monitor CBC

## 2022-04-23 NOTE — ED NOTES
Report called to Karen DENSON. Pt transported to tele floor by RN on monitor and full O2 tank. All belongings and chart with patient.

## 2022-04-23 NOTE — CARE PLAN
"  Problem: Knowledge Deficit - Standard  Goal: Patient and family/care givers will demonstrate understanding of plan of care, disease process/condition, diagnostic tests and medications  Outcome: Progressing     Problem: Optimal Care for Alcohol Withdrawal  Goal: Optimal Care for the alcohol withdrawal patient  Outcome: Progressing     Problem: Seizure Precautions  Goal: Implementation of seizure precautions  Outcome: Progressing     Problem: Lifestyle Changes  Goal: Patient's ability to identify lifestyle changes and available resources to help reduce recurrence of condition will improve  Outcome: Progressing     Problem: Psychosocial  Goal: Patient's level of anxiety will decrease  Outcome: Progressing  Goal: Spiritual and cultural needs incorporated into hospitalization  Outcome: Progressing     Problem: Risk for Aspiration  Goal: Patient's risk for aspiration will be absent or decrease  Outcome: Progressing     Problem: Pain - Standard  Goal: Alleviation of pain or a reduction in pain to the patient’s comfort goal  Outcome: Progressing     Problem: Skin Integrity  Goal: Skin integrity is maintained or improved  Outcome: Progressing     Problem: Provide Safe Environment  Goal: Suicide environmental safety, protocols, policies, and practices will be implemented  Outcome: Progressing     Problem: Psychosocial  Goal: Patient's ability to identify and develop effective coping behaviors will improve  Outcome: Progressing  Goal: Patient's ability to identify and utilize available support systems will improve  Outcome: Progressing     Problem: Fall Risk  Goal: Patient will remain free from falls  Outcome: Progressing   The patient is Watcher - Medium risk of patient condition declining or worsening    Shift Goals  Clinical Goals: Monitor and treat CIWA symptoms; maintain stable vital signs; seizure precautions.  Patient Goals: \"I need more Ativan\".  Family Goals: None at bedside.    Progress made toward(s) clinical / " shift goals:  New admit to floor; patient stable at this time with close monitoring; progressing toward goals.     Patient is not progressing towards the following goals:

## 2022-04-23 NOTE — PROGRESS NOTES
Patient arrived from ED to T703; patient drowsy but arouses to sound/touch, oriented x 4; rhythm ST; 2L NC; vitals stable; 2 RN skin check completed; safety education and orientation to room provided to patient.

## 2022-04-23 NOTE — ASSESSMENT & PLAN NOTE
Secondary to alcohol  Bowel rest  Symptomatic management  Pain management   Lipase improving 356 ->186  Continue clear liquid diet   Improved

## 2022-04-24 PROBLEM — F10.939 ALCOHOL WITHDRAWAL (HCC): Status: ACTIVE | Noted: 2022-04-24

## 2022-04-24 LAB
ANION GAP SERPL CALC-SCNC: 16 MMOL/L (ref 7–16)
BUN SERPL-MCNC: 12 MG/DL (ref 8–22)
CALCIUM SERPL-MCNC: 8.7 MG/DL (ref 8.5–10.5)
CHLORIDE SERPL-SCNC: 90 MMOL/L (ref 96–112)
CO2 SERPL-SCNC: 27 MMOL/L (ref 20–33)
CREAT SERPL-MCNC: 0.29 MG/DL (ref 0.5–1.4)
ERYTHROCYTE [DISTWIDTH] IN BLOOD BY AUTOMATED COUNT: 54.9 FL (ref 35.9–50)
ERYTHROCYTE [DISTWIDTH] IN BLOOD BY AUTOMATED COUNT: 56.5 FL (ref 35.9–50)
GFR SERPLBLD CREATININE-BSD FMLA CKD-EPI: 127 ML/MIN/1.73 M 2
GLUCOSE SERPL-MCNC: 96 MG/DL (ref 65–99)
HCT VFR BLD AUTO: 36 % (ref 37–47)
HCT VFR BLD AUTO: 37.5 % (ref 37–47)
HGB BLD-MCNC: 11.7 G/DL (ref 12–16)
HGB BLD-MCNC: 12.2 G/DL (ref 12–16)
MCH RBC QN AUTO: 33.8 PG (ref 27–33)
MCH RBC QN AUTO: 34.5 PG (ref 27–33)
MCHC RBC AUTO-ENTMCNC: 32.5 G/DL (ref 33.6–35)
MCHC RBC AUTO-ENTMCNC: 32.5 G/DL (ref 33.6–35)
MCV RBC AUTO: 104 FL (ref 81.4–97.8)
MCV RBC AUTO: 105.9 FL (ref 81.4–97.8)
PLATELET # BLD AUTO: 25 K/UL (ref 164–446)
PLATELET # BLD AUTO: 27 K/UL (ref 164–446)
PMV BLD AUTO: 10.4 FL (ref 9–12.9)
PMV BLD AUTO: 9.8 FL (ref 9–12.9)
POTASSIUM SERPL-SCNC: 4.1 MMOL/L (ref 3.6–5.5)
RBC # BLD AUTO: 3.46 M/UL (ref 4.2–5.4)
RBC # BLD AUTO: 3.54 M/UL (ref 4.2–5.4)
SODIUM SERPL-SCNC: 133 MMOL/L (ref 135–145)
WBC # BLD AUTO: 2.9 K/UL (ref 4.8–10.8)
WBC # BLD AUTO: 3 K/UL (ref 4.8–10.8)

## 2022-04-24 PROCEDURE — 700111 HCHG RX REV CODE 636 W/ 250 OVERRIDE (IP): Performed by: INTERNAL MEDICINE

## 2022-04-24 PROCEDURE — 85027 COMPLETE CBC AUTOMATED: CPT | Mod: 91

## 2022-04-24 PROCEDURE — 80048 BASIC METABOLIC PNL TOTAL CA: CPT

## 2022-04-24 PROCEDURE — 36415 COLL VENOUS BLD VENIPUNCTURE: CPT

## 2022-04-24 PROCEDURE — 700102 HCHG RX REV CODE 250 W/ 637 OVERRIDE(OP): Performed by: INTERNAL MEDICINE

## 2022-04-24 PROCEDURE — 99232 SBSQ HOSP IP/OBS MODERATE 35: CPT | Performed by: NURSE PRACTITIONER

## 2022-04-24 PROCEDURE — 770020 HCHG ROOM/CARE - TELE (206)

## 2022-04-24 PROCEDURE — 94640 AIRWAY INHALATION TREATMENT: CPT

## 2022-04-24 PROCEDURE — A9270 NON-COVERED ITEM OR SERVICE: HCPCS | Performed by: INTERNAL MEDICINE

## 2022-04-24 PROCEDURE — 700101 HCHG RX REV CODE 250: Performed by: INTERNAL MEDICINE

## 2022-04-24 RX ORDER — ONDANSETRON HYDROCHLORIDE 4 MG/5ML
4 SOLUTION ORAL EVERY 6 HOURS PRN
Status: DISCONTINUED | OUTPATIENT
Start: 2022-04-24 | End: 2022-04-24

## 2022-04-24 RX ORDER — ONDANSETRON 4 MG/1
4 TABLET, ORALLY DISINTEGRATING ORAL EVERY 6 HOURS PRN
Status: DISCONTINUED | OUTPATIENT
Start: 2022-04-24 | End: 2022-04-30 | Stop reason: HOSPADM

## 2022-04-24 RX ORDER — CHLORDIAZEPOXIDE HYDROCHLORIDE 25 MG/1
25 CAPSULE, GELATIN COATED ORAL EVERY 6 HOURS
Status: DISPENSED | OUTPATIENT
Start: 2022-04-25 | End: 2022-04-27

## 2022-04-24 RX ORDER — CHLORDIAZEPOXIDE HYDROCHLORIDE 25 MG/1
50 CAPSULE, GELATIN COATED ORAL EVERY 6 HOURS
Status: COMPLETED | OUTPATIENT
Start: 2022-04-24 | End: 2022-04-25

## 2022-04-24 RX ORDER — MODAFINIL 100 MG/1
100 TABLET ORAL EVERY MORNING
Status: DISCONTINUED | OUTPATIENT
Start: 2022-04-24 | End: 2022-04-24

## 2022-04-24 RX ADMIN — LORAZEPAM 1 MG: 2 INJECTION INTRAMUSCULAR; INTRAVENOUS at 08:15

## 2022-04-24 RX ADMIN — THERA TABS 1 TABLET: TAB at 05:14

## 2022-04-24 RX ADMIN — LORAZEPAM 2 MG: 2 INJECTION INTRAMUSCULAR; INTRAVENOUS at 20:44

## 2022-04-24 RX ADMIN — IPRATROPIUM BROMIDE AND ALBUTEROL SULFATE 3 ML: .5; 3 SOLUTION RESPIRATORY (INHALATION) at 02:08

## 2022-04-24 RX ADMIN — LORAZEPAM 1.5 MG: 2 INJECTION INTRAMUSCULAR; INTRAVENOUS at 17:31

## 2022-04-24 RX ADMIN — LORAZEPAM 1 MG: 2 INJECTION INTRAMUSCULAR; INTRAVENOUS at 00:07

## 2022-04-24 RX ADMIN — LACTULOSE 30 ML: 20 SOLUTION ORAL at 13:20

## 2022-04-24 RX ADMIN — Medication 100 MG: at 05:14

## 2022-04-24 RX ADMIN — CHLORDIAZEPOXIDE HYDROCHLORIDE 50 MG: 25 CAPSULE ORAL at 17:31

## 2022-04-24 RX ADMIN — LACTULOSE 30 ML: 20 SOLUTION ORAL at 05:15

## 2022-04-24 RX ADMIN — OXYCODONE 5 MG: 5 TABLET ORAL at 15:21

## 2022-04-24 RX ADMIN — LACTULOSE 30 ML: 20 SOLUTION ORAL at 17:30

## 2022-04-24 RX ADMIN — POTASSIUM CHLORIDE 40 MEQ: 20 TABLET, EXTENDED RELEASE ORAL at 05:14

## 2022-04-24 RX ADMIN — CHLORDIAZEPOXIDE HYDROCHLORIDE 50 MG: 25 CAPSULE ORAL at 13:20

## 2022-04-24 RX ADMIN — LORAZEPAM 2 MG: 2 INJECTION INTRAMUSCULAR; INTRAVENOUS at 19:48

## 2022-04-24 RX ADMIN — POTASSIUM CHLORIDE 40 MEQ: 20 TABLET, EXTENDED RELEASE ORAL at 13:20

## 2022-04-24 RX ADMIN — CHLORDIAZEPOXIDE HYDROCHLORIDE 50 MG: 25 CAPSULE ORAL at 08:49

## 2022-04-24 RX ADMIN — LORAZEPAM 1.5 MG: 2 INJECTION INTRAMUSCULAR; INTRAVENOUS at 20:02

## 2022-04-24 RX ADMIN — LORAZEPAM 1.5 MG: 2 INJECTION INTRAMUSCULAR; INTRAVENOUS at 04:48

## 2022-04-24 RX ADMIN — LORAZEPAM 1 MG: 1 TABLET ORAL at 13:23

## 2022-04-24 RX ADMIN — FOLIC ACID 1 MG: 1 TABLET ORAL at 05:15

## 2022-04-24 RX ADMIN — SENNOSIDES AND DOCUSATE SODIUM 2 TABLET: 50; 8.6 TABLET ORAL at 05:15

## 2022-04-24 ASSESSMENT — LIFESTYLE VARIABLES
ANXIETY: NO ANXIETY (AT EASE)
NAUSEA AND VOMITING: MILD NAUSEA WITH NO VOMITING
ANXIETY: *
AGITATION: NORMAL ACTIVITY
TREMOR: TREMOR NOT VISIBLE BUT CAN BE FELT, FINGERTIP TO FINGERTIP
HEADACHE, FULLNESS IN HEAD: VERY MILD
PAROXYSMAL SWEATS: NO SWEAT VISIBLE
VISUAL DISTURBANCES: NOT PRESENT
TOTAL SCORE: 16
AGITATION: NORMAL ACTIVITY
PAROXYSMAL SWEATS: BARELY PERCEPTIBLE SWEATING. PALMS MOIST
AGITATION: NORMAL ACTIVITY
NAUSEA AND VOMITING: NO NAUSEA AND NO VOMITING
VISUAL DISTURBANCES: NOT PRESENT
PAROXYSMAL SWEATS: NO SWEAT VISIBLE
ORIENTATION AND CLOUDING OF SENSORIUM: CANNOT DO SERIAL ADDITIONS OR IS UNCERTAIN ABOUT DATE
AGITATION: NORMAL ACTIVITY
TOTAL SCORE: 7
HEADACHE, FULLNESS IN HEAD: MODERATE
AGITATION: SOMEWHAT MORE THAN NORMAL ACTIVITY
TREMOR: *
ANXIETY: MILDLY ANXIOUS
TOTAL SCORE: 8
NAUSEA AND VOMITING: MILD NAUSEA WITH NO VOMITING
PAROXYSMAL SWEATS: BEADS OF SWEAT OBVIOUS ON FOREHEAD
AUDITORY DISTURBANCES: NOT PRESENT
NAUSEA AND VOMITING: NO NAUSEA AND NO VOMITING
TREMOR: *
TREMOR: *
ANXIETY: MILDLY ANXIOUS
TREMOR: *
TREMOR: SEVERE TREMOR, EVEN WITH ARMS NOT EXTENDED
NAUSEA AND VOMITING: NO NAUSEA AND NO VOMITING
TREMOR: *
ORIENTATION AND CLOUDING OF SENSORIUM: CANNOT DO SERIAL ADDITIONS OR IS UNCERTAIN ABOUT DATE
VISUAL DISTURBANCES: MILD SENSITIVITY
NAUSEA AND VOMITING: *
HEADACHE, FULLNESS IN HEAD: MODERATE
AUDITORY DISTURBANCES: NOT PRESENT
ANXIETY: MILDLY ANXIOUS
PAROXYSMAL SWEATS: *
AGITATION: NORMAL ACTIVITY
PAROXYSMAL SWEATS: BARELY PERCEPTIBLE SWEATING. PALMS MOIST
TOTAL SCORE: 3
VISUAL DISTURBANCES: NOT PRESENT
AGITATION: NORMAL ACTIVITY
ANXIETY: *
AUDITORY DISTURBANCES: NOT PRESENT
VISUAL DISTURBANCES: NOT PRESENT
TREMOR: MODERATE TREMOR WITH ARMS EXTENDED
PAROXYSMAL SWEATS: NO SWEAT VISIBLE
VISUAL DISTURBANCES: VERY MILD SENSITIVITY
ORIENTATION AND CLOUDING OF SENSORIUM: CANNOT DO SERIAL ADDITIONS OR IS UNCERTAIN ABOUT DATE
AUDITORY DISTURBANCES: NOT PRESENT
HEADACHE, FULLNESS IN HEAD: EXTREMELY SEVERE
VISUAL DISTURBANCES: NOT PRESENT
NAUSEA AND VOMITING: NO NAUSEA AND NO VOMITING
PAROXYSMAL SWEATS: NO SWEAT VISIBLE
ORIENTATION AND CLOUDING OF SENSORIUM: ORIENTED AND CAN DO SERIAL ADDITIONS
HEADACHE, FULLNESS IN HEAD: MODERATELY SEVERE
AUDITORY DISTURBANCES: NOT PRESENT
AUDITORY DISTURBANCES: NOT PRESENT
PAROXYSMAL SWEATS: BEADS OF SWEAT OBVIOUS ON FOREHEAD
ORIENTATION AND CLOUDING OF SENSORIUM: CANNOT DO SERIAL ADDITIONS OR IS UNCERTAIN ABOUT DATE
ORIENTATION AND CLOUDING OF SENSORIUM: ORIENTED AND CAN DO SERIAL ADDITIONS
ANXIETY: MILDLY ANXIOUS
AGITATION: MODERATELY FIDGETY AND RESTLESS
HEADACHE, FULLNESS IN HEAD: MODERATE
HEADACHE, FULLNESS IN HEAD: MODERATELY SEVERE
AGITATION: NORMAL ACTIVITY
ORIENTATION AND CLOUDING OF SENSORIUM: DISORIENTED FOR PLACE AND / OR PERSON
AUDITORY DISTURBANCES: NOT PRESENT
VISUAL DISTURBANCES: NOT PRESENT
ORIENTATION AND CLOUDING OF SENSORIUM: ORIENTED AND CAN DO SERIAL ADDITIONS
ANXIETY: NO ANXIETY (AT EASE)
TREMOR: MODERATE TREMOR WITH ARMS EXTENDED
HEADACHE, FULLNESS IN HEAD: NOT PRESENT
TOTAL SCORE: 3
NAUSEA AND VOMITING: NO NAUSEA AND NO VOMITING
HEADACHE, FULLNESS IN HEAD: MODERATELY SEVERE
ORIENTATION AND CLOUDING OF SENSORIUM: DATE DISORIENTATION BY NO MORE THAN TWO CALENDAR DAYS
ORIENTATION AND CLOUDING OF SENSORIUM: DATE DISORIENTATION BY NO MORE THAN TWO CALENDAR DAYS
AGITATION: *
TREMOR: SEVERE TREMOR, EVEN WITH ARMS NOT EXTENDED
VISUAL DISTURBANCES: NOT PRESENT
TOTAL SCORE: 15
SUBSTANCE_ABUSE: 1
AUDITORY DISTURBANCES: NOT PRESENT
TOTAL SCORE: 14
ANXIETY: NO ANXIETY (AT EASE)
TOTAL SCORE: 7
VISUAL DISTURBANCES: MILD SENSITIVITY
TREMOR: NO TREMOR
PAROXYSMAL SWEATS: NO SWEAT VISIBLE
TOTAL SCORE: 10
PAROXYSMAL SWEATS: NO SWEAT VISIBLE
VISUAL DISTURBANCES: NOT PRESENT
TOTAL SCORE: 30
PAROXYSMAL SWEATS: BEADS OF SWEAT OBVIOUS ON FOREHEAD
NAUSEA AND VOMITING: MILD NAUSEA WITH NO VOMITING
TOTAL SCORE: 26
AUDITORY DISTURBANCES: NOT PRESENT
NAUSEA AND VOMITING: NO NAUSEA AND NO VOMITING
TOTAL SCORE: 10
VISUAL DISTURBANCES: NOT PRESENT
HEADACHE, FULLNESS IN HEAD: VERY MILD
ORIENTATION AND CLOUDING OF SENSORIUM: DATE DISORIENTATION BY NO MORE THAN TWO CALENDAR DAYS
NAUSEA AND VOMITING: NO NAUSEA AND NO VOMITING
NAUSEA AND VOMITING: NO NAUSEA AND NO VOMITING
AUDITORY DISTURBANCES: NOT PRESENT
VISUAL DISTURBANCES: NOT PRESENT
TREMOR: SEVERE TREMOR, EVEN WITH ARMS NOT EXTENDED
ANXIETY: *
HEADACHE, FULLNESS IN HEAD: MILD
TREMOR: NO TREMOR
ANXIETY: MODERATELY ANXIOUS OR GUARDED, SO ANXIETY IS INFERRED
HEADACHE, FULLNESS IN HEAD: NOT PRESENT
AGITATION: NORMAL ACTIVITY
AGITATION: *
ORIENTATION AND CLOUDING OF SENSORIUM: DATE DISORIENTATION BY NO MORE THAN TWO CALENDAR DAYS
ORIENTATION AND CLOUDING OF SENSORIUM: CANNOT DO SERIAL ADDITIONS OR IS UNCERTAIN ABOUT DATE
AUDITORY DISTURBANCES: NOT PRESENT
TREMOR: SEVERE TREMOR, EVEN WITH ARMS NOT EXTENDED
AGITATION: SOMEWHAT MORE THAN NORMAL ACTIVITY
NAUSEA AND VOMITING: *
NAUSEA AND VOMITING: NO NAUSEA AND NO VOMITING
VISUAL DISTURBANCES: NOT PRESENT
ORIENTATION AND CLOUDING OF SENSORIUM: DATE DISORIENTATION BY MORE THAN TWO CALENDAR DAYS
ANXIETY: NO ANXIETY (AT EASE)
HEADACHE, FULLNESS IN HEAD: MODERATELY SEVERE
TOTAL SCORE: 19
TOTAL SCORE: 29
HEADACHE, FULLNESS IN HEAD: MODERATE
AUDITORY DISTURBANCES: NOT PRESENT
ANXIETY: NO ANXIETY (AT EASE)
PAROXYSMAL SWEATS: BARELY PERCEPTIBLE SWEATING. PALMS MOIST
AGITATION: MODERATELY FIDGETY AND RESTLESS
PAROXYSMAL SWEATS: NO SWEAT VISIBLE
ANXIETY: MODERATELY ANXIOUS OR GUARDED, SO ANXIETY IS INFERRED

## 2022-04-24 ASSESSMENT — ENCOUNTER SYMPTOMS
DIARRHEA: 0
NAUSEA: 0
COUGH: 0
INSOMNIA: 1
MYALGIAS: 1
CHILLS: 0
CONSTIPATION: 0
VOMITING: 0
DIZZINESS: 0
SEIZURES: 0
TREMORS: 1
FEVER: 0
PALPITATIONS: 0
HEADACHES: 1
ABDOMINAL PAIN: 1
FLANK PAIN: 0
PHOTOPHOBIA: 1
FALLS: 0
SHORTNESS OF BREATH: 0
ORTHOPNEA: 0
WEAKNESS: 0
SORE THROAT: 0

## 2022-04-24 ASSESSMENT — FIBROSIS 4 INDEX: FIB4 SCORE: 55.8

## 2022-04-24 NOTE — PROGRESS NOTES
Assumed care of patient. Pt is sleeping but arouses to sound/touch. No chest pain or SOB. 2L NC in place. No active bleeding noted. Informed of safety and call system. rhythm is ST.

## 2022-04-24 NOTE — PROGRESS NOTES
NELI from lab called with critical result of platelets 27 at 1446. Critical lab result read back to NELI.   Dr. Saini notified of critical lab result at 1450.  Critical lab result read back by Dr. Saini.

## 2022-04-24 NOTE — CARE PLAN
The patient is Watcher - Medium risk of patient condition declining or worsening    Shift Goals  Clinical Goals: monitor CIWA, safety  Patient Goals: safety  Family Goals: None at bedside.    Progress made toward(s) clinical / shift goals:    Problem: Knowledge Deficit - Standard  Goal: Patient and family/care givers will demonstrate understanding of plan of care, disease process/condition, diagnostic tests and medications  Outcome: Progressing     Problem: Seizure Precautions  Goal: Implementation of seizure precautions  Outcome: Progressing     Problem: Pain - Standard  Goal: Alleviation of pain or a reduction in pain to the patient’s comfort goal  Outcome: Progressing     Problem: Skin Integrity  Goal: Skin integrity is maintained or improved  Outcome: Progressing       Patient is not progressing towards the following goals:

## 2022-04-24 NOTE — CARE PLAN
Problem: Bronchoconstriction  Goal: Improve in air movement and diminished wheezing  Description: Target End Date:  2 to 3 days    1.  Implement inhaled treatments  2.  Evaluate and manage medication effects  Flowsheets (Taken 4/24/2022 2462)  Bronchodilator Goals/Outcome: Patient at Stable Baseline  Bronchodilator Indications: History / Diagnosis         Respiratory Update    Treatment modality: DuoNeb  Frequency: Q6    Pt tolerating current treatments well with no adverse reactions.

## 2022-04-24 NOTE — CARE PLAN
Problem: Knowledge Deficit - Standard  Goal: Patient and family/care givers will demonstrate understanding of plan of care, disease process/condition, diagnostic tests and medications  4/24/2022 1615 by Maggy Swartz R.N.  Outcome: Progressing     Problem: Optimal Care for Alcohol Withdrawal  Goal: Optimal Care for the alcohol withdrawal patient  4/24/2022 1615 by Maggy Swartz R.N.  Outcome: Progressing    Problem: Seizure Precautions  Goal: Implementation of seizure precautions  4/24/2022 1615 by Maggy Swartz R.N.  Outcome: Progressing    Problem: Lifestyle Changes  Goal: Patient's ability to identify lifestyle changes and available resources to help reduce recurrence of condition will improve  4/24/2022 1615 by Maggy Swartz R.N.  Outcome: Progressing    Problem: Psychosocial  Goal: Patient's level of anxiety will decrease  4/24/2022 1615 by Maggy Swartz R.N.  Outcome: Progressing  Goal: Spiritual and cultural needs incorporated into hospitalization  4/24/2022 1615 by Maggy Swartz R.N.  Outcome: Progressing     Problem: Risk for Aspiration  Goal: Patient's risk for aspiration will be absent or decrease  4/24/2022 1615 by Maggy Swartz R.N.  Outcome: Progressing     Problem: Pain - Standard  Goal: Alleviation of pain or a reduction in pain to the patient’s comfort goal  4/24/2022 1615 by Maggy Swartz R.N.  Outcome: Progressing     Problem: Skin Integrity  Goal: Skin integrity is maintained or improved  4/24/2022 1615 by Maggy Swartz R.N.  Outcome: Progressing     Problem: Provide Safe Environment  Goal: Suicide environmental safety, protocols, policies, and practices will be implemented  4/24/2022 1615 by Maggy Swartz R.N.  Outcome: Progressing  Patient low risk; re-screened daily; denies suicidal thoughts/plans at this time or recently.      Problem: Psychosocial  Goal: Patient's ability to identify and develop effective coping behaviors will improve  4/24/2022 1615 by  Maggy Swartz R.N.  Outcome: Progressing    Goal: Patient's ability to identify and utilize available support systems will improve  4/24/2022 1615 by Maggy Swartz R.N.  Outcome: Progressing       Problem: Fall Risk  Goal: Patient will remain free from falls  4/24/2022 1615 by Maggy Swartz R.N.  Outcome: Progressing     The patient is Watcher - Medium risk of patient condition declining or worsening    Shift Goals  Clinical Goals: Monitor/treat CIWA symptoms; maintain stable vital signs; seizure precautions; telemetry monitoring.  Patient Goals: Manage symptoms of withdrawal; drink water.  Family Goals: None at bedside.    Progress made toward(s) clinical / shift goals:  Progressing.    Patient is not progressing towards the following goals:

## 2022-04-24 NOTE — HOSPITAL COURSE
Olya Youssef is a 51 y.o. female well-known to ER staff for history of alcoholism and methamphetamine abuse, hypertension, pancreatitis, seizure, alcoholic hepatitis who presented 4/22/2022 with acute intoxication  Secondary to alcohol and methamphetamine use over the last few days.  The patient also reports abdominal pain for which she has attributed to her recent heavy alcohol use. She also reports headache which radiates up from her neck with associated neck stiffness and photosensitivity. She denies any fevers, chills, nausea, vomiting, shortness of breath, or chest pain.    In the ER, patient thrombocytopenic at 34, hypokalemic, and metabolic acidosis likely due to alcohol levels greater than detected level of 498. , alk phos 209, and lipase elevated at 356 consistent with acute pancreatitis. Chest xray negative.

## 2022-04-24 NOTE — PROGRESS NOTES
Hospital Medicine Daily Progress Note    Date of Service  4/24/2022    Chief Complaint  Olya Youssef is a 51 y.o. female admitted 4/22/2022 with generalized body aches and encephalopathy with intoxication    Hospital Course  Olya Youssef is a 51 y.o. female well-known to ER staff for history of alcoholism and methamphetamine abuse, hypertension, pancreatitis, seizure, alcoholic hepatitis who presented 4/22/2022 with acute intoxication  Secondary to alcohol and methamphetamine use over the last few days.  The patient also reports abdominal pain for which she has attributed to her recent heavy alcohol use. She also reports headache which radiates up from her neck with associated neck stiffness and photosensitivity. She denies any fevers, chills, nausea, vomiting, shortness of breath, or chest pain.    In the ER, patient thrombocytopenic at 34, hypokalemic, and metabolic acidosis likely due to alcohol levels greater than detected level of 498. , alk phos 209, and lipase elevated at 356 consistent with acute pancreatitis. Chest xray negative.       Interval Problem Update  4/24/2022: AAOx4. CIWA 10-16. Tremulous, requiring multiple doses of ativan. Started librium. Tolerating clear liquid diet.     I have personally seen and examined the patient at bedside. I discussed the plan of care with patient and bedside RN.    Consultants/Specialty  none    Code Status  Full Code    Disposition  Patient is not medically cleared for discharge.   Anticipate discharge to to home with close outpatient follow-up.  I have placed the appropriate orders for post-discharge needs.    Review of Systems  Review of Systems   Constitutional: Negative for chills and fever.   HENT: Negative for congestion and sore throat.    Eyes: Positive for photophobia.   Respiratory: Negative for cough and shortness of breath.    Cardiovascular: Negative for chest pain, palpitations and orthopnea.   Gastrointestinal: Positive for abdominal pain  (LUQ). Negative for constipation, diarrhea, nausea and vomiting.   Genitourinary: Negative for dysuria and flank pain.   Musculoskeletal: Positive for myalgias. Negative for falls.   Neurological: Positive for tremors and headaches. Negative for dizziness, seizures and weakness.   Psychiatric/Behavioral: Positive for substance abuse. The patient has insomnia.    All other systems reviewed and are negative.       Physical Exam  Temp:  [35.9 °C (96.6 °F)-37.2 °C (98.9 °F)] 36.4 °C (97.6 °F)  Pulse:  [113-129] 113  Resp:  [15-18] 18  BP: (128-161)/(76-97) 134/82  SpO2:  [93 %-99 %] 93 %    Physical Exam  Vitals and nursing note reviewed.   Constitutional:       Appearance: Normal appearance. She is ill-appearing and diaphoretic. She is not toxic-appearing.   HENT:      Head: Normocephalic.      Mouth/Throat:      Mouth: Mucous membranes are moist.      Pharynx: Oropharynx is clear.   Eyes:      General: No scleral icterus.     Extraocular Movements: Extraocular movements intact.      Conjunctiva/sclera: Conjunctivae normal.   Cardiovascular:      Rate and Rhythm: Regular rhythm. Tachycardia present.      Pulses: Normal pulses.      Heart sounds: Normal heart sounds. No murmur heard.    No gallop.   Pulmonary:      Effort: Pulmonary effort is normal. No respiratory distress.      Breath sounds: Normal breath sounds. No wheezing.   Chest:      Chest wall: No tenderness.   Abdominal:      General: Abdomen is flat. Bowel sounds are normal. There is no distension.      Palpations: Abdomen is soft.      Tenderness: There is abdominal tenderness (LUQ).   Musculoskeletal:      Right lower leg: No edema.      Left lower leg: No edema.   Skin:     General: Skin is warm.      Capillary Refill: Capillary refill takes less than 2 seconds.      Coloration: Skin is pale. Skin is not jaundiced.   Neurological:      General: No focal deficit present.      Mental Status: She is alert and oriented to person, place, and time.      Motor:  Tremor present.   Psychiatric:         Behavior: Behavior normal.         Thought Content: Thought content normal.         Fluids    Intake/Output Summary (Last 24 hours) at 4/24/2022 1242  Last data filed at 4/24/2022 0600  Gross per 24 hour   Intake 420 ml   Output 1 ml   Net 419 ml       Laboratory  Recent Labs     04/22/22 2215 04/24/22 0316   WBC 5.6 3.0*   RBC 4.17* 3.54*   HEMOGLOBIN 14.2 12.2   HEMATOCRIT 43.0 37.5   .1* 105.9*   MCH 34.1* 34.5*   MCHC 33.0* 32.5*   RDW 54.2* 56.5*   PLATELETCT 34* 25*   MPV 10.3 10.4     Recent Labs     04/22/22 2215 04/23/22  0926 04/24/22 0316   SODIUM 139 136 133*   POTASSIUM 3.1* 4.5 4.1   CHLORIDE 93* 95* 90*   CO2 20 23 27   GLUCOSE 81 110* 96   BUN 12 11 12   CREATININE 0.29* 0.21* 0.29*   CALCIUM 8.2* 8.3* 8.7                   Imaging  DX-CHEST-PORTABLE (1 VIEW)   Final Result         1.  Left basilar atelectasis, no focal infiltrate           Assessment/Plan  * Acute encephalopathy- (present on admission)  Assessment & Plan  Multifactorial secondary to hepatic encephalopathy, alcohol and methamphetamine intoxication.  Fall, aspiration, seizure precautions  Continue lactulose  CIWA protocol, benzodiazepine as needed    Pancreatitis- (present on admission)  Assessment & Plan  Secondary to alcohol  Bowel rest  Clear liquid diet  Symptomatic management  Pain management   Monitor chemistry    Alcohol withdrawal (HCC)  Assessment & Plan  CIWA 10-16  Requiring multiple doses of ativan.   Tremulous  Librium started.   Continue Folic acid, multivitamin, thiamine.   Continue CIWA protocol    Macrocytic anemia- (present on admission)  Assessment & Plan  Thiamine, folic acid, follow-up anemia labs    COPD (chronic obstructive pulmonary disease) (HCC)- (present on admission)  Assessment & Plan  Compensated   DuoNeb as needed  Incentive spirometry    Methamphetamine abuse (HCC)- (present on admission)  Assessment & Plan  Encourage cessation  Monitor for withdrawals,  benzodiazepine as needed    Pancytopenia (HCC)- (present on admission)  Assessment & Plan  Secondary to alcoholism, without evidence of acute bleed  Hold prophylactic heparin.   Monitor CBC       VTE prophylaxis: SCDs/TEDs    I have performed a physical exam and reviewed and updated ROS and Plan today (4/24/2022). In review of yesterday's note (4/23/2022), there are no changes except as documented above.

## 2022-04-25 LAB
AMMONIA PLAS-SCNC: 54 UMOL/L (ref 11–45)
ANION GAP SERPL CALC-SCNC: 11 MMOL/L (ref 7–16)
BUN SERPL-MCNC: 7 MG/DL (ref 8–22)
CALCIUM SERPL-MCNC: 9.2 MG/DL (ref 8.5–10.5)
CHLORIDE SERPL-SCNC: 94 MMOL/L (ref 96–112)
CO2 SERPL-SCNC: 28 MMOL/L (ref 20–33)
CREAT SERPL-MCNC: 0.29 MG/DL (ref 0.5–1.4)
GFR SERPLBLD CREATININE-BSD FMLA CKD-EPI: 127 ML/MIN/1.73 M 2
GLUCOSE SERPL-MCNC: 113 MG/DL (ref 65–99)
LIPASE SERPL-CCNC: 186 U/L (ref 11–82)
POTASSIUM SERPL-SCNC: 4 MMOL/L (ref 3.6–5.5)
SODIUM SERPL-SCNC: 133 MMOL/L (ref 135–145)

## 2022-04-25 PROCEDURE — 700111 HCHG RX REV CODE 636 W/ 250 OVERRIDE (IP): Performed by: INTERNAL MEDICINE

## 2022-04-25 PROCEDURE — 700102 HCHG RX REV CODE 250 W/ 637 OVERRIDE(OP): Performed by: INTERNAL MEDICINE

## 2022-04-25 PROCEDURE — 700101 HCHG RX REV CODE 250: Performed by: INTERNAL MEDICINE

## 2022-04-25 PROCEDURE — 83690 ASSAY OF LIPASE: CPT

## 2022-04-25 PROCEDURE — 770020 HCHG ROOM/CARE - TELE (206)

## 2022-04-25 PROCEDURE — A9270 NON-COVERED ITEM OR SERVICE: HCPCS | Performed by: INTERNAL MEDICINE

## 2022-04-25 PROCEDURE — 36415 COLL VENOUS BLD VENIPUNCTURE: CPT

## 2022-04-25 PROCEDURE — 94640 AIRWAY INHALATION TREATMENT: CPT

## 2022-04-25 PROCEDURE — 99232 SBSQ HOSP IP/OBS MODERATE 35: CPT | Performed by: NURSE PRACTITIONER

## 2022-04-25 PROCEDURE — 80048 BASIC METABOLIC PNL TOTAL CA: CPT

## 2022-04-25 PROCEDURE — 82140 ASSAY OF AMMONIA: CPT

## 2022-04-25 RX ORDER — IPRATROPIUM BROMIDE AND ALBUTEROL SULFATE 2.5; .5 MG/3ML; MG/3ML
3 SOLUTION RESPIRATORY (INHALATION)
Status: DISCONTINUED | OUTPATIENT
Start: 2022-04-25 | End: 2022-04-30 | Stop reason: HOSPADM

## 2022-04-25 RX ADMIN — IPRATROPIUM BROMIDE AND ALBUTEROL SULFATE 3 ML: .5; 3 SOLUTION RESPIRATORY (INHALATION) at 14:31

## 2022-04-25 RX ADMIN — CHLORDIAZEPOXIDE HYDROCHLORIDE 50 MG: 25 CAPSULE ORAL at 00:04

## 2022-04-25 RX ADMIN — FOLIC ACID 1 MG: 1 TABLET ORAL at 07:26

## 2022-04-25 RX ADMIN — LORAZEPAM 1.5 MG: 2 INJECTION INTRAMUSCULAR; INTRAVENOUS at 15:08

## 2022-04-25 RX ADMIN — LACTULOSE 30 ML: 20 SOLUTION ORAL at 07:26

## 2022-04-25 RX ADMIN — LACTULOSE 30 ML: 20 SOLUTION ORAL at 11:05

## 2022-04-25 RX ADMIN — LORAZEPAM 1 MG: 2 INJECTION INTRAMUSCULAR; INTRAVENOUS at 20:45

## 2022-04-25 RX ADMIN — LORAZEPAM 1 MG: 2 INJECTION INTRAMUSCULAR; INTRAVENOUS at 07:26

## 2022-04-25 RX ADMIN — CHLORDIAZEPOXIDE HYDROCHLORIDE 25 MG: 25 CAPSULE ORAL at 18:10

## 2022-04-25 RX ADMIN — LORAZEPAM 1.5 MG: 2 INJECTION INTRAMUSCULAR; INTRAVENOUS at 13:42

## 2022-04-25 RX ADMIN — OXYCODONE 5 MG: 5 TABLET ORAL at 15:08

## 2022-04-25 RX ADMIN — IPRATROPIUM BROMIDE AND ALBUTEROL SULFATE 3 ML: .5; 3 SOLUTION RESPIRATORY (INHALATION) at 19:45

## 2022-04-25 RX ADMIN — LORAZEPAM 1 MG: 2 INJECTION INTRAMUSCULAR; INTRAVENOUS at 11:05

## 2022-04-25 RX ADMIN — LORAZEPAM 1.5 MG: 2 INJECTION INTRAMUSCULAR; INTRAVENOUS at 18:09

## 2022-04-25 RX ADMIN — THERA TABS 1 TABLET: TAB at 07:26

## 2022-04-25 RX ADMIN — ONDANSETRON 4 MG: 4 TABLET, ORALLY DISINTEGRATING ORAL at 15:09

## 2022-04-25 RX ADMIN — LORAZEPAM 1 MG: 2 INJECTION INTRAMUSCULAR; INTRAVENOUS at 22:19

## 2022-04-25 RX ADMIN — CHLORDIAZEPOXIDE HYDROCHLORIDE 25 MG: 25 CAPSULE ORAL at 11:04

## 2022-04-25 RX ADMIN — ONDANSETRON 4 MG: 4 TABLET, ORALLY DISINTEGRATING ORAL at 07:26

## 2022-04-25 RX ADMIN — Medication 100 MG: at 07:26

## 2022-04-25 RX ADMIN — CHLORDIAZEPOXIDE HYDROCHLORIDE 25 MG: 25 CAPSULE ORAL at 23:33

## 2022-04-25 ASSESSMENT — LIFESTYLE VARIABLES
AUDITORY DISTURBANCES: NOT PRESENT
ANXIETY: NO ANXIETY (AT EASE)
VISUAL DISTURBANCES: NOT PRESENT
AGITATION: NORMAL ACTIVITY
PAROXYSMAL SWEATS: NO SWEAT VISIBLE
PAROXYSMAL SWEATS: NO SWEAT VISIBLE
AGITATION: *
EVER FELT BAD OR GUILTY ABOUT YOUR DRINKING: YES
TREMOR: MODERATE TREMOR WITH ARMS EXTENDED
NAUSEA AND VOMITING: *
VISUAL DISTURBANCES: NOT PRESENT
HEADACHE, FULLNESS IN HEAD: MODERATE
VISUAL DISTURBANCES: VERY MILD SENSITIVITY
VISUAL DISTURBANCES: NOT PRESENT
AGITATION: NORMAL ACTIVITY
AGITATION: MODERATELY FIDGETY AND RESTLESS
TOTAL SCORE: 13
NAUSEA AND VOMITING: *
HEADACHE, FULLNESS IN HEAD: NOT PRESENT
NAUSEA AND VOMITING: *
VISUAL DISTURBANCES: VERY MILD SENSITIVITY
ORIENTATION AND CLOUDING OF SENSORIUM: ORIENTED AND CAN DO SERIAL ADDITIONS
TREMOR: *
ORIENTATION AND CLOUDING OF SENSORIUM: ORIENTED AND CAN DO SERIAL ADDITIONS
ANXIETY: MILDLY ANXIOUS
ORIENTATION AND CLOUDING OF SENSORIUM: ORIENTED AND CAN DO SERIAL ADDITIONS
NAUSEA AND VOMITING: MILD NAUSEA WITH NO VOMITING
ANXIETY: MILDLY ANXIOUS
TOTAL SCORE: 1
HEADACHE, FULLNESS IN HEAD: MODERATE
NAUSEA AND VOMITING: MILD NAUSEA WITH NO VOMITING
AGITATION: *
PAROXYSMAL SWEATS: NO SWEAT VISIBLE
ORIENTATION AND CLOUDING OF SENSORIUM: ORIENTED AND CAN DO SERIAL ADDITIONS
TOTAL SCORE: 4
TREMOR: *
AUDITORY DISTURBANCES: NOT PRESENT
ANXIETY: *
DOES PATIENT WANT TO STOP DRINKING: YES
NAUSEA AND VOMITING: NO NAUSEA AND NO VOMITING
NAUSEA AND VOMITING: *
TOTAL SCORE: 4
HAVE YOU EVER FELT YOU SHOULD CUT DOWN ON YOUR DRINKING: YES
AGITATION: NORMAL ACTIVITY
NAUSEA AND VOMITING: *
ORIENTATION AND CLOUDING OF SENSORIUM: ORIENTED AND CAN DO SERIAL ADDITIONS
PAROXYSMAL SWEATS: NO SWEAT VISIBLE
TREMOR: MODERATE TREMOR WITH ARMS EXTENDED
AUDITORY DISTURBANCES: NOT PRESENT
ANXIETY: NO ANXIETY (AT EASE)
AGITATION: *
HEADACHE, FULLNESS IN HEAD: MODERATELY SEVERE
TOTAL SCORE: 7
AUDITORY DISTURBANCES: NOT PRESENT
TREMOR: MODERATE TREMOR WITH ARMS EXTENDED
TOTAL SCORE: 16
DOES PATIENT WANT TO TALK TO SOMEONE ABOUT QUITTING: YES
PAROXYSMAL SWEATS: NO SWEAT VISIBLE
PAROXYSMAL SWEATS: NO SWEAT VISIBLE
TOTAL SCORE: 4
VISUAL DISTURBANCES: NOT PRESENT
ORIENTATION AND CLOUDING OF SENSORIUM: ORIENTED AND CAN DO SERIAL ADDITIONS
VISUAL DISTURBANCES: NOT PRESENT
VISUAL DISTURBANCES: NOT PRESENT
HEADACHE, FULLNESS IN HEAD: MODERATE
TOTAL SCORE: 11
TOTAL SCORE: 10
AUDITORY DISTURBANCES: NOT PRESENT
VISUAL DISTURBANCES: VERY MILD SENSITIVITY
PAROXYSMAL SWEATS: NO SWEAT VISIBLE
AGITATION: SOMEWHAT MORE THAN NORMAL ACTIVITY
TREMOR: *
ANXIETY: MILDLY ANXIOUS
HEADACHE, FULLNESS IN HEAD: NOT PRESENT
ORIENTATION AND CLOUDING OF SENSORIUM: ORIENTED AND CAN DO SERIAL ADDITIONS
AGITATION: *
TREMOR: *
ORIENTATION AND CLOUDING OF SENSORIUM: ORIENTED AND CAN DO SERIAL ADDITIONS
ALCOHOL_USE: YES
AUDITORY DISTURBANCES: NOT PRESENT
TOTAL SCORE: 2
HEADACHE, FULLNESS IN HEAD: MODERATELY SEVERE
AUDITORY DISTURBANCES: NOT PRESENT
AGITATION: NORMAL ACTIVITY
ORIENTATION AND CLOUDING OF SENSORIUM: CANNOT DO SERIAL ADDITIONS OR IS UNCERTAIN ABOUT DATE
VISUAL DISTURBANCES: NOT PRESENT
TOTAL SCORE: 12
ANXIETY: *
ORIENTATION AND CLOUDING OF SENSORIUM: ORIENTED AND CAN DO SERIAL ADDITIONS
SUBSTANCE_ABUSE: 1
TOTAL SCORE: 16
ANXIETY: NO ANXIETY (AT EASE)
HEADACHE, FULLNESS IN HEAD: SEVERE
VISUAL DISTURBANCES: VERY MILD SENSITIVITY
EVER HAD A DRINK FIRST THING IN THE MORNING TO STEADY YOUR NERVES TO GET RID OF A HANGOVER: YES
HEADACHE, FULLNESS IN HEAD: MODERATELY SEVERE
HEADACHE, FULLNESS IN HEAD: MODERATELY SEVERE
NAUSEA AND VOMITING: *
AUDITORY DISTURBANCES: NOT PRESENT
AVERAGE NUMBER OF DAYS PER WEEK YOU HAVE A DRINK CONTAINING ALCOHOL: 7
TREMOR: TREMOR NOT VISIBLE BUT CAN BE FELT, FINGERTIP TO FINGERTIP
CONSUMPTION TOTAL: POSITIVE
NAUSEA AND VOMITING: NO NAUSEA AND NO VOMITING
ANXIETY: NO ANXIETY (AT EASE)
AGITATION: NORMAL ACTIVITY
ORIENTATION AND CLOUDING OF SENSORIUM: CANNOT DO SERIAL ADDITIONS OR IS UNCERTAIN ABOUT DATE
ANXIETY: MILDLY ANXIOUS
TOTAL SCORE: 13
HEADACHE, FULLNESS IN HEAD: MODERATELY SEVERE
PAROXYSMAL SWEATS: NO SWEAT VISIBLE
ANXIETY: *
AUDITORY DISTURBANCES: NOT PRESENT
TREMOR: MODERATE TREMOR WITH ARMS EXTENDED
AUDITORY DISTURBANCES: NOT PRESENT
TOTAL SCORE: 15
HAVE PEOPLE ANNOYED YOU BY CRITICIZING YOUR DRINKING: YES
HOW MANY TIMES IN THE PAST YEAR HAVE YOU HAD 5 OR MORE DRINKS IN A DAY: 0
TREMOR: TREMOR NOT VISIBLE BUT CAN BE FELT, FINGERTIP TO FINGERTIP
PAROXYSMAL SWEATS: NO SWEAT VISIBLE
NAUSEA AND VOMITING: *
TREMOR: MODERATE TREMOR WITH ARMS EXTENDED
AUDITORY DISTURBANCES: NOT PRESENT
ON A TYPICAL DAY WHEN YOU DRINK ALCOHOL HOW MANY DRINKS DO YOU HAVE: 10

## 2022-04-25 ASSESSMENT — ENCOUNTER SYMPTOMS
FALLS: 0
SORE THROAT: 0
ORTHOPNEA: 0
HEADACHES: 1
INSOMNIA: 0
DIZZINESS: 0
VOMITING: 0
MYALGIAS: 1
DIARRHEA: 0
SEIZURES: 0
COUGH: 0
FLANK PAIN: 0
CHILLS: 0
NAUSEA: 0
FEVER: 0
PALPITATIONS: 0
SHORTNESS OF BREATH: 0
TREMORS: 1
CONSTIPATION: 0
ABDOMINAL PAIN: 1
WEAKNESS: 0

## 2022-04-25 ASSESSMENT — COPD QUESTIONNAIRES
DURING THE PAST 4 WEEKS HOW MUCH DID YOU FEEL SHORT OF BREATH: SOME OF THE TIME
COPD SCREENING SCORE: 2
DO YOU EVER COUGH UP ANY MUCUS OR PHLEGM?: NO/ONLY WITH OCCASIONAL COLDS OR INFECTIONS
HAVE YOU SMOKED AT LEAST 100 CIGARETTES IN YOUR ENTIRE LIFE: NO/DON'T KNOW

## 2022-04-25 ASSESSMENT — FIBROSIS 4 INDEX: FIB4 SCORE: 55.8

## 2022-04-25 ASSESSMENT — PAIN DESCRIPTION - PAIN TYPE: TYPE: ACUTE PAIN;CHRONIC PAIN

## 2022-04-25 NOTE — CARE PLAN
The patient is Watcher.     Shift Goals  Clinical Goals: Monitor/treat CIWA symptoms; maintain stable vital signs; seizure precautions; telemetry monitoring.  Patient Goals: Manage symptoms of withdrawal; drink water.  Family Goals: None at bedside.    Progress made toward(s) clinical / shift goals:    Problem: Optimal Care for Alcohol Withdrawal  Goal: Optimal Care for the alcohol withdrawal patient  Outcome: Progressing     Problem: Seizure Precautions  Goal: Implementation of seizure precautions  Outcome: Progressing     Problem: Risk for Aspiration  Goal: Patient's risk for aspiration will be absent or decrease  Outcome: Progressing     Problem: Pain - Standard  Goal: Alleviation of pain or a reduction in pain to the patient’s comfort goal  Outcome: Progressing       Patient is not progressing towards the following goals:      Problem: Knowledge Deficit - Standard  Goal: Patient and family/care givers will demonstrate understanding of plan of care, disease process/condition, diagnostic tests and medications  Outcome: Not Progressing     Problem: Lifestyle Changes  Goal: Patient's ability to identify lifestyle changes and available resources to help reduce recurrence of condition will improve  Outcome: Not Progressing     Problem: Psychosocial  Goal: Patient's level of anxiety will decrease  Outcome: Not Progressing     Problem: Psychosocial  Goal: Patient's ability to identify and develop effective coping behaviors will improve  Outcome: Not Progressing  Goal: Patient's ability to identify and utilize available support systems will improve  Outcome: Not Progressing

## 2022-04-25 NOTE — PROGRESS NOTES
Assumed care of patient. Pt is drowsy and oriented x4; refer to CIWA documentation. No chest pain or SOB. 2L NC in place. No active bleeding noted. Informed of safety and call system; bed alarm on. Rhythm is ST 110s.

## 2022-04-25 NOTE — PROGRESS NOTES
Hospital Medicine Daily Progress Note    Date of Service  4/25/2022    Chief Complaint  Olya Youssef is a 51 y.o. female admitted 4/22/2022 with generalized body aches and encephalopathy with intoxication    Hospital Course  Olya Youssef is a 51 y.o. female well-known to ER staff for history of alcoholism and methamphetamine abuse, hypertension, pancreatitis, seizure, alcoholic hepatitis who presented 4/22/2022 with acute intoxication  Secondary to alcohol and methamphetamine use over the last few days.  The patient also reports abdominal pain for which she has attributed to her recent heavy alcohol use. She also reports headache which radiates up from her neck with associated neck stiffness and photosensitivity. She denies any fevers, chills, nausea, vomiting, shortness of breath, or chest pain.    In the ER, patient thrombocytopenic at 34, hypokalemic, and metabolic acidosis likely due to alcohol levels greater than detected level of 498. , alk phos 209, and lipase elevated at 356 consistent with acute pancreatitis. Chest xray negative.       Interval Problem Update  4/24/2022: AAOx4. CIWA 10-16. Tremulous, requiring multiple doses of ativan. Started librium. Tolerating clear liquid diet.     4/25/2022: Patient sleeping calmly, arouses easily. CIWA 2-16 overnight requiring multiple doses of ativan and librium. Ammonia and lipase levels improving. Continue CIWA protocol.    I have personally seen and examined the patient at bedside. I discussed the plan of care with patient and bedside RN.    Consultants/Specialty  none    Code Status  Full Code    Disposition  Patient is not medically cleared for discharge.   Anticipate discharge to to home with close outpatient follow-up.  I have placed the appropriate orders for post-discharge needs.    Review of Systems  Review of Systems   Constitutional: Negative for chills and fever.   HENT: Negative for congestion and sore throat.    Respiratory: Negative for  cough and shortness of breath.    Cardiovascular: Negative for chest pain, palpitations and orthopnea.   Gastrointestinal: Positive for abdominal pain (LUQ). Negative for constipation, diarrhea, nausea and vomiting.   Genitourinary: Negative for dysuria and flank pain.   Musculoskeletal: Positive for myalgias. Negative for falls.   Neurological: Positive for tremors and headaches. Negative for dizziness, seizures and weakness.   Psychiatric/Behavioral: Positive for substance abuse. The patient does not have insomnia.    All other systems reviewed and are negative.       Physical Exam  Temp:  [36.1 °C (97 °F)-36.4 °C (97.6 °F)] 36.4 °C (97.5 °F)  Pulse:  [113-130] 116  Resp:  [16-18] 16  BP: (109-150)/() 137/83  SpO2:  [93 %-98 %] 96 %    Physical Exam  Vitals and nursing note reviewed.   Constitutional:       Appearance: Normal appearance. She is not ill-appearing, toxic-appearing or diaphoretic.   HENT:      Head: Normocephalic.      Mouth/Throat:      Mouth: Mucous membranes are moist.      Pharynx: Oropharynx is clear.   Eyes:      General: No scleral icterus.     Extraocular Movements: Extraocular movements intact.      Conjunctiva/sclera: Conjunctivae normal.   Cardiovascular:      Rate and Rhythm: Regular rhythm. Tachycardia present.      Pulses: Normal pulses.      Heart sounds: Normal heart sounds. No murmur heard.    No gallop.   Pulmonary:      Effort: Pulmonary effort is normal. No respiratory distress.      Breath sounds: Normal breath sounds. No wheezing.   Chest:      Chest wall: No tenderness.   Abdominal:      General: Abdomen is flat. Bowel sounds are normal. There is no distension.      Palpations: Abdomen is soft.      Tenderness: There is abdominal tenderness (LUQ).   Musculoskeletal:      Right lower leg: No edema.      Left lower leg: No edema.   Skin:     General: Skin is warm.      Capillary Refill: Capillary refill takes less than 2 seconds.      Coloration: Skin is pale. Skin is not  jaundiced.   Neurological:      General: No focal deficit present.      Mental Status: She is alert and oriented to person, place, and time. Mental status is at baseline.      Motor: Tremor present.   Psychiatric:         Mood and Affect: Mood normal.         Behavior: Behavior normal.         Thought Content: Thought content normal.         Fluids    Intake/Output Summary (Last 24 hours) at 4/25/2022 0942  Last data filed at 4/25/2022 0713  Gross per 24 hour   Intake --   Output 200 ml   Net -200 ml       Laboratory  Recent Labs     04/22/22 2215 04/24/22  0316 04/24/22  1406   WBC 5.6 3.0* 2.9*   RBC 4.17* 3.54* 3.46*   HEMOGLOBIN 14.2 12.2 11.7*   HEMATOCRIT 43.0 37.5 36.0*   .1* 105.9* 104.0*   MCH 34.1* 34.5* 33.8*   MCHC 33.0* 32.5* 32.5*   RDW 54.2* 56.5* 54.9*   PLATELETCT 34* 25* 27*   MPV 10.3 10.4 9.8     Recent Labs     04/23/22  0926 04/24/22  0316 04/25/22  0215   SODIUM 136 133* 133*   POTASSIUM 4.5 4.1 4.0   CHLORIDE 95* 90* 94*   CO2 23 27 28   GLUCOSE 110* 96 113*   BUN 11 12 7*   CREATININE 0.21* 0.29* 0.29*   CALCIUM 8.3* 8.7 9.2                   Imaging  DX-CHEST-PORTABLE (1 VIEW)   Final Result         1.  Left basilar atelectasis, no focal infiltrate           Assessment/Plan  * Acute encephalopathy- (present on admission)  Assessment & Plan  Multifactorial secondary to hepatic encephalopathy, alcohol and methamphetamine intoxication.  Fall, aspiration, seizure precautions  Ammonia 75 ->54  Continue lactulose  CIWA protocol, benzodiazepine as needed  Continue to monitor chemistries    Pancreatitis- (present on admission)  Assessment & Plan  Secondary to alcohol  Bowel rest  Symptomatic management  Pain management   Lipase improving 356 ->186  Continue clear liquid diet     Alcohol withdrawal (HCC)  Assessment & Plan  CIWA 2-16  Requiring multiple doses of ativan and librium  Continue Folic acid, multivitamin, thiamine.   Continue CIWA protocol    Macrocytic anemia- (present on  admission)  Assessment & Plan  Likely due to chronic alcohol use   Continue folic acid, multivitamin, and thiamine supplements    COPD (chronic obstructive pulmonary disease) (HCC)- (present on admission)  Assessment & Plan  Compensated   DuoNeb as needed  Incentive spirometry    Methamphetamine abuse (HCC)- (present on admission)  Assessment & Plan  Encourage cessation  Monitor for withdrawals, benzodiazepine as needed    Pancytopenia (HCC)- (present on admission)  Assessment & Plan  Secondary to alcoholism, without evidence of acute bleed  Hold prophylactic heparin.   Continue to monitor CBC       VTE prophylaxis: SCDs/TEDs    I have performed a physical exam and reviewed and updated ROS and Plan today (4/25/2022). In review of yesterday's note (4/24/2022), there are no changes except as documented above.

## 2022-04-25 NOTE — CARE PLAN
Problem: Knowledge Deficit - Standard  Goal: Patient and family/care givers will demonstrate understanding of plan of care, disease process/condition, diagnostic tests and medications  Outcome: Progressing     Problem: Optimal Care for Alcohol Withdrawal  Goal: Optimal Care for the alcohol withdrawal patient  Outcome: Progressing     Problem: Seizure Precautions  Goal: Implementation of seizure precautions  Outcome: Progressing     Problem: Lifestyle Changes  Goal: Patient's ability to identify lifestyle changes and available resources to help reduce recurrence of condition will improve  Outcome: Progressing     Problem: Psychosocial  Goal: Patient's level of anxiety will decrease  Outcome: Progressing  Goal: Spiritual and cultural needs incorporated into hospitalization  Outcome: Progressing     Problem: Risk for Aspiration  Goal: Patient's risk for aspiration will be absent or decrease  Outcome: Progressing     Problem: Pain - Standard  Goal: Alleviation of pain or a reduction in pain to the patient’s comfort goal  Outcome: Progressing     Problem: Skin Integrity  Goal: Skin integrity is maintained or improved  Outcome: Progressing     Problem: Provide Safe Environment  Goal: Suicide environmental safety, protocols, policies, and practices will be implemented  Outcome: Progressing     Problem: Psychosocial  Goal: Patient's ability to identify and develop effective coping behaviors will improve  Outcome: Progressing  Goal: Patient's ability to identify and utilize available support systems will improve  Outcome: Progressing     Problem: Fall Risk  Goal: Patient will remain free from falls  Outcome: Progressing     The patient is Watcher - Medium risk of patient condition declining or worsening    Shift Goals  Clinical Goals: Monitor/treat CIWA symptoms; maintain stable vitals signs; seizure precautions; telemetry monitoring.  Patient Goals: Improve nausea and tremors; rest  Family Goals: None at  bedside.    Progress made toward(s) clinical / shift goals:  Progressing    Patient is not progressing towards the following goals:

## 2022-04-26 LAB
ALBUMIN SERPL BCP-MCNC: 3.6 G/DL (ref 3.2–4.9)
ALBUMIN/GLOB SERPL: 1.2 G/DL
ALP SERPL-CCNC: 203 U/L (ref 30–99)
ALT SERPL-CCNC: 40 U/L (ref 2–50)
ANION GAP SERPL CALC-SCNC: 14 MMOL/L (ref 7–16)
AST SERPL-CCNC: 122 U/L (ref 12–45)
BILIRUB SERPL-MCNC: 1.6 MG/DL (ref 0.1–1.5)
BUN SERPL-MCNC: 5 MG/DL (ref 8–22)
CALCIUM SERPL-MCNC: 9.2 MG/DL (ref 8.5–10.5)
CHLORIDE SERPL-SCNC: 91 MMOL/L (ref 96–112)
CO2 SERPL-SCNC: 26 MMOL/L (ref 20–33)
CREAT SERPL-MCNC: 0.31 MG/DL (ref 0.5–1.4)
ERYTHROCYTE [DISTWIDTH] IN BLOOD BY AUTOMATED COUNT: 55.1 FL (ref 35.9–50)
GFR SERPLBLD CREATININE-BSD FMLA CKD-EPI: 125 ML/MIN/1.73 M 2
GLOBULIN SER CALC-MCNC: 3.1 G/DL (ref 1.9–3.5)
GLUCOSE SERPL-MCNC: 96 MG/DL (ref 65–99)
HCT VFR BLD AUTO: 36.5 % (ref 37–47)
HGB BLD-MCNC: 11.7 G/DL (ref 12–16)
MCH RBC QN AUTO: 34.3 PG (ref 27–33)
MCHC RBC AUTO-ENTMCNC: 32.1 G/DL (ref 33.6–35)
MCV RBC AUTO: 107 FL (ref 81.4–97.8)
MORPHOLOGY BLD-IMP: NORMAL
PLATELET # BLD AUTO: 31 K/UL (ref 164–446)
PLATELETS.RETICULATED NFR BLD AUTO: 11.3 K/UL (ref 0.6–13.1)
PMV BLD AUTO: 11.2 FL (ref 9–12.9)
POTASSIUM SERPL-SCNC: 3.5 MMOL/L (ref 3.6–5.5)
PROT SERPL-MCNC: 6.7 G/DL (ref 6–8.2)
RBC # BLD AUTO: 3.41 M/UL (ref 4.2–5.4)
SODIUM SERPL-SCNC: 131 MMOL/L (ref 135–145)
WBC # BLD AUTO: 3.7 K/UL (ref 4.8–10.8)

## 2022-04-26 PROCEDURE — 700102 HCHG RX REV CODE 250 W/ 637 OVERRIDE(OP): Performed by: INTERNAL MEDICINE

## 2022-04-26 PROCEDURE — 97162 PT EVAL MOD COMPLEX 30 MIN: CPT

## 2022-04-26 PROCEDURE — 97166 OT EVAL MOD COMPLEX 45 MIN: CPT

## 2022-04-26 PROCEDURE — 700111 HCHG RX REV CODE 636 W/ 250 OVERRIDE (IP): Performed by: INTERNAL MEDICINE

## 2022-04-26 PROCEDURE — 700105 HCHG RX REV CODE 258: Performed by: INTERNAL MEDICINE

## 2022-04-26 PROCEDURE — 36415 COLL VENOUS BLD VENIPUNCTURE: CPT

## 2022-04-26 PROCEDURE — 99232 SBSQ HOSP IP/OBS MODERATE 35: CPT | Performed by: INTERNAL MEDICINE

## 2022-04-26 PROCEDURE — 85055 RETICULATED PLATELET ASSAY: CPT

## 2022-04-26 PROCEDURE — 700101 HCHG RX REV CODE 250: Performed by: INTERNAL MEDICINE

## 2022-04-26 PROCEDURE — A9270 NON-COVERED ITEM OR SERVICE: HCPCS | Performed by: INTERNAL MEDICINE

## 2022-04-26 PROCEDURE — 770020 HCHG ROOM/CARE - TELE (206)

## 2022-04-26 PROCEDURE — 80053 COMPREHEN METABOLIC PANEL: CPT

## 2022-04-26 PROCEDURE — 85027 COMPLETE CBC AUTOMATED: CPT

## 2022-04-26 RX ORDER — ATORVASTATIN CALCIUM 20 MG/1
20 TABLET, FILM COATED ORAL NIGHTLY
Status: ON HOLD | COMMUNITY
End: 2022-04-26

## 2022-04-26 RX ORDER — LEVOTHYROXINE SODIUM 0.05 MG/1
50 TABLET ORAL
Status: ON HOLD | COMMUNITY
End: 2022-04-26

## 2022-04-26 RX ORDER — LOSARTAN POTASSIUM 100 MG/1
100 TABLET ORAL DAILY
Status: ON HOLD | COMMUNITY
End: 2022-04-26

## 2022-04-26 RX ORDER — SODIUM CHLORIDE 9 MG/ML
INJECTION, SOLUTION INTRAVENOUS CONTINUOUS
Status: DISCONTINUED | OUTPATIENT
Start: 2022-04-26 | End: 2022-04-30 | Stop reason: HOSPADM

## 2022-04-26 RX ORDER — PROMETHAZINE HYDROCHLORIDE 25 MG/1
25 TABLET ORAL EVERY 6 HOURS PRN
Status: DISCONTINUED | OUTPATIENT
Start: 2022-04-26 | End: 2022-04-30 | Stop reason: HOSPADM

## 2022-04-26 RX ORDER — HYDROCHLOROTHIAZIDE 25 MG/1
12.5 TABLET ORAL DAILY
Status: ON HOLD | COMMUNITY
End: 2022-04-26

## 2022-04-26 RX ORDER — DEXLANSOPRAZOLE 60 MG/1
CAPSULE, DELAYED RELEASE ORAL
Status: ON HOLD | COMMUNITY
End: 2022-04-26

## 2022-04-26 RX ORDER — GLIPIZIDE 10 MG/1
10 TABLET ORAL 2 TIMES DAILY
Status: ON HOLD | COMMUNITY
End: 2022-04-26

## 2022-04-26 RX ADMIN — LORAZEPAM 2 MG: 2 TABLET ORAL at 22:18

## 2022-04-26 RX ADMIN — LORAZEPAM 2 MG: 2 TABLET ORAL at 20:12

## 2022-04-26 RX ADMIN — LORAZEPAM 2 MG: 2 TABLET ORAL at 18:08

## 2022-04-26 RX ADMIN — THERA TABS 1 TABLET: TAB at 04:38

## 2022-04-26 RX ADMIN — LORAZEPAM 2 MG: 2 TABLET ORAL at 11:03

## 2022-04-26 RX ADMIN — ONDANSETRON 4 MG: 4 TABLET, ORALLY DISINTEGRATING ORAL at 14:20

## 2022-04-26 RX ADMIN — FOLIC ACID 1 MG: 1 TABLET ORAL at 04:38

## 2022-04-26 RX ADMIN — LORAZEPAM 2 MG: 2 TABLET ORAL at 13:49

## 2022-04-26 RX ADMIN — LORAZEPAM 2 MG: 2 TABLET ORAL at 08:25

## 2022-04-26 RX ADMIN — LORAZEPAM 1 MG: 2 INJECTION INTRAMUSCULAR; INTRAVENOUS at 00:22

## 2022-04-26 RX ADMIN — ONDANSETRON 4 MG: 4 TABLET, ORALLY DISINTEGRATING ORAL at 04:48

## 2022-04-26 RX ADMIN — LORAZEPAM 1 MG: 2 INJECTION INTRAMUSCULAR; INTRAVENOUS at 04:38

## 2022-04-26 RX ADMIN — LORAZEPAM 2 MG: 2 TABLET ORAL at 16:09

## 2022-04-26 RX ADMIN — OXYCODONE 2.5 MG: 5 TABLET ORAL at 13:49

## 2022-04-26 RX ADMIN — LORAZEPAM 1 MG: 2 INJECTION INTRAMUSCULAR; INTRAVENOUS at 02:34

## 2022-04-26 RX ADMIN — IPRATROPIUM BROMIDE AND ALBUTEROL SULFATE 3 ML: .5; 2.5 SOLUTION RESPIRATORY (INHALATION) at 15:43

## 2022-04-26 RX ADMIN — CHLORDIAZEPOXIDE HYDROCHLORIDE 25 MG: 25 CAPSULE ORAL at 11:03

## 2022-04-26 RX ADMIN — CHLORDIAZEPOXIDE HYDROCHLORIDE 25 MG: 25 CAPSULE ORAL at 06:27

## 2022-04-26 RX ADMIN — SODIUM CHLORIDE: 9 INJECTION, SOLUTION INTRAVENOUS at 15:33

## 2022-04-26 RX ADMIN — LORAZEPAM 2 MG: 2 TABLET ORAL at 06:27

## 2022-04-26 RX ADMIN — Medication 100 MG: at 04:38

## 2022-04-26 RX ADMIN — PROMETHAZINE HYDROCHLORIDE 25 MG: 25 TABLET ORAL at 17:35

## 2022-04-26 RX ADMIN — CHLORDIAZEPOXIDE HYDROCHLORIDE 25 MG: 25 CAPSULE ORAL at 17:35

## 2022-04-26 ASSESSMENT — LIFESTYLE VARIABLES
ORIENTATION AND CLOUDING OF SENSORIUM: ORIENTED AND CAN DO SERIAL ADDITIONS
TOTAL SCORE: 14
TREMOR: *
TOTAL SCORE: 14
TOTAL SCORE: 13
HEADACHE, FULLNESS IN HEAD: MODERATELY SEVERE
HEADACHE, FULLNESS IN HEAD: MODERATE
VISUAL DISTURBANCES: NOT PRESENT
TOTAL SCORE: 14
TREMOR: *
AUDITORY DISTURBANCES: NOT PRESENT
AUDITORY DISTURBANCES: NOT PRESENT
NAUSEA AND VOMITING: *
TREMOR: MODERATE TREMOR WITH ARMS EXTENDED
PAROXYSMAL SWEATS: NO SWEAT VISIBLE
HEADACHE, FULLNESS IN HEAD: MODERATE
VISUAL DISTURBANCES: NOT PRESENT
ANXIETY: MILDLY ANXIOUS
HEADACHE, FULLNESS IN HEAD: MODERATELY SEVERE
PAROXYSMAL SWEATS: NO SWEAT VISIBLE
HEADACHE, FULLNESS IN HEAD: MODERATE
AGITATION: *
NAUSEA AND VOMITING: *
ORIENTATION AND CLOUDING OF SENSORIUM: ORIENTED AND CAN DO SERIAL ADDITIONS
TOTAL SCORE: 11
SUBSTANCE_ABUSE: 0
TREMOR: *
AGITATION: SOMEWHAT MORE THAN NORMAL ACTIVITY
VISUAL DISTURBANCES: NOT PRESENT
TOTAL SCORE: 14
ANXIETY: *
AGITATION: *
AUDITORY DISTURBANCES: NOT PRESENT
ORIENTATION AND CLOUDING OF SENSORIUM: ORIENTED AND CAN DO SERIAL ADDITIONS
NAUSEA AND VOMITING: MILD NAUSEA WITH NO VOMITING
AUDITORY DISTURBANCES: NOT PRESENT
PAROXYSMAL SWEATS: BARELY PERCEPTIBLE SWEATING. PALMS MOIST
HEADACHE, FULLNESS IN HEAD: MODERATE
HEADACHE, FULLNESS IN HEAD: MODERATE
NAUSEA AND VOMITING: *
VISUAL DISTURBANCES: MILD SENSITIVITY
NAUSEA AND VOMITING: MILD NAUSEA WITH NO VOMITING
HEADACHE, FULLNESS IN HEAD: SEVERE
PAROXYSMAL SWEATS: NO SWEAT VISIBLE
ANXIETY: MILDLY ANXIOUS
TREMOR: *
VISUAL DISTURBANCES: NOT PRESENT
ANXIETY: *
PAROXYSMAL SWEATS: NO SWEAT VISIBLE
PAROXYSMAL SWEATS: NO SWEAT VISIBLE
AGITATION: SOMEWHAT MORE THAN NORMAL ACTIVITY
AGITATION: SOMEWHAT MORE THAN NORMAL ACTIVITY
NAUSEA AND VOMITING: NO NAUSEA AND NO VOMITING
PAROXYSMAL SWEATS: NO SWEAT VISIBLE
PAROXYSMAL SWEATS: NO SWEAT VISIBLE
AUDITORY DISTURBANCES: NOT PRESENT
NAUSEA AND VOMITING: MILD NAUSEA WITH NO VOMITING
AUDITORY DISTURBANCES: NOT PRESENT
TREMOR: *
ORIENTATION AND CLOUDING OF SENSORIUM: ORIENTED AND CAN DO SERIAL ADDITIONS
TOTAL SCORE: 11
ORIENTATION AND CLOUDING OF SENSORIUM: ORIENTED AND CAN DO SERIAL ADDITIONS
ORIENTATION AND CLOUDING OF SENSORIUM: ORIENTED AND CAN DO SERIAL ADDITIONS
AGITATION: SOMEWHAT MORE THAN NORMAL ACTIVITY
TREMOR: *
AUDITORY DISTURBANCES: NOT PRESENT
PAROXYSMAL SWEATS: NO SWEAT VISIBLE
TREMOR: *
AGITATION: SOMEWHAT MORE THAN NORMAL ACTIVITY
ANXIETY: MILDLY ANXIOUS
TREMOR: MODERATE TREMOR WITH ARMS EXTENDED
ORIENTATION AND CLOUDING OF SENSORIUM: ORIENTED AND CAN DO SERIAL ADDITIONS
VISUAL DISTURBANCES: NOT PRESENT
TREMOR: MODERATE TREMOR WITH ARMS EXTENDED
ANXIETY: *
TOTAL SCORE: 11
VISUAL DISTURBANCES: NOT PRESENT
TOTAL SCORE: 11
AUDITORY DISTURBANCES: NOT PRESENT
TOTAL SCORE: 11
HEADACHE, FULLNESS IN HEAD: MODERATELY SEVERE
AUDITORY DISTURBANCES: NOT PRESENT
NAUSEA AND VOMITING: *
VISUAL DISTURBANCES: NOT PRESENT
NAUSEA AND VOMITING: MILD NAUSEA WITH NO VOMITING
NAUSEA AND VOMITING: *
AUDITORY DISTURBANCES: NOT PRESENT
PAROXYSMAL SWEATS: NO SWEAT VISIBLE
PAROXYSMAL SWEATS: BARELY PERCEPTIBLE SWEATING. PALMS MOIST
ANXIETY: MILDLY ANXIOUS
ORIENTATION AND CLOUDING OF SENSORIUM: ORIENTED AND CAN DO SERIAL ADDITIONS
AUDITORY DISTURBANCES: NOT PRESENT
HEADACHE, FULLNESS IN HEAD: MODERATE
VISUAL DISTURBANCES: NOT PRESENT
ANXIETY: *
HEADACHE, FULLNESS IN HEAD: MODERATE
NAUSEA AND VOMITING: *
TREMOR: MODERATE TREMOR WITH ARMS EXTENDED
VISUAL DISTURBANCES: NOT PRESENT
AGITATION: SOMEWHAT MORE THAN NORMAL ACTIVITY
ANXIETY: *
ORIENTATION AND CLOUDING OF SENSORIUM: ORIENTED AND CAN DO SERIAL ADDITIONS
ANXIETY: MILDLY ANXIOUS
ORIENTATION AND CLOUDING OF SENSORIUM: ORIENTED AND CAN DO SERIAL ADDITIONS
ANXIETY: MILDLY ANXIOUS
ORIENTATION AND CLOUDING OF SENSORIUM: ORIENTED AND CAN DO SERIAL ADDITIONS
VISUAL DISTURBANCES: NOT PRESENT
TOTAL SCORE: 14
AGITATION: SOMEWHAT MORE THAN NORMAL ACTIVITY

## 2022-04-26 ASSESSMENT — COGNITIVE AND FUNCTIONAL STATUS - GENERAL
SUGGESTED CMS G CODE MODIFIER DAILY ACTIVITY: CK
EATING MEALS: A LITTLE
DAILY ACTIVITIY SCORE: 18
MOBILITY SCORE: 11
PERSONAL GROOMING: A LITTLE
DRESSING REGULAR LOWER BODY CLOTHING: A LITTLE
STANDING UP FROM CHAIR USING ARMS: A LITTLE
SUGGESTED CMS G CODE MODIFIER MOBILITY: CL
WALKING IN HOSPITAL ROOM: A LOT
DRESSING REGULAR UPPER BODY CLOTHING: A LITTLE
CLIMB 3 TO 5 STEPS WITH RAILING: A LOT
HELP NEEDED FOR BATHING: A LITTLE
TOILETING: A LITTLE
MOVING TO AND FROM BED TO CHAIR: UNABLE
MOVING FROM LYING ON BACK TO SITTING ON SIDE OF FLAT BED: UNABLE
TURNING FROM BACK TO SIDE WHILE IN FLAT BAD: A LOT

## 2022-04-26 ASSESSMENT — ENCOUNTER SYMPTOMS
DIARRHEA: 0
SEIZURES: 0
EYE PAIN: 0
SHORTNESS OF BREATH: 0
CHILLS: 0
HEADACHES: 1
PHOTOPHOBIA: 0
WEAKNESS: 0
PALPITATIONS: 0
MYALGIAS: 1
DIZZINESS: 0
FEVER: 0
CONSTIPATION: 0
SORE THROAT: 0
TREMORS: 1
NAUSEA: 0
ABDOMINAL PAIN: 1
FALLS: 0
FLANK PAIN: 0
ORTHOPNEA: 0
VOMITING: 0
COUGH: 0

## 2022-04-26 ASSESSMENT — PAIN DESCRIPTION - PAIN TYPE
TYPE: ACUTE PAIN

## 2022-04-26 ASSESSMENT — FIBROSIS 4 INDEX: FIB4 SCORE: 32.98

## 2022-04-26 ASSESSMENT — GAIT ASSESSMENTS: GAIT LEVEL OF ASSIST: UNABLE TO PARTICIPATE

## 2022-04-26 ASSESSMENT — ACTIVITIES OF DAILY LIVING (ADL): TOILETING: INDEPENDENT

## 2022-04-26 NOTE — CARE PLAN
The patient is Stable - Low risk of patient condition declining or worsening    Shift Goals  Clinical Goals: Safety  Patient Goals: Rest    Progress made toward(s) clinical / shift goals:      Problem: Knowledge Deficit - Standard  Goal: Patient and family/care givers will demonstrate understanding of plan of care, disease process/condition, diagnostic tests and medications  Outcome: Progressing  Note: Educated patient on POC, pain management, medication administration, ambulation, safety, diet, rest, usage of call light, interventions in place to maintain/ improve skin integrity, CIWA protocol, seizure precautions. Will continue to educate patient on POC accordingly.     Problem: Seizure Precautions  Goal: Implementation of seizure precautions  Outcome: Progressing  Note: Seizure precautions in place.     Problem: Fall Risk  Goal: Patient will remain free from falls  Outcome: Progressing  Note: Appropriate fall precautions in place.

## 2022-04-26 NOTE — DISCHARGE PLANNING
Received Choice form at 1155  Agency/Facility Name: [facility]   Referral sent per Choice form @ 1200

## 2022-04-26 NOTE — CARE PLAN
The patient is Stable - Low risk of patient condition declining or worsening    Shift Goals  Clinical Goals: safety  Patient Goals: rest  Family Goals: None at bedside.    Progress made toward(s) clinical / shift goals:    Problem: Knowledge Deficit - Standard  Goal: Patient and family/care givers will demonstrate understanding of plan of care, disease process/condition, diagnostic tests and medications  Outcome: Progressing     Problem: Optimal Care for Alcohol Withdrawal  Goal: Optimal Care for the alcohol withdrawal patient  Outcome: Progressing     Problem: Seizure Precautions  Goal: Implementation of seizure precautions  Outcome: Progressing       Patient is not progressing towards the following goals:

## 2022-04-26 NOTE — THERAPY
Physical Therapy   Initial Evaluation     Patient Name: Olya Youssef  Age:  51 y.o., Sex:  female  Medical Record #: [id number]  Today's Date: 4/26/2022       Precautions: Fall Risk  Comments: etoh w/d    Assessment  Patient is a 51 y.o. female admitted with acute encephalopathy following alcohol intoxication with pmhx of etoh and meth abuse with alcoholic hepatitis. Pt seen for PT evaluation at this time. Pt required min/CGA for mobility, performed multiple bouts of STS to FWW but was unable to achieve adequate foot clearance for stepping or gait despite attempts and facilitation for weight shifting. Pt declining to sit in chair at this time but appears capable of SPT and would benefit from OOB to chair with nursing to promote incr activity tolerance. PT will follow to progress mobility. Currently recommend postacute placement; however with incr time OOB with nursing and therapy and as continues to progress through w/d, may progress to functional level capable of return home.       Plan  Recommend Physical Therapy 4 times per week until therapy goals are met for the following treatments:  Bed Mobility, Gait Training, Neuro Re-Education / Balance, Self Care/Home Evaluation, Stair Training, Therapeutic Activities, and Therapeutic Exercises  DC Equipment Recommendations: Unable to determine at this time  Discharge Recommendations: Recommend post-acute placement for additional physical therapy services prior to discharge home        04/26/22 0856   Prior Living Situation   Prior Services None   Housing / Facility 1 Story Apartment / Condo   Steps Into Home 0   Steps In Home 0   Bathroom Set up Bathtub / Shower Combination;Tub Transfer Bench   Equipment Owned Front-Wheel Walker;Tub Transfer Bench   Lives with -  Unrelated Adult   Comments reports RM is helpful   Prior Level of Functional Mobility   Bed Mobility Independent   Transfer Status Independent   Ambulation Independent   Distance Ambulation (Feet) community  distances   Assistive Devices Used None   Cognition    Speech/ Communication Delayed Responses   Level of Consciousness Alert   Safety Awareness Impaired   Attention Impaired   Initiation Impaired   Comments slightly lethargic, required some encouragement to participate but understands importance of mobility   Balance Assessment   Sitting Balance (Static) Fair   Sitting Balance (Dynamic) Fair -   Standing Balance (Static) Poor +   Standing Balance (Dynamic) Poor   Weight Shift Sitting Fair   Weight Shift Standing Poor   Comments standing with FWW   Gait Analysis   Gait Level Of Assist Unable to Participate   Comments multiple bouts of standing and marching though with very poor foot clearance. pt then tried to take steps fwd and was just sliding feet along floor.   Bed Mobility    Supine to Sit Minimal Assist   Sit to Supine Contact Guard Assist   Scooting Contact Guard Assist   Functional Mobility   Sit to Stand Minimal Assist   Bed, Chair, Wheelchair Transfer Refused   Comments capable of SPT but declining at this time   Short Term Goals    Short Term Goal # 1 pt will perform supine <> sit without bed features with SPV in 6 visits to get in/out of bed at home   Short Term Goal # 2 pt will perform all functional xfrs with SPV in 6 visits for improved independence   Short Term Goal # 3 pt will ambulate 150ft with FWW and SPV in 6 visits to access home environment

## 2022-04-26 NOTE — PROGRESS NOTES
Hospital Medicine Daily Progress Note    Date of Service  4/26/2022    Chief Complaint  Olya Youssef is a 51 y.o. female admitted 4/22/2022 with generalized body aches and encephalopathy with intoxication    Hospital Course  Olya Youssef is a 51 y.o. female well-known to ER staff for history of alcoholism and methamphetamine abuse, hypertension, pancreatitis, seizure, alcoholic hepatitis who presented 4/22/2022 with acute intoxication  Secondary to alcohol and methamphetamine use over the last few days.  The patient also reports abdominal pain for which she has attributed to her recent heavy alcohol use. She also reports headache which radiates up from her neck with associated neck stiffness and photosensitivity. She denies any fevers, chills, nausea, vomiting, shortness of breath, or chest pain.    In the ER, patient thrombocytopenic at 34, hypokalemic, and metabolic acidosis likely due to alcohol levels greater than detected level of 498. , alk phos 209, and lipase elevated at 356 consistent with acute pancreatitis. Chest xray negative.       Interval Problem Update  4/24/2022: AAOx4. CIWA 10-16. Tremulous, requiring multiple doses of ativan. Started librium. Tolerating clear liquid diet.     4/25/2022: Patient sleeping calmly, arouses easily. CIWA 2-16 overnight requiring multiple doses of ativan and librium. Ammonia and lipase levels improving. Continue CIWA protocol.    4/26 patient is withdrawing from alcohol.  She stated that she does not want to drink alcohol again.  PT saw her today and recommended SNF.  I have personally seen and examined the patient at bedside. I discussed the plan of care with patient and bedside RN.    Consultants/Specialty  none    Code Status  Full Code    Disposition  Patient is not medically cleared for discharge.   Anticipate discharge to to home with close outpatient follow-up.  I have placed the appropriate orders for post-discharge needs.    Review of  Systems  Review of Systems   Constitutional: Positive for malaise/fatigue. Negative for chills and fever.   HENT: Negative for congestion and sore throat.    Eyes: Negative for photophobia and pain.   Respiratory: Negative for cough and shortness of breath.    Cardiovascular: Negative for chest pain, palpitations and orthopnea.   Gastrointestinal: Positive for abdominal pain (LUQ). Negative for constipation, diarrhea, nausea and vomiting.   Genitourinary: Negative for dysuria and flank pain.   Musculoskeletal: Positive for myalgias. Negative for falls.   Neurological: Positive for tremors and headaches. Negative for dizziness, seizures and weakness.   Psychiatric/Behavioral: Negative for substance abuse.   All other systems reviewed and are negative.       Physical Exam  Temp:  [35.7 °C (96.2 °F)-37.6 °C (99.7 °F)] 37.6 °C (99.7 °F)  Pulse:  [100-124] 108  Resp:  [16-20] 18  BP: (102-144)/(70-92) 103/70  SpO2:  [95 %-99 %] 99 %    Physical Exam  Vitals and nursing note reviewed.   Constitutional:       Appearance: Normal appearance. She is not ill-appearing, toxic-appearing or diaphoretic.   HENT:      Head: Normocephalic.      Mouth/Throat:      Mouth: Mucous membranes are moist.      Pharynx: Oropharynx is clear.   Eyes:      General: No scleral icterus.     Extraocular Movements: Extraocular movements intact.      Conjunctiva/sclera: Conjunctivae normal.   Cardiovascular:      Rate and Rhythm: Regular rhythm. Tachycardia present.      Pulses: Normal pulses.      Heart sounds: Normal heart sounds. No murmur heard.    No gallop.   Pulmonary:      Effort: Pulmonary effort is normal. No respiratory distress.      Breath sounds: Normal breath sounds.   Abdominal:      General: Abdomen is flat. Bowel sounds are normal. There is no distension.      Palpations: Abdomen is soft.      Tenderness: There is abdominal tenderness (LUQ).   Musculoskeletal:      Right lower leg: No edema.      Left lower leg: No edema.   Skin:      General: Skin is warm.      Capillary Refill: Capillary refill takes less than 2 seconds.      Coloration: Skin is pale. Skin is not jaundiced.   Neurological:      General: No focal deficit present.      Mental Status: She is alert.      Motor: Tremor present.         Fluids    Intake/Output Summary (Last 24 hours) at 4/26/2022 1004  Last data filed at 4/25/2022 1809  Gross per 24 hour   Intake 960 ml   Output --   Net 960 ml       Laboratory  Recent Labs     04/24/22  0316 04/24/22  1406 04/26/22  0010   WBC 3.0* 2.9* 3.7*   RBC 3.54* 3.46* 3.41*   HEMOGLOBIN 12.2 11.7* 11.7*   HEMATOCRIT 37.5 36.0* 36.5*   .9* 104.0* 107.0*   MCH 34.5* 33.8* 34.3*   MCHC 32.5* 32.5* 32.1*   RDW 56.5* 54.9* 55.1*   PLATELETCT 25* 27* 31*   MPV 10.4 9.8 11.2     Recent Labs     04/24/22  0316 04/25/22  0215 04/26/22  0010   SODIUM 133* 133* 131*   POTASSIUM 4.1 4.0 3.5*   CHLORIDE 90* 94* 91*   CO2 27 28 26   GLUCOSE 96 113* 96   BUN 12 7* 5*   CREATININE 0.29* 0.29* 0.31*   CALCIUM 8.7 9.2 9.2                   Imaging  DX-CHEST-PORTABLE (1 VIEW)   Final Result         1.  Left basilar atelectasis, no focal infiltrate           Assessment/Plan  * Acute encephalopathy- (present on admission)  Assessment & Plan  Multifactorial secondary to hepatic encephalopathy, alcohol and methamphetamine intoxication.  Seems to be improving  Fall, aspiration, seizure precautions  Ammonia 75 ->54  Continue lactulose  CIWA protocol, benzodiazepine as needed  Continue to monitor chemistries    Alcohol withdrawal (HCC)  Assessment & Plan  CIWA 2-16  Requiring multiple doses of ativan and librium  Continue Folic acid, multivitamin, thiamine.   Continue CIWA protocol    Macrocytic anemia- (present on admission)  Assessment & Plan  Likely due to chronic alcohol use   Continue folic acid, multivitamin, and thiamine supplements    COPD (chronic obstructive pulmonary disease) (HCC)- (present on admission)  Assessment & Plan  Compensated   DuoNeb  as needed  Incentive spirometry    Methamphetamine abuse (HCC)- (present on admission)  Assessment & Plan  Encourage cessation  Monitor for withdrawals, benzodiazepine as needed    Pancreatitis- (present on admission)  Assessment & Plan  Secondary to alcohol  Bowel rest  Symptomatic management  Pain management   Lipase improving 356 ->186  Continue clear liquid diet   IV fluid    Pancytopenia (HCC)- (present on admission)  Assessment & Plan  Secondary to alcoholism, without evidence of acute bleed  Hold prophylactic heparin.   Continue to monitor CBC       VTE prophylaxis: SCDs/TEDs    I have performed a physical exam and reviewed and updated ROS and Plan today (4/26/2022). In review of yesterday's note (4/25/2022), there are no changes except as documented above.

## 2022-04-26 NOTE — DISCHARGE PLANNING
Anticipated Discharge Disposition: Home vs SNF    Action: LSW spoke with pt briefly about SNF. Pt agreeable with sending blanket referral due to insurance. SNF choice form faxed to [facility].    Barriers to Discharge: SNF acceptance, medicaid insurance, SA, MH.    Plan: Care coordination will follow up with SNF referrals.    Care Transition Team Assessment    LSW attempted to meet with pt at bedside to complete assessment however pt sleeping and unable to stay awake to answer questions. Information obtained through chart review. Pt lives with a roommate in a first floor apartment with no steps to enter. Pt is independent with ADLs and IADLs. Pt has a FWW and shower chair at home. Pt is currently unemployed. Pt has a history of etoh, SA (meth), and bipolar disorder. Pt does not have ACP documents. Pt has MTM transportation benefits through her insurance.    Information Source  Orientation Level: Oriented X4  Information Given By: Other (Comments) (chart review)  Who is responsible for making decisions for patient? : Patient    Readmission Evaluation  Is this a readmission?: No    Elopement Risk  Legal Hold: No  Ambulatory or Self Mobile in Wheelchair: No-Not an Elopement Risk  Elopement Risk: Not at Risk for Elopement    Interdisciplinary Discharge Planning  Lives with - Patient's Self Care Capacity: Unrelated Adult  Patient or legal guardian wants to designate a caregiver: No  Housing / Facility: 1 Story Apartment / Condo  Prior Services: None  Patient Prefers to be Discharged to:: Home  Durable Medical Equipment: Walker,Other - Specify (shower chair)    Discharge Preparedness  What is your plan after discharge?: Skilled nursing facility  Prior Functional Level: Ambulatory,Independent with Activities of Daily Living,Independent with Medication Management  Difficulity with ADLs: None  Difficulity with IADLs: None    Functional Assesment  Prior Functional Level: Ambulatory,Independent with Activities of Daily  Living,Independent with Medication Management    Finances  Financial Barriers to Discharge: No  Prescription Coverage: Yes    Vision / Hearing Impairment  Vision Impairment : No  Hearing Impairment : No    Advance Directive  Advance Directive?: None    Domestic Abuse  Have you ever been the victim of abuse or violence?: No  Physical Abuse or Sexual Abuse: No  Verbal Abuse or Emotional Abuse: No  Possible Abuse/Neglect Reported to:: Not Applicable    Psychological Assessment  History of Substance Abuse: Methamphetamine,Alcohol  History of Psychiatric Problems: Yes  Non-compliant with Treatment: No  Newly Diagnosed Illness: No    Discharge Risks or Barriers  Discharge risks or barriers?: Substance abuse,Mental health  Patient risk factors: Substance abuse,Mental health    Anticipated Discharge Information  Discharge Disposition: D/T to SNF with Medicare cert in anticipation of skilled care (03)

## 2022-04-26 NOTE — THERAPY
"Occupational Therapy   Initial Evaluation     Patient Name: Olya Youssef  Age:  51 y.o., Sex:  female  Medical Record #: [id number]  Today's Date: 4/26/2022     Precautions  Precautions: (P) Fall Risk    Assessment  Patient is 51 y.o. female admitted for generalized body aches, encephalopathy, ETOH/meth use. Pt states normally independent with functional mobility and ADLs living in a ground level apartment with roommate who is able to assist as needed. Pt required Hussain for functional mobility, Hussain for lower body ADLs, pt limited to small steps at the EOB due to weakness and lethargy. Will continue to see for skilled therapy while admitted as well as recommend post-acute placement but anticipate as patient progresses through CIWA will increase independence.    Plan    Recommend Occupational Therapy 3 times per week until therapy goals are met for the following treatments:  Adaptive Equipment, Neuro Re-Education / Balance, Self Care/Activities of Daily Living, Therapeutic Activities, and Therapeutic Exercises.    DC Equipment Recommendations: (P) Unable to determine at this time  Discharge Recommendations: (P) Recommend post-acute placement for additional occupational therapy services prior to discharge home     Subjective    \"Can't I sleep more\"     Objective       04/26/22 0854   Prior Living Situation   Prior Services None   Housing / Facility 1 Story Apartment / Condo   Steps Into Home 0   Steps In Home 0   Bathroom Set up Bathtub / Shower Combination;Tub Transfer Bench   Equipment Owned Front-Wheel Walker;Tub Transfer Bench   Lives with - Patient's Self Care Capacity Unrelated Adult  (roommate)   Prior Level of ADL Function   Self Feeding Independent   Grooming / Hygiene Independent   Bathing Independent   Dressing Independent   Toileting Independent   Prior Level of IADL Function   Prior Level Of Mobility Independent Without Device in Community   Precautions   Precautions Fall Risk   Pain 0 - 10 Group "   Therapist Pain Assessment Post Activity Pain Same as Prior to Activity;Nurse Notified   Cognition    Cognition / Consciousness X   Speech/ Communication Delayed Responses   Level of Consciousness Alert   Safety Awareness Impaired;Impulsive   New Learning Impaired   Attention Impaired   Initiation Impaired   Comments lethargic, initially refused but agreeable with encouragement   Active ROM Upper Body   Active ROM Upper Body  WDL   Strength Upper Body   Upper Body Strength  X   Gross Strength Generalized Weakness, Equal Bilaterally.    Balance Assessment   Sitting Balance (Static) Fair   Sitting Balance (Dynamic) Fair -   Standing Balance (Static) Poor +   Standing Balance (Dynamic) Poor   Weight Shift Sitting Fair   Weight Shift Standing Poor   Comments w/ FWW   Bed Mobility    Supine to Sit Contact Guard Assist   Sit to Supine Contact Guard Assist   Scooting Contact Guard Assist   Rolling Contact Guard Assist   ADL Assessment   Grooming Contact Guard Assist;Seated   Upper Body Dressing Supervision   Lower Body Dressing Minimal Assist   Toileting   (NT-refused need)   How much help from another person does the patient currently need...   Putting on and taking off regular lower body clothing? 3   Bathing (including washing, rinsing, and drying)? 3   Toileting, which includes using a toilet, bedpan, or urinal? 3   Putting on and taking off regular upper body clothing? 3   Taking care of personal grooming such as brushing teeth? 3   Eating meals? 3   6 Clicks Daily Activity Score 18   Functional Mobility   Sit to Stand Minimal Assist   Bed, Chair, Wheelchair Transfer Unable to Participate   Toilet Transfers Unable to Participate   Transfer Method Stand Step   Mobility bed mobility, steps at EOB, BTB   Activity Tolerance   Sitting Edge of Bed 10 min   Standing 5 min   Short Term Goals   Short Term Goal # 1 Pt will complete ADL transfers with supervision   Short Term Goal # 2 Pt will complete LB dressing with  supervision   Short Term Goal # 3 Pt will complete toileting with supervision   Education Group   Education Provided Role of Occupational Therapist   Role of Occupational Therapist Patient Response Patient;Acceptance;Explanation;Reinforcement Needed   Problem List   Problem List Decreased Active Daily Living Skills;Decreased Homemaking Skills;Decreased Upper Extremity Strength Right;Decreased Upper Extremity Strength Left;Decreased Functional Mobility;Decreased Activity Tolerance;Impaired Postural Control / Balance;Impaired Cognitive Function   Anticipated Discharge Equipment and Recommendations   DC Equipment Recommendations Unable to determine at this time   Discharge Recommendations Recommend post-acute placement for additional occupational therapy services prior to discharge home   Interdisciplinary Plan of Care Collaboration   IDT Collaboration with  Nursing;Physical Therapist   Patient Position at End of Therapy In Bed;Bed Alarm On;Call Light within Reach;Tray Table within Reach;Phone within Reach   Collaboration Comments RN updated

## 2022-04-27 LAB
ALBUMIN SERPL BCP-MCNC: 3.6 G/DL (ref 3.2–4.9)
ALBUMIN/GLOB SERPL: 1.2 G/DL
ALP SERPL-CCNC: 236 U/L (ref 30–99)
ALT SERPL-CCNC: 51 U/L (ref 2–50)
ANION GAP SERPL CALC-SCNC: 12 MMOL/L (ref 7–16)
AST SERPL-CCNC: 163 U/L (ref 12–45)
BASOPHILS # BLD AUTO: 0.9 % (ref 0–1.8)
BASOPHILS # BLD: 0.02 K/UL (ref 0–0.12)
BILIRUB SERPL-MCNC: 1.5 MG/DL (ref 0.1–1.5)
BUN SERPL-MCNC: 5 MG/DL (ref 8–22)
CALCIUM SERPL-MCNC: 9.4 MG/DL (ref 8.5–10.5)
CHLORIDE SERPL-SCNC: 97 MMOL/L (ref 96–112)
CO2 SERPL-SCNC: 27 MMOL/L (ref 20–33)
CREAT SERPL-MCNC: 0.35 MG/DL (ref 0.5–1.4)
EOSINOPHIL # BLD AUTO: 0.03 K/UL (ref 0–0.51)
EOSINOPHIL NFR BLD: 1.3 % (ref 0–6.9)
ERYTHROCYTE [DISTWIDTH] IN BLOOD BY AUTOMATED COUNT: 56.4 FL (ref 35.9–50)
GFR SERPLBLD CREATININE-BSD FMLA CKD-EPI: 122 ML/MIN/1.73 M 2
GLOBULIN SER CALC-MCNC: 2.9 G/DL (ref 1.9–3.5)
GLUCOSE SERPL-MCNC: 114 MG/DL (ref 65–99)
HCT VFR BLD AUTO: 36.5 % (ref 37–47)
HGB BLD-MCNC: 11.6 G/DL (ref 12–16)
IMM GRANULOCYTES # BLD AUTO: 0.01 K/UL (ref 0–0.11)
IMM GRANULOCYTES NFR BLD AUTO: 0.4 % (ref 0–0.9)
LYMPHOCYTES # BLD AUTO: 0.64 K/UL (ref 1–4.8)
LYMPHOCYTES NFR BLD: 27.5 % (ref 22–41)
MCH RBC QN AUTO: 34.5 PG (ref 27–33)
MCHC RBC AUTO-ENTMCNC: 31.8 G/DL (ref 33.6–35)
MCV RBC AUTO: 108.6 FL (ref 81.4–97.8)
MONOCYTES # BLD AUTO: 0.42 K/UL (ref 0–0.85)
MONOCYTES NFR BLD AUTO: 18 % (ref 0–13.4)
NEUTROPHILS # BLD AUTO: 1.21 K/UL (ref 2–7.15)
NEUTROPHILS NFR BLD: 51.9 % (ref 44–72)
NRBC # BLD AUTO: 0 K/UL
NRBC BLD-RTO: 0 /100 WBC
PLATELET # BLD AUTO: 48 K/UL (ref 164–446)
PMV BLD AUTO: 10.7 FL (ref 9–12.9)
POTASSIUM SERPL-SCNC: 3.3 MMOL/L (ref 3.6–5.5)
PROT SERPL-MCNC: 6.5 G/DL (ref 6–8.2)
RBC # BLD AUTO: 3.36 M/UL (ref 4.2–5.4)
SODIUM SERPL-SCNC: 136 MMOL/L (ref 135–145)
WBC # BLD AUTO: 2.3 K/UL (ref 4.8–10.8)

## 2022-04-27 PROCEDURE — 99232 SBSQ HOSP IP/OBS MODERATE 35: CPT | Performed by: INTERNAL MEDICINE

## 2022-04-27 PROCEDURE — 700102 HCHG RX REV CODE 250 W/ 637 OVERRIDE(OP): Performed by: INTERNAL MEDICINE

## 2022-04-27 PROCEDURE — 700105 HCHG RX REV CODE 258: Performed by: INTERNAL MEDICINE

## 2022-04-27 PROCEDURE — 36415 COLL VENOUS BLD VENIPUNCTURE: CPT

## 2022-04-27 PROCEDURE — 97530 THERAPEUTIC ACTIVITIES: CPT

## 2022-04-27 PROCEDURE — A9270 NON-COVERED ITEM OR SERVICE: HCPCS | Performed by: INTERNAL MEDICINE

## 2022-04-27 PROCEDURE — 85025 COMPLETE CBC W/AUTO DIFF WBC: CPT

## 2022-04-27 PROCEDURE — 700111 HCHG RX REV CODE 636 W/ 250 OVERRIDE (IP): Performed by: INTERNAL MEDICINE

## 2022-04-27 PROCEDURE — 770020 HCHG ROOM/CARE - TELE (206)

## 2022-04-27 PROCEDURE — 97112 NEUROMUSCULAR REEDUCATION: CPT

## 2022-04-27 PROCEDURE — 80053 COMPREHEN METABOLIC PANEL: CPT

## 2022-04-27 RX ORDER — POTASSIUM CHLORIDE 20 MEQ/1
40 TABLET, EXTENDED RELEASE ORAL ONCE
Status: COMPLETED | OUTPATIENT
Start: 2022-04-27 | End: 2022-04-27

## 2022-04-27 RX ADMIN — LORAZEPAM 2 MG: 2 TABLET ORAL at 19:21

## 2022-04-27 RX ADMIN — LORAZEPAM 1.5 MG: 2 INJECTION INTRAMUSCULAR; INTRAVENOUS at 15:50

## 2022-04-27 RX ADMIN — OXYCODONE 5 MG: 5 TABLET ORAL at 16:51

## 2022-04-27 RX ADMIN — LORAZEPAM 1.5 MG: 2 INJECTION INTRAMUSCULAR; INTRAVENOUS at 06:42

## 2022-04-27 RX ADMIN — SODIUM CHLORIDE: 9 INJECTION, SOLUTION INTRAVENOUS at 18:15

## 2022-04-27 RX ADMIN — LORAZEPAM 2 MG: 2 TABLET ORAL at 02:10

## 2022-04-27 RX ADMIN — Medication 100 MG: at 04:22

## 2022-04-27 RX ADMIN — LORAZEPAM 2 MG: 2 TABLET ORAL at 10:05

## 2022-04-27 RX ADMIN — LORAZEPAM 2 MG: 2 TABLET ORAL at 11:55

## 2022-04-27 RX ADMIN — HYDROMORPHONE HYDROCHLORIDE 0.25 MG: 1 INJECTION, SOLUTION INTRAMUSCULAR; INTRAVENOUS; SUBCUTANEOUS at 13:11

## 2022-04-27 RX ADMIN — FOLIC ACID 1 MG: 1 TABLET ORAL at 04:22

## 2022-04-27 RX ADMIN — LACTULOSE 30 ML: 20 SOLUTION ORAL at 11:54

## 2022-04-27 RX ADMIN — HYDROMORPHONE HYDROCHLORIDE 0.25 MG: 1 INJECTION, SOLUTION INTRAMUSCULAR; INTRAVENOUS; SUBCUTANEOUS at 18:13

## 2022-04-27 RX ADMIN — LORAZEPAM 1 MG: 2 INJECTION INTRAMUSCULAR; INTRAVENOUS at 21:43

## 2022-04-27 RX ADMIN — PROMETHAZINE HYDROCHLORIDE 25 MG: 25 TABLET ORAL at 02:10

## 2022-04-27 RX ADMIN — LACTULOSE 30 ML: 20 SOLUTION ORAL at 16:51

## 2022-04-27 RX ADMIN — LACTULOSE 30 ML: 20 SOLUTION ORAL at 04:22

## 2022-04-27 RX ADMIN — OXYCODONE 5 MG: 5 TABLET ORAL at 11:55

## 2022-04-27 RX ADMIN — SODIUM CHLORIDE: 9 INJECTION, SOLUTION INTRAVENOUS at 10:09

## 2022-04-27 RX ADMIN — LORAZEPAM 2 MG: 2 TABLET ORAL at 00:03

## 2022-04-27 RX ADMIN — LORAZEPAM 2 MG: 2 TABLET ORAL at 08:17

## 2022-04-27 RX ADMIN — SODIUM CHLORIDE: 9 INJECTION, SOLUTION INTRAVENOUS at 00:52

## 2022-04-27 RX ADMIN — POTASSIUM CHLORIDE 40 MEQ: 20 TABLET, EXTENDED RELEASE ORAL at 08:16

## 2022-04-27 RX ADMIN — OXYCODONE 5 MG: 5 TABLET ORAL at 08:17

## 2022-04-27 RX ADMIN — HYDROMORPHONE HYDROCHLORIDE 0.25 MG: 1 INJECTION, SOLUTION INTRAMUSCULAR; INTRAVENOUS; SUBCUTANEOUS at 09:29

## 2022-04-27 RX ADMIN — THERA TABS 1 TABLET: TAB at 04:22

## 2022-04-27 RX ADMIN — LORAZEPAM 2 MG: 2 TABLET ORAL at 16:51

## 2022-04-27 RX ADMIN — LORAZEPAM 2 MG: 2 TABLET ORAL at 04:22

## 2022-04-27 RX ADMIN — CHLORDIAZEPOXIDE HYDROCHLORIDE 25 MG: 25 CAPSULE ORAL at 00:02

## 2022-04-27 ASSESSMENT — ENCOUNTER SYMPTOMS
PHOTOPHOBIA: 0
SEIZURES: 0
ORTHOPNEA: 0
CHILLS: 0
ABDOMINAL PAIN: 1
SORE THROAT: 0
DIZZINESS: 0
FALLS: 0
EYE PAIN: 0
TREMORS: 1
DIARRHEA: 0
PALPITATIONS: 0
NAUSEA: 0
HEADACHES: 1
FLANK PAIN: 0
COUGH: 0
MYALGIAS: 1
VOMITING: 0
SHORTNESS OF BREATH: 0

## 2022-04-27 ASSESSMENT — LIFESTYLE VARIABLES
VISUAL DISTURBANCES: MILD SENSITIVITY
TOTAL SCORE: 12
SUBSTANCE_ABUSE: 0
ORIENTATION AND CLOUDING OF SENSORIUM: ORIENTED AND CAN DO SERIAL ADDITIONS
TREMOR: MODERATE TREMOR WITH ARMS EXTENDED
AGITATION: SOMEWHAT MORE THAN NORMAL ACTIVITY
NAUSEA AND VOMITING: NO NAUSEA AND NO VOMITING
TOTAL SCORE: 13
PAROXYSMAL SWEATS: NO SWEAT VISIBLE
ORIENTATION AND CLOUDING OF SENSORIUM: CANNOT DO SERIAL ADDITIONS OR IS UNCERTAIN ABOUT DATE
AGITATION: SOMEWHAT MORE THAN NORMAL ACTIVITY
VISUAL DISTURBANCES: NOT PRESENT
NAUSEA AND VOMITING: MILD NAUSEA WITH NO VOMITING
TOTAL SCORE: 3
TREMOR: MODERATE TREMOR WITH ARMS EXTENDED
VISUAL DISTURBANCES: NOT PRESENT
ORIENTATION AND CLOUDING OF SENSORIUM: CANNOT DO SERIAL ADDITIONS OR IS UNCERTAIN ABOUT DATE
PAROXYSMAL SWEATS: NO SWEAT VISIBLE
ANXIETY: *
ORIENTATION AND CLOUDING OF SENSORIUM: CANNOT DO SERIAL ADDITIONS OR IS UNCERTAIN ABOUT DATE
ANXIETY: *
NAUSEA AND VOMITING: NO NAUSEA AND NO VOMITING
TOTAL SCORE: 14
AUDITORY DISTURBANCES: NOT PRESENT
ANXIETY: MILDLY ANXIOUS
VISUAL DISTURBANCES: VERY MILD SENSITIVITY
AGITATION: *
ORIENTATION AND CLOUDING OF SENSORIUM: ORIENTED AND CAN DO SERIAL ADDITIONS
TREMOR: MODERATE TREMOR WITH ARMS EXTENDED
AUDITORY DISTURBANCES: NOT PRESENT
TOTAL SCORE: 12
NAUSEA AND VOMITING: NO NAUSEA AND NO VOMITING
AGITATION: *
TREMOR: MODERATE TREMOR WITH ARMS EXTENDED
HEADACHE, FULLNESS IN HEAD: MODERATE
VISUAL DISTURBANCES: NOT PRESENT
ANXIETY: *
AUDITORY DISTURBANCES: NOT PRESENT
HEADACHE, FULLNESS IN HEAD: MODERATELY SEVERE
ORIENTATION AND CLOUDING OF SENSORIUM: CANNOT DO SERIAL ADDITIONS OR IS UNCERTAIN ABOUT DATE
AUDITORY DISTURBANCES: NOT PRESENT
AGITATION: SOMEWHAT MORE THAN NORMAL ACTIVITY
PAROXYSMAL SWEATS: NO SWEAT VISIBLE
TREMOR: MODERATE TREMOR WITH ARMS EXTENDED
AUDITORY DISTURBANCES: NOT PRESENT
VISUAL DISTURBANCES: MILD SENSITIVITY
NAUSEA AND VOMITING: NO NAUSEA AND NO VOMITING
TREMOR: *
PAROXYSMAL SWEATS: NO SWEAT VISIBLE
AUDITORY DISTURBANCES: NOT PRESENT
TREMOR: MODERATE TREMOR WITH ARMS EXTENDED
VISUAL DISTURBANCES: NOT PRESENT
TREMOR: MODERATE TREMOR WITH ARMS EXTENDED
HEADACHE, FULLNESS IN HEAD: MODERATELY SEVERE
PAROXYSMAL SWEATS: NO SWEAT VISIBLE
AGITATION: SOMEWHAT MORE THAN NORMAL ACTIVITY
ANXIETY: MILDLY ANXIOUS
NAUSEA AND VOMITING: MILD NAUSEA WITH NO VOMITING
PAROXYSMAL SWEATS: NO SWEAT VISIBLE
TREMOR: MODERATE TREMOR WITH ARMS EXTENDED
AUDITORY DISTURBANCES: NOT PRESENT
ORIENTATION AND CLOUDING OF SENSORIUM: CANNOT DO SERIAL ADDITIONS OR IS UNCERTAIN ABOUT DATE
PAROXYSMAL SWEATS: NO SWEAT VISIBLE
ANXIETY: *
TOTAL SCORE: 14
VISUAL DISTURBANCES: NOT PRESENT
HEADACHE, FULLNESS IN HEAD: MODERATE
NAUSEA AND VOMITING: NO NAUSEA AND NO VOMITING
ORIENTATION AND CLOUDING OF SENSORIUM: CANNOT DO SERIAL ADDITIONS OR IS UNCERTAIN ABOUT DATE
AGITATION: NORMAL ACTIVITY
PAROXYSMAL SWEATS: NO SWEAT VISIBLE
AUDITORY DISTURBANCES: NOT PRESENT
PAROXYSMAL SWEATS: NO SWEAT VISIBLE
ANXIETY: NO ANXIETY (AT EASE)
AGITATION: SOMEWHAT MORE THAN NORMAL ACTIVITY
PAROXYSMAL SWEATS: NO SWEAT VISIBLE
HEADACHE, FULLNESS IN HEAD: MODERATELY SEVERE
HEADACHE, FULLNESS IN HEAD: MODERATE
ANXIETY: *
ORIENTATION AND CLOUDING OF SENSORIUM: CANNOT DO SERIAL ADDITIONS OR IS UNCERTAIN ABOUT DATE
HEADACHE, FULLNESS IN HEAD: SEVERE
VISUAL DISTURBANCES: NOT PRESENT
TOTAL SCORE: 18
ANXIETY: *
HEADACHE, FULLNESS IN HEAD: MODERATE
TREMOR: MODERATE TREMOR WITH ARMS EXTENDED
NAUSEA AND VOMITING: MILD NAUSEA WITH NO VOMITING
ANXIETY: *
TREMOR: MODERATE TREMOR WITH ARMS EXTENDED
PAROXYSMAL SWEATS: NO SWEAT VISIBLE
TOTAL SCORE: 16
AUDITORY DISTURBANCES: NOT PRESENT
VISUAL DISTURBANCES: MILD SENSITIVITY
ANXIETY: MODERATELY ANXIOUS OR GUARDED, SO ANXIETY IS INFERRED
HEADACHE, FULLNESS IN HEAD: MILD
HEADACHE, FULLNESS IN HEAD: MODERATE
TOTAL SCORE: 13
HEADACHE, FULLNESS IN HEAD: NOT PRESENT
NAUSEA AND VOMITING: MILD NAUSEA WITH NO VOMITING
NAUSEA AND VOMITING: MILD NAUSEA WITH NO VOMITING
ORIENTATION AND CLOUDING OF SENSORIUM: ORIENTED AND CAN DO SERIAL ADDITIONS
ORIENTATION AND CLOUDING OF SENSORIUM: CANNOT DO SERIAL ADDITIONS OR IS UNCERTAIN ABOUT DATE
TOTAL SCORE: 11
ANXIETY: *
PAROXYSMAL SWEATS: NO SWEAT VISIBLE
AGITATION: SOMEWHAT MORE THAN NORMAL ACTIVITY
AUDITORY DISTURBANCES: NOT PRESENT
AGITATION: *
ORIENTATION AND CLOUDING OF SENSORIUM: CANNOT DO SERIAL ADDITIONS OR IS UNCERTAIN ABOUT DATE
HEADACHE, FULLNESS IN HEAD: NOT PRESENT
AUDITORY DISTURBANCES: NOT PRESENT
NAUSEA AND VOMITING: MILD NAUSEA WITH NO VOMITING
TREMOR: MODERATE TREMOR WITH ARMS EXTENDED
TOTAL SCORE: 13
AUDITORY DISTURBANCES: NOT PRESENT
VISUAL DISTURBANCES: MILD SENSITIVITY
VISUAL DISTURBANCES: MILD SENSITIVITY
AGITATION: SOMEWHAT MORE THAN NORMAL ACTIVITY
NAUSEA AND VOMITING: MILD NAUSEA WITH NO VOMITING
AGITATION: *
TOTAL SCORE: 12

## 2022-04-27 ASSESSMENT — PAIN DESCRIPTION - PAIN TYPE
TYPE: ACUTE PAIN

## 2022-04-27 ASSESSMENT — COGNITIVE AND FUNCTIONAL STATUS - GENERAL
TURNING FROM BACK TO SIDE WHILE IN FLAT BAD: A LITTLE
MOBILITY SCORE: 10
CLIMB 3 TO 5 STEPS WITH RAILING: TOTAL
SUGGESTED CMS G CODE MODIFIER MOBILITY: CL
MOVING TO AND FROM BED TO CHAIR: A LOT
STANDING UP FROM CHAIR USING ARMS: TOTAL
WALKING IN HOSPITAL ROOM: TOTAL
MOVING FROM LYING ON BACK TO SITTING ON SIDE OF FLAT BED: A LOT

## 2022-04-27 ASSESSMENT — GAIT ASSESSMENTS: GAIT LEVEL OF ASSIST: UNABLE TO PARTICIPATE

## 2022-04-27 NOTE — CARE PLAN
The patient is Stable - Low risk of patient condition declining or worsening    Shift Goals  Clinical Goals: Safety  Patient Goals: Rest  Family Goals: None at bedside.    Progress made toward(s) clinical / shift goals:    Problem: Knowledge Deficit - Standard  Goal: Patient and family/care givers will demonstrate understanding of plan of care, disease process/condition, diagnostic tests and medications  Outcome: Progressing     Problem: Optimal Care for Alcohol Withdrawal  Goal: Optimal Care for the alcohol withdrawal patient  Outcome: Progressing     Problem: Seizure Precautions  Goal: Implementation of seizure precautions  Outcome: Progressing     Problem: Lifestyle Changes  Goal: Patient's ability to identify lifestyle changes and available resources to help reduce recurrence of condition will improve  Outcome: Progressing     Problem: Psychosocial  Goal: Patient's level of anxiety will decrease  Outcome: Progressing  Goal: Spiritual and cultural needs incorporated into hospitalization  Outcome: Progressing     Problem: Risk for Aspiration  Goal: Patient's risk for aspiration will be absent or decrease  Outcome: Progressing     Problem: Pain - Standard  Goal: Alleviation of pain or a reduction in pain to the patient’s comfort goal  Outcome: Progressing     Problem: Skin Integrity  Goal: Skin integrity is maintained or improved  Outcome: Progressing     Problem: Provide Safe Environment  Goal: Suicide environmental safety, protocols, policies, and practices will be implemented  Outcome: Progressing     Problem: Psychosocial  Goal: Patient's ability to identify and develop effective coping behaviors will improve  Outcome: Progressing  Goal: Patient's ability to identify and utilize available support systems will improve  Outcome: Progressing     Problem: Fall Risk  Goal: Patient will remain free from falls  Outcome: Progressing       Patient is not progressing towards the following goals:

## 2022-04-27 NOTE — DISCHARGE PLANNING
Medicaid requested new PASRR prior to providing LOC. LSW submitted new PASRR. PASRR in manual review.

## 2022-04-27 NOTE — PROGRESS NOTES
Hospital Medicine Daily Progress Note    Date of Service  4/27/2022    Chief Complaint  Olya Youssef is a 51 y.o. female admitted 4/22/2022 with generalized body aches and encephalopathy with intoxication    Hospital Course  Olya Youssef is a 51 y.o. female well-known to ER staff for history of alcoholism and methamphetamine abuse, hypertension, pancreatitis, seizure, alcoholic hepatitis who presented 4/22/2022 with acute intoxication  Secondary to alcohol and methamphetamine use over the last few days.  The patient also reports abdominal pain for which she has attributed to her recent heavy alcohol use. She also reports headache which radiates up from her neck with associated neck stiffness and photosensitivity. She denies any fevers, chills, nausea, vomiting, shortness of breath, or chest pain.    In the ER, patient thrombocytopenic at 34, hypokalemic, and metabolic acidosis likely due to alcohol levels greater than detected level of 498. , alk phos 209, and lipase elevated at 356 consistent with acute pancreatitis. Chest xray negative.       Interval Problem Update  4/24/2022: AAOx4. CIWA 10-16. Tremulous, requiring multiple doses of ativan. Started librium. Tolerating clear liquid diet.     4/25/2022: Patient sleeping calmly, arouses easily. CIWA 2-16 overnight requiring multiple doses of ativan and librium. Ammonia and lipase levels improving. Continue CIWA protocol.    4/26 patient is withdrawing from alcohol.  She stated that she does not want to drink alcohol again.  PT saw her today and recommended SNF.    4/27 still going through withdrawal from alcohol.  According to nursing she had drug-seeking behavior.  Continue CIWA protocol.  I have personally seen and examined the patient at bedside. I discussed the plan of care with patient and bedside RN.    Consultants/Specialty  none    Code Status  Full Code    Disposition  Patient is not medically cleared for discharge.   Anticipate discharge to  to home with close outpatient follow-up.  I have placed the appropriate orders for post-discharge needs.    Review of Systems  Review of Systems   Constitutional: Positive for malaise/fatigue. Negative for chills.   HENT: Negative for congestion and sore throat.    Eyes: Negative for photophobia and pain.   Respiratory: Negative for cough and shortness of breath.    Cardiovascular: Negative for chest pain, palpitations and orthopnea.   Gastrointestinal: Positive for abdominal pain (LUQ). Negative for diarrhea, nausea and vomiting.   Genitourinary: Negative for dysuria and flank pain.   Musculoskeletal: Positive for myalgias. Negative for falls.   Neurological: Positive for tremors and headaches. Negative for dizziness and seizures.   Psychiatric/Behavioral: Negative for substance abuse.   All other systems reviewed and are negative.       Physical Exam  Temp:  [35.8 °C (96.4 °F)-37 °C (98.6 °F)] 37 °C (98.6 °F)  Pulse:  [111-170] 111  Resp:  [17-18] 17  BP: (111-129)/(75-95) 115/84  SpO2:  [91 %-99 %] 98 %    Physical Exam  Vitals and nursing note reviewed.   Constitutional:       Appearance: Normal appearance. She is not ill-appearing, toxic-appearing or diaphoretic.   HENT:      Head: Normocephalic.      Mouth/Throat:      Mouth: Mucous membranes are moist.      Pharynx: Oropharynx is clear.   Eyes:      General: No scleral icterus.     Extraocular Movements: Extraocular movements intact.      Conjunctiva/sclera: Conjunctivae normal.   Cardiovascular:      Rate and Rhythm: Regular rhythm. Tachycardia present.      Pulses: Normal pulses.      Heart sounds: Normal heart sounds. No murmur heard.  Pulmonary:      Effort: Pulmonary effort is normal.      Breath sounds: Normal breath sounds.   Abdominal:      General: Abdomen is flat. Bowel sounds are normal. There is no distension.      Palpations: Abdomen is soft.      Tenderness: There is abdominal tenderness (LUQ).   Musculoskeletal:      Right lower leg: No edema.       Left lower leg: No edema.   Skin:     General: Skin is warm.      Capillary Refill: Capillary refill takes less than 2 seconds.      Coloration: Skin is pale. Skin is not jaundiced.   Neurological:      General: No focal deficit present.      Mental Status: She is alert.      Motor: Tremor present.         Fluids    Intake/Output Summary (Last 24 hours) at 4/27/2022 1007  Last data filed at 4/27/2022 0718  Gross per 24 hour   Intake --   Output 1500 ml   Net -1500 ml       Laboratory  Recent Labs     04/24/22  1406 04/26/22  0010 04/27/22  0201   WBC 2.9* 3.7* 2.3*   RBC 3.46* 3.41* 3.36*   HEMOGLOBIN 11.7* 11.7* 11.6*   HEMATOCRIT 36.0* 36.5* 36.5*   .0* 107.0* 108.6*   MCH 33.8* 34.3* 34.5*   MCHC 32.5* 32.1* 31.8*   RDW 54.9* 55.1* 56.4*   PLATELETCT 27* 31* 48*   MPV 9.8 11.2 10.7     Recent Labs     04/25/22  0215 04/26/22  0010 04/27/22  0201   SODIUM 133* 131* 136   POTASSIUM 4.0 3.5* 3.3*   CHLORIDE 94* 91* 97   CO2 28 26 27   GLUCOSE 113* 96 114*   BUN 7* 5* 5*   CREATININE 0.29* 0.31* 0.35*   CALCIUM 9.2 9.2 9.4                   Imaging  DX-CHEST-PORTABLE (1 VIEW)   Final Result         1.  Left basilar atelectasis, no focal infiltrate           Assessment/Plan  * Acute encephalopathy- (present on admission)  Assessment & Plan  Multifactorial secondary to hepatic encephalopathy, alcohol and methamphetamine intoxication.  Seems to be improving  Fall, aspiration, seizure precautions  Ammonia 75 ->54  Continue lactulose  CIWA protocol, benzodiazepine as needed  Continue to monitor chemistries    Alcohol withdrawal (HCC)  Assessment & Plan  Continue Folic acid, multivitamin, thiamine.   Continue CIWA protocol    Macrocytic anemia- (present on admission)  Assessment & Plan  Likely due to chronic alcohol use   Continue folic acid, multivitamin, and thiamine supplements    COPD (chronic obstructive pulmonary disease) (HCC)- (present on admission)  Assessment & Plan  Compensated   DuoNeb as  needed  Incentive spirometry    Methamphetamine abuse (HCC)- (present on admission)  Assessment & Plan  Encourage cessation  Monitor for withdrawals, benzodiazepine as needed    Pancreatitis- (present on admission)  Assessment & Plan  Secondary to alcohol  Bowel rest  Symptomatic management  Pain management   Lipase improving 356 ->186  Continue clear liquid diet   IV fluid    Pancytopenia (HCC)- (present on admission)  Assessment & Plan  Secondary to alcoholism, without evidence of acute bleed  Hold prophylactic heparin.   Continue to monitor CBC       VTE prophylaxis: SCDs/TEDs    I have performed a physical exam and reviewed and updated ROS and Plan today (4/27/2022). In review of yesterday's note (4/26/2022), there are no changes except as documented above.

## 2022-04-27 NOTE — PROGRESS NOTES
"Report received from JARETT Ramírez and care of patient assumed. Pt resting comfortably in bed without any signs of distress. A&Ox3, disoriented to place, VSS, no complaints at this time. Patient hallucinating about \"seeing the walking dead.\" POC reviewed and white board updated, Tele box on, Call light within reach, Bed locked in lowest position and side rails up x2. Will monitor.  "

## 2022-04-27 NOTE — CARE PLAN
The patient is Stable - Low risk of patient condition declining or worsening    Shift Goals  Clinical Goals: Safety  Patient Goals: Rest    Progress made toward(s) clinical / shift goals:    Problem: Knowledge Deficit - Standard  Goal: Patient and family/care givers will demonstrate understanding of plan of care, disease process/condition, diagnostic tests and medications  Outcome: Progressing  Note: Educated patient on POC, pain management, medication administration, ambulation, safety, diet, rest, usage of call light, interventions in place to maintain/ improve skin integrity, CIWA protocol, seizure precautions. Will continue to educate patient on POC accordingly.      Problem: Optimal Care for Alcohol Withdrawal  Goal: Optimal Care for the alcohol withdrawal patient  Outcome: Progressing  Note: CIWA protocol in place.      Problem: Seizure Precautions  Goal: Implementation of seizure precautions  Outcome: Progressing  Note: Seizure precautions in place.

## 2022-04-28 LAB
ALBUMIN SERPL BCP-MCNC: 3.5 G/DL (ref 3.2–4.9)
ALBUMIN/GLOB SERPL: 1 G/DL
ALP SERPL-CCNC: 224 U/L (ref 30–99)
ALT SERPL-CCNC: 57 U/L (ref 2–50)
ANION GAP SERPL CALC-SCNC: 14 MMOL/L (ref 7–16)
AST SERPL-CCNC: 135 U/L (ref 12–45)
BACTERIA BLD CULT: NORMAL
BACTERIA BLD CULT: NORMAL
BILIRUB SERPL-MCNC: 1.3 MG/DL (ref 0.1–1.5)
BUN SERPL-MCNC: 2 MG/DL (ref 8–22)
CALCIUM SERPL-MCNC: 9.2 MG/DL (ref 8.5–10.5)
CHLORIDE SERPL-SCNC: 102 MMOL/L (ref 96–112)
CO2 SERPL-SCNC: 23 MMOL/L (ref 20–33)
CREAT SERPL-MCNC: 0.29 MG/DL (ref 0.5–1.4)
GFR SERPLBLD CREATININE-BSD FMLA CKD-EPI: 127 ML/MIN/1.73 M 2
GLOBULIN SER CALC-MCNC: 3.5 G/DL (ref 1.9–3.5)
GLUCOSE SERPL-MCNC: 98 MG/DL (ref 65–99)
POTASSIUM SERPL-SCNC: 4.2 MMOL/L (ref 3.6–5.5)
PROT SERPL-MCNC: 7 G/DL (ref 6–8.2)
SIGNIFICANT IND 70042: NORMAL
SIGNIFICANT IND 70042: NORMAL
SITE SITE: NORMAL
SITE SITE: NORMAL
SODIUM SERPL-SCNC: 139 MMOL/L (ref 135–145)
SOURCE SOURCE: NORMAL
SOURCE SOURCE: NORMAL

## 2022-04-28 PROCEDURE — 700102 HCHG RX REV CODE 250 W/ 637 OVERRIDE(OP): Performed by: INTERNAL MEDICINE

## 2022-04-28 PROCEDURE — 700105 HCHG RX REV CODE 258: Performed by: INTERNAL MEDICINE

## 2022-04-28 PROCEDURE — 99232 SBSQ HOSP IP/OBS MODERATE 35: CPT | Performed by: INTERNAL MEDICINE

## 2022-04-28 PROCEDURE — 770001 HCHG ROOM/CARE - MED/SURG/GYN PRIV*

## 2022-04-28 PROCEDURE — 36415 COLL VENOUS BLD VENIPUNCTURE: CPT

## 2022-04-28 PROCEDURE — A9270 NON-COVERED ITEM OR SERVICE: HCPCS | Performed by: INTERNAL MEDICINE

## 2022-04-28 PROCEDURE — 80053 COMPREHEN METABOLIC PANEL: CPT

## 2022-04-28 RX ADMIN — LORAZEPAM 2 MG: 2 TABLET ORAL at 00:03

## 2022-04-28 RX ADMIN — LACTULOSE 30 ML: 20 SOLUTION ORAL at 15:48

## 2022-04-28 RX ADMIN — SODIUM CHLORIDE: 9 INJECTION, SOLUTION INTRAVENOUS at 15:44

## 2022-04-28 RX ADMIN — LACTULOSE 30 ML: 20 SOLUTION ORAL at 04:22

## 2022-04-28 RX ADMIN — LORAZEPAM 1 MG: 1 TABLET ORAL at 06:39

## 2022-04-28 RX ADMIN — LORAZEPAM 2 MG: 2 TABLET ORAL at 04:22

## 2022-04-28 RX ADMIN — LORAZEPAM 2 MG: 2 TABLET ORAL at 02:13

## 2022-04-28 RX ADMIN — LORAZEPAM 1 MG: 1 TABLET ORAL at 20:20

## 2022-04-28 RX ADMIN — PROMETHAZINE HYDROCHLORIDE 25 MG: 25 TABLET ORAL at 15:49

## 2022-04-28 ASSESSMENT — ENCOUNTER SYMPTOMS
TREMORS: 1
SORE THROAT: 0
VOMITING: 0
ABDOMINAL PAIN: 1
MYALGIAS: 1
DIARRHEA: 0
SEIZURES: 0
DIZZINESS: 0
PALPITATIONS: 0
HEADACHES: 1
FALLS: 0
SHORTNESS OF BREATH: 0
COUGH: 0
CHILLS: 0
ORTHOPNEA: 0
NAUSEA: 0
PHOTOPHOBIA: 0
FLANK PAIN: 0
EYE PAIN: 0

## 2022-04-28 ASSESSMENT — LIFESTYLE VARIABLES
ANXIETY: MILDLY ANXIOUS
TREMOR: MODERATE TREMOR WITH ARMS EXTENDED
ANXIETY: *
TOTAL SCORE: 11
ORIENTATION AND CLOUDING OF SENSORIUM: ORIENTED AND CAN DO SERIAL ADDITIONS
NAUSEA AND VOMITING: MILD NAUSEA WITH NO VOMITING
PAROXYSMAL SWEATS: NO SWEAT VISIBLE
HEADACHE, FULLNESS IN HEAD: NOT PRESENT
ORIENTATION AND CLOUDING OF SENSORIUM: ORIENTED AND CAN DO SERIAL ADDITIONS
TREMOR: MODERATE TREMOR WITH ARMS EXTENDED
AUDITORY DISTURBANCES: NOT PRESENT
AUDITORY DISTURBANCES: NOT PRESENT
HEADACHE, FULLNESS IN HEAD: MODERATELY SEVERE
NAUSEA AND VOMITING: *
TREMOR: MODERATE TREMOR WITH ARMS EXTENDED
ANXIETY: NO ANXIETY (AT EASE)
ORIENTATION AND CLOUDING OF SENSORIUM: CANNOT DO SERIAL ADDITIONS OR IS UNCERTAIN ABOUT DATE
ORIENTATION AND CLOUDING OF SENSORIUM: ORIENTED AND CAN DO SERIAL ADDITIONS
AGITATION: SOMEWHAT MORE THAN NORMAL ACTIVITY
NAUSEA AND VOMITING: MILD NAUSEA WITH NO VOMITING
TOTAL SCORE: 12
TOTAL SCORE: 4
AUDITORY DISTURBANCES: NOT PRESENT
HEADACHE, FULLNESS IN HEAD: NOT PRESENT
VISUAL DISTURBANCES: NOT PRESENT
PAROXYSMAL SWEATS: NO SWEAT VISIBLE
ANXIETY: MILDLY ANXIOUS
VISUAL DISTURBANCES: NOT PRESENT
AUDITORY DISTURBANCES: NOT PRESENT
PAROXYSMAL SWEATS: NO SWEAT VISIBLE
TREMOR: MODERATE TREMOR WITH ARMS EXTENDED
ORIENTATION AND CLOUDING OF SENSORIUM: CANNOT DO SERIAL ADDITIONS OR IS UNCERTAIN ABOUT DATE
SUBSTANCE_ABUSE: 0
VISUAL DISTURBANCES: NOT PRESENT
HEADACHE, FULLNESS IN HEAD: MODERATELY SEVERE
AGITATION: NORMAL ACTIVITY
PAROXYSMAL SWEATS: NO SWEAT VISIBLE
PAROXYSMAL SWEATS: NO SWEAT VISIBLE
HEADACHE, FULLNESS IN HEAD: NOT PRESENT
HEADACHE, FULLNESS IN HEAD: MODERATELY SEVERE
TOTAL SCORE: 9
NAUSEA AND VOMITING: NO NAUSEA AND NO VOMITING
PAROXYSMAL SWEATS: NO SWEAT VISIBLE
AUDITORY DISTURBANCES: NOT PRESENT
ORIENTATION AND CLOUDING OF SENSORIUM: CANNOT DO SERIAL ADDITIONS OR IS UNCERTAIN ABOUT DATE
TOTAL SCORE: 4
AGITATION: NORMAL ACTIVITY
AGITATION: NORMAL ACTIVITY
NAUSEA AND VOMITING: NO NAUSEA AND NO VOMITING
AGITATION: SOMEWHAT MORE THAN NORMAL ACTIVITY
TOTAL SCORE: 10
VISUAL DISTURBANCES: NOT PRESENT
AGITATION: NORMAL ACTIVITY
VISUAL DISTURBANCES: NOT PRESENT
ANXIETY: NO ANXIETY (AT EASE)
ANXIETY: NO ANXIETY (AT EASE)
NAUSEA AND VOMITING: NO NAUSEA AND NO VOMITING
AUDITORY DISTURBANCES: NOT PRESENT
VISUAL DISTURBANCES: NOT PRESENT

## 2022-04-28 ASSESSMENT — FIBROSIS 4 INDEX: FIB4 SCORE: 19.74

## 2022-04-28 ASSESSMENT — PAIN DESCRIPTION - PAIN TYPE: TYPE: ACUTE PAIN

## 2022-04-28 NOTE — PROGRESS NOTES
Monitor Summary:   -109  Tachy run 157, 3.53 seconds  .14/.09/.36        Strip report per monitor room

## 2022-04-28 NOTE — CARE PLAN
The patient is Stable - Low risk of patient condition declining or worsening    Shift Goals  Clinical Goals: Safety  Patient Goals: Rest  Family Goals: BRITTANIE    Progress made toward(s) clinical / shift goals:    Problem: Knowledge Deficit - Standard  Goal: Patient and family/care givers will demonstrate understanding of plan of care, disease process/condition, diagnostic tests and medications  Outcome: Progressing     Problem: Optimal Care for Alcohol Withdrawal  Goal: Optimal Care for the alcohol withdrawal patient  Outcome: Progressing     Problem: Seizure Precautions  Goal: Implementation of seizure precautions  Outcome: Progressing     Problem: Lifestyle Changes  Goal: Patient's ability to identify lifestyle changes and available resources to help reduce recurrence of condition will improve  Outcome: Progressing     Problem: Psychosocial  Goal: Patient's level of anxiety will decrease  Outcome: Progressing  Goal: Spiritual and cultural needs incorporated into hospitalization  Outcome: Progressing     Problem: Risk for Aspiration  Goal: Patient's risk for aspiration will be absent or decrease  Outcome: Progressing     Problem: Pain - Standard  Goal: Alleviation of pain or a reduction in pain to the patient’s comfort goal  Outcome: Progressing     Problem: Skin Integrity  Goal: Skin integrity is maintained or improved  Outcome: Progressing     Problem: Provide Safe Environment  Goal: Suicide environmental safety, protocols, policies, and practices will be implemented  Outcome: Progressing     Problem: Psychosocial  Goal: Patient's ability to identify and develop effective coping behaviors will improve  Outcome: Progressing  Goal: Patient's ability to identify and utilize available support systems will improve  Outcome: Progressing     Problem: Fall Risk  Goal: Patient will remain free from falls  Outcome: Progressing       Patient is not progressing towards the following goals:

## 2022-04-28 NOTE — DISCHARGE PLANNING
PASRR remains in manual review and requires additional info.  RN CM sent requested clinical information to [place] [employer].

## 2022-04-28 NOTE — DISCHARGE PLANNING
Anticipated Discharge Disposition: SNF    Action: Case discussed with Dr. Leong, patient should be medically clear for SNF tomorrow.  [facility] has been [facility]    Barriers to Discharge: SNF bed, [facility]     Plan: Case coordination to continue to follow up with medical team to discuss discharge barriers.

## 2022-04-28 NOTE — THERAPY
Physical Therapy   Daily Treatment     Patient Name: Olya Youssef  Age:  51 y.o., Sex:  female  Medical Record #: [id number]  Today's Date: 4/27/2022     Precautions  Precautions: Fall Risk;Other (See Comments)  Comments: etoh/methamphetamine dependence    Assessment    The pt presents today agreeable to participate in tx session.  She was able to demo bed mobility Hussain for trunk positioning/support and hob slightly elevated and no bed rails.  The pt then performed seated LAQ's x10 ea w/ consistent VC/TC to complete.  The pt performed STSx3 eob w/ fww Hussain for weight shift and extension, however demo's posterior lean during stance requiring therapist support for balance.  Attempted side stepping eob on each STS w/ fww, however pt unable to achieve advancement of either LE despite heavy facilitation for lateral weight shift (appears to be limited by sequencing and LE coordination).  Further attempts deferred upon pt c/o HA and BP read 140/104, and pt returned to supine.  Recommend skilled nursing at this time given pt's inability to care for self and high risk of future falls.  Will follow.     Plan    Continue current treatment plan.    DC Equipment Recommendations: Unable to determine at this time  Discharge Recommendations: Recommend post-acute placement for additional physical therapy services prior to discharge home      Subjective/Objective       04/27/22 1806   Cognition    Cognition / Consciousness X   Level of Consciousness Alert   Comments pt cooperative, however requires consistent cues for following commands   Passive ROM Lower Body   Passive ROM Lower Body WDL   Active ROM Lower Body    Active ROM Lower Body  WDL   Comments observed during functional mobility   Strength Lower Body   Lower Body Strength  X   Comments generalized weakness BLE   Sensation Lower Body   Lower Extremity Sensation   WDL   Comments pt denies numbness/tingling in LEs   Sitting Lower Body Exercises   Sitting Lower Body Exercises  Yes   Ankle Pumps 1 set of 10;Bilateral   Long Arc Quad 1 set of 10;Bilateral   Neuro-Muscular Treatments   Neuro-Muscular Treatments Anterior weight shift;Weight Shift Right;Weight Shift Left   Comments stand w/ fww, heavy facilitation required   Other Treatments   Other Treatments Provided pre-gait   Balance   Sitting Balance (Static) Fair   Sitting Balance (Dynamic) Fair -   Standing Balance (Static) Poor +   Standing Balance (Dynamic) Poor -   Weight Shift Sitting Fair   Weight Shift Standing Poor   Skilled Intervention Verbal Cuing;Tactile Cuing;Sequencing;Facilitation;Compensatory Strategies   Comments stand w/ fww   Gait Analysis   Gait Level Of Assist Unable to Participate   Weight Bearing Status no restrictions   Comments multiple attempts of side stepping eob, pt unable to coordinate BLE movement for advancing LE despite maxA to facilitate weight shift   Bed Mobility    Supine to Sit Minimal Assist   Sit to Supine Minimal Assist   Scooting Minimal Assist   Skilled Intervention Verbal Cuing;Tactile Cuing;Sequencing;Facilitation;Compensatory Strategies   Comments hob slightly elevated, no bed rails   Functional Mobility   Sit to Stand Minimal Assist   Bed, Chair, Wheelchair Transfer Unable to Participate   Mobility STS eob, attempted side stepping w/o success   Activity Tolerance   Sitting Edge of Bed 15min   Standing 4min total   Short Term Goals    Short Term Goal # 1 pt will perform supine <> sit without bed features with SPV in 6 visits to get in/out of bed at home   Goal Outcome # 1 goal not met   Short Term Goal # 2 pt will perform all functional xfrs with SPV in 6 visits for improved independence   Goal Outcome # 2 Goal not met   Short Term Goal # 3 pt will ambulate 150ft with FWW and SPV in 6 visits to access home environment   Goal Outcome # 3 Goal not met   Education Group   Education Provided Role of Physical Therapist;Exercises - Seated   Role of Physical Therapist Patient Response  Patient;Acceptance;Explanation;Demonstration;Action Demonstration   Exercises - Seated Patient Response Patient;Acceptance;Explanation;Demonstration;Action Demonstration   Additional Comments pt receptive of edu provided   Session Information   Date / Session Number  4/27- 2 (2/4, 5/2)

## 2022-04-28 NOTE — CARE PLAN
The patient is Stable - Low risk of patient condition declining or worsening    Shift Goals  Clinical Goals: Safety  Patient Goals: Rest    Progress made toward(s) clinical / shift goals:      Problem: Knowledge Deficit - Standard  Goal: Patient and family/care givers will demonstrate understanding of plan of care, disease process/condition, diagnostic tests and medications  Outcome: Progressing  Note: Educated patient on POC, pain management, medication administration, ambulation, safety, diet, rest, interventions in place to maintain/ improve skin integrity, CIWA protocol, seizure precautions. Will continue to educate patient on POC accordingly.      Problem: Optimal Care for Alcohol Withdrawal  Goal: Optimal Care for the alcohol withdrawal patient  Outcome: Progressing  Note: CIWA protocol in place.     Problem: Seizure Precautions  Goal: Implementation of seizure precautions  Outcome: Progressing  Note: Seizure precautions in place.

## 2022-04-28 NOTE — PROGRESS NOTES
"Patient resting in bed. When patient was aroused with verbal and tactile stimulus from a sound sleep she woke up stating her pain to 10/10 everywhere. Informed the patient her narcotics were discontinued and the doctor wanted her to have a narcotic holiday. Patient pouted and stated she just \"wants to be drugged up so she can sleep and not have to deal with anything.\" Patient then requested ativan. This writer informed her it was not time and again the patient whined \"I just want to sleep, nobody lets me sleep.\"  "

## 2022-04-28 NOTE — PROGRESS NOTES
Telemetry Shift Summary     RHYTHM: ST  HR RANGE: 101-116  ECTOPY: rPVC, rPAC  MEASUREMENTS: 0.12/0.08/0.39

## 2022-04-28 NOTE — PROGRESS NOTES
Report received from JARETT Ramírez and care of patient assumed. Pt resting comfortably in bed without any signs of distress. Patient asleep, VSS, no complaints at this time. POC reviewed and white board updated, Tele box on, Call light within reach, Bed locked in lowest position and side rails up x2. Will monitor.

## 2022-04-29 PROCEDURE — A9270 NON-COVERED ITEM OR SERVICE: HCPCS | Performed by: INTERNAL MEDICINE

## 2022-04-29 PROCEDURE — 700102 HCHG RX REV CODE 250 W/ 637 OVERRIDE(OP): Performed by: INTERNAL MEDICINE

## 2022-04-29 PROCEDURE — 97530 THERAPEUTIC ACTIVITIES: CPT

## 2022-04-29 PROCEDURE — 97116 GAIT TRAINING THERAPY: CPT

## 2022-04-29 PROCEDURE — 770001 HCHG ROOM/CARE - MED/SURG/GYN PRIV*

## 2022-04-29 PROCEDURE — 99232 SBSQ HOSP IP/OBS MODERATE 35: CPT | Performed by: INTERNAL MEDICINE

## 2022-04-29 RX ADMIN — PROMETHAZINE HYDROCHLORIDE 25 MG: 25 TABLET ORAL at 00:03

## 2022-04-29 RX ADMIN — LORAZEPAM 1 MG: 1 TABLET ORAL at 02:39

## 2022-04-29 ASSESSMENT — LIFESTYLE VARIABLES
ORIENTATION AND CLOUDING OF SENSORIUM: ORIENTED AND CAN DO SERIAL ADDITIONS
NAUSEA AND VOMITING: NO NAUSEA AND NO VOMITING
AUDITORY DISTURBANCES: NOT PRESENT
HEADACHE, FULLNESS IN HEAD: NOT PRESENT
TOTAL SCORE: 10
VISUAL DISTURBANCES: NOT PRESENT
AGITATION: *
PAROXYSMAL SWEATS: NO SWEAT VISIBLE
PAROXYSMAL SWEATS: NO SWEAT VISIBLE
ANXIETY: MODERATELY ANXIOUS OR GUARDED, SO ANXIETY IS INFERRED
TREMOR: *
ANXIETY: MILDLY ANXIOUS
TOTAL SCORE: 3
VISUAL DISTURBANCES: NOT PRESENT
AGITATION: NORMAL ACTIVITY
ORIENTATION AND CLOUDING OF SENSORIUM: ORIENTED AND CAN DO SERIAL ADDITIONS
HEADACHE, FULLNESS IN HEAD: NOT PRESENT
TREMOR: TREMOR NOT VISIBLE BUT CAN BE FELT, FINGERTIP TO FINGERTIP
NAUSEA AND VOMITING: *
AUDITORY DISTURBANCES: NOT PRESENT

## 2022-04-29 ASSESSMENT — PATIENT HEALTH QUESTIONNAIRE - PHQ9
SUM OF ALL RESPONSES TO PHQ QUESTIONS 1-9: 8
9. THOUGHTS THAT YOU WOULD BE BETTER OFF DEAD, OR OF HURTING YOURSELF: SEVERAL DAYS
2. FEELING DOWN, DEPRESSED, IRRITABLE, OR HOPELESS: SEVERAL DAYS
1. LITTLE INTEREST OR PLEASURE IN DOING THINGS: SEVERAL DAYS
7. TROUBLE CONCENTRATING ON THINGS, SUCH AS READING THE NEWSPAPER OR WATCHING TELEVISION: SEVERAL DAYS
4. FEELING TIRED OR HAVING LITTLE ENERGY: SEVERAL DAYS
SUM OF ALL RESPONSES TO PHQ9 QUESTIONS 1 AND 2: 2
8. MOVING OR SPEAKING SO SLOWLY THAT OTHER PEOPLE COULD HAVE NOTICED. OR THE OPPOSITE, BEING SO FIGETY OR RESTLESS THAT YOU HAVE BEEN MOVING AROUND A LOT MORE THAN USUAL: SEVERAL DAYS
5. POOR APPETITE OR OVEREATING: NOT AT ALL
6. FEELING BAD ABOUT YOURSELF - OR THAT YOU ARE A FAILURE OR HAVE LET YOURSELF OR YOUR FAMILY DOWN: SEVERAL DAYS
3. TROUBLE FALLING OR STAYING ASLEEP OR SLEEPING TOO MUCH: SEVERAL DAYS

## 2022-04-29 ASSESSMENT — COGNITIVE AND FUNCTIONAL STATUS - GENERAL
MOVING FROM LYING ON BACK TO SITTING ON SIDE OF FLAT BED: A LOT
WALKING IN HOSPITAL ROOM: TOTAL
TURNING FROM BACK TO SIDE WHILE IN FLAT BAD: A LITTLE
CLIMB 3 TO 5 STEPS WITH RAILING: TOTAL
SUGGESTED CMS G CODE MODIFIER MOBILITY: CL
MOBILITY SCORE: 10
MOVING TO AND FROM BED TO CHAIR: A LOT
STANDING UP FROM CHAIR USING ARMS: TOTAL

## 2022-04-29 ASSESSMENT — FIBROSIS 4 INDEX: FIB4 SCORE: 19.74

## 2022-04-29 ASSESSMENT — GAIT ASSESSMENTS
DISTANCE (FEET): 2
ASSISTIVE DEVICE: FRONT WHEEL WALKER
GAIT LEVEL OF ASSIST: MODERATE ASSIST
DEVIATION: INCREASED BASE OF SUPPORT;DECREASED TOE OFF

## 2022-04-29 ASSESSMENT — PAIN DESCRIPTION - PAIN TYPE: TYPE: ACUTE PAIN

## 2022-04-29 NOTE — DISCHARGE PLANNING
Anticipated Discharge Disposition: SNF    Action: Pt is medically clear for SNF, however PASRR is still in manual review. Requested that Mood/Behavior & Interpersonal Functioning sections on form be assessed for last 6 months. LSW escalated to [facility] leadership regarding assessment.    Mood and Behavior ?     Wandering   Physically Abusive   Resists Care        Socially Inappropriate/Disruptive Behavioral Symptoms   Verbally Expressions of Distress        Self Deprecation   Unrealistic Fears   Anxious Non-Health Complaints/Concerns   Persistent Anger        Repetitive Verbalizations   Negative Statements   Sad, pained worried facial expressions   Crying/Tearfulness        Unpleasant Mood in Morning   Insomnia/Disturbed Sleep Patterns   Reduced Social Interaction/Isolation   Repetitive Physical Movements   Withdrawal From Activities of Interest         Interpersonal Functioning ?     Combative   Dangerous to Self, Others, or Property?   Altercations        Evictions Due To Socially Inappropriate Behavior   Fear of Strangers   Illogical Comments        Suicide Attempts/Ideation   Social Isolation   Excessive Irritability        Hallucinations   Paranoid Ideation   Homicidal        Anxious             Addendum @9087  LSW spoke with MD Leong and JARETT Mendoza. Neither are aware of pt having any of the above behaviors. LSW attempted to meet with pt at bedside to complete assessment. Pt difficult to arouse and asked to be left alone to sleep. LSW stressed the importance of answering this LSWs questions, however pt continued to sleep.    Addendum @7818  [facility] requested it be noted that SNF is for 45 days or less. MD Leong included this in DC summary. DC summary submitted on [facility] website.    Barriers to Discharge: PASRR in manual review, SNF bed.    Plan: Care coordination will follow up with assessment of mood/behavior and interpersonal functioning.

## 2022-04-29 NOTE — CARE PLAN
Problem: Lifestyle Changes  Goal: Patient's ability to identify lifestyle changes and available resources to help reduce recurrence of condition will improve  Outcome: Not Progressing  Note: Continues to express desire for etoh     Problem: Psychosocial  Goal: Patient's ability to identify and develop effective coping behaviors will improve  Outcome: Not Progressing  Goal: Patient's ability to identify and utilize available support systems will improve  Outcome: Not Progressing   The patient is Watcher - Medium risk of patient condition declining or worsening    Shift Goals  Clinical Goals: safety ciwa vss  Patient Goals: sleep  Family Goals: BRITTANIE    Progress made toward(s) clinical / shift goals:improving      Patient is not progressing towards the following goals:      Problem: Lifestyle Changes  Goal: Patient's ability to identify lifestyle changes and available resources to help reduce recurrence of condition will improve  Outcome: Not Progressing  Note: Continues to express desire for etoh     Problem: Psychosocial  Goal: Patient's ability to identify and develop effective coping behaviors will improve  Outcome: Not Progressing  Goal: Patient's ability to identify and utilize available support systems will improve  Outcome: Not Progressing   Client is alert and oriented x 4 vss continues to express desire to drink vodka, mild detox symptoms bm x7

## 2022-04-29 NOTE — DISCHARGE SUMMARY
Discharge Summary    CHIEF COMPLAINT ON ADMISSION  Chief Complaint   Patient presents with   • Altered Mental Status     Roommate called 911 due to patient's altered mental status   • Headache     Patient reports she has been smoking meth and drinking a lot of alcohol because she has a bad headache.        Reason for Admission  EMS     Admission Date  4/22/2022    CODE STATUS  Full Code    HPI & HOSPITAL COURSE    Olya Youssef is a 51 y.o. female well-known to ER staff for history of alcoholism and methamphetamine abuse, hypertension, pancreatitis, seizure, alcoholic hepatitis who presented 4/22/2022 with acute intoxication  Secondary to alcohol and methamphetamine use over the last few days.  The patient also reports abdominal pain for which she has attributed to her recent heavy alcohol use. She also reports headache which radiates up from her neck with associated neck stiffness and photosensitivity. She denies any fevers, chills, nausea, vomiting, shortness of breath, or chest pain.    In the ER, patient thrombocytopenic at 34, hypokalemic, and metabolic acidosis likely due to alcohol levels greater than detected level of 498. , alk phos 209, and lipase elevated at 356 consistent with acute pancreatitis. Chest xray negative.   She went to alcohol withdrawal symptom here in the hospital.  Right now she is recovered.  PT OT saw the patient and recommended SNF which was arranged upon discharge and likely need SNF day 45 days or less.  In addition she was aggressively treated with IV fluid and supportive care for her pancreatitis.  It has improved significantly since admission.  I saw and examined the patient upon discharge today.  Please call [phone] to schedule PCP appointment for patient.    Required specialty appointments include:     Later today, the patient refused to go to SNF and she said she wanna stay here in the hospital until she wants to go home. She is alert and orientated and has  capacity to make her medical decision. If that is the case, she was explained that the hospital will bill her for this hospital stay and she said she will go live with her friend. Per RN, she can barely stand on her feet and voice our concern about it and she said she has a walker and she has full time job and she will be fine.   She doesn't want to go to SNF. She will sign AMA form and leave the hospital. She understood the risk of leaving AMA. I will also order  for her.      As below        Therefore, she is discharged in fair and stable condition to home with close outpatient follow-up.    The patient met 2-midnight criteria for an inpatient stay at the time of discharge.    Discharge Date  04/30/22      FOLLOW UP ITEMS POST DISCHARGE      DISCHARGE DIAGNOSES  Principal Problem:    Acute encephalopathy POA: Yes  Active Problems:    Pancytopenia (HCC) POA: Yes    Pancreatitis POA: Yes    Methamphetamine abuse (HCC) POA: Yes    COPD (chronic obstructive pulmonary disease) (HCC) (Chronic) POA: Yes    Hypomagnesemia POA: Yes    Macrocytic anemia POA: Yes    Alcohol withdrawal (HCC) POA: Unknown  Resolved Problems:    Alcohol intoxication, with unspecified complication (HCC) POA: Yes      FOLLOW UP  No future appointments.  Roby Daniel, A.P.R.N.  [address] [place] [address] [id number] [phone]    In 1 week        MEDICATIONS ON DISCHARGE     Medication List      You have not been prescribed any medications.         Allergies  Allergies   Allergen Reactions   • Bactrim Hives   • Lamotrigine [Lamictal] Hives   • Quetiapine Fumarate Hives     Extrapyramidal Symptoms   • Keflex Hives       DIET  Orders Placed This Encounter   Procedures   • Diet Order Diet: Regular     Standing Status:   Standing     Number of Occurrences:   1     Order Specific Question:   Diet:     Answer:   Regular [1]       ACTIVITY  As tolerated.  Weight bearing as tolerated    CONSULTATIONS      PROCEDURES      LABORATORY  Lab Results    Component Value Date    SODIUM 139 04/28/2022    POTASSIUM 4.2 04/28/2022    CHLORIDE 102 04/28/2022    CO2 23 04/28/2022    GLUCOSE 98 04/28/2022    BUN 2 (L) 04/28/2022    CREATININE 0.29 (L) 04/28/2022    CREATININE 1.0 02/06/2009        Lab Results   Component Value Date    WBC 2.3 (LL) 04/27/2022    HEMOGLOBIN 11.6 (L) 04/27/2022    HEMATOCRIT 36.5 (L) 04/27/2022    PLATELETCT 48 (LL) 04/27/2022        Total time of the discharge process exceeds 38 minutes.

## 2022-04-29 NOTE — DISCHARGE PLANNING
Anticipated Discharge Disposition: SNF-[facility]    Action: LSW notified by Luz that [facility] is able to accept this pt tomorrow and requested 1100 transportation. LSW notified JARETT Mendoza and MD Leong. Requested in handoff that transportation be arranged.    Barriers to Discharge: verify transportation.    Plan: Care coordination will follow up with arranging transportation.

## 2022-04-29 NOTE — THERAPY
Occupational Therapy Contact Note:     04/29/22 2904   Interdisciplinary Plan of Care Collaboration   Collaboration Comments Attempted to see pt for OT tx session, pt lethargic and unarousable following PT session. Will hold and round back.       Anjali Bob OTR/L

## 2022-04-29 NOTE — PROGRESS NOTES
"Patient called this writer into the room and stated she needed pain medication. This writer informed her that the MD had discontinued all of her pain medications and she did not have any available. Patient became upset and stated \"well I'm just going to leave. I can be in pain at home.\" Reinforced admission is for encephalopathy related to alcohol and meth abuse and taking medications \"so she can sleep and not deal with anything\" is not the appropriate use of pain medications or ativan. Patient stated she didn't care and she wants to leave. Patient is unable to safely ambulate without assistance. Reinforced safety with the patient.   "

## 2022-04-30 VITALS
WEIGHT: 121.47 LBS | SYSTOLIC BLOOD PRESSURE: 135 MMHG | HEIGHT: 60 IN | TEMPERATURE: 97.7 F | RESPIRATION RATE: 17 BRPM | BODY MASS INDEX: 23.85 KG/M2 | HEART RATE: 78 BPM | OXYGEN SATURATION: 96 % | DIASTOLIC BLOOD PRESSURE: 88 MMHG

## 2022-04-30 LAB — GLUCOSE BLD STRIP.AUTO-MCNC: 127 MG/DL (ref 65–99)

## 2022-04-30 PROCEDURE — 99239 HOSP IP/OBS DSCHRG MGMT >30: CPT | Performed by: INTERNAL MEDICINE

## 2022-04-30 PROCEDURE — 700111 HCHG RX REV CODE 636 W/ 250 OVERRIDE (IP): Performed by: INTERNAL MEDICINE

## 2022-04-30 PROCEDURE — 82962 GLUCOSE BLOOD TEST: CPT

## 2022-04-30 RX ADMIN — ONDANSETRON 4 MG: 4 TABLET, ORALLY DISINTEGRATING ORAL at 16:17

## 2022-04-30 ASSESSMENT — ENCOUNTER SYMPTOMS
PHOTOPHOBIA: 0
PALPITATIONS: 0
CHILLS: 0
ORTHOPNEA: 0
SORE THROAT: 0
COUGH: 0
MYALGIAS: 1
NAUSEA: 0
DIZZINESS: 0
FALLS: 0
FLANK PAIN: 0
VOMITING: 0
HEADACHES: 1
TREMORS: 1
ABDOMINAL PAIN: 1
EYE PAIN: 0
DIARRHEA: 0

## 2022-04-30 ASSESSMENT — PAIN DESCRIPTION - PAIN TYPE: TYPE: ACUTE PAIN

## 2022-04-30 NOTE — DISCHARGE PLANNING
Received Transport Form @ 0930  Spoke to Yasmin @ [facility]    Transport is scheduled for 04/30 @7671-1206 going to [facility].  Confirmation number,[phone]  RN LIANET notified

## 2022-04-30 NOTE — THERAPY
Physical Therapy   Daily Treatment     Patient Name: Olya Youssef  Age:  51 y.o., Sex:  female  Medical Record #: [id number]  Today's Date: 4/29/2022     Precautions  Precautions: Fall Risk  Comments: etoh/methamphetamine dependence    Assessment    The pt presents today agreeable to participate in tx session w/ extensive encouragement, very lethargic requiring consistent cuing throughout to complete tasks.  She was instructed on seated and supine ther-ex w/ limited carryover 2/2 current state of arousal.  The pt required Hussain to complete bed mobility w/ the hob flat, no bed rails, and increased time to complete the task. She was able to maintain seated balance unsupported eob, and perform STS eob w/ fww Hussain for extension and stability.  The pt also demo'd side stepping eob w/ fww modA for AD management, lateral weight shift and stability via limited toe clearance w/ no LOB observed and distance limited by weakness and fatigue.  Recommend placement given pt's current inability to care for self and high risk of future falls.  Will follow.     Plan    Continue current treatment plan.    DC Equipment Recommendations: Unable to determine at this time  Discharge Recommendations: Recommend post-acute placement for additional physical therapy services prior to discharge home      Subjective/Objective       04/29/22 1536   Cognition    Cognition / Consciousness X   Speech/ Communication Delayed Responses   Level of Consciousness Responds to voice   Comments pt cooperative w/ extensive encouragement and heavy cuing   Passive ROM Lower Body   Passive ROM Lower Body WDL   Active ROM Lower Body    Active ROM Lower Body  WDL   Strength Lower Body   Lower Body Strength  X   Comments generalized weakness BLE   Sensation Lower Body   Lower Extremity Sensation   WDL   Supine Lower Body Exercise   Supine Lower Body Exercises Yes   Heel Slide 1 set of 10;Bilateral  (min manual resistance during ext, heavy cuing required)   Sitting  Lower Body Exercises   Sitting Lower Body Exercises Yes   Ankle Pumps 1 set of 10;Bilateral   Hip Abduction Bilateral  (5 reps, 3s hold, min manual resistance)   Long Arc Quad 1 set of 10;Bilateral   Neuro-Muscular Treatments   Neuro-Muscular Treatments Anterior weight shift;Weight Shift Right;Weight Shift Left   Comments stand w/ fww   Other Treatments   Other Treatments Provided side step eob and bed mobility   Balance   Sitting Balance (Static) Fair   Sitting Balance (Dynamic) Fair   Standing Balance (Static) Poor +   Standing Balance (Dynamic) Poor   Weight Shift Sitting Fair   Weight Shift Standing Absent   Skilled Intervention Verbal Cuing;Tactile Cuing;Sequencing;Facilitation;Compensatory Strategies   Comments stand w/ fww   Gait Analysis   Gait Level Of Assist Moderate Assist   Assistive Device Front Wheel Walker   Distance (Feet) 2   # of Times Distance was Traveled 1   Deviation Increased Base Of Support;Decreased Toe Off   Weight Bearing Status no restrictions   Skilled Intervention Verbal Cuing;Tactile Cuing;Sequencing;Postural Facilitation;Facilitation;Compensatory Strategies   Comments side step eob w/ facilitation for lateral weight shift, distance limited by weakness/fatigue   Bed Mobility    Supine to Sit Minimal Assist   Sit to Supine Minimal Assist   Scooting Minimal Assist   Skilled Intervention Verbal Cuing;Tactile Cuing;Sequencing;Compensatory Strategies;Facilitation   Comments hob flat, no bed rails   Functional Mobility   Sit to Stand Minimal Assist   Bed, Chair, Wheelchair Transfer Moderate Assist   Transfer Method Stand Step   Mobility STS eob w/ fww, side step eob w/ fww   Skilled Intervention Verbal Cuing;Tactile Cuing;Sequencing;Facilitation;Compensatory Strategies   Activity Tolerance   Sitting Edge of Bed 15min   Standing 3min   Short Term Goals    Short Term Goal # 1 pt will perform supine <> sit without bed features with SPV in 6 visits to get in/out of bed at home   Goal Outcome # 1  goal not met   Short Term Goal # 2 pt will perform all functional xfrs with SPV in 6 visits for improved independence   Goal Outcome # 2 Goal not met   Short Term Goal # 3 pt will ambulate 150ft with FWW and SPV in 6 visits to access home environment   Goal Outcome # 3 Goal not met   Education Group   Education Provided Role of Physical Therapist;Gait Training;Use of Assistive Device;Exercises - Seated;Exercises - Supine   Role of Physical Therapist Patient Response Patient;Acceptance;Explanation;Demonstration;Action Demonstration   Gait Training Patient Response Patient;Acceptance;Explanation;Demonstration;Action Demonstration   Use of Assistive Device Patient Response Patient;Acceptance;Explanation;Demonstration;Action Demonstration   Exercises - Supine Patient Response Patient;Acceptance;Explanation;Demonstration;Action Demonstration   Exercises - Seated Patient Response Patient;Acceptance;Demonstration;Explanation;Action Demonstration   Additional Comments pt receptive of edu provided   Session Information   Date / Session Number  4/28- 3 (3/4, 5/2)

## 2022-04-30 NOTE — DISCHARGE PLANNING
Anticipated Discharge Disposition: [facility]     Action: RN CM received notification in hand-off that patient is transferring to [facility] today and transport needs to be arranged.  RN CM sent transportation request to DPA.  COBRA packet provided to RN. RN to obtain patient and provider signatures.  Will follow for transport time confirmation.      Barriers to Discharge: transportation confirmation     Plan: HCM to remain available to assist with discharge planning needs and barriers     0902: RN notified RN CM that patient is refusing to go to SNF.  Patient stating she wants to go home.  RN CM notified [facility] to hold on transportation request at this time.     1005: RN CM met with patient at bedside to discuss discharge planning as Dr. Leong placed HH orders.  Patient reports to RN CM that she will go to the SNF and is ok with continuing arrangement with transport.  Updated DPA to move forward with transportation. DPA will notify RN CM when transport is arranged.    1030: DPA updated RN LIANET that earliest transport available is [phone].  Updated charge RN and bedside RN.  COBRA transport packet provided to charge RN.

## 2022-04-30 NOTE — DISCHARGE INSTRUCTIONS
Discharge Instructions    Discharged to other by medical transportation with escort. Discharged via wheelchair, hospital escort: Refused.  Special equipment needed: Not Applicable    Be sure to schedule a follow-up appointment with your primary care doctor or any specialists as instructed.     Discharge Plan:   Diet Plan: Discussed  Activity Level: Discussed  Confirmed Follow up Appointment: Appointment Scheduled (going to SNF)  Confirmed Symptoms Management: Discussed  Medication Reconciliation Updated: Yes    I understand that a diet low in cholesterol, fat, and sodium is recommended for good health. Unless I have been given specific instructions below for another diet, I accept this instruction as my diet prescription.       Special Instructions: None    · Is patient discharged on Warfarin / Coumadin?   No     Depression / Suicide Risk    As you are discharged from this [facility] facility, it is important to learn how to keep safe from harming yourself.    Recognize the warning signs:  · Abrupt changes in personality, positive or negative- including increase in energy   · Giving away possessions  · Change in eating patterns- significant weight changes-  positive or negative  · Change in sleeping patterns- unable to sleep or sleeping all the time   · Unwillingness or inability to communicate  · Depression  · Unusual sadness, discouragement and loneliness  · Talk of wanting to die  · Neglect of personal appearance   · Rebelliousness- reckless behavior  · Withdrawal from people/activities they love  · Confusion- inability to concentrate     If you or a loved one observes any of these behaviors or has concerns about self-harm, here's what you can do:  · Talk about it- your feelings and reasons for harming yourself  · Remove any means that you might use to hurt yourself (examples: pills, rope, extension cords, firearm)  · Get professional help from the community (Mental Health, Substance Abuse, psychological  counseling)  · Do not be alone:Call your Safe Contact- someone whom you trust who will be there for you.  · Call your local CRISIS HOTLINE 364-7907 or 628-442-7172  · Call your local Children's Mobile Crisis Response Team [place] (922) 850-3417 or www.[employer]  · Call the toll free National Suicide Prevention Hotlines   · National Suicide Prevention Lifeline 188-918-UDPD (0280)  · [employer] Line Network 800-SUICIDE (891-4846)      Toxic Metabolic Encephalopathy  Toxic metabolic encephalopathy (TME) is a type of brain disorder caused by a change in brain chemistry. This condition may result from illnesses or conditions that cause an imbalance of fluid, minerals (electrolytes), and other substances in the body that affect the way the brain functions. It is not caused by brain damage or brain disease.  TME can cause confusion and other mental disturbances, which are generally referred to as delirium. Untreated delirium may lead to permanent mental changes or worsening medical conditions. Untreated delirium is a life-threatening condition that may need to be treated in the hospital.  What are the causes?  Possible causes of TME that can lead to delirium include:  · Short-term (acute) or long-term (chronic) disease of the kidney or liver.  · Not having enough fluid in the body (dehydration).  · Changes in the acid level (pH) of the blood.  · High or low levels of any of the following substances in the blood:  ? Calcium.  ? Salt (sodium).  ? Sugar (glucose).  ? Magnesium.  ? Phosphate.  · High body temperature.  · Not having enough oxygen in the blood.  · Low levels of B vitamins. This can result from alcohol abuse.  · Certain medicines, such as steroids and medicines that reduce the activity of the immune system (immunosuppressants).  · Certain infections.  What increases the risk?  You may have a higher risk for TME if you:  · Are elderly.  · Have dementia.  · Are in the hospital, especially in  intensive care.  · Live in a nursing home.  · Had recent surgery.  · Have liver or kidney disease.  · Have poorly controlled diabetes.  · Have chronic medical problems, especially heart or lung disease.  · Are not getting enough fluids.  · Have poor nutrition.  · Abuse alcohol.  What are the signs or symptoms?  Symptoms of TME may include:  · Muscle stiffness or jerking (spasticity).  · Shaking (tremors).  · Flapping of the hands.  · Weakness.  · Clumsiness.  · Slowed breathing.  · Jerky movements that you cannot control (seizures).  · Not being able to stay awake (drowsiness).  · Not being able to pay attention.  · Loss of consciousness (coma).  Symptoms of delirium caused by TME include:  · Confusion.  · Difficulty focusing or concentrating, or inability to focus or concentrate.  · Not knowing where you are (disorientation).  · Seeing or hearing things that are not real (hallucinations).  · Fearfulness.  · False beliefs (delusions).  · Changes in mood or personality.  · Changes in speech, such as saying things that do not make sense.  · Memory loss.  · Irritability.  · Avoiding other people (withdrawal).  · Depression.  · Poor judgment.  · Changes in eating and sleeping patterns.  · Hyperactivity.  · Decreased alertness.  · General mistrust of others (paranoia).  Delirium may come and go. Symptoms of delirium may start suddenly or gradually, and they often get worse at night.  How is this diagnosed?  This condition is diagnosed based on:  · Your symptoms and behavior.  · An exam to check how you are thinking, feeling, and behaving (mental status exam). To diagnose delirium, the mental status exam must rule out other possible causes of TME, and must show:  ? Changes in attention and awareness.  ? Changes that develop over a short period of time and tend to come and go (fluctuate).  ? Changes in memory, language, and thinking that were not present before.  · A physical exam.  · Imaging tests, such as:  ? MRI.  ? CT  scan.  · Blood tests to:  ? Measure liver and kidney function.  ? Check for a lack (deficiency) of vitamin B.  ? Check for changes in acid levels (pH) and changes in calcium, sodium, or magnesium levels in the blood.  ? Measure your blood sugar (glucose).  ? Measure your blood oxygen level.  How is this treated?  Treatment for TME depends on the cause, and it may include.  · Getting fluids through an IV tube.  · Regulating calcium, sodium, glucose, or magnesium levels in the body.  · Getting oxygen.  · Improving nutrition.  · Treating liver or kidney disease.  · Adjusting certain medicines.  · Treating infections.  If the cause is found and treated, delirium usually improves. Managing delirium may include:  · Keeping the room well-lit and quiet.  · Using calendars, pictures, and clocks to prevent disorientation.  · Having frequent checks from nursing staff and visits from caregivers.  · Wearing eyeglasses or a hearing aid, if needed.  · Physical therapy.  · Medicine to treat agitation, anxiety, hallucinations, or delusions.  Follow these instructions at home:  · Drink enough fluid to keep your urine clear or pale yellow.  · Take over-the-counter and prescription medicines only as told by your health care provider.  · Return to your normal activities as told by your health care provider. Ask your health care provider what activities are safe for you.  · Follow a healthy diet. Do not skip meals.  · Do not drink alcohol.  · Go to bed at the same time every night.  · Keep all follow-up visits as told by your health care provider. This is important.  Contact a health care provider if:  · You are unable to feed yourself or hydrate yourself.  · You need help at home.  · You start to feel clumsy.  · You start to have tremors or weakness.  Get help right away if:  · You have a seizure.  · You lose consciousness.  · You have trouble breathing.  · You do not feel able to care for yourself at home.  · You have a fever.  · You  become disoriented at home.  This information is not intended to replace advice given to you by your health care provider. Make sure you discuss any questions you have with your health care provider.  Document Released: 05/26/2017 Document Revised: 11/30/2018 Document Reviewed: 05/26/2017  Elsevier Patient Education © 2020 [employer]          Alcohol Intoxication  Alcohol intoxication happens when you cannot think clearly or function well (get impaired) after drinking alcohol. This can happen after just one drink. The effect that alcohol has on how you think and function depends on:  · How much alcohol you drank.  · Your age, your weight, and whether you are a man or a woman.  · How often you drink alcohol.  · If you have other medical problems.  Alcohol intoxication can range from mild to very bad. It can be dangerous, especially if you:  · Drink a large amount of alcohol in a short time (binge drink).  ? For women, binge drinking is having four or more drinks at one time.  ? For men, binge drinking is having five or more drinks at one time.  · Take certain drugs or medicines.  If you or anyone around you seems intoxicated:  · Tell someone.  · Get help from someone.  Follow these instructions at home:  Eating and drinking    · Ask your doctor if alcohol is safe for you.  ? If your doctor says that alcohol is safe for you, limit how much you drink to no more than 1 drink a day for women who are not pregnant and 2 drinks a day for men. One drink equals one of these:  § 12 oz of beer.  § 5 oz of wine.  § 1½ oz of hard liquor.  ? Do not drink alcohol if:  § Your doctor tells you not to drink.  § You are pregnant, may be pregnant, or are planning to get pregnant.  § You are under the legal drinking age (21 years old in the [place]  § You are taking medicines that you should not take with alcohol.  § Alcohol causes your medical problem to get worse.  § You have to drive or do activities that need you to be  alert.  § You have substance use disorder. This is when using alcohol again and again causes problems with your health, your relationships, or with what you need to do at work, home, or school.  ? Be sure to eat before you drink alcohol. Avoid drinking when you have an empty stomach.  ? Make sure you have enough fluid in your body (stay hydrated). To do this:  § Drink enough fluid to keep your pee (urine) pale yellow.  § Avoid caffeine, which may be in coffee, tea, and some sodas. Caffeine can make you thirsty.  ? Try not to drink more than one drink an hour.  ? If you are having more than one drink, have a drink without alcohol (such as water) between your drinks.  General instructions    · Take over-the-counter and prescription medicines only as told by your doctor.  · Do not drive after drinking any amount of alcohol. Plan for a designated  or another way to go home.  · Have someone you trust stay with you while you are intoxicated. Youshould not be left alone.  · Keep all follow-up visits as told by your doctor. This is important.  Contact a doctor if:  · You do not feel better after a few days.  · You have problems at work, at school, or at home due to drinking.  Get help right away if:  · You have any of the following:  ? Moderate or very bad trouble with:  § Movement (coordination).  § Talking.  § Memory.  § Paying attention to things.  ? Trouble staying awake.  ? Being very confused.  ? Jerky movements that you cannot control (seizure).  ? Light-headedness.  ? Fainting.  ? Throwing up (vomiting) blood. The blood may be bright red or look like coffee grounds.  ? Blood in your poop (stool). The blood may:  § Be bright red.  § Make your poop black and tarry and make it smell bad.  ? Feeling shaky when you try to stop drinking.  ? Thoughts about hurting yourself or others.  If you ever feel like you may hurt yourself or others, or have thoughts about taking your own life, get help right away. You can go to  your nearest emergency department or call:  · Your local emergency services (911 in the [place]  · A suicide crisis helpline, such as the National Suicide Prevention Lifeline at 1-274.252.7411. This is open 24 hours a day.  Summary  · Alcohol intoxication happens when you cannot think clearly or function well (get impaired) after drinking alcohol. This can happen after just one drink.  · If your doctor says that alcohol is safe for you, limit how much you drink to no more than 1 drink a day for women who are not pregnant and 2 drinks a day for men.  · Contact a doctor if you have problems at work, at school, or at home due to drinking.  · Get help right away if you have thoughts about hurting yourself or others.  This information is not intended to replace advice given to you by your health care provider. Make sure you discuss any questions you have with your health care provider.  Document Released: 06/05/2009 Document Revised: 04/09/2019 Document Reviewed: 04/09/2019  [employer] Patient Education © 2020 [employer]        Alcohol Abuse and Dependence Information, Adult  Alcohol is a widely available drug. People drink alcohol in different amounts. People who drink alcohol very often and in large amounts often have problems during and after drinking. They may develop what is called an alcohol use disorder. There are two main types of alcohol use disorders:  · Alcohol abuse. This is when you use alcohol too much or too often. You may use alcohol to make yourself feel happy or to reduce stress. You may have a hard time setting a limit on the amount you drink.  · Alcohol dependence. This is when you use alcohol consistently for a period of time, and your body changes as a result. This can make it hard to stop drinking because you may start to feel sick or feel different when you do not use alcohol. These symptoms are known as withdrawal.  How can alcohol abuse and dependence affect me?  Alcohol abuse and dependence can  have a negative effect on your life. Drinking too much can lead to addiction. You may feel like you need alcohol to function normally. You may drink alcohol before work in the morning, during the day, or as soon as you get home from work in the evening. These actions can result in:  · Poor work performance.  · Job loss.  · Financial problems.  · Car crashes or criminal charges from driving after drinking alcohol.  · Problems in your relationships with friends and family.  · Losing the trust and respect of coworkers, friends, and family.  Drinking heavily over a long period of time can permanently damage your body and brain, and can cause lifelong health issues, such as:  · Damage to your liver or pancreas.  · Heart problems, high blood pressure, or stroke.  · Certain cancers.  · Decreased ability to fight infections.  · Brain or nerve damage.  · Depression.  · Early (premature) death.  If you are careless or you crave alcohol, it is easy to drink more than your body can handle (overdose). Alcohol overdose is a serious situation that requires hospitalization. It may lead to permanent injuries or death.  What can increase my risk?  · Having a family history of alcohol abuse.  · Having depression or other mental health conditions.  · Beginning to drink at an early age.  · Binge drinking often.  · Experiencing trauma, stress, and an unstable home life during childhood.  · Spending time with people who drink often.  What actions can I take to prevent or manage alcohol abuse and dependence?  · Do not drink alcohol if:  ? Your health care provider tells you not to drink.  ? You are pregnant, may be pregnant, or are planning to become pregnant.  · If you drink alcohol:  ? Limit how much you use to:  § 0-1 drink a day for women.  § 0-2 drinks a day for men.  ? Be aware of how much alcohol is in your drink. In the [place] one drink equals one 12 oz bottle of beer (355 mL), one 5 oz glass of wine (148 mL), or one 1½ oz glass of  hard liquor (44 mL).  · Stop drinking if you have been drinking too much. This can be very hard to do if you are used to abusing alcohol. If you begin to have withdrawal symptoms, talk with your health care provider or a person that you trust. These symptoms may include anxiety, shaky hands, headache, nausea, sweating, or not being able to sleep.  · Choose to drink nonalcoholic beverages in social gatherings and places where there may be alcohol.  Activity  · Spend more time on activities that you enjoy that do not involve alcohol, like hobbies or exercise.  · Find healthy ways to cope with stress, such as exercise, meditation, or spending time with people you care about.  General information  · Talk to your family, coworkers, and friends about supporting you in your efforts to stop drinking. If they drink, ask them not to drink around you. Spend more time with people who do not drink alcohol.  · If you think that you have an alcohol dependency problem:  ? Tell friends or family about your concerns.  ? Talk with your health care provider or another health professional about where to get help.  ? Work with a therapist and a chemical dependency counselor.  ? Consider joining a support group for people who struggle with alcohol abuse and dependence.  Where to find support    · Your health care provider.  · SMART Recovery: www.smartrecovery.org  Therapy and support groups  · Local treatment centers or chemical dependency counselors.  · Local AA groups in your community: www.aa.org  Where to find more information  · Centers for Disease Control and Prevention: www.cdc.gov  · National [place] on Alcohol Abuse and Alcoholism: www.niaaa.nih.gov  · Alcoholics Anonymous (AA): www.aa.org  Contact a health care provider if:  · You drank more or for longer than you intended on more than one occasion.  · You tried to stop drinking or to cut back on how much you drink, but you were not able to.  · You often drink to the point of  vomiting or passing out.  · You want to drink so badly that you cannot think about anything else.  · You have problems in your life due to drinking, but you continue to drink.  · You keep drinking even though you feel anxious, depressed, or have experienced memory loss.  · You have stopped doing the things you used to enjoy in order to drink.  · You have to drink more than you used to in order to get the effect you want.  · You experience anxiety, sweating, nausea, shakiness, and trouble sleeping when you try to stop drinking.  Get help right away if:  · You have thoughts about hurting yourself or others.  · You have serious withdrawal symptoms, including:  ? Confusion.  ? Racing heart.  ? High blood pressure.  ? Fever.  If you ever feel like you may hurt yourself or others, or have thoughts about taking your own life, get help right away. You can go to your nearest emergency department or call:  · Your local emergency services (911 in the [place]  · A suicide crisis helpline, such as the National Suicide Prevention Lifeline at 1-884.706.9130. This is open 24 hours a day.  Summary  · Alcohol abuse and dependence can have a negative effect on your life. Drinking too much or too often can lead to addiction.  · If you drink alcohol, limit how much you use.  · If you are having trouble keeping your drinking under control, find ways to change your behavior. Hobbies, calming activities, exercise, or support groups can help.  · If you feel you need help with changing your drinking habits, talk with your health care provider, a good friend, or a therapist, or go to an AA group.  This information is not intended to replace advice given to you by your health care provider. Make sure you discuss any questions you have with your health care provider.  Document Released: 12/12/2017 Document Revised: 04/07/2020 Document Reviewed: 02/25/2020  Elsevier Patient Education © 2020 Elsevier Inc.

## 2022-04-30 NOTE — CARE PLAN
Problem: Lifestyle Changes  Goal: Patient's ability to identify lifestyle changes and available resources to help reduce recurrence of condition will improve  Outcome: Not Progressing     Problem: Lifestyle Changes  Goal: Patient's ability to identify lifestyle changes and available resources to help reduce recurrence of condition will improve  Outcome: Not Progressing     Problem: Psychosocial  Goal: Patient's level of anxiety will decrease  Outcome: Progressing  Goal: Spiritual and cultural needs incorporated into hospitalization  Outcome: Progressing     Problem: Psychosocial  Goal: Patient's level of anxiety will decrease  Outcome: Progressing     Problem: Psychosocial  Goal: Spiritual and cultural needs incorporated into hospitalization  Outcome: Progressing     Problem: Knowledge Deficit - Standard  Goal: Patient and family/care givers will demonstrate understanding of plan of care, disease process/condition, diagnostic tests and medications  Outcome: Met     Problem: Optimal Care for Alcohol Withdrawal  Goal: Optimal Care for the alcohol withdrawal patient  Outcome: Met     Problem: Seizure Precautions  Goal: Implementation of seizure precautions  Outcome: Met     Problem: Risk for Aspiration  Goal: Patient's risk for aspiration will be absent or decrease  Outcome: Met     Problem: Pain - Standard  Goal: Alleviation of pain or a reduction in pain to the patient’s comfort goal  Outcome: Met     Problem: Skin Integrity  Goal: Skin integrity is maintained or improved  Outcome: Met     Problem: Provide Safe Environment  Goal: Suicide environmental safety, protocols, policies, and practices will be implemented  Outcome: Met     Problem: Psychosocial  Goal: Patient's ability to identify and develop effective coping behaviors will improve  Outcome: Met  Goal: Patient's ability to identify and utilize available support systems will improve  Outcome: Met     Problem: Fall Risk  Goal: Patient will remain free from  falls  Outcome: Met   The patient is Watcher - Medium risk of patient condition declining or worsening    Shift Goals  Clinical Goals: discharge, safety  Patient Goals: safety, pain mng  Family Goals: jorge    Progress made toward(s) clinical / shift goals: improving    Patient is not progressing towards the following goals:      Problem: Lifestyle Changes  Goal: Patient's ability to identify lifestyle changes and available resources to help reduce recurrence of condition will improve  Outcome: Not Progressing reports she will continue to drink   Client is alert and oriented x4 vss no signs of detox

## 2022-04-30 NOTE — FACE TO FACE
Face to Face Supporting Documentation - Home Health    The encounter with this patient was in whole or in part the primary reason for home health admission.    Date of encounter:   Patient:                    MRN:                       YOB: 2024  Olya Youssef  [id number]  1970     Home health to see patient for:  Physical Therapy evaluation and treatment and Occupational therapy evaluation and treatment    Skilled need for:  Comment: weakness    Skilled nursing interventions to include:  Comment: PT/OT    Homebound status evidenced by:  Need the aid of supportive devices such as crutches, canes, wheelchairs or walkers. Leaving home requires a considerable and taxing effort. There is a normal inability to leave the home.    Community Physician to provide follow up care: Roby Daniel, A.P.R.N.     Optional Interventions? No      I certify the face to face encounter for this home health care referral meets the CMS requirements and the encounter/clinical assessment with the patient was, in whole, or in part, for the medical condition(s) listed above, which is the primary reason for home health care. Based on my clinical findings: the service(s) are medically necessary, support the need for home health care, and the homebound criteria are met.  I certify that this patient has had a face to face encounter by myself.  Zia Leong M.D. - NPI: [id number]

## 2022-04-30 NOTE — PROGRESS NOTES
[facility] Medicine Daily Progress Note    Date of Service  4/30/2022    Chief Complaint  Olya Youssef is a 51 y.o. female admitted 4/22/2022 with generalized body aches and encephalopathy with intoxication    Hospital Course  Olya Youssef is a 51 y.o. female well-known to ER staff for history of alcoholism and methamphetamine abuse, hypertension, pancreatitis, seizure, alcoholic hepatitis who presented 4/22/2022 with acute intoxication  Secondary to alcohol and methamphetamine use over the last few days.  The patient also reports abdominal pain for which she has attributed to her recent heavy alcohol use. She also reports headache which radiates up from her neck with associated neck stiffness and photosensitivity. She denies any fevers, chills, nausea, vomiting, shortness of breath, or chest pain.    In the ER, patient thrombocytopenic at 34, hypokalemic, and metabolic acidosis likely due to alcohol levels greater than detected level of 498. , alk phos 209, and lipase elevated at 356 consistent with acute pancreatitis. Chest xray negative.       Interval Problem Update  4/24/2022: AAOx4. CIWA 10-16. Tremulous, requiring multiple doses of ativan. Started librium. Tolerating clear liquid diet.     4/25/2022: Patient sleeping calmly, arouses easily. CIWA 2-16 overnight requiring multiple doses of ativan and librium. Ammonia and lipase levels improving. Continue CIWA protocol.    4/26 patient is withdrawing from alcohol.  She stated that she does not want to drink alcohol again.  PT saw her today and recommended SNF.    4/27 still going through withdrawal from alcohol.  According to nursing she had drug-seeking behavior.  Continue CIWA protocol.    4/28 her alcohol withdrawal symptoms are improving.  We will discontinue narcotic.  PT recommended SNF which was ordered.    4/29: No acute issue overnight.  Her withdrawal symptom is improving.  I discontinue all her pain medications.  I have personally seen and  examined the patient at bedside. I discussed the plan of care with patient and bedside RN.    Consultants/Specialty  none    Code Status  Full Code    Disposition  Patient is not medically cleared for discharge.   Anticipate discharge to to home with close outpatient follow-up.  I have placed the appropriate orders for post-discharge needs.    Review of Systems  Review of Systems   Constitutional: Positive for malaise/fatigue. Negative for chills.   HENT: Negative for congestion and sore throat.    Eyes: Negative for photophobia and pain.   Respiratory: Negative for cough.    Cardiovascular: Negative for palpitations and orthopnea.   Gastrointestinal: Positive for abdominal pain (LUQ). Negative for diarrhea, nausea and vomiting.   Genitourinary: Negative for dysuria and flank pain.   Musculoskeletal: Positive for myalgias. Negative for falls.   Neurological: Positive for tremors and headaches. Negative for dizziness.   All other systems reviewed and are negative.       Physical Exam  Temp:  [36.2 °C (97.2 °F)-37.2 °C (99 °F)] 36.9 °C (98.5 °F)  Pulse:  [] 98  Resp:  [15-17] 16  BP: (106-141)/(76-91) 119/82  SpO2:  [97 %-100 %] 97 %    Physical Exam  Vitals and nursing note reviewed.   Constitutional:       Appearance: Normal appearance. She is not ill-appearing, toxic-appearing or diaphoretic.   HENT:      Head: Normocephalic.      Mouth/Throat:      Mouth: Mucous membranes are moist.      Pharynx: Oropharynx is clear.   Eyes:      General: No scleral icterus.     Extraocular Movements: Extraocular movements intact.      Conjunctiva/sclera: Conjunctivae normal.   Cardiovascular:      Rate and Rhythm: Regular rhythm. Tachycardia present.      Pulses: Normal pulses.      Heart sounds: Normal heart sounds. No murmur heard.  Pulmonary:      Effort: Pulmonary effort is normal.      Breath sounds: Normal breath sounds.   Abdominal:      General: Abdomen is flat. Bowel sounds are normal. There is no distension.       Tenderness: There is abdominal tenderness (LUQ).   Musculoskeletal:      Right lower leg: No edema.      Left lower leg: No edema.   Skin:     General: Skin is warm.      Coloration: Skin is pale. Skin is not jaundiced.   Neurological:      General: No focal deficit present.      Mental Status: She is alert.      Motor: Tremor present.         Fluids  No intake or output data in the 24 hours ending 04/30/22 0654    Laboratory      Recent Labs     04/28/22  0055   SODIUM 139   POTASSIUM 4.2   CHLORIDE 102   CO2 23   GLUCOSE 98   BUN 2*   CREATININE 0.29*   CALCIUM 9.2                   Imaging  DX-CHEST-PORTABLE (1 VIEW)   Final Result         1.  Left basilar atelectasis, no focal infiltrate           Assessment/Plan  * Acute encephalopathy- (present on admission)  Assessment & Plan  Multifactorial secondary to hepatic encephalopathy, alcohol and methamphetamine intoxication.  Seems to be improving  Fall, aspiration, seizure precautions  Ammonia 75 ->54  Continue lactulose  CIWA protocol, benzodiazepine as needed  Continue to monitor chemistries  Improved    Alcohol withdrawal (HCC)  Assessment & Plan  Continue Folic acid, multivitamin, thiamine.   Continue CIWA protocol  Improving    Macrocytic anemia- (present on admission)  Assessment & Plan  Likely due to chronic alcohol use   Continue folic acid, multivitamin, and thiamine supplements    COPD (chronic obstructive pulmonary disease) (HCC)- (present on admission)  Assessment & Plan  Compensated   DuoNeb as needed  Incentive spirometry    Methamphetamine abuse (HCC)- (present on admission)  Assessment & Plan  Encourage cessation  Monitor for withdrawals, benzodiazepine as needed    Pancreatitis- (present on admission)  Assessment & Plan  Secondary to alcohol  Bowel rest  Symptomatic management  Pain management   Lipase improving 356 ->186  Continue clear liquid diet   Improved    Pancytopenia (HCC)- (present on admission)  Assessment & Plan  Secondary to  alcoholism, without evidence of acute bleed  Hold prophylactic heparin.   Continue to monitor CBC       VTE prophylaxis: SCDs/TEDs    I have performed a physical exam and reviewed and updated ROS and Plan today (4/30/2022). In review of yesterday's note (4/29/2022), there are no changes except as documented above.

## 2022-06-22 SDOH — ECONOMIC STABILITY: HOUSING INSECURITY
IN THE LAST 12 MONTHS, WAS THERE A TIME WHEN YOU DID NOT HAVE A STEADY PLACE TO SLEEP OR SLEPT IN A SHELTER (INCLUDING NOW)?: NO

## 2022-06-22 SDOH — HEALTH STABILITY: PHYSICAL HEALTH
ON AVERAGE, HOW MANY DAYS PER WEEK DO YOU ENGAGE IN MODERATE TO STRENUOUS EXERCISE (LIKE A BRISK WALK)?: PATIENT DECLINED

## 2022-06-22 SDOH — ECONOMIC STABILITY: INCOME INSECURITY: HOW HARD IS IT FOR YOU TO PAY FOR THE VERY BASICS LIKE FOOD, HOUSING, MEDICAL CARE, AND HEATING?: SOMEWHAT HARD

## 2022-06-22 SDOH — ECONOMIC STABILITY: TRANSPORTATION INSECURITY
IN THE PAST 12 MONTHS, HAS LACK OF TRANSPORTATION KEPT YOU FROM MEETINGS, WORK, OR FROM GETTING THINGS NEEDED FOR DAILY LIVING?: YES

## 2022-06-22 SDOH — ECONOMIC STABILITY: FOOD INSECURITY: WITHIN THE PAST 12 MONTHS, YOU WORRIED THAT YOUR FOOD WOULD RUN OUT BEFORE YOU GOT MONEY TO BUY MORE.: NEVER TRUE

## 2022-06-22 SDOH — ECONOMIC STABILITY: HOUSING INSECURITY
IN THE LAST 12 MONTHS, WAS THERE A TIME WHEN YOU DID NOT HAVE A STEADY PLACE TO SLEEP OR SLEPT IN A SHELTER (INCLUDING NOW)?: PATIENT REFUSED

## 2022-06-22 SDOH — ECONOMIC STABILITY: TRANSPORTATION INSECURITY
IN THE PAST 12 MONTHS, HAS THE LACK OF TRANSPORTATION KEPT YOU FROM MEDICAL APPOINTMENTS OR FROM GETTING MEDICATIONS?: YES

## 2022-06-22 SDOH — ECONOMIC STABILITY: INCOME INSECURITY: IN THE LAST 12 MONTHS, WAS THERE A TIME WHEN YOU WERE NOT ABLE TO PAY THE MORTGAGE OR RENT ON TIME?: PATIENT REFUSED

## 2022-06-22 SDOH — HEALTH STABILITY: MENTAL HEALTH
STRESS IS WHEN SOMEONE FEELS TENSE, NERVOUS, ANXIOUS, OR CAN'T SLEEP AT NIGHT BECAUSE THEIR MIND IS TROUBLED. HOW STRESSED ARE YOU?: VERY MUCH

## 2022-06-22 SDOH — HEALTH STABILITY: PHYSICAL HEALTH: ON AVERAGE, HOW MANY MINUTES DO YOU ENGAGE IN EXERCISE AT THIS LEVEL?: PATIENT DECLINED

## 2022-06-22 SDOH — ECONOMIC STABILITY: FOOD INSECURITY: WITHIN THE PAST 12 MONTHS, THE FOOD YOU BOUGHT JUST DIDN'T LAST AND YOU DIDN'T HAVE MONEY TO GET MORE.: NEVER TRUE

## 2022-06-22 SDOH — ECONOMIC STABILITY: TRANSPORTATION INSECURITY
IN THE PAST 12 MONTHS, HAS LACK OF RELIABLE TRANSPORTATION KEPT YOU FROM MEDICAL APPOINTMENTS, MEETINGS, WORK OR FROM GETTING THINGS NEEDED FOR DAILY LIVING?: YES

## 2022-06-22 SDOH — ECONOMIC STABILITY: HOUSING INSECURITY: IN THE LAST 12 MONTHS, HOW MANY PLACES HAVE YOU LIVED?: 1

## 2022-06-22 ASSESSMENT — SOCIAL DETERMINANTS OF HEALTH (SDOH)
IN A TYPICAL WEEK, HOW MANY TIMES DO YOU TALK ON THE PHONE WITH FAMILY, FRIENDS, OR NEIGHBORS?: MORE THAN THREE TIMES A WEEK
HOW OFTEN DO YOU HAVE SIX OR MORE DRINKS ON ONE OCCASION: PATIENT DECLINED
HOW MANY DRINKS CONTAINING ALCOHOL DO YOU HAVE ON A TYPICAL DAY WHEN YOU ARE DRINKING: 3 OR 4
HOW OFTEN DO YOU ATTENT MEETINGS OF THE CLUB OR ORGANIZATION YOU BELONG TO?: NEVER
HOW OFTEN DO YOU HAVE A DRINK CONTAINING ALCOHOL: 4 OR MORE TIMES A WEEK
IN A TYPICAL WEEK, HOW MANY TIMES DO YOU TALK ON THE PHONE WITH FAMILY, FRIENDS, OR NEIGHBORS?: MORE THAN THREE TIMES A WEEK
WITHIN THE PAST 12 MONTHS, YOU WORRIED THAT YOUR FOOD WOULD RUN OUT BEFORE YOU GOT THE MONEY TO BUY MORE: NEVER TRUE
DO YOU BELONG TO ANY CLUBS OR ORGANIZATIONS SUCH AS CHURCH GROUPS UNIONS, FRATERNAL OR ATHLETIC GROUPS, OR SCHOOL GROUPS?: NO
DO YOU BELONG TO ANY CLUBS OR ORGANIZATIONS SUCH AS CHURCH GROUPS UNIONS, FRATERNAL OR ATHLETIC GROUPS, OR SCHOOL GROUPS?: NO
HOW OFTEN DO YOU ATTEND CHURCH OR RELIGIOUS SERVICES?: PATIENT DECLINED
HOW HARD IS IT FOR YOU TO PAY FOR THE VERY BASICS LIKE FOOD, HOUSING, MEDICAL CARE, AND HEATING?: SOMEWHAT HARD
HOW OFTEN DO YOU ATTENT MEETINGS OF THE CLUB OR ORGANIZATION YOU BELONG TO?: NEVER
HOW OFTEN DO YOU GET TOGETHER WITH FRIENDS OR RELATIVES?: ONCE A WEEK
HOW OFTEN DO YOU GET TOGETHER WITH FRIENDS OR RELATIVES?: ONCE A WEEK
HOW OFTEN DO YOU ATTEND CHURCH OR RELIGIOUS SERVICES?: PATIENT DECLINED

## 2022-06-22 ASSESSMENT — LIFESTYLE VARIABLES
AUDIT-C TOTAL SCORE: -1
HOW OFTEN DO YOU HAVE SIX OR MORE DRINKS ON ONE OCCASION: PATIENT DECLINED
SKIP TO QUESTIONS 9-10: 0
HOW MANY STANDARD DRINKS CONTAINING ALCOHOL DO YOU HAVE ON A TYPICAL DAY: 3 OR 4
HOW OFTEN DO YOU HAVE A DRINK CONTAINING ALCOHOL: 4 OR MORE TIMES A WEEK

## 2022-06-23 ENCOUNTER — OFFICE VISIT (OUTPATIENT)
Dept: INTERNAL MEDICINE | Facility: OTHER | Age: 52
End: 2022-06-23
Payer: COMMERCIAL

## 2022-06-23 VITALS
TEMPERATURE: 98.2 F | DIASTOLIC BLOOD PRESSURE: 72 MMHG | OXYGEN SATURATION: 95 % | BODY MASS INDEX: 25.84 KG/M2 | HEART RATE: 120 BPM | WEIGHT: 128.2 LBS | HEIGHT: 59 IN | SYSTOLIC BLOOD PRESSURE: 111 MMHG

## 2022-06-23 DIAGNOSIS — F55.8 ACETAMINOPHEN ABUSE: ICD-10-CM

## 2022-06-23 DIAGNOSIS — G89.29 CHRONIC LEFT-SIDED LOW BACK PAIN WITH LEFT-SIDED SCIATICA: ICD-10-CM

## 2022-06-23 DIAGNOSIS — K70.10 ALCOHOLIC HEPATITIS WITHOUT ASCITES: ICD-10-CM

## 2022-06-23 DIAGNOSIS — M54.42 CHRONIC LEFT-SIDED LOW BACK PAIN WITH LEFT-SIDED SCIATICA: ICD-10-CM

## 2022-06-23 DIAGNOSIS — F15.10 METHAMPHETAMINE ABUSE (HCC): ICD-10-CM

## 2022-06-23 DIAGNOSIS — M54.2 NECK PAIN: ICD-10-CM

## 2022-06-23 DIAGNOSIS — D69.6 THROMBOCYTOPENIA (HCC): ICD-10-CM

## 2022-06-23 DIAGNOSIS — Z78.9 ALCOHOL USE: ICD-10-CM

## 2022-06-23 DIAGNOSIS — F41.9 ANXIETY: ICD-10-CM

## 2022-06-23 PROBLEM — E83.42 HYPOMAGNESEMIA: Status: RESOLVED | Noted: 2021-04-18 | Resolved: 2022-06-23

## 2022-06-23 PROBLEM — I10 HYPERTENSION: Status: RESOLVED | Noted: 2021-04-20 | Resolved: 2022-06-23

## 2022-06-23 PROBLEM — G93.40 ACUTE ENCEPHALOPATHY: Status: RESOLVED | Noted: 2022-04-23 | Resolved: 2022-06-23

## 2022-06-23 PROCEDURE — 99204 OFFICE O/P NEW MOD 45 MIN: CPT | Mod: GC | Performed by: STUDENT IN AN ORGANIZED HEALTH CARE EDUCATION/TRAINING PROGRAM

## 2022-06-23 RX ORDER — QUETIAPINE FUMARATE 25 MG/1
25 TABLET, FILM COATED ORAL
Qty: 30 TABLET | Refills: 0 | Status: SHIPPED | OUTPATIENT
Start: 2022-06-23 | End: 2022-09-26

## 2022-06-23 ASSESSMENT — ENCOUNTER SYMPTOMS
PALPITATIONS: 1
COUGH: 0
SORE THROAT: 0
BACK PAIN: 1
ABDOMINAL PAIN: 0
NAUSEA: 0
TREMORS: 0
WHEEZING: 0
TINGLING: 1
NERVOUS/ANXIOUS: 1
WEAKNESS: 1
DIZZINESS: 0
CHILLS: 0
NECK PAIN: 1
HALLUCINATIONS: 0
BLURRED VISION: 1
FALLS: 0
DEPRESSION: 0
HEADACHES: 0
SHORTNESS OF BREATH: 0
CONSTIPATION: 0
FEVER: 0
DOUBLE VISION: 0
DIARRHEA: 0
VOMITING: 0

## 2022-06-23 ASSESSMENT — PATIENT HEALTH QUESTIONNAIRE - PHQ9
CLINICAL INTERPRETATION OF PHQ2 SCORE: 5
SUM OF ALL RESPONSES TO PHQ QUESTIONS 1-9: 17
5. POOR APPETITE OR OVEREATING: 3 - NEARLY EVERY DAY

## 2022-06-23 ASSESSMENT — ANXIETY QUESTIONNAIRES
1. FEELING NERVOUS, ANXIOUS, OR ON EDGE: NEARLY EVERY DAY
4. TROUBLE RELAXING: NEARLY EVERY DAY
GAD7 TOTAL SCORE: 14
3. WORRYING TOO MUCH ABOUT DIFFERENT THINGS: NEARLY EVERY DAY
IF YOU CHECKED OFF ANY PROBLEMS ON THIS QUESTIONNAIRE, HOW DIFFICULT HAVE THESE PROBLEMS MADE IT FOR YOU TO DO YOUR WORK, TAKE CARE OF THINGS AT HOME, OR GET ALONG WITH OTHER PEOPLE: VERY DIFFICULT
7. FEELING AFRAID AS IF SOMETHING AWFUL MIGHT HAPPEN: NOT AT ALL
6. BECOMING EASILY ANNOYED OR IRRITABLE: NOT AT ALL
5. BEING SO RESTLESS THAT IT IS HARD TO SIT STILL: MORE THAN HALF THE DAYS
2. NOT BEING ABLE TO STOP OR CONTROL WORRYING: NEARLY EVERY DAY

## 2022-06-23 ASSESSMENT — LIFESTYLE VARIABLES: SUBSTANCE_ABUSE: 1

## 2022-06-23 ASSESSMENT — FIBROSIS 4 INDEX: FIB4 SCORE: 19.74

## 2022-06-23 NOTE — PATIENT INSTRUCTIONS
It was a pleasure meeting you today Olya  Please keep a log of how much you drink and smoke, bring at next visit  Please go to the hospital for any worsening pain/numbness in the genital area/can't hold urine or stool/fever or chills      Alcoholic Liver Disease    Alcoholic liver disease is liver damage that is caused by drinking a lot of alcohol for a long time. If you have this disease, you must stop drinking alcohol.  Follow these instructions at home:    Do not drink alcohol. Follow your treatment plan. Work with your doctor if you need help.  Think about joining an alcohol support group.  Take over-the-counter and prescription medicines only as told by your doctor. These include vitamins.  Do not use medicines or eat foods that have alcohol in them, unless your doctor says that it is safe.  Follow instructions from your doctor about eating a healthy diet.  Keep all follow-up visits as told by your doctor. This is important.  Contact a doctor if:  You get a fever.  Your skin starts to look more yellow, pale, or dark.  You get headaches.  Get help right away if:  You throw up (vomit) blood.  You have bright red blood in your poop (stool).  Your poop looks black or looks like tar.  You have trouble:  Thinking.  Walking.  Balancing.  Breathing.  Summary  Alcoholic liver disease is liver damage that is caused by drinking a lot of alcohol for a long time.  If you have this disease, you must stop drinking alcohol. Follow your treatment plan, and work with your doctor as needed.  Think about joining an alcohol support group.  This information is not intended to replace advice given to you by your health care provider. Make sure you discuss any questions you have with your health care provider.  Document Released: 10/15/2010 Document Revised: 04/07/2020 Document Reviewed: 09/07/2018  Elsevier Patient Education © 2020 [employer]

## 2022-06-23 NOTE — LETTER
June 23, 2022    To Whom It May Concern:     Ms. Youssef is under my care for her chronic conditions, please accommodate her for medical care on 6/24/2022.          If you have any questions please do not hesitate to call me at the phone number listed below.      Sincerely,          Margy Aparicio M.D.  [phone]

## 2022-06-23 NOTE — PROGRESS NOTES
"    New Patient    Olya Youssef is a 51 y.o. female who presents today with the following:    CC: Establish Care with Primary Care Physician   Chief Complaint   Patient presents with   • New Patient     Was on these but has been off for a year due to insurance Prozac 50mg daily , Seroquel 25mg at bedtime, gabapentin 300 mg TID    • Depression   • Anxiety     Patient is new to the clinic and reports that she has not seen a primary care provider in years. She reports a past medical history of chronic neck and back pain/sciatica, ?HTN, and history of COVID infection x2 on 2LNC supplemental oxygen at night, and anxiety/depression with previous SI attempt (~13 years ago). Today, patient comes in due to anxiety - stating that she has many stressors in her life, most notably living with a roommate that she provides care for and working in order to provide for herself and her children and grandchild. Patient states that she has constant worry that is difficult to control and that her mind constantly wanders, \"can't stop thinking\". She notes occasional panic attacks described by difficulty breathing and palpitations, has tried Atarax in the past without aid. Patient previously seen by [facility] services (around 2006) but has not been able to follow up given insurance issues. She said she was previously on Prozac 50mg daily and Seroquel 25mg at night, which helped her sleep, and said that she was on the highest possible dose before which caused tardive dyskinesia. Currently denies any SI/HI, wishes or plans of hurting self or others. She states that she does not have a good support system currently, however, journals and is in the process of writing a book which she states provides relief. She was previously in therapy, which she enjoyed, and expresses that she is interested in going back to therapy.    She lives in [place] with a roommate, has 2 adult children and a grandchild in the area, is able to do " her activities of daily living independently, and works at the [employer] in [place]. She denies tobacco use, but reports that she drinks 1/2 pint of vodka daily and smokes methamphetamine daily - which she states she uses to relieve pain in her neck and back. Denies IV drug use. She has had multiple ED visits and hospitalizations due to alcohol use and states that she has significant withdrawal symptoms and a seizure related to alcohol use (around 8-10 years ago). She said that she previously went into rehab ([facility]) and was sober for 2 years, but has had multiple relapses since due to ongoing life stressors.     Of note, patient has been taking acetaminophen 1500 mg x 3-4 times daily for the last 3 to 4 months in order to address her ongoing neck and lower back pain.  She also reports having tried taking Tylenol along with 800 mg of ibuprofen without relief of symptoms.  She denies fever/chills, unintentional weight loss, night sweats, saddle anesthesia, urinary/bowel incontinence.    ROS:       Review of Systems   Constitutional: Positive for malaise/fatigue. Negative for chills and fever.   HENT: Positive for tinnitus. Negative for congestion and sore throat.    Eyes: Positive for blurred vision (wears glasses, unchanged). Negative for double vision.   Respiratory: Negative for cough, shortness of breath and wheezing.    Cardiovascular: Positive for palpitations. Negative for chest pain and leg swelling.   Gastrointestinal: Negative for abdominal pain, constipation, diarrhea, nausea and vomiting.   Genitourinary: Negative for dysuria and urgency.   Musculoskeletal: Positive for back pain and neck pain. Negative for falls.   Skin: Negative for itching and rash.   Neurological: Positive for tingling (fingers) and weakness. Negative for dizziness, tremors and headaches.   Psychiatric/Behavioral: Positive for substance abuse. Negative for depression, hallucinations and suicidal  "ideas. The patient is nervous/anxious.          Past Medical History:   Diagnosis Date   • Alcohol abuse    • Alcohol intoxication, with unspecified complication (HCC) 8/7/2013   • Alcoholism (HCC)    • Anxiety    • Backpain    • Bipolar disorder, unspecified (HCC)    • Bronchitis    • Drug abuse (HCC)     meth, crack cocaine, THC   • Hepatitis, alcoholic    • Hypertension     controlled   • Indigestion    • Infectious disease MRSA   • Liver disease     pancreatitis   • Pancreatitis chronic     Flare-up   • Pneumonia    • Psychiatric disorder     suicidal in past   • Renal disorder     kidney infection 1991   • Seizure (HCC)     7/8/2011   • Seizure disorder (HCC)    • Unspecified hemorrhagic conditions        Past Surgical History:   Procedure Laterality Date   • GASTROSCOPY  4/28/2015    Performed by Kevin Rendon M.D. at SURGERY [facility]       Family History   Problem Relation Age of Onset   • Lung Disease Mother    • Cancer Mother        Social History     Tobacco Use   • Smoking status: Never Smoker   • Smokeless tobacco: Never Used   Vaping Use   • Vaping Use: Never used   Substance Use Topics   • Alcohol use: Yes     Comment: Hx heavy drinking apint in a 24 hr period   • Drug use: Yes     Comment: meth/THC       Current Outpatient Medications   Medication Sig Dispense Refill   • QUEtiapine (SEROQUEL) 25 MG Tab Take 1 Tablet by mouth at bedtime. 30 Tablet 0     No current facility-administered medications for this visit.       Physical Exam:  /72 (BP Location: Left arm, Patient Position: Sitting, BP Cuff Size: Small adult)   Pulse (!) 120   Temp 36.8 °C (98.2 °F) (Temporal)   Ht 1.492 m (4' 10.74\")   Wt 58.2 kg (128 lb 3.2 oz)   LMP 02/28/2018   SpO2 95%   BMI 26.12 kg/m²   General: Well-developed, well-nourished female, not toxic or ill appearing, not in acute distress  HEENT: Conjuntiva and lids are within normal limits, no scleral icterus.  External auditory canals are within normal " limits with hearing grossly intact  Nasal mucosa is normal, no rhinorrhea. Oral pharynx is normal and free of exudate/erythema  Neck: Not rigid but limited ROM given pain, non-tender, no thyromegaly noted  Lungs: Clear to auscultation bilaterally with good respiratory effort. No wheezes, crackes or other adventitious breath sounds are appreciated   Heart: Regular, tachycardic, no murmurs/rubs/gallops noted  Abdomen: Not distended with normoactive bowel sounds, soft, no tenderness to palpation, no organomegaly appreciated, no ascites/caput medusae/spider angioma  MSK/Back:  Limited ROM to internal/external rotation of hips bilaterally due to pain, straight leg postivie on LLE. Tenderness to palpation over lumbar area, bilaterally  Extremites: Erythema of hands bilaterally, no edema or clubbing, no asterixis of outstretched hands  Psych: No SI/HI. Patient tearful, appears anxious    Assessment and Plan:     1. Alcohol use  2. Alcoholic hepatitis without ascites  3. Thrombocytopenia   Reports drinking 1/2 pint of vodka daily x5 years  Chart review shows patient has multiple ED visits/hospitalizations due to alcohol intoxication/withdrawal, alcohol induced pancreatitis, and acute encephalopathy likely related to alcohol use  She reports previously going to rehabilitation ([facility]) and was previously sober for 2 years, but has had relapses since due to ongoing life stressors and neck/lower back pain   Previous labs done while hospitalized (4/28/2022) consistent with chronic alcohol abuse such as leukopenia, macrocytic anemia, thrombocytopenia, AST//57,    -CAGE positive  -Encouraged rehabilitation given patient has previously had successful outcomes, however, patient reports that she is unable to going to rehab at this time given that she has a roommate whom she helps take care of. She also says that she is able to cut down/quit drinking on her own and further declined inpatient  rehabilitation.  -Advised patient that due to her ongoing alcohol use and excessive amount of acetaminophen intake, she is at increased risk of acute liver failure.  Explained to patient that due to this, it will be difficult to prescribe pain medication, specifically gabapentin, in order to aid with her sciatic pain. Encouraged patient to keep a log of her alcohol intake and to continue to decrease the amount of alcohol she drinks daily in order to prevent any further liver damage and be able to explore more treatment options  -ED precautions given  - For CBC, CMP    4. Acetaminophen abuse  Has been taking 1500 mg, 3-4 times daily for the last 3 to 4 months  Given amount of acetaminophen she has been taking, concerns of acute liver failure along with alcohol abuse.  ED precautions given  - ACETAMINOPHEN; Future    5. Methamphetamine abuse (HCC)  Reports smoking methamphetamine daily for the last 5 years.  Explained to patient the concerns of illicit drug use, and how there is a possibility that there may be other substances mixed into the drugs that she is using.  Also discussed that given her current use of methamphetamine, it will be difficult to appropriately address her symptoms and provide proper treatment.  Advised to also keep a log of her meth use and to continue to decrease the amount that she uses in order to provide appropriate care  - URINE DRUG SCREEN; Future    6. Anxiety  KENNETH-9: 14  Patient reports excessive anxiety and worry with difficulty control, feels fatigued and has sleep disturbances related to this.  Has had panic attacks with associated palpitations and difficulty breathing during these episodes.  Has tried Seroquel in the past, and Atarax without symptom relief  -Difficult to assess actual diagnosis of anxiety as this may be related to substance use.  Due to patient previously being on Seroquel 25 mg nightly, will restart this medication for her and assess how she does at follow-up visit.   Can consider restarting fluoxetine at a later time  - Referral for Individual Therapy    7. Neck pain  8. Chronic left-sided low back pain with left-sided sciatica  Pain is chronic, with no alarm/red flag signs or symptoms.  Will get imaging to further assess neck and lower back pain.  Advised conservative treatments for now such as heating/cooling pads and Voltaren gel.  Cannot give gabapentin at this time due to ongoing alcohol use, advised patient to cut down.  Will reassess at next visit  - DX-CERVICAL SPINE-2 OR 3 VIEWS; Future  - DX-LUMBAR SPINE-2 OR 3 VIEWS; Future    Return in about 1 week (around 6/30/2022) for Long.      Signed by: Margy Aparicio M.D.      Margy Aparicio M.D. PGY-1  [facility]

## 2022-06-29 ENCOUNTER — APPOINTMENT (OUTPATIENT)
Dept: INTERNAL MEDICINE | Facility: OTHER | Age: 52
End: 2022-06-29
Payer: COMMERCIAL

## 2022-07-06 ENCOUNTER — APPOINTMENT (OUTPATIENT)
Dept: INTERNAL MEDICINE | Facility: OTHER | Age: 52
End: 2022-07-06
Payer: COMMERCIAL

## 2022-09-26 ENCOUNTER — APPOINTMENT (OUTPATIENT)
Dept: RADIOLOGY | Facility: MEDICAL CENTER | Age: 52
DRG: 896 | End: 2022-09-26
Attending: STUDENT IN AN ORGANIZED HEALTH CARE EDUCATION/TRAINING PROGRAM
Payer: COMMERCIAL

## 2022-09-26 ENCOUNTER — APPOINTMENT (OUTPATIENT)
Dept: RADIOLOGY | Facility: MEDICAL CENTER | Age: 52
DRG: 896 | End: 2022-09-26
Attending: EMERGENCY MEDICINE
Payer: COMMERCIAL

## 2022-09-26 ENCOUNTER — HOSPITAL ENCOUNTER (INPATIENT)
Facility: MEDICAL CENTER | Age: 52
LOS: 8 days | DRG: 896 | End: 2022-10-04
Attending: EMERGENCY MEDICINE | Admitting: STUDENT IN AN ORGANIZED HEALTH CARE EDUCATION/TRAINING PROGRAM
Payer: COMMERCIAL

## 2022-09-26 DIAGNOSIS — F10.920 ALCOHOLIC INTOXICATION WITHOUT COMPLICATION (HCC): ICD-10-CM

## 2022-09-26 DIAGNOSIS — F10.930 ALCOHOL WITHDRAWAL SYNDROME WITHOUT COMPLICATION (HCC): ICD-10-CM

## 2022-09-26 DIAGNOSIS — K85.20 ALCOHOL-INDUCED ACUTE PANCREATITIS WITHOUT INFECTION OR NECROSIS: ICD-10-CM

## 2022-09-26 DIAGNOSIS — J96.21 ACUTE ON CHRONIC RESPIRATORY FAILURE WITH HYPOXIA (HCC): ICD-10-CM

## 2022-09-26 DIAGNOSIS — I10 ESSENTIAL HYPERTENSION: ICD-10-CM

## 2022-09-26 DIAGNOSIS — T14.91XA SUICIDAL BEHAVIOR WITH ATTEMPTED SELF-INJURY (HCC): ICD-10-CM

## 2022-09-26 PROBLEM — F10.931 DELIRIUM, WITHDRAWAL, ALCOHOLIC (HCC): Status: ACTIVE | Noted: 2022-09-26

## 2022-09-26 LAB
ALBUMIN SERPL BCP-MCNC: 4.3 G/DL (ref 3.2–4.9)
ALBUMIN/GLOB SERPL: 1 G/DL
ALP SERPL-CCNC: 182 U/L (ref 30–99)
ALT SERPL-CCNC: 32 U/L (ref 2–50)
AMPHET UR QL SCN: POSITIVE
ANION GAP SERPL CALC-SCNC: 17 MMOL/L (ref 7–16)
APPEARANCE UR: CLEAR
AST SERPL-CCNC: 92 U/L (ref 12–45)
BACTERIA #/AREA URNS HPF: NEGATIVE /HPF
BARBITURATES UR QL SCN: NEGATIVE
BASOPHILS # BLD AUTO: 1.6 % (ref 0–1.8)
BASOPHILS # BLD: 0.07 K/UL (ref 0–0.12)
BENZODIAZ UR QL SCN: NEGATIVE
BILIRUB SERPL-MCNC: 0.4 MG/DL (ref 0.1–1.5)
BILIRUB UR QL STRIP.AUTO: NEGATIVE
BUN SERPL-MCNC: 9 MG/DL (ref 8–22)
BZE UR QL SCN: NEGATIVE
CALCIUM SERPL-MCNC: 8.9 MG/DL (ref 8.5–10.5)
CANNABINOIDS UR QL SCN: NEGATIVE
CHLORIDE SERPL-SCNC: 96 MMOL/L (ref 96–112)
CHOLEST SERPL-MCNC: 348 MG/DL (ref 100–199)
CK SERPL-CCNC: 97 U/L (ref 0–154)
CO2 SERPL-SCNC: 23 MMOL/L (ref 20–33)
COLOR UR: YELLOW
CREAT SERPL-MCNC: 0.32 MG/DL (ref 0.5–1.4)
EOSINOPHIL # BLD AUTO: 0.03 K/UL (ref 0–0.51)
EOSINOPHIL NFR BLD: 0.7 % (ref 0–6.9)
EPI CELLS #/AREA URNS HPF: ABNORMAL /HPF
ERYTHROCYTE [DISTWIDTH] IN BLOOD BY AUTOMATED COUNT: 50.5 FL (ref 35.9–50)
FLUAV RNA SPEC QL NAA+PROBE: NEGATIVE
FLUBV RNA SPEC QL NAA+PROBE: NEGATIVE
GFR SERPLBLD CREATININE-BSD FMLA CKD-EPI: 124 ML/MIN/1.73 M 2
GLOBULIN SER CALC-MCNC: 4.2 G/DL (ref 1.9–3.5)
GLUCOSE SERPL-MCNC: 106 MG/DL (ref 65–99)
GLUCOSE UR STRIP.AUTO-MCNC: NEGATIVE MG/DL
GRAN CASTS #/AREA URNS LPF: ABNORMAL /LPF
HCT VFR BLD AUTO: 31.7 % (ref 37–47)
HDLC SERPL-MCNC: 141 MG/DL
HGB BLD-MCNC: 9.6 G/DL (ref 12–16)
HYALINE CASTS #/AREA URNS LPF: ABNORMAL /LPF
IMM GRANULOCYTES # BLD AUTO: 0.03 K/UL (ref 0–0.11)
IMM GRANULOCYTES NFR BLD AUTO: 0.7 % (ref 0–0.9)
KETONES UR STRIP.AUTO-MCNC: NEGATIVE MG/DL
LACTATE SERPL-SCNC: 1.7 MMOL/L (ref 0.5–2)
LDLC SERPL CALC-MCNC: 187 MG/DL
LEUKOCYTE ESTERASE UR QL STRIP.AUTO: NEGATIVE
LIPASE SERPL-CCNC: 163 U/L (ref 11–82)
LYMPHOCYTES # BLD AUTO: 1.56 K/UL (ref 1–4.8)
LYMPHOCYTES NFR BLD: 35.8 % (ref 22–41)
MAGNESIUM SERPL-MCNC: 1.7 MG/DL (ref 1.5–2.5)
MCH RBC QN AUTO: 28 PG (ref 27–33)
MCHC RBC AUTO-ENTMCNC: 30.3 G/DL (ref 33.6–35)
MCV RBC AUTO: 92.4 FL (ref 81.4–97.8)
METHADONE UR QL SCN: NEGATIVE
MICRO URNS: ABNORMAL
MONOCYTES # BLD AUTO: 0.49 K/UL (ref 0–0.85)
MONOCYTES NFR BLD AUTO: 11.2 % (ref 0–13.4)
NEUTROPHILS # BLD AUTO: 2.18 K/UL (ref 2–7.15)
NEUTROPHILS NFR BLD: 50 % (ref 44–72)
NITRITE UR QL STRIP.AUTO: NEGATIVE
NRBC # BLD AUTO: 0 K/UL
NRBC BLD-RTO: 0 /100 WBC
OPIATES UR QL SCN: NEGATIVE
OXYCODONE UR QL SCN: NEGATIVE
PCP UR QL SCN: NEGATIVE
PH UR STRIP.AUTO: 5.5 [PH] (ref 5–8)
PHOSPHATE SERPL-MCNC: 4.1 MG/DL (ref 2.5–4.5)
PLATELET # BLD AUTO: 81 K/UL (ref 164–446)
PMV BLD AUTO: 9.4 FL (ref 9–12.9)
POC BREATHALIZER: 0.36 PERCENT (ref 0–0.01)
POTASSIUM SERPL-SCNC: 4.1 MMOL/L (ref 3.6–5.5)
PROPOXYPH UR QL SCN: NEGATIVE
PROT SERPL-MCNC: 8.5 G/DL (ref 6–8.2)
PROT UR QL STRIP: NEGATIVE MG/DL
RBC # BLD AUTO: 3.43 M/UL (ref 4.2–5.4)
RBC # URNS HPF: ABNORMAL /HPF
RBC UR QL AUTO: ABNORMAL
RSV RNA SPEC QL NAA+PROBE: NEGATIVE
SARS-COV-2 RNA RESP QL NAA+PROBE: NOTDETECTED
SODIUM SERPL-SCNC: 136 MMOL/L (ref 135–145)
SP GR UR STRIP.AUTO: 1.02
SPECIMEN SOURCE: NORMAL
TRIGL SERPL-MCNC: 99 MG/DL (ref 0–149)
UROBILINOGEN UR STRIP.AUTO-MCNC: 0.2 MG/DL
WBC # BLD AUTO: 4.4 K/UL (ref 4.8–10.8)
WBC #/AREA URNS HPF: ABNORMAL /HPF

## 2022-09-26 PROCEDURE — 700111 HCHG RX REV CODE 636 W/ 250 OVERRIDE (IP): Performed by: STUDENT IN AN ORGANIZED HEALTH CARE EDUCATION/TRAINING PROGRAM

## 2022-09-26 PROCEDURE — 80053 COMPREHEN METABOLIC PANEL: CPT

## 2022-09-26 PROCEDURE — 84100 ASSAY OF PHOSPHORUS: CPT

## 2022-09-26 PROCEDURE — 99223 1ST HOSP IP/OBS HIGH 75: CPT | Performed by: STUDENT IN AN ORGANIZED HEALTH CARE EDUCATION/TRAINING PROGRAM

## 2022-09-26 PROCEDURE — 83605 ASSAY OF LACTIC ACID: CPT

## 2022-09-26 PROCEDURE — C9803 HOPD COVID-19 SPEC COLLECT: HCPCS | Performed by: EMERGENCY MEDICINE

## 2022-09-26 PROCEDURE — 70450 CT HEAD/BRAIN W/O DYE: CPT

## 2022-09-26 PROCEDURE — 82550 ASSAY OF CK (CPK): CPT

## 2022-09-26 PROCEDURE — 770020 HCHG ROOM/CARE - TELE (206)

## 2022-09-26 PROCEDURE — 96375 TX/PRO/DX INJ NEW DRUG ADDON: CPT

## 2022-09-26 PROCEDURE — 700111 HCHG RX REV CODE 636 W/ 250 OVERRIDE (IP): Performed by: EMERGENCY MEDICINE

## 2022-09-26 PROCEDURE — 71045 X-RAY EXAM CHEST 1 VIEW: CPT

## 2022-09-26 PROCEDURE — 96365 THER/PROPH/DIAG IV INF INIT: CPT

## 2022-09-26 PROCEDURE — 99285 EMERGENCY DEPT VISIT HI MDM: CPT

## 2022-09-26 PROCEDURE — A9270 NON-COVERED ITEM OR SERVICE: HCPCS | Performed by: STUDENT IN AN ORGANIZED HEALTH CARE EDUCATION/TRAINING PROGRAM

## 2022-09-26 PROCEDURE — 36415 COLL VENOUS BLD VENIPUNCTURE: CPT

## 2022-09-26 PROCEDURE — A9270 NON-COVERED ITEM OR SERVICE: HCPCS | Performed by: EMERGENCY MEDICINE

## 2022-09-26 PROCEDURE — 700105 HCHG RX REV CODE 258: Performed by: STUDENT IN AN ORGANIZED HEALTH CARE EDUCATION/TRAINING PROGRAM

## 2022-09-26 PROCEDURE — 700102 HCHG RX REV CODE 250 W/ 637 OVERRIDE(OP): Performed by: STUDENT IN AN ORGANIZED HEALTH CARE EDUCATION/TRAINING PROGRAM

## 2022-09-26 PROCEDURE — 80307 DRUG TEST PRSMV CHEM ANLYZR: CPT

## 2022-09-26 PROCEDURE — 0241U HCHG SARS-COV-2 COVID-19 NFCT DS RESP RNA 4 TRGT MIC: CPT

## 2022-09-26 PROCEDURE — 85025 COMPLETE CBC W/AUTO DIFF WBC: CPT

## 2022-09-26 PROCEDURE — 96376 TX/PRO/DX INJ SAME DRUG ADON: CPT

## 2022-09-26 PROCEDURE — 96366 THER/PROPH/DIAG IV INF ADDON: CPT

## 2022-09-26 PROCEDURE — 700105 HCHG RX REV CODE 258: Performed by: EMERGENCY MEDICINE

## 2022-09-26 PROCEDURE — 700102 HCHG RX REV CODE 250 W/ 637 OVERRIDE(OP): Performed by: EMERGENCY MEDICINE

## 2022-09-26 PROCEDURE — 83690 ASSAY OF LIPASE: CPT

## 2022-09-26 PROCEDURE — 81001 URINALYSIS AUTO W/SCOPE: CPT

## 2022-09-26 PROCEDURE — 51701 INSERT BLADDER CATHETER: CPT

## 2022-09-26 PROCEDURE — 83735 ASSAY OF MAGNESIUM: CPT

## 2022-09-26 PROCEDURE — HZ2ZZZZ DETOXIFICATION SERVICES FOR SUBSTANCE ABUSE TREATMENT: ICD-10-PCS | Performed by: STUDENT IN AN ORGANIZED HEALTH CARE EDUCATION/TRAINING PROGRAM

## 2022-09-26 PROCEDURE — 700101 HCHG RX REV CODE 250: Performed by: EMERGENCY MEDICINE

## 2022-09-26 PROCEDURE — 302970 POC BREATHALIZER: Performed by: EMERGENCY MEDICINE

## 2022-09-26 PROCEDURE — 80061 LIPID PANEL: CPT

## 2022-09-26 RX ORDER — AMOXICILLIN 250 MG
2 CAPSULE ORAL 2 TIMES DAILY
Status: DISCONTINUED | OUTPATIENT
Start: 2022-09-26 | End: 2022-10-04 | Stop reason: HOSPADM

## 2022-09-26 RX ORDER — SODIUM CHLORIDE 9 MG/ML
1000 INJECTION, SOLUTION INTRAVENOUS ONCE
Status: COMPLETED | OUTPATIENT
Start: 2022-09-26 | End: 2022-09-26

## 2022-09-26 RX ORDER — SODIUM CHLORIDE, SODIUM LACTATE, POTASSIUM CHLORIDE, CALCIUM CHLORIDE 600; 310; 30; 20 MG/100ML; MG/100ML; MG/100ML; MG/100ML
INJECTION, SOLUTION INTRAVENOUS CONTINUOUS
Status: DISCONTINUED | OUTPATIENT
Start: 2022-09-26 | End: 2022-09-27

## 2022-09-26 RX ORDER — ONDANSETRON 2 MG/ML
4 INJECTION INTRAMUSCULAR; INTRAVENOUS EVERY 4 HOURS PRN
Status: DISCONTINUED | OUTPATIENT
Start: 2022-09-26 | End: 2022-10-04 | Stop reason: HOSPADM

## 2022-09-26 RX ORDER — LORAZEPAM 0.5 MG/1
0.5 TABLET ORAL EVERY 4 HOURS PRN
Status: DISCONTINUED | OUTPATIENT
Start: 2022-09-26 | End: 2022-09-27 | Stop reason: ALTCHOICE

## 2022-09-26 RX ORDER — ONDANSETRON 2 MG/ML
4 INJECTION INTRAMUSCULAR; INTRAVENOUS ONCE
Status: COMPLETED | OUTPATIENT
Start: 2022-09-26 | End: 2022-09-26

## 2022-09-26 RX ORDER — PHENOBARBITAL SODIUM 130 MG/ML
130 INJECTION, SOLUTION INTRAMUSCULAR; INTRAVENOUS ONCE
Status: COMPLETED | OUTPATIENT
Start: 2022-09-26 | End: 2022-09-26

## 2022-09-26 RX ORDER — ACETAMINOPHEN 325 MG/1
650 TABLET ORAL ONCE
Status: COMPLETED | OUTPATIENT
Start: 2022-09-26 | End: 2022-09-26

## 2022-09-26 RX ORDER — CHLORDIAZEPOXIDE HYDROCHLORIDE 25 MG/1
25 CAPSULE, GELATIN COATED ORAL ONCE
Status: DISCONTINUED | OUTPATIENT
Start: 2022-09-26 | End: 2022-09-26

## 2022-09-26 RX ORDER — FOLIC ACID 1 MG/1
1 TABLET ORAL DAILY
Status: COMPLETED | OUTPATIENT
Start: 2022-09-26 | End: 2022-09-29

## 2022-09-26 RX ORDER — IPRATROPIUM BROMIDE AND ALBUTEROL SULFATE 2.5; .5 MG/3ML; MG/3ML
3 SOLUTION RESPIRATORY (INHALATION)
Status: DISCONTINUED | OUTPATIENT
Start: 2022-09-26 | End: 2022-10-04 | Stop reason: HOSPADM

## 2022-09-26 RX ORDER — POLYETHYLENE GLYCOL 3350 17 G/17G
1 POWDER, FOR SOLUTION ORAL
Status: DISCONTINUED | OUTPATIENT
Start: 2022-09-26 | End: 2022-10-04 | Stop reason: HOSPADM

## 2022-09-26 RX ORDER — GAUZE BANDAGE 2" X 2"
100 BANDAGE TOPICAL DAILY
Status: COMPLETED | OUTPATIENT
Start: 2022-09-26 | End: 2022-09-29

## 2022-09-26 RX ORDER — LORAZEPAM 2 MG/1
2 TABLET ORAL
Status: DISCONTINUED | OUTPATIENT
Start: 2022-09-26 | End: 2022-09-27 | Stop reason: ALTCHOICE

## 2022-09-26 RX ORDER — DIAZEPAM 5 MG/1
5 TABLET ORAL EVERY 6 HOURS PRN
Status: DISCONTINUED | OUTPATIENT
Start: 2022-09-26 | End: 2022-09-27

## 2022-09-26 RX ORDER — PROMETHAZINE HYDROCHLORIDE 25 MG/1
12.5-25 SUPPOSITORY RECTAL EVERY 4 HOURS PRN
Status: DISCONTINUED | OUTPATIENT
Start: 2022-09-26 | End: 2022-10-04 | Stop reason: HOSPADM

## 2022-09-26 RX ORDER — CHLORDIAZEPOXIDE HYDROCHLORIDE 25 MG/1
25 CAPSULE, GELATIN COATED ORAL 3 TIMES DAILY PRN
Qty: 30 CAPSULE | Refills: 0 | Status: SHIPPED | OUTPATIENT
Start: 2022-09-26 | End: 2022-10-04

## 2022-09-26 RX ORDER — LABETALOL HYDROCHLORIDE 5 MG/ML
10 INJECTION, SOLUTION INTRAVENOUS EVERY 4 HOURS PRN
Status: DISCONTINUED | OUTPATIENT
Start: 2022-09-26 | End: 2022-10-04 | Stop reason: HOSPADM

## 2022-09-26 RX ORDER — BISACODYL 10 MG
10 SUPPOSITORY, RECTAL RECTAL
Status: DISCONTINUED | OUTPATIENT
Start: 2022-09-26 | End: 2022-10-04 | Stop reason: HOSPADM

## 2022-09-26 RX ORDER — HEPARIN SODIUM 5000 [USP'U]/ML
5000 INJECTION, SOLUTION INTRAVENOUS; SUBCUTANEOUS EVERY 8 HOURS
Status: DISCONTINUED | OUTPATIENT
Start: 2022-09-27 | End: 2022-09-26

## 2022-09-26 RX ORDER — ONDANSETRON 4 MG/1
4 TABLET, ORALLY DISINTEGRATING ORAL EVERY 4 HOURS PRN
Status: DISCONTINUED | OUTPATIENT
Start: 2022-09-26 | End: 2022-10-04 | Stop reason: HOSPADM

## 2022-09-26 RX ORDER — PROCHLORPERAZINE EDISYLATE 5 MG/ML
5-10 INJECTION INTRAMUSCULAR; INTRAVENOUS EVERY 4 HOURS PRN
Status: DISCONTINUED | OUTPATIENT
Start: 2022-09-26 | End: 2022-10-04 | Stop reason: HOSPADM

## 2022-09-26 RX ORDER — LORAZEPAM 1 MG/1
1 TABLET ORAL EVERY 4 HOURS PRN
Status: DISCONTINUED | OUTPATIENT
Start: 2022-09-26 | End: 2022-09-27 | Stop reason: ALTCHOICE

## 2022-09-26 RX ORDER — PROMETHAZINE HYDROCHLORIDE 25 MG/1
12.5-25 TABLET ORAL EVERY 4 HOURS PRN
Status: DISCONTINUED | OUTPATIENT
Start: 2022-09-26 | End: 2022-10-04 | Stop reason: HOSPADM

## 2022-09-26 RX ADMIN — THIAMINE HCL TAB 100 MG 100 MG: 100 TAB at 13:19

## 2022-09-26 RX ADMIN — ONDANSETRON 4 MG: 2 INJECTION INTRAMUSCULAR; INTRAVENOUS at 08:19

## 2022-09-26 RX ADMIN — SODIUM CHLORIDE, POTASSIUM CHLORIDE, SODIUM LACTATE AND CALCIUM CHLORIDE: 600; 310; 30; 20 INJECTION, SOLUTION INTRAVENOUS at 15:32

## 2022-09-26 RX ADMIN — LORAZEPAM 2 MG: 2 TABLET ORAL at 18:56

## 2022-09-26 RX ADMIN — DIAZEPAM 5 MG: 5 TABLET ORAL at 10:26

## 2022-09-26 RX ADMIN — SODIUM CHLORIDE 1000 ML: 9 INJECTION, SOLUTION INTRAVENOUS at 08:19

## 2022-09-26 RX ADMIN — THIAMINE HYDROCHLORIDE: 100 INJECTION, SOLUTION INTRAMUSCULAR; INTRAVENOUS at 13:23

## 2022-09-26 RX ADMIN — LORAZEPAM 2 MG: 2 TABLET ORAL at 16:13

## 2022-09-26 RX ADMIN — FOLIC ACID 1 MG: 1 TABLET ORAL at 13:19

## 2022-09-26 RX ADMIN — LORAZEPAM 3 MG: 2 TABLET ORAL at 21:08

## 2022-09-26 RX ADMIN — PHENOBARBITAL SODIUM 130 MG: 130 INJECTION INTRAMUSCULAR; INTRAVENOUS at 13:23

## 2022-09-26 RX ADMIN — ACETAMINOPHEN 650 MG: 325 TABLET, FILM COATED ORAL at 09:09

## 2022-09-26 RX ADMIN — LORAZEPAM 3 MG: 2 TABLET ORAL at 23:00

## 2022-09-26 RX ADMIN — ONDANSETRON 4 MG: 2 INJECTION INTRAMUSCULAR; INTRAVENOUS at 13:23

## 2022-09-26 RX ADMIN — THERA TABS 1 TABLET: TAB at 13:19

## 2022-09-26 ASSESSMENT — LIFESTYLE VARIABLES
PAROXYSMAL SWEATS: BARELY PERCEPTIBLE SWEATING. PALMS MOIST
VISUAL DISTURBANCES: NOT PRESENT
TREMOR: *
TOTAL SCORE: 19
AGITATION: SOMEWHAT MORE THAN NORMAL ACTIVITY
TOTAL SCORE: 13
HEADACHE, FULLNESS IN HEAD: MODERATELY SEVERE
AGITATION: SOMEWHAT MORE THAN NORMAL ACTIVITY
VISUAL DISTURBANCES: NOT PRESENT
ANXIETY: MODERATELY ANXIOUS OR GUARDED, SO ANXIETY IS INFERRED
TOTAL SCORE: VERY MILD ITCHING, PINS AND NEEDLES SENSATION, BURNING OR NUMBNESS
TREMOR: *
VISUAL DISTURBANCES: NOT PRESENT
AGITATION: NORMAL ACTIVITY
NAUSEA AND VOMITING: *
TREMOR: *
HEADACHE, FULLNESS IN HEAD: MODERATELY SEVERE
TREMOR: *
PAROXYSMAL SWEATS: *
HEADACHE, FULLNESS IN HEAD: MODERATE
HEADACHE, FULLNESS IN HEAD: MODERATELY SEVERE
ORIENTATION AND CLOUDING OF SENSORIUM: ORIENTED AND CAN DO SERIAL ADDITIONS
NAUSEA AND VOMITING: *
NAUSEA AND VOMITING: *
PAROXYSMAL SWEATS: BARELY PERCEPTIBLE SWEATING. PALMS MOIST
AGITATION: SOMEWHAT MORE THAN NORMAL ACTIVITY
TOTAL SCORE: 13
VISUAL DISTURBANCES: NOT PRESENT
ANXIETY: MILDLY ANXIOUS
TOTAL SCORE: VERY MILD ITCHING, PINS AND NEEDLES SENSATION, BURNING OR NUMBNESS
AUDITORY DISTURBANCES: NOT PRESENT
ANXIETY: MODERATELY ANXIOUS OR GUARDED, SO ANXIETY IS INFERRED
TOTAL SCORE: 22
ANXIETY: MILDLY ANXIOUS
ORIENTATION AND CLOUDING OF SENSORIUM: DATE DISORIENTATION BY NO MORE THAN TWO CALENDAR DAYS
AUDITORY DISTURBANCES: NOT PRESENT
NAUSEA AND VOMITING: *
ORIENTATION AND CLOUDING OF SENSORIUM: DATE DISORIENTATION BY MORE THAN TWO CALENDAR DAYS
ORIENTATION AND CLOUDING OF SENSORIUM: ORIENTED AND CAN DO SERIAL ADDITIONS
AUDITORY DISTURBANCES: NOT PRESENT
PAROXYSMAL SWEATS: BARELY PERCEPTIBLE SWEATING. PALMS MOIST
AUDITORY DISTURBANCES: NOT PRESENT

## 2022-09-26 ASSESSMENT — ENCOUNTER SYMPTOMS
FEVER: 0
HEADACHES: 1
SHORTNESS OF BREATH: 0
DIZZINESS: 0
MYALGIAS: 0
BLURRED VISION: 0
BRUISES/BLEEDS EASILY: 0
VOMITING: 1
ABDOMINAL PAIN: 1
NERVOUS/ANXIOUS: 1
COUGH: 0
NAUSEA: 1

## 2022-09-26 ASSESSMENT — FIBROSIS 4 INDEX
FIB4 SCORE: 10.84
FIB4 SCORE: 20.12

## 2022-09-26 NOTE — ED NOTES
Med rec completed per pt's home pharmacy ([facility])   No PO antibiotics in the last 30 days    Per [facility] pt last filled Seroquel 25 mg nightly in June 2022 for a 30 days supply

## 2022-09-26 NOTE — ASSESSMENT & PLAN NOTE
chronically on 2 L home oxygen since her COVID infection  Continue to  monitor oxygen saturation closely  Incentive spirometry  Continue DuoNeb

## 2022-09-26 NOTE — ED NOTES
1:1 continuous sitter ordered by Dr. Connors. Pt continuing to deny SI/HI. Continuous visual monitoring by Trained Personnel initiated. Sitter has unobstructed view of patient at all times. Discussion with sitter about patient care, safety, and support.

## 2022-09-26 NOTE — ASSESSMENT & PLAN NOTE
Alcohol withdrawal  -Cardiac monitoring  -Oxygen 2 L/m nasal cannula   -On CIWA protocol, on valium standing dose  -Thiamine/folate  -aspiration and fall precautions

## 2022-09-26 NOTE — ASSESSMENT & PLAN NOTE
Lipase 160+ on admission with nausea and vomiting.   Completed IVF  Antiemetics   Multimodal Pain control  Lipase trending down  Abd CT and US RUQ reviewed, no acute intraabdominal process

## 2022-09-26 NOTE — ED NOTES
Pt found to be incontinent of urine. Pt cleaned and full linen change provided. Pt placed on purewick. Pt medicated as per MAR.

## 2022-09-26 NOTE — ED PROVIDER NOTES
"ED Provider Note    CHIEF COMPLAINT  Chief Complaint   Patient presents with    Headache     Base of the skull, for the last 4 days    Urinating Blood     For 2 days    Nausea     Last emesis 9/25    Suicidal Ideation     States she could take multiple Seroquel to just \"get it over with\". \"I am just done with feeling this way, I just don't want to do it any more\".       HPI  Olya Youssef is a 52 y.o. female who presents with nausea, headache, vomiting, abdominal pain and bloody urine.  Patient has a history of alcohol abuse.  She has been drinking heavily.  Reports she stopped for couple days and felt very tremulous.  She even just tried to cut down 2 days ago and reports she had a seizure.  She states she bit her tongue during the seizures.  Since then she has had a headache.  This is located at the back of the head and radiates throughout.  No associated neck stiffness fever or thunderclap.  She has not had cough, congestion, runny nose or sore throat.  As mentioned she has had nausea vomited yesterday.  No vomiting today.  No hematemesis hematochezia melena.  She feels depressed because of all of her alcohol use.  She has had fleeting thoughts of overdose but she states she is not suicidal and would never hurt herself.  She wants to get off alcohol.    REVIEW OF SYSTEMS  As per HPI, otherwise a 10 point review of systems is negative    PAST MEDICAL HISTORY  Past Medical History:   Diagnosis Date    Alcohol abuse     Alcohol intoxication, with unspecified complication (HCC) 8/7/2013    Alcoholism (HCC)     Anxiety     Backpain     Bipolar disorder, unspecified (HCC)     Bronchitis     Drug abuse (HCC)     meth, crack cocaine, THC    Hepatitis, alcoholic     Hypertension     controlled    Indigestion     Infectious disease MRSA    Liver disease     pancreatitis    Pancreatitis chronic     Flare-up    Pneumonia     Psychiatric disorder     suicidal in past    Renal disorder     kidney infection 1991    " "Seizure (HCC)     7/8/2011    Seizure disorder (HCC)     Unspecified hemorrhagic conditions        SOCIAL HISTORY  Social History     Tobacco Use    Smoking status: Never    Smokeless tobacco: Never   Vaping Use    Vaping Use: Never used   Substance Use Topics    Alcohol use: Yes     Comment: Hx heavy drinking apint in a 24 hr period    Drug use: Yes     Comment: meth/THC       SURGICAL HISTORY  Past Surgical History:   Procedure Laterality Date    GASTROSCOPY  4/28/2015    Performed by Kevin Rendon M.D. [facility]       CURRENT MEDICATIONS  Home Medications    **Home medications have not yet been reviewed for this encounter**         ALLERGIES  Allergies   Allergen Reactions    Bactrim Hives    Lamotrigine [Lamictal] Hives    Quetiapine Fumarate Hives     Extrapyramidal Symptoms    Keflex Hives       PHYSICAL EXAM  VITAL SIGNS: BP (!) 151/68   Pulse (!) 102   Temp 36.7 °C (98.1 °F) (Temporal)   Resp 18   Ht 1.473 m (4' 10\")   Wt 58.9 kg (129 lb 13.6 oz)   LMP 02/28/2018   SpO2 97%   BMI 27.14 kg/m²    Constitutional: Low deliberate speech although anxious  HENT: Normal inspection.  Small injury right side of the tongue noted  Eyes: Normal inspection  Neck: Grossly normal range of motion.  Cardiovascular: Normal heart rate, Normal rhythm.  Symmetric peripheral pulses.   Thorax & Lungs: No respiratory distress, No wheezing, No rales, No rhonchi, No chest tenderness.   Abdomen: Bowel sounds normal, soft, non-distended, mild upper abdominal tenderness  Skin: No obvious rash.  Back: No tenderness, No CVA tenderness.   Extremities: No clubbing, cyanosis, edema, no Homans or cords.  Neurologic:  tremor no focal findings    RADIOLOGY/PROCEDURES  CT-HEAD W/O   Final Result      No acute intracranial abnormality.              Imaging is interpreted by radiologist    Labs:  Results for orders placed or performed during the hospital encounter of 09/26/22   CBC WITH DIFFERENTIAL   Result Value Ref " Range    WBC 4.4 (L) 4.8 - 10.8 K/uL    RBC 3.43 (L) 4.20 - 5.40 M/uL    Hemoglobin 9.6 (L) 12.0 - 16.0 g/dL    Hematocrit 31.7 (L) 37.0 - 47.0 %    MCV 92.4 81.4 - 97.8 fL    MCH 28.0 27.0 - 33.0 pg    MCHC 30.3 (L) 33.6 - 35.0 g/dL    RDW 50.5 (H) 35.9 - 50.0 fL    Platelet Count 81 (L) 164 - 446 K/uL    MPV 9.4 9.0 - 12.9 fL    Neutrophils-Polys 50.00 44.00 - 72.00 %    Lymphocytes 35.80 22.00 - 41.00 %    Monocytes 11.20 0.00 - 13.40 %    Eosinophils 0.70 0.00 - 6.90 %    Basophils 1.60 0.00 - 1.80 %    Immature Granulocytes 0.70 0.00 - 0.90 %    Nucleated RBC 0.00 /100 WBC    Neutrophils (Absolute) 2.18 2.00 - 7.15 K/uL    Lymphs (Absolute) 1.56 1.00 - 4.80 K/uL    Monos (Absolute) 0.49 0.00 - 0.85 K/uL    Eos (Absolute) 0.03 0.00 - 0.51 K/uL    Baso (Absolute) 0.07 0.00 - 0.12 K/uL    Immature Granulocytes (abs) 0.03 0.00 - 0.11 K/uL    NRBC (Absolute) 0.00 K/uL   COMP METABOLIC PANEL   Result Value Ref Range    Sodium 136 135 - 145 mmol/L    Potassium 4.1 3.6 - 5.5 mmol/L    Chloride 96 96 - 112 mmol/L    Co2 23 20 - 33 mmol/L    Anion Gap 17.0 (H) 7.0 - 16.0    Glucose 106 (H) 65 - 99 mg/dL    Bun 9 8 - 22 mg/dL    Creatinine 0.32 (L) 0.50 - 1.40 mg/dL    Calcium 8.9 8.5 - 10.5 mg/dL    AST(SGOT) 92 (H) 12 - 45 U/L    ALT(SGPT) 32 2 - 50 U/L    Alkaline Phosphatase 182 (H) 30 - 99 U/L    Total Bilirubin 0.4 0.1 - 1.5 mg/dL    Albumin 4.3 3.2 - 4.9 g/dL    Total Protein 8.5 (H) 6.0 - 8.2 g/dL    Globulin 4.2 (H) 1.9 - 3.5 g/dL    A-G Ratio 1.0 g/dL   LIPASE   Result Value Ref Range    Lipase 163 (H) 11 - 82 U/L   CoV-2, FLU A/B, and RSV by PCR (2-4 Hours CEPHEID) : Collect NP swab in VTM    Specimen: Respirate   Result Value Ref Range    Influenza virus A RNA Negative Negative    Influenza virus B, PCR Negative Negative    RSV, PCR Negative Negative    SARS-CoV-2 by PCR NotDetected     SARS-CoV-2 Source NP Swab    ESTIMATED GFR   Result Value Ref Range    GFR (CKD-EPI) 124 >60 mL/min/1.73 m 2   POC  BREATHALIZER   Result Value Ref Range    POC Breathalizer 0.356 (A) 0.00 - 0.01 Percent       Medications   diazePAM (VALIUM) tablet 5 mg (5 mg Oral Given 9/26/22 1026)   PHENObarbital injection bolus 130 mg (has no administration in time range)   detox IV 1000 mL (D5LR + magnesium 1 g + thiamine 100 mg + folic acid 1 mg) infusion (has no administration in time range)   NS infusion 1,000 mL (0 mL Intravenous Stopped 9/26/22 0920)   ondansetron (ZOFRAN) syringe/vial injection 4 mg (4 mg Intravenous Given 9/26/22 0819)   acetaminophen (Tylenol) tablet 650 mg (650 mg Oral Given 9/26/22 0909)       Measures:  HYDRATION: Based on the patient's presentation of Tachycardia the patient was given IV fluids. IV Hydration was used because oral hydration was not adequate alone. Upon recheck following hydration, the patient was stable.    COURSE & MEDICAL DECISION MAKING  Patient presents with a number of complaints.  She is intoxicated on arrival but wants to stop.  States that she did not drink for 1 day and had 2 seizures.  She does have small injury to the right tongue consistent with this statement.  Nevertheless she did not appear to to require sedation initially.  She was nauseous she was given Zofran.  She was given Tylenol for her headache.  Hydrated with LR.  Given alcohol abuse there is a chance she has head injury and does not remember.  Obtain CT scan of the head.  Obtain laboratory data.    CT head was negative.  Laboratory data with elevated LFTs.  Mildly elevated lipase consistent with mild alcohol induced pancreatitis.  CBC pancytopenia likely depressed secondary to alcohol abuse.  Patient complained of anxiety and was given Valium.    Observed in the ER waiting for urinalysis.  Point-of-care urine does not show evidence of infection.  Await formal lab urine.    On reassessment the patient remains tachycardic.  I ordered a detox bag and will give phenobarbital.  Given ongoing tachycardia and report of alcohol  withdrawal seizure and statement that she is going to stop drinking I believe it would be safest for her to go through medical detox here in the hospital.  I paged hospitalist for admission.    FINAL IMPRESSION  1.  Acute alcohol withdrawal  2.  Alcohol withdrawal seizure, prior to arrival  3.  Pancytopenia secondary to alcohol abuse  4.  Mild alcohol induced pancreatitis      This dictation was created using voice recognition software. The accuracy of the dictation is limited to the abilities of the software.  The nursing notes were reviewed and certain aspects of this information were incorporated into this note.      Electronically signed by: Modesto Espinoza M.D., 9/26/2022 11:37 AM

## 2022-09-26 NOTE — H&P
"Hospital Medicine History & Physical Note    Date of Service  9/26/2022    Primary Care Physician  Margy Aparicio M.D.        Code Status  Full Code    Chief Complaint  Chief Complaint   Patient presents with    Headache     Base of the skull, for the last 4 days    Urinating Blood     For 2 days    Nausea     Last emesis 9/25    Suicidal Ideation     States she could take multiple Seroquel to just \"get it over with\". \"I am just done with feeling this way, I just don't want to do it any more\".       History of Presenting Illness  Olya Youssef is a 52 y.o. female with past medical history of alcohol abuse, alcohol-related seizure, methamphetamine abuse, bipolar disorder , chronically on 2 L oxygen who presented 9/26/2022 with presented with generalized tremor associated with frontal headache, abdominal pain, nausea and vomiting for the past 4 days.  She also reports of shortness of breath.  Denies fever, cough, chest pain, diarrhea.  Denies melena/hematemesis.  Denies any weakness/numbness, syncope.  Denies suicidal ideation to me.    Patient reports her last drink was yesterday.  Reports she is drinking to stop her tremor.    On arrival to ED patient remained tachycardic, hypertensive, on 2 L oxygen saturating over 90.  Labs noted with pancytopenia, low bicarbonate glucose 106, elevated AST, lipase 163.  CT head negative for acute stroke/hemorrhage.      Patient admitted for management of acute pancreatitis and alcohol withdrawal.    I discussed the plan of care with patient.    Review of Systems  Review of Systems   Constitutional:  Positive for malaise/fatigue. Negative for fever.   HENT:  Negative for hearing loss.    Eyes:  Negative for blurred vision.   Respiratory:  Negative for cough and shortness of breath.    Cardiovascular:  Negative for chest pain.   Gastrointestinal:  Positive for abdominal pain, nausea and vomiting.   Genitourinary:  Negative for dysuria.   Musculoskeletal:  " Positive for joint pain. Negative for myalgias.   Skin:  Negative for rash.   Neurological:  Positive for headaches. Negative for dizziness.   Endo/Heme/Allergies:  Does not bruise/bleed easily.   Psychiatric/Behavioral:  Negative for suicidal ideas. The patient is nervous/anxious.      Past Medical History   has a past medical history of Alcohol abuse, Alcohol intoxication, with unspecified complication (HCC) (8/7/2013), Alcoholism (HCC), Anxiety, Backpain, Bipolar disorder, unspecified (HCC), Bronchitis, Drug abuse (HCC), Hepatitis, alcoholic, Hypertension, Indigestion, Infectious disease (MRSA), Liver disease, Pancreatitis chronic, Pneumonia, Psychiatric disorder, Renal disorder, Seizure (HCC), Seizure disorder (HCC), and Unspecified hemorrhagic conditions.    Surgical History   has a past surgical history that includes gastroscopy (4/28/2015).     Family History  family history includes Cancer in her mother; Lung Disease in her mother.   Family history reviewed with patient. There is no family history that is pertinent to the chief complaint.     Social History   reports that she has never smoked. She has never used smokeless tobacco. She reports current alcohol use. She reports current drug use.    Allergies  Allergies   Allergen Reactions    Bactrim Hives    Lamotrigine [Lamictal] Hives    Quetiapine Fumarate Hives     Extrapyramidal Symptoms    Keflex Hives       Medications  Prior to Admission Medications   Prescriptions Last Dose Informant Patient Reported? Taking?   QUEtiapine (SEROQUEL) 25 MG Tab   No No   Sig: Take 1 Tablet by mouth at bedtime.      Facility-Administered Medications: None       Physical Exam  Temp:  [36.7 °C (98.1 °F)] 36.7 °C (98.1 °F)  Pulse:  [102-120] 115  Resp:  [16-18] 16  BP: (127-165)/(68-93) 129/74  SpO2:  [94 %-99 %] 94 %  Blood Pressure: 129/74   Temperature: 36.7 °C (98.1 °F)   Pulse: (!) 115   Respiration: 16   Pulse Oximetry: 94 %       Physical Exam  Constitutional:        General: She is not in acute distress.     Appearance: She is toxic-appearing.   HENT:      Head: Normocephalic and atraumatic.      Right Ear: Tympanic membrane normal.      Left Ear: Tympanic membrane normal.      Nose: Nose normal.      Mouth/Throat:      Mouth: Mucous membranes are dry.   Eyes:      Extraocular Movements: Extraocular movements intact.      Pupils: Pupils are equal, round, and reactive to light.   Cardiovascular:      Rate and Rhythm: Normal rate and regular rhythm.      Pulses: Normal pulses.   Pulmonary:      Effort: Pulmonary effort is normal. No respiratory distress.   Abdominal:      General: Abdomen is flat. There is no distension.   Musculoskeletal:      Cervical back: Normal range of motion.      Right lower leg: No edema.      Left lower leg: No edema.   Skin:     General: Skin is warm.   Neurological:      General: No focal deficit present.      Mental Status: She is alert and oriented to person, place, and time. Mental status is at baseline.      Comments: noted generalized tremor   Psychiatric:         Mood and Affect: Mood normal.       Laboratory:  Recent Labs     09/26/22  0818   WBC 4.4*   RBC 3.43*   HEMOGLOBIN 9.6*   HEMATOCRIT 31.7*   MCV 92.4   MCH 28.0   MCHC 30.3*   RDW 50.5*   PLATELETCT 81*   MPV 9.4     Recent Labs     09/26/22  0818   SODIUM 136   POTASSIUM 4.1   CHLORIDE 96   CO2 23   GLUCOSE 106*   BUN 9   CREATININE 0.32*   CALCIUM 8.9     Recent Labs     09/26/22  0818   ALTSGPT 32   ASTSGOT 92*   ALKPHOSPHAT 182*   TBILIRUBIN 0.4   LIPASE 163*   GLUCOSE 106*         No results for input(s): NTPROBNP in the last 72 hours.      No results for input(s): TROPONINT in the last 72 hours.    Imaging:  CT-HEAD W/O   Final Result      No acute intracranial abnormality.         DX-CHEST-PORTABLE (1 VIEW)    (Results Pending)       X-Ray:  I have personally reviewed the images and compared with prior images.    Assessment/Plan:  Justification for Admission Status  I anticipate  this patient will require at least two midnights for appropriate medical management, necessitating inpatient admission because acute pancreatitis, alcohol withdrawal require close monitoring of electrolytes, aggressive IV fluid.        * Alcohol-induced acute pancreatitis  Assessment & Plan    -Ringer lactate at 150-200 ml/hr  -PPI   -Zofran 4 mg IVP Q 6 H PRN for nauseas or vomiting   -Pain control  -Serial abdominal exam  -CBC, CMP in AM  -Fasting lipids should be checked to r/o hypertriglyceridemia  -Abdominal US to evaluated biliary stone/obstruction.  -Consider CT Abdomen if the cause of pain in note clear, patient is not improving clinically after 3~4 days, clinical evidence of severe disease on presentation      Acute on chronic respiratory failure with hypoxia on home O2 therapy- (present on admission)  Assessment & Plan  chronically on 2 L home oxygen since her COVID infection  Continue to  monitor oxygen saturation closely  Incentive spirometry  Continue DuoNeb  Ambulatory O2 evaluation in a.m.      Alcohol dependence with withdrawal (HCC)- (present on admission)  Assessment & Plan  Alcohol withdrawal  -Cardiac monitoring  -Oxygen 2 L/m nasal cannula   -On [facility] protocol  -Thiamine/folate  -Monitor electrolytes and renal function  -Check CPK, magnesium, phosphorus      Pancytopenia (HCC)- (present on admission)  Assessment & Plan  In setting alcohol abuse  No concern of active bleeding  Hold DVT prophylaxis for now  Monitor H&H closely               VTE prophylaxis: SCDs/TEDs

## 2022-09-26 NOTE — ASSESSMENT & PLAN NOTE
In setting alcohol abuse  No concern of active bleeding  Hold DVT prophylaxis for now  Monitor H&H closely

## 2022-09-26 NOTE — ED NOTES
Report received from JARETT Diehl. Pt is A&Ox4 and awake in bed. Pt denies SI and HI and states she feels safe in her current environment. Placed pt on cardiac monitor, pulse ox, and automatic BP. VSS, saturating above 95% on 2L, ST in the 110s.

## 2022-09-26 NOTE — ED NOTES
Patient attempted to call a cab, and leave the hospital.  Based upon previous information that the patient stated to this RN, pt was placed on a legal hold.  Security was notified and patient was escorted to [place] [id number].

## 2022-09-26 NOTE — ED TRIAGE NOTES
"Chief Complaint   Patient presents with    Headache     Base of the skull, for the last 4 days    Urinating Blood     For 2 days    Nausea     Last emesis 9/25    Suicidal Ideation     States she could take multiple Seroquel to just \"get it over with\". \"I am just done with feeling this way, I just don't want to do it any more\".       Pt ambulated to triage, came in via EMS. Pt A&Ox4, came in for above complaint.  States alcohol and meth use a few hours prior to coming in.     Pt to green 34 . Pt educated on alerting staff in changes to condition. Pt verbalized understanding. Protocol ordered.     BP (!) 165/93   Pulse (!) 120   Temp 36.7 °C (98.1 °F) (Temporal)   Resp 18   Ht 1.473 m (4' 10\")   Wt 58.9 kg (129 lb 13.6 oz)   LMP 02/28/2018   SpO2 99%   BMI 27.14 kg/m²     "

## 2022-09-26 NOTE — DISCHARGE INSTRUCTIONS
Discharge Instructions per Chelsea Holder M.D.    Ms. Youssef,    You were admitted with acute pancreatitis and alcohol withdrawal.  You were seen by psychiatry and they developed a safety plan with you.  You will be given 14 days of your medications, please follow up with your medical provider to obtain refills.         Return to ER if you develop worsening abdominal pain, nausea/vomiting and concerns for dehydration, lightheadedness, dizziness or any other concerning symptoms.           Special Equipment    You are being discharged with the following special equipment:  Oxygen

## 2022-09-27 PROBLEM — R45.89 SUICIDAL BEHAVIOR: Status: ACTIVE | Noted: 2022-09-27

## 2022-09-27 LAB
ALBUMIN SERPL BCP-MCNC: 4.2 G/DL (ref 3.2–4.9)
ALBUMIN/GLOB SERPL: 1.1 G/DL
ALP SERPL-CCNC: 168 U/L (ref 30–99)
ALT SERPL-CCNC: 24 U/L (ref 2–50)
ANION GAP SERPL CALC-SCNC: 9 MMOL/L (ref 7–16)
AST SERPL-CCNC: 69 U/L (ref 12–45)
BILIRUB SERPL-MCNC: 0.8 MG/DL (ref 0.1–1.5)
BUN SERPL-MCNC: 6 MG/DL (ref 8–22)
CALCIUM SERPL-MCNC: 8.6 MG/DL (ref 8.5–10.5)
CHLORIDE SERPL-SCNC: 92 MMOL/L (ref 96–112)
CO2 SERPL-SCNC: 28 MMOL/L (ref 20–33)
CREAT SERPL-MCNC: 0.37 MG/DL (ref 0.5–1.4)
ERYTHROCYTE [DISTWIDTH] IN BLOOD BY AUTOMATED COUNT: 49.4 FL (ref 35.9–50)
GFR SERPLBLD CREATININE-BSD FMLA CKD-EPI: 120 ML/MIN/1.73 M 2
GLOBULIN SER CALC-MCNC: 3.7 G/DL (ref 1.9–3.5)
GLUCOSE SERPL-MCNC: 111 MG/DL (ref 65–99)
HCT VFR BLD AUTO: 29.5 % (ref 37–47)
HGB BLD-MCNC: 8.9 G/DL (ref 12–16)
MCH RBC QN AUTO: 27.6 PG (ref 27–33)
MCHC RBC AUTO-ENTMCNC: 30.2 G/DL (ref 33.6–35)
MCV RBC AUTO: 91.6 FL (ref 81.4–97.8)
PLATELET # BLD AUTO: 56 K/UL (ref 164–446)
PMV BLD AUTO: 9.7 FL (ref 9–12.9)
POTASSIUM SERPL-SCNC: 3.9 MMOL/L (ref 3.6–5.5)
PROT SERPL-MCNC: 7.9 G/DL (ref 6–8.2)
RBC # BLD AUTO: 3.22 M/UL (ref 4.2–5.4)
SODIUM SERPL-SCNC: 129 MMOL/L (ref 135–145)
WBC # BLD AUTO: 4.1 K/UL (ref 4.8–10.8)

## 2022-09-27 PROCEDURE — 700105 HCHG RX REV CODE 258: Performed by: STUDENT IN AN ORGANIZED HEALTH CARE EDUCATION/TRAINING PROGRAM

## 2022-09-27 PROCEDURE — 85027 COMPLETE CBC AUTOMATED: CPT

## 2022-09-27 PROCEDURE — 99233 SBSQ HOSP IP/OBS HIGH 50: CPT | Performed by: STUDENT IN AN ORGANIZED HEALTH CARE EDUCATION/TRAINING PROGRAM

## 2022-09-27 PROCEDURE — A9270 NON-COVERED ITEM OR SERVICE: HCPCS | Performed by: STUDENT IN AN ORGANIZED HEALTH CARE EDUCATION/TRAINING PROGRAM

## 2022-09-27 PROCEDURE — 700102 HCHG RX REV CODE 250 W/ 637 OVERRIDE(OP): Performed by: STUDENT IN AN ORGANIZED HEALTH CARE EDUCATION/TRAINING PROGRAM

## 2022-09-27 PROCEDURE — 770020 HCHG ROOM/CARE - TELE (206)

## 2022-09-27 PROCEDURE — 80053 COMPREHEN METABOLIC PANEL: CPT

## 2022-09-27 PROCEDURE — 36415 COLL VENOUS BLD VENIPUNCTURE: CPT

## 2022-09-27 PROCEDURE — 99254 IP/OBS CNSLTJ NEW/EST MOD 60: CPT | Mod: GC | Performed by: PSYCHIATRY & NEUROLOGY

## 2022-09-27 RX ORDER — LORAZEPAM 2 MG/1
2 TABLET ORAL
Status: DISCONTINUED | OUTPATIENT
Start: 2022-09-27 | End: 2022-10-02

## 2022-09-27 RX ORDER — SODIUM CHLORIDE 9 MG/ML
INJECTION, SOLUTION INTRAVENOUS CONTINUOUS
Status: DISCONTINUED | OUTPATIENT
Start: 2022-09-27 | End: 2022-09-28

## 2022-09-27 RX ORDER — LORAZEPAM 2 MG/ML
2 INJECTION INTRAMUSCULAR
Status: DISCONTINUED | OUTPATIENT
Start: 2022-09-27 | End: 2022-10-02

## 2022-09-27 RX ORDER — DIAZEPAM 5 MG/1
5 TABLET ORAL EVERY 6 HOURS
Status: COMPLETED | OUTPATIENT
Start: 2022-09-28 | End: 2022-09-30

## 2022-09-27 RX ORDER — DIAZEPAM 5 MG/1
10 TABLET ORAL EVERY 6 HOURS
Status: COMPLETED | OUTPATIENT
Start: 2022-09-27 | End: 2022-09-28

## 2022-09-27 RX ORDER — LORAZEPAM 2 MG/1
4 TABLET ORAL
Status: DISCONTINUED | OUTPATIENT
Start: 2022-09-27 | End: 2022-10-02

## 2022-09-27 RX ORDER — LORAZEPAM 0.5 MG/1
0.5 TABLET ORAL EVERY 4 HOURS PRN
Status: DISCONTINUED | OUTPATIENT
Start: 2022-09-27 | End: 2022-10-02

## 2022-09-27 RX ORDER — LORAZEPAM 1 MG/1
1 TABLET ORAL EVERY 4 HOURS PRN
Status: DISCONTINUED | OUTPATIENT
Start: 2022-09-27 | End: 2022-10-02

## 2022-09-27 RX ADMIN — THERA TABS 1 TABLET: TAB at 04:34

## 2022-09-27 RX ADMIN — DIAZEPAM 10 MG: 5 TABLET ORAL at 23:48

## 2022-09-27 RX ADMIN — DOCUSATE SODIUM 50 MG AND SENNOSIDES 8.6 MG 2 TABLET: 8.6; 5 TABLET, FILM COATED ORAL at 17:34

## 2022-09-27 RX ADMIN — LORAZEPAM 0.5 MG: 0.5 TABLET ORAL at 22:12

## 2022-09-27 RX ADMIN — SODIUM CHLORIDE: 9 INJECTION, SOLUTION INTRAVENOUS at 23:48

## 2022-09-27 RX ADMIN — DOCUSATE SODIUM 50 MG AND SENNOSIDES 8.6 MG 2 TABLET: 8.6; 5 TABLET, FILM COATED ORAL at 09:09

## 2022-09-27 RX ADMIN — SODIUM CHLORIDE, POTASSIUM CHLORIDE, SODIUM LACTATE AND CALCIUM CHLORIDE 1000 ML: 600; 310; 30; 20 INJECTION, SOLUTION INTRAVENOUS at 00:06

## 2022-09-27 RX ADMIN — LORAZEPAM 3 MG: 2 TABLET ORAL at 09:09

## 2022-09-27 RX ADMIN — FOLIC ACID 1 MG: 1 TABLET ORAL at 04:34

## 2022-09-27 RX ADMIN — DIAZEPAM 10 MG: 5 TABLET ORAL at 11:38

## 2022-09-27 RX ADMIN — THIAMINE HCL TAB 100 MG 100 MG: 100 TAB at 04:34

## 2022-09-27 RX ADMIN — LORAZEPAM 2 MG: 2 TABLET ORAL at 01:10

## 2022-09-27 RX ADMIN — LORAZEPAM 2 MG: 2 TABLET ORAL at 08:24

## 2022-09-27 RX ADMIN — LORAZEPAM 2 MG: 2 TABLET ORAL at 03:27

## 2022-09-27 RX ADMIN — SODIUM CHLORIDE: 9 INJECTION, SOLUTION INTRAVENOUS at 14:45

## 2022-09-27 RX ADMIN — LORAZEPAM 3 MG: 2 TABLET ORAL at 04:38

## 2022-09-27 RX ADMIN — DIAZEPAM 10 MG: 5 TABLET ORAL at 17:34

## 2022-09-27 RX ADMIN — SODIUM CHLORIDE, POTASSIUM CHLORIDE, SODIUM LACTATE AND CALCIUM CHLORIDE 1000 ML: 600; 310; 30; 20 INJECTION, SOLUTION INTRAVENOUS at 06:37

## 2022-09-27 ASSESSMENT — LIFESTYLE VARIABLES
ORIENTATION AND CLOUDING OF SENSORIUM: DATE DISORIENTATION BY MORE THAN TWO CALENDAR DAYS
VISUAL DISTURBANCES: NOT PRESENT
AGITATION: NORMAL ACTIVITY
AUDITORY DISTURBANCES: NOT PRESENT
VISUAL DISTURBANCES: NOT PRESENT
TREMOR: MODERATE TREMOR WITH ARMS EXTENDED
AUDITORY DISTURBANCES: NOT PRESENT
NAUSEA AND VOMITING: NO NAUSEA AND NO VOMITING
AUDITORY DISTURBANCES: NOT PRESENT
NAUSEA AND VOMITING: *
PAROXYSMAL SWEATS: NO SWEAT VISIBLE
ORIENTATION AND CLOUDING OF SENSORIUM: DATE DISORIENTATION BY MORE THAN TWO CALENDAR DAYS
TOTAL SCORE: 3
TOTAL SCORE: MILD ITCHING, PINS AND NEEDLES SENSATION, BURNING OR NUMBNESS
VISUAL DISTURBANCES: MILD SENSITIVITY
ANXIETY: NO ANXIETY (AT EASE)
HEADACHE, FULLNESS IN HEAD: NOT PRESENT
ORIENTATION AND CLOUDING OF SENSORIUM: DATE DISORIENTATION BY NO MORE THAN TWO CALENDAR DAYS
AUDITORY DISTURBANCES: NOT PRESENT
HEADACHE, FULLNESS IN HEAD: NOT PRESENT
TREMOR: MODERATE TREMOR WITH ARMS EXTENDED
AGITATION: NORMAL ACTIVITY
ORIENTATION AND CLOUDING OF SENSORIUM: DATE DISORIENTATION BY MORE THAN TWO CALENDAR DAYS
NAUSEA AND VOMITING: *
TREMOR: *
PAROXYSMAL SWEATS: NO SWEAT VISIBLE
TOTAL SCORE: VERY MILD ITCHING, PINS AND NEEDLES SENSATION, BURNING OR NUMBNESS
HEADACHE, FULLNESS IN HEAD: NOT PRESENT
HEADACHE, FULLNESS IN HEAD: NOT PRESENT
PAROXYSMAL SWEATS: BARELY PERCEPTIBLE SWEATING. PALMS MOIST
TREMOR: MODERATE TREMOR WITH ARMS EXTENDED
TOTAL SCORE: 3
TREMOR: NO TREMOR
TOTAL SCORE: 14
PAROXYSMAL SWEATS: BARELY PERCEPTIBLE SWEATING. PALMS MOIST
NAUSEA AND VOMITING: NO NAUSEA AND NO VOMITING
AGITATION: NORMAL ACTIVITY
PAROXYSMAL SWEATS: BARELY PERCEPTIBLE SWEATING. PALMS MOIST
VISUAL DISTURBANCES: NOT PRESENT
TREMOR: MODERATE TREMOR WITH ARMS EXTENDED
AGITATION: NORMAL ACTIVITY
VISUAL DISTURBANCES: NOT PRESENT
PAROXYSMAL SWEATS: NO SWEAT VISIBLE
AGITATION: SOMEWHAT MORE THAN NORMAL ACTIVITY
PAROXYSMAL SWEATS: *
TREMOR: MODERATE TREMOR WITH ARMS EXTENDED
HEADACHE, FULLNESS IN HEAD: VERY MILD
NAUSEA AND VOMITING: MILD NAUSEA WITH NO VOMITING
VISUAL DISTURBANCES: NOT PRESENT
HEADACHE, FULLNESS IN HEAD: VERY MILD
HEADACHE, FULLNESS IN HEAD: SEVERE
ANXIETY: MILDLY ANXIOUS
ORIENTATION AND CLOUDING OF SENSORIUM: DATE DISORIENTATION BY NO MORE THAN TWO CALENDAR DAYS
TOTAL SCORE: VERY MILD ITCHING, PINS AND NEEDLES SENSATION, BURNING OR NUMBNESS
AUDITORY DISTURBANCES: NOT PRESENT
TOTAL SCORE: VERY MILD ITCHING, PINS AND NEEDLES SENSATION, BURNING OR NUMBNESS
TOTAL SCORE: 14
TREMOR: NO TREMOR
ORIENTATION AND CLOUDING OF SENSORIUM: DATE DISORIENTATION BY MORE THAN TWO CALENDAR DAYS
ANXIETY: *
AUDITORY DISTURBANCES: NOT PRESENT
TOTAL SCORE: VERY MILD ITCHING, PINS AND NEEDLES SENSATION, BURNING OR NUMBNESS
PAROXYSMAL SWEATS: *
AGITATION: NORMAL ACTIVITY
HEADACHE, FULLNESS IN HEAD: MILD
ANXIETY: *
AGITATION: NORMAL ACTIVITY
AGITATION: SOMEWHAT MORE THAN NORMAL ACTIVITY
ANXIETY: NO ANXIETY (AT EASE)
TOTAL SCORE: 12
AUDITORY DISTURBANCES: NOT PRESENT
PAROXYSMAL SWEATS: *
NAUSEA AND VOMITING: NO NAUSEA AND NO VOMITING
ANXIETY: *
AUDITORY DISTURBANCES: NOT PRESENT
VISUAL DISTURBANCES: NOT PRESENT
ORIENTATION AND CLOUDING OF SENSORIUM: DATE DISORIENTATION BY NO MORE THAN TWO CALENDAR DAYS
TOTAL SCORE: 5
ANXIETY: *
NAUSEA AND VOMITING: NO NAUSEA AND NO VOMITING
ANXIETY: NO ANXIETY (AT EASE)
NAUSEA AND VOMITING: *
TOTAL SCORE: MILD ITCHING, PINS AND NEEDLES SENSATION, BURNING OR NUMBNESS
NAUSEA AND VOMITING: *
TOTAL SCORE: 9
TOTAL SCORE: 25
VISUAL DISTURBANCES: NOT PRESENT
AUDITORY DISTURBANCES: NOT PRESENT
ORIENTATION AND CLOUDING OF SENSORIUM: CANNOT DO SERIAL ADDITIONS OR IS UNCERTAIN ABOUT DATE
TOTAL SCORE: 17
AGITATION: NORMAL ACTIVITY
ANXIETY: MILDLY ANXIOUS
TREMOR: MODERATE TREMOR WITH ARMS EXTENDED
VISUAL DISTURBANCES: MILD SENSITIVITY
HEADACHE, FULLNESS IN HEAD: MILD
ORIENTATION AND CLOUDING OF SENSORIUM: DATE DISORIENTATION BY NO MORE THAN TWO CALENDAR DAYS

## 2022-09-27 ASSESSMENT — PAIN DESCRIPTION - PAIN TYPE
TYPE: ACUTE PAIN

## 2022-09-27 NOTE — CARE PLAN
The patient is Watcher - Medium risk of patient condition declining or worsening    Shift Goals  Clinical Goals: safety, ciwa  Patient Goals: sleep and stop the tremor  Family Goals: na    Progress made toward(s) clinical / shift goals:      Problem: Knowledge Deficit - Standard  Goal: Patient and family/care givers will demonstrate understanding of plan of care, disease process/condition, diagnostic tests and medications  Outcome: Progressing  Note: POC discussed with patient. Benefit of SCDs explained to patient.      Problem: Optimal Care for Alcohol Withdrawal  Goal: Optimal Care for the alcohol withdrawal patient  Outcome: Progressing  Note: CIWA continued.     Problem: Seizure Precautions  Goal: Implementation of seizure precautions  Outcome: Progressing  Note: seizure precautions are in place     Problem: Psychosocial  Goal: Patient's level of anxiety will decrease  Outcome: Progressing  Note: Patient's anxiety waxing and waning. She's anxious about her sons not knowing her being in the hospital.     Problem: Provide Safe Environment  Goal: Suicide environmental safety, protocols, policies, and practices will be implemented  Outcome: Progressing  Note: 1:1 sitter is in the room with patient.       Patient is not progressing towards the following goals:

## 2022-09-27 NOTE — ASSESSMENT & PLAN NOTE
Reported suicidal ideation at ER  On legal hold  Psych consulted, start zoloft and trazodone for insomnia   Pending inpatient psych transfer

## 2022-09-27 NOTE — ED NOTES
Staff not available for 1:1 sitter. Pt continues to deny SI/HI and is calm and cooperative. OK with Dr. Connors for telesitter. Telesitter placed in room and confirmed with telesitter tech that pt is being monitored.

## 2022-09-27 NOTE — CONSULTS
"PSYCHIATRIC INTAKE EVALUATION(new)  Reason for admission: pancreatitis   Reason for consult:Psychiatric Medication Evaluation/Management \"concern for suicidal ideation\"   Requesting Provider:      Hamilton Connors M.D  Legal Hold Status:   On hold, extended 9/27/2022         Chart reviewed.         *HPI:       52-year-old female with past psychiatric history of borderline personality disorder, major depressive disorder, alcohol use disorder, amphetamine use disorder.  Previously admitted to [facility] times with 2 previous suicide attempts via overdose.  Admitted for acute pancreatitis/alcohol withdrawal.  Tox positive for amphetamines, alcohol level 0.356 at admission.  CIWA started.  Required 5 mg Valium for agitation and attempted elopement from the emergency room overnight.    On interview patient is somnolent and not easily arousable.  She started out alert and became progressively more somnolent with likely willful respiratory sounds of sleeping.  Was able to elicit history of drinking that has increased in the last 2 years since COVID.  She states she got COVID and then pneumonia and then COVID again and then the flu and then upper respiratory tract infection which has caused a negative mood.  States she came to the hospital because \"urine is in my blood.\"  When asked about possible suicide attempt she stated \"I have enough to call the big man.\"  Stated her plan was to drink and take her Seroquel which she still has at home to kill herself.  Stated her son came home and interrupted her attempt.  She then closed her eyes and pretended to be sleeping.  When blood pressure cuff became bothersome she was very alert and let her needs be known without hints of somnolence.      Per pharmacy Seroquel was refilled on 6/22 with 30 tablets.      Psychiatric ROS:  Depression: States she has been feeling down for the last 2 years since COVID after repeated medical illnesses.   April: Unable to assess  Anxiety: Unable to " assess  Psychosis:   Unable to assess    Medical ROS (as pertinent):     Review of Systems   Unable to perform ROS: Mental acuity         *Psychiatric Examination:  Vitals:   Vitals:    09/27/22 1052   BP: (!) 148/102   Pulse: (!) 115   Resp: 18   Temp: 36.4 °C (97.6 °F)   SpO2: 94%      General Appearance: Somnolent, no distress  Abnormal Movements: Patient closing eyes and making respiratory noises during questioning however stops when she becomes uncomfortable with blood pressure and is more alert and responsive to questions.  Unclear if somnolence is willful.  Gait and Posture: Unable to assess  Speech: Decreased rate/rhythm/tone.  Somnolent  Thought processes: Unable to assess  Associations: Unable to assess  Abnormal or Psychotic Thoughts: Unable to assess  Judgement and Insight: Unable to assess  Orientation: oriented to persona and place at least  Recent and Remote Memory: Unable to assess  Attention Span and Concentration: impaired  Language: English  Fund of Knowledge: Unable to assess  Mood and Affect: Somnolent, possibly willfully so  SI/HI: Denies SI directly but endorses interrupted suicide attempt, denies HI    Past Medical History:   Past Medical History:   Diagnosis Date    Alcohol abuse     Alcohol intoxication, with unspecified complication (HCC) 8/7/2013    Alcoholism (HCC)     Anxiety     Backpain     Bipolar disorder, unspecified (HCC)     Bronchitis     Drug abuse (HCC)     meth, crack cocaine, THC    Hepatitis, alcoholic     Hypertension     controlled    Indigestion     Infectious disease MRSA    Liver disease     pancreatitis    Pancreatitis chronic     Flare-up    Pneumonia     Psychiatric disorder     suicidal in past    Renal disorder     kidney infection 1991    Seizure (HCC)     7/8/2011    Seizure disorder (HCC)     Unspecified hemorrhagic conditions         Past Psychiatric History:  Previous Diagnosis:   Borderline personality disorder  Major depressive disorder  Alcohol use  disorder  Amphetamine use disorder    Current meds:  Seroquel 25 nightly, last filled 6/22 for 30-day supply    Previous med trials: Unknown  Hospitalizations: mulitple hospitalizations at [facility]  Suicide attempts/SIB: 2 prior suicide attempts via overdose      Family Hx:   Unable to assess    Social Hx:   Alcohol use disorder, severe unknown time frame currently in active withdrawal  Amphetamine use disorder per tox screen on admission    Current Medications:  Current Facility-Administered Medications   Medication Dose Route Frequency Provider Last Rate Last Admin    diazePAM (VALIUM) tablet 10 mg  10 mg Oral Q6HR Omayra Keith M.D.   10 mg at 09/27/22 1138    Followed by    [START ON 9/28/2022] diazePAM (VALIUM) tablet 5 mg  5 mg Oral Q6HR Omayra Keith M.D.        LORazepam (ATIVAN) tablet 0.5 mg  0.5 mg Oral Q4HRS PRN Omayra Keith M.D.        LORazepam (ATIVAN) tablet 1 mg  1 mg Oral Q4HRS PRFLAVIA Keith M.D.        LORazepam (ATIVAN) tablet 2 mg  2 mg Oral Q2HRS PRFLAVIA Keith M.D.        LORazepam (ATIVAN) tablet 3 mg  3 mg Oral Q HOUR PRN Omayra Keith M.D.        LORazepam (ATIVAN) tablet 4 mg  4 mg Oral Q15 MIN PRFLAVIA Keith M.D.        Or    LORazepam (ATIVAN) injection 2 mg  2 mg Intravenous Q15 MIN PRFLAVIA Keith M.D.        senna-docusate (PERICOLACE or SENOKOT S) 8.6-50 MG per tablet 2 Tablet  2 Tablet Oral BID Hamilton Connors M.D.   2 Tablet at 09/27/22 0909    And    polyethylene glycol/lytes (MIRALAX) PACKET 1 Packet  1 Packet Oral QDAY PRN Hamilton Connors M.D.        And    magnesium hydroxide (MILK OF MAGNESIA) suspension 30 mL  30 mL Oral QDAY PRN Hamilton Connors M.D.        And    bisacodyl (DULCOLAX) suppository 10 mg  10 mg Rectal QDAY PRN Hamilton Connors M.D.        lactated ringers infusion   Intravenous Continuous Hamilton Connors M.D. 150 mL/hr at 09/27/22 0637 1,000 mL at 09/27/22 0637    labetalol (NORMODYNE/TRANDATE) injection 10 mg  10 mg Intravenous Q4HRS PRN Hamilton Connors M.D.         ondansetron (ZOFRAN) syringe/vial injection 4 mg  4 mg Intravenous Q4HRS PRN Hamilton Connors M.D.   4 mg at 09/26/22 1323    ondansetron (ZOFRAN ODT) dispertab 4 mg  4 mg Oral Q4HRS PRN Hamilton Connors M.D.        promethazine (PHENERGAN) tablet 12.5-25 mg  12.5-25 mg Oral Q4HRS PRN Hamilton Connors M.D.        promethazine (PHENERGAN) suppository 12.5-25 mg  12.5-25 mg Rectal Q4HRS PRN Hamilton Connors M.D.        prochlorperazine (COMPAZINE) injection 5-10 mg  5-10 mg Intravenous Q4HRS PRN Hamilton Connors M.D.        thiamine (Vitamin B-1) tablet 100 mg  100 mg Oral DAILY Hamilton Connors M.D.   100 mg at 09/27/22 0434    And    multivitamin (THERAGRAN) tablet 1 Tablet  1 Tablet Oral DAILY Hamilton Connors M.D.   1 Tablet at 09/27/22 0434    And    folic acid (FOLVITE) tablet 1 mg  1 mg Oral DAILY Hamilton Connors M.D.   1 mg at 09/27/22 0434    ipratropium-albuterol (DUONEB) nebulizer solution  3 mL Nebulization Q4H PRN (RT) Hamilton Connors M.D.            Allergies:  Bactrim, Lamotrigine [lamictal], Quetiapine fumarate, and Keflex       Labs personally reviewed:   Recent Results (from the past 72 hour(s))   POC BREATHALIZER    Collection Time: 09/26/22  6:44 AM   Result Value Ref Range    POC Breathalizer 0.356 (A) 0.00 - 0.01 Percent   CoV-2, FLU A/B, and RSV by PCR (2-4 Hours CEPHEID) : Collect NP swab in VTM    Collection Time: 09/26/22  8:02 AM    Specimen: Respirate   Result Value Ref Range    Influenza virus A RNA Negative Negative    Influenza virus B, PCR Negative Negative    RSV, PCR Negative Negative    SARS-CoV-2 by PCR NotDetected     SARS-CoV-2 Source NP Swab    CBC WITH DIFFERENTIAL    Collection Time: 09/26/22  8:18 AM   Result Value Ref Range    WBC 4.4 (L) 4.8 - 10.8 K/uL    RBC 3.43 (L) 4.20 - 5.40 M/uL    Hemoglobin 9.6 (L) 12.0 - 16.0 g/dL    Hematocrit 31.7 (L) 37.0 - 47.0 %    MCV 92.4 81.4 - 97.8 fL    MCH 28.0 27.0 - 33.0 pg    MCHC 30.3 (L) 33.6 - 35.0 g/dL    RDW 50.5 (H) 35.9 - 50.0 fL    Platelet Count 81 (L) 164 -  446 K/uL    MPV 9.4 9.0 - 12.9 fL    Neutrophils-Polys 50.00 44.00 - 72.00 %    Lymphocytes 35.80 22.00 - 41.00 %    Monocytes 11.20 0.00 - 13.40 %    Eosinophils 0.70 0.00 - 6.90 %    Basophils 1.60 0.00 - 1.80 %    Immature Granulocytes 0.70 0.00 - 0.90 %    Nucleated RBC 0.00 /100 WBC    Neutrophils (Absolute) 2.18 2.00 - 7.15 K/uL    Lymphs (Absolute) 1.56 1.00 - 4.80 K/uL    Monos (Absolute) 0.49 0.00 - 0.85 K/uL    Eos (Absolute) 0.03 0.00 - 0.51 K/uL    Baso (Absolute) 0.07 0.00 - 0.12 K/uL    Immature Granulocytes (abs) 0.03 0.00 - 0.11 K/uL    NRBC (Absolute) 0.00 K/uL   COMP METABOLIC PANEL    Collection Time: 09/26/22  8:18 AM   Result Value Ref Range    Sodium 136 135 - 145 mmol/L    Potassium 4.1 3.6 - 5.5 mmol/L    Chloride 96 96 - 112 mmol/L    Co2 23 20 - 33 mmol/L    Anion Gap 17.0 (H) 7.0 - 16.0    Glucose 106 (H) 65 - 99 mg/dL    Bun 9 8 - 22 mg/dL    Creatinine 0.32 (L) 0.50 - 1.40 mg/dL    Calcium 8.9 8.5 - 10.5 mg/dL    AST(SGOT) 92 (H) 12 - 45 U/L    ALT(SGPT) 32 2 - 50 U/L    Alkaline Phosphatase 182 (H) 30 - 99 U/L    Total Bilirubin 0.4 0.1 - 1.5 mg/dL    Albumin 4.3 3.2 - 4.9 g/dL    Total Protein 8.5 (H) 6.0 - 8.2 g/dL    Globulin 4.2 (H) 1.9 - 3.5 g/dL    A-G Ratio 1.0 g/dL   LIPASE    Collection Time: 09/26/22  8:18 AM   Result Value Ref Range    Lipase 163 (H) 11 - 82 U/L   ESTIMATED GFR    Collection Time: 09/26/22  8:18 AM   Result Value Ref Range    GFR (CKD-EPI) 124 >60 mL/min/1.73 m 2   MAGNESIUM    Collection Time: 09/26/22  8:18 AM   Result Value Ref Range    Magnesium 1.7 1.5 - 2.5 mg/dL   PHOSPHORUS    Collection Time: 09/26/22  8:18 AM   Result Value Ref Range    Phosphorus 4.1 2.5 - 4.5 mg/dL   CREATINE KINASE    Collection Time: 09/26/22  8:18 AM   Result Value Ref Range    CPK Total 97 0 - 154 U/L   Lipid Profile    Collection Time: 09/26/22  8:18 AM   Result Value Ref Range    Cholesterol,Tot 348 (H) 100 - 199 mg/dL    Triglycerides 99 0 - 149 mg/dL     >=40 mg/dL      (H) <100 mg/dL   Urine Drug Screen    Collection Time: 09/26/22 12:11 PM   Result Value Ref Range    Amphetamines Urine Positive (A) Negative    Barbiturates Negative Negative    Benzodiazepines Negative Negative    Cocaine Metabolite Negative Negative    Methadone Negative Negative    Opiates Negative Negative    Oxycodone Negative Negative    Phencyclidine -Pcp Negative Negative    Propoxyphene Negative Negative    Cannabinoid Metab Negative Negative   URINALYSIS CULTURE, IF INDICATED    Collection Time: 09/26/22 12:11 PM    Specimen: Urine   Result Value Ref Range    Color Yellow     Character Clear     Specific Gravity 1.025 <1.035    Ph 5.5 5.0 - 8.0    Glucose Negative Negative mg/dL    Ketones Negative Negative mg/dL    Protein Negative Negative mg/dL    Bilirubin Negative Negative    Urobilinogen, Urine 0.2 Negative    Nitrite Negative Negative    Leukocyte Esterase Negative Negative    Occult Blood Small (A) Negative    Micro Urine Req Microscopic    URINE MICROSCOPIC (W/UA)    Collection Time: 09/26/22 12:11 PM   Result Value Ref Range    WBC 0-2 /hpf    RBC 0-2 /hpf    Bacteria Negative None /hpf    Epithelial Cells Moderate (A) /hpf    Hyaline Cast 0-2 /lpf    Granular Casts 0-2 /lpf   LACTIC ACID    Collection Time: 09/26/22  1:20 PM   Result Value Ref Range    Lactic Acid 1.7 0.5 - 2.0 mmol/L   CBC without Differential    Collection Time: 09/27/22 12:46 AM   Result Value Ref Range    WBC 4.1 (L) 4.8 - 10.8 K/uL    RBC 3.22 (L) 4.20 - 5.40 M/uL    Hemoglobin 8.9 (L) 12.0 - 16.0 g/dL    Hematocrit 29.5 (L) 37.0 - 47.0 %    MCV 91.6 81.4 - 97.8 fL    MCH 27.6 27.0 - 33.0 pg    MCHC 30.2 (L) 33.6 - 35.0 g/dL    RDW 49.4 35.9 - 50.0 fL    Platelet Count 56 (L) 164 - 446 K/uL    MPV 9.7 9.0 - 12.9 fL   Comp Metabolic Panel (CMP)    Collection Time: 09/27/22 12:46 AM   Result Value Ref Range    Sodium 129 (L) 135 - 145 mmol/L    Potassium 3.9 3.6 - 5.5 mmol/L    Chloride 92 (L) 96 - 112 mmol/L     "Co2 28 20 - 33 mmol/L    Anion Gap 9.0 7.0 - 16.0    Glucose 111 (H) 65 - 99 mg/dL    Bun 6 (L) 8 - 22 mg/dL    Creatinine 0.37 (L) 0.50 - 1.40 mg/dL    Calcium 8.6 8.5 - 10.5 mg/dL    AST(SGOT) 69 (H) 12 - 45 U/L    ALT(SGPT) 24 2 - 50 U/L    Alkaline Phosphatase 168 (H) 30 - 99 U/L    Total Bilirubin 0.8 0.1 - 1.5 mg/dL    Albumin 4.2 3.2 - 4.9 g/dL    Total Protein 7.9 6.0 - 8.2 g/dL    Globulin 3.7 (H) 1.9 - 3.5 g/dL    A-G Ratio 1.1 g/dL   ESTIMATED GFR    Collection Time: 09/27/22 12:46 AM   Result Value Ref Range    GFR (CKD-EPI) 120 >60 mL/min/1.73 m 2     GFR: 120, hyponatremia at 129, elevated AST at 69, alk phos elevated at 168, normocytic anemia, platelets 56, elevated cholesterol/LDL, lipase elevated 163      EKG: QTC: 479, previously 538 on 4/22  Brain Imaging: CT head done 9/26/2022: No acute abnormalities  EEG: Not done         Assessment:  52-year-old female with history of borderline personality disorder, MDD, and alcohol use disorder admitted for headache and blood in urine.  Made suicidal statements in ED.  Currently on CIWA and required Valium p.o. for agitation overnight, attempted to elope from ED.  Tox positive for amphetamines and alcohol was 0.356.  Per chart 2 previous suicide attempts and mulitple admissions to [facility].  Unable to conduct full interview due to patient's somnolence.  Patient reported she was in the hospital due to \"urine in my blood\" and stated she was suicidal with a plan to overdose.  Stated \"I have enough to call the big man.\"  Stated she was drinking with plan to overdose when her son came home.  Reported she would have attempted suicide had he not interrupted her.  Unable to elicit further history due to somnolence.    Due to significant history of suicide attempts, alcohol use decreasing inhibitions, and reported interrupted suicide attempt with ALOC, recommend inpatient psychiatric hospitalization.  Hold extended. Due to current treatment of acute pancreatitis, " alcohol withdrawal, and down-trending platelets, recommend holding psychotropic medications at this time.     Dx:  Substance-induced mood disorder  Alcohol withdrawal, active  Alcohol use disorder  Amphetamine use disorder  Hx borderline personality disorder      Medical:  Acute pancreatitis  Alcohol withdrawal      Plan:  Legal hold: Extended 9/27/2022  Psychotropic medications  Do not recommend psychotropic medication at this time due to current medical status including low platelets    Please transfer pt to inpatient psychiatric hospital when medically cleared and bed is available  Labs reviewed:  EKG eviewed  Old records ordered/reviewed/summarized   Discussed the case with: Dr. Burdick   Psychiatry will follow up    Thank you for the consult.     Sitter: Yes  Phone: No  Visitors: No  Personal belongings: No    This note was created using voice recognition software (Dragon). The accuracy of the dictation is limited by the abilities of the software. I have reviewed the note prior to signing. However, error related to voice recognition software and /or scribes may still exist and should be interpreted within the appropriate context.

## 2022-09-27 NOTE — DISCHARGE PLANNING
1145 - Pt placed on legal hold, paperwork received from Charge JARETT Mao, faxed to Legal Hold FARHAN Mathew.

## 2022-09-27 NOTE — PROGRESS NOTES
4 Eyes Skin Assessment Completed by JARETT Sheehan and Wendy (charge)JARETT.    Head WDL  Ears WDL  Nose WDL  Mouth WDL  Neck WDL  Breast/Chest WDL  Shoulder Blades WDL  Spine WDL  (R) Arm/Elbow/Hand WDL  (L) Arm/Elbow/Hand WDL  Abdomen WDL  Groin WDL  Scrotum/Coccyx/Buttocks Redness and Blanching  (R) Leg WDL  (L) Leg WDL  (R) Heel/Foot/Toe Redness, Blanching, and Boggy dried flaky skin  (L) Heel/Foot/Toe Redness, Blanching, and Boggy dried flaky skin            Devices In Places Tele Box, Blood Pressure Cuff, Pulse Ox, Condom Cath, and Nasal Cannula SCDs are on; (purewick cath)        Interventions In Place Heel Mepilex, Pillows, and Pressure Redistribution Mattress    Possible Skin Injury No    Pictures Uploaded Into Epic N/A  Wound Consult Placed N/A  RN Wound Prevention Protocol Ordered No

## 2022-09-27 NOTE — ED NOTES
Report to JARETT Boykin. Pt awake and breathing with even, unlabored respirations on 2L NC at time of report.

## 2022-09-27 NOTE — ED NOTES
Pt found to be incontinent of urine. Full linen change provided and pt cleaned. Purewick repositioned. Pt medicated as per MAR. Telesitter remains in direct view of patient.

## 2022-09-27 NOTE — PROGRESS NOTES
[facility] Medicine Daily Progress Note    Date of Service  9/27/2022    Chief Complaint  Olya Youssef is a 52 y.o. female admitted 9/26/2022 with nausea, vomiting, suicidal ideation.     Hospital Course  52 y.o. female with past medical history of alcohol abuse, alcohol-related seizure, methamphetamine abuse, bipolar disorder , chronically on 2 L oxygen who presented 9/26/2022 with presented with generalized tremor associated with frontal headache, abdominal pain, nausea and vomiting for the past 4 days.  She also reports of shortness of breath. Reported SI at ED.   Labs noted with pancytopenia, low bicarbonate glucose 106, elevated AST, lipase 163.  CT head negative for acute stroke/hemorrhage.    Interval Problem Update  Seen at bedside.   Patient is eye closed. Responded to voice commands. Able to say her name only.   Ciwa protocol  IVF  Added valium standing dose    I have discussed this patient's plan of care and discharge plan at IDT rounds today with Case Management, Nursing, Nursing leadership, and other members of the IDT team.    Consultants/Specialty  psychiatry    Code Status  Full Code    Disposition  Patient is not medically cleared for discharge.   Anticipate discharge to  TBD .  I have placed the appropriate orders for post-discharge needs.    Review of Systems  Review of Systems   Unable to perform ROS: Mental acuity      Physical Exam  Temp:  [36.2 °C (97.2 °F)-36.8 °C (98.2 °F)] 36.4 °C (97.6 °F)  Pulse:  [111-120] 115  Resp:  [13-18] 18  BP: (128-153)/() 148/102  SpO2:  [92 %-96 %] 94 %    Physical Exam  Vitals reviewed.   Constitutional:       Comments: Somnolent, respond to voice. Able to say her name.    HENT:      Head: Normocephalic and atraumatic.      Nose: Nose normal.      Mouth/Throat:      Pharynx: Oropharynx is clear.   Eyes:      Extraocular Movements: Extraocular movements intact.      Conjunctiva/sclera: Conjunctivae normal.      Pupils: Pupils are equal, round, and  reactive to light.   Cardiovascular:      Rate and Rhythm: Regular rhythm. Tachycardia present.      Pulses: Normal pulses.      Heart sounds: Normal heart sounds. No murmur heard.  Pulmonary:      Effort: Pulmonary effort is normal. No respiratory distress.      Breath sounds: Normal breath sounds.   Abdominal:      General: Abdomen is flat. Bowel sounds are normal.      Palpations: Abdomen is soft.      Tenderness: There is abdominal tenderness.   Musculoskeletal:         General: Normal range of motion.      Cervical back: Normal range of motion and neck supple.   Skin:     General: Skin is warm and dry.   Neurological:      Mental Status: She is alert.      Comments: Somnolent, eye closed.   Responded to voice only  Answer her name only    Psychiatric:      Comments: Unable to assess       Fluids  No intake or output data in the 24 hours ending 09/27/22 1403    Laboratory  Recent Labs     09/26/22  0818 09/27/22  0046   WBC 4.4* 4.1*   RBC 3.43* 3.22*   HEMOGLOBIN 9.6* 8.9*   HEMATOCRIT 31.7* 29.5*   MCV 92.4 91.6   MCH 28.0 27.6   MCHC 30.3* 30.2*   RDW 50.5* 49.4   PLATELETCT 81* 56*   MPV 9.4 9.7     Recent Labs     09/26/22  0818 09/27/22  0046   SODIUM 136 129*   POTASSIUM 4.1 3.9   CHLORIDE 96 92*   CO2 23 28   GLUCOSE 106* 111*   BUN 9 6*   CREATININE 0.32* 0.37*   CALCIUM 8.9 8.6             Recent Labs     09/26/22  0818   TRIGLYCERIDE 99      *       Imaging  DX-CHEST-PORTABLE (1 VIEW)   Final Result      1.  Left basilar atelectasis, unchanged.      CT-HEAD W/O   Final Result      No acute intracranial abnormality.              Assessment/Plan  * Alcohol-induced acute pancreatitis  Assessment & Plan  Lipase 160+ on admission with nausea and vomiting.   IVF  Antiemetics   Pain control  Serial abdominal exam  Consider CT Abdomen if the cause of pain in note clear, patient is not improving clinically after 3~4 days, clinical evidence of severe disease on presentation  Fasting lipids should  be checked to r/o hypertriglyceridemia  Abdominal US to evaluated biliary stone/obstruction.    Alcohol dependence with withdrawal (HCC)- (present on admission)  Assessment & Plan  Alcohol withdrawal  -Cardiac monitoring  -Oxygen 2 L/m nasal cannula   -On CIWA protocol, on valium standing dose  -Thiamine/folate  -aspiration and fall precautions       Suicidal behavior  Assessment & Plan  Reported suicidal ideation at ER  On legal hold  Psych consulted     Acute on chronic respiratory failure with hypoxia on home O2 therapy- (present on admission)  Assessment & Plan  chronically on 2 L home oxygen since her COVID infection  Continue to  monitor oxygen saturation closely  Incentive spirometry  Continue DuoNeb  Ambulatory O2 evaluation in a.m.      Methamphetamine abuse (HCC)- (present on admission)  Assessment & Plan  UDS pos amphetamine   Counseling once mental status improves    Pancytopenia (HCC)- (present on admission)  Assessment & Plan  In setting alcohol abuse  No concern of active bleeding  Hold DVT prophylaxis for now  Monitor H&H closely      Hyponatremia- (present on admission)  Assessment & Plan  On IVF  Cont to monitor       VTE prophylaxis: SCDs/TEDs    I have performed a physical exam and reviewed and updated ROS and Plan today (9/27/2022). In review of yesterday's note (9/26/2022), there are no changes except as documented above.

## 2022-09-27 NOTE — NON-PROVIDER
"This note is intended for the purposes of medical student education and feedback only.   Please refer to the documentation by this patient's assigned medical practitioner for details of care and plans.    Medical Student Admitting History and Physical  Note Author: Av Souza, Student MS3  Date: 9/27/2022    Date of Admission: 9/26/2022  Primary Team: [facility]  Attending: Dr. Omayra Keith  Medical Student: Av Souza, MS3      ID/CC: Olya Youssef is a 52 y.o. female with a PMH of alcoholism, hepatitis, and chronic pancreatitis who was admitted on 9/26/2022 with alcohol induced pancreatitis, seizure 2/2 acute alcohol withdrawal, and medical detox.    SUBJECTIVE  HPI  On 9/26/22 pt presented to [facility] for N, V, HA, Abd Pain, bloody urine, and suicidal ideation. She described having cut back on her alcohol consumption 3 days ago, which she believes caused her to have a seizure in which she bit her tongue. Since then, she has had a HA, N, V, and Abd pain. In the ED, she was intoxicated w/ breathalyzer of .356, bleeding from her tongue, tachycardic, and anxious. She was started on LR ivf hydration and given a valium. The ERP performed a lab analysis that was significant for elevated AST of 69 and an ALT of 24, elevated lipase to 163, and pancytopenia. A CT head was insignificant for any signs of ICH. UA showed small occult blood and moderate epithelial cells, but was not suggestive of any active UTI or renal pathology. Given continued tachycardia and report of prior alcohol withdrawal seizure, ERP gave a dose of phenobarbital and admitted to IM service.    Since admission, per nurse Booker, pt is on CIWA protocol and has begun to experience hallucinations and  had no significant overnight events. Today she is on 5mg of ativan q6h and is only intermittently aroused. When awake, she states that her \"brother is coming to pick her up\" and complains of hunger per sitter Komal. She is currently " on 3 L O2 via NC, a urinary wick catheter, and is still receiving ivf  LR at 150 mL/hr. Further history is unobtainable due to pt's level of consciousness.    ROS  Positives: HA, Abd pain, confusion, hallucinations, decreased LOC  Negatives: N, V, tremulousness, Hematemesis, hematochezia, melena, jaundice, seizure, anxiety, hyperthermia, arrhythmia, CP, SOB,     PMHx   has a past medical history of Alcohol abuse, Alcohol intoxication, with unspecified complication (HCC) (8/7/2013), Alcoholism (HCC), Anxiety, Backpain, Bipolar disorder, unspecified (HCC), Bronchitis, Drug abuse (HCC), Hepatitis, alcoholic, Hypertension, Indigestion, Infectious disease (MRSA), Liver disease, Pancreatitis chronic, Pneumonia, Psychiatric disorder, Renal disorder, Seizure (HCC), Seizure disorder (HCC), and Unspecified hemorrhagic conditions.    She has no past medical history of Angina, Arrhythmia, Arthritis, CATARACT, Dialysis, Fall, Glaucoma, Heart murmur, Heart valve disease, Jaundice, Myocardial infarct (HCC), Other specified symptom associated with female genital organs, Pacemaker, Personal history of venous thrombosis and embolism, Rheumatic fever, or Unspecified disorder of thyroid.  Immunization History   Administered Date(s) Administered    INFLUENZA TIV (IM) 11/27/2009, 11/07/2011, 11/22/2012, 09/06/2013, 11/01/2014    Influenza LAIV (Nasal) - HISTORICAL DATA 12/31/2009    Influenza Seasonal Injectable - Historical Data 11/27/2009, 11/22/2012, 01/03/2013, 09/06/2013, 11/01/2014    Influenza Vaccine H1N1 - HISTORICAL DATA 12/31/2009    Pneumococcal Vaccine (UF) - HISTORICAL DATA 11/06/2009    Pneumococcal polysaccharide vaccine (PPSV-23) 11/06/2009     COVID Vaccination Status: UTO  LMP: UTO  G/P: UTO    Allergies  Allergies   Allergen Reactions    Bactrim Hives    Lamotrigine [Lamictal] Hives    Quetiapine Fumarate Hives     Extrapyramidal Symptoms    Keflex Hives       Surgical Hx   has a past surgical history that includes  "gastroscopy (4/28/2015).    Medications:  None       Family Hx:  Family History   Problem Relation Age of Onset    Lung Disease Mother     Cancer Mother        Social Hx:  *Obtained from previous note on 6/23/22 due to decreased LOC*    Living Situation: UTO  Work: UTO  Diet: UTO  Exercise: UTO  Tobacco: never smoked  Marijuana: Yes  Alcohol: Yes, heavy, drinking a pint/day.  Illicit Drugs: Methamphetamine smoked daily for past 5 years    Sexual Hx:  UTO    OBJECTIVE   Physical Exam:  Pt was tachycardic and hypertensive. Se is saaturating at 94% on 3L O2 via NC.    BP (!) 148/102   Pulse (!) 115   Temp 36.4 °C (97.6 °F) (Temporal)   Resp 18   Ht 1.473 m (4' 10\")   Wt 60 kg (132 lb 4.4 oz)   SpO2 94%   No intake or output data in the 24 hours ending 09/27/22 0827    General: Pt is asleep, lying in hospital bed under sitter observation. She is moderately aroused by voice but falls asleep again within 10 seconds.  HEENT: Normocephalic, atraumatic. EOM grossly intact but sluggish. Mucous membranes moist. No lymphadenopathy.  Cardio: Normal S1 and S2. Regular rate and rhythm. No murmurs, rubs, or gallops.  Pulmonary: Lungs are clear to auscultation bilaterally. No wheezes, rales, or rhonchi.  Abdomen: Normoactive bowel sounds. Abdomen is soft and nondistended. No masses. Diffuse tenderness throughout all 4Q. Hepatomegaly noted.  MSK: Normal ROM. Extremities well-perfused. Capillary refill <2 seconds. No LE edema.  Neuro: UTO due to decreased LOC.  Skin: No rash, lesions, or skin ulcers. No jaundice, ecchymoses, or petechiae.   Psych: UTO due to decreased LOC.    Lab Results:  Recent Labs     09/26/22  0818 09/27/22  0046   WBC 4.4* 4.1*   RBC 3.43* 3.22*   HEMOGLOBIN 9.6* 8.9*   HEMATOCRIT 31.7* 29.5*   MCV 92.4 91.6   MCH 28.0 27.6   RDW 50.5* 49.4   PLATELETCT 81* 56*   MPV 9.4 9.7   NEUTSPOLYS 50.00  --    LYMPHOCYTES 35.80  --    MONOCYTES 11.20  --    EOSINOPHILS 0.70  --    BASOPHILS 1.60  --      Recent Labs " "    09/26/22 0818 09/27/22 0046   SODIUM 136 129*   POTASSIUM 4.1 3.9   CHLORIDE 96 92*   CO2 23 28   BUN 9 6*   CREATININE 0.32* 0.37*   CALCIUM 8.9 8.6   MAGNESIUM 1.7  --    PHOSPHORUS 4.1  --    ALBUMIN 4.3 4.2     Estimated GFR/CRCL = CrCl cannot be calculated (Unknown ideal weight.).  Recent Labs     09/26/22 0818 09/27/22  0046   GLUCOSE 106* 111*     Recent Labs     09/26/22 0818 09/27/22  0046   ASTSGOT 92* 69*   ALTSGPT 32 24   TBILIRUBIN 0.4 0.8   ALKPHOSPHAT 182* 168*   GLOBULIN 4.2* 3.7*     Recent Labs     09/26/22 0818   CPKTOTAL 97         No results for input(s): INR, APTT, FIBRINOGEN in the last 72 hours.    Invalid input(s): DIMER    Microbiology Results:  Results       Procedure Component Value Units Date/Time    URINALYSIS CULTURE, IF INDICATED [[id number]]  (Abnormal) Collected: 09/26/22 1211    Order Status: Completed Specimen: Urine Updated: 09/26/22 1304     Color Yellow     Character Clear     Specific Gravity 1.025     Ph 5.5     Glucose Negative mg/dL      Ketones Negative mg/dL      Protein Negative mg/dL      Bilirubin Negative     Urobilinogen, Urine 0.2     Nitrite Negative     Leukocyte Esterase Negative     Occult Blood Small     Micro Urine Req Microscopic    Narrative:      Indication for culture:->Patient WITHOUT an indwelling Figueredo  catheter in place with new onset of Dysuria, Frequency,  Urgency, and/or Suprapubic pain    CoV-2, FLU A/B, and RSV by PCR (2-4 Hours CEPHEID) : Collect NP swab in [facility] [[id number]] Collected: 09/26/22 0802    Order Status: Completed Specimen: Respirate Updated: 09/26/22 0930     Influenza virus A RNA Negative     Influenza virus B, PCR Negative     RSV, PCR Negative     SARS-CoV-2 by PCR NotDetected     Comment: PATIENTS: Important information regarding your results and instructions can  be found at [email or web address]   \"After your  Covid-19 Test\"    [facility] providers: PLEASE REFER TO DE-ESCALATION AND RETESTING " PROTOCOL  on [facility]    **The [employer] GeneXpert Xpress SARS-CoV-2 RT-PCR Test has been made  available for use under the Emergency Use Authorization (EUA) only.          SARS-CoV-2 Source NP Swab            Imaging Results:  DX-CHEST-PORTABLE (1 VIEW)   Final Result      1.  Left basilar atelectasis, unchanged.      CT-HEAD W/O   Final Result      No acute intracranial abnormality.             EKG  Results for orders placed or performed during the hospital encounter of 21   EKG (NOW)   Result Value Ref Range    Report       [facility] Emergency Dept.    Test Date:  2021  Pt Name:    MANAS DELGADO              Department: ER  MRN:        [id number]                      Room:       [facility]  Gender:     Female                       Technician: LENO  :        1970                   Requested By:ER TRIAGE PROTOCOL  Order #:    [id number]                    Reading MD: Clif Rose    Measurements  Intervals                                Axis  Rate:       104                          P:          49  MN:         128                          QRS:        9  QRSD:       90                           T:          241  QT:         364  QTc:        479    Interpretive Statements  SINUS TACHYCARDIA  Compared to ECG 2021 05:54:13  Myocardial infarct finding no longer present  Prolonged QT interval no longer present  Electronically Signed On 2021 19:47:01 PDT by Clif Rose         Current Medications    Current Facility-Administered Medications:     diazePAM (VALIUM) tablet 5 mg, 5 mg, Oral, Q6HRS PRN, Modesto Espinoza M.D., 5 mg at 22 1026    senna-docusate (PERICOLACE or SENOKOT S) 8.6-50 MG per tablet 2 Tablet, 2 Tablet, Oral, BID, 2 Tablet at 22 0909 **AND** polyethylene glycol/lytes (MIRALAX) PACKET 1 Packet, 1 Packet, Oral, QDAY PRN **AND** magnesium hydroxide (MILK OF MAGNESIA) suspension 30 mL, 30 mL, Oral, QDAY PRN **AND** bisacodyl (DULCOLAX) suppository 10  mg, 10 mg, Rectal, QDAY PRN, Hamilton Connors M.D.    lactated ringers infusion, , Intravenous, Continuous, Hamilton Connors M.D., Last Rate: 150 mL/hr at 09/27/22 0637, 1,000 mL at 09/27/22 0637    labetalol (NORMODYNE/TRANDATE) injection 10 mg, 10 mg, Intravenous, Q4HRS PRN, Hamilton Connors M.D.    ondansetron (ZOFRAN) syringe/vial injection 4 mg, 4 mg, Intravenous, Q4HRS PRN, Hamilton Connors M.D., 4 mg at 09/26/22 1323    ondansetron (ZOFRAN ODT) dispertab 4 mg, 4 mg, Oral, Q4HRS PRN, Hamilton Connors M.D.    promethazine (PHENERGAN) tablet 12.5-25 mg, 12.5-25 mg, Oral, Q4HRS PRN, Hamilton Connors M.D.    promethazine (PHENERGAN) suppository 12.5-25 mg, 12.5-25 mg, Rectal, Q4HRS PRN, Hamilton Connors M.D.    prochlorperazine (COMPAZINE) injection 5-10 mg, 5-10 mg, Intravenous, Q4HRS PRN, Hamilton Connors M.D.    LORazepam (ATIVAN) tablet 0.5 mg, 0.5 mg, Oral, Q4HRS PRN, Hamilton Connors M.D.    LORazepam (ATIVAN) tablet 1 mg, 1 mg, Oral, Q4HRS PRN, Hamilton Connors M.D.    LORazepam (ATIVAN) tablet 2 mg, 2 mg, Oral, Q2HRS PRN, Hamilton Connors M.D., 2 mg at 09/27/22 0824    LORazepam (ATIVAN) tablet 3 mg, 3 mg, Oral, Q HOUR PRN, Hamilton Connors M.D., 3 mg at 09/27/22 0909    thiamine (Vitamin B-1) tablet 100 mg, 100 mg, Oral, DAILY, 100 mg at 09/27/22 0434 **AND** multivitamin (THERAGRAN) tablet 1 Tablet, 1 Tablet, Oral, DAILY, 1 Tablet at 09/27/22 0434 **AND** folic acid (FOLVITE) tablet 1 mg, 1 mg, Oral, DAILY, Hamilton Connors M.D., 1 mg at 09/27/22 0434    ipratropium-albuterol (DUONEB) nebulizer solution, 3 mL, Nebulization, Q4H PRN (RT), Hamilton Connors M.D.    ASSESSMENT/PLAN  Olya Youssef is a 52 y.o. female with a PMH of alcoholism, hepatitis, and chronic pancreatitis who was admitted on 9/26/2022 with alcohol induced pancreatitis, seizure 2/2 acute alcohol withdrawal, and medical detox.    #Alcoholism, withdrawal seizure, medical dotox  Pt has previously reported drinking a pint/day as recently as June 2022. Pt presented to ED with breathalyzer  "measured BISI of >4x the legal limit for intoxication. Pt's Elevated LFT pattern with 2:1 ratio of AST:ALT is suggestive of chronic alcohol abuse. Pt strongly desires to stop drinking. She was admitted to detox in the hospital under supervised conditions/CIWA protocol. Due to pt's decreased consciousness, I was UTO a CIWA-Ar score to objectify the severity of her alcohol withdrawal. Per nurse, pt is beginning to experience hallucinations. Due to her decreased LOC and lack of definitive airway control, pt is not cleared to eat solid foods. Given pt's subjective severity of alcohol withdrawal symptoms, her care cannot be de-escalated at this time. Unable to calculate MELD-Na score without INR, which has been ordered for tomorrow morning.  Continue IP tele mgmt on CIWA protocol  Continually reevaluate for seizure, vital sign abnormalities, or other systems changes  ? Tube nutrition if unable to maintain airway control  Reassess EKG per arrhythmia risk  Check INR (ordered for tomorrow 0300) and calculate MELD-Na score to assess liver function    #Acute Pancreatitis  Yesterday in ED lipase was elevated to 163 at Pt described abdominal pain. Pt exhibits continued pancytopenia and thrombocytopenia, but not greatly differentiated from her baseline.  Trend lipase   Continue CIWA protocol  CT ABD if worsened pain or continually elevated lipase    #Suicidal Ideation  Pt described wanting to \"get it over with\" and that she is \"just done feeling this way, I am done feeling this way\" to the ERP. Pt denies being actively suicidal and states she doesn't want to hurt herself, but admits to having fleeting thoughts of overdose as a means to end her suffering. Given level of intoxication yesterday during admission, it's possible this was a temporary moment of hopelessness. However, acute suicidality needs to be r/o before she is cleared for d/c. Pt is currently on Psych hold.  Psych consult for suicidal ideation.    PROPHYLAXIS  None " other than stated above.

## 2022-09-27 NOTE — PROGRESS NOTES
Received report in ED from JARETT Ji, and patient care assumed. Patient was oriented to person, place, and situation and disoriented to time.  She was transported to [facility] by almaz Nguyen RN and JARETT Sheehan. Vital signal taken with elevated BP (150/106). CIWA protocol resumed.  POC discussed with patient. 1:1 sitter was in the room. Fall precautions in place. Seizure precautions in place.

## 2022-09-27 NOTE — RESPIRATORY CARE
COPD EDUCATION by COPD CLINICAL EDUCATOR  9/27/2022 at 7:26 AM by Elsa Tapia, RRT     Patient reviewed by COPD education team. Patient does not qualify for the COPD program.

## 2022-09-27 NOTE — DISCHARGE PLANNING
Care Transition Team Assessment    LSW attempted to meet with pt at bedside to complete assessment, however pt not responding to assessment questions. Assessment obtained from previous admission:    Pt lives with a roommate in a first floor apartment with no steps to enter. Pt is independent with ADLs and IADLs. Pt has a FWW and shower chair at home. Pt is currently unemployed. Pt has a history of etoh, SA (meth), and bipolar disorder. Pt does not have ACP documents. Pt has MTM transportation benefits through her insurance.    Information Source  Orientation Level: Oriented to place, Oriented to situation, Oriented to person, Disoriented to time  Information Given By: Other (Comments) (chart review)  Who is responsible for making decisions for patient? : Patient    Readmission Evaluation  Is this a readmission?: No    Elopement Risk  Legal Hold: No  Ambulatory or Self Mobile in Wheelchair: Yes  Disoriented: No  Psychiatric Symptoms: None  History of Wandering: No  Elopement this Admit: No  Vocalizing Wanting to Leave: No  Displays Behaviors, Body Language Wanting to Leave: No-Not at Risk for Elopement  Elopement Risk: Not at Risk for Elopement    Discharge Preparedness  What is your plan after discharge?: Home with help  Prior Functional Level: Ambulatory, Independent with Activities of Daily Living, Independent with Medication Management, Uses Walker  Difficulity with ADLs: None  Difficulity with IADLs: None    Functional Assesment  Prior Functional Level: Ambulatory, Independent with Activities of Daily Living, Independent with Medication Management, Uses Walker    Finances  Prescription Coverage: Yes    Advance Directive  Advance Directive?: None    Domestic Abuse  Have you ever been the victim of abuse or violence?: No    Psychological Assessment  History of Substance Abuse: Alcohol, Methamphetamine  History of Psychiatric Problems: Yes  Non-compliant with Treatment: Yes  Newly Diagnosed Illness: No    Discharge  Risks or Barriers  Discharge risks or barriers?: Substance abuse, Mental health  Patient risk factors: Lack of outside supports, Mental health, Substance abuse    Anticipated Discharge Information  Discharge Disposition: Discharged to home/self care (01)

## 2022-09-27 NOTE — ED NOTES
BS report given to tele 8 RN  Pt belongings x1 bag retrieved from locker and sent w/ RN  Chart w/ RN  Telesitter transported upstairs w/ pt and tele team  Pt transported to tele 8 in stable condition

## 2022-09-27 NOTE — DISCHARGE PLANNING
Case Management Discharge Planning    Admission Date: 9/26/2022  GMLOS: 4.9  ALOS: 1    6-Clicks ADL Score:  none  6-Clicks Mobility Score:  none  PT and/or OT Eval ordered: No  Post-acute Referrals Ordered: No  Post-acute Choice Obtained: NA  Has referral(s) been sent to post-acute provider:  NA      Anticipated Discharge Dispo: Discharge Disposition: Discharged to home/self care (01)    DME Needed: No    Action(s) Taken: Pt pending medical clearance, pt currently on 3 liters O2, uses 2 at baseline. Psyciatry eval pending. Case management needs pending clinical course.    Escalations Completed: None    Medically Clear: No    Next Steps: f/u with medical team and pt to discuss dc needs and barriers.    Barriers to Discharge: Medical clearance

## 2022-09-28 ENCOUNTER — APPOINTMENT (OUTPATIENT)
Dept: RADIOLOGY | Facility: MEDICAL CENTER | Age: 52
DRG: 896 | End: 2022-09-28
Attending: STUDENT IN AN ORGANIZED HEALTH CARE EDUCATION/TRAINING PROGRAM
Payer: COMMERCIAL

## 2022-09-28 PROBLEM — R50.9 FEVER: Status: ACTIVE | Noted: 2022-09-28

## 2022-09-28 LAB
ALBUMIN SERPL BCP-MCNC: 3.7 G/DL (ref 3.2–4.9)
ALBUMIN/GLOB SERPL: 1.1 G/DL
ALP SERPL-CCNC: 169 U/L (ref 30–99)
ALT SERPL-CCNC: 26 U/L (ref 2–50)
ANION GAP SERPL CALC-SCNC: 12 MMOL/L (ref 7–16)
AST SERPL-CCNC: 84 U/L (ref 12–45)
BILIRUB SERPL-MCNC: 0.9 MG/DL (ref 0.1–1.5)
BUN SERPL-MCNC: 6 MG/DL (ref 8–22)
CALCIUM SERPL-MCNC: 8.9 MG/DL (ref 8.5–10.5)
CHLORIDE SERPL-SCNC: 96 MMOL/L (ref 96–112)
CO2 SERPL-SCNC: 26 MMOL/L (ref 20–33)
CREAT SERPL-MCNC: 0.31 MG/DL (ref 0.5–1.4)
ERYTHROCYTE [DISTWIDTH] IN BLOOD BY AUTOMATED COUNT: 49.5 FL (ref 35.9–50)
GFR SERPLBLD CREATININE-BSD FMLA CKD-EPI: 125 ML/MIN/1.73 M 2
GLOBULIN SER CALC-MCNC: 3.5 G/DL (ref 1.9–3.5)
GLUCOSE SERPL-MCNC: 125 MG/DL (ref 65–99)
HCT VFR BLD AUTO: 29.6 % (ref 37–47)
HGB BLD-MCNC: 8.8 G/DL (ref 12–16)
INR PPP: 1.11 (ref 0.87–1.13)
LIPASE SERPL-CCNC: 68 U/L (ref 11–82)
MAGNESIUM SERPL-MCNC: 1.7 MG/DL (ref 1.5–2.5)
MCH RBC QN AUTO: 27.4 PG (ref 27–33)
MCHC RBC AUTO-ENTMCNC: 29.7 G/DL (ref 33.6–35)
MCV RBC AUTO: 92.2 FL (ref 81.4–97.8)
NT-PROBNP SERPL IA-MCNC: 185 PG/ML (ref 0–125)
PHOSPHATE SERPL-MCNC: 3.9 MG/DL (ref 2.5–4.5)
PLATELET # BLD AUTO: 61 K/UL (ref 164–446)
PMV BLD AUTO: 10.8 FL (ref 9–12.9)
POTASSIUM SERPL-SCNC: 3.6 MMOL/L (ref 3.6–5.5)
PROCALCITONIN SERPL-MCNC: 0.13 NG/ML
PROT SERPL-MCNC: 7.2 G/DL (ref 6–8.2)
PROTHROMBIN TIME: 14.2 SEC (ref 12–14.6)
RBC # BLD AUTO: 3.21 M/UL (ref 4.2–5.4)
SODIUM SERPL-SCNC: 134 MMOL/L (ref 135–145)
TSH SERPL DL<=0.005 MIU/L-ACNC: 1.67 UIU/ML (ref 0.38–5.33)
WBC # BLD AUTO: 4.6 K/UL (ref 4.8–10.8)

## 2022-09-28 PROCEDURE — 83880 ASSAY OF NATRIURETIC PEPTIDE: CPT

## 2022-09-28 PROCEDURE — 83690 ASSAY OF LIPASE: CPT

## 2022-09-28 PROCEDURE — A9270 NON-COVERED ITEM OR SERVICE: HCPCS | Performed by: STUDENT IN AN ORGANIZED HEALTH CARE EDUCATION/TRAINING PROGRAM

## 2022-09-28 PROCEDURE — 99233 SBSQ HOSP IP/OBS HIGH 50: CPT | Performed by: STUDENT IN AN ORGANIZED HEALTH CARE EDUCATION/TRAINING PROGRAM

## 2022-09-28 PROCEDURE — 770020 HCHG ROOM/CARE - TELE (206)

## 2022-09-28 PROCEDURE — 84100 ASSAY OF PHOSPHORUS: CPT

## 2022-09-28 PROCEDURE — 85610 PROTHROMBIN TIME: CPT

## 2022-09-28 PROCEDURE — 83735 ASSAY OF MAGNESIUM: CPT

## 2022-09-28 PROCEDURE — 84145 PROCALCITONIN (PCT): CPT

## 2022-09-28 PROCEDURE — 87040 BLOOD CULTURE FOR BACTERIA: CPT

## 2022-09-28 PROCEDURE — 99231 SBSQ HOSP IP/OBS SF/LOW 25: CPT | Mod: GC | Performed by: PSYCHIATRY & NEUROLOGY

## 2022-09-28 PROCEDURE — 700102 HCHG RX REV CODE 250 W/ 637 OVERRIDE(OP): Performed by: STUDENT IN AN ORGANIZED HEALTH CARE EDUCATION/TRAINING PROGRAM

## 2022-09-28 PROCEDURE — 36415 COLL VENOUS BLD VENIPUNCTURE: CPT

## 2022-09-28 PROCEDURE — 85027 COMPLETE CBC AUTOMATED: CPT

## 2022-09-28 PROCEDURE — 84443 ASSAY THYROID STIM HORMONE: CPT

## 2022-09-28 PROCEDURE — 71045 X-RAY EXAM CHEST 1 VIEW: CPT

## 2022-09-28 PROCEDURE — 700111 HCHG RX REV CODE 636 W/ 250 OVERRIDE (IP): Performed by: STUDENT IN AN ORGANIZED HEALTH CARE EDUCATION/TRAINING PROGRAM

## 2022-09-28 PROCEDURE — 80053 COMPREHEN METABOLIC PANEL: CPT

## 2022-09-28 RX ORDER — AMLODIPINE BESYLATE 5 MG/1
5 TABLET ORAL
Status: DISCONTINUED | OUTPATIENT
Start: 2022-09-28 | End: 2022-10-04 | Stop reason: HOSPADM

## 2022-09-28 RX ORDER — OXYCODONE HYDROCHLORIDE 5 MG/1
5 TABLET ORAL EVERY 4 HOURS PRN
Status: DISCONTINUED | OUTPATIENT
Start: 2022-09-28 | End: 2022-10-02

## 2022-09-28 RX ADMIN — DIAZEPAM 10 MG: 5 TABLET ORAL at 04:32

## 2022-09-28 RX ADMIN — LORAZEPAM 3 MG: 2 TABLET ORAL at 16:41

## 2022-09-28 RX ADMIN — THERA TABS 1 TABLET: TAB at 04:33

## 2022-09-28 RX ADMIN — DIAZEPAM 5 MG: 5 TABLET ORAL at 23:31

## 2022-09-28 RX ADMIN — OXYCODONE 5 MG: 5 TABLET ORAL at 23:31

## 2022-09-28 RX ADMIN — THIAMINE HCL TAB 100 MG 100 MG: 100 TAB at 04:32

## 2022-09-28 RX ADMIN — DIAZEPAM 5 MG: 5 TABLET ORAL at 17:26

## 2022-09-28 RX ADMIN — ONDANSETRON 4 MG: 2 INJECTION INTRAMUSCULAR; INTRAVENOUS at 23:32

## 2022-09-28 RX ADMIN — LORAZEPAM 3 MG: 2 TABLET ORAL at 08:17

## 2022-09-28 RX ADMIN — LORAZEPAM 3 MG: 2 TABLET ORAL at 09:28

## 2022-09-28 RX ADMIN — DIAZEPAM 5 MG: 5 TABLET ORAL at 11:31

## 2022-09-28 RX ADMIN — AMLODIPINE BESYLATE 5 MG: 5 TABLET ORAL at 08:17

## 2022-09-28 RX ADMIN — OXYCODONE 5 MG: 5 TABLET ORAL at 12:47

## 2022-09-28 RX ADMIN — LORAZEPAM 4 MG: 2 TABLET ORAL at 09:10

## 2022-09-28 RX ADMIN — LORAZEPAM 2 MG: 2 TABLET ORAL at 03:19

## 2022-09-28 RX ADMIN — FOLIC ACID 1 MG: 1 TABLET ORAL at 04:33

## 2022-09-28 RX ADMIN — LORAZEPAM 0.5 MG: 0.5 TABLET ORAL at 20:04

## 2022-09-28 RX ADMIN — DOCUSATE SODIUM 50 MG AND SENNOSIDES 8.6 MG 2 TABLET: 8.6; 5 TABLET, FILM COATED ORAL at 04:33

## 2022-09-28 RX ADMIN — LORAZEPAM 4 MG: 2 TABLET ORAL at 11:31

## 2022-09-28 RX ADMIN — DOCUSATE SODIUM 50 MG AND SENNOSIDES 8.6 MG 2 TABLET: 8.6; 5 TABLET, FILM COATED ORAL at 17:26

## 2022-09-28 ASSESSMENT — LIFESTYLE VARIABLES
AGITATION: *
AGITATION: NORMAL ACTIVITY
TOTAL SCORE: 3
TOTAL SCORE: 24
ORIENTATION AND CLOUDING OF SENSORIUM: DATE DISORIENTATION BY NO MORE THAN TWO CALENDAR DAYS
AUDITORY DISTURBANCES: NOT PRESENT
TOTAL SCORE: 5
NAUSEA AND VOMITING: MILD NAUSEA WITH NO VOMITING
NAUSEA AND VOMITING: NO NAUSEA AND NO VOMITING
VISUAL DISTURBANCES: NOT PRESENT
AUDITORY DISTURBANCES: NOT PRESENT
VISUAL DISTURBANCES: NOT PRESENT
AUDITORY DISTURBANCES: NOT PRESENT
VISUAL DISTURBANCES: NOT PRESENT
PAROXYSMAL SWEATS: BEADS OF SWEAT OBVIOUS ON FOREHEAD
PAROXYSMAL SWEATS: BEADS OF SWEAT OBVIOUS ON FOREHEAD
NAUSEA AND VOMITING: NO NAUSEA AND NO VOMITING
ORIENTATION AND CLOUDING OF SENSORIUM: DATE DISORIENTATION BY NO MORE THAN TWO CALENDAR DAYS
AGITATION: *
ORIENTATION AND CLOUDING OF SENSORIUM: ORIENTED AND CAN DO SERIAL ADDITIONS
AGITATION: *
HEADACHE, FULLNESS IN HEAD: NOT PRESENT
TOTAL SCORE: 15
PAROXYSMAL SWEATS: NO SWEAT VISIBLE
TREMOR: MODERATE TREMOR WITH ARMS EXTENDED
HEADACHE, FULLNESS IN HEAD: SEVERE
HEADACHE, FULLNESS IN HEAD: MODERATE
ANXIETY: *
VISUAL DISTURBANCES: NOT PRESENT
ORIENTATION AND CLOUDING OF SENSORIUM: CANNOT DO SERIAL ADDITIONS OR IS UNCERTAIN ABOUT DATE
AUDITORY DISTURBANCES: NOT PRESENT
TREMOR: TREMOR NOT VISIBLE BUT CAN BE FELT, FINGERTIP TO FINGERTIP
NAUSEA AND VOMITING: *
AUDITORY DISTURBANCES: NOT PRESENT
ANXIETY: NO ANXIETY (AT EASE)
TOTAL SCORE: VERY MILD ITCHING, PINS AND NEEDLES SENSATION, BURNING OR NUMBNESS
TREMOR: MODERATE TREMOR WITH ARMS EXTENDED
TOTAL SCORE: 5
NAUSEA AND VOMITING: NO NAUSEA AND NO VOMITING
AGITATION: *
HEADACHE, FULLNESS IN HEAD: MILD
ORIENTATION AND CLOUDING OF SENSORIUM: DATE DISORIENTATION BY MORE THAN TWO CALENDAR DAYS
TREMOR: *
NAUSEA AND VOMITING: NO NAUSEA AND NO VOMITING
AGITATION: *
ANXIETY: *
NAUSEA AND VOMITING: NO NAUSEA AND NO VOMITING
PAROXYSMAL SWEATS: NO SWEAT VISIBLE
TREMOR: *
PAROXYSMAL SWEATS: *
ANXIETY: NO ANXIETY (AT EASE)
PAROXYSMAL SWEATS: NO SWEAT VISIBLE
VISUAL DISTURBANCES: NOT PRESENT
TOTAL SCORE: MILD ITCHING, PINS AND NEEDLES SENSATION, BURNING OR NUMBNESS
TREMOR: TREMOR NOT VISIBLE BUT CAN BE FELT, FINGERTIP TO FINGERTIP
AUDITORY DISTURBANCES: NOT PRESENT
VISUAL DISTURBANCES: NOT PRESENT
PAROXYSMAL SWEATS: *
TOTAL SCORE: 29
PAROXYSMAL SWEATS: NO SWEAT VISIBLE
AGITATION: MODERATELY FIDGETY AND RESTLESS
TREMOR: NO TREMOR
HEADACHE, FULLNESS IN HEAD: SEVERE
HEADACHE, FULLNESS IN HEAD: MODERATE
PAROXYSMAL SWEATS: *
TREMOR: *
VISUAL DISTURBANCES: NOT PRESENT
PAROXYSMAL SWEATS: BEADS OF SWEAT OBVIOUS ON FOREHEAD
ANXIETY: *
HEADACHE, FULLNESS IN HEAD: SEVERE
HEADACHE, FULLNESS IN HEAD: VERY MILD
ORIENTATION AND CLOUDING OF SENSORIUM: CANNOT DO SERIAL ADDITIONS OR IS UNCERTAIN ABOUT DATE
HEADACHE, FULLNESS IN HEAD: NOT PRESENT
TREMOR: *
ORIENTATION AND CLOUDING OF SENSORIUM: DATE DISORIENTATION BY NO MORE THAN TWO CALENDAR DAYS
TOTAL SCORE: 13
TREMOR: *
AUDITORY DISTURBANCES: NOT PRESENT
HEADACHE, FULLNESS IN HEAD: SEVERE
ANXIETY: NO ANXIETY (AT EASE)
ANXIETY: *
AGITATION: NORMAL ACTIVITY
NAUSEA AND VOMITING: NO NAUSEA AND NO VOMITING
NAUSEA AND VOMITING: NO NAUSEA AND NO VOMITING
TOTAL SCORE: 4
NAUSEA AND VOMITING: *
PAROXYSMAL SWEATS: NO SWEAT VISIBLE
AGITATION: NORMAL ACTIVITY
AUDITORY DISTURBANCES: NOT PRESENT
AGITATION: NORMAL ACTIVITY
ANXIETY: *
ORIENTATION AND CLOUDING OF SENSORIUM: CANNOT DO SERIAL ADDITIONS OR IS UNCERTAIN ABOUT DATE
HEADACHE, FULLNESS IN HEAD: NOT PRESENT
ANXIETY: NO ANXIETY (AT EASE)
AGITATION: NORMAL ACTIVITY
ORIENTATION AND CLOUDING OF SENSORIUM: CANNOT DO SERIAL ADDITIONS OR IS UNCERTAIN ABOUT DATE
VISUAL DISTURBANCES: NOT PRESENT
TOTAL SCORE: 31
VISUAL DISTURBANCES: NOT PRESENT
TOTAL SCORE: VERY MILD ITCHING, PINS AND NEEDLES SENSATION, BURNING OR NUMBNESS
ANXIETY: *
TOTAL SCORE: 18
VISUAL DISTURBANCES: NOT PRESENT
VISUAL DISTURBANCES: NOT PRESENT
TOTAL SCORE: VERY MILD ITCHING, PINS AND NEEDLES SENSATION, BURNING OR NUMBNESS
NAUSEA AND VOMITING: NO NAUSEA AND NO VOMITING
AUDITORY DISTURBANCES: NOT PRESENT
ANXIETY: NO ANXIETY (AT EASE)
TREMOR: MODERATE TREMOR WITH ARMS EXTENDED
TOTAL SCORE: VERY MILD ITCHING, PINS AND NEEDLES SENSATION, BURNING OR NUMBNESS
TOTAL SCORE: 3
ORIENTATION AND CLOUDING OF SENSORIUM: CANNOT DO SERIAL ADDITIONS OR IS UNCERTAIN ABOUT DATE
AUDITORY DISTURBANCES: NOT PRESENT
ORIENTATION AND CLOUDING OF SENSORIUM: CANNOT DO SERIAL ADDITIONS OR IS UNCERTAIN ABOUT DATE
AUDITORY DISTURBANCES: NOT PRESENT

## 2022-09-28 ASSESSMENT — ENCOUNTER SYMPTOMS
BLOOD IN STOOL: 0
TREMORS: 1
FEVER: 0
BLURRED VISION: 0
DIARRHEA: 0
NAUSEA: 0
CONSTIPATION: 0
VOMITING: 0
PALPITATIONS: 0
HEADACHES: 1
SHORTNESS OF BREATH: 0
CHILLS: 0
HEMOPTYSIS: 0

## 2022-09-28 ASSESSMENT — PAIN DESCRIPTION - PAIN TYPE: TYPE: ACUTE PAIN

## 2022-09-28 ASSESSMENT — PATIENT HEALTH QUESTIONNAIRE - PHQ9
2. FEELING DOWN, DEPRESSED, IRRITABLE, OR HOPELESS: NEARLY EVERY DAY
SUM OF ALL RESPONSES TO PHQ9 QUESTIONS 1 AND 2: 4
3. TROUBLE FALLING OR STAYING ASLEEP OR SLEEPING TOO MUCH: NOT AT ALL
1. LITTLE INTEREST OR PLEASURE IN DOING THINGS: SEVERAL DAYS

## 2022-09-28 NOTE — NON-PROVIDER
Internal Medicine Medical Student Note  Note Author: Av Souza, Student    Name Olya Youssef     1970   Age/Sex 52 y.o. female   MRN [id number]   Code Status Full             Reason for interval visit  (Principal Problem)   Alcohol-induced acute pancreatitis  Medical Withdrawal  Suicidal Ideation    Interval Problem Daily Status Update  (problem status, last 24 hours, new history, new data )     Olya Youssef is a 52 y.o. female with a PMH of alcoholism, hepatitis, and chronic pancreatitis who was admitted on 2022 with alcohol induced pancreatitis, seizure 2/2 acute alcohol withdrawal, and medical detox. She is currently on day 3 of her hospital stay.     Yesterday she was on continued CIWA protocol. She was beginning to experience hallucinations and was very unarousable due to heavy ativan administration.     Overnight she was up a lot w/ N and abd pain, pruritis, tremulousness, and had developed a fever of 100.9 on the 0745 VS check. She did not eat dinner and has had no BM's. She continues to be hypertensive at 148/100 and tachycardic at 121, which are both reasonably close to her baseline since admission.     Today, per nurse, her withdrawal sxs have worsened and she again required heavy ativan sedatino. She is unarousable. Earlier this morning she ate about 20% of her breakfast, some crackers, and drank some juice. She has been making lots of urine output.      This afternoon, per sitter, pt has been mostly sleeping. She has new onset mild unproductive cough and constant, moderate, pleuritic chest pain that is localized to her R thorax. Sitter reports that pt has nibbled on some crackers, has not eaten breakfast, and consumes lots of fluids. Sitter is also reporting that pt has been having difficulty swallowing. She continues to have a lot of urine output, but has not had a BM.    Physical Exam   Continued Tachycardia and HTN unchanged from her baseline. Pt's fever has  dropped to below threshold to 99.7 F @ 1234 from 100.9 F @ 0745.    Vitals:    09/27/22 2309 09/28/22 0314 09/28/22 0745 09/28/22 1234   BP: (!) 149/101 (!) 150/102 (!) 148/100 (!) 148/99   Pulse: (!) 111 (!) 113 (!) 121 (!) 118   Resp: 18 16 16 16   Temp: 37.3 °C (99.1 °F) 36.6 °C (97.9 °F) (!) 38.3 °C (100.9 °F) 37.6 °C (99.7 °F)   TempSrc: Temporal Temporal Temporal Temporal   SpO2: 96% 97% 96% 94%   Weight:       Height:         Body mass index is 27.65 kg/m².    Oxygen Therapy:  Pulse Oximetry: 94 %, O2 (LPM): 3, O2 Delivery Device: Silicone Nasal Cannula    Physical Exam  Vitals reviewed. Nursing note reviewed: **Pt is currently under heavy ativan medication and has a significantly decreased loc**.Exam conducted with a chaperone present.   Constitutional:       Appearance: She is obese.   HENT:      Head: Normocephalic and atraumatic.      Nose: Nose normal.      Mouth/Throat:      Mouth: Mucous membranes are moist.   Eyes:      Comments: Unable to assess due to decreased loc.   Cardiovascular:      Rate and Rhythm: Normal rate and regular rhythm.   Pulmonary:      Effort: Accessory muscle usage present.      Comments: Snoring respirations, sleeping. Abdominal muscles flex throughout expiration.  Abdominal:      General: Abdomen is flat. Bowel sounds are normal.      Palpations: There is hepatomegaly.      Comments: Abdominal muscles flexed with expiration, firm abdomen. Groaning with moderate palpation.   Skin:     General: Skin is warm and dry.   Neurological:      Mental Status: She is unresponsive.      Comments: Pt is heavily sedated with ativan and is asleep.     A follow up exam performed this afternoon showed increased breath sounds throughout, with increased coarseness appreciated in the R basilar lung field. Her fever has also subsided. Pt was unarousable due to heavy ativan administration earlier this afternoon. All other exam findings are unchanged from previous exam this morning.    Pertinent  Labs, ECG or imaging:  Hematology   Latest Reference Range & Units 9/26/22 08:18 9/27/22 00:46 9/28/22 05:04   WBC 4.8 - 10.8 K/uL 4.4 (L) 4.1 (L) 4.6 (L)   RBC 4.20 - 5.40 M/uL 3.43 (L) 3.22 (L) 3.21 (L)   Hemoglobin 12.0 - 16.0 g/dL 9.6 (L) 8.9 (L) 8.8 (L)   Hematocrit 37.0 - 47.0 % 31.7 (L) 29.5 (L) 29.6 (L)   MCV 81.4 - 97.8 fL 92.4 91.6 92.2   MCH 27.0 - 33.0 pg 28.0 27.6 27.4   MCHC 33.6 - 35.0 g/dL 30.3 (L) 30.2 (L) 29.7 (L)   RDW 35.9 - 50.0 fL 50.5 (H) 49.4 49.5   Platelet Count 164 - 446 K/uL 81 (L) 56 (L) 61 (L)   MPV 9.0 - 12.9 fL 9.4 9.7 10.8   (L): Data is abnormally low  (H): Data is abnormally high    Chemistry   Latest Reference Range & Units 9/26/22 08:18 9/27/22 00:46 9/28/22 05:04   Sodium 135 - 145 mmol/L 136 129 (L) 134 (L)   Potassium 3.6 - 5.5 mmol/L 4.1 3.9 3.6   Chloride 96 - 112 mmol/L 96 92 (L) 96   Co2 20 - 33 mmol/L 23 28 26   Anion Gap 7.0 - 16.0  17.0 (H) 9.0 12.0   Glucose 65 - 99 mg/dL 106 (H) 111 (H) 125 (H)   Bun 8 - 22 mg/dL 9 6 (L) 6 (L)   Creatinine 0.50 - 1.40 mg/dL 0.32 (L) 0.37 (L) 0.31 (L)   GFR (CKD-EPI) >60 mL/min/1.73 m 2 124 120 125   Calcium 8.5 - 10.5 mg/dL 8.9 8.6 8.9   AST(SGOT) 12 - 45 U/L 92 (H) 69 (H) 84 (H)   ALT(SGPT) 2 - 50 U/L 32 24 26   Alkaline Phosphatase 30 - 99 U/L 182 (H) 168 (H) 169 (H)   Lipase 11 - 82 U/L 163 (H)  68   (L): Data is abnormally low  (H): Data is abnormally high    Coagulation   Latest Reference Range & Units 9/28/22 11:14   INR 0.87 - 1.13  1.11     Updated Chemistry   Latest Reference Range & Units 9/28/22 05:04   NT-proBNP 0 - 125 pg/mL 185 (H)   (H): Data is abnormally high    CXR FINDINGS:     Chest is partially obscured by an overlying dressing or possibly a bag of ice.     HEART: Stable size.  LUNGS: Lung volumes are low. Asymmetric elevation of the LEFT diaphragm with LEFT basilar opacities unchanged.  PLEURA: No effusion or pneumothorax.    IMPRESSION:     Unchanged LEFT greater than RIGHT basilar atelectasis    Assessment/Plan    Olya Youssef is a 52 y.o. female with a PMH of alcoholism, hepatitis, and chronic pancreatitis who was admitted on 9/26/2022 with alcohol induced pancreatitis, seizure 2/2 acute alcohol withdrawal, and medical detox. She is currently on day 3 of her hospital stay.     # Fever  Pt's new fever and hx of alcoholism, unstable airway, and increasing feeding with solid foods is suspicious for aspiration PNA. Pt's presentation is also concerning for pulmonary edema 2/2 fluid overload. CXR was ordered and showed unchanged L sided atelectasis. Pt is reporting a new constant R sided pleuritic CP. Pt's sitter is reporting a new dry cough that is unrelated to feeding or drinking. Pt's concerning presentation of new fever, increased coarse breath sounds on R basilar lung field, and hx of alcoholism, unstable airway, and increasd feeding with solid foods is suspicious for aspiration PNA. Pt's presentation is also concerning for pulmonary edema 2/2 fluid overload. Pro-BNP was ordered for assessment of poss. fluid overload. RUQ US was also ordered to r/o potential cholestatic pathology.  - Start IV Unasyn to empirically treat for poss. aspiration PNA, most likely Klebsiella  - Trend Pro-BNP and assess need for poss. Diuresis  - +/- bedside swallowing study with nurse to assess pt's ability to control her airway and swallowing difficult  - Trend daily CBC w/ diff to assess for possible infection  - Trend Lactic Acid to assess for poss infection, hypoxia    #Alcoholism, withdrawal seizure, medical dotox  Pt has made progress towards goals of increasing foods. Given pt's subjective severity of alcohol withdrawal symptoms, her care cannot be de-escalated at this time. MELD-Na score was calculated to be 12 points, indicating a <2% 90 mortality due to liver failure. Current CIWA score is 5, indicating minimal withdrawal symptoms.  Continue IP tele mgmt on CIWA protocol  Continually reevaluate for seizure, vital sign  abnormalities, or other systems changes  Continue to improve food intake and stable airway control while eating  Reassess EKG per arrhythmia risk     # Chronic Pancreatitis  On Monday in ED lipase was elevated to 163 and Pt described abdominal pain. Today, lipase has decreased down to within normal limits at 68. Trend lipase   Continue CIWA protocol  Continue to trend Lipase  CT ABD if worsened sxs     # Pancytopenia  Pt exhibits continued pancytopenia and thrombocytopenia, but not greatly differentiated from her baseline. Today, her labs are essentially unchanged, with the exception that her platelets have increased slightly.   - Continue to trend CBC w/ diff    #Suicidal Ideation  Pt is currently on Psych hold. Psych saw pt yesterday but was unable to perform an exam or interview based on the pt's decreased loc.  Psych hold is continued for suicidal ideation.

## 2022-09-28 NOTE — ASSESSMENT & PLAN NOTE
Noted on 9/28 am. Ordered procalcitonin and proBNP, blood culture  Cont to monitor  Aspiration precaution

## 2022-09-28 NOTE — PROGRESS NOTES
Hospital Medicine Daily Progress Note    Date of Service  9/28/2022    Chief Complaint  Olya Youssef is a 52 y.o. female admitted 9/26/2022 with nausea, vomiting, suicidal ideation.     Hospital Course  52 y.o. female with past medical history of alcohol abuse, alcohol-related seizure, methamphetamine abuse, bipolar disorder , chronically on 2 L oxygen who presented 9/26/2022 with presented with generalized tremor associated with frontal headache, abdominal pain, nausea and vomiting for the past 4 days.  She also reports of shortness of breath. Reported SI at ED.   Labs noted with pancytopenia, low bicarbonate glucose 106, elevated AST, lipase 163.  CT head negative for acute stroke/hemorrhage.    Interval Problem Update  Seen at bedside.   Patient's mental status improving, alert. Reports headache and abdominal pain.   Ordered pain meds  Noted she is having fever with temp 100.9 this morning, chest CXR and procalcitonin.     I have discussed this patient's plan of care and discharge plan at IDT rounds today with Case Management, Nursing, Nursing leadership, and other members of the IDT team.    Consultants/Specialty  psychiatry    Code Status  Full Code    Disposition  Patient is not medically cleared for discharge.   Anticipate discharge to  TBD .  I have placed the appropriate orders for post-discharge needs.    Review of Systems  Review of Systems   Unable to perform ROS: Mental acuity      Physical Exam  Temp:  [36.6 °C (97.9 °F)-38.3 °C (100.9 °F)] 37.6 °C (99.7 °F)  Pulse:  [107-121] 118  Resp:  [16-18] 16  BP: (148-151)/() 148/99  SpO2:  [94 %-98 %] 94 %    Physical Exam  Vitals reviewed.   Constitutional:       Comments: Somnolent, respond to voice. Able to say her name.    HENT:      Head: Normocephalic and atraumatic.      Nose: Nose normal.      Mouth/Throat:      Pharynx: Oropharynx is clear.   Eyes:      Extraocular Movements: Extraocular movements intact.      Conjunctiva/sclera:  Conjunctivae normal.      Pupils: Pupils are equal, round, and reactive to light.   Cardiovascular:      Rate and Rhythm: Regular rhythm. Tachycardia present.      Pulses: Normal pulses.      Heart sounds: Normal heart sounds. No murmur heard.  Pulmonary:      Effort: Pulmonary effort is normal. No respiratory distress.      Breath sounds: Normal breath sounds.   Abdominal:      General: Abdomen is flat. Bowel sounds are normal.      Palpations: Abdomen is soft.      Tenderness: There is abdominal tenderness.   Musculoskeletal:         General: Normal range of motion.      Cervical back: Normal range of motion and neck supple.      Comments: Tremor noted   Skin:     General: Skin is warm and dry.   Neurological:      Mental Status: She is alert.      Comments: Somnolent, eye closed.   Responded to voice only  Answer her name only    Psychiatric:      Comments: Unable to assess       Fluids    Intake/Output Summary (Last 24 hours) at 9/28/2022 1329  Last data filed at 9/28/2022 0608  Gross per 24 hour   Intake --   Output 1500 ml   Net -1500 ml       Laboratory  Recent Labs     09/26/22  0818 09/27/22  0046 09/28/22  0504   WBC 4.4* 4.1* 4.6*   RBC 3.43* 3.22* 3.21*   HEMOGLOBIN 9.6* 8.9* 8.8*   HEMATOCRIT 31.7* 29.5* 29.6*   MCV 92.4 91.6 92.2   MCH 28.0 27.6 27.4   MCHC 30.3* 30.2* 29.7*   RDW 50.5* 49.4 49.5   PLATELETCT 81* 56* 61*   MPV 9.4 9.7 10.8       Recent Labs     09/26/22  0818 09/27/22  0046 09/28/22  0504   SODIUM 136 129* 134*   POTASSIUM 4.1 3.9 3.6   CHLORIDE 96 92* 96   CO2 23 28 26   GLUCOSE 106* 111* 125*   BUN 9 6* 6*   CREATININE 0.32* 0.37* 0.31*   CALCIUM 8.9 8.6 8.9       Recent Labs     09/28/22  1114   INR 1.11         Recent Labs     09/26/22  0818   TRIGLYCERIDE 99      *         Imaging  DX-CHEST-PORTABLE (1 VIEW)   Final Result      Unchanged LEFT greater than RIGHT basilar atelectasis      DX-CHEST-PORTABLE (1 VIEW)   Final Result      1.  Left basilar atelectasis,  unchanged.      CT-HEAD W/O   Final Result      No acute intracranial abnormality.                Assessment/Plan  * Alcohol-induced acute pancreatitis  Assessment & Plan  Lipase 160+ on admission with nausea and vomiting.   IVF  Antiemetics   Pain control  Serial abdominal exam  Consider CT Abdomen if the cause of pain in note clear, patient is not improving clinically after 3~4 days, clinical evidence of severe disease on presentation  Fasting lipids should be checked to r/o hypertriglyceridemia  Abdominal US to evaluated biliary stone/obstruction.    Alcohol dependence with withdrawal (HCC)- (present on admission)  Assessment & Plan  Alcohol withdrawal  -Cardiac monitoring  -Oxygen 2 L/m nasal cannula   -On CIWA protocol, on valium standing dose  -Thiamine/folate  -aspiration and fall precautions       Fever  Assessment & Plan  Noted on 9/28 am. Ordered procalcitonin and proBNP, blood culture  Cont to monitor  Aspiration precaution     Suicidal behavior  Assessment & Plan  Reported suicidal ideation at ER  On legal hold  Psych consulted     Acute on chronic respiratory failure with hypoxia on home O2 therapy- (present on admission)  Assessment & Plan  chronically on 2 L home oxygen since her COVID infection  Continue to  monitor oxygen saturation closely  Incentive spirometry  Continue DuoNeb      Methamphetamine abuse (HCC)- (present on admission)  Assessment & Plan  UDS pos amphetamine   Counseling once mental status improves    Pancytopenia (HCC)- (present on admission)  Assessment & Plan  In setting alcohol abuse  No concern of active bleeding  Hold DVT prophylaxis for now  Monitor H&H closely      Hyponatremia- (present on admission)  Assessment & Plan  On IVF  Cont to monitor       VTE prophylaxis: SCDs/TEDs    I have performed a physical exam and reviewed and updated ROS and Plan today (9/28/2022). In review of yesterday's note (9/27/2022), there are no changes except as documented above.

## 2022-09-28 NOTE — CARE PLAN
Problem: Knowledge Deficit - Standard  Goal: Patient and family/care givers will demonstrate understanding of plan of care, disease process/condition, diagnostic tests and medications  Outcome: Progressing     Problem: Optimal Care for Alcohol Withdrawal  Goal: Optimal Care for the alcohol withdrawal patient  Outcome: Progressing     Problem: Seizure Precautions  Goal: Implementation of seizure precautions  Outcome: Progressing     Problem: Fall Risk  Goal: Patient will remain free from falls  Outcome: Progressing     Problem: Pain - Standard  Goal: Alleviation of pain or a reduction in pain to the patient’s comfort goal  Outcome: Progressing   The patient is Stable - Low risk of patient condition declining or worsening    Shift Goals  Clinical Goals: Safety, CIWA  Patient Goals: Comfort  Family Goals: NA    Progress made toward(s) clinical / shift goals:  Progressing    Patient is not progressing towards the following goals:

## 2022-09-28 NOTE — DOCUMENTATION QUERY
[facility]                                                                       Query Response Note      PATIENT:               MANAS DELGADO  ACCT #:                  [id number]  MRN:                     [id number]  :                      1970  ADMIT DATE:       2022 6:17 AM  DISCH DATE:          RESPONDING  PROVIDER #:        [id number]           QUERY TEXT:    Please provide additional clinical indicators supportive of your documented diagnosis of Acute on Chronic Hypoxic Respiratory Failure.    Note: If an appropriate response is not listed below, please respond with a new note.            The patient's Clinical Indicators include:  Acute on Chronic Hypoxic Respiratory Failure is documented  Clinical Indicators - patient requires 2lpm o2 chronically since COVID infection                                   - RR wnl throughout admission                                   - no spo2 below 93% documented                                   - exams note normal pulmonary effort, no respiratory distress  TX - 2-3lpm low flow oxygen    Thank you,   Wendy Grewal RN, CCDS  Clinical   Connect via [employer]  Options provided:   -- Acute Hypoxic Respiratory Failure is ruled out, Chronic Hypoxic Respiratory Failure is present   -- Acute on Chronic Hypoxic Respiratory Failure is present, please document additional clinical indicators   -- Other explanation of clinical findings, Please specify   -- Unable to determine      Query created by: Wendy Grewal on 2022 10:47 AM    RESPONSE TEXT:    Acute on Chronic Hypoxic Respiratory Failure is present -          Electronically signed by:  MARILYNN BERNSTEIN MD 2022 10:48 AM

## 2022-09-28 NOTE — CONSULTS
"PSYCHIATRIC INTAKE EVALUATION(new)  Reason for admission: pancreatitis   Reason for consult:Psychiatric Medication Evaluation/Management \"concern for suicidal ideation\"   Requesting Provider:      Hamilton Connors M.D  Legal Hold Status:   On hold, extended 9/27/2022         Chart reviewed.         *HPI:       No acute events overnight. No PRN agitation medications required per patient still on CIWA with Ativan and Valium as needed.  Patient continues to be somnolent on interview.  Son continues concern for willful somnolence though as needed benzodiazepines are frequent.  Per chart patient has been at [facility] 13 times. patient able to engage moderately more today and states she has been on multiple medications in the past.    Lamictal,Caused hives  Seroquel at increased doses, 450 mg nightly because fasciculations in her face.  Low-dose Seroquel 25 mg has been used nightly for years without side effects  Abilify, did not like  Prozac, was on for years but is unclear if it was helpful  Buspirone, unclear if it was helpful  Depakote discontinued 3 years ago, concerns for liver  Naltrexone, provided longest period of sobriety: 3 years self DC'd due to \"did not think I needed it anymore\" amiable to restarting    Attempted to elicit more background information via patient became more somnolent and requested med team to leave.  Interview terminated.  Patient understands she is on legal hold.      Medical ROS (as pertinent):     Review of Systems   Constitutional:  Negative for chills and fever.   HENT:  Negative for congestion and nosebleeds.    Eyes:  Negative for blurred vision.   Respiratory:  Negative for hemoptysis and shortness of breath.    Cardiovascular:  Negative for chest pain and palpitations.   Gastrointestinal:  Negative for blood in stool, constipation, diarrhea, nausea and vomiting.   Genitourinary:  Negative for hematuria.   Skin:  Negative for rash.   Neurological:  Positive for tremors and headaches. "   Psychiatric/Behavioral:          See HPI         *Psychiatric Examination:  Vitals:   Vitals:    09/28/22 1234   BP: (!) 148/99   Pulse: (!) 118   Resp: 16   Temp: 37.6 °C (99.7 °F)   SpO2: 94%      General Appearance: Somnolent, no distress  Abnormal Movements: possibly willful somnolence   Gait and Posture: Unable to assess  Speech: Decreased rate/rhythm/tone.  Somnolent  Thought processes: Logical, linear  Associations: None  Abnormal or Psychotic Thoughts: Unable to assess  Judgement and Insight: Unable to assess  Orientation: oriented to person and place  Recent and Remote Memory: Impaired recent and remote memory due to poor historian in chronological order of events  Attention Span and Concentration: impaired  Language: English  Fund of Knowledge: Unable to assess  Mood and Affect: Somnolent, possibly willfully so  SI/HI: Denies SI directly but endorses interrupted suicide attempt, denies HI      Current Medications:  Current Facility-Administered Medications   Medication Dose Route Frequency Provider Last Rate Last Admin    amLODIPine (NORVASC) tablet 5 mg  5 mg Oral Q DAY Omayra eKith M.D.   5 mg at 09/28/22 0817    oxyCODONE immediate-release (ROXICODONE) tablet 5 mg  5 mg Oral Q4HRS PRFLAVIA Keith M.D.   5 mg at 09/28/22 1247    diazePAM (VALIUM) tablet 5 mg  5 mg Oral Q6HR Omayra Keith M.D.   5 mg at 09/28/22 1131    LORazepam (ATIVAN) tablet 0.5 mg  0.5 mg Oral Q4HRS PRFLAVIA Keith M.D.   0.5 mg at 09/27/22 2212    LORazepam (ATIVAN) tablet 1 mg  1 mg Oral Q4HRS PRFLAVIA Keith M.D.        LORazepam (ATIVAN) tablet 2 mg  2 mg Oral Q2HRS PRFLAVIA Keith M.D.   2 mg at 09/28/22 0319    LORazepam (ATIVAN) tablet 3 mg  3 mg Oral Q HOUR PRFLAVIA Keith M.D.   3 mg at 09/28/22 0928    LORazepam (ATIVAN) tablet 4 mg  4 mg Oral Q15 MIN PRFLAVIA Keith M.D.   4 mg at 09/28/22 1131    Or    LORazepam (ATIVAN) injection 2 mg  2 mg Intravenous Q15 MIN PRN Omayra Keith M.D.        senna-docusate  (PERICOLACE or SENOKOT S) 8.6-50 MG per tablet 2 Tablet  2 Tablet Oral BID Hamilton Connors M.D.   2 Tablet at 09/28/22 0433    And    polyethylene glycol/lytes (MIRALAX) PACKET 1 Packet  1 Packet Oral QDAY PRN Hamilton Connors M.D.        And    magnesium hydroxide (MILK OF MAGNESIA) suspension 30 mL  30 mL Oral QDAY PRN Hamilton Connors M.D.        And    bisacodyl (DULCOLAX) suppository 10 mg  10 mg Rectal QDAY PRN Hamilton Connors M.D.        labetalol (NORMODYNE/TRANDATE) injection 10 mg  10 mg Intravenous Q4HRS PRN Hamilton Connors M.D.        ondansetron (ZOFRAN) syringe/vial injection 4 mg  4 mg Intravenous Q4HRS PRN Hamilton Connors M.D.   4 mg at 09/26/22 1323    ondansetron (ZOFRAN ODT) dispertab 4 mg  4 mg Oral Q4HRS PRN Hamilton Connors M.D.        promethazine (PHENERGAN) tablet 12.5-25 mg  12.5-25 mg Oral Q4HRS PRN Hamilton Connors M.D.        promethazine (PHENERGAN) suppository 12.5-25 mg  12.5-25 mg Rectal Q4HRS PRN Hamilton Connors M.D.        prochlorperazine (COMPAZINE) injection 5-10 mg  5-10 mg Intravenous Q4HRS PRN Hamilton Connors M.D.        thiamine (Vitamin B-1) tablet 100 mg  100 mg Oral DAILY Hamilton Connors M.D.   100 mg at 09/28/22 0432    And    multivitamin (THERAGRAN) tablet 1 Tablet  1 Tablet Oral DAILY Hamilton Connors M.D.   1 Tablet at 09/28/22 0433    And    folic acid (FOLVITE) tablet 1 mg  1 mg Oral DAILY Hamilton Connors M.D.   1 mg at 09/28/22 0433    ipratropium-albuterol (DUONEB) nebulizer solution  3 mL Nebulization Q4H PRN (RT) Hamilton Connors M.D.            Allergies:  Bactrim, Lamotrigine [lamictal], Quetiapine fumarate, and Keflex       Labs personally reviewed:   Recent Results (from the past 72 hour(s))   POC BREATHALIZER    Collection Time: 09/26/22  6:44 AM   Result Value Ref Range    POC Breathalizer 0.356 (A) 0.00 - 0.01 Percent   CoV-2, FLU A/B, and RSV by PCR (2-4 Hours CEPHEID) : Collect NP swab in VTM    Collection Time: 09/26/22  8:02 AM    Specimen: Respirate   Result Value Ref Range    Influenza virus A  RNA Negative Negative    Influenza virus B, PCR Negative Negative    RSV, PCR Negative Negative    SARS-CoV-2 by PCR NotDetected     SARS-CoV-2 Source NP Swab    CBC WITH DIFFERENTIAL    Collection Time: 09/26/22  8:18 AM   Result Value Ref Range    WBC 4.4 (L) 4.8 - 10.8 K/uL    RBC 3.43 (L) 4.20 - 5.40 M/uL    Hemoglobin 9.6 (L) 12.0 - 16.0 g/dL    Hematocrit 31.7 (L) 37.0 - 47.0 %    MCV 92.4 81.4 - 97.8 fL    MCH 28.0 27.0 - 33.0 pg    MCHC 30.3 (L) 33.6 - 35.0 g/dL    RDW 50.5 (H) 35.9 - 50.0 fL    Platelet Count 81 (L) 164 - 446 K/uL    MPV 9.4 9.0 - 12.9 fL    Neutrophils-Polys 50.00 44.00 - 72.00 %    Lymphocytes 35.80 22.00 - 41.00 %    Monocytes 11.20 0.00 - 13.40 %    Eosinophils 0.70 0.00 - 6.90 %    Basophils 1.60 0.00 - 1.80 %    Immature Granulocytes 0.70 0.00 - 0.90 %    Nucleated RBC 0.00 /100 WBC    Neutrophils (Absolute) 2.18 2.00 - 7.15 K/uL    Lymphs (Absolute) 1.56 1.00 - 4.80 K/uL    Monos (Absolute) 0.49 0.00 - 0.85 K/uL    Eos (Absolute) 0.03 0.00 - 0.51 K/uL    Baso (Absolute) 0.07 0.00 - 0.12 K/uL    Immature Granulocytes (abs) 0.03 0.00 - 0.11 K/uL    NRBC (Absolute) 0.00 K/uL   COMP METABOLIC PANEL    Collection Time: 09/26/22  8:18 AM   Result Value Ref Range    Sodium 136 135 - 145 mmol/L    Potassium 4.1 3.6 - 5.5 mmol/L    Chloride 96 96 - 112 mmol/L    Co2 23 20 - 33 mmol/L    Anion Gap 17.0 (H) 7.0 - 16.0    Glucose 106 (H) 65 - 99 mg/dL    Bun 9 8 - 22 mg/dL    Creatinine 0.32 (L) 0.50 - 1.40 mg/dL    Calcium 8.9 8.5 - 10.5 mg/dL    AST(SGOT) 92 (H) 12 - 45 U/L    ALT(SGPT) 32 2 - 50 U/L    Alkaline Phosphatase 182 (H) 30 - 99 U/L    Total Bilirubin 0.4 0.1 - 1.5 mg/dL    Albumin 4.3 3.2 - 4.9 g/dL    Total Protein 8.5 (H) 6.0 - 8.2 g/dL    Globulin 4.2 (H) 1.9 - 3.5 g/dL    A-G Ratio 1.0 g/dL   LIPASE    Collection Time: 09/26/22  8:18 AM   Result Value Ref Range    Lipase 163 (H) 11 - 82 U/L   ESTIMATED GFR    Collection Time: 09/26/22  8:18 AM   Result Value Ref Range    GFR  (CKD-EPI) 124 >60 mL/min/1.73 m 2   MAGNESIUM    Collection Time: 09/26/22  8:18 AM   Result Value Ref Range    Magnesium 1.7 1.5 - 2.5 mg/dL   PHOSPHORUS    Collection Time: 09/26/22  8:18 AM   Result Value Ref Range    Phosphorus 4.1 2.5 - 4.5 mg/dL   CREATINE KINASE    Collection Time: 09/26/22  8:18 AM   Result Value Ref Range    CPK Total 97 0 - 154 U/L   Lipid Profile    Collection Time: 09/26/22  8:18 AM   Result Value Ref Range    Cholesterol,Tot 348 (H) 100 - 199 mg/dL    Triglycerides 99 0 - 149 mg/dL     >=40 mg/dL     (H) <100 mg/dL   Urine Drug Screen    Collection Time: 09/26/22 12:11 PM   Result Value Ref Range    Amphetamines Urine Positive (A) Negative    Barbiturates Negative Negative    Benzodiazepines Negative Negative    Cocaine Metabolite Negative Negative    Methadone Negative Negative    Opiates Negative Negative    Oxycodone Negative Negative    Phencyclidine -Pcp Negative Negative    Propoxyphene Negative Negative    Cannabinoid Metab Negative Negative   URINALYSIS CULTURE, IF INDICATED    Collection Time: 09/26/22 12:11 PM    Specimen: Urine   Result Value Ref Range    Color Yellow     Character Clear     Specific Gravity 1.025 <1.035    Ph 5.5 5.0 - 8.0    Glucose Negative Negative mg/dL    Ketones Negative Negative mg/dL    Protein Negative Negative mg/dL    Bilirubin Negative Negative    Urobilinogen, Urine 0.2 Negative    Nitrite Negative Negative    Leukocyte Esterase Negative Negative    Occult Blood Small (A) Negative    Micro Urine Req Microscopic    URINE MICROSCOPIC (W/UA)    Collection Time: 09/26/22 12:11 PM   Result Value Ref Range    WBC 0-2 /hpf    RBC 0-2 /hpf    Bacteria Negative None /hpf    Epithelial Cells Moderate (A) /hpf    Hyaline Cast 0-2 /lpf    Granular Casts 0-2 /lpf   LACTIC ACID    Collection Time: 09/26/22  1:20 PM   Result Value Ref Range    Lactic Acid 1.7 0.5 - 2.0 mmol/L   CBC without Differential    Collection Time: 09/27/22 12:46 AM    Result Value Ref Range    WBC 4.1 (L) 4.8 - 10.8 K/uL    RBC 3.22 (L) 4.20 - 5.40 M/uL    Hemoglobin 8.9 (L) 12.0 - 16.0 g/dL    Hematocrit 29.5 (L) 37.0 - 47.0 %    MCV 91.6 81.4 - 97.8 fL    MCH 27.6 27.0 - 33.0 pg    MCHC 30.2 (L) 33.6 - 35.0 g/dL    RDW 49.4 35.9 - 50.0 fL    Platelet Count 56 (L) 164 - 446 K/uL    MPV 9.7 9.0 - 12.9 fL   Comp Metabolic Panel (CMP)    Collection Time: 09/27/22 12:46 AM   Result Value Ref Range    Sodium 129 (L) 135 - 145 mmol/L    Potassium 3.9 3.6 - 5.5 mmol/L    Chloride 92 (L) 96 - 112 mmol/L    Co2 28 20 - 33 mmol/L    Anion Gap 9.0 7.0 - 16.0    Glucose 111 (H) 65 - 99 mg/dL    Bun 6 (L) 8 - 22 mg/dL    Creatinine 0.37 (L) 0.50 - 1.40 mg/dL    Calcium 8.6 8.5 - 10.5 mg/dL    AST(SGOT) 69 (H) 12 - 45 U/L    ALT(SGPT) 24 2 - 50 U/L    Alkaline Phosphatase 168 (H) 30 - 99 U/L    Total Bilirubin 0.8 0.1 - 1.5 mg/dL    Albumin 4.2 3.2 - 4.9 g/dL    Total Protein 7.9 6.0 - 8.2 g/dL    Globulin 3.7 (H) 1.9 - 3.5 g/dL    A-G Ratio 1.1 g/dL   ESTIMATED GFR    Collection Time: 09/27/22 12:46 AM   Result Value Ref Range    GFR (CKD-EPI) 120 >60 mL/min/1.73 m 2   CBC WITHOUT DIFFERENTIAL    Collection Time: 09/28/22  5:04 AM   Result Value Ref Range    WBC 4.6 (L) 4.8 - 10.8 K/uL    RBC 3.21 (L) 4.20 - 5.40 M/uL    Hemoglobin 8.8 (L) 12.0 - 16.0 g/dL    Hematocrit 29.6 (L) 37.0 - 47.0 %    MCV 92.2 81.4 - 97.8 fL    MCH 27.4 27.0 - 33.0 pg    MCHC 29.7 (L) 33.6 - 35.0 g/dL    RDW 49.5 35.9 - 50.0 fL    Platelet Count 61 (L) 164 - 446 K/uL    MPV 10.8 9.0 - 12.9 fL   Comp Metabolic Panel    Collection Time: 09/28/22  5:04 AM   Result Value Ref Range    Sodium 134 (L) 135 - 145 mmol/L    Potassium 3.6 3.6 - 5.5 mmol/L    Chloride 96 96 - 112 mmol/L    Co2 26 20 - 33 mmol/L    Anion Gap 12.0 7.0 - 16.0    Glucose 125 (H) 65 - 99 mg/dL    Bun 6 (L) 8 - 22 mg/dL    Creatinine 0.31 (L) 0.50 - 1.40 mg/dL    Calcium 8.9 8.5 - 10.5 mg/dL    AST(SGOT) 84 (H) 12 - 45 U/L    ALT(SGPT) 26 2 - 50  U/L    Alkaline Phosphatase 169 (H) 30 - 99 U/L    Total Bilirubin 0.9 0.1 - 1.5 mg/dL    Albumin 3.7 3.2 - 4.9 g/dL    Total Protein 7.2 6.0 - 8.2 g/dL    Globulin 3.5 1.9 - 3.5 g/dL    A-G Ratio 1.1 g/dL   TSH    Collection Time: 09/28/22  5:04 AM   Result Value Ref Range    TSH 1.670 0.380 - 5.330 uIU/mL   LIPASE    Collection Time: 09/28/22  5:04 AM   Result Value Ref Range    Lipase 68 11 - 82 U/L   PHOSPHORUS    Collection Time: 09/28/22  5:04 AM   Result Value Ref Range    Phosphorus 3.9 2.5 - 4.5 mg/dL   MAGNESIUM    Collection Time: 09/28/22  5:04 AM   Result Value Ref Range    Magnesium 1.7 1.5 - 2.5 mg/dL   ESTIMATED GFR    Collection Time: 09/28/22  5:04 AM   Result Value Ref Range    GFR (CKD-EPI) 125 >60 mL/min/1.73 m 2   PROCALCITONIN    Collection Time: 09/28/22  5:04 AM   Result Value Ref Range    Procalcitonin 0.13 <0.25 ng/mL   proBrain Natriuretic Peptide, NT    Collection Time: 09/28/22  5:04 AM   Result Value Ref Range    NT-proBNP 185 (H) 0 - 125 pg/mL   Prothrombin Time    Collection Time: 09/28/22 11:14 AM   Result Value Ref Range    PT 14.2 12.0 - 14.6 sec    INR 1.11 0.87 - 1.13     GFR: 120, hyponatremia at 129, elevated AST at 69, alk phos elevated at 168, normocytic anemia, platelets 56 trending up to 61 ,elevated cholesterol/LDL, lipase elevated 163      EKG: QTC: 479, previously 538 on 4/22  Brain Imaging: CT head done 9/26/2022: No acute abnormalities  EEG: Not done         Assessment:  Patient continues to be somnolent on interview with frequently space benzodiazepine distribution due to CIWA.  Difficult eliciting history due to poor historian, somnolence, patient's interest in continuing interaction.  Able to elicit naltrexone has worked in the past and will be good medication moving forward for alcohol cravings.  Patient where she is on legal hold and psychiatry will be monitoring SI and mood.    Recommend holding psychotropic medications at this time due to acute pancreatitis and  platelet levels.  Recommend inpatient psychiatric hospitalization due to recent interrupted suicide attempt, history of suicide attempt, and background substance use decreasing impulse control. Psychiatry will follow up.    Dx:  Substance-induced mood disorder  Alcohol withdrawal, active  Alcohol use disorder  Amphetamine use disorder    Hx borderline personality disorder      Medical:  Acute pancreatitis  Alcohol withdrawal, on CIWA      Plan:  Legal hold: Extended 9/27/2022  Psychotropic medications  Do not recommend psychotropic medication at this time due to current medical status including low platelets    Please transfer pt to inpatient psychiatric hospital when medically cleared and bed is available  Labs reviewed:  EKG darinel  Old records ordered/reviewed/summarized   Discussed the case with: Dr. Burdick   Psychiatry will follow up    Thank you for the consult.     Sitter: Yes  Phone: No  Visitors: No  Personal belongings: No    This note was created using voice recognition software (Dragon). The accuracy of the dictation is limited by the abilities of the software. I have reviewed the note prior to signing. However, error related to voice recognition software and /or scribes may still exist and should be interpreted within the appropriate context.

## 2022-09-29 ENCOUNTER — APPOINTMENT (OUTPATIENT)
Dept: RADIOLOGY | Facility: MEDICAL CENTER | Age: 52
DRG: 896 | End: 2022-09-29
Attending: STUDENT IN AN ORGANIZED HEALTH CARE EDUCATION/TRAINING PROGRAM
Payer: COMMERCIAL

## 2022-09-29 LAB
ANION GAP SERPL CALC-SCNC: 12 MMOL/L (ref 7–16)
BUN SERPL-MCNC: 6 MG/DL (ref 8–22)
CALCIUM SERPL-MCNC: 9.2 MG/DL (ref 8.5–10.5)
CHLORIDE SERPL-SCNC: 97 MMOL/L (ref 96–112)
CO2 SERPL-SCNC: 26 MMOL/L (ref 20–33)
CREAT SERPL-MCNC: 0.32 MG/DL (ref 0.5–1.4)
ERYTHROCYTE [DISTWIDTH] IN BLOOD BY AUTOMATED COUNT: 49.9 FL (ref 35.9–50)
GFR SERPLBLD CREATININE-BSD FMLA CKD-EPI: 124 ML/MIN/1.73 M 2
GLUCOSE SERPL-MCNC: 125 MG/DL (ref 65–99)
HCT VFR BLD AUTO: 30.6 % (ref 37–47)
HGB BLD-MCNC: 9.2 G/DL (ref 12–16)
MCH RBC QN AUTO: 28 PG (ref 27–33)
MCHC RBC AUTO-ENTMCNC: 30.1 G/DL (ref 33.6–35)
MCV RBC AUTO: 93 FL (ref 81.4–97.8)
PLATELET # BLD AUTO: 82 K/UL (ref 164–446)
PMV BLD AUTO: 10.6 FL (ref 9–12.9)
POTASSIUM SERPL-SCNC: 3.6 MMOL/L (ref 3.6–5.5)
RBC # BLD AUTO: 3.29 M/UL (ref 4.2–5.4)
SODIUM SERPL-SCNC: 135 MMOL/L (ref 135–145)
WBC # BLD AUTO: 5 K/UL (ref 4.8–10.8)

## 2022-09-29 PROCEDURE — 36415 COLL VENOUS BLD VENIPUNCTURE: CPT

## 2022-09-29 PROCEDURE — A9270 NON-COVERED ITEM OR SERVICE: HCPCS | Performed by: STUDENT IN AN ORGANIZED HEALTH CARE EDUCATION/TRAINING PROGRAM

## 2022-09-29 PROCEDURE — 80048 BASIC METABOLIC PNL TOTAL CA: CPT

## 2022-09-29 PROCEDURE — 99233 SBSQ HOSP IP/OBS HIGH 50: CPT | Performed by: STUDENT IN AN ORGANIZED HEALTH CARE EDUCATION/TRAINING PROGRAM

## 2022-09-29 PROCEDURE — 99232 SBSQ HOSP IP/OBS MODERATE 35: CPT | Performed by: PSYCHIATRY & NEUROLOGY

## 2022-09-29 PROCEDURE — 76705 ECHO EXAM OF ABDOMEN: CPT

## 2022-09-29 PROCEDURE — 92610 EVALUATE SWALLOWING FUNCTION: CPT

## 2022-09-29 PROCEDURE — 700102 HCHG RX REV CODE 250 W/ 637 OVERRIDE(OP): Performed by: STUDENT IN AN ORGANIZED HEALTH CARE EDUCATION/TRAINING PROGRAM

## 2022-09-29 PROCEDURE — 85027 COMPLETE CBC AUTOMATED: CPT

## 2022-09-29 PROCEDURE — 700117 HCHG RX CONTRAST REV CODE 255: Performed by: STUDENT IN AN ORGANIZED HEALTH CARE EDUCATION/TRAINING PROGRAM

## 2022-09-29 PROCEDURE — 770020 HCHG ROOM/CARE - TELE (206)

## 2022-09-29 PROCEDURE — 74177 CT ABD & PELVIS W/CONTRAST: CPT

## 2022-09-29 RX ADMIN — OXYCODONE 5 MG: 5 TABLET ORAL at 05:23

## 2022-09-29 RX ADMIN — LORAZEPAM 4 MG: 2 TABLET ORAL at 07:51

## 2022-09-29 RX ADMIN — LORAZEPAM 2 MG: 2 TABLET ORAL at 08:12

## 2022-09-29 RX ADMIN — LORAZEPAM 2 MG: 2 TABLET ORAL at 22:10

## 2022-09-29 RX ADMIN — DOCUSATE SODIUM 50 MG AND SENNOSIDES 8.6 MG 2 TABLET: 8.6; 5 TABLET, FILM COATED ORAL at 05:25

## 2022-09-29 RX ADMIN — DIAZEPAM 5 MG: 5 TABLET ORAL at 05:24

## 2022-09-29 RX ADMIN — IOHEXOL 100 ML: 350 INJECTION, SOLUTION INTRAVENOUS at 23:06

## 2022-09-29 RX ADMIN — DOCUSATE SODIUM 50 MG AND SENNOSIDES 8.6 MG 2 TABLET: 8.6; 5 TABLET, FILM COATED ORAL at 17:27

## 2022-09-29 RX ADMIN — FOLIC ACID 1 MG: 1 TABLET ORAL at 05:24

## 2022-09-29 RX ADMIN — THIAMINE HCL TAB 100 MG 100 MG: 100 TAB at 05:25

## 2022-09-29 RX ADMIN — DIAZEPAM 5 MG: 5 TABLET ORAL at 13:00

## 2022-09-29 RX ADMIN — AMLODIPINE BESYLATE 5 MG: 5 TABLET ORAL at 05:26

## 2022-09-29 RX ADMIN — THERA TABS 1 TABLET: TAB at 05:24

## 2022-09-29 RX ADMIN — OXYCODONE 5 MG: 5 TABLET ORAL at 09:19

## 2022-09-29 RX ADMIN — LORAZEPAM 3 MG: 2 TABLET ORAL at 13:01

## 2022-09-29 RX ADMIN — OXYCODONE 5 MG: 5 TABLET ORAL at 15:23

## 2022-09-29 RX ADMIN — LORAZEPAM 1 MG: 1 TABLET ORAL at 02:11

## 2022-09-29 RX ADMIN — DIAZEPAM 5 MG: 5 TABLET ORAL at 17:27

## 2022-09-29 ASSESSMENT — COGNITIVE AND FUNCTIONAL STATUS - GENERAL
HELP NEEDED FOR BATHING: A LOT
SUGGESTED CMS G CODE MODIFIER DAILY ACTIVITY: CK
MOBILITY SCORE: 14
TOILETING: A LOT
STANDING UP FROM CHAIR USING ARMS: A LOT
MOVING TO AND FROM BED TO CHAIR: A LITTLE
SUGGESTED CMS G CODE MODIFIER MOBILITY: CL
PERSONAL GROOMING: A LITTLE
DRESSING REGULAR LOWER BODY CLOTHING: A LOT
WALKING IN HOSPITAL ROOM: A LOT
DRESSING REGULAR UPPER BODY CLOTHING: A LITTLE
CLIMB 3 TO 5 STEPS WITH RAILING: A LOT
MOVING FROM LYING ON BACK TO SITTING ON SIDE OF FLAT BED: A LOT
TURNING FROM BACK TO SIDE WHILE IN FLAT BAD: A LITTLE
DAILY ACTIVITIY SCORE: 15
EATING MEALS: A LITTLE

## 2022-09-29 ASSESSMENT — LIFESTYLE VARIABLES
TREMOR: *
HEADACHE, FULLNESS IN HEAD: MILD
HEADACHE, FULLNESS IN HEAD: MILD
TOTAL SCORE: 7
VISUAL DISTURBANCES: NOT PRESENT
TOTAL SCORE: 10
VISUAL DISTURBANCES: NOT PRESENT
TOTAL SCORE: 27
TOTAL SCORE: 4
PAROXYSMAL SWEATS: NO SWEAT VISIBLE
AGITATION: NORMAL ACTIVITY
AUDITORY DISTURBANCES: NOT PRESENT
TREMOR: TREMOR NOT VISIBLE BUT CAN BE FELT, FINGERTIP TO FINGERTIP
ANXIETY: *
ORIENTATION AND CLOUDING OF SENSORIUM: ORIENTED AND CAN DO SERIAL ADDITIONS
AUDITORY DISTURBANCES: NOT PRESENT
ORIENTATION AND CLOUDING OF SENSORIUM: CANNOT DO SERIAL ADDITIONS OR IS UNCERTAIN ABOUT DATE
TOTAL SCORE: 12
ORIENTATION AND CLOUDING OF SENSORIUM: DATE DISORIENTATION BY MORE THAN TWO CALENDAR DAYS
AGITATION: NORMAL ACTIVITY
VISUAL DISTURBANCES: NOT PRESENT
VISUAL DISTURBANCES: NOT PRESENT
ANXIETY: MILDLY ANXIOUS
NAUSEA AND VOMITING: *
AGITATION: *
ANXIETY: *
NAUSEA AND VOMITING: MILD NAUSEA WITH NO VOMITING
HEADACHE, FULLNESS IN HEAD: MODERATE
PAROXYSMAL SWEATS: BARELY PERCEPTIBLE SWEATING. PALMS MOIST
PAROXYSMAL SWEATS: BARELY PERCEPTIBLE SWEATING. PALMS MOIST
ORIENTATION AND CLOUDING OF SENSORIUM: DATE DISORIENTATION BY MORE THAN TWO CALENDAR DAYS
ANXIETY: NO ANXIETY (AT EASE)
AUDITORY DISTURBANCES: NOT PRESENT
TOTAL SCORE: 4
NAUSEA AND VOMITING: *
HEADACHE, FULLNESS IN HEAD: VERY MILD
ORIENTATION AND CLOUDING OF SENSORIUM: CANNOT DO SERIAL ADDITIONS OR IS UNCERTAIN ABOUT DATE
NAUSEA AND VOMITING: NO NAUSEA AND NO VOMITING
TOTAL SCORE: VERY MILD ITCHING, PINS AND NEEDLES SENSATION, BURNING OR NUMBNESS
PAROXYSMAL SWEATS: *
ANXIETY: *
HEADACHE, FULLNESS IN HEAD: VERY MILD
TREMOR: MODERATE TREMOR WITH ARMS EXTENDED
ORIENTATION AND CLOUDING OF SENSORIUM: DATE DISORIENTATION BY MORE THAN TWO CALENDAR DAYS
HEADACHE, FULLNESS IN HEAD: VERY MILD
AUDITORY DISTURBANCES: NOT PRESENT
ANXIETY: *
TOTAL SCORE: 7
AGITATION: *
TREMOR: *
PAROXYSMAL SWEATS: *
ORIENTATION AND CLOUDING OF SENSORIUM: CANNOT DO SERIAL ADDITIONS OR IS UNCERTAIN ABOUT DATE
HEADACHE, FULLNESS IN HEAD: MILD
HEADACHE, FULLNESS IN HEAD: NOT PRESENT
AGITATION: SOMEWHAT MORE THAN NORMAL ACTIVITY
AUDITORY DISTURBANCES: NOT PRESENT
AUDITORY DISTURBANCES: NOT PRESENT
TREMOR: *
ANXIETY: MILDLY ANXIOUS
NAUSEA AND VOMITING: NO NAUSEA AND NO VOMITING
AUDITORY DISTURBANCES: NOT PRESENT
TREMOR: *
VISUAL DISTURBANCES: NOT PRESENT
PAROXYSMAL SWEATS: NO SWEAT VISIBLE
ANXIETY: MILDLY ANXIOUS
TREMOR: TREMOR NOT VISIBLE BUT CAN BE FELT, FINGERTIP TO FINGERTIP
AGITATION: MODERATELY FIDGETY AND RESTLESS
ORIENTATION AND CLOUDING OF SENSORIUM: CANNOT DO SERIAL ADDITIONS OR IS UNCERTAIN ABOUT DATE
TREMOR: NO TREMOR
HEADACHE, FULLNESS IN HEAD: SEVERE
TOTAL SCORE: 24
TOTAL SCORE: 11
NAUSEA AND VOMITING: NO NAUSEA AND NO VOMITING
PAROXYSMAL SWEATS: NO SWEAT VISIBLE
AGITATION: NORMAL ACTIVITY
VISUAL DISTURBANCES: NOT PRESENT
PAROXYSMAL SWEATS: BARELY PERCEPTIBLE SWEATING. PALMS MOIST
NAUSEA AND VOMITING: MILD NAUSEA WITH NO VOMITING
ORIENTATION AND CLOUDING OF SENSORIUM: DATE DISORIENTATION BY MORE THAN TWO CALENDAR DAYS
VISUAL DISTURBANCES: NOT PRESENT
VISUAL DISTURBANCES: NOT PRESENT
AGITATION: SOMEWHAT MORE THAN NORMAL ACTIVITY
AUDITORY DISTURBANCES: NOT PRESENT
NAUSEA AND VOMITING: NO NAUSEA AND NO VOMITING
VISUAL DISTURBANCES: NOT PRESENT
AUDITORY DISTURBANCES: NOT PRESENT
PAROXYSMAL SWEATS: *
TREMOR: *
NAUSEA AND VOMITING: *
ANXIETY: MILDLY ANXIOUS
AGITATION: NORMAL ACTIVITY

## 2022-09-29 ASSESSMENT — ENCOUNTER SYMPTOMS
DIARRHEA: 0
RESPIRATORY NEGATIVE: 1
CARDIOVASCULAR NEGATIVE: 1
HEADACHES: 1
DEPRESSION: 1
INSOMNIA: 0
ABDOMINAL PAIN: 1
NAUSEA: 1
HALLUCINATIONS: 0

## 2022-09-29 ASSESSMENT — PAIN DESCRIPTION - PAIN TYPE
TYPE: ACUTE PAIN
TYPE: ACUTE PAIN

## 2022-09-29 NOTE — CONSULTS
Brief Behavioral Health Care Note:    Received consultation order indicating patient is medically cleared and ready for transfer to an inpatient psychiatric hospital. Transfer process initiated.    Thank you,    Ruth Hammonds, Ph.D., [facility]

## 2022-09-29 NOTE — DISCHARGE PLANNING
Alert Team Note:    Contacted Attending Omayra Keith MD via voalte, confirmed the pt is NOT medically clear. Informed Lynette with [facility] and Jazmine with [facility].

## 2022-09-29 NOTE — NON-PROVIDER
Internal Medicine Medical Student Note  Note Author: Av Souza, Student    Name Olya Youssef     1970   Age/Sex 52 y.o. female   MRN [id number]   Code Status Full     Reason for interval visit  (Principal Problem)   Alcohol-induced acute pancreatitis  Medical Withdrawal  Suicidal Ideation    Interval Problem Daily Status Update  (problem status, last 24 hours, new history, new data )     Olya Youssef is a 52 y.o. female with a PMH of alcoholism, hepatitis, and chronic pancreatitis who was admitted on 2022 with alcohol induced pancreatitis, seizure 2/2 acute alcohol withdrawal, and medical detox. She is currently on day 4 of her hospital stay.     Yesterday she was on continued CIWA protocol. Overall her withdrawal symptoms were beginning to improve, but still required heavy ativan administration and was mostly asleep and unarousable.In the morning she was noted to have a fever of 100.9. In the afternoon, the fever had subsided and she had new onset mild unproductive cough and constant, moderate, pleuritic chest pain that is localized to her R thorax. The sitter reported some difficulty swallowing, but her nurse thought this could perhaps be due to the fact that she always tries to eat and drink while laying down. Her exam revealed coarse breath sounds over the R basilar lung field and use of abdominal muscles to help with expiration. A CXR showed no increased atelectasis but perhaps some increased lung markings c/f Pulmonary Edema 2/2 fluid overload vs. Aspiration PNA. A RUQ US was ordered and a plan to trend pro-BNP was started.    Overnight, she continued to remain afebrile, tachycardic, and hypertensive, but none of this is grossly different from her baseline. Unfortunately, she was fed this morning, so the RUQ US was unable to be performed.    Today, per sitter and nurse, pt is more arousable and has required less ativan administration. She has been put on NPO orders for  8 hours prior to her RUQ US. Pt complains of increased R sided abdominal pain, not R sided thoracic pain as was stated yesterday.    This afternoon, the pt's piv had become infiltrated. Nursing attempted to start another, but was unable. US guided piv placement was accomplished at 1609. RUQ US was performed around 1620.     Physical Exam   Continued Tachycardia and HTN unchanged from her baseline. Pt's fever has continue to decrease from her febrile episode yesterday, down to 97.3 F @ 0750. Exam significant for worsened R sided Abd. Pain that radiates to her back.    Vitals:    09/29/22 0400 09/29/22 0750 09/29/22 1358 09/29/22 1533   BP: (!) 139/91 (!) 145/92 133/78 (!) 129/96   Pulse: (!) 104 (!) 110 (!) 110 (!) 113   Resp: 18 18 18 19   Temp: 36.4 °C (97.6 °F) 36.3 °C (97.3 °F) 36.9 °C (98.4 °F) 36.8 °C (98.2 °F)   TempSrc: Temporal Temporal Temporal Temporal   SpO2: 98% 99% 95% 95%   Weight:       Height:         Body mass index is 27.65 kg/m².    Oxygen Therapy:  Pulse Oximetry: 95 %, O2 (LPM): 2, O2 Delivery Device: Silicone Nasal Cannula    Physical Exam  Constitutional:       General: She is sleeping.   HENT:      Head: Normocephalic and atraumatic.      Nose: Nose normal.      Mouth/Throat:      Mouth: Mucous membranes are dry.      Pharynx: Oropharynx is clear.   Eyes:      Extraocular Movements: Extraocular movements intact.      Conjunctiva/sclera: Conjunctivae normal.      Pupils: Pupils are equal, round, and reactive to light.   Cardiovascular:      Rate and Rhythm: Normal rate and regular rhythm.      Heart sounds: Normal heart sounds.   Pulmonary:      Effort: Pulmonary effort is normal.      Breath sounds: Normal breath sounds.   Abdominal:      Palpations: There is hepatomegaly.      Tenderness: There is abdominal tenderness in the right upper quadrant and right lower quadrant. There is right CVA tenderness and guarding. Positive signs include Gambino's sign. Negative signs include Rovsing's sign  and McBurney's sign.      Comments: Complains of R sided abdominal pain that radiates around to her back. Skin is normal.   Musculoskeletal:      Cervical back: Normal range of motion and neck supple.   Skin:     General: Skin is warm and dry.   Neurological:      Mental Status: Mental status is at baseline. She is lethargic and disoriented.   Psychiatric:         Behavior: Behavior is uncooperative.     A follow up exam performed this afternoon showed no changes from the previous exam.    Pertinent Labs, ECG or imaging:  Hematology   Latest Reference Range & Units 9/26/22 08:18 9/27/22 00:46 9/28/22 05:04 9/29/22 02:15   WBC 4.8 - 10.8 K/uL 4.4 (L) 4.1 (L) 4.6 (L) 5.0   RBC 4.20 - 5.40 M/uL 3.43 (L) 3.22 (L) 3.21 (L) 3.29 (L)   Hemoglobin 12.0 - 16.0 g/dL 9.6 (L) 8.9 (L) 8.8 (L) 9.2 (L)   Hematocrit 37.0 - 47.0 % 31.7 (L) 29.5 (L) 29.6 (L) 30.6 (L)   MCV 81.4 - 97.8 fL 92.4 91.6 92.2 93.0   MCH 27.0 - 33.0 pg 28.0 27.6 27.4 28.0   MCHC 33.6 - 35.0 g/dL 30.3 (L) 30.2 (L) 29.7 (L) 30.1 (L)   RDW 35.9 - 50.0 fL 50.5 (H) 49.4 49.5 49.9   Platelet Count 164 - 446 K/uL 81 (L) 56 (L) 61 (L) 82 (L)   MPV 9.0 - 12.9 fL 9.4 9.7 10.8 10.6   (L): Data is abnormally low  (H): Data is abnormally high    Chemistry   Latest Reference Range & Units 9/26/22 08:18 9/27/22 00:46 9/28/22 05:04 9/29/22 02:15   Sodium 135 - 145 mmol/L 136 129 (L) 134 (L) 135   Potassium 3.6 - 5.5 mmol/L 4.1 3.9 3.6 3.6   Chloride 96 - 112 mmol/L 96 92 (L) 96 97   Co2 20 - 33 mmol/L 23 28 26 26   Anion Gap 7.0 - 16.0  17.0 (H) 9.0 12.0 12.0   Glucose 65 - 99 mg/dL 106 (H) 111 (H) 125 (H) 125 (H)   Bun 8 - 22 mg/dL 9 6 (L) 6 (L) 6 (L)   Creatinine 0.50 - 1.40 mg/dL 0.32 (L) 0.37 (L) 0.31 (L) 0.32 (L)   GFR (CKD-EPI) >60 mL/min/1.73 m 2 124 120 125 124   Calcium 8.5 - 10.5 mg/dL 8.9 8.6 8.9 9.2   AST(SGOT) 12 - 45 U/L 92 (H) 69 (H) 84 (H)    ALT(SGPT) 2 - 50 U/L 32 24 26    Alkaline Phosphatase 30 - 99 U/L 182 (H) 168 (H) 169 (H)    (L): Data is abnormally  low  (H): Data is abnormally high    RUQ US Findings:  - not completed yet, performed ~1620.    Assessment/Plan   Olya Youssef is a 52 y.o. female with a PMH of alcoholism, hepatitis, and chronic pancreatitis who was admitted on 9/26/2022 with alcohol induced pancreatitis, seizure 2/2 acute alcohol withdrawal, and medical detox. She is currently on day 4 of her hospital stay.     # Fever  Pt's fever has subsided after the initial episode, however, the cause of the fever has still yet to be identified. A swallowing study was performed yesterday. She no longer has a cough. Overall, her presentation is less concerning for PNA and infection.  - continue to monitor for any VS changes indicative of decline or infection    # Alcoholism, withdrawal seizure, medical dotox  Yesterday pt's CIWA score was a 5, indicating a decline in her withdrawal symptoms. Pt has made progress towards goals of increasing foods. She has had increased R sided Abd pain.   Continue IP tele mgmt on CIWA protocol as needed  Continually reevaluate for seizure, vital sign abnormalities, or other systems changes  Continue to improve food intake and stable airway control while eating  Reassess EKG per arrhythmia risk     # Chronic Pancreatitis  Yesterday her Lipase trended down from 163 to 68. Today, she complains of significantly increased Abd pain radiating towards her back. Her exam was c/f abd pain w/ tenderness and a positive Gambino's sign. A RUQ US was performed this afternoon, which will hopefully r/o GB pathology. A CT Abd w/ con was ordered, but hasn't been performed.  Continue CIWA protocol  Pending RUQ US results  Consult Surgery pending CT results  Continue to increase foods and liquids as tolerated.    # Pancytopenia   Yesterday, the pt's hematology labs were essentially unchanged, continuing to show pancytopenia and thrombocytopenia. Today, she continues to be pancytopenic and thrombocytopenic, but has shown mild improvement in  her WBC, RBC, Hgb, Hct, MCHC, and her Plt. She is no longer leukopenic.  - Continue to trend CBC w/ diff    #Suicidal Ideation  Pt is currently on Psych hold. Psych saw pt each of the last 2 days but was unable to perform an exam or interview based on the pt's decreased loc.  Psych hold is continued for suicidal ideation.

## 2022-09-29 NOTE — DISCHARGE PLANNING
[facility] ALERT TEAM DISCHARGE PLANNING NOTE    Date:  9/29/22  Patient Name:  Olya Youssef - 52 y.o. - Discharge Planning  MRN:  [id number]   YOB: 1970  ADMISSION DATE:  9/26/2022      Writer forwarded transfer packet for inpatient psychiatric care to the following community providers:  [facility], [facility]   Items  included in the transfer packet:   __x___Face Sheet   __x___Pages 1 and 2 of completed legal hold   __x___Alert Team/Psych Assessment   __x___H&P   __x___UDS   _____Blood Alcohol   __x___Vital signs   _____Pregnancy Test (if applicable)   __x___Medications List   __x___Covid Screen

## 2022-09-29 NOTE — CARE PLAN
Problem: Optimal Care for Alcohol Withdrawal  Goal: Optimal Care for the alcohol withdrawal patient  Outcome: Progressing     Problem: Seizure Precautions  Goal: Implementation of seizure precautions  Outcome: Progressing     Problem: Risk for Aspiration  Goal: Patient's risk for aspiration will be absent or decrease  Outcome: Progressing     Problem: Provide Safe Environment  Goal: Suicide environmental safety, protocols, policies, and practices will be implemented  Outcome: Progressing   The patient is Watcher - Medium risk of patient condition declining or worsening    Shift Goals  Clinical Goals: safety  Patient Goals: sleep  Family Goals: NA    Progress made toward(s) clinical / shift goals:  no falls, sitter at bedside.    Patient is not progressing towards the following goals:

## 2022-09-29 NOTE — THERAPY
Speech Language Pathology   Clinical Swallow Evaluation     Patient Name: Olya Youssef  AGE:  52 y.o., SEX:  female  Medical Record #: [id number]  Today's Date: 9/29/2022       Precautions: Fall Risk      HPI: The pt is a 53 y/o F who was admitted 9/26/22.  The pt presented with generalized tremors associated with frontal headache, abdominal pain, N/V, and SOB for the past 4 days leading to admission date.       PMHx:  Etoh abuse, alcohol related seizure, methamphetamine use, bipolar d/o, chronically on 2L oxygen, HTN    CXR 9/28 -  Unchanged LEFT greater than RIGHT basilar atelectasis    CT Head 9/29 -  No acute intracranial abnormality.       Level of Consciousness: Alert, Awake  Affect/Behavior: Cooperative  Follows Directives: Yes  Orientation: Self, General place  Hearing: Functional hearing  Vision: Functional vision      Prior Living Situation & Level of Function:  Pt reported that she lives with a roommate in a ground-level apartment. She denied any issues with swallowing. Although pt appear to be a reliable historian (reported that she consumed all meals despite being NPO), information was corroborated via chart review.      Oral Mechanism Evaluation  Facial Symmetry: Equal  Facial Sensation: Equal  Labial Observations: WFL  Lingual Observations: Midline  Dentition: Fair, Natural dentition  Comments:      Voice  Quality: WFL, Hoarse  Resonance: WFL  Intensity: Appropriate  Cough: WFL  Comments:      Current Method of Nutrition   NPO until cleared by speech pathology      Subjective  RN cleared the pt for speech tx, no acute changes reported. RN reported good tolerance of PO meds and also that pt was attempting to consume solids while supine (per patient preference). Pt was received awake and alert. She did not appear to be an accurate historian however she was cooperative. Live sitter present.       Assessment  Positioning: Semi Beltrán's (30-45 degrees)  Bolus Administration: SLP and Patient  Oxygen  Requirements:  2 L Nasal Cannula  Factor(s) Affecting Performance: Impaired mental status    Swallowing Trials  Ice: WFL  Thin Liquid (TN0): WFL  Pureed (PU4): WFL  Regular (RG7): WFL    Comments: Appropriate oral acceptance and sufficient containment. Adequate bite and intermittently mildly slowed but adequate mastication of the regular solid boluses. Presumed slowed oral manipulation and lingual motion. Complete oral clearance. No overt signs of aspiration noted across all consistencies, including serial sips of thin liquids by straw. Hyolaryngeal movement was present. Voice was dry pre and post oral intake.       Clinical Impressions  The pt presents with a functional oropharyngeal swallow, however may be impacted by mentation, medication (Ativan) and positional limitations (per patient). A modified diet is not indicated with 1:1 supervision during meals and adherence to swallow precautions.        Recommendations  1.  Regular solids (RG7), Thin liquids (TN0)  2.  Instrumentation: None indicated at this time  3.  Swallowing Instructions & Precautions:   Supervision: 1:1 feeding with constant supervision, Encourage self-feeding  Positioning: Fully upright and midline during oral intake  Medication: Whole with liquid  Strategies: Small bites/sips, Slow rate of intake  Oral Care: Q6h  4.  No further acute speech pathology services are indicated at this time. Please reconsult if the pt's status changes.    Results and recs d/w pt and RN. Thank you.      Plan  Recommend Speech Therapy for Evaluation only for the following treatments:  Dysphagia Training.  Discharge Recommendations: Anticipate that the patient will have no further speech therapy needs after discharge from the hospital     Objective       09/29/22 1239   Charge Group   SLP Oral Pharyngeal Evaluation Oral Pharyngeal Evaluation   Total Treatment Time   SLP Time Spent Yes   SLP Oral Pharyngeal Evaluation (Mins) 21   Vitals   O2 (LPM) 2   O2 Delivery Device  Nasal Cannula   Pain 0 - 10 Group   Location Flank   Location Orientation Right   Pain Rating Scale (NPRS) 9   Description Aching   Comfort Goal Comfort with Movement   Prior Living Situation   Prior Services None   Housing / Facility 1 Story Apartment / Condo   Lives with - Patient's Self Care Capacity   (Roommate)   Prior Level Of Function   Communication Within Functional Limits   Swallow Within Functional Limits   Dentition Intact   Dentures None   Hearing Within Functional Limits for Evaluation   Hearing Aid None   Vision Within Functional Limits for Evaluation   Patient's Primary Language English   Anticipated Discharge Needs   Discharge Recommendations Anticipate that the patient will have no further speech therapy needs after discharge from the hospital

## 2022-09-29 NOTE — CONSULTS
"PSYCHIATRIC FOLLOW-UP:(established)  *Reason for admission:   Pancreatitis, alcohol withdrawal     *Legal Hold Status:    On hold        Chart reviewed.         *HPI:    Patient today's denied attempted suicide or son aborting her suicide attempt prior to admission.  She currently denies any active or passive SI, and after discharge she would like to return to her apartment where she lives with a roommate and her dog.  She currently denies any psychosis.  Reports her mood being\" crappy\", stating \" my life sucks\".  She does not have real solid future plans.  Patient is interview was limited due to sedation.        Medical ROS (as pertinent):     Review of Systems   Respiratory: Negative.     Cardiovascular: Negative.    Gastrointestinal:  Positive for abdominal pain. Negative for diarrhea.   Genitourinary:  Positive for dysuria.   Neurological:  Positive for headaches.   Psychiatric/Behavioral:  Positive for depression. Negative for hallucinations and suicidal ideas. The patient does not have insomnia.          *Psychiatric Examination:  Vitals:   Vitals:    09/29/22 0750   BP: (!) 145/92   Pulse: (!) 110   Resp: 18   Temp: 36.3 °C (97.3 °F)   SpO2: 99%       General Appearance: Calm and cooperative, but sedated, uncomfortable from physical pain  Abnormal Movements: Psychomotor retardation  Gait and Posture: Normal lying bed  Speech: Soft and slightly slurred  Thought processes: Linear  Associations: No loose associations  Abnormal or Psychotic Thoughts: Denies AVH, no paranoid delusions elicited  Judgement and Insight: Fair/fair  Orientation: Grossly oriented  Recent and Remote Memory: Not fully assessed, appears intact  Attention Span and Concentration: Impaired from sedation  Language: Fluid  Fund of Knowledge: Not tested  Mood and Affect:\" Crappy\", somnolent  SI/HI: Denies       *Labs personally reviewed:   Recent Results (from the past 24 hour(s))   CBC WITHOUT DIFFERENTIAL    Collection Time: 09/29/22  2:15 AM "   Result Value Ref Range    WBC 5.0 4.8 - 10.8 K/uL    RBC 3.29 (L) 4.20 - 5.40 M/uL    Hemoglobin 9.2 (L) 12.0 - 16.0 g/dL    Hematocrit 30.6 (L) 37.0 - 47.0 %    MCV 93.0 81.4 - 97.8 fL    MCH 28.0 27.0 - 33.0 pg    MCHC 30.1 (L) 33.6 - 35.0 g/dL    RDW 49.9 35.9 - 50.0 fL    Platelet Count 82 (L) 164 - 446 K/uL    MPV 10.6 9.0 - 12.9 fL   Basic Metabolic Panel    Collection Time: 09/29/22  2:15 AM   Result Value Ref Range    Sodium 135 135 - 145 mmol/L    Potassium 3.6 3.6 - 5.5 mmol/L    Chloride 97 96 - 112 mmol/L    Co2 26 20 - 33 mmol/L    Glucose 125 (H) 65 - 99 mg/dL    Bun 6 (L) 8 - 22 mg/dL    Creatinine 0.32 (L) 0.50 - 1.40 mg/dL    Calcium 9.2 8.5 - 10.5 mg/dL    Anion Gap 12.0 7.0 - 16.0   ESTIMATED GFR    Collection Time: 09/29/22  2:15 AM   Result Value Ref Range    GFR (CKD-EPI) 124 >60 mL/min/1.73 m 2     Current Facility-Administered Medications   Medication Dose Route Frequency Provider Last Rate Last Admin    amLODIPine (NORVASC) tablet 5 mg  5 mg Oral Q DAY Omayra Keith M.D.   5 mg at 09/29/22 0526    oxyCODONE immediate-release (ROXICODONE) tablet 5 mg  5 mg Oral Q4HRS PRFLAVIA Keith M.D.   5 mg at 09/29/22 0919    diazePAM (VALIUM) tablet 5 mg  5 mg Oral Q6HR Omayra Keith M.D.   5 mg at 09/29/22 1300    LORazepam (ATIVAN) tablet 0.5 mg  0.5 mg Oral Q4HRS PETRONA Keith M.D.   0.5 mg at 09/28/22 2004    LORazepam (ATIVAN) tablet 1 mg  1 mg Oral Q4HRS PETRONA Keith M.D.   1 mg at 09/29/22 0211    LORazepam (ATIVAN) tablet 2 mg  2 mg Oral Q2HRS PRN Omayra Keith M.D.   2 mg at 09/29/22 0812    LORazepam (ATIVAN) tablet 3 mg  3 mg Oral Q HOUR PRN Omayra Keith M.D.   3 mg at 09/29/22 1301    LORazepam (ATIVAN) tablet 4 mg  4 mg Oral Q15 MIN PRFLAVIA Keith M.D.   4 mg at 09/29/22 0751    Or    LORazepam (ATIVAN) injection 2 mg  2 mg Intravenous Q15 MIN PRFLAVIA Keith M.D.        senna-docusate (PERICOLACE or SENOKOT S) 8.6-50 MG per tablet 2 Tablet  2 Tablet Oral BID Hamilton Connors,  M.D.   2 Tablet at 09/29/22 0525    And    polyethylene glycol/lytes (MIRALAX) PACKET 1 Packet  1 Packet Oral QDAY PRN Hamitlon Connors M.D.        And    magnesium hydroxide (MILK OF MAGNESIA) suspension 30 mL  30 mL Oral QDAY PRN Hamilton Connors M.D.        And    bisacodyl (DULCOLAX) suppository 10 mg  10 mg Rectal QDAY PRN Hamilton Connors M.D.        labetalol (NORMODYNE/TRANDATE) injection 10 mg  10 mg Intravenous Q4HRS PRN Hamilton Connors M.D.        ondansetron (ZOFRAN) syringe/vial injection 4 mg  4 mg Intravenous Q4HRS PRN Hamilton Connors M.D.   4 mg at 09/28/22 2332    ondansetron (ZOFRAN ODT) dispertab 4 mg  4 mg Oral Q4HRS PRN Hamilton Connors M.D.        promethazine (PHENERGAN) tablet 12.5-25 mg  12.5-25 mg Oral Q4HRS PRN Hamilton Connors M.D.        promethazine (PHENERGAN) suppository 12.5-25 mg  12.5-25 mg Rectal Q4HRS PRN Hamilton Connors M.D.        prochlorperazine (COMPAZINE) injection 5-10 mg  5-10 mg Intravenous Q4HRS PRN Hamilton Connors M.D.        ipratropium-albuterol (DUONEB) nebulizer solution  3 mL Nebulization Q4H PRN (RT) Hamilton Connors M.D.             Assessment: She is still going through alcohol withdrawal, medically ill.  Today she denies suicide attempt prior to admission, currently denies any active or passive SI, but she has no specific future goals.  We will continue to observe patient's mood and behavior and reassess her legal hold once she is more medically stable.  For now, continue legal hold.      Dx:  Alcohol use disorder, severe  Alcohol withdrawal  Methamphetamine use disorder    Medical:  Alcohol induced acute pancreatitis  Acute on chronic respiratory failure with hypoxia on home O2 therapy  Pancytopenia  Hyponatremia      Plan:  1- Legal hold: Extended  2- Psychotropic medications: Not warranted at this time.  We will consider when platelets are within normal limits/or patient is medically stable.   3- Please transfer pt to inpatient psychiatric hospital when medically cleared and bed is  available  4- Psychiatry will follow up    Thank you for the consult.       Sitter: Yes  Phone: Yes  Visitors: Family only  Personal belongings: No    I will be returning on October 10th.  Dr. Hammonds and [facility] providers will continue to follow-up with patient during my absence.    This note was created using voice recognition software (Dragon). The accuracy of the dictation is limited by the abilities of the software. I have reviewed the note prior to signing. However, error related to voice recognition software and /or scribes may still exist and should be interpreted within the appropriate context.

## 2022-09-30 LAB
ANION GAP SERPL CALC-SCNC: 10 MMOL/L (ref 7–16)
BUN SERPL-MCNC: 7 MG/DL (ref 8–22)
CALCIUM SERPL-MCNC: 9.1 MG/DL (ref 8.5–10.5)
CHLORIDE SERPL-SCNC: 94 MMOL/L (ref 96–112)
CO2 SERPL-SCNC: 29 MMOL/L (ref 20–33)
CREAT SERPL-MCNC: 0.35 MG/DL (ref 0.5–1.4)
ERYTHROCYTE [DISTWIDTH] IN BLOOD BY AUTOMATED COUNT: 50.8 FL (ref 35.9–50)
GFR SERPLBLD CREATININE-BSD FMLA CKD-EPI: 121 ML/MIN/1.73 M 2
GLUCOSE SERPL-MCNC: 118 MG/DL (ref 65–99)
HCT VFR BLD AUTO: 31.3 % (ref 37–47)
HGB BLD-MCNC: 9.1 G/DL (ref 12–16)
MCH RBC QN AUTO: 27.4 PG (ref 27–33)
MCHC RBC AUTO-ENTMCNC: 29.1 G/DL (ref 33.6–35)
MCV RBC AUTO: 94.3 FL (ref 81.4–97.8)
PLATELET # BLD AUTO: 101 K/UL (ref 164–446)
PMV BLD AUTO: 9.9 FL (ref 9–12.9)
POTASSIUM SERPL-SCNC: 3.7 MMOL/L (ref 3.6–5.5)
RBC # BLD AUTO: 3.32 M/UL (ref 4.2–5.4)
SODIUM SERPL-SCNC: 133 MMOL/L (ref 135–145)
WBC # BLD AUTO: 5.1 K/UL (ref 4.8–10.8)

## 2022-09-30 PROCEDURE — 770020 HCHG ROOM/CARE - TELE (206)

## 2022-09-30 PROCEDURE — 85027 COMPLETE CBC AUTOMATED: CPT

## 2022-09-30 PROCEDURE — 700111 HCHG RX REV CODE 636 W/ 250 OVERRIDE (IP): Performed by: STUDENT IN AN ORGANIZED HEALTH CARE EDUCATION/TRAINING PROGRAM

## 2022-09-30 PROCEDURE — 700102 HCHG RX REV CODE 250 W/ 637 OVERRIDE(OP): Performed by: STUDENT IN AN ORGANIZED HEALTH CARE EDUCATION/TRAINING PROGRAM

## 2022-09-30 PROCEDURE — 99233 SBSQ HOSP IP/OBS HIGH 50: CPT | Performed by: STUDENT IN AN ORGANIZED HEALTH CARE EDUCATION/TRAINING PROGRAM

## 2022-09-30 PROCEDURE — 80048 BASIC METABOLIC PNL TOTAL CA: CPT

## 2022-09-30 PROCEDURE — 36415 COLL VENOUS BLD VENIPUNCTURE: CPT

## 2022-09-30 PROCEDURE — A9270 NON-COVERED ITEM OR SERVICE: HCPCS | Performed by: STUDENT IN AN ORGANIZED HEALTH CARE EDUCATION/TRAINING PROGRAM

## 2022-09-30 RX ORDER — TRAZODONE HYDROCHLORIDE 50 MG/1
50 TABLET ORAL
Status: DISCONTINUED | OUTPATIENT
Start: 2022-09-30 | End: 2022-10-02

## 2022-09-30 RX ADMIN — OXYCODONE 5 MG: 5 TABLET ORAL at 19:34

## 2022-09-30 RX ADMIN — LORAZEPAM 3 MG: 2 TABLET ORAL at 02:42

## 2022-09-30 RX ADMIN — OXYCODONE 5 MG: 5 TABLET ORAL at 10:28

## 2022-09-30 RX ADMIN — TRAZODONE HYDROCHLORIDE 50 MG: 50 TABLET ORAL at 20:15

## 2022-09-30 RX ADMIN — DIAZEPAM 5 MG: 5 TABLET ORAL at 04:29

## 2022-09-30 RX ADMIN — DIAZEPAM 5 MG: 5 TABLET ORAL at 00:13

## 2022-09-30 RX ADMIN — LORAZEPAM 1 MG: 1 TABLET ORAL at 19:34

## 2022-09-30 RX ADMIN — OXYCODONE 5 MG: 5 TABLET ORAL at 07:13

## 2022-09-30 RX ADMIN — LORAZEPAM 2 MG: 2 TABLET ORAL at 00:13

## 2022-09-30 RX ADMIN — LORAZEPAM 2 MG: 2 INJECTION INTRAMUSCULAR; INTRAVENOUS at 04:29

## 2022-09-30 RX ADMIN — SERTRALINE 25 MG: 50 TABLET, FILM COATED ORAL at 12:15

## 2022-09-30 RX ADMIN — OXYCODONE 5 MG: 5 TABLET ORAL at 02:42

## 2022-09-30 RX ADMIN — AMLODIPINE BESYLATE 5 MG: 5 TABLET ORAL at 04:30

## 2022-09-30 ASSESSMENT — LIFESTYLE VARIABLES
NAUSEA AND VOMITING: MILD NAUSEA WITH NO VOMITING
PAROXYSMAL SWEATS: NO SWEAT VISIBLE
ANXIETY: MILDLY ANXIOUS
HEADACHE, FULLNESS IN HEAD: MILD
ORIENTATION AND CLOUDING OF SENSORIUM: ORIENTED AND CAN DO SERIAL ADDITIONS
VISUAL DISTURBANCES: MODERATE SENSITIVITY
PAROXYSMAL SWEATS: NO SWEAT VISIBLE
ANXIETY: *
TREMOR: *
TOTAL SCORE: 4
NAUSEA AND VOMITING: *
AGITATION: NORMAL ACTIVITY
TOTAL SCORE: 11
HEADACHE, FULLNESS IN HEAD: NOT PRESENT
TREMOR: *
VISUAL DISTURBANCES: NOT PRESENT
AGITATION: NORMAL ACTIVITY
NAUSEA AND VOMITING: NO NAUSEA AND NO VOMITING
ANXIETY: *
TOTAL SCORE: VERY MILD ITCHING, PINS AND NEEDLES SENSATION, BURNING OR NUMBNESS
AUDITORY DISTURBANCES: NOT PRESENT
ANXIETY: *
NAUSEA AND VOMITING: NO NAUSEA AND NO VOMITING
ANXIETY: NO ANXIETY (AT EASE)
HEADACHE, FULLNESS IN HEAD: NOT PRESENT
TOTAL SCORE: 18
AGITATION: *
PAROXYSMAL SWEATS: NO SWEAT VISIBLE
TREMOR: *
PAROXYSMAL SWEATS: NO SWEAT VISIBLE
AGITATION: NORMAL ACTIVITY
PAROXYSMAL SWEATS: NO SWEAT VISIBLE
ORIENTATION AND CLOUDING OF SENSORIUM: CANNOT DO SERIAL ADDITIONS OR IS UNCERTAIN ABOUT DATE
HEADACHE, FULLNESS IN HEAD: MILD
ORIENTATION AND CLOUDING OF SENSORIUM: CANNOT DO SERIAL ADDITIONS OR IS UNCERTAIN ABOUT DATE
VISUAL DISTURBANCES: NOT PRESENT
PAROXYSMAL SWEATS: BARELY PERCEPTIBLE SWEATING. PALMS MOIST
AUDITORY DISTURBANCES: NOT PRESENT
VISUAL DISTURBANCES: NOT PRESENT
TREMOR: *
AUDITORY DISTURBANCES: NOT PRESENT
NAUSEA AND VOMITING: NO NAUSEA AND NO VOMITING
AUDITORY DISTURBANCES: NOT PRESENT
AGITATION: SOMEWHAT MORE THAN NORMAL ACTIVITY
AUDITORY DISTURBANCES: NOT PRESENT
VISUAL DISTURBANCES: NOT PRESENT
HEADACHE, FULLNESS IN HEAD: MODERATE
TREMOR: *
ANXIETY: NO ANXIETY (AT EASE)
AGITATION: SOMEWHAT MORE THAN NORMAL ACTIVITY
TREMOR: TREMOR NOT VISIBLE BUT CAN BE FELT, FINGERTIP TO FINGERTIP
TOTAL SCORE: 6
ORIENTATION AND CLOUDING OF SENSORIUM: CANNOT DO SERIAL ADDITIONS OR IS UNCERTAIN ABOUT DATE
HEADACHE, FULLNESS IN HEAD: NOT PRESENT
TREMOR: *
AUDITORY DISTURBANCES: NOT PRESENT
VISUAL DISTURBANCES: NOT PRESENT
TREMOR: *
AGITATION: NORMAL ACTIVITY
AGITATION: *
TOTAL SCORE: 2
AUDITORY DISTURBANCES: NOT PRESENT
AUDITORY DISTURBANCES: NOT PRESENT
HEADACHE, FULLNESS IN HEAD: NOT PRESENT
TREMOR: MODERATE TREMOR WITH ARMS EXTENDED
ORIENTATION AND CLOUDING OF SENSORIUM: ORIENTED AND CAN DO SERIAL ADDITIONS
NAUSEA AND VOMITING: *
VISUAL DISTURBANCES: NOT PRESENT
PAROXYSMAL SWEATS: BARELY PERCEPTIBLE SWEATING. PALMS MOIST
ORIENTATION AND CLOUDING OF SENSORIUM: ORIENTED AND CAN DO SERIAL ADDITIONS
AGITATION: SOMEWHAT MORE THAN NORMAL ACTIVITY
TOTAL SCORE: VERY MILD ITCHING, PINS AND NEEDLES SENSATION, BURNING OR NUMBNESS
ANXIETY: NO ANXIETY (AT EASE)
AUDITORY DISTURBANCES: NOT PRESENT
VISUAL DISTURBANCES: NOT PRESENT
ANXIETY: NO ANXIETY (AT EASE)
TOTAL SCORE: 4
PAROXYSMAL SWEATS: NO SWEAT VISIBLE
AUDITORY DISTURBANCES: NOT PRESENT
AGITATION: SOMEWHAT MORE THAN NORMAL ACTIVITY
NAUSEA AND VOMITING: NO NAUSEA AND NO VOMITING
HEADACHE, FULLNESS IN HEAD: VERY MILD
ANXIETY: MILDLY ANXIOUS
ANXIETY: *
TOTAL SCORE: 4
ORIENTATION AND CLOUDING OF SENSORIUM: ORIENTED AND CAN DO SERIAL ADDITIONS
HEADACHE, FULLNESS IN HEAD: VERY MILD
TOTAL SCORE: 10
ORIENTATION AND CLOUDING OF SENSORIUM: CANNOT DO SERIAL ADDITIONS OR IS UNCERTAIN ABOUT DATE
NAUSEA AND VOMITING: *
TOTAL SCORE: VERY MILD ITCHING, PINS AND NEEDLES SENSATION, BURNING OR NUMBNESS
NAUSEA AND VOMITING: NO NAUSEA AND NO VOMITING
PAROXYSMAL SWEATS: NO SWEAT VISIBLE
PAROXYSMAL SWEATS: *
TOTAL SCORE: 27
ORIENTATION AND CLOUDING OF SENSORIUM: ORIENTED AND CAN DO SERIAL ADDITIONS
NAUSEA AND VOMITING: NO NAUSEA AND NO VOMITING
TREMOR: MODERATE TREMOR WITH ARMS EXTENDED
TOTAL SCORE: 3
VISUAL DISTURBANCES: MODERATE SENSITIVITY
HEADACHE, FULLNESS IN HEAD: MILD
ORIENTATION AND CLOUDING OF SENSORIUM: CANNOT DO SERIAL ADDITIONS OR IS UNCERTAIN ABOUT DATE
VISUAL DISTURBANCES: NOT PRESENT

## 2022-09-30 ASSESSMENT — PAIN DESCRIPTION - PAIN TYPE: TYPE: ACUTE PAIN

## 2022-09-30 ASSESSMENT — ENCOUNTER SYMPTOMS
ABDOMINAL PAIN: 1
NAUSEA: 1
HEADACHES: 1

## 2022-09-30 ASSESSMENT — FIBROSIS 4 INDEX: FIB4 SCORE: 8.81

## 2022-09-30 NOTE — PROGRESS NOTES
Bedside report received from day RN, pt care assumed, assessment completed. Pt is A&O 2, denies current pain, sinus tach on the monitor. Updated on POC, questions answered. Bed in lowest, locked position, treaded socks on, call light and belongings within reach. Fall precautions in place.   1:1 sitter at bedside

## 2022-09-30 NOTE — CONSULTS
"  [facility] PSYCHIATRIC FOLLOW-UP:(established)  *Reason for admission:     States she could take multiple Seroquel to just \"get it over with\". \"I am just done with feeling this way, I just don't want to do it any more\". Hx of alcohol and methamp use.   *Legal Hold Status: on legal hold                S:  she reports RUQ pain worsened on inspiration that radiates to her back.         Reports headache from the back of her neck to the top of her head , \"feels like \"15 lbs\" on it.         Psychiatrically: she is depression but not SI/HI. Not sleeping well and requests trazodone. Says she drinks daily to stop physical pain and then meth to function. She has been on naltrexone with some benefit. She is interested in an antidepressant. Admits to anxiety and gabapentin is helpful.     O: Medical ROS (as pertinent):                      *Psychiatric Examination:   Vitals:   Vitals:    09/29/22 2040 09/30/22 0030 09/30/22 0407 09/30/22 0804   BP: (!) 125/93  128/83 138/86   Pulse: (!) 105 (!) 104 (!) 112 (!) 112   Resp: 18 18 18 18   Temp: 36.8 °C (98.2 °F) 36.8 °C (98.2 °F) 37.2 °C (99 °F) 37.2 °C (99 °F)   TempSrc: Temporal Temporal Temporal Temporal   SpO2: 95%  95% 94%   Weight:       Height:         General Appearance:  obese, good eye contact, and in intermittent discomfort. Cooperative.   Abnormal Movements: none   Gait and Posture: not observed  Speech: soft  Thought Process: normal rate  Associations:   linear  Abnormal or Psychotic Thoughts: none  Judgement and Insight: fair  Orientation: grossly intact  Recent and Remote Memory: grossly intact  Attention Span and Concentration: intact  Language:fluent  Fund of Knowledge: not tested  Mood and Affect: depressed and anxiety. Looks down, sad.  SI/HI:  suicidal - no and homicidal - no        Current Medications:  Scheduled Medications   Medication Dose Frequency    amLODIPine  5 mg Q DAY    senna-docusate  2 Tablet BID          " *ASSESSMENT/RECOMENDATIONS:  1. Alcohol use disorder severe: with withdrawal  2. Methamphetamine use disorder  3. Depressive disorder unspc  4  Anxiety disorder unspc.     Medical:   -alcohol induced pancreatis   -acute on chronic respiratory failure with hypoxia: on O2 at home  -thrombocytopenia and anemia    Legal hold: on legal hold  Observation status:   -line of site with sitter    Visitors: yes  family  Personal belongings: yes phone    Discussed/voalted: USHA TRAN    Medication and Other Recommendations: final orders as per Tx Tm  Consider trazodone 50 mg with repeat x 1 if needed. For sleep  Start zoloft 25 mg and increase as tolerated. Target is 100 mg. For mood. Not hepatically dependent.  3    if LH discontinued will need substance use referrals, and psychiatric outpt ones as well.    Will continue to follow with you.        Discharge recommendations: inpt psych

## 2022-09-30 NOTE — PROGRESS NOTES
Monitor Summary:     Rhythm: Sinus Tach    Rate: 101-118    Measurement: .16/.05/.32    Ectopy: none    12 hr cc

## 2022-09-30 NOTE — CARE PLAN
The patient is Watcher - Medium risk of patient condition declining or worsening    Shift Goals  Clinical Goals: Safety, CIWA  Patient Goals: Rest, pain control  Family Goals: NA    Progress made toward(s) clinical / shift goals:    Problem: Knowledge Deficit - Standard  Goal: Patient and family/care givers will demonstrate understanding of plan of care, disease process/condition, diagnostic tests and medications  Outcome: Progressing     Problem: Optimal Care for Alcohol Withdrawal  Goal: Optimal Care for the alcohol withdrawal patient  Outcome: Progressing     Problem: Lifestyle Changes  Goal: Patient's ability to identify lifestyle changes and available resources to help reduce recurrence of condition will improve  Outcome: Progressing

## 2022-09-30 NOTE — PROGRESS NOTES
Hospital Medicine Daily Progress Note    Date of Service  9/30/2022    Chief Complaint  Olya Youssef is a 52 y.o. female admitted 9/26/2022 with nausea, vomiting, suicidal ideation.     Hospital Course  52 y.o. female with past medical history of alcohol abuse, alcohol-related seizure, methamphetamine abuse, bipolar disorder , chronically on 2 L oxygen who presented 9/26/2022 with presented with generalized tremor associated with frontal headache, abdominal pain, nausea and vomiting for the past 4 days.  She also reports of shortness of breath. Reported SI at ED.   Labs noted with pancytopenia, low bicarbonate glucose 106, elevated AST, lipase 163.  CT head negative for acute stroke/hemorrhage.    Interval Problem Update  Seen at bedside.   Patient's mental status improving, alert. Still tremulous.   Reports abdominal pain. Ordered abdominal CT and US RUQ, no acute intraabdominal findings.     I have discussed this patient's plan of care and discharge plan at IDT rounds today with Case Management, Nursing, Nursing leadership, and other members of the IDT team.    Consultants/Specialty  psychiatry    Code Status  Full Code    Disposition  Patient is not medically cleared for discharge.   Anticipate discharge to to a psychiatric hospital.  I have placed the appropriate orders for post-discharge needs.    Review of Systems  Review of Systems   Gastrointestinal:  Positive for abdominal pain and nausea.   Neurological:  Positive for headaches.   All other systems reviewed and are negative.     Physical Exam  Temp:  [36.8 °C (98.2 °F)-37.2 °C (99 °F)] 37.2 °C (99 °F)  Pulse:  [104-113] 112  Resp:  [18-19] 18  BP: (125-138)/(78-96) 138/86  SpO2:  [94 %-95 %] 94 %    Physical Exam  Vitals reviewed.   Constitutional:       Comments: Somnolent, respond to voice. Able to say her name.    HENT:      Head: Normocephalic and atraumatic.      Nose: Nose normal.      Mouth/Throat:      Pharynx: Oropharynx is clear.   Eyes:       Extraocular Movements: Extraocular movements intact.      Conjunctiva/sclera: Conjunctivae normal.      Pupils: Pupils are equal, round, and reactive to light.   Cardiovascular:      Rate and Rhythm: Regular rhythm. Tachycardia present.      Pulses: Normal pulses.      Heart sounds: Normal heart sounds. No murmur heard.  Pulmonary:      Effort: Pulmonary effort is normal. No respiratory distress.      Breath sounds: Normal breath sounds.   Abdominal:      General: Abdomen is flat. Bowel sounds are normal.      Palpations: Abdomen is soft.      Tenderness: There is abdominal tenderness.   Musculoskeletal:         General: Normal range of motion.      Cervical back: Normal range of motion and neck supple.      Comments: Tremor noted   Skin:     General: Skin is warm and dry.   Neurological:      Mental Status: She is alert.      Comments: Somnolent, eye closed.   Responded to voice only  Answer her name only    Psychiatric:      Comments: Unable to assess       Fluids    Intake/Output Summary (Last 24 hours) at 9/30/2022 1201  Last data filed at 9/29/2022 1920  Gross per 24 hour   Intake --   Output 600 ml   Net -600 ml         Laboratory  Recent Labs     09/28/22  0504 09/29/22  0215 09/30/22  0209   WBC 4.6* 5.0 5.1   RBC 3.21* 3.29* 3.32*   HEMOGLOBIN 8.8* 9.2* 9.1*   HEMATOCRIT 29.6* 30.6* 31.3*   MCV 92.2 93.0 94.3   MCH 27.4 28.0 27.4   MCHC 29.7* 30.1* 29.1*   RDW 49.5 49.9 50.8*   PLATELETCT 61* 82* 101*   MPV 10.8 10.6 9.9       Recent Labs     09/28/22  0504 09/29/22  0215 09/30/22  0209   SODIUM 134* 135 133*   POTASSIUM 3.6 3.6 3.7   CHLORIDE 96 97 94*   CO2 26 26 29   GLUCOSE 125* 125* 118*   BUN 6* 6* 7*   CREATININE 0.31* 0.32* 0.35*   CALCIUM 8.9 9.2 9.1       Recent Labs     09/28/22  1114   INR 1.11                   Imaging  CT-ABDOMEN-PELVIS WITH   Final Result         1.  Diverticulosis   2.  Linear densities the bilateral lung bases, greater on the left, appearance favors changes of  atelectasis      US-RUQ   Final Result      Echogenic liver, a nonspecific finding that often is found to represent steatosis.  Other infiltrative processes could have an identical appearance.      IR-US GUIDED PIV   Final Result    Ultrasound-guided PERIPHERAL IV INSERTION performed by    qualified nursing staff as above.      DX-CHEST-PORTABLE (1 VIEW)   Final Result      Unchanged LEFT greater than RIGHT basilar atelectasis      DX-CHEST-PORTABLE (1 VIEW)   Final Result      1.  Left basilar atelectasis, unchanged.      CT-HEAD W/O   Final Result      No acute intracranial abnormality.                Assessment/Plan  * Alcohol-induced acute pancreatitis  Assessment & Plan  Lipase 160+ on admission with nausea and vomiting.   Completed IVF  Antiemetics   Pain control  Lipase trending down  Abd CT and US RUQ reviewed, no acute intraabdominal process    Alcohol dependence with withdrawal (HCC)- (present on admission)  Assessment & Plan  Alcohol withdrawal  -Cardiac monitoring  -Oxygen 2 L/m nasal cannula   -On CIWA protocol, on valium standing dose  -Thiamine/folate  -aspiration and fall precautions       Fever  Assessment & Plan  Noted on 9/28 am. Ordered procalcitonin and proBNP, blood culture  Cont to monitor  Aspiration precaution     Suicidal behavior  Assessment & Plan  Reported suicidal ideation at ER  On legal hold  Psych consulted, start zoloft and trazodone for insomnia     Acute on chronic respiratory failure with hypoxia on home O2 therapy- (present on admission)  Assessment & Plan  chronically on 2 L home oxygen since her COVID infection  Continue to  monitor oxygen saturation closely  Incentive spirometry  Continue DuoNeb      Methamphetamine abuse (HCC)- (present on admission)  Assessment & Plan  UDS pos amphetamine   Counseling once mental status improves    Pancytopenia (HCC)- (present on admission)  Assessment & Plan  In setting alcohol abuse  No concern of active bleeding  Hold DVT prophylaxis for  now  Monitor H&H closely      Hyponatremia- (present on admission)  Assessment & Plan  On IVF  Cont to monitor       VTE prophylaxis: SCDs/TEDs    I have performed a physical exam and reviewed and updated ROS and Plan today (9/30/2022). In review of yesterday's note (9/29/2022), there are no changes except as documented above.

## 2022-09-30 NOTE — PROGRESS NOTES
Pt back from CT, oxygen set to wall O2, sitter at bedside, bed locked in the lowest position with three side rails up, bed alarm in place.

## 2022-09-30 NOTE — NON-PROVIDER
Internal Medicine Medical Student Note  Note Author: Av Souza, Student    Name Olya Youssef     1970   Age/Sex 52 y.o. female   MRN [id number]   Code Status Full     Reason for interval visit  (Principal Problem)   Alcohol-induced acute pancreatitis  Medical Withdrawal  Suicidal Ideation    Interval Problem Daily Status Update  (problem status, last 24 hours, new history, new data )     Olya Youssef is a 52 y.o. female with a PMH of alcoholism, hepatitis, and chronic pancreatitis who was admitted on 2022 with alcohol induced pancreatitis, seizure 2/2 acute alcohol withdrawal, and medical detox. She is currently on day 5 of her hospital stay.     Yesterday, pt was making progress towards her hopeful d/c today. Her CIWA scores have steadily declined below treatment threshold, she has become more arousable and converses better, and she has had no more fever. Her Pancytopenia is also improving. Psychiatry has seen her every day for the last 3 days, but she has been too sedated for clearance and discontinuation of the legal hold. She had increased R sided abd pain that wrapped around to her back. A RUQ US and CT abd w/ contrast were performed late in the day. She also required a US guided piv placement after her previous piv became infiltrated and nursing was unable to reestablish.     Overnight, she states that she slept poorly due to increased pain and insomnia.She had an increased CIWA score up to 28 requiring a high dose of ativan.     Today, she reports ongoing severe R sided abdominal pain that is worsened by inspiration. She is able to pick at her solid food meals and consumes a little, but hasn't made any BM's and feels constipated. She requests increased pain meds and some nausea meds as well. She is concerned that if discharged, something could happen and she would be unable to take care of herself or call for help. Her most recent CIWA score is a 6.    This afternoon,  pt is sleeping and in less discomfort after receiving additional oxycodone @ 1028. She was arousable ane reports less pain, and improved ability to sleep.     Physical Exam   Continued Tachycardia and resolved HTN. Grossly, this is unchanged from her baseline. Pt is now mostly awake and alert, but is still somewhat sleepy.    Vitals:    09/30/22 0030 09/30/22 0407 09/30/22 0804 09/30/22 1200   BP:  128/83 138/86 132/88   Pulse: (!) 104 (!) 112 (!) 112 (!) 110   Resp: 18 18 18 18   Temp: 36.8 °C (98.2 °F) 37.2 °C (99 °F) 37.2 °C (99 °F) 36.3 °C (97.4 °F)   TempSrc: Temporal Temporal Temporal Temporal   SpO2:  95% 94% 95%   Weight:       Height:         Body mass index is 27.65 kg/m².    Oxygen Therapy:  Pulse Oximetry: 95 %, O2 (LPM): 2, O2 Delivery Device: Silicone Nasal Cannula    Physical Exam  HENT:      Head: Normocephalic and atraumatic.      Nose: Nose normal.      Mouth/Throat:      Mouth: Mucous membranes are moist.      Pharynx: Oropharynx is clear.   Eyes:      Extraocular Movements: Extraocular movements intact.      Conjunctiva/sclera: Conjunctivae normal.      Pupils: Pupils are equal, round, and reactive to light.   Cardiovascular:      Rate and Rhythm: Normal rate and regular rhythm.      Heart sounds: Normal heart sounds.   Pulmonary:      Effort: Pulmonary effort is normal.      Breath sounds: Decreased air movement present. Examination of the right-lower field reveals rales. Examination of the left-lower field reveals decreased breath sounds. Decreased breath sounds and rales present.      Comments: Noted diminished breath sounds over L lower lung field, slight crackles over R lower lung field.  Abdominal:      General: Bowel sounds are normal.      Palpations: Abdomen is soft. There is hepatomegaly.      Tenderness: There is generalized abdominal tenderness and tenderness in the right upper quadrant and right lower quadrant. There is right CVA tenderness and guarding.      Comments: Complains of  ongoing R sided abdominal pain that radiates around to her back. Skin is normal.   Musculoskeletal:      Cervical back: Normal range of motion and neck supple.   Skin:     General: Skin is warm and dry.      Coloration: Skin is pale.   Neurological:      Mental Status: She is alert. Mental status is at baseline. She is disoriented.   Psychiatric:         Behavior: Behavior is uncooperative.     A follow up exam performed this afternoon showed decreased tenderness to palpation, increased sleepiness, and snoring respirations. The rest of the exam findings are unchanged from this morning.     Addendum: this afternoon this pt's urine exhibited a strong raad color.     Pertinent Labs, ECG or imaging:  Hematology   Latest Reference Range & Units 9/27/22 00:46 9/28/22 05:04 9/29/22 02:15 9/30/22 02:09   WBC 4.8 - 10.8 K/uL 4.1 (L) 4.6 (L) 5.0 5.1   RBC 4.20 - 5.40 M/uL 3.22 (L) 3.21 (L) 3.29 (L) 3.32 (L)   Hemoglobin 12.0 - 16.0 g/dL 8.9 (L) 8.8 (L) 9.2 (L) 9.1 (L)   Hematocrit 37.0 - 47.0 % 29.5 (L) 29.6 (L) 30.6 (L) 31.3 (L)   MCV 81.4 - 97.8 fL 91.6 92.2 93.0 94.3   MCH 27.0 - 33.0 pg 27.6 27.4 28.0 27.4   MCHC 33.6 - 35.0 g/dL 30.2 (L) 29.7 (L) 30.1 (L) 29.1 (L)   RDW 35.9 - 50.0 fL 49.4 49.5 49.9 50.8 (H)   Platelet Count 164 - 446 K/uL 56 (L) 61 (L) 82 (L) 101 (L)   MPV 9.0 - 12.9 fL 9.7 10.8 10.6 9.9   (L): Data is abnormally low  (H): Data is abnormally high    Chemistry   Latest Reference Range & Units 9/27/22 00:46 9/28/22 05:04 9/29/22 02:15 9/30/22 02:09   Sodium 135 - 145 mmol/L 129 (L) 134 (L) 135 133 (L)   Potassium 3.6 - 5.5 mmol/L 3.9 3.6 3.6 3.7   Chloride 96 - 112 mmol/L 92 (L) 96 97 94 (L)   Co2 20 - 33 mmol/L 28 26 26 29   Anion Gap 7.0 - 16.0  9.0 12.0 12.0 10.0   Glucose 65 - 99 mg/dL 111 (H) 125 (H) 125 (H) 118 (H)   Bun 8 - 22 mg/dL 6 (L) 6 (L) 6 (L) 7 (L)   Creatinine 0.50 - 1.40 mg/dL 0.37 (L) 0.31 (L) 0.32 (L) 0.35 (L)   GFR (CKD-EPI) >60 mL/min/1.73 m 2 120 125 124 121   Calcium 8.5 - 10.5  mg/dL 8.6 8.9 9.2 9.1   AST(SGOT) 12 - 45 U/L 69 (H) 84 (H)     ALT(SGPT) 2 - 50 U/L 24 26     Alkaline Phosphatase 30 - 99 U/L 168 (H) 169 (H)     (L): Data is abnormally low  (H): Data is abnormally high    Cardiac Markers:   Latest Reference Range & Units 9/28/22 05:04   NT-proBNP 0 - 125 pg/mL 185 (H)   (H): Data is abnormally high    RUQ US Findings:  The liver is normal in contour. There is no evidence of solid mass lesion. The liver measures 13.62 cm. Liver is diffusely increased in echogenicity.  The gallbladder is normal. There is no evidence of cholelithiasis.  The gallbladder wall thickness measures 2.60 mm. There is no pericholecystic fluid.  The common duct measures 5.30 mm.  The visualized pancreas is unremarkable.  The visualized aorta is normal in caliber  Intrahepatic IVC is patent.  The portal vein is patent with hepatopetal flow. The MPV measures 1.01 cm.  The right kidney measures 11.01 cm.  There is no ascites.  IMPRESSION:  Echogenic liver, a nonspecific finding that often is found to represent steatosis.  Other infiltrative processes could have an identical appearance.    CT Abdomen w/ Contrast Findings:  Lower Chest: Linear densities in the bilateral lung bases are seen, greater on the left.  Liver: Normal.  Spleen: Unremarkable.  Pancreas: Unremarkable.  Gallbladder: No calcified stones.  Biliary: Nondilated.  Adrenal glands: Normal.  Kidneys: Unremarkable without hydronephrosis.  Bowel: No obstruction or acute inflammation. Scattered colonic diverticula are seen. The appendix appears within normal limits.  Lymph nodes: No adenopathy.  Vasculature: Unremarkable.  Peritoneum: Unremarkable without ascites.  Musculoskeletal: No acute or destructive process.  Pelvis: No adenopathy or free fluid. Uterus and adnexal structures appear within physiologic limits.  IMPRESSION:  1.  Diverticulosis  2.  Linear densities the bilateral lung bases, greater on the left, appearance favors changes of  atelectasis    Assessment/Plan   Olya Youssef is a 52 y.o. female with a PMH of alcoholism, hepatitis, and chronic pancreatitis who was admitted on 9/26/2022 with alcohol induced pancreatitis, seizure 2/2 acute alcohol withdrawal, and medical detox. She is currently on day 5 of her hospital stay.     # Fever  Resolved.    # Alcoholism, withdrawal seizure, medical dotox  Yesterday pt's CIWA score was a 5, indicating a decline in her withdrawal symptoms. Pt has made progress towards goals of increasing foods. She has had increased R sided Abd pain. Overnight, she had an increased CIWA score to 28. However, it is suspected that much of her agitation, HA's, and anxiety are related to her insomnia and abdominal pain 2/2 chronic pancreatitis. Today, her CIWA score is a 6. Pt is progressing and no longer requires IP mgmt of her withdrawal sxs.   Medically cleared for d/c to IP psych facility today pending psych consult  Continue to improve food intake     # Chronic Pancreatitis  Yesterday, she complained of significantly increased Abd pain radiating towards her back. Her exam was c/f abd pain w/ tenderness and a positive Gambino's sign. A RUQ US and CT abdomen w/ contrast have ruled out pathology related to GB and did not find obvious evidence of Pancreatic pathology, but were suggestive of Liver steatosis. Clinically, she meets diagnostic criteria of chronic pancreatitis due to her ongoing radiating abdominal pain and evidence of relapsing acute pancreatitis episodes.   Discontinue CIWA protocol  Continue to increase foods and liquids as tolerated.  Medically cleared for d/c to IP psych facility today pending psych consult    # Pancytopenia   Yesterday, the pt's hematology labs were essentially unchanged, continuing to show pancytopenia, thrombocytopenia, and anemia. Today, she continues to be pancytopenic, thrombocytopenic and anemic, but has shown mild improvement in her WBC, RBC, Hct, and her Plt. She is no  longer leukopenic.  - F/u w/ OP PCP     #Suicidal Ideation  Pt is currently on Psych hold. Psych saw pt yesterday but she was still going through withdrawal and too medically unstable to d/c the hold.   Psych hold is continued for suicidal ideation.  Transferring to IP Psych

## 2022-10-01 PROCEDURE — A9270 NON-COVERED ITEM OR SERVICE: HCPCS | Performed by: STUDENT IN AN ORGANIZED HEALTH CARE EDUCATION/TRAINING PROGRAM

## 2022-10-01 PROCEDURE — 99232 SBSQ HOSP IP/OBS MODERATE 35: CPT | Performed by: STUDENT IN AN ORGANIZED HEALTH CARE EDUCATION/TRAINING PROGRAM

## 2022-10-01 PROCEDURE — 770001 HCHG ROOM/CARE - MED/SURG/GYN PRIV*

## 2022-10-01 PROCEDURE — 700111 HCHG RX REV CODE 636 W/ 250 OVERRIDE (IP): Performed by: STUDENT IN AN ORGANIZED HEALTH CARE EDUCATION/TRAINING PROGRAM

## 2022-10-01 PROCEDURE — 700102 HCHG RX REV CODE 250 W/ 637 OVERRIDE(OP): Performed by: STUDENT IN AN ORGANIZED HEALTH CARE EDUCATION/TRAINING PROGRAM

## 2022-10-01 RX ADMIN — LORAZEPAM 3 MG: 2 TABLET ORAL at 01:23

## 2022-10-01 RX ADMIN — LORAZEPAM 1 MG: 1 TABLET ORAL at 09:29

## 2022-10-01 RX ADMIN — TRAZODONE HYDROCHLORIDE 50 MG: 50 TABLET ORAL at 20:21

## 2022-10-01 RX ADMIN — LORAZEPAM 1 MG: 1 TABLET ORAL at 13:01

## 2022-10-01 RX ADMIN — OXYCODONE 5 MG: 5 TABLET ORAL at 01:16

## 2022-10-01 RX ADMIN — OXYCODONE 5 MG: 5 TABLET ORAL at 09:22

## 2022-10-01 RX ADMIN — AMLODIPINE BESYLATE 5 MG: 5 TABLET ORAL at 04:33

## 2022-10-01 RX ADMIN — OXYCODONE 5 MG: 5 TABLET ORAL at 13:01

## 2022-10-01 RX ADMIN — LORAZEPAM 0.5 MG: 0.5 TABLET ORAL at 20:21

## 2022-10-01 RX ADMIN — LORAZEPAM 1 MG: 1 TABLET ORAL at 04:37

## 2022-10-01 RX ADMIN — ONDANSETRON 4 MG: 4 TABLET, ORALLY DISINTEGRATING ORAL at 13:01

## 2022-10-01 RX ADMIN — SERTRALINE 25 MG: 50 TABLET, FILM COATED ORAL at 04:33

## 2022-10-01 ASSESSMENT — LIFESTYLE VARIABLES
TREMOR: *
HEADACHE, FULLNESS IN HEAD: VERY MILD
VISUAL DISTURBANCES: NOT PRESENT
AGITATION: NORMAL ACTIVITY
AUDITORY DISTURBANCES: NOT PRESENT
ORIENTATION AND CLOUDING OF SENSORIUM: ORIENTED AND CAN DO SERIAL ADDITIONS
NAUSEA AND VOMITING: *
ANXIETY: MILDLY ANXIOUS
TOTAL SCORE: MILD ITCHING, PINS AND NEEDLES SENSATION, BURNING OR NUMBNESS
ANXIETY: MODERATELY ANXIOUS OR GUARDED, SO ANXIETY IS INFERRED
ANXIETY: NO ANXIETY (AT EASE)
TOTAL SCORE: MILD ITCHING, PINS AND NEEDLES SENSATION, BURNING OR NUMBNESS
NAUSEA AND VOMITING: MILD NAUSEA WITH NO VOMITING
HEADACHE, FULLNESS IN HEAD: MODERATE
NAUSEA AND VOMITING: *
NAUSEA AND VOMITING: MILD NAUSEA WITH NO VOMITING
HEADACHE, FULLNESS IN HEAD: MODERATELY SEVERE
TOTAL SCORE: 2
VISUAL DISTURBANCES: NOT PRESENT
TREMOR: *
NAUSEA AND VOMITING: NO NAUSEA AND NO VOMITING
NAUSEA AND VOMITING: MILD NAUSEA WITH NO VOMITING
ANXIETY: MILDLY ANXIOUS
TOTAL SCORE: 7
PAROXYSMAL SWEATS: *
ANXIETY: MILDLY ANXIOUS
AGITATION: SOMEWHAT MORE THAN NORMAL ACTIVITY
ORIENTATION AND CLOUDING OF SENSORIUM: ORIENTED AND CAN DO SERIAL ADDITIONS
AGITATION: NORMAL ACTIVITY
AUDITORY DISTURBANCES: VERY MILD HARSHNESS OR ABILITY TO FRIGHTEN
HEADACHE, FULLNESS IN HEAD: VERY MILD
TREMOR: *
AUDITORY DISTURBANCES: NOT PRESENT
HEADACHE, FULLNESS IN HEAD: MILD
ORIENTATION AND CLOUDING OF SENSORIUM: ORIENTED AND CAN DO SERIAL ADDITIONS
PAROXYSMAL SWEATS: BARELY PERCEPTIBLE SWEATING. PALMS MOIST
ANXIETY: *
AUDITORY DISTURBANCES: NOT PRESENT
VISUAL DISTURBANCES: NOT PRESENT
TREMOR: *
AGITATION: NORMAL ACTIVITY
AGITATION: NORMAL ACTIVITY
AUDITORY DISTURBANCES: NOT PRESENT
TOTAL SCORE: 10
TOTAL SCORE: 10
PAROXYSMAL SWEATS: NO SWEAT VISIBLE
AGITATION: SOMEWHAT MORE THAN NORMAL ACTIVITY
ORIENTATION AND CLOUDING OF SENSORIUM: ORIENTED AND CAN DO SERIAL ADDITIONS
TREMOR: NO TREMOR
VISUAL DISTURBANCES: NOT PRESENT
ORIENTATION AND CLOUDING OF SENSORIUM: ORIENTED AND CAN DO SERIAL ADDITIONS
PAROXYSMAL SWEATS: NO SWEAT VISIBLE
HEADACHE, FULLNESS IN HEAD: MODERATELY SEVERE
VISUAL DISTURBANCES: NOT PRESENT
TOTAL SCORE: 15
VISUAL DISTURBANCES: NOT PRESENT
PAROXYSMAL SWEATS: BARELY PERCEPTIBLE SWEATING. PALMS MOIST
AUDITORY DISTURBANCES: NOT PRESENT
ORIENTATION AND CLOUDING OF SENSORIUM: ORIENTED AND CAN DO SERIAL ADDITIONS
TOTAL SCORE: 10
PAROXYSMAL SWEATS: NO SWEAT VISIBLE
TREMOR: *

## 2022-10-01 ASSESSMENT — ENCOUNTER SYMPTOMS
HEADACHES: 1
ABDOMINAL PAIN: 1
NAUSEA: 1

## 2022-10-01 ASSESSMENT — PAIN DESCRIPTION - PAIN TYPE: TYPE: ACUTE PAIN

## 2022-10-01 NOTE — PROGRESS NOTES
Hospital Medicine Daily Progress Note    Date of Service  10/1/2022    Chief Complaint  Olya Youssef is a 52 y.o. female admitted 9/26/2022 with nausea, vomiting, suicidal ideation.     Hospital Course  52 y.o. female with past medical history of alcohol abuse, alcohol-related seizure, methamphetamine abuse, bipolar disorder , chronically on 2 L oxygen who presented 9/26/2022 with presented with generalized tremor associated with frontal headache, abdominal pain, nausea and vomiting for the past 4 days.  She also reports of shortness of breath. Reported SI at ED.   Labs noted with pancytopenia, low bicarbonate glucose 106, elevated AST, lipase 163.  CT head negative for acute stroke/hemorrhage.    Interval Problem Update  Seen at bedside.   Patient's mental status improving, alert.  Reports abdominal pain. But she is eating ice cream when I came to see her.   Psych following  Reports no bowel movements. Checked with RN, patient had bowel movement yesterday.     I have discussed this patient's plan of care and discharge plan at IDT rounds today with Case Management, Nursing, Nursing leadership, and other members of the IDT team.    Consultants/Specialty  psychiatry    Code Status  Full Code    Disposition  Patient is not medically cleared for discharge.   Anticipate discharge to to a psychiatric hospital.  I have placed the appropriate orders for post-discharge needs.    Review of Systems  Review of Systems   Gastrointestinal:  Positive for abdominal pain and nausea.   Neurological:  Positive for headaches.   All other systems reviewed and are negative.     Physical Exam  Temp:  [36.4 °C (97.5 °F)-37.2 °C (99 °F)] 37.2 °C (99 °F)  Pulse:  [100-118] 108  Resp:  [18-20] 18  BP: (116-141)/(72-94) 116/85  SpO2:  [90 %-94 %] 94 %    Physical Exam  Vitals reviewed.   Constitutional:       Comments: Somnolent, respond to voice. Able to say her name.    HENT:      Head: Normocephalic and atraumatic.      Nose: Nose  normal.      Mouth/Throat:      Pharynx: Oropharynx is clear.   Eyes:      Extraocular Movements: Extraocular movements intact.      Conjunctiva/sclera: Conjunctivae normal.      Pupils: Pupils are equal, round, and reactive to light.   Cardiovascular:      Rate and Rhythm: Regular rhythm. Tachycardia present.      Pulses: Normal pulses.      Heart sounds: Normal heart sounds. No murmur heard.  Pulmonary:      Effort: Pulmonary effort is normal. No respiratory distress.      Breath sounds: Normal breath sounds.   Abdominal:      General: Abdomen is flat. Bowel sounds are normal.      Palpations: Abdomen is soft.      Tenderness: There is abdominal tenderness.   Musculoskeletal:         General: Normal range of motion.      Cervical back: Normal range of motion and neck supple.      Comments: Tremor noted   Skin:     General: Skin is warm and dry.   Neurological:      Mental Status: She is alert.      Comments: Somnolent, eye closed.   Responded to voice only  Answer her name only    Psychiatric:      Comments: Unable to assess       Fluids    Intake/Output Summary (Last 24 hours) at 10/1/2022 1217  Last data filed at 9/30/2022 2000  Gross per 24 hour   Intake 480 ml   Output 400 ml   Net 80 ml         Laboratory  Recent Labs     09/29/22 0215 09/30/22  0209   WBC 5.0 5.1   RBC 3.29* 3.32*   HEMOGLOBIN 9.2* 9.1*   HEMATOCRIT 30.6* 31.3*   MCV 93.0 94.3   MCH 28.0 27.4   MCHC 30.1* 29.1*   RDW 49.9 50.8*   PLATELETCT 82* 101*   MPV 10.6 9.9       Recent Labs     09/29/22 0215 09/30/22  0209   SODIUM 135 133*   POTASSIUM 3.6 3.7   CHLORIDE 97 94*   CO2 26 29   GLUCOSE 125* 118*   BUN 6* 7*   CREATININE 0.32* 0.35*   CALCIUM 9.2 9.1                         Imaging  CT-ABDOMEN-PELVIS WITH   Final Result         1.  Diverticulosis   2.  Linear densities the bilateral lung bases, greater on the left, appearance favors changes of atelectasis      US-RUQ   Final Result      Echogenic liver, a nonspecific finding that often  is found to represent steatosis.  Other infiltrative processes could have an identical appearance.      IR-US GUIDED PIV   Final Result    Ultrasound-guided PERIPHERAL IV INSERTION performed by    qualified nursing staff as above.      DX-CHEST-PORTABLE (1 VIEW)   Final Result      Unchanged LEFT greater than RIGHT basilar atelectasis      DX-CHEST-PORTABLE (1 VIEW)   Final Result      1.  Left basilar atelectasis, unchanged.      CT-HEAD W/O   Final Result      No acute intracranial abnormality.                Assessment/Plan  * Alcohol-induced acute pancreatitis  Assessment & Plan  Lipase 160+ on admission with nausea and vomiting.   Completed IVF  Antiemetics   Pain control  Lipase trending down  Abd CT and US RUQ reviewed, no acute intraabdominal process    Alcohol dependence with withdrawal (HCC)- (present on admission)  Assessment & Plan  Alcohol withdrawal  -Cardiac monitoring  -Oxygen 2 L/m nasal cannula   -On CIWA protocol, on valium standing dose  -Thiamine/folate  -aspiration and fall precautions       Fever  Assessment & Plan  Noted on 9/28 am. Ordered procalcitonin and proBNP, blood culture  Cont to monitor  Aspiration precaution     Suicidal behavior  Assessment & Plan  Reported suicidal ideation at ER  On legal hold  Psych consulted, start zoloft and trazodone for insomnia   Pending inpatient psych transfer    Acute on chronic respiratory failure with hypoxia on home O2 therapy- (present on admission)  Assessment & Plan  chronically on 2 L home oxygen since her COVID infection  Continue to  monitor oxygen saturation closely  Incentive spirometry  Continue DuoNeb      Methamphetamine abuse (HCC)- (present on admission)  Assessment & Plan  UDS pos amphetamine   Counseling once mental status improves    Pancytopenia (HCC)- (present on admission)  Assessment & Plan  In setting alcohol abuse  No concern of active bleeding  Hold DVT prophylaxis for now  Monitor H&H closely      Hyponatremia- (present on  admission)  Assessment & Plan  Mild   Cont to monitor       VTE prophylaxis: SCDs/TEDs    I have performed a physical exam and reviewed and updated ROS and Plan today (10/1/2022). In review of yesterday's note (9/30/2022), there are no changes except as documented above.

## 2022-10-01 NOTE — PROGRESS NOTES
Psych at bedside,. MD to raise dose trazadone d/t pt report poor sleep. MD to assess suitability of add on Seroquel.      2603 MD at bedside. Reported recent CIWA, pain and anxiety reports. Reports of 11/10 pain while eating ice cream apparently peacefully.    1540 SBAR w MD re add on DVT proph    1740 Pt refusing xarelto and senna, edu re risks of refusal and insists on refusing

## 2022-10-01 NOTE — CARE PLAN
The patient is Stable - Low risk of patient condition declining or worsening    Shift Goals  Clinical Goals: safety and ciwa  Patient Goals: rest, pain control  Family Goals: BRITTANIE    Progress made toward(s) clinical / shift goals:  Pt's CIWA score is being monitored and treated per protocol, pt does not complain of SI on this shift, pt has remained free from falls, pt call appropriately, pt's sitter is at bedside     Patient is not progressing towards the following goals:      Problem: Provide Safe Environment  Goal: Suicide environmental safety, protocols, policies, and practices will be implemented  Outcome: Progressing     Problem: Fall Risk  Goal: Patient will remain free from falls  Outcome: Progressing

## 2022-10-01 NOTE — PROGRESS NOTES
Bedside report received from AM RN. Patient A&O x 3 and confused during conversation. Pt does not report SI at this time. Sitter is at bedside. Complains of pain 7/10 in right flank. CIWA at 10 at this time. Pt medicated per protocol. POC discussed with patient. Patient verbalizes understanding. Call light and belongings within reach. Bed locked in lowest position, alarm and fall precautions in place.

## 2022-10-01 NOTE — CONSULTS
"  [facility] PSYCHIATRIC FOLLOW-UP:(established)  *Reason for admission:    States she could take multiple Seroquel to just \"get it over with\". \"I am just done with feeling this way, I just don't want to do it any more\". Hx of alcohol and methamp use.   *Legal Hold Status: on legal hold                          S:  her mood is \"a bitch\" and is due to her pain and emotional state. She did not sleep well despite trazodone and is asking for seroquel 25 mg.    O: Medical ROS (as pertinent):                      *Psychiatric Examination:   Vitals:   Vitals:    09/30/22 2322 10/01/22 0420 10/01/22 0708 10/01/22 0922   BP: 121/81 (!) 141/93 (!) 140/94 128/88   Pulse: (!) 110 (!) 118 (!) 108    Resp: 18 18 20 20   Temp: 36.8 °C (98.2 °F) 36.8 °C (98.2 °F) 36.8 °C (98.2 °F)    TempSrc: Temporal Temporal Temporal    SpO2: 90% 90% 94%    Weight:       Height:         General Appearance:   good eye contact   Abnormal Movements: none   Gait and Posture: not observed  Speech: soft  Thought Process: normal rate  Associations:   linear  Abnormal or Psychotic Thoughts: none  Judgement and Insight: fair  Orientation: grossly intact  Recent and Remote Memory: grossly intact  Attention Span and Concentration: intact  Language:fluent  Fund of Knowledge: not tested  Mood and Affect: depressed and anxiety. Smiled briefly x 1  SI/HI:  suicidal - no and homicidal - no     Current Medications:  Scheduled Medications   Medication Dose Frequency    traZODone  50 mg QHS    sertraline  25 mg DAILY    amLODIPine  5 mg Q DAY    senna-docusate  2 Tablet BID          *ASSESSMENT/RECOMENDATIONS:  1.Alcohol use disorder severe: with withdrawal  2. Methamphetamine use disorder  3. Depressive disorder unspc  4  Anxiety disorder unspc.     Medical:   -alcohol induced pancreatis   -acute on chronic respiratory failure with hypoxia: on O2 at home  -thrombocytopenia and anemia     Legal hold: on legal hold  Observation status:   -line of " site with sitter     Visitors: yes  family  Personal belongings: yes phone    Discussed/voalted: USHA Keith MD    Medication and Other Recommendations: final orders as per Tx Tm  Pt requests seroquel 25 mg hs instead of trazodone.     Will continue to follow with you.       Discharge recommendations: inpt

## 2022-10-02 LAB
ALBUMIN SERPL BCP-MCNC: 3.9 G/DL (ref 3.2–4.9)
ALBUMIN/GLOB SERPL: 1.1 G/DL
ALP SERPL-CCNC: 145 U/L (ref 30–99)
ALT SERPL-CCNC: 22 U/L (ref 2–50)
ANION GAP SERPL CALC-SCNC: 11 MMOL/L (ref 7–16)
AST SERPL-CCNC: 68 U/L (ref 12–45)
BASOPHILS # BLD AUTO: 0.8 % (ref 0–1.8)
BASOPHILS # BLD: 0.04 K/UL (ref 0–0.12)
BILIRUB SERPL-MCNC: 0.7 MG/DL (ref 0.1–1.5)
BUN SERPL-MCNC: 8 MG/DL (ref 8–22)
CALCIUM SERPL-MCNC: 9.1 MG/DL (ref 8.5–10.5)
CHLORIDE SERPL-SCNC: 94 MMOL/L (ref 96–112)
CO2 SERPL-SCNC: 26 MMOL/L (ref 20–33)
CREAT SERPL-MCNC: 0.35 MG/DL (ref 0.5–1.4)
EOSINOPHIL # BLD AUTO: 0.12 K/UL (ref 0–0.51)
EOSINOPHIL NFR BLD: 2.3 % (ref 0–6.9)
ERYTHROCYTE [DISTWIDTH] IN BLOOD BY AUTOMATED COUNT: 49.9 FL (ref 35.9–50)
GFR SERPLBLD CREATININE-BSD FMLA CKD-EPI: 121 ML/MIN/1.73 M 2
GLOBULIN SER CALC-MCNC: 3.6 G/DL (ref 1.9–3.5)
GLUCOSE SERPL-MCNC: 115 MG/DL (ref 65–99)
HCT VFR BLD AUTO: 32 % (ref 37–47)
HGB BLD-MCNC: 9.6 G/DL (ref 12–16)
IMM GRANULOCYTES # BLD AUTO: 0.02 K/UL (ref 0–0.11)
IMM GRANULOCYTES NFR BLD AUTO: 0.4 % (ref 0–0.9)
LIPASE SERPL-CCNC: 40 U/L (ref 11–82)
LYMPHOCYTES # BLD AUTO: 1.26 K/UL (ref 1–4.8)
LYMPHOCYTES NFR BLD: 24.7 % (ref 22–41)
MCH RBC QN AUTO: 27.9 PG (ref 27–33)
MCHC RBC AUTO-ENTMCNC: 30 G/DL (ref 33.6–35)
MCV RBC AUTO: 93 FL (ref 81.4–97.8)
MONOCYTES # BLD AUTO: 1.07 K/UL (ref 0–0.85)
MONOCYTES NFR BLD AUTO: 20.9 % (ref 0–13.4)
NEUTROPHILS # BLD AUTO: 2.6 K/UL (ref 2–7.15)
NEUTROPHILS NFR BLD: 50.9 % (ref 44–72)
NRBC # BLD AUTO: 0 K/UL
NRBC BLD-RTO: 0 /100 WBC
PLATELET # BLD AUTO: 136 K/UL (ref 164–446)
PMV BLD AUTO: 9.9 FL (ref 9–12.9)
POTASSIUM SERPL-SCNC: 3.9 MMOL/L (ref 3.6–5.5)
PROT SERPL-MCNC: 7.5 G/DL (ref 6–8.2)
RBC # BLD AUTO: 3.44 M/UL (ref 4.2–5.4)
SODIUM SERPL-SCNC: 131 MMOL/L (ref 135–145)
WBC # BLD AUTO: 5.1 K/UL (ref 4.8–10.8)

## 2022-10-02 PROCEDURE — A9270 NON-COVERED ITEM OR SERVICE: HCPCS | Performed by: STUDENT IN AN ORGANIZED HEALTH CARE EDUCATION/TRAINING PROGRAM

## 2022-10-02 PROCEDURE — 700111 HCHG RX REV CODE 636 W/ 250 OVERRIDE (IP): Performed by: STUDENT IN AN ORGANIZED HEALTH CARE EDUCATION/TRAINING PROGRAM

## 2022-10-02 PROCEDURE — 700102 HCHG RX REV CODE 250 W/ 637 OVERRIDE(OP): Performed by: STUDENT IN AN ORGANIZED HEALTH CARE EDUCATION/TRAINING PROGRAM

## 2022-10-02 PROCEDURE — 80053 COMPREHEN METABOLIC PANEL: CPT

## 2022-10-02 PROCEDURE — 83690 ASSAY OF LIPASE: CPT

## 2022-10-02 PROCEDURE — 85025 COMPLETE CBC W/AUTO DIFF WBC: CPT

## 2022-10-02 PROCEDURE — 36415 COLL VENOUS BLD VENIPUNCTURE: CPT

## 2022-10-02 PROCEDURE — 99232 SBSQ HOSP IP/OBS MODERATE 35: CPT | Performed by: STUDENT IN AN ORGANIZED HEALTH CARE EDUCATION/TRAINING PROGRAM

## 2022-10-02 PROCEDURE — 770001 HCHG ROOM/CARE - MED/SURG/GYN PRIV*

## 2022-10-02 RX ORDER — OXYCODONE HYDROCHLORIDE 10 MG/1
10 TABLET ORAL EVERY 4 HOURS PRN
Status: DISCONTINUED | OUTPATIENT
Start: 2022-10-02 | End: 2022-10-04 | Stop reason: HOSPADM

## 2022-10-02 RX ORDER — BUTALBITAL, ACETAMINOPHEN AND CAFFEINE 50; 325; 40 MG/1; MG/1; MG/1
1 TABLET ORAL EVERY 6 HOURS PRN
Status: DISCONTINUED | OUTPATIENT
Start: 2022-10-02 | End: 2022-10-04 | Stop reason: HOSPADM

## 2022-10-02 RX ORDER — KETOROLAC TROMETHAMINE 30 MG/ML
15 INJECTION, SOLUTION INTRAMUSCULAR; INTRAVENOUS ONCE
Status: COMPLETED | OUTPATIENT
Start: 2022-10-02 | End: 2022-10-02

## 2022-10-02 RX ORDER — TRAZODONE HYDROCHLORIDE 100 MG/1
100 TABLET ORAL
Status: DISCONTINUED | OUTPATIENT
Start: 2022-10-02 | End: 2022-10-04 | Stop reason: HOSPADM

## 2022-10-02 RX ADMIN — TRAZODONE HYDROCHLORIDE 100 MG: 100 TABLET ORAL at 20:00

## 2022-10-02 RX ADMIN — OXYCODONE HYDROCHLORIDE 10 MG: 10 TABLET ORAL at 13:43

## 2022-10-02 RX ADMIN — ONDANSETRON 4 MG: 4 TABLET, ORALLY DISINTEGRATING ORAL at 19:57

## 2022-10-02 RX ADMIN — OXYCODONE 5 MG: 5 TABLET ORAL at 08:27

## 2022-10-02 RX ADMIN — DOCUSATE SODIUM 50 MG AND SENNOSIDES 8.6 MG 2 TABLET: 8.6; 5 TABLET, FILM COATED ORAL at 04:05

## 2022-10-02 RX ADMIN — OXYCODONE 5 MG: 5 TABLET ORAL at 00:45

## 2022-10-02 RX ADMIN — SERTRALINE 25 MG: 50 TABLET, FILM COATED ORAL at 04:05

## 2022-10-02 RX ADMIN — ONDANSETRON 4 MG: 4 TABLET, ORALLY DISINTEGRATING ORAL at 13:42

## 2022-10-02 RX ADMIN — OXYCODONE HYDROCHLORIDE 10 MG: 10 TABLET ORAL at 19:57

## 2022-10-02 RX ADMIN — DOCUSATE SODIUM 50 MG AND SENNOSIDES 8.6 MG 2 TABLET: 8.6; 5 TABLET, FILM COATED ORAL at 17:27

## 2022-10-02 RX ADMIN — KETOROLAC TROMETHAMINE 15 MG: 30 INJECTION, SOLUTION INTRAMUSCULAR at 11:56

## 2022-10-02 ASSESSMENT — LIFESTYLE VARIABLES
TREMOR: *
VISUAL DISTURBANCES: NOT PRESENT
AGITATION: NORMAL ACTIVITY
HEADACHE, FULLNESS IN HEAD: NOT PRESENT
ANXIETY: NO ANXIETY (AT EASE)
ORIENTATION AND CLOUDING OF SENSORIUM: ORIENTED AND CAN DO SERIAL ADDITIONS
AUDITORY DISTURBANCES: NOT PRESENT
PAROXYSMAL SWEATS: BARELY PERCEPTIBLE SWEATING. PALMS MOIST
AUDITORY DISTURBANCES: NOT PRESENT
TOTAL SCORE: 4
TOTAL SCORE: VERY MILD ITCHING, PINS AND NEEDLES SENSATION, BURNING OR NUMBNESS
PAROXYSMAL SWEATS: NO SWEAT VISIBLE
TREMOR: *
ANXIETY: NO ANXIETY (AT EASE)
ORIENTATION AND CLOUDING OF SENSORIUM: ORIENTED AND CAN DO SERIAL ADDITIONS
VISUAL DISTURBANCES: NOT PRESENT
TREMOR: *
NAUSEA AND VOMITING: NO NAUSEA AND NO VOMITING
VISUAL DISTURBANCES: NOT PRESENT
TOTAL SCORE: 4
NAUSEA AND VOMITING: NO NAUSEA AND NO VOMITING
AGITATION: NORMAL ACTIVITY
ANXIETY: MILDLY ANXIOUS
AUDITORY DISTURBANCES: NOT PRESENT
PAROXYSMAL SWEATS: BARELY PERCEPTIBLE SWEATING. PALMS MOIST
AGITATION: NORMAL ACTIVITY
TOTAL SCORE: 3
HEADACHE, FULLNESS IN HEAD: NOT PRESENT
HEADACHE, FULLNESS IN HEAD: NOT PRESENT
NAUSEA AND VOMITING: NO NAUSEA AND NO VOMITING
ORIENTATION AND CLOUDING OF SENSORIUM: ORIENTED AND CAN DO SERIAL ADDITIONS

## 2022-10-02 ASSESSMENT — PAIN DESCRIPTION - PAIN TYPE
TYPE: ACUTE PAIN

## 2022-10-02 ASSESSMENT — ENCOUNTER SYMPTOMS
HEADACHES: 1
ABDOMINAL PAIN: 1
NAUSEA: 1

## 2022-10-02 ASSESSMENT — FIBROSIS 4 INDEX: FIB4 SCORE: 5.76

## 2022-10-02 NOTE — PROGRESS NOTES
Hospital Medicine Daily Progress Note    Date of Service  10/2/2022    Chief Complaint  Olya Youssef is a 52 y.o. female admitted 9/26/2022 with nausea, vomiting, suicidal ideation.     Hospital Course  52 y.o. female with past medical history of alcohol abuse, alcohol-related seizure, methamphetamine abuse, bipolar disorder , chronically on 2 L oxygen who presented 9/26/2022 with presented with generalized tremor associated with frontal headache, abdominal pain, nausea and vomiting for the past 4 days.  She also reports of shortness of breath. Reported SI at ED.   Labs noted with pancytopenia, low bicarbonate glucose 106, elevated AST, lipase 163.  CT head negative for acute stroke/hemorrhage.    Interval Problem Update  Seen at bedside.   Labs ordered, stable. Lipase trending down  Still having abdominal pain, likely sec to chronic pancreatitis pain  Dc [facility] protocol     I have discussed this patient's plan of care and discharge plan at IDT rounds today with Case Management, Nursing, Nursing leadership, and other members of the IDT team.    Consultants/Specialty  psychiatry    Code Status  Full Code    Disposition  Patient is medically cleared for discharge.   Anticipate discharge to to a psychiatric hospital.  I have placed the appropriate orders for post-discharge needs.    Review of Systems  Review of Systems   Gastrointestinal:  Positive for abdominal pain and nausea.   Neurological:  Positive for headaches.   All other systems reviewed and are negative.     Physical Exam  Temp:  [36.4 °C (97.5 °F)-37.2 °C (99 °F)] (P) 36.9 °C (98.4 °F)  Pulse:  [] (P) 92  Resp:  [16-20] (P) 18  BP: ()/(52-98) (P) 122/86  SpO2:  [80 %-97 %] (P) 95 %    Physical Exam  Vitals reviewed.   Constitutional:       Comments: Alert, following commands   HENT:      Head: Normocephalic and atraumatic.      Nose: Nose normal.      Mouth/Throat:      Pharynx: Oropharynx is clear.   Eyes:      Extraocular Movements:  Extraocular movements intact.      Conjunctiva/sclera: Conjunctivae normal.      Pupils: Pupils are equal, round, and reactive to light.   Cardiovascular:      Rate and Rhythm: Regular rhythm. Tachycardia present.      Pulses: Normal pulses.      Heart sounds: Normal heart sounds. No murmur heard.  Pulmonary:      Effort: Pulmonary effort is normal. No respiratory distress.      Breath sounds: Normal breath sounds.   Abdominal:      General: Abdomen is flat. Bowel sounds are normal.      Palpations: Abdomen is soft.      Tenderness: There is abdominal tenderness.   Musculoskeletal:         General: Normal range of motion.      Cervical back: Normal range of motion and neck supple.      Comments: Tremor noted   Skin:     General: Skin is warm and dry.   Neurological:      General: No focal deficit present.      Mental Status: She is alert and oriented to person, place, and time.      Sensory: No sensory deficit.   Psychiatric:         Mood and Affect: Mood normal.         Behavior: Behavior normal.       Fluids  No intake or output data in the 24 hours ending 10/02/22 1145      Laboratory  Recent Labs     09/30/22  0209 10/02/22  1013   WBC 5.1 5.1   RBC 3.32* 3.44*   HEMOGLOBIN 9.1* 9.6*   HEMATOCRIT 31.3* 32.0*   MCV 94.3 93.0   MCH 27.4 27.9   MCHC 29.1* 30.0*   RDW 50.8* 49.9   PLATELETCT 101* 136*   MPV 9.9 9.9       Recent Labs     09/30/22  0209 10/02/22  1013   SODIUM 133* 131*   POTASSIUM 3.7 3.9   CHLORIDE 94* 94*   CO2 29 26   GLUCOSE 118* 115*   BUN 7* 8   CREATININE 0.35* 0.35*   CALCIUM 9.1 9.1                         Imaging  CT-ABDOMEN-PELVIS WITH   Final Result         1.  Diverticulosis   2.  Linear densities the bilateral lung bases, greater on the left, appearance favors changes of atelectasis      US-RUQ   Final Result      Echogenic liver, a nonspecific finding that often is found to represent steatosis.  Other infiltrative processes could have an identical appearance.      IR-US GUIDED PIV    Final Result    Ultrasound-guided PERIPHERAL IV INSERTION performed by    qualified nursing staff as above.      DX-CHEST-PORTABLE (1 VIEW)   Final Result      Unchanged LEFT greater than RIGHT basilar atelectasis      DX-CHEST-PORTABLE (1 VIEW)   Final Result      1.  Left basilar atelectasis, unchanged.      CT-HEAD W/O   Final Result      No acute intracranial abnormality.                Assessment/Plan  * Alcohol-induced acute pancreatitis  Assessment & Plan  Lipase 160+ on admission with nausea and vomiting.   Completed IVF  Antiemetics   Multimodal Pain control  Lipase trending down  Abd CT and US RUQ reviewed, no acute intraabdominal process    Alcohol dependence with withdrawal (HCC)- (present on admission)  Assessment & Plan  Alcohol withdrawal  -Cardiac monitoring  -Oxygen 2 L/m nasal cannula   -On CIWA protocol, on valium standing dose  -Thiamine/folate  -aspiration and fall precautions       Fever  Assessment & Plan  Noted on 9/28 am. Ordered procalcitonin and proBNP, blood culture  Cont to monitor  Aspiration precaution     Suicidal behavior  Assessment & Plan  Reported suicidal ideation at ER  On legal hold  Psych consulted, start zoloft and trazodone for insomnia   Pending inpatient psych transfer    Acute on chronic respiratory failure with hypoxia on home O2 therapy- (present on admission)  Assessment & Plan  chronically on 2 L home oxygen since her COVID infection  Continue to  monitor oxygen saturation closely  Incentive spirometry  Continue DuoNeb      Methamphetamine abuse (HCC)- (present on admission)  Assessment & Plan  UDS pos amphetamine   Counseling once mental status improves    Pancytopenia (HCC)- (present on admission)  Assessment & Plan  In setting alcohol abuse  No concern of active bleeding  Hold DVT prophylaxis for now  Monitor H&H closely      Hyponatremia- (present on admission)  Assessment & Plan  Mild   Cont to monitor       VTE prophylaxis: SCDs/TEDs    I have performed a  physical exam and reviewed and updated ROS and Plan today (10/2/2022). In review of yesterday's note (10/1/2022), there are no changes except as documented above.

## 2022-10-02 NOTE — CARE PLAN
The patient is Watcher - Medium risk of patient condition declining or worsening    Shift Goals  Clinical Goals: CIWA; Safety; Pain Management  Patient Goals: Rest  Family Goals: BRITTANIE    Progress made toward(s) clinical / shift goals:    Problem: Knowledge Deficit - Standard  Goal: Patient and family/care givers will demonstrate understanding of plan of care, disease process/condition, diagnostic tests and medications  Outcome: Progressing   Plan of care discussed with patient, verbalizes understanding. Encouraged to voice feelings or concerns.     Problem: Optimal Care for Alcohol Withdrawal  Goal: Optimal Care for the alcohol withdrawal patient  Outcome: Progressing   CIWA protocol in place.    Problem: Seizure Precautions  Goal: Implementation of seizure precautions  Outcome: Progressing     Problem: Provide Safe Environment  Goal: Suicide environmental safety, protocols, policies, and practices will be implemented  Outcome: Progressing   Patient on legal hold. 1:1 sitter at bedside. All safety precautions in place.    Problem: Fall Risk  Goal: Patient will remain free from falls  Outcome: Progressing   Fall precautions in place. Patient rounded on hourly. Clutter-free environment maintained. Bed alarm on, bed locked and in lowest position. Call light in reach.     Problem: Pain - Standard  Goal: Alleviation of pain or a reduction in pain to the patient’s comfort goal  Outcome: Progressing   Available pain medications reviewed with patient. Pain managed by PRN and scheduled medications. Encouraged to call if pain is no longer managed.

## 2022-10-02 NOTE — DISCHARGE PLANNING
Alert Team/Behavioral Health   Note:      Kevin @ [facility] called to decline patient because of medical complexity due to continuous 2 L oxygen, do not have staffing for a 1:1, and vomiting blood.

## 2022-10-02 NOTE — PROGRESS NOTES
Bedside report received from night RN, pt care assumed, assessment completed. Pt is A&O4, pain 9-provided pain medication per MAR, not on the monitor (medical). Updated on POC, questions answered. Bed in lowest, locked position, treaded socks on, call light and belongings within reach.   Sitter in room with patient. All safety precautions in place.

## 2022-10-02 NOTE — CONSULTS
"  [facility] PSYCHIATRIC FOLLOW-UP:(established)  *Reason for admission:   States she could take multiple Seroquel to just \"get it over with\". \"I am just done with feeling this way, I just don't want to do it any more\". Hx of alcohol and methamp use.   *Legal Hold Status: on legal hold                   S:  she wants sleep meds like valium. She didn't sleep well but she is napping throughout the day and can barely keep her eyes open. Discussed this and encourage her to stay up during the day: \"how am I supposed to do that?\". When I tell her she is on many meds and valium won't be given says, \"then Ill leave AMA\" despite her pain and discomfort. Says she will go home, buy drugs off the street to sleep but plans on not drinking ever again. Her mood is \"sucks\". She denies SI    When asked what could happen if she left AMA medically, she doesn't care she just wants to sleep though this was discussed with her.    O: Medical ROS (as pertinent):                      *Psychiatric Examination:   Vitals:   Vitals:    10/01/22 1945 10/02/22 0000 10/02/22 0400 10/02/22 0859   BP: (!) 134/90 118/84 (!) 98/52 (P) 122/86   Pulse: 98 97 97 (P) 92   Resp: 18 16 16 (P) 18   Temp: 36.4 °C (97.5 °F) 36.5 °C (97.7 °F) 36.8 °C (98.2 °F) (P) 36.9 °C (98.4 °F)   TempSrc: Temporal Temporal Temporal (P) Temporal   SpO2: 97% 96% 95% (P) 95%   Weight:       Height:         General Appearance:   good eye contact through half closed eyes  Abnormal Movements: none   Gait and Posture: not observed  Speech: soft  Thought Process: normal rate  Associations:   linear  Abnormal or Psychotic Thoughts: none  Judgement and Insight impaired  Orientation: grossly intact  Recent and Remote Memory: grossly intact  Attention Span and Concentration: intact  Language:fluent  Fund of Knowledge: not tested  Mood and Affect: depressed and anxiety.    SI/HI:  suicidal - no and homicidal - no      Current Medications:  Scheduled Medications "   Medication Dose Frequency    traZODone  100 mg QHS    sertraline  25 mg DAILY    amLODIPine  5 mg Q DAY    senna-docusate  2 Tablet BID          *ASSESSMENT/RECOMENDATIONS:  1. Alcohol use disorder severe: with withdrawal  2. Methamphetamine use disorder  3. Depressive disorder unspc  4  Anxiety disorder unspc.     Medical:   -alcohol induced pancreatis   -acute on chronic respiratory failure with hypoxia: on O2 at home  -thrombocytopenia and anemia     Legal hold: on legal hold and very impulsive at least verbally.  Observation status:   -line of site with sitter     Visitors: yes  family  Personal belongings: yes phone     Discussed/voalted: USHA Keith MD     Medication and Other Recommendations: final orders as per Tx Tm  Pt requests seroquel 25 mg hs instead of trazodone.      Will continue to follow with you.       Discharge recommendations: inpt

## 2022-10-02 NOTE — DISCHARGE PLANNING
Alert Team/Behavioral Health   Note:      [facility] ALERT TEAM DISCHARGE PLANNING NOTE    Date:  10/1/22  Patient Name:  Olya Youssef - 52 y.o. - Discharge Planning  MRN:  [id number]   YOB: 1970  ADMISSION DATE:  9/26/2022      Writer forwarded referral packet for inpatient psychiatric care to the following community providers:  [facility] , [facility]    Items  included in the referral packet:   _x____Face Sheet   _x____Pages 1 and 2 of completed legal hold   _x____Alert Team/Psych Assessment   _x____H&P   _____UDS   _____Blood Alcohol   _x____Vital signs   _____Pregnancy Test (if applicable)   _x____Medications List   _____Covid Screen

## 2022-10-03 PROCEDURE — 51798 US URINE CAPACITY MEASURE: CPT

## 2022-10-03 PROCEDURE — 99231 SBSQ HOSP IP/OBS SF/LOW 25: CPT | Performed by: STUDENT IN AN ORGANIZED HEALTH CARE EDUCATION/TRAINING PROGRAM

## 2022-10-03 PROCEDURE — 770001 HCHG ROOM/CARE - MED/SURG/GYN PRIV*

## 2022-10-03 PROCEDURE — A9270 NON-COVERED ITEM OR SERVICE: HCPCS | Performed by: STUDENT IN AN ORGANIZED HEALTH CARE EDUCATION/TRAINING PROGRAM

## 2022-10-03 PROCEDURE — 700102 HCHG RX REV CODE 250 W/ 637 OVERRIDE(OP): Performed by: STUDENT IN AN ORGANIZED HEALTH CARE EDUCATION/TRAINING PROGRAM

## 2022-10-03 PROCEDURE — 94760 N-INVAS EAR/PLS OXIMETRY 1: CPT

## 2022-10-03 RX ADMIN — AMLODIPINE BESYLATE 5 MG: 5 TABLET ORAL at 04:16

## 2022-10-03 RX ADMIN — OXYCODONE HYDROCHLORIDE 10 MG: 10 TABLET ORAL at 04:17

## 2022-10-03 RX ADMIN — DOCUSATE SODIUM 50 MG AND SENNOSIDES 8.6 MG 2 TABLET: 8.6; 5 TABLET, FILM COATED ORAL at 17:05

## 2022-10-03 RX ADMIN — SERTRALINE 25 MG: 50 TABLET, FILM COATED ORAL at 04:16

## 2022-10-03 RX ADMIN — POLYETHYLENE GLYCOL 3350 1 PACKET: 17 POWDER, FOR SOLUTION ORAL at 17:05

## 2022-10-03 RX ADMIN — OXYCODONE HYDROCHLORIDE 10 MG: 10 TABLET ORAL at 00:20

## 2022-10-03 RX ADMIN — OXYCODONE HYDROCHLORIDE 10 MG: 10 TABLET ORAL at 09:31

## 2022-10-03 RX ADMIN — DOCUSATE SODIUM 50 MG AND SENNOSIDES 8.6 MG 2 TABLET: 8.6; 5 TABLET, FILM COATED ORAL at 04:16

## 2022-10-03 RX ADMIN — TRAZODONE HYDROCHLORIDE 100 MG: 100 TABLET ORAL at 20:27

## 2022-10-03 RX ADMIN — OXYCODONE HYDROCHLORIDE 10 MG: 10 TABLET ORAL at 20:28

## 2022-10-03 RX ADMIN — RIVAROXABAN 10 MG: 10 TABLET, FILM COATED ORAL at 17:05

## 2022-10-03 RX ADMIN — BUTALBITAL, ACETAMINOPHEN, AND CAFFEINE 1 TABLET: 50; 325; 40 TABLET ORAL at 20:28

## 2022-10-03 ASSESSMENT — PAIN DESCRIPTION - PAIN TYPE
TYPE: ACUTE PAIN

## 2022-10-03 ASSESSMENT — ENCOUNTER SYMPTOMS
HEADACHES: 1
NAUSEA: 1
ABDOMINAL PAIN: 1

## 2022-10-03 ASSESSMENT — FIBROSIS 4 INDEX: FIB4 SCORE: 5.76

## 2022-10-03 NOTE — CONSULTS
"  [facility] PSYCHIATRIC FOLLOW-UP:(established)    DOS: 10/03/22     Reason for admission:  States she could take multiple Seroquel to just \"get it over with\". \"I am just done with feeling this way, I just don't want to do it any more\". Hx of alcohol and methamp use.  Legal Hold Status: on legal hold      ID/Chief Complaint:   Chief Complaint   Patient presents with    Headache     Base of the skull, for the last 4 days    Urinating Blood     For 2 days    Nausea     Last emesis 9/25    Suicidal Ideation     States she could take multiple Seroquel to just \"get it over with\". \"I am just done with feeling this way, I just don't want to do it any more\".               S:  Seen today as f/u psych consult. Mood is \"crappy\" - thinks she was discharged yesterday and readmitted this morning. Oriented to person/place/time however. Notes good sleep overnight (staff confirms). Appetite is poor. Denies SI/HI. Does note new visual hallucinations yesterday and today, but is unable to describe. Denies AH. Requests coloring materials.       O: Medical ROS (as pertinent): No new changes reported to this writer.    Recent Labs     10/02/22  1013   WBC 5.1   RBC 3.44*   HEMOGLOBIN 9.6*   HEMATOCRIT 32.0*   MCV 93.0   MCH 27.9   RDW 49.9   PLATELETCT 136*   MPV 9.9   NEUTSPOLYS 50.90   LYMPHOCYTES 24.70   MONOCYTES 20.90*   EOSINOPHILS 2.30   BASOPHILS 0.80     Recent Labs     10/02/22  1013   SODIUM 131*   POTASSIUM 3.9   CHLORIDE 94*   CO2 26   GLUCOSE 115*   BUN 8     Recent Labs     10/02/22  1013   SODIUM 131*   POTASSIUM 3.9   CHLORIDE 94*   CO2 26   GLUCOSE 115*   BUN 8         PSYCHIATRIC EXAM (MSE):   Vitals:    10/03/22 0411   BP: (!) 144/96   Pulse: (!) 102   Resp: 18   Temp: 36.8 °C (98.2 °F)   SpO2: 96%      Weight: 60.1kg on 10/2/22    Constitutional: as noted above  General Appearance/Behavior: 52 y.o. appears stated age, normal habitus poor eye contact cooperative friendly, very drowsy and at times " "difficult to engage as a result  Abnormal Movements: none, no PMA/PMR or tremor observed.  Gait and Posture: not observed  Musculoskeletal: as noted above  Mood: \"crappy\"  Affect: sleepy   Speech: quiet with somewhat short responses   Language:  spontaneous, comprehends spoken commands, and fluent   Thought Process: mild confusion/disorganization  Thought Content: +VH starting yesterday. Denies SI/HI.   Insight/Judgement:  impaired   Alert/Orientation: drowsy, oriented to person, place and time  Attn/Concentration: unfocused  Fund of Knowledge: Average  Memory recent/remote: No gross evidence of memory deficits  MMSE: deferred this visit          Meds Current:  Scheduled Medications   Medication Dose Frequency    traZODone  100 mg QHS    sertraline  25 mg DAILY    amLODIPine  5 mg Q DAY    senna-docusate  2 Tablet BID     Allergies:   Allergies   Allergen Reactions    Bactrim Hives    Lamotrigine [Lamictal] Hives    Quetiapine Fumarate Hives     Extrapyramidal Symptoms    Keflex Hives           *ASSESSMENT:   1. Alcoholic intoxication without complication (HCC)    2. Alcohol withdrawal syndrome without complication (HCC)       Pt appears to be becoming encephalopathic - discussed importance of normalizing sleep wake cycle (stimulation and light during the day, promoting restful sleep at night, etc) with RN   1 Alcohol use disorder severe: with withdrawal  2. Methamphetamine use disorder  3. Depressive disorder unspc  4  Anxiety disorder unspc.     Medical:   -alcohol induced pancreatis   -acute on chronic respiratory failure with hypoxia: on O2 at home  -thrombocytopenia and anemia    *RECOMMENDATIONS:  Legal Status: on legal hold    Please transfer patient to inpatient psychiatric hospital when medically cleared and bed is available.    Observation status:   -line of site with sitter    Visitors: yes family  Personal belongings: yes phone      Medication Recommendations: Final orders as per Treatment Team  Per " 10/2/22 psych note pt requesting seroquel 25mg instead of trazodone - pt slept with trazodone last night, don't have to change at this time (pt was not requesting change today)  Medication reconciliation was completed.  Reviewed safety plan: 911, ER, PCM, MHC, suicide crisis line, nursing staff while inpatient.    Will Continue to Follow. Thank you for the consult.           Discharge recommendations:   -Please assist with outpatient Psychiatric/substance use follow up appointments at discharge once medically cleared.

## 2022-10-03 NOTE — CARE PLAN
The patient is Stable - Low risk of patient condition declining or worsening    Shift Goals  Clinical Goals: Monitor safety & neuros  Patient Goals: Pain control  Family Goals: NA    Progress made toward(s) clinical / shift goals:      Problem: Fall Risk  Goal: Patient will remain free from falls  Outcome: Progressing  Note: All fall precautions in place and patient educated to use the call light before ambulation. 1:1 sitter present bedside.       Patient is not progressing towards the following goals:      Problem: Knowledge Deficit - Standard  Goal: Patient and family/care givers will demonstrate understanding of plan of care, disease process/condition, diagnostic tests and medications  Outcome: Not Progressing  Note: While patient is A&O X 4 she does not understand her POC or disease process

## 2022-10-03 NOTE — PROGRESS NOTES
This RN updated Dr. Keith on patient's neuro status. Patient is A&O X 4 but seems to be hallucinating. Vitals are stable. This RN has patient sitting up with the lights on and shade open to keep patient more awake until nighttime. Dr. Keith has no new orders at this time.

## 2022-10-03 NOTE — DISCHARGE PLANNING
Alert Team Note:    Contacted [facility], spoke to Jazmine. Pt packet has been received and reviewed. Pt is on the wait list. No beds available at this time.

## 2022-10-03 NOTE — PROGRESS NOTES
Hospital Medicine Daily Progress Note    Date of Service  10/3/2022    Chief Complaint  Olya Youssef is a 52 y.o. female admitted 9/26/2022 with nausea, vomiting, suicidal ideation.     Hospital Course  52 y.o. female with past medical history of alcohol abuse, alcohol-related seizure, methamphetamine abuse, bipolar disorder , chronically on 2 L oxygen who presented 9/26/2022 with presented with generalized tremor associated with frontal headache, abdominal pain, nausea and vomiting for the past 4 days.  She also reports of shortness of breath. Reported SI at ED.   Labs noted with pancytopenia, low bicarbonate glucose 106, elevated AST, lipase 163.  CT head negative for acute stroke/hemorrhage.    Interval Problem Update  Seen at bedside.   Labs ordered, stable. Lipase trending down  Still having abdominal pain, likely sec to chronic pancreatitis pain. Pain improving  Dc ciwa protocol   Await inpatient psych     I have discussed this patient's plan of care and discharge plan at IDT rounds today with Case Management, Nursing, Nursing leadership, and other members of the IDT team.    Consultants/Specialty  psychiatry    Code Status  Full Code    Disposition  Patient is medically cleared for discharge.   Anticipate discharge to to a psychiatric hospital.  I have placed the appropriate orders for post-discharge needs.    Review of Systems  Review of Systems   Gastrointestinal:  Positive for abdominal pain and nausea.   Neurological:  Positive for headaches.   All other systems reviewed and are negative.     Physical Exam  Temp:  [36.2 °C (97.2 °F)-36.8 °C (98.2 °F)] 36.8 °C (98.2 °F)  Pulse:  [] 98  Resp:  [16-18] 18  BP: (133-144)/(80-96) 144/96  SpO2:  [91 %-97 %] 97 %    Physical Exam  Vitals reviewed.   Constitutional:       Comments: Alert, following commands   HENT:      Head: Normocephalic and atraumatic.      Nose: Nose normal.      Mouth/Throat:      Pharynx: Oropharynx is clear.   Eyes:       Extraocular Movements: Extraocular movements intact.      Conjunctiva/sclera: Conjunctivae normal.      Pupils: Pupils are equal, round, and reactive to light.   Cardiovascular:      Rate and Rhythm: Regular rhythm. Tachycardia present.      Pulses: Normal pulses.      Heart sounds: Normal heart sounds. No murmur heard.  Pulmonary:      Effort: Pulmonary effort is normal. No respiratory distress.      Breath sounds: Normal breath sounds.   Abdominal:      General: Abdomen is flat. Bowel sounds are normal.      Palpations: Abdomen is soft.      Tenderness: There is abdominal tenderness.   Musculoskeletal:         General: Normal range of motion.      Cervical back: Normal range of motion and neck supple.      Comments: Tremor noted   Skin:     General: Skin is warm and dry.   Neurological:      General: No focal deficit present.      Mental Status: She is alert and oriented to person, place, and time.      Sensory: No sensory deficit.   Psychiatric:         Mood and Affect: Mood normal.         Behavior: Behavior normal.       Fluids    Intake/Output Summary (Last 24 hours) at 10/3/2022 1300  Last data filed at 10/3/2022 0411  Gross per 24 hour   Intake 240 ml   Output 1500 ml   Net -1260 ml         Laboratory  Recent Labs     10/02/22  1013   WBC 5.1   RBC 3.44*   HEMOGLOBIN 9.6*   HEMATOCRIT 32.0*   MCV 93.0   MCH 27.9   MCHC 30.0*   RDW 49.9   PLATELETCT 136*   MPV 9.9       Recent Labs     10/02/22  1013   SODIUM 131*   POTASSIUM 3.9   CHLORIDE 94*   CO2 26   GLUCOSE 115*   BUN 8   CREATININE 0.35*   CALCIUM 9.1                         Imaging  CT-ABDOMEN-PELVIS WITH   Final Result         1.  Diverticulosis   2.  Linear densities the bilateral lung bases, greater on the left, appearance favors changes of atelectasis      US-RUQ   Final Result      Echogenic liver, a nonspecific finding that often is found to represent steatosis.  Other infiltrative processes could have an identical appearance.      IR-US GUIDED  PIV   Final Result    Ultrasound-guided PERIPHERAL IV INSERTION performed by    qualified nursing staff as above.      DX-CHEST-PORTABLE (1 VIEW)   Final Result      Unchanged LEFT greater than RIGHT basilar atelectasis      DX-CHEST-PORTABLE (1 VIEW)   Final Result      1.  Left basilar atelectasis, unchanged.      CT-HEAD W/O   Final Result      No acute intracranial abnormality.                Assessment/Plan  * Alcohol-induced acute pancreatitis  Assessment & Plan  Lipase 160+ on admission with nausea and vomiting.   Completed IVF  Antiemetics   Multimodal Pain control  Lipase trending down  Abd CT and US RUQ reviewed, no acute intraabdominal process    Alcohol dependence with withdrawal (HCC)- (present on admission)  Assessment & Plan  Alcohol withdrawal  -Cardiac monitoring  -Oxygen 2 L/m nasal cannula   -On CIWA protocol, on valium standing dose  -Thiamine/folate  -aspiration and fall precautions       Fever  Assessment & Plan  Noted on 9/28 am. Ordered procalcitonin and proBNP, blood culture  Cont to monitor  Aspiration precaution     Suicidal behavior  Assessment & Plan  Reported suicidal ideation at ER  On legal hold  Psych consulted, start zoloft and trazodone for insomnia   Pending inpatient psych transfer    Acute on chronic respiratory failure with hypoxia on home O2 therapy- (present on admission)  Assessment & Plan  chronically on 2 L home oxygen since her COVID infection  Continue to  monitor oxygen saturation closely  Incentive spirometry  Continue DuoNeb      Methamphetamine abuse (HCC)- (present on admission)  Assessment & Plan  UDS pos amphetamine   Counseling once mental status improves    Pancytopenia (HCC)- (present on admission)  Assessment & Plan  In setting alcohol abuse  No concern of active bleeding  Hold DVT prophylaxis for now  Monitor H&H closely      Hyponatremia- (present on admission)  Assessment & Plan  Mild   Cont to monitor       VTE prophylaxis: SCDs/TEDs and Xarelto 10 mg  daily as prophylaxis    I have performed a physical exam and reviewed and updated ROS and Plan today (10/3/2022). In review of yesterday's note (10/2/2022), there are no changes except as documented above.

## 2022-10-03 NOTE — CARE PLAN
The patient is Stable - Low risk of patient condition declining or worsening    Shift Goals  Clinical Goals: safety  Patient Goals: pain control  Family Goals: NA    Progress made toward(s) clinical / shift goals:  Pt does not report anxiety at this time time, pt does not report SI at this time    Patient is not progressing towards the following goals: Pt shows no evidence of learning from education on disease process and psychiatric treatment, pt verbalizes that she has no intention to stop drinking and using drugs      Problem: Knowledge Deficit - Standard  Goal: Patient and family/care givers will demonstrate understanding of plan of care, disease process/condition, diagnostic tests and medications  Outcome: Not Progressing     Problem: Lifestyle Changes  Goal: Patient's ability to identify lifestyle changes and available resources to help reduce recurrence of condition will improve  Outcome: Not Progressing

## 2022-10-03 NOTE — PROGRESS NOTES
Assumed care of patient, received bedside report from NOC RN. Patient is A&O X 4. Pain 7/10, RUQ. Vital signs stable overnight, on RA. Patient is medical. POC discussed with patient and she verbalized understanding. Call light within reach and fall precautions in place. Bed locked and in lowest position. 1:1 sitter bedside and all safety precautions in place.

## 2022-10-03 NOTE — CARE PLAN
The patient is Stable - Low risk of patient condition declining or worsening    Shift Goals  Clinical Goals: CIWA, safety, pain management  Patient Goals: pain control  Family Goals: n/a    Progress made toward(s) clinical / shift goals:        Patient is not progressing towards the following goals:      Problem: Psychosocial  Goal: Patient's ability to identify and develop effective coping behaviors will improve  Outcome: Not Progressing   Pt on legal hold for SI. Pt with 1:1 sitter. Pt stated to psychiatry she wanted to leave AMA and buy drugs off the street since we won't give them to her. Pt unable to make safe decisions for self or understand consequences of actions. Continuing education on why patient needs psychiatric help and how to develop effective coping behaviors  Problem: Pain - Standard  Goal: Alleviation of pain or a reduction in pain to the patient’s comfort goal  Outcome: Not Progressing   Q4 pain assessments in place. Pt educated on pain scale. RN aware of pt's goal pain. PRN medication orders followed. Pt states pain medication does not work. Educated patient on pain medication and working with MD to find correct regimen.

## 2022-10-03 NOTE — DISCHARGE PLANNING
Alert Team Note:     Contacted by [facility], spoke to Jazmine. Sent legal hold extension, updated MAR, and ED notes as requested.

## 2022-10-03 NOTE — PROGRESS NOTES
Bedside report received from AM RN. 1-1 Sitter at bedside for SI, pt is on a legal hold. Patient A&O x 4 but confused in conversation and very fatigued. Complains of pain 8/10 in R abdomen at this time as well as a headache. POC discussed with patient. Patient verbalizes understanding. Call light and belongings within reach. Bed locked in lowest position, alarm and fall precautions in place.

## 2022-10-04 ENCOUNTER — PHARMACY VISIT (OUTPATIENT)
Dept: PHARMACY | Facility: MEDICAL CENTER | Age: 52
End: 2022-10-04
Payer: MEDICARE

## 2022-10-04 VITALS
SYSTOLIC BLOOD PRESSURE: 112 MMHG | DIASTOLIC BLOOD PRESSURE: 76 MMHG | RESPIRATION RATE: 17 BRPM | HEIGHT: 58 IN | TEMPERATURE: 98.8 F | OXYGEN SATURATION: 95 % | WEIGHT: 127.43 LBS | HEART RATE: 100 BPM | BODY MASS INDEX: 26.75 KG/M2

## 2022-10-04 PROBLEM — F10.931 DELIRIUM, WITHDRAWAL, ALCOHOLIC (HCC): Status: RESOLVED | Noted: 2022-09-26 | Resolved: 2022-10-04

## 2022-10-04 PROBLEM — K85.20 ALCOHOL-INDUCED ACUTE PANCREATITIS: Status: RESOLVED | Noted: 2022-09-26 | Resolved: 2022-10-04

## 2022-10-04 PROBLEM — R50.9 FEVER: Status: RESOLVED | Noted: 2022-09-28 | Resolved: 2022-10-04

## 2022-10-04 PROCEDURE — 99239 HOSP IP/OBS DSCHRG MGMT >30: CPT | Performed by: INTERNAL MEDICINE

## 2022-10-04 PROCEDURE — 700102 HCHG RX REV CODE 250 W/ 637 OVERRIDE(OP): Performed by: STUDENT IN AN ORGANIZED HEALTH CARE EDUCATION/TRAINING PROGRAM

## 2022-10-04 PROCEDURE — A9270 NON-COVERED ITEM OR SERVICE: HCPCS | Performed by: STUDENT IN AN ORGANIZED HEALTH CARE EDUCATION/TRAINING PROGRAM

## 2022-10-04 PROCEDURE — RXMED WILLOW AMBULATORY MEDICATION CHARGE: Performed by: INTERNAL MEDICINE

## 2022-10-04 RX ORDER — SERTRALINE HYDROCHLORIDE 25 MG/1
25 TABLET, FILM COATED ORAL DAILY
Qty: 14 TABLET | Refills: 0 | Status: SHIPPED | OUTPATIENT
Start: 2022-10-05 | End: 2023-01-23

## 2022-10-04 RX ORDER — AMLODIPINE BESYLATE 5 MG/1
5 TABLET ORAL DAILY
Qty: 14 TABLET | Refills: 0 | Status: SHIPPED | OUTPATIENT
Start: 2022-10-05 | End: 2023-01-23

## 2022-10-04 RX ORDER — HALOPERIDOL 5 MG/ML
2 INJECTION INTRAMUSCULAR EVERY 4 HOURS PRN
Status: DISCONTINUED | OUTPATIENT
Start: 2022-10-04 | End: 2022-10-04 | Stop reason: HOSPADM

## 2022-10-04 RX ORDER — OXYCODONE HYDROCHLORIDE 5 MG/1
5 TABLET ORAL EVERY 4 HOURS PRN
Qty: 12 TABLET | Refills: 0 | Status: SHIPPED | OUTPATIENT
Start: 2022-10-04 | End: 2022-10-07

## 2022-10-04 RX ORDER — AMLODIPINE BESYLATE 5 MG/1
5 TABLET ORAL DAILY
Qty: 30 TABLET | Refills: 0 | Status: SHIPPED | OUTPATIENT
Start: 2022-10-05 | End: 2022-10-04 | Stop reason: SDUPTHER

## 2022-10-04 RX ORDER — SERTRALINE HYDROCHLORIDE 25 MG/1
25 TABLET, FILM COATED ORAL DAILY
Qty: 30 TABLET | Refills: 11 | Status: SHIPPED | OUTPATIENT
Start: 2022-10-05 | End: 2022-10-04 | Stop reason: SDUPTHER

## 2022-10-04 RX ADMIN — SERTRALINE 25 MG: 50 TABLET, FILM COATED ORAL at 05:37

## 2022-10-04 RX ADMIN — AMLODIPINE BESYLATE 5 MG: 5 TABLET ORAL at 05:37

## 2022-10-04 RX ADMIN — DOCUSATE SODIUM 50 MG AND SENNOSIDES 8.6 MG 2 TABLET: 8.6; 5 TABLET, FILM COATED ORAL at 05:36

## 2022-10-04 RX ADMIN — OXYCODONE HYDROCHLORIDE 10 MG: 10 TABLET ORAL at 12:48

## 2022-10-04 ASSESSMENT — COGNITIVE AND FUNCTIONAL STATUS - GENERAL
SUGGESTED CMS G CODE MODIFIER MOBILITY: CK
DRESSING REGULAR LOWER BODY CLOTHING: A LITTLE
STANDING UP FROM CHAIR USING ARMS: A LITTLE
PERSONAL GROOMING: A LITTLE
SUGGESTED CMS G CODE MODIFIER DAILY ACTIVITY: CK
MOVING FROM LYING ON BACK TO SITTING ON SIDE OF FLAT BED: A LITTLE
CLIMB 3 TO 5 STEPS WITH RAILING: A LITTLE
MOVING TO AND FROM BED TO CHAIR: A LITTLE
MOBILITY SCORE: 19
HELP NEEDED FOR BATHING: A LITTLE
WALKING IN HOSPITAL ROOM: A LITTLE
TOILETING: A LITTLE
DRESSING REGULAR UPPER BODY CLOTHING: A LITTLE
DAILY ACTIVITIY SCORE: 19

## 2022-10-04 ASSESSMENT — PAIN DESCRIPTION - PAIN TYPE
TYPE: ACUTE PAIN

## 2022-10-04 NOTE — CONSULTS
[facility]  Crisis/Safety Plan       Date: 10/04/22       Warning signs that a crisis may be developing for me or I may be at risk:  1) Not interested in doing anything  2) Not leaving the house  3) Not baking     Coping strategies I can use on my own (relaxation, physical activity, etc):  1) Writing my book  2) Coloring   3) Gardening  4) Cleaning    Social settings that provide distraction:  1) Walking by the river     People I can contact for help:  1) Son Josemanuel     If I’m not able to reach my support people, or the above strategies don’t help, I can contact the following professionals, agencies, or hotlines:  1) National suicide hotline: 988  2) Crisis Call Center (24/7):  2-800-183-3310 -OR- (196) 489-6251  3) Crisis Text Line (24/7):  Text CARE TO 408501    Ways I can make my environment safe:  1) Remove all alcohol from my home  2) Dispose of unused medication, store extra medication in a hard to reach/locked location, only have access to small amounts of medications.       The thing that is most important to me and worth living for:  My family

## 2022-10-04 NOTE — DISCHARGE PLANNING
Alert Team Note:    Informed by Glenna MEYERS that pt has been taken off the legal hold. Miyram Lucero at [facility].

## 2022-10-04 NOTE — CARE PLAN
The patient is Stable - Low risk of patient condition declining or worsening    Shift Goals  Clinical Goals: 1-1 sitter, safety, legal hold  Patient Goals: Go home  Family Goals: NA    Progress made toward(s) clinical / shift goals:  Pt's CIWAs negative at this time, pt have 1-1 sitter, pt has PRN haldol ordered for agitation, all dangerous objects removed from room    Patient is not progressing towards the following goals: Pt attempted to exit room and leave this shift, pt is incredibly anxious and combative      Problem: Psychosocial  Goal: Patient's level of anxiety will decrease  Outcome: Not Progressing    Problem: Optimal Care for Alcohol Withdrawal  Goal: Optimal Care for the alcohol withdrawal patient  Outcome: Progressing

## 2022-10-04 NOTE — CONSULTS
"  [facility] PSYCHIATRIC FOLLOW-UP:(established)    DOS: 10/04/22     Reason for admission:  States she could take multiple Seroquel to just \"get it over with\". \"I am just done with feeling this way, I just don't want to do it any more\". Hx of alcohol and methamp use.  Legal Hold Status: on legal hold      ID/Chief Complaint:   Chief Complaint   Patient presents with    Headache     Base of the skull, for the last 4 days    Urinating Blood     For 2 days    Nausea     Last emesis 9/25    Suicidal Ideation     States she could take multiple Seroquel to just \"get it over with\". \"I am just done with feeling this way, I just don't want to do it any more\".             Chart Review: notes from appx 0130 today: \"This pt was attempting to leave the room and stated \"I want to go back to 303 and get in my bed with my dog Fluffy\". This RN attempted to redirect the pt with therapeutic communication and distraction. This RN called security to bedside because the pt was attempting to push this RN and exit the room. This RN was able to deescalate the situation and persuade the pt to return to bed and put her oxygen back on. This RN sat with the pt for an hour and provided therapeutic communication. At approximately 0110, the pt got into her bed and stated \"Go back to running hell, you're doing a great job,\" and proceeded to cover her face with her blankets.\"      S:  Seen today as f/u psych consult. Mood is \"sad\" - wants to go home, see her dog and roommate. Recalls statements of SI at admission, but maintains that she has consistently denied this since admission. \"I said that because I was upset, but I really don't feel that way anymore.\" Denies SI/HI, A/VH. Does not recall VH yesterday. Motivated to look for a job, get connected with mental health outpatient. She is agreeable to continuing psych medication. Also engaged in safety planning.     Feels her medications are helping, no side effects noted.       O: " "Medical ROS (as pertinent): No new changes reported to this writer.        PSYCHIATRIC EXAM (MSE):   Vitals:    10/03/22 1928   BP: (!) 142/91   Pulse: (!) 104   Resp: 17   Temp: 37.1 °C (98.8 °F)   SpO2: 98%      Weight: 60.1kg on 10/2/22      Constitutional: as noted above  General Appearance/Behavior: Good ec, engaged, friendly  Abnormal Movements: none - no PMA/PMR or tremor observed.  Gait and Posture: gait not observed. lying comfortably in bed.  Musculoskeletal: as noted above  Mood: \"sad\"  Affect: Mood/Congruent and Appropriate   Speech: normal rate/rhythm/tone/volume/fluency  Language:  spontaneous, comprehends spoken commands, fluent.  Thought Process: linear, logical, goal directed, future oriented  Thought Content: Denies SI/HI. Denies A/VH, no e/o delusions, PI, or internal preoccupation.   Insight/Judgement:  intact - improved today  Alert/Orientation: alert, oriented to person, place and time  Attn/Concentration: normal  Fund of Knowledge: Average  Memory recent/remote: No gross evidence of memory deficits  MMSE: deferred this visit            Meds Current:  Scheduled Medications   Medication Dose Frequency    rivaroxaban  10 mg DAILY AT 1800    traZODone  100 mg QHS    sertraline  25 mg DAILY    amLODIPine  5 mg Q DAY    senna-docusate  2 Tablet BID     Allergies:   Allergies   Allergen Reactions    Bactrim Hives    Lamotrigine [Lamictal] Hives    Quetiapine Fumarate Hives     Extrapyramidal Symptoms    Keflex Hives           *ASSESSMENT:   1. Alcoholic intoxication without complication (HCC)    2. Alcohol withdrawal syndrome without complication (HCC)       Pt appears to be becoming encephalopathic - discussed importance of normalizing sleep wake cycle (stimulation and light during the day, promoting restful sleep at night, etc) with RN   1 Alcohol use disorder severe: with withdrawal  2. Methamphetamine use disorder  3. Depressive disorder unspc  4  Anxiety disorder unspc.     Medical:   -alcohol " induced pancreatis   -acute on chronic respiratory failure with hypoxia: on O2 at home  -thrombocytopenia and anemia      Pt with significant improvement in mentation and engagement. She is future oriented, goal directed on mental health follow up and trying to get a job. While patient does have chronic risks given  race, advanced age (>45), mental health diagnoses (with 2 prior suicide attempts per chart), and substance use, she is not assessed to be an acute danger to self or others at this time. Reassuring is that patient denied current life- threatening ideation, is help-seeking, future-oriented, endorses social supports, intact reality testing, adaptive problem-solving skills. Risk has been further mitigated by adjustment to medication, resources for outpatient mental health, coping/grounding skills discussion via MI and supportive therapy, and safety planning.       *RECOMMENDATIONS:  Legal Status: discontinued by this writer @1046 on 10/4/22    Observation status:   -none    Visitors: yes family  Personal belongings: yes phone    Discussed/voalted: Dr. Holder and RN    Medication Recommendations: Final orders as per Treatment Team  Safety planning - see note to follow  Coping/grounding skills handouts  Please provide 2wk med refills for this pt (hx of OD would limit rx)  Medication reconciliation was completed.  Reviewed safety plan: 911, ER, PCM, MHC, suicide crisis line, nursing staff while inpatient.    Will Continue to Follow. Thank you for the consult.           Discharge recommendations:   -Please assist with outpatient Psychiatric/substance use follow up appointments at discharge once medically cleared.

## 2022-10-04 NOTE — FACE TO FACE
"Face to Face Note  -  Durable Medical Equipment    Chelsea Holder M.D. - NPI: [id number]  I certify that this patient is under my care and that they had a durable medical equipment(DME)face to face encounter by myself that meets the physician DME face-to-face encounter requirements with this patient on:    Date of encounter:   Patient:                    MRN:                       YOB: 2024  Olya Youssef  [id number]  1970     The encounter with the patient was in whole, or in part, for the following medical condition, which is the primary reason for durable medical equipment:  COPD    I certify that, based on my findings, the following durable medical equipment is medically necessary:    Oxygen   HOME O2 Saturation Measurements:(Values must be present for Home Oxygen orders)  Room air sat at rest: 87  Room air sat with amb: 85  With liters of O2: 2, O2 sat at rest with O2: 96  With Liters of O2: 2, O2 sat with amb with O2 : 91  Is the patient mobile?: Yes  If patient feels more short of breath, they can go up to 6 liters per minute and contact healthcare provider.    Supporting Symptoms: The patient requires supplemental oxygen, as the following interventions have been tried with limited or no improvement: \"Incentive spirometry.    My Clinical findings support the need for the above equipment due to:  Hypoxia  "

## 2022-10-04 NOTE — PROGRESS NOTES
"Bedside report received from AM RN. Patient A&O x 4 but hallucinating at this time. Pt currently on legal hold for SI. Pt stated \"Where is my roommate's dog 'Fluffy'? Will you please go look for him outside?\". Pt has been reoriented but is still tearful and upset. 1-1 sitter at bedside Complains of pain 7/10 in right flank. POC discussed with patient. Call light and belongings within reach. Bed locked in lowest position, alarm and fall precautions in place.    "

## 2022-10-04 NOTE — DISCHARGE PLANNING
Case Management Discharge Planning    Admission Date: 9/26/2022  GMLOS: 4.9  ALOS: 8    6-Clicks ADL Score: 15  6-Clicks Mobility Score: 14  PT and/or OT Eval ordered: No  Post-acute Referrals Ordered: No  Post-acute Choice Obtained: No  Has referral(s) been sent to post-acute provider:  No      Anticipated Discharge Dispo: Discharge Disposition: Discharged to home/self care (01)    DME Needed: Yes    DME Ordered: No    Action(s) Taken: LSW met with pt at bedside who reported she is on 2L O2 at baseline supplied by [facility]. Pt will need a tank to get home with as well as transportation. Per FARHAN Davis, [facility] is asking for updated O2 orders before delivering a tank. MD and RN notified.    Escalations Completed: None    Medically Clear: Yes    Next Steps: Care coordination will f/u with DME O2 pending orders    Barriers to Discharge: Oxygen Delivery    Is the patient up for discharge tomorrow: No-anticipated to DC today

## 2022-10-04 NOTE — PROGRESS NOTES
Assumed care of patient, received bedside report from NOC RN. Patient is A&O X 3, disoriented to place. Pain 6/10. Vital signs stable overnight, on 2L NC. Patient is medical. Call light within reach and fall precautions in place. Bed locked and in lowest position. 1:1 sitter at bedside and all safety precautions in place.

## 2022-10-04 NOTE — CONSULTS
Full note to follow. In short, pt no longer meeting criteria for a hold. She is future oriented, engaged in treatment planning, has created a safety plan, agrees to outpt psych follow up. There is concern for confusion/hallucinations at nighttime, however this is not thought to be in the context of a primary psychotic disorder.     Hold dropped by this writer @0718 on 10/4/22

## 2022-10-04 NOTE — PROGRESS NOTES
"This RN was called to bedside at approximately 0010 by the pt's sitter. This pt was attempting to leave the room and stated \"I want to go back to 303 and get in my bed with my dog Fluffy\". This RN attempted to redirect the pt with therapeutic communication and distraction. This RN called security to bedside because the pt was attempting to push this RN and exit the room. This RN was able to deescalate the situation and persuade the pt to return to bed and put her oxygen back on. This RN sat with the pt for an hour and provided therapeutic communication. At approximately 0110, the pt got into her bed and stated \"Go back to running hell, you're doing a great job,\" and proceeded to cover her face with her blankets. Pt currently has 2 L of O2 on via NC and water at bedside. Sitter is at bedside at this time. Charge RN aware of situation. On call provider notified.  "

## 2022-10-04 NOTE — DISCHARGE PLANNING
Agency/Facility Name: [facility]   Spoke To: Samantha  Outcome: DPA confirmed Pt is on service but updated DME order is needed as Pt has not had updated order for over a year.    SW Notified    1302  Received Choice form at 1154  Agency/Facility Name: [facility]   Referral sent per Choice form @ 1303    1353  Agency/Facility Name: [facility]  Spoke To: Korin  Outcome: DPA notified order needs to be updated to show smaller range of LPM for O2 needs, 1-4 LPM is too broad of range for order. DME company needs updated order then they will submit for insurance auth.    STEVEN Notified    1447  Agency/Facility Name: [facility]   Spoke To: Samantha   Outcome:  DPA sent updated order to DME [employer]. Confirmed Pt will received O2 tank today, but cannot receive any more until insurance auth goes through.     STEVEN Notified     1515  Agency/Facility Name: [facility]  Spoke To: Samantha  Outcome: DPA confirmed order has been received, they do not have an ETA for bedside delivery yet. DPA requested call back with bedside delivery time    1543  Agency/Facility Name: [facility]   Spoke To: Korin  Outcome: DME company confirmed O2 delivery bedside before 1700.     STEVEN Notified

## 2022-10-04 NOTE — DISCHARGE PLANNING
Alert Team Note:     Contacted [facility], spoke to Jazmine. Updated information has been received and reviewed. Pt is on the wait list. No beds available at this time due to staffing issues.

## 2022-10-04 NOTE — CARE PLAN
The patient is Stable - Low risk of patient condition declining or worsening    Shift Goals  Clinical Goals: Monitor safety & neuro status  Patient Goals: Leave  Family Goals: NA    Progress made toward(s) clinical / shift goals:      Problem: Fall Risk  Goal: Patient will remain free from falls  Outcome: Progressing  Note: All fall precautions in place and patient educated to use the call light before ambulation. 1:1 sitter in place.       Patient is not progressing towards the following goals:      Problem: Knowledge Deficit - Standard  Goal: Patient and family/care givers will demonstrate understanding of plan of care, disease process/condition, diagnostic tests and medications  Outcome: Not Progressing  Note: Patient is A&O X 3 but not does understand her POC or disease process

## 2022-10-04 NOTE — DISCHARGE PLANNING
Legal Hold    Referral: Legal Hold Court     Intervention: Pt presented for legal hold meeting with  via video conferencing.  advised pt will meet with court MD's via telemedicine monitor to contest the legal hold.      Plan: Pt will present to telemedicine mental health to meet with court physicians 10/5. Will call bedside RN once time has been determined.

## 2022-10-05 NOTE — PROGRESS NOTES
Patient discharged home. Patients IV and tele box removed. Meds to beds and O2 were delivered. Patient has all discharge paperwork and belongings. Patient wheeled downstairs by CNA and is taking a cab home. Has taxi voucher with her.

## 2022-10-05 NOTE — DISCHARGE SUMMARY
"Discharge Summary    CHIEF COMPLAINT ON ADMISSION  Chief Complaint   Patient presents with    Headache     Base of the skull, for the last 4 days    Urinating Blood     For 2 days    Nausea     Last emesis 9/25    Suicidal Ideation     States she could take multiple Seroquel to just \"get it over with\". \"I am just done with feeling this way, I just don't want to do it any more\".       Reason for Admission  Headache     Admission Date  9/26/2022    CODE STATUS  Full Code    HPI & HOSPITAL COURSE    52 y.o. female with past medical history of alcohol abuse, alcohol-related seizure, methamphetamine abuse, bipolar disorder , chronically on 2 L oxygen who presented 9/26/2022 with presented with generalized tremor associated with frontal headache, abdominal pain, nausea and vomiting for the past 4 days.  She also reports of shortness of breath. Reported SI at ED.   Labs noted with pancytopenia, low bicarbonate glucose 106, elevated AST, lipase 163.  CT head negative for acute stroke/hemorrhage.    She initially was on CIWA protocol and underwent uncomplicated etoh WD.  RUQ US showed echogenic liver.  She had CT AP which was negative for acute findings. She was started on IVF and pain medications.  On day of discharge she was tolerating PO and pain controlled on oral medications.  She was evaluated by psych, initially in legal hold which was discontinued on day of discharge.  She was given a safety plan and has follow up in 2 weeks, psych recommended 2 week supply of medications.  She was given short course of pain medication x 3 days.      Therefore, she is discharged in fair and stable condition to home with close outpatient follow-up.    The patient met 2-midnight criteria for an inpatient stay at the time of discharge.    Discharge Date  10/4/2022    FOLLOW UP ITEMS POST DISCHARGE  PCP follow up, monitor Na and resp function (given home oxygen)  Psych follow up    DISCHARGE DIAGNOSES  Principal Problem (Resolved):    " Alcohol-induced acute pancreatitis POA: Unknown  Active Problems:    Hyponatremia POA: Yes    Pancytopenia (HCC) POA: Yes    Alcohol dependence with withdrawal (HCC) POA: Yes    Methamphetamine abuse (HCC) POA: Yes    Acute on chronic respiratory failure with hypoxia on home O2 therapy POA: Yes    Suicidal behavior POA: Unknown  Resolved Problems:    Delirium, withdrawal, alcoholic (HCC) POA: Yes    Fever POA: Unknown      FOLLOW UP  No future appointments.  Margy Aparicio M.D.  [address] [facility] [address]  [phone]    Schedule an appointment as soon as possible for a visit in 1 week(s)        MEDICATIONS ON DISCHARGE     Medication List        START taking these medications        Instructions   amLODIPine 5 MG Tabs  Start taking on: October 5, 2022  Commonly known as: NORVASC   Take 1 Tablet by mouth every day.  Dose: 5 mg     oxyCODONE immediate-release 5 MG Tabs  Commonly known as: ROXICODONE  Notes to patient: Had a dose at 1250. Can take another dose at 4pm today. You can take these every 4 hours as needed.   Take 1 Tablet by mouth every four hours as needed for Severe Pain for up to 3 days.  Dose: 5 mg     sertraline 25 MG tablet  Start taking on: October 5, 2022  Commonly known as: Zoloft   Take 1 Tablet by mouth every day.  Dose: 25 mg              Allergies  Allergies   Allergen Reactions    Bactrim Hives    Lamotrigine [Lamictal] Hives     Tolerated Fioricet 10/3/22    Quetiapine Fumarate Hives     Extrapyramidal Symptoms    Keflex Hives       DIET  Orders Placed This Encounter   Procedures    Diet Order Diet: Regular; Tray Modifications (optional): SLP - 1:1 Supervision by Nursing, No Sharps (Paperware)     Standing Status:   Standing     Number of Occurrences:   1     Order Specific Question:   Diet:     Answer:   Regular [1]     Order Specific Question:   Tray Modifications (optional)     Answer:   SLP - 1:1 Supervision by Nursing     Order Specific Question:   Tray  Modifications (optional)     Answer:   No Sharps (Paperware)       ACTIVITY  As tolerated.  Weight bearing as tolerated    CONSULTATIONS  Psychiatry     PROCEDURES  None    Discharge exam:  Physical Exam  Constitutional:       General: She is not in acute distress.     Appearance: She is not toxic-appearing or diaphoretic.   HENT:      Head: Normocephalic and atraumatic.      Nose: Nose normal.      Mouth/Throat:      Mouth: Mucous membranes are moist.   Eyes:      General: No scleral icterus.     Conjunctiva/sclera: Conjunctivae normal.   Cardiovascular:      Rate and Rhythm: Regular rhythm. Tachycardia present.      Heart sounds: No murmur heard.    No friction rub. No gallop.   Pulmonary:      Effort: Pulmonary effort is normal.      Breath sounds: Normal breath sounds.   Abdominal:      General: Bowel sounds are normal. There is no distension.      Palpations: Abdomen is soft.      Tenderness: There is abdominal tenderness in the right upper quadrant. There is no guarding or rebound.   Musculoskeletal:      Cervical back: Normal range of motion.      Right lower leg: No edema.      Left lower leg: No edema.   Skin:     Coloration: Skin is not jaundiced.      Findings: No rash.   Neurological:      Mental Status: She is alert and oriented to person, place, and time. Mental status is at baseline.      Gait: Gait normal.   Psychiatric:         Mood and Affect: Mood normal.         Behavior: Behavior normal.         LABORATORY  Lab Results   Component Value Date    SODIUM 131 (L) 10/02/2022    POTASSIUM 3.9 10/02/2022    CHLORIDE 94 (L) 10/02/2022    CO2 26 10/02/2022    GLUCOSE 115 (H) 10/02/2022    BUN 8 10/02/2022    CREATININE 0.35 (L) 10/02/2022    CREATININE 1.0 02/06/2009        Lab Results   Component Value Date    WBC 5.1 10/02/2022    HEMOGLOBIN 9.6 (L) 10/02/2022    HEMATOCRIT 32.0 (L) 10/02/2022    PLATELETCT 136 (L) 10/02/2022      Recent Labs     09/28/22  0504 09/28/22  1114 10/02/22  1013   ASTSGOT  84*  --  68*   ALTSGPT 26  --  22   TBILIRUBIN 0.9  --  0.7   GLOBULIN 3.5  --  3.6*   INR  --  1.11  --      CT-ABDOMEN-PELVIS WITH  Narrative:   9/29/2022 10:52 PM    HISTORY/REASON FOR EXAM:  Abdominal distension; Abdominal pain, acute, nonlocalized.    TECHNIQUE/EXAM DESCRIPTION:   CT scan of the abdomen and pelvis with contrast.    Contrast-enhanced helical scanning was obtained from the diaphragmatic domes through the pubic symphysis following the bolus administration of nonionic contrast without complication.    100 mL of Omnipaque 350 nonionic contrast was administered without complication.    Low dose optimization technique was utilized for this CT exam including automated exposure control and adjustment of the mA and/or kV according to patient size.    COMPARISON: January 14, 2021    FINDINGS:    Lower Chest: Linear densities in the bilateral lung bases are seen, greater on the left.    Liver: Normal.    Spleen: Unremarkable.    Pancreas: Unremarkable.    Gallbladder: No calcified stones.    Biliary: Nondilated.    Adrenal glands: Normal.    Kidneys: Unremarkable without hydronephrosis.    Bowel: No obstruction or acute inflammation. Scattered colonic diverticula are seen. The appendix appears within normal limits.    Lymph nodes: No adenopathy.    Vasculature: Unremarkable.    Peritoneum: Unremarkable without ascites.    Musculoskeletal: No acute or destructive process.    Pelvis: No adenopathy or free fluid. Uterus and adnexal structures appear within physiologic limits.  Impression: 1.  Diverticulosis  2.  Linear densities the bilateral lung bases, greater on the left, appearance favors changes of atelectasis      Total time of the discharge process exceeds 32 minutes.

## 2022-10-10 ENCOUNTER — OFFICE VISIT (OUTPATIENT)
Dept: INTERNAL MEDICINE | Facility: OTHER | Age: 52
End: 2022-10-10
Payer: COMMERCIAL

## 2022-10-10 VITALS
SYSTOLIC BLOOD PRESSURE: 115 MMHG | OXYGEN SATURATION: 98 % | DIASTOLIC BLOOD PRESSURE: 73 MMHG | BODY MASS INDEX: 26.66 KG/M2 | HEIGHT: 58 IN | TEMPERATURE: 97.8 F | WEIGHT: 127 LBS | HEART RATE: 93 BPM

## 2022-10-10 DIAGNOSIS — F10.232 ALCOHOL DEPENDENCE WITH WITHDRAWAL WITH PERCEPTUAL DISTURBANCE (HCC): ICD-10-CM

## 2022-10-10 DIAGNOSIS — F15.10 METHAMPHETAMINE ABUSE (HCC): ICD-10-CM

## 2022-10-10 DIAGNOSIS — F33.2 SEVERE EPISODE OF RECURRENT MAJOR DEPRESSIVE DISORDER, WITHOUT PSYCHOTIC FEATURES (HCC): ICD-10-CM

## 2022-10-10 DIAGNOSIS — F10.231 ALCOHOL DEPENDENCE WITH WITHDRAWAL DELIRIUM (HCC): ICD-10-CM

## 2022-10-10 PROCEDURE — 99214 OFFICE O/P EST MOD 30 MIN: CPT | Mod: GC

## 2022-10-10 RX ORDER — M-VIT,TX,IRON,MINS/CALC/FOLIC 27MG-0.4MG
1 TABLET ORAL DAILY
Qty: 30 TABLET | Refills: 11 | Status: SHIPPED | OUTPATIENT
Start: 2022-10-10 | End: 2023-01-23

## 2022-10-10 RX ORDER — NALTREXONE HYDROCHLORIDE 50 MG/1
50 TABLET, FILM COATED ORAL DAILY
Qty: 30 TABLET | Refills: 0 | Status: SHIPPED | OUTPATIENT
Start: 2022-10-10 | End: 2023-01-23

## 2022-10-10 RX ORDER — OXYCODONE HYDROCHLORIDE AND ACETAMINOPHEN 5; 325 MG/1; MG/1
1 TABLET ORAL DAILY
COMMUNITY
End: 2022-10-10

## 2022-10-10 ASSESSMENT — FIBROSIS 4 INDEX: FIB4 SCORE: 5.76

## 2022-10-10 NOTE — PROGRESS NOTES
Date of Service:  10/10/2022    CC: Follow-up of hospitalization    HPI:  Olya Youssef  is a 52 y.o. female with a PMH of  alcohol abuse, alcohol related seizures here to address follow up after being discharged from hospital.   Patient was admitted on  with abdominal pain, frontal headaches and nausea and vomiting. Patient was placed on CIWA protocol and at one point was endorsing suicidal ideation.   Since her hospitalization, patient Continues to have some pain, has been taking zoloft as prescribed. She also has been taking her amlodipine. She has been taking tylenol and oxycodone for the pain. She is scheduled to see the psychiatrist tomorrow. Patient is usually chronically on oxygen all the time. Her intital sPO2 was 98%   Today patient has had a half-pint of alcohol to drink. She also notes that without alcohol she begins to have with drawls including shaking. She has been to many treatment programs in the past. As soon as she is discharged she gets tremors and sweats. She feels as if she can't keep a sentence together or her thoughts as well. She feels as if alcohol improves this.   In the past abstained for two years, Has gone to AA and used to  AA meetings. At this time she feels as if AA didn't feel like it was doing anything. Her motivation to staying sober is that she loves her children and wants to be around for them.   Last methamphetamine use was today. She feels as if her mood is up and down all the time. She states that at some points has been diagnosed with bipolar, borderline personality disorder, severe anxiety disorder and PTSD. Lately she feels angry all the time. She feels as if even the slightest thing bothers her.   Patient has had two almost successful suicide attempts where she claims she . And five other additional attempts. She currently feels as if SI is selfish and does not want to harm her children by harming herself. She denies any SI, self harm or HI.   She  states that she has not been eating much recently and has been mainly eating ice cream and rarely eating a turkey sandwhich.   In the past she did [place] [facility] [place].     Review of systems:    Constitutional:  Positive for chills, fever and weight loss (Size 12 to size 7).   Eyes:  Positive for blurred vision. Negative for double vision, photophobia and pain.        Wears glasses to see   Gastrointestinal:  Positive for abdominal pain, upper right quadrant. Negative for nausea and vomiting.        Incontinence, can't make it   -dark stool, solid    Genitourinary:         Incontinence, unable to stop   Neurological:  Positive for tremors and headaches (Every day).   Psychiatric/Behavioral:  Positive for depression and substance abuse. Negative for hallucinations and suicidal ideas. The patient is nervous/anxious. The patient does not have insomnia (With medication).         Past Medical History:  Patient Active Problem List    Diagnosis Date Noted    Suicidal behavior 09/27/2022    Acute on chronic respiratory failure with hypoxia on home O2 therapy 05/28/2021    Vitamin D deficiency 05/25/2021    Acute alcoholic hepatitis 05/24/2021    Leukocytopenia 04/18/2021    Macrocytic anemia 04/18/2021    Methamphetamine abuse (HCC) 05/29/2018    GI bleed 04/26/2015    Alcoholic hepatitis 08/15/2013    Alcohol withdrawal delirium (HCC) 08/08/2013    Pancreatitis, alcoholic, acute 08/07/2013    Depression 07/24/2013    Alcohol dependence with withdrawal, unspecified (HCC) 07/22/2013    Bipolar affective disorder (HCC) 07/22/2013    Overdose of antidepressant 03/29/2013    Overdose 03/25/2013    Obtundation 03/25/2013    Alcohol withdrawal seizure (HCC) 05/16/2012    Inflammatory liver disease 11/06/2011    Hematemesis 11/06/2011    Hepatitis 11/06/2011    Thrombocytopenia (HCC) 08/19/2011    Pancytopenia (HCC) 08/17/2011    Hypokalemia 08/16/2011    Hyponatremia 08/16/2011    Metabolic  acidosis 08/16/2011    Seizure (HCC) 07/15/2011    Lower urinary tract infectious disease 07/15/2011       Past Surgical History:    has a past surgical history that includes gastroscopy (4/28/2015).    Medications:  Current Outpatient Medications   Medication Sig Dispense Refill    therapeutic multivitamin-minerals (THERAGRAN-M) Tab Take 1 Tablet by mouth every day. 30 Tablet 11    naltrexone (DEPADE) 50 MG Tab Take 1 Tablet by mouth every day. 30 Tablet 0    amLODIPine (NORVASC) 5 MG Tab Take 1 Tablet by mouth every day. 14 Tablet 0    sertraline (ZOLOFT) 25 MG tablet Take 1 Tablet by mouth every day. 14 Tablet 0     No current facility-administered medications for this visit.       Allergies:  Allergies   Allergen Reactions    Bactrim Hives    Lamotrigine [Lamictal] Hives     Tolerated Fioricet 10/3/22    Quetiapine Fumarate Hives     Extrapyramidal Symptoms    Keflex Hives       Family History:   family history includes Cancer in her mother; Lung Disease in her mother.     Social History:    Social History     Tobacco Use    Smoking status: Never    Smokeless tobacco: Never   Vaping Use    Vaping Use: Never used   Substance Use Topics    Alcohol use: Yes     Comment: Hx heavy drinking apint in a 24 hr period    Drug use: Yes     Comment: meth/THC       Activity Level: minimal  Living situation:  Lives with roommate  Recent travel:  none  Other (stressors, spirituality, exposures):  none    Physical Exam:  Vitals:    10/10/22 1614   BP: 115/73   Pulse: 93   Temp: 36.6 °C (97.8 °F)   SpO2: 98%     Body mass index is 26.54 kg/m².  Physical Exam  Constitutional:       Appearance: Normal appearance.      Comments: Slurred speech, appears mildly intoxicated   HENT:      Head: Normocephalic and atraumatic.      Mouth/Throat:      Mouth: Mucous membranes are moist.      Pharynx: Oropharynx is clear.      Comments: Poor dentition  Eyes:      General: No scleral icterus.        Right eye: No discharge.         Left eye:  No discharge.      Extraocular Movements: Extraocular movements intact.      Pupils: Pupils are equal, round, and reactive to light.   Cardiovascular:      Rate and Rhythm: Normal rate and regular rhythm.      Pulses: Normal pulses.      Heart sounds: Normal heart sounds. No murmur heard.    No friction rub. No gallop.   Pulmonary:      Effort: Pulmonary effort is normal. No respiratory distress.      Breath sounds: No stridor. No wheezing or rales.   Abdominal:      General: Abdomen is flat. There is no distension.      Palpations: There is no mass.      Tenderness: There is abdominal tenderness (Epigastric). There is no rebound.      Hernia: No hernia is present.   Musculoskeletal:         General: Normal range of motion.      Right lower leg: No edema.      Left lower leg: No edema.   Skin:     General: Skin is warm.      Coloration: Skin is not jaundiced or pale.   Neurological:      Mental Status: She is alert and oriented to person, place, and time.      Motor: Motor function is intact. No tremor or seizure activity.      Comments: Finger to nose intact.    Psychiatric:         Attention and Perception: She is attentive. She does not perceive auditory or visual hallucinations.         Mood and Affect: Mood is anxious.         Speech: Speech is slurred. Speech is not rapid and pressured.         Behavior: Behavior is cooperative.         Thought Content: Thought content does not include homicidal or suicidal ideation. Thought content does not include suicidal plan.      Comments: Patient is distraught at times. Tangential thinking.         Labs:  Hemoglobin: 9.6, HCT: 32  Na: 131, Cl: 94, Glucose 115, AST 68, Alk Phos 145    Imaging:  no    Assessment/Plan:  Alcohol dependence with withdrawal, unspecified (HCC)  Patient was admitted on 9/26 for nausea, vomiting and abdominal pain. She then underwent withdrawal. After being 10 days sober in the hospital, on day of discharge patient began using again. Discussed  alcohol use. Gave her information about resources in the community.     Plan:  -Psychiatric referral  -Start taking multivitamins and thiamine  -Vitamin B lab  -Stop oxycodone for 24 hours  -Naltrexone 50 mg, daily-begin decreasing alcohol use by one drink, every 1-2 days with goal of abstainence  -Follow up next week    Depression  Continued depression and anxiety. Patient has appointment with psychiatry tomorrow. Will have patient follow up with them. Encouraged continued use of Zoloft at prescribed dose. Denies SI or HI.     Methamphetamine abuse (HCC)  Has continued to smoke methamphetamines. Encouraged AA vs. NA. Patient will follow up next week for further evaluation.            All imaging results and lab results and consult notes are reviewed at this visit.  Followup: Return in about 1 week (around 10/17/2022).    Kim Garcia MD  Internal Medicine PGY-2

## 2022-10-11 PROBLEM — F10.939 ALCOHOL WITHDRAWAL (HCC): Status: RESOLVED | Noted: 2022-04-24 | Resolved: 2022-10-11

## 2022-10-11 NOTE — PATIENT INSTRUCTIONS
Start a multivitamin and thiamine, while drinking  Follow up with Psychiatry   Stop the oxycodone, start naltrexone 50 mg, daily

## 2022-10-11 NOTE — PROGRESS NOTES
Teaching Physician Attestation      Level of Participation    I discussed with the resident physician the patient's history, exam, assessment and plan in detail.  Topics listed in my addendum were the focus of the visit.  Healthcare maintenance was not addressed this visit unless listed as a topic in my addendum.  I agree with plan as written along with the following additions/modifications:      Hospital f/u for alcohol withdrawal  -no psych alarm sx.  Likely has at minimum substance induced mood disorder  -I sat personally at length with the patient to reinforce the counseling by Dr. Garcia, patient reports that she is interested in stopping alcohol and substance use, and was able to successfully do this in the past via an intensive inpatient program.  She also notes significant psychosocial stressors and caring for a sick friend, and wishes to develop coping skills for dealing with stress.    Plan  -Recommended intensive inpatient treatment since this was successful for her before.  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  Begin multivitamin and thiamine.  In the interim, begin naltrexone 50 mg daily, AST only mildly elevated recently and ALT was nl. stop current opiates she is on for abdominal pain as is improving do not begin naltrexone until the opiate has washed completely out of her system at least 24 hours after the last dose (short acting).  Begin gradually tapering down by approximately 1 drink every 1 to 2 days while she is awaiting establishment with intensive outpatient or inpatient treatment, appreciate support.  Patient was very appreciative and interested in sobriety, also patient has follow-up with psychiatry tomorrow for mood support, appreciate support, no SI/HI, defer to psych for further management, no signs of nasim on exam.  Given  mild hyponatremia (could be 2/2 to poor PO due to alcohol use or SIADH due to pain during withdrawal or other dtopics), moderate stable normocytic anemia, and mild tcp (likely 2/2 alcohol), mild transaminitis 2/2 alcohol, will repeat cbc and cmp, also checking iron studies to begin workup.  Follow-up within 1 week.

## 2022-10-12 DIAGNOSIS — D53.9 MACROCYTIC ANEMIA: ICD-10-CM

## 2022-10-12 DIAGNOSIS — K75.9 HEPATITIS: ICD-10-CM

## 2022-10-12 DIAGNOSIS — F10.931 ALCOHOL WITHDRAWAL DELIRIUM (HCC): ICD-10-CM

## 2022-10-12 RX ORDER — LANOLIN ALCOHOL/MO/W.PET/CERES
100 CREAM (GRAM) TOPICAL DAILY
Qty: 30 TABLET | Refills: 3 | Status: SHIPPED | OUTPATIENT
Start: 2022-10-12 | End: 2023-01-23

## 2022-10-12 NOTE — ASSESSMENT & PLAN NOTE
Continued depression and anxiety. Patient has appointment with psychiatry tomorrow. Will have patient follow up with them. Encouraged continued use of Zoloft at prescribed dose. Denies SI or HI.

## 2022-10-12 NOTE — ASSESSMENT & PLAN NOTE
Patient was admitted on 9/26 for nausea, vomiting and abdominal pain. She then underwent withdrawal. After being 10 days sober in the hospital, on day of discharge patient began using again. Discussed alcohol use. Gave her information about resources in the community.     Plan:  -Psychiatric referral  -Start taking multivitamins and thiamine  -Vitamin B lab  -Stop oxycodone for 24 hours  -Naltrexone 50 mg, daily-begin decreasing alcohol use by one drink, every 1-2 days with goal of abstainence  -Follow up next week

## 2022-10-12 NOTE — ASSESSMENT & PLAN NOTE
Has continued to smoke methamphetamines. Encouraged AA vs. NA. Patient will follow up next week for further evaluation.

## 2022-12-19 ENCOUNTER — TELEPHONE (OUTPATIENT)
Dept: INTERNAL MEDICINE | Facility: OTHER | Age: 52
End: 2022-12-19
Payer: COMMERCIAL

## 2022-12-19 NOTE — TELEPHONE ENCOUNTER
Spoke to Dr Smith patient has an infected lesion ear and neck times 5 days. Patient referral to [facility] for possible infected lesion. Patient agree will follow with Resident clinic next week.

## 2023-01-23 ENCOUNTER — APPOINTMENT (OUTPATIENT)
Dept: RADIOLOGY | Facility: MEDICAL CENTER | Age: 53
DRG: 896 | End: 2023-01-23
Attending: EMERGENCY MEDICINE
Payer: COMMERCIAL

## 2023-01-23 ENCOUNTER — HOSPITAL ENCOUNTER (INPATIENT)
Facility: MEDICAL CENTER | Age: 53
LOS: 6 days | DRG: 896 | End: 2023-01-29
Attending: EMERGENCY MEDICINE | Admitting: STUDENT IN AN ORGANIZED HEALTH CARE EDUCATION/TRAINING PROGRAM
Payer: COMMERCIAL

## 2023-01-23 ENCOUNTER — APPOINTMENT (OUTPATIENT)
Dept: RADIOLOGY | Facility: MEDICAL CENTER | Age: 53
DRG: 896 | End: 2023-01-23
Attending: STUDENT IN AN ORGANIZED HEALTH CARE EDUCATION/TRAINING PROGRAM
Payer: COMMERCIAL

## 2023-01-23 DIAGNOSIS — F10.930 ALCOHOL WITHDRAWAL SYNDROME WITHOUT COMPLICATION (HCC): ICD-10-CM

## 2023-01-23 DIAGNOSIS — R94.31 LONG QT INTERVAL: ICD-10-CM

## 2023-01-23 DIAGNOSIS — R50.9 FEVER, UNSPECIFIED FEVER CAUSE: ICD-10-CM

## 2023-01-23 DIAGNOSIS — J96.21 ACUTE ON CHRONIC RESPIRATORY FAILURE WITH HYPOXIA (HCC): ICD-10-CM

## 2023-01-23 DIAGNOSIS — R51.9 NONINTRACTABLE HEADACHE, UNSPECIFIED CHRONICITY PATTERN, UNSPECIFIED HEADACHE TYPE: ICD-10-CM

## 2023-01-23 DIAGNOSIS — M79.10 MYALGIA: ICD-10-CM

## 2023-01-23 DIAGNOSIS — R11.2 NAUSEA AND VOMITING, UNSPECIFIED VOMITING TYPE: ICD-10-CM

## 2023-01-23 PROBLEM — F10.931 ALCOHOL WITHDRAWAL DELIRIUM, ACUTE, HYPERACTIVE (HCC): Status: ACTIVE | Noted: 2023-01-23

## 2023-01-23 PROBLEM — J96.20 ACUTE ON CHRONIC RESPIRATORY FAILURE (HCC): Status: ACTIVE | Noted: 2021-05-28

## 2023-01-23 PROBLEM — R74.8 ELEVATED LIPASE: Status: ACTIVE | Noted: 2023-01-23

## 2023-01-23 LAB
ALBUMIN SERPL BCP-MCNC: 4.3 G/DL (ref 3.2–4.9)
ALBUMIN/GLOB SERPL: 1 G/DL
ALP SERPL-CCNC: 146 U/L (ref 30–99)
ALT SERPL-CCNC: 32 U/L (ref 2–50)
ANION GAP SERPL CALC-SCNC: 14 MMOL/L (ref 7–16)
ANISOCYTOSIS BLD QL SMEAR: ABNORMAL
APAP SERPL-MCNC: 19 UG/ML (ref 10–30)
APPEARANCE UR: CLEAR
AST SERPL-CCNC: 89 U/L (ref 12–45)
BASE EXCESS BLDA CALC-SCNC: -1 MMOL/L (ref -4–3)
BASOPHILS # BLD AUTO: 1.4 % (ref 0–1.8)
BASOPHILS # BLD: 0.09 K/UL (ref 0–0.12)
BILIRUB SERPL-MCNC: 0.5 MG/DL (ref 0.1–1.5)
BILIRUB UR QL STRIP.AUTO: NEGATIVE
BODY TEMPERATURE: 36.8 CENTIGRADE
BUN SERPL-MCNC: 8 MG/DL (ref 8–22)
CALCIUM ALBUM COR SERPL-MCNC: 8.8 MG/DL (ref 8.5–10.5)
CALCIUM SERPL-MCNC: 9 MG/DL (ref 8.5–10.5)
CHLORIDE SERPL-SCNC: 100 MMOL/L (ref 96–112)
CO2 SERPL-SCNC: 26 MMOL/L (ref 20–33)
COLOR UR: YELLOW
COMMENT 1642: NORMAL
CREAT SERPL-MCNC: 0.38 MG/DL (ref 0.5–1.4)
EKG IMPRESSION: NORMAL
EKG IMPRESSION: NORMAL
EOSINOPHIL # BLD AUTO: 0.05 K/UL (ref 0–0.51)
EOSINOPHIL NFR BLD: 0.8 % (ref 0–6.9)
ERYTHROCYTE [DISTWIDTH] IN BLOOD BY AUTOMATED COUNT: 54.8 FL (ref 35.9–50)
ETHANOL BLD-MCNC: 286.4 MG/DL
FLUAV RNA SPEC QL NAA+PROBE: NEGATIVE
FLUBV RNA SPEC QL NAA+PROBE: NEGATIVE
GFR SERPLBLD CREATININE-BSD FMLA CKD-EPI: 119 ML/MIN/1.73 M 2
GLOBULIN SER CALC-MCNC: 4.4 G/DL (ref 1.9–3.5)
GLUCOSE SERPL-MCNC: 111 MG/DL (ref 65–99)
GLUCOSE UR STRIP.AUTO-MCNC: NEGATIVE MG/DL
HCG SERPL QL: NEGATIVE
HCO3 BLDA-SCNC: 25 MMOL/L (ref 17–25)
HCT VFR BLD AUTO: 34.2 % (ref 37–47)
HGB BLD-MCNC: 9.8 G/DL (ref 12–16)
IMM GRANULOCYTES # BLD AUTO: 0.04 K/UL (ref 0–0.11)
IMM GRANULOCYTES NFR BLD AUTO: 0.6 % (ref 0–0.9)
INHALED O2 FLOW RATE: 7 L/MIN (ref 2–10)
KETONES UR STRIP.AUTO-MCNC: NEGATIVE MG/DL
LACTATE SERPL-SCNC: 1.5 MMOL/L (ref 0.5–2)
LACTATE SERPL-SCNC: 2 MMOL/L (ref 0.5–2)
LACTATE SERPL-SCNC: 2.8 MMOL/L (ref 0.5–2)
LEUKOCYTE ESTERASE UR QL STRIP.AUTO: NEGATIVE
LIPASE SERPL-CCNC: 114 U/L (ref 11–82)
LYMPHOCYTES # BLD AUTO: 2.41 K/UL (ref 1–4.8)
LYMPHOCYTES NFR BLD: 37.7 % (ref 22–41)
MACROCYTES BLD QL SMEAR: ABNORMAL
MAGNESIUM SERPL-MCNC: 1.7 MG/DL (ref 1.5–2.5)
MCH RBC QN AUTO: 23.6 PG (ref 27–33)
MCHC RBC AUTO-ENTMCNC: 28.7 G/DL (ref 33.6–35)
MCV RBC AUTO: 82.2 FL (ref 81.4–97.8)
MICRO URNS: NORMAL
MONOCYTES # BLD AUTO: 0.69 K/UL (ref 0–0.85)
MONOCYTES NFR BLD AUTO: 10.8 % (ref 0–13.4)
MORPHOLOGY BLD-IMP: NORMAL
NEUTROPHILS # BLD AUTO: 3.12 K/UL (ref 2–7.15)
NEUTROPHILS NFR BLD: 48.7 % (ref 44–72)
NITRITE UR QL STRIP.AUTO: NEGATIVE
NRBC # BLD AUTO: 0 K/UL
NRBC BLD-RTO: 0 /100 WBC
PCO2 BLDA: 45.8 MMHG (ref 26–37)
PCO2 TEMP ADJ BLDA: 45.4 MMHG (ref 26–37)
PH BLDA: 7.35 [PH] (ref 7.4–7.5)
PH TEMP ADJ BLDA: 7.35 [PH] (ref 7.4–7.5)
PH UR STRIP.AUTO: 5 [PH] (ref 5–8)
PHOSPHATE SERPL-MCNC: 4.3 MG/DL (ref 2.5–4.5)
PLATELET # BLD AUTO: 105 K/UL (ref 164–446)
PLATELET BLD QL SMEAR: NORMAL
PMV BLD AUTO: 9.2 FL (ref 9–12.9)
PO2 BLDA: 109.5 MMHG (ref 64–87)
PO2 TEMP ADJ BLDA: 108.3 MMHG (ref 64–87)
POLYCHROMASIA BLD QL SMEAR: NORMAL
POTASSIUM SERPL-SCNC: 3.7 MMOL/L (ref 3.6–5.5)
PROCALCITONIN SERPL-MCNC: 0.11 NG/ML
PROCALCITONIN SERPL-MCNC: 0.13 NG/ML
PROT SERPL-MCNC: 8.7 G/DL (ref 6–8.2)
PROT UR QL STRIP: NEGATIVE MG/DL
RBC # BLD AUTO: 4.16 M/UL (ref 4.2–5.4)
RBC BLD AUTO: PRESENT
RBC UR QL AUTO: NEGATIVE
RSV RNA SPEC QL NAA+PROBE: NEGATIVE
SAO2 % BLDA: 97 % (ref 93–99)
SARS-COV-2 RNA RESP QL NAA+PROBE: NOTDETECTED
SODIUM SERPL-SCNC: 140 MMOL/L (ref 135–145)
SP GR UR STRIP.AUTO: 1.02
SPECIMEN SOURCE: NORMAL
UROBILINOGEN UR STRIP.AUTO-MCNC: 1 MG/DL
WBC # BLD AUTO: 6.4 K/UL (ref 4.8–10.8)

## 2023-01-23 PROCEDURE — 700105 HCHG RX REV CODE 258: Performed by: EMERGENCY MEDICINE

## 2023-01-23 PROCEDURE — 84703 CHORIONIC GONADOTROPIN ASSAY: CPT

## 2023-01-23 PROCEDURE — 87040 BLOOD CULTURE FOR BACTERIA: CPT

## 2023-01-23 PROCEDURE — 96367 TX/PROPH/DG ADDL SEQ IV INF: CPT

## 2023-01-23 PROCEDURE — A9270 NON-COVERED ITEM OR SERVICE: HCPCS | Performed by: EMERGENCY MEDICINE

## 2023-01-23 PROCEDURE — 93005 ELECTROCARDIOGRAM TRACING: CPT

## 2023-01-23 PROCEDURE — 80143 DRUG ASSAY ACETAMINOPHEN: CPT

## 2023-01-23 PROCEDURE — 700102 HCHG RX REV CODE 250 W/ 637 OVERRIDE(OP): Performed by: STUDENT IN AN ORGANIZED HEALTH CARE EDUCATION/TRAINING PROGRAM

## 2023-01-23 PROCEDURE — 99285 EMERGENCY DEPT VISIT HI MDM: CPT

## 2023-01-23 PROCEDURE — 36415 COLL VENOUS BLD VENIPUNCTURE: CPT

## 2023-01-23 PROCEDURE — C9803 HOPD COVID-19 SPEC COLLECT: HCPCS | Performed by: EMERGENCY MEDICINE

## 2023-01-23 PROCEDURE — 84145 PROCALCITONIN (PCT): CPT

## 2023-01-23 PROCEDURE — 87086 URINE CULTURE/COLONY COUNT: CPT

## 2023-01-23 PROCEDURE — 0241U HCHG SARS-COV-2 COVID-19 NFCT DS RESP RNA 4 TRGT MIC: CPT

## 2023-01-23 PROCEDURE — 96375 TX/PRO/DX INJ NEW DRUG ADDON: CPT

## 2023-01-23 PROCEDURE — 700102 HCHG RX REV CODE 250 W/ 637 OVERRIDE(OP): Performed by: EMERGENCY MEDICINE

## 2023-01-23 PROCEDURE — 700111 HCHG RX REV CODE 636 W/ 250 OVERRIDE (IP): Performed by: STUDENT IN AN ORGANIZED HEALTH CARE EDUCATION/TRAINING PROGRAM

## 2023-01-23 PROCEDURE — 81003 URINALYSIS AUTO W/O SCOPE: CPT

## 2023-01-23 PROCEDURE — 83690 ASSAY OF LIPASE: CPT

## 2023-01-23 PROCEDURE — 84100 ASSAY OF PHOSPHORUS: CPT

## 2023-01-23 PROCEDURE — 93005 ELECTROCARDIOGRAM TRACING: CPT | Performed by: EMERGENCY MEDICINE

## 2023-01-23 PROCEDURE — 96365 THER/PROPH/DIAG IV INF INIT: CPT

## 2023-01-23 PROCEDURE — 96372 THER/PROPH/DIAG INJ SC/IM: CPT

## 2023-01-23 PROCEDURE — 83605 ASSAY OF LACTIC ACID: CPT

## 2023-01-23 PROCEDURE — 700111 HCHG RX REV CODE 636 W/ 250 OVERRIDE (IP): Performed by: EMERGENCY MEDICINE

## 2023-01-23 PROCEDURE — 94760 N-INVAS EAR/PLS OXIMETRY 1: CPT

## 2023-01-23 PROCEDURE — 82077 ASSAY SPEC XCP UR&BREATH IA: CPT

## 2023-01-23 PROCEDURE — 96366 THER/PROPH/DIAG IV INF ADDON: CPT

## 2023-01-23 PROCEDURE — 700101 HCHG RX REV CODE 250: Performed by: STUDENT IN AN ORGANIZED HEALTH CARE EDUCATION/TRAINING PROGRAM

## 2023-01-23 PROCEDURE — 71045 X-RAY EXAM CHEST 1 VIEW: CPT

## 2023-01-23 PROCEDURE — 770000 HCHG ROOM/CARE - INTERMEDIATE ICU *

## 2023-01-23 PROCEDURE — 82803 BLOOD GASES ANY COMBINATION: CPT

## 2023-01-23 PROCEDURE — 85025 COMPLETE CBC W/AUTO DIFF WBC: CPT

## 2023-01-23 PROCEDURE — A9270 NON-COVERED ITEM OR SERVICE: HCPCS | Performed by: STUDENT IN AN ORGANIZED HEALTH CARE EDUCATION/TRAINING PROGRAM

## 2023-01-23 PROCEDURE — HZ2ZZZZ DETOXIFICATION SERVICES FOR SUBSTANCE ABUSE TREATMENT: ICD-10-PCS | Performed by: STUDENT IN AN ORGANIZED HEALTH CARE EDUCATION/TRAINING PROGRAM

## 2023-01-23 PROCEDURE — 80053 COMPREHEN METABOLIC PANEL: CPT

## 2023-01-23 PROCEDURE — 700105 HCHG RX REV CODE 258: Performed by: STUDENT IN AN ORGANIZED HEALTH CARE EDUCATION/TRAINING PROGRAM

## 2023-01-23 PROCEDURE — 83735 ASSAY OF MAGNESIUM: CPT

## 2023-01-23 PROCEDURE — 99223 1ST HOSP IP/OBS HIGH 75: CPT | Performed by: STUDENT IN AN ORGANIZED HEALTH CARE EDUCATION/TRAINING PROGRAM

## 2023-01-23 PROCEDURE — 76705 ECHO EXAM OF ABDOMEN: CPT

## 2023-01-23 RX ORDER — LORAZEPAM 2 MG/1
2 TABLET ORAL
Status: DISCONTINUED | OUTPATIENT
Start: 2023-01-23 | End: 2023-01-29

## 2023-01-23 RX ORDER — HEPARIN SODIUM 5000 [USP'U]/ML
5000 INJECTION, SOLUTION INTRAVENOUS; SUBCUTANEOUS EVERY 8 HOURS
Status: DISCONTINUED | OUTPATIENT
Start: 2023-01-23 | End: 2023-01-24

## 2023-01-23 RX ORDER — GAUZE BANDAGE 2" X 2"
100 BANDAGE TOPICAL DAILY
Status: COMPLETED | OUTPATIENT
Start: 2023-01-24 | End: 2023-01-27

## 2023-01-23 RX ORDER — KETOROLAC TROMETHAMINE 30 MG/ML
15 INJECTION, SOLUTION INTRAMUSCULAR; INTRAVENOUS ONCE
Status: COMPLETED | OUTPATIENT
Start: 2023-01-23 | End: 2023-01-23

## 2023-01-23 RX ORDER — SODIUM CHLORIDE 9 MG/ML
30 INJECTION, SOLUTION INTRAVENOUS ONCE
Status: COMPLETED | OUTPATIENT
Start: 2023-01-23 | End: 2023-01-23

## 2023-01-23 RX ORDER — LORAZEPAM 2 MG/ML
1.5 INJECTION INTRAMUSCULAR
Status: DISCONTINUED | OUTPATIENT
Start: 2023-01-23 | End: 2023-01-29

## 2023-01-23 RX ORDER — LORAZEPAM 1 MG/1
0.5 TABLET ORAL EVERY 4 HOURS PRN
Status: DISCONTINUED | OUTPATIENT
Start: 2023-01-23 | End: 2023-01-29

## 2023-01-23 RX ORDER — LORAZEPAM 2 MG/ML
1 INJECTION INTRAMUSCULAR ONCE
Status: DISCONTINUED | OUTPATIENT
Start: 2023-01-23 | End: 2023-01-23

## 2023-01-23 RX ORDER — FOLIC ACID 1 MG/1
1 TABLET ORAL DAILY
Status: COMPLETED | OUTPATIENT
Start: 2023-01-24 | End: 2023-01-27

## 2023-01-23 RX ORDER — ACETAMINOPHEN 325 MG/1
650 TABLET ORAL EVERY 6 HOURS PRN
Status: DISCONTINUED | OUTPATIENT
Start: 2023-01-23 | End: 2023-01-29 | Stop reason: HOSPADM

## 2023-01-23 RX ORDER — LORAZEPAM 2 MG/ML
2 INJECTION INTRAMUSCULAR
Status: DISCONTINUED | OUTPATIENT
Start: 2023-01-23 | End: 2023-01-29

## 2023-01-23 RX ORDER — LORAZEPAM 2 MG/1
4 TABLET ORAL
Status: DISCONTINUED | OUTPATIENT
Start: 2023-01-23 | End: 2023-01-29

## 2023-01-23 RX ORDER — SODIUM CHLORIDE 9 MG/ML
INJECTION, SOLUTION INTRAVENOUS CONTINUOUS
Status: DISCONTINUED | OUTPATIENT
Start: 2023-01-23 | End: 2023-01-25

## 2023-01-23 RX ORDER — LORAZEPAM 2 MG/ML
1 INJECTION INTRAMUSCULAR ONCE
Status: COMPLETED | OUTPATIENT
Start: 2023-01-23 | End: 2023-01-23

## 2023-01-23 RX ORDER — POLYETHYLENE GLYCOL 3350 17 G/17G
1 POWDER, FOR SOLUTION ORAL
Status: DISCONTINUED | OUTPATIENT
Start: 2023-01-23 | End: 2023-01-27

## 2023-01-23 RX ORDER — LABETALOL HYDROCHLORIDE 5 MG/ML
10 INJECTION, SOLUTION INTRAVENOUS EVERY 4 HOURS PRN
Status: DISCONTINUED | OUTPATIENT
Start: 2023-01-23 | End: 2023-01-29 | Stop reason: HOSPADM

## 2023-01-23 RX ORDER — LORAZEPAM 1 MG/1
1 TABLET ORAL EVERY 4 HOURS PRN
Status: DISCONTINUED | OUTPATIENT
Start: 2023-01-23 | End: 2023-01-29

## 2023-01-23 RX ORDER — LORAZEPAM 2 MG/ML
1 INJECTION INTRAMUSCULAR
Status: DISCONTINUED | OUTPATIENT
Start: 2023-01-23 | End: 2023-01-29

## 2023-01-23 RX ORDER — LORAZEPAM 2 MG/ML
0.5 INJECTION INTRAMUSCULAR EVERY 4 HOURS PRN
Status: DISCONTINUED | OUTPATIENT
Start: 2023-01-23 | End: 2023-01-29

## 2023-01-23 RX ORDER — METOCLOPRAMIDE HYDROCHLORIDE 5 MG/ML
10 INJECTION INTRAMUSCULAR; INTRAVENOUS ONCE
Status: COMPLETED | OUTPATIENT
Start: 2023-01-23 | End: 2023-01-23

## 2023-01-23 RX ORDER — MAGNESIUM SULFATE HEPTAHYDRATE 40 MG/ML
2 INJECTION, SOLUTION INTRAVENOUS ONCE
Status: COMPLETED | OUTPATIENT
Start: 2023-01-23 | End: 2023-01-23

## 2023-01-23 RX ORDER — ACETAMINOPHEN 500 MG
500 TABLET ORAL ONCE
Status: COMPLETED | OUTPATIENT
Start: 2023-01-23 | End: 2023-01-23

## 2023-01-23 RX ORDER — BISACODYL 10 MG
10 SUPPOSITORY, RECTAL RECTAL
Status: DISCONTINUED | OUTPATIENT
Start: 2023-01-23 | End: 2023-01-27

## 2023-01-23 RX ORDER — AMOXICILLIN 250 MG
2 CAPSULE ORAL 2 TIMES DAILY
Status: DISCONTINUED | OUTPATIENT
Start: 2023-01-23 | End: 2023-01-27

## 2023-01-23 RX ADMIN — LORAZEPAM 1 MG: 2 INJECTION INTRAMUSCULAR; INTRAVENOUS at 10:40

## 2023-01-23 RX ADMIN — HEPARIN SODIUM 5000 UNITS: 5000 INJECTION, SOLUTION INTRAVENOUS; SUBCUTANEOUS at 15:55

## 2023-01-23 RX ADMIN — METOCLOPRAMIDE 10 MG: 5 INJECTION, SOLUTION INTRAMUSCULAR; INTRAVENOUS at 06:47

## 2023-01-23 RX ADMIN — SODIUM CHLORIDE: 9 INJECTION, SOLUTION INTRAVENOUS at 20:11

## 2023-01-23 RX ADMIN — MAGNESIUM SULFATE HEPTAHYDRATE 2 G: 40 INJECTION, SOLUTION INTRAVENOUS at 06:54

## 2023-01-23 RX ADMIN — THIAMINE HYDROCHLORIDE: 100 INJECTION, SOLUTION INTRAMUSCULAR; INTRAVENOUS at 12:57

## 2023-01-23 RX ADMIN — LORAZEPAM 1 MG: 1 TABLET ORAL at 21:11

## 2023-01-23 RX ADMIN — LORAZEPAM 2 MG: 2 TABLET ORAL at 23:29

## 2023-01-23 RX ADMIN — HEPARIN SODIUM 5000 UNITS: 5000 INJECTION, SOLUTION INTRAVENOUS; SUBCUTANEOUS at 21:10

## 2023-01-23 RX ADMIN — SODIUM CHLORIDE 1836 ML: 9 INJECTION, SOLUTION INTRAVENOUS at 06:52

## 2023-01-23 RX ADMIN — ACETAMINOPHEN 500 MG: 500 TABLET ORAL at 10:41

## 2023-01-23 RX ADMIN — LORAZEPAM 3 MG: 1 TABLET ORAL at 16:37

## 2023-01-23 RX ADMIN — LIDOCAINE HYDROCHLORIDE 30 ML: 20 SOLUTION OROPHARYNGEAL at 06:49

## 2023-01-23 RX ADMIN — KETOROLAC TROMETHAMINE 15 MG: 30 INJECTION, SOLUTION INTRAMUSCULAR; INTRAVENOUS at 06:48

## 2023-01-23 ASSESSMENT — COGNITIVE AND FUNCTIONAL STATUS - GENERAL
PERSONAL GROOMING: A LITTLE
TOILETING: A LITTLE
CLIMB 3 TO 5 STEPS WITH RAILING: A LITTLE
EATING MEALS: A LITTLE
HELP NEEDED FOR BATHING: A LITTLE
DRESSING REGULAR LOWER BODY CLOTHING: A LITTLE
DAILY ACTIVITIY SCORE: 18
DRESSING REGULAR UPPER BODY CLOTHING: A LITTLE
SUGGESTED CMS G CODE MODIFIER DAILY ACTIVITY: CK
SUGGESTED CMS G CODE MODIFIER MOBILITY: CK
STANDING UP FROM CHAIR USING ARMS: A LITTLE
MOVING FROM LYING ON BACK TO SITTING ON SIDE OF FLAT BED: A LITTLE
MOBILITY SCORE: 19
WALKING IN HOSPITAL ROOM: A LITTLE
MOVING TO AND FROM BED TO CHAIR: A LITTLE

## 2023-01-23 ASSESSMENT — LIFESTYLE VARIABLES
PAROXYSMAL SWEATS: NO SWEAT VISIBLE
HEADACHE, FULLNESS IN HEAD: NOT PRESENT
ANXIETY: MODERATELY ANXIOUS OR GUARDED, SO ANXIETY IS INFERRED
AGITATION: MODERATELY FIDGETY AND RESTLESS
ORIENTATION AND CLOUDING OF SENSORIUM: ORIENTED AND CAN DO SERIAL ADDITIONS
ORIENTATION AND CLOUDING OF SENSORIUM: CANNOT DO SERIAL ADDITIONS OR IS UNCERTAIN ABOUT DATE
NAUSEA AND VOMITING: MILD NAUSEA WITH NO VOMITING
TREMOR: MODERATE TREMOR WITH ARMS EXTENDED
TACTILE DISTURBANCES: VERY MILD ITCHING, PINS AND NEEDLES SENSATION, BURNING OR NUMBNESS
AUDITORY DISTURBANCES: NOT PRESENT
ANXIETY: MILDLY ANXIOUS
AUDITORY DISTURBANCES: NOT PRESENT
HEADACHE, FULLNESS IN HEAD: MODERATELY SEVERE
TREMOR: TREMOR NOT VISIBLE BUT CAN BE FELT, FINGERTIP TO FINGERTIP
ORIENTATION AND CLOUDING OF SENSORIUM: ORIENTED AND CAN DO SERIAL ADDITIONS
NAUSEA AND VOMITING: MILD NAUSEA WITH NO VOMITING
NAUSEA AND VOMITING: NO NAUSEA AND NO VOMITING
ANXIETY: NO ANXIETY (AT EASE)
TOTAL SCORE: 11
VISUAL DISTURBANCES: NOT PRESENT
TOTAL SCORE: 17
PAROXYSMAL SWEATS: NO SWEAT VISIBLE
TREMOR: MODERATE TREMOR WITH ARMS EXTENDED
VISUAL DISTURBANCES: NOT PRESENT
HEADACHE, FULLNESS IN HEAD: VERY MILD
VISUAL DISTURBANCES: NOT PRESENT
AUDITORY DISTURBANCES: NOT PRESENT
PAROXYSMAL SWEATS: NO SWEAT VISIBLE
PAROXYSMAL SWEATS: NO SWEAT VISIBLE
TREMOR: NO TREMOR
HEADACHE, FULLNESS IN HEAD: NOT PRESENT
AGITATION: NORMAL ACTIVITY
VISUAL DISTURBANCES: NOT PRESENT
TOTAL SCORE: 9
AGITATION: *
AUDITORY DISTURBANCES: NOT PRESENT
NAUSEA AND VOMITING: NO NAUSEA AND NO VOMITING
ORIENTATION AND CLOUDING OF SENSORIUM: CANNOT DO SERIAL ADDITIONS OR IS UNCERTAIN ABOUT DATE
TOTAL SCORE: 1
AGITATION: SOMEWHAT MORE THAN NORMAL ACTIVITY
ANXIETY: *

## 2023-01-23 ASSESSMENT — FIBROSIS 4 INDEX
FIB4 SCORE: 8.09
FIB4 SCORE: 8.09
FIB4 SCORE: 5.76

## 2023-01-23 NOTE — ASSESSMENT & PLAN NOTE
Fever of 101.3 on admission unclear of etiology, I suspect this may be a hyper adrenergic response to severe alcohol withdrawal however cannot rule out infectious etiology at this point. RSV, Flu, COVID negative  -Resolved

## 2023-01-23 NOTE — ASSESSMENT & PLAN NOTE
N/V and abdominal pain on admission, mild elevated lipase, possible pancreatitis.  -Hx of alcohol abuse, previous pancreatitis.   -d/c fluids.   -GI cocktail and pain meds as needed. Clear liquids as tolerates and advance diet as tolerates  -anti-emetics as needed

## 2023-01-23 NOTE — ED PROVIDER NOTES
"ED Provider Note    Scribed for Anjali Gambino M.D. by Jake Hoffman. 1/23/2023, 6:20 AM.    Primary care provider: Margy Aparicio M.D.  Means of arrival: EMS  History obtained from: Patient  History limited by: None    CHIEF COMPLAINT  Chief Complaint   Patient presents with    Headache     Radiating from top of head to neck and shoulders    Abdominal Pain     To mid sternum radiating to the R side started about 2 weeks ago with bloating.  \"Problems with my gallbladder\"    N/V    Fever     Tmax over the last 24 hrs at home 102.1.  Pt also c/o cough and congestion       HPI/ROS  Olya Youssef is a 52 y.o. female who presents to the Emergency Department via EMS for acute body aches, headache and abdominal pain. Patient states her symptoms started 2 weeks ago and been gradually progressing. She describes her headache as located on the top of her head radiating down her neck and shoulders. Patient also complains of right upper quadrant abdominal pain that she describes as a bloating sensation with associated nausea and vomiting.  She states that she has also had congestion and cough for the past few days with associated fevers up to 102.1 at home.  Patient states that she has been taking 6 g of Tylenol daily for the last few days in order to help with her symptoms.  She has also been drinking a pint of alcohol a day.  She also admits to methamphetamine abuse.  She is chronically on oxygen since having a COVID-19 infection last year has required this.      EXTERNAL RECORDS REVIEWED  History of alcohol abuse, methamphetamine abuse, bipolar disorder; she is on 2L chronically. She was last hospitalized on 9/20/22 for abdominal  pain with nausea, vomiting; was additionally treated for alcohol withdrawal.      LIMITATION TO HISTORY   Select: : None    OUTSIDE HISTORIAN(S):  None      PAST MEDICAL HISTORY   has a past medical history of Alcohol abuse, Alcohol intoxication, with unspecified complication " "(HCC) (8/7/2013), Alcoholism (HCC), Anxiety, Backpain, Bipolar disorder, unspecified (HCC), Bronchitis, Drug abuse (HCC), Hepatitis, alcoholic, Hypertension, Indigestion, Infectious disease (MRSA), Liver disease, Pancreatitis chronic, Pneumonia, Psychiatric disorder, Renal disorder, Seizure (HCC), Seizure disorder (HCC), and Unspecified hemorrhagic conditions.    SURGICAL HISTORY   has a past surgical history that includes gastroscopy (4/28/2015).    SOCIAL HISTORY  Social History     Tobacco Use    Smoking status: Never    Smokeless tobacco: Never   Vaping Use    Vaping Use: Never used   Substance Use Topics    Alcohol use: Yes     Comment: Hx heavy drinking apint in a 24 hr period    Drug use: Yes     Types: Inhaled     Comment: meth 1/22 / [facility]      Social History     Substance and Sexual Activity   Drug Use Yes    Types: Inhaled    Comment: meth 1/22 / [facility]       FAMILY HISTORY  Family History   Problem Relation Age of Onset    Lung Disease Mother     Cancer Mother        CURRENT MEDICATIONS  Home Medications       Reviewed by Dolly Levin (Pharmacy Tech) on 01/23/23 at 1230  Med List Status: Complete     Medication Last Dose Status        Patient Tino Taking any Medications                           ALLERGIES  Allergies   Allergen Reactions    Bactrim Hives    Lamotrigine [Lamictal] Hives     Tolerated Fioricet 10/3/22    Quetiapine Fumarate Hives     Extrapyramidal Symptoms    Keflex Hives       PHYSICAL EXAM  VITAL SIGNS: /78   Pulse (!) 114   Temp 37.3 °C (99.1 °F) (Temporal)   Resp 18   Ht 1.473 m (4' 10\")   Wt 61.2 kg (135 lb)   LMP 02/28/2018   SpO2 94%   BMI 28.22 kg/m²   Vitals reviewed by myself.  Physical Exam  Nursing note and vitals reviewed.  Constitutional: Well-developed and well-nourished.  Patient appears uncomfortable  HENT: Head is normocephalic and atraumatic.  Eyes: extra-ocular movements intact  Cardiovascular: Tachycardic, regular rhythm. No murmur " heard.  Pulmonary/Chest: Breath sounds normal. No wheezes or rales.   Abdominal: Soft and non-tender. No distention.  Negative Gambino sign, negative McBurney's point tenderness, no rebound or guarding to abdominal palpation  Musculoskeletal: Extremities exhibit normal range of motion without edema or tenderness.   Neurological: Awake and alert  Skin: Skin is warm and dry. No rash.       DIAGNOSTIC STUDIES:  LABS  Labs Reviewed   COMP METABOLIC PANEL - Abnormal; Notable for the following components:       Result Value    Glucose 111 (*)     Creatinine 0.38 (*)     AST(SGOT) 89 (*)     Alkaline Phosphatase 146 (*)     Total Protein 8.7 (*)     Globulin 4.4 (*)     All other components within normal limits   LACTIC ACID - Abnormal; Notable for the following components:    Lactic Acid 2.8 (*)     All other components within normal limits   CBC WITH DIFFERENTIAL - Abnormal; Notable for the following components:    RBC 4.16 (*)     Hemoglobin 9.8 (*)     Hematocrit 34.2 (*)     MCH 23.6 (*)     MCHC 28.7 (*)     RDW 54.8 (*)     Platelet Count 105 (*)     All other components within normal limits   DIAGNOSTIC ALCOHOL - Abnormal; Notable for the following components:    Diagnostic Alcohol 286.4 (*)     All other components within normal limits   LIPASE - Abnormal; Notable for the following components:    Lipase 114 (*)     All other components within normal limits   ARTERIAL BLOOD GAS - Abnormal; Notable for the following components:    Ph 7.35 (*)     Pco2 45.8 (*)     Po2 109.5 (*)     Ph -TC 7.35 (*)     Pco2 -TC 45.4 (*)     Po2 -.3 (*)     All other components within normal limits   LACTIC ACID    Narrative:     Repeat if initial lactic acid result is greater than 2   HCG QUAL SERUM   URINALYSIS,CULTURE IF INDICATED    Narrative:     Indication for culture:->Patient WITHOUT an indwelling Figueredo  catheter in place with new onset of Dysuria, Frequency,  Urgency, and/or Suprapubic pain   COV-2, FLU A/B, AND RSV BY  "PCR (CEPHEID)   ACETAMINOPHEN   PHOSPHORUS   PROCALCITONIN   MAGNESIUM   CORRECTED CALCIUM   ESTIMATED GFR   PERIPHERAL SMEAR REVIEW   PLATELET ESTIMATE   MORPHOLOGY   DIFFERENTIAL COMMENT   LACTIC ACID    Narrative:     AFTER FLUIDS   PROCALCITONIN   LACTIC ACID   URINALYSIS   URINE CULTURE(NEW)    Narrative:     Indication for culture:->Evaluation for sepsis without a  clear source of infection   BLOOD CULTURE    Narrative:     Per Hospital Policy: Only change Specimen Src: to \"Line\" if  specified by physician order.   BLOOD CULTURE    Narrative:     Per Hospital Policy: Only change Specimen Src: to \"Line\" if  specified by physician order.   URINE DRUG SCREEN       All labs reviewed and independently interpreted by myself    EKG Interpretation:    12 Lead EKG independently interpreted by myself to show:  EKG at 5:55 AM: Sinus tachycardia at a rate of 110, normal axis, intervals notable for prolonged QT interval of 539, , QRS 87, no acute ST-T segment changes, no evidence of acute arrhythmia or ischemia, per review of prior EKGs patient has had prolonged QT in the past    EKG at 10:19 AM: Sinus tachycardia at a rate of 114, normal axis, intervals notable for prolonged QT of 516, , QRS 86, no acute ST-T segment changes, no dynamic changes when compared to prior EKG, no evidence of acute arrhythmia or ischemia per my interpretation    RADIOLOGY    US-RUQ   Final Result         1.  Echogenic liver, compatible with fatty change versus fibrosis.      DX-CHEST-PORTABLE (1 VIEW)   Final Result      No acute cardiopulmonary abnormality.           The radiologist's interpretation of all radiological studies have been reviewed by me.      COURSE & MEDICAL DECISION MAKING    ED Observation Status? Yes; I am placing the patient in to an observation status due to a diagnostic uncertainty as well as therapeutic intensity. Patient placed in observation status at 7:25 AM, 1/23/2023.     Observation plan is as follows: " Reassess for pain control and clinical sobriety.     Upon Reevaluation, the patient's condition has: not improved; and will be escalated to hospitalization.    Patient discharged from ED Observation status at 12:07 PM on 1/23/2023    INITIAL ASSESSMENT AND PLAN    Patient is a 52-year-old female who comes in for evaluation of nausea, vomiting, abdominal pain, cough, congestion and body aches.  Differential diagnosis includes arrhythmia, electrolyte derangement, dehydration, alcohol intoxication, alcohol withdrawal, viral syndrome, biliary pathology, Tylenol toxicity, pneumonia.  Diagnostic work-up includes labs, chest x-ray and right upper quadrant ultrasound.       REASSESSMENTS   7:35 AM Patient reassessed; she continues to experience pain diffusely.     10:35 AM Patient reassessed; she is now tremulous.     12:07 PM I discussed the patient's case and the above findings with Dr. Rice (Hospitalist) who agrees to evaluate the patient for hospitalization.     ER COURSE AND FINAL DISPOSITION   Patient's initial vitals notable for tachycardia and hypertension.  She is fully alert and oriented during my assessment, appears that she may be having alcohol withdrawal symptoms.  EKG is done and demonstrates no acute abnormalities, she is noted to have a prolonged QT, this is noted to be chronic for her.  However she has been having nausea and vomiting I elected to give her IV fluids and magnesium to see if this would help with her prolonged QT.  She is also treated with Toradol and Reglan for her body aches and nausea.  After treatment patient is not feeling much improved.  Labs returned are notable for an elevated alcohol level, however patient is noted to be having signs of withdrawal and is therefore treated with Ativan with some improvement in her symptoms.  Labs independently reviewed and interpreted by myself to further demonstrate:  -Thrombocytopenia and mildly elevated AST and alkaline phosphatase consistent  with liver disease related to alcohol abuse  -Tylenol level is within normal limits, she is not toxic from Tylenol  - Lactic acid is 2.8, likely secondary to dehydration.  After IV fluid resuscitation repeat lactic acid has normalized at 2.0.  -Patient has no leukocytosis and procalcitonin is within normal limits making bacterial pathology unlikely    Chest x-ray demonstrates no evidence of pneumonia and right upper quadrant ultrasound returns and demonstrates no acute abnormalities.  Urinalysis demonstrates no signs of infection.  Repeat EKG is done which demonstrates improvement in her prolonged QT from 539 down to 516 after magnesium and IV fluids.  At this time patient is having ongoing symptoms of alcohol withdrawal with persistent tachycardia and hypertension and requires multiple doses of Ativan, she will be hospitalized for this.  She is noted to spike a fever in the emergency department and is treated with Tylenol, this is of unclear etiology although likely viral in nature as she has no bacterial source for her infection.  Patient will be hospitalized for ongoing management of alcohol withdrawal and she is amenable to this plan.  Discussed the case with Dr. Rice who has accepted patient for hospitalization.  Patient is in guarded condition.    ADDITIONAL PROBLEM LIST AND RESOURCE UTILIZATION    Additional problems aside from the chief complaint that I have addressed: None    I have discussed management of the patient with the following physicians and TWILA's: Dr. Rice (Hospitalist)    Discussion of management with other Hospitals in [place] or appropriate source(s): None    Escalation of care considered, and ultimately not performed: See above.     Barriers to care at this time, including but not limited to: None.     Decision tools and prescription drugs considered including, but not limited to: See above.    Please see review of records as noted above    DISPOSITION:  Patient will be hospitalized by Dr. Rice  in guarded condition.    FINAL IMPRESSION  1. Nausea and vomiting, unspecified vomiting type    2. Alcohol withdrawal syndrome without complication (HCC)    3. Fever, unspecified fever cause    4. Long QT interval    5. Myalgia    6. Nonintractable headache, unspecified chronicity pattern, unspecified headache type          Jake LUNA (Scribe), nguyen scribing for, and in the presence of, Anjali Gambino M.D..    Electronically signed by: Jake Hoffman (Scribe), 1/23/2023    IAnjali M.D. personally performed the services described in this documentation, as scribed by Jake Hoffman in my presence, and it is both accurate and complete.    The note accurately reflects work and decisions made by me.  Anjali Gambino M.D.  1/23/2023  3:30 PM

## 2023-01-23 NOTE — H&P
"Hospital Medicine History & Physical Note    Date of Service  1/23/2023    Primary Care Physician  Margy Aparicio M.D.    Consultants  critical care    Specialist Names: Dr. Sandrita Garcia     Code Status  Full Code    Chief Complaint  Chief Complaint   Patient presents with    Headache     Radiating from top of head to neck and shoulders    Abdominal Pain     To mid sternum radiating to the R side started about 2 weeks ago with bloating.  \"Problems with my gallbladder\"    N/V    Fever     Tmax over the last 24 hrs at home 102.1.  Pt also c/o cough and congestion       History of Presenting Illness  Olya Youssef is a 52 y.o. female with a past medical history of chronic alcoholism with history of withdrawal, amphetamine use, hypertension and pancreatitis who presented 1/23/2023 complaints of headache, abdominal pain nausea vomiting and fever.  Per chart symptoms have been ongoing for approximately 2 weeks and gradually progressing.  Her pain is located in the right upper quadrant associated bloating nausea and vomiting fever chills.  She also reports did cough and congestion,  she states that she is chronically on 2 L of oxygen from previous COVID infection.  She last used amphetamines 24 hours ago and has been taking 6 g of Tylenol every day for the last 2 weeks.    On my evaluation patient is somnolent, she will stir to stimulation but then quickly falls back to sleep and is currently unable to provide any history or follow direction    Patient is febrile with a temperature of 101.3 she is tachycardic in the 100-120, she is hypertensive with SBP 180s/90s, per charted she was placed on 3 L of supplemental oxygen however on my evaluation examination she was desaturating to 85-86% on 5 L and was bumped to 6 L with improvement.    Labs are remarkable for normal WBC, H&H of 9.8/34.2 platelet 105.  Glucose 111 AST 89 and alk phos 146 lipase 114, lactic acid 2.8 which improved to 2.0 with IV fluids. "  Blood alcohol is positive at 286.4.  UDS is pending  Viral panel is negative  Chest x-ray is unremarkable  Right upper quadrant ultrasound shows fatty liver without evidence of cholecystitis or choledocholithiasis  EKG shows sinus rhythm with a prolonged QT of 516    Patient has only received 1 mg of Ativan since her arrival in the emergency department.  Given patient's increasing oxygen needs, somnolence and hyperadrenergic state while still having a positive blood alcohol level I was concerned that she was high risk for severe withdrawal and inability to treat with standard CIWA protocol on the floor without further decompensation of respiratory status and concern for development of DTs without.  For this reason I spoke with the intensivist who agrees with IMCU admission.    I discussed the plan of care with bedside RN and critical care.    Review of Systems  Review of Systems   Unable to perform ROS: Mental acuity     Past Medical History   has a past medical history of Alcohol abuse, Alcohol intoxication, with unspecified complication (HCC) (8/7/2013), Alcoholism (HCC), Anxiety, Backpain, Bipolar disorder, unspecified (HCC), Bronchitis, Drug abuse (HCC), Hepatitis, alcoholic, Hypertension, Indigestion, Infectious disease (MRSA), Liver disease, Pancreatitis chronic, Pneumonia, Psychiatric disorder, Renal disorder, Seizure (HCC), Seizure disorder (HCC), and Unspecified hemorrhagic conditions.    Surgical History   has a past surgical history that includes gastroscopy (4/28/2015).     Family History  family history includes Cancer in her mother; Lung Disease in her mother.   Family history reviewed with patient. There is no family history that is pertinent to the chief complaint.     Social History   reports that she has never smoked. She has never used smokeless tobacco. She reports current alcohol use. She reports current drug use. Drug: Inhaled.    Allergies  Allergies   Allergen Reactions    Bactrim Hives     Lamotrigine [Lamictal] Hives     Tolerated Fioricet 10/3/22    Quetiapine Fumarate Hives     Extrapyramidal Symptoms    Keflex Hives       Medications  None       Physical Exam  Temp:  [36.8 °C (98.3 °F)-38.5 °C (101.3 °F)] 36.8 °C (98.3 °F)  Pulse:  [101-118] 114  Resp:  [16-20] 16  BP: (109-188)/(73-99) 156/83  SpO2:  [92 %-96 %] 96 %  Blood Pressure: (!) 156/83   Temperature: 36.8 °C (98.3 °F)   Pulse: (!) 114   Respiration: 16   Pulse Oximetry: 96 %       Physical Exam  Vitals and nursing note reviewed.   Constitutional:       Appearance: She is obese. She is ill-appearing, toxic-appearing and diaphoretic.   HENT:      Head: Normocephalic and atraumatic.      Mouth/Throat:      Mouth: Mucous membranes are dry.      Comments: Very poor oral dentition, essentially edentulous, with broken/black teeth at gumline  Eyes:      Conjunctiva/sclera: Conjunctivae normal.   Cardiovascular:      Rate and Rhythm: Regular rhythm. Tachycardia present.   Pulmonary:      Effort: Respiratory distress present.      Breath sounds: Rales present.      Comments: Patient is tachypneic, with use of  muscles  Abdominal:      General: There is no distension.      Palpations: Abdomen is soft.      Tenderness: There is no abdominal tenderness.   Musculoskeletal:      Right lower leg: No edema.      Left lower leg: No edema.      Comments: Moving all extremities spontaneously   Skin:     General: Skin is warm.      Comments: Scattered abrasions and bruising   Neurological:      Mental Status: She is disoriented.      Comments: Unable to complete neuro exam however she is moving all extremities spontaneously and is tremulous in her hands and upper extremities   Psychiatric:      Comments: Unable to assess       Laboratory:  Recent Labs     01/23/23  0638   WBC 6.4   RBC 4.16*   HEMOGLOBIN 9.8*   HEMATOCRIT 34.2*   MCV 82.2   MCH 23.6*   MCHC 28.7*   RDW 54.8*   PLATELETCT 105*   MPV 9.2     Recent Labs     01/23/23  0638   SODIUM 140    POTASSIUM 3.7   CHLORIDE 100   CO2 26   GLUCOSE 111*   BUN 8   CREATININE 0.38*   CALCIUM 9.0     Recent Labs     01/23/23  0638   ALTSGPT 32   ASTSGOT 89*   ALKPHOSPHAT 146*   TBILIRUBIN 0.5   LIPASE 114*   GLUCOSE 111*         No results for input(s): NTPROBNP in the last 72 hours.      No results for input(s): TROPONINT in the last 72 hours.    Imaging:  US-RUQ   Final Result         1.  Echogenic liver, compatible with fatty change versus fibrosis.      DX-CHEST-PORTABLE (1 VIEW)   Final Result      No acute cardiopulmonary abnormality.             X-Ray:  I have personally reviewed the images and compared with prior images.  EKG:  I have personally reviewed the images and compared with prior images.    Assessment/Plan:  Justification for Admission Status  I anticipate this patient will require at least two midnights for appropriate medical management, necessitating inpatient admission because severe alcohol withdrawal, acute respiratory failure, fever patient is being admitted to the stepdown ICU for close monitoring of alcohol withdrawal she will require very close monitoring, electrolyte replacement    Patient will need a Intermediate Care (Adult and Pediatrics) bed on MEDICAL service .  The need is secondary to severe alcohol withdrawal.    * Alcohol withdrawal delirium, acute, hyperactive (HCC)- (present on admission)  Assessment & Plan  Patient presents with multitude of symptoms including nausea and vomiting abdominal pain likely secondary to acute alcohol withdrawal  -Blood alcohol level is positive at 286.4, patient is tremulous somnolent with hypertension and tachycardia  -Patient will be monitored in the IMCU due to her high risk for severe withdrawal and decompensation  -Rally bag with multivitamin and thiamine  -[facility] protocol, may need Precedex drip pending her clinical course  -Monitor electrolytes and replace as needed  -Telemetry monitoring  -Fall seizure and aspiration precautions    Elevated  lipase- (present on admission)  Assessment & Plan  N/V and abdominal pain on admission, mild elevated lipase, possible pancreatitis   - continue with IVF and monitor clinical course   -If any further decompensation or increasing abdominal pain consider CT abdomen    Fever  Assessment & Plan  Fever of 101.3 on admission unclear of etiology, I suspect this may be a hyper adrenergic response to severe alcohol withdrawal however I cannot rule out infectious etiology  -Thus far chest x-ray and urinalysis relatively unremarkable however patient does use amphetamines and is at risk for infection, blood cultures have been ordered and are pending  -We will need to monitor closely however will hold off on antibiotics for now  -will check a procalcitonin, viral panel negative   -Right upper quadrant ultrasound without any evidence of cholecystitis    Acute on chronic respiratory failure (HCC)  Assessment & Plan  Patient reportedly on 2 L of supplemental oxygen at baseline unclear if she is compliant.  Has been having increased O2 requirement since admission likely secondary to somnolence and alcohol withdrawal  -Chest x-ray appears relatively clear however she does have a fever and symptoms of cough and congestion  -Check procalcitonin  -Currently on 6 L, close airway monitoring with continuous pulse ox and IMCU admission  -RT protocol  -O2 per protocol, DuoNebs as needed  -Aspiration precautions    Methamphetamine abuse (HCC)- (present on admission)  Assessment & Plan  Amphetamine abuse, last use within the 24 hours  -UDS is pending  -Blood cultures ordered given presence of fever, will need to follow    Obtundation- (present on admission)  Assessment & Plan  Patient is somnolent and unarousable, suspect this is secondary to severe alcohol withdrawal  -CIWA protocol, close monitoring in the IMCU  -Fall seizure and aspiration precautions  -Continuous pulse ox with close monitoring of airway  -If she fails to improve may need  further intervention with imaging and possibly LP however infectious etiology is low on my differential        VTE prophylaxis: heparin ppx

## 2023-01-23 NOTE — ED TRIAGE NOTES
"Chief Complaint   Patient presents with   • Headache     Radiating from top of head to neck and shoulders   • Abdominal Pain     To mid sternum radiating to the R side started about 2 weeks ago with bloating.  \"Problems with my gallbladder\"   • N/V   • Fever     Tmax over the last 24 hrs at home 102.1.  Pt also c/o cough and congestion       BIB EMS to [facility], pt on monitor.  Pt consists of: came in for above complaint.  Pt endorces a pint of vodka over the last 24hr, and meth use 12/22.  Pt states she has taken many doses of tylenol over the last few days.  3 pills of tylenol before EMS arrival    Medications given en route:n/a    /78   Pulse (!) 114   Resp 18   Ht 1.473 m (4' 10\")   Wt 61.2 kg (135 lb)   LMP 02/28/2018   SpO2 94%   BMI 28.22 kg/m²     "

## 2023-01-23 NOTE — ED NOTES
Pt drowsy, pt awakes to voice but falling back asleep, holding off on ativan at this time.  ERP informed.

## 2023-01-23 NOTE — ASSESSMENT & PLAN NOTE
Upon admission. Much improved 1/26/23  Wean benzodiazepine meds as able given w/d.  Monitor neurochecks  Wean meds for withdrawal as tolerates

## 2023-01-23 NOTE — ED NOTES
Med rec complete per [facility] pharmacy (included central search) -has not filled medications in awhile. Pt was unable to answer questions, she was unable to stay awake long enough for interview. Pt mentioned going to [facility] for medications.

## 2023-01-23 NOTE — ED NOTES
Pt IV infiltrated.  Fluids and magnesium stopped.  Milked out some fluid.  Pharmacy informed, placing heat pack.  ERP informed.  Another RN to assist in placing another IV.

## 2023-01-23 NOTE — PROGRESS NOTES
[facility] Acceptance Note    Called by Dr. Desiree Rice for admission to [facility] for alcohol withdrawal.  Will admit to IM under the care of the hospitalist.      Sandrita Garcia MD  Pulmonary and Critical Care Medicine

## 2023-01-23 NOTE — ASSESSMENT & PLAN NOTE
Patient presents with multitude of symptoms including nausea and vomiting abdominal pain likely secondary to acute alcohol withdrawal. Blood alcohol level is positive at 286.4, patient is tremulous somnolent with hypertension and tachycardia    -CIWA protocol. Peak of 8 overnight,   -Thiamine, folate  -Seizure precautions  -Tapering Librium 25mg to BID from q8, ativan PRN.   -Continue atarax 25mg PRN for continued anxiety.

## 2023-01-23 NOTE — ASSESSMENT & PLAN NOTE
Patient on 2 L of supplemental oxygen at baseline. Suspect a component of COPD with diminished breath sounds. CXR result w/o acute finding. 1/23 procalcitonin not elevated, negative for covid, RSV, and Flu    -Currently on 2L O2 (baseline)  -RT per protocol if needed.

## 2023-01-24 ENCOUNTER — APPOINTMENT (OUTPATIENT)
Dept: RADIOLOGY | Facility: MEDICAL CENTER | Age: 53
DRG: 896 | End: 2023-01-24
Attending: STUDENT IN AN ORGANIZED HEALTH CARE EDUCATION/TRAINING PROGRAM
Payer: COMMERCIAL

## 2023-01-24 PROBLEM — R51.9 HEADACHE: Status: ACTIVE | Noted: 2023-01-24

## 2023-01-24 PROBLEM — E66.3 OVERWEIGHT: Status: ACTIVE | Noted: 2023-01-24

## 2023-01-24 LAB
ALBUMIN SERPL BCP-MCNC: 3.9 G/DL (ref 3.2–4.9)
ALBUMIN/GLOB SERPL: 1 G/DL
ALP SERPL-CCNC: 128 U/L (ref 30–99)
ALT SERPL-CCNC: 26 U/L (ref 2–50)
ANION GAP SERPL CALC-SCNC: 10 MMOL/L (ref 7–16)
AST SERPL-CCNC: 68 U/L (ref 12–45)
BASOPHILS # BLD AUTO: 0.9 % (ref 0–1.8)
BASOPHILS # BLD: 0.04 K/UL (ref 0–0.12)
BILIRUB SERPL-MCNC: 0.8 MG/DL (ref 0.1–1.5)
BUN SERPL-MCNC: 6 MG/DL (ref 8–22)
CALCIUM ALBUM COR SERPL-MCNC: 8.5 MG/DL (ref 8.5–10.5)
CALCIUM SERPL-MCNC: 8.4 MG/DL (ref 8.5–10.5)
CHLORIDE SERPL-SCNC: 94 MMOL/L (ref 96–112)
CO2 SERPL-SCNC: 27 MMOL/L (ref 20–33)
CREAT SERPL-MCNC: 0.31 MG/DL (ref 0.5–1.4)
EOSINOPHIL # BLD AUTO: 0.04 K/UL (ref 0–0.51)
EOSINOPHIL NFR BLD: 0.9 % (ref 0–6.9)
ERYTHROCYTE [DISTWIDTH] IN BLOOD BY AUTOMATED COUNT: 54.9 FL (ref 35.9–50)
GFR SERPLBLD CREATININE-BSD FMLA CKD-EPI: 125 ML/MIN/1.73 M 2
GLOBULIN SER CALC-MCNC: 4.1 G/DL (ref 1.9–3.5)
GLUCOSE SERPL-MCNC: 117 MG/DL (ref 65–99)
HCT VFR BLD AUTO: 31 % (ref 37–47)
HGB BLD-MCNC: 8.7 G/DL (ref 12–16)
IMM GRANULOCYTES # BLD AUTO: 0.03 K/UL (ref 0–0.11)
IMM GRANULOCYTES NFR BLD AUTO: 0.6 % (ref 0–0.9)
LYMPHOCYTES # BLD AUTO: 1.24 K/UL (ref 1–4.8)
LYMPHOCYTES NFR BLD: 26.4 % (ref 22–41)
MAGNESIUM SERPL-MCNC: 2.2 MG/DL (ref 1.5–2.5)
MCH RBC QN AUTO: 23.5 PG (ref 27–33)
MCHC RBC AUTO-ENTMCNC: 28.1 G/DL (ref 33.6–35)
MCV RBC AUTO: 83.6 FL (ref 81.4–97.8)
MONOCYTES # BLD AUTO: 0.58 K/UL (ref 0–0.85)
MONOCYTES NFR BLD AUTO: 12.3 % (ref 0–13.4)
NEUTROPHILS # BLD AUTO: 2.77 K/UL (ref 2–7.15)
NEUTROPHILS NFR BLD: 58.9 % (ref 44–72)
NRBC # BLD AUTO: 0 K/UL
NRBC BLD-RTO: 0 /100 WBC
PHOSPHATE SERPL-MCNC: 2.6 MG/DL (ref 2.5–4.5)
PLATELET # BLD AUTO: 71 K/UL (ref 164–446)
PMV BLD AUTO: 9 FL (ref 9–12.9)
POTASSIUM SERPL-SCNC: 3.7 MMOL/L (ref 3.6–5.5)
PROT SERPL-MCNC: 8 G/DL (ref 6–8.2)
RBC # BLD AUTO: 3.71 M/UL (ref 4.2–5.4)
SODIUM SERPL-SCNC: 131 MMOL/L (ref 135–145)
WBC # BLD AUTO: 4.7 K/UL (ref 4.8–10.8)

## 2023-01-24 PROCEDURE — 700111 HCHG RX REV CODE 636 W/ 250 OVERRIDE (IP): Performed by: STUDENT IN AN ORGANIZED HEALTH CARE EDUCATION/TRAINING PROGRAM

## 2023-01-24 PROCEDURE — C9113 INJ PANTOPRAZOLE SODIUM, VIA: HCPCS | Performed by: STUDENT IN AN ORGANIZED HEALTH CARE EDUCATION/TRAINING PROGRAM

## 2023-01-24 PROCEDURE — 84100 ASSAY OF PHOSPHORUS: CPT

## 2023-01-24 PROCEDURE — 700105 HCHG RX REV CODE 258: Performed by: STUDENT IN AN ORGANIZED HEALTH CARE EDUCATION/TRAINING PROGRAM

## 2023-01-24 PROCEDURE — 700102 HCHG RX REV CODE 250 W/ 637 OVERRIDE(OP): Performed by: HOSPITALIST

## 2023-01-24 PROCEDURE — 99233 SBSQ HOSP IP/OBS HIGH 50: CPT | Performed by: HOSPITALIST

## 2023-01-24 PROCEDURE — A9270 NON-COVERED ITEM OR SERVICE: HCPCS | Performed by: STUDENT IN AN ORGANIZED HEALTH CARE EDUCATION/TRAINING PROGRAM

## 2023-01-24 PROCEDURE — 80053 COMPREHEN METABOLIC PANEL: CPT

## 2023-01-24 PROCEDURE — 83735 ASSAY OF MAGNESIUM: CPT

## 2023-01-24 PROCEDURE — 700102 HCHG RX REV CODE 250 W/ 637 OVERRIDE(OP): Performed by: STUDENT IN AN ORGANIZED HEALTH CARE EDUCATION/TRAINING PROGRAM

## 2023-01-24 PROCEDURE — 770000 HCHG ROOM/CARE - INTERMEDIATE ICU *

## 2023-01-24 PROCEDURE — 85025 COMPLETE CBC W/AUTO DIFF WBC: CPT

## 2023-01-24 PROCEDURE — A9270 NON-COVERED ITEM OR SERVICE: HCPCS | Performed by: HOSPITALIST

## 2023-01-24 RX ORDER — CHLORDIAZEPOXIDE HYDROCHLORIDE 25 MG/1
25 CAPSULE, GELATIN COATED ORAL EVERY 6 HOURS
Status: DISCONTINUED | OUTPATIENT
Start: 2023-01-25 | End: 2023-01-25

## 2023-01-24 RX ORDER — CHLORDIAZEPOXIDE HYDROCHLORIDE 25 MG/1
50 CAPSULE, GELATIN COATED ORAL EVERY 6 HOURS
Status: COMPLETED | OUTPATIENT
Start: 2023-01-24 | End: 2023-01-24

## 2023-01-24 RX ORDER — MORPHINE SULFATE 4 MG/ML
2 INJECTION INTRAVENOUS EVERY 4 HOURS PRN
Status: DISCONTINUED | OUTPATIENT
Start: 2023-01-24 | End: 2023-01-24

## 2023-01-24 RX ORDER — MAGNESIUM SULFATE 1 G/100ML
1 INJECTION INTRAVENOUS ONCE
Status: COMPLETED | OUTPATIENT
Start: 2023-01-24 | End: 2023-01-24

## 2023-01-24 RX ORDER — PANTOPRAZOLE SODIUM 40 MG/10ML
40 INJECTION, POWDER, LYOPHILIZED, FOR SOLUTION INTRAVENOUS 2 TIMES DAILY
Status: DISCONTINUED | OUTPATIENT
Start: 2023-01-24 | End: 2023-01-24

## 2023-01-24 RX ORDER — BUTALBITAL, ACETAMINOPHEN AND CAFFEINE 50; 325; 40 MG/1; MG/1; MG/1
1 TABLET ORAL EVERY 6 HOURS PRN
Status: DISCONTINUED | OUTPATIENT
Start: 2023-01-24 | End: 2023-01-29 | Stop reason: HOSPADM

## 2023-01-24 RX ORDER — OMEPRAZOLE 20 MG/1
40 CAPSULE, DELAYED RELEASE ORAL 2 TIMES DAILY
Status: DISCONTINUED | OUTPATIENT
Start: 2023-01-24 | End: 2023-01-26

## 2023-01-24 RX ORDER — OXYCODONE HYDROCHLORIDE 5 MG/1
5 TABLET ORAL EVERY 4 HOURS PRN
Status: DISCONTINUED | OUTPATIENT
Start: 2023-01-24 | End: 2023-01-29 | Stop reason: HOSPADM

## 2023-01-24 RX ADMIN — CHLORDIAZEPOXIDE HYDROCHLORIDE 25 MG: 25 CAPSULE ORAL at 23:47

## 2023-01-24 RX ADMIN — CHLORDIAZEPOXIDE HYDROCHLORIDE 50 MG: 25 CAPSULE ORAL at 05:14

## 2023-01-24 RX ADMIN — BUTALBITAL, ACETAMINOPHEN, AND CAFFEINE 1 TABLET: 50; 325; 40 TABLET ORAL at 10:45

## 2023-01-24 RX ADMIN — PANTOPRAZOLE SODIUM 40 MG: 40 INJECTION, POWDER, LYOPHILIZED, FOR SOLUTION INTRAVENOUS at 01:23

## 2023-01-24 RX ADMIN — SODIUM CHLORIDE: 9 INJECTION, SOLUTION INTRAVENOUS at 17:30

## 2023-01-24 RX ADMIN — BUTALBITAL, ACETAMINOPHEN, AND CAFFEINE 1 TABLET: 50; 325; 40 TABLET ORAL at 17:24

## 2023-01-24 RX ADMIN — HEPARIN SODIUM 5000 UNITS: 5000 INJECTION, SOLUTION INTRAVENOUS; SUBCUTANEOUS at 05:13

## 2023-01-24 RX ADMIN — MAGNESIUM SULFATE HEPTAHYDRATE 1 G: 1 INJECTION, SOLUTION INTRAVENOUS at 01:28

## 2023-01-24 RX ADMIN — FOLIC ACID 1 MG: 1 TABLET ORAL at 05:14

## 2023-01-24 RX ADMIN — LORAZEPAM 2 MG: 2 TABLET ORAL at 08:00

## 2023-01-24 RX ADMIN — OMEPRAZOLE 40 MG: 20 CAPSULE, DELAYED RELEASE ORAL at 17:25

## 2023-01-24 RX ADMIN — CHLORDIAZEPOXIDE HYDROCHLORIDE 50 MG: 25 CAPSULE ORAL at 01:22

## 2023-01-24 RX ADMIN — CHLORDIAZEPOXIDE HYDROCHLORIDE 50 MG: 25 CAPSULE ORAL at 17:24

## 2023-01-24 RX ADMIN — THERA TABS 1 TABLET: TAB at 05:14

## 2023-01-24 RX ADMIN — CHLORDIAZEPOXIDE HYDROCHLORIDE 50 MG: 25 CAPSULE ORAL at 12:35

## 2023-01-24 RX ADMIN — Medication 100 MG: at 05:14

## 2023-01-24 RX ADMIN — RIVAROXABAN 10 MG: 10 TABLET, FILM COATED ORAL at 17:25

## 2023-01-24 RX ADMIN — LORAZEPAM 1 MG: 1 TABLET ORAL at 15:14

## 2023-01-24 RX ADMIN — LORAZEPAM 1 MG: 1 TABLET ORAL at 20:48

## 2023-01-24 ASSESSMENT — LIFESTYLE VARIABLES
NAUSEA AND VOMITING: NO NAUSEA AND NO VOMITING
VISUAL DISTURBANCES: NOT PRESENT
AUDITORY DISTURBANCES: NOT PRESENT
AGITATION: NORMAL ACTIVITY
TOTAL SCORE: 12
PAROXYSMAL SWEATS: BARELY PERCEPTIBLE SWEATING. PALMS MOIST
ORIENTATION AND CLOUDING OF SENSORIUM: ORIENTED AND CAN DO SERIAL ADDITIONS
HEADACHE, FULLNESS IN HEAD: NOT PRESENT
HEADACHE, FULLNESS IN HEAD: MODERATE
AUDITORY DISTURBANCES: NOT PRESENT
TREMOR: MODERATE TREMOR WITH ARMS EXTENDED
TREMOR: MODERATE TREMOR WITH ARMS EXTENDED
PAROXYSMAL SWEATS: NO SWEAT VISIBLE
AGITATION: NORMAL ACTIVITY
ORIENTATION AND CLOUDING OF SENSORIUM: ORIENTED AND CAN DO SERIAL ADDITIONS
ANXIETY: *
TOTAL SCORE: 4
TOTAL SCORE: 6
PAROXYSMAL SWEATS: BARELY PERCEPTIBLE SWEATING. PALMS MOIST
ANXIETY: MILDLY ANXIOUS
TOTAL SCORE: 6
VISUAL DISTURBANCES: NOT PRESENT
AUDITORY DISTURBANCES: NOT PRESENT
ANXIETY: NO ANXIETY (AT EASE)
PAROXYSMAL SWEATS: BARELY PERCEPTIBLE SWEATING. PALMS MOIST
ORIENTATION AND CLOUDING OF SENSORIUM: ORIENTED AND CAN DO SERIAL ADDITIONS
AGITATION: NORMAL ACTIVITY
PAROXYSMAL SWEATS: BARELY PERCEPTIBLE SWEATING. PALMS MOIST
TREMOR: *
SUBSTANCE_ABUSE: 1
VISUAL DISTURBANCES: NOT PRESENT
AUDITORY DISTURBANCES: NOT PRESENT
NAUSEA AND VOMITING: NO NAUSEA AND NO VOMITING
NAUSEA AND VOMITING: MILD NAUSEA WITH NO VOMITING
ANXIETY: NO ANXIETY (AT EASE)
VISUAL DISTURBANCES: NOT PRESENT
TOTAL SCORE: 4
AUDITORY DISTURBANCES: NOT PRESENT
ORIENTATION AND CLOUDING OF SENSORIUM: ORIENTED AND CAN DO SERIAL ADDITIONS
AGITATION: NORMAL ACTIVITY
AGITATION: SOMEWHAT MORE THAN NORMAL ACTIVITY
TREMOR: MODERATE TREMOR WITH ARMS EXTENDED
HEADACHE, FULLNESS IN HEAD: NOT PRESENT
AUDITORY DISTURBANCES: NOT PRESENT
VISUAL DISTURBANCES: NOT PRESENT
HEADACHE, FULLNESS IN HEAD: NOT PRESENT
VISUAL DISTURBANCES: NOT PRESENT
NAUSEA AND VOMITING: NO NAUSEA AND NO VOMITING
NAUSEA AND VOMITING: NO NAUSEA AND NO VOMITING
TREMOR: *
NAUSEA AND VOMITING: MILD NAUSEA WITH NO VOMITING
ANXIETY: MILDLY ANXIOUS
TOTAL SCORE: 4
ORIENTATION AND CLOUDING OF SENSORIUM: ORIENTED AND CAN DO SERIAL ADDITIONS
NAUSEA AND VOMITING: NO NAUSEA AND NO VOMITING
TREMOR: *
VISUAL DISTURBANCES: NOT PRESENT
HEADACHE, FULLNESS IN HEAD: MODERATE
AUDITORY DISTURBANCES: NOT PRESENT
TREMOR: *
TOTAL SCORE: 9
TOTAL SCORE: 10
ORIENTATION AND CLOUDING OF SENSORIUM: ORIENTED AND CAN DO SERIAL ADDITIONS
HEADACHE, FULLNESS IN HEAD: NOT PRESENT
VISUAL DISTURBANCES: NOT PRESENT
AGITATION: NORMAL ACTIVITY
ORIENTATION AND CLOUDING OF SENSORIUM: ORIENTED AND CAN DO SERIAL ADDITIONS
AGITATION: NORMAL ACTIVITY
PAROXYSMAL SWEATS: BARELY PERCEPTIBLE SWEATING. PALMS MOIST
AUDITORY DISTURBANCES: NOT PRESENT
TREMOR: MODERATE TREMOR WITH ARMS EXTENDED
HEADACHE, FULLNESS IN HEAD: NOT PRESENT
HEADACHE, FULLNESS IN HEAD: MODERATELY SEVERE
PAROXYSMAL SWEATS: NO SWEAT VISIBLE
AGITATION: SOMEWHAT MORE THAN NORMAL ACTIVITY
ANXIETY: MILDLY ANXIOUS
PAROXYSMAL SWEATS: BARELY PERCEPTIBLE SWEATING. PALMS MOIST
NAUSEA AND VOMITING: NO NAUSEA AND NO VOMITING
ORIENTATION AND CLOUDING OF SENSORIUM: ORIENTED AND CAN DO SERIAL ADDITIONS
ANXIETY: MILDLY ANXIOUS
ANXIETY: NO ANXIETY (AT EASE)

## 2023-01-24 ASSESSMENT — ENCOUNTER SYMPTOMS
COUGH: 0
SHORTNESS OF BREATH: 0
SPEECH CHANGE: 0
NERVOUS/ANXIOUS: 0
HEADACHES: 1
DIZZINESS: 0
NAUSEA: 0
CHILLS: 0
TINGLING: 0
BACK PAIN: 0
EYE DISCHARGE: 0
PALPITATIONS: 0
ABDOMINAL PAIN: 0
DIARRHEA: 0
STRIDOR: 0
FEVER: 0
FOCAL WEAKNESS: 0
SENSORY CHANGE: 0

## 2023-01-24 ASSESSMENT — PAIN DESCRIPTION - PAIN TYPE
TYPE: ACUTE PAIN

## 2023-01-24 NOTE — ASSESSMENT & PLAN NOTE
Possible EtOH w/drawal related, rebound headaches, methamphetamine use related. No focal deficit.  -Tylenol prn  -CTA head and neck 1/27/23:  Mild cerebral atrophy. 8mm rounded extra-axial calcification over the left high frontal convexity suggesting a calcified meningioma. Hyperostosis frontalis interna.   No acute dissection or occlusion of the neck arteries.  Severe atherosclerotic narrowing of the proximal right internal carotid artery.  Dependent right upper lobe atelectasis. 5 mm bilateral upper lobe pulmonary nodules. Increased multilevel degenerative changes of the cervical spine from prior.   -Adding ASA, statin for risk reduction in CVD  -Appears much improved 1/28/23.

## 2023-01-24 NOTE — CARE PLAN
The patient is Watcher - Medium risk of patient condition declining or worsening    Shift Goals  Clinical Goals: VSS - manage DTE's  Patient Goals: safety    Progress made toward(s) clinical / shift goals:    Problem: Knowledge Deficit - Standard  Goal: Patient and family/care givers will demonstrate understanding of plan of care, disease process/condition, diagnostic tests and medications  Outcome: Not Met     Problem: Lifestyle Changes  Goal: Patient's ability to identify lifestyle changes and available resources to help reduce recurrence of condition will improve  Outcome: Not Met     Problem: Optimal Care for Alcohol Withdrawal  Goal: Optimal Care for the alcohol withdrawal patient  Outcome: Progressing     Problem: Seizure Precautions  Goal: Implementation of seizure precautions  Outcome: Progressing     Problem: Psychosocial  Goal: Patient's level of anxiety will decrease  Outcome: Progressing     Problem: Risk for Aspiration  Goal: Patient's risk for aspiration will be absent or decrease  Outcome: Progressing       Patient is not progressing towards the following goals:      Problem: Knowledge Deficit - Standard  Goal: Patient and family/care givers will demonstrate understanding of plan of care, disease process/condition, diagnostic tests and medications  Outcome: Not Met     Problem: Lifestyle Changes  Goal: Patient's ability to identify lifestyle changes and available resources to help reduce recurrence of condition will improve  Outcome: Not Met

## 2023-01-24 NOTE — PROGRESS NOTES
4 Eyes Skin Assessment Completed by JARETT Khoury and JARETT Rodriguez.    Head WDL  Ears Redness and Blanching  Nose WDL  Mouth WDL  Neck WDL  Breast/Chest WDL  Shoulder Blades WDL  Spine WDL  (R) Arm/Elbow/Hand Redness and Blanching  (L) Arm/Elbow/Hand Redness and Blanching  Abdomen WDL  Groin WDL  Scrotum/Coccyx/Buttocks Redness and Blanching  (R) Leg WDL  (L) Leg WDL  (R) Heel/Foot/Toe Redness, Blanching, and Boggy, calloused,red/blanching on bones under great toe  (L) Heel/Foot/Toe Redness, Blanching, and Boggy, and Boggy, calloused,red/blanching on bones under great toe          Devices In Places ECG, Blood Pressure Cuff, Pulse Ox, and Oxy Mask      Interventions In Place Heel Mepilex, Elbow Mepilex, and Low Air Loss Mattress    Possible Skin Injury No    Pictures Uploaded Into Epic N/A  Wound Consult Placed N/A  RN Wound Prevention Protocol Ordered No

## 2023-01-24 NOTE — PROGRESS NOTES
Hospital Medicine Daily Progress Note    Date of Service  1/24/2023    Chief Complaint  Headache, abdominal pain, Nausea, and vomiting.    Hospital Course  Olya Youssef is a 52 y.o. female with amphetamine and alcohol use,hypertension, and use of chronic oxygen since a prior COVID infections. She was admitted 1/23/2023 with fever, tachycardia and hypertensive.  She required higher amount of oxygen per H+P.  There was a concern of alcohol withdrawal.     Interval Problem Update  1/24/2023: Remains hypertensive and tachycardic. Lethargic but alert to place.  Thought it was 2014.  Denies any sensory change or musculature change. States her head hurts and denies falling or hitting it and has no lump on top of her head.  She denies caffeine use. States a pint of vodka and some other drinks daily.      I have discussed this patient's plan of care and discharge plan at IDT rounds today with Case Management, Nursing, Nursing leadership, and other members of the IDT team.      Code Status  Full Code    Disposition  Patient is not medically cleared for discharge.   Anticipate discharge to to home with close outpatient follow-up.  I have placed the appropriate orders for post-discharge needs.    Review of Systems  Review of Systems   Constitutional:  Negative for chills and fever.   Eyes:  Negative for discharge.   Respiratory:  Negative for cough, shortness of breath and stridor.    Cardiovascular:  Negative for chest pain, palpitations and leg swelling.   Gastrointestinal:  Negative for abdominal pain, diarrhea and nausea.   Genitourinary:  Negative for dysuria and hematuria.   Musculoskeletal:  Negative for back pain and joint pain.   Skin:  Negative for rash.   Neurological:  Positive for headaches. Negative for dizziness, tingling, sensory change, speech change and focal weakness.   Psychiatric/Behavioral:  Positive for substance abuse. The patient is not nervous/anxious.       Physical Exam  Temp:  [36.6 °C  (97.9 °F)-36.8 °C (98.2 °F)] 36.6 °C (97.9 °F)  Pulse:  [] 113  Resp:  [16-51] 22  BP: (121-167)/(70-91) 125/70  SpO2:  [78 %-97 %] 95 %    Physical Exam  Vitals reviewed.   Constitutional:       Appearance: Normal appearance. She is overweight. She is not diaphoretic.   HENT:      Head: Normocephalic and atraumatic.      Nose: Nose normal.      Mouth/Throat:      Mouth: Mucous membranes are moist.      Pharynx: No oropharyngeal exudate.   Eyes:      General: No scleral icterus.        Right eye: No discharge.         Left eye: No discharge.      Extraocular Movements: Extraocular movements intact.      Conjunctiva/sclera: Conjunctivae normal.   Cardiovascular:      Rate and Rhythm: Regular rhythm. Tachycardia present.      Pulses:           Radial pulses are 2+ on the right side and 2+ on the left side.        Dorsalis pedis pulses are 2+ on the right side and 2+ on the left side.      Heart sounds: No murmur heard.  Pulmonary:      Effort: Pulmonary effort is normal. No respiratory distress.      Breath sounds: Decreased breath sounds present. No wheezing or rales.   Abdominal:      General: Bowel sounds are normal. There is no distension.      Palpations: Abdomen is soft.      Tenderness: There is no abdominal tenderness.   Musculoskeletal:         General: No swelling or tenderness.      Cervical back: No tenderness. No muscular tenderness.      Right lower leg: No edema.      Left lower leg: No edema.   Lymphadenopathy:      Cervical: No cervical adenopathy.   Skin:     Coloration: Skin is not jaundiced or pale.   Neurological:      Mental Status: She is lethargic and disoriented.      Cranial Nerves: No cranial nerve deficit.      Motor: Weakness present.   Psychiatric:         Mood and Affect: Mood normal.         Speech: Speech is delayed.         Behavior: Behavior is slowed.         Cognition and Memory: Cognition is impaired.       Fluids    Intake/Output Summary (Last 24 hours) at 1/24/2023  1806  Last data filed at 1/24/2023 1126  Gross per 24 hour   Intake 937.72 ml   Output 1800 ml   Net -862.28 ml       Laboratory  Recent Labs     01/23/23  0638 01/24/23  0428   WBC 6.4 4.7*   RBC 4.16* 3.71*   HEMOGLOBIN 9.8* 8.7*   HEMATOCRIT 34.2* 31.0*   MCV 82.2 83.6   MCH 23.6* 23.5*   MCHC 28.7* 28.1*   RDW 54.8* 54.9*   PLATELETCT 105* 71*   MPV 9.2 9.0     Recent Labs     01/23/23  0638 01/24/23  0428   SODIUM 140 131*   POTASSIUM 3.7 3.7   CHLORIDE 100 94*   CO2 26 27   GLUCOSE 111* 117*   BUN 8 6*   CREATININE 0.38* 0.31*   CALCIUM 9.0 8.4*                   Imaging  US-RUQ   Final Result         1.  Echogenic liver, compatible with fatty change versus fibrosis.      DX-CHEST-PORTABLE (1 VIEW)   Final Result      No acute cardiopulmonary abnormality.              Assessment/Plan  * Alcohol withdrawal delirium, acute, hyperactive (HCC)- (present on admission)  Assessment & Plan  Patient presents with multitude of symptoms including nausea and vomiting abdominal pain likely secondary to acute alcohol withdrawal  Blood alcohol level is positive at 286.4, patient is tremulous somnolent with hypertension and tachycardia  Off drips 1/24  CIWA protocol  Mobilize as able.  Thiamine, folate  Seizure precautions.    Overweight  Assessment & Plan  Body mass index is 29.72 kg/m².  Encourage healthy choices    Headache  Assessment & Plan  Likely EtOH w/drawal related  Tylenol prn  1/24 no neurologic deficit appreciated    Elevated lipase- (present on admission)  Assessment & Plan  N/V and abdominal pain on admission, mild elevated lipase, possible pancreatitis   Hx of alcohol abuse  IV fluids for now.  GI cocktail and pain meds as needed.  Clear liquids as tolerates and advance diet as tolerates    Fever  Assessment & Plan  Fever of 101.3 on admission unclear of etiology, I suspect this may be a hyper adrenergic response to severe alcohol withdrawal however I cannot rule out infectious etiology  Stop antibiotics  RSV,  Flu, COVID negative  Monitor vitals.    Acute on chronic respiratory failure (HCC)  Assessment & Plan  Patient reportedly on 2 L of supplemental oxygen at baseline  Currently on 4L O2  RT per protocol if needed.  Suspect a component of COPD with diminished breath sounds  CXR result w/o acute finding  1/23 procalcitonin not elevated, negative for covid, RSV, and Flu    Methamphetamine abuse (HCC)- (present on admission)  Assessment & Plan  Amphetamine abuse, last use within the 24 hours  Cessation advised.    Obtundation- (present on admission)  Assessment & Plan  Somewhat better 1/24am.  Monitor neurochecks  Wean meds for withdrawal as tolerates         VTE prophylaxis: heparin ppx    I have performed a physical exam and reviewed and updated ROS and Plan today (1/24/2023). In review of yesterday's note (1/23/2023), there are no changes except as documented above.

## 2023-01-25 ENCOUNTER — APPOINTMENT (OUTPATIENT)
Dept: RADIOLOGY | Facility: MEDICAL CENTER | Age: 53
DRG: 896 | End: 2023-01-25
Attending: HOSPITALIST
Payer: COMMERCIAL

## 2023-01-25 PROBLEM — F10.139 ALCOHOL ABUSE WITH WITHDRAWAL (HCC): Status: ACTIVE | Noted: 2023-01-25

## 2023-01-25 LAB
BACTERIA UR CULT: NORMAL
SIGNIFICANT IND 70042: NORMAL
SITE SITE: NORMAL
SOURCE SOURCE: NORMAL

## 2023-01-25 PROCEDURE — A9270 NON-COVERED ITEM OR SERVICE: HCPCS | Performed by: STUDENT IN AN ORGANIZED HEALTH CARE EDUCATION/TRAINING PROGRAM

## 2023-01-25 PROCEDURE — 700105 HCHG RX REV CODE 258: Performed by: STUDENT IN AN ORGANIZED HEALTH CARE EDUCATION/TRAINING PROGRAM

## 2023-01-25 PROCEDURE — 700102 HCHG RX REV CODE 250 W/ 637 OVERRIDE(OP): Performed by: STUDENT IN AN ORGANIZED HEALTH CARE EDUCATION/TRAINING PROGRAM

## 2023-01-25 PROCEDURE — 93005 ELECTROCARDIOGRAM TRACING: CPT | Performed by: HOSPITALIST

## 2023-01-25 PROCEDURE — 700111 HCHG RX REV CODE 636 W/ 250 OVERRIDE (IP): Performed by: HOSPITALIST

## 2023-01-25 PROCEDURE — 99232 SBSQ HOSP IP/OBS MODERATE 35: CPT | Performed by: HOSPITALIST

## 2023-01-25 PROCEDURE — A9270 NON-COVERED ITEM OR SERVICE: HCPCS | Performed by: HOSPITALIST

## 2023-01-25 PROCEDURE — 770020 HCHG ROOM/CARE - TELE (206)

## 2023-01-25 PROCEDURE — 700102 HCHG RX REV CODE 250 W/ 637 OVERRIDE(OP): Performed by: HOSPITALIST

## 2023-01-25 RX ORDER — CHLORDIAZEPOXIDE HYDROCHLORIDE 25 MG/1
25 CAPSULE, GELATIN COATED ORAL EVERY 8 HOURS
Status: DISCONTINUED | OUTPATIENT
Start: 2023-01-25 | End: 2023-01-28

## 2023-01-25 RX ORDER — PROCHLORPERAZINE EDISYLATE 5 MG/ML
10 INJECTION INTRAMUSCULAR; INTRAVENOUS EVERY 6 HOURS PRN
Status: DISCONTINUED | OUTPATIENT
Start: 2023-01-25 | End: 2023-01-27

## 2023-01-25 RX ORDER — ONDANSETRON 2 MG/ML
4 INJECTION INTRAMUSCULAR; INTRAVENOUS EVERY 4 HOURS PRN
Status: DISCONTINUED | OUTPATIENT
Start: 2023-01-25 | End: 2023-01-27

## 2023-01-25 RX ADMIN — CHLORDIAZEPOXIDE HYDROCHLORIDE 25 MG: 25 CAPSULE ORAL at 11:14

## 2023-01-25 RX ADMIN — LORAZEPAM 1 MG: 1 TABLET ORAL at 00:27

## 2023-01-25 RX ADMIN — LORAZEPAM 3 MG: 1 TABLET ORAL at 02:15

## 2023-01-25 RX ADMIN — Medication 100 MG: at 05:30

## 2023-01-25 RX ADMIN — SODIUM CHLORIDE: 9 INJECTION, SOLUTION INTRAVENOUS at 05:44

## 2023-01-25 RX ADMIN — OMEPRAZOLE 40 MG: 20 CAPSULE, DELAYED RELEASE ORAL at 17:49

## 2023-01-25 RX ADMIN — BUTALBITAL, ACETAMINOPHEN, AND CAFFEINE 1 TABLET: 50; 325; 40 TABLET ORAL at 10:12

## 2023-01-25 RX ADMIN — FOLIC ACID 1 MG: 1 TABLET ORAL at 05:30

## 2023-01-25 RX ADMIN — LORAZEPAM 3 MG: 1 TABLET ORAL at 08:04

## 2023-01-25 RX ADMIN — BUTALBITAL, ACETAMINOPHEN, AND CAFFEINE 1 TABLET: 50; 325; 40 TABLET ORAL at 01:37

## 2023-01-25 RX ADMIN — OMEPRAZOLE 40 MG: 20 CAPSULE, DELAYED RELEASE ORAL at 05:29

## 2023-01-25 RX ADMIN — PROCHLORPERAZINE EDISYLATE 10 MG: 5 INJECTION INTRAMUSCULAR; INTRAVENOUS at 12:15

## 2023-01-25 RX ADMIN — CHLORDIAZEPOXIDE HYDROCHLORIDE 25 MG: 25 CAPSULE ORAL at 05:29

## 2023-01-25 RX ADMIN — THERA TABS 1 TABLET: TAB at 05:29

## 2023-01-25 RX ADMIN — LORAZEPAM 1 MG: 1 TABLET ORAL at 12:15

## 2023-01-25 RX ADMIN — RIVAROXABAN 10 MG: 10 TABLET, FILM COATED ORAL at 17:49

## 2023-01-25 ASSESSMENT — LIFESTYLE VARIABLES
AGITATION: NORMAL ACTIVITY
TREMOR: TREMOR NOT VISIBLE BUT CAN BE FELT, FINGERTIP TO FINGERTIP
HEADACHE, FULLNESS IN HEAD: MODERATE
VISUAL DISTURBANCES: NOT PRESENT
VISUAL DISTURBANCES: NOT PRESENT
TREMOR: *
ANXIETY: *
TOTAL SCORE: 4
PAROXYSMAL SWEATS: BARELY PERCEPTIBLE SWEATING. PALMS MOIST
PAROXYSMAL SWEATS: BARELY PERCEPTIBLE SWEATING. PALMS MOIST
VISUAL DISTURBANCES: NOT PRESENT
HEADACHE, FULLNESS IN HEAD: NOT PRESENT
ANXIETY: NO ANXIETY (AT EASE)
HEADACHE, FULLNESS IN HEAD: MODERATE
TOTAL SCORE: 11
TOTAL SCORE: 10
AGITATION: SOMEWHAT MORE THAN NORMAL ACTIVITY
AGITATION: NORMAL ACTIVITY
NAUSEA AND VOMITING: NO NAUSEA AND NO VOMITING
ANXIETY: NO ANXIETY (AT EASE)
ORIENTATION AND CLOUDING OF SENSORIUM: ORIENTED AND CAN DO SERIAL ADDITIONS
HEADACHE, FULLNESS IN HEAD: NOT PRESENT
HEADACHE, FULLNESS IN HEAD: MODERATE
AGITATION: MODERATELY FIDGETY AND RESTLESS
PAROXYSMAL SWEATS: BARELY PERCEPTIBLE SWEATING. PALMS MOIST
NAUSEA AND VOMITING: NO NAUSEA AND NO VOMITING
NAUSEA AND VOMITING: *
VISUAL DISTURBANCES: NOT PRESENT
NAUSEA AND VOMITING: NO NAUSEA AND NO VOMITING
TOTAL SCORE: 19
SUBSTANCE_ABUSE: 1
AGITATION: MODERATELY FIDGETY AND RESTLESS
AUDITORY DISTURBANCES: NOT PRESENT
ORIENTATION AND CLOUDING OF SENSORIUM: DATE DISORIENTATION BY MORE THAN TWO CALENDAR DAYS
TREMOR: *
NAUSEA AND VOMITING: *
ORIENTATION AND CLOUDING OF SENSORIUM: ORIENTED AND CAN DO SERIAL ADDITIONS
HEADACHE, FULLNESS IN HEAD: NOT PRESENT
TREMOR: MODERATE TREMOR WITH ARMS EXTENDED
ANXIETY: NO ANXIETY (AT EASE)
AGITATION: NORMAL ACTIVITY
AUDITORY DISTURBANCES: NOT PRESENT
PAROXYSMAL SWEATS: *
ANXIETY: *
TREMOR: MODERATE TREMOR WITH ARMS EXTENDED
VISUAL DISTURBANCES: NOT PRESENT
TREMOR: *
AUDITORY DISTURBANCES: NOT PRESENT
AUDITORY DISTURBANCES: NOT PRESENT
PAROXYSMAL SWEATS: BARELY PERCEPTIBLE SWEATING. PALMS MOIST
NAUSEA AND VOMITING: NO NAUSEA AND NO VOMITING
PAROXYSMAL SWEATS: NO SWEAT VISIBLE
TOTAL SCORE: 17
AUDITORY DISTURBANCES: NOT PRESENT
ORIENTATION AND CLOUDING OF SENSORIUM: ORIENTED AND CAN DO SERIAL ADDITIONS
VISUAL DISTURBANCES: NOT PRESENT
ORIENTATION AND CLOUDING OF SENSORIUM: ORIENTED AND CAN DO SERIAL ADDITIONS
ORIENTATION AND CLOUDING OF SENSORIUM: ORIENTED AND CAN DO SERIAL ADDITIONS
AUDITORY DISTURBANCES: NOT PRESENT
TREMOR: MODERATE TREMOR WITH ARMS EXTENDED
TOTAL SCORE: 6
NAUSEA AND VOMITING: NO NAUSEA AND NO VOMITING
HEADACHE, FULLNESS IN HEAD: NOT PRESENT
ANXIETY: *
AGITATION: NORMAL ACTIVITY
PAROXYSMAL SWEATS: BARELY PERCEPTIBLE SWEATING. PALMS MOIST
ANXIETY: MILDLY ANXIOUS
AUDITORY DISTURBANCES: NOT PRESENT
TOTAL SCORE: 2
VISUAL DISTURBANCES: NOT PRESENT
ORIENTATION AND CLOUDING OF SENSORIUM: ORIENTED AND CAN DO SERIAL ADDITIONS

## 2023-01-25 ASSESSMENT — ENCOUNTER SYMPTOMS
CHILLS: 0
DIZZINESS: 0
BACK PAIN: 0
TINGLING: 0
FEVER: 0
SPEECH CHANGE: 0
NAUSEA: 1
SHORTNESS OF BREATH: 0
NERVOUS/ANXIOUS: 0
FOCAL WEAKNESS: 0
EYE DISCHARGE: 0
PALPITATIONS: 0
HEADACHES: 1
DIARRHEA: 0
ABDOMINAL PAIN: 1
COUGH: 0
STRIDOR: 0

## 2023-01-25 ASSESSMENT — PAIN DESCRIPTION - PAIN TYPE
TYPE: ACUTE PAIN
TYPE: ACUTE PAIN

## 2023-01-25 NOTE — CARE PLAN
The patient is Watcher - Medium risk of patient condition declining or worsening    Shift Goals  Clinical Goals: Decrease O2 requirements, manage delirum tremens  Patient Goals: Pain Control  Family Goals: BRITTANIE    Progress made toward(s) clinical / shift goals:    Problem: Knowledge Deficit - Standard  Goal: Patient and family/care givers will demonstrate understanding of plan of care, disease process/condition, diagnostic tests and medications  Outcome: Not Met     Problem: Lifestyle Changes  Goal: Patient's ability to identify lifestyle changes and available resources to help reduce recurrence of condition will improve  Outcome: Not Met     Problem: Optimal Care for Alcohol Withdrawal  Goal: Optimal Care for the alcohol withdrawal patient  Outcome: Progressing     Problem: Seizure Precautions  Goal: Implementation of seizure precautions  Outcome: Progressing     Problem: Psychosocial  Goal: Patient's level of anxiety will decrease  Outcome: Progressing     Problem: Risk for Aspiration  Goal: Patient's risk for aspiration will be absent or decrease  Outcome: Progressing     Problem: Fall Risk  Goal: Patient will remain free from falls  Outcome: Progressing     Problem: Pain - Standard  Goal: Alleviation of pain or a reduction in pain to the patient’s comfort goal  Outcome: Progressing       Patient is not progressing towards the following goals:      Problem: Knowledge Deficit - Standard  Goal: Patient and family/care givers will demonstrate understanding of plan of care, disease process/condition, diagnostic tests and medications  Outcome: Not Met     Problem: Lifestyle Changes  Goal: Patient's ability to identify lifestyle changes and available resources to help reduce recurrence of condition will improve  Outcome: Not Met

## 2023-01-25 NOTE — RESPIRATORY CARE
COPD EDUCATION by COPD CLINICAL EDUCATOR  1/25/2023 at 8:04 AM by Kristen Randall, RRT     Patient reviewed by COPD education team. Patient does not have a history or diagnosis of COPD.Therefore, patient does not qualify for the COPD program.

## 2023-01-25 NOTE — CARE PLAN
The patient is Watcher - Medium risk of patient condition declining or worsening    Shift Goals  Clinical Goals: Wean O2 as tolerated, CIWA  Patient Goals: Pain management  Family Goals: BRITTANIE    Progress made toward(s) clinical / shift goals:  Pt aware of plan of care, no falls this shift    Patient is not progressing towards the following goals:      Problem: Knowledge Deficit - Standard  Goal: Patient and family/care givers will demonstrate understanding of plan of care, disease process/condition, diagnostic tests and medications  Outcome: Progressing     Problem: Fall Risk  Goal: Patient will remain free from falls  Outcome: Progressing

## 2023-01-26 ENCOUNTER — APPOINTMENT (OUTPATIENT)
Dept: RADIOLOGY | Facility: MEDICAL CENTER | Age: 53
DRG: 896 | End: 2023-01-26
Payer: COMMERCIAL

## 2023-01-26 ENCOUNTER — APPOINTMENT (OUTPATIENT)
Dept: RADIOLOGY | Facility: MEDICAL CENTER | Age: 53
DRG: 896 | End: 2023-01-26
Attending: HOSPITALIST
Payer: COMMERCIAL

## 2023-01-26 PROBLEM — R10.11 RIGHT UPPER QUADRANT ABDOMINAL PAIN: Status: ACTIVE | Noted: 2023-01-26

## 2023-01-26 LAB
ALBUMIN SERPL BCP-MCNC: 3.7 G/DL (ref 3.2–4.9)
ALBUMIN/GLOB SERPL: 0.9 G/DL
ALP SERPL-CCNC: 132 U/L (ref 30–99)
ALT SERPL-CCNC: 26 U/L (ref 2–50)
ANION GAP SERPL CALC-SCNC: 11 MMOL/L (ref 7–16)
ANION GAP SERPL CALC-SCNC: 11 MMOL/L (ref 7–16)
AST SERPL-CCNC: 63 U/L (ref 12–45)
BILIRUB SERPL-MCNC: 0.6 MG/DL (ref 0.1–1.5)
BUN SERPL-MCNC: 7 MG/DL (ref 8–22)
BUN SERPL-MCNC: 7 MG/DL (ref 8–22)
CALCIUM ALBUM COR SERPL-MCNC: 9.3 MG/DL (ref 8.5–10.5)
CALCIUM SERPL-MCNC: 8.7 MG/DL (ref 8.5–10.5)
CALCIUM SERPL-MCNC: 9.1 MG/DL (ref 8.5–10.5)
CHLORIDE SERPL-SCNC: 100 MMOL/L (ref 96–112)
CHLORIDE SERPL-SCNC: 99 MMOL/L (ref 96–112)
CO2 SERPL-SCNC: 24 MMOL/L (ref 20–33)
CO2 SERPL-SCNC: 28 MMOL/L (ref 20–33)
CREAT SERPL-MCNC: 0.39 MG/DL (ref 0.5–1.4)
CREAT SERPL-MCNC: 0.4 MG/DL (ref 0.5–1.4)
EKG IMPRESSION: NORMAL
GFR SERPLBLD CREATININE-BSD FMLA CKD-EPI: 117 ML/MIN/1.73 M 2
GFR SERPLBLD CREATININE-BSD FMLA CKD-EPI: 118 ML/MIN/1.73 M 2
GLOBULIN SER CALC-MCNC: 3.9 G/DL (ref 1.9–3.5)
GLUCOSE SERPL-MCNC: 103 MG/DL (ref 65–99)
GLUCOSE SERPL-MCNC: 131 MG/DL (ref 65–99)
INR PPP: 1.2 (ref 0.87–1.13)
POTASSIUM SERPL-SCNC: 3.3 MMOL/L (ref 3.6–5.5)
POTASSIUM SERPL-SCNC: 3.6 MMOL/L (ref 3.6–5.5)
PROT SERPL-MCNC: 7.6 G/DL (ref 6–8.2)
PROTHROMBIN TIME: 15 SEC (ref 12–14.6)
SODIUM SERPL-SCNC: 135 MMOL/L (ref 135–145)
SODIUM SERPL-SCNC: 138 MMOL/L (ref 135–145)

## 2023-01-26 PROCEDURE — 700102 HCHG RX REV CODE 250 W/ 637 OVERRIDE(OP)

## 2023-01-26 PROCEDURE — A9270 NON-COVERED ITEM OR SERVICE: HCPCS | Performed by: HOSPITALIST

## 2023-01-26 PROCEDURE — 80048 BASIC METABOLIC PNL TOTAL CA: CPT

## 2023-01-26 PROCEDURE — 700102 HCHG RX REV CODE 250 W/ 637 OVERRIDE(OP): Performed by: HOSPITALIST

## 2023-01-26 PROCEDURE — 85610 PROTHROMBIN TIME: CPT

## 2023-01-26 PROCEDURE — 770020 HCHG ROOM/CARE - TELE (206)

## 2023-01-26 PROCEDURE — 700105 HCHG RX REV CODE 258

## 2023-01-26 PROCEDURE — A9270 NON-COVERED ITEM OR SERVICE: HCPCS

## 2023-01-26 PROCEDURE — 93010 ELECTROCARDIOGRAM REPORT: CPT | Performed by: INTERNAL MEDICINE

## 2023-01-26 PROCEDURE — 700102 HCHG RX REV CODE 250 W/ 637 OVERRIDE(OP): Performed by: STUDENT IN AN ORGANIZED HEALTH CARE EDUCATION/TRAINING PROGRAM

## 2023-01-26 PROCEDURE — A9270 NON-COVERED ITEM OR SERVICE: HCPCS | Performed by: STUDENT IN AN ORGANIZED HEALTH CARE EDUCATION/TRAINING PROGRAM

## 2023-01-26 PROCEDURE — 700111 HCHG RX REV CODE 636 W/ 250 OVERRIDE (IP): Performed by: HOSPITALIST

## 2023-01-26 PROCEDURE — 80053 COMPREHEN METABOLIC PANEL: CPT

## 2023-01-26 PROCEDURE — 36415 COLL VENOUS BLD VENIPUNCTURE: CPT

## 2023-01-26 PROCEDURE — 99232 SBSQ HOSP IP/OBS MODERATE 35: CPT | Mod: GC | Performed by: HOSPITALIST

## 2023-01-26 RX ORDER — OMEPRAZOLE 20 MG/1
20 CAPSULE, DELAYED RELEASE ORAL DAILY
Status: DISCONTINUED | OUTPATIENT
Start: 2023-01-27 | End: 2023-01-29 | Stop reason: HOSPADM

## 2023-01-26 RX ORDER — SODIUM CHLORIDE, SODIUM LACTATE, POTASSIUM CHLORIDE, CALCIUM CHLORIDE 600; 310; 30; 20 MG/100ML; MG/100ML; MG/100ML; MG/100ML
INJECTION, SOLUTION INTRAVENOUS CONTINUOUS
Status: DISCONTINUED | OUTPATIENT
Start: 2023-01-26 | End: 2023-01-27

## 2023-01-26 RX ORDER — POTASSIUM CHLORIDE 20 MEQ/1
40 TABLET, EXTENDED RELEASE ORAL ONCE
Status: COMPLETED | OUTPATIENT
Start: 2023-01-26 | End: 2023-01-26

## 2023-01-26 RX ORDER — SODIUM CHLORIDE 9 MG/ML
INJECTION, SOLUTION INTRAVENOUS CONTINUOUS
Status: DISCONTINUED | OUTPATIENT
Start: 2023-01-26 | End: 2023-01-26

## 2023-01-26 RX ORDER — POTASSIUM CHLORIDE 7.45 MG/ML
10 INJECTION INTRAVENOUS
Status: DISCONTINUED | OUTPATIENT
Start: 2023-01-26 | End: 2023-01-26

## 2023-01-26 RX ADMIN — Medication 100 MG: at 05:19

## 2023-01-26 RX ADMIN — OXYCODONE HYDROCHLORIDE 5 MG: 5 TABLET ORAL at 02:06

## 2023-01-26 RX ADMIN — CHLORDIAZEPOXIDE HYDROCHLORIDE 25 MG: 25 CAPSULE ORAL at 22:28

## 2023-01-26 RX ADMIN — CHLORDIAZEPOXIDE HYDROCHLORIDE 25 MG: 25 CAPSULE ORAL at 05:20

## 2023-01-26 RX ADMIN — LORAZEPAM 1 MG: 1 TABLET ORAL at 12:28

## 2023-01-26 RX ADMIN — LORAZEPAM 3 MG: 1 TABLET ORAL at 02:07

## 2023-01-26 RX ADMIN — BUTALBITAL, ACETAMINOPHEN, AND CAFFEINE 1 TABLET: 50; 325; 40 TABLET ORAL at 12:19

## 2023-01-26 RX ADMIN — RIVAROXABAN 10 MG: 10 TABLET, FILM COATED ORAL at 18:09

## 2023-01-26 RX ADMIN — FOLIC ACID 1 MG: 1 TABLET ORAL at 05:20

## 2023-01-26 RX ADMIN — LORAZEPAM 2 MG: 2 TABLET ORAL at 09:35

## 2023-01-26 RX ADMIN — BUTALBITAL, ACETAMINOPHEN, AND CAFFEINE 1 TABLET: 50; 325; 40 TABLET ORAL at 02:06

## 2023-01-26 RX ADMIN — CHLORDIAZEPOXIDE HYDROCHLORIDE 25 MG: 25 CAPSULE ORAL at 12:19

## 2023-01-26 RX ADMIN — BUTALBITAL, ACETAMINOPHEN, AND CAFFEINE 1 TABLET: 50; 325; 40 TABLET ORAL at 18:22

## 2023-01-26 RX ADMIN — LORAZEPAM 2 MG: 2 TABLET ORAL at 05:56

## 2023-01-26 RX ADMIN — SODIUM CHLORIDE: 9 INJECTION, SOLUTION INTRAVENOUS at 09:38

## 2023-01-26 RX ADMIN — THERA TABS 1 TABLET: TAB at 05:19

## 2023-01-26 RX ADMIN — OXYCODONE HYDROCHLORIDE 5 MG: 5 TABLET ORAL at 13:24

## 2023-01-26 RX ADMIN — OMEPRAZOLE 40 MG: 20 CAPSULE, DELAYED RELEASE ORAL at 05:20

## 2023-01-26 RX ADMIN — SENNOSIDES AND DOCUSATE SODIUM 2 TABLET: 50; 8.6 TABLET ORAL at 05:19

## 2023-01-26 RX ADMIN — POTASSIUM CHLORIDE 40 MEQ: 1500 TABLET, EXTENDED RELEASE ORAL at 18:09

## 2023-01-26 RX ADMIN — PROCHLORPERAZINE EDISYLATE 10 MG: 5 INJECTION INTRAMUSCULAR; INTRAVENOUS at 02:06

## 2023-01-26 RX ADMIN — LORAZEPAM 1 MG: 1 TABLET ORAL at 22:28

## 2023-01-26 RX ADMIN — SODIUM CHLORIDE, POTASSIUM CHLORIDE, SODIUM LACTATE AND CALCIUM CHLORIDE: 600; 310; 30; 20 INJECTION, SOLUTION INTRAVENOUS at 22:30

## 2023-01-26 ASSESSMENT — LIFESTYLE VARIABLES
ANXIETY: MILDLY ANXIOUS
NAUSEA AND VOMITING: NO NAUSEA AND NO VOMITING
PAROXYSMAL SWEATS: *
VISUAL DISTURBANCES: VERY MILD SENSITIVITY
TOTAL SCORE: 8
AGITATION: SOMEWHAT MORE THAN NORMAL ACTIVITY
TOTAL SCORE: 9
TOTAL SCORE: MILD ITCHING, PINS AND NEEDLES SENSATION, BURNING OR NUMBNESS
TREMOR: TREMOR NOT VISIBLE BUT CAN BE FELT, FINGERTIP TO FINGERTIP
PAROXYSMAL SWEATS: *
VISUAL DISTURBANCES: NOT PRESENT
TREMOR: MODERATE TREMOR WITH ARMS EXTENDED
NAUSEA AND VOMITING: NO NAUSEA AND NO VOMITING
ANXIETY: *
TREMOR: NO TREMOR
PAROXYSMAL SWEATS: BARELY PERCEPTIBLE SWEATING. PALMS MOIST
TREMOR: MODERATE TREMOR WITH ARMS EXTENDED
AGITATION: SOMEWHAT MORE THAN NORMAL ACTIVITY
AGITATION: SOMEWHAT MORE THAN NORMAL ACTIVITY
ANXIETY: MILDLY ANXIOUS
NAUSEA AND VOMITING: *
ORIENTATION AND CLOUDING OF SENSORIUM: ORIENTED AND CAN DO SERIAL ADDITIONS
AUDITORY DISTURBANCES: NOT PRESENT
VISUAL DISTURBANCES: VERY MILD SENSITIVITY
ORIENTATION AND CLOUDING OF SENSORIUM: ORIENTED AND CAN DO SERIAL ADDITIONS
PAROXYSMAL SWEATS: BARELY PERCEPTIBLE SWEATING. PALMS MOIST
NAUSEA AND VOMITING: *
AGITATION: SOMEWHAT MORE THAN NORMAL ACTIVITY
ORIENTATION AND CLOUDING OF SENSORIUM: ORIENTED AND CAN DO SERIAL ADDITIONS
AUDITORY DISTURBANCES: NOT PRESENT
TOTAL SCORE: 6
AGITATION: NORMAL ACTIVITY
NAUSEA AND VOMITING: NO NAUSEA AND NO VOMITING
AUDITORY DISTURBANCES: NOT PRESENT
HEADACHE, FULLNESS IN HEAD: MODERATELY SEVERE
TOTAL SCORE: 14
ORIENTATION AND CLOUDING OF SENSORIUM: ORIENTED AND CAN DO SERIAL ADDITIONS
ANXIETY: *
ANXIETY: *
VISUAL DISTURBANCES: NOT PRESENT
HEADACHE, FULLNESS IN HEAD: MODERATE
TREMOR: TREMOR NOT VISIBLE BUT CAN BE FELT, FINGERTIP TO FINGERTIP
AUDITORY DISTURBANCES: NOT PRESENT
VISUAL DISTURBANCES: NOT PRESENT
TOTAL SCORE: 12
AUDITORY DISTURBANCES: NOT PRESENT
NAUSEA AND VOMITING: MILD NAUSEA WITH NO VOMITING
TREMOR: MODERATE TREMOR WITH ARMS EXTENDED
PAROXYSMAL SWEATS: *
ORIENTATION AND CLOUDING OF SENSORIUM: ORIENTED AND CAN DO SERIAL ADDITIONS
HEADACHE, FULLNESS IN HEAD: MODERATE
ORIENTATION AND CLOUDING OF SENSORIUM: ORIENTED AND CAN DO SERIAL ADDITIONS
ANXIETY: NO ANXIETY (AT EASE)
HEADACHE, FULLNESS IN HEAD: MODERATE
HEADACHE, FULLNESS IN HEAD: MODERATELY SEVERE
AGITATION: SOMEWHAT MORE THAN NORMAL ACTIVITY
TOTAL SCORE: 19
PAROXYSMAL SWEATS: NO SWEAT VISIBLE
AUDITORY DISTURBANCES: NOT PRESENT
VISUAL DISTURBANCES: NOT PRESENT
HEADACHE, FULLNESS IN HEAD: MODERATE

## 2023-01-26 ASSESSMENT — ENCOUNTER SYMPTOMS
CONSTIPATION: 0
WHEEZING: 0
FOCAL WEAKNESS: 0
FEVER: 1
CHILLS: 1
BLOOD IN STOOL: 0
NAUSEA: 1
LOSS OF CONSCIOUSNESS: 0
HEADACHES: 1
COUGH: 1
ABDOMINAL PAIN: 1
PALPITATIONS: 0
SEIZURES: 0
VOMITING: 1
SHORTNESS OF BREATH: 1
INSOMNIA: 0
DIZZINESS: 0

## 2023-01-26 ASSESSMENT — PAIN DESCRIPTION - PAIN TYPE
TYPE: ACUTE PAIN
TYPE: ACUTE PAIN

## 2023-01-26 NOTE — PROGRESS NOTES
Hospital Medicine Daily Progress Note    Date of Service  1/25/2023    Chief Complaint  Headache, abdominal pain, Nausea, and vomiting.    Hospital Course  Olya Youssef is a 52 y.o. female with amphetamine and alcohol use,hypertension, and use of chronic oxygen since a prior COVID infections. She was admitted 1/23/2023 with fever, tachycardia and hypertensive.  She required higher amount of oxygen per H+P.  There was a concern of alcohol withdrawal.     Interval Problem Update  1/25: Awake but confused. Oriented to being in the hospital, [place].  Emotionally labile per RN with many needs.  The patient was mobilized to a chair today.  She did have some ongoing nausea.    1/24/2023: Remains hypertensive and tachycardic. Lethargic but alert to place.  Thought it was 2014.  Denies any sensory change or musculature change. States her head hurts and denies falling or hitting it and has no lump on top of her head.  She denies caffeine use. States a pint of vodka and some other drinks daily.      I have discussed this patient's plan of care and discharge plan at IDT rounds today with Case Management, Nursing, Nursing leadership, and other members of the IDT team.      Code Status  Full Code    Disposition  Patient is not medically cleared for discharge.   Anticipate discharge to to home with close outpatient follow-up.  I have placed the appropriate orders for post-discharge needs.    Review of Systems  Review of Systems   Constitutional:  Negative for chills and fever.   Eyes:  Negative for discharge.   Respiratory:  Negative for cough, shortness of breath and stridor.    Cardiovascular:  Negative for chest pain, palpitations and leg swelling.   Gastrointestinal:  Positive for abdominal pain and nausea. Negative for diarrhea.   Genitourinary:  Negative for dysuria and hematuria.   Musculoskeletal:  Negative for back pain and joint pain.   Neurological:  Positive for headaches. Negative for dizziness, tingling,  speech change and focal weakness.   Psychiatric/Behavioral:  Positive for substance abuse. The patient is not nervous/anxious.       Physical Exam  Temp:  [36.5 °C (97.7 °F)-36.6 °C (97.9 °F)] 36.6 °C (97.9 °F)  Pulse:  [] 114  Resp:  [18-42] 42  BP: (112-167)/(56-87) 167/80  SpO2:  [89 %-100 %] 98 %    Physical Exam  Vitals reviewed.   Constitutional:       Appearance: Normal appearance. She is overweight. She is ill-appearing. She is not diaphoretic.   HENT:      Head: Normocephalic and atraumatic.      Nose: Nose normal.      Mouth/Throat:      Mouth: Mucous membranes are moist.      Pharynx: No oropharyngeal exudate.   Eyes:      General: No scleral icterus.        Right eye: No discharge.         Left eye: No discharge.      Extraocular Movements: Extraocular movements intact.      Conjunctiva/sclera: Conjunctivae normal.   Cardiovascular:      Rate and Rhythm: Regular rhythm. Tachycardia present.      Pulses:           Radial pulses are 2+ on the right side and 2+ on the left side.        Dorsalis pedis pulses are 2+ on the right side and 2+ on the left side.      Heart sounds: No murmur heard.  Pulmonary:      Effort: Pulmonary effort is normal. No respiratory distress.      Breath sounds: Decreased breath sounds present. No wheezing or rales.   Abdominal:      General: Bowel sounds are normal. There is no distension.      Palpations: Abdomen is soft.      Tenderness: There is no abdominal tenderness. There is no guarding.   Musculoskeletal:         General: No swelling or tenderness.      Cervical back: No tenderness. No muscular tenderness.      Right lower leg: No edema.      Left lower leg: No edema.   Lymphadenopathy:      Cervical: No cervical adenopathy.   Skin:     Coloration: Skin is not jaundiced or pale.   Neurological:      Mental Status: She is lethargic and disoriented.      Cranial Nerves: No cranial nerve deficit.      Motor: Weakness present.   Psychiatric:         Mood and Affect: Mood  normal.         Speech: Speech is delayed.         Behavior: Behavior is slowed.         Cognition and Memory: Cognition is impaired.       Fluids    Intake/Output Summary (Last 24 hours) at 1/25/2023 1703  Last data filed at 1/25/2023 1000  Gross per 24 hour   Intake 2165.97 ml   Output 900 ml   Net 1265.97 ml       Laboratory  Recent Labs     01/23/23  0638 01/24/23  0428   WBC 6.4 4.7*   RBC 4.16* 3.71*   HEMOGLOBIN 9.8* 8.7*   HEMATOCRIT 34.2* 31.0*   MCV 82.2 83.6   MCH 23.6* 23.5*   MCHC 28.7* 28.1*   RDW 54.8* 54.9*   PLATELETCT 105* 71*   MPV 9.2 9.0     Recent Labs     01/23/23  0638 01/24/23  0428   SODIUM 140 131*   POTASSIUM 3.7 3.7   CHLORIDE 100 94*   CO2 26 27   GLUCOSE 111* 117*   BUN 8 6*   CREATININE 0.38* 0.31*   CALCIUM 9.0 8.4*                   Imaging  IR-US GUIDED PIV   Final Result    Ultrasound-guided PERIPHERAL IV INSERTION performed by    qualified nursing staff as above.      US-RUQ   Final Result         1.  Echogenic liver, compatible with fatty change versus fibrosis.      DX-CHEST-PORTABLE (1 VIEW)   Final Result      No acute cardiopulmonary abnormality.              Assessment/Plan  * Alcohol withdrawal delirium, acute, hyperactive (HCC)- (present on admission)  Assessment & Plan  Patient presents with multitude of symptoms including nausea and vomiting abdominal pain likely secondary to acute alcohol withdrawal  Blood alcohol level is positive at 286.4, patient is tremulous somnolent with hypertension and tachycardia  Off drips 1/24  CIWA protocol  1/25 transfer to medical floor.  Mobilize as able.  Thiamine, folate  Seizure precautions.    Overweight  Assessment & Plan  Body mass index is 29.72 kg/m².  Encourage healthy choices    Headache  Assessment & Plan  Likely EtOH w/drawal related  Tylenol prn  1/24 no neurologic deficit appreciated    Elevated lipase- (present on admission)  Assessment & Plan  N/V and abdominal pain on admission, mild elevated lipase, possible  pancreatitis   Hx of alcohol abuse  IV fluids for now.  GI cocktail and pain meds as needed.  Clear liquids as tolerates and advance diet as tolerates    Fever  Assessment & Plan  Fever of 101.3 on admission unclear of etiology, I suspect this may be a hyper adrenergic response to severe alcohol withdrawal however I cannot rule out infectious etiology  Stop antibiotics  RSV, Flu, COVID negative  Monitor vitals.    Acute on chronic respiratory failure (HCC)  Assessment & Plan  Patient reportedly on 2 L of supplemental oxygen at baseline  Currently on 4L O2, wean as tolerates  RT per protocol if needed.  Suspect a component of COPD with diminished breath sounds  CXR result w/o acute finding  1/23 procalcitonin not elevated, negative for covid, RSV, and Flu    Methamphetamine abuse (HCC)- (present on admission)  Assessment & Plan  Amphetamine abuse, last use within the 24 hours  Cessation advised.    Bipolar affective disorder ([facility])- (present on admission)  Assessment & Plan  No home medications listed.  Stop alcohol and meth.  Await further mentation improvement then consider psych meds    Obtundation- (present on admission)  Assessment & Plan  Somewhat better 1/24am.  Wean benzodiazepine meds as able given w/d.  Monitor neurochecks  Wean meds for withdrawal as tolerates         VTE prophylaxis: heparin ppx    I have performed a physical exam and reviewed and updated ROS and Plan today (1/25/2023). In review of yesterday's note (1/24/2023), there are no changes except as documented above.

## 2023-01-26 NOTE — PROGRESS NOTES
[facility] Internal Medicine Daily Progress Note    Date of Service  1/26/2023    [facility] Team: [facility]   Attending: Tia Harley M.d.  Senior Resident: Dr. Viky Dukes  Intern:  Dr. Reed Dodd  Contact Number: [phone]    Chief Complaint  Olya Youssef is a 52 y.o. female admitted 1/23/2023 with alcoholic withdrawal,     Hospital Course  No notes on file    Interval Problem Update  -no acute event overnight  -CIWA peak of 19 overnight.   -Patient still complains of abdominal pain, pain on top of head, radiation into shoulders. Adding head/neck imaging      I have discussed this patient's plan of care and discharge plan at IDT rounds today with Case Management, Nursing, Nursing leadership, and other members of the IDT team.    Consultants/Specialty  N/a    Code Status  Full Code    Disposition  Patient is not medically cleared for discharge.   Anticipate discharge to to home with close outpatient follow-up.  I have placed the appropriate orders for post-discharge needs.    Review of Systems  Review of Systems   Constitutional:  Positive for chills and fever. Negative for malaise/fatigue.   Respiratory:  Positive for cough and shortness of breath. Negative for wheezing.    Cardiovascular:  Negative for chest pain, palpitations and leg swelling.   Gastrointestinal:  Positive for abdominal pain, nausea and vomiting. Negative for blood in stool and constipation.   Neurological:  Positive for headaches. Negative for dizziness, focal weakness, seizures and loss of consciousness.   Psychiatric/Behavioral:  The patient does not have insomnia.       Physical Exam  Temp:  [36.3 °C (97.3 °F)-36.6 °C (97.9 °F)] 36.3 °C (97.3 °F)  Pulse:  [] 113  Resp:  [18-36] 18  BP: (100-142)/(55-81) 111/72  SpO2:  [88 %-99 %] 95 %    Physical Exam  Vitals and nursing note reviewed.   Constitutional:       General: She is not in acute distress.     Appearance: Normal appearance. She is normal weight.   HENT:       Head: Normocephalic and atraumatic.      Right Ear: External ear normal.      Left Ear: External ear normal.   Eyes:      General: No scleral icterus.     Conjunctiva/sclera: Conjunctivae normal.   Cardiovascular:      Rate and Rhythm: Regular rhythm. Tachycardia present.      Pulses: Normal pulses.      Heart sounds: Normal heart sounds.   Pulmonary:      Effort: Pulmonary effort is normal. No respiratory distress.      Breath sounds: Wheezing and rhonchi present.      Comments: Mild    Chest:      Chest wall: No tenderness.   Abdominal:      General: Abdomen is flat. There is no distension.   Musculoskeletal:         General: No swelling or deformity.      Right lower leg: No edema.      Left lower leg: No edema.   Skin:     General: Skin is warm.      Coloration: Skin is not jaundiced.      Findings: No erythema.   Neurological:      General: No focal deficit present.      Mental Status: She is alert and oriented to person, place, and time.      Cranial Nerves: No cranial nerve deficit.      Motor: Weakness present.       Fluids    Intake/Output Summary (Last 24 hours) at 1/26/2023 1655  Last data filed at 1/25/2023 2000  Gross per 24 hour   Intake 240 ml   Output --   Net 240 ml       Laboratory  Recent Labs     01/24/23  0428   WBC 4.7*   RBC 3.71*   HEMOGLOBIN 8.7*   HEMATOCRIT 31.0*   MCV 83.6   MCH 23.5*   MCHC 28.1*   RDW 54.9*   PLATELETCT 71*   MPV 9.0     Recent Labs     01/24/23  0428 01/26/23  1145   SODIUM 131* 138   POTASSIUM 3.7 3.3*   CHLORIDE 94* 99   CO2 27 28   GLUCOSE 117* 103*   BUN 6* 7*   CREATININE 0.31* 0.40*   CALCIUM 8.4* 9.1     Recent Labs     01/26/23  1145   INR 1.20*               Imaging  IR-US GUIDED PIV   Final Result    Ultrasound-guided PERIPHERAL IV INSERTION performed by    qualified nursing staff as above.      IR-US GUIDED PIV   Final Result    Ultrasound-guided PERIPHERAL IV INSERTION performed by    qualified nursing staff as above.      US-RUQ   Final Result         1.   Echogenic liver, compatible with fatty change versus fibrosis.      DX-CHEST-PORTABLE (1 VIEW)   Final Result      No acute cardiopulmonary abnormality.         CT-CTA NECK WITH & W/O-POST PROCESSING    (Results Pending)   CT-CTA HEAD WITH & W/O-POST PROCESS    (Results Pending)        Assessment/Plan  Problem Representation:    * Alcohol withdrawal delirium, acute, hyperactive (HCC)- (present on admission)  Assessment & Plan  Patient presents with multitude of symptoms including nausea and vomiting abdominal pain likely secondary to acute alcohol withdrawal. Blood alcohol level is positive at 286.4, patient is tremulous somnolent with hypertension and tachycardia    -CIWA protocol. Peak of 19 overnight,   -Thiamine, folate  -Seizure precautions  -Librium 25mg q8, ativan PRN.     Right upper quadrant abdominal pain  Assessment & Plan  Also component of more generalized pain as well. Lipase is mildly elevated, RUQ US negative for cholecystitis, lithiasis.   Differential: alcoholic hepatitis/ excessive tylenol use, mild pancreatitis,  --Maddrey=2.4 (good); holding steroids  -Re-adding fluids,  CTM      Headache  Assessment & Plan  Possible EtOH w/drawal related, rebound headaches, methamphetamine use related. No focal deficit.  -Tylenol prn  -Adding CTA head and neck. Pending access in the AM    Elevated lipase- (present on admission)  Assessment & Plan  N/V and abdominal pain on admission, mild elevated lipase, possible pancreatitis.  -Hx of alcohol abuse, previous pancreatitis.   -150ml/hr IV fluids.  -GI cocktail and pain meds as needed. Clear liquids as tolerates and advance diet as tolerates    Fever  Assessment & Plan  Fever of 101.3 on admission unclear of etiology, I suspect this may be a hyper adrenergic response to severe alcohol withdrawal however cannot rule out infectious etiology at this point.   -RSV, Flu, COVID negative  -Monitor vitals.    Acute on chronic respiratory failure (HCC)  Assessment &  Plan  Patient on 2 L of supplemental oxygen at baseline. Suspect a component of COPD with diminished breath sounds. CXR result w/o acute finding. 1/23 procalcitonin not elevated, negative for covid, RSV, and Flu    -Currently on 3L O2, wean as tolerated.  -RT per protocol if needed.      Methamphetamine abuse (HCC)- (present on admission)  Assessment & Plan  Amphetamine abuse, last use within the 24 hours before admission  Cessation advised.    Obtundation- (present on admission)  Assessment & Plan  Upon admission. Much improved 1/26/23  Wean benzodiazepine meds as able given w/d.  Monitor neurochecks  Wean meds for withdrawal as tolerates    Overweight  Assessment & Plan  Body mass index is 29.72 kg/m².  Encourage healthy choices    Bipolar affective disorder (HCC)- (present on admission)  Assessment & Plan  No home medications listed.  -alcohol and meth cessation   -Await further mentation improvement then consider psych meds         VTE prophylaxis: SCDs/TEDs and Xarelto 10 mg daily as prophylaxis    I have performed a physical exam and reviewed and updated ROS and Plan today (1/26/2023). In review of yesterday's note (1/25/2023), there are no changes except as documented above.

## 2023-01-26 NOTE — CARE PLAN
The patient is Stable - Low risk of patient condition declining or worsening    Shift Goals  Clinical Goals: Wean O2, CIWA  Patient Goals: Pain management  Family Goals: BRITTANIE    Progress made toward(s) clinical / shift goals:  Pt pain well managed throughout shift, no falls this shift    Patient is not progressing towards the following goals:      Problem: Fall Risk  Goal: Patient will remain free from falls  Outcome: Progressing     Problem: Pain - Standard  Goal: Alleviation of pain or a reduction in pain to the patient’s comfort goal  Outcome: Progressing

## 2023-01-26 NOTE — CARE PLAN
The patient is Stable - Low risk of patient condition declining or worsening    Shift Goals: Wean off oxygen; manage withdrawal symptoms; pain and anxiety management; neuro status and pending CT scan  Clinical Goals: Wean O2, manage withdrawal symptoms  Patient Goals: Comfort  Family Goals: BRITTANIE    Progress made toward(s) clinical / shift goals:  see above    Problem: Seizure Precautions  Goal: Implementation of seizure precautions  Outcome: Progressing     Problem: Fall Risk  Goal: Patient will remain free from falls  Outcome: Not Progressing     Problem: Risk for Aspiration  Goal: Patient's risk for aspiration will be absent or decrease  Outcome: Progressing       Patient is not progressing towards the following goals:      Problem: Knowledge Deficit - Standard  Goal: Patient and family/care givers will demonstrate understanding of plan of care, disease process/condition, diagnostic tests and medications  Outcome: Not Progressing     Problem: Optimal Care for Alcohol Withdrawal  Goal: Optimal Care for the alcohol withdrawal patient  Outcome: Not Progressing     Problem: Fall Risk  Goal: Patient will remain free from falls  Outcome: Not Progressing     Problem: Pain - Standard  Goal: Alleviation of pain or a reduction in pain to the patient’s comfort goal  Outcome: Not Progressing

## 2023-01-26 NOTE — PROGRESS NOTES
4 Eyes Skin Assessment Completed by Dre RN and JARETT Gonzales.    Head WDL  Ears WDL  Nose WDL  Mouth WDL  Neck WDL  Breast/Chest WDL  Shoulder Blades WDL  Spine WDL  (R) Arm/Elbow/Hand WDL  (L) Arm/Elbow/Hand WDL  Abdomen WDL  Groin WDL  Scrotum/Coccyx/Buttocks WDL  (R) Leg WDL  (L) Leg WDL  (R) Heel/Foot/Toe [place]anch[place]gy  (L) Heel/Foot/Toe [place]anching and Boggy          Devices In Places Tele Box and Oxy Mask      Interventions In Place Pillows    Possible Skin Injury No    Pictures Uploaded Into Epic N/A  Wound Consult Placed N/A  RN Wound Prevention Protocol Ordered No

## 2023-01-26 NOTE — ASSESSMENT & PLAN NOTE
No home medications listed.  -alcohol and meth cessation   -Await further mentation improvement then consider psych meds and outpatient follow up.

## 2023-01-26 NOTE — PROGRESS NOTES
Monitor summary: -110, AL -0.13, QRS -0.07, QT -0.30, per strip from the monitor room.

## 2023-01-27 ENCOUNTER — APPOINTMENT (OUTPATIENT)
Dept: RADIOLOGY | Facility: MEDICAL CENTER | Age: 53
DRG: 896 | End: 2023-01-27
Attending: HOSPITALIST
Payer: COMMERCIAL

## 2023-01-27 ENCOUNTER — APPOINTMENT (OUTPATIENT)
Dept: RADIOLOGY | Facility: MEDICAL CENTER | Age: 53
DRG: 896 | End: 2023-01-27
Payer: COMMERCIAL

## 2023-01-27 LAB
ANION GAP SERPL CALC-SCNC: 10 MMOL/L (ref 7–16)
BUN SERPL-MCNC: 5 MG/DL (ref 8–22)
CALCIUM SERPL-MCNC: 9.2 MG/DL (ref 8.5–10.5)
CHLORIDE SERPL-SCNC: 103 MMOL/L (ref 96–112)
CO2 SERPL-SCNC: 25 MMOL/L (ref 20–33)
CREAT SERPL-MCNC: 0.37 MG/DL (ref 0.5–1.4)
EKG IMPRESSION: NORMAL
GFR SERPLBLD CREATININE-BSD FMLA CKD-EPI: 119 ML/MIN/1.73 M 2
GLUCOSE SERPL-MCNC: 132 MG/DL (ref 65–99)
POTASSIUM SERPL-SCNC: 4.1 MMOL/L (ref 3.6–5.5)
SODIUM SERPL-SCNC: 138 MMOL/L (ref 135–145)

## 2023-01-27 PROCEDURE — 70498 CT ANGIOGRAPHY NECK: CPT

## 2023-01-27 PROCEDURE — 700111 HCHG RX REV CODE 636 W/ 250 OVERRIDE (IP): Performed by: HOSPITALIST

## 2023-01-27 PROCEDURE — 93010 ELECTROCARDIOGRAM REPORT: CPT | Performed by: INTERNAL MEDICINE

## 2023-01-27 PROCEDURE — A9270 NON-COVERED ITEM OR SERVICE: HCPCS | Performed by: HOSPITALIST

## 2023-01-27 PROCEDURE — 700105 HCHG RX REV CODE 258

## 2023-01-27 PROCEDURE — 70496 CT ANGIOGRAPHY HEAD: CPT

## 2023-01-27 PROCEDURE — 700102 HCHG RX REV CODE 250 W/ 637 OVERRIDE(OP): Performed by: HOSPITALIST

## 2023-01-27 PROCEDURE — 700102 HCHG RX REV CODE 250 W/ 637 OVERRIDE(OP)

## 2023-01-27 PROCEDURE — 770020 HCHG ROOM/CARE - TELE (206)

## 2023-01-27 PROCEDURE — 700102 HCHG RX REV CODE 250 W/ 637 OVERRIDE(OP): Performed by: STUDENT IN AN ORGANIZED HEALTH CARE EDUCATION/TRAINING PROGRAM

## 2023-01-27 PROCEDURE — A9270 NON-COVERED ITEM OR SERVICE: HCPCS | Performed by: STUDENT IN AN ORGANIZED HEALTH CARE EDUCATION/TRAINING PROGRAM

## 2023-01-27 PROCEDURE — 93005 ELECTROCARDIOGRAM TRACING: CPT

## 2023-01-27 PROCEDURE — A9270 NON-COVERED ITEM OR SERVICE: HCPCS

## 2023-01-27 PROCEDURE — 36415 COLL VENOUS BLD VENIPUNCTURE: CPT

## 2023-01-27 PROCEDURE — 99232 SBSQ HOSP IP/OBS MODERATE 35: CPT | Mod: GC | Performed by: HOSPITALIST

## 2023-01-27 PROCEDURE — 80048 BASIC METABOLIC PNL TOTAL CA: CPT

## 2023-01-27 PROCEDURE — 700117 HCHG RX CONTRAST REV CODE 255

## 2023-01-27 RX ORDER — POTASSIUM CHLORIDE 20 MEQ/1
20 TABLET, EXTENDED RELEASE ORAL ONCE
Status: COMPLETED | OUTPATIENT
Start: 2023-01-27 | End: 2023-01-27

## 2023-01-27 RX ORDER — ONDANSETRON 2 MG/ML
4 INJECTION INTRAMUSCULAR; INTRAVENOUS EVERY 4 HOURS PRN
Status: DISCONTINUED | OUTPATIENT
Start: 2023-01-27 | End: 2023-01-29 | Stop reason: HOSPADM

## 2023-01-27 RX ORDER — PROMETHAZINE HYDROCHLORIDE 25 MG/1
25 TABLET ORAL EVERY 6 HOURS PRN
Status: DISCONTINUED | OUTPATIENT
Start: 2023-01-27 | End: 2023-01-29 | Stop reason: HOSPADM

## 2023-01-27 RX ORDER — POLYETHYLENE GLYCOL 3350 17 G/17G
1 POWDER, FOR SOLUTION ORAL
Status: DISCONTINUED | OUTPATIENT
Start: 2023-01-27 | End: 2023-01-29 | Stop reason: HOSPADM

## 2023-01-27 RX ORDER — AMOXICILLIN 250 MG
2 CAPSULE ORAL 2 TIMES DAILY PRN
Status: DISCONTINUED | OUTPATIENT
Start: 2023-01-27 | End: 2023-01-29 | Stop reason: HOSPADM

## 2023-01-27 RX ORDER — DEXAMETHASONE 1 MG
1 TABLET ORAL ONCE
Status: DISCONTINUED | OUTPATIENT
Start: 2023-01-27 | End: 2023-01-28

## 2023-01-27 RX ORDER — BISACODYL 10 MG
10 SUPPOSITORY, RECTAL RECTAL
Status: DISCONTINUED | OUTPATIENT
Start: 2023-01-27 | End: 2023-01-29 | Stop reason: HOSPADM

## 2023-01-27 RX ORDER — LORAZEPAM 2 MG/ML
0.5 INJECTION INTRAMUSCULAR ONCE
Status: COMPLETED | OUTPATIENT
Start: 2023-01-27 | End: 2023-01-27

## 2023-01-27 RX ORDER — CHOLECALCIFEROL (VITAMIN D3) 125 MCG
5 CAPSULE ORAL NIGHTLY
Status: DISCONTINUED | OUTPATIENT
Start: 2023-01-27 | End: 2023-01-27

## 2023-01-27 RX ORDER — PROMETHAZINE HYDROCHLORIDE 25 MG/1
25 TABLET ORAL EVERY 6 HOURS PRN
Status: DISCONTINUED | OUTPATIENT
Start: 2023-01-27 | End: 2023-01-27

## 2023-01-27 RX ORDER — GAUZE BANDAGE 2" X 2"
100 BANDAGE TOPICAL DAILY
Status: DISCONTINUED | OUTPATIENT
Start: 2023-01-27 | End: 2023-01-29 | Stop reason: HOSPADM

## 2023-01-27 RX ORDER — HYDROXYZINE HYDROCHLORIDE 25 MG/1
25 TABLET, FILM COATED ORAL 3 TIMES DAILY PRN
Status: DISCONTINUED | OUTPATIENT
Start: 2023-01-27 | End: 2023-01-29 | Stop reason: HOSPADM

## 2023-01-27 RX ORDER — HYDROXYZINE HYDROCHLORIDE 25 MG/1
25 TABLET, FILM COATED ORAL 3 TIMES DAILY
Status: DISCONTINUED | OUTPATIENT
Start: 2023-01-27 | End: 2023-01-27

## 2023-01-27 RX ORDER — FOLIC ACID 1 MG/1
1 TABLET ORAL DAILY
Status: DISCONTINUED | OUTPATIENT
Start: 2023-01-27 | End: 2023-01-29 | Stop reason: HOSPADM

## 2023-01-27 RX ADMIN — SENNOSIDES AND DOCUSATE SODIUM 2 TABLET: 50; 8.6 TABLET ORAL at 05:09

## 2023-01-27 RX ADMIN — HYDROXYZINE HYDROCHLORIDE 25 MG: 25 TABLET, FILM COATED ORAL at 17:23

## 2023-01-27 RX ADMIN — Medication 100 MG: at 05:09

## 2023-01-27 RX ADMIN — CHLORDIAZEPOXIDE HYDROCHLORIDE 25 MG: 25 CAPSULE ORAL at 21:14

## 2023-01-27 RX ADMIN — LORAZEPAM 0.5 MG: 2 INJECTION INTRAMUSCULAR; INTRAVENOUS at 06:09

## 2023-01-27 RX ADMIN — FOLIC ACID 1 MG: 1 TABLET ORAL at 05:09

## 2023-01-27 RX ADMIN — POTASSIUM CHLORIDE 20 MEQ: 1500 TABLET, EXTENDED RELEASE ORAL at 05:09

## 2023-01-27 RX ADMIN — FOLIC ACID 1 MG: 1 TABLET ORAL at 10:03

## 2023-01-27 RX ADMIN — CHLORDIAZEPOXIDE HYDROCHLORIDE 25 MG: 25 CAPSULE ORAL at 05:09

## 2023-01-27 RX ADMIN — BUTALBITAL, ACETAMINOPHEN, AND CAFFEINE 1 TABLET: 50; 325; 40 TABLET ORAL at 10:03

## 2023-01-27 RX ADMIN — LORAZEPAM 1 MG: 1 TABLET ORAL at 08:13

## 2023-01-27 RX ADMIN — LIDOCAINE HYDROCHLORIDE 15 ML: 20 SOLUTION OROPHARYNGEAL at 21:13

## 2023-01-27 RX ADMIN — BUTALBITAL, ACETAMINOPHEN, AND CAFFEINE 1 TABLET: 50; 325; 40 TABLET ORAL at 03:46

## 2023-01-27 RX ADMIN — RIVAROXABAN 10 MG: 10 TABLET, FILM COATED ORAL at 17:23

## 2023-01-27 RX ADMIN — OXYCODONE HYDROCHLORIDE 5 MG: 5 TABLET ORAL at 14:28

## 2023-01-27 RX ADMIN — THERA TABS 1 TABLET: TAB at 05:09

## 2023-01-27 RX ADMIN — SODIUM CHLORIDE, POTASSIUM CHLORIDE, SODIUM LACTATE AND CALCIUM CHLORIDE: 600; 310; 30; 20 INJECTION, SOLUTION INTRAVENOUS at 05:55

## 2023-01-27 RX ADMIN — THIAMINE HCL TAB 100 MG 100 MG: 100 TAB at 10:03

## 2023-01-27 RX ADMIN — OMEPRAZOLE 20 MG: 20 CAPSULE, DELAYED RELEASE ORAL at 05:09

## 2023-01-27 RX ADMIN — IOHEXOL 80 ML: 350 INJECTION, SOLUTION INTRAVENOUS at 06:44

## 2023-01-27 RX ADMIN — PROMETHAZINE HYDROCHLORIDE 25 MG: 25 TABLET ORAL at 17:23

## 2023-01-27 RX ADMIN — CHLORDIAZEPOXIDE HYDROCHLORIDE 25 MG: 25 CAPSULE ORAL at 13:05

## 2023-01-27 RX ADMIN — LORAZEPAM 0.5 MG: 1 TABLET ORAL at 13:03

## 2023-01-27 RX ADMIN — THERA TABS 1 TABLET: TAB at 10:03

## 2023-01-27 RX ADMIN — BUTALBITAL, ACETAMINOPHEN, AND CAFFEINE 1 TABLET: 50; 325; 40 TABLET ORAL at 17:23

## 2023-01-27 RX ADMIN — LIDOCAINE HYDROCHLORIDE 15 ML: 20 SOLUTION OROPHARYNGEAL at 13:32

## 2023-01-27 ASSESSMENT — LIFESTYLE VARIABLES
TREMOR: *
HEADACHE, FULLNESS IN HEAD: VERY MILD
ANXIETY: MILDLY ANXIOUS
PAROXYSMAL SWEATS: BARELY PERCEPTIBLE SWEATING. PALMS MOIST
ORIENTATION AND CLOUDING OF SENSORIUM: ORIENTED AND CAN DO SERIAL ADDITIONS
AUDITORY DISTURBANCES: NOT PRESENT
AGITATION: NORMAL ACTIVITY
HEADACHE, FULLNESS IN HEAD: VERY MILD
AUDITORY DISTURBANCES: NOT PRESENT
AGITATION: SOMEWHAT MORE THAN NORMAL ACTIVITY
AGITATION: NORMAL ACTIVITY
PAROXYSMAL SWEATS: *
PAROXYSMAL SWEATS: BARELY PERCEPTIBLE SWEATING. PALMS MOIST
TREMOR: TREMOR NOT VISIBLE BUT CAN BE FELT, FINGERTIP TO FINGERTIP
VISUAL DISTURBANCES: NOT PRESENT
TREMOR: NO TREMOR
HEADACHE, FULLNESS IN HEAD: MILD
ORIENTATION AND CLOUDING OF SENSORIUM: ORIENTED AND CAN DO SERIAL ADDITIONS
ANXIETY: NO ANXIETY (AT EASE)
TOTAL SCORE: VERY MILD ITCHING, PINS AND NEEDLES SENSATION, BURNING OR NUMBNESS
NAUSEA AND VOMITING: NO NAUSEA AND NO VOMITING
ANXIETY: MILDLY ANXIOUS
AUDITORY DISTURBANCES: NOT PRESENT
ANXIETY: MILDLY ANXIOUS
AGITATION: NORMAL ACTIVITY
TOTAL SCORE: 8
TOTAL SCORE: 4
ANXIETY: MILDLY ANXIOUS
AGITATION: NORMAL ACTIVITY
HEADACHE, FULLNESS IN HEAD: NOT PRESENT
VISUAL DISTURBANCES: NOT PRESENT
ORIENTATION AND CLOUDING OF SENSORIUM: ORIENTED AND CAN DO SERIAL ADDITIONS
TREMOR: NO TREMOR
HEADACHE, FULLNESS IN HEAD: MILD
NAUSEA AND VOMITING: NO NAUSEA AND NO VOMITING
ORIENTATION AND CLOUDING OF SENSORIUM: ORIENTED AND CAN DO SERIAL ADDITIONS
ANXIETY: MILDLY ANXIOUS
NAUSEA AND VOMITING: NO NAUSEA AND NO VOMITING
ORIENTATION AND CLOUDING OF SENSORIUM: ORIENTED AND CAN DO SERIAL ADDITIONS
AGITATION: NORMAL ACTIVITY
ORIENTATION AND CLOUDING OF SENSORIUM: ORIENTED AND CAN DO SERIAL ADDITIONS
VISUAL DISTURBANCES: NOT PRESENT
TREMOR: TREMOR NOT VISIBLE BUT CAN BE FELT, FINGERTIP TO FINGERTIP
AUDITORY DISTURBANCES: NOT PRESENT
TOTAL SCORE: 4
NAUSEA AND VOMITING: MILD NAUSEA WITH NO VOMITING
HEADACHE, FULLNESS IN HEAD: MODERATE
NAUSEA AND VOMITING: MILD NAUSEA WITH NO VOMITING
PAROXYSMAL SWEATS: BARELY PERCEPTIBLE SWEATING. PALMS MOIST
PAROXYSMAL SWEATS: BARELY PERCEPTIBLE SWEATING. PALMS MOIST
TOTAL SCORE: 3
PAROXYSMAL SWEATS: BARELY PERCEPTIBLE SWEATING. PALMS MOIST
TOTAL SCORE: 4
TOTAL SCORE: 7
VISUAL DISTURBANCES: NOT PRESENT
NAUSEA AND VOMITING: NO NAUSEA AND NO VOMITING
TREMOR: NO TREMOR
AUDITORY DISTURBANCES: NOT PRESENT
AUDITORY DISTURBANCES: NOT PRESENT
VISUAL DISTURBANCES: NOT PRESENT
VISUAL DISTURBANCES: NOT PRESENT

## 2023-01-27 ASSESSMENT — ENCOUNTER SYMPTOMS
SHORTNESS OF BREATH: 1
ABDOMINAL PAIN: 1
CHILLS: 0
NAUSEA: 1
HEADACHES: 1
FEVER: 0
LOSS OF CONSCIOUSNESS: 0
BLOOD IN STOOL: 0
VOMITING: 1
WHEEZING: 0
COUGH: 1
FOCAL WEAKNESS: 0
CONSTIPATION: 0
SEIZURES: 0
DIZZINESS: 0
INSOMNIA: 0
PALPITATIONS: 0

## 2023-01-27 NOTE — PROGRESS NOTES
[facility] Internal Medicine Daily Progress Note    Date of Service  1/27/2023    [facility] Team: [facility] Team   Attending: Tia Harley M.d.  Senior Resident: Dr. Viky Dukes  Intern:  Dr. Reed Dodd  Contact Number: [phone]    Chief Complaint  Olya Youssef is a 52 y.o. female admitted 1/23/2023 with alcoholic withdrawal,     Hospital Course  No notes on file    Interval Problem Update  -no acute event overnight  -CIWA peak of 12 overnight. Trending downwards   -Patient crying throughout exam. Expresses that her pain continues, anxiety is severe.  -Attempts to walk during rounds. Fails walk test. Wished to go AMA during rounds, decided against by the end of conversation.  -Imaging head/neck completed this AM.      I have discussed this patient's plan of care and discharge plan at IDT rounds today with Case Management, Nursing, Nursing leadership, and other members of the IDT team.    Consultants/Specialty  N/a    Code Status  Full Code    Disposition  Patient is not medically cleared for discharge.   Anticipate discharge to to home with close outpatient follow-up.  I have placed the appropriate orders for post-discharge needs.    Review of Systems  Review of Systems   Constitutional:  Negative for chills, fever and malaise/fatigue.   Respiratory:  Positive for cough and shortness of breath. Negative for wheezing.    Cardiovascular:  Negative for chest pain, palpitations and leg swelling.   Gastrointestinal:  Positive for abdominal pain, nausea and vomiting. Negative for blood in stool and constipation.   Skin:  Negative for rash.   Neurological:  Positive for headaches. Negative for dizziness, focal weakness, seizures and loss of consciousness.   Psychiatric/Behavioral:  The patient does not have insomnia.       Physical Exam  Temp:  [36.1 °C (97 °F)-36.5 °C (97.7 °F)] 36.1 °C (97 °F)  Pulse:  [] 104  Resp:  [18-20] 20  BP: (111-156)/(70-98) 120/70  SpO2:  [95 %-98 %] 95 %    Physical  Exam  Vitals and nursing note reviewed.   Constitutional:       General: She is not in acute distress.     Appearance: Normal appearance. She is normal weight.   HENT:      Head: Normocephalic and atraumatic.      Right Ear: External ear normal.      Left Ear: External ear normal.   Eyes:      General: No scleral icterus.     Conjunctiva/sclera: Conjunctivae normal.   Cardiovascular:      Rate and Rhythm: Regular rhythm. Tachycardia present.      Pulses: Normal pulses.      Heart sounds: Normal heart sounds.   Pulmonary:      Effort: Pulmonary effort is normal. No respiratory distress.      Breath sounds: Wheezing and rhonchi present.      Comments: Mild    Chest:      Chest wall: No tenderness.   Abdominal:      General: Abdomen is flat. There is no distension.   Musculoskeletal:         General: No swelling or deformity.      Right lower leg: No edema.      Left lower leg: No edema.   Skin:     General: Skin is warm.      Coloration: Skin is not jaundiced.      Findings: No erythema.   Neurological:      General: No focal deficit present.      Mental Status: She is alert and oriented to person, place, and time.      Cranial Nerves: No cranial nerve deficit.      Motor: Weakness present.   Psychiatric:         Attention and Perception: Attention normal.         Mood and Affect: Affect is labile and tearful.         Behavior: Behavior is not aggressive.         Thought Content: Thought content normal.       Fluids    Intake/Output Summary (Last 24 hours) at 1/27/2023 1306  Last data filed at 1/27/2023 0850  Gross per 24 hour   Intake 120 ml   Output 900 ml   Net -780 ml       Laboratory        Recent Labs     01/26/23  1145 01/26/23  1840 01/27/23  0341   SODIUM 138 135 138   POTASSIUM 3.3* 3.6 4.1   CHLORIDE 99 100 103   CO2 28 24 25   GLUCOSE 103* 131* 132*   BUN 7* 7* 5*   CREATININE 0.40* 0.39* 0.37*   CALCIUM 9.1 8.7 9.2     Recent Labs     01/26/23  1145   INR 1.20*               Imaging  CT-CTA NECK WITH &  W/O-POST PROCESSING   Final Result      1.  No acute dissection or occlusion of the neck arteries.   2.  Severe atherosclerotic narrowing of the proximal right internal carotid artery.   3.  Dependent right upper lobe atelectasis.   4.  5 mm bilateral upper lobe pulmonary nodules.   5.  Increased multilevel degenerative changes of the cervical spine from prior.      Low Risk: No routine follow-up      High Risk: Optional CT at 12 months      Comments: Use most suspicious nodule as guide to management. Follow-up intervals may vary according to size and risk.      Low Risk - Minimal or absent history of smoking and of other known risk factors.      High Risk - History of smoking or of other known risk factors.      Note: These recommendations do not apply to lung cancer screening, patients with immunosuppression, or patients with known primary cancer.      Fleischner Society 2017 Guidelines for Management of Incidentally Detected Pulmonary Nodules in Adults      CT-CTA HEAD WITH & W/O-POST PROCESS   Final Result      1.  Normal variant diminutive basilar artery with associated carotid origin of the right posterior cerebral artery.   2.  No evidence of aneurysm or occlusive lesion about [place].   3.  Mild cerebral atrophy.   4.  8mm rounded extra-axial calcification over the left high frontal convexity suggesting a calcified meningioma.   5.  Hyperostosis frontalis interna.      IR-US GUIDED PIV   Final Result    Ultrasound-guided PERIPHERAL IV INSERTION performed by    qualified nursing staff as above.      IR-US GUIDED PIV   Final Result    Ultrasound-guided PERIPHERAL IV INSERTION performed by    qualified nursing staff as above.      US-RUQ   Final Result         1.  Echogenic liver, compatible with fatty change versus fibrosis.      DX-CHEST-PORTABLE (1 VIEW)   Final Result      No acute cardiopulmonary abnormality.              Assessment/Plan  Problem Representation:    * Alcohol withdrawal delirium,  acute, hyperactive (HCC)- (present on admission)  Assessment & Plan  Patient presents with multitude of symptoms including nausea and vomiting abdominal pain likely secondary to acute alcohol withdrawal. Blood alcohol level is positive at 286.4, patient is tremulous somnolent with hypertension and tachycardia    -CIWA protocol. Peak of 12 overnight,   -Thiamine, folate  -Seizure precautions  -Librium 25mg q8, ativan PRN.   -Adding atarax 25mg for continued anxiety.     Right upper quadrant abdominal pain  Assessment & Plan  Also component of more generalized pain as well. Lipase is mildly elevated, RUQ US negative for cholecystitis, lithiasis.   Differential: alcoholic hepatitis/ excessive tylenol use, mild pancreatitis,  --Maddrey=2.4 (good); holding steroids  -CTM      Headache  Assessment & Plan  Possible EtOH w/drawal related, rebound headaches, methamphetamine use related. No focal deficit.  -Tylenol prn  -CTA head and neck 1/27/23:  Mild cerebral atrophy. 8mm rounded extra-axial calcification over the left high frontal convexity suggesting a calcified meningioma. Hyperostosis frontalis interna.   No acute dissection or occlusion of the neck arteries.  Severe atherosclerotic narrowing of the proximal right internal carotid artery.  Dependent right upper lobe atelectasis. 5 mm bilateral upper lobe pulmonary nodules. Increased multilevel degenerative changes of the cervical spine from prior.    Elevated lipase- (present on admission)  Assessment & Plan  N/V and abdominal pain on admission, mild elevated lipase, possible pancreatitis.  -Hx of alcohol abuse, previous pancreatitis.   -d/c fluids.   -GI cocktail and pain meds as needed. Clear liquids as tolerates and advance diet as tolerates    Fever  Assessment & Plan  Fever of 101.3 on admission unclear of etiology, I suspect this may be a hyper adrenergic response to severe alcohol withdrawal however cannot rule out infectious etiology at this point.   -RSV, Flu,  COVID negative  -Monitor vitals.    Acute on chronic respiratory failure (HCC)  Assessment & Plan  Patient on 2 L of supplemental oxygen at baseline. Suspect a component of COPD with diminished breath sounds. CXR result w/o acute finding. 1/23 procalcitonin not elevated, negative for covid, RSV, and Flu    -Currently on 3L O2, wean as tolerated.  -RT per protocol if needed.      Methamphetamine abuse (HCC)- (present on admission)  Assessment & Plan  Amphetamine abuse, last use within the 24 hours before admission  Cessation advised.    Obtundation- (present on admission)  Assessment & Plan  Upon admission. Much improved 1/26/23  Wean benzodiazepine meds as able given w/d.  Monitor neurochecks  Wean meds for withdrawal as tolerates    Overweight  Assessment & Plan  Body mass index is 29.72 kg/m².  Encourage healthy choices    Bipolar affective disorder (HCC)- (present on admission)  Assessment & Plan  No home medications listed.  -alcohol and meth cessation   -Await further mentation improvement then consider psych meds         VTE prophylaxis: SCDs/TEDs and Xarelto 10 mg daily as prophylaxis    I have performed a physical exam and reviewed and updated ROS and Plan today (1/27/2023). In review of yesterday's note (1/26/2023), there are no changes except as documented above.

## 2023-01-27 NOTE — CARE PLAN
The patient is Stable - Low risk of patient condition declining or worsening    Shift Goals  Clinical Goals: monitor CIWA  Patient Goals: comfot  Family Goals: BRITTANIE    Progress made toward(s) clinical / shift goals:    Problem: Knowledge Deficit - Standard  Goal: Patient and family/care givers will demonstrate understanding of plan of care, disease process/condition, diagnostic tests and medications  Outcome: Progressing   Pt A&Ox4, educated on plan of care for shift - verabalizes understanding of plan and for goal to get CT of neck this morning.     Problem: Optimal Care for Alcohol Withdrawal  Goal: Optimal Care for the alcohol withdrawal patient  Outcome: Progressing   Appropriate interventions implemented per CIWA protocol.     Patient is not progressing towards the following goals:    Problem: Psychosocial  Goal: Patient's level of anxiety will decrease  Outcome: Not Progressing   Pt shows signs of anxiety throughout shift when staff is present however appears to be resting comfortably when staff not present in room. Limit setting implemented this shift regarding CIWA medication.

## 2023-01-27 NOTE — ASSESSMENT & PLAN NOTE
Also component of more generalized pain as well. Lipase is mildly elevated, RUQ US negative for cholecystitis, lithiasis.   Differential: alcoholic hepatitis/ excessive tylenol use, mild pancreatitis,  --Maddrey=2.4 (good); holding steroids  -CTM

## 2023-01-27 NOTE — PROGRESS NOTES
Pt A&Ox4, educated on consent form for CT of neck. Answered questions appropriately. Pt on transport list to go to CT at/around [id number]

## 2023-01-28 PROBLEM — I65.21 CAROTID ARTERY STENOSIS, ASYMPTOMATIC, RIGHT: Status: ACTIVE | Noted: 2023-01-28

## 2023-01-28 LAB
ANION GAP SERPL CALC-SCNC: 12 MMOL/L (ref 7–16)
BACTERIA BLD CULT: NORMAL
BACTERIA BLD CULT: NORMAL
BUN SERPL-MCNC: 6 MG/DL (ref 8–22)
CALCIUM SERPL-MCNC: 9.2 MG/DL (ref 8.5–10.5)
CHLORIDE SERPL-SCNC: 101 MMOL/L (ref 96–112)
CO2 SERPL-SCNC: 25 MMOL/L (ref 20–33)
CREAT SERPL-MCNC: 0.41 MG/DL (ref 0.5–1.4)
GFR SERPLBLD CREATININE-BSD FMLA CKD-EPI: 116 ML/MIN/1.73 M 2
GLUCOSE SERPL-MCNC: 108 MG/DL (ref 65–99)
PHOSPHATE SERPL-MCNC: 3.3 MG/DL (ref 2.5–4.5)
POTASSIUM SERPL-SCNC: 3.9 MMOL/L (ref 3.6–5.5)
SIGNIFICANT IND 70042: NORMAL
SIGNIFICANT IND 70042: NORMAL
SITE SITE: NORMAL
SITE SITE: NORMAL
SODIUM SERPL-SCNC: 138 MMOL/L (ref 135–145)
SOURCE SOURCE: NORMAL
SOURCE SOURCE: NORMAL

## 2023-01-28 PROCEDURE — 36415 COLL VENOUS BLD VENIPUNCTURE: CPT

## 2023-01-28 PROCEDURE — 700102 HCHG RX REV CODE 250 W/ 637 OVERRIDE(OP): Performed by: HOSPITALIST

## 2023-01-28 PROCEDURE — 700102 HCHG RX REV CODE 250 W/ 637 OVERRIDE(OP)

## 2023-01-28 PROCEDURE — 80048 BASIC METABOLIC PNL TOTAL CA: CPT

## 2023-01-28 PROCEDURE — 84100 ASSAY OF PHOSPHORUS: CPT

## 2023-01-28 PROCEDURE — 99231 SBSQ HOSP IP/OBS SF/LOW 25: CPT | Mod: GC | Performed by: HOSPITALIST

## 2023-01-28 PROCEDURE — A9270 NON-COVERED ITEM OR SERVICE: HCPCS

## 2023-01-28 PROCEDURE — A9270 NON-COVERED ITEM OR SERVICE: HCPCS | Performed by: HOSPITALIST

## 2023-01-28 PROCEDURE — 770020 HCHG ROOM/CARE - TELE (206)

## 2023-01-28 RX ORDER — CHLORDIAZEPOXIDE HYDROCHLORIDE 25 MG/1
25 CAPSULE, GELATIN COATED ORAL 2 TIMES DAILY
Status: DISCONTINUED | OUTPATIENT
Start: 2023-01-28 | End: 2023-01-29

## 2023-01-28 RX ORDER — ATORVASTATIN CALCIUM 40 MG/1
40 TABLET, FILM COATED ORAL EVERY EVENING
Status: DISCONTINUED | OUTPATIENT
Start: 2023-01-28 | End: 2023-01-29 | Stop reason: HOSPADM

## 2023-01-28 RX ADMIN — HYDROXYZINE HYDROCHLORIDE 25 MG: 25 TABLET, FILM COATED ORAL at 11:41

## 2023-01-28 RX ADMIN — CHLORDIAZEPOXIDE HYDROCHLORIDE 25 MG: 25 CAPSULE ORAL at 04:16

## 2023-01-28 RX ADMIN — FOLIC ACID 1 MG: 1 TABLET ORAL at 04:16

## 2023-01-28 RX ADMIN — RIVAROXABAN 10 MG: 10 TABLET, FILM COATED ORAL at 17:36

## 2023-01-28 RX ADMIN — LIDOCAINE HYDROCHLORIDE 15 ML: 20 SOLUTION OROPHARYNGEAL at 12:38

## 2023-01-28 RX ADMIN — PROMETHAZINE HYDROCHLORIDE 25 MG: 25 TABLET ORAL at 17:36

## 2023-01-28 RX ADMIN — ATORVASTATIN CALCIUM 40 MG: 40 TABLET, FILM COATED ORAL at 17:36

## 2023-01-28 RX ADMIN — PROMETHAZINE HYDROCHLORIDE 25 MG: 25 TABLET ORAL at 10:46

## 2023-01-28 RX ADMIN — ASPIRIN 81 MG: 81 TABLET, COATED ORAL at 13:10

## 2023-01-28 RX ADMIN — CHLORDIAZEPOXIDE HYDROCHLORIDE 25 MG: 25 CAPSULE ORAL at 17:36

## 2023-01-28 RX ADMIN — THERA TABS 1 TABLET: TAB at 04:16

## 2023-01-28 RX ADMIN — BUTALBITAL, ACETAMINOPHEN, AND CAFFEINE 1 TABLET: 50; 325; 40 TABLET ORAL at 17:35

## 2023-01-28 RX ADMIN — BUTALBITAL, ACETAMINOPHEN, AND CAFFEINE 1 TABLET: 50; 325; 40 TABLET ORAL at 10:46

## 2023-01-28 RX ADMIN — OMEPRAZOLE 20 MG: 20 CAPSULE, DELAYED RELEASE ORAL at 04:16

## 2023-01-28 RX ADMIN — BUTALBITAL, ACETAMINOPHEN, AND CAFFEINE 1 TABLET: 50; 325; 40 TABLET ORAL at 04:16

## 2023-01-28 RX ADMIN — THIAMINE HCL TAB 100 MG 100 MG: 100 TAB at 04:16

## 2023-01-28 ASSESSMENT — ENCOUNTER SYMPTOMS
PALPITATIONS: 0
CONSTIPATION: 0
DIZZINESS: 0
NAUSEA: 0
SHORTNESS OF BREATH: 1
CHILLS: 0
INSOMNIA: 0
LOSS OF CONSCIOUSNESS: 0
SEIZURES: 0
COUGH: 1
VOMITING: 0
FOCAL WEAKNESS: 0
FEVER: 0
HEADACHES: 1
BLOOD IN STOOL: 0
ABDOMINAL PAIN: 1
WHEEZING: 0

## 2023-01-28 ASSESSMENT — LIFESTYLE VARIABLES
PAROXYSMAL SWEATS: NO SWEAT VISIBLE
TREMOR: TREMOR NOT VISIBLE BUT CAN BE FELT, FINGERTIP TO FINGERTIP
ORIENTATION AND CLOUDING OF SENSORIUM: ORIENTED AND CAN DO SERIAL ADDITIONS
ANXIETY: MILDLY ANXIOUS
TOTAL SCORE: 2
ORIENTATION AND CLOUDING OF SENSORIUM: ORIENTED AND CAN DO SERIAL ADDITIONS
AUDITORY DISTURBANCES: NOT PRESENT
TREMOR: TREMOR NOT VISIBLE BUT CAN BE FELT, FINGERTIP TO FINGERTIP
HEADACHE, FULLNESS IN HEAD: VERY MILD
ORIENTATION AND CLOUDING OF SENSORIUM: ORIENTED AND CAN DO SERIAL ADDITIONS
NAUSEA AND VOMITING: NO NAUSEA AND NO VOMITING
ANXIETY: NO ANXIETY (AT EASE)
AGITATION: NORMAL ACTIVITY
VISUAL DISTURBANCES: NOT PRESENT
AUDITORY DISTURBANCES: NOT PRESENT
AGITATION: NORMAL ACTIVITY
NAUSEA AND VOMITING: MILD NAUSEA WITH NO VOMITING
ANXIETY: MILDLY ANXIOUS
TOTAL SCORE: 4
VISUAL DISTURBANCES: NOT PRESENT
AUDITORY DISTURBANCES: NOT PRESENT
NAUSEA AND VOMITING: NO NAUSEA AND NO VOMITING
REASON UNABLE TO ASSESS: PATIENT SLEEPING
HEADACHE, FULLNESS IN HEAD: VERY MILD
PAROXYSMAL SWEATS: BARELY PERCEPTIBLE SWEATING. PALMS MOIST
TREMOR: NO TREMOR
AGITATION: NORMAL ACTIVITY
PAROXYSMAL SWEATS: BARELY PERCEPTIBLE SWEATING. PALMS MOIST
TREMOR: NO TREMOR
TOTAL SCORE: 4
VISUAL DISTURBANCES: NOT PRESENT
AGITATION: SOMEWHAT MORE THAN NORMAL ACTIVITY
VISUAL DISTURBANCES: NOT PRESENT
HEADACHE, FULLNESS IN HEAD: VERY MILD
ORIENTATION AND CLOUDING OF SENSORIUM: ORIENTED AND CAN DO SERIAL ADDITIONS
ANXIETY: NO ANXIETY (AT EASE)
NAUSEA AND VOMITING: NO NAUSEA AND NO VOMITING
AUDITORY DISTURBANCES: NOT PRESENT
PAROXYSMAL SWEATS: NO SWEAT VISIBLE
HEADACHE, FULLNESS IN HEAD: VERY MILD
TOTAL SCORE: 2

## 2023-01-28 ASSESSMENT — FIBROSIS 4 INDEX: FIB4 SCORE: 9.4

## 2023-01-28 NOTE — CARE PLAN
The patient is Stable - Low risk of patient condition declining or worsening    Shift Goals  Clinical Goals: vitals  Patient Goals: sleep  Family Goals: BRITTANIE    Progress made toward(s) clinical / shift goals:    Problem: Psychosocial  Goal: Patient's level of anxiety will decrease  Outcome: Progressing   Pt appears to be more calm this shift, appears to be sleeping/resting comfortably.    Problem: Fall Risk  Goal: Patient will remain free from falls  Outcome: Progressing  Bed alarm in place, pt uses call bell at times for assistance. Education provided       Patient is not progressing towards the following goals:

## 2023-01-28 NOTE — PROGRESS NOTES
[facility] Internal Medicine Daily Progress Note    Date of Service  1/28/2023    R Team: R IM Angel Team   Attending: Tia Harley M.d.  Senior Resident: Dr. Viky Dukes  Intern:  Dr. Reed Dodd  Contact Number: [phone]    Chief Complaint  Olya Youssef is a 52 y.o. female admitted 1/23/2023 with alcoholic withdrawal,     Hospital Course  No notes on file    Interval Problem Update  -no acute event overnight  -CIWA peak of 12 overnight. Trending downwards   -Patient much less anxious throughout exam. Agreeable to stay 1/28/23.     I have discussed this patient's plan of care and discharge plan at IDT rounds today with Case Management, Nursing, Nursing leadership, and other members of the IDT team.    Consultants/Specialty  N/a    Code Status  Full Code    Disposition  Patient is not medically cleared for discharge.   Anticipate discharge to to home with close outpatient follow-up.  I have placed the appropriate orders for post-discharge needs.    Review of Systems  Review of Systems   Constitutional:  Negative for chills, fever and malaise/fatigue.   Respiratory:  Positive for cough and shortness of breath. Negative for wheezing.    Cardiovascular:  Negative for chest pain, palpitations and leg swelling.   Gastrointestinal:  Positive for abdominal pain. Negative for blood in stool, constipation, nausea and vomiting.   Skin:  Negative for rash.   Neurological:  Positive for headaches (significant improvement). Negative for dizziness, focal weakness, seizures and loss of consciousness.   Psychiatric/Behavioral:  The patient does not have insomnia.       Physical Exam  Temp:  [36.1 °C (97 °F)-36.7 °C (98.1 °F)] 36.7 °C (98.1 °F)  Pulse:  [] 95  Resp:  [18-20] 20  BP: ()/(59-78) 100/62  SpO2:  [90 %-97 %] 96 %    Physical Exam  Vitals and nursing note reviewed.   Constitutional:       General: She is not in acute distress.     Appearance: Normal appearance. She is normal weight.   HENT:       Head: Normocephalic and atraumatic.      Right Ear: External ear normal.      Left Ear: External ear normal.   Eyes:      General: No scleral icterus.     Conjunctiva/sclera: Conjunctivae normal.   Cardiovascular:      Rate and Rhythm: Regular rhythm. Tachycardia present.      Pulses: Normal pulses.      Heart sounds: Normal heart sounds.   Pulmonary:      Effort: Pulmonary effort is normal. No respiratory distress.      Breath sounds: Wheezing and rhonchi present.      Comments: Mild    Chest:      Chest wall: No tenderness.   Abdominal:      General: Abdomen is flat. There is no distension.   Musculoskeletal:         General: No swelling or deformity.      Right lower leg: No edema.      Left lower leg: No edema.   Skin:     General: Skin is warm.      Coloration: Skin is not jaundiced.      Findings: No erythema.   Neurological:      General: No focal deficit present.      Mental Status: She is alert and oriented to person, place, and time. Mental status is at baseline.      Cranial Nerves: No cranial nerve deficit, dysarthria or facial asymmetry.      Motor: Weakness present.   Psychiatric:         Attention and Perception: Attention normal.         Mood and Affect: Mood is depressed. Affect is not labile, blunt or tearful.         Behavior: Behavior is not agitated or aggressive.         Thought Content: Thought content normal.       Fluids    Intake/Output Summary (Last 24 hours) at 1/28/2023 1202  Last data filed at 1/28/2023 1000  Gross per 24 hour   Intake 478 ml   Output --   Net 478 ml       Laboratory        Recent Labs     01/26/23  1840 01/27/23  0341 01/28/23  0706   SODIUM 135 138 138   POTASSIUM 3.6 4.1 3.9   CHLORIDE 100 103 101   CO2 24 25 25   GLUCOSE 131* 132* 108*   BUN 7* 5* 6*   CREATININE 0.39* 0.37* 0.41*   CALCIUM 8.7 9.2 9.2     Recent Labs     01/26/23  1145   INR 1.20*               Imaging  CT-CTA NECK WITH & W/O-POST PROCESSING   Final Result      1.  No acute dissection or  occlusion of the neck arteries.   2.  Severe atherosclerotic narrowing of the proximal right internal carotid artery.   3.  Dependent right upper lobe atelectasis.   4.  5 mm bilateral upper lobe pulmonary nodules.   5.  Increased multilevel degenerative changes of the cervical spine from prior.      Low Risk: No routine follow-up      High Risk: Optional CT at 12 months      Comments: Use most suspicious nodule as guide to management. Follow-up intervals may vary according to size and risk.      Low Risk - Minimal or absent history of smoking and of other known risk factors.      High Risk - History of smoking or of other known risk factors.      Note: These recommendations do not apply to lung cancer screening, patients with immunosuppression, or patients with known primary cancer.      Fleischner Society 2017 Guidelines for Management of Incidentally Detected Pulmonary Nodules in Adults      CT-CTA HEAD WITH & W/O-POST PROCESS   Final Result      1.  Normal variant diminutive basilar artery with associated carotid origin of the right posterior cerebral artery.   2.  No evidence of aneurysm or occlusive lesion about [place].   3.  Mild cerebral atrophy.   4.  8mm rounded extra-axial calcification over the left high frontal convexity suggesting a calcified meningioma.   5.  Hyperostosis frontalis interna.      IR-US GUIDED PIV   Final Result    Ultrasound-guided PERIPHERAL IV INSERTION performed by    qualified nursing staff as above.      IR-US GUIDED PIV   Final Result    Ultrasound-guided PERIPHERAL IV INSERTION performed by    qualified nursing staff as above.      US-RUQ   Final Result         1.  Echogenic liver, compatible with fatty change versus fibrosis.      DX-CHEST-PORTABLE (1 VIEW)   Final Result      No acute cardiopulmonary abnormality.              Assessment/Plan  Problem Representation:    * Alcohol withdrawal delirium, acute, hyperactive (HCC)- (present on admission)  Assessment &  Plan  Patient presents with multitude of symptoms including nausea and vomiting abdominal pain likely secondary to acute alcohol withdrawal. Blood alcohol level is positive at 286.4, patient is tremulous somnolent with hypertension and tachycardia    -CIWA protocol. Peak of 8 overnight,   -Thiamine, folate  -Seizure precautions  -Tapering Librium 25mg to BID from q8, ativan PRN.   -Continue atarax 25mg PRN for continued anxiety.     Carotid artery stenosis, asymptomatic, right  Assessment & Plan  CTA 1/27/23: Severe atherosclerotic narrowing of the proximal right internal carotid artery. >70%.  09/22 lipid panel shows hyperlipidemia as well.    -Adding ASA 81mg, high intensity statin therapy of atorvastatin 40mg.    -No need for repeat lipid panel at this point.     Right upper quadrant abdominal pain  Assessment & Plan  Also component of more generalized pain as well. Lipase is mildly elevated, RUQ US negative for cholecystitis, lithiasis.   Differential: alcoholic hepatitis/ excessive tylenol use, mild pancreatitis,  --Maddrey=2.4 (good); holding steroids  -CTM      Headache  Assessment & Plan  Possible EtOH w/drawal related, rebound headaches, methamphetamine use related. No focal deficit.  -Tylenol prn  -CTA head and neck 1/27/23:  Mild cerebral atrophy. 8mm rounded extra-axial calcification over the left high frontal convexity suggesting a calcified meningioma. Hyperostosis frontalis interna.   No acute dissection or occlusion of the neck arteries.  Severe atherosclerotic narrowing of the proximal right internal carotid artery.  Dependent right upper lobe atelectasis. 5 mm bilateral upper lobe pulmonary nodules. Increased multilevel degenerative changes of the cervical spine from prior.   -Adding ASA, statin for risk reduction in CVD  -Appears much improved 1/28/23.    Elevated lipase- (present on admission)  Assessment & Plan  N/V and abdominal pain on admission, mild elevated lipase, possible  pancreatitis.  -Hx of alcohol abuse, previous pancreatitis.   -d/c fluids.   -GI cocktail and pain meds as needed. Clear liquids as tolerates and advance diet as tolerates  -anti-emetics as needed    Acute on chronic respiratory failure (HCC)  Assessment & Plan  Patient on 2 L of supplemental oxygen at baseline. Suspect a component of COPD with diminished breath sounds. CXR result w/o acute finding. 1/23 procalcitonin not elevated, negative for covid, RSV, and Flu    -Currently on 2L O2 (baseline)  -RT per protocol if needed.      Methamphetamine abuse (HCC)- (present on admission)  Assessment & Plan  Amphetamine abuse, last use within the 24 hours before admission  Cessation advised.    Obtundation- (present on admission)  Assessment & Plan  Upon admission. Much improved 1/26/23  Wean benzodiazepine meds as able given w/d.  Monitor neurochecks  Wean meds for withdrawal as tolerates    Overweight  Assessment & Plan  Body mass index is 29.72 kg/m².  Encourage healthy choices    Fever  Assessment & Plan  Fever of 101.3 on admission unclear of etiology, I suspect this may be a hyper adrenergic response to severe alcohol withdrawal however cannot rule out infectious etiology at this point. RSV, Flu, COVID negative  -Resolved      Bipolar affective disorder (HCC)- (present on admission)  Assessment & Plan  No home medications listed.  -alcohol and meth cessation   -Await further mentation improvement then consider psych meds and outpatient follow up.         VTE prophylaxis: SCDs/TEDs and Xarelto 10 mg daily as prophylaxis    I have performed a physical exam and reviewed and updated ROS and Plan today (1/28/2023). In review of yesterday's note (1/27/2023), there are no changes except as documented above.

## 2023-01-28 NOTE — CARE PLAN
The patient is Stable - Low risk of patient condition declining or worsening    Shift Goals: monitor HR; monitor for CIWA symptoms; PRN ativan and anxiety meds; monitor nausea and vomiting and EKG; increase nutrition and mobility  Clinical Goals: monitor CIWA  Patient Goals: comfot  Family Goals: BRITTANIE    Progress made toward(s) clinical / shift goals:  see above    Patient is not progressing towards the following goals:      Problem: Lifestyle Changes  Goal: Patient's ability to identify lifestyle changes and available resources to help reduce recurrence of condition will improve  Outcome: Not Progressing     Problem: Psychosocial  Goal: Patient's level of anxiety will decrease  Outcome: Not Progressing  Goal: Spiritual and cultural needs incorporated into hospitalization  Outcome: Not Progressing     Problem: Fall Risk  Goal: Patient will remain free from falls  Outcome: Not Progressing     Problem: Pain - Standard  Goal: Alleviation of pain or a reduction in pain to the patient’s comfort goal  Outcome: Not Progressing

## 2023-01-28 NOTE — CARE PLAN
The patient is Stable - Low risk of patient condition declining or worsening    Shift Goals: Monitor CIWA; monitor vitals; monitor nausea and vomiting; increase nutrition and mobility; monitor for anxiety  Clinical Goals: vitals; CIWA monitoring; aspirin and atorvastatin  Patient Goals: sleep and pain management  Family Goals: BRITTANIE    Progress made toward(s) clinical / shift goals:  see above    Problem: Optimal Care for Alcohol Withdrawal  Goal: Optimal Care for the alcohol withdrawal patient  Outcome: Progressing     Problem: Seizure Precautions  Goal: Implementation of seizure precautions  Outcome: Progressing       Problem: Risk for Aspiration  Goal: Patient's risk for aspiration will be absent or decrease  Outcome: Progressing     Problem: Fall Risk  Goal: Patient will remain free from falls  Outcome: Progressing       Patient is not progressing towards the following goals:      Problem: Knowledge Deficit - Standard  Goal: Patient and family/care givers will demonstrate understanding of plan of care, disease process/condition, diagnostic tests and medications  Outcome: Not Progressing     Problem: Lifestyle Changes  Goal: Patient's ability to identify lifestyle changes and available resources to help reduce recurrence of condition will improve  Outcome: Not Progressing     Problem: Psychosocial  Goal: Patient's level of anxiety will decrease  Outcome: Not Progressing  Goal: Spiritual and cultural needs incorporated into hospitalization  Outcome: Not Progressing

## 2023-01-28 NOTE — PROGRESS NOTES
Pt's blood pressure soft throughout night shift. Provider Yuriy notified, pt asymptomatic throughout the shift. Last /78. No interventions.

## 2023-01-28 NOTE — ASSESSMENT & PLAN NOTE
CTA 1/27/23: Severe atherosclerotic narrowing of the proximal right internal carotid artery. >70%.  09/22 lipid panel shows hyperlipidemia as well.    -Adding ASA 81mg, high intensity statin therapy of atorvastatin 40mg.    -No need for repeat lipid panel at this point.

## 2023-01-28 NOTE — PROGRESS NOTES
0800: Went in to see patient this morning and patient was alert and orientated x4 for and denied nausea but complained of headaches and abdominal pain. Patient was scored on CIWA and required 1mg of ativan at this time.    1030: MD rounded on patient this morning and the patient discussed refusing to take hydroxyzine because it wasn't going to help. Medical team discussed plan of care with patient but that she was not medically clear to discharge from the hospital at this time because she was still at risk of having withdrawal symptoms. Patient got to edge of bed an walked a couple of steps but was dizzy and unsteady and needed to sit back down.     1430: EKG was taken on patient and the MD was notified of EKG results and of previous EKG. MD compared results and wasn't concerned. MD said to continue monitor patient and to monitor for changes.    1730: Patient only scored an 4 on CIWA and didn't require ativan at this time.

## 2023-01-29 ENCOUNTER — PHARMACY VISIT (OUTPATIENT)
Dept: PHARMACY | Facility: MEDICAL CENTER | Age: 53
End: 2023-01-29
Payer: MEDICARE

## 2023-01-29 VITALS
BODY MASS INDEX: 29.2 KG/M2 | SYSTOLIC BLOOD PRESSURE: 100 MMHG | OXYGEN SATURATION: 98 % | HEART RATE: 94 BPM | WEIGHT: 139.11 LBS | RESPIRATION RATE: 20 BRPM | HEIGHT: 58 IN | DIASTOLIC BLOOD PRESSURE: 71 MMHG | TEMPERATURE: 97 F

## 2023-01-29 PROCEDURE — A9270 NON-COVERED ITEM OR SERVICE: HCPCS | Performed by: HOSPITALIST

## 2023-01-29 PROCEDURE — 700102 HCHG RX REV CODE 250 W/ 637 OVERRIDE(OP): Performed by: HOSPITALIST

## 2023-01-29 PROCEDURE — 99238 HOSP IP/OBS DSCHRG MGMT 30/<: CPT | Mod: GC | Performed by: HOSPITALIST

## 2023-01-29 PROCEDURE — 700102 HCHG RX REV CODE 250 W/ 637 OVERRIDE(OP)

## 2023-01-29 PROCEDURE — 700102 HCHG RX REV CODE 250 W/ 637 OVERRIDE(OP): Performed by: STUDENT IN AN ORGANIZED HEALTH CARE EDUCATION/TRAINING PROGRAM

## 2023-01-29 PROCEDURE — A9270 NON-COVERED ITEM OR SERVICE: HCPCS

## 2023-01-29 PROCEDURE — RXMED WILLOW AMBULATORY MEDICATION CHARGE

## 2023-01-29 PROCEDURE — A9270 NON-COVERED ITEM OR SERVICE: HCPCS | Performed by: STUDENT IN AN ORGANIZED HEALTH CARE EDUCATION/TRAINING PROGRAM

## 2023-01-29 RX ORDER — ATORVASTATIN CALCIUM 40 MG/1
40 TABLET, FILM COATED ORAL EVERY EVENING
Qty: 30 TABLET | Refills: 1 | Status: SHIPPED | OUTPATIENT
Start: 2023-01-29 | End: 2023-04-20 | Stop reason: SDUPTHER

## 2023-01-29 RX ORDER — ASPIRIN 81 MG/1
81 TABLET ORAL DAILY
Qty: 30 TABLET | Refills: 1 | Status: SHIPPED | OUTPATIENT
Start: 2023-01-30 | End: 2023-02-27 | Stop reason: SDUPTHER

## 2023-01-29 RX ORDER — PROMETHAZINE HYDROCHLORIDE 25 MG/1
25 TABLET ORAL EVERY 6 HOURS PRN
Qty: 12 TABLET | Refills: 0 | Status: SHIPPED | OUTPATIENT
Start: 2023-01-29 | End: 2023-02-01

## 2023-01-29 RX ADMIN — BUTALBITAL, ACETAMINOPHEN, AND CAFFEINE 1 TABLET: 50; 325; 40 TABLET ORAL at 01:31

## 2023-01-29 RX ADMIN — ACETAMINOPHEN 650 MG: 325 TABLET ORAL at 03:37

## 2023-01-29 RX ADMIN — FOLIC ACID 1 MG: 1 TABLET ORAL at 04:27

## 2023-01-29 RX ADMIN — THERA TABS 1 TABLET: TAB at 04:27

## 2023-01-29 RX ADMIN — OMEPRAZOLE 20 MG: 20 CAPSULE, DELAYED RELEASE ORAL at 04:27

## 2023-01-29 RX ADMIN — LORAZEPAM 1 MG: 1 TABLET ORAL at 05:35

## 2023-01-29 RX ADMIN — CHLORDIAZEPOXIDE HYDROCHLORIDE 25 MG: 25 CAPSULE ORAL at 04:27

## 2023-01-29 RX ADMIN — THIAMINE HCL TAB 100 MG 100 MG: 100 TAB at 04:29

## 2023-01-29 RX ADMIN — BUTALBITAL, ACETAMINOPHEN, AND CAFFEINE 1 TABLET: 50; 325; 40 TABLET ORAL at 09:27

## 2023-01-29 RX ADMIN — ASPIRIN 81 MG: 81 TABLET, COATED ORAL at 04:27

## 2023-01-29 RX ADMIN — LORAZEPAM 0.5 MG: 1 TABLET ORAL at 09:28

## 2023-01-29 RX ADMIN — PROMETHAZINE HYDROCHLORIDE 25 MG: 25 TABLET ORAL at 09:58

## 2023-01-29 RX ADMIN — HYDROXYZINE HYDROCHLORIDE 25 MG: 25 TABLET, FILM COATED ORAL at 15:19

## 2023-01-29 ASSESSMENT — LIFESTYLE VARIABLES
AGITATION: NORMAL ACTIVITY
NAUSEA AND VOMITING: NO NAUSEA AND NO VOMITING
AGITATION: SOMEWHAT MORE THAN NORMAL ACTIVITY
TREMOR: TREMOR NOT VISIBLE BUT CAN BE FELT, FINGERTIP TO FINGERTIP
ORIENTATION AND CLOUDING OF SENSORIUM: CANNOT DO SERIAL ADDITIONS OR IS UNCERTAIN ABOUT DATE
HEADACHE, FULLNESS IN HEAD: VERY MILD
PAROXYSMAL SWEATS: BARELY PERCEPTIBLE SWEATING. PALMS MOIST
TREMOR: TREMOR NOT VISIBLE BUT CAN BE FELT, FINGERTIP TO FINGERTIP
ANXIETY: MILDLY ANXIOUS
NAUSEA AND VOMITING: NO NAUSEA AND NO VOMITING
ANXIETY: *
PAROXYSMAL SWEATS: NO SWEAT VISIBLE
ORIENTATION AND CLOUDING OF SENSORIUM: ORIENTED AND CAN DO SERIAL ADDITIONS
HEADACHE, FULLNESS IN HEAD: NOT PRESENT
ORIENTATION AND CLOUDING OF SENSORIUM: ORIENTED AND CAN DO SERIAL ADDITIONS
PAROXYSMAL SWEATS: NO SWEAT VISIBLE
AUDITORY DISTURBANCES: VERY MILD HARSHNESS OR ABILITY TO FRIGHTEN
ANXIETY: NO ANXIETY (AT EASE)
AUDITORY DISTURBANCES: NOT PRESENT
TOTAL SCORE: 5
AGITATION: *
HEADACHE, FULLNESS IN HEAD: VERY MILD
NAUSEA AND VOMITING: NO NAUSEA AND NO VOMITING
TOTAL SCORE: 1
AUDITORY DISTURBANCES: NOT PRESENT
VISUAL DISTURBANCES: NOT PRESENT
VISUAL DISTURBANCES: NOT PRESENT
TOTAL SCORE: 10
VISUAL DISTURBANCES: NOT PRESENT
TREMOR: *

## 2023-01-29 ASSESSMENT — PAIN DESCRIPTION - PAIN TYPE
TYPE: ACUTE PAIN

## 2023-01-29 NOTE — CARE PLAN
The patient is Stable - Low risk of patient condition declining or worsening    Shift Goals  Clinical Goals: Discharge planning, anxiety reduction  Patient Goals: rest  Family Goals: BRITTANIE    Progress made toward(s) clinical / shift goals:    Problem: Knowledge Deficit - Standard  Goal: Patient and family/care givers will demonstrate understanding of plan of care, disease process/condition, diagnostic tests and medications  Outcome: Met  Note: Discharge education completed.      Problem: Lifestyle Changes  Goal: Patient's ability to identify lifestyle changes and available resources to help reduce recurrence of condition will improve  Outcome: Met  Note: Pt verbalized understanding of recommended lifestyle changes       Problem: Psychosocial  Goal: Patient's level of anxiety will decrease  Outcome: Met   Pt received hydroxyzine for anxiety reduction.    Patient is not progressing towards the following goals:

## 2023-01-29 NOTE — CARE PLAN
The patient is Stable - Low risk of patient condition declining or worsening    Shift Goals  Clinical Goals: reduction in anxiety  Patient Goals: sleep, comfort  Family Goals: BRITTANIE    Progress made toward(s) clinical / shift goals:  Patients anxiety level remained low and her withdrawals symptoms were minimal. She did not experience any seizures. Rested comfortably and quietly during the shift. Her Ciwa score remained less than 4.

## 2023-01-29 NOTE — PROGRESS NOTES
"Update.... Patient awoke at approximately 0510. Patient was crying and stating \"my son is outside he just called my phone. He says he cannot get inside. I need to help him.\" Patient could not be redirected into reality based thinking on the topic. Patient had become more agitated when staff tried to console her and redirect her. CIWA was Access with a score of 10 and patient was administered prn medication accordingly.       "

## 2023-01-29 NOTE — DISCHARGE SUMMARY
"[facility] Internal Medicine Discharge Summary    Attending: Tia Harley M.d.  Senior Resident: Dr. Viky Dukes  Intern:  Dr. Reed Dodd  Contact Number: [phone]    CHIEF COMPLAINT ON ADMISSION  Chief Complaint   Patient presents with    Headache     Radiating from top of head to neck and shoulders    Abdominal Pain     To mid sternum radiating to the R side started about 2 weeks ago with bloating.  \"Problems with my gallbladder\"    N/V    Fever     Tmax over the last 24 hrs at home 102.1.  Pt also c/o cough and congestion       Reason for Admission  Alcohol withdrawal delirium, acute*     Admission Date  1/23/2023    CODE STATUS  Full Code    HPI & HOSPITAL COURSE  Olya Youssef is a 52 y.o. female with a past medical history of chronic alcoholism with history of withdrawal, amphetamine use, hypertension and pancreatitis who presented 1/23/2023 with complaints of headache, abdominal pain, nausea, vomiting and fever. She had admitted to meth use <24hours prior to admission and blood alcohol was positive at 286. She was tachycardic, febrile, hypertensive, and required increased oxygen demand on admission. Patient was placed on CIWA protocol and briefly was admitted to IMCU before de-escalating to a rondon floor. Patient was given librium 25mg q8hr with ativan PRN per CIWA before titrating down throughout hospital course.   For patient's new/worsening headache, patient had angiograms of head and neck revealing a severe atherosclerotic narrowing of the proximal right internal carotid artery. Given patient asymptomatic, patient was placed on ASA 81mg, atorvastatin 40mg for CVA risk reduction.     #Alcohol withdrawal delirium, acute, hyperactive   Patient presented with multitude of symptoms including nausea and vomiting abdominal pain likely secondary to acute alcohol withdrawal. Blood alcohol level is positive at 286.4, patient was tremulous somnolent with hypertension and tachycardia on admission  CIWA " scores low  Librium tapered down  -Counseled on alcohol cessation counseled  -Follow-up with primary care provider and/or psychiatry    #Headache  #Carotid artery stenosis, asymptomatic, right  Possible EtOH w/drawal related, rebound headaches, methamphetamine use related. No focal deficit. Using 6 g of Tylenol daily for headache  CTA head and neck 1/27/23: Mild cerebral atrophy. 8mm rounded extra-axial calcification over the left high frontal convexity suggesting a calcified meningioma. Hyperostosis frontalis interna. Severe atherosclerotic narrowing of the proximal right internal carotid artery. >70%.    No symptoms   -Continue ASA 81mg, high intensity statin therapy of atorvastatin 40mg for reduction in CVA  -Will need outpatient follow-up with vascular surgery for stent     #Methamphetamine abuse   Amphetamine abuse, last use within the 24 hours before admission  -Counseled patient on meth cessation  -Follow-up with primary care provider and/or psychiatry    #Acute on chronic respiratory failure  Resolved  Patient on 2 L of supplemental oxygen at baseline.   Suspect a component of COPD with diminished breath sounds. CXR result w/o acute finding. 1/23 procalcitonin not elevated, negative for covid, RSV, and Flu  -Passed home O2 evaluation, does not require home oxygen    #Bipolar affective disorder  No home medications listed. States previously followed by a psychiatrist.   -Counseled patient on alcohol and meth cessation   -Follow-up with psychiatry outpatient     #Right upper quadrant abdominal pain  #Elevated lipase  Hx of alcohol abuse, previous pancreatitis.   Lipase is mildly elevated, RUQ US negative for cholecystitis, lithiasis.   Differential: alcoholic hepatitis/ excessive tylenol use, mild pancreatitis,  Maddrey=2.4 (good); holding steroids  -Counseled patient on alcohol cessation  -Follow-up with primary care provider if symptoms worsen    Therefore, she is discharged in good and stable condition to  home with close outpatient follow-up.    The patient met 2-midnight criteria for an inpatient stay at the time of discharge.    Discharge Date  01/29/23    Physical Exam on Day of Discharge  Physical Exam  Vitals and nursing note reviewed.   Constitutional:       General: She is not in acute distress.     Appearance: Normal appearance. She is normal weight.   HENT:      Head: Normocephalic and atraumatic.      Right Ear: External ear normal.      Left Ear: External ear normal.   Eyes:      General: No scleral icterus.     Conjunctiva/sclera: Conjunctivae normal.   Pulmonary:      Effort: Pulmonary effort is normal. No respiratory distress.      Comments: Mild    Abdominal:      General: Abdomen is flat. There is no distension.   Musculoskeletal:         General: No swelling or deformity.      Right lower leg: No edema.      Left lower leg: No edema.   Skin:     General: Skin is warm.      Coloration: Skin is not jaundiced.      Findings: No erythema.   Neurological:      General: No focal deficit present.      Mental Status: She is alert and oriented to person, place, and time. Mental status is at baseline.      Cranial Nerves: No cranial nerve deficit, dysarthria or facial asymmetry.      Motor: Weakness present.      Comments: generalized   Psychiatric:         Attention and Perception: Attention normal.         Mood and Affect: Mood is anxious. Affect is not labile, blunt or tearful.         Speech: Speech normal.         Behavior: Behavior is not agitated or aggressive.         Thought Content: Thought content normal.       FOLLOW UP ITEMS POST DISCHARGE  Substance use cessation  Psychiatric management  Primary care management. Carotid stenosis.     DISCHARGE DIAGNOSES  Principal Problem:    Alcohol withdrawal delirium, acute, hyperactive (HCC) POA: Yes  Active Problems:    Obtundation POA: Yes    Bipolar affective disorder (HCC) POA: Yes    Methamphetamine abuse (HCC) POA: Yes    Acute on chronic respiratory  failure (HCC) POA: Unknown    Fever POA: Unknown    Elevated lipase POA: Yes    Headache POA: Unknown    Overweight POA: Unknown    Alcohol abuse with withdrawal (HCC) POA: Yes    Right upper quadrant abdominal pain POA: Unknown    Carotid artery stenosis, asymptomatic, right POA: Unknown      Overview: CTA 1/27/23: Severe atherosclerotic narrowing of the proximal right       internal carotid artery. >70%  Resolved Problems:    * No resolved hospital problems. *      FOLLOW UP  No future appointments.  Margy Aparicio M.D.  [address] [address] [facility] [address]  [phone]    Follow up in 1 week(s)      [facility]  [address]  [facility] [facility] [place] [address]  [phone]  Follow up in 1 month(s)        MEDICATIONS ON DISCHARGE     Medication List        START taking these medications        Instructions   aspirin 81 MG EC tablet  Start taking on: January 30, 2023   Take 1 Tablet by mouth every day.  Dose: 81 mg     atorvastatin 40 MG Tabs  Commonly known as: LIPITOR   Take 1 Tablet by mouth every evening.  Dose: 40 mg     multivitamin Tabs  Start taking on: January 30, 2023   Take 1 Tablet by mouth every day.  Dose: 1 Tablet     promethazine 25 MG Tabs  Commonly known as: PHENERGAN   Take 1 Tablet by mouth every 6 hours as needed for Nausea/Vomiting for up to 3 days.  Dose: 25 mg              Allergies  Allergies   Allergen Reactions    Bactrim Hives    Lamotrigine [Lamictal] Hives     Tolerated Fioricet 10/3/22    Quetiapine Fumarate Hives     Extrapyramidal Symptoms    Keflex Hives       DIET  Orders Placed This Encounter   Procedures    Diet Order Diet: Regular     Standing Status:   Standing     Number of Occurrences:   1     Order Specific Question:   Diet:     Answer:   Regular [1]       ACTIVITY  As tolerated.  Weight bearing as tolerated    CONSULTATIONS  N/a    PROCEDURES  N/a    LABORATORY  Lab Results   Component Value Date    SODIUM 138 01/28/2023    POTASSIUM 3.9  01/28/2023    CHLORIDE 101 01/28/2023    CO2 25 01/28/2023    GLUCOSE 108 (H) 01/28/2023    BUN 6 (L) 01/28/2023    CREATININE 0.41 (L) 01/28/2023    CREATININE 1.0 02/06/2009        Lab Results   Component Value Date    WBC 4.7 (L) 01/24/2023    HEMOGLOBIN 8.7 (L) 01/24/2023    HEMATOCRIT 31.0 (L) 01/24/2023    PLATELETCT 71 (L) 01/24/2023        Total time of the discharge process exceeds 45 minutes.

## 2023-01-29 NOTE — FACE TO FACE
"Face to Face Note  -  Durable Medical Equipment    Viky Dukes M.D. - NPI: [id number]  I certify that this patient is under my care and that they had a durable medical equipment(DME)face to face encounter by myself that meets the physician DME face-to-face encounter requirements with this patient on:    Date of encounter:   Patient:                    MRN:                       YOB: 2025  Olya Youssef  [id number]  1970     The encounter with the patient was in whole, or in part, for the following medical condition, which is the primary reason for durable medical equipment:  Covid-19 Infection (long Covid)    I certify that, based on my findings, the following durable medical equipment is medically necessary:    Oxygen   HOME O2 Saturation Measurements:(Values must be present for Home Oxygen orders)  Room air sat at rest: 99  Room air sat with amb: 96  With liters of O2: 2, O2 sat at rest with O2: 99  With Liters of O2: 2, O2 sat with amb with O2 : 98  Is the patient mobile?: Yes  If patient feels more short of breath, they can go up to 6 liters per minute and contact healthcare provider.    Supporting Symptoms: The patient requires supplemental oxygen, as the following interventions have been tried with limited or no improvement: \"Ambulation with oximetry.    My Clinical findings support the need for the above equipment due to:  Hypoxia  "

## 2023-01-29 NOTE — HOSPITAL COURSE
Olya Youssef is a 52 y.o. female with a past medical history of chronic alcoholism with history of withdrawal, amphetamine use, hypertension and pancreatitis who presented 1/23/2023 complaints of headache, abdominal pain nausea vomiting and fever. She had admitted to meth use <24hours prior to admission and blood alcohol was positive at 286. She was tachycardic, febrile, hypertensive, and required increased oxygen demand on admission. Patient was placed on CIWA protocol and briefly was admitted to IMCU before de-escalating to a rondon floor. Patient was given librium 25mg q8hr with ativan PRN per CIWA before titrating down throughout hospital course.   For patient's new/worsening headache, patient has angiograms of head and neck revealing a severe atherosclerotic narrowing of the proximal right internal carotid artery. Given patient asymptomatic, patient was placed on ASA 81mg, atorvastatin 40mg for CVA risk reduction.

## 2023-01-29 NOTE — DISCHARGE PLANNING
Case Management Discharge Planning    Admission Date: 1/23/2023  GMLOS: 5  ALOS: 6    6-Clicks ADL Score: 18  6-Clicks Mobility Score: 19      Anticipated Discharge Dispo: Discharge Disposition: Discharged to home/self care (01)    DME Needed: Oxygen    Action(s) Taken:  Patient medically cleared for DC home on baseline oxygen and does not have anyone available to bring home O2 for DC and will also need cab voucher.   CM met with patient at bedside to discuss DCP.   Patient stated that she lives with a roommate, confirmed not being able to get home O2 for DC, provided name of O2 supplier ([facility]), and verified home address:  [address] [place] [address] [address], [place], [place].     CM called [facility] #[phone] & spoke to Milagros who stated that patient needs to be re- qualified for home oxygen and that new orders & F2F are needed.     Patient selected [facility] for continuation of oxygen services.    Choice Form faxed to [facility].    Escalations Completed: O2 orders & F2F requested from Dr. Dodd.    Medically Clear: Yes    Next Steps:  CM will f/u on oxygen orders & delivery    Barriers to Discharge: Oxygen Delivery    Is the patient up for discharge tomorrow:  Medically cleared    **1525 Hrs - Patient passed Home O2 eval, saturating 96-99% on RA.   Discussed with MD who is aware of patient not qualifying for Home oxygen.   CM updated patient & Milagros @ [facility] on oxygen status.   Cab voucher handed to patient.

## 2023-01-29 NOTE — DISCHARGE INSTRUCTIONS
-Follow-up with your primary care provider and/or psychiatry for alcohol cessation and meth cessation.   -Continue using aspirin 81 mg and Lipitor 40 mg daily. One of your vessels showed severe atherosclerotic narrowing of the proximal right internal carotid artery. >70%.  Discharge Instructions    Discharged to home by taxi with self. Discharged via wheelchair, hospital escort: Yes.  Special equipment needed: Not Applicable    Be sure to schedule a follow-up appointment with your primary care doctor or any specialists as instructed.     Discharge Plan:   Diet Plan: Discussed  Activity Level: Discussed  Confirmed Follow up Appointment: Patient to Call and Schedule Appointment  Confirmed Symptoms Management: Discussed  Medication Reconciliation Updated: Yes  Influenza Vaccine Indication: Not indicated: Previously immunized this influenza season and > 8 years of age    I understand that a diet low in cholesterol, fat, and sodium is recommended for good health. Unless I have been given specific instructions below for another diet, I accept this instruction as my diet prescription.   Other diet: Regular    Special Instructions: None    -Is this patient being discharged with medication to prevent blood clots?  Yes, Aspirin   Aspirin, ASA oral tablets  What is this medicine?  ASPIRIN (AS pir in) is a pain reliever. It is used to treat mild pain and fever. This medicine is also used as directed by a doctor to prevent and to treat heart attacks, to prevent strokes and blood clots, and to treat arthritis or inflammation.  This medicine may be used for other purposes; ask your health care provider or pharmacist if you have questions.  COMMON BRAND NAME(S): Aspir-Low, Aspir-Geovanna, Aspirtab, Emma Advanced Aspirin, Emma Aspirin, Emma Aspirin Extra Strength, Emma Aspirin Plus, Emma Extra Strength, Emma Extra Strength Plus, Emma Genuine Aspirin, Emma Womens Aspirin, Bufferin, Bufferin Extra Strength, Bufferin Low Dose  What  should I tell my health care provider before I take this medicine?  They need to know if you have any of these conditions:  anemia  asthma  bleeding problems  child with chickenpox, the flu, or other viral infection  diabetes  gout  if you frequently drink alcohol containing drinks  kidney disease  liver disease  low level of vitamin K  lupus  smoke tobacco  stomach ulcers or other problems  an unusual or allergic reaction to aspirin, tartrazine dye, other medicines, dyes, or preservatives  pregnant or trying to get pregnant  breast-feeding  How should I use this medicine?  Take this medicine by mouth with a glass of water. Follow the directions on the package or prescription label. You can take this medicine with or without food. If it upsets your stomach, take it with food. Do not take your medicine more often than directed.  Talk to your pediatrician regarding the use of this medicine in children. While this drug may be prescribed for children as young as 12 years of age for selected conditions, precautions do apply. Children and teenagers should not use this medicine to treat chicken pox or flu symptoms unless directed by a doctor.  Patients over 65 years old may have a stronger reaction and need a smaller dose.  Overdosage: If you think you have taken too much of this medicine contact a poison control center or emergency room at once.  NOTE: This medicine is only for you. Do not share this medicine with others.  What if I miss a dose?  If you are taking this medicine on a regular schedule and miss a dose, take it as soon as you can. If it is almost time for your next dose, take only that dose. Do not take double or extra doses.  What may interact with this medicine?  Do not take this medicine with any of the following medications:  cidofovir  ketorolac  probenecid  This medicine may also interact with the following medications:  alcohol  alendronate  bismuth subsalicylate  flavocoxid  herbal supplements like  feverfew, garlic, oh, ginkgo biloba, horse chestnut  medicines for diabetes or glaucoma like acetazolamide, methazolamide  medicines for gout  medicines that treat or prevent blood clots like enoxaparin, heparin, ticlopidine, warfarin  other aspirin and aspirin-like medicines  NSAIDs, medicines for pain and inflammation, like ibuprofen or naproxen  pemetrexed  sulfinpyrazone  varicella live vaccine  This list may not describe all possible interactions. Give your health care provider a list of all the medicines, herbs, non-prescription drugs, or dietary supplements you use. Also tell them if you smoke, drink alcohol, or use illegal drugs. Some items may interact with your medicine.  What should I watch for while using this medicine?  If you are treating yourself for pain, tell your doctor or health care professional if the pain lasts more than 10 days, if it gets worse, or if there is a new or different kind of pain. Tell your doctor if you see redness or swelling. Also, check with your doctor if you have a fever that lasts for more than 3 days. Only take this medicine to prevent heart attacks or blood clotting if prescribed by your doctor or health care professional.  Do not take aspirin or aspirin-like medicines with this medicine. Too much aspirin can be dangerous. Always read the labels carefully.  This medicine can irritate your stomach or cause bleeding problems. Do not smoke cigarettes or drink alcohol while taking this medicine. Do not lie down for 30 minutes after taking this medicine to prevent irritation to your throat.  If you are scheduled for any medical or dental procedure, tell your healthcare provider that you are taking this medicine. You may need to stop taking this medicine before the procedure.  This medicine may be used to treat migraines. If you take migraine medicines for 10 or more days a month, your migraines may get worse. Keep a diary of headache days and medicine use. Contact your  healthcare professional if your migraine attacks occur more frequently.  What side effects may I notice from receiving this medicine?  Side effects that you should report to your doctor or health care professional as soon as possible:  allergic reactions like skin rash, itching or hives, swelling of the face, lips, or tongue  breathing problems  changes in hearing, ringing in the ears  confusion  general ill feeling or flu-like symptoms  pain on swallowing  redness, blistering, peeling or loosening of the skin, including inside the mouth or nose  signs and symptoms of bleeding such as bloody or black, tarry stools; red or dark-brown urine; spitting up blood or brown material that looks like coffee grounds; red spots on the skin; unusual bruising or bleeding from the eye, gums, or nose  trouble passing urine or change in the amount of urine  unusually weak or tired  yellowing of the eyes or skin  Side effects that usually do not require medical attention (report to your doctor or health care professional if they continue or are bothersome):  diarrhea or constipation  headache  nausea, vomiting  stomach gas, heartburn  This list may not describe all possible side effects. Call your doctor for medical advice about side effects. You may report side effects to FDA at 1-996-FDA-2088.  Where should I keep my medicine?  Keep out of the reach of children.  Store at room temperature between 15 and 30 degrees C (59 and 86 degrees F). Protect from heat and moisture. Do not use this medicine if it has a strong vinegar smell. Throw away any unused medicine after the expiration date.  NOTE: This sheet is a summary. It may not cover all possible information. If you have questions about this medicine, talk to your doctor, pharmacist, or health care provider.  © 2020 [employer] Standard (2018-01-30 10:42:13)    Is patient discharged on Warfarin / Coumadin?   No

## 2023-01-29 NOTE — PROGRESS NOTES
Received report from night shift RNSandrita . Assumed care of pt at 0645. Pt reports no pain, nausea, vomiting, numbness, tingling, at this time. Aox4, calm and cooperative. Updated pt on plan of care. Pt resting comfortably in bed. Fall precautions and education done. Educated on use of call light which is placed nearby patient. Hourly rounding and continuous pulse ox monitoring in place, O2 saturation at 98% on 2L NC. Questions and concerns answered at this time. Patient reports no other needs at the moment.

## 2023-01-29 NOTE — PROGRESS NOTES
Monitor summary:     Rhythm : ST  Rate : 101-118  Ectopy : PVC'S    NM=.12  QRS=.08  QT=.35        Per telemetry strip summary from monitor room.

## 2023-01-30 NOTE — PROGRESS NOTES
1645 waited for pt cab in [facility] [employer] [facility] [employer], did not arrive by 1745, brought pt back to room. Officially discharged at 1756 via [employer].    Pt discharged at 1756 via wheelchair escorted by staff to taxi ride. Discharge education paper work and education has been completed. All IV's pulled. All belongings checked and taken home with patient. All questions and concerns were met at time of discharge.

## 2023-02-06 ENCOUNTER — OFFICE VISIT (OUTPATIENT)
Dept: INTERNAL MEDICINE | Facility: OTHER | Age: 53
End: 2023-02-06
Payer: COMMERCIAL

## 2023-02-06 VITALS
HEART RATE: 101 BPM | OXYGEN SATURATION: 91 % | BODY MASS INDEX: 28.67 KG/M2 | WEIGHT: 136.6 LBS | DIASTOLIC BLOOD PRESSURE: 89 MMHG | HEIGHT: 58 IN | TEMPERATURE: 98 F | SYSTOLIC BLOOD PRESSURE: 136 MMHG

## 2023-02-06 DIAGNOSIS — G47.09 OTHER INSOMNIA: ICD-10-CM

## 2023-02-06 DIAGNOSIS — I10 HYPERTENSION, UNSPECIFIED TYPE: ICD-10-CM

## 2023-02-06 DIAGNOSIS — G62.9 NEUROPATHY: ICD-10-CM

## 2023-02-06 DIAGNOSIS — F10.20 ALCOHOL USE DISORDER, SEVERE, DEPENDENCE (HCC): ICD-10-CM

## 2023-02-06 DIAGNOSIS — F34.1 PERSISTENT DEPRESSIVE DISORDER: ICD-10-CM

## 2023-02-06 DIAGNOSIS — I65.21 CAROTID ARTERY STENOSIS, ASYMPTOMATIC, RIGHT: ICD-10-CM

## 2023-02-06 DIAGNOSIS — R11.0 NAUSEA: ICD-10-CM

## 2023-02-06 PROBLEM — J96.11 CHRONIC RESPIRATORY FAILURE WITH HYPOXIA, ON HOME OXYGEN THERAPY (HCC): Status: ACTIVE | Noted: 2023-02-06

## 2023-02-06 PROBLEM — R50.9 FEVER: Status: RESOLVED | Noted: 2022-09-28 | Resolved: 2023-02-06

## 2023-02-06 PROBLEM — Z99.81 CHRONIC RESPIRATORY FAILURE WITH HYPOXIA, ON HOME OXYGEN THERAPY (HCC): Status: ACTIVE | Noted: 2023-02-06

## 2023-02-06 PROBLEM — U09.9 POST COVID-19 CONDITION, UNSPECIFIED: Status: ACTIVE | Noted: 2023-02-06

## 2023-02-06 PROCEDURE — 99214 OFFICE O/P EST MOD 30 MIN: CPT | Mod: GC

## 2023-02-06 RX ORDER — AMLODIPINE BESYLATE 2.5 MG/1
2.5 TABLET ORAL DAILY
Qty: 30 TABLET | Refills: 3 | Status: SHIPPED | OUTPATIENT
Start: 2023-02-06 | End: 2023-04-20 | Stop reason: SDUPTHER

## 2023-02-06 RX ORDER — LANOLIN ALCOHOL/MO/W.PET/CERES
3 CREAM (GRAM) TOPICAL
Qty: 30 TABLET | Refills: 3 | Status: SHIPPED | OUTPATIENT
Start: 2023-02-06 | End: 2023-03-28

## 2023-02-06 RX ORDER — PROMETHAZINE HYDROCHLORIDE 25 MG/1
25 TABLET ORAL EVERY 6 HOURS PRN
Qty: 30 TABLET | Refills: 0 | Status: SHIPPED | OUTPATIENT
Start: 2023-02-06 | End: 2023-03-28

## 2023-02-06 RX ORDER — GABAPENTIN 300 MG/1
300 CAPSULE ORAL 3 TIMES DAILY
Qty: 90 CAPSULE | Refills: 3 | Status: SHIPPED | OUTPATIENT
Start: 2023-02-06 | End: 2023-02-27

## 2023-02-06 RX ORDER — TRAZODONE HYDROCHLORIDE 50 MG/1
50 TABLET ORAL NIGHTLY
Qty: 30 TABLET | Refills: 3 | Status: SHIPPED | OUTPATIENT
Start: 2023-02-06 | End: 2023-04-20 | Stop reason: SDUPTHER

## 2023-02-06 ASSESSMENT — ENCOUNTER SYMPTOMS
INSOMNIA: 1
BACK PAIN: 1
FALLS: 0
FEVER: 0
TINGLING: 1
HALLUCINATIONS: 0
DOUBLE VISION: 0
DIARRHEA: 0
NECK PAIN: 1
WEAKNESS: 1
CONSTIPATION: 0
PALPITATIONS: 1
TREMORS: 0
CHILLS: 0
ABDOMINAL PAIN: 1
DIZZINESS: 0
NAUSEA: 1
VOMITING: 0
SORE THROAT: 0
SHORTNESS OF BREATH: 1
WHEEZING: 0
NERVOUS/ANXIOUS: 1
BLURRED VISION: 1
COUGH: 0
HEADACHES: 1
DEPRESSION: 1

## 2023-02-06 ASSESSMENT — PATIENT HEALTH QUESTIONNAIRE - PHQ9
SUM OF ALL RESPONSES TO PHQ QUESTIONS 1-9: 18
CLINICAL INTERPRETATION OF PHQ2 SCORE: 6
5. POOR APPETITE OR OVEREATING: 3 - NEARLY EVERY DAY

## 2023-02-06 ASSESSMENT — FIBROSIS 4 INDEX: FIB4 SCORE: 9.4

## 2023-02-06 ASSESSMENT — LIFESTYLE VARIABLES: SUBSTANCE_ABUSE: 1

## 2023-02-07 NOTE — PROGRESS NOTES
Established Patient    Patient Care Team:  Margy Aparicio M.D. as PCP - General (Internal Medicine)    Olya Youssef is a 52 y.o. female who presents today with the following Chief Complaint(s): Follow up for Diagnoses of Hypertension, unspecified type, Carotid artery stenosis, asymptomatic, right, Alcohol use disorder, severe, dependence (HCC), Persistent depressive disorder, Other insomnia, Nausea, and Neuropathy were pertinent to this visit.    HPI:  Ms. 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 Aparicio who presents to clinic today to follow up on her hospital stay from 1/23-1/29. She was hospitalized for acute alcohol intoxication and withdrawal and had a brief ICU stay initially. After downgrade to the medical floor she experienced a severe headache so a head and neck CTA was ordered which revealed >70 % R ICA stenosis. She was started on atorvastatin 40 mg and aspirin 81 mg and was recommended for outpatient follow up. She was also referred to [facility] for assistance with her substance use and mood disorders. Today she feels that she is still having worsening RUQ pain and thought that she could not take Tylenol due to liver dysfunction, so instead to ease the pain she is still drinking 1/2 pint vodka daily. She has not yet been contacted for counseling. She is taking the atorvastatin but not the aspirin. She is concerned that she does not have transportation to the pharmacy and has very limited resources financially. Her roommate purchases her alcohol and she reports she has an  (?) who sends her supplies such as shampoo. She is having difficulty sleeping, still feeling nauseous, and would like to refill her gabapentin for nerve pain.       Review of Systems   Constitutional:  Positive for malaise/fatigue.  Negative for chills and fever.   HENT:  Positive for tinnitus. Negative for congestion and sore throat.    Eyes:  Positive for blurred vision (wears glasses, unchanged). Negative for double vision.   Respiratory:  Positive for shortness of breath. Negative for cough and wheezing.    Cardiovascular:  Positive for palpitations. Negative for chest pain and leg swelling.   Gastrointestinal:  Positive for abdominal pain (RUQ) and nausea. Negative for constipation, diarrhea and vomiting.   Genitourinary:  Positive for urgency. Negative for dysuria.        Incontinence   Musculoskeletal:  Positive for back pain and neck pain. Negative for falls.   Skin:  Negative for itching and rash.   Neurological:  Positive for tingling (fingers), weakness and headaches. Negative for dizziness and tremors.   Psychiatric/Behavioral:  Positive for depression and substance abuse. Negative for hallucinations and suicidal ideas. The patient is nervous/anxious and has insomnia.      Past Medical History:   Diagnosis Date    Alcohol abuse     Alcohol intoxication, with unspecified complication ([facility]) 8/7/2013    Alcoholism ([facility])     Anxiety     Backpain     Bipolar disorder, unspecified ([facility])     Bronchitis     Drug abuse ([facility])     meth, crack cocaine, THC    Hepatitis, alcoholic     Hypertension     controlled    Indigestion     Infectious disease MRSA    Liver disease     pancreatitis    Pancreatitis chronic     Flare-up    Pneumonia     Psychiatric disorder     suicidal in past    Renal disorder     kidney infection 1991    Seizure ([facility])     7/8/2011    Seizure disorder ([facility])     Unspecified hemorrhagic conditions        Social History     Tobacco Use    Smoking status: Never    Smokeless tobacco: Never   Vaping Use    Vaping Use: Never used   Substance Use Topics    Alcohol use: Yes     Comment: Hx heavy drinking apint in a 24 hr period    Drug use: Yes     Types: Inhaled     Comment: meth 1/22 / THC       Current Outpatient Medications    Medication Sig Dispense Refill    amLODIPine (NORVASC) 2.5 MG Tab Take 1 Tablet by mouth every day. 30 Tablet 3    melatonin 3 MG Tab Take 1 Tablet by mouth at bedtime. 30 Tablet 3    promethazine (PHENERGAN) 25 MG Tab Take 1 Tablet by mouth every 6 hours as needed for Nausea/Vomiting. 30 Tablet 0    traZODone (DESYREL) 50 MG Tab Take 1 Tablet by mouth every evening. 30 Tablet 3    gabapentin (NEURONTIN) 300 MG Cap Take 1 Capsule by mouth 3 times a day. 90 Capsule 3    aspirin EC 81 MG EC tablet Take 1 Tablet by mouth every day. 30 Tablet 1    atorvastatin (LIPITOR) 40 MG Tab Take 1 Tablet by mouth every evening. 30 Tablet 1    multivitamin Tab Take 1 Tablet by mouth every day. 30 Tablet 1     No current facility-administered medications for this visit.       Results for orders placed or performed during the hospital encounter of 01/23/23   Comp Metabolic Panel   Result Value Ref Range    Sodium 140 135 - 145 mmol/L    Potassium 3.7 3.6 - 5.5 mmol/L    Chloride 100 96 - 112 mmol/L    Co2 26 20 - 33 mmol/L    Anion Gap 14.0 7.0 - 16.0    Glucose 111 (H) 65 - 99 mg/dL    Bun 8 8 - 22 mg/dL    Creatinine 0.38 (L) 0.50 - 1.40 mg/dL    Calcium 9.0 8.5 - 10.5 mg/dL    AST(SGOT) 89 (H) 12 - 45 U/L    ALT(SGPT) 32 2 - 50 U/L    Alkaline Phosphatase 146 (H) 30 - 99 U/L    Total Bilirubin 0.5 0.1 - 1.5 mg/dL    Albumin 4.3 3.2 - 4.9 g/dL    Total Protein 8.7 (H) 6.0 - 8.2 g/dL    Globulin 4.4 (H) 1.9 - 3.5 g/dL    A-G Ratio 1.0 g/dL   Lactic acid (lactate)   Result Value Ref Range    Lactic Acid 2.8 (H) 0.5 - 2.0 mmol/L   Lactic acid (lactate): Repeat if initial lactic acid result is greater than 2   Result Value Ref Range    Lactic Acid 1.5 0.5 - 2.0 mmol/L   CBC With Differential   Result Value Ref Range    WBC 6.4 4.8 - 10.8 K/uL    RBC 4.16 (L) 4.20 - 5.40 M/uL    Hemoglobin 9.8 (L) 12.0 - 16.0 g/dL    Hematocrit 34.2 (L) 37.0 - 47.0 %    MCV 82.2 81.4 - 97.8 fL    MCH 23.6 (L) 27.0 - 33.0 pg    MCHC 28.7 (L) 33.6 -  35.0 g/dL    RDW 54.8 (H) 35.9 - 50.0 fL    Platelet Count 105 (L) 164 - 446 K/uL    MPV 9.2 9.0 - 12.9 fL    Neutrophils-Polys 48.70 44.00 - 72.00 %    Lymphocytes 37.70 22.00 - 41.00 %    Monocytes 10.80 0.00 - 13.40 %    Eosinophils 0.80 0.00 - 6.90 %    Basophils 1.40 0.00 - 1.80 %    Immature Granulocytes 0.60 0.00 - 0.90 %    Nucleated RBC 0.00 /100 WBC    Neutrophils (Absolute) 3.12 2.00 - 7.15 K/uL    Lymphs (Absolute) 2.41 1.00 - 4.80 K/uL    Monos (Absolute) 0.69 0.00 - 0.85 K/uL    Eos (Absolute) 0.05 0.00 - 0.51 K/uL    Baso (Absolute) 0.09 0.00 - 0.12 K/uL    Immature Granulocytes (abs) 0.04 0.00 - 0.11 K/uL    NRBC (Absolute) 0.00 K/uL    Anisocytosis 1+     Macrocytosis 1+    Urine Culture (New)    Specimen: Urine, Clean Catch   Result Value Ref Range    Significant Indicator NEG     Source UR     Site URINE, CLEAN CATCH     Culture Result Usual skin priyank ,000 cfu/mL    Blood Culture    Specimen: Peripheral; Blood   Result Value Ref Range    Significant Indicator NEG     Source BLD     Site PERIPHERAL     Culture Result No growth after 5 days of incubation.    Blood Culture    Specimen: Peripheral; Blood   Result Value Ref Range    Significant Indicator NEG     Source BLD     Site PERIPHERAL     Culture Result No growth after 5 days of incubation.    HCG QUAL SERUM   Result Value Ref Range    Beta-Hcg Qualitative Serum Negative Negative   URINALYSIS,CULTURE IF INDICATED    Specimen: Urine   Result Value Ref Range    Color Yellow     Character Clear     Specific Gravity 1.017 <1.035    Ph 5.0 5.0 - 8.0    Glucose Negative Negative mg/dL    Ketones Negative Negative mg/dL    Protein Negative Negative mg/dL    Bilirubin Negative Negative    Urobilinogen, Urine 1.0 Negative    Nitrite Negative Negative    Leukocyte Esterase Negative Negative    Occult Blood Negative Negative    Micro Urine Req see below    CoV-2, FLU A/B, and RSV by PCR (2-4 Hours CEPHEID) : Collect NP swab in VTM    Specimen:  Respirate   Result Value Ref Range    Influenza virus A RNA Negative Negative    Influenza virus B, PCR Negative Negative    RSV, PCR Negative Negative    SARS-CoV-2 by PCR NotDetected     SARS-CoV-2 Source NP Swab    ACETAMINOPHEN   Result Value Ref Range    Acetaminophen -Tylenol 19.0 10.0 - 30.0 ug/mL   PHOSPHORUS   Result Value Ref Range    Phosphorus 4.3 2.5 - 4.5 mg/dL   PROCALCITONIN   Result Value Ref Range    Procalcitonin 0.13 <0.25 ng/mL   MAGNESIUM   Result Value Ref Range    Magnesium 1.7 1.5 - 2.5 mg/dL   DIAGNOSTIC ALCOHOL   Result Value Ref Range    Diagnostic Alcohol 286.4 (H) <10.1 mg/dL   LIPASE   Result Value Ref Range    Lipase 114 (H) 11 - 82 U/L   CORRECTED CALCIUM   Result Value Ref Range    Correct Calcium 8.8 8.5 - 10.5 mg/dL   ESTIMATED GFR   Result Value Ref Range    GFR (CKD-EPI) 119 >60 mL/min/1.73 m 2   PERIPHERAL SMEAR REVIEW   Result Value Ref Range    Peripheral Smear Review see below    PLATELET ESTIMATE   Result Value Ref Range    Plt Estimation Decreased    MORPHOLOGY   Result Value Ref Range    RBC Morphology Present     Polychromia 1+    DIFFERENTIAL COMMENT   Result Value Ref Range    Comments-Diff see below    LACTIC ACID   Result Value Ref Range    Lactic Acid 2.0 0.5 - 2.0 mmol/L   ABG - LAB   Result Value Ref Range    Ph 7.35 (L) 7.40 - 7.50    Pco2 45.8 (H) 26.0 - 37.0 mmHg    Po2 109.5 (H) 64.0 - 87.0 mmHg    O2 Saturation 97.0 93.0 - 99.0 %    Hco3 25 17 - 25 mmol/L    Base Excess -1 -4 - 3 mmol/L    Body Temp 36.8 Centigrade    O2 Therapy 7.0 2.0 - 10.0 L/min    Ph -TC 7.35 (L) 7.40 - 7.50    Pco2 -TC 45.4 (H) 26.0 - 37.0 mmHg    Po2 -.3 (H) 64.0 - 87.0 mmHg   PROCALCITONIN   Result Value Ref Range    Procalcitonin 0.11 <0.25 ng/mL   CBC with Differential   Result Value Ref Range    WBC 4.7 (L) 4.8 - 10.8 K/uL    RBC 3.71 (L) 4.20 - 5.40 M/uL    Hemoglobin 8.7 (L) 12.0 - 16.0 g/dL    Hematocrit 31.0 (L) 37.0 - 47.0 %    MCV 83.6 81.4 - 97.8 fL    MCH 23.5 (L)  27.0 - 33.0 pg    MCHC 28.1 (L) 33.6 - 35.0 g/dL    RDW 54.9 (H) 35.9 - 50.0 fL    Platelet Count 71 (L) 164 - 446 K/uL    MPV 9.0 9.0 - 12.9 fL    Neutrophils-Polys 58.90 44.00 - 72.00 %    Lymphocytes 26.40 22.00 - 41.00 %    Monocytes 12.30 0.00 - 13.40 %    Eosinophils 0.90 0.00 - 6.90 %    Basophils 0.90 0.00 - 1.80 %    Immature Granulocytes 0.60 0.00 - 0.90 %    Nucleated RBC 0.00 /100 WBC    Neutrophils (Absolute) 2.77 2.00 - 7.15 K/uL    Lymphs (Absolute) 1.24 1.00 - 4.80 K/uL    Monos (Absolute) 0.58 0.00 - 0.85 K/uL    Eos (Absolute) 0.04 0.00 - 0.51 K/uL    Baso (Absolute) 0.04 0.00 - 0.12 K/uL    Immature Granulocytes (abs) 0.03 0.00 - 0.11 K/uL    NRBC (Absolute) 0.00 K/uL   Comp Metabolic Panel (CMP)   Result Value Ref Range    Sodium 131 (L) 135 - 145 mmol/L    Potassium 3.7 3.6 - 5.5 mmol/L    Chloride 94 (L) 96 - 112 mmol/L    Co2 27 20 - 33 mmol/L    Anion Gap 10.0 7.0 - 16.0    Glucose 117 (H) 65 - 99 mg/dL    Bun 6 (L) 8 - 22 mg/dL    Creatinine 0.31 (L) 0.50 - 1.40 mg/dL    Calcium 8.4 (L) 8.5 - 10.5 mg/dL    AST(SGOT) 68 (H) 12 - 45 U/L    ALT(SGPT) 26 2 - 50 U/L    Alkaline Phosphatase 128 (H) 30 - 99 U/L    Total Bilirubin 0.8 0.1 - 1.5 mg/dL    Albumin 3.9 3.2 - 4.9 g/dL    Total Protein 8.0 6.0 - 8.2 g/dL    Globulin 4.1 (H) 1.9 - 3.5 g/dL    A-G Ratio 1.0 g/dL   Magnesium   Result Value Ref Range    Magnesium 2.2 1.5 - 2.5 mg/dL   Phosphorus   Result Value Ref Range    Phosphorus 2.6 2.5 - 4.5 mg/dL   CORRECTED CALCIUM   Result Value Ref Range    Correct Calcium 8.5 8.5 - 10.5 mg/dL   ESTIMATED GFR   Result Value Ref Range    GFR (CKD-EPI) 125 >60 mL/min/1.73 m 2   Prothrombin Time   Result Value Ref Range    PT 15.0 (H) 12.0 - 14.6 sec    INR 1.20 (H) 0.87 - 1.13   Comp Metabolic Panel   Result Value Ref Range    Sodium 138 135 - 145 mmol/L    Potassium 3.3 (L) 3.6 - 5.5 mmol/L    Chloride 99 96 - 112 mmol/L    Co2 28 20 - 33 mmol/L    Anion Gap 11.0 7.0 - 16.0    Glucose 103 (H) 65 - 99  mg/dL    Bun 7 (L) 8 - 22 mg/dL    Creatinine 0.40 (L) 0.50 - 1.40 mg/dL    Calcium 9.1 8.5 - 10.5 mg/dL    AST(SGOT) 63 (H) 12 - 45 U/L    ALT(SGPT) 26 2 - 50 U/L    Alkaline Phosphatase 132 (H) 30 - 99 U/L    Total Bilirubin 0.6 0.1 - 1.5 mg/dL    Albumin 3.7 3.2 - 4.9 g/dL    Total Protein 7.6 6.0 - 8.2 g/dL    Globulin 3.9 (H) 1.9 - 3.5 g/dL    A-G Ratio 0.9 g/dL   CORRECTED CALCIUM   Result Value Ref Range    Correct Calcium 9.3 8.5 - 10.5 mg/dL   ESTIMATED GFR   Result Value Ref Range    GFR (CKD-EPI) 117 >60 mL/min/1.73 m 2   Basic Metabolic Panel   Result Value Ref Range    Sodium 135 135 - 145 mmol/L    Potassium 3.6 3.6 - 5.5 mmol/L    Chloride 100 96 - 112 mmol/L    Co2 24 20 - 33 mmol/L    Glucose 131 (H) 65 - 99 mg/dL    Bun 7 (L) 8 - 22 mg/dL    Creatinine 0.39 (L) 0.50 - 1.40 mg/dL    Calcium 8.7 8.5 - 10.5 mg/dL    Anion Gap 11.0 7.0 - 16.0   ESTIMATED GFR   Result Value Ref Range    GFR (CKD-EPI) 118 >60 mL/min/1.73 m 2   Basic Metabolic Panel   Result Value Ref Range    Sodium 138 135 - 145 mmol/L    Potassium 4.1 3.6 - 5.5 mmol/L    Chloride 103 96 - 112 mmol/L    Co2 25 20 - 33 mmol/L    Glucose 132 (H) 65 - 99 mg/dL    Bun 5 (L) 8 - 22 mg/dL    Creatinine 0.37 (L) 0.50 - 1.40 mg/dL    Calcium 9.2 8.5 - 10.5 mg/dL    Anion Gap 10.0 7.0 - 16.0   ESTIMATED GFR   Result Value Ref Range    GFR (CKD-EPI) 119 >60 mL/min/1.73 m 2   Basic Metabolic Panel   Result Value Ref Range    Sodium 138 135 - 145 mmol/L    Potassium 3.9 3.6 - 5.5 mmol/L    Chloride 101 96 - 112 mmol/L    Co2 25 20 - 33 mmol/L    Glucose 108 (H) 65 - 99 mg/dL    Bun 6 (L) 8 - 22 mg/dL    Creatinine 0.41 (L) 0.50 - 1.40 mg/dL    Calcium 9.2 8.5 - 10.5 mg/dL    Anion Gap 12.0 7.0 - 16.0   ESTIMATED GFR   Result Value Ref Range    GFR (CKD-EPI) 116 >60 mL/min/1.73 m 2   PHOSPHORUS   Result Value Ref Range    Phosphorus 3.3 2.5 - 4.5 mg/dL   EKG   Result Value Ref Range    Report       [facility] Emergency  Dept.    Test Date:  2023  Pt Name:    MANAS DELGADO              Department: ER  MRN:        [id number]                      Room:       GR 39  Gender:     Female                       Technician: [id number]  :        1970                   Requested By:ER TRIAGE PROTOCOL  Order #:    [id number]                    Reading MD: Anjali Gambino MD    Measurements  Intervals                                Axis  Rate:       110                          P:          67  KS:         126                          QRS:        24  QRSD:       87                           T:          48  QT:         398  QTc:        539    Interpretive Statements  Sinus tachycardia  Prolonged QT interval  Compared to ECG 2021 17:23:03  Prolonged QT interval now present  Electronically Signed On 2023 15:22:54 PST by Anjali Gambino MD     EKG   Result Value Ref Range    Report       [facility] Emergency Dept.    Test Date:  2023  Pt Name:    MANAS DELGADO              Department: ER  MRN:        [id number]                      Room:       GR 39  Gender:     Female                       Technician: [id number]  :        1970                   Requested By:ER TRIAGE PROTOCOL  Order #:    [id number]                    Reading MD: Anjali Gambino MD    Measurements  Intervals                                Axis  Rate:       114                          P:          52  KS:         132                          QRS:        14  QRSD:       86                           T:          41  QT:         374  QTc:        516    Interpretive Statements  Sinus tachycardia  Prolonged QT interval  Compared to ECG 2023 05:55:25  No significant changes  Electronically Signed On 2023 15:23:51 PST by Anjali Gambino MD     EKG   Result Value Ref Range    Report       [facility]    Test Date:  2023  Pt Name:    MANAS CROSSNULTY              Department: [facility]  MRN:        [id number]                      Room:     "   S148  Gender:     Female                       Technician: [facility]  :        1970                   Requested By:CLARIBEL MENDOZA  Order #:    [id number]                    Reading MD: Orlando Bernard MD    Measurements  Intervals                                Axis  Rate:       115                          P:          72  NH:         120                          QRS:        -15  QRSD:       73                           T:          224  QT:         392  QTc:        543    Interpretive Statements  Sinus tachycardia  Inferior infarct, old  Lateral ST depression, nonspecific  Prolonged QTC  Electronically Signed On 2023 16:17:20 PST by Orlando Bernard MD     EKG   Result Value Ref Range    Report       [facility]    Test Date:  2023  Pt Name:    MANAS DELGADO              Department: [facility]  MRN:        [id number]                      Room:       S148  Gender:     Female                       Technician: [facility]  :        1970                   Requested By:SAW LANDAVERDE  Order #:    [id number]                    Reading MD: Palmer Lopez MD    Measurements  Intervals                                Axis  Rate:       113                          P:          40  NH:         140                          QRS:        -5  QRSD:       68                           T:          -32  QT:         306  QTc:        420    Interpretive Statements  Poor quality data, interpretation may be affected  Sinus tachycardia  Right atrial enlargement  Inferoposterior infarct, age indeterminate  Electronically Signed On 2023 17:49:49 PST by Palmer Lopez MD         /89 (BP Location: Left arm, Patient Position: Sitting, BP Cuff Size: Adult)   Pulse (!) 101   Temp 36.7 °C (98 °F) (Temporal)   Ht 1.473 m (4' 10\")   Wt 62 kg (136 lb 9.6 oz)   LMP 2018   SpO2 91%   BMI 28.55 kg/m²     Physical Exam  Constitutional:       Appearance: Normal appearance.   HENT:      Head: Normocephalic and " atraumatic.      Mouth/Throat:      Mouth: Mucous membranes are moist.      Pharynx: Oropharynx is clear.      Comments: Poor dentition  Eyes:      General: No scleral icterus.        Right eye: No discharge.         Left eye: No discharge.      Extraocular Movements: Extraocular movements intact.      Pupils: Pupils are equal, round, and reactive to light.   Cardiovascular:      Rate and Rhythm: Normal rate and regular rhythm.      Pulses: Normal pulses.      Heart sounds: Normal heart sounds. No murmur heard.    No friction rub. No gallop.   Pulmonary:      Effort: Pulmonary effort is normal. No respiratory distress.      Breath sounds: No stridor. No wheezing or rales.   Abdominal:      General: Abdomen is flat. There is no distension.      Palpations: There is no mass.      Tenderness: There is abdominal tenderness (RUQ). There is no rebound.      Hernia: No hernia is present.   Musculoskeletal:         General: Normal range of motion.      Right lower leg: No edema.      Left lower leg: No edema.   Skin:     General: Skin is warm.      Coloration: Skin is not jaundiced or pale.   Neurological:      Mental Status: She is alert and oriented to person, place, and time.      Motor: Motor function is intact. No tremor or seizure activity.      Comments: Finger to nose intact.    Psychiatric:         Attention and Perception: She is attentive. She does not perceive auditory or visual hallucinations.         Mood and Affect: Mood is anxious.         Speech: Speech is not rapid and pressured or slurred.         Behavior: Behavior is agitated. Behavior is cooperative.         Thought Content: Thought content does not include homicidal or suicidal ideation. Thought content does not include suicidal plan.      Comments: Patient is distraught. Tangential thinking.          Assessment and Plan:     Alcohol use disorder, severe, dependence (HCC)  Hospitalized 1/23-1/29 for alcohol withdrawal. Continues to drink a half pint  of vodka daily, associated with RUQ pain still. Referred to [facility] psychiatry upon discharge    Plan:  -Encouraged to cut down on drinking to one glass of vodka daily  -Provided contact info for [facility]    Carotid artery stenosis, asymptomatic, right  CTA 1/27/23: Severe atherosclerotic narrowing of the proximal right internal carotid artery. >70%.  09/22 lipid panel shows hyperlipidemia as well.     Plan:  -Continue aspirin 81 mg and atorvastatin 40 mg  -Referred to vascular surgery for consideration of CEA or stenting    Neuropathy  Has sciatica and evidence of c-spine degeneration with likely nerve compression given radicular pain on left side of neck. Takes gabapentin    Plan:  -Refill gabapentin 300 mg TID    Nausea  Prescribed promethazine for nausea in hospital and would like refill.     Plan:  -Refill 30 days of promethazine only  -May need to screen for gastritis in setting of severe alcohol use    Other insomnia  Struggles to sleep nightly    Plan:  -Melatonin and trazodone 50 mg prescribed    Persistent depressive disorder  Not currently taking antidepressants.     Plan:  -Provided contact info for [facility]     Orders Placed This Encounter    Referral to Vascular Surgery    amLODIPine (NORVASC) 2.5 MG Tab    melatonin 3 MG Tab    promethazine (PHENERGAN) 25 MG Tab    traZODone (DESYREL) 50 MG Tab    gabapentin (NEURONTIN) 300 MG Cap       No follow-ups on file.    Patient Instructions   Make an appointment with [facility]  [facility]  [address] [place] [address], [address] [place] [id number]  Phone: [phone]  Fax: [phone]    2. I have included the  on this communication to aid you with transportation assistance  3. Call the pharmacy to make sure your medications are ready to be picked up  4. I referred you to vascular surgery for your artery, but keep taking aspirin and atorvastatin      Teresa Gray M.D. PGY I  [facility]

## 2023-02-07 NOTE — PATIENT INSTRUCTIONS
Make an appointment with [facility] - PHYCHIATRY  [facility]  [address]  [place] [address]  Phone: [phone]  Fax: [phone]    2. I have included the  on this communication to aid you with transportation assistance  3. Call the pharmacy to make sure your medications are ready to be picked up  4. I referred you to vascular surgery for your artery, but keep taking aspirin and atorvastatin

## 2023-02-07 NOTE — ASSESSMENT & PLAN NOTE
Prescribed promethazine for nausea in hospital and would like refill.     Plan:  -Refill 30 days of promethazine only  -May need to screen for gastritis in setting of severe alcohol use

## 2023-02-07 NOTE — ASSESSMENT & PLAN NOTE
Hospitalized 1/23-1/29 for alcohol withdrawal. Continues to drink a half pint of vodka daily, associated with RUQ pain still. Referred to [facility] psychiatry upon discharge    Plan:  -Encouraged to cut down on drinking to one glass of vodka daily  -Provided contact info for [facility]

## 2023-02-07 NOTE — ASSESSMENT & PLAN NOTE
Has sciatica and evidence of c-spine degeneration with likely nerve compression given radicular pain on left side of neck. Takes gabapentin    Plan:  -Refill gabapentin 300 mg TID

## 2023-02-07 NOTE — ASSESSMENT & PLAN NOTE
CTA 1/27/23: Severe atherosclerotic narrowing of the proximal right internal carotid artery. >70%.  09/22 lipid panel shows hyperlipidemia as well.     Plan:  -Continue aspirin 81 mg and atorvastatin 40 mg  -Referred to vascular surgery for consideration of CEA or stenting

## 2023-02-13 ENCOUNTER — TELEPHONE (OUTPATIENT)
Dept: INTERNAL MEDICINE | Facility: OTHER | Age: 53
End: 2023-02-13
Payer: COMMERCIAL

## 2023-02-13 NOTE — TELEPHONE ENCOUNTER
MSW intern called patient to discuss transportation needs. Patient did not answer. MSW intern left message requesting call back.     Marie Schroeder  MSW Intern

## 2023-02-14 ENCOUNTER — TELEPHONE (OUTPATIENT)
Dept: INTERNAL MEDICINE | Facility: OTHER | Age: 53
End: 2023-02-14
Payer: COMMERCIAL

## 2023-02-14 NOTE — TELEPHONE ENCOUNTER
MSW intern called patient at request of physician to discuss transportation needs to  [facility] and to  prescriptions.    Patient was informed that [facility] can arrange transportation to appointments at this clinic if the patient calls ahead and requests it. The patient also qualifies for [facility] but she will only be provided  transportation for 45 days during which she must complete the RTC Access application. [facility] phone number [phone]) was provided and MSW intern explained process  to the patient.     MSW intern suggested the patient contact her insurance provider to ask about mail-order prescription services included in her plan. The patient agreed to do this. MSW intern will follow-up with the patient in one week.    Marie Schroeder  MSW Intern

## 2023-02-21 ENCOUNTER — TELEPHONE (OUTPATIENT)
Dept: INTERNAL MEDICINE | Facility: OTHER | Age: 53
End: 2023-02-21
Payer: COMMERCIAL

## 2023-02-21 NOTE — TELEPHONE ENCOUNTER
MSW intern placed follow-up call with patient. The patient had expressed difficulty obtaining her prescriptions due to lack of transportation. The patient agreed to contact her insurance provider about mail-order prescriptions. The patient spoke to her pharmacy and reported that she must request her refill three days in advance and her medications can be mailed via [employer] at no charge.     Marie Schroeder  MSW intern

## 2023-02-27 ENCOUNTER — OFFICE VISIT (OUTPATIENT)
Dept: INTERNAL MEDICINE | Facility: OTHER | Age: 53
End: 2023-02-27
Payer: COMMERCIAL

## 2023-02-27 ENCOUNTER — HOSPITAL ENCOUNTER (OUTPATIENT)
Facility: MEDICAL CENTER | Age: 53
End: 2023-02-27
Attending: STUDENT IN AN ORGANIZED HEALTH CARE EDUCATION/TRAINING PROGRAM
Payer: COMMERCIAL

## 2023-02-27 ENCOUNTER — TELEPHONE (OUTPATIENT)
Dept: INTERNAL MEDICINE | Facility: OTHER | Age: 53
End: 2023-02-27

## 2023-02-27 VITALS
TEMPERATURE: 98.2 F | HEART RATE: 102 BPM | BODY MASS INDEX: 29.52 KG/M2 | DIASTOLIC BLOOD PRESSURE: 85 MMHG | OXYGEN SATURATION: 100 % | HEIGHT: 58 IN | SYSTOLIC BLOOD PRESSURE: 121 MMHG | WEIGHT: 140.6 LBS

## 2023-02-27 DIAGNOSIS — R68.89 FLU-LIKE SYMPTOMS: ICD-10-CM

## 2023-02-27 DIAGNOSIS — G62.9 NEUROPATHY: ICD-10-CM

## 2023-02-27 DIAGNOSIS — F15.10 METHAMPHETAMINE ABUSE (HCC): ICD-10-CM

## 2023-02-27 DIAGNOSIS — G89.29 OTHER CHRONIC PAIN: ICD-10-CM

## 2023-02-27 DIAGNOSIS — Z78.9 ALCOHOL USE: ICD-10-CM

## 2023-02-27 DIAGNOSIS — F55.8 ACETAMINOPHEN ABUSE: ICD-10-CM

## 2023-02-27 LAB
FLUAV+FLUBV AG SPEC QL IA: NEGATIVE
INT CON NEG: NEGATIVE
INT CON POS: POSITIVE

## 2023-02-27 PROCEDURE — 87804 INFLUENZA ASSAY W/OPTIC: CPT | Performed by: STUDENT IN AN ORGANIZED HEALTH CARE EDUCATION/TRAINING PROGRAM

## 2023-02-27 PROCEDURE — 99214 OFFICE O/P EST MOD 30 MIN: CPT | Mod: GC | Performed by: STUDENT IN AN ORGANIZED HEALTH CARE EDUCATION/TRAINING PROGRAM

## 2023-02-27 PROCEDURE — U0003 INFECTIOUS AGENT DETECTION BY NUCLEIC ACID (DNA OR RNA); SEVERE ACUTE RESPIRATORY SYNDROME CORONAVIRUS 2 (SARS-COV-2) (CORONAVIRUS DISEASE [COVID-19]), AMPLIFIED PROBE TECHNIQUE, MAKING USE OF HIGH THROUGHPUT TECHNOLOGIES AS DESCRIBED BY CMS-2020-01-R: HCPCS

## 2023-02-27 PROCEDURE — U0005 INFEC AGEN DETEC AMPLI PROBE: HCPCS

## 2023-02-27 RX ORDER — ASPIRIN 81 MG/1
81 TABLET ORAL DAILY
Qty: 30 TABLET | Refills: 1 | Status: SHIPPED | OUTPATIENT
Start: 2023-02-27 | End: 2023-03-28 | Stop reason: SDUPTHER

## 2023-02-27 RX ORDER — LIDOCAINE 50 MG/G
1 PATCH TOPICAL EVERY 24 HOURS
Qty: 10 PATCH | Refills: 1 | Status: ON HOLD | OUTPATIENT
Start: 2023-02-27 | End: 2023-04-12

## 2023-02-27 RX ORDER — GABAPENTIN 600 MG/1
600 TABLET ORAL 3 TIMES DAILY
Qty: 90 TABLET | Refills: 1 | Status: SHIPPED | OUTPATIENT
Start: 2023-02-27 | End: 2023-04-20 | Stop reason: SDUPTHER

## 2023-02-27 ASSESSMENT — ENCOUNTER SYMPTOMS
HEADACHES: 1
DEPRESSION: 1
SHORTNESS OF BREATH: 1
SORE THROAT: 1
SINUS PAIN: 1
MYALGIAS: 1
VOMITING: 0
NECK PAIN: 1
FEVER: 1
COUGH: 1
WEAKNESS: 1
SPUTUM PRODUCTION: 1
DIZZINESS: 0
PALPITATIONS: 1
DIARRHEA: 0
ABDOMINAL PAIN: 0
CONSTIPATION: 0
BACK PAIN: 1
CHILLS: 1
WHEEZING: 1
NERVOUS/ANXIOUS: 1
HEARTBURN: 0
HEMOPTYSIS: 0
NAUSEA: 0
FALLS: 0
TINGLING: 1
DIAPHORESIS: 0

## 2023-02-27 ASSESSMENT — FIBROSIS 4 INDEX: FIB4 SCORE: 9.4

## 2023-02-27 NOTE — PROGRESS NOTES
Established Patient    Patient Care Team:  Margy Aparicio M.D. as PCP - General (Internal Medicine)    HPI:  Olya Youssef is a 52 y.o. female who presents today with the following Chief Complaint(s):   Chief Complaint   Patient presents with    Follow-Up    Medication Refill     Aspirin 81mg, unable to get OTC    Cough    Body Aches     Patient comes in today with notable flu-like symptoms, specifically coughing (thick, greenish sputum), fever (101F), headache, congestion, fatigue, lethargy, myaglias, sore throat (sharp and stabbing; no odynophagia), and loss of appetite. She states that she lives with a roommate who was recently sick and had similar symptoms. They did not test for COVID, and patient declined COVID vaccinations in the past. No flu vaccine this year.   She also reported taking Tylenol prior to coming in to address her fever, but also states that she has been taking 1500mg three to four times daily to aid in her pain. She has cut down on smoking methamphetamine (previously daily use to 1-2x per week) and drinking alcohol (previously a 1 pint of vodka and 2-3 Hurricanes daily to 2 Hurricanes daily) , which she previously used frequently for pain.   Of note, aspirin sent to pharmacy. Encouraged patient to  and take along with atorvastatin.    Unable to address chronic medical conditions/healthcare maintenance at this time. Will address at future visit.    Review of Systems   Review of Systems   Constitutional:  Positive for chills, fever and malaise/fatigue. Negative for diaphoresis.   HENT:  Positive for congestion, sinus pain and sore throat.    Respiratory:  Positive for cough, sputum production (green), shortness of breath and wheezing. Negative for hemoptysis.    Cardiovascular:  Positive for palpitations. Negative for chest pain and leg swelling.   Gastrointestinal:  Negative for abdominal pain, constipation, diarrhea, heartburn, nausea and vomiting.    Genitourinary:  Negative for dysuria.   Musculoskeletal:  Positive for back pain, myalgias and neck pain. Negative for falls.   Skin:  Negative for itching and rash.   Neurological:  Positive for tingling (fingers, chronic and unchanged), weakness and headaches. Negative for dizziness.   Psychiatric/Behavioral:  Positive for depression. The patient is nervous/anxious.        Past Medical History:   Diagnosis Date    Alcohol abuse     Alcohol intoxication, with unspecified complication ([facility]) 8/7/2013    Alcoholism ([facility])     Anxiety     Backpain     Bipolar disorder, unspecified ([facility])     Bronchitis     Drug abuse ([facility])     meth, crack cocaine, THC    Hepatitis, alcoholic     Hypertension     controlled    Indigestion     Infectious disease MRSA    Liver disease     pancreatitis    Pancreatitis chronic     Flare-up    Pneumonia     Psychiatric disorder     suicidal in past    Renal disorder     kidney infection 1991    Seizure ([facility])     7/8/2011    Seizure disorder ([facility])     Unspecified hemorrhagic conditions        Patient Active Problem List    Diagnosis Date Noted    Alcohol use disorder, severe, dependence ([facility]) 02/06/2023    Chronic respiratory failure with hypoxia, on home oxygen therapy ([facility]) 02/06/2023    Post covid-19 condition, unspecified 02/06/2023    Nausea 02/06/2023    Other insomnia 02/06/2023    Carotid artery stenosis, asymptomatic, right 01/28/2023    Right upper quadrant abdominal pain 01/26/2023    Headache 01/24/2023    Overweight 01/24/2023    Alcohol withdrawal delirium, acute, hyperactive ([facility]) 01/23/2023    Elevated lipase 01/23/2023    Suicidal behavior 09/27/2022    Acute on chronic respiratory failure ([facility]) 05/28/2021    Vitamin D deficiency 05/25/2021    Hypertension 04/20/2021    Neuropathy 04/19/2021    Leukocytopenia 04/18/2021    Macrocytic anemia 04/18/2021    Methamphetamine abuse ([facility]) 05/29/2018    Alcoholic hepatitis 08/15/2013    Alcohol withdrawal delirium ([facility]) 08/08/2013     "Pancreatitis, alcoholic, acute 08/07/2013    Persistent depressive disorder 07/24/2013    Bipolar affective disorder (HCC) 07/22/2013    Overdose of antidepressant 03/29/2013    Alcohol withdrawal seizure ([facility]) 05/16/2012    Thrombocytopenia ([facility]) 08/19/2011    Pancytopenia ([facility]) 08/17/2011    Seizure ([facility]) 07/15/2011       Allergies:Bactrim, Lamotrigine [lamictal], Quetiapine fumarate, and Keflex    Current Outpatient Medications   Medication Sig Dispense Refill    QUEtiapine Fumarate (SEROQUEL PO) Take  by mouth. Cuts it in 4 pieces      amLODIPine (NORVASC) 2.5 MG Tab Take 1 Tablet by mouth every day. 30 Tablet 3    melatonin 3 MG Tab Take 1 Tablet by mouth at bedtime. 30 Tablet 3    promethazine (PHENERGAN) 25 MG Tab Take 1 Tablet by mouth every 6 hours as needed for Nausea/Vomiting. 30 Tablet 0    traZODone (DESYREL) 50 MG Tab Take 1 Tablet by mouth every evening. 30 Tablet 3    gabapentin (NEURONTIN) 300 MG Cap Take 1 Capsule by mouth 3 times a day. 90 Capsule 3    aspirin EC 81 MG EC tablet Take 1 Tablet by mouth every day. 30 Tablet 1    atorvastatin (LIPITOR) 40 MG Tab Take 1 Tablet by mouth every evening. 30 Tablet 1    multivitamin Tab Take 1 Tablet by mouth every day. 30 Tablet 1     No current facility-administered medications for this visit.       Social History     Tobacco Use    Smoking status: Never    Smokeless tobacco: Never   Vaping Use    Vaping Use: Never used   Substance Use Topics    Alcohol use: Yes     Comment: Hx heavy drinking apint in a 24 hr period    Drug use: Yes     Types: Inhaled     Comment: meth 1/22 / THC       Family History   Problem Relation Age of Onset    Lung Disease Mother     Cancer Mother          Physical Exam  Vitals:  /85 (BP Location: Left arm, Patient Position: Sitting, BP Cuff Size: Adult)   Pulse (!) 102   Temp 36.8 °C (98.2 °F) (Temporal)   Ht 1.473 m (4' 10\")   Wt 63.8 kg (140 lb 9.6 oz)   SpO2 100%  Body mass index is 29.39 kg/m².  Constitutional:  " Not in acute distress, well appearing.  HEENT:   NC/AT. EOMI, anicteric sclera, hearing grossly intact, MMM, fair dentition, no pharyngeal erythema or exudates  Cardiovascular: Regular rate and rhythm. No murmurs or gallops.      Lungs:   Clear to auscultation bilaterally. No wheezes  Extremities:  No edema or obvious deformities.  Skin:  Warm and dry.  No visible rashes.  Neurologic: Alert & oriented x 3, no focal deficits noted.    Assessment and Plan:     Problem List Items Addressed This Visit       Flu-like symptoms     3 day history of flu-like symptoms with recent ill-contact  Has not received flu vaccine this year, not vaccinated against COVID  -POCT Flu negative  -COVID PCR collected, results pending  -Advised conservative management at this time and to return to clinic if with worsening/persistent symptoms         Relevant Orders    POCT Influenza A/B (Completed)    SARS-CoV-2, PCR (In-House) (Completed)    Methamphetamine abuse ([facility])     Reports smoking methamphetamine daily for the last 5 years, has since cut down to smoking 1-2x per week  Encouraged cessation and explained to patient the concerns of illicit drug use. Also discussed that given her current use of methamphetamine, it will be difficult to address her chronic pain appropriately and provide proper treatment. Emphasized importance of continuing to decrease the amount that she uses/complete cessation in order to provide appropriate care. Patient expressed understanding         Neuropathy    Relevant Medications    QUEtiapine Fumarate (SEROQUEL PO)    gabapentin (NEURONTIN) 600 MG tablet    Alcohol use     Reports drinking 1/2 pint of vodka daily x5 years  Chart review shows patient has multiple ED visits/hospitalizations due to alcohol intoxication/withdrawal, alcohol induced pancreatitis, and acute encephalopathy likely related to alcohol use. Most recent hospitalization from 1/23-1/29 which required a brief ICU stay  She reports previously  going to rehabilitation ([employer] [facility] and was previously sober for 2 years, but has had relapses since due to ongoing life stressors and neck/lower back pain   Previous labs done while hospitalized consistent with chronic alcohol abuse such as leukopenia, macrocytic anemia, thrombocytopenia, elevated AST/ALT/ALP  -Patient reports that she has cut down significantly from 1 pint of vodka and 2-3 Hurricanes daily to 2 Hurricanes daily. Encouraged continuing to cut down on drinking. Reiterated possibility of rehabilitation given she previously had successful outcomes. However, patient unable to at this time given that she has a roommate whom she helps take care of.  -Advised patient that due to her ongoing alcohol use and excessive amount of acetaminophen intake, she is at increased risk of acute liver failure. Explained to patient that due to this, it will be difficult to prescribe medications to address her pain. Encouraged patient to keep a log of her alcohol intake and to continue to decrease the amount of alcohol she drinks daily in order to prevent any further liver damage and be able to explore more treatment options  -ED precautions given         Acetaminophen abuse     Has been taking 1500 mg, 3-4 times daily x months  Given amount of acetaminophen she has been taking, concerns of acute liver failure along with alcohol abuse.   -Highly encouraged patient to stop acetaminophen intake and explained the complications associated with continued use. Encouraged conservative management at this time such as hot/cold compresses, lidocaine patches, neck exercises in the meantime  -ED precautions given         Other chronic pain     Neck/back pain is chronic, with no alarm/red flag signs or symptoms.  -Advised conservative treatments for now such as heating/cooling pads and lidocaine patches. No acetaminophen/NSAIDs.  -Increased gabapentin to 600mg three times daily. Will reassess at next visit  -Referral  to pain management to consider steroid injections to address pain, as appropriate         Relevant Medications    aspirin 81 MG EC tablet    gabapentin (NEURONTIN) 600 MG tablet    Other Relevant Orders    Referral to Pain Management       Return in about 4 weeks (around 3/27/2023) for Long. To address chronic medical conditions/assess pain    Please note that this dictation was created using voice recognition software. I have made every reasonable attempt to correct obvious errors, but I expect that there are errors of grammar and possibly content that I did not discover before finalizing the note.    Margy Aparicio M.D. PGY-2  [facility]

## 2023-02-27 NOTE — PATIENT INSTRUCTIONS
Thank you for coming in today, Olya  You were tested for flu and covid. Your covid test was sent out, if it is positive - I will let you know.  I have increased your gabapentin to 600mg three times a day, please take it as prescribed  Please stop take Tylenol. Try heating and cooling pads, lidocaine patches, stretching exercises to help  Follow up in 1 month

## 2023-02-28 DIAGNOSIS — R68.89 FLU-LIKE SYMPTOMS: ICD-10-CM

## 2023-02-28 PROBLEM — G89.29 OTHER CHRONIC PAIN: Status: ACTIVE | Noted: 2023-02-28

## 2023-02-28 PROBLEM — J96.20 ACUTE ON CHRONIC RESPIRATORY FAILURE (HCC): Status: RESOLVED | Noted: 2021-05-28 | Resolved: 2023-02-28

## 2023-02-28 PROBLEM — Z78.9 ALCOHOL USE: Status: ACTIVE | Noted: 2023-02-28

## 2023-02-28 PROBLEM — R10.11 RIGHT UPPER QUADRANT ABDOMINAL PAIN: Status: RESOLVED | Noted: 2023-01-26 | Resolved: 2023-02-28

## 2023-02-28 PROBLEM — F55.8 ACETAMINOPHEN ABUSE: Status: ACTIVE | Noted: 2023-02-28

## 2023-02-28 NOTE — ASSESSMENT & PLAN NOTE
Has been taking 1500 mg, 3-4 times daily x months  Given amount of acetaminophen she has been taking, concerns of acute liver failure along with alcohol abuse.   -Highly encouraged patient to stop acetaminophen intake and explained the complications associated with continued use. Encouraged conservative management at this time such as hot/cold compresses, lidocaine patches, neck exercises in the meantime  -ED precautions given

## 2023-02-28 NOTE — TELEPHONE ENCOUNTER
DOCUMENTATION OF PAR STATUS:    1. Name of Medication & Dose: Lidocaine patch     2. Name of Prescription Coverage Company & phone #: [email or web address]    3. Date Prior Auth Submitted: 02/27/2023    4. What information was given to obtain insurance decision? Notes and prior auth    5. Prior Auth Letter UNABLE TO APPROVE. Scan form for your review on media.    6. Action Taken: Pharmacy/Patient Notified: Yes

## 2023-02-28 NOTE — ASSESSMENT & PLAN NOTE
3 day history of flu-like symptoms with recent ill-contact  Has not received flu vaccine this year, not vaccinated against COVID  -POCT Flu negative  -COVID PCR collected, results pending  -Advised conservative management at this time and to return to clinic if with worsening/persistent symptoms

## 2023-02-28 NOTE — ASSESSMENT & PLAN NOTE
Neck/back pain is chronic, with no alarm/red flag signs or symptoms.  -Advised conservative treatments for now such as heating/cooling pads and lidocaine patches. No acetaminophen/NSAIDs.  -Increased gabapentin to 600mg three times daily. Will reassess at next visit  -Referral to pain management to consider steroid injections to address pain, as appropriate

## 2023-02-28 NOTE — ASSESSMENT & PLAN NOTE
Reports drinking 1/2 pint of vodka daily x5 years  Chart review shows patient has multiple ED visits/hospitalizations due to alcohol intoxication/withdrawal, alcohol induced pancreatitis, and acute encephalopathy likely related to alcohol use. Most recent hospitalization from 1/23-1/29 which required a brief ICU stay  She reports previously going to rehabilitation ([employer]) and was previously sober for 2 years, but has had relapses since due to ongoing life stressors and neck/lower back pain   Previous labs done while hospitalized consistent with chronic alcohol abuse such as leukopenia, macrocytic anemia, thrombocytopenia, elevated AST/ALT/ALP  -Patient reports that she has cut down significantly from 1 pint of vodka and 2-3 Hurricanes daily to 2 Hurricanes daily. Encouraged continuing to cut down on drinking. Reiterated possibility of rehabilitation given she previously had successful outcomes. However, patient unable to at this time given that she has a roommate whom she helps take care of.  -Advised patient that due to her ongoing alcohol use and excessive amount of acetaminophen intake, she is at increased risk of acute liver failure. Explained to patient that due to this, it will be difficult to prescribe medications to address her pain. Encouraged patient to keep a log of her alcohol intake and to continue to decrease the amount of alcohol she drinks daily in order to prevent any further liver damage and be able to explore more treatment options  -ED precautions given

## 2023-02-28 NOTE — ASSESSMENT & PLAN NOTE
Reports smoking methamphetamine daily for the last 5 years, has since cut down to smoking 1-2x per week  Encouraged cessation and explained to patient the concerns of illicit drug use. Also discussed that given her current use of methamphetamine, it will be difficult to address her chronic pain appropriately and provide proper treatment. Emphasized importance of continuing to decrease the amount that she uses/complete cessation in order to provide appropriate care. Patient expressed understanding

## 2023-03-01 LAB
SARS-COV-2 RNA RESP QL NAA+PROBE: NOTDETECTED
SPECIMEN SOURCE: NORMAL

## 2023-03-13 DIAGNOSIS — R11.0 NAUSEA: ICD-10-CM

## 2023-03-14 ENCOUNTER — OFFICE VISIT (OUTPATIENT)
Dept: VASCULAR SURGERY | Facility: MEDICAL CENTER | Age: 53
End: 2023-03-14
Payer: COMMERCIAL

## 2023-03-14 VITALS
HEART RATE: 109 BPM | BODY MASS INDEX: 29.26 KG/M2 | OXYGEN SATURATION: 90 % | SYSTOLIC BLOOD PRESSURE: 124 MMHG | WEIGHT: 140 LBS | TEMPERATURE: 98.3 F | DIASTOLIC BLOOD PRESSURE: 66 MMHG

## 2023-03-14 DIAGNOSIS — I65.21 CAROTID STENOSIS, ASYMPTOMATIC, RIGHT: ICD-10-CM

## 2023-03-14 DIAGNOSIS — I10 PRIMARY HYPERTENSION: ICD-10-CM

## 2023-03-14 PROCEDURE — 99203 OFFICE O/P NEW LOW 30 MIN: CPT | Performed by: SURGERY

## 2023-03-14 RX ORDER — PROMETHAZINE HYDROCHLORIDE 25 MG/1
25 TABLET ORAL EVERY 6 HOURS PRN
Qty: 30 TABLET | Refills: 0 | OUTPATIENT
Start: 2023-03-14

## 2023-03-14 ASSESSMENT — FIBROSIS 4 INDEX: FIB4 SCORE: 9.4

## 2023-03-14 NOTE — PROGRESS NOTES
VASCULAR SURGERY SERVICE  CONSULT NOTE      Date: 3/14/2023    Referring Provider: Teresa Gray MD    Consulting Physician: Teofilo Flanagan MD - [facility]     -------------------------------------------------------------------------------------------------    Reason for consultation:  Severe right carotid artery stenosis.    HPI:  This is a pleasant 52 years old right-handed female who on recent CTA of the carotid done for headache was found to have severe right carotid artery stenosis, as read by the radiologist.  Patient is referred to see me.  She denies history of strokes or transient ischemic attack.    Past Medical History:   Diagnosis Date    Alcohol abuse     Alcohol intoxication, with unspecified complication (HCC) 8/7/2013    Alcoholism (HCC)     Anxiety     Backpain     Bipolar disorder, unspecified (HCC)     Bronchitis     Drug abuse (HCC)     meth, crack cocaine, THC    Hepatitis, alcoholic     Hypertension     controlled    Indigestion     Infectious disease MRSA    Liver disease     pancreatitis    Pancreatitis chronic     Flare-up    Pneumonia     Psychiatric disorder     suicidal in past    Renal disorder     kidney infection 1991    Seizure (HCC)     7/8/2011    Seizure disorder (HCC)     Unspecified hemorrhagic conditions        Past Surgical History:   Procedure Laterality Date    GASTROSCOPY  4/28/2015    Performed by Kevin Rendon M.D. at SURGERY [facility]       Current Outpatient Medications   Medication Sig Dispense Refill    QUEtiapine Fumarate (SEROQUEL PO) Take  by mouth. Cuts it in 4 pieces      aspirin 81 MG EC tablet Take 1 Tablet by mouth every day. 30 Tablet 1    gabapentin (NEURONTIN) 600 MG tablet Take 1 Tablet by mouth 3 times a day. 90 Tablet 1    lidocaine (LIDODERM) 5 % Patch Place 1 Patch on the skin every 24 hours. 10 Patch 1    amLODIPine (NORVASC) 2.5 MG Tab Take 1 Tablet by mouth every day. 30 Tablet 3    melatonin 3 MG Tab Take 1 Tablet by mouth at bedtime.  30 Tablet 3    promethazine (PHENERGAN) 25 MG Tab Take 1 Tablet by mouth every 6 hours as needed for Nausea/Vomiting. 30 Tablet 0    traZODone (DESYREL) 50 MG Tab Take 1 Tablet by mouth every evening. 30 Tablet 3    atorvastatin (LIPITOR) 40 MG Tab Take 1 Tablet by mouth every evening. 30 Tablet 1    multivitamin Tab Take 1 Tablet by mouth every day. 30 Tablet 1     No current facility-administered medications for this visit.       Social History     Socioeconomic History    Marital status: Single     Spouse name: Not on file    Number of children: Not on file    Years of education: Not on file    Highest education level: 10th grade   Occupational History    Not on file   Tobacco Use    Smoking status: Never    Smokeless tobacco: Never   Vaping Use    Vaping Use: Never used   Substance and Sexual Activity    Alcohol use: Yes     Comment: Hx heavy drinking apint in a 24 hr period    Drug use: Yes     Types: Inhaled     Comment: meth 1/22 / THC    Sexual activity: Not on file   Other Topics Concern    Not on file   Social History Narrative    Not on file     Social Determinants of Health     Financial Resource Strain: Medium Risk    Difficulty of Paying Living Expenses: Somewhat hard   Food Insecurity: No Food Insecurity    Worried About Running Out of Food in the Last Year: Never true    Ran Out of Food in the Last Year: Never true   Transportation Needs: Unmet Transportation Needs    Lack of Transportation (Medical): Yes    Lack of Transportation (Non-Medical): Yes   Physical Activity: Unknown    Days of Exercise per Week: Patient refused    Minutes of Exercise per Session: Patient refused   Stress: Stress Concern Present    Feeling of Stress : Very much   Social Connections: Unknown    Frequency of Communication with Friends and Family: More than three times a week    Frequency of Social Gatherings with Friends and Family: Once a week    Attends Yazidism Services: Patient refused    Active Member of Clubs or  Organizations: No    Attends Club or Organization Meetings: Never    Marital Status:    Intimate Partner Violence: Not on file   Housing Stability: Unknown    Unable to Pay for Housing in the Last Year: Patient refused    Number of Places Lived in the Last Year: 1    Unstable Housing in the Last Year: No       Family History   Problem Relation Age of Onset    Lung Disease Mother     Cancer Mother        Allergies:  Bactrim, Lamotrigine [lamictal], Quetiapine fumarate, and Keflex    Review of Systems:    Constitutional: Negative for fever, chills, weight loss,   HENT:   Negative for hearing loss or tinnitus    Eyes:    Negative for blurred vision, double vision, or loss of vision  Respiratory:  Negative for cough, hemoptysis, or wheezing    Cardiac:  Negative for chest pain or palpitations or orthopnea  Vascular:  Negative for claudication or rest pain   Gastrointestinal: Negative for nausea, vomiting, or abdominal pain     Negative for hematochezia or melena   Genitourinary: Negative for dysuria, frequency, or hematuria   Musculoskeletal: Negative for myalgias, back pain, or joint pain  Skin:   Negative for itching or rash  Neurological:  Negative for dizziness, headaches, or tremors     Negative for speech disturbance     Negative for extremity weakness or paresthesias  Endo/Heme:  Negative for easy bruising or bleeding  Psychiatric:  Negative for depression, suicidal ideas, or hallucinations    Physical Exam:  /66   Pulse (!) 109   Temp 36.8 °C (98.3 °F)   Wt 63.5 kg (140 lb)   SpO2 90%     Constitutional: Alert, oriented, no acute distress  HEENT:  Normocephalic and atraumatic, EOMI.  No bruits.  Neck:   Supple, no JVD  Cardiovascular: Regular rate and rhythm  Pulmonary:  Good air entry bilaterally  Abdominal:  Soft, non-tender, non-distended     Aortic impulse not widened  Musculoskeletal: No edema, no tenderness  Neurological:  CN II-XII grossly intact, no focal deficits  Skin:   Skin is warm  and dry. No rash noted.  Psychiatric:  Normal mood and affect.  Vascular:  Feet are warm, well-perfused.  No pulsatile mass in femoral or popliteal areas.    Radiology:  I personally reviewed the CTA images.  Contrary to the radiologist report, there is no significant stenosis of either carotid artery and at most, the degree of stenosis is less than 50%.    Assessment:  -Mild carotid artery stenosis, asymptomatic.  -Hypertension.  -History of alcohol abuse.    Plan:  I had a long discussion with patient.  I also reviewed the CTA images with her.  Again, contrary to the radiologist's reading, there is no significant carotid stenosis identified on either side and hence, no invasive intervention is indicated.  I will release patient back to her primary care provider for continuation of risk factor modification treatment program.  I will see patient back as needed.  Patient indicated understanding and agreed with plan.  All questions were answered.      Teofilo Flanagan MD  [facility] Vascular Surgery   [place] preferred or call my office [phone]  __________________________________________________________________  Patient:Olya Youssef   MRN:[id number]   [facility]:[id number]

## 2023-03-28 ENCOUNTER — OFFICE VISIT (OUTPATIENT)
Dept: INTERNAL MEDICINE | Facility: OTHER | Age: 53
End: 2023-03-28
Payer: COMMERCIAL

## 2023-03-28 VITALS
DIASTOLIC BLOOD PRESSURE: 69 MMHG | WEIGHT: 140.2 LBS | BODY MASS INDEX: 29.43 KG/M2 | TEMPERATURE: 99.4 F | HEIGHT: 58 IN | SYSTOLIC BLOOD PRESSURE: 118 MMHG | HEART RATE: 98 BPM | OXYGEN SATURATION: 88 %

## 2023-03-28 DIAGNOSIS — I65.21 CAROTID ARTERY STENOSIS, ASYMPTOMATIC, RIGHT: ICD-10-CM

## 2023-03-28 DIAGNOSIS — M50.30 DEGENERATIVE DISC DISEASE, CERVICAL: ICD-10-CM

## 2023-03-28 DIAGNOSIS — Z60.9 HIGH RISK SOCIAL SITUATION: ICD-10-CM

## 2023-03-28 DIAGNOSIS — J96.11 CHRONIC RESPIRATORY FAILURE WITH HYPOXIA, ON HOME OXYGEN THERAPY (HCC): ICD-10-CM

## 2023-03-28 DIAGNOSIS — F10.931 ALCOHOL WITHDRAWAL SEIZURE WITH DELIRIUM (HCC): ICD-10-CM

## 2023-03-28 DIAGNOSIS — Z09 FOLLOW-UP EXAMINATION: ICD-10-CM

## 2023-03-28 DIAGNOSIS — F13.20 SEVERE BENZODIAZEPINE USE DISORDER (HCC): ICD-10-CM

## 2023-03-28 DIAGNOSIS — Z99.81 CHRONIC RESPIRATORY FAILURE WITH HYPOXIA, ON HOME OXYGEN THERAPY (HCC): ICD-10-CM

## 2023-03-28 DIAGNOSIS — R56.9 ALCOHOL WITHDRAWAL SEIZURE WITH DELIRIUM (HCC): ICD-10-CM

## 2023-03-28 DIAGNOSIS — U09.9 POST COVID-19 CONDITION, UNSPECIFIED: ICD-10-CM

## 2023-03-28 PROCEDURE — 99214 OFFICE O/P EST MOD 30 MIN: CPT | Mod: GC,CS | Performed by: INTERNAL MEDICINE

## 2023-03-28 RX ORDER — ASPIRIN 81 MG/1
81 TABLET ORAL DAILY
Qty: 30 TABLET | Refills: 1 | Status: SHIPPED | OUTPATIENT
Start: 2023-03-28 | End: 2023-04-20 | Stop reason: SDUPTHER

## 2023-03-28 SDOH — SOCIAL STABILITY - SOCIAL INSECURITY: PROBLEM RELATED TO SOCIAL ENVIRONMENT, UNSPECIFIED: Z60.9

## 2023-03-28 ASSESSMENT — LIFESTYLE VARIABLES: SUBSTANCE_ABUSE: 1

## 2023-03-28 ASSESSMENT — ENCOUNTER SYMPTOMS
WEAKNESS: 1
EYES NEGATIVE: 1
TREMORS: 1
CARDIOVASCULAR NEGATIVE: 1
SEIZURES: 1
SHORTNESS OF BREATH: 1
MUSCULOSKELETAL NEGATIVE: 1
DEPRESSION: 1
CONSTITUTIONAL NEGATIVE: 1
GASTROINTESTINAL NEGATIVE: 1
NERVOUS/ANXIOUS: 1

## 2023-03-28 ASSESSMENT — FIBROSIS 4 INDEX: FIB4 SCORE: 9.4

## 2023-03-28 NOTE — PROGRESS NOTES
lubna Mckeon Patient    Olya presents today with the following:    CC: Neck pain, Following-up on DME/Home O2 Order    HPI:     Ms. Youssef is a 52 year-old female who presents to [facility] for complaints of neck pain and to follow-up on a Home O2/DME order.    Patient reports that she continues to have ongoing neck pain. She is currently taking gabapentin 600 mg TID. CT imaging of her neck revealed multilevel degenerative disc disease in January 2023.   It was recommended for her to start taking ASA 81 mg and use lidocaine patches (in addition to her gabapentin). However, due to financial hardship, she has been unable to pay for the aspirin and lidocaine patches.   She denies having a spinal specialist and has not been seen by pain management.    She notes that her insurance has changed and it has been harder for her to navigate/move around with her oxygen tank. Her current insurance does not cover the oxygen supply company that she was previously set-up with.    States that she has not been able to pay for her medications, and her pharmacy will not fill the aspirin (stating that because it is OTC).  She additionally notes that her insurance changed and her original oxygen company does not accept her new insurance.     Additional concerns she expresses today includes her struggles with alcohol cessation. She states that when she quits using alcohol, she starts having seizures. She reports that she had 2 seizures in the last 2 weeks. She has not used any alcohol in the last 3 days, however now she has been more dependent on street Ativan use.   Patient wishes to go to the hospital for EtOH detox, however will need to go home to ensure that her dog will be taken care of. After she goes home and arranges this, she will be going to the hospital.     Patient Active Problem List    Diagnosis Date Noted    Severe benzodiazepine use disorder (HCC) 03/28/2023    High risk social situation 03/28/2023     Degenerative disc disease, cervical 03/28/2023    Flu-like symptoms 02/28/2023    Acetaminophen abuse 02/28/2023    Other chronic pain 02/28/2023    Alcohol use disorder, severe, dependence ([facility]) 02/06/2023    Chronic respiratory failure with hypoxia, on home oxygen therapy ([facility]) 02/06/2023    Post covid-19 condition, unspecified 02/06/2023    Nausea 02/06/2023    Other insomnia 02/06/2023    Carotid artery stenosis, asymptomatic, right 01/28/2023    Headache 01/24/2023    Overweight 01/24/2023    Alcohol withdrawal delirium, acute, hyperactive ([facility]) 01/23/2023    Elevated lipase 01/23/2023    Suicidal behavior 09/27/2022    Vitamin D deficiency 05/25/2021    Hypertension 04/20/2021    Neuropathy 04/19/2021    Leukocytopenia 04/18/2021    Macrocytic anemia 04/18/2021    Methamphetamine abuse ([facility]) 05/29/2018    Alcoholic hepatitis 08/15/2013    Alcohol withdrawal delirium ([facility]) 08/08/2013    Pancreatitis, alcoholic, acute 08/07/2013    Persistent depressive disorder 07/24/2013    Bipolar affective disorder ([facility]) 07/22/2013    Overdose of antidepressant 03/29/2013    Alcohol withdrawal seizure ([facility]) 05/16/2012    Thrombocytopenia ([facility]) 08/19/2011    Pancytopenia ([facility]) 08/17/2011     Social History     Tobacco Use    Smoking status: Never    Smokeless tobacco: Never   Vaping Use    Vaping Use: Never used   Substance Use Topics    Alcohol use: Not Currently     Comment: Hx heavy drinking apint in a 24 hr period    Drug use: Yes     Types: Inhaled     Comment: meth 1/22 / THC     Current Outpatient Medications   Medication Sig Dispense Refill    aspirin 81 MG EC tablet Take 1 Tablet by mouth every day. 30 Tablet 1    diclofenac sodium (VOLTAREN) 1 % Gel Apply 2 g topically 4 times a day as needed (for neck pain). 2 g 2    QUEtiapine Fumarate (SEROQUEL PO) Take  by mouth. Cuts it in 4 pieces      gabapentin (NEURONTIN) 600 MG tablet Take 1 Tablet by mouth 3 times a day. 90 Tablet 1    amLODIPine (NORVASC) 2.5 MG Tab Take  "1 Tablet by mouth every day. 30 Tablet 3    traZODone (DESYREL) 50 MG Tab Take 1 Tablet by mouth every evening. 30 Tablet 3    atorvastatin (LIPITOR) 40 MG Tab Take 1 Tablet by mouth every evening. 30 Tablet 1    multivitamin Tab Take 1 Tablet by mouth every day. 30 Tablet 1    lidocaine (LIDODERM) 5 % Patch Place 1 Patch on the skin every 24 hours. 10 Patch 1     No current facility-administered medications for this visit.     Review of Systems   Constitutional: Negative.    HENT: Negative.     Eyes: Negative.    Respiratory:  Positive for shortness of breath.    Cardiovascular: Negative.    Gastrointestinal: Negative.    Genitourinary: Negative.    Musculoskeletal: Negative.    Skin: Negative.    Neurological:  Positive for tremors, seizures and weakness.   Psychiatric/Behavioral:  Positive for depression and substance abuse. The patient is nervous/anxious.      /69 (BP Location: Left arm, Patient Position: Sitting, BP Cuff Size: Adult)   Pulse 98   Temp 37.4 °C (99.4 °F) (Temporal)   Ht 1.473 m (4' 10\")   Wt 63.6 kg (140 lb 3.2 oz)   LMP 02/28/2018   SpO2 88%   BMI 29.30 kg/m²     Physical Exam  HENT:      Head: Normocephalic and atraumatic.      Nose: Nose normal.      Mouth/Throat:      Mouth: Mucous membranes are moist.      Pharynx: Oropharynx is clear.   Eyes:      Extraocular Movements: Extraocular movements intact.      Conjunctiva/sclera: Conjunctivae normal.   Cardiovascular:      Rate and Rhythm: Normal rate and regular rhythm.      Pulses: Normal pulses.      Heart sounds: Normal heart sounds.   Pulmonary:      Effort: Pulmonary effort is normal.      Breath sounds: Normal breath sounds.   Abdominal:      General: Bowel sounds are normal.      Palpations: Abdomen is soft.   Musculoskeletal:         General: Normal range of motion.      Cervical back: Tenderness present.   Skin:     General: Skin is warm and dry.      Capillary Refill: Capillary refill takes less than 2 seconds. "   Neurological:      General: No focal deficit present.      Mental Status: She is alert and oriented to person, place, and time.     Note: I have reviewed all pertinent labs and diagnostic tests associated with this visit with specific comments listed under the assessment and plan below    Assessment and Plan    Ms. Youssef is a 52 year-old female [facility] and to follow-up on a Home O2/DME order.    1. Follow-up examination  Patient was seen in clinic to discuss her major concerns. We addressed her neck pain, her EtOH use and her home oxygen orders.   - Given concern for severe EtOH withdrawal/seizures, it was recommended for the patient to go to the emergency department to help with detox.   - Follow-up in clinic with PCP in 2-4 weeks.    2. Alcohol withdrawal seizure with delirium (HCC)  3. Severe benzodiazepine use disorder (HCC)  4. High risk social situation  In clinic, she is not displaying any clinical signs/concerns of DT or withdrawal symptoms.   She states that she currently lives with a roommate that uses BZD's and additionally notes multiple social concerns. She notes that when she stops using EtOH, she has been buying street drugs (BZD/Ativan) to try to prevent seizures from occurring.   She has been struggling with finances and not being able to pay for her prescriptions.  - She would benefit from a social work consult IP, however we will place an order today to ensure she is being seen.   - Recommended for her to go to the emergency department to help with BZD/EtOH withdrawal symptoms.    5. Carotid artery stenosis, asymptomatic, right  - aspirin 81 MG EC tablet; Take 1 Tablet by mouth every day.  Dispense: 30 Tablet; Refill: 1    6. Degenerative disc disease, cervical  - Referral to Spine Surgery  - Referral to Pain Management  - Referral to Physical Therapy  - diclofenac sodium (VOLTAREN) 1 % Gel; Apply 2 g topically 4 times a day as needed (for neck  pain).  Dispense: 2 g; Refill: 2  - Encouraged to buy Salonpas or OTC lidocaine patches.     7. Post covid-19 condition, unspecified  8. Chronic Hypoxic Respiratory Failure, on Home O2  - DME O2 New Set Up orders placed for her.     Followup: Return in about 2 months (around 5/28/2023), or if symptoms worsen or fail to improve.    Signed by:     Lillie Lee MD  Internal Medicine PGY-2      Patient was seen and examined with Attending Physician.

## 2023-03-28 NOTE — PATIENT INSTRUCTIONS
Recommend for you to go to the hospital for alcohol/benzodiazepine detox.  Once you are safely weaned off, please follow-up in clinic to further discuss your neck pain.   Due to the insurance coverage changes, we will be sending in a new home oxygen order for you.   
(2) cough or sneeze

## 2023-04-04 ENCOUNTER — TELEPHONE (OUTPATIENT)
Dept: INTERNAL MEDICINE | Facility: OTHER | Age: 53
End: 2023-04-04
Payer: COMMERCIAL

## 2023-04-04 NOTE — TELEPHONE ENCOUNTER
MSW intern called the patient to follow-up on over the counter medication needs. A friend of the patient answered and explained that the number listed was not Olya's number. They passed the phone to Olya so that she could update her phone number in the chart. MSW intern updated her phone number and called back but received no answer. MSW intern sent a patient message requesting a call back and will continue to follow.     Marie Schroeder  MSW Intern

## 2023-04-09 RX ORDER — ATORVASTATIN CALCIUM 40 MG/1
TABLET, FILM COATED ORAL
Qty: 30 TABLET | Refills: 0 | OUTPATIENT
Start: 2023-04-09

## 2023-04-11 ENCOUNTER — APPOINTMENT (OUTPATIENT)
Dept: RADIOLOGY | Facility: MEDICAL CENTER | Age: 53
DRG: 897 | End: 2023-04-11
Attending: EMERGENCY MEDICINE
Payer: COMMERCIAL

## 2023-04-11 ENCOUNTER — HOSPITAL ENCOUNTER (INPATIENT)
Facility: MEDICAL CENTER | Age: 53
LOS: 3 days | DRG: 897 | End: 2023-04-14
Attending: EMERGENCY MEDICINE | Admitting: STUDENT IN AN ORGANIZED HEALTH CARE EDUCATION/TRAINING PROGRAM
Payer: COMMERCIAL

## 2023-04-11 ENCOUNTER — OFFICE VISIT (OUTPATIENT)
Dept: INTERNAL MEDICINE | Facility: OTHER | Age: 53
End: 2023-04-11
Payer: COMMERCIAL

## 2023-04-11 VITALS
WEIGHT: 144.6 LBS | DIASTOLIC BLOOD PRESSURE: 67 MMHG | OXYGEN SATURATION: 97 % | TEMPERATURE: 99.5 F | SYSTOLIC BLOOD PRESSURE: 109 MMHG | BODY MASS INDEX: 30.35 KG/M2 | HEIGHT: 58 IN | HEART RATE: 101 BPM

## 2023-04-11 DIAGNOSIS — F10.931 ALCOHOL WITHDRAWAL SEIZURE WITH DELIRIUM (HCC): ICD-10-CM

## 2023-04-11 DIAGNOSIS — F13.20 SEVERE BENZODIAZEPINE USE DISORDER (HCC): ICD-10-CM

## 2023-04-11 DIAGNOSIS — F10.10 CHRONIC ALCOHOL ABUSE: ICD-10-CM

## 2023-04-11 DIAGNOSIS — F10.930 ALCOHOL WITHDRAWAL SYNDROME WITHOUT COMPLICATION (HCC): ICD-10-CM

## 2023-04-11 DIAGNOSIS — M54.16 RADICULOPATHY, LUMBAR REGION: ICD-10-CM

## 2023-04-11 DIAGNOSIS — F15.10 METHAMPHETAMINE ABUSE (HCC): ICD-10-CM

## 2023-04-11 DIAGNOSIS — R10.11 RIGHT UPPER QUADRANT ABDOMINAL PAIN: ICD-10-CM

## 2023-04-11 DIAGNOSIS — R60.0 BILATERAL LOWER EXTREMITY EDEMA: ICD-10-CM

## 2023-04-11 DIAGNOSIS — M54.12 RADICULOPATHY, CERVICAL REGION: ICD-10-CM

## 2023-04-11 DIAGNOSIS — R56.9 ALCOHOL WITHDRAWAL SEIZURE WITH DELIRIUM (HCC): ICD-10-CM

## 2023-04-11 DIAGNOSIS — R30.0 DYSURIA: ICD-10-CM

## 2023-04-11 DIAGNOSIS — E87.6 HYPOKALEMIA: ICD-10-CM

## 2023-04-11 DIAGNOSIS — Z99.81 SUPPLEMENTAL OXYGEN DEPENDENT: ICD-10-CM

## 2023-04-11 DIAGNOSIS — D64.9 ANEMIA, UNSPECIFIED TYPE: ICD-10-CM

## 2023-04-11 PROBLEM — D41.4 BLADDER POLYP: Status: ACTIVE | Noted: 2023-04-11

## 2023-04-11 LAB
ALBUMIN SERPL BCP-MCNC: 4.1 G/DL (ref 3.2–4.9)
ALBUMIN/GLOB SERPL: 0.9 G/DL
ALP SERPL-CCNC: 162 U/L (ref 30–99)
ALT SERPL-CCNC: 24 U/L (ref 2–50)
ANION GAP SERPL CALC-SCNC: 14 MMOL/L (ref 7–16)
APPEARANCE UR: ABNORMAL
APTT PPP: 30.4 SEC (ref 24.7–36)
AST SERPL-CCNC: 55 U/L (ref 12–45)
BACTERIA #/AREA URNS HPF: ABNORMAL /HPF
BASOPHILS # BLD AUTO: 0 % (ref 0–1.8)
BASOPHILS # BLD: 0 K/UL (ref 0–0.12)
BILIRUB SERPL-MCNC: 0.7 MG/DL (ref 0.1–1.5)
BILIRUB UR QL STRIP.AUTO: NEGATIVE
BUN SERPL-MCNC: 6 MG/DL (ref 8–22)
CALCIUM ALBUM COR SERPL-MCNC: 8.6 MG/DL (ref 8.5–10.5)
CALCIUM SERPL-MCNC: 8.7 MG/DL (ref 8.5–10.5)
CHLORIDE SERPL-SCNC: 98 MMOL/L (ref 96–112)
CO2 SERPL-SCNC: 23 MMOL/L (ref 20–33)
COLOR UR: YELLOW
CREAT SERPL-MCNC: 0.34 MG/DL (ref 0.5–1.4)
EKG IMPRESSION: NORMAL
EOSINOPHIL # BLD AUTO: 0.06 K/UL (ref 0–0.51)
EOSINOPHIL NFR BLD: 0.9 % (ref 0–6.9)
EPI CELLS #/AREA URNS HPF: ABNORMAL /HPF
ERYTHROCYTE [DISTWIDTH] IN BLOOD BY AUTOMATED COUNT: 54.4 FL (ref 35.9–50)
ETHANOL BLD-MCNC: 45.4 MG/DL
GFR SERPLBLD CREATININE-BSD FMLA CKD-EPI: 122 ML/MIN/1.73 M 2
GLOBULIN SER CALC-MCNC: 4.7 G/DL (ref 1.9–3.5)
GLUCOSE SERPL-MCNC: 120 MG/DL (ref 65–99)
GLUCOSE UR STRIP.AUTO-MCNC: NEGATIVE MG/DL
HCT VFR BLD AUTO: 29.4 % (ref 37–47)
HGB BLD-MCNC: 8.3 G/DL (ref 12–16)
HYALINE CASTS #/AREA URNS LPF: ABNORMAL /LPF
HYPOCHROMIA BLD QL SMEAR: ABNORMAL
INR PPP: 1.21 (ref 0.87–1.13)
KETONES UR STRIP.AUTO-MCNC: ABNORMAL MG/DL
LEUKOCYTE ESTERASE UR QL STRIP.AUTO: NEGATIVE
LIPASE SERPL-CCNC: 74 U/L (ref 11–82)
LYMPHOCYTES # BLD AUTO: 1.68 K/UL (ref 1–4.8)
LYMPHOCYTES NFR BLD: 26.7 % (ref 22–41)
MAGNESIUM SERPL-MCNC: 1.8 MG/DL (ref 1.5–2.5)
MANUAL DIFF BLD: NORMAL
MCH RBC QN AUTO: 22.9 PG (ref 27–33)
MCHC RBC AUTO-ENTMCNC: 28.2 G/DL (ref 33.6–35)
MCV RBC AUTO: 81.2 FL (ref 81.4–97.8)
MICRO URNS: ABNORMAL
MONOCYTES # BLD AUTO: 0.49 K/UL (ref 0–0.85)
MONOCYTES NFR BLD AUTO: 7.8 % (ref 0–13.4)
MORPHOLOGY BLD-IMP: NORMAL
NEUTROPHILS # BLD AUTO: 4.07 K/UL (ref 2–7.15)
NEUTROPHILS NFR BLD: 64.6 % (ref 44–72)
NITRITE UR QL STRIP.AUTO: NEGATIVE
NRBC # BLD AUTO: 0 K/UL
NRBC BLD-RTO: 0 /100 WBC
NT-PROBNP SERPL IA-MCNC: 30 PG/ML (ref 0–125)
PH UR STRIP.AUTO: 5.5 [PH] (ref 5–8)
PLATELET # BLD AUTO: 93 K/UL (ref 164–446)
PLATELET BLD QL SMEAR: NORMAL
PMV BLD AUTO: 9.1 FL (ref 9–12.9)
POTASSIUM SERPL-SCNC: 3.1 MMOL/L (ref 3.6–5.5)
PROT SERPL-MCNC: 8.8 G/DL (ref 6–8.2)
PROT UR QL STRIP: NEGATIVE MG/DL
PROTHROMBIN TIME: 15.1 SEC (ref 12–14.6)
RBC # BLD AUTO: 3.62 M/UL (ref 4.2–5.4)
RBC # URNS HPF: ABNORMAL /HPF
RBC BLD AUTO: PRESENT
RBC UR QL AUTO: NEGATIVE
SODIUM SERPL-SCNC: 135 MMOL/L (ref 135–145)
SP GR UR STRIP.AUTO: 1.02
TROPONIN T SERPL-MCNC: 18 NG/L (ref 6–19)
UROBILINOGEN UR STRIP.AUTO-MCNC: 1 MG/DL
WBC # BLD AUTO: 6.3 K/UL (ref 4.8–10.8)
WBC #/AREA URNS HPF: ABNORMAL /HPF

## 2023-04-11 PROCEDURE — 99223 1ST HOSP IP/OBS HIGH 75: CPT | Performed by: STUDENT IN AN ORGANIZED HEALTH CARE EDUCATION/TRAINING PROGRAM

## 2023-04-11 PROCEDURE — 85610 PROTHROMBIN TIME: CPT

## 2023-04-11 PROCEDURE — 96365 THER/PROPH/DIAG IV INF INIT: CPT

## 2023-04-11 PROCEDURE — 700111 HCHG RX REV CODE 636 W/ 250 OVERRIDE (IP): Performed by: STUDENT IN AN ORGANIZED HEALTH CARE EDUCATION/TRAINING PROGRAM

## 2023-04-11 PROCEDURE — 84484 ASSAY OF TROPONIN QUANT: CPT

## 2023-04-11 PROCEDURE — 83690 ASSAY OF LIPASE: CPT

## 2023-04-11 PROCEDURE — 74177 CT ABD & PELVIS W/CONTRAST: CPT

## 2023-04-11 PROCEDURE — 36415 COLL VENOUS BLD VENIPUNCTURE: CPT

## 2023-04-11 PROCEDURE — 85007 BL SMEAR W/DIFF WBC COUNT: CPT

## 2023-04-11 PROCEDURE — 96375 TX/PRO/DX INJ NEW DRUG ADDON: CPT

## 2023-04-11 PROCEDURE — 82077 ASSAY SPEC XCP UR&BREATH IA: CPT

## 2023-04-11 PROCEDURE — 700102 HCHG RX REV CODE 250 W/ 637 OVERRIDE(OP): Performed by: STUDENT IN AN ORGANIZED HEALTH CARE EDUCATION/TRAINING PROGRAM

## 2023-04-11 PROCEDURE — 81001 URINALYSIS AUTO W/SCOPE: CPT

## 2023-04-11 PROCEDURE — 700111 HCHG RX REV CODE 636 W/ 250 OVERRIDE (IP): Performed by: EMERGENCY MEDICINE

## 2023-04-11 PROCEDURE — A9270 NON-COVERED ITEM OR SERVICE: HCPCS | Performed by: EMERGENCY MEDICINE

## 2023-04-11 PROCEDURE — 93005 ELECTROCARDIOGRAM TRACING: CPT | Performed by: EMERGENCY MEDICINE

## 2023-04-11 PROCEDURE — 700101 HCHG RX REV CODE 250: Performed by: EMERGENCY MEDICINE

## 2023-04-11 PROCEDURE — 85730 THROMBOPLASTIN TIME PARTIAL: CPT

## 2023-04-11 PROCEDURE — 700102 HCHG RX REV CODE 250 W/ 637 OVERRIDE(OP): Performed by: EMERGENCY MEDICINE

## 2023-04-11 PROCEDURE — 80053 COMPREHEN METABOLIC PANEL: CPT

## 2023-04-11 PROCEDURE — 94760 N-INVAS EAR/PLS OXIMETRY 1: CPT

## 2023-04-11 PROCEDURE — 83735 ASSAY OF MAGNESIUM: CPT

## 2023-04-11 PROCEDURE — 83880 ASSAY OF NATRIURETIC PEPTIDE: CPT

## 2023-04-11 PROCEDURE — A9270 NON-COVERED ITEM OR SERVICE: HCPCS | Performed by: STUDENT IN AN ORGANIZED HEALTH CARE EDUCATION/TRAINING PROGRAM

## 2023-04-11 PROCEDURE — 85025 COMPLETE CBC W/AUTO DIFF WBC: CPT

## 2023-04-11 PROCEDURE — 99285 EMERGENCY DEPT VISIT HI MDM: CPT

## 2023-04-11 PROCEDURE — HZ2ZZZZ DETOXIFICATION SERVICES FOR SUBSTANCE ABUSE TREATMENT: ICD-10-PCS | Performed by: STUDENT IN AN ORGANIZED HEALTH CARE EDUCATION/TRAINING PROGRAM

## 2023-04-11 PROCEDURE — 99215 OFFICE O/P EST HI 40 MIN: CPT | Mod: GC | Performed by: STUDENT IN AN ORGANIZED HEALTH CARE EDUCATION/TRAINING PROGRAM

## 2023-04-11 PROCEDURE — 700117 HCHG RX CONTRAST REV CODE 255: Performed by: EMERGENCY MEDICINE

## 2023-04-11 PROCEDURE — 96366 THER/PROPH/DIAG IV INF ADDON: CPT

## 2023-04-11 PROCEDURE — 770001 HCHG ROOM/CARE - MED/SURG/GYN PRIV*

## 2023-04-11 RX ORDER — PROCHLORPERAZINE EDISYLATE 5 MG/ML
5-10 INJECTION INTRAMUSCULAR; INTRAVENOUS EVERY 4 HOURS PRN
Status: DISCONTINUED | OUTPATIENT
Start: 2023-04-11 | End: 2023-04-14 | Stop reason: HOSPADM

## 2023-04-11 RX ORDER — LORAZEPAM 1 MG/1
2 TABLET ORAL
Status: DISCONTINUED | OUTPATIENT
Start: 2023-04-11 | End: 2023-04-14

## 2023-04-11 RX ORDER — FOLIC ACID 1 MG/1
1 TABLET ORAL DAILY
Status: DISCONTINUED | OUTPATIENT
Start: 2023-04-12 | End: 2023-04-14 | Stop reason: HOSPADM

## 2023-04-11 RX ORDER — LORAZEPAM 0.5 MG/1
0.5 TABLET ORAL EVERY 4 HOURS PRN
Status: DISCONTINUED | OUTPATIENT
Start: 2023-04-11 | End: 2023-04-14

## 2023-04-11 RX ORDER — CHLORDIAZEPOXIDE HYDROCHLORIDE 25 MG/1
50 CAPSULE, GELATIN COATED ORAL EVERY 6 HOURS
Status: DISCONTINUED | OUTPATIENT
Start: 2023-04-12 | End: 2023-04-12

## 2023-04-11 RX ORDER — POTASSIUM CHLORIDE 20 MEQ/1
40 TABLET, EXTENDED RELEASE ORAL ONCE
Status: COMPLETED | OUTPATIENT
Start: 2023-04-11 | End: 2023-04-11

## 2023-04-11 RX ORDER — ATORVASTATIN CALCIUM 40 MG/1
40 TABLET, FILM COATED ORAL EVERY EVENING
Status: DISCONTINUED | OUTPATIENT
Start: 2023-04-12 | End: 2023-04-14 | Stop reason: HOSPADM

## 2023-04-11 RX ORDER — ONDANSETRON 2 MG/ML
4 INJECTION INTRAMUSCULAR; INTRAVENOUS ONCE
Status: COMPLETED | OUTPATIENT
Start: 2023-04-11 | End: 2023-04-11

## 2023-04-11 RX ORDER — GAUZE BANDAGE 2" X 2"
100 BANDAGE TOPICAL DAILY
Status: DISCONTINUED | OUTPATIENT
Start: 2023-04-12 | End: 2023-04-14 | Stop reason: HOSPADM

## 2023-04-11 RX ORDER — ONDANSETRON 2 MG/ML
4 INJECTION INTRAMUSCULAR; INTRAVENOUS EVERY 4 HOURS PRN
Status: DISCONTINUED | OUTPATIENT
Start: 2023-04-11 | End: 2023-04-14 | Stop reason: HOSPADM

## 2023-04-11 RX ORDER — LORAZEPAM 2 MG/ML
0.5 INJECTION INTRAMUSCULAR EVERY 4 HOURS PRN
Status: DISCONTINUED | OUTPATIENT
Start: 2023-04-11 | End: 2023-04-14

## 2023-04-11 RX ORDER — ENOXAPARIN SODIUM 100 MG/ML
40 INJECTION SUBCUTANEOUS DAILY
Status: DISCONTINUED | OUTPATIENT
Start: 2023-04-11 | End: 2023-04-14 | Stop reason: HOSPADM

## 2023-04-11 RX ORDER — PROMETHAZINE HYDROCHLORIDE 25 MG/1
12.5-25 SUPPOSITORY RECTAL EVERY 4 HOURS PRN
Status: DISCONTINUED | OUTPATIENT
Start: 2023-04-11 | End: 2023-04-14 | Stop reason: HOSPADM

## 2023-04-11 RX ORDER — LORAZEPAM 2 MG/ML
2 INJECTION INTRAMUSCULAR
Status: DISCONTINUED | OUTPATIENT
Start: 2023-04-11 | End: 2023-04-14

## 2023-04-11 RX ORDER — LORAZEPAM 2 MG/ML
1 INJECTION INTRAMUSCULAR
Status: DISCONTINUED | OUTPATIENT
Start: 2023-04-11 | End: 2023-04-14

## 2023-04-11 RX ORDER — ONDANSETRON 4 MG/1
4 TABLET, ORALLY DISINTEGRATING ORAL EVERY 4 HOURS PRN
Status: DISCONTINUED | OUTPATIENT
Start: 2023-04-11 | End: 2023-04-14 | Stop reason: HOSPADM

## 2023-04-11 RX ORDER — POTASSIUM CHLORIDE 20 MEQ/1
40 TABLET, EXTENDED RELEASE ORAL EVERY 4 HOURS
Status: COMPLETED | OUTPATIENT
Start: 2023-04-12 | End: 2023-04-12

## 2023-04-11 RX ORDER — AMLODIPINE BESYLATE 5 MG/1
2.5 TABLET ORAL DAILY
Status: DISCONTINUED | OUTPATIENT
Start: 2023-04-12 | End: 2023-04-14 | Stop reason: HOSPADM

## 2023-04-11 RX ORDER — LORAZEPAM 1 MG/1
1 TABLET ORAL EVERY 4 HOURS PRN
Status: DISCONTINUED | OUTPATIENT
Start: 2023-04-11 | End: 2023-04-14

## 2023-04-11 RX ORDER — LORAZEPAM 1 MG/1
4 TABLET ORAL
Status: DISCONTINUED | OUTPATIENT
Start: 2023-04-11 | End: 2023-04-14

## 2023-04-11 RX ORDER — LORAZEPAM 1 MG/1
3 TABLET ORAL
Status: DISCONTINUED | OUTPATIENT
Start: 2023-04-11 | End: 2023-04-14

## 2023-04-11 RX ORDER — LORAZEPAM 2 MG/ML
1.5 INJECTION INTRAMUSCULAR
Status: DISCONTINUED | OUTPATIENT
Start: 2023-04-11 | End: 2023-04-14

## 2023-04-11 RX ORDER — CHLORDIAZEPOXIDE HYDROCHLORIDE 25 MG/1
25 CAPSULE, GELATIN COATED ORAL EVERY 6 HOURS
Status: DISCONTINUED | OUTPATIENT
Start: 2023-04-13 | End: 2023-04-12

## 2023-04-11 RX ORDER — PROMETHAZINE HYDROCHLORIDE 25 MG/1
12.5-25 TABLET ORAL EVERY 4 HOURS PRN
Status: DISCONTINUED | OUTPATIENT
Start: 2023-04-11 | End: 2023-04-14 | Stop reason: HOSPADM

## 2023-04-11 RX ORDER — LORAZEPAM 2 MG/ML
1 INJECTION INTRAMUSCULAR ONCE
Status: COMPLETED | OUTPATIENT
Start: 2023-04-11 | End: 2023-04-11

## 2023-04-11 RX ORDER — GABAPENTIN 300 MG/1
600 CAPSULE ORAL 3 TIMES DAILY
Status: DISCONTINUED | OUTPATIENT
Start: 2023-04-12 | End: 2023-04-14 | Stop reason: HOSPADM

## 2023-04-11 RX ADMIN — LORAZEPAM 1 MG: 2 INJECTION INTRAMUSCULAR; INTRAVENOUS at 20:33

## 2023-04-11 RX ADMIN — ENOXAPARIN SODIUM 40 MG: 40 INJECTION SUBCUTANEOUS at 23:44

## 2023-04-11 RX ADMIN — CHLORDIAZEPOXIDE HYDROCHLORIDE 50 MG: 25 CAPSULE ORAL at 23:45

## 2023-04-11 RX ADMIN — ONDANSETRON 4 MG: 2 INJECTION INTRAMUSCULAR; INTRAVENOUS at 20:35

## 2023-04-11 RX ADMIN — IOHEXOL 80 ML: 350 INJECTION, SOLUTION INTRAVENOUS at 21:18

## 2023-04-11 RX ADMIN — THIAMINE HYDROCHLORIDE: 100 INJECTION, SOLUTION INTRAMUSCULAR; INTRAVENOUS at 20:02

## 2023-04-11 RX ADMIN — POTASSIUM CHLORIDE 40 MEQ: 1500 TABLET, EXTENDED RELEASE ORAL at 21:40

## 2023-04-11 ASSESSMENT — FIBROSIS 4 INDEX
FIB4 SCORE: 9.4
FIB4 SCORE: 9.4
FIB4 SCORE: 6.52

## 2023-04-11 ASSESSMENT — LIFESTYLE VARIABLES
PAROXYSMAL SWEATS: NO SWEAT VISIBLE
AUDITORY DISTURBANCES: NOT PRESENT
VISUAL DISTURBANCES: NOT PRESENT
ANXIETY: NO ANXIETY (AT EASE)
TOTAL SCORE: 4
TREMOR: *
ORIENTATION AND CLOUDING OF SENSORIUM: CANNOT DO SERIAL ADDITIONS OR IS UNCERTAIN ABOUT DATE
AGITATION: NORMAL ACTIVITY
ANXIETY: *
HEADACHE, FULLNESS IN HEAD: MODERATE
TACTILE DISTURBANCES: VERY MILD ITCHING, PINS AND NEEDLES SENSATION, BURNING OR NUMBNESS
TREMOR: TREMOR NOT VISIBLE BUT CAN BE FELT, FINGERTIP TO FINGERTIP
NAUSEA AND VOMITING: MILD NAUSEA WITH NO VOMITING
VISUAL DISTURBANCES: NOT PRESENT
AUDITORY DISTURBANCES: NOT PRESENT
HEADACHE, FULLNESS IN HEAD: MILD
PAROXYSMAL SWEATS: BARELY PERCEPTIBLE SWEATING. PALMS MOIST
ORIENTATION AND CLOUDING OF SENSORIUM: ORIENTED AND CAN DO SERIAL ADDITIONS
AGITATION: *
TOTAL SCORE: 15
SUBSTANCE_ABUSE: 1
NAUSEA AND VOMITING: MILD NAUSEA WITH NO VOMITING

## 2023-04-11 ASSESSMENT — ENCOUNTER SYMPTOMS
HEARTBURN: 0
BLURRED VISION: 0
NERVOUS/ANXIOUS: 1
HEMOPTYSIS: 0
TREMORS: 1
NAUSEA: 0
HEADACHES: 0
DOUBLE VISION: 0
DEPRESSION: 0
FEVER: 0
COUGH: 0
DIZZINESS: 0
CHILLS: 0
BRUISES/BLEEDS EASILY: 0
PALPITATIONS: 0
NECK PAIN: 0
MYALGIAS: 0

## 2023-04-11 NOTE — PROGRESS NOTES
Teaching Physician Attestation      Level of Participation    I have personally interviewed and examined the patient.  In addition, I discussed with the resident physician the patient's history, exam, assessment and plan in detail.  Topics listed in my addendum were the focus of the visit.  Healthcare maintenance was not addressed this visit unless listed as a topic in my addendum.  I agree with the plan as written along with the following additions/modifications:        Alcohol use disorder  -Patient reports history of seizures/significant withdrawal symptoms.  Is motivated to withdrawal but has medically complex, has chronic pain followed by neurosurgery, and additionally previous benzodiazepine use.  Patient does not have access to reliable transportation and thus would be unable to participate in a intensive outpatient program.  Additionally, her roommate is an active substance user and would treat a challenging environment for outpatient detox.  As such, inpatient setting appears the best option for her.  Patient is motivated to change and is interested in detoxing and then finding placement in a rehab center for at least 1 month to help her transition and acquire life skills to enhance her chance of sobriety.  As such, will admit to [facility], direct signout given to the transfer center A[facility], appreciate support.  Last drink was last evening before bed, she is mildly tremulous and states that she feels anxious/jittery but does not have full-blown withdrawal symptoms at this time, is borderline tachycardic but otherwise vitally stable.    Methamphetamine use  -Last use yesterday, will need monitoring during admission    Dysuria  -would recommend UA to screen for UTI as symptomatic, deferred at present given patient will be transfererd to [facility] for further care.

## 2023-04-11 NOTE — PROGRESS NOTES
Established Patient    Patient Care Team:  Margy Aparicio M.D. as PCP - General (Internal Medicine)    HPI:  Olya Youssef is a 52 y.o. female with a past medical history of methamphetamine use, alcohol abuse complicated by alcohol withdrawal/seizures and alcoholic pancreatitis, chronic hypoxic respiratory failure secondary to COVID (on 2-3LNC at baseline), cervical radiculopathy with chronic neck and back pain (seeing neurosurgery and pain specialist), and asymptomatic carotid artery stenosis who presents today with the following Chief Complaint(s):   Chief Complaint   Patient presents with    Follow-Up     2 week fup, wants to talk about going into a medical detox      Patient previously seen in clinic on 3/28/23 by Dr. Lee - at that time was advised to go to the hospital for detox, however, unable to stating that she had to get certain things at home in order first. She comes in today seeking help and is interested in inpatient rehabilitation for substance abuse. Of note, she has a long and complex history of alcohol abuse with multiple hospital admissions for withdrawals/seizures and has stated that she has poor social support to help detox/remain free of substance use due to caring for roommate with substance use and financial issues due to being unable to work because of chronic neck/back pain and is unable to get disability. She states that she has done inpatient rehab in the past, multiple times, however, due to her current living situation she is unable to stay sober. Furthermore, patient has transportation issues making it difficult for her to have regular follow up.   Currently, patient has a myriad of symptoms: notably bilateral lower extremity swelling with pain during ambulation, bowel incontinence, dysuria with foul odor, tremors/shaking, poor appetite, skin breakouts described as hives along her face, agitation, and coughing. She states that within the last month, she has  had 3 seizures, two seizures witnessed by roommate prior to visit on 3/28/23 -  described as appearing dazed, toes and fingers curl up, with shaking of all limbs; and one unwitnessed seizure prior to today's visit described as waking up confused and aching. She denies any head injuries, tongue biting, or urinary incontinence. Patient reports previous benzodiazapine use 2-3x per day for a week (last use some time before 3/28/23), recent methamphetamine use (smoked, last use 4/10/23) and alcohol intake (last drink yesterday evening, unknown amount of vodka). Patient appeared anxious and tearful during today's visit, denied SI/HI. She stated that she wants to get her life on track and does not want to go home, further stating if she goes home she will use again.       +agitation, tremors, shortness of breath, cough, depression, substance abuse, anxiety       Review of Systems   Constitutional: Denies chills, fever; reports fatigue  HEENT: Denies blurry vision, congestion, sore throat  Respiratory: Reports shortness of breath (chronic) and cough   Cardiovascular: Denies chest pain, palpitations; reports bilateral leg swelling x1 week  Gastrointestinal: Reports chronic right upper abdominal pain and stool incontinence; denied nausea or vomiting  Genitourinary: Reports dysuria, frequency/urgency, foul odor, change in color  Musculoskeletal: Reports chronic neck/back pain  Skin: Denies itching and rash.   Neurological: Reports tremors, seizures   Psychiatric: Reports agitation, depression, anxiety, substance abuse      Past Medical History:   Diagnosis Date    Alcohol abuse     Alcohol intoxication, with unspecified complication (HCC) 8/7/2013    Alcoholism ([facility])     Anxiety     Backpain     Bipolar disorder, unspecified ([facility])     Bronchitis     Degenerative disc disease, cervical 3/28/2023    Drug abuse ([facility])     meth, crack cocaine, THC    Hepatitis, alcoholic     Hypertension     controlled    Indigestion     Infectious  disease MRSA    Liver disease     pancreatitis    Pancreatitis chronic     Flare-up    Pneumonia     Psychiatric disorder     suicidal in past    Renal disorder     kidney infection 1991    Seizure ([facility])     7/8/2011    Seizure disorder ([facility])     Unspecified hemorrhagic conditions        Patient Active Problem List    Diagnosis Date Noted    Flu-like symptoms 02/28/2023    Severe benzodiazepine use disorder ([facility]) 03/28/2023    High risk social situation 03/28/2023    Degenerative disc disease, cervical 03/28/2023    Acetaminophen abuse 02/28/2023    Other chronic pain 02/28/2023    Alcohol use disorder, severe, dependence ([facility]) 02/06/2023    Chronic respiratory failure with hypoxia, on home oxygen therapy ([facility]) 02/06/2023    Post covid-19 condition, unspecified 02/06/2023    Nausea 02/06/2023    Other insomnia 02/06/2023    Carotid artery stenosis, asymptomatic, right 01/28/2023    Headache 01/24/2023    Overweight 01/24/2023    Alcohol withdrawal delirium, acute, hyperactive ([facility]) 01/23/2023    Elevated lipase 01/23/2023    Suicidal behavior 09/27/2022    Vitamin D deficiency 05/25/2021    Hypertension 04/20/2021    Neuropathy 04/19/2021    Leukocytopenia 04/18/2021    Macrocytic anemia 04/18/2021    Methamphetamine abuse ([facility]) 05/29/2018    Alcoholic hepatitis 08/15/2013    Alcohol withdrawal delirium ([facility]) 08/08/2013    Pancreatitis, alcoholic, acute 08/07/2013    Persistent depressive disorder 07/24/2013    Bipolar affective disorder ([facility]) 07/22/2013    Overdose of antidepressant 03/29/2013    Alcohol withdrawal seizure ([facility]) 05/16/2012    Thrombocytopenia ([facility]) 08/19/2011    Pancytopenia ([facility]) 08/17/2011       Allergies:Bactrim, Lamotrigine [lamictal], Penicillin g, Quetiapine fumarate, Quetiapine fumarate, and Keflex    Current Outpatient Medications   Medication Sig Dispense Refill    aspirin 81 MG EC tablet Take 1 Tablet by mouth every day. 30 Tablet 1    diclofenac sodium (VOLTAREN) 1 % Gel Apply 2 g  "topically 4 times a day as needed (for neck pain). 2 g 2    QUEtiapine Fumarate (SEROQUEL PO) Take  by mouth. Cuts it in 4 pieces      gabapentin (NEURONTIN) 600 MG tablet Take 1 Tablet by mouth 3 times a day. 90 Tablet 1    lidocaine (LIDODERM) 5 % Patch Place 1 Patch on the skin every 24 hours. 10 Patch 1    amLODIPine (NORVASC) 2.5 MG Tab Take 1 Tablet by mouth every day. 30 Tablet 3    traZODone (DESYREL) 50 MG Tab Take 1 Tablet by mouth every evening. 30 Tablet 3    atorvastatin (LIPITOR) 40 MG Tab Take 1 Tablet by mouth every evening. 30 Tablet 1    multivitamin Tab Take 1 Tablet by mouth every day. 30 Tablet 1     No current facility-administered medications for this visit.       Social History     Tobacco Use    Smoking status: Never    Smokeless tobacco: Never   Vaping Use    Vaping Use: Never used   Substance Use Topics    Alcohol use: Yes     Comment: Hx heavy drinking apint in a 24 hr period    Drug use: Yes     Types: Inhaled     Comment: meth 1/22 / THC       Family History   Problem Relation Age of Onset    Lung Disease Mother     Cancer Mother          Physical Exam  Vitals:  /67 (BP Location: Right arm, Patient Position: Sitting, BP Cuff Size: Small adult)   Pulse (!) 101   Temp 37.5 °C (99.5 °F) (Temporal)   Ht 1.473 m (4' 10\")   Wt 65.6 kg (144 lb 9.6 oz)   SpO2 97%  Body mass index is 30.22 kg/m².  Constitutional:  Not in acute distress, not toxic appearing  HEENT:   NC/AT, EOMI, anicteric sclera bilaterally, hearing grossly intact  Cardiovascular: Tachycardic, regular rhythm. No murmurs or gallops.      Lungs:   Clear to auscultation bilaterally. No wheezes or crackles. No respiratory distress.  Extremities:  Bilateral lower extremity edema with trace pitting, tremors noted to outstretched hands bilaterally  Skin:  Warm and dry.  No visible rashes.  Neurologic: Alert & oriented x 3. No focal neurologic deficits  Psychiatric:  Tearful, no SI/HI    Assessment and Plan:     1. Alcohol " withdrawal seizure with delirium (HCC)  2. Methamphetamine abuse (HCC)  3. Severe benzodiazepine use disorder (HCC)  During today's visit, patient does not appear to be in full withdrawal. Is notably tremulous and tachycardic  She has a complex alcohol use history with multiple hospitalizations due to withdrawals/seizures and has previously underwent inpatient rehabilitation, however, unable to remain sober to due social circumstances/social support - has returned to using alcohol and other substances due to fears of withdrawals. Patient does not have transport to support outpatient rehabilitation programs and further requests for inpatient services to better motivate and support her decision to quit.   Previous benzodiazapine use 2-3x per day for a week (last use some time before 3/28/23), recent methamphetamine use (smoked, last use 4/10/23) and alcohol intake (last drink evening of 4/10/23, unknown amount of vodka)  Outpatient social work has been provided to patient, however, will also benefit from inpatient social work in order to better address her needs moving forward with an inpatient rehabilitation after detox from alcohol/substance use  -Patient hemodynamically stable, [facility] called for transport to [facility] for monitoring of withdrawal symptoms. Sign off provided to [facility].  -Plan to admit patient to [facility] for further management    4. Dysuria  Reports dysuria, frequency/urgency, foul odor, change in color. Patient going to hospital for alcohol detoxification and to monitor for withdrawals given history of seizures. Recommend workup for urinary tract infection and treat as appropriate while hospitalized.    Margy Aparicio M.D. PGY-2  [facility]

## 2023-04-12 LAB
ANION GAP SERPL CALC-SCNC: 10 MMOL/L (ref 7–16)
BUN SERPL-MCNC: 5 MG/DL (ref 8–22)
CALCIUM SERPL-MCNC: 8.4 MG/DL (ref 8.5–10.5)
CHLORIDE SERPL-SCNC: 100 MMOL/L (ref 96–112)
CO2 SERPL-SCNC: 25 MMOL/L (ref 20–33)
CREAT SERPL-MCNC: 0.32 MG/DL (ref 0.5–1.4)
GFR SERPLBLD CREATININE-BSD FMLA CKD-EPI: 123 ML/MIN/1.73 M 2
GLUCOSE SERPL-MCNC: 102 MG/DL (ref 65–99)
HGB RETIC QN AUTO: 23.2 PG/CELL (ref 29–35)
IMM RETICS NFR: 30.7 % (ref 9.3–17.4)
IRON SATN MFR SERPL: 8 % (ref 15–55)
IRON SERPL-MCNC: 36 UG/DL (ref 40–170)
POTASSIUM SERPL-SCNC: 4.6 MMOL/L (ref 3.6–5.5)
RETICS # AUTO: 0.09 M/UL (ref 0.04–0.06)
RETICS/RBC NFR: 2.7 % (ref 0.8–2.1)
SODIUM SERPL-SCNC: 135 MMOL/L (ref 135–145)
TIBC SERPL-MCNC: 428 UG/DL (ref 250–450)
UIBC SERPL-MCNC: 392 UG/DL (ref 110–370)

## 2023-04-12 PROCEDURE — 85046 RETICYTE/HGB CONCENTRATE: CPT

## 2023-04-12 PROCEDURE — 99232 SBSQ HOSP IP/OBS MODERATE 35: CPT | Performed by: STUDENT IN AN ORGANIZED HEALTH CARE EDUCATION/TRAINING PROGRAM

## 2023-04-12 PROCEDURE — 83540 ASSAY OF IRON: CPT

## 2023-04-12 PROCEDURE — 700102 HCHG RX REV CODE 250 W/ 637 OVERRIDE(OP): Performed by: STUDENT IN AN ORGANIZED HEALTH CARE EDUCATION/TRAINING PROGRAM

## 2023-04-12 PROCEDURE — 700111 HCHG RX REV CODE 636 W/ 250 OVERRIDE (IP): Performed by: STUDENT IN AN ORGANIZED HEALTH CARE EDUCATION/TRAINING PROGRAM

## 2023-04-12 PROCEDURE — 80048 BASIC METABOLIC PNL TOTAL CA: CPT

## 2023-04-12 PROCEDURE — 36415 COLL VENOUS BLD VENIPUNCTURE: CPT

## 2023-04-12 PROCEDURE — A9270 NON-COVERED ITEM OR SERVICE: HCPCS | Performed by: STUDENT IN AN ORGANIZED HEALTH CARE EDUCATION/TRAINING PROGRAM

## 2023-04-12 PROCEDURE — 83550 IRON BINDING TEST: CPT

## 2023-04-12 PROCEDURE — 770001 HCHG ROOM/CARE - MED/SURG/GYN PRIV*

## 2023-04-12 RX ORDER — TRAZODONE HYDROCHLORIDE 50 MG/1
50 TABLET ORAL NIGHTLY
Status: DISCONTINUED | OUTPATIENT
Start: 2023-04-12 | End: 2023-04-14 | Stop reason: HOSPADM

## 2023-04-12 RX ORDER — FAMOTIDINE 20 MG/1
20 TABLET, FILM COATED ORAL 2 TIMES DAILY
COMMUNITY
End: 2023-04-20 | Stop reason: SDUPTHER

## 2023-04-12 RX ORDER — OXYCODONE HYDROCHLORIDE 5 MG/1
2.5 TABLET ORAL EVERY 4 HOURS PRN
Status: DISCONTINUED | OUTPATIENT
Start: 2023-04-12 | End: 2023-04-13

## 2023-04-12 RX ORDER — MELOXICAM 15 MG/1
15 TABLET ORAL
COMMUNITY
End: 2023-04-20 | Stop reason: SDUPTHER

## 2023-04-12 RX ORDER — METHYLPREDNISOLONE 4 MG/1
4 TABLET ORAL SEE ADMIN INSTRUCTIONS
Status: ON HOLD | COMMUNITY
End: 2023-04-14

## 2023-04-12 RX ADMIN — POTASSIUM CHLORIDE 40 MEQ: 1500 TABLET, EXTENDED RELEASE ORAL at 03:46

## 2023-04-12 RX ADMIN — OXYCODONE 2.5 MG: 5 TABLET ORAL at 17:03

## 2023-04-12 RX ADMIN — GABAPENTIN 600 MG: 300 CAPSULE ORAL at 09:04

## 2023-04-12 RX ADMIN — CHLORDIAZEPOXIDE HYDROCHLORIDE 50 MG: 25 CAPSULE ORAL at 12:46

## 2023-04-12 RX ADMIN — FOLIC ACID 1 MG: 1 TABLET ORAL at 06:31

## 2023-04-12 RX ADMIN — LORAZEPAM 1 MG: 1 TABLET ORAL at 00:29

## 2023-04-12 RX ADMIN — AMLODIPINE BESYLATE 2.5 MG: 5 TABLET ORAL at 06:32

## 2023-04-12 RX ADMIN — LORAZEPAM 2 MG: 1 TABLET ORAL at 06:31

## 2023-04-12 RX ADMIN — Medication 100 MG: at 06:31

## 2023-04-12 RX ADMIN — LORAZEPAM 2 MG: 1 TABLET ORAL at 04:17

## 2023-04-12 RX ADMIN — OXYCODONE 2.5 MG: 5 TABLET ORAL at 21:22

## 2023-04-12 RX ADMIN — POTASSIUM CHLORIDE 40 MEQ: 1500 TABLET, EXTENDED RELEASE ORAL at 09:04

## 2023-04-12 RX ADMIN — ATORVASTATIN CALCIUM 40 MG: 40 TABLET, FILM COATED ORAL at 17:03

## 2023-04-12 RX ADMIN — CHLORDIAZEPOXIDE HYDROCHLORIDE 50 MG: 25 CAPSULE ORAL at 06:31

## 2023-04-12 RX ADMIN — THERA TABS 1 TABLET: TAB at 06:00

## 2023-04-12 RX ADMIN — ONDANSETRON 4 MG: 4 TABLET, ORALLY DISINTEGRATING ORAL at 19:42

## 2023-04-12 RX ADMIN — GABAPENTIN 600 MG: 300 CAPSULE ORAL at 17:03

## 2023-04-12 RX ADMIN — ASPIRIN 81 MG: 81 TABLET, COATED ORAL at 06:31

## 2023-04-12 RX ADMIN — LORAZEPAM 1 MG: 1 TABLET ORAL at 14:50

## 2023-04-12 RX ADMIN — LORAZEPAM 2 MG: 1 TABLET ORAL at 22:03

## 2023-04-12 RX ADMIN — LORAZEPAM 2 MG: 1 TABLET ORAL at 19:58

## 2023-04-12 RX ADMIN — LORAZEPAM 2 MG: 1 TABLET ORAL at 09:04

## 2023-04-12 RX ADMIN — ENOXAPARIN SODIUM 40 MG: 40 INJECTION SUBCUTANEOUS at 17:03

## 2023-04-12 RX ADMIN — LORAZEPAM 3 MG: 1 TABLET ORAL at 18:48

## 2023-04-12 ASSESSMENT — LIFESTYLE VARIABLES
TOTAL SCORE: MODERATE ITCHING, PINS AND NEEDLES SENSATION, BURNING OR NUMBNESS
NAUSEA AND VOMITING: MILD NAUSEA WITH NO VOMITING
VISUAL DISTURBANCES: NOT PRESENT
TREMOR: *
NAUSEA AND VOMITING: MILD NAUSEA WITH NO VOMITING
HEADACHE, FULLNESS IN HEAD: MODERATE
ORIENTATION AND CLOUDING OF SENSORIUM: ORIENTED AND CAN DO SERIAL ADDITIONS
ANXIETY: NO ANXIETY (AT EASE)
HEADACHE, FULLNESS IN HEAD: MODERATELY SEVERE
HEADACHE, FULLNESS IN HEAD: MODERATE
NAUSEA AND VOMITING: MILD NAUSEA WITH NO VOMITING
TOTAL SCORE: 13
VISUAL DISTURBANCES: NOT PRESENT
TOTAL SCORE: VERY MILD ITCHING, PINS AND NEEDLES SENSATION, BURNING OR NUMBNESS
AGITATION: NORMAL ACTIVITY
AUDITORY DISTURBANCES: NOT PRESENT
ORIENTATION AND CLOUDING OF SENSORIUM: CANNOT DO SERIAL ADDITIONS OR IS UNCERTAIN ABOUT DATE
AUDITORY DISTURBANCES: NOT PRESENT
PAROXYSMAL SWEATS: NO SWEAT VISIBLE
HEADACHE, FULLNESS IN HEAD: MODERATE
PAROXYSMAL SWEATS: NO SWEAT VISIBLE
AUDITORY DISTURBANCES: NOT PRESENT
TOTAL SCORE: MODERATE ITCHING, PINS AND NEEDLES SENSATION, BURNING OR NUMBNESS
AGITATION: SOMEWHAT MORE THAN NORMAL ACTIVITY
PAROXYSMAL SWEATS: *
HEADACHE, FULLNESS IN HEAD: MILD
HEADACHE, FULLNESS IN HEAD: MODERATELY SEVERE
ANXIETY: NO ANXIETY (AT EASE)
ORIENTATION AND CLOUDING OF SENSORIUM: ORIENTED AND CAN DO SERIAL ADDITIONS
TOTAL SCORE: 16
AGITATION: NORMAL ACTIVITY
AUDITORY DISTURBANCES: NOT PRESENT
PAROXYSMAL SWEATS: BARELY PERCEPTIBLE SWEATING. PALMS MOIST
TOTAL SCORE: 13
ANXIETY: MILDLY ANXIOUS
SUBSTANCE_ABUSE: 0
VISUAL DISTURBANCES: NOT PRESENT
TOTAL SCORE: 4
VISUAL DISTURBANCES: NOT PRESENT
HEADACHE, FULLNESS IN HEAD: NOT PRESENT
HEADACHE, FULLNESS IN HEAD: VERY SEVERE
TOTAL SCORE: MODERATE ITCHING, PINS AND NEEDLES SENSATION, BURNING OR NUMBNESS
ANXIETY: MILDLY ANXIOUS
VISUAL DISTURBANCES: NOT PRESENT
ANXIETY: MILDLY ANXIOUS
AGITATION: NORMAL ACTIVITY
NAUSEA AND VOMITING: NO NAUSEA AND NO VOMITING
TREMOR: TREMOR NOT VISIBLE BUT CAN BE FELT, FINGERTIP TO FINGERTIP
ORIENTATION AND CLOUDING OF SENSORIUM: DATE DISORIENTATION BY MORE THAN TWO CALENDAR DAYS
ANXIETY: NO ANXIETY (AT EASE)
TREMOR: *
AGITATION: NORMAL ACTIVITY
VISUAL DISTURBANCES: VERY MILD SENSITIVITY
AUDITORY DISTURBANCES: NOT PRESENT
TREMOR: *
TREMOR: *
TOTAL SCORE: 13
AGITATION: SOMEWHAT MORE THAN NORMAL ACTIVITY
TOTAL SCORE: MILD ITCHING, PINS AND NEEDLES SENSATION, BURNING OR NUMBNESS
ORIENTATION AND CLOUDING OF SENSORIUM: CANNOT DO SERIAL ADDITIONS OR IS UNCERTAIN ABOUT DATE
TOTAL SCORE: 13
ORIENTATION AND CLOUDING OF SENSORIUM: CANNOT DO SERIAL ADDITIONS OR IS UNCERTAIN ABOUT DATE
AGITATION: NORMAL ACTIVITY
NAUSEA AND VOMITING: *
PAROXYSMAL SWEATS: BARELY PERCEPTIBLE SWEATING. PALMS MOIST
TREMOR: *
AUDITORY DISTURBANCES: NOT PRESENT
ORIENTATION AND CLOUDING OF SENSORIUM: CANNOT DO SERIAL ADDITIONS OR IS UNCERTAIN ABOUT DATE
PAROXYSMAL SWEATS: BARELY PERCEPTIBLE SWEATING. PALMS MOIST
NAUSEA AND VOMITING: MILD NAUSEA WITH NO VOMITING
ORIENTATION AND CLOUDING OF SENSORIUM: DATE DISORIENTATION BY NO MORE THAN TWO CALENDAR DAYS
AUDITORY DISTURBANCES: NOT PRESENT
TOTAL SCORE: MILD ITCHING, PINS AND NEEDLES SENSATION, BURNING OR NUMBNESS
TREMOR: *
AUDITORY DISTURBANCES: NOT PRESENT
AUDITORY DISTURBANCES: NOT PRESENT
AGITATION: NORMAL ACTIVITY
PAROXYSMAL SWEATS: BARELY PERCEPTIBLE SWEATING. PALMS MOIST
NAUSEA AND VOMITING: NO NAUSEA AND NO VOMITING
NAUSEA AND VOMITING: MILD NAUSEA WITH NO VOMITING
VISUAL DISTURBANCES: VERY MILD SENSITIVITY
TREMOR: *
TOTAL SCORE: VERY MILD ITCHING, PINS AND NEEDLES SENSATION, BURNING OR NUMBNESS
ORIENTATION AND CLOUDING OF SENSORIUM: ORIENTED AND CAN DO SERIAL ADDITIONS
TOTAL SCORE: 8
NAUSEA AND VOMITING: MILD NAUSEA WITH NO VOMITING
ANXIETY: MILDLY ANXIOUS
VISUAL DISTURBANCES: NOT PRESENT
ANXIETY: NO ANXIETY (AT EASE)
TOTAL SCORE: MILD ITCHING, PINS AND NEEDLES SENSATION, BURNING OR NUMBNESS
ANXIETY: MILDLY ANXIOUS
TREMOR: *
PAROXYSMAL SWEATS: BARELY PERCEPTIBLE SWEATING. PALMS MOIST
AGITATION: SOMEWHAT MORE THAN NORMAL ACTIVITY
PAROXYSMAL SWEATS: BARELY PERCEPTIBLE SWEATING. PALMS MOIST
HEADACHE, FULLNESS IN HEAD: MODERATE
VISUAL DISTURBANCES: NOT PRESENT
TOTAL SCORE: 8
TOTAL SCORE: 14

## 2023-04-12 ASSESSMENT — PAIN DESCRIPTION - PAIN TYPE
TYPE: ACUTE PAIN

## 2023-04-12 ASSESSMENT — ENCOUNTER SYMPTOMS
ABDOMINAL PAIN: 0
INSOMNIA: 0
COUGH: 0
SHORTNESS OF BREATH: 0
BACK PAIN: 0
PALPITATIONS: 0
BLURRED VISION: 0
LOSS OF CONSCIOUSNESS: 0
SENSORY CHANGE: 0
CHILLS: 0
FOCAL WEAKNESS: 0
HEADACHES: 0
NAUSEA: 0
FEVER: 0
NERVOUS/ANXIOUS: 1
EYE PAIN: 0
SEIZURES: 1
DIZZINESS: 0
VOMITING: 0
FALLS: 0

## 2023-04-12 NOTE — CARE PLAN
The patient is Watcher - Medium risk of patient condition declining or worsening    Shift Goals  Clinical Goals: monitor alcohol witdrawals  Patient Goals: detox  Family Goals: jorge    Progress made toward(s) clinical / shift goals:      Problem: Knowledge Deficit - Standard  Goal: Patient and family/care givers will demonstrate understanding of plan of care, disease process/condition, diagnostic tests and medications  Outcome: Progressing     Problem: Optimal Care for Alcohol Withdrawal  Goal: Optimal Care for the alcohol withdrawal patient  Outcome: Progressing     Problem: Seizure Precautions  Goal: Implementation of seizure precautions  Outcome: Progressing     Problem: Fall Risk  Goal: Patient will remain free from falls  Outcome: Progressing     Problem: Pain - Standard  Goal: Alleviation of pain or a reduction in pain to the patient’s comfort goal  Outcome: Progressing       Patient is not progressing towards the following goals:

## 2023-04-12 NOTE — PROGRESS NOTES
Notified by security patient car left by ER entrance. Patient asked about car given permission to search belongings. Found a knife and pepper spray. Notified security. Patient educated on removal of knife and pepper spray for safety. Patient agreeable. Patient knife and pepper spray removed and given to security in bag with patient labels.    Notified by security that patient car moved to [address] garage on the third floor. Patient notified, however is drowsy so noted in chart.

## 2023-04-12 NOTE — PROGRESS NOTES
4 Eyes Skin Assessment Completed by JARETT Sosa and JARETT Heaton.    Head WDL  Ears WDL  Nose WDL  Mouth WDL  Neck WDL  Breast/Chest WDL  Shoulder Blades WDL  Spine WDL  (R) Arm/Elbow/Hand WDL  (L) Arm/Elbow/Hand WDL  Abdomen WDL  Groin WDL  Scrotum/Coccyx/Buttocks WDL  (R) Leg WDL  (L) Leg WDL  (R) Heel/Foot/Toe WDL  (L) Heel/Foot/Toe WDL      Devices In Places Pulse Ox and Nasal Cannula      Interventions In Place Gray Ear Foams    Possible Skin Injury No    Pictures Uploaded Into Epic N/A  Wound Consult Placed N/A  RN Wound Prevention Protocol Ordered No

## 2023-04-12 NOTE — ED NOTES
Report received from Pepito DENSON. Assumed care of patient. Pt assessed, AAO x 4 . Patient's concerns addressed. Patient aware of POC. Fall precautions in place.  Pt repositioned and comfortable.  Call light within reach. This RN masked and in appropriate PPE during encounter.

## 2023-04-12 NOTE — RESPIRATORY CARE
COPD EDUCATION by COPD CLINICAL EDUCATOR  4/12/2023 at 8:57 AM by Lisa Sheets RRT     Patient reviewed by COPD education team. Patient does not have a diagnosis history of COPD, does not take respiratory medications, and has never smoked. Therefore the patient does not qualify for the COPD program.

## 2023-04-12 NOTE — ED PROVIDER NOTES
ER Provider Note    Scribed for Dr. Elsa You D.O. by Trina Chin. 4/11/2023  8:10 PM    Primary Care Provider: Margy Aparicio M.D.    CHIEF COMPLAINT  Chief Complaint   Patient presents with    Detox     Pt reports that she has been trying to stop drinking at home but has had seizures at home. Pt would like inpatient help for detox. Pt states her last drink was yesterday.     Foot Swelling     Worsening x 1 week.     Abdominal Pain     RUQ x4 weeks.      EXTERNAL RECORDS REVIEWED  Inpatient Notes reviewed to show previous alcohol related issues and admission for detox in February.    HPI/ROS  LIMITATION TO HISTORY   Select: : None    OUTSIDE HISTORIAN(S):  None    Olya Youssef is a 52 y.o. female who presents to the ED for alcohol detox. The patient states that she is an alcoholic and has been trying to detox at home. However, she has a history of withdrawal seizures and has had multiple seizures while at home. The patient states that she went to [facility] for inpatient detox, but was told that she could not be treated because they do not supply oxygen. She states that she usually drinks three 24oz beers and a pint of vodka daily. She notes that she last drank yesterday. The patient also endorses use of meth. She endorses associated symptoms of intermittent chest pain, feet and leg swelling, shakiness, and moderate pain underneath her right ribcage. No alleviating or exacerbating factors noted. She denies fall or trauma to her abdomen. Endorses allergies to Sulfa drugs and Lamictal.     PAST MEDICAL HISTORY  Past Medical History:   Diagnosis Date    Alcohol abuse     Alcohol intoxication, with unspecified complication (HCC) 8/7/2013    Alcoholism (HCC)     Anxiety     Backpain     Bipolar disorder, unspecified (HCC)     Bronchitis     Degenerative disc disease, cervical 3/28/2023    Drug abuse (HCC)     meth, crack cocaine, THC    Hepatitis, alcoholic     Hypertension      "controlled    Indigestion     Infectious disease MRSA    Liver disease     pancreatitis    Pancreatitis chronic     Flare-up    Pneumonia     Psychiatric disorder     suicidal in past    Renal disorder     kidney infection 1991    Seizure (HCC)     7/8/2011    Seizure disorder (HCC)     Unspecified hemorrhagic conditions      SURGICAL HISTORY  Past Surgical History:   Procedure Laterality Date    GASTROSCOPY  4/28/2015    Performed by Kevin Rendon M.D. at SURGERY [facility]     FAMILY HISTORY  Family History   Problem Relation Age of Onset    Lung Disease Mother     Cancer Mother      SOCIAL HISTORY   reports that she has never smoked. She has never used smokeless tobacco. She reports current alcohol use. She reports current drug use. Drug: Inhaled.    CURRENT MEDICATIONS  Previous Medications    AMLODIPINE (NORVASC) 2.5 MG TAB    Take 1 Tablet by mouth every day.    ASPIRIN 81 MG EC TABLET    Take 1 Tablet by mouth every day.    ATORVASTATIN (LIPITOR) 40 MG TAB    Take 1 Tablet by mouth every evening.    DICLOFENAC SODIUM (VOLTAREN) 1 % GEL    Apply 2 g topically 4 times a day as needed (for neck pain).    GABAPENTIN (NEURONTIN) 600 MG TABLET    Take 1 Tablet by mouth 3 times a day.    LIDOCAINE (LIDODERM) 5 % PATCH    Place 1 Patch on the skin every 24 hours.    MULTIVITAMIN TAB    Take 1 Tablet by mouth every day.    QUETIAPINE FUMARATE (SEROQUEL PO)    Take  by mouth. Cuts it in 4 pieces    TRAZODONE (DESYREL) 50 MG TAB    Take 1 Tablet by mouth every evening.     ALLERGIES  Bactrim, Lamotrigine [lamictal], Penicillin g, Quetiapine fumarate, Quetiapine fumarate, Sulfa drugs, and Keflex    PHYSICAL EXAM  /72   Pulse 100   Temp 37.4 °C (99.4 °F) (Temporal)   Resp 16   Ht 1.473 m (4' 10\")   Wt 65.5 kg (144 lb 6.4 oz)   LMP 02/28/2018   SpO2 100%   BMI 30.18 kg/m²   Constitutional: Patient is well developed, well nourished. Non-toxic appearing. Mild distress. Somewhat tremulous.  HENT: " Normocephalic, atraumatic.  Dry oral mucosa   Cardiovascular: Tachycardic with no murmur  Thorax & Lungs: Clear and equal breath sounds with good excursion. No respiratory distress, no rhonchi, wheezing or rales.   Abdomen: Right upper quadrant tenderness to palpation, intentional guarding. Bowel sounds normal in all four quadrants. Soft, no rebound, palpable masses.   Skin: Warm, Dry  Extremities: 2+ pitting edema to bilateral lower extremities. Peripheral pulses 4/4 No tenderness  Neurologic: Alert & oriented x 3, Normal motor function, Normal sensory function  Psychiatric: Affect anxious, tremulous.    DIAGNOSTIC STUDIES & PROCEDURES    Labs:   Results for orders placed or performed during the hospital encounter of 04/11/23   CBC with Differential   Result Value Ref Range    WBC 6.3 4.8 - 10.8 K/uL    RBC 3.62 (L) 4.20 - 5.40 M/uL    Hemoglobin 8.3 (L) 12.0 - 16.0 g/dL    Hematocrit 29.4 (L) 37.0 - 47.0 %    MCV 81.2 (L) 81.4 - 97.8 fL    MCH 22.9 (L) 27.0 - 33.0 pg    MCHC 28.2 (L) 33.6 - 35.0 g/dL    RDW 54.4 (H) 35.9 - 50.0 fL    Platelet Count 93 (L) 164 - 446 K/uL    MPV 9.1 9.0 - 12.9 fL    Neutrophils-Polys 64.60 44.00 - 72.00 %    Lymphocytes 26.70 22.00 - 41.00 %    Monocytes 7.80 0.00 - 13.40 %    Eosinophils 0.90 0.00 - 6.90 %    Basophils 0.00 0.00 - 1.80 %    Nucleated RBC 0.00 /100 WBC    Neutrophils (Absolute) 4.07 2.00 - 7.15 K/uL    Lymphs (Absolute) 1.68 1.00 - 4.80 K/uL    Monos (Absolute) 0.49 0.00 - 0.85 K/uL    Eos (Absolute) 0.06 0.00 - 0.51 K/uL    Baso (Absolute) 0.00 0.00 - 0.12 K/uL    NRBC (Absolute) 0.00 K/uL    Hypochromia 1+    Complete Metabolic Panel   Result Value Ref Range    Sodium 135 135 - 145 mmol/L    Potassium 3.1 (L) 3.6 - 5.5 mmol/L    Chloride 98 96 - 112 mmol/L    Co2 23 20 - 33 mmol/L    Anion Gap 14.0 7.0 - 16.0    Glucose 120 (H) 65 - 99 mg/dL    Bun 6 (L) 8 - 22 mg/dL    Creatinine 0.34 (L) 0.50 - 1.40 mg/dL    Calcium 8.7 8.5 - 10.5 mg/dL    AST(SGOT) 55 (H) 12 -  45 U/L    ALT(SGPT) 24 2 - 50 U/L    Alkaline Phosphatase 162 (H) 30 - 99 U/L    Total Bilirubin 0.7 0.1 - 1.5 mg/dL    Albumin 4.1 3.2 - 4.9 g/dL    Total Protein 8.8 (H) 6.0 - 8.2 g/dL    Globulin 4.7 (H) 1.9 - 3.5 g/dL    A-G Ratio 0.9 g/dL   Lipase   Result Value Ref Range    Lipase 74 11 - 82 U/L   ESTIMATED GFR   Result Value Ref Range    GFR (CKD-EPI) 122 >60 mL/min/1.73 m 2   CORRECTED CALCIUM   Result Value Ref Range    Correct Calcium 8.6 8.5 - 10.5 mg/dL   DIFFERENTIAL MANUAL   Result Value Ref Range    Manual Diff Status PERFORMED    PERIPHERAL SMEAR REVIEW   Result Value Ref Range    Peripheral Smear Review see below    PLATELET ESTIMATE   Result Value Ref Range    Plt Estimation Decreased    MORPHOLOGY   Result Value Ref Range    RBC Morphology Present    EKG (NOW)   Result Value Ref Range    Report       [facility] Emergency Dept.    Test Date:  2023  Pt Name:    MANAS DELGADO              Department: ER  MRN:        [id number]                      Room:       [facility]  Gender:     Female                       Technician: [id number]  :        1970                   Requested By:MARGIE RAMOS  Order #:    [id number]                    Reading MD:    Measurements  Intervals                                Axis  Rate:       99                           P:          53  CO:         135                          QRS:        -4  QRSD:       83                           T:          18  QT:         393  QTc:        505    Interpretive Statements  Sinus rhythm  Borderline T abnormalities, anterior leads  Borderline prolonged QT interval  Baseline wander in lead(s) V3  Compared to ECG 2023 14:12:14  T-wave abnormality now present  Sinus tachycardia no longer present  Atrial abnormality no longer present  Myocardial infarct finding no longer present       All labs reviewed by me.    EKG:   I have independently interpreted this EKG.     Radiology:   The attending Emergency Physician  has independently interpreted the diagnostic imaging associated with this visit and is awaiting the final reading from the radiologist, which will be displayed below.    I have independently interpreted this radiology to show: No free air    CT-ABDOMEN-PELVIS WITH   Final Result      1.  Colonic diverticulosis without evidence for diverticulitis.   2.  Normal appendix.   3.  No bowel obstruction or perforation.   4.  Enlarged liver with fatty infiltration, possibly early cirrhosis given stigmata of portal hypertension including recanalized umbilical vein and paraesophageal varices.   5.  6 mm nodule along the LEFT bladder dome, polyp versus tumor.          COURSE & MEDICAL DECISION MAKING    ED Observation Status? Yes; I am placing the patient in to an observation status due to a diagnostic uncertainty as well as therapeutic intensity. Patient placed in observation status at 6:50 PM, 4/11/2023.     Observation plan is as follows: Laboratories, CT abdomen and pelvis, IV, twelve-lead EKG.    Upon Reevaluation, the patient's condition has: not improved; and will be escalated to hospitalization.    Patient discharged from ED Observation status at 8:39 PM on 4/11/23.     INITIAL ASSESSMENT AND PLAN    Care Narrative:       6:45 PM - Patient seen and evaluated at bedside. Discussed plan of care, including lab work and imaging. Patient agrees to plan of care. Patient will be treated with Kdur 40 mEq and detox IV 1000 mL infusion for her symptoms. Ordered EKG, Lipase, CMP, CBC w/ Diff, Morphology, Platelet Estimate, Diagnostic Alcohol, BNP, Prothrombin Time, APTT, UA, and Troponin to evaluate. Due to lower extremity edema, BNP was ordered. Differential diagnoses include but are not limited to: alcoholic liver disease, cirrhosis, alcohol withdrawal  DT was unremarkable other than early signs of cirrhosis.  Her laboratories show anemia with a hemoglobin of 8.3, hematocrit 29.4.  There does appear to be iron deficiency anemia.   LFTs are elevated, glucose is 120.    8:32 PM - Ordered CT-Abdomen Pelvis for further evaluation. Ordered Ativan 1 mg and Zofran 4 mg for her continued symptoms.    8:54 PM - I discussed the patient's case and the above findings with Dr. Mcconnell (Hospitalist) who agreed to admit the patient.    ADDITIONAL PROBLEM LIST AND DISPOSITION  Chronic alcohol abuse               DISPOSITION AND DISCUSSIONS  I have discussed management of the patient with the following physicians and TWILA's: Dr. Mcconnell (Hospitalist)    Discussion of management with other [facility] or appropriate source(s): None     Barriers to care at this time, including but not limited to:  None .     Decision tools and prescription drugs considered including, but not limited to:  Seizure precautions, detox bag, IV fluids .    DISPOSITION:  Patient will be hospitalized by Dr. Mcconnell in guarded condition.    FINAL IMPRESSION   1. Alcohol withdrawal syndrome without complication (HCC)    2. Bilateral lower extremity edema    3. Hypokalemia    4. Chronic alcohol abuse    5. Supplemental oxygen dependent    6. Anemia, unspecified type    7. Right upper quadrant abdominal pain       ITrina (Neal), am scribing for, and in the presence of, Elsa You D.O..    Electronically signed by: Trina Chin (Neal), 4/11/2023    IElsa D.O. personally performed the services described in this documentation, as scribed by Trina Chin in my presence, and it is both accurate and complete.    The note accurately reflects work and decisions made by me.  Elsa You D.O.  4/12/2023  2:39 AM

## 2023-04-12 NOTE — PROGRESS NOTES
Hospital Medicine Daily Progress Note    Date of Service  4/12/2023    Chief Complaint  Olya Youssef is a 52 y.o. female admitted 4/11/2023 with alcohol withdrawal.    Hospital Course  Olya Youssef is a 52 y.o. 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  Patient unable to be hospitalized at [facility] as patient requires oxygen so recommended to come to [facility].  Patient reports drinks 324 ounce HurriCaine beers and pint of vodka daily.  Last alcohol drink yesterday.  She will be admitted to medicine service for alcohol withdrawal close monitoring for seizures.    Interval Problem Update  Patient admitted overnight for alcohol withdrawal; has hx of withdrawal seizures.  Patient somnolent this morning, arousable to stimuli.  CIWA scores moderate up to 14, improved by afternoon to 5.  Stop scheduled librium due to somnolence, continue CIWA protocol.  BMP reviewed, K improved to 4.6.  Anticipate discharge home in next 24-48 hours if patient improves. Will continue to monitor closely for any worsening withdrawal.    I have discussed this patient's plan of care and discharge plan at IDT rounds today with Case Management, Nursing, Nursing leadership, and other members of the IDT team.    Consultants/Specialty  none    Code Status  Full Code    Disposition  Patient is not medically cleared for discharge.   Anticipate discharge to to home with close outpatient follow-up.  I have placed the appropriate orders for post-discharge needs.    Review of Systems  Review of Systems   Constitutional:  Negative for chills and fever.   Eyes:  Negative for blurred vision and pain.   Respiratory:  Negative for cough and  shortness of breath.    Cardiovascular:  Negative for chest pain, palpitations and leg swelling.   Gastrointestinal:  Negative for abdominal pain, nausea and vomiting.   Genitourinary:  Negative for dysuria and urgency.   Musculoskeletal:  Negative for back pain and falls.   Skin:  Negative for itching and rash.   Neurological:  Positive for seizures. Negative for dizziness, sensory change, focal weakness, loss of consciousness and headaches.   Psychiatric/Behavioral:  Negative for substance abuse. The patient is nervous/anxious. The patient does not have insomnia.       Physical Exam  Temp:  [36.2 °C (97.2 °F)-37.4 °C (99.4 °F)] 36.2 °C (97.2 °F)  Pulse:  [] 92  Resp:  [16-20] 18  BP: (104-138)/(70-82) 104/75  SpO2:  [86 %-100 %] 94 %    Physical Exam  Vitals reviewed.   Constitutional:       General: She is not in acute distress.     Appearance: She is not diaphoretic.   HENT:      Head: Normocephalic and atraumatic.      Right Ear: External ear normal.      Left Ear: External ear normal.      Nose: Nose normal. No congestion.      Mouth/Throat:      Pharynx: No oropharyngeal exudate or posterior oropharyngeal erythema.   Eyes:      Extraocular Movements: Extraocular movements intact.      Pupils: Pupils are equal, round, and reactive to light.   Cardiovascular:      Rate and Rhythm: Normal rate and regular rhythm.   Pulmonary:      Effort: Pulmonary effort is normal. No respiratory distress.      Breath sounds: Normal breath sounds. No wheezing.   Abdominal:      General: Bowel sounds are normal. There is no distension.      Palpations: Abdomen is soft.      Tenderness: There is no abdominal tenderness. There is no guarding or rebound.   Musculoskeletal:         General: No swelling. Normal range of motion.      Cervical back: Normal range of motion and neck supple.   Skin:     General: Skin is warm and dry.   Neurological:      General: No focal deficit present.      Mental Status: She is alert and  oriented to person, place, and time.      Cranial Nerves: No cranial nerve deficit.      Sensory: No sensory deficit.      Motor: No weakness.   Psychiatric:         Mood and Affect: Mood normal.         Behavior: Behavior normal.       Fluids    Intake/Output Summary (Last 24 hours) at 4/12/2023 1504  Last data filed at 4/12/2023 0030  Gross per 24 hour   Intake 320 ml   Output --   Net 320 ml       Laboratory  Recent Labs     04/11/23  1746   WBC 6.3   RBC 3.62*   HEMOGLOBIN 8.3*   HEMATOCRIT 29.4*   MCV 81.2*   MCH 22.9*   MCHC 28.2*   RDW 54.4*   PLATELETCT 93*   MPV 9.1     Recent Labs     04/11/23  1746 04/12/23  1012   SODIUM 135 135   POTASSIUM 3.1* 4.6   CHLORIDE 98 100   CO2 23 25   GLUCOSE 120* 102*   BUN 6* 5*   CREATININE 0.34* 0.32*   CALCIUM 8.7 8.4*     Recent Labs     04/11/23  1746   APTT 30.4   INR 1.21*               Imaging  CT-ABDOMEN-PELVIS WITH   Final Result      1.  Colonic diverticulosis without evidence for diverticulitis.   2.  Normal appendix.   3.  No bowel obstruction or perforation.   4.  Enlarged liver with fatty infiltration, possibly early cirrhosis given stigmata of portal hypertension including recanalized umbilical vein and paraesophageal varices.   5.  6 mm nodule along the LEFT bladder dome, polyp versus tumor.           Assessment/Plan  * Alcohol withdrawal with inpatient treatment, uncomplicated (HCC)- (present on admission)  Assessment & Plan  Unable to be hospitalized at [facility] given she requires oxygen  [facility] protocol  Librium   Detox bag given  Thiamine, folic acid, multivitamins  IV fluids  Seizure precautions      Bladder polyp  Assessment & Plan  Suspected incidental 6 mm nodule along the left bladder dome.  Polyp vs tumor   Follow up urology outpatient     Chronic respiratory failure with hypoxia, on home oxygen therapy (HCC)- (present on admission)  Assessment & Plan  Baseline 3 L.  Continue with oxygen supplementation obtain SPO2 above  92%    Thrombocytopenia (HCC)- (present on admission)  Assessment & Plan  Likely secondary to alcohol abuse and liver disease  No active superficial bleeding noted  Monitor    Hypertension- (present on admission)  Assessment & Plan  C/w amlodipine     Neuropathy- (present on admission)  Assessment & Plan  Continue gabapentin    Methamphetamine abuse (HCC)- (present on admission)  Assessment & Plan  History of  Encouraged sobriety    Bipolar affective disorder (HCC)- (present on admission)  Assessment & Plan  Continue with quetiapine     Anemia  Assessment & Plan  Iron and TIBC low, ferritin pending  Reticulocytes elevated  No signs of active bleeding  Likely related to alcohol abuse    Hypokalemia  Assessment & Plan  replete   improved         VTE prophylaxis: enoxaparin ppx    I have performed a physical exam and reviewed and updated ROS and Plan today (4/12/2023). In review of yesterday's note (4/11/2023), there are no changes except as documented above.

## 2023-04-12 NOTE — CARE PLAN
The patient is Stable - Low risk of patient condition declining or worsening    Shift Goals  Clinical Goals: CIWA  Patient Goals: detox  Family Goals: BRITTANIE    Progress made toward(s) clinical / shift goals:  Patient CIWA score managed per MAR. CIWA assessed as needed. Seizure precautions in place      Problem: Knowledge Deficit - Standard  Goal: Patient and family/care givers will demonstrate understanding of plan of care, disease process/condition, diagnostic tests and medications  Outcome: Progressing     Problem: Optimal Care for Alcohol Withdrawal  Goal: Optimal Care for the alcohol withdrawal patient  Outcome: Progressing     Problem: Seizure Precautions  Goal: Implementation of seizure precautions  Outcome: Progressing

## 2023-04-12 NOTE — ASSESSMENT & PLAN NOTE
Iron and TIBC low, ferritin pending  Reticulocytes elevated  No signs of active bleeding  Likely related to alcohol abuse

## 2023-04-12 NOTE — ED NOTES
Unable to address med rec at this time. Patient not arousable to participate in interview. Pharmacy on file is [facility] on [address] ([phone]) which is closed at this time.

## 2023-04-12 NOTE — ASSESSMENT & PLAN NOTE
Suspected incidental 6 mm nodule along the left bladder dome.  Polyp vs tumor   Follow up urology outpatient    Writer called patient left non detailed message requesting return call to clinic and ask to speak with nurse triage - also recommended same day assessment - urgent care for symptoms

## 2023-04-12 NOTE — H&P
Hospital Medicine History & Physical Note    Date of Service  4/11/2023    Primary Care Physician  Margy Aparicio M.D.    Consultants  none    Specialist Names: none    Code Status  Full Code    Chief Complaint  Chief Complaint   Patient presents with    Detox     Pt reports that she has been trying to stop drinking at home but has had seizures at home. Pt would like inpatient help for detox. Pt states her last drink was yesterday.     Foot Swelling     Worsening x 1 week.     Abdominal Pain     RUQ x4 weeks.        History of Presenting Illness  Olya Youssef is a 52 y.o. 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  Patient unable to be hospitalized at [facility] as patient requires oxygen so recommended to come to [facility].  Patient reports drinks 324 ounce HurriCaine beers and pint of vodka daily.  Last alcohol drink yesterday.  She will be admitted to medicine service for alcohol withdrawal close monitoring for seizures.    I discussed the plan of care with patient and bedside RN.    Review of Systems  Review of Systems   Constitutional:  Negative for chills and fever.   HENT:  Negative for hearing loss and tinnitus.    Eyes:  Negative for blurred vision and double vision.   Respiratory:  Negative for cough and hemoptysis.    Cardiovascular:  Negative for chest pain and palpitations.   Gastrointestinal:  Negative for heartburn and nausea.   Genitourinary:  Negative for dysuria and urgency.   Musculoskeletal:  Negative for myalgias and neck pain.   Skin:  Negative for rash.   Neurological:  Positive for tremors. Negative for dizziness and headaches.   Endo/Heme/Allergies:  Does not bruise/bleed easily.    Psychiatric/Behavioral:  Positive for substance abuse. Negative for depression and suicidal ideas. The patient is nervous/anxious.      Past Medical History   has a past medical history of Alcohol abuse, Alcohol intoxication, with unspecified complication (HCC) (8/7/2013), Alcoholism (HCC), Anxiety, Backpain, Bipolar disorder, unspecified (HCC), Bronchitis, Degenerative disc disease, cervical (3/28/2023), Drug abuse (HCC), Hepatitis, alcoholic, Hypertension, Indigestion, Infectious disease (MRSA), Liver disease, Pancreatitis chronic, Pneumonia, Psychiatric disorder, Renal disorder, Seizure (HCC), Seizure disorder (HCC), and Unspecified hemorrhagic conditions.    Surgical History   has a past surgical history that includes gastroscopy (4/28/2015).     Family History  family history includes Cancer in her mother; Lung Disease in her mother.   Family history reviewed with patient. There is no family history that is pertinent to the chief complaint.     Social History   reports that she has never smoked. She has never used smokeless tobacco. She reports current alcohol use. She reports current drug use. Drug: Inhaled.    Allergies  Allergies   Allergen Reactions    Bactrim Hives    Lamotrigine [Lamictal] Hives     Tolerated Fioricet 10/3/22    Penicillin G     Quetiapine Fumarate Hives     Extrapyramidal Symptoms    Quetiapine Fumarate Hives    Sulfa Drugs Hives    Keflex Hives       Medications  Prior to Admission Medications   Prescriptions Last Dose Informant Patient Reported? Taking?   QUEtiapine Fumarate (SEROQUEL PO)   Yes No   Sig: Take  by mouth. Cuts it in 4 pieces   amLODIPine (NORVASC) 2.5 MG Tab   No No   Sig: Take 1 Tablet by mouth every day.   aspirin 81 MG EC tablet   No No   Sig: Take 1 Tablet by mouth every day.   atorvastatin (LIPITOR) 40 MG Tab   No No   Sig: Take 1 Tablet by mouth every evening.   diclofenac sodium (VOLTAREN) 1 % Gel   No No   Sig: Apply 2 g topically 4 times a day as needed (for  neck pain).   gabapentin (NEURONTIN) 600 MG tablet   No No   Sig: Take 1 Tablet by mouth 3 times a day.   lidocaine (LIDODERM) 5 % Patch   No No   Sig: Place 1 Patch on the skin every 24 hours.   multivitamin Tab   No No   Sig: Take 1 Tablet by mouth every day.   traZODone (DESYREL) 50 MG Tab   No No   Sig: Take 1 Tablet by mouth every evening.      Facility-Administered Medications: None       Physical Exam  Temp:  [37 °C (98.6 °F)-37.5 °C (99.5 °F)] 37 °C (98.6 °F)  Pulse:  [] 105  Resp:  [16-20] 20  BP: (109-138)/(67-82) 125/82  SpO2:  [86 %-100 %] 94 %  Blood Pressure: 125/82   Temperature: 37 °C (98.6 °F)   Pulse: (!) 105 (RN notified)   Respiration: 20   Pulse Oximetry: 94 %       Physical Exam  Vitals and nursing note reviewed.   Constitutional:       Appearance: Normal appearance. She is obese.   HENT:      Head: Normocephalic and atraumatic.      Right Ear: Tympanic membrane normal.      Left Ear: Tympanic membrane normal.      Nose: Nose normal.      Mouth/Throat:      Mouth: Mucous membranes are moist.      Pharynx: Oropharynx is clear.   Eyes:      Extraocular Movements: Extraocular movements intact.      Pupils: Pupils are equal, round, and reactive to light.   Cardiovascular:      Rate and Rhythm: Regular rhythm. Tachycardia present.      Pulses: Normal pulses.      Heart sounds: Normal heart sounds.   Pulmonary:      Effort: Pulmonary effort is normal. No respiratory distress.      Breath sounds: Normal breath sounds. No stridor.   Abdominal:      General: Bowel sounds are normal. There is no distension.      Palpations: Abdomen is soft. There is no mass.   Musculoskeletal:         General: Tenderness present. No swelling. Normal range of motion.      Cervical back: Neck supple.   Skin:     General: Skin is warm.      Capillary Refill: Capillary refill takes less than 2 seconds.      Coloration: Skin is not jaundiced or pale.   Neurological:      Mental Status: She is alert.   Psychiatric:          Mood and Affect: Mood normal.         Behavior: Behavior normal.       Laboratory:  Recent Labs     04/11/23  1746   WBC 6.3   RBC 3.62*   HEMOGLOBIN 8.3*   HEMATOCRIT 29.4*   MCV 81.2*   MCH 22.9*   MCHC 28.2*   RDW 54.4*   PLATELETCT 93*   MPV 9.1     Recent Labs     04/11/23  1746   SODIUM 135   POTASSIUM 3.1*   CHLORIDE 98   CO2 23   GLUCOSE 120*   BUN 6*   CREATININE 0.34*   CALCIUM 8.7     Recent Labs     04/11/23  1746   ALTSGPT 24   ASTSGOT 55*   ALKPHOSPHAT 162*   TBILIRUBIN 0.7   LIPASE 74   GLUCOSE 120*     Recent Labs     04/11/23  1746   APTT 30.4   INR 1.21*     Recent Labs     04/11/23  1746   NTPROBNP 30         Recent Labs     04/11/23  1746   TROPONINT 18       Imaging:  CT-ABDOMEN-PELVIS WITH   Final Result      1.  Colonic diverticulosis without evidence for diverticulitis.   2.  Normal appendix.   3.  No bowel obstruction or perforation.   4.  Enlarged liver with fatty infiltration, possibly early cirrhosis given stigmata of portal hypertension including recanalized umbilical vein and paraesophageal varices.   5.  6 mm nodule along the LEFT bladder dome, polyp versus tumor.          no X-Ray or EKG requiring interpretation    Assessment/Plan:  Justification for Admission Status  I anticipate this patient will require at least two midnights for appropriate medical management, necessitating inpatient admission because alcohol withdrawal requiring CIWA protocol    Patient will need a Med/Surg bed on MEDICAL service .  The need is secondary to see above.    * Alcohol withdrawal with inpatient treatment, uncomplicated (HCC)- (present on admission)  Assessment & Plan  Unable to be hospitalized at [facility] given she requires oxygen  CIWA protocol  Librium   Detox bag given  Thiamine, folic acid, multivitamins  IV fluids  Seizure precautions      Bladder polyp  Assessment & Plan  Suspected incidental 6 mm nodule along the left bladder dome.  Polyp vs tumor   Follow up urology outpatient      Chronic respiratory failure with hypoxia, on home oxygen therapy (HCC)- (present on admission)  Assessment & Plan  Baseline 3 L.  Continue with oxygen supplementation obtain SPO2 above 92%    Thrombocytopenia (HCC)- (present on admission)  Assessment & Plan  Likely secondary to alcohol abuse and liver disease  No active superficial bleeding noted  Monitor    Hypertension- (present on admission)  Assessment & Plan  C/w amlodipine     Neuropathy- (present on admission)  Assessment & Plan  Continue gabapentin    Methamphetamine abuse ([facility])- (present on admission)  Assessment & Plan  History of  Encouraged sobriety    Bipolar affective disorder ([facility])- (present on admission)  Assessment & Plan  Continue with quetiapine     Anemia  Assessment & Plan  Iron panel       Hypokalemia  Assessment & Plan  replete         VTE prophylaxis: SCDs/TEDs

## 2023-04-12 NOTE — ED TRIAGE NOTES
"Chief Complaint   Patient presents with    Detox     Pt reports that she has been trying to stop drinking at home but has had seizures at home. Pt would like inpatient help for detox. Pt states her last drink was yesterday.     Foot Swelling     Worsening x 1 week.     Abdominal Pain     RUQ x4 weeks.      /73   Pulse 97   Temp 37.4 °C (99.4 °F) (Temporal)   Resp 18   Ht 1.473 m (4' 10\")   Wt 65.5 kg (144 lb 6.4 oz)   LMP 02/28/2018   SpO2 93%   BMI 30.18 kg/m²     Pt is ambulatory in and out of triage. Appropriate PPE worn throughout entire encounter. Pt placed back in the lobby and educated about triage process.    "

## 2023-04-12 NOTE — ED NOTES
Transported by transport staff on [facility] with patient belongings to 30. Aox2, on oxygen at 2 LPM.

## 2023-04-12 NOTE — PROGRESS NOTES
Pt arrived to unit. Pt drowsy, drifts off to sleep mid conversation, able to answer orientation questions x4 but delayed in response and in short sentences, pt able to follow commands. Assessment completed. Pt on 2L nasal cannula, placed on continuous o2 monitoring with good saturations. Pt  continues to be on CIWA monitoring, PRN medication given per CIWA protocol order. Safety precautions in place, bed alarm on, non skid socks on and call light within reach, on hourly rounding.

## 2023-04-12 NOTE — ASSESSMENT & PLAN NOTE
Likely secondary to alcohol abuse and liver disease  No active superficial bleeding noted  Monitor

## 2023-04-12 NOTE — ASSESSMENT & PLAN NOTE
Unable to be hospitalized at [facility] given she requires oxygen  Day 3 of withdrawal, CIWA scores low, stop CIWA monitoring  Stop librium given somnolence  Detox bag given  Thiamine, folic acid, multivitamins

## 2023-04-13 LAB
ANION GAP SERPL CALC-SCNC: 10 MMOL/L (ref 7–16)
ANISOCYTOSIS BLD QL SMEAR: ABNORMAL
BASOPHILS # BLD AUTO: 0.9 % (ref 0–1.8)
BASOPHILS # BLD: 0.05 K/UL (ref 0–0.12)
BUN SERPL-MCNC: 6 MG/DL (ref 8–22)
CALCIUM SERPL-MCNC: 8.7 MG/DL (ref 8.5–10.5)
CHLORIDE SERPL-SCNC: 97 MMOL/L (ref 96–112)
CO2 SERPL-SCNC: 26 MMOL/L (ref 20–33)
CREAT SERPL-MCNC: 0.37 MG/DL (ref 0.5–1.4)
EOSINOPHIL # BLD AUTO: 0.13 K/UL (ref 0–0.51)
EOSINOPHIL NFR BLD: 2.6 % (ref 0–6.9)
ERYTHROCYTE [DISTWIDTH] IN BLOOD BY AUTOMATED COUNT: 56.6 FL (ref 35.9–50)
GFR SERPLBLD CREATININE-BSD FMLA CKD-EPI: 119 ML/MIN/1.73 M 2
GLUCOSE SERPL-MCNC: 118 MG/DL (ref 65–99)
HCT VFR BLD AUTO: 31.2 % (ref 37–47)
HGB BLD-MCNC: 8.3 G/DL (ref 12–16)
LYMPHOCYTES # BLD AUTO: 1.67 K/UL (ref 1–4.8)
LYMPHOCYTES NFR BLD: 33.3 % (ref 22–41)
MACROCYTES BLD QL SMEAR: ABNORMAL
MANUAL DIFF BLD: NORMAL
MCH RBC QN AUTO: 22.6 PG (ref 27–33)
MCHC RBC AUTO-ENTMCNC: 26.6 G/DL (ref 33.6–35)
MCV RBC AUTO: 85 FL (ref 81.4–97.8)
MICROCYTES BLD QL SMEAR: ABNORMAL
MONOCYTES # BLD AUTO: 0.49 K/UL (ref 0–0.85)
MONOCYTES NFR BLD AUTO: 9.7 % (ref 0–13.4)
MORPHOLOGY BLD-IMP: NORMAL
NEUTROPHILS # BLD AUTO: 2.68 K/UL (ref 2–7.15)
NEUTROPHILS NFR BLD: 53.5 % (ref 44–72)
NRBC # BLD AUTO: 0.02 K/UL
NRBC BLD-RTO: 0.4 /100 WBC
PLATELET # BLD AUTO: 80 K/UL (ref 164–446)
PLATELET BLD QL SMEAR: NORMAL
PMV BLD AUTO: 9.9 FL (ref 9–12.9)
POTASSIUM SERPL-SCNC: 4.1 MMOL/L (ref 3.6–5.5)
RBC # BLD AUTO: 3.67 M/UL (ref 4.2–5.4)
RBC BLD AUTO: PRESENT
SODIUM SERPL-SCNC: 133 MMOL/L (ref 135–145)
WBC # BLD AUTO: 5 K/UL (ref 4.8–10.8)

## 2023-04-13 PROCEDURE — 700102 HCHG RX REV CODE 250 W/ 637 OVERRIDE(OP): Performed by: STUDENT IN AN ORGANIZED HEALTH CARE EDUCATION/TRAINING PROGRAM

## 2023-04-13 PROCEDURE — A9270 NON-COVERED ITEM OR SERVICE: HCPCS | Performed by: STUDENT IN AN ORGANIZED HEALTH CARE EDUCATION/TRAINING PROGRAM

## 2023-04-13 PROCEDURE — 770006 HCHG ROOM/CARE - MED/SURG/GYN SEMI*

## 2023-04-13 PROCEDURE — 700111 HCHG RX REV CODE 636 W/ 250 OVERRIDE (IP): Performed by: STUDENT IN AN ORGANIZED HEALTH CARE EDUCATION/TRAINING PROGRAM

## 2023-04-13 PROCEDURE — 99232 SBSQ HOSP IP/OBS MODERATE 35: CPT | Performed by: STUDENT IN AN ORGANIZED HEALTH CARE EDUCATION/TRAINING PROGRAM

## 2023-04-13 PROCEDURE — 85007 BL SMEAR W/DIFF WBC COUNT: CPT

## 2023-04-13 PROCEDURE — 700102 HCHG RX REV CODE 250 W/ 637 OVERRIDE(OP)

## 2023-04-13 PROCEDURE — 36415 COLL VENOUS BLD VENIPUNCTURE: CPT

## 2023-04-13 PROCEDURE — 80048 BASIC METABOLIC PNL TOTAL CA: CPT

## 2023-04-13 PROCEDURE — A9270 NON-COVERED ITEM OR SERVICE: HCPCS

## 2023-04-13 PROCEDURE — 85025 COMPLETE CBC W/AUTO DIFF WBC: CPT

## 2023-04-13 RX ORDER — QUETIAPINE FUMARATE 25 MG/1
12.5 TABLET, FILM COATED ORAL 2 TIMES DAILY
Status: DISCONTINUED | OUTPATIENT
Start: 2023-04-13 | End: 2023-04-14 | Stop reason: HOSPADM

## 2023-04-13 RX ORDER — SIMETHICONE 125 MG
125 TABLET,CHEWABLE ORAL 3 TIMES DAILY PRN
Status: DISCONTINUED | OUTPATIENT
Start: 2023-04-13 | End: 2023-04-14 | Stop reason: HOSPADM

## 2023-04-13 RX ORDER — MORPHINE SULFATE 4 MG/ML
1 INJECTION INTRAVENOUS EVERY 4 HOURS PRN
Status: DISCONTINUED | OUTPATIENT
Start: 2023-04-13 | End: 2023-04-14 | Stop reason: HOSPADM

## 2023-04-13 RX ORDER — MORPHINE SULFATE 4 MG/ML
1 INJECTION INTRAVENOUS EVERY 4 HOURS PRN
Status: DISCONTINUED | OUTPATIENT
Start: 2023-04-13 | End: 2023-04-13

## 2023-04-13 RX ORDER — OXYCODONE HYDROCHLORIDE 5 MG/1
2.5 TABLET ORAL EVERY 4 HOURS PRN
Status: DISCONTINUED | OUTPATIENT
Start: 2023-04-13 | End: 2023-04-14 | Stop reason: HOSPADM

## 2023-04-13 RX ORDER — MORPHINE SULFATE 4 MG/ML
3 INJECTION INTRAVENOUS EVERY 4 HOURS PRN
Status: DISCONTINUED | OUTPATIENT
Start: 2023-04-13 | End: 2023-04-13

## 2023-04-13 RX ORDER — KETOROLAC TROMETHAMINE 30 MG/ML
15 INJECTION, SOLUTION INTRAMUSCULAR; INTRAVENOUS EVERY 6 HOURS PRN
Status: DISCONTINUED | OUTPATIENT
Start: 2023-04-13 | End: 2023-04-14 | Stop reason: HOSPADM

## 2023-04-13 RX ADMIN — LORAZEPAM 0.5 MG: 0.5 TABLET ORAL at 00:31

## 2023-04-13 RX ADMIN — Medication 100 MG: at 05:24

## 2023-04-13 RX ADMIN — GABAPENTIN 600 MG: 300 CAPSULE ORAL at 08:32

## 2023-04-13 RX ADMIN — GABAPENTIN 600 MG: 300 CAPSULE ORAL at 17:09

## 2023-04-13 RX ADMIN — AMLODIPINE BESYLATE 2.5 MG: 5 TABLET ORAL at 05:25

## 2023-04-13 RX ADMIN — LORAZEPAM 1 MG: 1 TABLET ORAL at 10:36

## 2023-04-13 RX ADMIN — FOLIC ACID 1 MG: 1 TABLET ORAL at 05:25

## 2023-04-13 RX ADMIN — LORAZEPAM 1 MG: 1 TABLET ORAL at 04:37

## 2023-04-13 RX ADMIN — ENOXAPARIN SODIUM 40 MG: 40 INJECTION SUBCUTANEOUS at 17:09

## 2023-04-13 RX ADMIN — LORAZEPAM 3 MG: 1 TABLET ORAL at 18:37

## 2023-04-13 RX ADMIN — OXYCODONE 2.5 MG: 5 TABLET ORAL at 06:29

## 2023-04-13 RX ADMIN — QUETIAPINE FUMARATE 12.5 MG: 25 TABLET ORAL at 17:10

## 2023-04-13 RX ADMIN — QUETIAPINE FUMARATE 12.5 MG: 25 TABLET ORAL at 12:18

## 2023-04-13 RX ADMIN — ASPIRIN 81 MG: 81 TABLET, COATED ORAL at 05:24

## 2023-04-13 RX ADMIN — GABAPENTIN 600 MG: 300 CAPSULE ORAL at 00:32

## 2023-04-13 RX ADMIN — THERA TABS 1 TABLET: TAB at 05:24

## 2023-04-13 RX ADMIN — OXYCODONE 2.5 MG: 5 TABLET ORAL at 01:49

## 2023-04-13 RX ADMIN — SIMETHICONE 125 MG: 125 TABLET, CHEWABLE ORAL at 05:24

## 2023-04-13 RX ADMIN — ATORVASTATIN CALCIUM 40 MG: 40 TABLET, FILM COATED ORAL at 17:09

## 2023-04-13 RX ADMIN — KETOROLAC TROMETHAMINE 15 MG: 30 INJECTION, SOLUTION INTRAMUSCULAR at 12:19

## 2023-04-13 RX ADMIN — LORAZEPAM 2 MG: 1 TABLET ORAL at 08:46

## 2023-04-13 ASSESSMENT — LIFESTYLE VARIABLES
AGITATION: SOMEWHAT MORE THAN NORMAL ACTIVITY
PAROXYSMAL SWEATS: NO SWEAT VISIBLE
ORIENTATION AND CLOUDING OF SENSORIUM: CANNOT DO SERIAL ADDITIONS OR IS UNCERTAIN ABOUT DATE
AGITATION: NORMAL ACTIVITY
ANXIETY: MILDLY ANXIOUS
NAUSEA AND VOMITING: NO NAUSEA AND NO VOMITING
ORIENTATION AND CLOUDING OF SENSORIUM: DISORIENTED FOR PLACE AND / OR PERSON
AUDITORY DISTURBANCES: NOT PRESENT
ORIENTATION AND CLOUDING OF SENSORIUM: CANNOT DO SERIAL ADDITIONS OR IS UNCERTAIN ABOUT DATE
VISUAL DISTURBANCES: NOT PRESENT
HEADACHE, FULLNESS IN HEAD: NOT PRESENT
TOTAL SCORE: 8
NAUSEA AND VOMITING: NO NAUSEA AND NO VOMITING
PAROXYSMAL SWEATS: NO SWEAT VISIBLE
HEADACHE, FULLNESS IN HEAD: VERY MILD
AUDITORY DISTURBANCES: NOT PRESENT
NAUSEA AND VOMITING: *
TOTAL SCORE: MILD ITCHING, PINS AND NEEDLES SENSATION, BURNING OR NUMBNESS
TOTAL SCORE: 1
TOTAL SCORE: 10
TREMOR: TREMOR NOT VISIBLE BUT CAN BE FELT, FINGERTIP TO FINGERTIP
HEADACHE, FULLNESS IN HEAD: VERY MILD
VISUAL DISTURBANCES: NOT PRESENT
ANXIETY: MILDLY ANXIOUS
TOTAL SCORE: 4
NAUSEA AND VOMITING: MILD NAUSEA WITH NO VOMITING
AGITATION: NORMAL ACTIVITY
TOTAL SCORE: MILD ITCHING, PINS AND NEEDLES SENSATION, BURNING OR NUMBNESS
TREMOR: NO TREMOR
PAROXYSMAL SWEATS: NO SWEAT VISIBLE
TREMOR: *
ANXIETY: NO ANXIETY (AT EASE)
AUDITORY DISTURBANCES: NOT PRESENT
ORIENTATION AND CLOUDING OF SENSORIUM: CANNOT DO SERIAL ADDITIONS OR IS UNCERTAIN ABOUT DATE
AUDITORY DISTURBANCES: NOT PRESENT
ANXIETY: MILDLY ANXIOUS
NAUSEA AND VOMITING: MILD NAUSEA WITH NO VOMITING
VISUAL DISTURBANCES: NOT PRESENT
TREMOR: NO TREMOR
NAUSEA AND VOMITING: NO NAUSEA AND NO VOMITING
TOTAL SCORE: VERY MILD ITCHING, PINS AND NEEDLES SENSATION, BURNING OR NUMBNESS
ANXIETY: *
ORIENTATION AND CLOUDING OF SENSORIUM: CANNOT DO SERIAL ADDITIONS OR IS UNCERTAIN ABOUT DATE
TOTAL SCORE: MILD ITCHING, PINS AND NEEDLES SENSATION, BURNING OR NUMBNESS
TOTAL SCORE: 15
ANXIETY: MILDLY ANXIOUS
VISUAL DISTURBANCES: NOT PRESENT
TOTAL SCORE: 11
TOTAL SCORE: MILD ITCHING, PINS AND NEEDLES SENSATION, BURNING OR NUMBNESS
VISUAL DISTURBANCES: NOT PRESENT
ORIENTATION AND CLOUDING OF SENSORIUM: CANNOT DO SERIAL ADDITIONS OR IS UNCERTAIN ABOUT DATE
ANXIETY: NO ANXIETY (AT EASE)
TREMOR: *
NAUSEA AND VOMITING: *
AGITATION: NORMAL ACTIVITY
SUBSTANCE_ABUSE: 0
AUDITORY DISTURBANCES: NOT PRESENT
TOTAL SCORE: 1
NAUSEA AND VOMITING: NO NAUSEA AND NO VOMITING
AGITATION: *
VISUAL DISTURBANCES: NOT PRESENT
HEADACHE, FULLNESS IN HEAD: VERY MILD
ORIENTATION AND CLOUDING OF SENSORIUM: ORIENTED AND CAN DO SERIAL ADDITIONS
TREMOR: TREMOR NOT VISIBLE BUT CAN BE FELT, FINGERTIP TO FINGERTIP
TREMOR: MODERATE TREMOR WITH ARMS EXTENDED
HEADACHE, FULLNESS IN HEAD: NOT PRESENT
PAROXYSMAL SWEATS: NO SWEAT VISIBLE
PAROXYSMAL SWEATS: NO SWEAT VISIBLE
AUDITORY DISTURBANCES: NOT PRESENT
VISUAL DISTURBANCES: NOT PRESENT
VISUAL DISTURBANCES: NOT PRESENT
PAROXYSMAL SWEATS: NO SWEAT VISIBLE
AGITATION: NORMAL ACTIVITY
ANXIETY: NO ANXIETY (AT EASE)
AGITATION: NORMAL ACTIVITY
TOTAL SCORE: 5
PAROXYSMAL SWEATS: NO SWEAT VISIBLE
TREMOR: *
ORIENTATION AND CLOUDING OF SENSORIUM: CANNOT DO SERIAL ADDITIONS OR IS UNCERTAIN ABOUT DATE
PAROXYSMAL SWEATS: NO SWEAT VISIBLE
HEADACHE, FULLNESS IN HEAD: MILD
HEADACHE, FULLNESS IN HEAD: VERY MILD
AGITATION: NORMAL ACTIVITY
HEADACHE, FULLNESS IN HEAD: VERY MILD
TOTAL SCORE: VERY MILD ITCHING, PINS AND NEEDLES SENSATION, BURNING OR NUMBNESS

## 2023-04-13 ASSESSMENT — ENCOUNTER SYMPTOMS
BACK PAIN: 0
SEIZURES: 1
VOMITING: 0
NERVOUS/ANXIOUS: 1
HEADACHES: 0
LOSS OF CONSCIOUSNESS: 0
COUGH: 0
FALLS: 0
SHORTNESS OF BREATH: 0
EYE PAIN: 0
CHILLS: 0
BLURRED VISION: 0
FOCAL WEAKNESS: 0
NAUSEA: 0
SENSORY CHANGE: 0
ABDOMINAL PAIN: 0
FEVER: 0
INSOMNIA: 0
DIZZINESS: 0
PALPITATIONS: 0

## 2023-04-13 ASSESSMENT — PAIN DESCRIPTION - PAIN TYPE: TYPE: ACUTE PAIN

## 2023-04-13 NOTE — PROGRESS NOTES
Patient complaining of 10/10 abdominal and chest pain. Patient medicated per MAR. No PRN available. MD notified.     New orders received. Will continue to monitor.    Patient somnolent difficult to arouse, patient complaining of pain with CIWA check. Medicated per MAR.     Patient attempting OOB stating her father is making dinner and she needs to go. Patient reoriented and redirected that she is in the hospital for detox. Patient agreeable to get back in bed. Dianelys replaced.

## 2023-04-13 NOTE — CARE PLAN
Bedside handoff report received from Day RN. Pt Aaox4, on 2L nasal cannula, pt on continuous o2 monitoring maintaining good saturations, pt in no apparent distress. C/o 8/10  abd pain, medicated per order. CIWA monitoring in place. VSS. Bed on the lowest position with brakes on, non skid socks on, call light within reach, on hourly rounding.       The patient is Watcher - Medium risk of patient condition declining or worsening    Shift Goals  Clinical Goals: CIWA monitoring  Patient Goals: detox  Family Goals: jorge    Progress made toward(s) clinical / shift goals:      Problem: Knowledge Deficit - Standard  Goal: Patient and family/care givers will demonstrate understanding of plan of care, disease process/condition, diagnostic tests and medications  Outcome: Progressing     Problem: Optimal Care for Alcohol Withdrawal  Goal: Optimal Care for the alcohol withdrawal patient  Outcome: Progressing     Problem: Seizure Precautions  Goal: Implementation of seizure precautions  Outcome: Progressing     Problem: Fall Risk  Goal: Patient will remain free from falls  Outcome: Progressing     Problem: Pain - Standard  Goal: Alleviation of pain or a reduction in pain to the patient’s comfort goal  Outcome: Progressing       Patient is not progressing towards the following goals:

## 2023-04-13 NOTE — PROGRESS NOTES
Hospital Medicine Daily Progress Note    Date of Service  4/13/2023    Chief Complaint  Olya Youssef is a 52 y.o. female admitted 4/11/2023 with alcohol withdrawal.    Hospital Course  Olya Youssef is a 52 y.o. 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  Patient unable to be hospitalized at [facility] as patient requires oxygen so recommended to come to [facility].  Patient reports drinks 324 ounce HurriCaine beers and pint of vodka daily.  Last alcohol drink yesterday.  She will be admitted to medicine service for alcohol withdrawal close monitoring for seizures.    Interval Problem Update  Patient with some reported chest pain overnight, abdominal pain this morning.  At bedside patient reports low back pain and shoulder pain.  CIWA scores moderate 10, continue CIWA protocol.  Pain regimen adjusted.  Patient requesting seroquel despite known allergy, she states she takes it at home in small doses which she tolerates. Will start seroquel 12.5 mg BID for now.  Anticipate discharge home in next 24 hours if patient improves. Will continue to monitor closely for any worsening withdrawal.    I have discussed this patient's plan of care and discharge plan at IDT rounds today with Case Management, Nursing, Nursing leadership, and other members of the IDT team.    Consultants/Specialty  none    Code Status  Full Code    Disposition  Patient is not medically cleared for discharge.   Anticipate discharge to to home with close outpatient follow-up.  I have placed the appropriate orders for post-discharge needs.    Review of Systems  Review of Systems   Constitutional:  Negative for chills and fever.   Eyes:  Negative for  blurred vision and pain.   Respiratory:  Negative for cough and shortness of breath.    Cardiovascular:  Negative for chest pain, palpitations and leg swelling.   Gastrointestinal:  Negative for abdominal pain, nausea and vomiting.   Genitourinary:  Negative for dysuria and urgency.   Musculoskeletal:  Negative for back pain and falls.   Skin:  Negative for itching and rash.   Neurological:  Positive for seizures. Negative for dizziness, sensory change, focal weakness, loss of consciousness and headaches.   Psychiatric/Behavioral:  Negative for substance abuse. The patient is nervous/anxious. The patient does not have insomnia.       Physical Exam  Temp:  [36.3 °C (97.3 °F)-36.7 °C (98.1 °F)] 36.6 °C (97.8 °F)  Pulse:  [] 101  Resp:  [16-19] 17  BP: ()/(56-86) 117/79  SpO2:  [91 %-94 %] 92 %    Physical Exam  Vitals reviewed.   Constitutional:       General: She is not in acute distress.     Appearance: She is not diaphoretic.   HENT:      Head: Normocephalic and atraumatic.      Right Ear: External ear normal.      Left Ear: External ear normal.      Nose: Nose normal. No congestion.      Mouth/Throat:      Pharynx: No oropharyngeal exudate or posterior oropharyngeal erythema.   Eyes:      Extraocular Movements: Extraocular movements intact.      Pupils: Pupils are equal, round, and reactive to light.   Cardiovascular:      Rate and Rhythm: Normal rate and regular rhythm.   Pulmonary:      Effort: Pulmonary effort is normal. No respiratory distress.      Breath sounds: Normal breath sounds. No wheezing.   Abdominal:      General: Bowel sounds are normal. There is no distension.      Palpations: Abdomen is soft.      Tenderness: There is no abdominal tenderness. There is no guarding or rebound.   Musculoskeletal:         General: No swelling. Normal range of motion.      Cervical back: Normal range of motion and neck supple.   Skin:     General: Skin is warm and dry.   Neurological:      General: No  focal deficit present.      Mental Status: She is alert and oriented to person, place, and time.      Cranial Nerves: No cranial nerve deficit.      Sensory: No sensory deficit.      Motor: No weakness.   Psychiatric:         Mood and Affect: Mood normal.         Behavior: Behavior normal.       Fluids    Intake/Output Summary (Last 24 hours) at 4/13/2023 1246  Last data filed at 4/13/2023 0209  Gross per 24 hour   Intake 120 ml   Output 750 ml   Net -630 ml       Laboratory  Recent Labs     04/11/23 1746 04/13/23  0341   WBC 6.3 5.0   RBC 3.62* 3.67*   HEMOGLOBIN 8.3* 8.3*   HEMATOCRIT 29.4* 31.2*   MCV 81.2* 85.0   MCH 22.9* 22.6*   MCHC 28.2* 26.6*   RDW 54.4* 56.6*   PLATELETCT 93* 80*   MPV 9.1 9.9     Recent Labs     04/11/23 1746 04/12/23  1012 04/13/23  0341   SODIUM 135 135 133*   POTASSIUM 3.1* 4.6 4.1   CHLORIDE 98 100 97   CO2 23 25 26   GLUCOSE 120* 102* 118*   BUN 6* 5* 6*   CREATININE 0.34* 0.32* 0.37*   CALCIUM 8.7 8.4* 8.7     Recent Labs     04/11/23 1746   APTT 30.4   INR 1.21*               Imaging  CT-ABDOMEN-PELVIS WITH   Final Result      1.  Colonic diverticulosis without evidence for diverticulitis.   2.  Normal appendix.   3.  No bowel obstruction or perforation.   4.  Enlarged liver with fatty infiltration, possibly early cirrhosis given stigmata of portal hypertension including recanalized umbilical vein and paraesophageal varices.   5.  6 mm nodule along the LEFT bladder dome, polyp versus tumor.           Assessment/Plan  * Alcohol withdrawal with inpatient treatment, uncomplicated (HCC)- (present on admission)  Assessment & Plan  Unable to be hospitalized at [facility] given she requires oxygen  [facility] protocol  Stop librium given somnolence  Detox bag given  Thiamine, folic acid, multivitamins  IV fluids  Seizure precautions      Bladder polyp  Assessment & Plan  Suspected incidental 6 mm nodule along the left bladder dome.  Polyp vs tumor   Follow up urology outpatient      Chronic respiratory failure with hypoxia, on home oxygen therapy (HCC)- (present on admission)  Assessment & Plan  Baseline 3 L.  Continue with oxygen supplementation obtain SPO2 above 92%    Thrombocytopenia (HCC)- (present on admission)  Assessment & Plan  Likely secondary to alcohol abuse and liver disease  No active superficial bleeding noted  Monitor    Hypertension- (present on admission)  Assessment & Plan  C/w amlodipine     Neuropathy- (present on admission)  Assessment & Plan  Continue gabapentin    Methamphetamine abuse ([facility])- (present on admission)  Assessment & Plan  History of  Encouraged sobriety    Bipolar affective disorder (formerly [facility])- (present on admission)  Assessment & Plan  Continue with quetiapine     Anemia  Assessment & Plan  Iron and TIBC low, ferritin pending  Reticulocytes elevated  No signs of active bleeding  Likely related to alcohol abuse    Hypokalemia  Assessment & Plan  replete   improved         VTE prophylaxis: enoxaparin ppx    I have performed a physical exam and reviewed and updated ROS and Plan today (4/13/2023). In review of yesterday's note (4/12/2023), there are no changes except as documented above.

## 2023-04-14 VITALS
HEART RATE: 102 BPM | SYSTOLIC BLOOD PRESSURE: 116 MMHG | WEIGHT: 147.05 LBS | OXYGEN SATURATION: 98 % | DIASTOLIC BLOOD PRESSURE: 93 MMHG | BODY MASS INDEX: 30.87 KG/M2 | RESPIRATION RATE: 18 BRPM | HEIGHT: 58 IN | TEMPERATURE: 98 F

## 2023-04-14 PROCEDURE — 51798 US URINE CAPACITY MEASURE: CPT

## 2023-04-14 PROCEDURE — 700111 HCHG RX REV CODE 636 W/ 250 OVERRIDE (IP): Performed by: STUDENT IN AN ORGANIZED HEALTH CARE EDUCATION/TRAINING PROGRAM

## 2023-04-14 PROCEDURE — 99239 HOSP IP/OBS DSCHRG MGMT >30: CPT | Performed by: STUDENT IN AN ORGANIZED HEALTH CARE EDUCATION/TRAINING PROGRAM

## 2023-04-14 PROCEDURE — 700102 HCHG RX REV CODE 250 W/ 637 OVERRIDE(OP): Performed by: STUDENT IN AN ORGANIZED HEALTH CARE EDUCATION/TRAINING PROGRAM

## 2023-04-14 PROCEDURE — A9270 NON-COVERED ITEM OR SERVICE: HCPCS | Performed by: STUDENT IN AN ORGANIZED HEALTH CARE EDUCATION/TRAINING PROGRAM

## 2023-04-14 RX ADMIN — ATORVASTATIN CALCIUM 40 MG: 40 TABLET, FILM COATED ORAL at 17:11

## 2023-04-14 RX ADMIN — ASPIRIN 81 MG: 81 TABLET, COATED ORAL at 04:51

## 2023-04-14 RX ADMIN — KETOROLAC TROMETHAMINE 15 MG: 30 INJECTION, SOLUTION INTRAMUSCULAR at 08:47

## 2023-04-14 RX ADMIN — QUETIAPINE FUMARATE 12.5 MG: 25 TABLET ORAL at 04:50

## 2023-04-14 RX ADMIN — GABAPENTIN 600 MG: 300 CAPSULE ORAL at 08:47

## 2023-04-14 RX ADMIN — ENOXAPARIN SODIUM 40 MG: 40 INJECTION SUBCUTANEOUS at 17:11

## 2023-04-14 RX ADMIN — FOLIC ACID 1 MG: 1 TABLET ORAL at 04:51

## 2023-04-14 RX ADMIN — AMLODIPINE BESYLATE 2.5 MG: 5 TABLET ORAL at 04:50

## 2023-04-14 RX ADMIN — THERA TABS 1 TABLET: TAB at 04:51

## 2023-04-14 RX ADMIN — Medication 100 MG: at 04:51

## 2023-04-14 ASSESSMENT — LIFESTYLE VARIABLES
SUBSTANCE_ABUSE: 0
TOTAL SCORE: 4
TREMOR: NO TREMOR
ANXIETY: MILDLY ANXIOUS
AGITATION: NORMAL ACTIVITY
ORIENTATION AND CLOUDING OF SENSORIUM: CANNOT DO SERIAL ADDITIONS OR IS UNCERTAIN ABOUT DATE
HEADACHE, FULLNESS IN HEAD: VERY MILD
NAUSEA AND VOMITING: MILD NAUSEA WITH NO VOMITING
VISUAL DISTURBANCES: NOT PRESENT
PAROXYSMAL SWEATS: NO SWEAT VISIBLE
AUDITORY DISTURBANCES: NOT PRESENT

## 2023-04-14 ASSESSMENT — ENCOUNTER SYMPTOMS
FEVER: 0
NAUSEA: 0
SEIZURES: 1
HEADACHES: 0
LOSS OF CONSCIOUSNESS: 0
EYE PAIN: 0
VOMITING: 0
PALPITATIONS: 0
SHORTNESS OF BREATH: 0
NERVOUS/ANXIOUS: 1
INSOMNIA: 0
CHILLS: 0
DIZZINESS: 0
ABDOMINAL PAIN: 0
FALLS: 0
SENSORY CHANGE: 0
FOCAL WEAKNESS: 0
COUGH: 0
BLURRED VISION: 0
BACK PAIN: 0

## 2023-04-14 NOTE — PROGRESS NOTES
Hospital Medicine Daily Progress Note    Date of Service  4/14/2023    Chief Complaint  Olya Youssef is a 52 y.o. female admitted 4/11/2023 with alcohol withdrawal.    Hospital Course  Olya Youssef is a 52 y.o. 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  Patient unable to be hospitalized at [facility] as patient requires oxygen so recommended to come to [facility].  Patient reports drinks 324 ounce HurriCaine beers and pint of vodka daily.  Last alcohol drink yesterday.  She will be admitted to medicine service for alcohol withdrawal close monitoring for seizures.    Interval Problem Update  No acute events overnight.  CIWA scores low, stop CIWA protocol.  Patient very somnolent this morning, suspect due to seroquel and/or gabapentin given this morning. Hold all sedating medications, discussed with nursing.  Will monitor for improvement in somnolence.  Patient can possibly discharge home later today if she is awake enough to go home, otherwise plan for discharge likely tomorrow.      I have discussed this patient's plan of care and discharge plan at IDT rounds today with Case Management, Nursing, Nursing leadership, and other members of the IDT team.    Consultants/Specialty  none    Code Status  Full Code    Disposition  Patient is not medically cleared for discharge.   Anticipate discharge to to home with close outpatient follow-up.  I have placed the appropriate orders for post-discharge needs.    Review of Systems  Review of Systems   Constitutional:  Negative for chills and fever.   Eyes:  Negative for blurred vision and pain.   Respiratory:  Negative for cough and shortness of breath.    Cardiovascular:   Negative for chest pain, palpitations and leg swelling.   Gastrointestinal:  Negative for abdominal pain, nausea and vomiting.   Genitourinary:  Negative for dysuria and urgency.   Musculoskeletal:  Negative for back pain and falls.   Skin:  Negative for itching and rash.   Neurological:  Positive for seizures. Negative for dizziness, sensory change, focal weakness, loss of consciousness and headaches.   Psychiatric/Behavioral:  Negative for substance abuse. The patient is nervous/anxious. The patient does not have insomnia.       Physical Exam  Temp:  [36.2 °C (97.1 °F)-36.8 °C (98.2 °F)] 36.7 °C (98 °F)  Pulse:  [] 102  Resp:  [16-18] 18  BP: (110-123)/(67-93) 116/93  SpO2:  [93 %-98 %] 98 %    Physical Exam  Vitals reviewed.   Constitutional:       General: She is not in acute distress.     Appearance: She is not diaphoretic.   HENT:      Head: Normocephalic and atraumatic.      Right Ear: External ear normal.      Left Ear: External ear normal.      Nose: Nose normal. No congestion.      Mouth/Throat:      Pharynx: No oropharyngeal exudate or posterior oropharyngeal erythema.   Eyes:      Extraocular Movements: Extraocular movements intact.      Pupils: Pupils are equal, round, and reactive to light.   Cardiovascular:      Rate and Rhythm: Normal rate and regular rhythm.   Pulmonary:      Effort: Pulmonary effort is normal. No respiratory distress.      Breath sounds: Normal breath sounds. No wheezing.   Abdominal:      General: Bowel sounds are normal. There is no distension.      Palpations: Abdomen is soft.      Tenderness: There is no abdominal tenderness. There is no guarding or rebound.   Musculoskeletal:         General: No swelling. Normal range of motion.      Cervical back: Normal range of motion and neck supple.   Skin:     General: Skin is warm and dry.   Neurological:      General: No focal deficit present.      Mental Status: She is alert and oriented to person, place, and time.      Cranial  Nerves: No cranial nerve deficit.      Sensory: No sensory deficit.      Motor: No weakness.   Psychiatric:         Mood and Affect: Mood normal.         Behavior: Behavior normal.       Fluids    Intake/Output Summary (Last 24 hours) at 4/14/2023 1502  Last data filed at 4/14/2023 0400  Gross per 24 hour   Intake 120 ml   Output 400 ml   Net -280 ml       Laboratory  Recent Labs     04/11/23 1746 04/13/23  0341   WBC 6.3 5.0   RBC 3.62* 3.67*   HEMOGLOBIN 8.3* 8.3*   HEMATOCRIT 29.4* 31.2*   MCV 81.2* 85.0   MCH 22.9* 22.6*   MCHC 28.2* 26.6*   RDW 54.4* 56.6*   PLATELETCT 93* 80*   MPV 9.1 9.9     Recent Labs     04/11/23 1746 04/12/23  1012 04/13/23  0341   SODIUM 135 135 133*   POTASSIUM 3.1* 4.6 4.1   CHLORIDE 98 100 97   CO2 23 25 26   GLUCOSE 120* 102* 118*   BUN 6* 5* 6*   CREATININE 0.34* 0.32* 0.37*   CALCIUM 8.7 8.4* 8.7     Recent Labs     04/11/23 1746   APTT 30.4   INR 1.21*               Imaging  CT-ABDOMEN-PELVIS WITH   Final Result      1.  Colonic diverticulosis without evidence for diverticulitis.   2.  Normal appendix.   3.  No bowel obstruction or perforation.   4.  Enlarged liver with fatty infiltration, possibly early cirrhosis given stigmata of portal hypertension including recanalized umbilical vein and paraesophageal varices.   5.  6 mm nodule along the LEFT bladder dome, polyp versus tumor.           Assessment/Plan  * Alcohol withdrawal with inpatient treatment, uncomplicated (HCC)- (present on admission)  Assessment & Plan  Unable to be hospitalized at [facility] given she requires oxygen  Day 3 of withdrawal, CIWA scores low, stop CIWA monitoring  Stop librium given somnolence  Detox bag given  Thiamine, folic acid, multivitamins    Bladder polyp  Assessment & Plan  Suspected incidental 6 mm nodule along the left bladder dome.  Polyp vs tumor   Follow up urology outpatient     Chronic respiratory failure with hypoxia, on home oxygen therapy (HCC)- (present on  admission)  Assessment & Plan  Baseline 3 L.  Continue with oxygen supplementation obtain SPO2 above 92%    Thrombocytopenia (HCC)- (present on admission)  Assessment & Plan  Likely secondary to alcohol abuse and liver disease  No active superficial bleeding noted  Monitor    Hypertension- (present on admission)  Assessment & Plan  C/w amlodipine     Neuropathy- (present on admission)  Assessment & Plan  Continue gabapentin    Methamphetamine abuse (HCC)- (present on admission)  Assessment & Plan  History of  Encouraged sobriety    Bipolar affective disorder (HCC)- (present on admission)  Assessment & Plan  Continue with quetiapine     Anemia  Assessment & Plan  Iron and TIBC low, ferritin pending  Reticulocytes elevated  No signs of active bleeding  Likely related to alcohol abuse    Hypokalemia  Assessment & Plan  replete   improved         VTE prophylaxis: enoxaparin ppx    I have performed a physical exam and reviewed and updated ROS and Plan today (4/14/2023). In review of yesterday's note (4/13/2023), there are no changes except as documented above.

## 2023-04-14 NOTE — CARE PLAN
The patient is Stable - Low risk of patient condition declining or worsening    Shift Goals  Clinical Goals: CIWA  Patient Goals: detox, pain control  Family Goals: BRITTANIE    Progress made toward(s) clinical / shift goals:  Patient CIWA medicated per MAR, patient attempt OOB redirection provided, pain controlled per MAR.       Problem: Knowledge Deficit - Standard  Goal: Patient and family/care givers will demonstrate understanding of plan of care, disease process/condition, diagnostic tests and medications  Outcome: Progressing     Problem: Optimal Care for Alcohol Withdrawal  Goal: Optimal Care for the alcohol withdrawal patient  Outcome: Progressing     Problem: Seizure Precautions  Goal: Implementation of seizure precautions  Outcome: Progressing

## 2023-04-14 NOTE — CARE PLAN
Assumed care of pt. Pt drowsy, aaox2, responds to painful stimuli, yells/gets angry once woken up, pt in no distress, VSS, on 3L nasal cannula maintaining good saturations, pt on continuous o2 monitoring. CIWA monitoring in place, on hourly rounding. Bed alarm in place. Bed on the lowest position with brakes on. Non skid socks on.    The patient is Watcher - Medium risk of patient condition declining or worsening    Shift Goals  Clinical Goals: CIWA  Patient Goals: detox  Family Goals: jorge    Progress made toward(s) clinical / shift goals:      Problem: Knowledge Deficit - Standard  Goal: Patient and family/care givers will demonstrate understanding of plan of care, disease process/condition, diagnostic tests and medications  Outcome: Progressing     Problem: Optimal Care for Alcohol Withdrawal  Goal: Optimal Care for the alcohol withdrawal patient  Outcome: Progressing     Problem: Seizure Precautions  Goal: Implementation of seizure precautions  Outcome: Progressing     Problem: Fall Risk  Goal: Patient will remain free from falls  Outcome: Progressing     Problem: Pain - Standard  Goal: Alleviation of pain or a reduction in pain to the patient’s comfort goal  Outcome: Progressing       Patient is not progressing towards the following goals:

## 2023-04-14 NOTE — PROGRESS NOTES
"1200: Patient very somnolent, arouses to voice. Patient bedding changed with CNA assistance. Patient sat up for breakfast to encourage increase awake time.    1250: Checked on patient. Patient awoken by voice stimulus and told that she needs to eat. Patient told this writer to \"fuck off. You cant tell me what to do.\" Patient attempting to scratch this writer. Will recheck later.   "

## 2023-04-15 NOTE — PROGRESS NOTES
Patient discharging home via private vehicle with family. Patient PIV removed and discharge instructions provided. Patient taking hospital oxygen tank to get home then patient family will return to the tank to the hospital.

## 2023-04-15 NOTE — DISCHARGE SUMMARY
Discharge Summary    CHIEF COMPLAINT ON ADMISSION  Chief Complaint   Patient presents with    Detox     Pt reports that she has been trying to stop drinking at home but has had seizures at home. Pt would like inpatient help for detox. Pt states her last drink was yesterday.     Foot Swelling     Worsening x 1 week.     Abdominal Pain     RUQ x4 weeks.        Reason for Admission  detox     Admission Date  4/11/2023    CODE STATUS  Prior    HPI & HOSPITAL COURSE  Olya Youssef is a 52 y.o. 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  Patient unable to be hospitalized at [facility] as patient requires oxygen so recommended to come to [facility].  [facility]. [facility] after withdrawal resolution. Patient is discharged home under care of family.    Therefore, she is discharged in fair and stable condition to home with close outpatient follow-up.    The patient met 2-midnight criteria for an inpatient stay at the time of discharge.    Discharge Date  4/14/2023    FOLLOW UP ITEMS POST DISCHARGE  Take medications as prescribed.  Follow up with PCP.    DISCHARGE DIAGNOSES  Principal Problem:    Alcohol withdrawal with inpatient treatment, uncomplicated (HCC) POA: Yes  Active Problems:    Hypokalemia POA: Unknown    Anemia POA: Unknown    Bipolar affective disorder (HCC) POA: Yes    Methamphetamine abuse (HCC) POA: Yes    Neuropathy POA: Yes    Hypertension POA: Yes    Thrombocytopenia (HCC) POA: Yes    Chronic respiratory failure with hypoxia, on home oxygen therapy (HCC) POA: Yes      Overview: 3 L O2    Bladder polyp POA:  Unknown  Resolved Problems:    * No resolved hospital problems. *      FOLLOW UP  Future Appointments   Date Time Provider Department Center   4/19/2023 10:00 AM Jayde Bernardo M.D. [facility] Aparicio M.D.  [address] [facility] [address]  [phone]            MEDICATIONS ON DISCHARGE     Medication List        CONTINUE taking these medications        Instructions   amLODIPine 2.5 MG Tabs  Commonly known as: NORVASC   Take 1 Tablet by mouth every day.  Dose: 2.5 mg     aspirin 81 MG EC tablet   Take 1 Tablet by mouth every day.  Dose: 81 mg     atorvastatin 40 MG Tabs  Commonly known as: LIPITOR   Take 1 Tablet by mouth every evening.  Dose: 40 mg     diclofenac sodium 1 % Gel  Commonly known as: Voltaren   Apply 2 g topically 4 times a day as needed (for neck pain).  Dose: 2 g     famotidine 20 MG Tabs  Commonly known as: PEPCID   Take 20 mg by mouth 2 times a day. For 7 days  Dose: 20 mg     gabapentin 600 MG tablet  Commonly known as: NEURONTIN   Take 1 Tablet by mouth 3 times a day.  Dose: 600 mg     meloxicam 15 MG tablet  Commonly known as: MOBIC   Take 15 mg by mouth 1 time a day as needed. For 14 days  Dose: 15 mg     traZODone 50 MG Tabs  Commonly known as: DESYREL   Take 1 Tablet by mouth every evening.  Dose: 50 mg            STOP taking these medications      methylPREDNISolone 4 MG Tbpk  Commonly known as: MEDROL DOSEPAK              Allergies  Allergies   Allergen Reactions    Bactrim Hives    Lamotrigine [Lamictal] Hives     Tolerated Fioricet 10/3/22    Penicillin G     Quetiapine Fumarate Hives     Extrapyramidal Symptoms, can tolerate lower doses    Quetiapine Fumarate Hives    Sulfa Drugs Hives    Keflex Hives       DIET  No orders of the defined types were placed in this encounter.      ACTIVITY  As tolerated.  Weight bearing as tolerated    CONSULTATIONS  none    PROCEDURES  none    LABORATORY  Lab Results   Component Value Date    SODIUM 133 (L)  04/13/2023    POTASSIUM 4.1 04/13/2023    CHLORIDE 97 04/13/2023    CO2 26 04/13/2023    GLUCOSE 118 (H) 04/13/2023    BUN 6 (L) 04/13/2023    CREATININE 0.37 (L) 04/13/2023    CREATININE 1.0 02/06/2009        Lab Results   Component Value Date    WBC 5.0 04/13/2023    HEMOGLOBIN 8.3 (L) 04/13/2023    HEMATOCRIT 31.2 (L) 04/13/2023    PLATELETCT 80 (L) 04/13/2023        Total time of the discharge process exceeds 34 minutes.

## 2023-04-16 ENCOUNTER — APPOINTMENT (OUTPATIENT)
Dept: RADIOLOGY | Facility: MEDICAL CENTER | Age: 53
End: 2023-04-16
Attending: EMERGENCY MEDICINE
Payer: COMMERCIAL

## 2023-04-16 ENCOUNTER — HOSPITAL ENCOUNTER (EMERGENCY)
Facility: MEDICAL CENTER | Age: 53
End: 2023-04-16
Attending: EMERGENCY MEDICINE
Payer: COMMERCIAL

## 2023-04-16 VITALS
BODY MASS INDEX: 31.49 KG/M2 | SYSTOLIC BLOOD PRESSURE: 116 MMHG | DIASTOLIC BLOOD PRESSURE: 67 MMHG | WEIGHT: 150 LBS | RESPIRATION RATE: 16 BRPM | HEART RATE: 98 BPM | OXYGEN SATURATION: 99 % | TEMPERATURE: 98 F | HEIGHT: 58 IN

## 2023-04-16 DIAGNOSIS — R05.3 CHRONIC COUGH: ICD-10-CM

## 2023-04-16 DIAGNOSIS — G44.309 POST-TRAUMATIC HEADACHE, NOT INTRACTABLE, UNSPECIFIED CHRONICITY PATTERN: ICD-10-CM

## 2023-04-16 DIAGNOSIS — D64.9 CHRONIC ANEMIA: ICD-10-CM

## 2023-04-16 DIAGNOSIS — F10.920 ALCOHOLIC INTOXICATION WITHOUT COMPLICATION (HCC): ICD-10-CM

## 2023-04-16 LAB
ALBUMIN SERPL BCP-MCNC: 3.6 G/DL (ref 3.2–4.9)
ALBUMIN/GLOB SERPL: 0.8 G/DL
ALP SERPL-CCNC: 147 U/L (ref 30–99)
ALT SERPL-CCNC: 21 U/L (ref 2–50)
ANION GAP SERPL CALC-SCNC: 13 MMOL/L (ref 7–16)
AST SERPL-CCNC: 40 U/L (ref 12–45)
BASOPHILS # BLD AUTO: 0.9 % (ref 0–1.8)
BASOPHILS # BLD: 0.05 K/UL (ref 0–0.12)
BILIRUB SERPL-MCNC: 0.5 MG/DL (ref 0.1–1.5)
BUN SERPL-MCNC: 2 MG/DL (ref 8–22)
CALCIUM ALBUM COR SERPL-MCNC: 8.9 MG/DL (ref 8.5–10.5)
CALCIUM SERPL-MCNC: 8.6 MG/DL (ref 8.5–10.5)
CHLORIDE SERPL-SCNC: 100 MMOL/L (ref 96–112)
CO2 SERPL-SCNC: 26 MMOL/L (ref 20–33)
CREAT SERPL-MCNC: 0.32 MG/DL (ref 0.5–1.4)
EKG IMPRESSION: NORMAL
EOSINOPHIL # BLD AUTO: 0.14 K/UL (ref 0–0.51)
EOSINOPHIL NFR BLD: 2.5 % (ref 0–6.9)
ERYTHROCYTE [DISTWIDTH] IN BLOOD BY AUTOMATED COUNT: 55.7 FL (ref 35.9–50)
ETHANOL BLD-MCNC: 134.1 MG/DL
GFR SERPLBLD CREATININE-BSD FMLA CKD-EPI: 123 ML/MIN/1.73 M 2
GLOBULIN SER CALC-MCNC: 4.6 G/DL (ref 1.9–3.5)
GLUCOSE SERPL-MCNC: 115 MG/DL (ref 65–99)
HCT VFR BLD AUTO: 30.1 % (ref 37–47)
HGB BLD-MCNC: 8.2 G/DL (ref 12–16)
IMM GRANULOCYTES # BLD AUTO: 0.04 K/UL (ref 0–0.11)
IMM GRANULOCYTES NFR BLD AUTO: 0.7 % (ref 0–0.9)
LYMPHOCYTES # BLD AUTO: 1.56 K/UL (ref 1–4.8)
LYMPHOCYTES NFR BLD: 27.6 % (ref 22–41)
MCH RBC QN AUTO: 22.6 PG (ref 27–33)
MCHC RBC AUTO-ENTMCNC: 27.2 G/DL (ref 33.6–35)
MCV RBC AUTO: 82.9 FL (ref 81.4–97.8)
MONOCYTES # BLD AUTO: 0.9 K/UL (ref 0–0.85)
MONOCYTES NFR BLD AUTO: 15.9 % (ref 0–13.4)
NEUTROPHILS # BLD AUTO: 2.96 K/UL (ref 2–7.15)
NEUTROPHILS NFR BLD: 52.4 % (ref 44–72)
NRBC # BLD AUTO: 0 K/UL
NRBC BLD-RTO: 0 /100 WBC
PLATELET # BLD AUTO: 126 K/UL (ref 164–446)
PMV BLD AUTO: 8.7 FL (ref 9–12.9)
POTASSIUM SERPL-SCNC: 3.2 MMOL/L (ref 3.6–5.5)
PROT SERPL-MCNC: 8.2 G/DL (ref 6–8.2)
RBC # BLD AUTO: 3.63 M/UL (ref 4.2–5.4)
SODIUM SERPL-SCNC: 139 MMOL/L (ref 135–145)
WBC # BLD AUTO: 5.7 K/UL (ref 4.8–10.8)

## 2023-04-16 PROCEDURE — 36415 COLL VENOUS BLD VENIPUNCTURE: CPT

## 2023-04-16 PROCEDURE — 80053 COMPREHEN METABOLIC PANEL: CPT

## 2023-04-16 PROCEDURE — 700102 HCHG RX REV CODE 250 W/ 637 OVERRIDE(OP): Performed by: EMERGENCY MEDICINE

## 2023-04-16 PROCEDURE — 71045 X-RAY EXAM CHEST 1 VIEW: CPT

## 2023-04-16 PROCEDURE — 70450 CT HEAD/BRAIN W/O DYE: CPT

## 2023-04-16 PROCEDURE — 93005 ELECTROCARDIOGRAM TRACING: CPT

## 2023-04-16 PROCEDURE — 85025 COMPLETE CBC W/AUTO DIFF WBC: CPT

## 2023-04-16 PROCEDURE — 99285 EMERGENCY DEPT VISIT HI MDM: CPT

## 2023-04-16 PROCEDURE — 93005 ELECTROCARDIOGRAM TRACING: CPT | Performed by: EMERGENCY MEDICINE

## 2023-04-16 PROCEDURE — 82077 ASSAY SPEC XCP UR&BREATH IA: CPT

## 2023-04-16 PROCEDURE — A9270 NON-COVERED ITEM OR SERVICE: HCPCS | Performed by: EMERGENCY MEDICINE

## 2023-04-16 RX ORDER — ACETAMINOPHEN 325 MG/1
650 TABLET ORAL ONCE
Status: COMPLETED | OUTPATIENT
Start: 2023-04-16 | End: 2023-04-16

## 2023-04-16 RX ADMIN — ACETAMINOPHEN 650 MG: 325 TABLET, FILM COATED ORAL at 14:45

## 2023-04-16 ASSESSMENT — FIBROSIS 4 INDEX: FIB4 SCORE: 7.58
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
## 2023-04-16 NOTE — ED NOTES
Olya Youssef discharged via ambulation and bus pass.  Discharge instructions given and reviewed, patient educated to follow up with PCP, verbalized understanding.  All personal belongings in possession.  No questions at this time.
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
## 2023-04-16 NOTE — ED NOTES
Pt does not have concentrator to go home with/ nor does she have ride to go home. SW notified for need of ride and concentrator

## 2023-04-16 NOTE — ED NOTES
Pt brought back to room [address] via wheelchair. C/C is Shortness of Breath and Headache. Pt is in a gown, on the monitor and call light within reach. Chart up for ERP.

## 2023-04-17 ENCOUNTER — TELEPHONE (OUTPATIENT)
Dept: INTERNAL MEDICINE | Facility: OTHER | Age: 53
End: 2023-04-17
Payer: COMMERCIAL

## 2023-04-17 NOTE — TELEPHONE ENCOUNTER
MSW intern called patient, she did not answer. Left message requesting callback.    Marie Schroeder MSW Intern

## 2023-04-17 NOTE — PROGRESS NOTES
Called son Buster for mothers forgotten phone. Buster states that he will be by to get his mothers phone today 4/17.

## 2023-04-18 ENCOUNTER — TELEPHONE (OUTPATIENT)
Dept: INTERNAL MEDICINE | Facility: OTHER | Age: 53
End: 2023-04-18

## 2023-04-19 ENCOUNTER — APPOINTMENT (OUTPATIENT)
Dept: INTERNAL MEDICINE | Facility: OTHER | Age: 53
End: 2023-04-19
Payer: COMMERCIAL

## 2023-04-20 ENCOUNTER — OFFICE VISIT (OUTPATIENT)
Dept: INTERNAL MEDICINE | Facility: OTHER | Age: 53
End: 2023-04-20
Payer: COMMERCIAL

## 2023-04-20 VITALS
WEIGHT: 144 LBS | HEART RATE: 76 BPM | SYSTOLIC BLOOD PRESSURE: 124 MMHG | RESPIRATION RATE: 16 BRPM | OXYGEN SATURATION: 100 % | DIASTOLIC BLOOD PRESSURE: 86 MMHG | BODY MASS INDEX: 30.23 KG/M2 | TEMPERATURE: 98.1 F | HEIGHT: 58 IN

## 2023-04-20 DIAGNOSIS — D64.9 ANEMIA, UNSPECIFIED TYPE: ICD-10-CM

## 2023-04-20 DIAGNOSIS — T14.91XA SUICIDAL BEHAVIOR WITH ATTEMPTED SELF-INJURY (HCC): ICD-10-CM

## 2023-04-20 DIAGNOSIS — G47.09 OTHER INSOMNIA: ICD-10-CM

## 2023-04-20 DIAGNOSIS — I10 HYPERTENSION, UNSPECIFIED TYPE: ICD-10-CM

## 2023-04-20 DIAGNOSIS — F34.1 PERSISTENT DEPRESSIVE DISORDER: ICD-10-CM

## 2023-04-20 DIAGNOSIS — I65.21 CAROTID ARTERY STENOSIS, ASYMPTOMATIC, RIGHT: ICD-10-CM

## 2023-04-20 DIAGNOSIS — U09.9 POST COVID-19 CONDITION, UNSPECIFIED: ICD-10-CM

## 2023-04-20 DIAGNOSIS — R74.8 ELEVATED ALKALINE PHOSPHATASE MEASUREMENT: ICD-10-CM

## 2023-04-20 DIAGNOSIS — E87.6 HYPOKALEMIA: ICD-10-CM

## 2023-04-20 DIAGNOSIS — R45.86 MOOD CHANGES: ICD-10-CM

## 2023-04-20 DIAGNOSIS — D69.6 THROMBOCYTOPENIA (HCC): ICD-10-CM

## 2023-04-20 DIAGNOSIS — M50.30 DEGENERATIVE DISC DISEASE, CERVICAL: ICD-10-CM

## 2023-04-20 PROCEDURE — 99214 OFFICE O/P EST MOD 30 MIN: CPT | Mod: GC | Performed by: STUDENT IN AN ORGANIZED HEALTH CARE EDUCATION/TRAINING PROGRAM

## 2023-04-20 RX ORDER — GABAPENTIN 600 MG/1
600 TABLET ORAL 3 TIMES DAILY
Qty: 270 TABLET | Refills: 3 | Status: SHIPPED | OUTPATIENT
Start: 2023-04-20 | End: 2023-12-06

## 2023-04-20 RX ORDER — ASPIRIN 81 MG/1
81 TABLET ORAL DAILY
Qty: 90 TABLET | Refills: 3 | Status: SHIPPED | OUTPATIENT
Start: 2023-04-20 | End: 2023-04-26

## 2023-04-20 RX ORDER — TRAZODONE HYDROCHLORIDE 50 MG/1
50 TABLET ORAL NIGHTLY
Qty: 30 TABLET | Refills: 0 | Status: SHIPPED | OUTPATIENT
Start: 2023-04-20 | End: 2023-07-05

## 2023-04-20 RX ORDER — ATORVASTATIN CALCIUM 40 MG/1
40 TABLET, FILM COATED ORAL EVERY EVENING
Qty: 90 TABLET | Refills: 3 | Status: SHIPPED | OUTPATIENT
Start: 2023-04-20 | End: 2023-12-06 | Stop reason: SDUPTHER

## 2023-04-20 RX ORDER — FLUOXETINE HYDROCHLORIDE 20 MG/1
20 CAPSULE ORAL DAILY
Qty: 90 CAPSULE | Refills: 0 | Status: CANCELLED | OUTPATIENT
Start: 2023-04-20 | End: 2023-07-19

## 2023-04-20 RX ORDER — FAMOTIDINE 20 MG/1
20 TABLET, FILM COATED ORAL 2 TIMES DAILY
Qty: 180 TABLET | Refills: 3 | Status: SHIPPED | OUTPATIENT
Start: 2023-04-20 | End: 2023-07-05

## 2023-04-20 RX ORDER — MELOXICAM 15 MG/1
15 TABLET ORAL
Qty: 30 TABLET | Refills: 0 | Status: SHIPPED | OUTPATIENT
Start: 2023-04-20 | End: 2023-07-05 | Stop reason: SDUPTHER

## 2023-04-20 RX ORDER — AMLODIPINE BESYLATE 2.5 MG/1
2.5 TABLET ORAL DAILY
Qty: 90 TABLET | Refills: 3 | Status: SHIPPED | OUTPATIENT
Start: 2023-04-20 | End: 2024-04-14

## 2023-04-20 RX ORDER — PROMETHAZINE HYDROCHLORIDE 25 MG/1
25 TABLET ORAL EVERY 6 HOURS PRN
Qty: 10 TABLET | Refills: 0 | Status: CANCELLED | OUTPATIENT
Start: 2023-04-20

## 2023-04-20 RX ORDER — HYDROXYZINE HYDROCHLORIDE 25 MG/1
25 TABLET, FILM COATED ORAL 3 TIMES DAILY PRN
Qty: 30 TABLET | Refills: 0 | Status: SHIPPED | OUTPATIENT
Start: 2023-04-20 | End: 2023-05-30

## 2023-04-20 ASSESSMENT — FIBROSIS 4 INDEX: FIB4 SCORE: 3.74

## 2023-04-20 NOTE — PROGRESS NOTES
Established Patient    Patient Care Team:  Margy Aparicio M.D. as PCP - General (Internal Medicine)    CC:  Chief Complaint   Patient presents with    Follow-Up     Medication refill       HPI:  Olya Youssef is a 52 y.o. female who presents today for medication refills. She has hx of psychiatric illnesses and on chronic 2L NC oxygen 2/2 COVID pneumonia (was unvaccinated at the time, and remains unvaccinated). Hx of substance abuse, but patient denies any now.     She reports anxiety and depression hx, as well as multiple suicide attempts in the past, for which she has previously seen psychiatry but does not currently have a psychiatrist, so will place urgent referral. She has a therapist, who she is seeing regularly. To relieve her mood symptoms, she states that writing helps. She is currently working on a book about COVID. Associated with uncontrolled anxiety, she often gets nauseous. Would expect nausea to improve once patient sees psychiatry and anxiety is appropriately managed. In the meantime, will prescribe hydroxyzine PRN for anxiety. She denies active SI/ HI. Counseled her about meditation/ anxiety management techniques.      Per chart review, her K was low in the past, so will order repeat chem panel.     Also counseled patient extensively about need for COVID vaccination, given her history. She declined.      ROS:  Review of Systems   Psychiatric/Behavioral:  The patient is nervous/anxious.    All other systems reviewed and are negative.    Past Medical History:   Diagnosis Date    Alcohol abuse     Alcohol intoxication, with unspecified complication (HCC) 8/7/2013    Alcoholism (HCC)     Anxiety     Backpain     Bipolar disorder, unspecified (HCC)     Bronchitis     Degenerative disc disease, cervical 3/28/2023    Drug abuse (HCC)     meth, crack cocaine, THC    Hepatitis, alcoholic     Hypertension     controlled    Indigestion     Infectious disease MRSA    Liver disease      "pancreatitis    Pancreatitis chronic     Flare-up    Pneumonia     Psychiatric disorder     suicidal in past    Renal disorder     kidney infection 1991    Seizure (HCC)     7/8/2011    Seizure disorder (HCC)     Unspecified hemorrhagic conditions      Social History     Tobacco Use    Smoking status: Never    Smokeless tobacco: Never   Vaping Use    Vaping Use: Never used   Substance Use Topics    Alcohol use: Yes     Comment: pint of vodka a day    Drug use: Yes     Types: Inhaled     Comment: meth on 4/14     Current Outpatient Medications   Medication Sig Dispense Refill    amLODIPine (NORVASC) 2.5 MG Tab Take 1 Tablet by mouth every day for 360 days. 90 Tablet 3    aspirin 81 MG EC tablet Take 1 Tablet by mouth every day for 360 days. 90 Tablet 3    atorvastatin (LIPITOR) 40 MG Tab Take 1 Tablet by mouth every evening for 360 days. 90 Tablet 3    diclofenac sodium (VOLTAREN) 1 % Gel Apply 2 g topically 4 times a day as needed (for neck pain) for up to 360 days. 150 g 3    famotidine (PEPCID) 20 MG Tab Take 1 Tablet by mouth 2 times a day for 360 days. For 7 days 180 Tablet 3    gabapentin (NEURONTIN) 600 MG tablet Take 1 Tablet by mouth 3 times a day for 360 days. 270 Tablet 3    meloxicam (MOBIC) 15 MG tablet Take 1 Tablet by mouth 1 time a day as needed for Moderate Pain for up to 360 days. 30 Tablet 0    traZODone (DESYREL) 50 MG Tab Take 1 Tablet by mouth every evening for 360 days. 30 Tablet 0    hydrOXYzine HCl (ATARAX) 25 MG Tab Take 1 Tablet by mouth 3 times a day as needed for Itching. 30 Tablet 0     No current facility-administered medications for this visit.     Physical Exam:  /86 (BP Location: Right arm, Patient Position: Sitting, BP Cuff Size: Adult)   Pulse 76   Temp 36.7 °C (98.1 °F) (Temporal)   Resp 16   Ht 1.473 m (4' 10\")   Wt 65.3 kg (144 lb)   LMP 02/28/2018   SpO2 100%   BMI 30.10 kg/m²   General: Well developed, well nourished female, in no distress. At baseline, appears " that she has insight into medical conditions. On 2L continuously.  Eyes: Conjuntiva without any obvious injection or erythema.   Cardiovascular: Heart is regular rate and rhythm  Lungs: Clear to auscultation bilaterally. No wheezes, rhonci or crackles heard. Respiratory effort is normal.  Abd: Soft, non-tender  Ext: No edema    Assessment and Plan:   Hypokalemia  Per chart review, her K was low in the past, so will order repeat chem panel.     Mood changes  She reports anxiety as well as depression hx, as well as multiple suicide attempts in the past, for which she has previously seen psychiatry but does not currently have a psychiatrist, so will place urgent referral. She has a therapist, who she is seeing regularly. To relieve her mood symptoms, she states that writing helps. She is currently working on a book about COVID. Associated with uncontrolled anxiety, she often gets nauseous. Would expect nausea to improve once patient sees psychiatry and anxiety is appropriately managed. In the meantime, will prescribe hydroxyzine PRN for anxiety. She denies active SI/ HI. Counseled her about meditation/ anxiety management techniques.    Post covid-19 condition, unspecified  chronic 2L NC oxygen 2/2 COVID pneumonia (was unvaccinated at the time, and remains unvaccinated)     Refills provided:  Amlodipine for bp  Atorvastatin for cholesterol   Voltaren gel for neck pain  Famotidine for GERD  Gabapentin for neuopathic neck pain and back pain, chronic  Hydroxyzine for anxiety   Meloxicam started while inpatient for neck pain, started 1 year ago. Was taking tylenol 6000mg before, so was started. Consider f/u for this  Trazodone for insomnia     Asked MA to help with O2 DME order  cbc to monitor thrombocytopenia and anemia now that she is sober  cmp to trend previously elevated alk phos now that she is sober

## 2023-04-20 NOTE — PROGRESS NOTES
Teaching Physician Attestation      Level of Participation    I have personally interviewed and examined the patient.  In addition, I discussed with the resident physician the patient's history, exam, assessment and plan in detail.  Topics listed in my addendum were the focus of the visit.  Healthcare maintenance was not addressed this visit unless listed as a topic in my addendum.  I agree with the plan as written along with the following additions/modifications:    Anxiety in the setting of substance induced mood disorder vs ? Bipolar, panic disorder  -No alarm symptoms, safety contract reviewed.  Begin hydroxyzine, urgent psych referral appreciate support.  Anxiety resources handout given.  Praised patient for her abstinence and progress with substance use.    Hypokalemia  -mild, repeat CMP as below    Thrombocytopenia has improved to 126, recent lfts nl., would repeat cbc at next visit for monitoring given now sober off alcohol.  Also will need eval for chronic normocytic anemia.  Would repeat cmp to trend mildly elevated alk phos.    Rec covid vaccine    Meds for chronic conditions refilled.    Return to clinic in 2-4 weeks with PCP.  At follow up, needs reassessment for oxygen requirement/continued need for oxygen due to prior hypoxia related to covid.

## 2023-04-22 PROBLEM — E55.9 VITAMIN D DEFICIENCY: Status: ACTIVE | Noted: 2021-05-25

## 2023-04-22 PROBLEM — R45.86 MOOD CHANGES: Status: ACTIVE | Noted: 2023-04-22

## 2023-04-24 ENCOUNTER — TELEPHONE (OUTPATIENT)
Dept: INTERNAL MEDICINE | Facility: OTHER | Age: 53
End: 2023-04-24
Payer: COMMERCIAL

## 2023-04-24 NOTE — LETTER
Date:     From: [facility]  [address] [address]  Phone:   [phone]  Fax:   [phone]    Recipient:         Recipient Fax Number:           Message:                          Confidentiality / Privacy Note: If you are not the intended recipient of this document, this document should be returned to [facility] immediately. Please contact the sender at [facility] so that we can arrange for the return of this transmission to us at no cost to you. The document accompanying this transmission contains information from [facility], which is confidential, proprietary or copyrighted and is intended solely for the use of the individual or entity named on this transaction. If you are not the intended recipient, you are notified that disclosing, copying, distributing or taking any action in reliance on the contents of this information is strictly prohibited. This prohibition includes, without limitations, displaying this transmission or any portion thereof, on any public bulletin board.            [address] [place] [id number] - [phone] - [facility]

## 2023-04-25 NOTE — TELEPHONE ENCOUNTER
Order was faxed to [facility].    Pt aware of order being sent and that they will contact her to get it set up for delivery.    Advised pt to call back if she does not hear from them. Pt verbalized understanding.

## 2023-04-26 ASSESSMENT — ENCOUNTER SYMPTOMS: NERVOUS/ANXIOUS: 1

## 2023-05-23 ENCOUNTER — HOSPITAL ENCOUNTER (OUTPATIENT)
Dept: RADIOLOGY | Facility: MEDICAL CENTER | Age: 53
End: 2023-05-23
Attending: NURSE PRACTITIONER
Payer: COMMERCIAL

## 2023-05-23 DIAGNOSIS — M54.12 RADICULOPATHY, CERVICAL: ICD-10-CM

## 2023-05-23 DIAGNOSIS — M54.16 LUMBAR RADICULOPATHY: ICD-10-CM

## 2023-05-23 PROCEDURE — 72141 MRI NECK SPINE W/O DYE: CPT

## 2023-05-23 PROCEDURE — 72050 X-RAY EXAM NECK SPINE 4/5VWS: CPT

## 2023-05-23 PROCEDURE — 72110 X-RAY EXAM L-2 SPINE 4/>VWS: CPT

## 2023-05-30 ENCOUNTER — OFFICE VISIT (OUTPATIENT)
Dept: INTERNAL MEDICINE | Facility: OTHER | Age: 53
End: 2023-05-30
Payer: COMMERCIAL

## 2023-05-30 VITALS
BODY MASS INDEX: 28.84 KG/M2 | HEART RATE: 91 BPM | DIASTOLIC BLOOD PRESSURE: 65 MMHG | TEMPERATURE: 97.5 F | SYSTOLIC BLOOD PRESSURE: 104 MMHG | HEIGHT: 58 IN | WEIGHT: 137.4 LBS | OXYGEN SATURATION: 96 %

## 2023-05-30 DIAGNOSIS — M50.30 DEGENERATIVE DISC DISEASE, CERVICAL: ICD-10-CM

## 2023-05-30 PROCEDURE — 3078F DIAST BP <80 MM HG: CPT | Performed by: STUDENT IN AN ORGANIZED HEALTH CARE EDUCATION/TRAINING PROGRAM

## 2023-05-30 PROCEDURE — 99214 OFFICE O/P EST MOD 30 MIN: CPT | Mod: GC | Performed by: STUDENT IN AN ORGANIZED HEALTH CARE EDUCATION/TRAINING PROGRAM

## 2023-05-30 PROCEDURE — 3074F SYST BP LT 130 MM HG: CPT | Performed by: STUDENT IN AN ORGANIZED HEALTH CARE EDUCATION/TRAINING PROGRAM

## 2023-05-30 RX ORDER — BUSPIRONE HYDROCHLORIDE 7.5 MG/1
7.5 TABLET ORAL 3 TIMES DAILY
COMMUNITY
End: 2023-07-05

## 2023-05-30 RX ORDER — FLUOXETINE HYDROCHLORIDE 40 MG/1
40 CAPSULE ORAL DAILY
COMMUNITY
End: 2023-07-05

## 2023-05-30 RX ORDER — METHOCARBAMOL 500 MG/1
500 TABLET, FILM COATED ORAL 4 TIMES DAILY
Qty: 60 TABLET | Refills: 0 | Status: SHIPPED | OUTPATIENT
Start: 2023-05-30 | End: 2023-07-05 | Stop reason: SDUPTHER

## 2023-05-30 ASSESSMENT — FIBROSIS 4 INDEX: FIB4 SCORE: 3.74

## 2023-05-30 NOTE — PROGRESS NOTES
Established Patient    Patient Care Team:  Margy Aparicio M.D. as PCP - General (Internal Medicine)    HPI:  Olya Youssef is a 52 y.o. female  with a past medical history of methamphetamine use, alcohol abuse complicated by alcohol withdrawal/seizures and alcoholic pancreatitis, chronic hypoxic respiratory failure secondary to COVID (on 2-3LNC at baseline), cervical radiculopathy with chronic neck and back pain (seeing neurosurgery and pain specialist), and asymptomatic carotid artery stenosis who presents today with the following Chief Complaint(s):   Chief Complaint   Patient presents with    Pain     Pain in neck and lower back. Starting getting worse within the last 2 years.   Patient had MRI and X-Ray done last week and would like to go over results.  These are both in patient's chart.      Patient comes in today to discuss chronic neck pain and recent imaging ordered by neurosurgery. Patient has been following with neurosurgery, Elyse MEYERS, for ongoing chronic neck and back pain. Speaking further with patient, she states that she has been dealing with this for around 2 years and denies any trauma prior to onset. She describes the pain as constant, 5-10/10, sharp and stabbing in character, with radiation to the left shoulder and upper chest area, worsened by any head movement (like turning the head), briefly relieved by use of Tylenol (around 500mg three times daily, occasionally taking 1000mg) and Voltaren gel. She denies any red flag signs or symptoms and does not describe radiculopathy. 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 She has been seeing physical therapy twice weekly and been compliant with continuing exercises and stretches at home. Referral to pain specialist has been  sent previously, but patient states she has called and been unable to speak with someone or make an appointment. Patient appeared tearful, stating that no one understands the pain she is dealing with and is frustrated with no improvement of symptoms despite her following the recommendations provided.     Review of Systems   Constitutional: Denies chills, fever  HEENT: Denies blurry vision, congestion, sore throat  Respiratory: Reports shortness of breath (chronic) and cough. Denies wheezing   Cardiovascular: Denies chest pain, palpitations  Gastrointestinal: Denies heartburn, nausea, vomiting, abdominal pain  Genitourinary: Denies dysuria, frequency  Musculoskeletal: Reports chronic neck and back pain   Neurological: Denies headaches, dizziness, paralysis, numbness/tingling of extremities, saddle anesthesia, incontinence to bowel/bladder      Past Medical History:   Diagnosis Date    Alcohol abuse     Alcohol intoxication, with unspecified complication ([facility]) 8/7/2013    Alcoholism ([facility])     Anxiety     Backpain     Bipolar disorder, unspecified ([facility])     Bronchitis     Degenerative disc disease, cervical 3/28/2023    Drug abuse ([facility])     meth, crack cocaine, THC    Hepatitis, alcoholic     Hypertension     controlled    Indigestion     Infectious disease MRSA    Liver disease     pancreatitis    Pancreatitis chronic     Flare-up    Pneumonia     Psychiatric disorder     suicidal in past    Renal disorder     kidney infection 1991    Seizure ([facility])     7/8/2011    Seizure disorder ([facility])     Unspecified hemorrhagic conditions        Patient Active Problem List    Diagnosis Date Noted    Flu-like symptoms 02/28/2023    Mood changes 04/22/2023    Alcohol withdrawal with inpatient treatment, uncomplicated ([facility]) 04/11/2023    Bladder polyp 04/11/2023    Severe benzodiazepine use disorder ([facility]) 03/28/2023    High risk social situation 03/28/2023    Degenerative disc disease, cervical 03/28/2023    Acetaminophen abuse  02/28/2023    Other chronic pain 02/28/2023    Alcohol use disorder, severe, dependence ([facility]) 02/06/2023    Chronic respiratory failure with hypoxia, on home oxygen therapy ([facility]) 02/06/2023    Post covid-19 condition, unspecified 02/06/2023    Other insomnia 02/06/2023    Carotid artery stenosis, asymptomatic, right 01/28/2023    Headache 01/24/2023    Overweight 01/24/2023    Alcohol withdrawal delirium, acute, hyperactive ([facility]) 01/23/2023    Elevated lipase 01/23/2023    Suicidal behavior 09/27/2022    Vitamin D deficiency 05/25/2021    Hypertension 04/20/2021    Neuropathy 04/19/2021    Leukocytopenia 04/18/2021    Macrocytic anemia 04/18/2021    Methamphetamine abuse ([facility]) 05/29/2018    Alcoholic hepatitis 08/15/2013    Alcohol withdrawal delirium ([facility]) 08/08/2013    Pancreatitis, alcoholic, acute 08/07/2013    Persistent depressive disorder 07/24/2013    Bipolar affective disorder ([facility]) 07/22/2013    Overdose of antidepressant 03/29/2013    Alcohol withdrawal seizure ([facility]) 05/16/2012    Thrombocytopenia ([facility]) 08/19/2011    Anemia 08/17/2011    Hypokalemia 08/16/2011       Allergies:Bactrim, Lamotrigine [lamictal], Penicillin g, Quetiapine fumarate, Quetiapine fumarate, Sulfa drugs, and Keflex    Current Outpatient Medications   Medication Sig Dispense Refill    fluoxetine (PROZAC) 40 MG capsule Take 40 mg by mouth every day.      busPIRone (BUSPAR) 7.5 MG tablet Take 7.5 mg by mouth 3 times a day.      amLODIPine (NORVASC) 2.5 MG Tab Take 1 Tablet by mouth every day for 360 days. 90 Tablet 3    atorvastatin (LIPITOR) 40 MG Tab Take 1 Tablet by mouth every evening for 360 days. 90 Tablet 3    diclofenac sodium (VOLTAREN) 1 % Gel Apply 2 g topically 4 times a day as needed (for neck pain) for up to 360 days. 150 g 3    gabapentin (NEURONTIN) 600 MG tablet Take 1 Tablet by mouth 3 times a day for 360 days. 270 Tablet 3    meloxicam (MOBIC) 15 MG tablet Take 1 Tablet by mouth 1 time a day as needed for Moderate  "Pain for up to 360 days. 30 Tablet 0    traZODone (DESYREL) 50 MG Tab Take 1 Tablet by mouth every evening for 360 days. (Patient taking differently: Take 100 mg by mouth every evening.) 30 Tablet 0    famotidine (PEPCID) 20 MG Tab Take 1 Tablet by mouth 2 times a day for 360 days. For 7 days (Patient not taking: Reported on 5/30/2023) 180 Tablet 3    hydrOXYzine HCl (ATARAX) 25 MG Tab Take 1 Tablet by mouth 3 times a day as needed for Itching. (Patient not taking: Reported on 5/30/2023) 30 Tablet 0     No current facility-administered medications for this visit.       Social History     Tobacco Use    Smoking status: Never    Smokeless tobacco: Never   Vaping Use    Vaping Use: Never used   Substance Use Topics    Alcohol use: Yes     Comment: pint of vodka a day    Drug use: Yes     Types: Inhaled     Comment: meth on 4/14       Family History   Problem Relation Age of Onset    Lung Disease Mother     Cancer Mother          Physical Exam  Vitals:  /65 (BP Location: Right arm, Patient Position: Sitting, BP Cuff Size: Adult)   Pulse 91   Temp 36.4 °C (97.5 °F) (Temporal)   Ht 1.473 m (4' 10\")   Wt 62.3 kg (137 lb 6.4 oz)   SpO2 96%  Body mass index is 28.72 kg/m².  Constitutional:  Not in acute distress, well appearing.  HEENT:   NC/AT, EOMI, anicteric sclera, hearing grossly intact  Cardiovascular: Regular rate and rhythm. No murmurs or gallops.      Lungs:   Clear to auscultation bilaterally with good respiratory effort. No wheezes or crackles.  MSK: No tenderness to palpation of neck or back but with limited range in motion due to pain. Negative Lhermitte's sign  Skin:  Warm and dry.  No visible rashes.  Neurologic: Alert & oriented x 3, CN II-XII grossly intact, strength and sensation intact.  No focal deficits noted.  Psychiatric:  Tearful, no SI/HI    Assessment and Plan:     Problem List Items Addressed This Visit       Degenerative disc disease, cervical     Chronic neck and back pain x2 years, " "atraumatic  Neck pain is constant, 5-10/10, sharp and stabbing in character, with radiation to the left shoulder and upper chest area  Worsened by any head movement  Briefly relieved by use of Tylenol (around 500mg three times daily, occasionally taking 1000mg) and Voltaren gel  Denies any red flag signs/symptoms; denies heavy lifting or overhead activities  Pain is not radicular in nature  Has been unable to work due to ongoing pain  Sees physical therapy 2x/week and continues to do exercises and stretches at home  Following with neurosugery ([facility])-LUIGI Kapoor  Imaging ordered by ALEKSANDER reviewed with patient today: (5/23/23) - C spine xray: \"moderate osteoarthritic changes at the C4-5 level through the C6-7 level with moderate marginal osteophytosis. Mild degenerative anterolisthesis at the C2-3 level which does not change significantly with flexion\" and L spine xray: \"osteopenia, moderate dextroscoliosis of L spine with Arellano angle of 26 degrees, moderate discal degenerative changes at the L2-3 and L3-4 levels, mild degenerative retrolisthesis at the L2-3 level\"  -Explained to patient the significant findings of her imaging and encouraged her to keep her follow up with neurosurgery tomorrow, 5/31/23, to discuss in more detail  -Empathized with patient regarding her persistence of symptoms and difficulties/frustrations with slow progression of symptom relief and inability to contact pain management. Explained to patient that at this time, we can safely increase her Tylenol to 2g daily given her abstinence to alcohol and most recent labs showing normal liver function. Patient also noted some relief of pain with Voltaren gel, advised patient to increase from twice daily to 4 times daily application. Explained to patient that narcotics are not the recommended choice in addressing her symptoms and the risks/potential of abuse/dependence. Patient firmly stated that she is not seeking opioids but needs relief in " her symptoms as it is affecting her life significantly. Physical examination showed very tense trapezius muscles and discussed starting low dose Robaxin to help relax her muscles. Patient amenable. It was explained to patient that Flexiril would not be a good choice as a muscle relaxant given the current psychiatric medications she is taking. Patient also noted no relief in taking gabapentin despite increasing dose, explained to patient that the pain she is describing is not radicular in nature and that gabapentin is likely addressing alcohol withdrawal symptoms  -Encouraged patient to continue with physical therapy twice weekly  -For regular follow up with NSGY as they are the primary team in addressing her ongoing pain/concerns  -Another referral to pain management sent today and information for previous referral given to patient to call. Explained to patient that there may be other treatment modalities pain management can offer as they are better equipped at handling these symptoms  -Spoke with patient regarding red flag signs and symptoms of cord compression and ED precautions given           Relevant Medications    methocarbamol (ROBAXIN) 500 MG Tab    Other Relevant Orders    Referral to Pain Management       Return in about 4 weeks (around 6/27/2023) for discussion of oxygen requirements/GERD.    Margy Aparicio M.D. PGY-2  [facility]

## 2023-05-30 NOTE — PATIENT INSTRUCTIONS
Thank you for coming in today, Olya  You can take Tylenol, up to 2 grams per day. You can take 1 tablet 500mg every 6 hours or 2 tablets 1000mg twice daily  I have prescribed Robaxin, you can take up to 4 pills daily as needed for muscle spasms  You can also increase Voltaren gel to 4 times daily to help with your pain  Please speak with neurosurgery regarding the imaging finding you have. If you notice any electric shocks down your arms when you put your chin to your chest, any paralysis in the hands/feet, numbness in the groin area, or loss of bowel or bladder function - please call neurosurgery right away and go to the emergency room because that could mean your spinal cord is affected      Here is the information for pain medicine, please give them a call to make an appointment:  [facility]  Dr. Mark Garcia  [address]  [place] [address]  Phone: [phone]  Fax: [phone]

## 2023-05-30 NOTE — PROGRESS NOTES
Teaching Physician Attestation      Level of Participation    I have personally interviewed and examined the patient.  In addition, I discussed with the resident physician the patient's history, exam, assessment and plan in detail.  Topics listed in my addendum were the focus of the visit.  Healthcare maintenance was not addressed this visit unless listed as a topic in my addendum.  I agree with the plan as written along with the following additions/modifications:    Chronic neck and back pain in the setting of known spondylolisthesis at 2 sites, likely degenerative changes  -Patient has established with neurosurgery who ordered the imaging which identified the spondylolistheses, has follow-up appointment tomorrow.  Patient is requesting support for pain control.  When I encouraged the patient to follow-up with her neurosurgical team and continue with establishment of care with her pain management doctor, patient became tearful and stated that she felt no one was addressing her pain.  I discussed with the patient the significance of her diagnosis of spondylolisthesis, although this is atraumatic and likely can be managed nonoperatively did discuss specifically the risks of cord compression with vertebral movements, and given that imaging studies were obtained by neurosurgery they are the primary team managing and as a result for safety would defer to them for further management.  Patient is taking Tylenol in the setting of history of alcohol use disorder, currently sober.  Praised patient for her progress.  She is taking 500 mg once daily, given normal LFTs and no longer drinking alcohol discussed with patient she can increase up to 2 g daily safely.  Patient also reports some minimal improvement with Voltaren gel, is only using twice daily, counseled is safe to use up to 4 times daily.  Trapezius muscle spasms, counseled on the potential med interactions with Flexeril (a tricyclic analogue) with her existing  psychiatric medications, offered Robaxin low-dose as a possible alternative, patient initially hesitant but ultimately stated was willing to try this.  Patient is already following with physical therapy, encouraged continued follow-up.  Patient denies any radicular pain complaints at present, discussed that gabapentin is likely not helping significantly with her overall pain control related to her current symptoms, however given her recent sobriety gabapentin is likely helping with withdrawal symptoms, as such will not wean at present given that if patient withdraws this would complicate things further.  Patient reports issues with establishing care with her current pain management referral, new referral placed, explained the importance of pain management as part of a multimodal treatment plan, appreciate pain management support once they establish.  Explained to patient that ultimately the primary team's working on this will be her neurosurgical team and also her pain management team given the presence of's spondylolisthesis and also the chronic nature of her pain.  As alluded to above, explicitly discussed cord compression symptoms and Lhermitte sign, and the importance of immediately contacting her neurosurgical team if any of these were to develop.  Appreciate neurosurgery and pain management support.    Encournaged lab f/u ordrered at prev visit

## 2023-05-31 NOTE — ASSESSMENT & PLAN NOTE
"Chronic neck and back pain x2 years, atraumatic  Neck pain is constant, 5-10/10, sharp and stabbing in character, with radiation to the left shoulder and upper chest area  Worsened by any head movement  Briefly relieved by use of Tylenol (around 500mg three times daily, occasionally taking 1000mg) and Voltaren gel  Denies any red flag signs/symptoms; denies heavy lifting or overhead activities  Pain is not radicular in nature  Has been unable to work due to ongoing pain  Sees physical therapy 2x/week and continues to do exercises and stretches at home  Following with neurosugery ([facility])-LUIGI Kapoor  Imaging ordered by ALEKSANDER reviewed with patient today: (5/23/23) - C spine xray: \"moderate osteoarthritic changes at the C4-5 level through the C6-7 level with moderate marginal osteophytosis. Mild degenerative anterolisthesis at the C2-3 level which does not change significantly with flexion\" and L spine xray: \"osteopenia, moderate dextroscoliosis of L spine with Arellano angle of 26 degrees, moderate discal degenerative changes at the L2-3 and L3-4 levels, mild degenerative retrolisthesis at the L2-3 level\"  -Explained to patient the significant findings of her imaging and encouraged her to keep her follow up with neurosurgery tomorrow, 5/31/23, to discuss in more detail  -Empathized with patient regarding her persistence of symptoms and difficulties/frustrations with slow progression of symptom relief and inability to contact pain management. Explained to patient that at this time, we can safely increase her Tylenol to 2g daily given her abstinence to alcohol and most recent labs showing normal liver function. Patient also noted some relief of pain with Voltaren gel, advised patient to increase from twice daily to 4 times daily application. Explained to patient that narcotics are not the recommended choice in addressing her symptoms and the risks/potential of abuse/dependence. Patient firmly stated that she is not " seeking opioids but needs relief in her symptoms as it is affecting her life significantly. Physical examination showed very tense trapezius muscles and discussed starting low dose Robaxin to help relax her muscles. Patient amenable. It was explained to patient that Flexiril would not be a good choice as a muscle relaxant given the current psychiatric medications she is taking. Patient also noted no relief in taking gabapentin despite increasing dose, explained to patient that the pain she is describing is not radicular in nature and that gabapentin is likely addressing alcohol withdrawal symptoms  -Encouraged patient to continue with physical therapy twice weekly  -For regular follow up with NSGY as they are the primary team in addressing her ongoing pain/concerns  -Another referral to pain management sent today and information for previous referral given to patient to call. Explained to patient that there may be other treatment modalities pain management can offer as they are better equipped at handling these symptoms  -Spoke with patient regarding red flag signs and symptoms of cord compression and ED precautions given

## 2023-06-18 ENCOUNTER — HOSPITAL ENCOUNTER (EMERGENCY)
Facility: MEDICAL CENTER | Age: 53
End: 2023-06-18
Attending: EMERGENCY MEDICINE
Payer: COMMERCIAL

## 2023-06-18 VITALS
SYSTOLIC BLOOD PRESSURE: 129 MMHG | HEIGHT: 58 IN | BODY MASS INDEX: 28.14 KG/M2 | RESPIRATION RATE: 17 BRPM | WEIGHT: 134.04 LBS | TEMPERATURE: 97.4 F | OXYGEN SATURATION: 98 % | DIASTOLIC BLOOD PRESSURE: 69 MMHG | HEART RATE: 85 BPM

## 2023-06-18 DIAGNOSIS — T14.8XXA WOUND INFECTION: ICD-10-CM

## 2023-06-18 DIAGNOSIS — K70.10 ALCOHOLIC HEPATITIS WITHOUT ASCITES: ICD-10-CM

## 2023-06-18 DIAGNOSIS — L08.9 WOUND INFECTION: ICD-10-CM

## 2023-06-18 DIAGNOSIS — D64.9 CHRONIC ANEMIA: ICD-10-CM

## 2023-06-18 LAB
ALBUMIN SERPL BCP-MCNC: 4.2 G/DL (ref 3.2–4.9)
ALBUMIN/GLOB SERPL: 1.1 G/DL
ALP SERPL-CCNC: 149 U/L (ref 30–99)
ALT SERPL-CCNC: 20 U/L (ref 2–50)
ANION GAP SERPL CALC-SCNC: 15 MMOL/L (ref 7–16)
ANISOCYTOSIS BLD QL SMEAR: ABNORMAL
AST SERPL-CCNC: 48 U/L (ref 12–45)
BASOPHILS # BLD AUTO: 0.8 % (ref 0–1.8)
BASOPHILS # BLD: 0.05 K/UL (ref 0–0.12)
BILIRUB SERPL-MCNC: 0.4 MG/DL (ref 0.1–1.5)
BUN SERPL-MCNC: 5 MG/DL (ref 8–22)
CALCIUM ALBUM COR SERPL-MCNC: 8.8 MG/DL (ref 8.5–10.5)
CALCIUM SERPL-MCNC: 9 MG/DL (ref 8.5–10.5)
CHLORIDE SERPL-SCNC: 96 MMOL/L (ref 96–112)
CO2 SERPL-SCNC: 26 MMOL/L (ref 20–33)
COMMENT 1642: NORMAL
CREAT SERPL-MCNC: 0.33 MG/DL (ref 0.5–1.4)
EOSINOPHIL # BLD AUTO: 0.13 K/UL (ref 0–0.51)
EOSINOPHIL NFR BLD: 2.1 % (ref 0–6.9)
ERYTHROCYTE [DISTWIDTH] IN BLOOD BY AUTOMATED COUNT: 48.8 FL (ref 35.9–50)
GFR SERPLBLD CREATININE-BSD FMLA CKD-EPI: 122 ML/MIN/1.73 M 2
GLOBULIN SER CALC-MCNC: 3.8 G/DL (ref 1.9–3.5)
GLUCOSE SERPL-MCNC: 101 MG/DL (ref 65–99)
HCT VFR BLD AUTO: 32.2 % (ref 37–47)
HGB BLD-MCNC: 9 G/DL (ref 12–16)
HYPOCHROMIA BLD QL SMEAR: ABNORMAL
IMM GRANULOCYTES # BLD AUTO: 0.02 K/UL (ref 0–0.11)
IMM GRANULOCYTES NFR BLD AUTO: 0.3 % (ref 0–0.9)
LIPASE SERPL-CCNC: 39 U/L (ref 11–82)
LYMPHOCYTES # BLD AUTO: 1.76 K/UL (ref 1–4.8)
LYMPHOCYTES NFR BLD: 28.4 % (ref 22–41)
MACROCYTES BLD QL SMEAR: ABNORMAL
MCH RBC QN AUTO: 21.6 PG (ref 27–33)
MCHC RBC AUTO-ENTMCNC: 28 G/DL (ref 32.2–35.5)
MCV RBC AUTO: 77.2 FL (ref 81.4–97.8)
MICROCYTES BLD QL SMEAR: ABNORMAL
MONOCYTES # BLD AUTO: 0.62 K/UL (ref 0–0.85)
MONOCYTES NFR BLD AUTO: 10 % (ref 0–13.4)
MORPHOLOGY BLD-IMP: NORMAL
NEUTROPHILS # BLD AUTO: 3.62 K/UL (ref 1.82–7.42)
NEUTROPHILS NFR BLD: 58.4 % (ref 44–72)
NRBC # BLD AUTO: 0 K/UL
NRBC BLD-RTO: 0 /100 WBC (ref 0–0.2)
PLATELET # BLD AUTO: 115 K/UL (ref 164–446)
PLATELET BLD QL SMEAR: NORMAL
PMV BLD AUTO: 8.7 FL (ref 9–12.9)
POLYCHROMASIA BLD QL SMEAR: NORMAL
POTASSIUM SERPL-SCNC: 3.2 MMOL/L (ref 3.6–5.5)
PROT SERPL-MCNC: 8 G/DL (ref 6–8.2)
RBC # BLD AUTO: 4.17 M/UL (ref 4.2–5.4)
RBC BLD AUTO: PRESENT
SODIUM SERPL-SCNC: 137 MMOL/L (ref 135–145)
WBC # BLD AUTO: 6.2 K/UL (ref 4.8–10.8)

## 2023-06-18 PROCEDURE — 36415 COLL VENOUS BLD VENIPUNCTURE: CPT

## 2023-06-18 PROCEDURE — 85025 COMPLETE CBC W/AUTO DIFF WBC: CPT

## 2023-06-18 PROCEDURE — 700102 HCHG RX REV CODE 250 W/ 637 OVERRIDE(OP): Mod: UD | Performed by: EMERGENCY MEDICINE

## 2023-06-18 PROCEDURE — 99285 EMERGENCY DEPT VISIT HI MDM: CPT

## 2023-06-18 PROCEDURE — 80053 COMPREHEN METABOLIC PANEL: CPT

## 2023-06-18 PROCEDURE — 83690 ASSAY OF LIPASE: CPT

## 2023-06-18 PROCEDURE — A9270 NON-COVERED ITEM OR SERVICE: HCPCS | Mod: UD | Performed by: EMERGENCY MEDICINE

## 2023-06-18 RX ORDER — DOXYCYCLINE 100 MG/1
100 TABLET ORAL ONCE
Status: COMPLETED | OUTPATIENT
Start: 2023-06-18 | End: 2023-06-18

## 2023-06-18 RX ORDER — DOXYCYCLINE 100 MG/1
100 CAPSULE ORAL 2 TIMES DAILY
Qty: 14 CAPSULE | Refills: 0 | Status: ACTIVE | OUTPATIENT
Start: 2023-06-18 | End: 2023-06-25

## 2023-06-18 RX ADMIN — DOXYCYCLINE 100 MG: 100 TABLET, FILM COATED ORAL at 22:47

## 2023-06-18 ASSESSMENT — FIBROSIS 4 INDEX: FIB4 SCORE: 3.74

## 2023-06-19 NOTE — ED NOTES
Discharge instructions reviewed with patient and signed. They were instructed on how to  prescriptions. They verbalized understanding of follow up instructions. All belongings with patient. They ambulate with a steady gait. She is calling for [facility] cab home

## 2023-06-19 NOTE — ED PROVIDER NOTES
ED Provider Note    CHIEF COMPLAINT  Chief Complaint   Patient presents with    Wound Check     Patient to triage via ems from home for a wound check to left shin, history of MRSA, reports abscess formed a few days ago, popped it today, green puss out put, patient also has c/o 'liver pain'        HPI  Olya Youssef is a 52 y.o. female who presents for evaluation of a lesion to her left pretibial region which she thinks is infected with MRSA.  She wants to be tested for this and notes that the pain is extreme.  She notes no fevers or chills and no red streaking up the leg.  She notes that she pressed on it earlier and purulent secretions came out.  Currently it is scabbed.  She also notes that she has right upper quadrant pain which she thinks is related to her liver.  She is requesting testing for this.  Patient notes that she has been drinking heavily these past few days to deal with the pain in her leg because nobody will give her pain medication.  EXTERNAL RECORDS REVIEWED  Reviewed last ED visit on 16 April for alcohol intoxication, anemia, and headache  ROS  Constitutional: No fevers or chills  Skin: She has noted  HEENT: No sore throat, runny nose  Neck: No neck pain  Chest: No pain or rashes  Pulm: No shortness of breath, cough, wheezing, stridor, or pain with inspiration/expiration  Gastrointestinal: No nausea, vomiting, diarrhea, constipation, bloating, melena, hematochezia Genitourinary: No dysuria or hematuria  Musculoskeletal: No pain, swelling, or weakness  Neurologic: No sensory or focal motor changes to extremities. No confusion or disorientation.  Heme: No bleeding or bruising problems.   Immuno: No hx of recurrent infections        LIMITATION TO HISTORY   None  OUTSIDE HISTORIAN(S):  none        PAST FAM HISTORY  Family History   Problem Relation Age of Onset    Lung Disease Mother     Cancer Mother        PAST MEDICAL HISTORY   has a past medical history of Alcohol abuse, Alcohol  "intoxication, with unspecified complication (HCC) (8/7/2013), Alcoholism (HCC), Anxiety, Backpain, Bipolar disorder, unspecified (HCC), Bronchitis, Degenerative disc disease, cervical (3/28/2023), Drug abuse (HCC), Hepatitis, alcoholic, Hypertension, Indigestion, Infectious disease (MRSA), Liver disease, Pancreatitis chronic, Pneumonia, Psychiatric disorder, Renal disorder, Seizure (HCC), Seizure disorder (HCC), and Unspecified hemorrhagic conditions.    SOCIAL HISTORY  Social History     Tobacco Use    Smoking status: Never    Smokeless tobacco: Never   Vaping Use    Vaping Use: Never used   Substance and Sexual Activity    Alcohol use: Yes     Comment: pint of vodka a day    Drug use: Yes     Types: Inhaled     Comment: meth on 4/14    Sexual activity: Not on file       SURGICAL HISTORY   has a past surgical history that includes gastroscopy (4/28/2015).    CURRENT MEDICATIONS  Home Medications    **Home medications have not yet been reviewed for this encounter**          ALLERGIES  Allergies   Allergen Reactions    Bactrim Hives    Lamotrigine [Lamictal] Hives     Tolerated Fioricet 10/3/22    Penicillin G     Quetiapine Fumarate Hives     Extrapyramidal Symptoms, can tolerate lower doses    Quetiapine Fumarate Hives    Sulfa Drugs Hives    Keflex Hives       PHYSICAL EXAM  VITAL SIGNS: /69   Pulse 85   Temp 36.3 °C (97.4 °F)   Resp 17   Ht 1.473 m (4' 10\")   Wt 60.8 kg (134 lb 0.6 oz)   LMP 02/28/2018   SpO2 98%   BMI 28.01 kg/m²    Gen: Alert in no apparent distress.  HEENT: No signs of trauma, Bilateral external ears normal, Nose normal. Conjunctiva normal, Non-icteric.   Cardiovascular: Regular rate and rhythm, no murmurs.  Capillary refill less than 3 seconds to all extremities, 2+ distal pulses.  Thorax & Lungs: Normal breath sounds, No respiratory distress, No wheezing bilateral chest rise  Abdomen: Bowel sounds normal, Soft, mild right upper quadrant and costal margin tenderness, No masses, " No pulsatile masses. No Guarding or rebound  Skin: Warm, Dry.   Extremities: Intact distal pulses, No edema.  Small lesion with about a 1 cm diameter area of scab in the middle.  There is mild, blanching, poorly demarcated erythema surrounding as pictured below.  Neurologic: Alert , no facial droop, grossly normal coordination and strength  Psychiatric: Affect pleasant      INITIAL IMPRESSION  Patient arrives for evaluation of symptoms that are likely related to her alcoholism but she also has a small lesion which may be locally infected.  At most this would be cellulitis as there is certainly no evidence for a pustular content and I do not feel imaging of the area is necessary.  We will perform screening laboratory evaluation as she is worried about her right upper quadrant pain.  This is not over the area of the gallbladder and she does not have peritoneal signs.  Unclear if imaging will be necessary but I will result the labs and reevaluate the patient.  If she is improving and has not experienced any deterioration, likely imaging will not be necessary.  LABS  Results for orders placed or performed during the hospital encounter of 06/18/23   CBC WITH DIFFERENTIAL   Result Value Ref Range    WBC 6.2 4.8 - 10.8 K/uL    RBC 4.17 (L) 4.20 - 5.40 M/uL    Hemoglobin 9.0 (L) 12.0 - 16.0 g/dL    Hematocrit 32.2 (L) 37.0 - 47.0 %    MCV 77.2 (L) 81.4 - 97.8 fL    MCH 21.6 (L) 27.0 - 33.0 pg    MCHC 28.0 (L) 32.2 - 35.5 g/dL    RDW 48.8 35.9 - 50.0 fL    Platelet Count 115 (L) 164 - 446 K/uL    MPV 8.7 (L) 9.0 - 12.9 fL    Neutrophils-Polys 58.40 44.00 - 72.00 %    Lymphocytes 28.40 22.00 - 41.00 %    Monocytes 10.00 0.00 - 13.40 %    Eosinophils 2.10 0.00 - 6.90 %    Basophils 0.80 0.00 - 1.80 %    Immature Granulocytes 0.30 0.00 - 0.90 %    Nucleated RBC 0.00 0.00 - 0.20 /100 WBC    Neutrophils (Absolute) 3.62 1.82 - 7.42 K/uL    Lymphs (Absolute) 1.76 1.00 - 4.80 K/uL    Monos (Absolute) 0.62 0.00 - 0.85 K/uL    Eos  (Absolute) 0.13 0.00 - 0.51 K/uL    Baso (Absolute) 0.05 0.00 - 0.12 K/uL    Immature Granulocytes (abs) 0.02 0.00 - 0.11 K/uL    NRBC (Absolute) 0.00 K/uL    Hypochromia 1+     Anisocytosis 1+     Macrocytosis 1+     Microcytosis 1+    COMP METABOLIC PANEL   Result Value Ref Range    Sodium 137 135 - 145 mmol/L    Potassium 3.2 (L) 3.6 - 5.5 mmol/L    Chloride 96 96 - 112 mmol/L    Co2 26 20 - 33 mmol/L    Anion Gap 15.0 7.0 - 16.0    Glucose 101 (H) 65 - 99 mg/dL    Bun 5 (L) 8 - 22 mg/dL    Creatinine 0.33 (L) 0.50 - 1.40 mg/dL    Calcium 9.0 8.5 - 10.5 mg/dL    AST(SGOT) 48 (H) 12 - 45 U/L    ALT(SGPT) 20 2 - 50 U/L    Alkaline Phosphatase 149 (H) 30 - 99 U/L    Total Bilirubin 0.4 0.1 - 1.5 mg/dL    Albumin 4.2 3.2 - 4.9 g/dL    Total Protein 8.0 6.0 - 8.2 g/dL    Globulin 3.8 (H) 1.9 - 3.5 g/dL    A-G Ratio 1.1 g/dL   LIPASE   Result Value Ref Range    Lipase 39 11 - 82 U/L   ESTIMATED GFR   Result Value Ref Range    GFR (CKD-EPI) 122 >60 mL/min/1.73 m 2   CORRECTED CALCIUM   Result Value Ref Range    Correct Calcium 8.8 8.5 - 10.5 mg/dL   PLATELET ESTIMATE   Result Value Ref Range    Plt Estimation Decreased    MORPHOLOGY   Result Value Ref Range    RBC Morphology Present     Polychromia 1+    PERIPHERAL SMEAR REVIEW   Result Value Ref Range    Peripheral Smear Review see below    DIFFERENTIAL COMMENT   Result Value Ref Range    Comments-Diff see below          COURSE & MEDICAL DECISION MAKING  Pertinent Labs & Imaging studies reviewed. (See chart for details)  ED observation? No  Patient arrives for what appears to be a small area of possibly infected skin to her lower extremity but no evidence for sepsis or lymphangitis.  Feel it is reasonable to treat empirically with doxycycline as she is very concerned about this.  She has right upper quadrant pain most likely due to her alcoholic hepatitis.  She did not have peritoneal signs and I did not feel imaging of the right upper quadrant will benefit the patient  or .  She states understanding that she needs to cut down on drinking and to follow-up with her primary care physician.  She will return if symptoms worsen or change in any way.    I have discussed management of the patient with the following physicians and TWILA's: None    Escalation of care considered, and ultimately not performed: CT or ultrasound imaging of abdomen    Barriers to care at this time, including but not limited to: None.     Decision tools and Rx drugs considered including, but not limited to : Antibiotics    Discussion of management with other [facility] or appropriate source(s): None    The patient will not drink alcohol nor drive with prescribed medications. The patient will return for worsening symptoms and is stable at the time of discharge. The patient verbalizes understanding and will comply.    FINAL IMPRESSION  1. Wound infection    2. Alcoholic hepatitis without ascites    3. Chronic anemia        Electronically signed by: Bernardino Vázquez M.D., 6/18/2023 10:18 PM

## 2023-06-19 NOTE — ED TRIAGE NOTES
"Chief Complaint   Patient presents with    Wound Check     Patient to triage via ems from home for a wound check to left shin, history of MRSA, reports abscess formed a few days ago, popped it today, green puss out put, patient also has c/o 'liver pain'        /76   Pulse 93   Temp 36.3 °C (97.4 °F) (Temporal)   Resp 16   Ht 1.473 m (4' 10\")   Wt 60.8 kg (134 lb 0.6 oz)   SpO2 100%     Patient educated on ed triage process, instructed to notify staff of any new or worsening symptoms, verbalizes understanding. Patient returned to ed lobby, apologized for wait times.     "

## 2023-06-26 ENCOUNTER — APPOINTMENT (OUTPATIENT)
Dept: RADIOLOGY | Facility: MEDICAL CENTER | Age: 53
End: 2023-06-26
Attending: EMERGENCY MEDICINE
Payer: COMMERCIAL

## 2023-06-26 ENCOUNTER — HOSPITAL ENCOUNTER (EMERGENCY)
Facility: MEDICAL CENTER | Age: 53
End: 2023-06-26
Attending: EMERGENCY MEDICINE
Payer: COMMERCIAL

## 2023-06-26 VITALS
HEIGHT: 58 IN | DIASTOLIC BLOOD PRESSURE: 75 MMHG | TEMPERATURE: 96.5 F | BODY MASS INDEX: 28.18 KG/M2 | OXYGEN SATURATION: 95 % | WEIGHT: 134.26 LBS | SYSTOLIC BLOOD PRESSURE: 125 MMHG | RESPIRATION RATE: 16 BRPM | HEART RATE: 86 BPM

## 2023-06-26 DIAGNOSIS — D69.2 PURPURA (HCC): ICD-10-CM

## 2023-06-26 DIAGNOSIS — F10.10 ALCOHOL ABUSE: ICD-10-CM

## 2023-06-26 DIAGNOSIS — Z51.89 ENCOUNTER FOR WOUND RE-CHECK: ICD-10-CM

## 2023-06-26 LAB
ALBUMIN SERPL BCP-MCNC: 4.5 G/DL (ref 3.2–4.9)
ALBUMIN/GLOB SERPL: 1.3 G/DL
ALP SERPL-CCNC: 150 U/L (ref 30–99)
ALT SERPL-CCNC: 20 U/L (ref 2–50)
AMMONIA PLAS-SCNC: 20 UMOL/L (ref 11–45)
ANION GAP SERPL CALC-SCNC: 13 MMOL/L (ref 7–16)
ANISOCYTOSIS BLD QL SMEAR: ABNORMAL
APTT PPP: 34.6 SEC (ref 24.7–36)
AST SERPL-CCNC: 47 U/L (ref 12–45)
BASOPHILS # BLD AUTO: 0.8 % (ref 0–1.8)
BASOPHILS # BLD: 0.04 K/UL (ref 0–0.12)
BILIRUB SERPL-MCNC: 0.5 MG/DL (ref 0.1–1.5)
BUN SERPL-MCNC: 7 MG/DL (ref 8–22)
CALCIUM ALBUM COR SERPL-MCNC: 9.1 MG/DL (ref 8.5–10.5)
CALCIUM SERPL-MCNC: 9.5 MG/DL (ref 8.5–10.5)
CHLORIDE SERPL-SCNC: 100 MMOL/L (ref 96–112)
CO2 SERPL-SCNC: 24 MMOL/L (ref 20–33)
COMMENT 1642: NORMAL
CREAT SERPL-MCNC: 0.41 MG/DL (ref 0.5–1.4)
CRP SERPL HS-MCNC: 0.47 MG/DL (ref 0–0.75)
EOSINOPHIL # BLD AUTO: 0.27 K/UL (ref 0–0.51)
EOSINOPHIL NFR BLD: 5.3 % (ref 0–6.9)
ERYTHROCYTE [DISTWIDTH] IN BLOOD BY AUTOMATED COUNT: 51.4 FL (ref 35.9–50)
ERYTHROCYTE [SEDIMENTATION RATE] IN BLOOD BY WESTERGREN METHOD: 28 MM/HOUR (ref 0–25)
ETHANOL BLD-MCNC: 90.7 MG/DL
FIBRINOGEN PPP-MCNC: 365 MG/DL (ref 215–460)
GFR SERPLBLD CREATININE-BSD FMLA CKD-EPI: 116 ML/MIN/1.73 M 2
GLOBULIN SER CALC-MCNC: 3.6 G/DL (ref 1.9–3.5)
GLUCOSE SERPL-MCNC: 90 MG/DL (ref 65–99)
HCT VFR BLD AUTO: 33.4 % (ref 37–47)
HGB BLD-MCNC: 8.9 G/DL (ref 12–16)
HYPOCHROMIA BLD QL SMEAR: ABNORMAL
IMM GRANULOCYTES # BLD AUTO: 0.02 K/UL (ref 0–0.11)
IMM GRANULOCYTES NFR BLD AUTO: 0.4 % (ref 0–0.9)
INR PPP: 1.2 (ref 0.87–1.13)
LACTATE SERPL-SCNC: 2 MMOL/L (ref 0.5–2)
LIPASE SERPL-CCNC: 48 U/L (ref 11–82)
LYMPHOCYTES # BLD AUTO: 1.35 K/UL (ref 1–4.8)
LYMPHOCYTES NFR BLD: 26.7 % (ref 22–41)
MCH RBC QN AUTO: 21.1 PG (ref 27–33)
MCHC RBC AUTO-ENTMCNC: 26.6 G/DL (ref 32.2–35.5)
MCV RBC AUTO: 79.3 FL (ref 81.4–97.8)
MICROCYTES BLD QL SMEAR: ABNORMAL
MONOCYTES # BLD AUTO: 0.56 K/UL (ref 0–0.85)
MONOCYTES NFR BLD AUTO: 11.1 % (ref 0–13.4)
MORPHOLOGY BLD-IMP: NORMAL
NEUTROPHILS # BLD AUTO: 2.82 K/UL (ref 1.82–7.42)
NEUTROPHILS NFR BLD: 55.7 % (ref 44–72)
NRBC # BLD AUTO: 0 K/UL
NRBC BLD-RTO: 0 /100 WBC (ref 0–0.2)
PLATELET # BLD AUTO: 129 K/UL (ref 164–446)
PLATELET BLD QL SMEAR: NORMAL
PLATELETS.RETICULATED NFR BLD AUTO: 2.8 % (ref 0.6–13.1)
PMV BLD AUTO: 8.7 FL (ref 9–12.9)
POTASSIUM SERPL-SCNC: 3.3 MMOL/L (ref 3.6–5.5)
PROT SERPL-MCNC: 8.1 G/DL (ref 6–8.2)
PROTHROMBIN TIME: 15 SEC (ref 12–14.6)
RBC # BLD AUTO: 4.21 M/UL (ref 4.2–5.4)
RBC BLD AUTO: PRESENT
SODIUM SERPL-SCNC: 137 MMOL/L (ref 135–145)
WBC # BLD AUTO: 5.1 K/UL (ref 4.8–10.8)

## 2023-06-26 PROCEDURE — 85384 FIBRINOGEN ACTIVITY: CPT

## 2023-06-26 PROCEDURE — 83690 ASSAY OF LIPASE: CPT

## 2023-06-26 PROCEDURE — 85730 THROMBOPLASTIN TIME PARTIAL: CPT

## 2023-06-26 PROCEDURE — 85652 RBC SED RATE AUTOMATED: CPT

## 2023-06-26 PROCEDURE — 82077 ASSAY SPEC XCP UR&BREATH IA: CPT

## 2023-06-26 PROCEDURE — 73590 X-RAY EXAM OF LOWER LEG: CPT | Mod: RT

## 2023-06-26 PROCEDURE — 82140 ASSAY OF AMMONIA: CPT

## 2023-06-26 PROCEDURE — 83605 ASSAY OF LACTIC ACID: CPT

## 2023-06-26 PROCEDURE — 73590 X-RAY EXAM OF LOWER LEG: CPT | Mod: LT

## 2023-06-26 PROCEDURE — 87040 BLOOD CULTURE FOR BACTERIA: CPT | Mod: 91

## 2023-06-26 PROCEDURE — 71045 X-RAY EXAM CHEST 1 VIEW: CPT

## 2023-06-26 PROCEDURE — 86140 C-REACTIVE PROTEIN: CPT

## 2023-06-26 PROCEDURE — 85610 PROTHROMBIN TIME: CPT

## 2023-06-26 PROCEDURE — 99283 EMERGENCY DEPT VISIT LOW MDM: CPT | Mod: 25

## 2023-06-26 PROCEDURE — 85025 COMPLETE CBC W/AUTO DIFF WBC: CPT

## 2023-06-26 PROCEDURE — 36415 COLL VENOUS BLD VENIPUNCTURE: CPT

## 2023-06-26 PROCEDURE — 80053 COMPREHEN METABOLIC PANEL: CPT

## 2023-06-26 PROCEDURE — 85055 RETICULATED PLATELET ASSAY: CPT

## 2023-06-26 RX ORDER — DOXYCYCLINE 100 MG/1
100 CAPSULE ORAL 2 TIMES DAILY
Qty: 20 CAPSULE | Refills: 0 | Status: ACTIVE | OUTPATIENT
Start: 2023-06-26 | End: 2023-07-05

## 2023-06-26 ASSESSMENT — FIBROSIS 4 INDEX: FIB4 SCORE: 5.04

## 2023-06-26 NOTE — ED TRIAGE NOTES
"Chief Complaint   Patient presents with    Wound Check     Pt was seen about a week ago for a circular wound to the left shin.  Pt was discharged on doxycycline.  Pt has been compliant with it. Pt states she has an itchy rash around the site.  Pt has red rash around the site. Pt now has a new wound to her right shin.       /73   Pulse 91   Temp 35.8 °C (96.5 °F) (Temporal)   Resp 18   Ht 1.473 m (4' 10\")   Wt 60.9 kg (134 lb 4.2 oz)   LMP 02/28/2018   SpO2 95%   BMI 28.06 kg/m²     Pt ambulatory from lobby with steady gait. Pt on oxygen tank. Pt wears 2L at baseline.     Pt is alert and oriented, speaking in full sentences, follows commands and responds appropriately to questions. Resp are even and unlabored.      Pt placed in lobby. Pt educated on triage process. Pt encouraged to alert staff for any changes.     Patient and staff wearing appropriate PPE    "

## 2023-06-27 NOTE — ED PROVIDER NOTES
ED Provider Note    CHIEF COMPLAINT  Chief Complaint   Patient presents with    Wound Check     Pt was seen about a week ago for a circular wound to the left shin.  Pt was discharged on doxycycline.  Pt has been compliant with it. Pt states she has an itchy rash around the site.  Pt has red rash around the site. Pt now has a new wound to her right shin.         EXTERNAL RECORDS REVIEWED  Outpatient Notes was seen in her primary care physician's office on 5/30/2023 for cervical degenerative disc disease and chronic neck pain for to neurosurgery    HPI/ROS  LIMITATION TO HISTORY   Select: : None  OUTSIDE HISTORIAN(S):  none    Olya Youssef is a 52 y.o. female who presents sending of her lower leg wound.  She was seen 6/18/2023 for a lesion on her left lower leg that she thought was MRSA because she had MRSA in the past.  She was started on doxycycline.  She states that the wound in her left leg is gotten worse and now it spread to her right leg and she has noticed small areas of erythema tracking up her left leg.  She is not on any blood thinners.  She states that she has had a few weeks of abdominal pain and diarrhea.  She denies any fevers or chills dizziness or lightheadedness.  She states that she has been using methamphetamines to control the pain in her legs and her abdomen.  She denies being diabetic.  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  Had any fevers or chills.  She is able to walk.    PAST MEDICAL HISTORY   has a past medical history of Alcohol abuse, Alcohol intoxication, with unspecified complication (HCC) (8/7/2013), Alcoholism ([facility]), Anxiety, Backpain, Bipolar disorder, unspecified ([facility]), Bronchitis, Degenerative disc disease, cervical (3/28/2023), Drug abuse ([facility]), Hepatitis, alcoholic, Hypertension, Indigestion, Infectious disease ([facility]), Liver disease, Pancreatitis chronic, Pneumonia, Psychiatric disorder, Renal disorder, Seizure (HCC), Seizure disorder (HCC), and  "Unspecified hemorrhagic conditions.    SURGICAL HISTORY   has a past surgical history that includes gastroscopy (4/28/2015).    FAMILY HISTORY  Family History   Problem Relation Age of Onset    Lung Disease Mother     Cancer Mother        SOCIAL HISTORY  Social History     Tobacco Use    Smoking status: Never    Smokeless tobacco: Never   Vaping Use    Vaping Use: Never used   Substance and Sexual Activity    Alcohol use: Yes     Comment: a couple beers a day    Drug use: Not Currently     Types: Inhaled     Comment: meth on 4/14    Sexual activity: Not on file       CURRENT MEDICATIONS  Home Medications       Reviewed by Shanita Hernadez R.N. (Registered Nurse) on 06/26/23 at 1531  Med List Status: Not Addressed     Medication Last Dose Status   amLODIPine (NORVASC) 2.5 MG Tab  Active   atorvastatin (LIPITOR) 40 MG Tab  Active   busPIRone (BUSPAR) 7.5 MG tablet  Active   diclofenac sodium (VOLTAREN) 1 % Gel  Active   famotidine (PEPCID) 20 MG Tab  Active   fluoxetine (PROZAC) 40 MG capsule  Active   gabapentin (NEURONTIN) 600 MG tablet  Active   meloxicam (MOBIC) 15 MG tablet  Active   methocarbamol (ROBAXIN) 500 MG Tab  Active   traZODone (DESYREL) 50 MG Tab  Active                    ALLERGIES  Allergies   Allergen Reactions    Bactrim Hives    Lamotrigine [Lamictal] Hives     Tolerated Fioricet 10/3/22    Penicillin G     Quetiapine Fumarate Hives     Extrapyramidal Symptoms, can tolerate lower doses    Quetiapine Fumarate Hives    Sulfa Drugs Hives    Keflex Hives       PHYSICAL EXAM  VITAL SIGNS: /73   Pulse 91   Temp 35.8 °C (96.5 °F) (Temporal)   Resp 18   Ht 1.473 m (4' 10\")   Wt 60.9 kg (134 lb 4.2 oz)   LMP 02/28/2018   SpO2 95%   BMI 28.06 kg/m²      Constitutional: Well developed, Well nourished, No acute distress, Non-toxic appearance.   HEENT: Normocephalic, Atraumatic,  external ears normal, pharynx pink,  Mucous  Membranes moist, No rhinorrhea or mucosal edema  Eyes: PERRL, EOMI, " Conjunctiva normal, No discharge.   Neck: Normal range of motion, No tenderness, Supple, No stridor.   Lymphatic: No lymphadenopathy    Cardiovascular: Regular Rate and Rhythm, No murmurs,  rubs, or gallops.   Thorax & Lungs: Lungs clear to auscultation bilaterally, No respiratory distress, No wheezes, rhales or rhonchi, No chest wall tenderness.   Abdomen: Bowel sounds normal, Soft, non tender, non distended,  No pulsatile masses., no rebound guarding or peritoneal signs.   Skin: Warm, Dry, No erythema, No rash, of bilateral lower extremity erythema with petechiae tracking up her left leg and some petechiae on her right leg  Back:  No CVA tenderness,  No spinal tenderness, bony crepitance step offs or instability.   Extremities: Equal, intact distal pulses, No cyanosis, clubbing or edema,  No tenderness.  Bilateral lower extremity lesions with surrounding petechiae.  There is some purpura that is itchy but not painful.  There is no subcutaneous emphysema she has no joint pain or instability and distally she is neurovascular intact  Musculoskeletal: Good range of motion in all major joints. No tenderness to palpation or major deformities noted.   Neurologic: Alert & oriented No focal deficits noted.   Psychiatric: Affect normal, Judgment normal, Mood normal.       DIAGNOSTIC STUDIES / PROCEDURES  EKG  I have independently interpreted this EKG  None    LABS  Results for orders placed or performed during the hospital encounter of 06/26/23   LACTIC ACID   Result Value Ref Range    Lactic Acid 2.0 0.5 - 2.0 mmol/L   CBC WITH DIFFERENTIAL   Result Value Ref Range    WBC 5.1 4.8 - 10.8 K/uL    RBC 4.21 4.20 - 5.40 M/uL    Hemoglobin 8.9 (L) 12.0 - 16.0 g/dL    Hematocrit 33.4 (L) 37.0 - 47.0 %    MCV 79.3 (L) 81.4 - 97.8 fL    MCH 21.1 (L) 27.0 - 33.0 pg    MCHC 26.6 (L) 32.2 - 35.5 g/dL    RDW 51.4 (H) 35.9 - 50.0 fL    Platelet Count 129 (L) 164 - 446 K/uL    MPV 8.7 (L) 9.0 - 12.9 fL    Neutrophils-Polys 55.70 44.00 -  72.00 %    Lymphocytes 26.70 22.00 - 41.00 %    Monocytes 11.10 0.00 - 13.40 %    Eosinophils 5.30 0.00 - 6.90 %    Basophils 0.80 0.00 - 1.80 %    Immature Granulocytes 0.40 0.00 - 0.90 %    Nucleated RBC 0.00 0.00 - 0.20 /100 WBC    Neutrophils (Absolute) 2.82 1.82 - 7.42 K/uL    Lymphs (Absolute) 1.35 1.00 - 4.80 K/uL    Monos (Absolute) 0.56 0.00 - 0.85 K/uL    Eos (Absolute) 0.27 0.00 - 0.51 K/uL    Baso (Absolute) 0.04 0.00 - 0.12 K/uL    Immature Granulocytes (abs) 0.02 0.00 - 0.11 K/uL    NRBC (Absolute) 0.00 K/uL    Hypochromia 1+     Anisocytosis 1+     Microcytosis 1+    COMP METABOLIC PANEL   Result Value Ref Range    Sodium 137 135 - 145 mmol/L    Potassium 3.3 (L) 3.6 - 5.5 mmol/L    Chloride 100 96 - 112 mmol/L    Co2 24 20 - 33 mmol/L    Anion Gap 13.0 7.0 - 16.0    Glucose 90 65 - 99 mg/dL    Bun 7 (L) 8 - 22 mg/dL    Creatinine 0.41 (L) 0.50 - 1.40 mg/dL    Calcium 9.5 8.5 - 10.5 mg/dL    AST(SGOT) 47 (H) 12 - 45 U/L    ALT(SGPT) 20 2 - 50 U/L    Alkaline Phosphatase 150 (H) 30 - 99 U/L    Total Bilirubin 0.5 0.1 - 1.5 mg/dL    Albumin 4.5 3.2 - 4.9 g/dL    Total Protein 8.1 6.0 - 8.2 g/dL    Globulin 3.6 (H) 1.9 - 3.5 g/dL    A-G Ratio 1.3 g/dL   Prothrombin Time   Result Value Ref Range    PT 15.0 (H) 12.0 - 14.6 sec    INR 1.20 (H) 0.87 - 1.13   APTT   Result Value Ref Range    APTT 34.6 24.7 - 36.0 sec   Fibrinogen   Result Value Ref Range    Fibrinogen 365 215 - 460 mg/dL   C Reactive Protein Quantitative (Non-Cardiac)   Result Value Ref Range    Stat C-Reactive Protein 0.47 0.00 - 0.75 mg/dL   DIAGNOSTIC ALCOHOL   Result Value Ref Range    Diagnostic Alcohol 90.7 (H) <10.1 mg/dL   LIPASE   Result Value Ref Range    Lipase 48 11 - 82 U/L   AMMONIA   Result Value Ref Range    Ammonia 20 11 - 45 umol/L   ESTIMATED GFR   Result Value Ref Range    GFR (CKD-EPI) 116 >60 mL/min/1.73 m 2   CORRECTED CALCIUM   Result Value Ref Range    Correct Calcium 9.1 8.5 - 10.5 mg/dL   PLATELET ESTIMATE   Result  Value Ref Range    Plt Estimation Decreased    MORPHOLOGY   Result Value Ref Range    RBC Morphology Present    PERIPHERAL SMEAR REVIEW   Result Value Ref Range    Peripheral Smear Review see below    IMMATURE PLT FRACTION   Result Value Ref Range    Imm. Plt Fraction 2.8 0.6 - 13.1 %   DIFFERENTIAL COMMENT   Result Value Ref Range    Comments-Diff see below         RADIOLOGY  I have independently interpreted the diagnostic imaging associated with this visit and am waiting the final reading from the radiologist.   My preliminary interpretation is as follows: Tib-fib x-ray right no gas  Radiologist interpretation:   DX-TIBIA AND FIBULA RIGHT   Final Result      Negative radiographs of the right leg      Plantar calcaneal lateral tibial spurring      DX-TIBIA AND FIBULA LEFT   Final Result      Negative radiographs of the left leg      Plantar calcaneal spurring      DX-CHEST-PORTABLE (1 VIEW)   Final Result      1.  Stable elevation of the left hemidiaphragm with associated basilar atelectasis and/or consolidation. A small left-sided pleural effusion cannot be excluded.   2.  Stable mild enlargement of the cardiomediastinal silhouette.            COURSE & MEDICAL DECISION MAKING    ED Observation Status? Yes; I am placing the patient in to an observation status due to a diagnostic uncertainty as well as therapeutic intensity. Patient placed in observation status at 5:12 PM, 6/26/2023.     Observation plan is as follows: Labs, chest x-ray, leg x-ray were ordered to further evaluate the patient's symptoms.    Upon Reevaluation, the patient's condition has: Improved; and will be discharged.    Patient discharged from ED Observation status at 8:20 PM  (Time) 6/26/23 (Date).     INITIAL ASSESSMENT, COURSE AND PLAN  Care Narrative: This is a 52-year-old female who was seen 6/18/2023 for a lesion on her left lower leg that she thought was MRSA because she had MRSA in the past.  She was started on doxycycline.  She states  that the wound in her left leg is gotten worse and now it spread to her right leg and she has noticed small areas of erythema tracking up her left leg.  She is not on any blood thinners.  She states that she has had a few weeks of abdominal pain and diarrhea.  She denies any fevers or chills dizziness or lightheadedness.  She states that she has been using methamphetamines to control the pain in her legs and her abdomen.  She denies being diabetic.  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  Had any fevers or chills.  She is able to walk.    Sickle exam she has petechiae and purpura of her bilateral lower extremities.  Her wounds appear to be superficial and circular on both of her anterior shins the one on the left shin is large about 3 cm in circular the right one is 2 cm.  There is no fluctuance or drainage.  There is no blanching of the rash or purpura.  She has no joint swelling.  Her abdomen is soft though she states it is tender.  Lungs have decreased breath sounds at the bases heart is regular rate and rhythm without murmurs rubs or gallops.  She is not toxic appearing.  She has no lesions in her buccal mucosa on the palms differential diagnosis: Of her hands or soles of her feet.  She has no joint swelling     Differential diagnosis: DIC, thrombocytopenia, blood dyscrasia, cellulitis, allergy,    ADDITIONAL PROBLEM LIST  Hypoxia chronically due to post COVID syndrome  Alcohol abuse history of alcohol withdrawal seizures  Methamphetamine abuse  Pancreatitis due to alcohol  Cervical radiculopathy  Carotid artery stenosis  DISPOSITION AND DISCUSSIONS    CBC has a chronic anemia with a hemoglobin of 8.9 her MCV is low at 79.3 platelet count is low at 129.  INR is elevated at 1.2 PT elevated at 15.  Fibrinogen level is normal at 365.  Alcohol level is 90.7.  Comprehensive metabolic panel is a slight low potassium at 3.3 BUN 7 creatinine slightly more elevated at 0.41.  AST 47 ALT of 20 alk phos  elevated 150.  Total bilirubin is normal.  Ammonia level is normal at 20.  I performed x-rays of the patient's legs and there was no gas or bony abnormalities.  Lactate is normal at 2.  Blood cultures were drawn in case she had an infection.    Chest x-ray shows a small elevated left hemidiaphragm possible left pleural effusion.    The patient's purpura and petechiae on her lower legs is most likely due to her alcohol abuse and liver failure.  I will write her another prescription for the doxycycline and advised her to finish it.  I asked her not to scratch her legs and explained to her the importance of not drinking alcohol or using any methamphetamines.  I referred her to the [place] [facility] and AA to help with this.    I will discharge the patient home in stable condition with advice for her to return for any worsening of the rash dizziness lightheadedness or for any worsening symptoms.  I have discussed management of the patient with the following physicians and TWILA's:  none    Discussion of management with other [facility] or appropriate source(s): None     Escalation of care considered, and ultimately not performed:acute inpatient care management, however at this time, the patient is most appropriate for outpatient management    Barriers to care at this time, including but not limited to:  pt has polysubstance abuse .     Decision tools and prescription drugs considered including, but not limited to: Antibiotics doxycycline .  The patient will return for new or worsening symptoms and is stable at the time of discharge.    The patient is referred to a primary physician for blood pressure management, diabetic screening, and for all other preventative health concerns.      DISPOSITION:  Patient will be discharged home in stable condition.    FOLLOW UP:  [facility]  [address] [facility] [address]  [phone]  In 1 day  for alcohol detox    Margy Aparicio M.D.  [address]    [place] [address]  [phone]    Call in 1 day  for recheck      OUTPATIENT MEDICATIONS:  Discharge Medication List as of 6/26/2023  8:26 PM        START taking these medications    Details   doxycycline (MONODOX) 100 MG capsule Take 1 Capsule by mouth 2 times a day for 10 days., Disp-20 Capsule, R-0, Normal             FINAL DIAGNOSIS  1. Encounter for wound re-check    2. Purpura (HCC)    3. Alcohol abuse           Electronically signed by: Thelma Campo M.D., 6/26/2023 5:10 PM

## 2023-06-27 NOTE — ED NOTES
Recollect for Ammonia collected from PIV and sent to lab as  states that previously the wrong tube was sent for this test. Pt tolerated well and reminded that urine sample is needed. Pt demonstrated understanding and is asking for water so she can try to provide sample, will speak to ERP.

## 2023-07-01 ENCOUNTER — HOSPITAL ENCOUNTER (EMERGENCY)
Facility: MEDICAL CENTER | Age: 53
End: 2023-07-02
Attending: EMERGENCY MEDICINE
Payer: COMMERCIAL

## 2023-07-01 DIAGNOSIS — F10.20 ALCOHOLISM (HCC): ICD-10-CM

## 2023-07-01 DIAGNOSIS — R45.851 SUICIDAL IDEATION: ICD-10-CM

## 2023-07-01 DIAGNOSIS — R21 RASH: ICD-10-CM

## 2023-07-01 LAB
ALBUMIN SERPL BCP-MCNC: 3.7 G/DL (ref 3.2–4.9)
ALBUMIN/GLOB SERPL: 1 G/DL
ALP SERPL-CCNC: 139 U/L (ref 30–99)
ALT SERPL-CCNC: 20 U/L (ref 2–50)
AMPHET UR QL SCN: POSITIVE
ANION GAP SERPL CALC-SCNC: 15 MMOL/L (ref 7–16)
APPEARANCE UR: CLEAR
AST SERPL-CCNC: 54 U/L (ref 12–45)
BACTERIA BLD CULT: NORMAL
BACTERIA BLD CULT: NORMAL
BARBITURATES UR QL SCN: NEGATIVE
BASOPHILS # BLD AUTO: 0.3 % (ref 0–1.8)
BASOPHILS # BLD: 0.02 K/UL (ref 0–0.12)
BENZODIAZ UR QL SCN: NEGATIVE
BILIRUB SERPL-MCNC: 0.4 MG/DL (ref 0.1–1.5)
BILIRUB UR QL STRIP.AUTO: NEGATIVE
BUN SERPL-MCNC: 6 MG/DL (ref 8–22)
BZE UR QL SCN: NEGATIVE
CALCIUM ALBUM COR SERPL-MCNC: 9.1 MG/DL (ref 8.5–10.5)
CALCIUM SERPL-MCNC: 8.9 MG/DL (ref 8.5–10.5)
CANNABINOIDS UR QL SCN: NEGATIVE
CHLORIDE SERPL-SCNC: 98 MMOL/L (ref 96–112)
CO2 SERPL-SCNC: 24 MMOL/L (ref 20–33)
COLOR UR: ABNORMAL
CREAT SERPL-MCNC: 0.43 MG/DL (ref 0.5–1.4)
CRP SERPL HS-MCNC: 0.88 MG/DL (ref 0–0.75)
EOSINOPHIL # BLD AUTO: 0.46 K/UL (ref 0–0.51)
EOSINOPHIL NFR BLD: 7.9 % (ref 0–6.9)
ERYTHROCYTE [DISTWIDTH] IN BLOOD BY AUTOMATED COUNT: 51 FL (ref 35.9–50)
ERYTHROCYTE [SEDIMENTATION RATE] IN BLOOD BY WESTERGREN METHOD: 2 MM/HOUR (ref 0–25)
FENTANYL UR QL: NEGATIVE
GFR SERPLBLD CREATININE-BSD FMLA CKD-EPI: 115 ML/MIN/1.73 M 2
GLOBULIN SER CALC-MCNC: 3.6 G/DL (ref 1.9–3.5)
GLUCOSE SERPL-MCNC: 101 MG/DL (ref 65–99)
GLUCOSE UR STRIP.AUTO-MCNC: NEGATIVE MG/DL
HCT VFR BLD AUTO: 32.1 % (ref 37–47)
HGB BLD-MCNC: 9 G/DL (ref 12–16)
IMM GRANULOCYTES # BLD AUTO: 0 K/UL (ref 0–0.11)
IMM GRANULOCYTES NFR BLD AUTO: 0 % (ref 0–0.9)
KETONES UR STRIP.AUTO-MCNC: ABNORMAL MG/DL
LEUKOCYTE ESTERASE UR QL STRIP.AUTO: NEGATIVE
LIPASE SERPL-CCNC: 40 U/L (ref 11–82)
LYMPHOCYTES # BLD AUTO: 1.26 K/UL (ref 1–4.8)
LYMPHOCYTES NFR BLD: 21.7 % (ref 22–41)
MAGNESIUM SERPL-MCNC: 1.7 MG/DL (ref 1.5–2.5)
MCH RBC QN AUTO: 21.8 PG (ref 27–33)
MCHC RBC AUTO-ENTMCNC: 28 G/DL (ref 32.2–35.5)
MCV RBC AUTO: 77.7 FL (ref 81.4–97.8)
METHADONE UR QL SCN: NEGATIVE
MICRO URNS: ABNORMAL
MONOCYTES # BLD AUTO: 0.77 K/UL (ref 0–0.85)
MONOCYTES NFR BLD AUTO: 13.3 % (ref 0–13.4)
NEUTROPHILS # BLD AUTO: 3.3 K/UL (ref 1.82–7.42)
NEUTROPHILS NFR BLD: 56.8 % (ref 44–72)
NITRITE UR QL STRIP.AUTO: NEGATIVE
NRBC # BLD AUTO: 0 K/UL
NRBC BLD-RTO: 0 /100 WBC (ref 0–0.2)
OPIATES UR QL SCN: NEGATIVE
OXYCODONE UR QL SCN: NEGATIVE
PCP UR QL SCN: NEGATIVE
PH UR STRIP.AUTO: 5.5 [PH] (ref 5–8)
PLATELET # BLD AUTO: 116 K/UL (ref 164–446)
PMV BLD AUTO: 9.1 FL (ref 9–12.9)
POC BREATHALIZER: 0.03 PERCENT (ref 0–0.01)
POTASSIUM SERPL-SCNC: 3.6 MMOL/L (ref 3.6–5.5)
PROPOXYPH UR QL SCN: NEGATIVE
PROT SERPL-MCNC: 7.3 G/DL (ref 6–8.2)
PROT UR QL STRIP: NEGATIVE MG/DL
RBC # BLD AUTO: 4.13 M/UL (ref 4.2–5.4)
RBC UR QL AUTO: NEGATIVE
SIGNIFICANT IND 70042: NORMAL
SIGNIFICANT IND 70042: NORMAL
SITE SITE: NORMAL
SITE SITE: NORMAL
SODIUM SERPL-SCNC: 137 MMOL/L (ref 135–145)
SOURCE SOURCE: NORMAL
SOURCE SOURCE: NORMAL
SP GR UR STRIP.AUTO: 1.02
T PALLIDUM AB SER QL IA: NORMAL
TSH SERPL DL<=0.005 MIU/L-ACNC: 0.73 UIU/ML (ref 0.38–5.33)
UROBILINOGEN UR STRIP.AUTO-MCNC: 1 MG/DL
WBC # BLD AUTO: 5.8 K/UL (ref 4.8–10.8)

## 2023-07-01 PROCEDURE — 83735 ASSAY OF MAGNESIUM: CPT

## 2023-07-01 PROCEDURE — 36415 COLL VENOUS BLD VENIPUNCTURE: CPT

## 2023-07-01 PROCEDURE — 700102 HCHG RX REV CODE 250 W/ 637 OVERRIDE(OP): Mod: UD | Performed by: EMERGENCY MEDICINE

## 2023-07-01 PROCEDURE — 80307 DRUG TEST PRSMV CHEM ANLYZR: CPT

## 2023-07-01 PROCEDURE — 84443 ASSAY THYROID STIM HORMONE: CPT

## 2023-07-01 PROCEDURE — 700111 HCHG RX REV CODE 636 W/ 250 OVERRIDE (IP): Mod: UD | Performed by: EMERGENCY MEDICINE

## 2023-07-01 PROCEDURE — 80053 COMPREHEN METABOLIC PANEL: CPT

## 2023-07-01 PROCEDURE — 81003 URINALYSIS AUTO W/O SCOPE: CPT

## 2023-07-01 PROCEDURE — 99285 EMERGENCY DEPT VISIT HI MDM: CPT

## 2023-07-01 PROCEDURE — 302970 POC BREATHALIZER

## 2023-07-01 PROCEDURE — 86780 TREPONEMA PALLIDUM: CPT

## 2023-07-01 PROCEDURE — 85025 COMPLETE CBC W/AUTO DIFF WBC: CPT

## 2023-07-01 PROCEDURE — 302970 POC BREATHALIZER: Performed by: EMERGENCY MEDICINE

## 2023-07-01 PROCEDURE — 85652 RBC SED RATE AUTOMATED: CPT

## 2023-07-01 PROCEDURE — A9270 NON-COVERED ITEM OR SERVICE: HCPCS | Mod: UD | Performed by: EMERGENCY MEDICINE

## 2023-07-01 PROCEDURE — 83690 ASSAY OF LIPASE: CPT

## 2023-07-01 PROCEDURE — 86140 C-REACTIVE PROTEIN: CPT

## 2023-07-01 RX ORDER — HYDROXYZINE HYDROCHLORIDE 25 MG/1
25 TABLET, FILM COATED ORAL ONCE
Status: COMPLETED | OUTPATIENT
Start: 2023-07-02 | End: 2023-07-01

## 2023-07-01 RX ORDER — LORATADINE 10 MG/1
10 TABLET ORAL ONCE
Status: COMPLETED | OUTPATIENT
Start: 2023-07-01 | End: 2023-07-01

## 2023-07-01 RX ORDER — PREDNISONE 20 MG/1
60 TABLET ORAL ONCE
Status: COMPLETED | OUTPATIENT
Start: 2023-07-01 | End: 2023-07-01

## 2023-07-01 RX ADMIN — PREDNISONE 60 MG: 20 TABLET ORAL at 21:44

## 2023-07-01 RX ADMIN — HYDROXYZINE HYDROCHLORIDE 25 MG: 25 TABLET, FILM COATED ORAL at 23:38

## 2023-07-01 RX ADMIN — LORATADINE 10 MG: 10 TABLET ORAL at 21:44

## 2023-07-01 ASSESSMENT — FIBROSIS 4 INDEX: FIB4 SCORE: 4.4

## 2023-07-02 VITALS
OXYGEN SATURATION: 92 % | TEMPERATURE: 98 F | BODY MASS INDEX: 26.45 KG/M2 | HEART RATE: 87 BPM | DIASTOLIC BLOOD PRESSURE: 65 MMHG | RESPIRATION RATE: 18 BRPM | WEIGHT: 134.7 LBS | SYSTOLIC BLOOD PRESSURE: 127 MMHG | HEIGHT: 60 IN

## 2023-07-02 PROCEDURE — 96375 TX/PRO/DX INJ NEW DRUG ADDON: CPT

## 2023-07-02 PROCEDURE — 96374 THER/PROPH/DIAG INJ IV PUSH: CPT

## 2023-07-02 PROCEDURE — 90791 PSYCH DIAGNOSTIC EVALUATION: CPT

## 2023-07-02 PROCEDURE — 700111 HCHG RX REV CODE 636 W/ 250 OVERRIDE (IP): Mod: UD | Performed by: EMERGENCY MEDICINE

## 2023-07-02 RX ORDER — PHENOBARBITAL SODIUM 130 MG/ML
260 INJECTION, SOLUTION INTRAMUSCULAR; INTRAVENOUS ONCE
Status: COMPLETED | OUTPATIENT
Start: 2023-07-02 | End: 2023-07-02

## 2023-07-02 RX ORDER — LORAZEPAM 2 MG/ML
1 INJECTION INTRAMUSCULAR ONCE
Status: COMPLETED | OUTPATIENT
Start: 2023-07-02 | End: 2023-07-02

## 2023-07-02 RX ADMIN — PHENOBARBITAL SODIUM 260 MG: 130 INJECTION INTRAMUSCULAR; INTRAVENOUS at 03:20

## 2023-07-02 RX ADMIN — LORAZEPAM 1 MG: 2 INJECTION INTRAMUSCULAR; INTRAVENOUS at 01:08

## 2023-07-02 NOTE — ED NOTES
Orage juice provided. Taxi requested. Cab voucher given to patient. Patient provided discharge instructions. Patient verbalized understanding. Patient leaving ER in stable condition. Patient ambulatory with steady gait.  IV removed.

## 2023-07-02 NOTE — ED NOTES
Bedside report received from Jared DENSON. Assumed care of patient. Pt assessed,Fall precautions in place.  Pt repositioned and comfortable.      For discharge.

## 2023-07-02 NOTE — ED NOTES
Pt resting in bed, monitor connected, pt difficult to arouse, attempted to remove 2LNC oxygen, pt desatted briefly to 75% and returned to 82%. Pt difficult to arouse to take deep breaths. ERP notified. Pt O2 placed to 1.5 NC.

## 2023-07-02 NOTE — ED NOTES
Pt resting in bed, breathing unlabored, in direct view of sitter. Pt states she usually drinks 4-6 shots a day, last drink was before coming to the ED. Pt worried she may go into detox.

## 2023-07-02 NOTE — ED PROVIDER NOTES
"                                                        ED Provider Note    CHIEF COMPLAINT  Chief Complaint   Patient presents with    Rash     Pt has been in 3 times for rash, given abx for each time. Last visit stopped taking after four doses, pt appears to have rash on lower extremities.     Allergic Reaction     Pt woke up this morning with hives and itchiness all over body. Pt experiencing chills.     Suicidal Ideation     Pt states if she can't get this fixed she is going to go home and take all of her medications to kill herself        [facility]    Primary care provider: Margy Aparicio M.D.   History obtained from: Patient  History limited by: None     Olya Youssef is a 52 y.o. female who presents to the ED by EMS complaining of diffuse itchy rash for the past several weeks.  Patient states that it started as a small red spot on her left ankle but has since spread throughout her body.  She was prescribed antibiotics but stopped taking because she felt that it was making her rash worse.  She denies any known triggers/allergens and denies any new exposures.  Patient states that she is planning on taking her pills to kill herself if she cannot get this taken care of.  Patient states that she has had prior suicide attempts by taking pills and mixing it with alcohol and was in a coma in the ICU.  Patient states that she has \"couple of drinks\" every day.  She also reports using meth yesterday.  She denies history of IV drug use.  She denies homicidal ideation.  She denies known fever.  She reports that she does get dry cough at night and has some shortness of breath at times.  She has had nausea without vomiting.  She reports approximately 4 episodes of diarrhea daily without gross blood noted.  She denies dysuria.  No recent travels or known ill contacts.    REVIEW OF SYSTEMS  Please see HPI for pertinent positives/negatives.  All other systems reviewed and are negative.     PAST MEDICAL " HISTORY  Past Medical History:   Diagnosis Date    Degenerative disc disease, cervical 3/28/2023    Alcohol intoxication, with unspecified complication (HCC) 8/7/2013    Alcohol abuse     Alcoholism (HCC)     Anxiety     Backpain     Bipolar disorder, unspecified (HCC)     Bronchitis     Drug abuse (HCC)     meth, crack cocaine, THC    Hepatitis, alcoholic     Hypertension     controlled    Indigestion     Infectious disease MRSA    Liver disease     pancreatitis    Pancreatitis chronic     Flare-up    Pneumonia     Psychiatric disorder     suicidal in past    Renal disorder     kidney infection 1991    Seizure (HCC)     7/8/2011    Seizure disorder (HCC)     Unspecified hemorrhagic conditions         SURGICAL HISTORY  Past Surgical History:   Procedure Laterality Date    GASTROSCOPY  4/28/2015    Performed by Kevin Rendon M.D. at SURGERY [facility] ORS        SOCIAL HISTORY  Social History     Tobacco Use    Smoking status: Never    Smokeless tobacco: Never   Vaping Use    Vaping Use: Never used   Substance and Sexual Activity    Alcohol use: Yes     Comment: a couple beers a day    Drug use: Not Currently     Types: Inhaled     Comment: meth on 4/14    Sexual activity: Not on file        FAMILY HISTORY  Family History   Problem Relation Age of Onset    Lung Disease Mother     Cancer Mother         CURRENT MEDICATIONS  Home Medications       Reviewed by Tee Soliz RJobyNJoby (Registered Nurse) on 07/01/23 at 2005  Med List Status: Partial     Medication Last Dose Status   amLODIPine (NORVASC) 2.5 MG Tab  Active   atorvastatin (LIPITOR) 40 MG Tab  Active   busPIRone (BUSPAR) 7.5 MG tablet  Active   diclofenac sodium (VOLTAREN) 1 % Gel  Active   doxycycline (MONODOX) 100 MG capsule  Active   famotidine (PEPCID) 20 MG Tab  Active   fluoxetine (PROZAC) 40 MG capsule  Active   gabapentin (NEURONTIN) 600 MG tablet  Active   meloxicam (MOBIC) 15 MG tablet  Active   methocarbamol (ROBAXIN) 500 MG Tab  Active    traZODone (DESYREL) 50 MG Tab  Active                     ALLERGIES  Allergies   Allergen Reactions    Bactrim Hives    Lamotrigine [Lamictal] Hives     Tolerated Fioricet 10/3/22    Penicillin G     Quetiapine Fumarate Hives     Extrapyramidal Symptoms, can tolerate lower doses    Quetiapine Fumarate Hives    Sulfa Drugs Hives    Keflex Hives        PHYSICAL EXAM  VITAL SIGNS: /73   Pulse 90   Temp 37.2 °C (98.9 °F) (Temporal)   Resp 16   Ht 1.524 m (5')   Wt 61.1 kg (134 lb 11.2 oz)   LMP 02/28/2018   SpO2 89%   BMI 26.31 kg/m²  @[id number]::@     Pulse ox interpretation: 96% I interpret this pulse ox as normal     Constitutional: Well developed, well nourished, alert in no apparent distress, nontoxic appearance    HENT: No external signs of trauma, normocephalic, oropharynx moist and clear, no trismus/drooling/stridor, airway is widely patent and patient speaking with normal voice without difficulty  Eyes: PERRL, conjunctiva without erythema, no discharge, no icterus    Neck: Soft and supple, trachea midline, no stridor, no tenderness, no LAD, good ROM    Cardiovascular: Regular rate and rhythm, no murmurs/rubs/gallops, strong distal pulses and good perfusion    Thorax & Lungs: No respiratory distress, CTAB    Abdomen: Soft, nontender, nondistended, no guarding, no rebound, normal BS    Back: No CVAT     Extremities: No cyanosis, no edema, no gross deformity, good ROM, intact distal pulses with brisk cap refill    Skin: Warm, dry, no pallor/cyanosis, diffuse red maculopapular rash of various sizes and shapes predominately both legs but also noted on both thighs, trunk and both upper extremities, rash blanches with pressure and without apparent tenderness to palpation, no rash noted on palms or soles, no mucosal involvement noted, no petechiae/purpura/blisters/vesicles/streaking/drainage/crepitus/fluctuance, scattered excoriation on both lower legs     Neuro: A/O times 3, no focal deficits noted     Psychiatric: Cooperative, slightly anxious, reports suicidal ideation, denies homicidal ideation, no signs of hallucinations or delusions      DIAGNOSTIC STUDIES / PROCEDURES        LABS  All labs reviewed by me.     Results for orders placed or performed during the hospital encounter of 07/01/23   Urine Drug Screen   Result Value Ref Range    Amphetamines Urine Positive (A) Negative    Barbiturates Negative Negative    Benzodiazepines Negative Negative    Cocaine Metabolite Negative Negative    Fentanyl, Urine Negative Negative    Methadone Negative Negative    Opiates Negative Negative    Oxycodone Negative Negative    Phencyclidine -Pcp Negative Negative    Propoxyphene Negative Negative    Cannabinoid Metab Negative Negative   CBC WITH DIFFERENTIAL   Result Value Ref Range    WBC 5.8 4.8 - 10.8 K/uL    RBC 4.13 (L) 4.20 - 5.40 M/uL    Hemoglobin 9.0 (L) 12.0 - 16.0 g/dL    Hematocrit 32.1 (L) 37.0 - 47.0 %    MCV 77.7 (L) 81.4 - 97.8 fL    MCH 21.8 (L) 27.0 - 33.0 pg    MCHC 28.0 (L) 32.2 - 35.5 g/dL    RDW 51.0 (H) 35.9 - 50.0 fL    Platelet Count 116 (L) 164 - 446 K/uL    MPV 9.1 9.0 - 12.9 fL    Neutrophils-Polys 56.80 44.00 - 72.00 %    Lymphocytes 21.70 (L) 22.00 - 41.00 %    Monocytes 13.30 0.00 - 13.40 %    Eosinophils 7.90 (H) 0.00 - 6.90 %    Basophils 0.30 0.00 - 1.80 %    Immature Granulocytes 0.00 0.00 - 0.90 %    Nucleated RBC 0.00 0.00 - 0.20 /100 WBC    Neutrophils (Absolute) 3.30 1.82 - 7.42 K/uL    Lymphs (Absolute) 1.26 1.00 - 4.80 K/uL    Monos (Absolute) 0.77 0.00 - 0.85 K/uL    Eos (Absolute) 0.46 0.00 - 0.51 K/uL    Baso (Absolute) 0.02 0.00 - 0.12 K/uL    Immature Granulocytes (abs) 0.00 0.00 - 0.11 K/uL    NRBC (Absolute) 0.00 K/uL   COMP METABOLIC PANEL   Result Value Ref Range    Sodium 137 135 - 145 mmol/L    Potassium 3.6 3.6 - 5.5 mmol/L    Chloride 98 96 - 112 mmol/L    Co2 24 20 - 33 mmol/L    Anion Gap 15.0 7.0 - 16.0    Glucose 101 (H) 65 - 99 mg/dL    Bun 6 (L) 8 - 22 mg/dL     Creatinine 0.43 (L) 0.50 - 1.40 mg/dL    Calcium 8.9 8.5 - 10.5 mg/dL    AST(SGOT) 54 (H) 12 - 45 U/L    ALT(SGPT) 20 2 - 50 U/L    Alkaline Phosphatase 139 (H) 30 - 99 U/L    Total Bilirubin 0.4 0.1 - 1.5 mg/dL    Albumin 3.7 3.2 - 4.9 g/dL    Total Protein 7.3 6.0 - 8.2 g/dL    Globulin 3.6 (H) 1.9 - 3.5 g/dL    A-G Ratio 1.0 g/dL   LIPASE   Result Value Ref Range    Lipase 40 11 - 82 U/L   MAGNESIUM   Result Value Ref Range    Magnesium 1.7 1.5 - 2.5 mg/dL   CRP QUANTITIVE (NON-CARDIAC)   Result Value Ref Range    Stat C-Reactive Protein 0.88 (H) 0.00 - 0.75 mg/dL   Sed Rate   Result Value Ref Range    Sed Rate Westergren 2 0 - 25 mm/hour   URINALYSIS (UA)    Specimen: Urine, Clean Catch   Result Value Ref Range    Color DK Yellow     Character Clear     Specific Gravity 1.025 <1.035    Ph 5.5 5.0 - 8.0    Glucose Negative Negative mg/dL    Ketones Trace (A) Negative mg/dL    Protein Negative Negative mg/dL    Bilirubin Negative Negative    Urobilinogen, Urine 1.0 Negative    Nitrite Negative Negative    Leukocyte Esterase Negative Negative    Occult Blood Negative Negative    Micro Urine Req see below    T.PALLIDUM AB IVON (SCREENING)   Result Value Ref Range    Syphilis, Treponemal Qual Non-Reactive Non-Reactive   TSH WITH REFLEX TO FT4   Result Value Ref Range    TSH 0.730 0.380 - 5.330 uIU/mL   CORRECTED CALCIUM   Result Value Ref Range    Correct Calcium 9.1 8.5 - 10.5 mg/dL   ESTIMATED GFR   Result Value Ref Range    GFR (CKD-EPI) 115 >60 mL/min/1.73 m 2   POC BREATHALIZER   Result Value Ref Range    POC Breathalizer 0.030 (A) 0.00 - 0.01 Percent        RADIOLOGY  I have independently interpreted the diagnostic imaging associated with this visit and am waiting the final reading from the radiologist.       No orders to display          COURSE & MEDICAL DECISION MAKING  Nursing notes, VS, PMSFHx reviewed in chart.     Review of past medical records shows the patient was last seen in this ED June 26, 2023  regarding left lower leg wound.  She was also seen in this ED June 18, 2023 for the same.  She was seen in the office on April 20, 2023 regarding multiple chronic medical conditions.      Differential diagnoses considered include but are not limited to: Allergic reaction, urticaria, drug rash, contact dermatitis, cellulitis, eczema, syphilis, viral exanthem, suicidal ideation/attempt, anxiety, depression, psychosis/schizophrenia, personality disorder, intoxication, electrolyte abnormality, thyroid disorder      ED Observation Status? Yes; I am placing the patient in to an observation status due to a diagnostic uncertainty as well as therapeutic intensity. Patient placed in observation status at 9:08 PM, 7/1/2023.     Observation plan is as follows: We will obtain laboratory studies and monitor patient in the ED for medical clearance prior to mental health evaluation.    Upon Reevaluation, the patient's condition has: Remained stable and will be discharged.    Patient discharged from ED Observation status at 0415 on July 2, 2023.       INITIAL ASSESSMENT AND PLAN  Care Narrative: This is a 52-year-old female patient with past medical history including hypertension, hepatitis, seizure, previous suicide, bipolar disorder, alcohol and drug use who presents to the ED complaining of diffuse itchy rash and also suicidal ideation.  We will obtain laboratory studies and closely monitor patient in the ED for medical clearance prior to mental health evaluation.      Discussion of management with other QHP or appropriate source(s): Behavioral Health        Escalation of care considered, and ultimately not performed: diagnostic imaging and acute inpatient care management, however at this time, the patient is most appropriate for outpatient management.     Decision tools and prescription drugs considered including, but not limited to: Medication modification   .        History and physical exam as above.  Laboratory testing shows  stable anemia and thrombocytopenia.  No significant leukocytosis and sed rate improved compared to June 26 while CRP with slight increase compared to June 26.  She was treated with Claritin and prednisone without improvement of her rash.  She was given Atarax for her itch.  She subsequently reported that she may be going through alcohol withdrawal and was given Ativan and subsequently phenobarbital.  Patient was evaluated by the alert team and cleared for discharge.  She no longer endorses suicidal ideation.  She mainly expresses frustration that this rash has been ongoing without improvement.  She does not have any evidence for hemodynamic instability, airway impairment or respiratory distress.  The rash does not appear to be a serious infectious etiology.  I do not suspect Hernandez-Jose or TEN.  She may need dermatology referral given no improvement with her rash.  I discussed the findings with the patient.  She is in no acute distress and nontoxic in appearance.  She is not exhibiting evidence for active alcohol withdrawal.  No hallucinations or delusions and she denies further suicidal ideation.  She was advised on outpatient follow-up and given return to ED precautions.  Patient verbalized understanding and agreed with plan of care with no further questions or concerns.      The patient is referred to a primary physician for blood pressure management, diabetic screening, and for all other preventative health concerns.       FINAL IMPRESSION  1. Rash Acute   2. Suicidal ideation Acute   3. Alcoholism (HCC) Active          DISPOSITION  Patient will be discharged home in stable condition.       FOLLOW UP  Margy Aparicio M.D.  [address] [facility] [id number] [phone]    Call in 2 days      Please follow-up as discussed with mental health          [facility], Emergency Dept  [address] [facility] [address] [phone]    If symptoms worsen         OUTPATIENT  MEDICATIONS  New Prescriptions    No medications on file          Electronically signed by: Matthew Keith D.O., 7/1/2023 9:07 PM      Portions of this record were made with voice recognition software.  Despite my review, errors may remain.  Please interpret this chart in the appropriate context.

## 2023-07-02 NOTE — ED NOTES
Checked on bed, with unlabored respirations. No safety risk noted  Sitter - 1:1; Continued safety precaution  Gurney in low position, side rail up for pt safety.   Will continue to monitor for any complications.

## 2023-07-02 NOTE — ED NOTES
Checked on bed, with unlabored respirations. No safety risk noted  Awake  Sitter - 1:1; Continued safety precaution  Gurney in low position, side rail up for pt safety.   Will continue to monitor for any complications.

## 2023-07-02 NOTE — CONSULTS
"  Name: Olya Youssef  MRN: [id number]  : 1970  Age: 52 y.o.  Date of assessment: 2023  PCP: Margy Aparicio M.D.  Persons in attendance: Patient  Patient Location: [facility]    CHIEF COMPLAINT/PRESENTING ISSUE (as stated by pt):   Chief Complaint   Patient presents with    Rash     Pt has been in 3 times for rash, given abx for each time. Last visit stopped taking after four doses, pt appears to have rash on lower extremities.     Allergic Reaction     Pt woke up this morning with hives and itchiness all over body. Pt experiencing chills.     Suicidal Ideation     Pt states if she can't get this fixed she is going to go home and take all of her medications to kill herself      PT BIB self for rash and, at time of triage, reported to RN that d/t her rash, she was feeling suicidal and had a plan to OD on her medications. Upon assessment, pt presents as A + O x 4, somnolent, falling asleep during assessment, logical, goal-oriented. PT given hydroxyzine 25mg PO loratadine 10mg PO, Ativan 1mg IV, prednisone 60mg PO, and phenobarbital (for possible etoh detox). Pt now adamantly denies any SI reporting that she was frustrated with her rash. Pt also denies AH/VH/HI. Pt reports that MH history of depression and \"maybe borderline DO.\" When asked about MH. Pt reports \"a bunch of things\" but could not elaborate to writer. EMR notes: Alcoholism , Anxiety, Backpain, Bipolar disorder, unspecified , Bronchitis, Degenerative disc disease, cervical , Drug abuse, Hepatitis,  Hypertension, Indigestion, Infectious disease (MRSA), Liver disease, Pancreatitis chronic, Pneumonia, Psychiatric disorder, Renal disorder,Seizure disorder (HCC), and Unspecified hemorrhagic conditions. When asked about medications pt repoted \"a lot,there is a list in my purse.\" Writer could not find list in purse. PT reports multiple inpatient hospitalizations, however, could only remember [facility] inpatient in  for SI. " "PT reports that she is connected with outpatient but could not remember organization. Pt reports consuming \"2 or 3 mixed drinks a day\" and denies other drug use but positive for amphetamines. Consulted with Dr Keith and LUIS MK will be Dced.      CURRENT LIVING SITUATION/SOCIAL SUPPORT/FINANCIAL RESOURCES Pt reports that she lives with a roommate.    BEHAVIORAL HEALTH/SUBSTANCE USE TREATMENT HISTORY  Does patient/parent report a history of prior behavioral health/substance use treatment for patient?   Yes:    PT reports multiple inpatient hospitalizations, however, could only remember [facility] inpatient in 2022 for SI. PT reports that she is connected with outpatient but could not remember organization.       SAFETY ASSESSMENT - SELF  Does patient acknowledge current or past symptoms of dangerousness to self or is previous history noted? Yes - pt reports past SI but denies SI at time of assessment.  Does parent/significant other report patient has current or past symptoms of dangerousness to self? N\A  Does presenting problem suggest symptoms of dangerousness to self? No    SAFETY ASSESSMENT - OTHERS  Does patient acknowledge current or past symptoms of aggressive behavior or risk to others or is previous history noted? no  Does parent/significant other report patient has current or past symptoms of aggressive behavior or risk to others?  no  Does presenting problem suggest symptoms of dangerousness to others? No    LEGAL HISTORY  Does patient acknowledge history of arrest/half-way/USP or is previous history noted? no    Crisis Safety Plan completed and copy given to patient? yes    ABUSE/NEGLECT SCREENING  Does patient report feeling “unsafe” in his/her home, or afraid of anyone?  no  Does patient report any history of physical, sexual, or emotional abuse?  no  Does parent or significant other report any of the above? no  Is there evidence of neglect by self?  no  Is there evidence of neglect by a caregiver? no  Does the " "patient/parent report any history of CPS/APS/police involvement related to suspected abuse/neglect or domestic violence? no  Based on the information provided during the current assessment, is a mandated report of suspected abuse/neglect being made?  No    SUBSTANCE USE SCREENING  Yes:  Isrrael all substances used in the past 30 days:      Last Use Amount   [x]   Alcohol 7/1/23 \" 2 or 3 mixed drinks\"   []   Marijuana     []   Heroin     []   Prescription Opioids  (used without prescription, for    recreation, or in excess of prescribed amount)     []   Other Prescription  (used without prescription, for    recreation, or in excess of prescribed amount)     []   Cocaine      [x]   Methamphetamine     []   \"\" drugs (ectasy, MDMA)     []   Other substances        UDS results: + for amphetamines  Breathalyzer results: 0.00    What consequences does the patient associate with any of the above substance use and or addictive behaviors? Health problems:     Risk factors for detox (check all that apply):  [x]  Seizures   [x]  Diaphoretic (sweating)   [x]  Tremors   []  Hallucinations   [x]  Increased blood pressure   []  Decreased blood pressure   []  Other   []  None      [] Patient education on risk factors for detoxification and instructed to return to ER as needed.      MENTAL STATUS   Participation: Limited verbal participation  Grooming: Casual  Orientation: Alert and Drowsy/Somnolent  Behavior: Calm  Eye contact: Limited  Mood: Euthymic  Affect: Flexible and Full range  Thought process: Logical and Goal-directed  Thought content: Within normal limits  Speech: Rate within normal limits and Soft  Perception: Within normal limits  Memory:  No gross evidence of memory deficits  Insight: Adequate  Judgment:  Adequate  Other:    Collateral information:   Source:  [] Significant other present in person:   [] Significant other by telephone  [] [facility]   [x] [facility] Nursing Staff  [x] [facility] Medical Record  [] " Other:     [] Unable to complete full assessment due to:  [] Acute intoxication  [] Patient declined to participate/engage  [] Patient verbally unresponsive  [] Significant cognitive deficits  [] Significant perceptual distortions or behavioral disorganization  [] Other:      CLINICAL IMPRESSIONS:  Primary:  Rash  Secondary:  SI       IDENTIFIED NEEDS/PLAN:  [Trigger DISPOSITION list for any items marked]    []  Imminent safety risk - self [] Imminent safety risk - others   [x]  Acute substance withdrawal []  Psychosis/Impaired reality testing   []  Mood/anxiety []  Substance use/Addictive behavior   []  Maladaptive behaviro []  Parent/child conflict   []  Family/Couples conflict []  Biomedical   []  Housing []  Financial   []   Legal  Occupational/Educational   []  Domestic violence []  Other:     Recommended Plan of Care:   Legal hold to be DCed. PT denies SI and will DC home in AM when awake. PT will be given MH resources as well  chemical dependency resources.  *Telesitter may not be utilized for moderate or high risk patients    Has the Recommended Plan of Care/Level of Observation been reviewed with the patient's assigned nurse? yes    Does patient/parent or guardian express agreement with the above plan? yes    Referral appointment(s) scheduled? N\A    Alert team only:   I have discussed findings and recommendations with Dr. Keith  who is in agreement with these recommendations.     Referral information sent to the following community providers :        Mckay Madrid R.N., MBA

## 2023-07-05 ENCOUNTER — OFFICE VISIT (OUTPATIENT)
Dept: INTERNAL MEDICINE | Facility: OTHER | Age: 53
End: 2023-07-05
Payer: COMMERCIAL

## 2023-07-05 VITALS
SYSTOLIC BLOOD PRESSURE: 93 MMHG | WEIGHT: 139.6 LBS | HEART RATE: 81 BPM | OXYGEN SATURATION: 100 % | TEMPERATURE: 98.7 F | DIASTOLIC BLOOD PRESSURE: 57 MMHG | BODY MASS INDEX: 27.41 KG/M2 | HEIGHT: 60 IN

## 2023-07-05 DIAGNOSIS — L29.8 PRURITIC ERYTHEMATOUS RASH: ICD-10-CM

## 2023-07-05 PROBLEM — R68.89 FLU-LIKE SYMPTOMS: Status: RESOLVED | Noted: 2023-02-28 | Resolved: 2023-07-05

## 2023-07-05 PROCEDURE — 3074F SYST BP LT 130 MM HG: CPT

## 2023-07-05 PROCEDURE — 99214 OFFICE O/P EST MOD 30 MIN: CPT | Mod: GC

## 2023-07-05 PROCEDURE — 3078F DIAST BP <80 MM HG: CPT

## 2023-07-05 RX ORDER — CLONIDINE HYDROCHLORIDE 0.1 MG/1
TABLET ORAL
COMMUNITY
Start: 2023-07-01 | End: 2024-01-18 | Stop reason: SDUPTHER

## 2023-07-05 RX ORDER — BUSPIRONE HYDROCHLORIDE 15 MG/1
15 TABLET ORAL 3 TIMES DAILY
COMMUNITY
Start: 2023-07-01 | End: 2023-07-05 | Stop reason: SDUPTHER

## 2023-07-05 RX ORDER — METHOCARBAMOL 500 MG/1
500 TABLET, FILM COATED ORAL 4 TIMES DAILY
Qty: 360 TABLET | Refills: 0 | Status: SHIPPED | OUTPATIENT
Start: 2023-07-05 | End: 2023-10-03

## 2023-07-05 RX ORDER — BUSPIRONE HYDROCHLORIDE 15 MG/1
15 TABLET ORAL 3 TIMES DAILY
Qty: 90 TABLET | Refills: 3 | Status: SHIPPED | OUTPATIENT
Start: 2023-07-05 | End: 2024-01-18 | Stop reason: SDUPTHER

## 2023-07-05 RX ORDER — METHYLPREDNISOLONE 4 MG/1
TABLET ORAL
Qty: 21 TABLET | Refills: 0 | Status: SHIPPED | OUTPATIENT
Start: 2023-07-05 | End: 2023-07-12

## 2023-07-05 RX ORDER — HYDROXYZINE HYDROCHLORIDE 25 MG/1
25 TABLET, FILM COATED ORAL 3 TIMES DAILY PRN
Qty: 90 TABLET | Refills: 0 | Status: SHIPPED | OUTPATIENT
Start: 2023-07-05 | End: 2023-07-12 | Stop reason: SDUPTHER

## 2023-07-05 RX ORDER — MICONAZOLE NITRATE 20 MG/G
CREAM TOPICAL
Qty: 177 ML | Refills: 3 | Status: SHIPPED | OUTPATIENT
Start: 2023-07-05 | End: 2023-07-21

## 2023-07-05 RX ORDER — MELOXICAM 15 MG/1
15 TABLET ORAL
Qty: 90 TABLET | Refills: 0 | Status: SHIPPED | OUTPATIENT
Start: 2023-07-05 | End: 2023-12-26

## 2023-07-05 RX ORDER — FLUOXETINE HYDROCHLORIDE 20 MG/1
50 CAPSULE ORAL DAILY
COMMUNITY
Start: 2023-04-27 | End: 2023-07-21

## 2023-07-05 RX ORDER — FAMOTIDINE 20 MG/1
20 TABLET, FILM COATED ORAL 2 TIMES DAILY
Qty: 60 TABLET | Refills: 0 | Status: SHIPPED | OUTPATIENT
Start: 2023-07-05 | End: 2023-08-04

## 2023-07-05 RX ORDER — TRAZODONE HYDROCHLORIDE 150 MG/1
150 TABLET ORAL
COMMUNITY
Start: 2023-06-26 | End: 2023-08-16 | Stop reason: SDUPTHER

## 2023-07-05 ASSESSMENT — ENCOUNTER SYMPTOMS
BLOOD IN STOOL: 0
DIAPHORESIS: 0
COUGH: 0
EYE PAIN: 0
FEVER: 0
NAUSEA: 1
WHEEZING: 0
SORE THROAT: 0
VOMITING: 0
WEIGHT LOSS: 0
CHILLS: 0
SHORTNESS OF BREATH: 0
DEPRESSION: 1
EYE REDNESS: 0

## 2023-07-05 ASSESSMENT — FIBROSIS 4 INDEX: FIB4 SCORE: 5.73

## 2023-07-05 NOTE — PROGRESS NOTES
Established Patient    Patient Care Team:  Margy Aparicio M.D. as PCP - General (Internal Medicine)    Olya Youssef is a 53 y.o. female with relevant past medical history of alcohol and polysubstance use disorder complicated by hepatitis and alcohol withdrawal delirium, thrombocytopenia and anemia, degenerative disc disc disease, depression with several previous suicidal thoughts and attempts, and need for home oxygen (2 L nasal cannula) secondary to COVID who presents today with the following chief complaint(s): Diffuse pruritic maculopapular rash with central clearing, excoriations, plaques sparing mucous membranes.  She also requested refills on Robaxin and Mobic.  We discussed other ongoing issues such as her back pain, which she is following up with her neurosurgeon.  She has appointments with her psychiatrist and psychologist this month.  She also states that she quit drinking and using meth within the last month after her recent emergency department visits.    HPI:  1. Pruritic erythematous rash  Patient has been dealing with this rash for the past couple of weeks.  The rash began as a small spot on her left shin that she suspected was due to bug bite.  It has since ascended and now covers nearly every part of her body with the exception of her face and mucous membranes (i.e. oral cavity, vaginal canal).  She describes the rash as excruciatingly itchy. she has been to the emergency department several times to address this issue.  She was prescribed doxycycline but she stopped when the itching would not subside.  She was also treated with Atarax in the ED. no biopsy or culturing has been performed yet.  In the ED she describes that the pain and frustration from itching to be so severe that if it does not improve she will attempt suicide.  Patient is determined to find a solution to her itchiness.  It is understandably frustrating and uncomfortable.    Patient denies any recent IV drug  use, bedbugs, changes in detergent or soap, recent travel, new medications, or contact with anyone with similar rash.  She is afebrile and has no other related symptoms.  She has had a MRSA infection in the past and states this does not look the same.    2. Medication refills  Patient requests refills of her BuSpar, Mobic and Robaxin.        Review of Systems   Constitutional:  Negative for chills, diaphoresis, fever, malaise/fatigue and weight loss.   HENT:  Negative for ear pain and sore throat.    Eyes:  Negative for pain and redness.   Respiratory:  Negative for cough, shortness of breath and wheezing.    Gastrointestinal:  Positive for nausea. Negative for blood in stool and vomiting.   Genitourinary:  Negative for dysuria, frequency and urgency.   Skin:  Positive for itching and rash.   Psychiatric/Behavioral:  Positive for depression and suicidal ideas.    :    Past Medical History:   Diagnosis Date    Alcohol abuse     Alcohol intoxication, with unspecified complication ([facility]) 8/7/2013    Alcoholism ([facility])     Anxiety     Backpain     Bipolar disorder, unspecified ([facility])     Bronchitis     Degenerative disc disease, cervical 3/28/2023    Drug abuse ([facility])     meth, crack cocaine, THC    Hepatitis, alcoholic     Hypertension     controlled    Indigestion     Infectious disease MRSA    Liver disease     pancreatitis    Pancreatitis chronic     Flare-up    Pneumonia     Psychiatric disorder     suicidal in past    Renal disorder     kidney infection 1991    Seizure ([facility])     7/8/2011    Seizure disorder ([facility])     Unspecified hemorrhagic conditions      Social History     Tobacco Use    Smoking status: Never    Smokeless tobacco: Never   Vaping Use    Vaping Use: Never used   Substance Use Topics    Alcohol use: Yes     Comment: a couple beers a day    Drug use: Yes     Types: Inhaled     Comment: meth on 4/14     Current Outpatient Medications   Medication Sig Dispense Refill    cloNIDine (CATAPRES) 0.1 MG Tab        FLUoxetine (PROZAC) 20 MG Cap Take 50 mg by mouth every day.      meloxicam (MOBIC) 15 MG tablet Take 1 Tablet by mouth 1 time a day as needed for Moderate Pain for up to 360 days. 90 Tablet 0    methocarbamol (ROBAXIN) 500 MG Tab Take 1 Tablet by mouth 4 times a day for 90 days. 360 Tablet 0    methylPREDNISolone (MEDROL DOSEPAK) 4 MG Tablet Therapy Pack As directed on the packaging label. 21 Tablet 0    pramoxine-calamine 1-8% (CALAMINE PLUS) lotion Use lotion 4 times per day on itchy areas. Avoid open wounds. 177 mL 3    hydrOXYzine HCl (ATARAX) 25 MG Tab Take 1 Tablet by mouth 3 times a day as needed for Itching for up to 30 days. 90 Tablet 0    famotidine (PEPCID) 20 MG Tab Take 1 Tablet by mouth 2 times a day for 30 days. 60 Tablet 0    busPIRone (BUSPAR) 15 MG tablet Take 1 Tablet by mouth in the morning, at noon, and at bedtime. 90 Tablet 3    amLODIPine (NORVASC) 2.5 MG Tab Take 1 Tablet by mouth every day for 360 days. 90 Tablet 3    atorvastatin (LIPITOR) 40 MG Tab Take 1 Tablet by mouth every evening for 360 days. 90 Tablet 3    diclofenac sodium (VOLTAREN) 1 % Gel Apply 2 g topically 4 times a day as needed (for neck pain) for up to 360 days. 150 g 3    gabapentin (NEURONTIN) 600 MG tablet Take 1 Tablet by mouth 3 times a day for 360 days. 270 Tablet 3    traZODone (DESYREL) 150 MG Tab Take 150 mg by mouth at bedtime as needed.       No current facility-administered medications for this visit.       Physical Exam:  BP 93/57 (BP Location: Left arm, Patient Position: Sitting, BP Cuff Size: Adult)   Pulse 81   Temp 37.1 °C (98.7 °F) (Temporal)   Ht 1.524 m (5')   Wt 63.3 kg (139 lb 9.6 oz)   LMP 02/28/2018   SpO2 100%   BMI 27.26 kg/m²   Physical Exam  Constitutional:       General: She is in acute distress.   HENT:      Head: Normocephalic and atraumatic.      Mouth/Throat:      Mouth: Mucous membranes are moist.      Pharynx: Oropharynx is clear. No posterior oropharyngeal erythema.    Cardiovascular:      Rate and Rhythm: Normal rate and regular rhythm.      Heart sounds: Normal heart sounds.   Pulmonary:      Effort: Pulmonary effort is normal.      Breath sounds: Normal breath sounds.      Comments: 2 L oxygen via NC  Musculoskeletal:         General: Tenderness present.      Right lower leg: Edema present.      Left lower leg: Edema present.   Skin:     Findings: Erythema, lesion and rash present.   Neurological:      Mental Status: She is alert and oriented to person, place, and time.   Psychiatric:         Attention and Perception: Attention and perception normal.         Mood and Affect: Mood is anxious and depressed.         Speech: Speech normal.         Behavior: Behavior normal. Behavior is cooperative.         Thought Content: Thought content includes suicidal ideation. Thought content includes suicidal plan.         Cognition and Memory: Cognition and memory normal.           Assessment and Plan:   1. Pruritic erythematous rash  Patient has had very little relief from any previous treatments.  She has not tried much of the Atarax prescribed in the hospital.  She found that her roommates prescription strength hydrocortisone cream was somewhat effective at treating the itchiness.  The rash appears erythematous and predominantly macular with central clearing with some papules on the arms.  She has some plaques on her left ankle and excoriations on her arms legs and trunk.  Her rash is diffuse but more concentrated along the lower legs and arms.  She is experiencing swelling around her ankles.  Patient noted some pustules yesterday that appear to now have healed.  Differential diagnoses include granuloma annulare, psoriasis, adverse drug reaction.  Treatment plan is the following:  - Medrol Dosepak  - Calamine lotion  -Atarax 25 mg  - Pepcid 20 mg  - Urgent referral to dermatology  - Follow-up in clinic next week  - Patient is encouraged to contact the clinic if symptoms do not start to  improve after 2 to 3 days    2. Medication refills  Refills for meloxicam, methocarbamol, and buspirone sent to pharmacy    Routine screening and care will be discussed once rash has resolved and she is able to see her regular primary care physician.       Return in about 1 week (around 7/12/2023).    Patient Instructions   Start oral steroid pack, following directions on the packaging.  Use calamine lotion on areas where itchiness persists avoiding areas of open skin from scratching.  Start famotidine 20 mg and hydroxyzine 25 mg as prescribed.  We will follow-up next week after you are able to see the dermatologist.  If you start to experience symptoms of an infection such as an elevated temperature, lightheadedness, sweating please head to the emergency department.    I will see you next week!  Thank you!      Samanta Louie M.D., PGY-1  [facility]    This note was created using voice recognition software.  While every attempt is made to ensure accuracy of transcription, occasionally errors occur.

## 2023-07-05 NOTE — PATIENT INSTRUCTIONS
Start oral steroid pack, following directions on the packaging.  Use calamine lotion on areas where itchiness persists avoiding areas of open skin from scratching.  Start famotidine 20 mg and hydroxyzine 25 mg as prescribed.  We will follow-up next week after you are able to see the dermatologist.  If you start to experience symptoms of an infection such as an elevated temperature, lightheadedness, sweating please head to the emergency department.    I will see you next week!  Thank you!

## 2023-07-12 ENCOUNTER — OFFICE VISIT (OUTPATIENT)
Dept: INTERNAL MEDICINE | Facility: OTHER | Age: 53
End: 2023-07-12
Payer: COMMERCIAL

## 2023-07-12 VITALS
BODY MASS INDEX: 28.29 KG/M2 | OXYGEN SATURATION: 99 % | TEMPERATURE: 97.2 F | SYSTOLIC BLOOD PRESSURE: 98 MMHG | HEART RATE: 79 BPM | HEIGHT: 58 IN | DIASTOLIC BLOOD PRESSURE: 60 MMHG | WEIGHT: 134.8 LBS

## 2023-07-12 DIAGNOSIS — L29.8 PRURITIC ERYTHEMATOUS RASH: ICD-10-CM

## 2023-07-12 PROBLEM — F10.931 ALCOHOL WITHDRAWAL DELIRIUM, ACUTE, HYPERACTIVE (HCC): Status: RESOLVED | Noted: 2023-01-23 | Resolved: 2023-07-12

## 2023-07-12 PROBLEM — L29.89 PRURITIC ERYTHEMATOUS RASH: Status: ACTIVE | Noted: 2023-07-12

## 2023-07-12 PROBLEM — F10.930 ALCOHOL WITHDRAWAL WITH INPATIENT TREATMENT, UNCOMPLICATED (HCC): Status: RESOLVED | Noted: 2023-04-11 | Resolved: 2023-07-12

## 2023-07-12 PROCEDURE — 3078F DIAST BP <80 MM HG: CPT | Performed by: STUDENT IN AN ORGANIZED HEALTH CARE EDUCATION/TRAINING PROGRAM

## 2023-07-12 PROCEDURE — 99213 OFFICE O/P EST LOW 20 MIN: CPT | Mod: GC | Performed by: STUDENT IN AN ORGANIZED HEALTH CARE EDUCATION/TRAINING PROGRAM

## 2023-07-12 PROCEDURE — 3074F SYST BP LT 130 MM HG: CPT | Performed by: STUDENT IN AN ORGANIZED HEALTH CARE EDUCATION/TRAINING PROGRAM

## 2023-07-12 RX ORDER — KETOCONAZOLE 20 MG/ML
5 SHAMPOO TOPICAL
Qty: 120 ML | Refills: 1 | Status: SHIPPED | OUTPATIENT
Start: 2023-07-12 | End: 2023-12-06

## 2023-07-12 RX ORDER — HYDROXYZINE HYDROCHLORIDE 25 MG/1
25 TABLET, FILM COATED ORAL 3 TIMES DAILY PRN
Qty: 90 TABLET | Refills: 0 | Status: SHIPPED | OUTPATIENT
Start: 2023-07-12 | End: 2023-08-11

## 2023-07-12 ASSESSMENT — FIBROSIS 4 INDEX: FIB4 SCORE: 5.73

## 2023-07-12 NOTE — PROGRESS NOTES
"    Established Patient    Patient Care Team:  Margy Aparicio M.D. as PCP - General (Internal Medicine)    HPI:  Olya Youssef is a 53 y.o. female who presents today with the following Chief Complaint(s):   Chief Complaint   Patient presents with    Skin Lesion     Patient here for skin rash     Patient comes in today for follow up of generalized pruritic rash. She was last seen in clinic last week, with Dr. Louie who prescribed Medrol-dosepak, calamine lotion, Atarax and Pepcid. Urgent dermatology referral was also sent at that time and patient is scheduled to see Dr. Julio on 7/18/23. Since her last visit, patient has completed the Medrol-dosepak with reported improvement of symptoms. She has been unable to  the lotion due to it not being covered by her insurance and not being able to pay for it over the counter. She has tried Atarax which she is unsure of if it provides relief and has been taking around 2 tabs around 4 times a day. Of note, patient has also been applying hydrocortisone cream three times a day to her whole body (not including face) in order to address her pruritus. When asked further, patient denies any changes in detergent, body wash or lotion. She reports showering daily to try to relieve symptoms but does not apply body lotion due to burning sensation on skin. She also notes pruritus is worse at night, particularly along her lower extremities, described as \"prickly heat\".    Review of Systems   Constitutional: Denies chills, fever, fatigue  HEENT: Denies blurry vision, congestion, sore throat  Respiratory: Denies worsening shortness of breath, wheezing, +chronic cough  Cardiovascular: Denies chest pain, palpitations, leg swelling  Gastrointestinal: Denies nausea, vomiting, abdominal pain  Genitourinary: Denies dysuria, frequency  Musculoskeletal: +chronic neck and back pain without red flag signs/symptoms  Skin: +generalized rash, itching  Neurological: Negative for " headaches, dizziness      Past Medical History:   Diagnosis Date    Alcohol abuse     Alcohol intoxication, with unspecified complication ([facility]) 8/7/2013    Alcoholism ([facility])     Anxiety     Backpain     Bipolar disorder, unspecified ([facility])     Bronchitis     Degenerative disc disease, cervical 3/28/2023    Drug abuse ([facility])     meth, crack cocaine, THC    Hepatitis, alcoholic     Hypertension     controlled    Indigestion     Infectious disease MRSA    Liver disease     pancreatitis    Pancreatitis chronic     Flare-up    Pneumonia     Psychiatric disorder     suicidal in past    Renal disorder     kidney infection 1991    Seizure ([facility])     7/8/2011    Seizure disorder ([facility])     Unspecified hemorrhagic conditions        Patient Active Problem List    Diagnosis Date Noted    Mood changes 04/22/2023    Alcohol withdrawal with inpatient treatment, uncomplicated ([facility]) 04/11/2023    Bladder polyp 04/11/2023    Severe benzodiazepine use disorder ([facility]) 03/28/2023    High risk social situation 03/28/2023    Degenerative disc disease, cervical 03/28/2023    Acetaminophen abuse 02/28/2023    Other chronic pain 02/28/2023    Alcohol use disorder, severe, dependence ([facility]) 02/06/2023    Chronic respiratory failure with hypoxia, on home oxygen therapy ([facility]) 02/06/2023    Post covid-19 condition, unspecified 02/06/2023    Other insomnia 02/06/2023    Carotid artery stenosis, asymptomatic, right 01/28/2023    Headache 01/24/2023    Overweight 01/24/2023    Alcohol withdrawal delirium, acute, hyperactive ([facility]) 01/23/2023    Elevated lipase 01/23/2023    Suicidal behavior 09/27/2022    Vitamin D deficiency 05/25/2021    Hypertension 04/20/2021    Neuropathy 04/19/2021    Leukocytopenia 04/18/2021    Macrocytic anemia 04/18/2021    Methamphetamine abuse ([facility]) 05/29/2018    Alcoholic hepatitis 08/15/2013    Pancreatitis, alcoholic, acute 08/07/2013    Persistent depressive disorder 07/24/2013    Bipolar affective disorder ([facility]) 07/22/2013     Overdose of antidepressant 03/29/2013    Alcohol withdrawal seizure ([facility]) 05/16/2012    Thrombocytopenia ([facility]) 08/19/2011    Anemia 08/17/2011    Hypokalemia 08/16/2011       Allergies:Bactrim, Cephalexin, Lamotrigine [lamictal], Penicillin g, Quetiapine, Quetiapine fumarate, Quetiapine fumarate, Sulfa drugs, Sulfamethoxazole w-trimethoprim, and Keflex    Current Outpatient Medications   Medication Sig Dispense Refill    cloNIDine (CATAPRES) 0.1 MG Tab       FLUoxetine (PROZAC) 20 MG Cap Take 50 mg by mouth every day.      traZODone (DESYREL) 150 MG Tab Take 150 mg by mouth at bedtime as needed.      meloxicam (MOBIC) 15 MG tablet Take 1 Tablet by mouth 1 time a day as needed for Moderate Pain for up to 360 days. 90 Tablet 0    methocarbamol (ROBAXIN) 500 MG Tab Take 1 Tablet by mouth 4 times a day for 90 days. 360 Tablet 0    methylPREDNISolone (MEDROL DOSEPAK) 4 MG Tablet Therapy Pack As directed on the packaging label. 21 Tablet 0    hydrOXYzine HCl (ATARAX) 25 MG Tab Take 1 Tablet by mouth 3 times a day as needed for Itching for up to 30 days. 90 Tablet 0    famotidine (PEPCID) 20 MG Tab Take 1 Tablet by mouth 2 times a day for 30 days. 60 Tablet 0    busPIRone (BUSPAR) 15 MG tablet Take 1 Tablet by mouth in the morning, at noon, and at bedtime. 90 Tablet 3    amLODIPine (NORVASC) 2.5 MG Tab Take 1 Tablet by mouth every day for 360 days. 90 Tablet 3    atorvastatin (LIPITOR) 40 MG Tab Take 1 Tablet by mouth every evening for 360 days. 90 Tablet 3    diclofenac sodium (VOLTAREN) 1 % Gel Apply 2 g topically 4 times a day as needed (for neck pain) for up to 360 days. 150 g 3    gabapentin (NEURONTIN) 600 MG tablet Take 1 Tablet by mouth 3 times a day for 360 days. 270 Tablet 3    pramoxine-calamine 1-8% (CALAMINE PLUS) lotion Use lotion 4 times per day on itchy areas. Avoid open wounds. (Patient not taking: Reported on 7/12/2023) 177 mL 3     No current facility-administered medications for this visit.  "      Social History     Tobacco Use    Smoking status: Never    Smokeless tobacco: Never   Vaping Use    Vaping Use: Never used   Substance Use Topics    Alcohol use: Yes     Comment: a couple beers a day    Drug use: Yes     Types: Inhaled     Comment: meth on 4/14       Family History   Problem Relation Age of Onset    Lung Disease Mother     Cancer Mother          Physical Exam  Vitals:  BP 98/60 (BP Location: Right arm, Patient Position: Sitting, BP Cuff Size: Adult)   Pulse 79   Temp 36.2 °C (97.2 °F) (Temporal)   Ht 1.473 m (4' 10\")   Wt 61.1 kg (134 lb 12.8 oz)   SpO2 99%  Body mass index is 28.17 kg/m².  Constitutional:  Not in acute distress, well appearing.  HEENT:   NC/AT, EOMI, conjunctiva normal, hearing grossly intact  Cardiovascular: Regular rate and rhythm. No murmurs    Lungs:   Clear to auscultation bilaterally. No wheezes or crackles  Abdomen: Not distended, soft, not tender  Extremities:  No edema. No obvious deformities.  Skin:  Presence of generalized erythematous maculopapular rash with areas of coalescence seen along chest, abdomen, back, bilateral upper and lower extremities, areas of excoriated noted  Neurologic: Alert & oriented x 3, no focal deficits noted.  Psychiatric:  Tearful, no SI/HI.      Assessment and Plan:     Problem List Items Addressed This Visit       Pruritic erythematous rash     Seen in clinic last week, prescribed Medrol-dosepak, calamine lotion, Atarax and Pepcid  Completed oral steroids, reports improved symptoms. She states that Atarax one tab three times a day has not helped with itching and has been taking more. She has not tried calamine lotion or Pepcid. Reports also applying hydrocortisone cream three times a day to her whole body (not including face)  Denies changes in detergent, body wash or lotion Reports showering daily, does not apply body lotion due to burning sensation on skin  Pruritus is worse at night, particularly along her lower extremities, " "described as \"prickly heat\"  Urgent dermatology referral sent last visit, is scheduled to see Dr. Julio on 7/18/23  -Patient previously on antibiotics end of June/early July, consider drug-induced rash exacerbated by heat/sun exposure as possible differential vs atopic dermatitis vs fungal  -Patient requested for another prescription for oral steroids, explained to patient the risks/adverse effects of prolonged systemic and topical steroid use. Encouraged patient to not use hydrocortisone cream, if pruritus is very bothersome, advised patient to use a very small amount on a concentrated area.  -Advised to decrease amount of times patient showers as this could be contributing. Encouraged use of body lotion to aid in hydration  -Ketoconazole shampoo prescribed, possible ?tinea versicolor  -Diphenhydramine cream also prescribed  -Atarax refilled today. Advised patient to take Pepcid as well  -Furthermore, explained to patient that at this time we will symptomatically treat her pruritus until she sees dermatology            Return in about 4 weeks (around 8/9/2023).    Margy Aparicio M.D. PGY-3  [facility] [employer] [facility]    "

## 2023-07-12 NOTE — PATIENT INSTRUCTIONS
I have sent benadryl cream and ketoconazole shampoo to your pharmacy, please pick it up when you can  Avoid scratching your skin as much as possible  Take less frequent showers, it is making your skin more dry. Apply moisturizing lotion to your body  Avoid using hydrocortisone cream, if it gets really itchy you can try using a small pea sized amount  Avoid fragranced or scented body washes, lotions on your body  Take Atarax as needed for your itching. Also take Pepcid  Follow up with dermatology on 7/18/23  Follow up in around 1 month or so if symptoms have not resolved

## 2023-07-13 NOTE — ASSESSMENT & PLAN NOTE
"Seen in clinic last week, prescribed Medrol-dosepak, calamine lotion, Atarax and Pepcid  Completed oral steroids, reports improved symptoms. She states that Atarax one tab three times a day has not helped with itching and has been taking more. She has not tried calamine lotion or Pepcid. Reports also applying hydrocortisone cream three times a day to her whole body (not including face)  Denies changes in detergent, body wash or lotion Reports showering daily, does not apply body lotion due to burning sensation on skin  Pruritus is worse at night, particularly along her lower extremities, described as \"prickly heat\"  Urgent dermatology referral sent last visit, is scheduled to see Dr. Julio on 7/18/23  -Patient previously on antibiotics end of June/early July, consider drug-induced rash exacerbated by heat/sun exposure as possible differential vs atopic dermatitis vs fungal  -Patient requested for another prescription for oral steroids, explained to patient the risks/adverse effects of prolonged systemic and topical steroid use. Encouraged patient to not use hydrocortisone cream, if pruritus is very bothersome, advised patient to use a very small amount on a concentrated area.  -Advised to decrease amount of times patient showers as this could be contributing. Encouraged use of body lotion to aid in hydration  -Ketoconazole shampoo prescribed, possible ?tinea versicolor  -Diphenhydramine cream also prescribed  -Atarax refilled today. Advised patient to take Pepcid as well  -Furthermore, explained to patient that at this time we will symptomatically treat her pruritus until she sees dermatology  "

## 2023-07-14 ENCOUNTER — APPOINTMENT (OUTPATIENT)
Dept: RADIOLOGY | Facility: MEDICAL CENTER | Age: 53
End: 2023-07-14
Attending: NURSE PRACTITIONER
Payer: COMMERCIAL

## 2023-07-18 ENCOUNTER — TELEPHONE (OUTPATIENT)
Dept: INTERNAL MEDICINE | Facility: OTHER | Age: 53
End: 2023-07-18

## 2023-07-18 ENCOUNTER — APPOINTMENT (OUTPATIENT)
Dept: INTERNAL MEDICINE | Facility: OTHER | Age: 53
End: 2023-07-18
Payer: COMMERCIAL

## 2023-07-18 DIAGNOSIS — L29.8 PRURITIC ERYTHEMATOUS RASH: ICD-10-CM

## 2023-07-18 NOTE — TELEPHONE ENCOUNTER
Patient was scheduled to see Dr Julio this morning, spoke with patient and told her we need to reschedule, I do not see the urgent referral anywhere in her chart.  Patient was understanding and did say the rash was improving  but still very itchy.  Did the referral to Dermatology get written up?  Dr Aparicio wrote in her last office visit that there was an urgent referral?

## 2023-07-21 ENCOUNTER — OFFICE VISIT (OUTPATIENT)
Dept: INTERNAL MEDICINE | Facility: OTHER | Age: 53
End: 2023-07-21
Payer: COMMERCIAL

## 2023-07-21 ENCOUNTER — TELEPHONE (OUTPATIENT)
Dept: INTERNAL MEDICINE | Facility: OTHER | Age: 53
End: 2023-07-21

## 2023-07-21 VITALS
OXYGEN SATURATION: 96 % | BODY MASS INDEX: 28 KG/M2 | SYSTOLIC BLOOD PRESSURE: 92 MMHG | DIASTOLIC BLOOD PRESSURE: 53 MMHG | WEIGHT: 133.4 LBS | HEART RATE: 81 BPM | TEMPERATURE: 98 F | HEIGHT: 58 IN

## 2023-07-21 DIAGNOSIS — M25.512 ACUTE PAIN OF LEFT SHOULDER: ICD-10-CM

## 2023-07-21 DIAGNOSIS — F34.1 PERSISTENT DEPRESSIVE DISORDER: ICD-10-CM

## 2023-07-21 DIAGNOSIS — R20.8 HYPERALGESIA: ICD-10-CM

## 2023-07-21 DIAGNOSIS — F55.8 ACETAMINOPHEN ABUSE: ICD-10-CM

## 2023-07-21 DIAGNOSIS — Z12.31 ENCOUNTER FOR SCREENING MAMMOGRAM FOR BREAST CANCER: ICD-10-CM

## 2023-07-21 PROCEDURE — 3074F SYST BP LT 130 MM HG: CPT | Mod: GC

## 2023-07-21 PROCEDURE — 99214 OFFICE O/P EST MOD 30 MIN: CPT | Mod: GC

## 2023-07-21 PROCEDURE — 3078F DIAST BP <80 MM HG: CPT | Mod: GC

## 2023-07-21 RX ORDER — DULOXETIN HYDROCHLORIDE 30 MG/1
30 CAPSULE, DELAYED RELEASE ORAL DAILY
Qty: 30 CAPSULE | Refills: 0 | Status: SHIPPED | OUTPATIENT
Start: 2023-07-21 | End: 2023-07-24 | Stop reason: SDUPTHER

## 2023-07-21 RX ORDER — HYDROXYZINE PAMOATE 50 MG/1
CAPSULE ORAL
COMMUNITY
Start: 2023-07-17 | End: 2023-12-26

## 2023-07-21 ASSESSMENT — ENCOUNTER SYMPTOMS
ABDOMINAL PAIN: 1
BACK PAIN: 1
PALPITATIONS: 0
FEVER: 1
CHILLS: 0
VOMITING: 0
DIAPHORESIS: 1
DEPRESSION: 0
NECK PAIN: 1
SHORTNESS OF BREATH: 1
PND: 0
NAUSEA: 1
BLOOD IN STOOL: 0
FALLS: 0
LOSS OF CONSCIOUSNESS: 0
SEIZURES: 0
NERVOUS/ANXIOUS: 0

## 2023-07-21 ASSESSMENT — FIBROSIS 4 INDEX: FIB4 SCORE: 5.73

## 2023-07-21 NOTE — ASSESSMENT & PLAN NOTE
Acute on chronic shoulder pain, exacerbated past 3-4 days, atraumatic.  Chronic shoulder pain for the past 6 months, intermittently compliant with left shoulder PT.  Positive Lentz, however normal strength with abduction with resistance of left shoulder.     -Ordered left shoulder XR (2 views) to rule out flexion/dislocation  -Advised to continue Voltaren gel, as found significant relief with medication  -Strongly encouraged to partake in physical therapy for her left shoulder  -Reevaluate next visit

## 2023-07-21 NOTE — ASSESSMENT & PLAN NOTE
Patient notes breast lump in upper quadrant left breast 3 days ago.  However upon PE, no mass or lump appreciated.  Significant findings include multiple excoriations, with excoriation overlying tender point of upper quadrant of left breast, as well as mild skin changes noted in bilateral inferior breasts.    -Ordered mammogram for routine screening given lack of PE findings and low concern for malignancy at this time  -Reassess next visit

## 2023-07-21 NOTE — PROGRESS NOTES
Established Patient    Patient Care Team:  Margy Aparicio M.D. as PCP - General (Internal Medicine)    Olya Youssef is a 53 y.o. female who presents today with the following Chief Complaint(s): Follow up for Diagnoses of Persistent depressive disorder, Hyperalgesia, Encounter for screening mammogram for breast cancer, Acute pain of left shoulder, and Acetaminophen abuse were pertinent to this visit.    HPI:  Patient here for a visit regarding complaint of lump noted in upper quadrant of her left breast 3 days ago, as well as acute on chronic left shoulder pain, nontraumatic.  Patient noticed that she had a tender lump several inches above her nipple.  Denies any discharge, erythema, swelling.  Notes that for the past month she has been having loss of satiety, as well as waking up soaking wet almost every night for the past 3-4 months.  Denies overt fever/chills, however does state that she feels feverish and flushes during the day intermittently through the week.  History of lung cancer in her mother, however no history of breast cancer.     Patient is also noted significant pain and weakness in her left shoulder, has been going to PT for degenerative changes of her neck and back as well as pain in her left shoulder, however has not gone to her past 2 physical therapy appointments, and has not been working with her shoulder much during PT sessions due to shoulder soreness.  She denies any  recent trauma, however noted increased pain in the past 3-4 days.    Of note, for the past 6 months patient has been taking 5 g of Tylenol daily (ten 500 mg tablets) to help alleviate her pain.  Has history of polysubstance use, most recent methamphetamine and alcohol use 1 month ago.     Review of Systems   Constitutional:  Positive for diaphoresis (night sweats almost daily) and fever (intermittent). Negative for chills.   HENT:  Negative for hearing loss.    Respiratory:  Positive for shortness of  breath (chronic).    Cardiovascular:  Positive for chest pain (pleuritc). Negative for palpitations, leg swelling and PND.   Gastrointestinal:  Positive for abdominal pain and nausea. Negative for blood in stool, melena and vomiting.   Genitourinary:  Negative for dysuria and hematuria.   Musculoskeletal:  Positive for back pain, joint pain and neck pain. Negative for falls.   Neurological:  Negative for seizures and loss of consciousness.   Psychiatric/Behavioral:  Negative for depression. The patient is not nervous/anxious.        Past Medical History:   Diagnosis Date    Alcohol abuse     Alcohol intoxication, with unspecified complication ([facility]) 8/7/2013    Alcoholism ([facility])     Anxiety     Backpain     Bipolar disorder, unspecified ([facility])     Bronchitis     Degenerative disc disease, cervical 3/28/2023    Drug abuse ([facility])     meth, crack cocaine, THC    Hepatitis, alcoholic     Hypertension     controlled    Indigestion     Infectious disease MRSA    Liver disease     pancreatitis    Pancreatitis chronic     Flare-up    Pneumonia     Psychiatric disorder     suicidal in past    Renal disorder     kidney infection 1991    Seizure ([facility])     7/8/2011    Seizure disorder ([facility])     Unspecified hemorrhagic conditions      Social History     Tobacco Use    Smoking status: Never    Smokeless tobacco: Never   Vaping Use    Vaping Use: Never used   Substance Use Topics    Alcohol use: Not Currently     Comment: a couple beers a day    Drug use: Not Currently     Types: Inhaled     Comment: meth on 4/14     Current Outpatient Medications   Medication Sig Dispense Refill    hydrOXYzine pamoate (VISTARIL) 50 MG Cap       DULoxetine (CYMBALTA) 30 MG Cap DR Particles Take 1 Capsule by mouth every day. 30 Capsule 0    ketoconazole (NIZORAL) 2 % shampoo Apply 5 mL topically 1 time a day as needed for Itching. 120 mL 1    diphenhydramine (BENADRYL) 2 % Cream Apply 10 mL topically 3 times a day. 56.8 g 1    hydrOXYzine HCl (ATARAX)  "25 MG Tab Take 1 Tablet by mouth 3 times a day as needed for Itching for up to 30 days. 90 Tablet 0    cloNIDine (CATAPRES) 0.1 MG Tab       traZODone (DESYREL) 150 MG Tab Take 150 mg by mouth at bedtime as needed.      meloxicam (MOBIC) 15 MG tablet Take 1 Tablet by mouth 1 time a day as needed for Moderate Pain for up to 360 days. 90 Tablet 0    methocarbamol (ROBAXIN) 500 MG Tab Take 1 Tablet by mouth 4 times a day for 90 days. 360 Tablet 0    famotidine (PEPCID) 20 MG Tab Take 1 Tablet by mouth 2 times a day for 30 days. 60 Tablet 0    busPIRone (BUSPAR) 15 MG tablet Take 1 Tablet by mouth in the morning, at noon, and at bedtime. 90 Tablet 3    amLODIPine (NORVASC) 2.5 MG Tab Take 1 Tablet by mouth every day for 360 days. 90 Tablet 3    atorvastatin (LIPITOR) 40 MG Tab Take 1 Tablet by mouth every evening for 360 days. 90 Tablet 3    diclofenac sodium (VOLTAREN) 1 % Gel Apply 2 g topically 4 times a day as needed (for neck pain) for up to 360 days. 150 g 3    gabapentin (NEURONTIN) 600 MG tablet Take 1 Tablet by mouth 3 times a day for 360 days. 270 Tablet 3     No current facility-administered medications for this visit.       BP 92/53 (BP Location: Left arm, Patient Position: Sitting, BP Cuff Size: Small adult)   Pulse 81   Temp 36.7 °C (98 °F) (Temporal)   Ht 1.473 m (4' 10\")   Wt 60.5 kg (133 lb 6.4 oz)   LMP 02/28/2018   SpO2 96%   BMI 27.88 kg/m²   Physical Exam  Exam conducted with a chaperone present.   Constitutional:       General: She is not in acute distress.  HENT:      Head: Normocephalic and atraumatic.      Nose: Nose normal.      Mouth/Throat:      Mouth: Mucous membranes are moist.   Eyes:      Conjunctiva/sclera: Conjunctivae normal.   Cardiovascular:      Rate and Rhythm: Normal rate and regular rhythm.      Pulses: Normal pulses.      Heart sounds: Normal heart sounds. No murmur heard.     No friction rub. No gallop.   Pulmonary:      Effort: Pulmonary effort is normal.      Breath " sounds: Normal breath sounds. No wheezing, rhonchi or rales.   Chest:   Breasts:     Right: Skin change present. No swelling, bleeding, inverted nipple, mass, nipple discharge or tenderness.      Left: Skin change and tenderness (Upper quadrant left breast) present. No swelling, bleeding, inverted nipple, mass or nipple discharge.      Comments: Excoriations on bilateral breast noted, patient attributes to itching  Abdominal:      General: Abdomen is flat. Bowel sounds are normal.      Palpations: Abdomen is soft.      Tenderness: There is abdominal tenderness (Diffuse). There is no guarding or rebound.   Musculoskeletal:      Right lower leg: No edema.      Left lower leg: No edema.      Comments: In multiple points of tenderness throughout body including diffusely around abdomen, chest, left shoulder, left back, bilateral lower extremities.    Positive Lentz, normal strength with abduction with resistance of left shoulder   Lymphadenopathy:      Upper Body:      Right upper body: No axillary adenopathy.      Left upper body: No axillary adenopathy.   Skin:     General: Skin is warm.      Capillary Refill: Capillary refill takes less than 2 seconds.   Neurological:      General: No focal deficit present.      Mental Status: She is alert and oriented to person, place, and time.   Psychiatric:         Mood and Affect: Mood normal.         Behavior: Behavior normal.         Assessment and Plan:     Acute pain of left shoulder  Acute on chronic shoulder pain, exacerbated past 3-4 days, atraumatic.  Chronic shoulder pain for the past 6 months, intermittently compliant with left shoulder PT.  Positive Lentz, however normal strength with abduction with resistance of left shoulder.     -Ordered left shoulder XR (2 views) to rule out flexion/dislocation  -Advised to continue Voltaren gel, as found significant relief with medication  -Strongly encouraged to partake in physical therapy for her left shoulder  -Reevaluate  next visit    Hyperalgesia  Patient has hyperalgesia all over body, with multiple points of tenderness including diffuse abdomen, chest, left shoulder, left back, bilateral lower extremities.  Given concurrent mood symptoms, concern for possible fibromyalgia given refractory to nonnarcotic pain medications.    -Switched Prozac to Cymbalta 30 mg daily, reevaluate in 2 weeks for possible up titration to 60 mg  -Pain management appointment on August 18, with patient strongly encouraged to establish care  -Reevaluate next visit    Acetaminophen abuse  Has been taking 5 g of Tylenol daily for the past 6 months.  Recent CMP demonstrating mildly elevated AST and alk phos, however stable compared to previous labs.  No concerns for acute liver failure at this time.    -Patient strongly encouraged to only take up to four 500 mg tablets for a total of 2 g daily of Tylenol, with risk of acetaminophen abuse extensively discussed with patient and possible morbidity and mortality related to liver failure  -Reevaluate next visit    Encounter for screening mammogram for breast cancer  Patient notes breast lump in upper quadrant left breast 3 days ago.  However upon PE, no mass or lump appreciated.  Significant findings include multiple excoriations, with excoriation overlying tender point of upper quadrant of left breast, as well as mild skin changes noted in bilateral inferior breasts.    -Ordered mammogram for routine screening given lack of PE findings and low concern for malignancy at this time  -Reassess next visit      Orders Placed This Encounter    MA-SCREENING MAMMO BILAT W/TOMOSYNTHESIS W/CAD    DX-SHOULDER 2+ LEFT    hydrOXYzine pamoate (VISTARIL) 50 MG Cap    DULoxetine (CYMBALTA) 30 MG Cap DR Particles       Return in about 2 weeks (around 8/4/2023).    David Hazel D.O. PGY II  Internal Medicine  [facility]

## 2023-07-21 NOTE — ASSESSMENT & PLAN NOTE
Patient has hyperalgesia all over body, with multiple points of tenderness including diffuse abdomen, chest, left shoulder, left back, bilateral lower extremities.  Given concurrent mood symptoms, concern for possible fibromyalgia given refractory to nonnarcotic pain medications.    -Switched Prozac to Cymbalta 30 mg daily, reevaluate in 2 weeks for possible up titration to 60 mg  -Pain management appointment on August 18, with patient strongly encouraged to establish care  -Reevaluate next visit

## 2023-07-21 NOTE — TELEPHONE ENCOUNTER
DOCUMENTATION OF PAR STATUS:    1. Name of Medication & Dose: Duloxetine 30mg     2. Name of Prescription Coverage Company & phone #: [email or web address]     3. Date Prior Auth Submitted: 7/21/2023    4. What information was given to obtain insurance decision? ICD 10    5. Prior Auth Status? Approved 7/21/23-7/26/2023    6. Patient Notified: N\A

## 2023-07-21 NOTE — ASSESSMENT & PLAN NOTE
Has been taking 5 g of Tylenol daily for the past 6 months.  Recent CMP demonstrating mildly elevated AST and alk phos, however stable compared to previous labs.  No concerns for acute liver failure at this time.    -Patient strongly encouraged to only take up to four 500 mg tablets for a total of 2 g daily of Tylenol, with risk of acetaminophen abuse extensively discussed with patient and possible morbidity and mortality related to liver failure  -Reevaluate next visit

## 2023-07-24 ENCOUNTER — TELEPHONE (OUTPATIENT)
Dept: INTERNAL MEDICINE | Facility: OTHER | Age: 53
End: 2023-07-24
Payer: COMMERCIAL

## 2023-07-24 DIAGNOSIS — N64.4 BREAST PAIN: ICD-10-CM

## 2023-07-24 DIAGNOSIS — F34.1 PERSISTENT DEPRESSIVE DISORDER: ICD-10-CM

## 2023-07-24 DIAGNOSIS — R20.8 HYPERALGESIA: ICD-10-CM

## 2023-07-24 RX ORDER — DULOXETIN HYDROCHLORIDE 30 MG/1
30 CAPSULE, DELAYED RELEASE ORAL DAILY
Qty: 30 CAPSULE | Refills: 0 | Status: SHIPPED | OUTPATIENT
Start: 2023-07-24 | End: 2023-11-08 | Stop reason: SDUPTHER

## 2023-07-24 NOTE — PROGRESS NOTES
Patient required duloxetine to be reordered to [address] [place] [address], as well as mammogram order being reordered as diagnostic.  Patient also requesting lumbar imaging, however had recent lumbar spine XR and MRI. She will need reevaluation in clinic for potential need for repeat imaging.

## 2023-08-09 ENCOUNTER — TELEPHONE (OUTPATIENT)
Dept: INTERNAL MEDICINE | Facility: OTHER | Age: 53
End: 2023-08-09
Payer: COMMERCIAL

## 2023-08-16 NOTE — TELEPHONE ENCOUNTER
Received request via: Patient    Was the patient seen in the last year in this department? Yes    Does the patient have an active prescription (recently filled or refills available) for medication(s) requested?  yes    Does the patient have FDC Plus and need 100 day supply (blood pressure, diabetes and cholesterol meds only)? Patient does not have SCP

## 2023-08-18 RX ORDER — TRAZODONE HYDROCHLORIDE 150 MG/1
150 TABLET ORAL
Qty: 30 TABLET | Refills: 0 | Status: SHIPPED | OUTPATIENT
Start: 2023-08-18 | End: 2023-11-08 | Stop reason: SDUPTHER

## 2023-08-23 ENCOUNTER — HOSPITAL ENCOUNTER (OUTPATIENT)
Dept: RADIOLOGY | Facility: MEDICAL CENTER | Age: 53
End: 2023-08-23
Payer: COMMERCIAL

## 2023-08-23 DIAGNOSIS — N64.4 BREAST PAIN: ICD-10-CM

## 2023-08-23 PROCEDURE — 76642 ULTRASOUND BREAST LIMITED: CPT | Mod: LT

## 2023-08-23 PROCEDURE — G0279 TOMOSYNTHESIS, MAMMO: HCPCS

## 2023-09-14 NOTE — THERAPY
Occupational Therapy   Initial Evaluation     Patient Name: Olya Youssef  Age:  51 y.o., Sex:  female  Medical Record #: [id number]  Today's Date: 10/7/2021     Precautions  Precautions: Fall Risk  Comments: d/t ongoing etoh abuse    Assessment  Patient is 51 y.o. female with a diagnosis of alcohol withdrawal, pt was admitted to hospital for same in May of 2021 and was discharged home. Pt was able to perform basic self care tasks, functional mobility and t/f's with supervision, pt did require encouragement for hallway ambulation post BR use as she can be self-limiting. Pt appears to be close to her functional baseline; however recommend HH therapy and SW consult to address and provide resources in community. Pt doesn't believe she needs post acute placement and feels comfortable discharging home once medically cleared.     Plan    Recommend Occupational Therapy for Evaluation only     DC Equipment Recommendations: None  Discharge Recommendations: Recommend home health for continued occupational therapy services (okay to d/c w/o HHS should pt refuse)      Objective       10/07/21 1153   Prior Living Situation   Prior Services None   Housing / Facility 1 Story House  (reports handicap accessible home)   Steps Into Home 0   Bathroom Set up Walk In Shower;Grab Bars;Shower Chair   Equipment Owned Front-Wheel Walker;Tub / Shower Seat;Grab Bar(s) By Toilet;Grab Bar(s) In Tub / Shower;Oxygen   Lives with - Patient's Self Care Capacity Unrelated Adult   Comments Reports I w/ ADLs/IADLs baseline, works FT as a , reports provide IADL assist to her roommate. Per pt, supportive neighbors who check in on his roommate while she is away   Prior Level of ADL Function   Self Feeding Independent   Grooming / Hygiene Independent   Bathing Independent   Dressing Independent   Toileting Independent   Prior Level of IADL Function   Medication Management Independent   Laundry Independent   Kitchen Mobility Independent   Finances  [facility]        NAME: Juancho Breen is a 25 y.o. female  : 1995    MRN: [id number]  DATE: 2023  TIME: 9:30 AM    Assessment and Plan   Abscess of left axilla [L02.412]  1. Abscess of left axilla            No problem-specific Assessment & Plan notes found for this encounter. Patient Instructions     Patient Instructions   Continue Augmentin. Try to have the packing stay in place. I would recheck this back in 3 to 4 days. Change dressing as needed if it gets soaked through. Warm compresses may help also. Chief Complaint     Chief Complaint   Patient presents with   • Cyst     Drain cyst         History of Present Illness       Patient comes in today for follow-up on abscess of the left arm. Patient had been switched from Bactrim to Augmentin 2 days ago. Became worse so her mother-ER nurse-had lanced this at home. Today the area was probed with more purulent discharge noted. Area was packed      Review of Systems   Review of Systems   Constitutional: Negative for chills, diaphoresis and fever. Skin: Positive for color change and wound. Current Medications       Current Outpatient Medications:   •  amoxicillin-clavulanate (AUGMENTIN) 875-125 mg per tablet, Take 1 tablet by mouth every 12 (twelve) hours for 10 days, Disp: 20 tablet, Rfl: 0  •  enoxaparin (LOVENOX) 100 mg/mL, Pt will be on lovenox long term for injections q 12 hours. Pt will inject . 85 ML under skin as ordered, Disp: 60 mL, Rfl: 5  •  escitalopram (LEXAPRO) 10 mg tablet, TAKE 1 TABLET BY MOUTH EVERY DAY, Disp: 90 tablet, Rfl: 4  •  metoprolol succinate (TOPROL-XL) 25 mg 24 hr tablet, TAKE 1 TABLET (25 MG TOTAL) BY MOUTH DAILY. , Disp: 90 tablet, Rfl: 4  •  warfarin (COUMADIN) 2.5 mg tablet, TAKE ONE TO TWO TABLETS DAILY AS DIRECTED BY DOCTOR PER INR LEVEL, Disp: 180 tablet, Rfl: 2  •  Levonorgest-Eth Estrad -Day (Camrese) 0.15-0.03 &0.01 MG TABS, Take 1 tablet Independent   Home Management Independent   Shopping Independent   Prior Level Of Mobility Independent Without Device in Community   Driving / Transportation Utilizes Taxi Service for Transportation  (takes cab)   Occupation (Pre-Hospital Vocational) Employed Full Time  (works as a )   Coordination Upper Body   Coordination X   Comments functional for ADls but does have slight tremors of UE's   Balance Assessment   Sitting Balance (Static) Fair +   Sitting Balance (Dynamic) Fair +   Standing Balance (Static) Fair   Standing Balance (Dynamic) Fair   Weight Shift Sitting Fair   Weight Shift Standing Fair   Comments w/FWW, no lob noted   Bed Mobility    Supine to Sit Supervised   Sit to Supine Supervised   Scooting Supervised   Rolling Supervised   Comments flat hob w/ increased time   ADL Assessment   Eating Modified Independent   Grooming Supervision;Standing  (handwashing)   Bathing   (discussed home s/u, has all the DME s/u)   Upper Body Dressing Supervision   Lower Body Dressing Supervision   Toileting Supervision   Functional Mobility   Sit to Stand Supervised   Bed, Chair, Wheelchair Transfer Supervised   Toilet Transfers Supervised   Transfer Method Stand Step   Mobility within room and hallway   Comments w/FWW   Anticipated Discharge Equipment and Recommendations   DC Equipment Recommendations None                  by mouth in the morning (Patient not taking: Reported on 9/11/2023), Disp: 91 tablet, Rfl: 4    Current Allergies     Allergies as of 09/14/2023 - Reviewed 09/14/2023   Allergen Reaction Noted   • Cefprozil Rash 01/22/2013            The following portions of the patient's history were reviewed and updated as appropriate: allergies, current medications, past family history, past medical history, past social history, past surgical history and problem list.     Past Medical History:   Diagnosis Date   • Anxiety    • Bicuspid aortic valve     s/p mechanical AVR   • Coronary artery disease 1993   • Exercises two times per week     cross fit- 2x/week   • Moderate aortic insufficiency     s/p mechanical AVR   • Motion sickness    • Patent ductus arteriosus     s/p repair   • PONV (postoperative nausea and vomiting)    • S/P ascending aortic aneurysm repair    • Stroke ([address]) 02/2022    no residual effects per pt--initially couldnt speak at first--"feels back to normal now"       Past Surgical History:   Procedure Laterality Date   • ASCENDING AORTIC ANEURYSM REPAIR  2015    with" Aortic valve surgery"   • CARDIAC VALVE REPLACEMENT     • MECHANICAL AORTIC VALVE REPLACEMENT  2015   • PATENT DUCTUS ARTERIOUS LIGATION      per pt had this surgery age 1   • NM EXC B9 LESION MRGN XCP SK TG T/A/L 1.1-2.0 CM Right 12/5/2022    Procedure: EXCISION  BIOPSY LESION/MASS BACK;  Surgeon: Ruben Underwood MD;  Location: [facility];  Service: General   • WISDOM TOOTH EXTRACTION         Family History   Problem Relation Age of Onset   • Aneurysm Father         Aortic aneurysm   • Other Father         Bicuspid AV   • Heart disease Father    • Aortic stenosis Maternal Grandmother    • Breast cancer Family    • Colon cancer Family    • Diabetes Family    • Sudden death Paternal Grandfather    • Depression Mother    • Anxiety disorder Mother    • Drug abuse Brother    • Schizoaffective Disorder  Brother          Medications have been verified. Objective   /60 (BP Location: Left arm, Patient Position: Sitting, Cuff Size: Standard)   Pulse (!) 113   Temp (!) 97.1 °F (36.2 °C) (Tympanic)   Ht 5' 3" (1.6 m)   Wt 85.3 kg (188 lb)   SpO2 99%   BMI 33.30 kg/m²        Physical Exam     Physical Exam  Constitutional:       General: She is not in acute distress. Appearance: She is not ill-appearing. HENT:      Head: Normocephalic and atraumatic. Cardiovascular:      Rate and Rhythm: Normal rate and regular rhythm. Pulmonary:      Effort: Pulmonary effort is normal. No respiratory distress. Musculoskeletal:      Comments: Of the axilla-induration with palpable cyst.  Central puncture wound draining purulent material the cyst was compressed and purulent along with sebaceous material was expressed. Cavity cleaned with peroxide and approximately 6 inches of iodoform gauze was placed. Skin:     Findings: Erythema present. Comments: Left axilla-approximately 9 cm area of induration with tenderness. Neurological:      Mental Status: She is alert.

## 2023-10-11 ENCOUNTER — HOSPITAL ENCOUNTER (EMERGENCY)
Facility: MEDICAL CENTER | Age: 53
End: 2023-10-11
Attending: EMERGENCY MEDICINE
Payer: COMMERCIAL

## 2023-10-11 VITALS
OXYGEN SATURATION: 91 % | SYSTOLIC BLOOD PRESSURE: 137 MMHG | HEART RATE: 109 BPM | RESPIRATION RATE: 19 BRPM | BODY MASS INDEX: 28.22 KG/M2 | WEIGHT: 135 LBS | TEMPERATURE: 98.8 F | DIASTOLIC BLOOD PRESSURE: 86 MMHG

## 2023-10-11 DIAGNOSIS — F45.8 ACUTE HYPERVENTILATION SYNDROME: ICD-10-CM

## 2023-10-11 PROCEDURE — 700102 HCHG RX REV CODE 250 W/ 637 OVERRIDE(OP): Mod: UD | Performed by: EMERGENCY MEDICINE

## 2023-10-11 PROCEDURE — A9270 NON-COVERED ITEM OR SERVICE: HCPCS | Mod: UD | Performed by: EMERGENCY MEDICINE

## 2023-10-11 PROCEDURE — 99284 EMERGENCY DEPT VISIT MOD MDM: CPT

## 2023-10-11 RX ORDER — LORAZEPAM 2 MG/1
2 TABLET ORAL ONCE
Status: COMPLETED | OUTPATIENT
Start: 2023-10-11 | End: 2023-10-11

## 2023-10-11 RX ADMIN — LORAZEPAM 2 MG: 2 TABLET ORAL at 08:48

## 2023-10-11 ASSESSMENT — FIBROSIS 4 INDEX: FIB4 SCORE: 5.47

## 2023-10-11 NOTE — ED NOTES
Patient discharged home per ERP.  Discharge teaching and education discussed with patient. POC discussed.   Patient verbalized understanding of discharge teaching and education. No other questions at this time.       VSS. Patient alert and oriented. Patient arranged ride for self by calling a taxi.  Able to ambulate off unit safely with steady gait.

## 2023-10-11 NOTE — ED PROVIDER NOTES
ER Provider Note    Scribed for Carl Borden M.D. by Lauryn Hernandez. 10/11/2023   8:12 AM    Primary Care Provider: Margy Aparicio M.D.    CHIEF COMPLAINT  Chief Complaint   Patient presents with    Hand Pain     BIBA for hand pain. Pt was woken from sleep with rt hand pain. Upon arriving to ER, pt c/o left hand pain as well. Pt unable to straighten hands. Pt moaning in triage. Pt states this has happened before from taking seroquel. Pt currently on seroquel.     EXTERNAL RECORDS REVIEWED  Outpatient Notes: The patient had lab studies done 6 days ago which are remarkable for normal Potassium and Calcium. The patient has a history of alcoholism.     HPI/ROS  LIMITATION TO HISTORY   Select: : None  OUTSIDE HISTORIAN(S):  None noted    Olya Youssef is a 53 y.o. female who presents to the ED for evaluation of bilateral hand numbness and pain onset this morning when she woke up. She reports associated mouth numbness and dizziness. She states she is unable to straighten her hands. She reports this has happened previously when taking Seroquel. The patient has a history of alcoholism.    PAST MEDICAL HISTORY  Past Medical History:   Diagnosis Date    Alcohol abuse     Alcohol intoxication, with unspecified complication (HCC) 8/7/2013    Alcoholism (HCC)     Anxiety     Backpain     Bipolar disorder, unspecified (HCC)     Bronchitis     Degenerative disc disease, cervical 3/28/2023    Drug abuse (HCC)     meth, crack cocaine, THC    Hepatitis, alcoholic     Hypertension     controlled    Indigestion     Infectious disease MRSA    Liver disease     pancreatitis    Pancreatitis chronic     Flare-up    Pneumonia     Psychiatric disorder     suicidal in past    Renal disorder     kidney infection 1991    Seizure (HCC)     7/8/2011    Seizure disorder (HCC)     Unspecified hemorrhagic conditions        SURGICAL HISTORY  Past Surgical History:   Procedure Laterality Date    GASTROSCOPY  4/28/2015     Performed by Kevin Rendon M.D. at SURGERY [facility]       FAMILY HISTORY  Family History   Problem Relation Age of Onset    Lung Disease Mother     Cancer Mother        SOCIAL HISTORY   reports that she has never smoked. She has never used smokeless tobacco. She reports current alcohol use. She reports that she does not currently use drugs after having used the following drugs: Inhaled.    CURRENT MEDICATIONS  Discharge Medication List as of 10/11/2023  8:53 AM        CONTINUE these medications which have NOT CHANGED    Details   traZODone (DESYREL) 150 MG Tab Take 1 Tablet by mouth at bedtime as needed for Sleep., Disp-30 Tablet, R-0, Normal      DULoxetine (CYMBALTA) 30 MG Cap DR Particles Take 1 Capsule by mouth every day., Disp-30 Capsule, R-0, Normal      hydrOXYzine pamoate (VISTARIL) 50 MG Cap Historical Med      ketoconazole (NIZORAL) 2 % shampoo Apply 5 mL topically 1 time a day as needed for Itching., Disp-120 mL, R-1, Normal      diphenhydramine (BENADRYL) 2 % Cream Apply 10 mL topically 3 times a day., Disp-56.8 g, R-1, Normal      cloNIDine (CATAPRES) 0.1 MG Tab Historical Med      meloxicam (MOBIC) 15 MG tablet Take 1 Tablet by mouth 1 time a day as needed for Moderate Pain for up to 360 days., Disp-90 Tablet, R-0, Normal      busPIRone (BUSPAR) 15 MG tablet Take 1 Tablet by mouth in the morning, at noon, and at bedtime., Disp-90 Tablet, R-3, Normal      amLODIPine (NORVASC) 2.5 MG Tab Take 1 Tablet by mouth every day for 360 days., Disp-90 Tablet, R-3, Normal      atorvastatin (LIPITOR) 40 MG Tab Take 1 Tablet by mouth every evening for 360 days., Disp-90 Tablet, R-3, Normal      diclofenac sodium (VOLTAREN) 1 % Gel Apply 2 g topically 4 times a day as needed (for neck pain) for up to 360 days., Disp-150 g, R-3, Normal      gabapentin (NEURONTIN) 600 MG tablet Take 1 Tablet by mouth 3 times a day for 360 days., Disp-270 Tablet, R-3, Normal             ALLERGIES  Allergies   Allergen Reactions     Bactrim Hives    Cephalexin     Lamotrigine [Lamictal] Hives     Tolerated Fioricet 10/3/22    Penicillin G     Quetiapine Hives    Quetiapine Fumarate Hives     Extrapyramidal Symptoms, can tolerate lower doses    Quetiapine Fumarate Hives    Sulfa Drugs Hives    Sulfamethoxazole W-Trimethoprim Hives    Keflex Hives        PHYSICAL EXAM  BP (!) 167/96   Pulse 95   Temp 37.3 °C (99.1 °F) (Temporal)   Resp (!) 22   Wt 61.2 kg (135 lb)   LMP 02/28/2018   SpO2 95%   BMI 28.22 kg/m²      Nursing note and vitals reviewed.  Constitutional: Chronically ill appearing, Stigmata of alcoholism, Anxiety  HENT: Head is normocephalic and atraumatic. Oropharynx is clear and moist without exudate or erythema.   Eyes: Pupils are equal, round, and reactive to light. Conjunctiva are normal.   Cardiovascular: Tachycardic rate and regular rhythm. No murmur heard. Normal radial pulses.  Pulmonary/Chest: Breath sounds normal. No wheezes or rales.   Abdominal: Soft and non-tender. No distention    Musculoskeletal: Extremities exhibit normal range of motion without edema or tenderness.   Neurological: Awake, alert and oriented to person, place, and time. No focal deficits noted.  Skin: Skin is warm and dry. No rash.   Psychiatric: Normal mood and affect. Appropriate for clinical situation    INITIAL ASSESSMENT AND PLAN    8:12 AM - Patient was evaluated at bedside for bilateral hand numbness.  The patient will be medicated with Ativan 2 mg. for her symptoms. The patient's history and symptoms today are consistent with hyperventilation syndrome. Patient also has a history of alcoholism. I informed her of the plan for discharge home. Patient verbalizes understanding and support with my plan of care.      ED Observation Status? No; Patient does not meet criteria for ED Observation.       DISPOSITION AND DISCUSSIONS    I have discussed management of the patient with the following physicians and TWILA's:  None noted    Discussion of  management with other [facility] or appropriate source(s): None     Escalation of care considered, and ultimately not performed: acute inpatient care management, however at this time, the patient is most appropriate for outpatient management.    Barriers to care at this time, including but not limited to:  None noted .     Decision tools and prescription drugs considered including, but not limited to: Considered Benzodiazepines but the patient is in stable condition and has not demonstrated alcohol withdrawal syndrome.    The patient will return for new or worsening symptoms and is stable at the time of discharge.    The patient is referred to a primary physician for blood pressure management, diabetic screening, and for all other preventative health concerns.    DISPOSITION:  Patient will be discharged home in stable condition.    FOLLOW UP:  Margy Aparicio M.D.  [address] [facility] [id number] [phone]    Schedule an appointment as soon as possible for a visit       [facility], Emergency Dept  [address] [facility] [id number] [phone]    If symptoms worsen    FINAL DIAGNOSIS  1. Acute hyperventilation syndrome         Lauryn ULNA (Neal), am scribing for, and in the presence of, Carl Borden M.D..    Electronically signed by: Lauryn Hale), 10/11/2023    Carl LUNA M.D. personally performed the services described in this documentation, as scribed by Lauryn Hernandez in my presence, and it is both accurate and complete.      The note accurately reflects work and decisions made by me.  Carl Borden M.D.  10/11/2023  1:00 PM

## 2023-10-11 NOTE — ED TRIAGE NOTES
Chief Complaint   Patient presents with    Hand Pain     BIBA for hand pain. Pt was woken from sleep with rt hand pain. Upon arriving to ER, pt c/o left hand pain as well. Pt unable to straighten hands. Pt moaning in triage. Pt states this has happened before from taking seroquel. Pt currently on seroquel.     BP (!) 141/109   Pulse (!) 103   Temp 37.3 °C (99.1 °F) (Temporal)   Resp (!) 22   Wt 61.2 kg (135 lb)   LMP 02/28/2018   SpO2 96%   BMI 28.22 kg/m²     Pt ambulated to triage for above complaint. Pt speaking, unlabored, in full sentences. Pt placed in lobby, educated on triage process, and told to notify staff of any change in status.

## 2023-11-08 RX ORDER — TRAZODONE HYDROCHLORIDE 150 MG/1
150 TABLET ORAL
Qty: 30 TABLET | Refills: 0 | Status: SHIPPED | OUTPATIENT
Start: 2023-11-08 | End: 2023-12-06 | Stop reason: SDUPTHER

## 2023-11-17 ENCOUNTER — HOSPITAL ENCOUNTER (OUTPATIENT)
Facility: MEDICAL CENTER | Age: 53
End: 2023-11-17
Attending: INTERNAL MEDICINE
Payer: COMMERCIAL

## 2023-11-17 LAB — AMMONIA PLAS-SCNC: 64 UMOL/L (ref 11–45)

## 2023-11-17 PROCEDURE — 82140 ASSAY OF AMMONIA: CPT

## 2023-11-22 ENCOUNTER — HOSPITAL ENCOUNTER (OUTPATIENT)
Facility: MEDICAL CENTER | Age: 53
End: 2023-11-22
Attending: INTERNAL MEDICINE
Payer: COMMERCIAL

## 2023-11-22 LAB — AMMONIA PLAS-SCNC: 55 UMOL/L (ref 11–45)

## 2023-11-28 ENCOUNTER — PATIENT MESSAGE (OUTPATIENT)
Dept: INTERNAL MEDICINE | Facility: OTHER | Age: 53
End: 2023-11-28
Payer: COMMERCIAL

## 2023-12-02 ENCOUNTER — HOSPITAL ENCOUNTER (OUTPATIENT)
Facility: MEDICAL CENTER | Age: 53
End: 2023-12-02
Attending: INTERNAL MEDICINE
Payer: COMMERCIAL

## 2023-12-02 PROCEDURE — 87086 URINE CULTURE/COLONY COUNT: CPT

## 2023-12-04 ENCOUNTER — HOSPITAL ENCOUNTER (OUTPATIENT)
Facility: MEDICAL CENTER | Age: 53
End: 2023-12-04
Attending: INTERNAL MEDICINE
Payer: COMMERCIAL

## 2023-12-04 LAB
BACTERIA UR CULT: NORMAL
SIGNIFICANT IND 70042: NORMAL
SITE SITE: NORMAL
SOURCE SOURCE: NORMAL

## 2023-12-06 ENCOUNTER — OFFICE VISIT (OUTPATIENT)
Dept: INTERNAL MEDICINE | Facility: OTHER | Age: 53
End: 2023-12-06
Payer: COMMERCIAL

## 2023-12-06 VITALS
SYSTOLIC BLOOD PRESSURE: 119 MMHG | OXYGEN SATURATION: 94 % | HEART RATE: 92 BPM | BODY MASS INDEX: 24.64 KG/M2 | TEMPERATURE: 97.6 F | HEIGHT: 58 IN | WEIGHT: 117.4 LBS | DIASTOLIC BLOOD PRESSURE: 80 MMHG

## 2023-12-06 DIAGNOSIS — Z86.69 HX OF ENCEPHALOPATHY: ICD-10-CM

## 2023-12-06 DIAGNOSIS — Z87.448 HX OF ACUTE RENAL FAILURE: ICD-10-CM

## 2023-12-06 DIAGNOSIS — R05.9 COUGH, UNSPECIFIED TYPE: ICD-10-CM

## 2023-12-06 DIAGNOSIS — E78.5 DYSLIPIDEMIA: ICD-10-CM

## 2023-12-06 DIAGNOSIS — F34.1 PERSISTENT DEPRESSIVE DISORDER: ICD-10-CM

## 2023-12-06 DIAGNOSIS — Z87.19 H/O: GI BLEED: ICD-10-CM

## 2023-12-06 DIAGNOSIS — R20.8 HYPERALGESIA: ICD-10-CM

## 2023-12-06 DIAGNOSIS — K70.9 ALCOHOLIC LIVER DISEASE (HCC): ICD-10-CM

## 2023-12-06 PROCEDURE — 3079F DIAST BP 80-89 MM HG: CPT

## 2023-12-06 PROCEDURE — 99214 OFFICE O/P EST MOD 30 MIN: CPT | Mod: GC

## 2023-12-06 PROCEDURE — 3074F SYST BP LT 130 MM HG: CPT

## 2023-12-06 RX ORDER — BENZONATATE 100 MG/1
100 CAPSULE ORAL 3 TIMES DAILY PRN
Qty: 60 CAPSULE | Refills: 0 | Status: SHIPPED | OUTPATIENT
Start: 2023-12-06 | End: 2023-12-15 | Stop reason: SDUPTHER

## 2023-12-06 RX ORDER — CALCIUM CARBONATE/VITAMIN D3 600 MG-10
100 TABLET ORAL DAILY
Qty: 30 TABLET | Refills: 5 | Status: SHIPPED | OUTPATIENT
Start: 2023-12-06 | End: 2023-12-26 | Stop reason: SDUPTHER

## 2023-12-06 RX ORDER — POTASSIUM CHLORIDE 750 MG/1
10 TABLET, FILM COATED, EXTENDED RELEASE ORAL 2 TIMES DAILY
Qty: 60 TABLET | Refills: 3 | Status: SHIPPED | OUTPATIENT
Start: 2023-12-06

## 2023-12-06 RX ORDER — FOLIC ACID 1 MG/1
1 TABLET ORAL DAILY
Qty: 30 TABLET | Refills: 5 | Status: SHIPPED | OUTPATIENT
Start: 2023-12-06

## 2023-12-06 RX ORDER — LACTULOSE 10 G/15ML
10 SOLUTION ORAL 3 TIMES DAILY
Qty: 473 ML | Refills: 1 | Status: SHIPPED | OUTPATIENT
Start: 2023-12-06 | End: 2023-12-26 | Stop reason: SDUPTHER

## 2023-12-06 RX ORDER — ATORVASTATIN CALCIUM 40 MG/1
40 TABLET, FILM COATED ORAL EVERY EVENING
Qty: 90 TABLET | Refills: 3 | Status: SHIPPED | OUTPATIENT
Start: 2023-12-06 | End: 2024-11-30

## 2023-12-06 RX ORDER — GABAPENTIN 600 MG/1
300 TABLET ORAL 3 TIMES DAILY
Qty: 135 TABLET | Refills: 0 | Status: SHIPPED | OUTPATIENT
Start: 2023-12-06 | End: 2024-01-18 | Stop reason: SDUPTHER

## 2023-12-06 RX ORDER — TRAZODONE HYDROCHLORIDE 150 MG/1
150 TABLET ORAL
Qty: 30 TABLET | Refills: 0 | Status: SHIPPED | OUTPATIENT
Start: 2023-12-06 | End: 2024-01-09 | Stop reason: SDUPTHER

## 2023-12-06 RX ORDER — DULOXETIN HYDROCHLORIDE 30 MG/1
30 CAPSULE, DELAYED RELEASE ORAL DAILY
Qty: 30 CAPSULE | Refills: 0 | Status: SHIPPED | OUTPATIENT
Start: 2023-12-06 | End: 2023-12-26

## 2023-12-06 RX ORDER — PANTOPRAZOLE SODIUM 40 MG/1
40 TABLET, DELAYED RELEASE ORAL DAILY
Qty: 30 TABLET | Refills: 1 | Status: SHIPPED | OUTPATIENT
Start: 2023-12-06 | End: 2024-01-18 | Stop reason: SDUPTHER

## 2023-12-06 ASSESSMENT — FIBROSIS 4 INDEX: FIB4 SCORE: 5.47

## 2023-12-06 NOTE — PROGRESS NOTES
Follow Up      Chief Complaint: med refills  PCP: Dr. Aparicio  Last Seen: 7/21/23    History of Present Illness:   Olya Youssef is a 53 y.o. female with PMH as below who presents for medication refills following discharge from [facility] yesterday.  Patient is a poor history Eliel but states she presented to the ED with flank pain and was admitted after ammonia was found to be elevated.  She was hospitalized for roughly 2 weeks states she also had damaged her kidneys at this time.  Was discharged to skilled nursing facility for physical therapy and Occupational Therapy.  Recently seen in this clinic for generalized pruritus, suspect this was related to alcohol use disorder.   Followed by psychiatry, would like to discontinue Cymbalta and resume Prozac.  Plans to do a walk-in visit as she recently missed several appointments with them because of hospitalization and skilled nursing stay.   Additionally would like to return to 600 mg 3 times daily of gabapentin which was her dose prior to recent hospitalization.  Denies current alcohol use.     Past Medical History:   Past Medical History:   Diagnosis Date    Alcohol abuse     Alcohol intoxication, with unspecified complication (HCC) 8/7/2013    Alcoholism (HCC)     Anxiety     Backpain     Bipolar disorder, unspecified (HCC)     Bronchitis     Degenerative disc disease, cervical 3/28/2023    Drug abuse (HCC)     meth, crack cocaine, THC    Hepatitis, alcoholic     Hypertension     controlled    Indigestion     Infectious disease MRSA    Liver disease     pancreatitis    Pancreatitis chronic     Flare-up    Pneumonia     Psychiatric disorder     suicidal in past    Renal disorder     kidney infection 1991    Seizure (HCC)     7/8/2011    Seizure disorder (HCC)     Unspecified hemorrhagic conditions        Patient Active Problem List    Diagnosis Date Noted    Hyperalgesia 07/21/2023    Acute pain of left shoulder 07/21/2023    Encounter for screening  mammogram for breast cancer 07/21/2023    Pruritic erythematous rash 07/12/2023    Mood changes 04/22/2023    Bladder polyp 04/11/2023    Severe benzodiazepine use disorder (HCC) 03/28/2023    High risk social situation 03/28/2023    Degenerative disc disease, cervical 03/28/2023    Acetaminophen abuse 02/28/2023    Other chronic pain 02/28/2023    Alcohol use disorder, severe, dependence (HCC) 02/06/2023    Chronic respiratory failure with hypoxia, on home oxygen therapy ([facility]) 02/06/2023    Post covid-19 condition, unspecified 02/06/2023    Other insomnia 02/06/2023    Carotid artery stenosis, asymptomatic, right 01/28/2023    Headache 01/24/2023    Overweight 01/24/2023    Elevated lipase 01/23/2023    Suicidal behavior 09/27/2022    Vitamin D deficiency 05/25/2021    Hypertension 04/20/2021    Neuropathy 04/19/2021    Leukocytopenia 04/18/2021    Macrocytic anemia 04/18/2021    Methamphetamine abuse ([facility]) 05/29/2018    Alcoholic hepatitis 08/15/2013    Pancreatitis, alcoholic, acute 08/07/2013    Persistent depressive disorder 07/24/2013    Bipolar affective disorder ([facility]) 07/22/2013    Alcohol withdrawal seizure ([facility]) 05/16/2012    Thrombocytopenia ([facility]) 08/19/2011    Anemia 08/17/2011    Hypokalemia 08/16/2011       Past Surgical History:   Past Surgical History:   Procedure Laterality Date    GASTROSCOPY  4/28/2015    Performed by Kevin Rendon M.D. at SURGERY [facility]        Allergies:  Bactrim, Cephalexin, Lamotrigine [lamictal], Penicillin g, Quetiapine, Quetiapine fumarate, Quetiapine fumarate, Sulfa drugs, Sulfamethoxazole w-trimethoprim, and Keflex    Medications:     Current Outpatient Medications:     DULoxetine, 30 mg, Oral, DAILY, Taking    traZODone, 150 mg, Oral, QHS PRN, Taking    hydrOXYzine pamoate, , Taking    cloNIDine, , Taking    meloxicam, 15 mg, Oral, QDAY PRN, Taking    busPIRone, 15 mg, Oral, TID, Taking    amLODIPine, 2.5 mg, Oral, DAILY, Taking    atorvastatin, 40 mg, Oral, Q  "EVENING, Taking    diclofenac sodium, 2 g, Topical, 4X/DAY PRN, Taking    gabapentin, 600 mg, Oral, TID, Taking     Social History:   Social History     Tobacco Use    Smoking status: Never    Smokeless tobacco: Never   Vaping Use    Vaping Use: Never used   Substance Use Topics    Alcohol use: Yes     Comment: a couple beers a day    Drug use: Not Currently     Types: Inhaled     Comment: meth on 4/14       Family History:   Family History   Problem Relation Age of Onset    Lung Disease Mother     Cancer Mother        Objective:  Vitals:   /80 (BP Location: Left arm, Patient Position: Sitting, BP Cuff Size: Adult)   Pulse 92   Temp 36.4 °C (97.6 °F) (Temporal)   Ht 1.473 m (4' 10\")   Wt 53.3 kg (117 lb 6.4 oz)   SpO2 94%  Body mass index is 24.54 kg/m².    Physical Exam  Constitutional:       General: She is not in acute distress.     Appearance: She is ill-appearing. She is not toxic-appearing.      Comments: Nasal cannula in place with portable oxygen   HENT:      Mouth/Throat:      Mouth: Mucous membranes are dry.   Eyes:      General: No scleral icterus.     Conjunctiva/sclera: Conjunctivae normal.   Pulmonary:      Effort: No respiratory distress.   Skin:     General: Skin is warm and dry.      Coloration: Skin is jaundiced.   Neurological:      General: No focal deficit present.      Mental Status: She is alert.            Assessment and Plan:    Refills were provided for 30 days (based on prescriptions provided by SNF at discharge), lab work ordered and records requested from [facility].     Alcoholic liver disease (HCC)  Hx of encephalopathy  Pancytopenia  - lactulose 10 GM/15ML Solution; Take 15 mL by mouth 3 times a day.  Dispense: 473 mL; Refill: 1  - folic acid (FOLVITE) 1 MG Tab; Take 1 Tablet by mouth every day.  Dispense: 30 Tablet; Refill: 5  - Thiamine Mononitrate (B1) 100 MG Tab; Take 100 mg by mouth every day.  Dispense: 30 Tablet; Refill: 5  - " Comp Metabolic Panel; Future  - CBC WITH DIFFERENTIAL; Future  - APTT; Future  - Prothrombin Time; Future  - AMMONIA; Future    Hypokalemia   - potassium chloride ER (KLOR-CON) 10 MEQ tablet; Take 1 Tablet by mouth 2 times a day.  Dispense: 60 Tablet; Refill: 3    Insomnia   - traZODone (DESYREL) 150 MG Tab; Take 1 Tablet by mouth at bedtime as needed for Sleep.  Dispense: 30 Tablet; Refill: 0    Cough, unspecified type  Patient requesting cough syrup. No worsening SOB   - benzonatate (TESSALON) 100 MG Cap; Take 1 Capsule by mouth 3 times a day as needed for Cough.  Dispense: 60 Capsule; Refill: 0    Hx of acute renal failure (pt reported)   States this was because of the ammonia built up in her system. Jasmin d/c'd patient with prescription for 300mg TID. Will obtain CMP to monitor Cr be followed up with PCP    Dyslipidemia  - atorvastatin (LIPITOR) 40 MG Tab; Take 1 Tablet by mouth every evening for 360 days.  Dispense: 90 Tablet; Refill: 3    Hyperalgesia  - gabapentin (NEURONTIN) 600 MG tablet; Take 0.5 Tablets by mouth 3 times a day for 360 days.  Dispense: 135 Tablet; Refill: 0    H/O: Acute GI bleed (melena)   patient reported from recent hospitalization   - pantoprazole (PROTONIX) 40 MG Tablet Delayed Response; Take 1 Tablet by mouth every day.  Dispense: 30 Tablet; Refill: 1    Bipolar affective disorder  Persistent depressive disorder   Of note, Patient requested discontinuation of duloxetine and switch back to Prozac however follows with psychiatry, will defer to specialist for further management  - DULoxetine (CYMBALTA) 30 MG Cap DR Particles; Take 1 Capsule by mouth every day.  Dispense: 30 Capsule; Refill: 0       Please follow up with Dr. Aparicio as scheduled on December 15th     Camilla Magdaleno MD  Internal Medicine PGY-1  [facility]

## 2023-12-06 NOTE — LETTER
[facility]  Margy Aparicio M.D.  [address] [facility] [address]  Fax: [phone]   Authorization for Release/Disclosure of   Protected Health Information   Name: MANAS DELGADO : 1970 SSN: [id number]   Address: [facility] [place]    [address] [address] Phone:    [phone] (home)    I authorize the entity listed below to release/disclose the PHI below to:   [facility]/Margy Aparicio M.D. and Camilla Magdaleno M.D.   Provider or Entity Name:  [facility]   Address   City, [facility]   Phone:      Fax:     Reason for request: continuity of care   Information to be released:    [  ] LAST COLONOSCOPY,  including any PATH REPORT and follow-up  [  ] LAST FIT/COLOGUARD RESULT [  ] LAST DEXA  [  ] LAST MAMMOGRAM  [  ] LAST PAP  [  ] LAST LABS [  ] RETINA EXAM REPORT  [  ] IMMUNIZATION RECORDS  [  ] Release all info      [  ] Check here and initial the line next to each item to release ALL health information INCLUDING  _____ Care and treatment for drug and / or alcohol abuse  _____ HIV testing, infection status, or AIDS  _____ Genetic Testing    DATES OF SERVICE OR TIME PERIOD TO BE DISCLOSED: _____________  I understand and acknowledge that:  * This Authorization may be revoked at any time by you in writing, except if your health information has already been used or disclosed.  * Your health information that will be used or disclosed as a result of you signing this authorization could be re-disclosed by the recipient. If this occurs, your re-disclosed health information may no longer be protected by State or Federal laws.  * You may refuse to sign this Authorization. Your refusal will not affect your ability to obtain treatment.  * This Authorization becomes effective upon signing and will  on (date) __________.      If no date is indicated, this Authorization will  one (1) year from the signature date.    Name: Manas  Celestina Youssef  Signature: Date:   12/6/2023     PLEASE FAX REQUESTED RECORDS BACK TO: [phone]

## 2023-12-06 NOTE — LETTER
[facility]  Margy Aparicio M.D.  [address] [facility] [address]  Fax: [phone]   Authorization for Release/Disclosure of   Protected Health Information   Name: MANAS YOUSSEF : 1970 SSN: [id number]   Address: [facility] [place]    [address] [address] Phone:    [phone] (home)    I authorize the entity listed below to release/disclose the PHI below to:   [facility]/Margy Aparicio M.D. and Camilla Magdaleno M.D.   Provider or Entity Name:     Address   City, State, Zip   Phone:      Fax:     Reason for request: continuity of care   Information to be released:    [  ] LAST COLONOSCOPY,  including any PATH REPORT and follow-up  [  ] LAST FIT/COLOGUARD RESULT [  ] LAST DEXA  [  ] LAST MAMMOGRAM  [  ] LAST PAP  [  ] LAST LABS [  ] RETINA EXAM REPORT  [  ] IMMUNIZATION RECORDS  [  ] Release all info      [  ] Check here and initial the line next to each item to release ALL health information INCLUDING  _____ Care and treatment for drug and / or alcohol abuse  _____ HIV testing, infection status, or AIDS  _____ Genetic Testing    DATES OF SERVICE OR TIME PERIOD TO BE DISCLOSED: _____________  I understand and acknowledge that:  * This Authorization may be revoked at any time by you in writing, except if your health information has already been used or disclosed.  * Your health information that will be used or disclosed as a result of you signing this authorization could be re-disclosed by the recipient. If this occurs, your re-disclosed health information may no longer be protected by State or Federal laws.  * You may refuse to sign this Authorization. Your refusal will not affect your ability to obtain treatment.  * This Authorization becomes effective upon signing and will  on (date) __________.      If no date is indicated, this Authorization will  one (1) year from the signature date.    Name: Manas Youssef  Signature: Date:    12/6/2023     PLEASE FAX REQUESTED RECORDS BACK TO: [phone]

## 2023-12-06 NOTE — LETTER
[facility]  Margy Aparicio M.D.  [address] [facility] [address]  Fax: [phone]   Authorization for Release/Disclosure of   Protected Health Information   Name: MANAS YOUSSEF : 1970 SSN: [id number]   Address: [facility] [place]    [address] [address] Phone:    [phone] (home)    I authorize the entity listed below to release/disclose the PHI below to:   [facility]/Margy Aparicio M.D. and Camilla Magdaleno M.D.   Provider or Entity Name:     Address   City, State, Zip   Phone:      Fax:     Reason for request: continuity of care   Information to be released:    [  ] LAST COLONOSCOPY,  including any PATH REPORT and follow-up  [  ] LAST FIT/COLOGUARD RESULT [  ] LAST DEXA  [  ] LAST MAMMOGRAM  [  ] LAST PAP  [  ] LAST LABS [  ] RETINA EXAM REPORT  [  ] IMMUNIZATION RECORDS  [  ] Release all info      [  ] Check here and initial the line next to each item to release ALL health information INCLUDING  _____ Care and treatment for drug and / or alcohol abuse  _____ HIV testing, infection status, or AIDS  _____ Genetic Testing    DATES OF SERVICE OR TIME PERIOD TO BE DISCLOSED: _____________  I understand and acknowledge that:  * This Authorization may be revoked at any time by you in writing, except if your health information has already been used or disclosed.  * Your health information that will be used or disclosed as a result of you signing this authorization could be re-disclosed by the recipient. If this occurs, your re-disclosed health information may no longer be protected by State or Federal laws.  * You may refuse to sign this Authorization. Your refusal will not affect your ability to obtain treatment.  * This Authorization becomes effective upon signing and will  on (date) __________.      If no date is indicated, this Authorization will  one (1) year from the signature date.    Name: Manas Youssef  Signature: Date:    12/6/2023     PLEASE FAX REQUESTED RECORDS BACK TO: [phone]

## 2023-12-08 RX ORDER — MELOXICAM 15 MG/1
15 TABLET ORAL
Qty: 90 TABLET | Refills: 0 | OUTPATIENT
Start: 2023-12-08 | End: 2024-12-02

## 2023-12-13 NOTE — PATIENT COMMUNICATION
Spoke to patient refills route to [facility] pharmacy has an appointment and today set up for [employer] transportation. Issues resolved.

## 2023-12-15 ENCOUNTER — TELEPHONE (OUTPATIENT)
Dept: INTERNAL MEDICINE | Facility: OTHER | Age: 53
End: 2023-12-15

## 2023-12-15 ENCOUNTER — OFFICE VISIT (OUTPATIENT)
Dept: INTERNAL MEDICINE | Facility: OTHER | Age: 53
End: 2023-12-15
Payer: COMMERCIAL

## 2023-12-15 VITALS
HEIGHT: 58 IN | TEMPERATURE: 98.2 F | HEART RATE: 106 BPM | OXYGEN SATURATION: 96 % | WEIGHT: 121.8 LBS | BODY MASS INDEX: 25.57 KG/M2 | SYSTOLIC BLOOD PRESSURE: 120 MMHG | DIASTOLIC BLOOD PRESSURE: 79 MMHG

## 2023-12-15 DIAGNOSIS — R05.9 COUGH, UNSPECIFIED TYPE: ICD-10-CM

## 2023-12-15 DIAGNOSIS — R68.89 FLU-LIKE SYMPTOMS: ICD-10-CM

## 2023-12-15 DIAGNOSIS — J06.9 UPPER RESPIRATORY INFECTION, ACUTE: ICD-10-CM

## 2023-12-15 LAB
FLUAV RNA SPEC QL NAA+PROBE: NEGATIVE
FLUBV RNA SPEC QL NAA+PROBE: NEGATIVE
RSV RNA SPEC QL NAA+PROBE: NEGATIVE
SARS-COV-2 RNA RESP QL NAA+PROBE: NEGATIVE

## 2023-12-15 PROCEDURE — 87637 SARSCOV2&INF A&B&RSV AMP PRB: CPT | Mod: QW | Performed by: STUDENT IN AN ORGANIZED HEALTH CARE EDUCATION/TRAINING PROGRAM

## 2023-12-15 PROCEDURE — 3078F DIAST BP <80 MM HG: CPT | Mod: GC | Performed by: STUDENT IN AN ORGANIZED HEALTH CARE EDUCATION/TRAINING PROGRAM

## 2023-12-15 PROCEDURE — 99213 OFFICE O/P EST LOW 20 MIN: CPT | Mod: GC,25 | Performed by: STUDENT IN AN ORGANIZED HEALTH CARE EDUCATION/TRAINING PROGRAM

## 2023-12-15 PROCEDURE — 3074F SYST BP LT 130 MM HG: CPT | Mod: GC | Performed by: STUDENT IN AN ORGANIZED HEALTH CARE EDUCATION/TRAINING PROGRAM

## 2023-12-15 RX ORDER — BENZONATATE 100 MG/1
100 CAPSULE ORAL 3 TIMES DAILY PRN
Qty: 60 CAPSULE | Refills: 0 | Status: SHIPPED | OUTPATIENT
Start: 2023-12-15 | End: 2024-01-18 | Stop reason: SDUPTHER

## 2023-12-15 RX ORDER — GUAIFENESIN 600 MG/1
600 TABLET, EXTENDED RELEASE ORAL EVERY 12 HOURS
Qty: 28 TABLET | Refills: 0 | Status: SHIPPED | OUTPATIENT
Start: 2023-12-15 | End: 2023-12-26

## 2023-12-15 ASSESSMENT — ENCOUNTER SYMPTOMS
CHILLS: 1
FEVER: 1
WEAKNESS: 0
NAUSEA: 1
PALPITATIONS: 0
SPUTUM PRODUCTION: 1
SHORTNESS OF BREATH: 0
VOMITING: 0
CONSTIPATION: 0
COUGH: 1
DIZZINESS: 0
DEPRESSION: 0
DIARRHEA: 0
WEIGHT LOSS: 0
MYALGIAS: 1

## 2023-12-15 ASSESSMENT — FIBROSIS 4 INDEX: FIB4 SCORE: 5.47

## 2023-12-15 NOTE — PROGRESS NOTES
Chief Complaint:  Three days of fevers with body aches, headaches, chills x 4 days    Last Seen:   12/2023    PCP:   Margy Aparicio M.D.    History of Present Illness:   Patient is a 53 y.o. with pertinent PMHx of alcohol use disorder c/b history of alcohol withdrawal seizures, pancreatitis, portal hypertension and alcoholic hepatitis, methamphetamine use disorder, benzodiazepine use disorder presenting today for 3-4 days of fevers, body aches, headaches, chills. States she has a cough that is dry but at times gunky (yellow). Did not get flu vaccine and has never had covid vaccine. Denies sick contacts. Denies recent travel. States she has SOB but that is at baseline at this time. +sore throat, congestion. No runny nose. +sinus pain. Has been taking Tylenol-- last dose last night-- helps for a little while. Yesterday temp 102 F at home on forehead. Today she states she is less tired than yesterday. Denies ongoing drug use with alcohol/ meth ise. +nausea. States she has been hydrating well.     Is on 2L NC at baseline, continuing to be on 2L-- on this for COVID PNA that she was diagnosed with few years ago.     ----    Healthcare maintenance: PCP to address  Mood screening:   Drug screening:   Sexual activity screening:   Infectious disease screening:  Colon cancer screening:   Vaccines:   Osteoporosis screening:   Breast cancer screening:   Cervical cancer screening:   Metabolic screening:     Review of Systems   Review of Systems   Constitutional:  Positive for chills and fever. Negative for weight loss.   Respiratory:  Positive for cough and sputum production. Negative for shortness of breath.    Cardiovascular:  Negative for chest pain, palpitations and leg swelling.   Gastrointestinal:  Positive for nausea. Negative for constipation, diarrhea and vomiting.   Genitourinary:  Negative for dysuria.   Musculoskeletal:  Positive for myalgias.   Neurological:  Negative for dizziness and weakness.    Psychiatric/Behavioral:  Negative for depression.       Past Medical History:   Past Medical History:   Diagnosis Date    Alcohol abuse     Alcohol intoxication, with unspecified complication ([facility]) 8/7/2013    Alcoholism (HCC)     Anxiety     Backpain     Bipolar disorder, unspecified (HCC)     Bronchitis     Degenerative disc disease, cervical 3/28/2023    Drug abuse ([facility])     meth, crack cocaine, THC    Hepatitis, alcoholic     Hypertension     controlled    Indigestion     Infectious disease MRSA    Liver disease     pancreatitis    Pancreatitis chronic     Flare-up    Pneumonia     Psychiatric disorder     suicidal in past    Renal disorder     kidney infection 1991    Seizure ([facility])     7/8/2011    Seizure disorder ([facility])     Unspecified hemorrhagic conditions        Patient Active Problem List    Diagnosis Date Noted    Hyperalgesia 07/21/2023    Acute pain of left shoulder 07/21/2023    Encounter for screening mammogram for breast cancer 07/21/2023    Pruritic erythematous rash 07/12/2023    Mood changes 04/22/2023    Bladder polyp 04/11/2023    Severe benzodiazepine use disorder (HCC) 03/28/2023    High risk social situation 03/28/2023    Degenerative disc disease, cervical 03/28/2023    Acetaminophen abuse 02/28/2023    Other chronic pain 02/28/2023    Alcohol use disorder, severe, dependence ([facility]) 02/06/2023    Chronic respiratory failure with hypoxia, on home oxygen therapy ([facility]) 02/06/2023    Post covid-19 condition, unspecified 02/06/2023    Other insomnia 02/06/2023    Carotid artery stenosis, asymptomatic, right 01/28/2023    Headache 01/24/2023    Overweight 01/24/2023    Elevated lipase 01/23/2023    Suicidal behavior 09/27/2022    Vitamin D deficiency 05/25/2021    Hypertension 04/20/2021    Neuropathy 04/19/2021    Leukocytopenia 04/18/2021    Macrocytic anemia 04/18/2021    Methamphetamine abuse ([facility]) 05/29/2018    Alcoholic hepatitis 08/15/2013    Pancreatitis, alcoholic, acute 08/07/2013     "Persistent depressive disorder 07/24/2013    Bipolar affective disorder (HCC) 07/22/2013    Alcohol withdrawal seizure (HCC) 05/16/2012    Thrombocytopenia (HCC) 08/19/2011    Anemia 08/17/2011    Hypokalemia 08/16/2011     Past Surgical History:   Past Surgical History:   Procedure Laterality Date    GASTROSCOPY  4/28/2015    Performed by Kevin Rendon M.D. at SURGERY [facility]      Allergies:  Bactrim, Cephalexin, Lamotrigine [lamictal], Penicillin g, Quetiapine, Quetiapine fumarate, Quetiapine fumarate, Sulfa drugs, Sulfamethoxazole w-trimethoprim, and Keflex    Medications:     Current Outpatient Medications:     benzonatate, 100 mg, Oral, TID PRN    guaiFENesin ER, 600 mg, Oral, Q12HRS    atorvastatin, 40 mg, Oral, Q EVENING, Taking    traZODone, 150 mg, Oral, QHS PRN, Taking    DULoxetine, 30 mg, Oral, DAILY, Taking    lactulose, 10 g, Oral, TID, Taking    pantoprazole, 40 mg, Oral, DAILY, Taking    potassium chloride ER, 10 mEq, Oral, BID, Taking    gabapentin, 300 mg, Oral, TID, Taking    folic acid, 1 mg, Oral, DAILY, Taking    B1, 100 mg, Oral, DAILY, Taking    hydrOXYzine pamoate, , Taking    cloNIDine, , Taking    meloxicam, 15 mg, Oral, QDAY PRN, Taking    busPIRone, 15 mg, Oral, TID, Taking    amLODIPine, 2.5 mg, Oral, DAILY, Taking    diclofenac sodium, 2 g, Topical, 4X/DAY PRN, Taking     Social History:   Social History     Tobacco Use    Smoking status: Never    Smokeless tobacco: Never   Vaping Use    Vaping Use: Never used   Substance Use Topics    Alcohol use: Yes     Comment: a couple beers a day    Drug use: Not Currently     Types: Inhaled     Comment: meth on 4/14     Family History:   Family History   Problem Relation Age of Onset    Lung Disease Mother     Cancer Mother      Vitals:   /79 (BP Location: Left arm, Patient Position: Sitting, BP Cuff Size: Adult)   Pulse (!) 106   Temp 36.8 °C (98.2 °F) (Temporal)   Ht 1.473 m (4' 10\")   Wt 55.2 kg (121 lb 12.8 oz)   SpO2 96% "  Body mass index is 25.46 kg/m².    Physical Exam:   Physical Exam  Constitutional:       General: She is not in acute distress.     Appearance: Normal appearance. She is not ill-appearing or diaphoretic.   HENT:      Head: Normocephalic and atraumatic.      Mouth/Throat:      Mouth: Mucous membranes are moist.   Eyes:      Extraocular Movements: Extraocular movements intact.      Pupils: Pupils are equal, round, and reactive to light.   Cardiovascular:      Rate and Rhythm: Normal rate and regular rhythm.      Heart sounds: No murmur heard.     No friction rub. No gallop.   Pulmonary:      Effort: No respiratory distress.      Breath sounds: No stridor. No wheezing, rhonchi or rales.      Comments: On 2 L nasal cannula  Abdominal:      General: Bowel sounds are normal. There is no distension.      Tenderness: There is no abdominal tenderness. There is no guarding or rebound.   Musculoskeletal:         General: No swelling.      Right lower leg: No edema.      Left lower leg: No edema.   Skin:     Coloration: Skin is not jaundiced or pale.   Neurological:      General: No focal deficit present.      Mental Status: She is alert and oriented to person, place, and time.   Psychiatric:         Mood and Affect: Mood normal.         Behavior: Behavior normal.     Assessment and Plan    Patient is a 53-year-old female with pertinent PMHx of alcohol use disorder c/b history of alcohol withdrawal seizures, pancreatitis, portal hypertension presenting today for flulike symptoms onset 4 days prior.  Follow-up with Dr. Aparicio (PCP) to address care gaps and follow-up regarding symptomology/follow-up for tachycardia:    #Upper respiratory infection, likely viral  #Flu like symptoms  #Tachycardia, likely sinus  Patient without sick contacts or recent travel however has never been vaccinated against flu or COVID.  No concern for SBP on exam in the setting of patient's previous alcohol use history.  Likely this is a viral  syndrome.  -Point-of-care testing for RSV/flu/COVID--consideration for Tamiflu if testing returns positive for influenza  -Supportive care: As needed Tylenol (instructed on max dosing), NyQuil/DayQuil, Mucinex, Tessalon Perles  -Counseled to present to urgent care should her symptoms suddenly worsen  -Follow-up in 1 week with Dr. Elmore to address care gaps and to follow-up regarding symptoms given multiple comorbidities    Please note that this dictation was created using voice recognition software. I have made every reasonable attempt to correct obvious errors, but I expect that there are errors of grammar and possibly content that I did not discover before finalizing the note.    Patient case was seen/ assessed/ discussed with Dr. Antonio Cantu, PGY-3  Internal Medicine

## 2023-12-26 ENCOUNTER — OFFICE VISIT (OUTPATIENT)
Dept: INTERNAL MEDICINE | Facility: OTHER | Age: 53
End: 2023-12-26
Payer: COMMERCIAL

## 2023-12-26 VITALS
HEART RATE: 92 BPM | DIASTOLIC BLOOD PRESSURE: 69 MMHG | RESPIRATION RATE: 18 BRPM | SYSTOLIC BLOOD PRESSURE: 104 MMHG | WEIGHT: 120 LBS | BODY MASS INDEX: 23.56 KG/M2 | TEMPERATURE: 98.4 F | OXYGEN SATURATION: 100 % | HEIGHT: 60 IN

## 2023-12-26 DIAGNOSIS — L29.8 PRURITIC ERYTHEMATOUS RASH: ICD-10-CM

## 2023-12-26 DIAGNOSIS — Z87.19 HISTORY OF BLOODY STOOLS: ICD-10-CM

## 2023-12-26 DIAGNOSIS — N32.9 LESION OF BLADDER: ICD-10-CM

## 2023-12-26 DIAGNOSIS — K70.9 ALCOHOLIC LIVER DISEASE (HCC): ICD-10-CM

## 2023-12-26 DIAGNOSIS — G89.29 OTHER CHRONIC PAIN: ICD-10-CM

## 2023-12-26 DIAGNOSIS — Z86.69 HX OF ENCEPHALOPATHY: ICD-10-CM

## 2023-12-26 DIAGNOSIS — F34.1 PERSISTENT DEPRESSIVE DISORDER: ICD-10-CM

## 2023-12-26 PROCEDURE — 3078F DIAST BP <80 MM HG: CPT | Performed by: STUDENT IN AN ORGANIZED HEALTH CARE EDUCATION/TRAINING PROGRAM

## 2023-12-26 PROCEDURE — 3074F SYST BP LT 130 MM HG: CPT | Performed by: STUDENT IN AN ORGANIZED HEALTH CARE EDUCATION/TRAINING PROGRAM

## 2023-12-26 PROCEDURE — 99214 OFFICE O/P EST MOD 30 MIN: CPT | Mod: GC | Performed by: STUDENT IN AN ORGANIZED HEALTH CARE EDUCATION/TRAINING PROGRAM

## 2023-12-26 RX ORDER — METHOCARBAMOL 500 MG/1
500 TABLET, FILM COATED ORAL 4 TIMES DAILY
Qty: 120 TABLET | Refills: 1 | Status: SHIPPED | OUTPATIENT
Start: 2023-12-26 | End: 2024-01-18 | Stop reason: SDUPTHER

## 2023-12-26 RX ORDER — FLUOXETINE HYDROCHLORIDE 20 MG/1
20 CAPSULE ORAL DAILY
Qty: 30 CAPSULE | Refills: 1 | Status: SHIPPED | OUTPATIENT
Start: 2023-12-26 | End: 2024-01-10 | Stop reason: SDUPTHER

## 2023-12-26 RX ORDER — LACTULOSE 10 G/15ML
10 SOLUTION ORAL 3 TIMES DAILY PRN
Qty: 473 ML | Refills: 1 | Status: SHIPPED | OUTPATIENT
Start: 2023-12-26 | End: 2024-01-18 | Stop reason: SDUPTHER

## 2023-12-26 RX ORDER — QUETIAPINE FUMARATE 25 MG/1
25 TABLET, FILM COATED ORAL DAILY
COMMUNITY
Start: 2023-08-18

## 2023-12-26 RX ORDER — FLUOXETINE HYDROCHLORIDE 40 MG/1
40 CAPSULE ORAL DAILY
Qty: 30 CAPSULE | Refills: 1 | Status: CANCELLED | OUTPATIENT
Start: 2023-12-26

## 2023-12-26 RX ORDER — FLUOXETINE HYDROCHLORIDE 40 MG/1
CAPSULE ORAL
COMMUNITY
Start: 2023-08-16 | End: 2023-12-26

## 2023-12-26 RX ORDER — HYDROXYZINE HYDROCHLORIDE 25 MG/1
25 TABLET, FILM COATED ORAL 3 TIMES DAILY PRN
Qty: 30 TABLET | Refills: 2 | Status: SHIPPED | OUTPATIENT
Start: 2023-12-26 | End: 2024-01-18 | Stop reason: SDUPTHER

## 2023-12-26 RX ORDER — CALCIUM CARBONATE/VITAMIN D3 600 MG-10
100 TABLET ORAL DAILY
Qty: 30 TABLET | Refills: 5 | Status: SHIPPED | OUTPATIENT
Start: 2023-12-26 | End: 2024-02-09 | Stop reason: SDUPTHER

## 2023-12-26 ASSESSMENT — FIBROSIS 4 INDEX: FIB4 SCORE: 5.47

## 2023-12-26 NOTE — PROGRESS NOTES
Established Patient    Patient Care Team:  Margy Aparicio M.D. as PCP - General (Internal Medicine)    HPI:  Olya Youssef is a 53 y.o. female with past medical history of methamphetamine use and alcohol abuse complicated by alcohol withdrawal/seizures and alcoholic pancreatitis and alcoholic liver disease, chronic hypoxic respiratory failure secondary to COVID (on 2-3LNC at baseline), cervical radiculopathy with chronic neck and back pain, and asymptomatic carotid artery stenosis  who presents today with the following Chief Complaint(s):   Chief Complaint   Patient presents with    Follow-Up    Medication Refill    Referral Needed    Foot Problem     Swelling and painful to the touch    Mental Heatlh Problem     Patient comes in today for follow up visit. Reports being clean and sober since 10/28/2023.   Requests refills for lactulose, methocarbamol, fluoxetine (no longer want duloxetine), and hydroxyzine.    Alcoholic liver disease with history of hepatic encephalopathy  She reports ongoing regular bowel movements, more than 2-3x per day. Advised to titrate down, will continue to monitor.  Of note, recent hospitalization at [facility] for acute GIB, acute encephalopathy -CT abd/pelvis with contrast showed hepatic steatosis, atelectasis of LLL base, pancreatitis, intramural thickening of the colon with some involvement of the terminal ileum suggestive of possible Crohn's, and 5mm lesion on the bladder. UA negative for blood. GI and urology consults placed.    Other than that, she reports bilateral lower extremity swelling (L>R), worse at the end of the day. Notes pain to BLE to touch, described as an intense, deep ache. No concerns for DVT. Will hold off on diuretics as this point, soft BP. Encourage compression socks.    Reports depressed mood, no SI/HI. States she did not feel much jadyn during the holidays, was able to spend time with her sons and did feel some happiness with them. Reports good  support with roommate. Was previously on Prozac, per psychiatry but has to re-establish due to missing appointments while hospitalized. Prozac was changed to Cymbalta previously to address mood and pain, patient states it did not help and noted worsening mood/anger without relief of pain. Prefers Prozac.     Review of Systems   12 point ROS reviewed and are negative except as mentioned in the HPI.      Past Medical History:   Diagnosis Date    Alcohol abuse     Alcohol intoxication, with unspecified complication (HCC) 8/7/2013    Alcoholism (HCC)     Anxiety     Backpain     Bipolar disorder, unspecified (HCC)     Bronchitis     Degenerative disc disease, cervical 3/28/2023    Drug abuse (HCC)     meth, crack cocaine, THC    Hepatitis, alcoholic     Hypertension     controlled    Indigestion     Infectious disease MRSA    Liver disease     pancreatitis    Pancreatitis chronic     Flare-up    Pneumonia     Psychiatric disorder     suicidal in past    Renal disorder     kidney infection 1991    Seizure ([facility])     7/8/2011    Seizure disorder ([facility])     Unspecified hemorrhagic conditions        Patient Active Problem List    Diagnosis Date Noted    Hyperalgesia 07/21/2023    Acute pain of left shoulder 07/21/2023    Encounter for screening mammogram for breast cancer 07/21/2023    Pruritic erythematous rash 07/12/2023    Mood changes 04/22/2023    Bladder polyp 04/11/2023    Severe benzodiazepine use disorder (HCC) 03/28/2023    High risk social situation 03/28/2023    Degenerative disc disease, cervical 03/28/2023    Acetaminophen abuse 02/28/2023    Other chronic pain 02/28/2023    Alcohol use disorder, severe, dependence (HCC) 02/06/2023    Chronic respiratory failure with hypoxia, on home oxygen therapy (HCC) 02/06/2023    Post covid-19 condition, unspecified 02/06/2023    Other insomnia 02/06/2023    Carotid artery stenosis, asymptomatic, right 01/28/2023    Headache 01/24/2023    Overweight 01/24/2023    Elevated  lipase 01/23/2023    Suicidal behavior 09/27/2022    Vitamin D deficiency 05/25/2021    Hypertension 04/20/2021    Neuropathy 04/19/2021    Leukocytopenia 04/18/2021    Macrocytic anemia 04/18/2021    Methamphetamine abuse (HCC) 05/29/2018    Alcoholic hepatitis 08/15/2013    Pancreatitis, alcoholic, acute 08/07/2013    Persistent depressive disorder 07/24/2013    Bipolar affective disorder ([facility]) 07/22/2013    Alcohol withdrawal seizure ([facility]) 05/16/2012    Thrombocytopenia ([facility]) 08/19/2011    Anemia 08/17/2011    Hypokalemia 08/16/2011       Allergies:Bactrim, Cephalexin, Lamotrigine [lamictal], Penicillin g, Quetiapine, Quetiapine fumarate, Quetiapine fumarate, Sulfa drugs, Sulfamethoxazole w-trimethoprim, and Keflex    Current Outpatient Medications   Medication Sig Dispense Refill    QUEtiapine (SEROQUEL) 25 MG Tab 25 mg.      Thiamine Mononitrate (B1) 100 MG Tab Take 100 mg by mouth every day. 30 Tablet 5    hydrOXYzine HCl (ATARAX) 25 MG Tab Take 1 Tablet by mouth 3 times a day as needed for Itching. 30 Tablet 2    methocarbamol (ROBAXIN) 500 MG Tab Take 1 Tablet by mouth 4 times a day. 120 Tablet 1    lactulose 10 GM/15ML Solution Take 15 mL by mouth 3 times a day as needed (To have 2-3 bowel movements daily). 473 mL 1    Elastic Bandages & Supports (MEDICAL COMPRESSION SOCKS) Misc Please apply compression stockings to your legs during the day, take off at night. 2 Each 1    FLUoxetine (PROZAC) 20 MG Cap Take 1 Capsule by mouth every day. 30 Capsule 1    benzonatate (TESSALON) 100 MG Cap Take 1 Capsule by mouth 3 times a day as needed for Cough. 60 Capsule 0    atorvastatin (LIPITOR) 40 MG Tab Take 1 Tablet by mouth every evening for 360 days. 90 Tablet 3    traZODone (DESYREL) 150 MG Tab Take 1 Tablet by mouth at bedtime as needed for Sleep. 30 Tablet 0    pantoprazole (PROTONIX) 40 MG Tablet Delayed Response Take 1 Tablet by mouth every day. 30 Tablet 1    potassium chloride ER (KLOR-CON) 10 MEQ tablet Take  1 Tablet by mouth 2 times a day. 60 Tablet 3    gabapentin (NEURONTIN) 600 MG tablet Take 0.5 Tablets by mouth 3 times a day for 360 days. 135 Tablet 0    folic acid (FOLVITE) 1 MG Tab Take 1 Tablet by mouth every day. 30 Tablet 5    busPIRone (BUSPAR) 15 MG tablet Take 1 Tablet by mouth in the morning, at noon, and at bedtime. 90 Tablet 3    amLODIPine (NORVASC) 2.5 MG Tab Take 1 Tablet by mouth every day for 360 days. 90 Tablet 3    diclofenac sodium (VOLTAREN) 1 % Gel Apply 2 g topically 4 times a day as needed (for neck pain) for up to 360 days. 150 g 3    cloNIDine (CATAPRES) 0.1 MG Tab        No current facility-administered medications for this visit.       Social History     Tobacco Use    Smoking status: Never    Smokeless tobacco: Never   Vaping Use    Vaping Use: Never used   Substance Use Topics    Alcohol use: Not Currently     Comment: stopped 10.28.2023    Drug use: Not Currently     Types: Inhaled     Comment: meth on 4/14       Family History   Problem Relation Age of Onset    Lung Disease Mother     Cancer Mother          Physical Exam  Vitals:  /69 (BP Location: Left arm, Patient Position: Sitting, BP Cuff Size: Adult)   Pulse 92   Temp 36.9 °C (98.4 °F) (Temporal)   Resp 18   Ht 1.524 m (5')   Wt 54.4 kg (120 lb)   SpO2 100%  Body mass index is 23.44 kg/m².  Constitutional:  Not in acute distress, not toxic appearing  HEENT:   NC/AT, EOMI, conjunctiva normal, hearing grossly intact, NC in place  Cardiovascular: Regular rate and rhythm. No murmurs or gallops.      Lungs:   Clear to auscultation bilaterally. No wheezes or crackles.   Abdomen: Not distended, soft, not tender to palpation.  Extremities:  Bilateral lower extremity swelling, trace edema  Skin:  Warm and dry.  No visible rashes.  Neurologic: Alert & oriented x 3, CN II-XII grossly intact, no focal deficits noted.  Psychiatric:  Affect normal, judgment normal.      Assessment and Plan:     Problem List Items Addressed This  "Visit       Alcoholic liver disease (HCC)     Noted hepatic steatosis on imaging, no signs of cirrhosis. Also recent hospitalization 10/2023 for recent GIB, noted to be encephalopathic.  Patient reports sobriety since 10/28/2023  -Congratulated patient on her sobriety and encouraged continued cessation  -Refilled lactulose, advised to titrate to 2-3 BM daily  -Continue MVT, folic acid, thiamine. Thiamine refilled today  -Advised to get labs previously ordered         Relevant Medications    Thiamine Mononitrate (B1) 100 MG Tab    lactulose 10 GM/15ML Solution    Other Relevant Orders    Referral to Gastroenterology    Persistent depressive disorder     Previously on Prozac per psychiatry. Stopped earlier this year and changed to Cymbalta previously to address mood and pain, patient states it did not help and noted worsening mood/anger without relief of pain.  Current mood is depressed, no SI/HI  -Given liver disease and on other medications that can cause sedation, will do low dose Prozac 20mg daily. Educated patient on side effects. Advised patient to re-establish with psychiatry for ongoing medication management         Relevant Medications    hydrOXYzine HCl (ATARAX) 25 MG Tab    FLUoxetine (PROZAC) 20 MG Cap    Other chronic pain     Chronic neck and back pain x2 years, atraumatic  Neck pain is constant, 5-10/10, sharp and stabbing in character, with radiation to the left shoulder and upper chest area  Worsened by any head movement  Briefly relieved by use of Tylenol and Voltaren gel  Denies any red flag signs/symptoms; denies heavy lifting or overhead activities  Pain is not radicular in nature  Has been unable to work due to ongoing pain  Sees physical therapy 2x/week and continues to do exercises and stretches at home  Following with neurosugery ([facility])-LUIGI Kapoor  Imaging ordered by ALEKSANDER: (5/23/23) - C spine xray: \"moderate osteoarthritic changes at the C4-5 level through the C6-7 level with " "moderate marginal osteophytosis. Mild degenerative anterolisthesis at the C2-3 level which does not change significantly with flexion\" and L spine xray: \"osteopenia, moderate dextroscoliosis of L spine with Arellano angle of 26 degrees, moderate discal degenerative changes at the L2-3 and L3-4 levels, mild degenerative retrolisthesis at the L2-3 level\"  -Methocarbamol refilled today, patient with noted trapezius muscle tension. Patient states she uses very sparingly, advised  to take only as needed and caution with other medications  -Pain referral previously sent. Provided patient with clinic information so she may call and schedule appointment  -ED precautions         Relevant Medications    methocarbamol (ROBAXIN) 500 MG Tab    FLUoxetine (PROZAC) 20 MG Cap    Bladder polyp     Noted on CT abd/pelvis 10/2023, suggestive of polyp but another imaging read concerning for polypoid mass. UA done on previous hospitalization negative for blood.  -Called patient to inform of finding and urology referral sent.         Pruritic erythematous rash     Noted improvement of pruritus, relief noted with Atarax  -Will send refilled today, advised to only take as needed. Educated on side effects and caution in taking with other sedating medications.          Other Visit Diagnoses       Hx of encephalopathy        Relevant Medications    lactulose 10 GM/15ML Solution    History of bloody stools        Recent CT abd/pelvis suggestive of Crohn's. No recent bloody stools. Referral to GI.    Relevant Orders    Referral to Gastroenterology          Return in about 3 months (around 3/26/2024), or if symptoms worsen or fail to improve.    Margy Aparicio M.D. PGY-3  [facility]    "

## 2023-12-26 NOTE — ASSESSMENT & PLAN NOTE
Noted on CT abd/pelvis 10/2023, suggestive of polyp but another imaging read concerning for polypoid mass. UA done on previous hospitalization negative for blood.  -Called patient to inform of finding and urology referral sent.

## 2023-12-26 NOTE — ASSESSMENT & PLAN NOTE
Noted improvement of pruritus, relief noted with Atarax  -Will send refilled today, advised to only take as needed. Educated on side effects and caution in taking with other sedating medications.

## 2023-12-26 NOTE — PATIENT INSTRUCTIONS
Thank you for coming in today, Olya. Congratulations on being sober and clean!  I have sent refills for: lactulose, methocarbamol, hydroxyzine, thiamine and Prozac. Please re-establish with psychiatry for future Prozac refills and for clonidine.  Caution with taking your methocarbamol, hydroxyzine and Prozac as this can cause sedation. Will do Prozac 20mg daily for now due to your liver. Limit methocarbamol and hydroxyzine as needed.  Please make an appointment with GI when you can  I have also sent in compression socks, please use daily and take off at night. Continue to elevate legs during the day when you can.    Here is the information for pain management referral:    [facility]  KAREN EARL  [address] [address]  Phone: [phone]  Fax: [phone]    Physical Medicine & Rehabilitation Pain Medicine  [facility]  [address] [address]  Phone: [phone]    Physical Medicine & Rehabilitation Pain Medicine  [facility]  [address] [place] [address]  Phone: [phone]      Follow up in 3 months or sooner if needed

## 2023-12-26 NOTE — ASSESSMENT & PLAN NOTE
Previously on Prozac per psychiatry. Stopped earlier this year and changed to Cymbalta previously to address mood and pain, patient states it did not help and noted worsening mood/anger without relief of pain.  Current mood is depressed, no SI/HI  -Given liver disease and on other medications that can cause sedation, will do low dose Prozac 20mg daily. Educated patient on side effects. Advised patient to re-establish with psychiatry for ongoing medication management

## 2023-12-26 NOTE — ASSESSMENT & PLAN NOTE
Noted hepatic steatosis on imaging, no signs of cirrhosis. Also recent hospitalization 10/2023 for recent GIB, noted to be encephalopathic.  Patient reports sobriety since 10/28/2023  -Congratulated patient on her sobriety and encouraged continued cessation  -Refilled lactulose, advised to titrate to 2-3 BM daily  -Continue MVT, folic acid, thiamine. Thiamine refilled today  -Advised to get labs previously ordered

## 2023-12-26 NOTE — ASSESSMENT & PLAN NOTE
"Chronic neck and back pain x2 years, atraumatic  Neck pain is constant, 5-10/10, sharp and stabbing in character, with radiation to the left shoulder and upper chest area  Worsened by any head movement  Briefly relieved by use of Tylenol and Voltaren gel  Denies any red flag signs/symptoms; denies heavy lifting or overhead activities  Pain is not radicular in nature  Has been unable to work due to ongoing pain  Sees physical therapy 2x/week and continues to do exercises and stretches at home  Following with neurosugery (NSGY)-LUIGI Kapoor  Imaging ordered by NSGY: (5/23/23) - C spine xray: \"moderate osteoarthritic changes at the C4-5 level through the C6-7 level with moderate marginal osteophytosis. Mild degenerative anterolisthesis at the C2-3 level which does not change significantly with flexion\" and L spine xray: \"osteopenia, moderate dextroscoliosis of L spine with Arellano angle of 26 degrees, moderate discal degenerative changes at the L2-3 and L3-4 levels, mild degenerative retrolisthesis at the L2-3 level\"  -Methocarbamol refilled today, patient with noted trapezius muscle tension. Patient states she uses very sparingly, advised  to take only as needed and caution with other medications  -Pain referral previously sent. Provided patient with clinic information so she may call and schedule appointment  -ED precautions  "

## 2023-12-27 NOTE — TELEPHONE ENCOUNTER
DOCUMENTATION OF PAR STATUS:    1. Name of Medication & Dose: FLUoxetine HCl 20MG capsules      2. Name of Prescription Coverage Company & phone #: [employer] Electronic Prior Authorization Request Form (2017 NCPDP)     3. Date Prior Auth Submitted: 12/27/2023    4. What information was given to obtain insurance decision? ICD DX of persistent Depression and trailed failed medications.     5. Prior Auth Status? Pending    6. Patient Notified: N\A

## 2023-12-27 NOTE — TELEPHONE ENCOUNTER
FINAL PRIOR AUTHORIZATION STATUS:    1.  Name of Medication & Dose: Fluoxetine 20mg     2. Prior Auth Status: Approved through 12/27/23-12/26/24     3. Action Taken: Pharmacy Notified: yes Patient Notified: yes

## 2024-01-06 DIAGNOSIS — K70.9 ALCOHOLIC LIVER DISEASE (HCC): ICD-10-CM

## 2024-01-08 RX ORDER — CALCIUM CARBONATE/VITAMIN D3 600 MG-10
100 TABLET ORAL DAILY
Qty: 30 TABLET | Refills: 5 | OUTPATIENT
Start: 2024-01-08

## 2024-01-09 RX ORDER — TRAZODONE HYDROCHLORIDE 150 MG/1
150 TABLET ORAL
Qty: 30 TABLET | Refills: 0 | Status: CANCELLED | OUTPATIENT
Start: 2024-01-09

## 2024-01-10 ENCOUNTER — PATIENT MESSAGE (OUTPATIENT)
Dept: INTERNAL MEDICINE | Facility: OTHER | Age: 54
End: 2024-01-10
Payer: COMMERCIAL

## 2024-01-10 RX ORDER — TRAZODONE HYDROCHLORIDE 150 MG/1
150 TABLET ORAL
Qty: 30 TABLET | Refills: 0 | Status: SHIPPED | OUTPATIENT
Start: 2024-01-10 | End: 2024-01-26 | Stop reason: SDUPTHER

## 2024-01-10 RX ORDER — FLUOXETINE HYDROCHLORIDE 20 MG/1
20 CAPSULE ORAL DAILY
Qty: 30 CAPSULE | Refills: 0 | Status: SHIPPED | OUTPATIENT
Start: 2024-01-10 | End: 2024-01-18

## 2024-01-10 NOTE — PATIENT COMMUNICATION
Spoke to patient will keep appointment with Dr Garcia and notify on Prozac 20 mg was route to pharmacy.

## 2024-01-12 RX ORDER — BUSPIRONE HYDROCHLORIDE 15 MG/1
15 TABLET ORAL 3 TIMES DAILY
Qty: 90 TABLET | Refills: 3 | Status: CANCELLED | OUTPATIENT
Start: 2024-01-12

## 2024-01-12 RX ORDER — TRAZODONE HYDROCHLORIDE 150 MG/1
150 TABLET ORAL
Qty: 30 TABLET | Refills: 0 | OUTPATIENT
Start: 2024-01-12

## 2024-01-12 RX ORDER — HYDROXYZINE HYDROCHLORIDE 25 MG/1
25 TABLET, FILM COATED ORAL 3 TIMES DAILY PRN
Qty: 30 TABLET | Refills: 2 | Status: CANCELLED | OUTPATIENT
Start: 2024-01-12

## 2024-01-17 ENCOUNTER — TELEMEDICINE (OUTPATIENT)
Dept: INTERNAL MEDICINE | Facility: OTHER | Age: 54
End: 2024-01-17
Payer: COMMERCIAL

## 2024-01-17 NOTE — PROGRESS NOTES
"Virtual Visit: Established Patient   This visit was conducted via Zoom using secure and encrypted videoconferencing technology.   The patient was {home or other private:01248::\"in their home\"} in the state of {State:[address]::\"[place]\"}.    The patient's identity was confirmed and verbal consent was obtained for this virtual visit.    Subjective:   CC:   Chief Complaint   Patient presents with    Follow-Up    Weight Loss    Medication Refill    Leg Pain     Left leg femur pain started a week ago, no swelling. Pain at touch. Cause of pain unknown.      Olya Youssef is a 53 y.o. female presenting for evaluation and management of:  Doing well, not drinking alcohol or using  --eating better  --back pain: for a year an a half, has been using volterin   --feels that maybe there is a disc out of joint   --did physical therapy (a while back, July 2023, went twice a week for a two or three months)  --sent back to physical therapy, it hurts more  --      ***    ROS   ***    Current medicines (including changes today)  Current Outpatient Medications   Medication Sig Dispense Refill    traZODone (DESYREL) 150 MG Tab Take 1 Tablet by mouth at bedtime as needed for Sleep. 30 Tablet 0    FLUoxetine (PROZAC) 20 MG Cap Take 1 Capsule by mouth every day. 30 Capsule 0    QUEtiapine (SEROQUEL) 25 MG Tab 25 mg.      Thiamine Mononitrate (B1) 100 MG Tab Take 100 mg by mouth every day. 30 Tablet 5    hydrOXYzine HCl (ATARAX) 25 MG Tab Take 1 Tablet by mouth 3 times a day as needed for Itching. 30 Tablet 2    methocarbamol (ROBAXIN) 500 MG Tab Take 1 Tablet by mouth 4 times a day. 120 Tablet 1    lactulose 10 GM/15ML Solution Take 15 mL by mouth 3 times a day as needed (To have 2-3 bowel movements daily). 473 mL 1    Elastic Bandages & Supports (MEDICAL COMPRESSION SOCKS) Misc Please apply compression stockings to your legs during the day, take off at night. 2 Each 1    benzonatate (TESSALON) 100 MG Cap Take 1 Capsule by mouth 3 times " a day as needed for Cough. 60 Capsule 0    atorvastatin (LIPITOR) 40 MG Tab Take 1 Tablet by mouth every evening for 360 days. 90 Tablet 3    pantoprazole (PROTONIX) 40 MG Tablet Delayed Response Take 1 Tablet by mouth every day. 30 Tablet 1    potassium chloride ER (KLOR-CON) 10 MEQ tablet Take 1 Tablet by mouth 2 times a day. 60 Tablet 3    gabapentin (NEURONTIN) 600 MG tablet Take 0.5 Tablets by mouth 3 times a day for 360 days. 135 Tablet 0    folic acid (FOLVITE) 1 MG Tab Take 1 Tablet by mouth every day. 30 Tablet 5    cloNIDine (CATAPRES) 0.1 MG Tab       busPIRone (BUSPAR) 15 MG tablet Take 1 Tablet by mouth in the morning, at noon, and at bedtime. 90 Tablet 3    amLODIPine (NORVASC) 2.5 MG Tab Take 1 Tablet by mouth every day for 360 days. 90 Tablet 3    diclofenac sodium (VOLTAREN) 1 % Gel Apply 2 g topically 4 times a day as needed (for neck pain) for up to 360 days. 150 g 3     No current facility-administered medications for this visit.       Patient Active Problem List    Diagnosis Date Noted    Hyperalgesia 07/21/2023    Acute pain of left shoulder 07/21/2023    Encounter for screening mammogram for breast cancer 07/21/2023    Pruritic erythematous rash 07/12/2023    Mood changes 04/22/2023    Bladder polyp 04/11/2023    Severe benzodiazepine use disorder (HCC) 03/28/2023    High risk social situation 03/28/2023    Degenerative disc disease, cervical 03/28/2023    Acetaminophen abuse 02/28/2023    Other chronic pain 02/28/2023    Alcohol use disorder, severe, dependence (HCC) 02/06/2023    Chronic respiratory failure with hypoxia, on home oxygen therapy (HCC) 02/06/2023    Post covid-19 condition, unspecified 02/06/2023    Other insomnia 02/06/2023    Carotid artery stenosis, asymptomatic, right 01/28/2023    Headache 01/24/2023    Overweight 01/24/2023    Elevated lipase 01/23/2023    Suicidal behavior 09/27/2022    Vitamin D deficiency 05/25/2021    Hypertension 04/20/2021    Neuropathy 04/19/2021     Leukocytopenia 04/18/2021    Macrocytic anemia 04/18/2021    Methamphetamine abuse (HCC) 05/29/2018    Alcoholic liver disease (HCC) 08/15/2013    Pancreatitis, alcoholic, acute 08/07/2013    Persistent depressive disorder 07/24/2013    Bipolar affective disorder (HCC) 07/22/2013    Alcohol withdrawal seizure (HCC) 05/16/2012    Thrombocytopenia (HCC) 08/19/2011    Anemia 08/17/2011        Objective:   [facility] 02/28/2018     Physical Exam:***  Constitutional: Alert, no distress, well-groomed.  Skin: No rashes in visible areas.  Eye: Round. Conjunctiva clear, lids normal. No icterus.   ENMT: Lips pink without lesions, good dentition, moist mucous membranes. Phonation normal.  Neck: No masses, no thyromegaly. Moves freely without pain.  Respiratory: Unlabored respiratory effort, no cough or audible wheeze  Psych: Alert and oriented x3, normal affect and mood.     Assessment and Plan:   The following treatment plan was discussed:     There are no diagnoses linked to this encounter.    Follow-up: No follow-ups on file.

## 2024-01-18 ENCOUNTER — OFFICE VISIT (OUTPATIENT)
Dept: INTERNAL MEDICINE | Facility: OTHER | Age: 54
End: 2024-01-18
Payer: COMMERCIAL

## 2024-01-18 ENCOUNTER — APPOINTMENT (OUTPATIENT)
Dept: INTERNAL MEDICINE | Facility: OTHER | Age: 54
End: 2024-01-18
Payer: COMMERCIAL

## 2024-01-18 VITALS
TEMPERATURE: 98.4 F | HEART RATE: 97 BPM | HEIGHT: 59 IN | WEIGHT: 118.4 LBS | RESPIRATION RATE: 16 BRPM | DIASTOLIC BLOOD PRESSURE: 70 MMHG | OXYGEN SATURATION: 97 % | BODY MASS INDEX: 23.87 KG/M2 | SYSTOLIC BLOOD PRESSURE: 112 MMHG

## 2024-01-18 DIAGNOSIS — Z76.0 ENCOUNTER FOR MEDICATION REFILL: ICD-10-CM

## 2024-01-18 DIAGNOSIS — M54.42 CHRONIC MIDLINE LOW BACK PAIN WITH LEFT-SIDED SCIATICA: ICD-10-CM

## 2024-01-18 DIAGNOSIS — Z79.899 LONG TERM CURRENT USE OF HIGH DOSE ACETAMINOPHEN: ICD-10-CM

## 2024-01-18 DIAGNOSIS — G89.29 CHRONIC MIDLINE LOW BACK PAIN WITH LEFT-SIDED SCIATICA: ICD-10-CM

## 2024-01-18 PROBLEM — F10.21 HISTORY OF ALCOHOL DEPENDENCE (HCC): Status: ACTIVE | Noted: 2023-02-06

## 2024-01-18 PROBLEM — U09.9 POST COVID-19 CONDITION, UNSPECIFIED: Status: RESOLVED | Noted: 2023-02-06 | Resolved: 2024-01-18

## 2024-01-18 PROBLEM — E87.1 HYPO-OSMOLALITY AND HYPONATREMIA: Status: RESOLVED | Noted: 2023-11-10 | Resolved: 2024-01-18

## 2024-01-18 PROBLEM — G93.40 ENCEPHALOPATHY, UNSPECIFIED: Status: RESOLVED | Noted: 2022-04-23 | Resolved: 2024-01-18

## 2024-01-18 PROBLEM — Z74.09 OTHER REDUCED MOBILITY: Status: ACTIVE | Noted: 2023-11-10

## 2024-01-18 PROBLEM — G93.41 METABOLIC ENCEPHALOPATHY: Status: RESOLVED | Noted: 2023-11-10 | Resolved: 2024-01-18

## 2024-01-18 PROBLEM — R26.2 DIFFICULTY IN WALKING, NOT ELSEWHERE CLASSIFIED: Status: ACTIVE | Noted: 2023-11-10

## 2024-01-18 PROBLEM — E66.3 OVERWEIGHT: Status: RESOLVED | Noted: 2023-01-24 | Resolved: 2024-01-18

## 2024-01-18 PROBLEM — F13.20 SEVERE BENZODIAZEPINE USE DISORDER (HCC): Status: RESOLVED | Noted: 2023-03-28 | Resolved: 2024-01-18

## 2024-01-18 PROBLEM — K52.9 NONINFECTIVE GASTROENTERITIS AND COLITIS, UNSPECIFIED: Status: ACTIVE | Noted: 2023-11-10

## 2024-01-18 PROBLEM — R41.841 COGNITIVE COMMUNICATION DEFICIT: Status: ACTIVE | Noted: 2023-11-10

## 2024-01-18 PROBLEM — R74.01 ELEVATION OF LEVELS OF LIVER TRANSAMINASE LEVELS: Status: ACTIVE | Noted: 2023-11-10

## 2024-01-18 PROBLEM — Z82.62 FAMILY HISTORY OF OSTEOPOROSIS IN MOTHER: Status: ACTIVE | Noted: 2024-01-18

## 2024-01-18 PROBLEM — F19.90 OTHER PSYCHOACTIVE SUBSTANCE USE, UNSPECIFIED, UNCOMPLICATED: Status: ACTIVE | Noted: 2023-11-10

## 2024-01-18 PROBLEM — R74.8 ELEVATED LIPASE: Status: RESOLVED | Noted: 2023-01-23 | Resolved: 2024-01-18

## 2024-01-18 PROBLEM — G93.41 METABOLIC ENCEPHALOPATHY: Status: ACTIVE | Noted: 2023-11-10

## 2024-01-18 PROBLEM — F41.9 ANXIETY DISORDER, UNSPECIFIED: Status: ACTIVE | Noted: 2023-11-10

## 2024-01-18 PROBLEM — F15.11 HISTORY OF METHAMPHETAMINE ABUSE (HCC): Status: ACTIVE | Noted: 2018-05-29

## 2024-01-18 PROBLEM — K52.9 NONINFECTIVE GASTROENTERITIS AND COLITIS, UNSPECIFIED: Status: RESOLVED | Noted: 2023-11-10 | Resolved: 2024-01-18

## 2024-01-18 PROBLEM — M62.81 MUSCLE WEAKNESS (GENERALIZED): Status: ACTIVE | Noted: 2023-11-10

## 2024-01-18 PROBLEM — G62.9 POLYNEUROPATHY, UNSPECIFIED: Status: ACTIVE | Noted: 2023-11-10

## 2024-01-18 PROBLEM — D72.819 LEUKOCYTOPENIA: Status: RESOLVED | Noted: 2021-04-18 | Resolved: 2024-01-18

## 2024-01-18 PROBLEM — R45.89 SUICIDAL BEHAVIOR: Status: RESOLVED | Noted: 2022-09-27 | Resolved: 2024-01-18

## 2024-01-18 PROBLEM — E87.1 HYPO-OSMOLALITY AND HYPONATREMIA: Status: ACTIVE | Noted: 2023-11-10

## 2024-01-18 PROCEDURE — 3074F SYST BP LT 130 MM HG: CPT

## 2024-01-18 PROCEDURE — 3078F DIAST BP <80 MM HG: CPT

## 2024-01-18 PROCEDURE — 99214 OFFICE O/P EST MOD 30 MIN: CPT | Mod: GC

## 2024-01-18 RX ORDER — BENZONATATE 100 MG/1
100 CAPSULE ORAL 3 TIMES DAILY PRN
Qty: 60 CAPSULE | Refills: 0 | Status: SHIPPED | OUTPATIENT
Start: 2024-01-18 | End: 2024-02-20

## 2024-01-18 RX ORDER — METHOCARBAMOL 500 MG/1
500 TABLET, FILM COATED ORAL 4 TIMES DAILY
Qty: 120 TABLET | Refills: 1 | Status: SHIPPED | OUTPATIENT
Start: 2024-01-18 | End: 2024-02-09 | Stop reason: SDUPTHER

## 2024-01-18 RX ORDER — CLONIDINE HYDROCHLORIDE 0.1 MG/1
0.1 TABLET ORAL
Qty: 60 TABLET | Refills: 1 | Status: SHIPPED | OUTPATIENT
Start: 2024-01-18 | End: 2024-02-27

## 2024-01-18 RX ORDER — HYDROXYZINE HYDROCHLORIDE 25 MG/1
25 TABLET, FILM COATED ORAL 3 TIMES DAILY PRN
Qty: 30 TABLET | Refills: 2 | Status: SHIPPED | OUTPATIENT
Start: 2024-01-18 | End: 2024-02-27

## 2024-01-18 RX ORDER — FLUOXETINE HYDROCHLORIDE 40 MG/1
40 CAPSULE ORAL DAILY
Qty: 30 CAPSULE | Refills: 0 | Status: SHIPPED | OUTPATIENT
Start: 2024-01-18 | End: 2024-02-19 | Stop reason: SDUPTHER

## 2024-01-18 RX ORDER — GABAPENTIN 600 MG/1
300 TABLET ORAL 3 TIMES DAILY
Qty: 135 TABLET | Refills: 1 | Status: SHIPPED | OUTPATIENT
Start: 2024-01-18 | End: 2024-02-09 | Stop reason: SDUPTHER

## 2024-01-18 RX ORDER — LACTULOSE 10 G/15ML
10 SOLUTION ORAL 3 TIMES DAILY PRN
Qty: 473 ML | Refills: 1 | Status: SHIPPED | OUTPATIENT
Start: 2024-01-18 | End: 2024-02-16 | Stop reason: SDUPTHER

## 2024-01-18 RX ORDER — PANTOPRAZOLE SODIUM 40 MG/1
40 TABLET, DELAYED RELEASE ORAL DAILY
Qty: 90 TABLET | Refills: 1 | Status: SHIPPED | OUTPATIENT
Start: 2024-01-18

## 2024-01-18 RX ORDER — BUSPIRONE HYDROCHLORIDE 15 MG/1
15 TABLET ORAL 3 TIMES DAILY
Qty: 90 TABLET | Refills: 3 | Status: SHIPPED | OUTPATIENT
Start: 2024-01-18 | End: 2024-02-09 | Stop reason: SDUPTHER

## 2024-01-18 ASSESSMENT — FIBROSIS 4 INDEX: FIB4 SCORE: 5.47

## 2024-01-18 NOTE — PROGRESS NOTES
Established Patient    Patient Care Team:  Margy Aparicio M.D. as PCP - General (Internal Medicine)    Olya Youssef is a 53 y.o. female who presents today with the following Chief Complaint(s): Follow up for Diagnoses of Chronic midline low back pain with left-sided sciatica, Long term current use of high dose acetaminophen, and Encounter for medication refill were pertinent to this visit.    HPI:  Olya Youssef is a 53 year-old female with past medical history of chronic hypoxic respiratory failure on 2L NC, remote history of substance abuse, chronic neck pain, chronic back pain, hypertension, alcoholic liver disease and multiple mood disorders who presents to the clinic today for chronic back pain and medication refills.     Has chronic mid back pain for the past couple of years with left sided sciatica. Currently pain is not controlled. Patient ran out of her methocarbamol and gabapentin in the past week. Has been using voltaren gel and Tylenol 3000 mg in 24 hours. Denies any trauma or recent injury to the area. States the back pain is worse with movement. Rest relieves the pain. Has been doing PT sessions weekly for over 2 months. Denies any fevers, chills, night sweats, weight loss, chest pain, abdominal pain, bladder/bowel incontinence or weakness. No history of cancer. No current tobacco, alcohol or IV drug use. Has been followed by neurosurgery in the past, LUIGI Kapoor and requesting new referral to re-establish with them. Has been referred to pain clinic and has appointment with them tomorrow.     Requesting refills of the following medications as she has been out now for a week:  Pantoprazole 40 mg for history of GI bleed and esophagitis/gastritis.  Benzonatate 100 mg TID prn for chronic cough.   Fluoxetine 40 mg daily for mood disorders. States 40 mg is her dose and not the 20 mg that is on file.   Buspirone 15 mg TID for mood disorders  Clonidine 0.1 mg daily prn for  blood pressure and/or mood disorder.   Gabapentin 300 mg TID for sciatica back pain and/or mood disorder.   Hydroxyzine 25 mg TID prn for mood disorder.  Lactulose 15 mL TID initially for hepatic encephalopathy, but uses now to relieve constipation.  Methocarbamol 500 mg QID prn for back pain.       ROS:     General: No fevers, chills, night sweats, weight loss or gain  HEENT: No hearing changes, vision changes, eye pain, ear pain, nasal discharge, sore throat  Neck: No swelling in neck  Pulmonary: No shortness of breath, cough, sputum, or hemoptysis  Cardiovascular: No chest pain, palpitations, or LE swelling  GI: No nausea, vomiting, diarrhea, constipation, abdominal pain, hematochezia or melena  : No dysuria or frequency  Neuro: No focal weakness, no general weakness, no headaches, no lightheadedness, no dizziness  Psych: No anxiety or depression    Past Medical History:   Diagnosis Date    Alcohol abuse     Alcohol intoxication, with unspecified complication ([facility]) 08/07/2013    Alcoholism ([facility])     Anxiety     Backpain     Bipolar disorder, unspecified ([facility])     Bronchitis     Degenerative disc disease, cervical 03/28/2023    Drug abuse ([facility])     meth, crack cocaine, THC    Elevated lipase 01/23/2023    Encephalopathy, unspecified 04/23/2022    Hepatitis, alcoholic     Hypertension     controlled    Hypo-osmolality and hyponatremia 11/10/2023    Indigestion     Infectious disease MRSA    Liver disease     pancreatitis    Metabolic encephalopathy 11/10/2023    Noninfective gastroenteritis and colitis, unspecified 11/10/2023    Overweight 01/24/2023    Pancreatitis chronic     Flare-up    Pancreatitis, alcoholic, acute 08/07/2013    Pneumonia     Post covid-19 condition, unspecified 02/06/2023    Psychiatric disorder     suicidal in past    Renal disorder     kidney infection 1991    Seizure ([facility])     7/8/2011    Seizure disorder ([facility])     Suicidal behavior 09/27/2022    Thrombocytopenia ([facility]) 08/19/2011     Unspecified hemorrhagic conditions      Social History     Tobacco Use    Smoking status: Never    Smokeless tobacco: Never   Vaping Use    Vaping Use: Never used   Substance Use Topics    Alcohol use: Not Currently     Comment: stopped 10.28.2023    Drug use: Not Currently     Types: Inhaled     Comment: meth on 4/14     Current Outpatient Medications   Medication Sig Dispense Refill    pantoprazole (PROTONIX) 40 MG Tablet Delayed Response Take 1 Tablet by mouth every day. 90 Tablet 1    lactulose 10 GM/15ML Solution Take 15 mL by mouth 3 times a day as needed (To have 2-3 bowel movements daily). 473 mL 1    hydrOXYzine HCl (ATARAX) 25 MG Tab Take 1 Tablet by mouth 3 times a day as needed for Itching. 30 Tablet 2    gabapentin (NEURONTIN) 600 MG tablet Take 0.5 Tablets by mouth 3 times a day for 360 days. 135 Tablet 1    cloNIDine (CATAPRES) 0.1 MG Tab Take 1 Tablet by mouth 1 time a day as needed (blood pressure). 60 Tablet 1    busPIRone (BUSPAR) 15 MG tablet Take 1 Tablet by mouth in the morning, at noon, and at bedtime. 90 Tablet 3    benzonatate (TESSALON) 100 MG Cap Take 1 Capsule by mouth 3 times a day as needed for Cough. 60 Capsule 0    fluoxetine (PROZAC) 40 MG capsule Take 1 Capsule by mouth every day. 30 Capsule 0    methocarbamol (ROBAXIN) 500 MG Tab Take 1 Tablet by mouth 4 times a day. 120 Tablet 1    traZODone (DESYREL) 150 MG Tab Take 1 Tablet by mouth at bedtime as needed for Sleep. 30 Tablet 0    QUEtiapine (SEROQUEL) 25 MG Tab 25 mg.      Thiamine Mononitrate (B1) 100 MG Tab Take 100 mg by mouth every day. 30 Tablet 5    Elastic Bandages & Supports (MEDICAL COMPRESSION SOCKS) Misc Please apply compression stockings to your legs during the day, take off at night. 2 Each 1    atorvastatin (LIPITOR) 40 MG Tab Take 1 Tablet by mouth every evening for 360 days. 90 Tablet 3    potassium chloride ER (KLOR-CON) 10 MEQ tablet Take 1 Tablet by mouth 2 times a day. 60 Tablet 3    folic acid (FOLVITE) 1 MG  "Tab Take 1 Tablet by mouth every day. 30 Tablet 5    amLODIPine (NORVASC) 2.5 MG Tab Take 1 Tablet by mouth every day for 360 days. 90 Tablet 3    diclofenac sodium (VOLTAREN) 1 % Gel Apply 2 g topically 4 times a day as needed (for neck pain) for up to 360 days. 150 g 3     No current facility-administered medications for this visit.       Physical Exam:  /70 (BP Location: Left arm, Patient Position: Sitting, BP Cuff Size: Adult)   Pulse 97   Temp 36.9 °C (98.4 °F) (Temporal)   Resp 16   Ht 1.51 m (4' 11.45\")   Wt 53.7 kg (118 lb 6.4 oz)   LMP 02/28/2018   SpO2 97%   BMI 23.55 kg/m²   General: Well developed, well nourished female, in no distress on 2L NC   HEENT: NC/AT, PERRL, EOMI, no scleral icterus or conjunctival pallor  CV:RRR, no murmurs gallops or Rubs  Pulm: LCAB, no crackles, rales, rhonchi, or wheezing  GI: Normal bowel sounds, abdomen soft, nontender, nondistended to deep or light palpation in all 4 quadrants, no HSM. No CVA tenderness.   MSK: Radial and dorsalis pedis pulses 2+ and equal bilaterally, respectively.  Strength 5 out of 5 in upper and lower extremities.  No lower extremity edema. No TTP along spine. Decreased range of motion with forward flexion. Normal extension and lateral bending.   Neuro: Patient is alert and oriented x3, no focal deficits. DTR intact. Gross sensation intact. Slow, steady gait.   Psych: Appropriate mood and affect       Assessment and Plan:   1. Chronic midline low back pain with left-sided sciatica  No alarm symptoms   Pain currently not controlled due to running out of medications.  Current pain medications: Gabapentin 300 mg TID, methocarbamol 500 mg QID, Tylenol 3000 mg in 24 hours and voltaren gel.  Has appointment to establish with pain medicine 1/19/2024  Follows with PT 2 times weekly  -Discussed with patient to continue supportive measures: Ice or heat to the area, topical analgesic, sparing use of Tylenol due to history of alcoholic liver " disease, massage and stretches.  -Refills of gabapentin and methocarbamol sent to pharmacy listed on chart.   -Continue physical therapy  -Follow-up with pain medicine  -Sent referral to neurosurgery as patient would like to re-establish with them.  - Referral to Neurosurgery    2. Long term current use of high dose acetaminophen  History of alcoholic liver disease, sober since October 2023  Takes 3000 mg Tylenol in 24 hours for chronic back pain  No current signs or symptoms of acute liver failure  -Discussed with patient to limit Tylenol use  -Will check liver enzymes  - Comp Metabolic Panel; Future  -Follow-up in 4 weeks to go over lab    3. Encounter for medication refill  Requesting refills of medications.  -Refills sent to pharmacy listed on chart.   - pantoprazole (PROTONIX) 40 MG Tablet Delayed Response; Take 1 Tablet by mouth every day.  Dispense: 90 Tablet; Refill: 1  - lactulose 10 GM/15ML Solution; Take 15 mL by mouth 3 times a day as needed (To have 2-3 bowel movements daily).  Dispense: 473 mL; Refill: 1  - hydrOXYzine HCl (ATARAX) 25 MG Tab; Take 1 Tablet by mouth 3 times a day as needed for Itching.  Dispense: 30 Tablet; Refill: 2  - gabapentin (NEURONTIN) 600 MG tablet; Take 0.5 Tablets by mouth 3 times a day for 360 days.  Dispense: 135 Tablet; Refill: 1  - cloNIDine (CATAPRES) 0.1 MG Tab; Take 1 Tablet by mouth 1 time a day as needed (blood pressure).  Dispense: 60 Tablet; Refill: 1  - busPIRone (BUSPAR) 15 MG tablet; Take 1 Tablet by mouth in the morning, at noon, and at bedtime.  Dispense: 90 Tablet; Refill: 3  - benzonatate (TESSALON) 100 MG Cap; Take 1 Capsule by mouth 3 times a day as needed for Cough.  Dispense: 60 Capsule; Refill: 0  - fluoxetine (PROZAC) 40 MG capsule; Take 1 Capsule by mouth every day.  Dispense: 30 Capsule; Refill: 0  - methocarbamol (ROBAXIN) 500 MG Tab; Take 1 Tablet by mouth 4 times a day.  Dispense: 120 Tablet; Refill: 1    Return in about 4 weeks (around 2/15/2024)  for Short.    Patient Instructions   -I refilled your medications  -[facility]. [facility]. If you don't hear anything in 2 weeks, call our office to check on the progress   -Try to take a vacation from Tylenol   -Follow-up tomorrow with pain medicine tomorrow   -We will check your liver functions, get labs prior to next appointment   -Follow-up with Dr. Aparicio to address health care maintenance  -It was nice visiting with you today!      Viky Dukes M.D. PGY-3  [facility]    This note was created using voice recognition software.  While every attempt is made to ensure accuracy of transcription, occasionally errors occur.

## 2024-01-18 NOTE — PATIENT INSTRUCTIONS
-I refilled your medications  -Referral to neurosurgery. Check [facility] for scheduling instructions. If you don't hear anything in 2 weeks, call our office to check on the progress   -Try to take a vacation from Tylenol   -Follow-up tomorrow with pain medicine tomorrow   -We will check your liver functions, get labs prior to next appointment   -Follow-up with Dr. Aparicio to address health care maintenance  -It was nice visiting with you today!

## 2024-01-26 RX ORDER — TRAZODONE HYDROCHLORIDE 150 MG/1
150 TABLET ORAL
Qty: 30 TABLET | Refills: 0 | Status: SHIPPED | OUTPATIENT
Start: 2024-01-26

## 2024-02-09 ENCOUNTER — HOSPITAL ENCOUNTER (OUTPATIENT)
Dept: LAB | Facility: MEDICAL CENTER | Age: 54
End: 2024-02-09
Attending: STUDENT IN AN ORGANIZED HEALTH CARE EDUCATION/TRAINING PROGRAM
Payer: COMMERCIAL

## 2024-02-09 DIAGNOSIS — Z76.0 ENCOUNTER FOR MEDICATION REFILL: ICD-10-CM

## 2024-02-09 DIAGNOSIS — K70.9 ALCOHOLIC LIVER DISEASE (HCC): ICD-10-CM

## 2024-02-09 LAB
ANION GAP SERPL CALC-SCNC: 13 MMOL/L (ref 7–16)
ANISOCYTOSIS BLD QL SMEAR: ABNORMAL
BASOPHILS # BLD AUTO: 0.6 % (ref 0–1.8)
BASOPHILS # BLD: 0.02 K/UL (ref 0–0.12)
BUN SERPL-MCNC: 7 MG/DL (ref 8–22)
CALCIUM SERPL-MCNC: 9.1 MG/DL (ref 8.5–10.5)
CHLORIDE SERPL-SCNC: 100 MMOL/L (ref 96–112)
CO2 SERPL-SCNC: 24 MMOL/L (ref 20–33)
COMMENT 1642: NORMAL
CREAT SERPL-MCNC: 0.46 MG/DL (ref 0.5–1.4)
EOSINOPHIL # BLD AUTO: 0.04 K/UL (ref 0–0.51)
EOSINOPHIL NFR BLD: 1.1 % (ref 0–6.9)
ERYTHROCYTE [DISTWIDTH] IN BLOOD BY AUTOMATED COUNT: 45.6 FL (ref 35.9–50)
GFR SERPLBLD CREATININE-BSD FMLA CKD-EPI: 113 ML/MIN/1.73 M 2
GLUCOSE SERPL-MCNC: 103 MG/DL (ref 65–99)
HCT VFR BLD AUTO: 29.1 % (ref 37–47)
HGB BLD-MCNC: 8.6 G/DL (ref 12–16)
IMM GRANULOCYTES # BLD AUTO: 0 K/UL (ref 0–0.11)
IMM GRANULOCYTES NFR BLD AUTO: 0 % (ref 0–0.9)
LYMPHOCYTES # BLD AUTO: 0.99 K/UL (ref 1–4.8)
LYMPHOCYTES NFR BLD: 28 % (ref 22–41)
MCH RBC QN AUTO: 22.7 PG (ref 27–33)
MCHC RBC AUTO-ENTMCNC: 29.6 G/DL (ref 32.2–35.5)
MCV RBC AUTO: 76.8 FL (ref 81.4–97.8)
MICROCYTES BLD QL SMEAR: ABNORMAL
MONOCYTES # BLD AUTO: 0.49 K/UL (ref 0–0.85)
MONOCYTES NFR BLD AUTO: 13.8 % (ref 0–13.4)
MORPHOLOGY BLD-IMP: NORMAL
NEUTROPHILS # BLD AUTO: 2 K/UL (ref 1.82–7.42)
NEUTROPHILS NFR BLD: 56.5 % (ref 44–72)
NRBC # BLD AUTO: 0 K/UL
NRBC BLD-RTO: 0 /100 WBC (ref 0–0.2)
PLATELET # BLD AUTO: 110 K/UL (ref 164–446)
PLATELET BLD QL SMEAR: NORMAL
PLATELETS.RETICULATED NFR BLD AUTO: 2.4 % (ref 0.6–13.1)
PMV BLD AUTO: 9.5 FL (ref 9–12.9)
POTASSIUM SERPL-SCNC: 3.4 MMOL/L (ref 3.6–5.5)
RBC # BLD AUTO: 3.79 M/UL (ref 4.2–5.4)
RBC BLD AUTO: PRESENT
SODIUM SERPL-SCNC: 137 MMOL/L (ref 135–145)
WBC # BLD AUTO: 3.5 K/UL (ref 4.8–10.8)

## 2024-02-09 PROCEDURE — 80048 BASIC METABOLIC PNL TOTAL CA: CPT

## 2024-02-09 PROCEDURE — 85055 RETICULATED PLATELET ASSAY: CPT

## 2024-02-09 PROCEDURE — 85025 COMPLETE CBC W/AUTO DIFF WBC: CPT

## 2024-02-09 PROCEDURE — 36415 COLL VENOUS BLD VENIPUNCTURE: CPT

## 2024-02-09 RX ORDER — LACTULOSE 10 G/15ML
10 SOLUTION ORAL 3 TIMES DAILY PRN
Qty: 473 ML | Refills: 1 | Status: CANCELLED | OUTPATIENT
Start: 2024-02-09

## 2024-02-12 NOTE — TELEPHONE ENCOUNTER
Received request via: Pharmacy    Was the patient seen in the last year in this department? Yes    Does the patient have an active prescription (recently filled or refills available) for medication(s) requested? Yes. Thiamine filled 12/26/23 for 30 days, 5 refills. Pt need the socks refilled    Pharmacy Name: [facility]    Does the patient have [facility] Plus and need 100 day supply (blood pressure, diabetes and cholesterol meds only)? Patient does not have SCP

## 2024-02-12 NOTE — TELEPHONE ENCOUNTER
Received request via: Pharmacy    Was the patient seen in the last year in this department? Yes    Does the patient have an active prescription (recently filled or refills available) for medication(s) requested?  Please review medications.. seems like pt has active medications     Pharmacy Name: [facility]     Does the patient have [facility] Plus and need 100 day supply (blood pressure, diabetes and cholesterol meds only)? Patient does not have SCP

## 2024-02-14 ENCOUNTER — APPOINTMENT (OUTPATIENT)
Dept: ADMISSIONS | Facility: MEDICAL CENTER | Age: 54
End: 2024-02-14
Attending: STUDENT IN AN ORGANIZED HEALTH CARE EDUCATION/TRAINING PROGRAM
Payer: COMMERCIAL

## 2024-02-14 ENCOUNTER — HOSPITAL ENCOUNTER (OUTPATIENT)
Dept: LAB | Facility: MEDICAL CENTER | Age: 54
End: 2024-02-14
Attending: STUDENT IN AN ORGANIZED HEALTH CARE EDUCATION/TRAINING PROGRAM
Payer: COMMERCIAL

## 2024-02-14 LAB
ANION GAP SERPL CALC-SCNC: 13 MMOL/L (ref 7–16)
ANISOCYTOSIS BLD QL SMEAR: ABNORMAL
APPEARANCE UR: CLEAR
BACTERIA #/AREA URNS HPF: NEGATIVE /HPF
BASOPHILS # BLD AUTO: 0.7 % (ref 0–1.8)
BASOPHILS # BLD: 0.02 K/UL (ref 0–0.12)
BILIRUB UR QL STRIP.AUTO: NEGATIVE
BUN SERPL-MCNC: 8 MG/DL (ref 8–22)
CALCIUM SERPL-MCNC: 9.2 MG/DL (ref 8.5–10.5)
CHLORIDE SERPL-SCNC: 99 MMOL/L (ref 96–112)
CO2 SERPL-SCNC: 23 MMOL/L (ref 20–33)
COLOR UR: YELLOW
COMMENT 1642: NORMAL
CREAT SERPL-MCNC: 0.4 MG/DL (ref 0.5–1.4)
EOSINOPHIL # BLD AUTO: 0.08 K/UL (ref 0–0.51)
EOSINOPHIL NFR BLD: 2.7 % (ref 0–6.9)
EPI CELLS #/AREA URNS HPF: NORMAL /HPF
ERYTHROCYTE [DISTWIDTH] IN BLOOD BY AUTOMATED COUNT: 44.6 FL (ref 35.9–50)
GFR SERPLBLD CREATININE-BSD FMLA CKD-EPI: 116 ML/MIN/1.73 M 2
GLUCOSE SERPL-MCNC: 123 MG/DL (ref 65–99)
GLUCOSE UR STRIP.AUTO-MCNC: NEGATIVE MG/DL
HCT VFR BLD AUTO: 29.5 % (ref 37–47)
HGB BLD-MCNC: 8.6 G/DL (ref 12–16)
HYALINE CASTS #/AREA URNS LPF: NORMAL /LPF
IMM GRANULOCYTES # BLD AUTO: 0 K/UL (ref 0–0.11)
IMM GRANULOCYTES NFR BLD AUTO: 0 % (ref 0–0.9)
KETONES UR STRIP.AUTO-MCNC: NEGATIVE MG/DL
LEUKOCYTE ESTERASE UR QL STRIP.AUTO: ABNORMAL
LYMPHOCYTES # BLD AUTO: 1.01 K/UL (ref 1–4.8)
LYMPHOCYTES NFR BLD: 33.8 % (ref 22–41)
MCH RBC QN AUTO: 22.2 PG (ref 27–33)
MCHC RBC AUTO-ENTMCNC: 29.2 G/DL (ref 32.2–35.5)
MCV RBC AUTO: 76 FL (ref 81.4–97.8)
MICRO URNS: ABNORMAL
MICROCYTES BLD QL SMEAR: ABNORMAL
MONOCYTES # BLD AUTO: 0.43 K/UL (ref 0–0.85)
MONOCYTES NFR BLD AUTO: 14.4 % (ref 0–13.4)
MORPHOLOGY BLD-IMP: NORMAL
NEUTROPHILS # BLD AUTO: 1.45 K/UL (ref 1.82–7.42)
NEUTROPHILS NFR BLD: 48.4 % (ref 44–72)
NITRITE UR QL STRIP.AUTO: NEGATIVE
NRBC # BLD AUTO: 0 K/UL
NRBC BLD-RTO: 0 /100 WBC (ref 0–0.2)
OVALOCYTES BLD QL SMEAR: NORMAL
PH UR STRIP.AUTO: 5.5 [PH] (ref 5–8)
PLATELET # BLD AUTO: 101 K/UL (ref 164–446)
PLATELET BLD QL SMEAR: NORMAL
PLATELETS.RETICULATED NFR BLD AUTO: 2.4 % (ref 0.6–13.1)
PMV BLD AUTO: 8.6 FL (ref 9–12.9)
POIKILOCYTOSIS BLD QL SMEAR: NORMAL
POTASSIUM SERPL-SCNC: 3.4 MMOL/L (ref 3.6–5.5)
PROT UR QL STRIP: NEGATIVE MG/DL
RBC # BLD AUTO: 3.88 M/UL (ref 4.2–5.4)
RBC # URNS HPF: NORMAL /HPF
RBC BLD AUTO: PRESENT
RBC UR QL AUTO: NEGATIVE
SODIUM SERPL-SCNC: 135 MMOL/L (ref 135–145)
SP GR UR STRIP.AUTO: 1.02
UROBILINOGEN UR STRIP.AUTO-MCNC: 0.2 MG/DL
WBC # BLD AUTO: 3 K/UL (ref 4.8–10.8)
WBC #/AREA URNS HPF: NORMAL /HPF

## 2024-02-14 PROCEDURE — 85055 RETICULATED PLATELET ASSAY: CPT

## 2024-02-14 PROCEDURE — 81001 URINALYSIS AUTO W/SCOPE: CPT

## 2024-02-14 PROCEDURE — 36415 COLL VENOUS BLD VENIPUNCTURE: CPT

## 2024-02-14 PROCEDURE — 87086 URINE CULTURE/COLONY COUNT: CPT

## 2024-02-14 PROCEDURE — 85025 COMPLETE CBC W/AUTO DIFF WBC: CPT

## 2024-02-14 PROCEDURE — 80048 BASIC METABOLIC PNL TOTAL CA: CPT

## 2024-02-16 DIAGNOSIS — Z76.0 ENCOUNTER FOR MEDICATION REFILL: ICD-10-CM

## 2024-02-16 LAB
BACTERIA UR CULT: NORMAL
SIGNIFICANT IND 70042: NORMAL
SITE SITE: NORMAL
SOURCE SOURCE: NORMAL

## 2024-02-16 RX ORDER — LACTULOSE 10 G/15ML
10 SOLUTION ORAL 3 TIMES DAILY PRN
Qty: 473 ML | Refills: 1 | Status: SHIPPED | OUTPATIENT
Start: 2024-02-16 | End: 2024-03-06 | Stop reason: SDUPTHER

## 2024-02-17 RX ORDER — CALCIUM CARBONATE/VITAMIN D3 600 MG-10
100 TABLET ORAL DAILY
Qty: 30 TABLET | Refills: 5 | Status: SHIPPED | OUTPATIENT
Start: 2024-02-17 | End: 2024-02-27 | Stop reason: SDUPTHER

## 2024-02-17 RX ORDER — METHOCARBAMOL 500 MG/1
500 TABLET, FILM COATED ORAL 4 TIMES DAILY
Qty: 120 TABLET | Refills: 0 | Status: SHIPPED | OUTPATIENT
Start: 2024-02-17 | End: 2024-02-27

## 2024-02-17 RX ORDER — GABAPENTIN 600 MG/1
300 TABLET ORAL 3 TIMES DAILY
Qty: 90 TABLET | Refills: 0 | Status: SHIPPED | OUTPATIENT
Start: 2024-02-17 | End: 2024-02-27

## 2024-02-17 RX ORDER — BUSPIRONE HYDROCHLORIDE 15 MG/1
15 TABLET ORAL 3 TIMES DAILY
Qty: 90 TABLET | Refills: 0 | Status: SHIPPED | OUTPATIENT
Start: 2024-02-17

## 2024-02-19 DIAGNOSIS — Z76.0 ENCOUNTER FOR MEDICATION REFILL: ICD-10-CM

## 2024-02-20 ENCOUNTER — PRE-ADMISSION TESTING (OUTPATIENT)
Dept: ADMISSIONS | Facility: MEDICAL CENTER | Age: 54
End: 2024-02-20
Attending: STUDENT IN AN ORGANIZED HEALTH CARE EDUCATION/TRAINING PROGRAM
Payer: COMMERCIAL

## 2024-02-20 RX ORDER — FLUOXETINE HYDROCHLORIDE 40 MG/1
40 CAPSULE ORAL DAILY
Qty: 30 CAPSULE | Refills: 0 | Status: SHIPPED | OUTPATIENT
Start: 2024-02-20 | End: 2024-02-27

## 2024-02-20 RX ORDER — PREGABALIN 25 MG/1
25 CAPSULE ORAL DAILY
COMMUNITY
End: 2024-02-27

## 2024-02-20 RX ORDER — MULTIVITAMIN
TABLET ORAL DAILY
COMMUNITY

## 2024-02-20 RX ORDER — ACETAMINOPHEN 500 MG
1000 TABLET ORAL
COMMUNITY

## 2024-02-20 RX ORDER — MIRABEGRON 25 MG/1
1 TABLET, FILM COATED, EXTENDED RELEASE ORAL
COMMUNITY
Start: 2024-01-24

## 2024-02-20 NOTE — TELEPHONE ENCOUNTER
Received request via: Patient    Was the patient seen in the last year in this department? Yes    Does the patient have an active prescription (recently filled or refills available) for medication(s) requested? No    Pharmacy Name: [facility]     Does the patient have [facility] Plus and need 100 day supply (blood pressure, diabetes and cholesterol meds only)? Patient does not have SCP

## 2024-02-21 ENCOUNTER — PRE-ADMISSION TESTING (OUTPATIENT)
Dept: ADMISSIONS | Facility: MEDICAL CENTER | Age: 54
End: 2024-02-21
Attending: STUDENT IN AN ORGANIZED HEALTH CARE EDUCATION/TRAINING PROGRAM
Payer: COMMERCIAL

## 2024-02-21 DIAGNOSIS — Z01.810 PRE-OPERATIVE CARDIOVASCULAR EXAMINATION: ICD-10-CM

## 2024-02-21 DIAGNOSIS — Z01.812 PRE-OPERATIVE LABORATORY EXAMINATION: ICD-10-CM

## 2024-02-21 LAB
ALBUMIN SERPL BCP-MCNC: 4 G/DL (ref 3.2–4.9)
ALBUMIN/GLOB SERPL: 1.1 G/DL
ALP SERPL-CCNC: 148 U/L (ref 30–99)
ALT SERPL-CCNC: 18 U/L (ref 2–50)
ANION GAP SERPL CALC-SCNC: 13 MMOL/L (ref 7–16)
ANISOCYTOSIS BLD QL SMEAR: ABNORMAL
AST SERPL-CCNC: 30 U/L (ref 12–45)
BASOPHILS # BLD AUTO: 0.4 % (ref 0–1.8)
BASOPHILS # BLD: 0.02 K/UL (ref 0–0.12)
BILIRUB SERPL-MCNC: 0.3 MG/DL (ref 0.1–1.5)
BUN SERPL-MCNC: 6 MG/DL (ref 8–22)
CALCIUM ALBUM COR SERPL-MCNC: 9.2 MG/DL (ref 8.5–10.5)
CALCIUM SERPL-MCNC: 9.2 MG/DL (ref 8.5–10.5)
CHLORIDE SERPL-SCNC: 100 MMOL/L (ref 96–112)
CO2 SERPL-SCNC: 23 MMOL/L (ref 20–33)
COMMENT 1642: NORMAL
CREAT SERPL-MCNC: 0.51 MG/DL (ref 0.5–1.4)
EKG IMPRESSION: NORMAL
EOSINOPHIL # BLD AUTO: 0.05 K/UL (ref 0–0.51)
EOSINOPHIL NFR BLD: 1.1 % (ref 0–6.9)
ERYTHROCYTE [DISTWIDTH] IN BLOOD BY AUTOMATED COUNT: 43.1 FL (ref 35.9–50)
GFR SERPLBLD CREATININE-BSD FMLA CKD-EPI: 110 ML/MIN/1.73 M 2
GLOBULIN SER CALC-MCNC: 3.7 G/DL (ref 1.9–3.5)
GLUCOSE SERPL-MCNC: 109 MG/DL (ref 65–99)
HCT VFR BLD AUTO: 32 % (ref 37–47)
HGB BLD-MCNC: 9.5 G/DL (ref 12–16)
IMM GRANULOCYTES # BLD AUTO: 0.01 K/UL (ref 0–0.11)
IMM GRANULOCYTES NFR BLD AUTO: 0.2 % (ref 0–0.9)
INR PPP: 1.12 (ref 0.87–1.13)
LYMPHOCYTES # BLD AUTO: 0.95 K/UL (ref 1–4.8)
LYMPHOCYTES NFR BLD: 21.3 % (ref 22–41)
MCH RBC QN AUTO: 22 PG (ref 27–33)
MCHC RBC AUTO-ENTMCNC: 29.7 G/DL (ref 32.2–35.5)
MCV RBC AUTO: 74.2 FL (ref 81.4–97.8)
MICROCYTES BLD QL SMEAR: ABNORMAL
MONOCYTES # BLD AUTO: 0.54 K/UL (ref 0–0.85)
MONOCYTES NFR BLD AUTO: 12.1 % (ref 0–13.4)
MORPHOLOGY BLD-IMP: NORMAL
NEUTROPHILS # BLD AUTO: 2.89 K/UL (ref 1.82–7.42)
NEUTROPHILS NFR BLD: 64.9 % (ref 44–72)
NRBC # BLD AUTO: 0 K/UL
NRBC BLD-RTO: 0 /100 WBC (ref 0–0.2)
OVALOCYTES BLD QL SMEAR: NORMAL
PLATELET # BLD AUTO: 96 K/UL (ref 164–446)
PLATELET BLD QL SMEAR: NORMAL
PLATELETS.RETICULATED NFR BLD AUTO: 2 % (ref 0.6–13.1)
PMV BLD AUTO: 9.5 FL (ref 9–12.9)
POIKILOCYTOSIS BLD QL SMEAR: NORMAL
POTASSIUM SERPL-SCNC: 3.8 MMOL/L (ref 3.6–5.5)
PROT SERPL-MCNC: 7.7 G/DL (ref 6–8.2)
PROTHROMBIN TIME: 14.5 SEC (ref 12–14.6)
RBC # BLD AUTO: 4.31 M/UL (ref 4.2–5.4)
RBC BLD AUTO: PRESENT
SODIUM SERPL-SCNC: 136 MMOL/L (ref 135–145)
WBC # BLD AUTO: 4.5 K/UL (ref 4.8–10.8)

## 2024-02-21 PROCEDURE — 93010 ELECTROCARDIOGRAM REPORT: CPT | Performed by: STUDENT IN AN ORGANIZED HEALTH CARE EDUCATION/TRAINING PROGRAM

## 2024-02-21 PROCEDURE — 36415 COLL VENOUS BLD VENIPUNCTURE: CPT

## 2024-02-21 PROCEDURE — 93005 ELECTROCARDIOGRAM TRACING: CPT

## 2024-02-21 PROCEDURE — 85055 RETICULATED PLATELET ASSAY: CPT

## 2024-02-21 PROCEDURE — 80053 COMPREHEN METABOLIC PANEL: CPT

## 2024-02-21 PROCEDURE — 85610 PROTHROMBIN TIME: CPT

## 2024-02-21 PROCEDURE — 85025 COMPLETE CBC W/AUTO DIFF WBC: CPT

## 2024-02-23 ENCOUNTER — ANESTHESIA (OUTPATIENT)
Dept: SURGERY | Facility: MEDICAL CENTER | Age: 54
End: 2024-02-23
Payer: COMMERCIAL

## 2024-02-23 ENCOUNTER — ANESTHESIA EVENT (OUTPATIENT)
Dept: SURGERY | Facility: MEDICAL CENTER | Age: 54
End: 2024-02-23
Payer: COMMERCIAL

## 2024-02-23 ENCOUNTER — HOSPITAL ENCOUNTER (OUTPATIENT)
Facility: MEDICAL CENTER | Age: 54
End: 2024-02-23
Attending: STUDENT IN AN ORGANIZED HEALTH CARE EDUCATION/TRAINING PROGRAM | Admitting: STUDENT IN AN ORGANIZED HEALTH CARE EDUCATION/TRAINING PROGRAM
Payer: COMMERCIAL

## 2024-02-23 VITALS
OXYGEN SATURATION: 94 % | BODY MASS INDEX: 22.13 KG/M2 | RESPIRATION RATE: 22 BRPM | TEMPERATURE: 97 F | HEART RATE: 82 BPM | DIASTOLIC BLOOD PRESSURE: 73 MMHG | WEIGHT: 109.79 LBS | HEIGHT: 59 IN | SYSTOLIC BLOOD PRESSURE: 159 MMHG

## 2024-02-23 DIAGNOSIS — Z76.0 ENCOUNTER FOR MEDICATION REFILL: ICD-10-CM

## 2024-02-23 LAB — PATHOLOGY CONSULT NOTE: NORMAL

## 2024-02-23 PROCEDURE — 160048 HCHG OR STATISTICAL LEVEL 1-5: Performed by: STUDENT IN AN ORGANIZED HEALTH CARE EDUCATION/TRAINING PROGRAM

## 2024-02-23 PROCEDURE — 160002 HCHG RECOVERY MINUTES (STAT): Performed by: STUDENT IN AN ORGANIZED HEALTH CARE EDUCATION/TRAINING PROGRAM

## 2024-02-23 PROCEDURE — A9270 NON-COVERED ITEM OR SERVICE: HCPCS | Mod: UD | Performed by: ANESTHESIOLOGY

## 2024-02-23 PROCEDURE — 160046 HCHG PACU - 1ST 60 MINS PHASE II: Performed by: STUDENT IN AN ORGANIZED HEALTH CARE EDUCATION/TRAINING PROGRAM

## 2024-02-23 PROCEDURE — 160039 HCHG SURGERY MINUTES - EA ADDL 1 MIN LEVEL 3: Performed by: STUDENT IN AN ORGANIZED HEALTH CARE EDUCATION/TRAINING PROGRAM

## 2024-02-23 PROCEDURE — 160025 RECOVERY II MINUTES (STATS): Performed by: STUDENT IN AN ORGANIZED HEALTH CARE EDUCATION/TRAINING PROGRAM

## 2024-02-23 PROCEDURE — 700105 HCHG RX REV CODE 258: Mod: UD | Performed by: STUDENT IN AN ORGANIZED HEALTH CARE EDUCATION/TRAINING PROGRAM

## 2024-02-23 PROCEDURE — 700101 HCHG RX REV CODE 250: Mod: UD | Performed by: ANESTHESIOLOGY

## 2024-02-23 PROCEDURE — 700102 HCHG RX REV CODE 250 W/ 637 OVERRIDE(OP): Mod: UD | Performed by: ANESTHESIOLOGY

## 2024-02-23 PROCEDURE — 160009 HCHG ANES TIME/MIN: Performed by: STUDENT IN AN ORGANIZED HEALTH CARE EDUCATION/TRAINING PROGRAM

## 2024-02-23 PROCEDURE — 700111 HCHG RX REV CODE 636 W/ 250 OVERRIDE (IP): Mod: UD | Performed by: ANESTHESIOLOGY

## 2024-02-23 PROCEDURE — 88305 TISSUE EXAM BY PATHOLOGIST: CPT

## 2024-02-23 PROCEDURE — 160035 HCHG PACU - 1ST 60 MINS PHASE I: Performed by: STUDENT IN AN ORGANIZED HEALTH CARE EDUCATION/TRAINING PROGRAM

## 2024-02-23 PROCEDURE — 700111 HCHG RX REV CODE 636 W/ 250 OVERRIDE (IP): Mod: JZ,UD | Performed by: ANESTHESIOLOGY

## 2024-02-23 PROCEDURE — 160028 HCHG SURGERY MINUTES - 1ST 30 MINS LEVEL 3: Performed by: STUDENT IN AN ORGANIZED HEALTH CARE EDUCATION/TRAINING PROGRAM

## 2024-02-23 RX ORDER — LIDOCAINE HYDROCHLORIDE 20 MG/ML
INJECTION, SOLUTION EPIDURAL; INFILTRATION; INTRACAUDAL; PERINEURAL PRN
Status: DISCONTINUED | OUTPATIENT
Start: 2024-02-23 | End: 2024-02-23 | Stop reason: SURG

## 2024-02-23 RX ORDER — ONDANSETRON 2 MG/ML
INJECTION INTRAMUSCULAR; INTRAVENOUS PRN
Status: DISCONTINUED | OUTPATIENT
Start: 2024-02-23 | End: 2024-02-23 | Stop reason: SURG

## 2024-02-23 RX ORDER — DEXAMETHASONE SODIUM PHOSPHATE 4 MG/ML
INJECTION, SOLUTION INTRA-ARTICULAR; INTRALESIONAL; INTRAMUSCULAR; INTRAVENOUS; SOFT TISSUE PRN
Status: DISCONTINUED | OUTPATIENT
Start: 2024-02-23 | End: 2024-02-23 | Stop reason: SURG

## 2024-02-23 RX ORDER — CIPROFLOXACIN 2 MG/ML
INJECTION, SOLUTION INTRAVENOUS PRN
Status: DISCONTINUED | OUTPATIENT
Start: 2024-02-23 | End: 2024-02-23 | Stop reason: SURG

## 2024-02-23 RX ORDER — EPHEDRINE SULFATE 50 MG/ML
5 INJECTION, SOLUTION INTRAVENOUS
Status: DISCONTINUED | OUTPATIENT
Start: 2024-02-23 | End: 2024-02-23 | Stop reason: HOSPADM

## 2024-02-23 RX ORDER — OXYCODONE HCL 5 MG/5 ML
5 SOLUTION, ORAL ORAL
Status: COMPLETED | OUTPATIENT
Start: 2024-02-23 | End: 2024-02-23

## 2024-02-23 RX ORDER — HYDRALAZINE HYDROCHLORIDE 20 MG/ML
5 INJECTION INTRAMUSCULAR; INTRAVENOUS
Status: DISCONTINUED | OUTPATIENT
Start: 2024-02-23 | End: 2024-02-23 | Stop reason: HOSPADM

## 2024-02-23 RX ORDER — MIDAZOLAM HYDROCHLORIDE 1 MG/ML
INJECTION INTRAMUSCULAR; INTRAVENOUS PRN
Status: DISCONTINUED | OUTPATIENT
Start: 2024-02-23 | End: 2024-02-23 | Stop reason: SURG

## 2024-02-23 RX ORDER — HALOPERIDOL 5 MG/ML
1 INJECTION INTRAMUSCULAR
Status: DISCONTINUED | OUTPATIENT
Start: 2024-02-23 | End: 2024-02-23 | Stop reason: HOSPADM

## 2024-02-23 RX ORDER — LABETALOL HYDROCHLORIDE 5 MG/ML
5 INJECTION, SOLUTION INTRAVENOUS
Status: DISCONTINUED | OUTPATIENT
Start: 2024-02-23 | End: 2024-02-23 | Stop reason: HOSPADM

## 2024-02-23 RX ORDER — ONDANSETRON 2 MG/ML
4 INJECTION INTRAMUSCULAR; INTRAVENOUS
Status: COMPLETED | OUTPATIENT
Start: 2024-02-23 | End: 2024-02-23

## 2024-02-23 RX ORDER — SODIUM CHLORIDE, SODIUM LACTATE, POTASSIUM CHLORIDE, CALCIUM CHLORIDE 600; 310; 30; 20 MG/100ML; MG/100ML; MG/100ML; MG/100ML
INJECTION, SOLUTION INTRAVENOUS CONTINUOUS
Status: DISCONTINUED | OUTPATIENT
Start: 2024-02-23 | End: 2024-02-23 | Stop reason: HOSPADM

## 2024-02-23 RX ORDER — OXYCODONE HCL 5 MG/5 ML
10 SOLUTION, ORAL ORAL
Status: COMPLETED | OUTPATIENT
Start: 2024-02-23 | End: 2024-02-23

## 2024-02-23 RX ORDER — DIPHENHYDRAMINE HYDROCHLORIDE 50 MG/ML
12.5 INJECTION INTRAMUSCULAR; INTRAVENOUS
Status: DISCONTINUED | OUTPATIENT
Start: 2024-02-23 | End: 2024-02-23 | Stop reason: HOSPADM

## 2024-02-23 RX ADMIN — SODIUM CHLORIDE, POTASSIUM CHLORIDE, SODIUM LACTATE AND CALCIUM CHLORIDE: 600; 310; 30; 20 INJECTION, SOLUTION INTRAVENOUS at 07:38

## 2024-02-23 RX ADMIN — OXYCODONE HYDROCHLORIDE 5 MG: 5 SOLUTION ORAL at 08:56

## 2024-02-23 RX ADMIN — PROPOFOL 100 MG: 10 INJECTION, EMULSION INTRAVENOUS at 07:50

## 2024-02-23 RX ADMIN — LIDOCAINE HYDROCHLORIDE 100 MG: 20 INJECTION, SOLUTION EPIDURAL; INFILTRATION; INTRACAUDAL at 07:49

## 2024-02-23 RX ADMIN — MIDAZOLAM HYDROCHLORIDE 2 MG: 1 INJECTION, SOLUTION INTRAMUSCULAR; INTRAVENOUS at 07:47

## 2024-02-23 RX ADMIN — FENTANYL CITRATE 100 MCG: 50 INJECTION, SOLUTION INTRAMUSCULAR; INTRAVENOUS at 07:49

## 2024-02-23 RX ADMIN — ONDANSETRON 4 MG: 2 INJECTION INTRAMUSCULAR; INTRAVENOUS at 08:53

## 2024-02-23 RX ADMIN — DEXAMETHASONE SODIUM PHOSPHATE 8 MG: 4 INJECTION INTRA-ARTICULAR; INTRALESIONAL; INTRAMUSCULAR; INTRAVENOUS; SOFT TISSUE at 08:31

## 2024-02-23 RX ADMIN — ONDANSETRON 4 MG: 2 INJECTION INTRAMUSCULAR; INTRAVENOUS at 08:31

## 2024-02-23 RX ADMIN — CIPROFLOXACIN 400 MG: 400 INJECTION, SOLUTION INTRAVENOUS at 07:50

## 2024-02-23 RX ADMIN — FENTANYL CITRATE 50 MCG: 50 INJECTION, SOLUTION INTRAMUSCULAR; INTRAVENOUS at 08:36

## 2024-02-23 ASSESSMENT — PAIN DESCRIPTION - PAIN TYPE
TYPE: CHRONIC PAIN
TYPE: SURGICAL PAIN
TYPE: CHRONIC PAIN

## 2024-02-23 ASSESSMENT — FIBROSIS 4 INDEX
FIB4 SCORE: 4.05
FIB4 SCORE: 4.05

## 2024-02-23 NOTE — ANESTHESIA TIME REPORT
Anesthesia Start and Stop Event Times       Date Time Event    2/23/2024 0719 Ready for Procedure     0743 Anesthesia Start     0845 Anesthesia Stop          Responsible Staff  02/23/24      Name Role Begin End    Zenon Lassiter M.D. Anesth 0743 0845          Overtime Reason:  no overtime (within assigned shift)    Comments:

## 2024-02-23 NOTE — DISCHARGE INSTRUCTIONS
If any questions arise, call your provider.  If your provider is not available, please feel free to call the [facility] at [phone].    MEDICATIONS: Resume taking daily medication.  Take prescribed pain medication with food.  If no medication is prescribed, you may take non-aspirin pain medication if needed.  PAIN MEDICATION CAN BE VERY CONSTIPATING.  Take a stool softener or laxative such as senokot, pericolace, or milk of magnesia if needed.    Last pain medication given at 8:56 am (Oxycodone)     What to Expect Post Anesthesia    Rest and take it easy for the first 24 hours.  A responsible adult is recommended to remain with you during that time.  It is normal to feel sleepy.  We encourage you to not do anything that requires balance, judgment or coordination.    FOR 24 HOURS DO NOT:  Drive, operate machinery or run household appliances.  Drink beer or alcoholic beverages.  Make important decisions or sign legal documents.    To avoid nausea, slowly advance diet as tolerated, avoiding spicy or greasy foods for the first day.  Add more substantial food to your diet according to your provider's instructions.  Babies can be fed formula or breast milk as soon as they are hungry.  INCREASE FLUIDS AND FIBER TO AVOID CONSTIPATION.    MILD FLU-LIKE SYMPTOMS ARE NORMAL.  YOU MAY EXPERIENCE GENERALIZED MUSCLE ACHES, THROAT IRRITATION, HEADACHE AND/OR SOME NAUSEA.    Discharge Instructions:   No heavy lifting or straining for 1 week.  Take over the counter medications for pain.  Look out for signs of significant bleeding in urine or infection.  Will plan for visit in 2 weeks in clinic to discuss results.

## 2024-02-23 NOTE — ANESTHESIA PROCEDURE NOTES
Airway    Date/Time: 2/23/2024 7:51 AM    Performed by: Zenon Lassiter M.D.  Authorized by: Zenon Lassiter M.D.    Location:  OR  Urgency:  Elective  Difficult Airway: No    Indications for Airway Management:  Anesthesia      Spontaneous Ventilation: absent    Sedation Level:  Deep  Preoxygenated: Yes    Mask Difficulty Assessment:  0 - not attempted  Final Airway Type:  Supraglottic airway  Final Supraglottic Airway:  Standard LMA    SGA Size:  3  Number of Attempts at Approach:  1

## 2024-02-23 NOTE — ANESTHESIA POSTPROCEDURE EVALUATION
Patient: Olya Youssef    Procedure Summary       Date: 02/23/24 Room / Location: [facility] 12 / SURGERY [facility]    Anesthesia Start: 0743 Anesthesia Stop: 0845    Procedure: BIPOLAR TRANSURETHRAL RESECTION OF BLADDER TUMOR (Bladder) Diagnosis: (BLADDER TUMOR)    Surgeons: Jim Ortega M.D. Responsible Provider: Zenon Lassiter M.D.    Anesthesia Type: general ASA Status: 3            Final Anesthesia Type: general  Last vitals  BP   Blood Pressure: (!) 159/73    Temp   36.1 °C (97 °F)    Pulse   82   Resp   (!) 22    SpO2   94 %      Anesthesia Post Evaluation    Patient location during evaluation: PACU  Patient participation: complete - patient participated  Level of consciousness: awake and alert    Airway patency: patent  Anesthetic complications: no  Cardiovascular status: hemodynamically stable  Respiratory status: acceptable  Hydration status: euvolemic    PONV: none          No notable events documented.     Nurse Pain Score: 4 (NPRS)

## 2024-02-23 NOTE — ANESTHESIA PREPROCEDURE EVALUATION
Case: [id number] Date/Time: 02/23/24 0715    Procedure: BIPOLAR TRANSURETHRAL RESECTION OF BLADDER TUMOR    Pre-op diagnosis: MASS OF URINARY BLADDER    Location: [facility] OR 12 / SUR[facility]ERY [facility]    Surgeons: Jim Ortega M.D.            Relevant Problems   NEURO   (positive) Alcohol withdrawal seizure (HCC)   (positive) Headache      CARDIAC   (positive) Carotid artery stenosis, asymptomatic, right   (positive) Hypertension      Other   (positive) Bipolar affective disorder (HCC)   (positive) History of alcohol dependence (HCC)   (positive) History of methamphetamine abuse (HCC)   (positive) Neuropathy   Last meth and EtOH use 4-5 months ago.  Denies cirrhosis.    Physical Exam    Airway   Mallampati: II  TM distance: >3 FB  Neck ROM: full       Cardiovascular - normal exam  Rhythm: regular  Rate: normal  (-) murmur     Dental - normal exam      Very poor dentition     Pulmonary - normal exam  Breath sounds clear to auscultation     Abdominal    Neurological - normal exam                   Anesthesia Plan    ASA 3   ASA physical status 3 criteria: alcohol and/or substance dependence or abuse    Plan - general       Airway plan will be LMA          Induction: intravenous    Postoperative Plan: Postoperative administration of opioids is intended.    Pertinent diagnostic labs and testing reviewed    Informed Consent:    Anesthetic plan and risks discussed with patient.    Use of blood products discussed with: patient whom consented to blood products.

## 2024-02-23 NOTE — OP REPORT
Urology Operative Report    Date: 2/23/2024    Pre-operative diagnosis: Bladder tumor    Post-operative diagnosis: Bladder tumor    Procedures:  Transurethral resection of bladder tumor (2 cm)    Surgeon: Surgeon(s) and Role:     * Jim Ortega M.D. - Primary    Anesthesia: General    EBL: Minimal    IV fluids: Per anesthesia.    Specimens: Bladder tumor    Complications: None    Drains: None    Disposition:  PACU, discharge after voiding    Findings:  2 cm papillary bladder tumor on the posterior bladder wall    Indications for procedure:    Olya Youssef is a 53 y.o. female who presented with CT finding of bladder tumor.  After lengthy   discussion of risks and benefits, patient agreed to proceed with transurethral resection of bladder tumor.    Details of procedure:    Patient was seen in the preoperative area and informed consent was obtained.  They were transported to the operating room and underwent general anesthesia without complication.  Ciprofloxacin was administered for antibiotic prophylaxis.  Timeout was completed.  Patient was positioned in dorsal lithotomy and then prepped and draped.    26 Mexican resectoscope was inserted into the bladder per urethra.  Cystoscopy was notable for 2 cm papillary bladder tumor on the posterior wall.  Remainder of cystoscopy was unremarkable.  Bipolar resectoscope loop was inserted in the tumor resected in 1 piece and sent for pathology.  Coagulation was used for hemostasis.  Good hemostasis was noted.  Bladder was emptied.  This concluded the procedure.  Patient was awoken from anesthesia and transported to the PACU recovery.

## 2024-02-23 NOTE — OR NURSING
Awake, alert and tolerating PO fluids.  Medicated for pain/nausea.  Vitals stable.  On monitors with alarms audible.    Called family member with update and approximate DC timeline.

## 2024-02-23 NOTE — OR NURSING
0909- Patient arrived in discharge area. Patient up to chair. During initial assessment, pressure injury was found on patient sacrum by discharge and PACU RN. Patient states she has had it for about a week. Rn educated patient on following up with primary care for sacral wound. Also the importance of repositioning frequently.    1000- Patient dropping to 87% on RA.  IS given to patient. Best IS 1000.     1015- Patient up to restroom to void. Successfully able to void.     1020- Patient dressed.     1025- Discharge paperwork signed and reviewed.    1030- PIV removed with tip intact. Patient discharged home with all belongings.

## 2024-02-27 ENCOUNTER — OFFICE VISIT (OUTPATIENT)
Dept: INTERNAL MEDICINE | Facility: OTHER | Age: 54
End: 2024-02-27
Payer: COMMERCIAL

## 2024-02-27 VITALS
BODY MASS INDEX: 22.98 KG/M2 | WEIGHT: 114 LBS | OXYGEN SATURATION: 93 % | SYSTOLIC BLOOD PRESSURE: 112 MMHG | DIASTOLIC BLOOD PRESSURE: 66 MMHG | TEMPERATURE: 97.8 F | HEIGHT: 59 IN | HEART RATE: 94 BPM

## 2024-02-27 DIAGNOSIS — N32.89 MASS OF URINARY BLADDER: ICD-10-CM

## 2024-02-27 DIAGNOSIS — D50.9 MICROCYTIC ANEMIA: ICD-10-CM

## 2024-02-27 DIAGNOSIS — R10.9 LEFT FLANK PAIN: ICD-10-CM

## 2024-02-27 DIAGNOSIS — K70.9 ALCOHOLIC LIVER DISEASE (HCC): ICD-10-CM

## 2024-02-27 DIAGNOSIS — G89.29 OTHER CHRONIC PAIN: ICD-10-CM

## 2024-02-27 DIAGNOSIS — G62.9 NEUROPATHY: ICD-10-CM

## 2024-02-27 DIAGNOSIS — F10.21 HISTORY OF ALCOHOL DEPENDENCE (HCC): ICD-10-CM

## 2024-02-27 PROBLEM — D61.818 OTHER PANCYTOPENIA (HCC): Status: ACTIVE | Noted: 2023-11-10

## 2024-02-27 PROBLEM — K92.2 GASTROINTESTINAL HEMORRHAGE, UNSPECIFIED: Status: ACTIVE | Noted: 2023-11-10

## 2024-02-27 PROBLEM — N32.81 OVERACTIVE BLADDER: Status: ACTIVE | Noted: 2024-01-23

## 2024-02-27 PROBLEM — F32.A DEPRESSION, UNSPECIFIED: Status: ACTIVE | Noted: 2023-11-16

## 2024-02-27 PROBLEM — I10 ESSENTIAL (PRIMARY) HYPERTENSION: Status: ACTIVE | Noted: 2023-11-11

## 2024-02-27 PROCEDURE — 3078F DIAST BP <80 MM HG: CPT

## 2024-02-27 PROCEDURE — 3074F SYST BP LT 130 MM HG: CPT

## 2024-02-27 PROCEDURE — 99214 OFFICE O/P EST MOD 30 MIN: CPT | Mod: GC

## 2024-02-27 RX ORDER — CALCIUM CARBONATE/VITAMIN D3 600 MG-10
100 TABLET ORAL DAILY
Qty: 30 TABLET | Refills: 5 | Status: SHIPPED | OUTPATIENT
Start: 2024-02-27

## 2024-02-27 RX ORDER — PREGABALIN 50 MG/1
25 CAPSULE ORAL 2 TIMES DAILY
COMMUNITY
Start: 2024-01-27 | End: 2024-02-27

## 2024-02-27 RX ORDER — HYDROXYZINE PAMOATE 50 MG/1
CAPSULE ORAL
COMMUNITY
Start: 2024-02-05

## 2024-02-27 RX ORDER — PREGABALIN 25 MG/1
25 CAPSULE ORAL 3 TIMES DAILY
Qty: 90 CAPSULE | Refills: 0 | Status: CANCELLED | OUTPATIENT
Start: 2024-02-27 | End: 2024-03-28

## 2024-02-27 RX ORDER — GABAPENTIN 300 MG/1
CAPSULE ORAL
COMMUNITY
End: 2024-02-27

## 2024-02-27 RX ORDER — METHOCARBAMOL 750 MG/1
TABLET, FILM COATED ORAL
COMMUNITY
Start: 2024-01-19

## 2024-02-27 RX ORDER — CLONIDINE HYDROCHLORIDE 0.1 MG/1
1 TABLET ORAL 2 TIMES DAILY PRN
COMMUNITY

## 2024-02-27 RX ORDER — FLUOXETINE HYDROCHLORIDE 20 MG/1
CAPSULE ORAL
COMMUNITY
Start: 2024-02-05

## 2024-02-27 RX ORDER — GABAPENTIN 600 MG/1
600 TABLET ORAL 3 TIMES DAILY
Qty: 90 TABLET | Refills: 1 | Status: CANCELLED | OUTPATIENT
Start: 2024-02-27

## 2024-02-27 ASSESSMENT — ENCOUNTER SYMPTOMS
FEVER: 0
BACK PAIN: 1
PALPITATIONS: 0
VOMITING: 0
SORE THROAT: 0
NAUSEA: 0
COUGH: 0
DIZZINESS: 0
CHILLS: 0
HEADACHES: 0
ABDOMINAL PAIN: 0
SHORTNESS OF BREATH: 0

## 2024-02-27 ASSESSMENT — FIBROSIS 4 INDEX: FIB4 SCORE: 4.05

## 2024-02-27 NOTE — PROGRESS NOTES
Chief Complaint: Follow-up after cystoscopy    Last Seen: 1/18/2024    History of Present Illness:   Olya Youssef is a 53 y.o. female with PMH relevant for chronic hypoxic respiratory failure on 2 L nasal cannula, remote history of substance abuse, chronic neck and back pain, hypertension, alcoholic liver disease, and mood disorder who presents with follow-up after cystoscopy.  Patient had an incidental finding of a 6 mm enhancing polypoid mass in the dome of her bladder suspicious for transitional cell carcinoma.  She had a cystoscopy done that showed a 2 cm papillary bladder tumor on the posterior wall.  Patient states that she has not had any difficulty with urinating afterwards.  She denies any dysuria or hematuria.  She does mention that her urologist recommended her to see her PCP to evaluate her anemia.  Patient also reports left-sided flank pain for the past couple of weeks. She denies any fevers or chills.      Review of Systems:  Review of Systems   Constitutional:  Negative for chills and fever.   HENT:  Negative for congestion and sore throat.    Respiratory:  Negative for cough and shortness of breath.    Cardiovascular:  Negative for chest pain and palpitations.   Gastrointestinal:  Negative for abdominal pain, nausea and vomiting.   Genitourinary:  Negative for dysuria and hematuria.   Musculoskeletal:  Positive for back pain.   Skin:  Negative for itching and rash.   Neurological:  Negative for dizziness and headaches.        Medications:     Current Outpatient Medications:     hydrOXYzine pamoate, TAKE 1 TO 2 CAPSULES BY MOUTH 4 TIMES DAILY AS NEEDED FOR SLEEP AND ITCHING.    FLUoxetine, TAKE 1 CAPSULE BY MOUTH ONCE DAILY FOR DEPRESSION AND ANXIETY. TAKE WITH 40MG FOR A TOTAL OF 60MG ONCE DAILY OF PROZAC.    methocarbamol, TAKE 1 TO 2 TABLETS BY MOUTH THREE TIMES DAILY AS NEEDED FOR MUSCLE SPASM    B1, 100 mg, Oral, DAILY    Multivitamin, Take  by mouth every day., Taking    Myrbetriq,  "1 Tablet, Oral, QDAY, Taking    acetaminophen, 1,000 mg, Oral, TID, Taking    busPIRone, 15 mg, Oral, TID, Taking    lactulose, 10 g, Oral, TID PRN, Taking    pantoprazole, 40 mg, Oral, DAILY, Taking    QUEtiapine, 25 mg, Oral, DAILY, Taking    atorvastatin, 40 mg, Oral, Q EVENING, Taking    potassium chloride ER, 10 mEq, Oral, BID, Taking    folic acid, 1 mg, Oral, DAILY, Taking    amLODIPine, 2.5 mg, Oral, DAILY, Taking    cloNIDine, 1 Tablet, Oral, BID PRN    traZODone, 150 mg, Oral, QHS PRN (Patient taking differently: 200 mg, Oral, EVERY BEDTIME PRN, Sleep)     Objective:  Vitals:   /66 (BP Location: Right arm, Patient Position: Sitting, BP Cuff Size: Small adult)   Pulse 94   Temp 36.6 °C (97.8 °F) (Temporal)   Ht 1.499 m (4' 11\")   Wt 51.7 kg (114 lb)   SpO2 93%  Body mass index is 23.03 kg/m².    Physical Exam  Constitutional:       General: She is not in acute distress.     Appearance: Normal appearance.   HENT:      Head: Normocephalic and atraumatic.   Eyes:      Extraocular Movements: Extraocular movements intact.      Pupils: Pupils are equal, round, and reactive to light.   Cardiovascular:      Rate and Rhythm: Normal rate and regular rhythm.   Pulmonary:      Effort: No respiratory distress.      Breath sounds: Normal breath sounds.   Abdominal:      General: There is no distension.      Palpations: Abdomen is soft.      Tenderness: There is left CVA tenderness.   Musculoskeletal:         General: No deformity. Normal range of motion.   Neurological:      General: No focal deficit present.      Mental Status: She is oriented to person, place, and time.          Results:    Lab Results   Component Value Date/Time    CHOLSTRLTOT 348 (H) 09/26/2022 08:18 AM     (H) 09/26/2022 08:18 AM     09/26/2022 08:18 AM    TRIGLYCERIDE 99 09/26/2022 08:18 AM       Lab Results   Component Value Date/Time    SODIUM 136 02/21/2024 11:30 AM    POTASSIUM 3.8 02/21/2024 11:30 AM    CHLORIDE 100 " 02/21/2024 11:30 AM    CO2 23 02/21/2024 11:30 AM    GLUCOSE 109 (H) 02/21/2024 11:30 AM    BUN 6 (L) 02/21/2024 11:30 AM    CREATININE 0.51 02/21/2024 11:30 AM    CREATININE 1.0 02/06/2009 03:40 AM    BUNCREATRAT 10.0 10/05/2023 12:07 AM     Lab Results   Component Value Date/Time    ALKPHOSPHAT 148 (H) 02/21/2024 11:30 AM    ASTSGOT 30 02/21/2024 11:30 AM    ALTSGPT 18 02/21/2024 11:30 AM    TBILIRUBIN 0.3 02/21/2024 11:30 AM        Lab Results   Component Value Date/Time    WBC 4.5 (L) 02/21/2024 11:58 AM    RBC 4.31 02/21/2024 11:58 AM    HEMOGLOBIN 9.5 (L) 02/21/2024 11:58 AM    HEMATOCRIT 32.0 (L) 02/21/2024 11:58 AM    MCV 74.2 (L) 02/21/2024 11:58 AM    MCH 22.0 (L) 02/21/2024 11:58 AM    MCHC 29.7 (L) 02/21/2024 11:58 AM    MPV 9.5 02/21/2024 11:58 AM    NEUTSPOLYS 64.90 02/21/2024 11:58 AM    LYMPHOCYTES 21.30 (L) 02/21/2024 11:58 AM    MONOCYTES 12.10 02/21/2024 11:58 AM    EOSINOPHILS 1.10 02/21/2024 11:58 AM    BASOPHILS 0.40 02/21/2024 11:58 AM    HYPOCHROMIA 1+ 06/26/2023 05:50 PM    ANISOCYTOSIS 1+ 02/21/2024 11:58 AM        Assessment and Plan:    #Bladder tumor  Recently underwent cystoscopy on 2/23/2024 with transurethral resection of bladder tumor.  Cystoscopy showed a 2 cm papillary bladder tumor on the posterior wall.  Pathology negative for malignancy.  -Continue follow-up with urology    #Microcytic anemia  Appears patient has been anemic with hemoglobin in the 8-9's. Low MCV.  Possibly due to iron deficiency versus anemia of chronic disease.  - IRON/TOTAL IRON BIND; Future  - FERRITIN; Future    #Neuropathy  #Chronic back pain   Following with pain management (Dr. Messina at [facility]) Currently on gabapentin, methocarbamol, and Lyrica?  Pain not currently well-controlled due to running out of gabapentin.   -Advised patient to follow-up with pain management for refills and medication optimization  -Requested records from pain management    #Left flank pain  Low suspicion for  pyelonephritis. Patient did have left-sided CVA tenderness on exam.  However, she denies any symptoms of dysuria, hematuria, or urinary incontinence. Afebrile.  U/A on 2/14/2024 unremarkable. Possibly MSK related.  -Strict precautions given, if patient develops any symptoms of fevers, chills, worsening pain that radiates, or urinary symptoms to immediately go to the ER    #Alcoholic liver disease (HCC)  Sober for 4 months.  Currently on multivitamin and folate.  Patient states that she never got her prescription for thiamine.  -Thiamine 100 mg daily  -Encourage continue alcohol cessation    Follow Up:  Return in about 6 weeks (around 4/9/2024) for Dr. Aparicio.

## 2024-02-27 NOTE — LETTER
[facility]  Margy Aparicio M.D.  [address] [address]  Fax: [phone]   Authorization for Release/Disclosure of   Protected Health Information   Name: MANAS YOUSSEF : 1970 SSN: [id number]   Address: [place] [address] [address] Phone:    [phone] (home)    I authorize the entity listed below to release/disclose the PHI below to:   [facility]/Margy Aparicio M.D. and Jared Keith D.O.   Provider or Entity Name:     Address   City, State, Zip   Phone:      Fax:     Reason for request: continuity of care   Information to be released:    [  ] LAST COLONOSCOPY,  including any PATH REPORT and follow-up  [  ] LAST FIT/COLOGUARD RESULT [  ] LAST DEXA  [  ] LAST MAMMOGRAM  [  ] LAST PAP  [  ] LAST LABS [  ] RETINA EXAM REPORT  [  ] IMMUNIZATION RECORDS  [  ] Release all info      [  ] Check here and initial the line next to each item to release ALL health information INCLUDING  _____ Care and treatment for drug and / or alcohol abuse  _____ HIV testing, infection status, or AIDS  _____ Genetic Testing    DATES OF SERVICE OR TIME PERIOD TO BE DISCLOSED: _____________  I understand and acknowledge that:  * This Authorization may be revoked at any time by you in writing, except if your health information has already been used or disclosed.  * Your health information that will be used or disclosed as a result of you signing this authorization could be re-disclosed by the recipient. If this occurs, your re-disclosed health information may no longer be protected by State or Federal laws.  * You may refuse to sign this Authorization. Your refusal will not affect your ability to obtain treatment.  * This Authorization becomes effective upon signing and will  on (date) __________.      If no date is indicated, this Authorization will  one (1) year from the signature date.    Name: Manas Youssef  Signature: Date:   2024      PLEASE FAX REQUESTED RECORDS BACK TO: [phone]

## 2024-03-06 DIAGNOSIS — Z76.0 ENCOUNTER FOR MEDICATION REFILL: ICD-10-CM

## 2024-03-06 RX ORDER — LACTULOSE 10 G/15ML
10 SOLUTION ORAL 3 TIMES DAILY PRN
Qty: 473 ML | Refills: 1 | Status: SHIPPED | OUTPATIENT
Start: 2024-03-06

## 2024-03-07 ENCOUNTER — HOSPITAL ENCOUNTER (OUTPATIENT)
Dept: LAB | Facility: MEDICAL CENTER | Age: 54
End: 2024-03-07
Payer: COMMERCIAL

## 2024-03-07 DIAGNOSIS — D50.9 MICROCYTIC ANEMIA: ICD-10-CM

## 2024-03-07 LAB
FERRITIN SERPL-MCNC: 9.7 NG/ML (ref 10–291)
IRON SATN MFR SERPL: 5 % (ref 15–55)
IRON SERPL-MCNC: 24 UG/DL (ref 40–170)
TIBC SERPL-MCNC: 463 UG/DL (ref 250–450)
UIBC SERPL-MCNC: 439 UG/DL (ref 110–370)

## 2024-03-07 PROCEDURE — 83540 ASSAY OF IRON: CPT

## 2024-03-07 PROCEDURE — 82728 ASSAY OF FERRITIN: CPT

## 2024-03-07 PROCEDURE — 83550 IRON BINDING TEST: CPT

## 2024-03-07 PROCEDURE — 36415 COLL VENOUS BLD VENIPUNCTURE: CPT

## 2024-03-08 ENCOUNTER — TELEPHONE (OUTPATIENT)
Dept: INTERNAL MEDICINE | Facility: OTHER | Age: 54
End: 2024-03-08
Payer: COMMERCIAL

## 2024-03-08 DIAGNOSIS — D50.8 OTHER IRON DEFICIENCY ANEMIA: ICD-10-CM

## 2024-03-08 RX ORDER — FERROUS SULFATE 325(65) MG
325 TABLET ORAL
Qty: 30 TABLET | Refills: 0 | Status: SHIPPED | OUTPATIENT
Start: 2024-03-08

## 2024-03-08 NOTE — TELEPHONE ENCOUNTER
Iron panel suggestive of iron deficiency anemia.  Prescription for ferrous sulfate 325 mg every other day sent to [facility] pharmacy.  Patient was informed about results and advised to take medication as prescribed.

## 2024-03-11 ENCOUNTER — HOSPITAL ENCOUNTER (OUTPATIENT)
Facility: MEDICAL CENTER | Age: 54
End: 2024-03-11
Attending: STUDENT IN AN ORGANIZED HEALTH CARE EDUCATION/TRAINING PROGRAM
Payer: COMMERCIAL

## 2024-03-11 PROCEDURE — 87661 TRICHOMONAS VAGINALIS AMPLIF: CPT

## 2024-03-11 PROCEDURE — 81513 NFCT DS BV RNA VAG FLU ALG: CPT

## 2024-03-11 PROCEDURE — 87481 CANDIDA DNA AMP PROBE: CPT

## 2024-03-13 LAB
BV BACTERIA DNA VAG QL NAA+PROBE: POSITIVE
C GLABRATA DNA VAG QL NAA+PROBE: NEGATIVE
CANDIDA DNA VAG QL NAA+PROBE: POSITIVE
T VAGINALIS DNA VAG QL NAA+PROBE: NEGATIVE

## 2024-03-18 ENCOUNTER — APPOINTMENT (OUTPATIENT)
Dept: INTERNAL MEDICINE | Facility: OTHER | Age: 54
End: 2024-03-18
Payer: COMMERCIAL

## 2024-04-01 ENCOUNTER — OFFICE VISIT (OUTPATIENT)
Dept: INTERNAL MEDICINE | Facility: OTHER | Age: 54
End: 2024-04-01
Payer: COMMERCIAL

## 2024-04-01 VITALS
HEIGHT: 60 IN | BODY MASS INDEX: 21.83 KG/M2 | WEIGHT: 111.2 LBS | OXYGEN SATURATION: 92 % | SYSTOLIC BLOOD PRESSURE: 102 MMHG | HEART RATE: 102 BPM | TEMPERATURE: 99 F | DIASTOLIC BLOOD PRESSURE: 66 MMHG

## 2024-04-01 DIAGNOSIS — D50.9 IRON DEFICIENCY ANEMIA, UNSPECIFIED IRON DEFICIENCY ANEMIA TYPE: ICD-10-CM

## 2024-04-01 DIAGNOSIS — R41.3 MEMORY CHANGE: ICD-10-CM

## 2024-04-01 DIAGNOSIS — F39 MOOD DISORDER (HCC): ICD-10-CM

## 2024-04-01 DIAGNOSIS — H53.9 VISION CHANGES: ICD-10-CM

## 2024-04-01 DIAGNOSIS — K70.9 ALCOHOLIC LIVER DISEASE (HCC): ICD-10-CM

## 2024-04-01 PROCEDURE — 3074F SYST BP LT 130 MM HG: CPT | Mod: GC

## 2024-04-01 PROCEDURE — 99214 OFFICE O/P EST MOD 30 MIN: CPT | Mod: GC

## 2024-04-01 PROCEDURE — 3078F DIAST BP <80 MM HG: CPT | Mod: GC

## 2024-04-01 ASSESSMENT — FIBROSIS 4 INDEX: FIB4 SCORE: 4.05

## 2024-04-01 NOTE — PATIENT INSTRUCTIONS
Gastroenterology- previous referral placed.      [facility]  [address] [place] [id number]  Phone: [phone]

## 2024-04-01 NOTE — PROGRESS NOTES
"Date of Service:  4/1/24    CC: memory changes    HPI:  Olya Youssef is a 53 y.o. 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    Memory changes-for 2 to 3 weeks.  Patient states noticing some issues with forgetting what she is talking about midsentence in her brain would freeze.  Denies any head trauma.  Associated with some bilateral temporal intermittent daily headache for the past couple weeks (has been taking Tylenol), intermittent subjective \" fever\" daily for the past week.  States that she does feel little bit unbalanced, but noted no trouble walking today.  Denies any chest pain, shortness of breath.  Was started on benztropine recently for her shaking.  She states she ran out of her quetiapine for a few days and will be seeing psychiatry later today.  Patient today not on nasal cannula oxygen as she said she forgot her oxygen at home today so slightly short of breath, but no hypoxia noted on vitals.  Denies any alcohol/meth use-last use in October 2023.  Patient also noted having some trouble with her eyes lately, having difficulty seeing closer things  (states the reading glasses she bought in stores is not working as well anymore) but is able to watch her TV and see far distances okay; she does not have a ophthalmologist/optometrist.  She states that she has been taking lactulose as well but only having about 1-2 bowel movements a day.    Patient states that she does still have her chronic back pain and her back doctor had given her some Lyrica but she ran out of that medication.    States was also recently given a course of metronidazole and antifungal medication by urologist " as she was told she had BV and yeast infection.  States that she did have some dysuria initially which improved with finishing her antibiotics, however starting to have some burning sensation again.  Denies any increased frequency/urgency/flank pain/abdominal pain/hematuria.  Admits to probably not drinking of water.  Has appointment coming up on 4/8/2024.    Review of systems:  Review of Systems   Constitutional:  Positive for fever (subjective). Negative for chills.   Eyes:  Positive for blurred vision.   Respiratory:  Negative for cough and shortness of breath.    Cardiovascular:  Negative for chest pain and palpitations.   Gastrointestinal:  Negative for abdominal pain, constipation, diarrhea, nausea and vomiting.   Genitourinary:  Positive for dysuria. Negative for flank pain, frequency, hematuria and urgency.   Musculoskeletal:  Positive for back pain (chronic).   Skin: Negative.    Neurological:  Negative for sensory change and weakness.   Psychiatric/Behavioral:  Positive for memory loss.         Past Medical History:  Patient Active Problem List    Diagnosis Date Noted    Overactive bladder 01/23/2024    Mass of urinary bladder 01/23/2024    Family history of osteoporosis in mother 01/18/2024    Depression, unspecified 11/16/2023    Essential (primary) hypertension 11/11/2023    Anxiety disorder, unspecified 11/10/2023    Cognitive communication deficit 11/10/2023    Difficulty in walking, not elsewhere classified 11/10/2023    Elevation of levels of liver transaminase levels 11/10/2023    Muscle weakness (generalized) 11/10/2023    Other psychoactive substance use, unspecified, uncomplicated 11/10/2023    Other reduced mobility 11/10/2023    Polyneuropathy, unspecified 11/10/2023    Alcoholic liver disease, unspecified (HCC) 11/10/2023    Gastrointestinal hemorrhage, unspecified 11/10/2023    Other pancytopenia (HCC) 11/10/2023    Hyperalgesia 07/21/2023    Acute pain of left shoulder 07/21/2023     Encounter for screening mammogram for breast cancer 07/21/2023    Pruritic erythematous rash 07/12/2023    Mood changes 04/22/2023    Bladder polyp 04/11/2023    High risk social situation 03/28/2023    Degenerative disc disease, cervical 03/28/2023    Acetaminophen abuse 02/28/2023    Other chronic pain 02/28/2023    History of alcohol dependence (HCC) 02/06/2023    Chronic respiratory failure with hypoxia, on home oxygen therapy (HCC) 02/06/2023    Other insomnia 02/06/2023    Carotid artery stenosis, asymptomatic, right 01/28/2023    Headache 01/24/2023    Vitamin D deficiency 05/25/2021    Hypertension 04/20/2021    Neuropathy 04/19/2021    Macrocytic anemia 04/18/2021    History of methamphetamine abuse (HCC) 05/29/2018    Alcoholic liver disease (HCC) 08/15/2013    Persistent depressive disorder 07/24/2013    Bipolar affective disorder (HCC) 07/22/2013    Alcohol withdrawal seizure (HCC) 05/16/2012    Anemia 08/17/2011       Past Surgical History:    has a past surgical history that includes gastroscopy (04/28/2015); endoscopy; colonoscopy; and turbt (transurethral resection of bladder tumor) (2/23/2024).    Medications:  Current Outpatient Medications   Medication Sig Dispense Refill    ferrous sulfate 325 (65 Fe) MG tablet Take 1 Tablet by mouth every 48 hours. 30 Tablet 0    lactulose 10 GM/15ML Solution Take 15 mL by mouth 3 times a day as needed (To have 2-3 bowel movements daily). 473 mL 1    hydrOXYzine pamoate (VISTARIL) 50 MG Cap TAKE 1 TO 2 CAPSULES BY MOUTH 4 TIMES DAILY AS NEEDED FOR SLEEP AND ITCHING.      cloNIDine (CATAPRES) 0.1 MG Tab Take 1 Tablet by mouth 2 times a day as needed.      FLUoxetine (PROZAC) 20 MG Cap TAKE 1 CAPSULE BY MOUTH ONCE DAILY FOR DEPRESSION AND ANXIETY. TAKE WITH 40MG FOR A TOTAL OF 60MG ONCE DAILY OF PROZAC.      methocarbamol (ROBAXIN) 750 MG Tab TAKE 1 TO 2 TABLETS BY MOUTH THREE TIMES DAILY AS NEEDED FOR MUSCLE SPASM      Thiamine Mononitrate (B1) 100 MG Tab Take  100 mg by mouth every day. 30 Tablet 5    Multiple Vitamin (MULTIVITAMIN) Tab Take  by mouth every day.      mirabegron ER (MYRBETRIQ) 25 MG TABLET SR 24 HR Take 1 Tablet by mouth every day.      acetaminophen (TYLENOL) 500 MG Tab Take 1,000 mg by mouth 3 times a day.      busPIRone (BUSPAR) 15 MG tablet Take 1 Tablet by mouth in the morning, at noon, and at bedtime. 90 Tablet 0    traZODone (DESYREL) 150 MG Tab Take 1 Tablet by mouth at bedtime as needed for Sleep. (Patient taking differently: Take 200 mg by mouth at bedtime as needed for Sleep.) 30 Tablet 0    pantoprazole (PROTONIX) 40 MG Tablet Delayed Response Take 1 Tablet by mouth every day. 90 Tablet 1    QUEtiapine (SEROQUEL) 25 MG Tab Take 25 mg by mouth every day.      atorvastatin (LIPITOR) 40 MG Tab Take 1 Tablet by mouth every evening for 360 days. 90 Tablet 3    potassium chloride ER (KLOR-CON) 10 MEQ tablet Take 1 Tablet by mouth 2 times a day. 60 Tablet 3    folic acid (FOLVITE) 1 MG Tab Take 1 Tablet by mouth every day. 30 Tablet 5    amLODIPine (NORVASC) 2.5 MG Tab Take 1 Tablet by mouth every day for 360 days. 90 Tablet 3     No current facility-administered medications for this visit.       Allergies:  Allergies   Allergen Reactions    Bactrim Hives    Cephalexin     Lamotrigine [Lamictal] Hives     Tolerated Fioricet 10/3/22    Penicillin G     Quetiapine Hives    Quetiapine Fumarate Hives     Extrapyramidal Symptoms, can tolerate lower doses    Quetiapine Fumarate Hives    Sulfa Drugs Hives    Sulfamethoxazole W-Trimethoprim Hives    Keflex Hives       Family History:   family history includes Cancer in her mother; Lung Disease in her mother.     Social History:    Social History     Tobacco Use    Smoking status: Never    Smokeless tobacco: Never   Vaping Use    Vaping Use: Never used   Substance Use Topics    Alcohol use: Not Currently     Comment: stopped 10.28.2023    Drug use: Not Currently     Types: Inhaled     Comment: Meth 10/28/2023      Physical Exam:  Vitals:    04/01/24 0906   BP: 102/66   Pulse: (!) 102   Temp: 37.2 °C (99 °F)   SpO2: 92%     Body mass index is 21.72 kg/m².  Physical Exam  Constitutional:       General: She is not in acute distress.     Appearance: Normal appearance.   HENT:      Head: Normocephalic and atraumatic.      Right Ear: External ear normal.      Left Ear: External ear normal.   Eyes:      General: No scleral icterus.     Extraocular Movements: Extraocular movements intact.      Conjunctiva/sclera: Conjunctivae normal.   Cardiovascular:      Rate and Rhythm: Normal rate and regular rhythm.      Heart sounds: Normal heart sounds. No murmur heard.  Pulmonary:      Effort: No respiratory distress.      Breath sounds: Normal breath sounds. No wheezing, rhonchi or rales.   Abdominal:      General: There is no distension.      Palpations: Abdomen is soft.   Musculoskeletal:         General: No swelling or tenderness.      Cervical back: Normal range of motion.      Right lower leg: No edema.      Left lower leg: No edema.   Skin:     General: Skin is warm and dry.   Neurological:      General: No focal deficit present.      Mental Status: She is alert and oriented to person, place, and time.      Cranial Nerves: No cranial nerve deficit.      Sensory: No sensory deficit.      Motor: No weakness.      Gait: Gait normal.      Comments: Patient had a couple times of what she was saying mid sentence/ or what was just asked during the interview   Psychiatric:         Mood and Affect: Mood normal.          Assessment/Plan:    1. Memory change  2. Alcoholic liver disease (HCC)  3. Mood disorder (HCC)  No focal deficits, cranial nerve deficits, weakness/abnormal gait. Since symptoms occurred weeks ago, less suspicious for an acute stroke at this time. Will check bloodwork for any electrolyte abnormalities/signs of infection/vitamin deficiencies/thyroid abnormalities, and have patient followup in 1 week to reassess again.   - CBC  WITH DIFFERENTIAL; Future  - Comp Metabolic Panel; Future  - FOLATE; Future  - VITAMIN B12; Future  - TSH; Future  - FREE THYROXINE; Future  - AMMONIA; Future, in setting of alcoholic liver disease and hx of hepatic encephalopathy  - URINALYSIS,CULTURE IF INDICATED; Future   - pt was unable to give urine sample in office today. Recently treated with metonidazole and antifungal for BV and yeast infection by urologist. Has appt next week with urology   - pt also on multiple medications for her mood, prescribed by her psychiatrist, there are concerns for polypharmacy. Pt has appt with psychiatry later today and will discuss with him about her recent memory concerns and see if medication related.     3. Vision changes  - states have trouble reading closer items like her phone, her store bought reading glasses no longer enough. No trouble with vision at a distance as she is still able to watch tv and see me clearly at a distance.   - she does not have an optometrist/ophthalmologist. Recommend her to make an appt with them for eye exam    4. Iron deficiency anemia   - Iron panel- iron 24, TIBC 463, ,% sat 5, ferritin 9.7.  - patient recently started on PO iron supplementation, will need future followup of iron levels  - no colonoscopy before. Referral placed previously, info given to patient today again.     All imaging results and lab results and consult notes are reviewed at this visit.  Followup: Return in about 1 week (around 4/8/2024) for Dr. Louie or Dr. Dukes .    Carola Medina, DO  Internal Medicine PGY-3

## 2024-04-02 ASSESSMENT — ENCOUNTER SYMPTOMS
SHORTNESS OF BREATH: 0
VOMITING: 0
CONSTIPATION: 0
WEAKNESS: 0
BACK PAIN: 1
FLANK PAIN: 0
BLURRED VISION: 1
CHILLS: 0
DIARRHEA: 0
FEVER: 1
SENSORY CHANGE: 0
MEMORY LOSS: 1
COUGH: 0
PALPITATIONS: 0
NAUSEA: 0
ABDOMINAL PAIN: 0

## 2024-04-08 ENCOUNTER — APPOINTMENT (OUTPATIENT)
Dept: RADIOLOGY | Facility: MEDICAL CENTER | Age: 54
End: 2024-04-08
Attending: PHYSICAL MEDICINE & REHABILITATION
Payer: COMMERCIAL

## 2024-04-11 ENCOUNTER — APPOINTMENT (OUTPATIENT)
Dept: INTERNAL MEDICINE | Facility: OTHER | Age: 54
End: 2024-04-11
Payer: COMMERCIAL

## 2024-04-29 ENCOUNTER — APPOINTMENT (OUTPATIENT)
Dept: INTERNAL MEDICINE | Facility: OTHER | Age: 54
End: 2024-04-29
Payer: COMMERCIAL

## 2024-04-30 ENCOUNTER — APPOINTMENT (OUTPATIENT)
Dept: INTERNAL MEDICINE | Facility: OTHER | Age: 54
End: 2024-04-30
Payer: COMMERCIAL

## 2024-05-02 ENCOUNTER — APPOINTMENT (OUTPATIENT)
Dept: INTERNAL MEDICINE | Facility: OTHER | Age: 54
End: 2024-05-02
Payer: COMMERCIAL

## 2024-05-07 ENCOUNTER — APPOINTMENT (OUTPATIENT)
Dept: RADIOLOGY | Facility: MEDICAL CENTER | Age: 54
End: 2024-05-07
Attending: PHYSICAL MEDICINE & REHABILITATION
Payer: COMMERCIAL

## 2024-05-14 ENCOUNTER — TELEPHONE (OUTPATIENT)
Dept: INTERNAL MEDICINE | Facility: OTHER | Age: 54
End: 2024-05-14
Payer: COMMERCIAL

## 2024-05-15 NOTE — TELEPHONE ENCOUNTER
Called Olya again after she No showed her appointment and uber again today, left a message with instructions to call back at [phone] and to ask for Shanita so we could discuss her missed appointment.

## 2024-05-15 NOTE — TELEPHONE ENCOUNTER
Called Olya to discuss multiple No shows and canceled appointments. Let her know that we have a policy of 3 no showed appointments would result in a dismissal from clinic, although I would make an exception for her and confirmed her appointment for 5/15 at 2pm and confirmed address and time to be ready for her Uber to pick her up.

## 2024-06-05 ENCOUNTER — OFFICE VISIT (OUTPATIENT)
Dept: INTERNAL MEDICINE | Facility: OTHER | Age: 54
End: 2024-06-05
Payer: COMMERCIAL

## 2024-06-05 VITALS
OXYGEN SATURATION: 93 % | HEART RATE: 101 BPM | HEIGHT: 60 IN | BODY MASS INDEX: 19.16 KG/M2 | WEIGHT: 97.6 LBS | TEMPERATURE: 99.2 F | DIASTOLIC BLOOD PRESSURE: 66 MMHG | SYSTOLIC BLOOD PRESSURE: 120 MMHG

## 2024-06-05 DIAGNOSIS — K70.9 ALCOHOLIC LIVER DISEASE (HCC): ICD-10-CM

## 2024-06-05 DIAGNOSIS — Z11.9 ENCOUNTER FOR SCREENING FOR INFECTIOUS AND PARASITIC DISEASES, UNSPECIFIED: ICD-10-CM

## 2024-06-05 DIAGNOSIS — R00.0 TACHYCARDIA: ICD-10-CM

## 2024-06-05 DIAGNOSIS — E78.2 MIXED HYPERLIPIDEMIA: ICD-10-CM

## 2024-06-05 DIAGNOSIS — Z76.0 ENCOUNTER FOR MEDICATION REFILL: ICD-10-CM

## 2024-06-05 PROCEDURE — 3078F DIAST BP <80 MM HG: CPT | Performed by: STUDENT IN AN ORGANIZED HEALTH CARE EDUCATION/TRAINING PROGRAM

## 2024-06-05 PROCEDURE — 99213 OFFICE O/P EST LOW 20 MIN: CPT | Mod: GE | Performed by: STUDENT IN AN ORGANIZED HEALTH CARE EDUCATION/TRAINING PROGRAM

## 2024-06-05 PROCEDURE — 3074F SYST BP LT 130 MM HG: CPT | Performed by: STUDENT IN AN ORGANIZED HEALTH CARE EDUCATION/TRAINING PROGRAM

## 2024-06-05 RX ORDER — ATORVASTATIN CALCIUM 40 MG/1
40 TABLET, FILM COATED ORAL EVERY EVENING
Qty: 90 TABLET | Refills: 3 | Status: SHIPPED | OUTPATIENT
Start: 2024-06-05 | End: 2025-05-31

## 2024-06-05 RX ORDER — TRAZODONE HYDROCHLORIDE 150 MG/1
150 TABLET ORAL NIGHTLY
Qty: 30 TABLET | Refills: 1 | Status: SHIPPED | OUTPATIENT
Start: 2024-06-05

## 2024-06-05 RX ORDER — FOLIC ACID 1 MG/1
1 TABLET ORAL DAILY
Qty: 30 TABLET | Refills: 5 | Status: SHIPPED | OUTPATIENT
Start: 2024-06-05

## 2024-06-05 RX ORDER — MULTIVITAMIN
1 TABLET ORAL DAILY
Qty: 30 TABLET | Refills: 3 | Status: SHIPPED | OUTPATIENT
Start: 2024-06-05

## 2024-06-05 RX ORDER — LACTULOSE 10 G/15ML
10 SOLUTION ORAL 3 TIMES DAILY PRN
Qty: 473 ML | Refills: 1 | Status: SHIPPED | OUTPATIENT
Start: 2024-06-05

## 2024-06-05 RX ORDER — PANTOPRAZOLE SODIUM 40 MG/1
40 TABLET, DELAYED RELEASE ORAL DAILY
Qty: 90 TABLET | Refills: 1 | Status: SHIPPED | OUTPATIENT
Start: 2024-06-05

## 2024-06-05 RX ORDER — CALCIUM CARBONATE/VITAMIN D3 600 MG-10
100 TABLET ORAL DAILY
Qty: 30 TABLET | Refills: 5 | Status: SHIPPED | OUTPATIENT
Start: 2024-06-05

## 2024-06-05 ASSESSMENT — FIBROSIS 4 INDEX: FIB4 SCORE: 4.05

## 2024-06-05 NOTE — PROGRESS NOTES
"    Established Patient    Patient Care Team:  Margy Aparicio M.D. as PCP - General (Internal Medicine)    HPI:  Olya Youssef is a 53 y.o. female with past medical history of methamphetamine use and alcohol abuse complicated by alcohol withdrawal/seizures and alcoholic pancreatitis and alcoholic liver disease, chronic hypoxic respiratory failure secondary to COVID (on 2-3LNC at baseline), cervical radiculopathy with chronic neck and back pain, and asymptomatic carotid artery stenosis  who presents today with the following Chief Complaint(s):   Chief Complaint   Patient presents with    Medication Refill     Medication refills, parasitic infection. Poor appetite.      Patient states that she has concerns for a parasitic infection after reading symptoms on WebMD. She states that she sees parasite crawling out of nose, when coughing/feeling parasite hanging on uvula, and thinks that she has it in her bowel movements. Patient brings in a \"specimen\" wrapped in cellophane. Does not have risk factors.   Other than that, states that she has not been hungry lately but has been forcing herself to have a soft diet. Notes poor dentition further stating that one of her teeth broke while eating toast. She has not yet seen a dentist.  Patient reports a variety of symptoms: decreased/blurry vision without loss along with pressure behind eye and when looking around and band-like headache. Was recommended to see optometry at last visit but not yet done. Patient also states she has subjective fever, chills, and possible night sweats. Still dealing with memory issues, taking lactulose with 1-2 BM daily. Taking folate, not on thiamine but has been previously prescribed.   Mood has been down lately, states she does not want to interact with others or leave her house despite loving the outdoors. Has thoughts of not wanting to be here but without intent/plan and no HI. Is following with psychiatry, however, has not " yet reached out regarding this.     Review of Systems   12 point ROS reviewed and are negative except as mentioned in the HPI.      Past Medical History:   Diagnosis Date    Acute nasopharyngitis 02/20/2024    H/O Cold Resolved Two Weeks Ago    Alcohol abuse     Alcohol intoxication, with unspecified complication ([facility]) 08/07/2013    Alcoholism ([facility])     Anxiety     Arthritis     Backpain     Bipolar disorder, unspecified ([facility])     Breath shortness     Bronchitis     Degenerative disc disease, cervical 03/28/2023    Dental disorder 02/20/2024    Missing Teeth    Dependence on nocturnal oxygen therapy 02/20/2024    2L, H/O Covid Pneumonia    Diagnosis unknown 02/20/2024    Hernia Present, Type Unknown    Drug abuse ([facility])     meth, crack cocaine, THC    Elevated lipase 01/23/2023    Encephalopathy, unspecified 04/23/2022    Heart burn     Hepatitis, alcoholic     Hypertension     controlled    Hypo-osmolality and hyponatremia 11/10/2023    Indigestion     Infectious disease MRSA    Liver disease     pancreatitis    Metabolic encephalopathy 11/10/2023    Noninfective gastroenteritis and colitis, unspecified 11/10/2023    Overweight 01/24/2023    Pancreatitis chronic     Flare-up    Pancreatitis, alcoholic, acute 08/07/2013    Pneumonia     Pneumonia due to COVID-19 virus     H/O    Post covid-19 condition, unspecified 02/06/2023    Psychiatric disorder     suicidal in past    Renal disorder     kidney infection 1991    Seizure ([facility])     7/8/2011    Seizure disorder ([facility])     Suicidal behavior 09/27/2022    Thrombocytopenia ([facility]) 08/19/2011    Unspecified hemorrhagic conditions     Urinary bladder disorder     Urinary incontinence        Patient Active Problem List    Diagnosis Date Noted    Mixed hyperlipidemia 06/06/2024    Overactive bladder 01/23/2024    Mass of urinary bladder 01/23/2024    Family history of osteoporosis in mother 01/18/2024    Depression, unspecified 11/16/2023    Essential (primary) hypertension  11/11/2023    Anxiety disorder, unspecified 11/10/2023    Cognitive communication deficit 11/10/2023    Difficulty in walking, not elsewhere classified 11/10/2023    Elevation of levels of liver transaminase levels 11/10/2023    Muscle weakness (generalized) 11/10/2023    Other psychoactive substance use, unspecified, uncomplicated 11/10/2023    Other reduced mobility 11/10/2023    Polyneuropathy, unspecified 11/10/2023    Alcoholic liver disease, unspecified (HCC) 11/10/2023    Gastrointestinal hemorrhage, unspecified 11/10/2023    Other pancytopenia (HCC) 11/10/2023    Hyperalgesia 07/21/2023    Acute pain of left shoulder 07/21/2023    Encounter for screening mammogram for breast cancer 07/21/2023    Pruritic erythematous rash 07/12/2023    Mood changes 04/22/2023    Bladder polyp 04/11/2023    High risk social situation 03/28/2023    Degenerative disc disease, cervical 03/28/2023    Acetaminophen abuse 02/28/2023    Other chronic pain 02/28/2023    History of alcohol dependence (HCC) 02/06/2023    Chronic respiratory failure with hypoxia, on home oxygen therapy (HCC) 02/06/2023    Other insomnia 02/06/2023    Carotid artery stenosis, asymptomatic, right 01/28/2023    Headache 01/24/2023    Vitamin D deficiency 05/25/2021    Hypertension 04/20/2021    Neuropathy 04/19/2021    Macrocytic anemia 04/18/2021    History of methamphetamine abuse ([facility]) 05/29/2018    Alcoholic liver disease (HCC) 08/15/2013    Persistent depressive disorder 07/24/2013    Bipolar affective disorder (HCC) 07/22/2013    Alcohol withdrawal seizure (HCC) 05/16/2012    Anemia 08/17/2011       Allergies:Bactrim, Cephalexin, Lamotrigine [lamictal], Penicillin g, Quetiapine, Quetiapine fumarate, Quetiapine fumarate, Sulfa drugs, Sulfamethoxazole w-trimethoprim, and Keflex    Current Outpatient Medications   Medication Sig Dispense Refill    atorvastatin (LIPITOR) 40 MG Tab Take 1 Tablet by mouth every evening for 360 days. 90 Tablet 3    folic  acid (FOLVITE) 1 MG Tab Take 1 Tablet by mouth every day. 30 Tablet 5    lactulose 10 GM/15ML Solution Take 15 mL by mouth 3 times a day as needed (To have 2-3 bowel movements daily). 473 mL 1    Multiple Vitamin (MULTIVITAMIN) Tab Take 1 Tablet by mouth every day. 30 Tablet 3    pantoprazole (PROTONIX) 40 MG Tablet Delayed Response Take 1 Tablet by mouth every day. 90 Tablet 1    Thiamine Mononitrate (B1) 100 MG Tab Take 100 mg by mouth every day. 30 Tablet 5    traZODone (DESYREL) 150 MG Tab Take 1 Tablet by mouth every evening. 30 Tablet 1    ferrous sulfate 325 (65 Fe) MG tablet Take 1 Tablet by mouth every 48 hours. 30 Tablet 0    hydrOXYzine pamoate (VISTARIL) 50 MG Cap TAKE 1 TO 2 CAPSULES BY MOUTH 4 TIMES DAILY AS NEEDED FOR SLEEP AND ITCHING.      cloNIDine (CATAPRES) 0.1 MG Tab Take 1 Tablet by mouth 2 times a day as needed.      FLUoxetine (PROZAC) 20 MG Cap TAKE 1 CAPSULE BY MOUTH ONCE DAILY FOR DEPRESSION AND ANXIETY. TAKE WITH 40MG FOR A TOTAL OF 60MG ONCE DAILY OF PROZAC.      methocarbamol (ROBAXIN) 750 MG Tab TAKE 1 TO 2 TABLETS BY MOUTH THREE TIMES DAILY AS NEEDED FOR MUSCLE SPASM      acetaminophen (TYLENOL) 500 MG Tab Take 1,000 mg by mouth 3 times a day.      busPIRone (BUSPAR) 15 MG tablet Take 1 Tablet by mouth in the morning, at noon, and at bedtime. 90 Tablet 0    QUEtiapine (SEROQUEL) 25 MG Tab Take 25 mg by mouth every day.      potassium chloride ER (KLOR-CON) 10 MEQ tablet Take 1 Tablet by mouth 2 times a day. 60 Tablet 3     No current facility-administered medications for this visit.       Social History     Tobacco Use    Smoking status: Never    Smokeless tobacco: Never   Vaping Use    Vaping status: Never Used   Substance Use Topics    Alcohol use: Not Currently     Comment: stopped 10.28.2023    Drug use: Not Currently     Types: Inhaled     Comment: Meth 10/28/2023       Family History   Problem Relation Age of Onset    Lung Disease Mother     Cancer Mother          Physical  Exam  Vitals:  /66 (BP Location: Left arm, Patient Position: Sitting, BP Cuff Size: Small adult)   Pulse (!) 101   Temp 37.3 °C (99.2 °F) (Temporal)   Ht 1.524 m (5')   Wt 44.3 kg (97 lb 9.6 oz)   SpO2 93%  Body mass index is 19.06 kg/m².  Constitutional:  Not in acute distress, fidgeting during exam  HEENT:   NC/AT, EOMI, conjunctiva normal, hearing grossly intact, poor dentition  Cardiovascular: Regular rate, regular rhythm. No murmurs or gallops.      Lungs:   Clear to auscultation bilaterally. No wheezes or crackles. No respiratory distress.  Abdomen: Not distended, soft, not tender. No guarding or rigidity. No masses.  Extremities:  No cyanosis/clubbing/edema. No obvious deformities.  Skin:  Warm and dry.  No visible rashes.  Neurologic: Alert & oriented x 3, CN II-XII grossly intact, no focal deficits noted.  Psychiatric:  Affect normal, mood normal      Assessment and Plan:     Problem List Items Addressed This Visit       Alcoholic liver disease (HCC)     Noted hepatic steatosis on imaging, no signs of cirrhosis. Also hospitalization 10/2023 for GIB, noted to be encephalopathic.  Patient reports sobriety since 10/28/2023  -Congratulated patient on her sobriety and encouraged continued cessation  -Refilled lactulose, advised to titrate to 2-3 BM daily  -Continue MVT, folic acid, thiamine. Thiamine refilled today         Relevant Medications    folic acid (FOLVITE) 1 MG Tab    Thiamine Mononitrate (B1) 100 MG Tab    Mixed hyperlipidemia     Lipid panel 9/26/22: , TG 99, ,   H/o asymptomatic severe carotid artery stenosis  On atorvastatin 40mg, not on aspirin d/t h/o GIB. Benefits of aspirin therapy outweighs risks of bleed thus recommended. To discuss in further detail with patient at future visit  -Atorvastatin refilled today         Relevant Medications    atorvastatin (LIPITOR) 40 MG Tab     Other Visit Diagnoses       Encounter for medication refill        Relevant  Medications    lactulose 10 GM/15ML Solution    pantoprazole (PROTONIX) 40 MG Tablet Delayed Response    Encounter for screening for infectious and parasitic diseases, unspecified        Low risk, no risk factors and no noted eosinophils on recent labs. Will get O&P    Relevant Orders    Complete O&P    Tachycardia        Re-evaluate at next visit, work up as appropriate if with persistence          Return in about 4 weeks (around 7/3/2024). To re-establish with primary care provider    Please note that this dictation was created using voice recognition software. I have made every reasonable attempt to correct obvious errors, but I expect that there are errors of grammar and possibly content that I did not discover before finalizing the note.    Margy Aparicio M.D. PGY-3  Internal medicine, [facility] [place]

## 2024-06-05 NOTE — PATIENT INSTRUCTIONS
Thank you for coming in today, Olya  I have sent refills to your pharmacy, please pick them up when you can  Please get your labs that were previously ordered, done as soon as you can. I have also ordered stool studies for you  Please schedule an appointment to see your dentist  Please see optometrist for your vision changes, if you notice any loss of vision or spots in your vision, redness/swelling of eyes please be seen at the hospital as soon as possible  Try drinking Ensure to aid in nutrition  Contact your psychiatrist regarding your medications   Follow up in about 1 month or sooner if needed

## 2024-06-06 PROBLEM — E78.2 MIXED HYPERLIPIDEMIA: Status: ACTIVE | Noted: 2024-06-06

## 2024-06-06 NOTE — ASSESSMENT & PLAN NOTE
Lipid panel 9/26/22: , TG 99, ,   H/o asymptomatic severe carotid artery stenosis  On atorvastatin 40mg, not on aspirin d/t h/o GIB. Benefits of aspirin therapy outweighs risks of bleed thus recommended. To discuss in further detail with patient at future visit  -Atorvastatin refilled today

## 2024-06-06 NOTE — ASSESSMENT & PLAN NOTE
Noted hepatic steatosis on imaging, no signs of cirrhosis. Also hospitalization 10/2023 for GIB, noted to be encephalopathic.  Patient reports sobriety since 10/28/2023  -Congratulated patient on her sobriety and encouraged continued cessation  -Refilled lactulose, advised to titrate to 2-3 BM daily  -Continue MVT, folic acid, thiamine. Thiamine refilled today

## 2024-06-25 ENCOUNTER — OFFICE VISIT (OUTPATIENT)
Dept: URGENT CARE | Facility: CLINIC | Age: 54
End: 2024-06-25
Payer: COMMERCIAL

## 2024-06-25 VITALS
RESPIRATION RATE: 16 BRPM | OXYGEN SATURATION: 100 % | BODY MASS INDEX: 19.83 KG/M2 | DIASTOLIC BLOOD PRESSURE: 78 MMHG | WEIGHT: 101 LBS | HEART RATE: 106 BPM | SYSTOLIC BLOOD PRESSURE: 124 MMHG | TEMPERATURE: 96.9 F | HEIGHT: 60 IN

## 2024-06-25 DIAGNOSIS — Z11.9 ENCOUNTER FOR SCREENING FOR INFECTIOUS AND PARASITIC DISEASES, UNSPECIFIED: ICD-10-CM

## 2024-06-25 DIAGNOSIS — R53.83 OTHER FATIGUE: ICD-10-CM

## 2024-06-25 DIAGNOSIS — L65.9 HAIR LOSS: ICD-10-CM

## 2024-06-25 PROCEDURE — 3078F DIAST BP <80 MM HG: CPT | Performed by: PHYSICIAN ASSISTANT

## 2024-06-25 PROCEDURE — 99213 OFFICE O/P EST LOW 20 MIN: CPT | Performed by: PHYSICIAN ASSISTANT

## 2024-06-25 PROCEDURE — 3074F SYST BP LT 130 MM HG: CPT | Performed by: PHYSICIAN ASSISTANT

## 2024-06-25 ASSESSMENT — ENCOUNTER SYMPTOMS
CONSTIPATION: 0
WEIGHT LOSS: 1
ABDOMINAL PAIN: 0
NAUSEA: 0
VOMITING: 0
FEVER: 0
CHILLS: 0
COUGH: 0
SHORTNESS OF BREATH: 0

## 2024-06-25 ASSESSMENT — FIBROSIS 4 INDEX: FIB4 SCORE: 4.05

## 2024-06-25 NOTE — PROGRESS NOTES
"Subjective     Olya Youssef is a 53 y.o. female who presents with Hair Loss (Pt reports hair loss as well as weight loss. ) and Other (Pt reports blowing a worm out of her nostril.)            Patient is here for follow-up on concerns she expressed to her PCP 3 weeks ago. She states she has been feeling awful. She reports extreme fatigue, hair loss, decreased appetite, weight  loss and blowing \"worms\" out of her nose and coughing them up from her lungs. Patient has labs pending and O&P pending. She has not had these done.         Past Medical History:   Diagnosis Date    Acute nasopharyngitis 02/20/2024    H/O Cold Resolved Two Weeks Ago    Alcohol abuse     Alcohol intoxication, with unspecified complication (Formerly [facility]) 08/07/2013    Alcoholism (Formerly [facility])     Anxiety     Arthritis     Backpain     Bipolar disorder, unspecified ([facility]ly [facility])     Breath shortness     Bronchitis     Degenerative disc disease, cervical 03/28/2023    Dental disorder 02/20/2024    Missing Teeth    Dependence on nocturnal oxygen therapy 02/20/2024    2L, H/O Covid Pneumonia    Diagnosis unknown 02/20/2024    Hernia Present, Type Unknown    Drug abuse ([facility])     meth, crack cocaine, THC    Elevated lipase 01/23/2023    Encephalopathy, unspecified 04/23/2022    Heart burn     Hepatitis, alcoholic     Hypertension     controlled    Hypo-osmolality and hyponatremia 11/10/2023    Indigestion     Infectious disease MRSA    Liver disease     pancreatitis    Metabolic encephalopathy 11/10/2023    Noninfective gastroenteritis and colitis, unspecified 11/10/2023    Overweight 01/24/2023    Pancreatitis chronic     Flare-up    Pancreatitis, alcoholic, acute 08/07/2013    Pneumonia     Pneumonia due to COVID-19 virus     H/O    Post covid-19 condition, unspecified 02/06/2023    Psychiatric disorder     suicidal in past    Renal disorder     kidney infection 1991    Seizure ([facility])     7/8/2011    Seizure disorder (Formerly [facility])     Suicidal behavior 09/27/2022    " Thrombocytopenia ([facility]) 08/19/2011    Unspecified hemorrhagic conditions     Urinary bladder disorder     Urinary incontinence        Past Surgical History:   Procedure Laterality Date    TURBT (TRANSURETHRAL RESECTION OF BLADDER TUMOR)  2/23/2024    Procedure: BIPOLAR TRANSURETHRAL RESECTION OF BLADDER TUMOR;  Surgeon: Jim Ortega M.D.;  Location: [facility];  Service: Urology    GASTROSCOPY  04/28/2015    Performed by Kevin Rendon M.D. at SURGERY [facility]    COLONOSCOPY      ENDOSCOPY         Family History   Problem Relation Age of Onset    Lung Disease Mother     Cancer Mother      Allergies:  Bactrim, Cephalexin, Lamotrigine [lamictal], Penicillin g, Quetiapine, Quetiapine fumarate, Quetiapine fumarate, Sulfa drugs, Sulfamethoxazole w-trimethoprim, and Keflex      Medications, Allergies, and current problem list reviewed today in Epic      Review of Systems   Constitutional:  Positive for malaise/fatigue and weight loss. Negative for chills and fever.   Respiratory:  Negative for cough and shortness of breath.    Cardiovascular:  Negative for chest pain and leg swelling.   Gastrointestinal:  Negative for abdominal pain, constipation, nausea and vomiting.     All other systems reviewed and are negative.            Objective     /78   Pulse (!) 106   Temp 36.1 °C (96.9 °F) (Temporal)   Resp 16   Ht 1.524 m (5')   Wt 45.8 kg (101 lb)   LMP 02/28/2018   SpO2 100%   BMI 19.73 kg/m²      Physical Exam  Constitutional:       General: She is not in acute distress.     Appearance: Normal appearance. She is not ill-appearing.   HENT:      Head: Normocephalic and atraumatic.   Eyes:      Conjunctiva/sclera: Conjunctivae normal.   Cardiovascular:      Rate and Rhythm: Regular rhythm. Tachycardia present.   Pulmonary:      Effort: Pulmonary effort is normal. No respiratory distress.      Breath sounds: No stridor. No wheezing.   Skin:     General: Skin is warm and dry.   Neurological:       General: No focal deficit present.      Mental Status: She is alert and oriented to person, place, and time.   Psychiatric:         Mood and Affect: Mood normal.         Behavior: Behavior normal.         Thought Content: Thought content normal.         Judgment: Judgment normal.                             Assessment & Plan        1. Encounter for screening for infectious and parasitic diseases, unspecified        2. Hair loss        3. Other fatigue          Patient states she did not know she had orders to take to the lab.    Orders were printed and given to the patient to take to the lab including O&P.    Follow-up with PCP    Differential diagnoses, Supportive care, and indications for immediate follow-up discussed with patient.   Pathogenesis of diagnosis discussed including typical length and natural progression.   Instructed to return to clinic or nearest emergency department for any change in condition, further concerns, or worsening of symptoms.    The patient demonstrated a good understanding and agreed with the treatment plan.    Desiree Rich P.A.-C.

## 2024-07-01 ENCOUNTER — APPOINTMENT (OUTPATIENT)
Dept: INTERNAL MEDICINE | Facility: OTHER | Age: 54
End: 2024-07-01
Payer: COMMERCIAL

## 2024-07-01 VITALS
WEIGHT: 102.2 LBS | OXYGEN SATURATION: 95 % | DIASTOLIC BLOOD PRESSURE: 63 MMHG | BODY MASS INDEX: 20.07 KG/M2 | HEART RATE: 91 BPM | HEIGHT: 60 IN | SYSTOLIC BLOOD PRESSURE: 100 MMHG | TEMPERATURE: 97.5 F

## 2024-07-01 DIAGNOSIS — F15.10 METHAMPHETAMINE USE (HCC): ICD-10-CM

## 2024-07-01 DIAGNOSIS — F41.1 GENERALIZED ANXIETY DISORDER: ICD-10-CM

## 2024-07-01 DIAGNOSIS — F34.1 PERSISTENT DEPRESSIVE DISORDER: ICD-10-CM

## 2024-07-01 PROBLEM — I10 ESSENTIAL (PRIMARY) HYPERTENSION: Status: RESOLVED | Noted: 2023-11-11 | Resolved: 2024-07-01

## 2024-07-01 PROBLEM — Z74.09 OTHER REDUCED MOBILITY: Status: RESOLVED | Noted: 2023-11-10 | Resolved: 2024-07-01

## 2024-07-01 PROBLEM — M70.72 BURSITIS OF LEFT HIP: Status: ACTIVE | Noted: 2024-07-01

## 2024-07-01 PROBLEM — R45.86 MOOD CHANGES: Status: RESOLVED | Noted: 2023-04-22 | Resolved: 2024-07-01

## 2024-07-01 PROBLEM — I10 HYPERTENSION: Status: RESOLVED | Noted: 2021-04-20 | Resolved: 2024-07-01

## 2024-07-01 PROBLEM — Z82.62 FAMILY HISTORY OF OSTEOPOROSIS IN MOTHER: Status: RESOLVED | Noted: 2024-01-18 | Resolved: 2024-07-01

## 2024-07-01 PROBLEM — Z12.31 ENCOUNTER FOR SCREENING MAMMOGRAM FOR BREAST CANCER: Status: RESOLVED | Noted: 2023-07-21 | Resolved: 2024-07-01

## 2024-07-01 PROBLEM — R74.01 ELEVATION OF LEVELS OF LIVER TRANSAMINASE LEVELS: Status: RESOLVED | Noted: 2023-11-10 | Resolved: 2024-07-01

## 2024-07-01 PROCEDURE — 99214 OFFICE O/P EST MOD 30 MIN: CPT | Mod: GC

## 2024-07-01 PROCEDURE — 3074F SYST BP LT 130 MM HG: CPT | Mod: GC

## 2024-07-01 PROCEDURE — 3078F DIAST BP <80 MM HG: CPT | Mod: GC

## 2024-07-01 RX ORDER — GABAPENTIN 400 MG/1
400 CAPSULE ORAL 3 TIMES DAILY
COMMUNITY
End: 2024-07-10 | Stop reason: SDUPTHER

## 2024-07-01 RX ORDER — BENZTROPINE MESYLATE 1 MG/1
1 TABLET ORAL 2 TIMES DAILY
COMMUNITY

## 2024-07-01 RX ORDER — HYDROXYZINE HYDROCHLORIDE 25 MG/1
25 TABLET, FILM COATED ORAL 3 TIMES DAILY PRN
COMMUNITY

## 2024-07-01 SDOH — ECONOMIC STABILITY: FOOD INSECURITY: WITHIN THE PAST 12 MONTHS, YOU WORRIED THAT YOUR FOOD WOULD RUN OUT BEFORE YOU GOT MONEY TO BUY MORE.: NEVER TRUE

## 2024-07-01 SDOH — ECONOMIC STABILITY: INCOME INSECURITY: IN THE LAST 12 MONTHS, WAS THERE A TIME WHEN YOU WERE NOT ABLE TO PAY THE MORTGAGE OR RENT ON TIME?: NO

## 2024-07-01 SDOH — ECONOMIC STABILITY: FOOD INSECURITY: WITHIN THE PAST 12 MONTHS, THE FOOD YOU BOUGHT JUST DIDN'T LAST AND YOU DIDN'T HAVE MONEY TO GET MORE.: NEVER TRUE

## 2024-07-01 ASSESSMENT — ENCOUNTER SYMPTOMS
NERVOUS/ANXIOUS: 1
DEPRESSION: 1
COUGH: 1
PALPITATIONS: 1

## 2024-07-01 ASSESSMENT — PATIENT HEALTH QUESTIONNAIRE - PHQ9
5. POOR APPETITE OR OVEREATING: 3 - NEARLY EVERY DAY
SUM OF ALL RESPONSES TO PHQ QUESTIONS 1-9: 22
CLINICAL INTERPRETATION OF PHQ2 SCORE: 6

## 2024-07-01 ASSESSMENT — SOCIAL DETERMINANTS OF HEALTH (SDOH)
HOW OFTEN DO YOU GET TOGETHER WITH FRIENDS OR RELATIVES?: NEVER
HOW HARD IS IT FOR YOU TO PAY FOR THE VERY BASICS LIKE FOOD, HOUSING, MEDICAL CARE, AND HEATING?: SOMEWHAT HARD
DO YOU BELONG TO ANY CLUBS OR ORGANIZATIONS SUCH AS CHURCH GROUPS UNIONS, FRATERNAL OR ATHLETIC GROUPS, OR SCHOOL GROUPS?: NO
HOW OFTEN DO YOU ATTENT MEETINGS OF THE CLUB OR ORGANIZATION YOU BELONG TO?: NEVER
IN A TYPICAL WEEK, HOW MANY TIMES DO YOU TALK ON THE PHONE WITH FAMILY, FRIENDS, OR NEIGHBORS?: TWICE A WEEK

## 2024-07-01 ASSESSMENT — LIFESTYLE VARIABLES
HOW MANY STANDARD DRINKS CONTAINING ALCOHOL DO YOU HAVE ON A TYPICAL DAY: 1 OR 2
SKIP TO QUESTIONS 9-10: 0
AUDIT-C TOTAL SCORE: 4
HOW OFTEN DO YOU HAVE A DRINK CONTAINING ALCOHOL: 2-3 TIMES A WEEK
HOW OFTEN DO YOU HAVE SIX OR MORE DRINKS ON ONE OCCASION: LESS THAN MONTHLY

## 2024-07-01 ASSESSMENT — FIBROSIS 4 INDEX: FIB4 SCORE: 4.05

## 2024-07-09 ENCOUNTER — HOSPITAL ENCOUNTER (OUTPATIENT)
Facility: MEDICAL CENTER | Age: 54
End: 2024-07-09
Attending: STUDENT IN AN ORGANIZED HEALTH CARE EDUCATION/TRAINING PROGRAM
Payer: COMMERCIAL

## 2024-07-09 ENCOUNTER — HOSPITAL ENCOUNTER (OUTPATIENT)
Dept: LAB | Facility: MEDICAL CENTER | Age: 54
End: 2024-07-09
Attending: STUDENT IN AN ORGANIZED HEALTH CARE EDUCATION/TRAINING PROGRAM
Payer: COMMERCIAL

## 2024-07-09 ENCOUNTER — HOSPITAL ENCOUNTER (OUTPATIENT)
Dept: LAB | Facility: MEDICAL CENTER | Age: 54
End: 2024-07-09
Payer: COMMERCIAL

## 2024-07-09 DIAGNOSIS — K70.9 ALCOHOLIC LIVER DISEASE (HCC): ICD-10-CM

## 2024-07-09 DIAGNOSIS — R41.3 MEMORY CHANGE: ICD-10-CM

## 2024-07-09 LAB
ALBUMIN SERPL BCP-MCNC: 3.7 G/DL (ref 3.2–4.9)
ALBUMIN/GLOB SERPL: 1.1 G/DL
ALP SERPL-CCNC: 137 U/L (ref 30–99)
ALT SERPL-CCNC: 8 U/L (ref 2–50)
AMMONIA PLAS-SCNC: 72 UMOL/L (ref 11–45)
ANION GAP SERPL CALC-SCNC: 13 MMOL/L (ref 7–16)
ANISOCYTOSIS BLD QL SMEAR: ABNORMAL
APPEARANCE UR: CLEAR
AST SERPL-CCNC: 32 U/L (ref 12–45)
BASOPHILS # BLD AUTO: 0.2 % (ref 0–1.8)
BASOPHILS # BLD: 0.01 K/UL (ref 0–0.12)
BILIRUB SERPL-MCNC: 0.3 MG/DL (ref 0.1–1.5)
BILIRUB UR QL STRIP.AUTO: NEGATIVE
BUN SERPL-MCNC: 5 MG/DL (ref 8–22)
CALCIUM ALBUM COR SERPL-MCNC: 9.3 MG/DL (ref 8.5–10.5)
CALCIUM SERPL-MCNC: 9.1 MG/DL (ref 8.5–10.5)
CHLORIDE SERPL-SCNC: 100 MMOL/L (ref 96–112)
CO2 SERPL-SCNC: 20 MMOL/L (ref 20–33)
COLOR UR: YELLOW
COMMENT 1642: NORMAL
CREAT SERPL-MCNC: 0.41 MG/DL (ref 0.5–1.4)
EOSINOPHIL # BLD AUTO: 0.02 K/UL (ref 0–0.51)
EOSINOPHIL NFR BLD: 0.5 % (ref 0–6.9)
ERYTHROCYTE [DISTWIDTH] IN BLOOD BY AUTOMATED COUNT: 63 FL (ref 35.9–50)
FOLATE SERPL-MCNC: >40 NG/ML
GFR SERPLBLD CREATININE-BSD FMLA CKD-EPI: 115 ML/MIN/1.73 M 2
GLOBULIN SER CALC-MCNC: 3.3 G/DL (ref 1.9–3.5)
GLUCOSE SERPL-MCNC: 113 MG/DL (ref 65–99)
GLUCOSE UR STRIP.AUTO-MCNC: NEGATIVE MG/DL
HCT VFR BLD AUTO: 27.4 % (ref 37–47)
HGB BLD-MCNC: 8 G/DL (ref 12–16)
IMM GRANULOCYTES # BLD AUTO: 0.01 K/UL (ref 0–0.11)
IMM GRANULOCYTES NFR BLD AUTO: 0.2 % (ref 0–0.9)
KETONES UR STRIP.AUTO-MCNC: NEGATIVE MG/DL
LEUKOCYTE ESTERASE UR QL STRIP.AUTO: NEGATIVE
LYMPHOCYTES # BLD AUTO: 0.87 K/UL (ref 1–4.8)
LYMPHOCYTES NFR BLD: 21.7 % (ref 22–41)
MCH RBC QN AUTO: 24.5 PG (ref 27–33)
MCHC RBC AUTO-ENTMCNC: 29.2 G/DL (ref 32.2–35.5)
MCV RBC AUTO: 84 FL (ref 81.4–97.8)
MICRO URNS: NORMAL
MICROCYTES BLD QL SMEAR: ABNORMAL
MONOCYTES # BLD AUTO: 0.6 K/UL (ref 0–0.85)
MONOCYTES NFR BLD AUTO: 15 % (ref 0–13.4)
MORPHOLOGY BLD-IMP: NORMAL
NEUTROPHILS # BLD AUTO: 2.5 K/UL (ref 1.82–7.42)
NEUTROPHILS NFR BLD: 62.4 % (ref 44–72)
NITRITE UR QL STRIP.AUTO: NEGATIVE
NRBC # BLD AUTO: 0 K/UL
NRBC BLD-RTO: 0 /100 WBC (ref 0–0.2)
OVALOCYTES BLD QL SMEAR: NORMAL
PH UR STRIP.AUTO: 6.5 [PH] (ref 5–8)
PLATELET # BLD AUTO: 85 K/UL (ref 164–446)
PLATELET BLD QL SMEAR: NORMAL
PLATELETS.RETICULATED NFR BLD AUTO: 2.8 % (ref 0.6–13.1)
PMV BLD AUTO: 9.9 FL (ref 9–12.9)
POIKILOCYTOSIS BLD QL SMEAR: NORMAL
POTASSIUM SERPL-SCNC: 3.5 MMOL/L (ref 3.6–5.5)
PROT SERPL-MCNC: 7 G/DL (ref 6–8.2)
PROT UR QL STRIP: NEGATIVE MG/DL
RBC # BLD AUTO: 3.26 M/UL (ref 4.2–5.4)
RBC BLD AUTO: PRESENT
RBC UR QL AUTO: NEGATIVE
SODIUM SERPL-SCNC: 133 MMOL/L (ref 135–145)
SP GR UR STRIP.AUTO: 1
T4 FREE SERPL-MCNC: 0.98 NG/DL (ref 0.93–1.7)
TSH SERPL DL<=0.005 MIU/L-ACNC: 1.61 UIU/ML (ref 0.38–5.33)
UROBILINOGEN UR STRIP.AUTO-MCNC: 0.2 MG/DL
VIT B12 SERPL-MCNC: 462 PG/ML (ref 211–911)
WBC # BLD AUTO: 4 K/UL (ref 4.8–10.8)

## 2024-07-09 PROCEDURE — 36415 COLL VENOUS BLD VENIPUNCTURE: CPT

## 2024-07-09 PROCEDURE — 87329 GIARDIA AG IA: CPT

## 2024-07-09 PROCEDURE — 87328 CRYPTOSPORIDIUM AG IA: CPT

## 2024-07-09 PROCEDURE — 82140 ASSAY OF AMMONIA: CPT

## 2024-07-09 PROCEDURE — 84443 ASSAY THYROID STIM HORMONE: CPT

## 2024-07-09 PROCEDURE — 81003 URINALYSIS AUTO W/O SCOPE: CPT

## 2024-07-09 PROCEDURE — 80053 COMPREHEN METABOLIC PANEL: CPT

## 2024-07-09 PROCEDURE — 82746 ASSAY OF FOLIC ACID SERUM: CPT

## 2024-07-09 PROCEDURE — 85055 RETICULATED PLATELET ASSAY: CPT

## 2024-07-09 PROCEDURE — 85025 COMPLETE CBC W/AUTO DIFF WBC: CPT

## 2024-07-09 PROCEDURE — 82607 VITAMIN B-12: CPT

## 2024-07-09 PROCEDURE — 84439 ASSAY OF FREE THYROXINE: CPT

## 2024-07-10 ENCOUNTER — OFFICE VISIT (OUTPATIENT)
Dept: INTERNAL MEDICINE | Facility: OTHER | Age: 54
End: 2024-07-10
Payer: COMMERCIAL

## 2024-07-10 VITALS
HEIGHT: 60 IN | SYSTOLIC BLOOD PRESSURE: 99 MMHG | DIASTOLIC BLOOD PRESSURE: 60 MMHG | TEMPERATURE: 97.7 F | WEIGHT: 99 LBS | OXYGEN SATURATION: 97 % | HEART RATE: 88 BPM | BODY MASS INDEX: 19.44 KG/M2

## 2024-07-10 DIAGNOSIS — D50.8 OTHER IRON DEFICIENCY ANEMIA: ICD-10-CM

## 2024-07-10 DIAGNOSIS — K76.6 PORTAL HYPERTENSION (HCC): ICD-10-CM

## 2024-07-10 DIAGNOSIS — R05.3 CHRONIC COUGH: ICD-10-CM

## 2024-07-10 DIAGNOSIS — Z12.11 COLON CANCER SCREENING: ICD-10-CM

## 2024-07-10 DIAGNOSIS — K70.9 ALCOHOLIC LIVER DISEASE (HCC): ICD-10-CM

## 2024-07-10 DIAGNOSIS — Z76.0 ENCOUNTER FOR MEDICATION REFILL: ICD-10-CM

## 2024-07-10 DIAGNOSIS — F32.A DEPRESSION, UNSPECIFIED DEPRESSION TYPE: ICD-10-CM

## 2024-07-10 DIAGNOSIS — Z87.448 HX OF ACUTE RENAL FAILURE: ICD-10-CM

## 2024-07-10 DIAGNOSIS — R09.3 ABNORMAL COLOR OF SPUTUM: ICD-10-CM

## 2024-07-10 DIAGNOSIS — R63.4 WEIGHT LOSS, UNINTENTIONAL: ICD-10-CM

## 2024-07-10 PROCEDURE — 99214 OFFICE O/P EST MOD 30 MIN: CPT | Mod: GC

## 2024-07-10 RX ORDER — BUSPIRONE HYDROCHLORIDE 15 MG/1
15 TABLET ORAL 3 TIMES DAILY
Qty: 90 TABLET | Refills: 1 | Status: SHIPPED | OUTPATIENT
Start: 2024-07-10

## 2024-07-10 RX ORDER — POTASSIUM CHLORIDE 750 MG/1
20 TABLET, FILM COATED, EXTENDED RELEASE ORAL 2 TIMES DAILY
Qty: 60 TABLET | Refills: 3 | Status: SHIPPED | OUTPATIENT
Start: 2024-07-10

## 2024-07-10 RX ORDER — DOXYCYCLINE HYCLATE 100 MG
100 TABLET ORAL 2 TIMES DAILY
Qty: 14 TABLET | Refills: 0 | Status: SHIPPED | OUTPATIENT
Start: 2024-07-10 | End: 2024-07-17

## 2024-07-10 RX ORDER — LACTULOSE 10 G/15ML
10 SOLUTION ORAL 3 TIMES DAILY PRN
Qty: 473 ML | Refills: 1 | Status: SHIPPED | OUTPATIENT
Start: 2024-07-10

## 2024-07-10 RX ORDER — GABAPENTIN 400 MG/1
400 CAPSULE ORAL 3 TIMES DAILY
Qty: 90 CAPSULE | Refills: 1 | Status: SHIPPED | OUTPATIENT
Start: 2024-07-10

## 2024-07-10 RX ORDER — AMOXICILLIN AND CLAVULANATE POTASSIUM 875; 125 MG/1; MG/1
1 TABLET, FILM COATED ORAL 2 TIMES DAILY
Qty: 14 TABLET | Refills: 0 | Status: SHIPPED | OUTPATIENT
Start: 2024-07-10 | End: 2024-07-17

## 2024-07-10 ASSESSMENT — FIBROSIS 4 INDEX: FIB4 SCORE: 7.45

## 2024-07-10 ASSESSMENT — ENCOUNTER SYMPTOMS
WHEEZING: 0
FEVER: 0
CHILLS: 0
VOMITING: 0
CONSTIPATION: 1
HEADACHES: 0
COUGH: 0
ABDOMINAL PAIN: 1
DIZZINESS: 0
NAUSEA: 1
PALPITATIONS: 0
SHORTNESS OF BREATH: 0
NECK PAIN: 0
BACK PAIN: 0
MYALGIAS: 1
DIARRHEA: 0
WEIGHT LOSS: 1

## 2024-07-10 ASSESSMENT — PAIN SCALES - GENERAL: PAINLEVEL: 8=MODERATE-SEVERE PAIN

## 2024-07-11 DIAGNOSIS — Z11.9 ENCOUNTER FOR SCREENING FOR INFECTIOUS AND PARASITIC DISEASES, UNSPECIFIED: ICD-10-CM

## 2024-07-11 LAB
C PARVUM AG STL QL IA.RAPID: NEGATIVE
G LAMBLIA AG STL QL IA.RAPID: NEGATIVE

## 2024-07-15 ENCOUNTER — PATIENT OUTREACH (OUTPATIENT)
Dept: INTERNAL MEDICINE | Facility: OTHER | Age: 54
End: 2024-07-15

## 2024-07-15 NOTE — PROGRESS NOTES
Patient contact SW Intern about transportation needs for medical appointments and prescription refills.    SW Intern referred patient to [facility] ([phone]) and patient said she would contact them. Patient has SW Intern's direct number if further help is needed.

## 2024-07-15 NOTE — PROGRESS NOTES
Patient was referred to SW Intern due to transportation issues in attending medical appointments and getting prescriptions.    SW Intern called patient and she answered the phone, but then was disconnected. SW Intern called back and there was no answer so a message was left with direct number.

## 2024-07-16 ENCOUNTER — APPOINTMENT (OUTPATIENT)
Dept: RADIOLOGY | Facility: MEDICAL CENTER | Age: 54
End: 2024-07-16
Attending: PHYSICAL MEDICINE & REHABILITATION
Payer: COMMERCIAL

## 2024-07-17 ENCOUNTER — OFFICE VISIT (OUTPATIENT)
Dept: INTERNAL MEDICINE | Facility: OTHER | Age: 54
End: 2024-07-17
Payer: COMMERCIAL

## 2024-07-17 VITALS
WEIGHT: 100.4 LBS | DIASTOLIC BLOOD PRESSURE: 71 MMHG | HEART RATE: 91 BPM | HEIGHT: 60 IN | SYSTOLIC BLOOD PRESSURE: 116 MMHG | BODY MASS INDEX: 19.71 KG/M2 | OXYGEN SATURATION: 96 % | TEMPERATURE: 98.7 F

## 2024-07-17 DIAGNOSIS — F32.A DEPRESSION, UNSPECIFIED DEPRESSION TYPE: ICD-10-CM

## 2024-07-17 DIAGNOSIS — R04.2 HEMOPTYSIS: ICD-10-CM

## 2024-07-17 DIAGNOSIS — D61.818 OTHER PANCYTOPENIA (HCC): ICD-10-CM

## 2024-07-17 PROCEDURE — 99213 OFFICE O/P EST LOW 20 MIN: CPT | Mod: GE

## 2024-07-17 RX ORDER — ACETAMINOPHEN 500 MG
500 TABLET ORAL EVERY 8 HOURS
Qty: 30 TABLET | Refills: 0 | Status: SHIPPED | OUTPATIENT
Start: 2024-07-17

## 2024-07-17 RX ORDER — FLUOXETINE HYDROCHLORIDE 40 MG/1
40 CAPSULE ORAL DAILY
Qty: 90 CAPSULE | Refills: 0 | Status: SHIPPED | OUTPATIENT
Start: 2024-07-17

## 2024-07-17 ASSESSMENT — ENCOUNTER SYMPTOMS
HEADACHES: 0
SORE THROAT: 0
MYALGIAS: 0
BLURRED VISION: 0
FEVER: 0
NECK PAIN: 0
VOMITING: 0
DEPRESSION: 1
COUGH: 1
HEMOPTYSIS: 1
PALPITATIONS: 0
DIARRHEA: 0
STRIDOR: 0
WHEEZING: 0
DIZZINESS: 0
SHORTNESS OF BREATH: 0
SPUTUM PRODUCTION: 1
CHILLS: 0
DOUBLE VISION: 0

## 2024-07-17 ASSESSMENT — LIFESTYLE VARIABLES: SUBSTANCE_ABUSE: 0

## 2024-07-17 ASSESSMENT — FIBROSIS 4 INDEX: FIB4 SCORE: 7.45

## 2024-08-02 ENCOUNTER — HOSPITAL ENCOUNTER (OUTPATIENT)
Dept: RADIOLOGY | Facility: MEDICAL CENTER | Age: 54
End: 2024-08-02
Payer: COMMERCIAL

## 2024-08-02 DIAGNOSIS — R05.3 CHRONIC COUGH: ICD-10-CM

## 2024-08-02 DIAGNOSIS — R09.3 ABNORMAL COLOR OF SPUTUM: ICD-10-CM

## 2024-08-02 PROCEDURE — 71260 CT THORAX DX C+: CPT

## 2024-08-02 PROCEDURE — 700117 HCHG RX CONTRAST REV CODE 255: Mod: UD

## 2024-08-02 RX ADMIN — IOHEXOL 75 ML: 350 INJECTION, SOLUTION INTRAVENOUS at 14:40

## 2024-10-23 ENCOUNTER — TELEPHONE (OUTPATIENT)
Dept: INTERNAL MEDICINE | Facility: OTHER | Age: 54
End: 2024-10-23
Payer: COMMERCIAL

## 2024-12-03 NOTE — ED NOTES
EKG done. Urine sample collected and sent to lab.   Peripheral IV    Performed by: Adele Pham MD  Authorized by: Adele Pham MD    Size:  18 G  Laterality:  Right  Location:  Hand  Local Anesthetic:  None  Site Prep:  Alcohol  Technique:  Anatomical landmarks  Attempts:  1  Securement: Tape and Transparent dressing

## 2024-12-20 ENCOUNTER — HOSPITAL ENCOUNTER (OUTPATIENT)
Facility: MEDICAL CENTER | Age: 54
End: 2024-12-20
Attending: STUDENT IN AN ORGANIZED HEALTH CARE EDUCATION/TRAINING PROGRAM | Admitting: STUDENT IN AN ORGANIZED HEALTH CARE EDUCATION/TRAINING PROGRAM
Payer: COMMERCIAL

## 2025-03-13 ENCOUNTER — TELEPHONE (OUTPATIENT)
Dept: OBGYN | Facility: CLINIC | Age: 55
End: 2025-03-13
Payer: COMMERCIAL

## (undated) DEVICE — SET LEADWIRE 5 LEAD BEDSIDE DISPOSABLE ECG (1SET OF 5/EA)

## (undated) DEVICE — SENSOR OXIMETER ADULT SPO2 RD SET (20EA/BX)

## (undated) DEVICE — SLEEVE VASO CALF MED - (10PR/CA)

## (undated) DEVICE — COVER LIGHT HANDLE ALC PLUS DISP (18EA/BX)

## (undated) DEVICE — ELECTRODE DUAL RETURN W/ CORD - (50/PK)

## (undated) DEVICE — LACTATED RINGERS INJ 1000 ML - (14EA/CA 60CA/PF)

## (undated) DEVICE — CONNECTOR HOSE NEPTUNE FOR CYSTO ROOM

## (undated) DEVICE — PACK SINGLE BASIN - (6/CA)

## (undated) DEVICE — GLOVE BIOGEL PI INDICATOR SZ 6.5 SURGICAL PF LF - (50/BX 4BX/CA)

## (undated) DEVICE — GLOVE SZ 7.5 BIOGEL PI MICRO - PF LF (50PR/BX)

## (undated) DEVICE — EVACUATOR BLADDER ELLIK - (10/BX)

## (undated) DEVICE — BAG DRAINAGE ANTIREFLUX TOWER SLIDE TAP 4000 ML (20EA/CA)

## (undated) DEVICE — GOWN WARMING STANDARD FLEX - (30/CA)

## (undated) DEVICE — GLOVE SZ 6.5 BIOGEL PI MICRO - PF LF (50PR/BX)

## (undated) DEVICE — COVER FOOT UNIVERSAL DISP. - (25EA/CA)

## (undated) DEVICE — SPONGE GAUZESTER 4 X 4 4PLY - (128PK/CA)

## (undated) DEVICE — TUBING CLEARLINK DUO-VENT - C-FLO (48EA/CA)

## (undated) DEVICE — WATER IRRIG. STER 3000 ML - (4/CA)

## (undated) DEVICE — CANISTER SUCTION 3000ML MECHANICAL FILTER AUTO SHUTOFF MEDI-VAC NONSTERILE LF DISP  (40EA/CA)

## (undated) DEVICE — SET EXTENSION WITH 2 PORTS (48EA/CA) ***PART #2C8610 IS A SUBSTITUTE*****

## (undated) DEVICE — BAG URODRAIN WITH TUBING - (20/CA)

## (undated) DEVICE — SUCTION INSTRUMENT YANKAUER BULBOUS TIP W/O VENT (50EA/CA)